# Patient Record
Sex: FEMALE | Race: WHITE | NOT HISPANIC OR LATINO | Employment: FULL TIME | URBAN - METROPOLITAN AREA
[De-identification: names, ages, dates, MRNs, and addresses within clinical notes are randomized per-mention and may not be internally consistent; named-entity substitution may affect disease eponyms.]

---

## 2017-01-03 ENCOUNTER — GENERIC CONVERSION - ENCOUNTER (OUTPATIENT)
Dept: OTHER | Facility: OTHER | Age: 57
End: 2017-01-03

## 2017-01-04 ENCOUNTER — GENERIC CONVERSION - ENCOUNTER (OUTPATIENT)
Dept: OTHER | Facility: OTHER | Age: 57
End: 2017-01-04

## 2017-01-04 LAB — INTERPRETATION (HISTORICAL): NORMAL

## 2017-01-11 ENCOUNTER — ALLSCRIPTS OFFICE VISIT (OUTPATIENT)
Dept: OTHER | Facility: OTHER | Age: 57
End: 2017-01-11

## 2017-01-11 LAB — HBA1C MFR BLD HPLC: 5.9 %

## 2017-01-16 ENCOUNTER — ALLSCRIPTS OFFICE VISIT (OUTPATIENT)
Dept: OTHER | Facility: OTHER | Age: 57
End: 2017-01-16

## 2017-02-23 ENCOUNTER — GENERIC CONVERSION - ENCOUNTER (OUTPATIENT)
Dept: OTHER | Facility: OTHER | Age: 57
End: 2017-02-23

## 2017-04-12 ENCOUNTER — GENERIC CONVERSION - ENCOUNTER (OUTPATIENT)
Dept: OTHER | Facility: OTHER | Age: 57
End: 2017-04-12

## 2017-04-12 LAB — HBA1C MFR BLD HPLC: 5.5 % (ref 4.8–5.6)

## 2017-04-13 LAB
A/G RATIO (HISTORICAL): 1.7 (ref 1.2–2.2)
ALBUMIN SERPL BCP-MCNC: 4 G/DL (ref 3.5–5.5)
ALP SERPL-CCNC: 96 IU/L (ref 39–117)
ALT SERPL W P-5'-P-CCNC: 54 IU/L (ref 0–32)
AST SERPL W P-5'-P-CCNC: 39 IU/L (ref 0–40)
BILIRUB SERPL-MCNC: 0.3 MG/DL (ref 0–1.2)
BUN SERPL-MCNC: 14 MG/DL (ref 6–24)
BUN/CREA RATIO (HISTORICAL): 18 (ref 9–23)
CALCIUM SERPL-MCNC: 9.5 MG/DL (ref 8.7–10.2)
CHLORIDE SERPL-SCNC: 103 MMOL/L (ref 96–106)
CHOLEST SERPL-MCNC: 153 MG/DL (ref 100–199)
CHOLEST/HDLC SERPL: 3.6 RATIO UNITS (ref 0–4.4)
CO2 SERPL-SCNC: 23 MMOL/L (ref 18–29)
CREAT SERPL-MCNC: 0.78 MG/DL (ref 0.57–1)
EGFR AFRICAN AMERICAN (HISTORICAL): 98 ML/MIN/1.73
EGFR-AMERICAN CALC (HISTORICAL): 85 ML/MIN/1.73
GLUCOSE SERPL-MCNC: 92 MG/DL (ref 65–99)
HDLC SERPL-MCNC: 42 MG/DL
INTERPRETATION (HISTORICAL): NORMAL
LDLC SERPL CALC-MCNC: 84 MG/DL (ref 0–99)
POTASSIUM SERPL-SCNC: 4 MMOL/L (ref 3.5–5.2)
SODIUM SERPL-SCNC: 143 MMOL/L (ref 134–144)
TOT. GLOBULIN, SERUM (HISTORICAL): 2.3 G/DL (ref 1.5–4.5)
TOTAL PROTEIN (HISTORICAL): 6.3 G/DL (ref 6–8.5)
TRIGL SERPL-MCNC: 135 MG/DL (ref 0–149)
VLDLC SERPL CALC-MCNC: 27 MG/DL (ref 5–40)

## 2017-04-19 ENCOUNTER — ALLSCRIPTS OFFICE VISIT (OUTPATIENT)
Dept: OTHER | Facility: OTHER | Age: 57
End: 2017-04-19

## 2017-05-18 ENCOUNTER — ALLSCRIPTS OFFICE VISIT (OUTPATIENT)
Dept: OTHER | Facility: OTHER | Age: 57
End: 2017-05-18

## 2017-08-18 ENCOUNTER — ALLSCRIPTS OFFICE VISIT (OUTPATIENT)
Dept: OTHER | Facility: OTHER | Age: 57
End: 2017-08-18

## 2017-08-21 ENCOUNTER — TRANSCRIBE ORDERS (OUTPATIENT)
Dept: ADMINISTRATIVE | Facility: HOSPITAL | Age: 57
End: 2017-08-21

## 2017-08-21 DIAGNOSIS — Z12.31 VISIT FOR SCREENING MAMMOGRAM: Primary | ICD-10-CM

## 2017-08-24 ENCOUNTER — HOSPITAL ENCOUNTER (OUTPATIENT)
Dept: RADIOLOGY | Facility: HOSPITAL | Age: 57
Discharge: HOME | End: 2017-08-24
Payer: COMMERCIAL

## 2017-08-24 DIAGNOSIS — Z12.31 VISIT FOR SCREENING MAMMOGRAM: ICD-10-CM

## 2017-08-24 PROCEDURE — G0202 SCR MAMMO BI INCL CAD: HCPCS

## 2017-09-22 ENCOUNTER — ALLSCRIPTS OFFICE VISIT (OUTPATIENT)
Dept: OTHER | Facility: OTHER | Age: 57
End: 2017-09-22

## 2017-11-01 ENCOUNTER — ALLSCRIPTS OFFICE VISIT (OUTPATIENT)
Dept: OTHER | Facility: OTHER | Age: 57
End: 2017-11-01

## 2017-11-02 NOTE — PROGRESS NOTES
Assessment  Assessed    1  Supraventricular tachycardia (427 89) (I47 1)   2  Morbid or severe obesity due to excess calories (278 01) (E66 01)   3  Benign essential hypertension (401 1) (I10)   4  Hyperlipidemia (272 4) (E78 5)   5  Severe obstructive sleep apnea (327 23) (G47 33)   6  Status post ablation of atrial flutter (V45 89) (Z98 890,Z86 79)    Plan  Morbid or severe obesity due to excess calories    · Belviq 10 MG Oral Tablet   Rx By: Ziggy Robertson; Dispense: 0 Days ; #:60 Tablet; Refill: 5;For: Morbid or severe obesity due to excess calories; ROBERT = N; Print Rx; Last Updated By: Demi Valladares; 11/1/2017 1:30:23 PM   · Contrave 8-90 MG Oral Tablet Extended Release 12 Hour; 1 tab qd for week, then 1  tab BID for a week, then 2 tabs in am 1 tab in PM for a week then 2 tabs  BID for a week   Rx By: Demi Valladares; Dispense: 0 Days ; #:70 Tablet Extended Release 12 Hour; Refill: 0;For: Morbid or severe obesity due to excess calories; ROBERT = N; Print Rx  Supraventricular tachycardia    · EKG/ECG- POC; Status:Complete;   Done: 88GYE5310   Perform: In Office; [facility]:89ASO8351; Last Updated By:Teagan Mars; 11/1/2017 1:02:03 PM;Ordered; For:Supraventricular tachycardia; Ordered By:Alli Guerin;    Discussion/Summary  Cardiology Discussion Summary Free Text Note Form [facility]:   1  SVT - Continue metoprolol and sotalol  If palpitation episodes increase, consider increasing metoprolol dosage  She does not want to make changes at this time  Encouraged to go to emergency room if palpitations do not resolve immediately  Atrial fibrillation - She is currently on sotalol and metoprolol  ASA is being used for CVA prophylaxis because of low CHADS score  QT interval wnl  HTN - BP controlledDyslipidemia - Pravastatin/fish oil  Weight loss/exercise  Obesity - will begin Contrave  Instructions for use and discount card given to patient  Will follow up in 1 month  Chief Complaint  Chief Complaint Free Text Note Form: 6 mo  f/u - no cardiac complaints  ylm/ma      History of Present Illness  Cardiology HPI Free Text Note Form [facility]: Ms Emiliano Cobos is a 61year old female for the followup of supraventricular tachycardia/atrial fibrillation  The patient was seen at [address] [place] [address] initially because of tachycardia  On 3/23/16, she underwent EPS/SVT ablation with ablation of atrial tachycardia but was complicated by atrial fibrillation which responded to sotalol and cardioversion  Since then, she has intermittent episodes of palpitations that occur up to 2*/week, lasting for a few minutes then resolving with Valsalva manoeuvre  She denies syncope or near syncope  She denies any shortness of breath or chest pain with exertion  is tolerating sotalol without any major side effects  She denies any lower extremity edema, orthopnea or paroxysmal nocturnal dyspnea  was recently prescribed Qysmia and then Belviq by Dr Anthony Alarcon but could not take because insurance did not cover  She is interested in discussing other options  Review of Systems  Cardiology Female ROS:     Cardiac: rhythm problems-- and-- palpitations present , but-- as noted in HPI,-- no chest pain,-- no fainting/blackouts-- and-- no signs of swelling  Skin: No complaints of nonhealing sores or skin rash  Genitourinary: No complaints of recurrent urinary tract infections, frequent urination at night, difficult urination, blood in urine, kidney stones, loss of bladder control, kidney problems, denies any birth control or hormone replacement, is not post menopausal, not currently pregnant  Psychological: No complaints of feeling depressed, anxiety, panic attacks, or difficulty concentrating  General: No complaints of trouble sleeping, lack of energy, fatigue, appetite changes, weight changes, fever, frequent infections, or night sweats  Respiratory: No complaints of shortness of breath, cough with sputum, or wheezing     HEENT: No complaints of serious problems, hearing problems, nose problems, throat problems, or snoring  Gastrointestinal: heartburn   Hematologic: No complaints of bleeding disorders, anemia, blood clots, or excessive brusing  Neurological: No complaints of numbness, tingling, dizziness, weakness, seizures, headaches, syncope or fainting, AM fatigue, daytime sleepiness, no witnessed apnea episodes  Musculoskeletal: arthritis-- and-- back pain   ROS Reviewed:   ROS reviewed  Active Problems  Problems    1  Adenomatous colon polyp (211 3) (D12 6)   2  Benign essential hypertension (401 1) (I10)   3  Controlled depression (311) (F32 9)   4  Esophageal reflux (530 81) (K21 9)   5  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)   6  Hyperlipidemia (272 4) (E78 5)   7  Impaired fasting glucose (790 21) (R73 01)   8  Morbid or severe obesity due to excess calories (278 01) (E66 01)   9  Need for influenza vaccination (V04 81) (Z23)   10  Severe obstructive sleep apnea (327 23) (G47 33)   11  Status post ablation of atrial flutter (V45 89) (Z98 890,Z86 79)   12  Supraventricular tachycardia (427 89) (I47 1)   13  Wears glasses (V49 89) (Z97 3)   14  Well adult on routine health check (V70 0) (Z00 00)    Past Medical History  Problems    1  History of Abdominal pain, RUQ (789 01) (R10 11)   2  History of Abnormal liver function test (790 6) (R79 89)   3  History of Adenomatosis (211 3) (D12 6)   4  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)   5  Benign essential hypertension (401 1) (I10)   6  History of Body mass index (BMI) of 50 0 to 59 9 in adult (V85 43) (Z68 43)   7  History of Carpal tunnel syndrome, unspecified laterality (354 0) (G56 00)   8  History of Cervicalgia (723 1) (M54 2)   9  History of Congenital pes planus (754 61) (Q66 50)   10  History of Cough (786 2) (R05)   11  History of Hemangioma of skin (228 01) (D18 01)   12  History of acute sinusitis (V12 69) (Z87 09)   13   History of atrial fibrillation (V12 59) (Z86 79)   14  History of atrial flutter (V12 59) (Z86 79)   15  History of depression (V11 8) (Z86 59)   16  History of gastroesophageal reflux (GERD) (V12 79) (Z87 19)   17  History of high cholesterol (V12 29) (Z86 39)   18  History of onychomycosis (V12 09) (Z86 19)   19  History of tinea corporis (V12 09) (Z86 19)   20  History of viral gastroenteritis (V12 09) (Z86 19)   21  History of Infectious gastroenteritis (009 0) (A09)   22  History of Ingrowing nail with infection (703 0) (L60 0)   23  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 4)   24  History of Myalgia And Myositis (729 1)   25  History of Osteoarthritis of both knees (715 96) (M17 0)   26  History of Paronychia of toe, unspecified laterality (681 11) (L03 039)   27  History of Paroxysmal atrial fibrillation (427 31) (I48 0)   28  History of Plantar fasciitis (728 71) (M72 2)   29  History of Preoperative examination (V72 84) (Z01 818)   30  History of S/P ablation of atrial flutter (V45 89) (Z98 890,Z86 79)   31  History of Scalp cyst (706 2) (L72 9)   32  History of Sprain Of Lateral Collateral Ligament Of Knee (844 0)   33  History of Sprain Of The Back (847 9)   34  History of Trapezius Muscle Strain (840 8)  Active Problems And Past Medical History Reviewed: The active problems and past medical history were reviewed and updated today  Surgical History  Problems    1  History of Abdominal Surgery   2  History of Appendectomy   3  History of Catheter Ablation Atrial Fibrillation   4  History of Colonoscopy (Fiberoptic) Screening   5  History of Hysterectomy   6  History of Neuroplasty Median Nerve At Carpal Tunnel   7  History of Reported Hx Of Knee Replacement   8  History of Tonsillectomy  Surgical History Reviewed: The surgical history was reviewed and updated today  Family History  Mother    1  Family history of arthritis (V17 7) (Z82 61)   2   Family history of chronic obstructive pulmonary disease (V17 6) (Z82 5)   3  Family history of diabetes mellitus (V18 0) (Z83 3)   4  Family history of hypertension (V17 49) (Z82 49)  Father    5  Family history of arthritis (V17 7) (Z82 61)   6  Family history of Prostate cancer  Son    7  Family history of cerebral aneurysm (V17 1) (Z82 49)  Family History Reviewed: The family history was reviewed and updated today  Social History  Problems    · Being A Social Drinker   · Current some day smoker (305 1) (F17 200)   · Former smoker (V15 82) (W78 400)   · Good sleep hygiene   · Denied: History of Illicit drug use   · Lack of exercise (V69 0) (Z72 3)   ·    · No caffeine use   · Pets/Animals: Dog  Social History Reviewed: The social history was reviewed and updated today  Current Meds   1  Aspir-Low 81 MG Oral Tablet Delayed Release; 1 Every Day; Therapy: 75KPQ0001 to  Requested for: 26Jun2015 Recorded   2  Belviq 10 MG Oral Tablet; 1 tab po bid; Therapy: 92YRV1940 to (Last Rx:05Oct2017) Ordered   3  Centrum Silver Ultra Womens Oral Tablet; TAKE 1 TABLET DAILY; Therapy: (Kristin Gredie) to Recorded   4  Crestor 20 MG Oral Tablet; TAKE 1 TABLET DAILY; Therapy: 85GDS5315 to (Evaluate:06Jan2018)  Requested for: 14UUN7729; Last   Rx:11Jan2017 Ordered   5  Esomeprazole Magnesium 40 MG Oral Capsule Delayed Release; TAKE 1 CAPSULE   Every morning; Therapy: 83VHD3265 to (Last Rx:70Wop2447)  Requested for: 85Voq2157 Ordered   6  Fish Oil Triple Strength 1400 MG Oral Capsule; Take 1 capsule twice daily; Therapy: (Recorded:09Cyz9611) to Recorded   7  Magnesium 400 MG CAPS; take 1 capsule daily; Therapy: 20VZJ0547 to (Evaluate:90Lju7928)  Requested for: 58NCS0433; Last   Rx:32Mmp3291 Ordered   8  Metoprolol Succinate ER 25 MG Oral Tablet Extended Release 24 Hour; take 1 tablet   twice a day; Therapy: 05BAH6122 to (Evaluate:41Xob9072)  Requested for: 55TWC7399; Last   Rx:50Cip8984 Ordered   9   PARoxetine HCl ER 25 MG Oral Tablet Extended Release 24 Hour; TAKE 1 TABLET   EVERY MORNING; Therapy: 57NTV8612 to (Last Rx:04Fpe0674)  Requested for: 12Xmj3776 Ordered   10  Sotalol HCl - 120 MG Oral Tablet; Take 1 tablet every 12 hours; Therapy: 15LNN7179 to (Last Rx:83Ola6231)  Requested for: 87Ikt4343 Ordered   11  Valsartan 160 MG Oral Tablet; TAKE 1 TABLET EVERY MORNING; Therapy: 49Umu4137 to (Last Angel Flynn)  Requested for: 25Tgt7652 Ordered  Medication List Reviewed: The medication list was reviewed and updated today  Allergies  Medication    1  Adhesive Tape TAPE    Vitals  Vital Signs    Recorded: 77UBA3195 01:04PM   Heart Rate 66, Apical   Systolic 041, LUE, Sitting   Diastolic 82, LUE, Sitting   BP CUFF SIZE Large   Height 5 ft 4 in   Weight 302 lb    BMI Calculated 51 84   BSA Calculated 2 33   O2 Saturation 97, RA     Physical Exam    Constitutional - General appearance: No acute distress, well appearing and well nourished  Eyes - Conjunctiva and Sclera examination: Conjunctiva pink, sclera anicteric  Neck - Normal, no JVD   Pulmonary - Respiratory effort: No signs of respiratory distress  -- Auscultation of lungs: Clear to auscultation  Cardiovascular - Auscultation of heart: Normal rate and rhythm, normal S1 and S2, no murmurs  -- Pedal pulses: Normal, 2+ bilaterally  -- Examination of extremities for edema and/or varicosities: Normal     Abdomen - Soft  Musculoskeletal - Gait and station: Normal gait  Skin - Skin: Normal without rashes  Skin is warm and well perfused  Neurologic - Speech normal  No focal deficits  Psychiatric - Orientation to person, place, and time: Normal       Results/Data  Diagnostic Studies Reviewed Cardio:   Echocardiogram/YASMIN: Normal ejection fraction  Normal valve function  ECG Report:   Rhythm and rate:  normal sinus rhythm  P-waves:   the P wave is normal    QRS: the QRS is normal   ST segment: QTc interval: 420      Health Management  History of Encounter for screening mammogram for malignant neoplasm of breast   Digital Bilateral Screening Mammogram With CAD; every 1 year; Last 08Tnr2080; Next Due:  79Oez4633;  Overdue    Future Appointments    Date/Time Provider Specialty Site   12/01/2017 01:00 PM Aniceto Little DO Cardiology [facility]IOLOG[facility]   11/06/2017 01:30 PM Amy Penn MD [facility]ology [facility]   01/15/2018 08:45 AM Amy Penn MD [facility]     Signatures   Electronically signed by : Amaya Dacosta DO; Nov 1 2017  2:20PM EST                       (Author)

## 2017-11-06 ENCOUNTER — ALLSCRIPTS OFFICE VISIT (OUTPATIENT)
Dept: OTHER | Facility: OTHER | Age: 57
End: 2017-11-06

## 2017-12-20 ENCOUNTER — GENERIC CONVERSION - ENCOUNTER (OUTPATIENT)
Dept: OTHER | Facility: OTHER | Age: 57
End: 2017-12-20

## 2017-12-20 ENCOUNTER — ALLSCRIPTS OFFICE VISIT (OUTPATIENT)
Dept: OTHER | Facility: OTHER | Age: 57
End: 2017-12-20

## 2018-01-10 NOTE — PROGRESS NOTES
History of Present Illness  Care Coordination Encounter Information:   Type of Encounter: Telephonic   Last Office Visit: 1/11/17       Care Coordination SL Nurse [facility]:   The reason for call is to discuss outreach for follow up/needed services and coordination of meeting care plan treatment goals  I attempted contact with Elmon Spatz as per the [facility] high risk list  She has a history of GI stromal sarcoma, obesity, elevated LFT with fatty liver disease  I provided my contact information and requested a call back  Active Problems    1  Abnormal liver function test (790 6) (R79 89)   2  Adenomatosis (211 3) (D12 6)   3  Adenomatous colon polyp (211 3) (D12 6)   4  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)   5  Atrial flutter (427 32) (I48 92)   6  Benign essential hypertension (401 1) (I10)   7  Body mass index (BMI) of 50 0 to 59 9 in adult (V85 43) (Z68 43)   8  Carpal tunnel syndrome, unspecified laterality (354 0) (G56 00)   9  Congenital pes planus (754 61) (Q66 50)   10  Depression (311) (F32 9)   11  Encounter for screening for malignant neoplasm of colon (V76 51) (Z12 11)   12  Encounter for screening mammogram for malignant neoplasm of breast (V76 12)    (Z12 31)   13  Esophageal reflux (530 81) (K21 9)   14  Flu vaccine need (V04 81) (Z23)   15  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)   16  Hemangioma of skin (228 01) (D18 01)   17  Hyperlipidemia (272 4) (E78 5)   18  Impaired fasting glucose (790 21) (R73 01)   19  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 4)   20  Morbid or severe obesity due to excess calories (278 01) (E66 01)   21  Myalgia And Myositis (729 1)   22  Need for prophylactic vaccination and inoculation against influenza (V04 81) (Z23)   23  Onychomycosis (110 1) (B35 1)   24  Osteoarthritis of both knees (715 96) (M17 0)   25  Paroxysmal atrial fibrillation (427 31) (I48 0)   26   Preoperative examination (V72 84) (Z01 818)   27  S/P ablation of atrial flutter (V45 89) (Z98 890,Z86 79)   28  Severe obstructive sleep apnea (327 23) (G47 33)   29  Status post ablation of atrial flutter (V45 89) (Z98 890,Z86 79)   30  Supraventricular tachycardia (427 89) (I47 1)   31  Wears glasses (V49 89) (Z97 3)   32  Well adult on routine health check (V70 0) (Z00 00)    Past Medical History    1  History of Abdominal pain, RUQ (789 01) (R10 11)   2  Benign essential hypertension (401 1) (I10)   3  History of Cervicalgia (723 1) (M54 2)   4  History of Cough (786 2) (R05)   5  History of acute sinusitis (V12 69) (Z87 09)   6  History of atrial fibrillation (V12 59) (Z86 79)   7  History of gastroesophageal reflux (GERD) (V12 79) (Z87 19)   8  History of high cholesterol (V12 29) (Z86 39)   9  History of tinea corporis (V12 09) (Z86 19)   10  History of viral gastroenteritis (V12 09) (Z86 19)   11  History of Infectious gastroenteritis (009 0) (A09)   12  History of Ingrowing nail with infection (703 0) (L60 0)   13  History of Paronychia of toe, unspecified laterality (681 11) (L03 039)   14  History of Plantar fasciitis (728 71) (M72 2)   15  History of Scalp cyst (706 2) (L72 9)   16  History of Sprain Of Lateral Collateral Ligament Of Knee (844 0)   17  History of Sprain Of The Back (847 9)   18  History of Trapezius Muscle Strain (840 8)    Surgical History    1  History of Abdominal Surgery   2  History of Appendectomy   3  History of Catheter Ablation Atrial Fibrillation   4  History of Colonoscopy (Fiberoptic) Screening   5  History of Hysterectomy   6  History of Neuroplasty Median Nerve At Carpal Tunnel   7  History of Reported Hx Of Knee Replacement   8  History of Tonsillectomy    Family History  Mother    1  Family history of arthritis (V17 7) (Z82 61)   2  Family history of chronic obstructive pulmonary disease (V17 6) (Z82 5)   3  Family history of diabetes mellitus (V18 0) (Z83 3)   4   Family history of hypertension (V17 49) (Z82 49)  Father    5  Family history of arthritis (V17 7) (Z82 61)   6  Family history of Prostate cancer  Son    7  Family history of cerebral aneurysm (V17 1) (Z82 49)    Social History    · Being A Social Drinker   · Current some day smoker (305 1) (F17 200)   · Former smoker (V15 82) (Z87 891)   · Good sleep hygiene   · Lack of exercise (V69 0) (Z72 3)   ·    · No caffeine use   · Pets/Animals: Dog    Current Meds    1  Magnesium 400 MG CAPS; take 1 capsule daily; Therapy: 83KQN8362 to (Evaluate:24Zcl0018)  Requested for: 79YBX9530; Last   Rx:11Eks3545 Ordered    2  Valsartan 160 MG Oral Tablet; TAKE 1 TABLET EVERY MORNING; Therapy: 99Zcq7612 to (Last Rx:54Xlv2389)  Requested for: 32Ldn6029 Ordered    3  PARoxetine HCl ER 25 MG Oral Tablet Extended Release 24 Hour; TAKE 1 TABLET   EVERY MORNING; Therapy: 09DCK0241 to (Last Rx:78Skq2205)  Requested for: 05Zmy9846 Ordered    4  Esomeprazole Magnesium 40 MG Oral Capsule Delayed Release; TAKE 1 CAPSULE   Every morning; Therapy: 26PIJ9509 to (Last Rx:51Ubz3501)  Requested for: 42XNU7988 Ordered    5  Fish Oil Triple Strength 1400 MG Oral Capsule; Take 1 capsule twice daily; Therapy: (Recorded:84Ppv1412) to Recorded    6  Crestor 20 MG Oral Tablet (Rosuvastatin Calcium); TAKE 1 TABLET DAILY; Therapy: 47RKH9534 to (Evaluate:06Jan2018)  Requested for: 66MQF9496; Last   Rx:11Jan2017 Ordered   7  Pravastatin Sodium 40 MG Oral Tablet (Pravachol); TAKE 1 TABLET AT BEDTIME; Therapy: 13SEB7578 to (Last Rx:60Tjc4961)  Requested for: 33Cfn9660 Ordered    8  Saxenda 18 MG/3ML Subcutaneous Solution Pen-injector; 3 mg SQ daily; Therapy: 12YDB1463 to (Last Rx:11Jan2017)  Requested for: 22ULB2273 Ordered    9  Metoprolol Succinate ER 25 MG Oral Tablet Extended Release 24 Hour; Take 1 tablet   twice a day; Therapy: 93PUU2617 to (Evaluate:46Qvw1571)  Requested for: 84Psy1334; Last   Rx:82Ccs0046 Ordered   10   Sotalol HCl - 120 MG Oral Tablet; take 1 tablet every twelve hours; Therapy: 85WOX5306 to (Evaluate:20Apr2017)  Requested for: 25Apr2016; Last    Rx:40Rpt6907 Ordered    11  Aspir-Low 81 MG Oral Tablet Delayed Release; 1 Every Day; Therapy: 47QKQ1935 to  Requested for: 26Jun2015 Recorded   12  Centrum Silver Ultra Womens Oral Tablet; TAKE 1 TABLET DAILY; Therapy: (Ortega Briggs) to Recorded    Allergies    1  Adhesive Tape TAPE    Health Management   Digital Bilateral Screening Mammogram With CAD; every 1 year; Last 30Apr2015; Next Due:  16Hwe5670; Overdue    End of Encounter Meds    1  Magnesium 400 MG CAPS; take 1 capsule daily; Therapy: 42PSH9573 to (Evaluate:06Tmj0983)  Requested for: 68SSO2108; Last   Rx:23Dtq6186 Ordered    2  Valsartan 160 MG Oral Tablet; TAKE 1 TABLET EVERY MORNING; Therapy: 41Wwt4583 to (Last Rx:07Mtw8295)  Requested for: 22Ybz9494 Ordered    3  PARoxetine HCl ER 25 MG Oral Tablet Extended Release 24 Hour; TAKE 1 TABLET   EVERY MORNING; Therapy: 88XLJ6653 to (Last Rx:48Dkd6235)  Requested for: 37Glq6557 Ordered    4  Esomeprazole Magnesium 40 MG Oral Capsule Delayed Release; TAKE 1 CAPSULE   Every morning; Therapy: 98ZYG0219 to (Last Rx:68Zfb3252)  Requested for: 73RYY8566 Ordered    5  Fish Oil Triple Strength 1400 MG Oral Capsule; Take 1 capsule twice daily; Therapy: (Recorded:69Lac9972) to Recorded    6  Crestor 20 MG Oral Tablet (Rosuvastatin Calcium); TAKE 1 TABLET DAILY; Therapy: 70SZS3812 to (Evaluate:06Jan2018)  Requested for: 52XSH2165; Last   Rx:11Jan2017 Ordered   7  Pravastatin Sodium 40 MG Oral Tablet (Pravachol); TAKE 1 TABLET AT BEDTIME; Therapy: 31QBG8084 to (Last Rx:20Zhv6673)  Requested for: 53Cxb5260 Ordered    8  Saxenda 18 MG/3ML Subcutaneous Solution Pen-injector; 3 mg SQ daily; Therapy: 76JHK2087 to (Last Rx:11Jan2017)  Requested for: 49TEL4914 Ordered    9  Metoprolol Succinate ER 25 MG Oral Tablet Extended Release 24 Hour; Take 1 tablet   twice a day;    Therapy: 95UID4794 to (Evaluate:46Cds4183)  Requested for: 53Pft4372; Last   Rx:09Uih1983 Ordered   10  Sotalol HCl - 120 MG Oral Tablet; take 1 tablet every twelve hours; Therapy: 74VWS7427 to (Evaluate:96Ujz9529)  Requested for: 25Apr2016; Last    Rx:89Lvv9067 Ordered    11  Aspir-Low 81 MG Oral Tablet Delayed Release; 1 Every Day; Therapy: 67KKJ3578 to  Requested for: 26Jun2015 Recorded   12  Centrum Silver Ultra Womens Oral Tablet; TAKE 1 TABLET DAILY; Therapy: (Cheron Pond) to Recorded    Future Appointments    Date/Time Provider Specialty Site   01/15/2018 08:45 AM Ward Oliveros MD Hematology Oncology [facility]   04/14/2017 08:00 AM JUAREZ Trejo  [address] [place] [address]     Patient Care Team    Care Team Member Role Specialty Office Number   Antoniofrancis [facility] MD Specialist General Surgery [phone]   Inder FOSTER  Referring Family Medicine [phone]) Lui FOSTER  Cardiology [phone]   Kaushik FOSTER  Cardiology [phone]   Ward Oliveros MD Specialist Hematology Oncology [phone]     Signatures   Electronically signed by :  Juno Mcdonald RN; Feb 23 2017 11:08AM EST                       (Author)

## 2018-01-11 NOTE — MISCELLANEOUS
Provider Comments  Provider Comments:   Dear Jarrett Ivory called the office to cancel your appointment for 8/1/16 but did not reschedule for another day  It is very important that you follow up with us so that we can assess your physical and nutritional safety  Please call our office at [phone] to reschedule your appointment       Sincerely,     [facility]            Signatures   Electronically signed by : Sanket Husain, ; Jul 27 2016  1:08PM EST                       (Author)    Electronically signed by : JUAREZ Razo ; Jul 27 2016  1:15PM EST                       (Co-author)

## 2018-01-12 VITALS
HEIGHT: 64 IN | WEIGHT: 293 LBS | TEMPERATURE: 96.9 F | HEART RATE: 88 BPM | DIASTOLIC BLOOD PRESSURE: 85 MMHG | RESPIRATION RATE: 16 BRPM | SYSTOLIC BLOOD PRESSURE: 120 MMHG | BODY MASS INDEX: 50.02 KG/M2

## 2018-01-12 VITALS
HEART RATE: 72 BPM | RESPIRATION RATE: 16 BRPM | TEMPERATURE: 97.1 F | WEIGHT: 290 LBS | HEIGHT: 64 IN | DIASTOLIC BLOOD PRESSURE: 64 MMHG | BODY MASS INDEX: 49.51 KG/M2 | SYSTOLIC BLOOD PRESSURE: 112 MMHG

## 2018-01-12 NOTE — PROCEDURES
Procedures by Torito Awad MD at 3/23/2016   2:13 PM      Author:  Torito Awad MD Service:  Electrophysiology Author Type:  Physician     Filed:  3/23/2016  2:50 PM Date of Service:  3/23/2016  2:13 PM Status:  Signed     :  Torito Awad MD (Physician)            Post procedure operative note      History and physical were reviewed  Patient was examined and history was reviewed  No change in patient's condition Since history and physical has been completed      The pre- operative diagnosis:  SVT, long RP tachycardia      Postoperative diagnosis:  SVT, long RP tachycardia  Recurrent  atrial fibrillation  Recurrent atrial flutter       Procedure:  SVT - Atrial Tachycardia - RF ablation   3D EAM  Comprehensive EP study   LA pacing and mapping   Pharmacologic stimulation     DCCV - 4 times for A fib       Surgeon: [address] [place] [address] Drive -none    Specimens - none    Estimated blood loss- 25 mL    Findings-none    Complications none    Anesthesia- modified by anesthesiologist, local lidocaine by myself      Details of the procedure    Sheaths used  All  access was obtained under ultrasound guidance  Right femoral vein- 8F, 5F, 5F  Left femoral vein- 7F, 6F, 5F      Catheters used  HRA catheters - Cash  His catheter - Leandra  RV catheter - Cash  Coronary sinus catheter  Ablation catheter -  Irrigated , pressure sensing, DF curve  Pentaray - DF curve      Imaging systems used  1  Fluoroscopy   Right anterior oblique views were used whenever we needed to determine between anterior and posterior  Left anterior oblique views were used whenever we needed to determine between right and left  2  Electrotomy mapping - 3D electro-anatomic mapping was done by the CARTO  System  This was also used whenever catheter was moved  The His bundle was marked        Anticoagulation:  Heparin was used for anticoagulation to keep ACT in the therapeutic range , all ablation in right side, -300 ms      Details of the ablation      Details of the electrophysiologic study    Pre-procedure - Baseline  AH-55  HV- 37  QRS-100  QT-450  MT- 110  Basal CL-980      Ventricular stimulation:  Decremental -VA wenckebach -300  Extra-stimuli -  VERP -600/280      Atrial stimulation:  Decremental - AV wenckebach -400  Extra -stimuli -  AERP -600/280      Tachycardia and maneuvers :  CL -   QRS -  VA interval-    Retrograde RBBB- suggestive of all AVN conduction  Parahisian pacing -suggestive of all AVN conduction    Ventricular entrainment  1  VAAV response  - suggestive of AT  Repeated and conformed AT    Atrial entrainment - not succesful        Ablation :  The patient was brought to the electrophysiologic laboratory and a comprehensive EP study was done  Ventricular extrastimuli showed retrograde right bundle branch block suggesting all conduction was gil  Para hisian pacing was all gil conduction  On atrial stimulation - AH jump present, single echo beats present  However when tachycardia was induced, it is long RP tachycardia- Keith maneuvers gave VA AV response    Atrial beat in the tachycardia - solitary atrial beat was identified after we come off ventricular entrainment, as it is not contaminated by the P wave  It is negative in lead V1 and positive in lead 1 and aVL  It is negative in leads 23 aVF    Initial mapping was done with penta-ray and subsequent with ablation catheter  Site of activation was found - right atrium, inferiorly and laterally and posteriorly  Please look at the CARTO map    We isolated the earliest point:  It was earlier by about 50 ms on Prucka when compared to proximal CS  A signal, and on unipolar had QS  Coming on with ablation the patient did go into tachycardia , which terminated  while we were on ablation   However when I try to consolidate the point with ablation around it, the patient kept going into atrial fibrillation  In total we did form a sukumar but she needed 4 cardioversions    At this time but decided to stop any ablation                    Post procedure :  CL -980  AH - 60  HV-40  QRS -98  QT-450      Post Procedure Testing : The patient went into atrial fibrillation when we restarted post procedure testing and had to be cardioverted  We waited for around 30 minutes post ablation  Patient did not have any more atrial tachycardia        Summary of Procedure :  Patient was evaluated and underwent radiofrequency ablation for long RP tachycardia which was suggestive of atrial tachycardia  During the procedure she had recurrent episodes of atrial fibrillation and atrial flutter    Patient definitely needs to be on antiarrhythmics and anticoagulation  My suggestion would be to use sotalol and any anticoagulation that her insurance will cover  She will also need a sleep study  If antiarrhythmics failed and she should be set up for a combined atrial fibrillation and atrial flutter ablation                     Received Anny FOSTER    Mar 23 2016  2:50PM [facility] Standard Time

## 2018-01-12 NOTE — CONSULTS
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 and placed on Gleevec 400 daily since 2006 (patient had been on the medication for 7+ years - discontinued in December 2013)  Disease involved mesentery and small bowel, and low-grade (2/10 mitotic rate)  Patient is back for followup  Mrs Juan F Yost states feeling okay, baseline  No nausea, vomiting, diarrhea or abdominal pain  No fevers or signs of infection  No problems with excessive bruising or bleeding  Appetite is good; weight is stable  Mrs Juan F Yost continues to go about her normal daily activities  Patient states that routine health maintenance and medical care is up-to-date  Mrs Juan F Yost recently underwent upper and lower endoscopies  3 polyps were found in the upper endoscopy and 2 polyps in the colon  Patient continues to try to lose weight  Review of Systems    Constitutional: as noted in HPI  Eyes: No complaints of eye pain, no red eyes, no eyesight problems, no discharge, no dry eyes, no itching of eyes  ENT: no complaints of earache, no loss of hearing, no nose bleeds, no nasal discharge, no sore throat, no hoarseness  Cardiovascular: No complaints of slow heart rate, no fast heart rate, no chest pain, no palpitations, no leg claudication, no lower extremity edema  Respiratory: No complaints of shortness of breath, no wheezing, no cough, no SOB on exertion, no orthopnea, no PND  Gastrointestinal: as noted in HPI  Genitourinary: No complaints of dysuria, no incontinence, no pelvic pain, no dysmenorrhea, no vaginal discharge or bleeding     Musculoskeletal: No complaints of arthralgias, no myalgias, no joint swelling or stiffness, no limb pain or swelling  Integumentary: No complaints of skin rash or lesions, no itching, no skin wounds, no breast pain or lump  Neurological: No complaints of headache, no confusion, no convulsions, no numbness, no dizziness or fainting, no tingling, no limb weakness, no difficulty walking  Psychiatric: Not suicidal, no sleep disturbance, no anxiety or depression, no change in personality, no emotional problems  Endocrine: No complaints of proptosis, no hot flashes, no muscle weakness, no deepening of the voice, no feelings of weakness  Hematologic/Lymphatic: No complaints of swollen glands, no swollen glands in the neck, does not bleed easily, does not bruise easily  Active Problems    1  Abnormal liver function test (790 6) (R79 89)   2  Adenomatosis (211 3) (D12 6)   3  Adenomatous colon polyp (211 3) (D12 6)   4  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)   5  Atrial flutter (427 32) (I48 92)   6  Benign essential hypertension (401 1) (I10)   7  Carpal tunnel syndrome, unspecified laterality (354 0) (G56 00)   8  Congenital pes planus (754 61) (Q66 50)   9  Depression (311) (F32 9)   10  Encounter for screening for malignant neoplasm of colon (V76 51) (Z12 11)   11  Encounter for screening mammogram for malignant neoplasm of breast (V76 12)    (Z12 31)   12  Esophageal reflux (530 81) (K21 9)   13  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)   14  Hemangioma of skin (228 01) (D18 01)   15  Hyperlipidemia (272 4) (E78 5)   16  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 4)   17  Morbid or severe obesity due to excess calories (278 01) (E66 01)   18  Myalgia And Myositis (729 1)   19  Need for prophylactic vaccination and inoculation against influenza (V04 81) (Z23)   20  Onychomycosis (110 1) (B35 1)   21  Osteoarthritis of both knees (715 96) (M17 0)   22  Preoperative examination (V72 84) (Z01 818)   23  Wears glasses (V49 89) (Z97 3)   24  Well adult on routine health check (V70 0) (Z00 00)    Past Medical History    · History of Abdominal pain, RUQ (789 01) (R10 11)   · History of Cervicalgia (723 1) (M54 2)   · History of Cough (786 2) (R05)   · History of acute sinusitis (V12 69) (Z87 09)   · History of tinea corporis (V12 09) (Z86 19)   · History of viral gastroenteritis (V12 09) (Z86 19)   · History of Infectious gastroenteritis (009 0) (A09)   · History of Ingrowing nail with infection (703 0) (L60 0)   · History of Paronychia of toe, unspecified laterality (681 11) (L03 039)   · History of Plantar fasciitis (728 71) (M72 2)   · History of Scalp cyst (706 2) (L72 9)   · History of Sprain Of Lateral Collateral Ligament Of Knee (844 0)   · History of Sprain Of The Back (847 9)   · History of Trapezius Muscle Strain (840 8)    Surgical History    · History of Abdominal Surgery   · History of Appendectomy   · History of Hysterectomy    The surgical history was reviewed and updated today  Family History    · Family history of chronic obstructive pulmonary disease (V17 6) (Z82 5)   · Family history of diabetes mellitus (V18 0) (Z83 3)   · Family history of hypertension (V17 49) (Z82 49)    · Family history of Prostate cancer    · Family history of cerebral aneurysm (V17 1) (Z82 49)    Social History    · Being A Social Drinker   · Former smoker (V15 82) (O13 053)   · No caffeine use    Current Meds   1  Aspir-Low 81 MG Oral Tablet Delayed Release; 1 Every Day; Therapy: 73ULV9363 to  Requested for: 26Jun2015 Recorded   2  Centrum Silver Ultra Womens Oral Tablet; TAKE 1 TABLET DAILY; Therapy: (Michell Foil) to Recorded   3  Esomeprazole Magnesium 40 MG Oral Capsule Delayed Release; TAKE 1 CAPSULE   Every morning; Therapy: 05QIH3031 to (Last Rx:25Nov2015)  Requested for: 06LUF0931 Ordered   4  Fish Oil Triple Strength 1400 MG Oral Capsule; take 1 capsule daily; Therapy: (Michell Foil) to Recorded   5   Metoprolol Tartrate 50 MG Oral Tablet; Therapy: 66JYM3903 to (Evaluate:19Mar2015) Recorded   6  PARoxetine HCl ER 25 MG Oral Tablet Extended Release 24 Hour; TAKE 1 TABLET   EVERY MORNING; Therapy: 90LHL0934 to (Last Rx:04Aug2015)  Requested for: 48Fho4429 Ordered   7  Pravastatin Sodium 40 MG Oral Tablet; TAKE 1 TABLET AT BEDTIME; Therapy: 46TQQ0040 to (Last Rx:19Nov2015)  Requested for: 30HID1285 Ordered   8  Valsartan 160 MG Oral Tablet; TAKE 1 TABLET EVERY MORNING; Therapy: 02Prn6713 to (Last Rx:16Nov2015)  Requested for: 32PYG3756 Ordered    Allergies    1  Adhesive Tape TAPE    Vitals   Recorded: 24BZY9350 70:23EW   Systolic 299   Diastolic 80   Height 5 ft 4 in   Weight 291 lb    BMI Calculated 49 95   BSA Calculated 2 31     Physical Exam    Constitutional   General appearance: No acute distress, well appearing and well nourished  No apparent distress  Eyes   Conjunctiva and lids: No swelling, erythema or discharge  Pupils and irises: Equal, round and reactive to light  Ears, Nose, Mouth, and Throat   External inspection of ears and nose: Normal     Nasal mucosa, septum, and turbinates: Normal without edema or erythema  Oropharynx: Normal with no erythema, edema, exudate or lesions  Pulmonary Clear bilaterally, distant breath sounds  Auscultation of lungs: Clear to auscultation  Cardiovascular   Palpation of heart: Normal PMI, no thrills  Auscultation of heart: Normal rate and rhythm, normal S1 and S2, without murmurs  Distant heart sounds  Examination of extremities for edema and/or varicosities: Abnormal   1+ bilateral lower extremity edema, no cords, pulses are 1+  Abdomen   Abdomen: Non-tender, no masses  Liver and spleen: Abnormal   Obese cannot palpate liver or spleen  Lymphatic No adenopathy in the neck, supraclavicular area, axilla and groin bilaterally  Musculoskeletal   Gait and station: Normal     Digits and nails: Normal without clubbing or cyanosis      Inspection/palpation of joints, bones, and muscles: Normal     Skin   Skin and subcutaneous tissue: Normal without rashes or lesions  Moist, good color, no ecchymoses  Neurologic   Cranial nerves: Cranial nerves 2-12 intact  Reflexes: 2+ and symmetric  Sensation: No sensory loss  Psychiatric   Orientation to person, place, and time: Normal     Mood and affect: Normal          Results/Data    Results   Radiology CAT scan of the chest and abdomen/pelvis from December 17 2014 did not demonstrate any evidence of recurrent disease  Patient had a stable right adrenal adenoma and a nonobstructing 2 mm up right renal calculus  CAT scan of the chest and abdomen/pelvis performed on November 14, 2013 did not demonstrate any significant abnormalities in the chest  Patient has a stable right adrenal adenoma but no evidence of recurrent GIST tumor  Patient had a small cyst in her right kidney  CAT scan of the abdomen/pelvis performed on September 24, 2012 did not demonstrate any evidence of recurrent or residual GIST tumor  Patient had hepatic steatosis and a stable right adrenal abnormal      Lab Results 12/18/15 WBC = 5 6 hemoglobin = 14 2 hematocrit = 43 platelet = 478 BUN = 19 creatinine = 0 75 AST = 71 ALT = 87 total bilirubin = 0 5 alkaline phosphatase = 102 LDH = 209  6/19/15 WBC = 5 7 hemoglobin = 13 4 hematocrit = 40 platelet = 594 BUN = 11 creatinine = 0 4 LFTs WNL LDH = 188  12/3/14 WBC = 6 6 hemoglobin = 13 9 hematocrit = 42 platelet = 297 BUN = 16 creatinine = 0 75 LFTs WNL LDH = 184 ESR = 30  11/1/13 WBC = 5 6 hemoglobin = 12 2 hematocrit = 39 platelet = 933  8/23/67 BUN = 13 creatinine = 0 79 LFTs WNL LDH = 241 ESR = 14  Assessment    1  Abnormal liver function test (790 6) (R79 89)   2   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)    Plan  Gastrointestinal stromal sarcoma of digestive system    · Follow Up After Tests Complete Evaluation and Treatment  Follow-up  Status: Hold For -  Scheduling  Requested for: 60NEE0947   Ordered; For: Gastrointestinal stromal sarcoma of digestive system; Ordered By: Inez Lord Performed:  Due: 11USF4066   · (1) CBC/PLT/DIFF; Status:Active; Requested REW:09YFB3818;    Perform:[facility]; Due:11Jan2018; Ordered; For:Gastrointestinal stromal sarcoma of digestive system; Ordered By:Alexander Peñaloza;   · (1) COMPREHENSIVE METABOLIC PANEL; Status:Active; Requested CKV:09BME2047;    Perform:[facility]; Due:11Jan2018; Ordered; For:Gastrointestinal stromal sarcoma of digestive system; Ordered By:Ricki Jasmine;   · (1) LD (LDH); Status:Active; Requested JXR:62GBF8381;    Perform:[facility]; Due:11Jan2018; Ordered; For:Gastrointestinal stromal sarcoma of digestive system; Ordered By:Alexander Peñaloza;  Well adult on routine health check    · * MAMMO SCREENING BILATERAL W CAD; Status:Hold For - Scheduling; Requested  BVV:11GJL7864;    Perform:[facility]; LTE:59LJJ5341;UUHWZBT; For:Well adult on routine health check; Ordered By:Alexander Peñaloza;    Discussion/Summary    76-year-old female with history of GIST tumor status post resection and 8 5 years of [address]  Patient feels well and clinically there are no troubling signs  Recent blood work was good/WNL  Mrs Lisy Funes is to return in 13 months with blood work before  No followup scans will be ordered unless patient has any new signs or symptoms (we discussed what to monitor for in regard to recurrence)  Routine health maintenance and medical care is up-to-date  Patient follows up with GI in 3 years  Patient is to call the office if she has any oncology questions or concerns  I will get a copy of the recent upper and lower endoscopy reports to the chart  Patient states that the elevated LFTs is reportedly due to a fatty liver  The values are higher than before  I will request an interval LFTs in 6 months  Carefully review your medication list and verify that the list is accurate and up-to-date   Please call the hematology/oncology office if there are medications missing from the list, medications on the list that you are not currently taking or if there is a dosage or instruction that is different from how you're taking a medication  Health Management   Digital Bilateral Screening Mammogram With CAD; every 1 year; Last 30Apr2015; Next Due:  30Apr2016; Active    Thank you very much for allowing me to participate in the care of this patient  If you have any questions, please do not hesitate to contact me        Signatures   Electronically signed by : Hernandez Woodard MD; Jan 11 2016 12:31PM EST                       (Author)

## 2018-01-13 VITALS
DIASTOLIC BLOOD PRESSURE: 70 MMHG | BODY MASS INDEX: 50.02 KG/M2 | HEIGHT: 64 IN | WEIGHT: 293 LBS | TEMPERATURE: 96.7 F | RESPIRATION RATE: 16 BRPM | HEART RATE: 96 BPM | SYSTOLIC BLOOD PRESSURE: 110 MMHG

## 2018-01-14 VITALS
HEIGHT: 64 IN | DIASTOLIC BLOOD PRESSURE: 70 MMHG | OXYGEN SATURATION: 98 % | BODY MASS INDEX: 50.02 KG/M2 | HEART RATE: 90 BPM | SYSTOLIC BLOOD PRESSURE: 104 MMHG | WEIGHT: 293 LBS

## 2018-01-14 VITALS
HEART RATE: 80 BPM | HEIGHT: 64 IN | DIASTOLIC BLOOD PRESSURE: 80 MMHG | WEIGHT: 293 LBS | BODY MASS INDEX: 50.02 KG/M2 | TEMPERATURE: 96.8 F | RESPIRATION RATE: 16 BRPM | SYSTOLIC BLOOD PRESSURE: 130 MMHG

## 2018-01-14 VITALS
BODY MASS INDEX: 50.02 KG/M2 | DIASTOLIC BLOOD PRESSURE: 82 MMHG | HEIGHT: 64 IN | WEIGHT: 293 LBS | OXYGEN SATURATION: 97 % | HEART RATE: 66 BPM | SYSTOLIC BLOOD PRESSURE: 122 MMHG

## 2018-01-14 NOTE — MISCELLANEOUS
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)   5  Atrial flutter (427 32) (I48 92)   6  Benign essential hypertension (401 1) (I10)   7  Carpal tunnel syndrome, unspecified laterality (354 0) (G56 00)   8  Congenital pes planus (754 61) (Q66 50)   9  Depression (311) (F32 9)   10  Encounter for screening for malignant neoplasm of colon (V76 51) (Z12 11)   11  Encounter for screening mammogram for malignant neoplasm of breast (V76 12)    (Z12 31)   12  Esophageal reflux (530 81) (K21 9)   13  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)   14  Hemangioma of skin (228 01) (D18 01)   15  Hyperlipidemia (272 4) (E78 5)   16  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 4)   17  Morbid or severe obesity due to excess calories (278 01) (E66 01)   18  Myalgia And Myositis (729 1)   19  Need for prophylactic vaccination and inoculation against influenza (V04 81) (Z23)   20  Onychomycosis (110 1) (B35 1)   21  Osteoarthritis of both knees (715 96) (M17 0)   22  Paroxysmal atrial fibrillation (427 31) (I48 0)   23  Preoperative examination (V72 84) (Z01 818)   24  Supraventricular tachycardia (427 89) (I47 1)   25  Wears glasses (V49 89) (Z97 3)   26  Well adult on routine health check (V70 0) (Z00 00)    Past Medical History    1  History of Abdominal pain, RUQ (789 01) (R10 11)   2  Benign essential hypertension (401 1) (I10)   3   History of Cervicalgia (723 1) (M54 2)   4  History of Cough (786 2) (R05)   5  History of acute sinusitis (V12 69) (Z87 09)   6  History of atrial fibrillation (V12 59) (Z86 79)   7  History of gastroesophageal reflux (GERD) (V12 79) (Z87 19)   8  History of high cholesterol (V12 29) (Z86 39)   9  History of tinea corporis (V12 09) (Z86 19)   10  History of viral gastroenteritis (V12 09) (Z86 19)   11  History of Infectious gastroenteritis (009 0) (A09)   12  History of Ingrowing nail with infection (703 0) (L60 0)   13  History of Paronychia of toe, unspecified laterality (681 11) (L03 039)   14  History of Plantar fasciitis (728 71) (M72 2)   15  History of Scalp cyst (706 2) (L72 9)   16  History of Sprain Of Lateral Collateral Ligament Of Knee (844 0)   17  History of Sprain Of The Back (847 9)   18  History of Trapezius Muscle Strain (840 8)    Surgical History    1  History of Abdominal Surgery   2  History of Appendectomy   3  History of Colonoscopy (Fiberoptic) Screening   4  History of Hysterectomy   5  History of Neuroplasty Median Nerve At Carpal Tunnel   6  History of Reported Hx Of Knee Replacement   7  History of Tonsillectomy    Family History    1  Family history of arthritis (V17 7) (Z82 61)   2  Family history of chronic obstructive pulmonary disease (V17 6) (Z82 5)   3  Family history of diabetes mellitus (V18 0) (Z83 3)   4  Family history of hypertension (V17 49) (Z82 49)    5  Family history of arthritis (V17 7) (Z82 61)   6  Family history of Prostate cancer    7  Family history of cerebral aneurysm (V17 1) (Z82 49)    Social History    · Being A Social Drinker   · Current some day smoker (305 1) (F17 210)   · Former smoker (V15 82) (Z87 891)   · Good sleep hygiene   · Lack of exercise (V69 0) (Z72 3)   ·    · No caffeine use   · Pets/Animals: Dog    Current Meds   1  Aspir-Low 81 MG Oral Tablet Delayed Release; 1 Every Day; Therapy: 81HPD8115 to  Requested for: 26Jun2015 Recorded   2   Centrum Silver Ultra Womens Oral Tablet; TAKE 1 TABLET DAILY; Therapy: (Rhododendron Goad) to Recorded   3  Esomeprazole Magnesium 40 MG Oral Capsule Delayed Release; TAKE 1 CAPSULE   Every morning; Therapy: 00FKU6365 to (Last Rx:25Nov2015)  Requested for: 51HBS9191 Ordered   4  Fish Oil Triple Strength 1400 MG Oral Capsule; take 1 capsule daily; Therapy: (Leanne Goad) to Recorded   5  Magnesium Oxide 400 MG Oral Tablet; TAKE 1 TABLET TWICE DAILY; Therapy: 60TLR7949 to (Evaluate:60Cas5882)  Requested for: 25Mar2016; Last   Rx:25Mar2016 Ordered   6  Metoprolol Succinate ER 25 MG Oral Tablet Extended Release 24 Hour; Take 1 tablet   twice a day; Therapy: 63JFL3632 to (Evaluate:30Qgg3084)  Requested for: 25Mar2016; Last   Rx:25Mar2016 Ordered   7  PARoxetine HCl ER 25 MG Oral Tablet Extended Release 24 Hour; TAKE 1 TABLET   EVERY MORNING; Therapy: 05MQF8018 to (Last Rx:31Jan2016)  Requested for: 70GFG4865 Ordered   8  Pravastatin Sodium 40 MG Oral Tablet; TAKE 1 TABLET AT BEDTIME; Therapy: 50VJW2185 to (Last Rx:19Nov2015)  Requested for: 42EPZ3192 Ordered   9  Sotalol HCl - 120 MG Oral Tablet; take 1 tablet every twelve hours; Therapy: 65LGB5711 to (Evaluate:83Rnn1328)  Requested for: 25Mar2016; Last   Rx:25Mar2016 Ordered   10  Valsartan 160 MG Oral Tablet; TAKE 1 TABLET EVERY MORNING; Therapy: 62Xqq4634 to (Last Rx:16Nov2015)  Requested for: 10VSB2759 Ordered    Allergies    1  Adhesive Tape TAPE    Health Management  Encounter for screening mammogram for malignant neoplasm of breast   Digital Bilateral Screening Mammogram With CAD; every 1 year; Last 30Apr2015;  Next Due:  85Rkf5233; Near Due    Future Appointments    Date/Time Provider Specialty Site   04/25/2016 03:30 PM Rakesh Jackson DO [facility]   01/16/2017 11:30 AM Steve Palacios MD Hematology [facility]     Signatures   Electronically signed by : JUAREZ Yin ; Apr 12 2016  5:52PM EST (Author)

## 2018-01-15 ENCOUNTER — ALLSCRIPTS OFFICE VISIT (OUTPATIENT)
Dept: OTHER | Facility: OTHER | Age: 58
End: 2018-01-15

## 2018-01-15 VITALS
HEIGHT: 64 IN | HEART RATE: 76 BPM | OXYGEN SATURATION: 93 % | BODY MASS INDEX: 50.02 KG/M2 | RESPIRATION RATE: 16 BRPM | TEMPERATURE: 96.2 F | WEIGHT: 293 LBS | DIASTOLIC BLOOD PRESSURE: 80 MMHG | SYSTOLIC BLOOD PRESSURE: 120 MMHG

## 2018-01-17 NOTE — CONSULTS
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 and placed on Gleevec 400 daily since 2006 (patient had been on the medication for 9+ years - discontinued in December 2013)  Disease involved mesentery and small bowel, and low-grade (2/10 mitotic rate)  Patient is back for followup  Mrs Lisy Funes felt a lump in her left flank approximately 3 weeks ago  The lesion is approximately the size of a pea  Lesion is sometimes painful  Patient otherwise feels well  No nausea, vomiting, diarrhea or abdominal pain  No fevers or signs of infection  No problems with excessive bruising or bleeding  Appetite is good; weight control is an issue  Mrs Lisy Funes continues to go about her normal daily activities  Patient states that routine health maintenance and medical care is up-to-date  Mrs Lisy Funes has a history of abnormal LFTs believed to be due to a fatty liver  Cholesterol-lowering agent was changed last year in the liver function tests have come back down to normal     Patient cut her right lateral ankle while shaving her legs approximately 2 weeks ago  Lesion has not healed and in fact there is some discharge  Review of Systems    Constitutional: as noted in HPI  Eyes: No complaints of eye pain, no red eyes, no eyesight problems, no discharge, no dry eyes, no itching of eyes  ENT: no complaints of earache, no loss of hearing, no nose bleeds, no nasal discharge, no sore throat, no hoarseness  Cardiovascular: No complaints of slow heart rate, no fast heart rate, no chest pain, no palpitations, no leg claudication, no lower extremity edema     Respiratory: No complaints of shortness of breath, no wheezing, no cough, no SOB on exertion, no orthopnea, no PND  Gastrointestinal: as noted in HPI  Genitourinary: No complaints of dysuria, no incontinence, no pelvic pain, no dysmenorrhea, no vaginal discharge or bleeding  Musculoskeletal: myalgias and Flank pain, but as noted in HPI  Integumentary: No complaints of skin rash or lesions, no itching, no skin wounds, no breast pain or lump  Neurological: No complaints of headache, no confusion, no convulsions, no numbness, no dizziness or fainting, no tingling, no limb weakness, no difficulty walking  Psychiatric: Not suicidal, no sleep disturbance, no anxiety or depression, no change in personality, no emotional problems  Endocrine: No complaints of proptosis, no hot flashes, no muscle weakness, no deepening of the voice, no feelings of weakness  Hematologic/Lymphatic: No complaints of swollen glands, no swollen glands in the neck, does not bleed easily, does not bruise easily  Active Problems    1  Adenomatous colon polyp (211 3) (D12 6)   2  Benign essential hypertension (401 1) (I10)   3  Controlled depression (311) (F32 9)   4  Esophageal reflux (530 81) (K21 9)   5  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)   6  Hyperlipidemia (272 4) (E78 5)   7  Impaired fasting glucose (790 21) (R73 01)   8  Morbid or severe obesity due to excess calories (278 01) (E66 01)   9  Need for influenza vaccination (V04 81) (Z23)   10  Severe obstructive sleep apnea (327 23) (G47 33)   11  Status post ablation of atrial flutter (V45 89) (Z98 890,Z86 79)   12  Supraventricular tachycardia (427 89) (I47 1)   13  Wears glasses (V49 89) (Z97 3)   14   Well adult on routine health check (V70 0) (Z00 00)    Past Medical History    · History of Abdominal pain, RUQ (789 01) (R10 11)   · History of Abnormal liver function test (790 6) (R79 89)   · History of Adenomatosis (211 3) (D12 6)   · 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)   · Benign essential hypertension (401 1) (I10)   · History of Body mass index (BMI) of 50 0 to 59 9 in adult (V85 43) (N55 09)   · History of Carpal tunnel syndrome, unspecified laterality (354 0) (G56 00)   · History of Cervicalgia (723 1) (M54 2)   · History of Congenital pes planus (754 61) (Q66 50)   · History of Cough (786 2) (R05)   · History of Hemangioma of skin (228 01) (D18 01)   · History of acute sinusitis (V12 69) (Z87 09)   · History of atrial fibrillation (V12 59) (Z86 79)   · History of atrial flutter (V12 59) (Z86 79)   · History of depression (V11 8) (Z86 59)   · History of gastroesophageal reflux (GERD) (V12 79) (Z87 19)   · History of high cholesterol (V12 29) (Z86 39)   · History of onychomycosis (V12 09) (Z86 19)   · History of tinea corporis (V12 09) (Z86 19)   · History of viral gastroenteritis (V12 09) (Z86 19)   · History of Infectious gastroenteritis (009 0) (A09)   · History of Ingrowing nail with infection (703 0) (L60 0)   · 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 4)   · History of Myalgia And Myositis (729 1)   · History of Osteoarthritis of both knees (715 96) (M17 0)   · History of Paronychia of toe, unspecified laterality (681 11) (L03 039)   · History of Paroxysmal atrial fibrillation (427 31) (I48 0)   · History of Plantar fasciitis (728 71) (M72 2)   · History of Preoperative examination (V72 84) (Z01 818)   · History of S/P ablation of atrial flutter (V45 89) (Z98 890,Z86 79)   · History of Scalp cyst (706 2) (L72 9)   · History of Sprain Of Lateral Collateral Ligament Of Knee (844 0)   · History of Sprain Of The Back (847 9)   · History of Trapezius Muscle Strain (840 8)    Surgical History    · History of Abdominal Surgery   · History of Appendectomy   · History of Catheter Ablation Atrial Fibrillation   · History of Colonoscopy (Fiberoptic) Screening   · History of Hysterectomy   · History of Neuroplasty Median Nerve At Carpal Tunnel   · History of Reported Hx Of Knee Replacement   · History of Tonsillectomy    The surgical history was reviewed and updated today  Family History    · Family history of arthritis (V17 7) (Z82 61)   · Family history of chronic obstructive pulmonary disease (V17 6) (Z82 5)   · Family history of diabetes mellitus (V18 0) (Z83 3)   · Family history of hypertension (V17 49) (Z82 49)    · Family history of arthritis (V17 7) (Z82 61)   · Family history of Prostate cancer    · Family history of cerebral aneurysm (V17 1) (Z82 49)    Social History    · Being A Social Drinker   · Current some day smoker (305 1) (F17 200)   · Former smoker (V15 82) (Z87 891)   · Good sleep hygiene   · Denied: History of Illicit drug use   · Lack of exercise (V69 0) (Z72 3)   ·    · No caffeine use   · Pets/Animals: Dog    Current Meds   1  Aspir-Low 81 MG Oral Tablet Delayed Release; 1 Every Day; Therapy: 27NTW3661 to  Requested for: 26Jun2015 Recorded   2  Centrum Silver Ultra Womens Oral Tablet; TAKE 1 TABLET DAILY; Therapy: (Nicki Colon) to Recorded   3  Contrave 8-90 MG Oral Tablet Extended Release 12 Hour; 1 tab qd for week, then 1 tab   BID for a week, then 2 tabs in am 1 tab in PM for a week then 2 tabs BID   for a week; Therapy: 58MYL9050 to (Last Rx:01Nov2017) Ordered   4  Crestor 20 MG Oral Tablet; TAKE 1 TABLET DAILY; Therapy: 86QZR5437 to (Evaluate:06Jan2018)  Requested for: 19UAE5187; Last   Rx:11Jan2017 Ordered   5  Esomeprazole Magnesium 40 MG Oral Capsule Delayed Release; TAKE 1 CAPSULE   Every morning; Therapy: 78LFX0171 to (Last Rx:97Yfw8044)  Requested for: 79Coe7558 Ordered   6  Fish Oil Triple Strength 1400 MG Oral Capsule; Take 1 capsule twice daily; Therapy: (Recorded:13Kox5880) to Recorded   7  Magnesium 400 MG CAPS; take 1 capsule daily; Therapy: 43MJA5222 to (Evaluate:98Jvi2084)  Requested for: 64ZPO3061; Last   Rx:13Gcc8380 Ordered   8   Metoprolol Succinate ER 25 MG Oral Tablet Extended Release 24 Hour; take 1 tablet   twice a day; Therapy: 44XZA6301 to (Evaluate:98Kvl1197)  Requested for: 19HVQ8026; Last   Rx:78Cxj8192 Ordered   9  PARoxetine HCl ER 25 MG Oral Tablet Extended Release 24 Hour; TAKE 1 TABLET   EVERY MORNING; Therapy: 70HHM9748 to (Last Rx:51Mmc5824)  Requested for: 39Ugt1963 Ordered   10  Sotalol HCl - 120 MG Oral Tablet; Take 1 tablet every 12 hours; Therapy: 88CYF6952 to (Last Rx:13Nns7620)  Requested for: 96Wcd5122 Ordered   11  Valsartan 160 MG Oral Tablet; TAKE 1 TABLET EVERY MORNING; Therapy: 41Mac4875 to (Last Rx:14Mcu0166)  Requested for: 48Kse9590 Ordered    Allergies    1  Adhesive Tape TAPE    Vitals   Recorded: 95YYV3436 01:42PM   Temperature 96 3 F   Heart Rate 63   Respiration 16   Systolic 682   Diastolic 60   Height 5 ft 4 in   Weight 303 lb 4 oz   BMI Calculated 52 05   BSA Calculated 2 33   O2 Saturation 98     Physical Exam    Constitutional   General appearance: No acute distress, well appearing and well nourished  No apparent distress  Eyes   Conjunctiva and lids: No swelling, erythema or discharge  Pupils and irises: Equal, round and reactive to light  Ears, Nose, Mouth, and Throat   External inspection of ears and nose: Normal     Nasal mucosa, septum, and turbinates: Normal without edema or erythema  Oropharynx: Normal with no erythema, edema, exudate or lesions  Pulmonary Clear bilaterally, distant breath sounds  Auscultation of lungs: Clear to auscultation  Cardiovascular   Palpation of heart: Normal PMI, no thrills  Auscultation of heart: Normal rate and rhythm, normal S1 and S2, without murmurs  Distant heart sounds  Examination of extremities for edema and/or varicosities: Abnormal   1+ bilateral lower extremity edema, no cords, pulses are 1+  Abdomen   Abdomen: Non-tender, no masses  Liver and spleen: Abnormal   Obese cannot palpate liver or spleen     Lymphatic No adenopathy in the neck, supraclavicular area, axilla and groin bilaterally  Musculoskeletal   Gait and station: Normal     Digits and nails: Normal without clubbing or cyanosis  Inspection/palpation of joints, bones, and muscles: Normal     Skin   Skin and subcutaneous tissue: Normal without rashes or lesions  Moist, good color, no ecchymoses  Examination of the skin for lesions: Abnormal   Right lateral ankle with abrasion approximately 3 cm long with thickened borders, slight discharge, no bleeding, area around the borders is slightly red and inflamed  Neurologic   Cranial nerves: Cranial nerves 2-12 intact  Reflexes: 2+ and symmetric  Sensation: No sensory loss  Psychiatric   Orientation to person, place, and time: Normal     Mood and affect: Normal     Additional Exam:  Patient has a lesion in her left lower flank approximately 5 inches from the spine, size is approximately 0 5 cm, round and movable, slightly tender to the touch  Results/Data    Results   Radiology 8/24/17 screening bilateral digital mammogram was category 1, benign  CAT scan of the chest and abdomen/pelvis from December 17 2014 did not demonstrate any evidence of recurrent disease  Patient had a stable right adrenal adenoma and a nonobstructing 2 mm up right renal calculus  CAT scan of the chest and abdomen/pelvis performed on November 14, 2013 did not demonstrate any significant abnormalities in the chest  Patient has a stable right adrenal adenoma but no evidence of recurrent GIST tumor  Patient had a small cyst in her right kidney  CAT scan of the abdomen/pelvis performed on September 24, 2012 did not demonstrate any evidence of recurrent or residual GIST tumor   Patient had hepatic steatosis and a stable right adrenal abnormal      Lab Results 4/12/17 BUN = 14 creatinine = 0 78 total bilirubin = 0 3 alkaline phosphatase = 96 AST = 39 ALT = 54  1/13/17 WBC = 7 5 hemoglobin = 13 6 hematocrit = 42 platelet = 917 BUN = 17 creatinine = 0 73 alkaline phosphatase = 101 AST = 98 = high ALT = 141 = high total bilirubin = 0 3 LDH = 232  12/18/15 WBC = 5 6 hemoglobin = 14 2 hematocrit = 43 platelet = 422 BUN = 19 creatinine = 0 75 AST = 71 ALT = 87 total bilirubin = 0 5 alkaline phosphatase = 102 LDH = 209  6/19/15 WBC = 5 7 hemoglobin = 13 4 hematocrit = 40 platelet = 214 BUN = 11 creatinine = 0 4 LFTs WNL LDH = 188  12/3/14 WBC = 6 6 hemoglobin = 13 9 hematocrit = 42 platelet = 108 BUN = 16 creatinine = 0 75 LFTs WNL LDH = 184 ESR = 30  11/1/13 WBC = 5 6 hemoglobin = 12 2 hematocrit = 39 platelet = 646  0/64/17 BUN = 13 creatinine = 0 79 LFTs WNL LDH = 241 ESR = 14  Assessment    1  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)   2  Flank pain (789 09) (R10 9)   3  Skin lesion (709 9) (L98 9)    Plan  Gastrointestinal stromal sarcoma of digestive system    · Cephalexin 500 MG Oral Capsule; TAKE 1 CAPSULE 4 TIMES DAILY UNTIL GONE   Rx By: Prem GARCIA; Dispense: 7 Days ; #:28 Capsule; Refill: 1; For: Gastrointestinal stromal sarcoma of digestive system; ROBERT = N; Verified Transmission to [facility] [facility]; Last Updated By: System, [employer]; 11/6/2017 2:07:12 PM   · Follow-up visit in 2 months Evaluation and Treatment  Follow-up  Status: Complete   Done: 97WTC0237 08:45AM   Ordered; For: Gastrointestinal stromal sarcoma of digestive system; Ordered By: Andie Pérez Performed:  Due: 74BGP2035; Last Updated By: Hina Potter; 11/6/2017 2:05:50 PM    Discussion/Summary    58-year-old female with history of GIST tumor status post resection and 9 5 years of [address]  Issues:     1 GIST  Patient feels well and clinically there are no troubling signs  I do not believe that the left flank lesion is associated with the history of GIST  We discussed options  I placed a call into radiology - a callback is pending  Not sure what type of x-ray/scan would be helpful in determining what this lesion is   For the time being, patient will monitor for progression  2 The prior liver function tests have come back down to normal  PCP is monitoring  3 Right ankle skin infection from the cut patient cause while shaving her legs  There is no clear evidence of cellulitis or systemic infection  We discussed options  Patient is to begin Keflex (patient denies any penicillin or cephalosporin allergy)  Mrs Michael Gómez will call her PCP if the lesion worsens/enlarges  Patient has a follow-up appointment for 2 months but this may change depending upon the above  Carefully review your medication list and verify that the list is accurate and up-to-date  Please call the hematology/oncology office if there are medications missing from the list, medications on the list that you are not currently taking or if there is a dosage or instruction that is different from how you're taking a medication  Health Management   Digital Bilateral Screening Mammogram With CAD; every 1 year; Last 30Apr2015; Next Due:  30Apr2016; Overdue    Thank you very much for allowing me to participate in the care of this patient  If you have any questions, please do not hesitate to contact me        Signatures   Electronically signed by : Glynn Whitmore MD; Nov 6 2017  2:21PM EST                       (Author)

## 2018-01-22 VITALS
HEIGHT: 64 IN | WEIGHT: 293 LBS | OXYGEN SATURATION: 98 % | HEART RATE: 63 BPM | BODY MASS INDEX: 50.02 KG/M2 | RESPIRATION RATE: 16 BRPM | TEMPERATURE: 96.3 F | DIASTOLIC BLOOD PRESSURE: 60 MMHG | SYSTOLIC BLOOD PRESSURE: 110 MMHG

## 2018-01-23 NOTE — MISCELLANEOUS
Provider Comments  Provider Comments:   Left vm message for patient to call our office to reschedule her missed appointment      Signatures   Electronically signed by : Ankur Mcmanus, ; Jackson 15 2018  9:08AM EST                       (Author)

## 2018-01-24 VITALS
DIASTOLIC BLOOD PRESSURE: 82 MMHG | SYSTOLIC BLOOD PRESSURE: 124 MMHG | OXYGEN SATURATION: 95 % | BODY MASS INDEX: 50.02 KG/M2 | HEIGHT: 64 IN | WEIGHT: 293 LBS | HEART RATE: 65 BPM

## 2018-02-05 DIAGNOSIS — I48.91 ATRIAL FIBRILLATION (HCC): Primary | ICD-10-CM

## 2018-02-05 RX ORDER — SOTALOL HYDROCHLORIDE 120 MG/1
1 TABLET ORAL EVERY 12 HOURS
COMMUNITY
Start: 2016-03-25 | End: 2018-02-05 | Stop reason: SDUPTHER

## 2018-02-05 RX ORDER — SOTALOL HYDROCHLORIDE 120 MG/1
120 TABLET ORAL EVERY 12 HOURS
Qty: 60 TABLET | Refills: 5 | Status: SHIPPED | OUTPATIENT
Start: 2018-02-05 | End: 2018-03-12 | Stop reason: SDUPTHER

## 2018-02-08 ENCOUNTER — OFFICE VISIT (OUTPATIENT)
Dept: FAMILY MEDICINE CLINIC | Facility: CLINIC | Age: 58
End: 2018-02-08
Payer: COMMERCIAL

## 2018-02-08 VITALS
HEART RATE: 72 BPM | RESPIRATION RATE: 14 BRPM | WEIGHT: 293 LBS | HEIGHT: 62 IN | SYSTOLIC BLOOD PRESSURE: 122 MMHG | BODY MASS INDEX: 53.92 KG/M2 | TEMPERATURE: 97.3 F | DIASTOLIC BLOOD PRESSURE: 80 MMHG

## 2018-02-08 DIAGNOSIS — S81.802A OPEN WOUND OF LEFT LOWER EXTREMITY, INITIAL ENCOUNTER: Primary | ICD-10-CM

## 2018-02-08 PROBLEM — R73.01 IMPAIRED FASTING GLUCOSE: Status: ACTIVE | Noted: 2017-01-12

## 2018-02-08 PROCEDURE — 17250 CHEM CAUT OF GRANLTJ TISSUE: CPT | Performed by: NURSE PRACTITIONER

## 2018-02-08 PROCEDURE — 99213 OFFICE O/P EST LOW 20 MIN: CPT | Performed by: NURSE PRACTITIONER

## 2018-02-08 RX ORDER — CEPHALEXIN 500 MG/1
500 CAPSULE ORAL 4 TIMES DAILY
Qty: 40 CAPSULE | Refills: 0 | Status: SHIPPED | OUTPATIENT
Start: 2018-02-08 | End: 2018-02-08 | Stop reason: SDUPTHER

## 2018-02-08 RX ORDER — METOPROLOL SUCCINATE 25 MG/1
1 TABLET, EXTENDED RELEASE ORAL 2 TIMES DAILY
COMMUNITY
Start: 2016-03-25 | End: 2018-03-12 | Stop reason: SDUPTHER

## 2018-02-08 RX ORDER — CEPHALEXIN 500 MG/1
1 CAPSULE ORAL 4 TIMES DAILY
COMMUNITY
Start: 2017-11-06 | End: 2018-02-08 | Stop reason: SDUPTHER

## 2018-02-08 RX ORDER — CEPHALEXIN 500 MG/1
500 CAPSULE ORAL 4 TIMES DAILY
Qty: 40 CAPSULE | Refills: 0 | Status: SHIPPED | OUTPATIENT
Start: 2018-02-08 | End: 2018-02-18

## 2018-02-08 RX ORDER — VALSARTAN 160 MG/1
1 TABLET ORAL DAILY
COMMUNITY
Start: 2014-08-07 | End: 2018-02-26 | Stop reason: SDUPTHER

## 2018-02-08 NOTE — PATIENT INSTRUCTIONS
Monitor for signs and symptoms of repeat bleeding or infection  Return to the office as needed  Wound care discussed during the office visit, keep the wound dry for 24 hours  Then may apply topical antibiotic ointment daily

## 2018-02-08 NOTE — PROGRESS NOTES
Chief Complaint   Patient presents with    Nevus     pt  states bleeding mole left thigh will not stop        Patient ID: Braulio Mcdowell is a 61 y o  female  Patient presents with a bleeding wound/skin lesion on the left upper thigh  She reports a history of multiple hemangiomas  She states that it began to bleed this morning and she was not able to stop the bleeding  She treated this at home with a pressure bandage with reduction in blood flow however without resolution of the bleeding  Past Medical History:   Diagnosis Date    Depression     Hyperlipidemia     Hypertension        Past Surgical History:   Procedure Laterality Date    APPENDECTOMY      CARPAL TUNNEL RELEASE Bilateral     HYSTERECTOMY      KNEE SURGERY      TUMOR EXCISION  2006    gastrointestinal stromal tumor       Patient Active Problem List   Diagnosis    Tachycardia    Dyspnea on exertion    Supraventricular tachycardia ([address] )    Hypertension    Controlled depression    Severe obstructive sleep apnea    Morbid obesity ([address] )    Impaired fasting glucose    Hyperlipidemia    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[address] )    Esophageal reflux    Benign essential hypertension    Adenomatous colon polyp    Open wound of left lower extremity       Family History   Problem Relation Age of Onset    Emphysema Mother     Cancer Father      prostate       Immunization History   Administered Date(s) Administered    Influenza Quadrivalent Preservative Free 3 years and older IM 12/28/2016, 08/18/2017    Influenza TIV (IM) 10/24/2006, 09/17/2010, 09/27/2011, 10/14/2014, 11/19/2015    Pneumococcal Polysaccharide PPV23 01/15/2015    Tdap 09/17/2010       Allergies   Allergen Reactions    Other      Adhesive tape        Current Outpatient Prescriptions   Medication Sig Dispense Refill    aspirin 81 MG tablet Take 81 mg by mouth daily        cephalexin (KEFLEX) 500 mg capsule Take 1 capsule (500 mg total) by mouth 4 (four) times a day for 10 days 40 capsule 0    esomeprazole (NexIUM) 40 MG capsule Take 40 mg by mouth every morning before breakfast       Magnesium Oxide -Mg Supplement 400 MG CAPS Take 1 capsule by mouth daily      metoprolol succinate (TOPROL-XL) 25 mg 24 hr tablet Take 1 tablet by mouth 2 (two) times a day      multivitamin (THERAGRAN) TABS Take 1 tablet by mouth daily   Omega-3 Fatty Acids (FISH OIL) 1,000 mg Take 1,000 mg by mouth daily   PARoxetine (PAXIL-CR) 25 mg 24 hr tablet Take 25 mg by mouth every morning   pravastatin (PRAVACHOL) 40 mg tablet Take 40 mg by mouth daily   sotalol (BETAPACE) 120 mg tablet Take 1 tablet (120 mg total) by mouth every 12 (twelve) hours 60 tablet 5    valsartan (DIOVAN) 160 mg tablet Take 1 tablet by mouth daily       No current facility-administered medications for this visit  Social History     Social History    Marital status: /Civil Union     Spouse name: N/A    Number of children: N/A    Years of education: N/A     Social History Main Topics    Smoking status: Light Tobacco Smoker     Years: 4 00    Smokeless tobacco: Current User    Alcohol use No    Drug use: No    Sexual activity: Not Asked     Other Topics Concern    None     Social History Narrative    None       Review of Systems   Constitutional: Negative  HENT: Negative  Eyes: Negative  Respiratory: Negative  Cardiovascular: Negative  Gastrointestinal: Negative  Endocrine: Negative  Genitourinary: Negative  Musculoskeletal: Negative  Skin: Positive for wound  Allergic/Immunologic: Negative  Neurological: Negative  Hematological: Negative  Psychiatric/Behavioral: Negative            Objective:    /80 (BP Location: Right arm, Patient Position: Sitting, Cuff Size: Adult)   Pulse 72   Temp (!) 97 3 °F (36 3 °C) (Temporal)   Resp 14   Ht 5' 2" (1 575 m)   Wt 136 kg (300 lb)   BMI 54 87 kg/m²        Physical Exam Skin:   There is a 2 mm open area on the left lateral thigh that is bleeding significantly  Procedures      After consent obtained the bleeding area is clean in a sterile fashion and infiltrated with 0 5 cc of 2% xylocaine with epinephrine  The area is then cauterized with a Bovie to rule with effective hemostasis  A dry sterile dressing is applied  Assessment/Plan:    No problem-specific Assessment & Plan notes found for this encounter  Diagnoses and all orders for this visit:    Open wound of left lower extremity, initial encounter  -     cephalexin (KEFLEX) 500 mg capsule; Take 1 capsule (500 mg total) by mouth 4 (four) times a day for 10 days    Other orders  -     Discontinue: cephalexin (KEFLEX) 500 mg capsule; Take 1 capsule by mouth 4 (four) times a day  -     Magnesium Oxide -Mg Supplement 400 MG CAPS; Take 1 capsule by mouth daily  -     metoprolol succinate (TOPROL-XL) 25 mg 24 hr tablet; Take 1 tablet by mouth 2 (two) times a day  -     valsartan (DIOVAN) 160 mg tablet; Take 1 tablet by mouth daily            Patient Instructions   Monitor for signs and symptoms of repeat bleeding or infection  Return to the office as needed  Wound care discussed during the office visit, keep the wound dry for 24 hours  Then may apply topical antibiotic ointment daily                      Zulema Levine

## 2018-02-26 DIAGNOSIS — E78.5 HYPERLIPIDEMIA, UNSPECIFIED HYPERLIPIDEMIA TYPE: ICD-10-CM

## 2018-02-26 DIAGNOSIS — K21.9 GASTROESOPHAGEAL REFLUX DISEASE, ESOPHAGITIS PRESENCE NOT SPECIFIED: Primary | ICD-10-CM

## 2018-02-26 DIAGNOSIS — F32.A DEPRESSION, UNSPECIFIED DEPRESSION TYPE: ICD-10-CM

## 2018-02-26 DIAGNOSIS — I10 HYPERTENSION, UNSPECIFIED TYPE: ICD-10-CM

## 2018-02-26 RX ORDER — ESOMEPRAZOLE MAGNESIUM 40 MG/1
40 CAPSULE, DELAYED RELEASE ORAL DAILY
Qty: 90 CAPSULE | Refills: 1 | Status: SHIPPED | OUTPATIENT
Start: 2018-02-26 | End: 2019-02-25 | Stop reason: SDUPTHER

## 2018-02-26 RX ORDER — VALSARTAN 160 MG/1
160 TABLET ORAL DAILY
Qty: 90 TABLET | Refills: 1 | Status: SHIPPED | OUTPATIENT
Start: 2018-02-26 | End: 2018-07-18 | Stop reason: ALTCHOICE

## 2018-02-26 RX ORDER — PRAVASTATIN SODIUM 40 MG
40 TABLET ORAL DAILY
Qty: 90 TABLET | Refills: 1 | Status: SHIPPED | OUTPATIENT
Start: 2018-02-26 | End: 2019-02-23 | Stop reason: SDUPTHER

## 2018-02-26 RX ORDER — PAROXETINE HYDROCHLORIDE 25 MG/1
25 TABLET, FILM COATED, EXTENDED RELEASE ORAL EVERY MORNING
Qty: 90 TABLET | Refills: 1 | Status: SHIPPED | OUTPATIENT
Start: 2018-02-26 | End: 2018-08-08 | Stop reason: SDUPTHER

## 2018-03-12 DIAGNOSIS — I48.91 ATRIAL FIBRILLATION, UNSPECIFIED TYPE (HCC): ICD-10-CM

## 2018-03-12 NOTE — TELEPHONE ENCOUNTER
Patient needs a short script to local and 90 day to mail away     I am unable to send a short until these are approved

## 2018-03-13 RX ORDER — SOTALOL HYDROCHLORIDE 120 MG/1
120 TABLET ORAL EVERY 12 HOURS
Qty: 180 TABLET | Refills: 2 | Status: SHIPPED | OUTPATIENT
Start: 2018-03-13 | End: 2018-11-23 | Stop reason: SDUPTHER

## 2018-03-13 RX ORDER — METOPROLOL SUCCINATE 25 MG/1
25 TABLET, EXTENDED RELEASE ORAL 2 TIMES DAILY
Qty: 180 TABLET | Refills: 2 | Status: SHIPPED | OUTPATIENT
Start: 2018-03-13 | End: 2019-02-23 | Stop reason: SDUPTHER

## 2018-03-21 ENCOUNTER — TELEPHONE (OUTPATIENT)
Dept: CARDIOLOGY CLINIC | Facility: CLINIC | Age: 58
End: 2018-03-21

## 2018-03-21 ENCOUNTER — HOSPITAL ENCOUNTER (EMERGENCY)
Facility: HOSPITAL | Age: 58
Discharge: HOME | End: 2018-03-21
Attending: EMERGENCY MEDICINE
Payer: COMMERCIAL

## 2018-03-21 ENCOUNTER — APPOINTMENT (EMERGENCY)
Dept: RADIOLOGY | Facility: HOSPITAL | Age: 58
End: 2018-03-21
Payer: COMMERCIAL

## 2018-03-21 VITALS
WEIGHT: 293 LBS | TEMPERATURE: 96.9 F | SYSTOLIC BLOOD PRESSURE: 145 MMHG | RESPIRATION RATE: 22 BRPM | HEART RATE: 86 BPM | OXYGEN SATURATION: 96 % | DIASTOLIC BLOOD PRESSURE: 101 MMHG | BODY MASS INDEX: 50.02 KG/M2 | HEIGHT: 64 IN

## 2018-03-21 DIAGNOSIS — I49.9 IRREGULAR HEART BEAT: Primary | ICD-10-CM

## 2018-03-21 LAB
ALBUMIN SERPL BCP-MCNC: 3.7 G/DL (ref 3.5–5)
ALP SERPL-CCNC: 119 U/L (ref 46–116)
ALT SERPL W P-5'-P-CCNC: 139 U/L (ref 12–78)
ANION GAP SERPL CALCULATED.3IONS-SCNC: 13 MMOL/L (ref 4–13)
APTT PPP: 25 SECONDS (ref 24–33)
AST SERPL W P-5'-P-CCNC: 79 U/L (ref 5–45)
BASOPHILS # BLD AUTO: 0.1 THOUSANDS/ΜL (ref 0–0.1)
BASOPHILS NFR BLD AUTO: 1 % (ref 0–1)
BILIRUB SERPL-MCNC: 0.5 MG/DL (ref 0.2–1)
BUN SERPL-MCNC: 16 MG/DL (ref 5–25)
CALCIUM SERPL-MCNC: 9.6 MG/DL (ref 8.3–10.1)
CHLORIDE SERPL-SCNC: 107 MMOL/L (ref 100–108)
CO2 SERPL-SCNC: 24 MMOL/L (ref 21–32)
CREAT SERPL-MCNC: 0.79 MG/DL (ref 0.6–1.3)
DEPRECATED D DIMER PPP: 960 NG/ML (FEU) (ref 190–520)
EOSINOPHIL # BLD AUTO: 0 THOUSAND/ΜL (ref 0–0.61)
EOSINOPHIL NFR BLD AUTO: 1 % (ref 0–6)
ERYTHROCYTE [DISTWIDTH] IN BLOOD BY AUTOMATED COUNT: 14.9 % (ref 11.6–15.1)
GFR SERPL CREATININE-BSD FRML MDRD: 83 ML/MIN/1.73SQ M
GLUCOSE SERPL-MCNC: 113 MG/DL (ref 65–140)
HCT VFR BLD AUTO: 48.4 % (ref 37–47)
HGB BLD-MCNC: 15.6 G/DL (ref 12–16)
INR PPP: 1.02 (ref 0.86–1.16)
LIPASE SERPL-CCNC: 95 U/L (ref 73–393)
LYMPHOCYTES # BLD AUTO: 2.3 THOUSANDS/ΜL (ref 0.6–4.47)
LYMPHOCYTES NFR BLD AUTO: 31 % (ref 14–44)
MAGNESIUM SERPL-MCNC: 2 MG/DL (ref 1.6–2.6)
MCH RBC QN AUTO: 26.4 PG (ref 27–31)
MCHC RBC AUTO-ENTMCNC: 32.2 G/DL (ref 31.4–37.4)
MCV RBC AUTO: 82 FL (ref 82–98)
MONOCYTES # BLD AUTO: 0.5 THOUSAND/ΜL (ref 0.17–1.22)
MONOCYTES NFR BLD AUTO: 7 % (ref 4–12)
NEUTROPHILS # BLD AUTO: 4.4 THOUSANDS/ΜL (ref 1.85–7.62)
NEUTS SEG NFR BLD AUTO: 60 % (ref 43–75)
NT-PROBNP SERPL-MCNC: 534 PG/ML
PLATELET # BLD AUTO: 211 THOUSANDS/UL (ref 130–400)
PMV BLD AUTO: 10.4 FL (ref 8.9–12.7)
POTASSIUM SERPL-SCNC: 3.9 MMOL/L (ref 3.5–5.3)
PROT SERPL-MCNC: 7.2 G/DL (ref 6.4–8.2)
PROTHROMBIN TIME: 10.7 SECONDS (ref 9.4–11.7)
RBC # BLD AUTO: 5.89 MILLION/UL (ref 4.2–5.4)
SODIUM SERPL-SCNC: 144 MMOL/L (ref 136–145)
TROPONIN I SERPL-MCNC: <0.02 NG/ML
TSH SERPL DL<=0.05 MIU/L-ACNC: 3.19 UIU/ML (ref 0.36–3.74)
WBC # BLD AUTO: 7.3 THOUSAND/UL (ref 4.8–10.8)

## 2018-03-21 PROCEDURE — 83880 ASSAY OF NATRIURETIC PEPTIDE: CPT | Performed by: EMERGENCY MEDICINE

## 2018-03-21 PROCEDURE — 85610 PROTHROMBIN TIME: CPT | Performed by: EMERGENCY MEDICINE

## 2018-03-21 PROCEDURE — 36415 COLL VENOUS BLD VENIPUNCTURE: CPT | Performed by: EMERGENCY MEDICINE

## 2018-03-21 PROCEDURE — 83690 ASSAY OF LIPASE: CPT | Performed by: EMERGENCY MEDICINE

## 2018-03-21 PROCEDURE — 71275 CT ANGIOGRAPHY CHEST: CPT

## 2018-03-21 PROCEDURE — 93005 ELECTROCARDIOGRAM TRACING: CPT

## 2018-03-21 PROCEDURE — 85379 FIBRIN DEGRADATION QUANT: CPT | Performed by: EMERGENCY MEDICINE

## 2018-03-21 PROCEDURE — 80053 COMPREHEN METABOLIC PANEL: CPT | Performed by: EMERGENCY MEDICINE

## 2018-03-21 PROCEDURE — 85025 COMPLETE CBC W/AUTO DIFF WBC: CPT | Performed by: EMERGENCY MEDICINE

## 2018-03-21 PROCEDURE — 99285 EMERGENCY DEPT VISIT HI MDM: CPT

## 2018-03-21 PROCEDURE — 71045 X-RAY EXAM CHEST 1 VIEW: CPT

## 2018-03-21 PROCEDURE — 85730 THROMBOPLASTIN TIME PARTIAL: CPT | Performed by: EMERGENCY MEDICINE

## 2018-03-21 PROCEDURE — 84484 ASSAY OF TROPONIN QUANT: CPT | Performed by: EMERGENCY MEDICINE

## 2018-03-21 PROCEDURE — 83735 ASSAY OF MAGNESIUM: CPT | Performed by: EMERGENCY MEDICINE

## 2018-03-21 PROCEDURE — 84443 ASSAY THYROID STIM HORMONE: CPT | Performed by: EMERGENCY MEDICINE

## 2018-03-21 RX ADMIN — IOHEXOL 85 ML: 350 INJECTION, SOLUTION INTRAVENOUS at 13:00

## 2018-03-21 NOTE — ED PROVIDER NOTES
History  Chief Complaint   Patient presents with    Irregular Heart Beat     patient c/o burning between her shoulder blades and an irregular pulse since 0230 this morning with SOB     48-year-old female presents with burning between her shoulder blades that she experienced at 2:30 a m  this morning  Later then the morning she was carrying a laundry basket when she felt shortness of breath  She also felt an irregular pulse  Got concerned  So came to the emergency room for further evaluation  She has a history of AFib paroxysmal currently on sotalol and metoprolol  At the present time she denies any chest pain pressure dyspnea nausea vomiting abdominal pain diaphoresis generalized weakness or any other symptoms  She has been taking her medications  Has been having some cough and congestion for the past few days  History provided by:  Patient   used: No        Prior to Admission Medications   Prescriptions Last Dose Informant Patient Reported? Taking? Magnesium Oxide -Mg Supplement 400 MG CAPS   Yes Yes   Sig: Take 1 capsule by mouth daily   Omega-3 Fatty Acids (FISH OIL) 1,000 mg   Yes Yes   Sig: Take 1,000 mg by mouth 2 (two) times a day     PARoxetine (PAXIL-CR) 25 mg 24 hr tablet   No Yes   Sig: Take 1 tablet (25 mg total) by mouth every morning   aspirin 81 MG tablet   Yes Yes   Sig: Take 81 mg by mouth daily  esomeprazole (NexIUM) 40 MG capsule   No Yes   Sig: Take 1 capsule (40 mg total) by mouth daily   metoprolol succinate (TOPROL-XL) 25 mg 24 hr tablet   No Yes   Sig: Take 1 tablet (25 mg total) by mouth 2 (two) times a day   multivitamin (THERAGRAN) TABS   Yes Yes   Sig: Take 1 tablet by mouth daily     pravastatin (PRAVACHOL) 40 mg tablet   No Yes   Sig: Take 1 tablet (40 mg total) by mouth daily   Patient taking differently: Take 40 mg by mouth daily at bedtime     sotalol (BETAPACE) 120 mg tablet   No Yes   Sig: Take 1 tablet (120 mg total) by mouth every 12 (twelve) hours   valsartan (DIOVAN) 160 mg tablet   No Yes   Sig: Take 1 tablet (160 mg total) by mouth daily      Facility-Administered Medications: None       Past Medical History:   Diagnosis Date    Depression     Hyperlipidemia     Hypertension        Past Surgical History:   Procedure Laterality Date    APPENDECTOMY      CARPAL TUNNEL RELEASE Bilateral     HYSTERECTOMY      KNEE SURGERY      TUMOR EXCISION  2006    gastrointestinal stromal tumor       Family History   Problem Relation Age of Onset    Emphysema Mother     Cancer Father      prostate     I have reviewed and agree with the history as documented  Social History   Substance Use Topics    Smoking status: Light Tobacco Smoker     Years: 4 00    Smokeless tobacco: Current User    Alcohol use No        Review of Systems   All other systems reviewed and are negative  Physical Exam  ED Triage Vitals [03/21/18 1109]   Temperature Pulse Respirations Blood Pressure SpO2   (!) 96 9 °F (36 1 °C) 86 22 (!) 145/101 96 %      Temp Source Heart Rate Source Patient Position - Orthostatic VS BP Location FiO2 (%)   Tympanic Monitor -- -- --      Pain Score       3           Orthostatic Vital Signs  Vitals:    03/21/18 1109   BP: (!) 145/101   Pulse: 86       Physical Exam   Constitutional: She is oriented to person, place, and time  She appears well-developed and well-nourished  HENT:   Head: Normocephalic and atraumatic  Eyes: EOM are normal  Pupils are equal, round, and reactive to light  Neck: Normal range of motion  Neck supple  Cardiovascular: Normal rate and regular rhythm  Pulmonary/Chest: Effort normal and breath sounds normal    Abdominal: Soft  Bowel sounds are normal    Musculoskeletal: Normal range of motion  Neurological: She is alert and oriented to person, place, and time  Skin: Skin is warm and dry  Psychiatric: She has a normal mood and affect  Nursing note and vitals reviewed        ED Medications  Medications iohexol (OMNIPAQUE) 350 MG/ML injection (MULTI-DOSE) 85 mL (85 mL Intravenous Given 3/21/18 1300)       Diagnostic Studies  Results Reviewed     Procedure Component Value Units Date/Time    Protime-INR [[id number]]  (Normal) Collected:  03/21/18 1127    Lab Status:  Final result Specimen:  Blood from Arm, Right Updated:  03/21/18 1225     Protime 10 7 seconds      INR 1 02    APTT [[id number]]  (Normal) Collected:  03/21/18 1127    Lab Status:  Final result Specimen:  Blood from Arm, Right Updated:  03/21/18 1225     PTT 25 seconds     Narrative: Therapeutic Heparin Range = 60-90 seconds    D-Dimer [[id number]]  (Abnormal) Collected:  03/21/18 1127    Lab Status:  Final result Specimen:  Blood from Arm, Right Updated:  03/21/18 1225     D-Dimer, Quant 960 (H) ng/ml (FEU)     TSH [[id number]]  (Normal) Collected:  03/21/18 1127    Lab Status:  Final result Specimen:  Blood from Arm, Right Updated:  03/21/18 1201     TSH 3RD GENERATON 3 192 uIU/mL     Narrative:         Patients undergoing fluorescein dye angiography may retain small amounts of fluorescein in the body for 48-72 hours post procedure  Samples containing fluorescein can produce falsely depressed TSH values  If the patient had this procedure,a specimen should be resubmitted post fluorescein clearance            The recommended reference ranges for TSH during pregnancy are as follows:  First trimester 0 1 to 2 5 uIU/mL  Second trimester  0 2 to 3 0 uIU/mL  Third trimester 0 3 to 3 0 uIU/m      Magnesium [[id number]]  (Normal) Collected:  03/21/18 1127    Lab Status:  Final result Specimen:  Blood from Arm, Right Updated:  03/21/18 1201     Magnesium 2 0 mg/dL     Lipase [[id number]]  (Normal) Collected:  03/21/18 1127    Lab Status:  Final result Specimen:  Blood from Arm, Right Updated:  03/21/18 1201     Lipase 95 u/L     B-type natriuretic peptide [[id number]]  (Abnormal) Collected:  03/21/18 1127    Lab Status:  Final result Specimen:  Blood from Arm, Right Updated:  03/21/18 1201     NT-proBNP 534 (H) pg/mL     Troponin I [[id number]]  (Normal) Collected:  03/21/18 1127    Lab Status:  Final result Specimen:  Blood from Arm, Right Updated:  03/21/18 1155     Troponin I <0 02 ng/mL     Narrative:         [employer] Chemistry analyzer 99% cutoff is > 0 04 ng/mL in network labs    o cTnI 99% cutoff is useful only when applied to patients in the clinical setting of myocardial ischemia  o cTnI 99% cutoff should be interpreted in the context of clinical history, ECG findings and possibly cardiac imaging to establish correct diagnosis  o cTnI 99% cutoff may be suggestive but clearly not indicative of a coronary event without the clinical setting of myocardial ischemia  Comprehensive metabolic panel [[id number]]  (Abnormal) Collected:  03/21/18 1127    Lab Status:  Final result Specimen:  Blood from Arm, Right Updated:  03/21/18 1154     Sodium 144 mmol/L      Potassium 3 9 mmol/L      Chloride 107 mmol/L      CO2 24 mmol/L      Anion Gap 13 mmol/L      BUN 16 mg/dL      Creatinine 0 79 mg/dL      Glucose 113 mg/dL      Calcium 9 6 mg/dL      AST 79 (H) U/L       (H) U/L      Alkaline Phosphatase 119 (H) U/L      Total Protein 7 2 g/dL      Albumin 3 7 g/dL      Total Bilirubin 0 50 mg/dL      eGFR 83 ml/min/1 73sq m     Narrative:         National Kidney Disease Education Program recommendations are as follows:  GFR calculation is accurate only with a steady state creatinine  Chronic Kidney disease less than 60 ml/min/1 73 sq  meters  Kidney failure less than 15 ml/min/1 73 sq  meters      CBC and differential [[id number]]  (Abnormal) Collected:  03/21/18 1127    Lab Status:  Final result Specimen:  Blood from Arm, Right Updated:  03/21/18 1144     WBC 7 30 Thousand/uL      RBC 5 89 (H) Million/uL      Hemoglobin 15 6 g/dL      Hematocrit 48 4 (H) %      MCV 82 fL      MCH 26 4 (L) pg      MCHC 32 2 g/dL      RDW 14 9 %      MPV 10 4 fL      Platelets 341 Thousands/uL Neutrophils Relative 60 %      Lymphocytes Relative 31 %      Monocytes Relative 7 %      Eosinophils Relative 1 %      Basophils Relative 1 %      Neutrophils Absolute 4 40 Thousands/µL      Lymphocytes Absolute 2 30 Thousands/µL      Monocytes Absolute 0 50 Thousand/µL      Eosinophils Absolute 0 00 Thousand/µL      Basophils Absolute 0 10 Thousands/µL                  CTA chest pe study   Final Result by Silvia Corado MD (03/21 1336)         1  No acute pulmonary embolus  2   Minimal scattered atelectasis  Workstation performed: [id number]         XR chest 1 view portable   Final Result by Silvia Corado MD (03/21 1200)      Left basilar discoid atelectasis  Workstation performed: [id number]                    Procedures  ECG 12 Lead Documentation  Date/Time: 3/21/2018 11:19 AM  Performed by: Kelli Florez  Authorized by: Kelli Florez     ECG reviewed by me, the ED Provider: yes    Patient location:  ED  Previous ECG:     Previous ECG:  Unavailable    Comparison to cardiac monitor: Yes    Interpretation:     Interpretation: non-specific    Rate:     ECG rate assessment: normal    Rhythm:     Rhythm: sinus rhythm    Ectopy:     Ectopy: none    QRS:     QRS axis:  Normal  Conduction:     Conduction: normal    ST segments:     ST segments:  Non-specific  T waves:     T waves: normal             Phone Contacts  ED Phone Contact    ED Course  ED Course                                MDM  Number of Diagnoses or Management Options  Irregular heart beat:   Diagnosis management comments: Patient evaluated with labs, CTPE, EKG, which are unremarkable  Patient will follow up with cardiologist Dr Dasha Wilder for outpatient stress test  She remained hemodynamically stable and asymptomatic during her ED course  Discharged and patient verbalized understanding of instructions and had no further questions at the time of discharge         Amount and/or Complexity of Data Reviewed  Clinical lab tests: ordered and reviewed  Tests in the radiology section of CPT®: ordered and reviewed  Tests in the medicine section of CPT®: ordered and reviewed    Patient Progress  Patient progress: stable    CritCare Time    Disposition  Final diagnoses:   Irregular heart beat     Time reflects when diagnosis was documented in both MDM as applicable and the Disposition within this note     Time User Action Codes Description Comment    3/21/2018  1:44 PM Leanne Forrest Cancer Add [I49 9] Irregular heart beat       ED Disposition     ED Disposition Condition Comment    Discharge  Albert Swenson discharge to home/self care  Condition at discharge: Stable        Follow-up Information     Follow up With Specialties Details Why Contact Info Additional Information    Ziggy Oleary MD Family Medicine Schedule an appointment as soon as possible for a visit  [address] [facility] [address]  [address] [facility] Emergency Department Emergency Medicine  If symptoms worsen [address] [place] [address]  [phone] [facility], [place], [place], [address] [place] [address],  Cardiology Schedule an appointment as soon as possible for a visit  [facility]  [address] [facility] [address]  [phone]           Discharge Medication List as of 3/21/2018  1:45 PM      CONTINUE these medications which have NOT CHANGED    Details   aspirin 81 MG tablet Take 81 mg by mouth daily  , Until Discontinued, Historical Med      esomeprazole (NexIUM) 40 MG capsule Take 1 capsule (40 mg total) by mouth daily, Starting Mon 2/26/2018, Normal      Magnesium Oxide -Mg Supplement 400 MG CAPS Take 1 capsule by mouth daily, Starting Wed 5/25/2016, Historical Med      metoprolol succinate (TOPROL-XL) 25 mg 24 hr tablet Take 1 tablet (25 mg total) by mouth 2 (two) times a day, Starting Tue 3/13/2018, Normal      multivitamin (THERAGRAN) TABS Take 1 tablet by mouth daily  , Until Discontinued, Historical Med      Omega-3 Fatty Acids (FISH OIL) 1,000 mg Take 1,000 mg by mouth 2 (two) times a day  , Historical Med      PARoxetine (PAXIL-CR) 25 mg 24 hr tablet Take 1 tablet (25 mg total) by mouth every morning, Starting Mon 2/26/2018, Normal      pravastatin (PRAVACHOL) 40 mg tablet Take 1 tablet (40 mg total) by mouth daily, Starting Mon 2/26/2018, Normal      sotalol (BETAPACE) 120 mg tablet Take 1 tablet (120 mg total) by mouth every 12 (twelve) hours, Starting Tue 3/13/2018, Normal      valsartan (DIOVAN) 160 mg tablet Take 1 tablet (160 mg total) by mouth daily, Starting Mon 2/26/2018, Normal           No discharge procedures on file      ED Provider  Electronically Signed by           Amy Way DO  03/21/18 7857

## 2018-03-21 NOTE — TELEPHONE ENCOUNTER
Called patient to head to ED  She called the office complaining of burning of chest and back  And SOB   I spoke with Dr Radha Art and explained to the patient to head to the ED as per Dr Radha Art

## 2018-03-21 NOTE — DISCHARGE INSTRUCTIONS
Dyspnea   WHAT YOU NEED TO KNOW:   Dyspnea is breathing difficulty or discomfort  You may have labored, painful, or shallow breathing  You may feel breathless or short of breath  Dyspnea can occur during rest or with activity  You may have dyspnea for a short time, or it might become chronic  Dyspnea is often a symptom of a disease or condition  DISCHARGE INSTRUCTIONS:   Return to the emergency department if:   · Your signs and symptoms are the same or worse within 24 hours of treatment  · You have shaking chills or a fever over 102°F      · You have new pain, pressure, or tightness in your chest      · You have a new or worse cough or wheezing, or you cough up blood  · You feel like you cannot get enough air  · The skin over your ribs or on your neck sinks in when you breathe  · You have a severe headache with vomiting and abdominal pain  · You feel confused or dizzy  Contact your healthcare provider or specialist if:   · You have questions or concerns about your condition or care  Medicines:   · Medicines  may be used to treat the cause of your dyspnea  Medicines may reduce swelling in your airway or decrease extra fluid from around your heart or lungs  Other medicines may be used to decrease anxiety and help you feel calm and relaxed  · Take your medicine as directed  Contact your healthcare provider if you think your medicine is not helping or if you have side effects  Tell him or her if you are allergic to any medicine  Keep a list of the medicines, vitamins, and herbs you take  Include the amounts, and when and why you take them  Bring the list or the pill bottles to follow-up visits  Carry your medicine list with you in case of an emergency  Manage long-term dyspnea:   · Create an action plan  You and your healthcare provider can work together to create a plan for how to handle episodes of dyspnea   The plan can include daily activities, treatment changes, and what to do if you have severe breathing problems  · Lean forward on your elbows when you sit  This helps your lungs expand and may make it easier to breathe  · Use pursed-lip breathing any time you feel short of breath  Breathe in through your nose and then slowly breathe out through your mouth with your lips slightly puckered  It should take you twice as long to breathe out as it did to breathe in  · Do not smoke  Nicotine and other chemicals in cigarettes and cigars can cause lung damage and make it harder to breathe  Ask your healthcare provider for information if you currently smoke and need help to quit  E-cigarettes or smokeless tobacco still contain nicotine  Talk to your healthcare provider before you use these products  · Reach or maintain a healthy weight  Your healthcare provider can help you create a safe weight loss plan if you are overweight  · Exercise as directed  Exercise can help your lungs work more easily  Exercise can also help you lose weight if needed  Try to get at least 30 minutes of exercise most days of the week  Your healthcare provider can help you create an exercise plan that is safe for you  Follow up with your healthcare provider or specialist as directed:  Write down your questions so you remember to ask them during your visits  © 2017 [employer] [employer]  Information is for End User's use only and may not be sold, redistributed or otherwise used for commercial purposes  All illustrations and images included in CareNotes® are the copyrighted property of [employer]  or Lukas Callejas  The above information is an  only  It is not intended as medical advice for individual conditions or treatments  Talk to your doctor, nurse or pharmacist before following any medical regimen to see if it is safe and effective for you

## 2018-03-22 ENCOUNTER — TELEPHONE (OUTPATIENT)
Dept: CARDIOLOGY CLINIC | Facility: CLINIC | Age: 58
End: 2018-03-22

## 2018-03-22 ENCOUNTER — VBI (OUTPATIENT)
Dept: ADMINISTRATIVE | Facility: OTHER | Age: 58
End: 2018-03-22

## 2018-03-22 DIAGNOSIS — R07.9 CHEST PAIN, UNSPECIFIED TYPE: Primary | ICD-10-CM

## 2018-03-22 LAB
ATRIAL RATE: 81 BPM
P AXIS: 57 DEGREES
PR INTERVAL: 140 MS
QRS AXIS: 10 DEGREES
QRSD INTERVAL: 88 MS
QT INTERVAL: 382 MS
QTC INTERVAL: 443 MS
T WAVE AXIS: 54 DEGREES
VENTRICULAR RATE: 81 BPM

## 2018-03-22 PROCEDURE — 93010 ELECTROCARDIOGRAM REPORT: CPT | Performed by: INTERNAL MEDICINE

## 2018-03-22 NOTE — TELEPHONE ENCOUNTER
NO OUTREACH FROM [facility] [place] PATIENT CALLED DR ARREOLA'S OFFICE ON 3/21/18 AND DR Ernie Chávez SENT PATIENT TO ER - PATIENT NEEDS TO F/U WITH Dr Ernie Chávez

## 2018-04-25 ENCOUNTER — TELEPHONE (OUTPATIENT)
Dept: FAMILY MEDICINE CLINIC | Facility: CLINIC | Age: 58
End: 2018-04-25

## 2018-04-25 NOTE — TELEPHONE ENCOUNTER
Dr Brando Alejandro was just at the house to observe the patient and she stated that she is fine and completely not in the need of hospice and her family is denying any hospice treatment for her as well

## 2018-07-18 ENCOUNTER — TELEPHONE (OUTPATIENT)
Dept: FAMILY MEDICINE CLINIC | Facility: CLINIC | Age: 58
End: 2018-07-18

## 2018-07-18 DIAGNOSIS — I10 ESSENTIAL HYPERTENSION: Primary | ICD-10-CM

## 2018-07-18 RX ORDER — OLMESARTAN MEDOXOMIL 20 MG/1
20 TABLET ORAL DAILY
Qty: 90 TABLET | Refills: 3 | Status: SHIPPED | OUTPATIENT
Start: 2018-07-18 | End: 2018-10-18 | Stop reason: SDUPTHER

## 2018-07-18 NOTE — TELEPHONE ENCOUNTER
Dr Ciara Saavedra,  Please send an alternative to valsartan to [facility] as she confirmed what she has was made in [facility]  Thank you!

## 2018-08-08 ENCOUNTER — OFFICE VISIT (OUTPATIENT)
Dept: FAMILY MEDICINE CLINIC | Facility: CLINIC | Age: 58
End: 2018-08-08
Payer: COMMERCIAL

## 2018-08-08 VITALS — TEMPERATURE: 98.2 F | BODY MASS INDEX: 50.02 KG/M2 | WEIGHT: 293 LBS | HEIGHT: 64 IN

## 2018-08-08 DIAGNOSIS — N20.0 KIDNEY CALCULI: ICD-10-CM

## 2018-08-08 DIAGNOSIS — F32.A DEPRESSION, UNSPECIFIED DEPRESSION TYPE: ICD-10-CM

## 2018-08-08 DIAGNOSIS — M54.6 ACUTE LEFT-SIDED THORACIC BACK PAIN: Primary | ICD-10-CM

## 2018-08-08 LAB
SL AMB  POCT GLUCOSE, UA: NORMAL
SL AMB LEUKOCYTE ESTERASE,UA: NORMAL
SL AMB POCT BILIRUBIN,UA: NORMAL
SL AMB POCT BLOOD,UA: NORMAL
SL AMB POCT CLARITY,UA: CLEAR
SL AMB POCT COLOR,UA: YELLOW
SL AMB POCT KETONES,UA: NORMAL
SL AMB POCT NITRITE,UA: NORMAL
SL AMB POCT PH,UA: 5
SL AMB POCT SPECIFIC GRAVITY,UA: 1.01
SL AMB POCT URINE PROTEIN: NORMAL
SL AMB POCT UROBILINOGEN: NORMAL

## 2018-08-08 PROCEDURE — 99214 OFFICE O/P EST MOD 30 MIN: CPT | Performed by: FAMILY MEDICINE

## 2018-08-08 PROCEDURE — 81003 URINALYSIS AUTO W/O SCOPE: CPT | Performed by: FAMILY MEDICINE

## 2018-08-08 PROCEDURE — 3008F BODY MASS INDEX DOCD: CPT | Performed by: FAMILY MEDICINE

## 2018-08-08 RX ORDER — PAROXETINE HYDROCHLORIDE 37.5 MG/1
37.5 TABLET, FILM COATED, EXTENDED RELEASE ORAL EVERY MORNING
Qty: 30 TABLET | Refills: 5 | Status: SHIPPED | OUTPATIENT
Start: 2018-08-08 | End: 2018-08-31 | Stop reason: SDUPTHER

## 2018-08-08 RX ORDER — PREDNISONE 20 MG/1
40 TABLET ORAL DAILY
Qty: 14 TABLET | Refills: 0 | Status: SHIPPED | OUTPATIENT
Start: 2018-08-08 | End: 2019-10-02

## 2018-08-08 RX ORDER — IBUPROFEN 800 MG/1
800 TABLET ORAL EVERY 8 HOURS PRN
Qty: 30 TABLET | Refills: 0 | Status: SHIPPED | OUTPATIENT
Start: 2018-08-08 | End: 2021-07-06

## 2018-08-08 NOTE — PROGRESS NOTES
Chief Complaint   Patient presents with    Back Pain        Patient ID: Fabio Schneider is a 61 y o  female  HPI  Pt is seeing for evaluation of L sided mid back pain started 2 wks ago, positional, does not bother pt at night -  Was seen by ortho  -  Negative spine xrays - was advised to see urologist -  H/o R kidney stone 2 y ago -  Different presentation -  No blood in the urine, no  symptoms, no N/V, no fever, no abd pain  -  Pt tried OTC NSAID with no help     The following portions of the patient's history were reviewed and updated as appropriate: allergies, current medications, past family history, past medical history, past social history, past surgical history and problem list     Review of Systems   Constitutional: Negative  Respiratory: Negative  Cardiovascular: Negative  Gastrointestinal: Negative  Genitourinary: Negative  Skin: Negative  Neurological: Negative  Psychiatric/Behavioral: Positive for dysphoric mood  Negative for sleep disturbance and suicidal ideas  The patient is nervous/anxious  Current Outpatient Prescriptions   Medication Sig Dispense Refill    aspirin 81 MG tablet Take 81 mg by mouth daily   esomeprazole (NexIUM) 40 MG capsule Take 1 capsule (40 mg total) by mouth daily 90 capsule 1    Magnesium Oxide -Mg Supplement 400 MG CAPS Take 1 capsule by mouth daily      metoprolol succinate (TOPROL-XL) 25 mg 24 hr tablet Take 1 tablet (25 mg total) by mouth 2 (two) times a day 180 tablet 2    multivitamin (THERAGRAN) TABS Take 1 tablet by mouth daily        olmesartan (BENICAR) 20 mg tablet Take 1 tablet (20 mg total) by mouth daily 90 tablet 3    Omega-3 Fatty Acids (FISH OIL) 1,000 mg Take 1,000 mg by mouth 2 (two) times a day        PARoxetine (PAXIL-CR) 37 5 mg 24 hr tablet Take 1 tablet (37 5 mg total) by mouth every morning 30 tablet 5    pravastatin (PRAVACHOL) 40 mg tablet Take 1 tablet (40 mg total) by mouth daily (Patient taking differently: Take 40 mg by mouth daily at bedtime  ) 90 tablet 1    sotalol (BETAPACE) 120 mg tablet Take 1 tablet (120 mg total) by mouth every 12 (twelve) hours 180 tablet 2     No current facility-administered medications for this visit  Objective:    Temp 98 2 °F (36 8 °C) (Tympanic)   Ht 5' 4" (1 626 m)   Wt (!) 141 kg (311 lb)   BMI 53 38 kg/m²        Physical Exam   Constitutional: She is oriented to person, place, and time  No distress  Abdominal: There is no tenderness  There is no CVA tenderness  Musculoskeletal:        Thoracic back: She exhibits no swelling, no deformity and no spasm  Back:    Neurological: She is oriented to person, place, and time  No cranial nerve deficit  Skin: No rash noted  Psychiatric: She exhibits a depressed mood  Tearful                Assessment/Plan:         Diagnoses and all orders for this visit:    Acute left-sided thoracic back pain  -     POCT urine dip auto non-scope -  Normal UA  -     predniSONE 20 mg tablet; Take 2 tablets (40 mg total) by mouth daily  -     Ambulatory referral to Physical Therapy; Future  -     ibuprofen (MOTRIN) 800 mg tablet; Take 1 tablet (800 mg total) by mouth every 8 (eight) hours as needed for mild pain    Kidney calculi  -     US kidney and bladder; Future    Depression, unspecified depression type  -     PARoxetine (PAXIL-CR) 37 5 mg 24 hr tablet;  Take 1 tablet (37 5 mg total) by mouth every morning  Med was increased from 25 mg           rto in 3 m                 Kal Conte MD

## 2018-08-13 ENCOUNTER — EVALUATION (OUTPATIENT)
Dept: PHYSICAL THERAPY | Facility: CLINIC | Age: 58
End: 2018-08-13
Payer: COMMERCIAL

## 2018-08-13 DIAGNOSIS — M54.6 PAIN IN THORACIC SPINE: Primary | ICD-10-CM

## 2018-08-13 DIAGNOSIS — M54.6 ACUTE LEFT-SIDED THORACIC BACK PAIN: ICD-10-CM

## 2018-08-13 PROCEDURE — G8981 BODY POS CURRENT STATUS: HCPCS | Performed by: PHYSICAL THERAPIST

## 2018-08-13 PROCEDURE — G8982 BODY POS GOAL STATUS: HCPCS | Performed by: PHYSICAL THERAPIST

## 2018-08-13 PROCEDURE — 97161 PT EVAL LOW COMPLEX 20 MIN: CPT | Performed by: PHYSICAL THERAPIST

## 2018-08-13 NOTE — PROGRESS NOTES
PT Evaluation     Today's date: 2018  Patient name: Stefanie Chawla  : 1960  MRN: [id number]  Referring provider: Lincoln Orr MD  Dx:   Encounter Diagnosis     ICD-10-CM    1  Pain in thoracic spine M54 6    2  Acute left-sided thoracic back pain M54 6 Ambulatory referral to Physical Therapy                  Assessment  Impairments: abnormal or restricted ROM, activity intolerance, impaired physical strength, lacks appropriate home exercise program and pain with function    Assessment details: Pearlene Peer with signs and symptoms consistent with Pain in thoracic spine  (primary encounter diagnosis)  Acute left-sided thoracic back pain, with loss of range of motion, strength and spinal stabilization  Presents with medium reactivity  Stefanie Chawla would benefit with physical therapy to address these impairments to return to prior level of function  Understanding of Dx/Px/POC: good   Prognosis: fair    Goals  STG  Initiate HEP  Able to stand for 30 minutes with out pain in 3 weeks  LTG  Independent with HEP   Able to walk 1/2 mile without pain in 6 weeks    Plan  Planned therapy interventions: manual therapy, neuromuscular re-education, patient education, postural training, strengthening, stretching, therapeutic exercise and home exercise program  Frequency: 2x week  Duration in visits: 12  Duration in weeks: 6  Treatment plan discussed with: patient        Subjective Evaluation    History of Present Illness  Date of onset: 2018  Mechanism of injury: Patient reports longterm achy pain in the back, from needing to assist her  transferring from sitting, but recently, she developed sharp stabbing pain left thorcic/lumbar junction  Dr Rigo Pozo , ortho MD ruled out lumbar origin of the pain  Dr Marbella Solis evaluated and referred to PT and also to have kidney scan      Recurrent probem    Quality of life: good    Pain  Current pain ratin  At best pain ratin  At worst pain rating: 10  Quality: needle-like, knife-like and sharp  Relieving factors: change in position  Aggravating factors: lifting, sitting and standing  Progression: no change    Social Support  Steps to enter house: yes  Stairs in house: no   Lives in: multiple-level home  Lives with: spouse    Employment status: working  Hand dominance: left    Treatments  Current treatment: physical therapy  Patient Goals  Patient goals for therapy: decreased pain, increased motion, increased strength and independence with ADLs/IADLs          Objective     Active Range of Motion     Lumbar   Flexion: 40 degrees   Extension: 15 degrees   Left rotation: 70 degrees with pain  Right rotation: 70 degrees with pain      Flowsheet Rows      Most Recent Value   PT/OT G-Codes   Current Score  47   Projected Score  68   FOTO information reviewed  Yes   Assessment Type  Evaluation   G code set  Changing & Maintaining Body Position   Changing and Maintaining Body Position Current Status ()  CK   Changing and Maintaining Body Position Goal Status ()  CJ          Precautions: HTN, SVT    Daily Treatment Diary     Manual                                                                                   Exercise Diary                                                                                                                                                                                                                                                                                      Modalities                                                       Patient instructed in HEP from [address] [place] [address] #[id number]

## 2018-08-16 ENCOUNTER — OFFICE VISIT (OUTPATIENT)
Dept: PHYSICAL THERAPY | Facility: CLINIC | Age: 58
End: 2018-08-16
Payer: COMMERCIAL

## 2018-08-16 ENCOUNTER — HOSPITAL ENCOUNTER (OUTPATIENT)
Dept: RADIOLOGY | Facility: CLINIC | Age: 58
Discharge: HOME | End: 2018-08-16
Payer: COMMERCIAL

## 2018-08-16 DIAGNOSIS — M54.6 ACUTE LEFT-SIDED THORACIC BACK PAIN: Primary | ICD-10-CM

## 2018-08-16 DIAGNOSIS — N20.0 KIDNEY CALCULI: ICD-10-CM

## 2018-08-16 PROCEDURE — 97110 THERAPEUTIC EXERCISES: CPT

## 2018-08-16 PROCEDURE — 76770 US EXAM ABDO BACK WALL COMP: CPT

## 2018-08-16 NOTE — PROGRESS NOTES
Daily Note     Today's date: 2018  Patient name: Zabrina Rose  : 1960  MRN: [id number]  Referring provider: Suma Goodson MD  Dx:   Encounter Diagnosis     ICD-10-CM    1  Acute left-sided thoracic back pain M54 6                   Subjective: I feel about the same  I just had a kidney US  Objective: See treatment diary below  Precautions: HTN, SVT    Daily Treatment Diary     Manual              T/S-L/S STM seated                                                                    Exercise Diary              NuStep 10min L1            Heel slides 20x            Lumbar rotation 20x            Hip add squeeze [id number]            Clamshells  Gr tb x20                                                                                                                                                                                                                   Modalities              MH T/S-L/S 10min                                        Assessment: Tolerated treatment fair  Patient demonstrated fatigue post treatment      Plan: Progress treatment as tolerated

## 2018-08-20 ENCOUNTER — OFFICE VISIT (OUTPATIENT)
Dept: PHYSICAL THERAPY | Facility: CLINIC | Age: 58
End: 2018-08-20
Payer: COMMERCIAL

## 2018-08-20 DIAGNOSIS — M54.6 ACUTE LEFT-SIDED THORACIC BACK PAIN: Primary | ICD-10-CM

## 2018-08-20 PROCEDURE — 97110 THERAPEUTIC EXERCISES: CPT

## 2018-08-20 NOTE — PROGRESS NOTES
Daily Note     Today's date: 2018  Patient name: John Banks  : 1960  MRN: [id number]  Referring provider: Keyonna Choudhary MD  Dx: No diagnosis found  Subjective: Hurts a lot when I stand, hurts a lot when I sit in my recliner  The kidney US was normal       Objective: See treatment diary below  Precautions: HTN, SVT    Daily Treatment Diary     Manual             T/S-L/S STM seated -----           STM/foam roller  S/L L T/S                                                      Exercise Diary             NuStep 10min L1 [id number] w/MH           Heel slides 20x 20x           Lumbar rotation 20x 20x           Hip add squeeze [id number] w/bridges x10           Clamshells  Gr tb x20 Gr tb x20           TB rows  Red x20           TB HA supine  Yx10           L T/S rotation  S/L x5           FIS w/blue PB  10x           SLR  10x                                                                                                                                                 Modalities              MH T/S-L/S 10min                                        Assessment: Tolerated treatment fair  Patient demonstrated fatigue post treatment      Plan: Progress treatment as tolerated

## 2018-08-22 ENCOUNTER — OFFICE VISIT (OUTPATIENT)
Dept: HEMATOLOGY ONCOLOGY | Facility: MEDICAL CENTER | Age: 58
End: 2018-08-22
Payer: COMMERCIAL

## 2018-08-22 VITALS
OXYGEN SATURATION: 96 % | BODY MASS INDEX: 50.02 KG/M2 | HEIGHT: 64 IN | RESPIRATION RATE: 18 BRPM | DIASTOLIC BLOOD PRESSURE: 80 MMHG | SYSTOLIC BLOOD PRESSURE: 124 MMHG | WEIGHT: 293 LBS | TEMPERATURE: 97.2 F | HEART RATE: 66 BPM

## 2018-08-22 DIAGNOSIS — C49.A0 MALIGNANT GASTROINTESTINAL STROMAL TUMOR, UNSPECIFIED SITE (HCC): Primary | ICD-10-CM

## 2018-08-22 DIAGNOSIS — C49.A0: ICD-10-CM

## 2018-08-22 PROCEDURE — 99214 OFFICE O/P EST MOD 30 MIN: CPT | Performed by: INTERNAL MEDICINE

## 2018-08-22 RX ORDER — ROSUVASTATIN CALCIUM 20 MG/1
TABLET, COATED ORAL
COMMUNITY
Start: 2018-08-14 | End: 2019-01-24 | Stop reason: SDUPTHER

## 2018-08-22 NOTE — PROGRESS NOTES
Quin Aver  1960  [facility] [place] [facility] [id number]    DISCUSSION/SUMMARY:    58-year-old female with history of GIST tumor status post resection and 8+ years of [address]  Issues:      1  GIST  Patient feels well and clinically there are no troubling signs  Patient is approximately 10 years out from diagnosis  I do not believe that routine scanning without any clinical signs or symptoms is warranted  Patient agrees to return in 1 year with blood work before  As before, I do not believe that the left flank lesion is associated with the history of GIST  Patient will continue to monitor for an enlarging lesions/progression  Physical therapy is ongoing  2  Elevated LFTs  This is not new  Etiology is not clear  Workup by PCP is in process      3  Mammogram   Patient is due for mammogram   Mrs Evelia Connors states this is usually ordered by Dr Kalli Kelley - follow-up is pending  Patient knows to call the hematology/oncology office if there are any other questions or concerns  Carefully review your medication list and verify that the list is accurate and up-to-date  Please call the hematology/oncology office if there are medications missing from the list, medications on the list that you are not currently taking or if there is a dosage or instruction that is different from how you're taking that medication      Patient goals and areas of care:  GIST surveillance  Patient is able to self-care   _____________________________________________________________________________________    Chief Complaint   Patient presents with    Follow-up     GIST status post resection and approximately 8 years of Gleevec on surveillance     History of Present Illness:    58-year-old female 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 and placed on Gleevec 400 daily since 2006 (patient had been on the medication for 8+ years - discontinued in December 2013)  Disease involved mesentery and small bowel, and low-grade (2/10 mitotic rate)  Patient is back for followup  Ms Annabel Melchor states feeling okay, baseline  Appetite is okay, no GI issues, no GI bleeding  No Gyn or  issues  Weight control still an issue  Activities are baseline  On the last office visit, patient had issues with left posterior flank pain  A nodule can be felt in the area - same size as before  Patient continues to have pain  Activities are baseline  Routine health maintenance and medical care is up-to-date other than a pending mammogram   Patient has a history of elevated LFTs - workup by PCP is in process  No headaches, blurred vision or dizziness  No fevers, chills or sweats  No chest pain or pressure  No shortness of breath or dyspnea on exertion  Review of Systems   Constitutional: Negative  HENT: Negative  Eyes: Negative  Respiratory: Negative  Cardiovascular: Negative  Gastrointestinal: Negative  Endocrine: Negative  Genitourinary: Negative  Musculoskeletal: Positive for arthralgias and back pain  Skin: Negative  Allergic/Immunologic: Negative  Neurological: Negative  Hematological: Negative  Psychiatric/Behavioral: Negative  All other systems reviewed and are negative       Patient Active Problem List   Diagnosis    Tachycardia    Dyspnea on exertion    Supraventricular tachycardia ([address] )    Hypertension    Controlled depression    Severe obstructive sleep apnea    Morbid obesity ([address] )    Impaired fasting glucose    Hyperlipidemia    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[address] )    Esophageal reflux    Benign essential hypertension    Adenomatous colon polyp    Open wound of left lower extremity     Past Medical History:   Diagnosis Date    Abnormal liver function test     last assessed 1/16/17     Adenomatosis     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 last assessed 4/4/13    Atrial fibrillation (HCC)     Atrial flutter (HCC)     Benign essential hypertension     last assessed 12/21/17     Body mass index (BMI) of 50-59 9 in adult [facility])     Carpal tunnel syndrome 09/07/2004    unspecified laterality  / last assessed 9/7/04    Congenital pes planus     last assessed 7/15/14     Depression     Depression     Hemangioma of skin 08/26/2003    last assessed 8/26/03    History of gastroesophageal reflux (GERD)     Hypercholesterolemia     Hyperlipidemia     Hypertension     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 [address] [facility] 04/19/2006    last assessed 12/31/14     Osteoarthritis of both knees     last assessed 12/31/14     Paroxysmal atrial fibrillation (HCC)     S/P ablation of atrial flutter      Past Surgical History:   Procedure Laterality Date    ABDOMINAL SURGERY  06/2006    20cm GIST removed     APPENDECTOMY      ATRIAL ABLATION SURGERY      CARPAL TUNNEL RELEASE Bilateral     COLONOSCOPY      HYSTERECTOMY      KNEE SURGERY      TONSILLECTOMY      TUMOR EXCISION  2006    gastrointestinal stromal tumor     Family History   Problem Relation Age of Onset    Emphysema Mother    [facility] Arthritis Mother     Diabetes Mother     Hypertension Mother     COPD Mother     Cancer Father         prostate    Arthritis Father     Prostate cancer Father     Cerebral aneurysm Son      Social History     Social History    Marital status: /Civil Union     Spouse name: N/A    Number of children: N/A    Years of education: N/A     Occupational History    Not on file       Social History Main Topics    Smoking status: Light Tobacco Smoker     Years: 4 00    Smokeless tobacco: Current User      Comment: current some day smoker 1 cigarette/day 4 yrs / former smoker per allscript                            Alcohol use No      Comment: social drinker per allscript     Drug use: No    Sexual activity: Not on file     Other Topics Concern    Not on file     Social History Narrative    Lack of exercise    Good sleep hygiene    No caffeine use    Dog    Good sleep hygiene         Current Outpatient Prescriptions:     aspirin 81 MG tablet, Take 81 mg by mouth daily  , Disp: , Rfl:     esomeprazole (NexIUM) 40 MG capsule, Take 1 capsule (40 mg total) by mouth daily, Disp: 90 capsule, Rfl: 1    ibuprofen (MOTRIN) 800 mg tablet, Take 1 tablet (800 mg total) by mouth every 8 (eight) hours as needed for mild pain, Disp: 30 tablet, Rfl: 0    Magnesium Oxide -Mg Supplement 400 MG CAPS, Take 1 capsule by mouth daily, Disp: , Rfl:     metoprolol succinate (TOPROL-XL) 25 mg 24 hr tablet, Take 1 tablet (25 mg total) by mouth 2 (two) times a day, Disp: 180 tablet, Rfl: 2    multivitamin (THERAGRAN) TABS, Take 1 tablet by mouth daily  , Disp: , Rfl:     olmesartan (BENICAR) 20 mg tablet, Take 1 tablet (20 mg total) by mouth daily, Disp: 90 tablet, Rfl: 3    Omega-3 Fatty Acids (FISH OIL) 1,000 mg, Take 1,000 mg by mouth 2 (two) times a day  , Disp: , Rfl:     PARoxetine (PAXIL-CR) 37 5 mg 24 hr tablet, Take 1 tablet (37 5 mg total) by mouth every morning, Disp: 30 tablet, Rfl: 5    pravastatin (PRAVACHOL) 40 mg tablet, Take 1 tablet (40 mg total) by mouth daily (Patient taking differently: Take 40 mg by mouth daily at bedtime  ), Disp: 90 tablet, Rfl: 1    predniSONE 20 mg tablet, Take 2 tablets (40 mg total) by mouth daily, Disp: 14 tablet, Rfl: 0    rosuvastatin (CRESTOR) 20 MG tablet, , Disp: , Rfl:     sotalol (BETAPACE) 120 mg tablet, Take 1 tablet (120 mg total) by mouth every 12 (twelve) hours (Patient not taking: Reported on 8/22/2018 ), Disp: 180 tablet, Rfl: 2    Allergies   Allergen Reactions    Other      Adhesive tape        Vitals:    08/22/18 1600   BP: 124/80   Pulse: 66   Resp: 18   Temp: (!) 97 2 °F (36 2 °C)   SpO2: 96%     Physical Exam   Constitutional: She is oriented to person, place, and time   She appears well-developed and well-nourished  Obese female, no respiratory distress   HENT:   Head: Normocephalic and atraumatic  Right Ear: External ear normal    Left Ear: External ear normal    Nose: Nose normal    Mouth/Throat: Oropharynx is clear and moist    Eyes: Conjunctivae and EOM are normal  Pupils are equal, round, and reactive to light  Neck: Normal range of motion  Neck supple  Cardiovascular: Normal rate, regular rhythm, normal heart sounds and intact distal pulses  Pulmonary/Chest: Effort normal and breath sounds normal    Distant breath sounds bilaterally, no rales or rhonchi   Abdominal: Soft  Bowel sounds are normal    Morbidly obese, cannot palpate liver or spleen, +bowel sounds, nontender   Musculoskeletal: Normal range of motion  There is a palpable nodule in the left lower posterior flank approximately 1 cm in diameter no different than before, lesion is buried within the muscle, no redness or swelling, no signs of cellulitis or inflammation, area is tender,   Neurological: She is alert and oriented to person, place, and time  She has normal reflexes  Skin: Skin is warm  Good color, warm, moist, no petechiae or ecchymoses   Psychiatric: She has a normal mood and affect   Her behavior is normal  Judgment and thought content normal    Extremities:  No lower extremity edema, no cords, pulses are 1+  Lymphatics:  No adenopathy in the neck, supraclavicular region, axilla and groin bilaterally    Performance Status: 0 - Asymptomatic     Labs:    03/21/2018 BUN = 16 creatinine = 0 79 calcium = 9 6 AST = 79 ALT = 139 alkaline phosphatase = 119 total bilirubin = 0 50 total protein = 7 2 WBC = 7 3 hemoglobin = 15 6 hematocrit = 48 4 MCV = 82 platelet = 027 neutrophil = 60%  4/12/17 BUN = 14 creatinine = 0 78 total bilirubin = 0 3 alkaline phosphatase = 96 AST = 39 ALT = 54  1/13/17 WBC = 7 5 hemoglobin = 13 6 hematocrit = 42 platelet = 191 BUN = 17 creatinine = 0 73 alkaline phosphatase = 101 AST = 98 = high ALT = 141 = high total bilirubin = 0 3 LDH = 232    Imaging     08/16/2018 ultrasound of kidney and bladder    IMPRESSION: Normal renal sonogram      8/24/17 screening bilateral digital mammogram was category 1, benign  CAT scan of the chest and abdomen/pelvis from December 17 2014 did not demonstrate any evidence of recurrent disease  Patient had a stable right adrenal adenoma and a nonobstructing 2 mm up right renal calculus  CAT scan of the chest and abdomen/pelvis performed on November 14, 2013 did not demonstrate any significant abnormalities in the chest  Patient has a stable right adrenal adenoma but no evidence of recurrent GIST tumor  Patient had a small cyst in her right kidney  CAT scan of the abdomen/pelvis performed on September 24, 2012 did not demonstrate any evidence of recurrent or residual GIST tumor   Patient had hepatic steatosis and a stable right adrenal abnormal

## 2018-08-27 ENCOUNTER — OFFICE VISIT (OUTPATIENT)
Dept: PHYSICAL THERAPY | Facility: CLINIC | Age: 58
End: 2018-08-27
Payer: COMMERCIAL

## 2018-08-27 DIAGNOSIS — M54.6 ACUTE LEFT-SIDED THORACIC BACK PAIN: Primary | ICD-10-CM

## 2018-08-27 PROCEDURE — 97110 THERAPEUTIC EXERCISES: CPT | Performed by: PHYSICAL THERAPIST

## 2018-08-27 NOTE — PROGRESS NOTES
Daily Note     Today's date: 2018  Patient name: Corky Leon  : 1960  MRN: [id number]  Referring provider: Cehlsie Orantes MD  Dx:   Encounter Diagnosis     ICD-10-CM    1  Acute left-sided thoracic back pain M54 6                   Subjective: Complains of left sided rib cage pain  Objective: See treatment diary below      Assessment: Tolerated treatment well  Patient demonstrated fatigue post treatment      Plan: Continue per plan of care        Daily Treatment Diary     Manual             T/S-L/S STM seated -----           STM/foam roller  S/L L T/S                                                      Exercise Diary            NuStep 10min L1 [id number] w/MH 10 min          Heel slides 20x 20x           Lumbar rotation 20x 20x 20x PB          Hip add squeeze [id number] w/bridges x10           Clamshells  Gr tb x20 Gr tb x20           TB rows  Red x20           TB HA supine  Yx10           L T/S rotation  S/L x5 20x          FIS w/blue PB  10x           SLR  10x           Standing t-spine ext   10x          Standing t spine rotation   10x                                                                                                                      Modalities              MH T/S-L/S 10min

## 2018-08-30 ENCOUNTER — APPOINTMENT (OUTPATIENT)
Dept: PHYSICAL THERAPY | Facility: CLINIC | Age: 58
End: 2018-08-30
Payer: COMMERCIAL

## 2018-08-30 DIAGNOSIS — F32.A DEPRESSION, UNSPECIFIED DEPRESSION TYPE: ICD-10-CM

## 2018-08-31 DIAGNOSIS — F32.A DEPRESSION, UNSPECIFIED DEPRESSION TYPE: ICD-10-CM

## 2018-08-31 RX ORDER — PAROXETINE HYDROCHLORIDE 37.5 MG/1
37.5 TABLET, FILM COATED, EXTENDED RELEASE ORAL EVERY MORNING
Qty: 90 TABLET | Refills: 3 | Status: SHIPPED | OUTPATIENT
Start: 2018-08-31 | End: 2019-07-23 | Stop reason: SDUPTHER

## 2018-08-31 RX ORDER — PAROXETINE HYDROCHLORIDE 25 MG/1
TABLET, FILM COATED, EXTENDED RELEASE ORAL
Qty: 90 TABLET | OUTPATIENT
Start: 2018-08-31

## 2018-09-06 ENCOUNTER — OFFICE VISIT (OUTPATIENT)
Dept: PHYSICAL THERAPY | Facility: CLINIC | Age: 58
End: 2018-09-06
Payer: COMMERCIAL

## 2018-09-06 DIAGNOSIS — M54.6 ACUTE LEFT-SIDED THORACIC BACK PAIN: Primary | ICD-10-CM

## 2018-09-06 PROCEDURE — G8982 BODY POS GOAL STATUS: HCPCS

## 2018-09-06 PROCEDURE — G8981 BODY POS CURRENT STATUS: HCPCS

## 2018-09-06 PROCEDURE — 97110 THERAPEUTIC EXERCISES: CPT

## 2018-09-06 NOTE — PROGRESS NOTES
Daily Note     Today's date: 2018  Patient name: Yoel Montilla  : 1960  MRN: [id number]  Referring provider: Jason Hoyos MD  Dx:   Encounter Diagnosis     ICD-10-CM    1  Acute left-sided thoracic back pain M54 6                   Subjective: Continued L sided back pain when standing & sitting in my lounge chair, but not as bad  Objective: See treatment diary below    Daily Treatment Diary     Manual             T/S-L/S STM seated -----           STM/foam roller  S/L L T/S                                                      Exercise Diary           NuStep 10min L1 [id number] w/MH 10 min 10min L1         Heel slides 20x 20x  20x         Lumbar rotation 20x 20x 20x PB 20x PB         Hip add squeeze [id number] w/bridges x10  bridge x10 on PB         Clamshells  Gr tb x20 Gr tb x20  Gr tb x20         TB rows  Red x20  Red x20         TB HA supine  Yx10  Y x20 seated         L T/S rotation  S/L x5 20x -----         FIS w/blue PB  10x  20x         SLR  10x  -----         Standing t-spine ext   10x 10x         Standing t spine rotation   10x 10x                                                                                                                     Modalities              MH T/S-L/S 10min                                        Assessment: Tolerated treatment fair  Patient demonstrated fatigue post treatment      Plan: Progress treatment as tolerated

## 2018-09-10 ENCOUNTER — OFFICE VISIT (OUTPATIENT)
Dept: PHYSICAL THERAPY | Facility: CLINIC | Age: 58
End: 2018-09-10
Payer: COMMERCIAL

## 2018-09-10 DIAGNOSIS — M54.6 ACUTE LEFT-SIDED THORACIC BACK PAIN: Primary | ICD-10-CM

## 2018-09-10 PROCEDURE — G8983 BODY POS D/C STATUS: HCPCS | Performed by: PHYSICAL THERAPIST

## 2018-09-10 PROCEDURE — 97110 THERAPEUTIC EXERCISES: CPT

## 2018-09-10 PROCEDURE — G8982 BODY POS GOAL STATUS: HCPCS | Performed by: PHYSICAL THERAPIST

## 2018-09-10 NOTE — PROGRESS NOTES
Daily Note     Today's date: 9/10/2018  Patient name: Fabio Schneider  : 1960  MRN: [id number]  Referring provider: Francine Marvin MD  Dx:   Encounter Diagnosis     ICD-10-CM    1  Acute left-sided thoracic back pain M54 6                   Subjective: No pain  Objective: See treatment diary below  Precautions: HTN, SVT    Daily Treatment Diary     Manual             T/S-L/S STM seated -----           STM/foam roller  S/L L T/S                                                      Exercise Diary  8/16 8/20 8/27 9/6 9/10        NuStep 10min L1 [id number] w/MH 10 min 10min L1 10min L1        Heel slides 20x 20x  20x 20x        Lumbar rotation 20x 20x 20x PB 20x PB 20x on pb        Hip add squeeze [id number] w/bridges x10  bridge x10 on PB 10x on PB        Clamshells  Gr tb x20 Gr tb x20  Gr tb x20 ---        TB rows  Red x20  Red x20 Row,ir,ext,add x20         TB HA supine  Yx10  Y x20 seated Y x 20 seated        L T/S rotation  S/L x5 20x -----         FIS w/blue PB  10x  20x 20x        SLR  10x  -----         Standing t-spine ext   10x 10x 10x        Standing t spine rotation   10x 10x 10x                                                                                                                    Modalities              MH T/S-L/S 10min                                        Assessment: Tolerated treatment well  Patient exhibited good technique with therapeutic exercises   HEP / stretching program reviewed with daily performance encouraged        Plan: D/C with HEP

## 2018-09-10 NOTE — PROGRESS NOTES
PT Discharge    Today's date: 9/10/2018  Patient name: Lemont Meigs  : 1960  MRN: [id number]  Referring provider: Konrad Brambila MD  Dx:   Encounter Diagnosis     ICD-10-CM    1  Acute left-sided thoracic back pain M54 6        Start Time: 1620  Stop Time: 1700  Total time in clinic (min): 40 minutes    Assessment    Assessment details: Lemont Meigs has been seen for Acute left-sided thoracic back pain  (primary encounter diagnosis),and has met the goals of physical therapy  And is independent with a HEP  Plan  Plan details: Discontinue PT  Subjective Evaluation    History of Present Illness  Mechanism of injury: I feel great, no pain able to work and stand without pain    Pain  Current pain ratin  At best pain ratin          Objective     Active Range of Motion   Cervical/Thoracic Spine   Normal active range of motion      Flowsheet Rows      Most Recent Value   PT/OT G-Codes   Current Score  86   Projected Score  67   FOTO information reviewed  Yes   Assessment Type  Discharge   G code set  Changing & Maintaining Body Position   Changing and Maintaining Body Position Goal Status ()  CJ   Changing and Maintaining Body Position Discharge Status ()  CI

## 2018-09-13 ENCOUNTER — APPOINTMENT (OUTPATIENT)
Dept: PHYSICAL THERAPY | Facility: CLINIC | Age: 58
End: 2018-09-13
Payer: COMMERCIAL

## 2018-10-18 DIAGNOSIS — I10 ESSENTIAL HYPERTENSION: ICD-10-CM

## 2018-10-18 NOTE — TELEPHONE ENCOUNTER
Dr Leslie Packer,     Patient was wondering if you can call it in to [id number] [facility] so she will have a back up and not have to bother you for it again when she needs it? TY

## 2018-10-19 DIAGNOSIS — I10 ESSENTIAL HYPERTENSION: ICD-10-CM

## 2018-10-19 RX ORDER — OLMESARTAN MEDOXOMIL 20 MG/1
20 TABLET ORAL DAILY
Qty: 30 TABLET | Refills: 0 | Status: SHIPPED | OUTPATIENT
Start: 2018-10-19 | End: 2019-04-10 | Stop reason: SDUPTHER

## 2018-10-19 RX ORDER — OLMESARTAN MEDOXOMIL 20 MG/1
20 TABLET ORAL DAILY
Qty: 90 TABLET | Refills: 3 | Status: SHIPPED | OUTPATIENT
Start: 2018-10-19 | End: 2018-10-19 | Stop reason: SDUPTHER

## 2018-11-23 DIAGNOSIS — I48.91 ATRIAL FIBRILLATION, UNSPECIFIED TYPE (HCC): ICD-10-CM

## 2018-11-27 RX ORDER — SOTALOL HYDROCHLORIDE 120 MG/1
TABLET ORAL
Qty: 180 TABLET | Refills: 2 | Status: SHIPPED | OUTPATIENT
Start: 2018-11-27 | End: 2019-07-26 | Stop reason: SDUPTHER

## 2018-12-19 ENCOUNTER — TRANSCRIBE ORDERS (OUTPATIENT)
Dept: ADMINISTRATIVE | Facility: HOSPITAL | Age: 58
End: 2018-12-19

## 2018-12-19 DIAGNOSIS — Z12.39 SCREENING BREAST EXAMINATION: Primary | ICD-10-CM

## 2019-01-07 ENCOUNTER — HOSPITAL ENCOUNTER (OUTPATIENT)
Dept: RADIOLOGY | Facility: HOSPITAL | Age: 59
Discharge: HOME | End: 2019-01-07
Payer: COMMERCIAL

## 2019-01-07 VITALS — BODY MASS INDEX: 50.02 KG/M2 | WEIGHT: 293 LBS | HEIGHT: 64 IN

## 2019-01-07 DIAGNOSIS — Z12.39 SCREENING BREAST EXAMINATION: ICD-10-CM

## 2019-01-07 PROCEDURE — 77067 SCR MAMMO BI INCL CAD: CPT

## 2019-01-24 DIAGNOSIS — E78.5 HYPERLIPIDEMIA, UNSPECIFIED HYPERLIPIDEMIA TYPE: Primary | ICD-10-CM

## 2019-01-25 RX ORDER — ROSUVASTATIN CALCIUM 20 MG/1
TABLET, COATED ORAL
Qty: 90 TABLET | Refills: 3 | Status: SHIPPED | OUTPATIENT
Start: 2019-01-25 | End: 2019-10-02

## 2019-01-30 ENCOUNTER — OFFICE VISIT (OUTPATIENT)
Dept: PODIATRY | Facility: CLINIC | Age: 59
End: 2019-01-30
Payer: COMMERCIAL

## 2019-01-30 VITALS
RESPIRATION RATE: 17 BRPM | HEART RATE: 77 BPM | HEIGHT: 64 IN | WEIGHT: 293 LBS | SYSTOLIC BLOOD PRESSURE: 137 MMHG | DIASTOLIC BLOOD PRESSURE: 80 MMHG | BODY MASS INDEX: 50.02 KG/M2

## 2019-01-30 DIAGNOSIS — L03.039 PARONYCHIA OF TOENAIL, UNSPECIFIED LATERALITY: ICD-10-CM

## 2019-01-30 DIAGNOSIS — L60.0 INGROWN TOENAIL: ICD-10-CM

## 2019-01-30 DIAGNOSIS — M79.671 PAIN IN BOTH FEET: ICD-10-CM

## 2019-01-30 DIAGNOSIS — M21.969 ACQUIRED DEFORMITY OF FOOT, UNSPECIFIED LATERALITY: Primary | ICD-10-CM

## 2019-01-30 DIAGNOSIS — B35.1 ONYCHOMYCOSIS: ICD-10-CM

## 2019-01-30 DIAGNOSIS — M79.672 PAIN IN BOTH FEET: ICD-10-CM

## 2019-01-30 PROCEDURE — 99203 OFFICE O/P NEW LOW 30 MIN: CPT | Performed by: PODIATRIST

## 2019-01-30 RX ORDER — ULTRAMICROSIZE GRISEOFULVIN 250 MG/1
250 TABLET ORAL DAILY
Qty: 30 TABLET | Refills: 0 | Status: SHIPPED | OUTPATIENT
Start: 2019-01-30 | End: 2019-03-01

## 2019-01-30 NOTE — PROGRESS NOTES
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    last assessed 4/4/13    Atrial fibrillation (HCC)     Atrial flutter (HCC)     Benign essential hypertension     last assessed 12/21/17     Body mass index (BMI) of 50-59 9 in adult [facility])     Carpal tunnel syndrome 09/07/2004    unspecified laterality  / last assessed 9/7/04    Congenital pes planus     last assessed 7/15/14     Depression     Depression     Hemangioma of skin 08/26/2003    last assessed 8/26/03    History of gastroesophageal reflux (GERD)     Hypercholesterolemia     Hyperlipidemia     Hypertension     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[facility] [address] ) 04/19/2006    last assessed 12/31/14     Osteoarthritis of both knees     last assessed 12/31/14     Paroxysmal atrial fibrillation (HCC)     S/P ablation of atrial flutter        Past Surgical History:   Procedure Laterality Date    ABDOMINAL SURGERY  06/2006    20cm GIST removed     APPENDECTOMY      ATRIAL ABLATION SURGERY      CARPAL TUNNEL RELEASE Bilateral     COLONOSCOPY      HYSTERECTOMY      KNEE SURGERY      OOPHORECTOMY      TONSILLECTOMY      TUMOR EXCISION  2006    gastrointestinal stromal tumor       Allergies   Allergen Reactions    Other      Adhesive tape          Current Outpatient Prescriptions:     aspirin 81 MG tablet, Take 81 mg by mouth daily , Disp: , Rfl:     esomeprazole (NexIUM) 40 MG capsule, Take 1 capsule (40 mg total) by mouth daily, Disp: 90 capsule, Rfl: 1    ibuprofen (MOTRIN) 800 mg tablet, Take 1 tablet (800 mg total) by mouth every 8 (eight) hours as needed for mild pain, Disp: 30 tablet, Rfl: 0    Magnesium Oxide -Mg Supplement 400 MG CAPS, Take 1 capsule by mouth daily, Disp: , Rfl:     metoprolol succinate (TOPROL-XL) 25 mg 24 hr tablet, Take 1 tablet (25 mg total) by mouth 2 (two) times a day, Disp: 180 tablet, Rfl: 2    multivitamin (THERAGRAN) TABS, Take 1 tablet by mouth daily  , Disp: , Rfl:     olmesartan (BENICAR) 20 mg tablet, Take 1 tablet (20 mg total) by mouth daily, Disp: 30 tablet, Rfl: 0    Omega-3 Fatty Acids (FISH OIL) 1,000 mg, Take 1,000 mg by mouth 2 (two) times a day  , Disp: , Rfl:     PARoxetine (PAXIL-CR) 37 5 mg 24 hr tablet, Take 1 tablet (37 5 mg total) by mouth every morning, Disp: 90 tablet, Rfl: 3    pravastatin (PRAVACHOL) 40 mg tablet, Take 1 tablet (40 mg total) by mouth daily (Patient taking differently: Take 40 mg by mouth daily at bedtime  ), Disp: 90 tablet, Rfl: 1    predniSONE 20 mg tablet, Take 2 tablets (40 mg total) by mouth daily, Disp: 14 tablet, Rfl: 0    rosuvastatin (CRESTOR) 20 MG tablet, TAKE 1 TABLET DAILY, Disp: 90 tablet, Rfl: 3    sotalol (BETAPACE) 120 mg tablet, TAKE 1 TABLET BY MOUTH  EVERY 12 HOURS, Disp: 180 tablet, Rfl: 2    Patient Active Problem List   Diagnosis    Tachycardia    Dyspnea on exertion    Supraventricular tachycardia (HCC)    Hypertension    Controlled depression    Severe obstructive sleep apnea    Morbid obesity (HCC)    Impaired fasting glucose    Hyperlipidemia    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)    Esophageal reflux    Benign essential hypertension    Adenomatous colon polyp    Open wound of left lower extremity          Patient ID: Yoon Singleton is a 60 y o  female      HPI    The following portions of the patient's history were reviewed and updated as appropriate: allergies, current medications, past family history, past medical history, past social history, past surgical history and problem list     Review of Systems      Objective:  Patient's shoes and socks removed  Foot Exam    General  General Appearance: appears stated age and healthy   Orientation: alert and oriented to person, place, and time   Affect: appropriate   Gait: antalgic       Right Foot/Ankle     Inspection and Palpation  Tenderness: metatarsals   Swelling: dorsum and metatarsals   Arch: pes planus  Hammertoes: fifth toe  Skin Exam: dry skin;     Neurovascular  Dorsalis pedis: 1+  Posterior tibial: 2+  Superficial peroneal nerve sensation: diminished  Deep peroneal nerve sensation: diminished      Left Foot/Ankle      Inspection and Palpation  Tenderness: metatarsals   Swelling: dorsum and metatarsals   Arch: pes planus  Hammertoes: fifth toe  Skin Exam: dry skin;     Neurovascular  Dorsalis pedis: 1+  Posterior tibial: 2+  Superficial peroneal nerve sensation: diminished  Deep peroneal nerve sensation: diminished        Physical Exam   Constitutional: She appears well-developed and well-nourished  Cardiovascular: Normal rate and regular rhythm  Pulses:       Dorsalis pedis pulses are 1+ on the right side, and 1+ on the left side  Posterior tibial pulses are 2+ on the right side, and 2+ on the left side  Feet:   Right Foot:   Skin Integrity: Positive for dry skin  Left Foot:   Skin Integrity: Positive for dry skin  Skin:   Wide incurvated hallux toenail bilateral   Nails demonstrate mycosis  Both hallux is demonstrate ingrown toenail

## 2019-02-23 DIAGNOSIS — I48.91 ATRIAL FIBRILLATION, UNSPECIFIED TYPE (HCC): ICD-10-CM

## 2019-02-23 DIAGNOSIS — E78.5 HYPERLIPIDEMIA, UNSPECIFIED HYPERLIPIDEMIA TYPE: ICD-10-CM

## 2019-02-25 DIAGNOSIS — K21.9 GASTROESOPHAGEAL REFLUX DISEASE, ESOPHAGITIS PRESENCE NOT SPECIFIED: ICD-10-CM

## 2019-02-25 RX ORDER — METOPROLOL SUCCINATE 25 MG/1
TABLET, EXTENDED RELEASE ORAL
Qty: 180 TABLET | Refills: 2 | Status: SHIPPED | OUTPATIENT
Start: 2019-02-25 | End: 2019-09-13 | Stop reason: SDUPTHER

## 2019-02-25 RX ORDER — PRAVASTATIN SODIUM 40 MG
TABLET ORAL
Qty: 90 TABLET | Refills: 1 | Status: SHIPPED | OUTPATIENT
Start: 2019-02-25 | End: 2019-07-16 | Stop reason: SDUPTHER

## 2019-02-25 RX ORDER — ESOMEPRAZOLE MAGNESIUM 40 MG/1
40 CAPSULE, DELAYED RELEASE ORAL DAILY
Qty: 90 CAPSULE | Refills: 1 | Status: SHIPPED | OUTPATIENT
Start: 2019-02-25 | End: 2019-02-27 | Stop reason: ALTCHOICE

## 2019-02-27 DIAGNOSIS — K21.9 GASTROESOPHAGEAL REFLUX DISEASE WITHOUT ESOPHAGITIS: Primary | ICD-10-CM

## 2019-02-27 RX ORDER — PANTOPRAZOLE SODIUM 40 MG/1
40 TABLET, DELAYED RELEASE ORAL
Qty: 90 TABLET | Refills: 3 | Status: SHIPPED | OUTPATIENT
Start: 2019-02-27 | End: 2019-12-17 | Stop reason: SDUPTHER

## 2019-02-28 ENCOUNTER — TRANSCRIBE ORDERS (OUTPATIENT)
Dept: ADMINISTRATIVE | Facility: HOSPITAL | Age: 59
End: 2019-02-28

## 2019-02-28 DIAGNOSIS — T84.038A MECHANICAL LOOSENING OF PROSTHETIC HIP, INITIAL ENCOUNTER (HCC): Primary | ICD-10-CM

## 2019-02-28 DIAGNOSIS — Z96.649 MECHANICAL LOOSENING OF PROSTHETIC HIP, INITIAL ENCOUNTER (HCC): Primary | ICD-10-CM

## 2019-03-11 ENCOUNTER — HOSPITAL ENCOUNTER (OUTPATIENT)
Dept: RADIOLOGY | Facility: HOSPITAL | Age: 59
Discharge: HOME | End: 2019-03-11
Attending: ORTHOPAEDIC SURGERY
Payer: COMMERCIAL

## 2019-03-11 DIAGNOSIS — T84.038A MECHANICAL LOOSENING OF PROSTHETIC HIP, INITIAL ENCOUNTER (HCC): ICD-10-CM

## 2019-03-11 DIAGNOSIS — T84.038A MECHANICAL LOOSENING OF PROSTHETIC KNEE, INITIAL ENCOUNTER (HCC): ICD-10-CM

## 2019-03-11 DIAGNOSIS — Z96.659 MECHANICAL LOOSENING OF PROSTHETIC KNEE, INITIAL ENCOUNTER (HCC): ICD-10-CM

## 2019-03-11 DIAGNOSIS — Z96.649 MECHANICAL LOOSENING OF PROSTHETIC HIP, INITIAL ENCOUNTER (HCC): ICD-10-CM

## 2019-03-11 PROCEDURE — A9503 TC99M MEDRONATE: HCPCS

## 2019-03-11 PROCEDURE — 78315 BONE IMAGING 3 PHASE: CPT

## 2019-04-10 DIAGNOSIS — I10 ESSENTIAL HYPERTENSION: ICD-10-CM

## 2019-04-10 RX ORDER — OLMESARTAN MEDOXOMIL 20 MG/1
20 TABLET ORAL DAILY
Qty: 90 TABLET | Refills: 1 | Status: SHIPPED | OUTPATIENT
Start: 2019-04-10 | End: 2019-11-21 | Stop reason: SDUPTHER

## 2019-06-10 ENCOUNTER — DOCUMENTATION (OUTPATIENT)
Dept: HEMATOLOGY ONCOLOGY | Facility: MEDICAL CENTER | Age: 59
End: 2019-06-10

## 2019-07-16 DIAGNOSIS — E78.5 HYPERLIPIDEMIA, UNSPECIFIED HYPERLIPIDEMIA TYPE: ICD-10-CM

## 2019-07-17 RX ORDER — PRAVASTATIN SODIUM 40 MG
TABLET ORAL
Qty: 90 TABLET | Refills: 1 | Status: SHIPPED | OUTPATIENT
Start: 2019-07-17 | End: 2019-10-29 | Stop reason: ALTCHOICE

## 2019-07-23 DIAGNOSIS — F32.A DEPRESSION, UNSPECIFIED DEPRESSION TYPE: ICD-10-CM

## 2019-07-24 RX ORDER — PAROXETINE HYDROCHLORIDE 37.5 MG/1
TABLET, FILM COATED, EXTENDED RELEASE ORAL
Qty: 90 TABLET | Refills: 3 | Status: SHIPPED | OUTPATIENT
Start: 2019-07-24 | End: 2020-06-01

## 2019-07-26 DIAGNOSIS — I48.91 ATRIAL FIBRILLATION, UNSPECIFIED TYPE (HCC): ICD-10-CM

## 2019-07-29 RX ORDER — SOTALOL HYDROCHLORIDE 120 MG/1
TABLET ORAL
Qty: 60 TABLET | Refills: 0 | Status: SHIPPED | OUTPATIENT
Start: 2019-07-29 | End: 2019-09-13 | Stop reason: SDUPTHER

## 2019-08-27 LAB
ALBUMIN SERPL-MCNC: 4.3 G/DL (ref 3.5–5.5)
ALBUMIN/GLOB SERPL: 1.8 {RATIO} (ref 1.2–2.2)
ALP SERPL-CCNC: 103 IU/L (ref 39–117)
ALT SERPL-CCNC: 123 IU/L (ref 0–32)
AST SERPL-CCNC: 117 IU/L (ref 0–40)
BASOPHILS # BLD AUTO: 0.1 X10E3/UL (ref 0–0.2)
BASOPHILS NFR BLD AUTO: 1 %
BILIRUB SERPL-MCNC: 0.5 MG/DL (ref 0–1.2)
BUN SERPL-MCNC: 17 MG/DL (ref 6–24)
BUN/CREAT SERPL: 23 (ref 9–23)
CALCIUM SERPL-MCNC: 9.9 MG/DL (ref 8.7–10.2)
CHLORIDE SERPL-SCNC: 103 MMOL/L (ref 96–106)
CO2 SERPL-SCNC: 21 MMOL/L (ref 20–29)
CREAT SERPL-MCNC: 0.74 MG/DL (ref 0.57–1)
EOSINOPHIL # BLD AUTO: 0.1 X10E3/UL (ref 0–0.4)
EOSINOPHIL NFR BLD AUTO: 1 %
ERYTHROCYTE [DISTWIDTH] IN BLOOD BY AUTOMATED COUNT: 17.3 % (ref 12.3–15.4)
GLOBULIN SER-MCNC: 2.4 G/DL (ref 1.5–4.5)
GLUCOSE SERPL-MCNC: 135 MG/DL (ref 65–99)
HCT VFR BLD AUTO: 41.4 % (ref 34–46.6)
HGB BLD-MCNC: 13 G/DL (ref 11.1–15.9)
IMM GRANULOCYTES # BLD: 0 X10E3/UL (ref 0–0.1)
IMM GRANULOCYTES NFR BLD: 0 %
LYMPHOCYTES # BLD AUTO: 2 X10E3/UL (ref 0.7–3.1)
LYMPHOCYTES NFR BLD AUTO: 36 %
MCH RBC QN AUTO: 24.7 PG (ref 26.6–33)
MCHC RBC AUTO-ENTMCNC: 31.4 G/DL (ref 31.5–35.7)
MCV RBC AUTO: 79 FL (ref 79–97)
MONOCYTES # BLD AUTO: 0.3 X10E3/UL (ref 0.1–0.9)
MONOCYTES NFR BLD AUTO: 6 %
NEUTROPHILS # BLD AUTO: 3.1 X10E3/UL (ref 1.4–7)
NEUTROPHILS NFR BLD AUTO: 56 %
PLATELET # BLD AUTO: 163 X10E3/UL (ref 150–450)
POTASSIUM SERPL-SCNC: 4 MMOL/L (ref 3.5–5.2)
PROT SERPL-MCNC: 6.7 G/DL (ref 6–8.5)
RBC # BLD AUTO: 5.26 X10E6/UL (ref 3.77–5.28)
SL AMB EGFR AFRICAN AMERICAN: 103 ML/MIN/1.73
SL AMB EGFR NON AFRICAN AMERICAN: 89 ML/MIN/1.73
SODIUM SERPL-SCNC: 141 MMOL/L (ref 134–144)
WBC # BLD AUTO: 5.6 X10E3/UL (ref 3.4–10.8)

## 2019-09-04 ENCOUNTER — OFFICE VISIT (OUTPATIENT)
Dept: HEMATOLOGY ONCOLOGY | Facility: MEDICAL CENTER | Age: 59
End: 2019-09-04
Payer: COMMERCIAL

## 2019-09-04 VITALS
SYSTOLIC BLOOD PRESSURE: 138 MMHG | HEART RATE: 66 BPM | HEIGHT: 64 IN | DIASTOLIC BLOOD PRESSURE: 82 MMHG | TEMPERATURE: 96.4 F | OXYGEN SATURATION: 98 % | WEIGHT: 293 LBS | RESPIRATION RATE: 18 BRPM | BODY MASS INDEX: 50.02 KG/M2

## 2019-09-04 DIAGNOSIS — C49.A0: Primary | ICD-10-CM

## 2019-09-04 PROCEDURE — 99213 OFFICE O/P EST LOW 20 MIN: CPT | Performed by: INTERNAL MEDICINE

## 2019-09-04 RX ORDER — AMOXICILLIN 500 MG/1
CAPSULE ORAL
Refills: 0 | COMMUNITY
Start: 2019-06-12 | End: 2021-04-27

## 2019-09-04 NOTE — PROGRESS NOTES
Jessi Ayala  1960  [facility] [address] [id number]    DISCUSSION/SUMMARY:    50-year-old female with history of GIST tumor status post resection and 8+ years of [address]  Issues:      GIST  From a GI standpoint, patient feels well and clinically there are no troubling signs  Patient is 10+ years from diagnosis  The plan is to continue with surveillance  Cat scans this many years out with any signs or symptoms is not indicated  Patient is to return in 1 year with blood work before  Elevated LFTs  This is not new; trend is listed below (query fatty liver)  I do not believe the elevated LFTs has anything to do with the prior diagnosis of GIST  We rediscussed the liver function abnormalities  Mrs Ranjan Morrison will make an appointment with her PCP      Borderline MCV with elevated RDW, normal hemoglobin level  Patient denies any obvious signs of GI,  or GYN bleeding; states having a good diet  We discussed what to monitor for in regard to progressive anemia  Mrs Ranjan Morrison is to start a dedicated a over the counter iron pill 3 times a week monitoring for GI side effects  Obesity  Patient questioned whether or not she could have gastric surgery  This would be difficult for me to say, patient would need to be seen by 1 of the bariatric surgeons  Patient will think about this option and rediscuss it with her PCP  Patient knows to call the hematology/oncology office if there are any other questions or concerns  Carefully review your medication list and verify that the list is accurate and up-to-date  Please call the hematology/oncology office if there are medications missing from the list, medications on the list that you are not currently taking or if there is a dosage or instruction that is different from how you're taking that medication      Patient goals and areas of care:  GIST surveillance  Barriers to care:  None  Patient is able to self-care   _____________________________________________________________________________________    Chief Complaint   Patient presents with    Follow-up     History of gist, on surveillance     History of Present Illness:    26-year-old female 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 and placed on Gleevec 400 daily since 2006 (patient had been on the medication for 8+ years - discontinued in December 2013)  Disease involved mesentery and small bowel, and low-grade (2/10 mitotic rate)  Patient is back for followup  Ms Ene Monge states feeling well  Appetite is good, no GI issues including pain, bleeding, diarrhea or constipation  No abdominal pain  No  or GYN issues  Weight control still an issue  Activities are baseline  Generalized body aches are the same as before  Routine health maintenance and medical care is up-to-date  Patient has a history of elevated LFTs - never followed up with PCP regarding workup  Review of Systems   Constitutional: Negative  HENT: Negative  Eyes: Negative  Respiratory: Negative  Cardiovascular: Negative  Gastrointestinal: Negative  Endocrine: Negative  Genitourinary: Negative  Musculoskeletal: Positive for arthralgias  Negative for back pain  Skin: Negative  Allergic/Immunologic: Negative  Neurological: Negative  Hematological: Negative  Psychiatric/Behavioral: Negative  All other systems reviewed and are negative       Patient Active Problem List   Diagnosis    Tachycardia    Dyspnea on exertion    [facility]raventricular tachycardia ([facility] [address] )    Hypertension    Controlled depression    Severe obstructive sleep apnea    Morbid obesity ([address] )    Impaired fasting glucose    Hyperlipidemia    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[facility] [address] )    Esophageal reflux    Benign essential hypertension    Adenomatous colon polyp    Open wound of left lower extremity    Acquired deformity of foot    Pain in both feet    Ingrown toenail    Onychomycosis    Paronychia of toenail     Past Medical History:   Diagnosis Date    Abnormal liver function test     last assessed 1/16/17     Adenomatosis     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    last assessed 4/4/13    Atrial fibrillation (HCC)     Atrial flutter (HCC)     Benign essential hypertension     last assessed 12/21/17     Body mass index (BMI) of 50-59 9 in adult [facility])     Carpal tunnel syndrome 09/07/2004    unspecified laterality  / last assessed 9/7/04    Congenital pes planus     last assessed 7/15/14     Depression     Depression     Hemangioma of skin 08/26/2003    last assessed 8/26/03    History of gastroesophageal reflux (GERD)     Hypercholesterolemia     Hyperlipidemia     Hypertension     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[facility] [address] ) 04/19/2006    last assessed 12/31/14     Osteoarthritis of both knees     last assessed 12/31/14     Paroxysmal atrial fibrillation (HCC)     S/P ablation of atrial flutter      Past Surgical History:   Procedure Laterality Date    ABDOMINAL SURGERY  06/2006    20cm GIST removed     APPENDECTOMY      ATRIAL ABLATION SURGERY      CARPAL TUNNEL RELEASE Bilateral     COLONOSCOPY      HYSTERECTOMY      KNEE SURGERY      OOPHORECTOMY      TONSILLECTOMY      TUMOR EXCISION  2006    gastrointestinal stromal tumor     Family History   Problem Relation Age of Onset    Emphysema Mother    Vena Taty Arthritis Mother     Diabetes Mother     Hypertension Mother     COPD Mother     Cancer Father         prostate    Arthritis Father     Prostate cancer Father     Cerebral aneurysm Son      Social History     Socioeconomic History    Marital status: /Civil Union     Spouse name: Not on file    Number of children: Not on file    Years of education: Not on file    Highest education level: Not on file   Occupational History    Not on file   Social Needs    Financial resource strain: Not on file    Food insecurity:     Worry: Not on file     Inability: Not on file    Transportation needs:     Medical: Not on file     Non-medical: Not on file   Tobacco Use    Smoking status: Light Tobacco Smoker     Years: 4 00    Smokeless tobacco: Current User    Tobacco comment: current some day smoker 1 cigarette/day 4 yrs / former smoker per allscript                           Substance and Sexual Activity    Alcohol use: No     Comment: social drinker per allscript     Drug use: No    Sexual activity: Not on file   Lifestyle    Physical activity:     Days per week: Not on file     Minutes per session: Not on file    Stress: Not on file   Relationships    Social connections:     Talks on phone: Not on file     Gets together: Not on file     Attends Mosque service: Not on file     Active member of club or organization: Not on file     Attends meetings of clubs or organizations: Not on file     Relationship status: Not on file    Intimate partner violence:     Fear of current or ex partner: Not on file     Emotionally abused: Not on file     Physically abused: Not on file     Forced sexual activity: Not on file   Other Topics Concern    Not on file   Social History Narrative    Lack of exercise    Good sleep hygiene    No caffeine use    Dog    Good sleep hygiene         Current Outpatient Medications:     aspirin 81 MG tablet, Take 81 mg by mouth daily  , Disp: , Rfl:     ibuprofen (MOTRIN) 800 mg tablet, Take 1 tablet (800 mg total) by mouth every 8 (eight) hours as needed for mild pain, Disp: 30 tablet, Rfl: 0    Magnesium Oxide -Mg Supplement 400 MG CAPS, Take 1 capsule by mouth daily, Disp: , Rfl:     metoprolol succinate (TOPROL-XL) 25 mg 24 hr tablet, TAKE 1 TABLET BY MOUTH TWO  TIMES DAILY, Disp: 180 tablet, Rfl: 2    multivitamin (THERAGRAN) TABS, Take 1 tablet by mouth daily  , Disp: , Rfl:     olmesartan (BENICAR) 20 mg tablet, Take 1 tablet (20 mg total) by mouth daily, Disp: 90 tablet, Rfl: 1    Omega-3 Fatty Acids (FISH OIL) 1,000 mg, Take 1,000 mg by mouth 2 (two) times a day  , Disp: , Rfl:     pantoprazole (PROTONIX) 40 mg tablet, Take 1 tablet (40 mg total) by mouth daily before breakfast, Disp: 90 tablet, Rfl: 3    PARoxetine (PAXIL-CR) 37 5 mg 24 hr tablet, TAKE 1 TABLET BY MOUTH  EVERY MORNING, Disp: 90 tablet, Rfl: 3    pravastatin (PRAVACHOL) 40 mg tablet, TAKE 1 TABLET BY MOUTH  DAILY, Disp: 90 tablet, Rfl: 1    amoxicillin (AMOXIL) 500 mg capsule, TAKE 4 CAPSULES BY MOUTH 1 HOUR BEFORE DENTAL APPOINTMENT, Disp: , Rfl: 0    predniSONE 20 mg tablet, Take 2 tablets (40 mg total) by mouth daily (Patient not taking: Reported on 9/4/2019), Disp: 14 tablet, Rfl: 0    rosuvastatin (CRESTOR) 20 MG tablet, TAKE 1 TABLET DAILY (Patient not taking: Reported on 9/4/2019), Disp: 90 tablet, Rfl: 3    sotalol (BETAPACE) 120 mg tablet, TAKE 1 TABLET BY MOUTH  EVERY 12 HOURS (Patient not taking: Reported on 9/4/2019), Disp: 60 tablet, Rfl: 0    Allergies   Allergen Reactions    Other      Adhesive tape        Vitals:    09/04/19 1610   BP: 138/82   Pulse: 66   Resp: 18   Temp: (!) 96 4 °F (35 8 °C)   SpO2: 98%     Physical Exam   Constitutional: She is oriented to person, place, and time  She appears well-developed and well-nourished  Obese female, no respiratory distress   HENT:   Head: Normocephalic and atraumatic  Right Ear: External ear normal    Left Ear: External ear normal    Nose: Nose normal    Mouth/Throat: Oropharynx is clear and moist    Eyes: Pupils are equal, round, and reactive to light  Conjunctivae and EOM are normal    Neck: Normal range of motion  Neck supple  Supple, no JVD   Cardiovascular: Normal rate, regular rhythm, normal heart sounds and intact distal pulses  Pulmonary/Chest: Effort normal and breath sounds normal    Clear, distant breath sounds   Abdominal: Soft   Bowel sounds are normal    Morbidly obese, cannot palpate liver or spleen, +bowel sounds, nontender   Musculoskeletal: Normal range of motion  Prior left flank nodule not palpable, no pain or tenderness with palpation of joints, muscles or bones including ribcage and spine   Neurological: She is alert and oriented to person, place, and time  She has normal reflexes  Skin: Skin is warm  Good color, warm, moist, no petechiae or ecchymoses   Psychiatric: She has a normal mood and affect  Her behavior is normal  Judgment and thought content normal    Extremities:  No lower extremity edema, no cords, pulses are 1+  Lymphatics:  No adenopathy in the neck, supraclavicular region, axilla and groin bilaterally    Performance Status: 0 - Asymptomatic     Labs        08/26/2019 WBC = 5 6 hemoglobin = 13 hematocrit = 41 4 MCV = 79 RDW = 17 platelet = 289 neutrophil = 56% lymphocytes = 36% monocyte = 6% BUN = 17 creatinine = 0 74 calcium = 9 9 AST = 117 ALT = 123 alkaline phosphatase = 103 total bilirubin = 0 5    03/21/2018 BUN = 16 creatinine = 0 79 calcium = 9 6 AST = 79 ALT = 139 alkaline phosphatase = 119 total bilirubin = 0 50 total protein = 7 2 WBC = 7 3 hemoglobin = 15 6 hematocrit = 48 4 MCV = 82 platelet = 134 neutrophil = 60%  4/12/17 BUN = 14 creatinine = 0 78 total bilirubin = 0 3 alkaline phosphatase = 96 AST = 39 ALT = 54  1/13/17 WBC = 7 5 hemoglobin = 13 6 hematocrit = 42 platelet = 122 BUN = 17 creatinine = 0 73 alkaline phosphatase = 101 AST = 98 = high ALT = 141 = high total bilirubin = 0 3 LDH = 232    Imaging     08/16/2018 ultrasound of kidney and bladder    IMPRESSION: Normal renal sonogram      8/24/17 screening bilateral digital mammogram was category 1, benign  CAT scan of the chest and abdomen/pelvis from December 17 2014 did not demonstrate any evidence of recurrent disease  Patient had a stable right adrenal adenoma and a nonobstructing 2 mm up right renal calculus      CAT scan of the chest and abdomen/pelvis performed on November 14, 2013 did not demonstrate any significant abnormalities in the chest  Patient has a stable right adrenal adenoma but no evidence of recurrent GIST tumor  Patient had a small cyst in her right kidney  CAT scan of the abdomen/pelvis performed on September 24, 2012 did not demonstrate any evidence of recurrent or residual GIST tumor   Patient had hepatic steatosis and a stable right adrenal abnormal

## 2019-09-13 DIAGNOSIS — I48.91 ATRIAL FIBRILLATION, UNSPECIFIED TYPE (HCC): ICD-10-CM

## 2019-09-13 RX ORDER — METOPROLOL SUCCINATE 25 MG/1
25 TABLET, EXTENDED RELEASE ORAL 2 TIMES DAILY
Qty: 180 TABLET | Refills: 0 | Status: SHIPPED | OUTPATIENT
Start: 2019-09-13 | End: 2020-01-02

## 2019-09-13 RX ORDER — SOTALOL HYDROCHLORIDE 120 MG/1
120 TABLET ORAL EVERY 12 HOURS
Qty: 180 TABLET | Refills: 0 | Status: SHIPPED | OUTPATIENT
Start: 2019-09-13 | End: 2020-01-30

## 2019-09-13 NOTE — TELEPHONE ENCOUNTER
Need refills of the following:     metoprolol succinate (TOPROL-XL) 25 mg 24 hr tablet    sotalol (BETAPACE) 120 mg tablet    [facility] 90-day supply   [facility] to [facility]

## 2019-10-02 ENCOUNTER — OFFICE VISIT (OUTPATIENT)
Dept: FAMILY MEDICINE CLINIC | Facility: CLINIC | Age: 59
End: 2019-10-02
Payer: COMMERCIAL

## 2019-10-02 VITALS
SYSTOLIC BLOOD PRESSURE: 110 MMHG | DIASTOLIC BLOOD PRESSURE: 76 MMHG | HEART RATE: 80 BPM | TEMPERATURE: 98.5 F | RESPIRATION RATE: 18 BRPM | WEIGHT: 293 LBS | BODY MASS INDEX: 50.02 KG/M2 | HEIGHT: 64 IN

## 2019-10-02 DIAGNOSIS — Z23 NEED FOR INFLUENZA VACCINATION: ICD-10-CM

## 2019-10-02 DIAGNOSIS — E66.01 MORBID OBESITY (HCC): ICD-10-CM

## 2019-10-02 DIAGNOSIS — E78.5 HYPERLIPIDEMIA, UNSPECIFIED HYPERLIPIDEMIA TYPE: ICD-10-CM

## 2019-10-02 DIAGNOSIS — I10 BENIGN ESSENTIAL HYPERTENSION: ICD-10-CM

## 2019-10-02 DIAGNOSIS — R74.8 ELEVATED LIVER ENZYMES: Primary | ICD-10-CM

## 2019-10-02 DIAGNOSIS — R73.01 IMPAIRED FASTING GLUCOSE: ICD-10-CM

## 2019-10-02 PROBLEM — M79.672 PAIN IN BOTH FEET: Status: RESOLVED | Noted: 2019-01-30 | Resolved: 2019-10-02

## 2019-10-02 PROBLEM — M79.671 PAIN IN BOTH FEET: Status: RESOLVED | Noted: 2019-01-30 | Resolved: 2019-10-02

## 2019-10-02 PROBLEM — B35.1 ONYCHOMYCOSIS: Status: RESOLVED | Noted: 2019-01-30 | Resolved: 2019-10-02

## 2019-10-02 PROBLEM — L60.0 INGROWN TOENAIL: Status: RESOLVED | Noted: 2019-01-30 | Resolved: 2019-10-02

## 2019-10-02 PROBLEM — S81.802A OPEN WOUND OF LEFT LOWER EXTREMITY: Status: RESOLVED | Noted: 2018-02-08 | Resolved: 2019-10-02

## 2019-10-02 PROBLEM — M21.969 ACQUIRED DEFORMITY OF FOOT: Status: RESOLVED | Noted: 2019-01-30 | Resolved: 2019-10-02

## 2019-10-02 PROBLEM — L03.039 PARONYCHIA OF TOENAIL: Status: RESOLVED | Noted: 2019-01-30 | Resolved: 2019-10-02

## 2019-10-02 PROCEDURE — 99214 OFFICE O/P EST MOD 30 MIN: CPT | Performed by: FAMILY MEDICINE

## 2019-10-02 PROCEDURE — 90682 RIV4 VACC RECOMBINANT DNA IM: CPT | Performed by: FAMILY MEDICINE

## 2019-10-02 PROCEDURE — 90471 IMMUNIZATION ADMIN: CPT | Performed by: FAMILY MEDICINE

## 2019-10-02 NOTE — PROGRESS NOTES
Chief Complaint   Patient presents with    Follow-up     multiple issues        Patient ID: Ambika Belcher is a 60 y o  female  HPI  Pt is seeing for f/u elev liver enzymes, MO, IFG, HTN, HLD -  On statin, has fatty liver -  Asymptomatic -  No recent liver US -  Considering weight loss surgery      The following portions of the patient's history were reviewed and updated as appropriate: allergies, current medications, past family history, past medical history, past social history, past surgical history and problem list     Review of Systems   Constitutional: Negative  Respiratory: Negative  Cardiovascular: Negative  Gastrointestinal: Negative  Negative for abdominal distention, abdominal pain, constipation, diarrhea, nausea and vomiting  Genitourinary: Negative  Musculoskeletal: Negative  Skin: Negative  Neurological: Negative  Current Outpatient Medications   Medication Sig Dispense Refill    amoxicillin (AMOXIL) 500 mg capsule TAKE 4 CAPSULES BY MOUTH 1 HOUR BEFORE DENTAL APPOINTMENT  0    aspirin 81 MG tablet Take 81 mg by mouth daily   ibuprofen (MOTRIN) 800 mg tablet Take 1 tablet (800 mg total) by mouth every 8 (eight) hours as needed for mild pain 30 tablet 0    Magnesium Oxide -Mg Supplement 400 MG CAPS Take 1 capsule by mouth daily      metoprolol succinate (TOPROL-XL) 25 mg 24 hr tablet Take 1 tablet (25 mg total) by mouth 2 (two) times a day 180 tablet 0    multivitamin (THERAGRAN) TABS Take 1 tablet by mouth daily        olmesartan (BENICAR) 20 mg tablet Take 1 tablet (20 mg total) by mouth daily 90 tablet 1    Omega-3 Fatty Acids (FISH OIL) 1,000 mg Take 1,000 mg by mouth 2 (two) times a day        pantoprazole (PROTONIX) 40 mg tablet Take 1 tablet (40 mg total) by mouth daily before breakfast 90 tablet 3    PARoxetine (PAXIL-CR) 37 5 mg 24 hr tablet TAKE 1 TABLET BY MOUTH  EVERY MORNING 90 tablet 3    pravastatin (PRAVACHOL) 40 mg tablet TAKE 1 TABLET BY MOUTH  DAILY 90 tablet 1    sotalol (BETAPACE) 120 mg tablet Take 1 tablet (120 mg total) by mouth every 12 (twelve) hours 180 tablet 0     No current facility-administered medications for this visit  Objective:    /76 (BP Location: Left arm, Patient Position: Sitting, Cuff Size: Large)   Pulse 80   Temp 98 5 °F (36 9 °C) (Tympanic)   Resp 18   Ht 5' 4" (1 626 m)   Wt (!) 146 kg (322 lb)   BMI 55 27 kg/m²        Physical Exam   Constitutional: No distress  MO   Cardiovascular: Normal rate, regular rhythm and normal heart sounds  Exam reveals no gallop  No murmur heard  Pulmonary/Chest: Effort normal  No respiratory distress  Abdominal: Soft  She exhibits no distension and no mass  There is no tenderness  Extremely obese      Musculoskeletal: She exhibits no edema  Psychiatric: She has a normal mood and affect  Labs in chart were reviewed  Assessment/Plan:         Diagnoses and all orders for this visit:    Elevated liver enzymes  -     Comprehensive metabolic panel; Future  -     Ferritin; Future  -     Iron; Future  -     TIBC; Future  -     Hepatitis C antibody; Future  -     US liver; Future    Need for influenza vaccination  -     influenza vaccine, [id number], quadrivalent, recombinant, PF, 0 5 mL, for patients 18 yr+ (FLUBLOK)    Impaired fasting glucose  -     Comprehensive metabolic panel; Future  -     Hemoglobin A1C; Future    Benign essential hypertension  -     Lipid panel; Future  -     TSH, 3rd generation; Future    Morbid obesity ([facility] [address] )  -     Ambulatory referral to Weight Management; Future  -     Lipid panel; Future    Hyperlipidemia, unspecified hyperlipidemia type            BMI Counseling: Body mass index is 55 27 kg/m²  Discussed the patient's BMI with her   The BMI is above normal  Nutrition recommendations include reducing portion sizes, decreasing overall calorie intake, 3-5 servings of fruits/vegetables daily, reducing fast food intake, consuming healthier snacks, decreasing soda and/or juice intake, moderation in carbohydrate intake, increasing intake of lean protein, reducing intake of saturated fat and trans fat and reducing intake of cholesterol  Exercise recommendations include moderate aerobic physical activity for 150 minutes/week  Patient referred to bariatric surgery due to patient being morbidly obese           rto in 3 m         Mago Fowler MD

## 2019-10-14 ENCOUNTER — HOSPITAL ENCOUNTER (OUTPATIENT)
Dept: RADIOLOGY | Facility: HOSPITAL | Age: 59
Discharge: HOME | End: 2019-10-14
Attending: FAMILY MEDICINE
Payer: COMMERCIAL

## 2019-10-14 DIAGNOSIS — R74.8 ELEVATED LIVER ENZYMES: ICD-10-CM

## 2019-10-14 LAB
HBA1C MFR BLD HPLC: 6.4 %
HCV AB SER-ACNC: NEGATIVE

## 2019-10-14 PROCEDURE — 76705 ECHO EXAM OF ABDOMEN: CPT

## 2019-10-15 LAB
ALBUMIN SERPL-MCNC: 4.2 G/DL (ref 3.5–5.5)
ALBUMIN/GLOB SERPL: 1.9 {RATIO} (ref 1.2–2.2)
ALP SERPL-CCNC: 100 IU/L (ref 39–117)
ALT SERPL-CCNC: 102 IU/L (ref 0–32)
AST SERPL-CCNC: 76 IU/L (ref 0–40)
BILIRUB SERPL-MCNC: 0.3 MG/DL (ref 0–1.2)
BUN SERPL-MCNC: 20 MG/DL (ref 6–24)
BUN/CREAT SERPL: 26 (ref 9–23)
CALCIUM SERPL-MCNC: 9.5 MG/DL (ref 8.7–10.2)
CHLORIDE SERPL-SCNC: 105 MMOL/L (ref 96–106)
CHOLEST SERPL-MCNC: 209 MG/DL (ref 100–199)
CO2 SERPL-SCNC: 21 MMOL/L (ref 20–29)
CREAT SERPL-MCNC: 0.77 MG/DL (ref 0.57–1)
FERRITIN SERPL-MCNC: 45 NG/ML (ref 15–150)
GLOBULIN SER-MCNC: 2.2 G/DL (ref 1.5–4.5)
GLUCOSE SERPL-MCNC: 133 MG/DL (ref 65–99)
HBA1C MFR BLD: 6.4 % (ref 4.8–5.6)
HCV AB S/CO SERPL IA: 0.2 S/CO RATIO (ref 0–0.9)
HDLC SERPL-MCNC: 38 MG/DL
IRON SATN MFR SERPL: 14 % (ref 15–55)
IRON SERPL-MCNC: 59 UG/DL (ref 27–159)
LDLC SERPL CALC-MCNC: 128 MG/DL (ref 0–99)
MICRODELETION SYND BLD/T FISH: NORMAL
POTASSIUM SERPL-SCNC: 4.1 MMOL/L (ref 3.5–5.2)
PROT SERPL-MCNC: 6.4 G/DL (ref 6–8.5)
SL AMB EGFR AFRICAN AMERICAN: 98 ML/MIN/1.73
SL AMB EGFR NON AFRICAN AMERICAN: 85 ML/MIN/1.73
SL AMB VLDL CHOLESTEROL CALC: 43 MG/DL (ref 5–40)
SODIUM SERPL-SCNC: 143 MMOL/L (ref 134–144)
TIBC SERPL-MCNC: 430 UG/DL (ref 250–450)
TRIGL SERPL-MCNC: 213 MG/DL (ref 0–149)
TSH SERPL DL<=0.005 MIU/L-ACNC: 3.36 UIU/ML (ref 0.45–4.5)
UIBC SERPL-MCNC: 371 UG/DL (ref 131–425)

## 2019-10-16 ENCOUNTER — TELEPHONE (OUTPATIENT)
Dept: FAMILY MEDICINE CLINIC | Facility: CLINIC | Age: 59
End: 2019-10-16

## 2019-10-16 NOTE — TELEPHONE ENCOUNTER
----- Message from Luis Francis MD sent at 10/16/2019  1:23 PM EDT -----  Pl, advise pt -  UA showed fatty liver -  No other abnormalities -  Low fat /low carb diet is recommended

## 2019-10-29 ENCOUNTER — OFFICE VISIT (OUTPATIENT)
Dept: CARDIOLOGY CLINIC | Facility: CLINIC | Age: 59
End: 2019-10-29
Payer: COMMERCIAL

## 2019-10-29 VITALS
WEIGHT: 293 LBS | OXYGEN SATURATION: 98 % | HEIGHT: 64 IN | DIASTOLIC BLOOD PRESSURE: 74 MMHG | HEART RATE: 69 BPM | SYSTOLIC BLOOD PRESSURE: 112 MMHG | BODY MASS INDEX: 50.02 KG/M2

## 2019-10-29 DIAGNOSIS — G47.33 SEVERE OBSTRUCTIVE SLEEP APNEA: ICD-10-CM

## 2019-10-29 DIAGNOSIS — I47.1 SVT (SUPRAVENTRICULAR TACHYCARDIA) (HCC): Primary | ICD-10-CM

## 2019-10-29 DIAGNOSIS — I10 ESSENTIAL HYPERTENSION: Chronic | ICD-10-CM

## 2019-10-29 DIAGNOSIS — E78.5 HYPERLIPIDEMIA, UNSPECIFIED HYPERLIPIDEMIA TYPE: ICD-10-CM

## 2019-10-29 PROCEDURE — 3074F SYST BP LT 130 MM HG: CPT | Performed by: INTERNAL MEDICINE

## 2019-10-29 PROCEDURE — 93000 ELECTROCARDIOGRAM COMPLETE: CPT | Performed by: INTERNAL MEDICINE

## 2019-10-29 PROCEDURE — 3078F DIAST BP <80 MM HG: CPT | Performed by: INTERNAL MEDICINE

## 2019-10-29 PROCEDURE — 99214 OFFICE O/P EST MOD 30 MIN: CPT | Performed by: INTERNAL MEDICINE

## 2019-10-29 RX ORDER — ATORVASTATIN CALCIUM 40 MG/1
40 TABLET, FILM COATED ORAL DAILY
Qty: 90 TABLET | Refills: 2 | Status: SHIPPED | OUTPATIENT
Start: 2019-10-29 | End: 2020-12-14

## 2019-10-29 NOTE — PROGRESS NOTES
Cardiology Follow Up    Mariah Marroquin  1960  [id number]      Interval History: Mariah Marroquin is here for follow up of SVT/Atrial fibrillation  She has been feeling well without any palpitations, chest pain, shortness of breath, syncope or near syncope  She had knee replacement because of infection  Reports good adherence with medications  Weight has increased  The patient was seen at [address] [place] [address] initially because of tachycardia  On 3/23/16, she underwent EPS/SVT ablation with ablation of atrial tachycardia but was complicated by atrial fibrillation which responded to sotalol and cardioversion  She remains in sinus rhythm since then  She is not on anticoagulation because of low chads score  The following portions of the patient's history were reviewed and updated as appropriate: allergies, current medications, past family history, past medical history, past social history, past surgical history and problem list     Current Outpatient Medications on File Prior to Visit   Medication Sig Dispense Refill    amoxicillin (AMOXIL) 500 mg capsule TAKE 4 CAPSULES BY MOUTH 1 HOUR BEFORE DENTAL APPOINTMENT  0    aspirin 81 MG tablet Take 81 mg by mouth daily   ibuprofen (MOTRIN) 800 mg tablet Take 1 tablet (800 mg total) by mouth every 8 (eight) hours as needed for mild pain 30 tablet 0    Magnesium Oxide -Mg Supplement 400 MG CAPS Take 1 capsule by mouth daily      metoprolol succinate (TOPROL-XL) 25 mg 24 hr tablet Take 1 tablet (25 mg total) by mouth 2 (two) times a day 180 tablet 0    multivitamin (THERAGRAN) TABS Take 1 tablet by mouth daily        olmesartan (BENICAR) 20 mg tablet Take 1 tablet (20 mg total) by mouth daily 90 tablet 1    Omega-3 Fatty Acids (FISH OIL) 1,000 mg Take 1,000 mg by mouth 2 (two) times a day        pantoprazole (PROTONIX) 40 mg tablet Take 1 tablet (40 mg total) by mouth daily before breakfast 90 tablet 3    PARoxetine (PAXIL-CR) 37 5 mg 24 hr tablet TAKE 1 TABLET BY MOUTH  EVERY MORNING 90 tablet 3    pravastatin (PRAVACHOL) 40 mg tablet TAKE 1 TABLET BY MOUTH  DAILY 90 tablet 1    sotalol (BETAPACE) 120 mg tablet Take 1 tablet (120 mg total) by mouth every 12 (twelve) hours 180 tablet 0     No current facility-administered medications on file prior to visit  Review of Systems:  Review of Systems   Constitutional: Negative for chills, fatigue and fever  HENT: Negative for congestion, nosebleeds and postnasal drip  Respiratory: Negative for cough, chest tightness and shortness of breath  Cardiovascular: Negative for chest pain, palpitations and leg swelling  Gastrointestinal: Negative for abdominal distention, abdominal pain, diarrhea, nausea and vomiting  Endocrine: Negative for polydipsia, polyphagia and polyuria  Musculoskeletal: Positive for arthralgias, gait problem and myalgias  Skin: Negative for color change, pallor and rash  Allergic/Immunologic: Negative for environmental allergies, food allergies and immunocompromised state  Neurological: Negative for dizziness, seizures, syncope and light-headedness  Hematological: Negative for adenopathy  Does not bruise/bleed easily  Psychiatric/Behavioral: Negative for dysphoric mood  The patient is not nervous/anxious  Physical Exam:  /74 (BP Location: Left arm, Patient Position: Sitting, Cuff Size: Large)   Pulse 69 Comment: apical  Ht 5' 4" (1 626 m)   Wt (!) 148 kg (326 lb)   SpO2 98% Comment: RA  BMI 55 96 kg/m²     Physical Exam   Constitutional: She is oriented to person, place, and time  She appears well-developed  No distress  HENT:   Head: Normocephalic and atraumatic  Eyes: Pupils are equal, round, and reactive to light  Conjunctivae and EOM are normal    Neck: Neck supple  No JVD present  No thyromegaly present  Cardiovascular: Normal rate, regular rhythm and normal heart sounds   Exam reveals no gallop and no friction rub  No murmur heard  Pulmonary/Chest: Effort normal and breath sounds normal    Abdominal: Soft  She exhibits no distension  There is no tenderness  Musculoskeletal: She exhibits no edema  Neurological: She is alert and oriented to person, place, and time  No cranial nerve deficit  Skin: Skin is warm and dry  No rash noted  She is not diaphoretic  No erythema  Psychiatric: She has a normal mood and affect  Her behavior is normal  Judgment and thought content normal        Cardiographics  ECG: normal sinus rhythm, no blocks or conduction defects, no ischemic changes    Labs:  Lab Results   Component Value Date     04/12/2017    K 4 1 10/14/2019     10/14/2019    CO2 21 10/14/2019    BUN 20 10/14/2019    CREATININE 0 77 10/14/2019    CREATININE 0 79 03/21/2018    CREATININE 0 78 04/12/2017    GLUCOSE 92 04/12/2017    CALCIUM 9 6 03/21/2018    CALCIUM 9 5 04/12/2017     Lab Results   Component Value Date    WBC 5 6 08/26/2019    WBC 7 30 03/21/2018    WBC 7 5 01/13/2017    HGB 13 0 08/26/2019    HGB 15 6 03/21/2018    HGB 13 6 01/13/2017    HCT 41 4 08/26/2019    HCT 48 4 (H) 03/21/2018    HCT 41 5 01/13/2017    MCV 79 08/26/2019    MCV 82 03/21/2018    MCV 83 01/13/2017     08/26/2019     03/21/2018     01/13/2017     Lab Results   Component Value Date    CHOL 153 04/12/2017    TRIG 213 (H) 10/14/2019    HDL 38 (L) 10/14/2019     Imaging: Us Abdomen Limited    Result Date: 10/16/2019  Narrative: RIGHT UPPER QUADRANT ULTRASOUND INDICATION:    R74 8: Abnormal levels of other serum enzymes  COMPARISON:  CT abdomen pelvis 12/17/2014 TECHNIQUE:   Real-time ultrasound of the right upper quadrant was performed with a curvilinear transducer with both volumetric sweeps and still imaging techniques  FINDINGS: PANCREAS:  Visualized portions of the pancreas are within normal limits  AORTA AND IVC:  Visualized portions are normal for patient age   LIVER: Size: Mildly enlarged  The liver measures 18 5 cm in the midclavicular line  Contour:  Surface contour is smooth  Parenchyma: There is moderate diffuse increased echogenicity with smooth echotexture and acoustic beam attenuation  Most consistent with moderate hepatic steatosis  No evidence of suspicious mass  Limited imaging of the main portal vein shows it to be patent and hepatopetal   BILIARY: The gallbladder is normal in caliber  No wall thickening or pericholecystic fluid  No stones or sludge identified  No sonographic Hardin's sign  No intrahepatic biliary dilatation  CBD measures 2 mm  No choledocholithiasis  KIDNEY: Right kidney measures 12 6 x 5 7 cm  Within normal limits  Non-shadowing echogenic foci in the lower pole likely sinus fat  ASCITES:   None  Impression: 1  Prominent, fatty liver  2   No cholelithiasis or biliary ductal obstruction  Workstation performed: [id number]       Discussion/Summary:  1  SVT (supraventricular tachycardia) ([address] )    2  Essential hypertension    3  Hyperlipidemia, unspecified hyperlipidemia type    4  Severe obstructive sleep apnea      - she continues to be free of SVT and atrial fibrillation  Tolerating sotalol  QT interval is normal today  Renal function normal along will electrolytes  - Discussed weight loss  She wishes to be evaluated by bariatric surgery for possible gastric sleeve  - blood pressure controlled with olmesartan and metoprolol   - recent blood work reviewed  LDL cholesterol is elevated  Recommend switch of pravastatin to atorvastatin  - She will followup regularly - next appointment in 3-4 months

## 2019-10-30 ENCOUNTER — TELEPHONE (OUTPATIENT)
Dept: CARDIOLOGY CLINIC | Facility: CLINIC | Age: 59
End: 2019-10-30

## 2019-10-30 NOTE — TELEPHONE ENCOUNTER
----- Message from Marvin Wagner DO sent at 10/29/2019  5:24 PM EDT -----  Can you please let her know I switched pravastatin to atorvastatin  Sent to mail order

## 2019-11-21 DIAGNOSIS — I10 ESSENTIAL HYPERTENSION: ICD-10-CM

## 2019-11-22 RX ORDER — OLMESARTAN MEDOXOMIL 20 MG/1
TABLET ORAL
Qty: 90 TABLET | Refills: 1 | Status: SHIPPED | OUTPATIENT
Start: 2019-11-22 | End: 2020-05-11

## 2019-12-05 ENCOUNTER — OFFICE VISIT (OUTPATIENT)
Dept: PODIATRY | Facility: CLINIC | Age: 59
End: 2019-12-05
Payer: COMMERCIAL

## 2019-12-05 VITALS
BODY MASS INDEX: 50.02 KG/M2 | SYSTOLIC BLOOD PRESSURE: 132 MMHG | DIASTOLIC BLOOD PRESSURE: 78 MMHG | RESPIRATION RATE: 17 BRPM | WEIGHT: 293 LBS | HEIGHT: 64 IN

## 2019-12-05 DIAGNOSIS — B35.1 ONYCHOMYCOSIS: ICD-10-CM

## 2019-12-05 DIAGNOSIS — M79.671 PAIN IN BOTH FEET: ICD-10-CM

## 2019-12-05 DIAGNOSIS — L60.0 INGROWN TOENAIL: ICD-10-CM

## 2019-12-05 DIAGNOSIS — M79.672 PAIN IN BOTH FEET: ICD-10-CM

## 2019-12-05 DIAGNOSIS — M21.969 ACQUIRED DEFORMITY OF FOOT, UNSPECIFIED LATERALITY: Primary | ICD-10-CM

## 2019-12-05 DIAGNOSIS — L03.039 PARONYCHIA OF TOENAIL, UNSPECIFIED LATERALITY: ICD-10-CM

## 2019-12-05 PROCEDURE — 99213 OFFICE O/P EST LOW 20 MIN: CPT | Performed by: PODIATRIST

## 2019-12-05 RX ORDER — ULTRAMICROSIZE GRISEOFULVIN 250 MG/1
250 TABLET ORAL DAILY
Qty: 30 TABLET | Refills: 0 | Status: SHIPPED | OUTPATIENT
Start: 2019-12-05 | End: 2020-01-30 | Stop reason: SDUPTHER

## 2019-12-05 NOTE — PROGRESS NOTES
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[facility] [place] [address] ) 04/19/2006     last assessed 12/31/14     Osteoarthritis of both knees       last assessed 12/31/14     Paroxysmal atrial fibrillation (HCC)      S/P ablation of atrial flutter                 Past Surgical History:   Procedure Laterality Date    ABDOMINAL SURGERY   06/2006     20cm GIST removed     APPENDECTOMY        ATRIAL ABLATION SURGERY        CARPAL TUNNEL RELEASE Bilateral      COLONOSCOPY        HYSTERECTOMY        KNEE SURGERY        OOPHORECTOMY        TONSILLECTOMY        TUMOR EXCISION   2006     gastrointestinal stromal tumor               Allergies   Allergen Reactions    Other         Adhesive tape             Current Outpatient Prescriptions:     aspirin 81 MG tablet, Take 81 mg by mouth daily , Disp: , Rfl:     esomeprazole (NexIUM) 40 MG capsule, Take 1 capsule (40 mg total) by mouth daily, Disp: 90 capsule, Rfl: 1    ibuprofen (MOTRIN) 800 mg tablet, Take 1 tablet (800 mg total) by mouth every 8 (eight) hours as needed for mild pain, Disp: 30 tablet, Rfl: 0    Magnesium Oxide -Mg Supplement 400 MG CAPS, Take 1 capsule by mouth daily, Disp: , Rfl:     metoprolol succinate (TOPROL-XL) 25 mg 24 hr tablet, Take 1 tablet (25 mg total) by mouth 2 (two) times a day, Disp: 180 tablet, Rfl: 2    multivitamin (THERAGRAN) TABS, Take 1 tablet by mouth daily  , Disp: , Rfl:     olmesartan (BENICAR) 20 mg tablet, Take 1 tablet (20 mg total) by mouth daily, Disp: 30 tablet, Rfl: 0    Omega-3 Fatty Acids (FISH OIL) 1,000 mg, Take 1,000 mg by mouth 2 (two) times a day  , Disp: , Rfl:     PARoxetine (PAXIL-CR) 37 5 mg 24 hr tablet, Take 1 tablet (37 5 mg total) by mouth every morning, Disp: 90 tablet, Rfl: 3    pravastatin (PRAVACHOL) 40 mg tablet, Take 1 tablet (40 mg total) by mouth daily (Patient taking differently: Take 40 mg by mouth daily at bedtime  ), Disp: 90 tablet, Rfl: 1    predniSONE 20 mg tablet, Take 2 tablets (40 mg total) by mouth daily, Disp: 14 tablet, Rfl: 0    rosuvastatin (CRESTOR) 20 MG tablet, TAKE 1 TABLET DAILY, Disp: 90 tablet, Rfl: 3    sotalol (BETAPACE) 120 mg tablet, TAKE 1 TABLET BY MOUTH  EVERY 12 HOURS, Disp: 180 tablet, Rfl: 2     Patient Active Problem List   Diagnosis    Tachycardia    Dyspnea on exertion    Supraventricular tachycardia (HCC)    Hypertension    Controlled depression    Severe obstructive sleep apnea    Morbid obesity (HCC)    Impaired fasting glucose    Hyperlipidemia    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)    Esophageal reflux    Benign essential hypertension    Adenomatous colon polyp    Open wound of left lower extremity             Patient ID: Jimmy Issa is a 60 y o  female      HPI     The following portions of the patient's history were reviewed and updated as appropriate: allergies, current medications, past family history, past medical history, past social history, past surgical history and problem list      Review of Systems       Objective:  Patient's shoes and socks removed  Foot Exam     General  General Appearance: appears stated age and healthy   Orientation: alert and oriented to person, place, and time   Affect: appropriate   Gait: antalgic         Right Foot/Ankle      Inspection and Palpation  Tenderness: metatarsals   Swelling: dorsum and metatarsals   Arch: pes planus  Hammertoes: fifth toe  Skin Exam: dry skin;      Neurovascular  Dorsalis pedis: 1+  Posterior tibial: 2+  Superficial peroneal nerve sensation: diminished  Deep peroneal nerve sensation: diminished        Left Foot/Ankle       Inspection and Palpation  Tenderness: metatarsals   Swelling: dorsum and metatarsals   Arch: pes planus  Hammertoes: fifth toe  Skin Exam: dry skin;      Neurovascular  Dorsalis pedis: 1+  Posterior tibial: 2+  Superficial peroneal nerve sensation: diminished  Deep peroneal nerve sensation: diminished           Physical Exam   Constitutional: She appears well-developed and well-nourished  Cardiovascular: Normal rate and regular rhythm  Pulses:       Dorsalis pedis pulses are 1+ on the right side, and 1+ on the left side  Posterior tibial pulses are 2+ on the right side, and 2+ on the left side  Feet:   Right Foot:   Skin Integrity: Positive for dry skin  Left Foot:   Skin Integrity: Positive for dry skin  Skin:   Wide incurvated hallux toenail bilateral   Nails demonstrate mycosis    Both hallux is demonstrate ingrown toenail

## 2019-12-17 DIAGNOSIS — K21.9 GASTROESOPHAGEAL REFLUX DISEASE WITHOUT ESOPHAGITIS: ICD-10-CM

## 2019-12-18 RX ORDER — PANTOPRAZOLE SODIUM 40 MG/1
TABLET, DELAYED RELEASE ORAL
Qty: 90 TABLET | Refills: 3 | Status: SHIPPED | OUTPATIENT
Start: 2019-12-18 | End: 2020-11-18

## 2020-01-01 DIAGNOSIS — I48.91 ATRIAL FIBRILLATION, UNSPECIFIED TYPE (HCC): ICD-10-CM

## 2020-01-02 RX ORDER — METOPROLOL SUCCINATE 25 MG/1
TABLET, EXTENDED RELEASE ORAL
Qty: 180 TABLET | Refills: 0 | Status: SHIPPED | OUTPATIENT
Start: 2020-01-02 | End: 2020-03-27

## 2020-01-22 ENCOUNTER — OFFICE VISIT (OUTPATIENT)
Dept: FAMILY MEDICINE CLINIC | Facility: CLINIC | Age: 60
End: 2020-01-22
Payer: COMMERCIAL

## 2020-01-22 VITALS
HEIGHT: 64 IN | TEMPERATURE: 97.1 F | DIASTOLIC BLOOD PRESSURE: 80 MMHG | BODY MASS INDEX: 50.02 KG/M2 | RESPIRATION RATE: 16 BRPM | SYSTOLIC BLOOD PRESSURE: 118 MMHG | WEIGHT: 293 LBS | HEART RATE: 80 BPM

## 2020-01-22 DIAGNOSIS — Z12.31 ENCOUNTER FOR SCREENING MAMMOGRAM FOR MALIGNANT NEOPLASM OF BREAST: ICD-10-CM

## 2020-01-22 DIAGNOSIS — R53.83 OTHER FATIGUE: Primary | ICD-10-CM

## 2020-01-22 DIAGNOSIS — I10 BENIGN ESSENTIAL HYPERTENSION: ICD-10-CM

## 2020-01-22 DIAGNOSIS — E66.01 MORBID OBESITY (HCC): ICD-10-CM

## 2020-01-22 DIAGNOSIS — R73.01 IMPAIRED FASTING GLUCOSE: ICD-10-CM

## 2020-01-22 DIAGNOSIS — G47.33 SEVERE OBSTRUCTIVE SLEEP APNEA: ICD-10-CM

## 2020-01-22 PROCEDURE — 3074F SYST BP LT 130 MM HG: CPT | Performed by: FAMILY MEDICINE

## 2020-01-22 PROCEDURE — 3079F DIAST BP 80-89 MM HG: CPT | Performed by: FAMILY MEDICINE

## 2020-01-22 PROCEDURE — 3008F BODY MASS INDEX DOCD: CPT | Performed by: FAMILY MEDICINE

## 2020-01-22 PROCEDURE — 99214 OFFICE O/P EST MOD 30 MIN: CPT | Performed by: FAMILY MEDICINE

## 2020-01-22 NOTE — PROGRESS NOTES
Chief Complaint   Patient presents with    Fatigue     pt requests bw , says her pulse is skipping a beat        Patient ID: Katie Bartlett is a 60 y o  female  HPI  Pt is seeing for feeling tired all the time -  has severe MARTIN -  On CPAP-  Did not see pulmonologist in several years - noticed "occasional skipping beat" -  No CP, no SOB -  Cannot loose weight -  Under cardiologist care as well     The following portions of the patient's history were reviewed and updated as appropriate: allergies, current medications, past family history, past medical history, past social history, past surgical history and problem list     Review of Systems   Constitutional: Positive for fatigue  Negative for activity change, appetite change and unexpected weight change  HENT: Negative  Respiratory: Negative  Cardiovascular: Negative  Gastrointestinal: Negative  Endocrine: Negative  Genitourinary: Negative  Musculoskeletal: Negative  Psychiatric/Behavioral: Positive for dysphoric mood (stress at home -  pt's son is getting  a divorce )  Current Outpatient Medications   Medication Sig Dispense Refill    amoxicillin (AMOXIL) 500 mg capsule TAKE 4 CAPSULES BY MOUTH 1 HOUR BEFORE DENTAL APPOINTMENT  0    aspirin 81 MG tablet Take 81 mg by mouth daily   atorvastatin (LIPITOR) 40 mg tablet Take 1 tablet (40 mg total) by mouth daily 90 tablet 2    ibuprofen (MOTRIN) 800 mg tablet Take 1 tablet (800 mg total) by mouth every 8 (eight) hours as needed for mild pain 30 tablet 0    Magnesium Oxide -Mg Supplement 400 MG CAPS Take 1 capsule by mouth daily      metoprolol succinate (TOPROL-XL) 25 mg 24 hr tablet TAKE 1 TABLET BY MOUTH TWO  TIMES DAILY 180 tablet 0    multivitamin (THERAGRAN) TABS Take 1 tablet by mouth daily        olmesartan (BENICAR) 20 mg tablet TAKE 1 TABLET BY MOUTH  DAILY 90 tablet 1    Omega-3 Fatty Acids (FISH OIL) 1,000 mg Take 1,000 mg by mouth 2 (two) times a day        pantoprazole (PROTONIX) 40 mg tablet TAKE 1 TABLET BY MOUTH  DAILY BEFORE BREAKFAST 90 tablet 3    PARoxetine (PAXIL-CR) 37 5 mg 24 hr tablet TAKE 1 TABLET BY MOUTH  EVERY MORNING 90 tablet 3    sotalol (BETAPACE) 120 mg tablet Take 1 tablet (120 mg total) by mouth every 12 (twelve) hours 180 tablet 0     No current facility-administered medications for this visit  Objective:    /80 (BP Location: Left arm, Patient Position: Sitting, Cuff Size: Large)   Pulse 80   Temp (!) 97 1 °F (36 2 °C)   Resp 16   Ht 5' 4" (1 626 m)   Wt (!) 147 kg (323 lb 9 6 oz)   BMI 55 55 kg/m²        Physical Exam   Constitutional: She is oriented to person, place, and time  No distress  MO   Cardiovascular: Normal rate, regular rhythm and normal heart sounds  Exam reveals no gallop  No murmur heard  Pulmonary/Chest: Effort normal  No respiratory distress  Abdominal: Soft  She exhibits no distension and no mass  There is no tenderness  Extremely obese      Musculoskeletal: She exhibits no edema  Neurological: She is alert and oriented to person, place, and time  Psychiatric: She has a normal mood and affect  Labs in chart were reviewed  Assessment/Plan:         Diagnoses and all orders for this visit:    Other fatigue  -     TSH, 3rd generation; Future  -     CBC; Future  -     Ambulatory referral to Pulmonology; Future    Encounter for screening mammogram for malignant neoplasm of breast  -     Mammo screening bilateral w cad; Future    Benign essential hypertension  -     Comprehensive metabolic panel; Future    Severe obstructive sleep apnea  -     Ambulatory referral to Pulmonology;  Future    Impaired fasting glucose  -     Hemoglobin A1C; Future    Morbid obesity (HCC)  Diet, weight loss, exercises advised       rto in 3 m                   Yvonne Cardona MD

## 2020-01-24 ENCOUNTER — VBI (OUTPATIENT)
Dept: FAMILY MEDICINE CLINIC | Facility: CLINIC | Age: 60
End: 2020-01-24

## 2020-01-24 NOTE — LETTER
Procedure Request Form: Cervical Cancer Screening      Date Requested: 20  Patient: Jona Newton  Patient : 1960   Referring Provider: Davida Christiansen, MD        Date of Procedure ______________________________       The above patient has informed us that they have completed their   most recent Cervical Cancer Screening at your facility  Please complete   this form and attach all corresponding procedure reports/results  Comments __________________________________________________________  ____________________________________________________________________  ____________________________________________________________________  ____________________________________________________________________    Facility Completing Procedure _________________________________________    Form Completed By (print name) _______________________________________      Signature __________________________________________________________      These reports are needed for  compliance    Please fax this completed form and a copy of the procedure report to our office as soon as possible to [phone] luz Mo: Phone [phone]    We thank you for your assistance in treating our mutual patient     (Faxed to Dr Andrea Cadena ([address]))

## 2020-01-24 NOTE — TELEPHONE ENCOUNTER
[address] -pap result within last year -tc/cma      Faxed to Dr Francisco Teixeira ([facility]  [address]) [phone] EVI Brewster01/24/20 8:08 AM     Received Pap DOS 10/10/2018, resulted EVI Brewster 01/30/20 12:33 PM

## 2020-01-26 LAB
ALBUMIN SERPL-MCNC: 4.3 G/DL (ref 3.8–4.9)
ALBUMIN/GLOB SERPL: 1.9 {RATIO} (ref 1.2–2.2)
ALP SERPL-CCNC: 94 IU/L (ref 39–117)
ALT SERPL-CCNC: 92 IU/L (ref 0–32)
AST SERPL-CCNC: 62 IU/L (ref 0–40)
BASOPHILS # BLD AUTO: 0 X10E3/UL (ref 0–0.2)
BASOPHILS NFR BLD AUTO: 1 %
BILIRUB SERPL-MCNC: 0.5 MG/DL (ref 0–1.2)
BUN SERPL-MCNC: 15 MG/DL (ref 8–27)
BUN/CREAT SERPL: 21 (ref 12–28)
CALCIUM SERPL-MCNC: 9.5 MG/DL (ref 8.7–10.3)
CHLORIDE SERPL-SCNC: 104 MMOL/L (ref 96–106)
CO2 SERPL-SCNC: 21 MMOL/L (ref 20–29)
CREAT SERPL-MCNC: 0.73 MG/DL (ref 0.57–1)
EOSINOPHIL # BLD AUTO: 0.1 X10E3/UL (ref 0–0.4)
EOSINOPHIL NFR BLD AUTO: 1 %
ERYTHROCYTE [DISTWIDTH] IN BLOOD BY AUTOMATED COUNT: 16 % (ref 11.7–15.4)
GLOBULIN SER-MCNC: 2.3 G/DL (ref 1.5–4.5)
GLUCOSE SERPL-MCNC: 114 MG/DL (ref 65–99)
HBA1C MFR BLD: 6.5 % (ref 4.8–5.6)
HCT VFR BLD AUTO: 42.5 % (ref 34–46.6)
HGB BLD-MCNC: 13.7 G/DL (ref 11.1–15.9)
IMM GRANULOCYTES # BLD: 0 X10E3/UL (ref 0–0.1)
IMM GRANULOCYTES NFR BLD: 0 %
LYMPHOCYTES # BLD AUTO: 2.2 X10E3/UL (ref 0.7–3.1)
LYMPHOCYTES NFR BLD AUTO: 35 %
MCH RBC QN AUTO: 26.9 PG (ref 26.6–33)
MCHC RBC AUTO-ENTMCNC: 32.2 G/DL (ref 31.5–35.7)
MCV RBC AUTO: 83 FL (ref 79–97)
MONOCYTES # BLD AUTO: 0.4 X10E3/UL (ref 0.1–0.9)
MONOCYTES NFR BLD AUTO: 6 %
NEUTROPHILS # BLD AUTO: 3.5 X10E3/UL (ref 1.4–7)
NEUTROPHILS NFR BLD AUTO: 57 %
PLATELET # BLD AUTO: 134 X10E3/UL (ref 150–450)
POTASSIUM SERPL-SCNC: 4.2 MMOL/L (ref 3.5–5.2)
PROT SERPL-MCNC: 6.6 G/DL (ref 6–8.5)
RBC # BLD AUTO: 5.1 X10E6/UL (ref 3.77–5.28)
SL AMB EGFR AFRICAN AMERICAN: 104 ML/MIN/1.73
SL AMB EGFR NON AFRICAN AMERICAN: 90 ML/MIN/1.73
SODIUM SERPL-SCNC: 142 MMOL/L (ref 134–144)
TSH SERPL DL<=0.005 MIU/L-ACNC: 1.68 UIU/ML (ref 0.45–4.5)
WBC # BLD AUTO: 6.2 X10E3/UL (ref 3.4–10.8)

## 2020-01-29 DIAGNOSIS — I48.91 ATRIAL FIBRILLATION, UNSPECIFIED TYPE (HCC): ICD-10-CM

## 2020-01-30 ENCOUNTER — OFFICE VISIT (OUTPATIENT)
Dept: PODIATRY | Facility: CLINIC | Age: 60
End: 2020-01-30
Payer: COMMERCIAL

## 2020-01-30 VITALS — HEIGHT: 64 IN | WEIGHT: 293 LBS | BODY MASS INDEX: 50.02 KG/M2

## 2020-01-30 DIAGNOSIS — B35.1 ONYCHOMYCOSIS: ICD-10-CM

## 2020-01-30 DIAGNOSIS — M79.671 PAIN IN BOTH FEET: ICD-10-CM

## 2020-01-30 DIAGNOSIS — M21.969 ACQUIRED DEFORMITY OF FOOT, UNSPECIFIED LATERALITY: Primary | ICD-10-CM

## 2020-01-30 DIAGNOSIS — M79.672 PAIN IN BOTH FEET: ICD-10-CM

## 2020-01-30 DIAGNOSIS — L60.0 INGROWN TOENAIL: ICD-10-CM

## 2020-01-30 DIAGNOSIS — L03.039 PARONYCHIA OF TOENAIL, UNSPECIFIED LATERALITY: ICD-10-CM

## 2020-01-30 PROCEDURE — 99213 OFFICE O/P EST LOW 20 MIN: CPT | Performed by: PODIATRIST

## 2020-01-30 RX ORDER — SOTALOL HYDROCHLORIDE 120 MG/1
TABLET ORAL
Qty: 180 TABLET | Refills: 0 | Status: SHIPPED | OUTPATIENT
Start: 2020-01-30 | End: 2020-03-26

## 2020-01-30 RX ORDER — ULTRAMICROSIZE GRISEOFULVIN 250 MG/1
250 TABLET ORAL DAILY
Qty: 30 TABLET | Refills: 0 | Status: SHIPPED | OUTPATIENT
Start: 2020-01-30 | End: 2020-02-10 | Stop reason: SDUPTHER

## 2020-01-30 NOTE — PROGRESS NOTES
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     last assessed 4/4/13    Atrial fibrillation (HCC)      Atrial flutter (HCC)      Benign essential hypertension       last assessed 12/21/17     Body mass index (BMI) of 50-59 9 in adult [facility])      Carpal tunnel syndrome 09/07/2004     unspecified laterality  / last assessed 9/7/04    Congenital pes planus       last assessed 7/15/14     Depression      Depression      Hemangioma of skin 08/26/2003     last assessed 8/26/03    History of gastroesophageal reflux (GERD)      Hypercholesterolemia      Hyperlipidemia      Hypertension      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[facility] [address] ) 04/19/2006     last assessed 12/31/14     Osteoarthritis of both knees       last assessed 12/31/14     Paroxysmal atrial fibrillation (HCC)      S/P ablation of atrial flutter                     Past Surgical History:   Procedure Laterality Date    ABDOMINAL SURGERY   06/2006     20cm GIST removed     APPENDECTOMY        ATRIAL ABLATION SURGERY        CARPAL TUNNEL RELEASE Bilateral      COLONOSCOPY        HYSTERECTOMY        KNEE SURGERY        OOPHORECTOMY        TONSILLECTOMY        TUMOR EXCISION   2006     gastrointestinal stromal tumor                   Allergies   Allergen Reactions    Other         Adhesive tape             Current Outpatient Prescriptions:     aspirin 81 MG tablet, Take 81 mg by mouth daily  , Disp: , Rfl:     esomeprazole (NexIUM) 40 MG capsule, Take 1 capsule (40 mg total) by mouth daily, Disp: 90 capsule, Rfl: 1    ibuprofen (MOTRIN) 800 mg tablet, Take 1 tablet (800 mg total) by mouth every 8 (eight) hours as needed for mild pain, Disp: 30 tablet, Rfl: 0    Magnesium Oxide -Mg Supplement 400 MG CAPS, Take 1 capsule by mouth daily, Disp: , Rfl:     metoprolol succinate (TOPROL-XL) 25 mg 24 hr tablet, Take 1 tablet (25 mg total) by mouth 2 (two) times a day, Disp: 180 tablet, Rfl: 2    multivitamin (THERAGRAN) TABS, Take 1 tablet by mouth daily  , Disp: , Rfl:     olmesartan (BENICAR) 20 mg tablet, Take 1 tablet (20 mg total) by mouth daily, Disp: 30 tablet, Rfl: 0    Omega-3 Fatty Acids (FISH OIL) 1,000 mg, Take 1,000 mg by mouth 2 (two) times a day  , Disp: , Rfl:     PARoxetine (PAXIL-CR) 37 5 mg 24 hr tablet, Take 1 tablet (37 5 mg total) by mouth every morning, Disp: 90 tablet, Rfl: 3    pravastatin (PRAVACHOL) 40 mg tablet, Take 1 tablet (40 mg total) by mouth daily (Patient taking differently: Take 40 mg by mouth daily at bedtime  ), Disp: 90 tablet, Rfl: 1    predniSONE 20 mg tablet, Take 2 tablets (40 mg total) by mouth daily, Disp: 14 tablet, Rfl: 0    rosuvastatin (CRESTOR) 20 MG tablet, TAKE 1 TABLET DAILY, Disp: 90 tablet, Rfl: 3    sotalol (BETAPACE) 120 mg tablet, TAKE 1 TABLET BY MOUTH  EVERY 12 HOURS, Disp: 180 tablet, Rfl: 2         Patient Active Problem List   Diagnosis    Tachycardia    Dyspnea on exertion    Supraventricular tachycardia (HCC)    Hypertension    Controlled depression    Severe obstructive sleep apnea    Morbid obesity (HCC)    Impaired fasting glucose    Hyperlipidemia    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)    Esophageal reflux    Benign essential hypertension    Adenomatous colon polyp    Open wound of left lower extremity             Patient ID: Alecia Vieira is a 59 y o  female          Objective:  Patient's shoes and socks removed    Foot Exam     General  General Appearance: appears stated age and healthy   Orientation: alert and oriented to person, place, and time   Affect: appropriate   Gait: antalgic         Right Foot/Ankle      Inspection and Palpation  Tenderness: metatarsals   Swelling: dorsum and metatarsals   Arch: pes planus  Hammertoes: fifth toe  Skin Exam: dry skin;      Neurovascular  Dorsalis pedis: 1+  Posterior tibial: 2+  Superficial peroneal nerve sensation: diminished  Deep peroneal nerve sensation: diminished        Left Foot/Ankle       Inspection and Palpation  Tenderness: metatarsals   Swelling: dorsum and metatarsals   Arch: pes planus  Hammertoes: fifth toe  Skin Exam: dry skin;      Neurovascular  Dorsalis pedis: 1+  Posterior tibial: 2+  Superficial peroneal nerve sensation: diminished  Deep peroneal nerve sensation: diminished           Physical Exam   Constitutional: She appears well-developed and well-nourished  Cardiovascular: Normal rate and regular rhythm     Pulses:       Dorsalis pedis pulses are 1+ on the right side, and 1+ on the left side         Posterior tibial pulses are 2+ on the right side, and 2+ on the left side  Feet:   Right Foot:   Skin Integrity: Positive for dry skin  Left Foot:   Skin Integrity: Positive for dry skin     Skin:   Wide incurvated hallux toenail bilateral   Nails demonstrate mycosis   Both hallux is demonstrate ingrown toenail

## 2020-02-03 DIAGNOSIS — I48.91 ATRIAL FIBRILLATION, UNSPECIFIED TYPE (HCC): ICD-10-CM

## 2020-02-03 RX ORDER — SOTALOL HYDROCHLORIDE 120 MG/1
120 TABLET ORAL 2 TIMES DAILY
Qty: 28 TABLET | Refills: 0 | Status: SHIPPED | OUTPATIENT
Start: 2020-02-03 | End: 2020-02-28 | Stop reason: SDUPTHER

## 2020-02-03 NOTE — TELEPHONE ENCOUNTER
Pt waiting for sotalol from [facility] - will need 14 day refill from [facility]

## 2020-02-06 ENCOUNTER — TELEPHONE (OUTPATIENT)
Dept: FAMILY MEDICINE CLINIC | Facility: CLINIC | Age: 60
End: 2020-02-06

## 2020-02-07 ENCOUNTER — TELEPHONE (OUTPATIENT)
Dept: FAMILY MEDICINE CLINIC | Facility: CLINIC | Age: 60
End: 2020-02-07

## 2020-02-07 NOTE — TELEPHONE ENCOUNTER
trc tc/cma----- Message from Marco Antonio Pugh MD sent at 2/7/2020  4:28 PM EST -----  Pl, advise pt -  All labs are stable  - some improvement on liver tests are noticed, Hg AIC is still high 6 5 -  Borderline diabetes

## 2020-02-10 ENCOUNTER — TELEPHONE (OUTPATIENT)
Dept: FAMILY MEDICINE CLINIC | Facility: CLINIC | Age: 60
End: 2020-02-10

## 2020-02-10 DIAGNOSIS — B35.1 ONYCHOMYCOSIS: ICD-10-CM

## 2020-02-10 RX ORDER — KETOCONAZOLE 20 MG/G
CREAM TOPICAL DAILY
Qty: 60 G | Refills: 1 | Status: SHIPPED | OUTPATIENT
Start: 2020-02-10 | End: 2020-11-25

## 2020-02-10 RX ORDER — ULTRAMICROSIZE GRISEOFULVIN 250 MG/1
250 TABLET ORAL DAILY
Qty: 30 TABLET | Refills: 0 | Status: SHIPPED | OUTPATIENT
Start: 2020-02-10 | End: 2020-03-11

## 2020-02-10 NOTE — TELEPHONE ENCOUNTER
----- Message from Alicia Huizar MD sent at 2/7/2020  4:28 PM EST -----  Pl, advise pt -  All labs are stable  - some improvement on liver tests are noticed, Hg AIC is still high 6 5 -  Borderline diabetes

## 2020-02-19 ENCOUNTER — CLINICAL SUPPORT (OUTPATIENT)
Dept: BARIATRICS | Facility: CLINIC | Age: 60
End: 2020-02-19

## 2020-02-19 VITALS
HEART RATE: 70 BPM | RESPIRATION RATE: 18 BRPM | TEMPERATURE: 98.5 F | HEIGHT: 63 IN | DIASTOLIC BLOOD PRESSURE: 70 MMHG | WEIGHT: 293 LBS | SYSTOLIC BLOOD PRESSURE: 116 MMHG | BODY MASS INDEX: 51.91 KG/M2

## 2020-02-19 DIAGNOSIS — Z98.84 BARIATRIC SURGERY STATUS: ICD-10-CM

## 2020-02-19 DIAGNOSIS — E66.01 MORBID (SEVERE) OBESITY DUE TO EXCESS CALORIES (HCC): Primary | ICD-10-CM

## 2020-02-19 PROCEDURE — RECHECK

## 2020-02-19 RX ORDER — PRAVASTATIN SODIUM 40 MG
40 TABLET ORAL DAILY
COMMUNITY
End: 2020-08-31

## 2020-02-19 NOTE — PROGRESS NOTES
Bariatric Nutrition Assessment Note    Type of surgery    Preop (3 weight checks)  Surgery Date: TBD-leaning toward sleeve  Surgeon: Dr Jonatan Russell  60 y o   female     Wt with BMI of 25: 141 1lbs  Pre-Op Excess Wt: 182 3lbs  Blood pressure 116/70, pulse 70, temperature 98 5 °F (36 9 °C), temperature source Tympanic, resp  rate 18, height 5' 2 99" (1 6 m), weight (!) 147 kg (323 lb 3 2 oz)  Body mass index is 57 27 kg/m²      Weight History   Onset of Obesity: Adult, since kids 43 years ago  Family history of obesity: Yes  Wt Loss Attempts: Commercial Programs ([employer], [employer], etc )  Meal Replacements (Medifast, Slim Fast, etc )  OTC meds/supplements  Self Created Diets (Portion Control, Healthy Food Choices, etc )  Maximum Wt Lost: 60lbs with weight watchers (many years ago)  Review of History and Medications   Past Medical History:   Diagnosis Date    Abnormal liver function test     last assessed 1/16/17     Adenomatosis     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    last assessed 4/4/13    Atrial fibrillation ([facility] )     Atrial flutter ([facility] )     Benign essential hypertension     last assessed 12/21/17     Body mass index (BMI) of 50-59 9 in adult [facility])     Carpal tunnel syndrome 09/07/2004    unspecified laterality  / last assessed 9/7/04    Congenital pes planus     last assessed 7/15/14     Depression     Depression     Hemangioma of skin 08/26/2003    last assessed 8/26/03    History of gastroesophageal reflux (GERD)     Hypercholesterolemia     Hyperlipidemia     Hypertension     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[facility] ) 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/2006    last assessed 12/31/14     Osteoarthritis of both knees     last assessed 12/31/14     Paroxysmal atrial fibrillation (HCC)     S/P ablation of atrial flutter      Past Surgical History:   Procedure Laterality Date    ABDOMINAL SURGERY  06/2006    20cm GIST removed     APPENDECTOMY      ATRIAL ABLATION SURGERY      CARPAL TUNNEL RELEASE Bilateral     COLONOSCOPY      HYSTERECTOMY      KNEE SURGERY      KNEE SURGERY Left 03/2019    OOPHORECTOMY      TONSILLECTOMY      TUMOR EXCISION  2006    gastrointestinal stromal tumor     Social History     Socioeconomic History    Marital status: /Civil Union     Spouse name: None    Number of children: None    Years of education: None    Highest education level: None   Occupational History    None   Social Needs    Financial resource strain: None    Food insecurity:     Worry: None     Inability: None    Transportation needs:     Medical: None     Non-medical: None   Tobacco Use    Smoking status: Light Tobacco Smoker     Years: 4 00    Smokeless tobacco: Current User    Tobacco comment: current some day smoker 1 cigarette/day 4 yrs / former smoker per allscript                           Substance and Sexual Activity    Alcohol use: No     Comment: social drinker per allscript     Drug use: No    Sexual activity: None   Lifestyle    Physical activity:     Days per week: None     Minutes per session: None    Stress: None   Relationships    Social connections:     Talks on phone: None     Gets together: None     Attends Jainism service: None     Active member of club or organization: None     Attends meetings of clubs or organizations: None     Relationship status: None    Intimate partner violence:     Fear of current or ex partner: None     Emotionally abused: None     Physically abused: None     Forced sexual activity: None   Other Topics Concern    None   Social History Narrative    Lack of exercise    Good sleep hygiene    No caffeine use    Dog    Good sleep hygiene         Current Outpatient Medications:     aspirin 81 MG tablet, Take 81 mg by mouth daily  , Disp: , Rfl:     griseofulvin (VIVIAN-PEG) 250 MG tablet, Take 1 tablet (250 mg total) by mouth daily, Disp: 30 tablet, Rfl: 0    ibuprofen (MOTRIN) 800 mg tablet, Take 1 tablet (800 mg total) by mouth every 8 (eight) hours as needed for mild pain, Disp: 30 tablet, Rfl: 0    ketoconazole (NIZORAL) 2 % cream, Apply topically daily, Disp: 60 g, Rfl: 1    Magnesium Oxide -Mg Supplement 400 MG CAPS, Take 1 capsule by mouth daily, Disp: , Rfl:     metoprolol succinate (TOPROL-XL) 25 mg 24 hr tablet, TAKE 1 TABLET BY MOUTH TWO  TIMES DAILY, Disp: 180 tablet, Rfl: 0    multivitamin (THERAGRAN) TABS, Take 1 tablet by mouth daily  , Disp: , Rfl:     olmesartan (BENICAR) 20 mg tablet, TAKE 1 TABLET BY MOUTH  DAILY, Disp: 90 tablet, Rfl: 1    Omega-3 Fatty Acids (FISH OIL) 1,000 mg, Take 1,000 mg by mouth 2 (two) times a day  , Disp: , Rfl:     pantoprazole (PROTONIX) 40 mg tablet, TAKE 1 TABLET BY MOUTH  DAILY BEFORE BREAKFAST, Disp: 90 tablet, Rfl: 3    PARoxetine (PAXIL-CR) 37 5 mg 24 hr tablet, TAKE 1 TABLET BY MOUTH  EVERY MORNING, Disp: 90 tablet, Rfl: 3    pravastatin (PRAVACHOL) 40 mg tablet, Take 40 mg by mouth daily, Disp: , Rfl:     sotalol (BETAPACE) 120 mg tablet, TAKE 1 TABLET BY MOUTH  EVERY 12 HOURS, Disp: 180 tablet, Rfl: 0    sotalol (BETAPACE) 120 mg tablet, Take 1 tablet (120 mg total) by mouth 2 (two) times a day, Disp: 28 tablet, Rfl: 0    VITAMIN D PO, Take 2,000 Units by mouth 2 (two) times a day, Disp: , Rfl:     amoxicillin (AMOXIL) 500 mg capsule, TAKE 4 CAPSULES BY MOUTH 1 HOUR BEFORE DENTAL APPOINTMENT, Disp: , Rfl: 0    atorvastatin (LIPITOR) 40 mg tablet, Take 1 tablet (40 mg total) by mouth daily (Patient not taking: Reported on 2/19/2020), Disp: 90 tablet, Rfl: 2  Food Intake and Lifestyle Assessment   Food Intake Assessment completed via food log brought by patient  Wake:  6:15am  Breakfast: DD decaf coffee XL light and sweet w/equal (lasts all day) sometimes sausage egg and cheese wake up wrap if not going to have lunch, but often skips breakfast  Snack: if something is in the office then will pick at it   Lunch: gets 1hr for lunch  Will take around 12pm-Fridays orders out-pizza or sandwich or sausage, egg and cheese wrap OR leftovers OR PBJ OR 1/4 pounder with cheese and fries  Snack: -  Dinner: 3:00/9NT:  2 slices pizza OR 1 hamburger with cheese on a large english muffin OR potatoes, carrots, prime rib OR beef strogunoff OR often beef:  Steak sandwich, burger, steak (6oz) OR sausage, peppers, onions OR chicken shira   Snack: popcorn with butter OR bowl of cereal  Beverage intake: water and coffee/tea  Protein supplement: none  Estimated protein intake per day: 70-80gm  Estimated fluid intake per day: 16-32oz water, then XL coffee each day  Meals eaten away from home: at least 3 per week-Friday lunch and dinner, when camping season will go out to eat 2-3 meals  Typical meal pattern: 2-3 meals per day and 0-1 snacks per day  Eating Behaviors: Consumption of high calorie/ high fat foods, Large portion sizes and Mindless eating  Food allergies or intolerances: Allergies   Allergen Reactions    Other      Adhesive tape      Cultural or Jehovah's witness considerations: none    Physical Assessment  Physical Activity  Types of exercise: None, 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, 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  Current physical limitations: knee pain (has knee replacements, but still in pain)    Psychosocial Assessment   Support systems: spouse friend(s)  Socioeconomic factors: none    Nutrition Diagnosis  Diagnosis: Overweight / Obesity (NC-3 3)  Related to: Physical inactivity and Excessive energy intake  As Evidenced by: BMI >25     Nutrition Prescription: Recommend the following diet  Regular    Interventions and Teaching   Discussed pre-op and post-op nutrition guidelines  Patient educated and handouts provided    Surgical changes to stomach / GI  Capacity of post-surgery stomach  Diet progression  Adequate hydration  Sugar and fat restriction to decrease "dumping syndrome"  Fat restriction to decrease steatorrhea  Expected weight loss  Weight loss plateaus/ possibility of weight regain  Exercise  Suggestions for pre-op diet  Nutrition considerations after surgery  Protein supplements  Meal planning and preparation  Appropriate carbohydrate, protein, and fat intake, and food/fluid choices to maximize safe weight loss, nutrient intake, and tolerance   Dietary and lifestyle changes  Possible problems with poor eating habits  Intuitive eating  Techniques for self monitoring and keeping daily food journal  Potential for food intolerance after surgery, and ways to deal with them including: lactose intolerance, nausea, reflux, vomiting, diarrhea, food intolerance, appetite changes, gas  Vitamin / Mineral supplementation of Multivitamin with minerals and Vitamin D 2000IU pre-op    Education provided to: patient    Barriers to learning: No barriers identified  Readiness to change: preparation    Prior research on procedure: internet, discussed with provider and friends or family    Comprehension: verbalizes understanding     Expected Compliance: good  Recommendations  Pt is an appropriate candidate for surgery   Yes  Evaluation / Monitoring  Dietitian to Monitor: Eating pattern as discussed Body weight Physical activity  Pre-op weight loss goals:  10% by day of surgery:  -32lbs (291lbs)  1/2 by time ready to submit to insurance:  -16lbs (307lbs)  Goals  Food journal  Exercise 30 minutes 5 times per week (start with 10 mins 3x/day)  Complete lession plans 1-6  Eat 3 meals per day with protein at each meal  Use protein shake for breakfast  Eliminate mindless snacking  Incorporate veggies at least 3 times per week  Time Spent:   1 Hour

## 2020-02-19 NOTE — PROGRESS NOTES
Bariatric Behavioral Health Evaluation    Presenting Problem: Jimmy Issa,  1960  Patient is experiencing a lot of conditions relevant to her weight  Patient has tried to lose weight through Weight Watchers, Lenell Staple, Slim Fast, would   ose weight but then gain back  Is the patient seeking Bariatric Surgery Eval? Yes  If yes how long have you researched this surgery option  Patient was going to have the surgery in  but due to the passing of her son she cancelled the procedure  More recently reconsidering the procedure again  Realizes Post- Op Requirements? Yes     Pre-morbid level of function and history of present illness: Patient experiencing knee pain, pre-diabetes, sleep apnea, high cholesterol and blood pressure  Psychiatric/Psychological Treatment Diagnosis: Patient has diagnosis of depression, currently taking medications as prescribed by PCP  Patient feels her symptoms are well managed  Patient denies any substance abuse, drinks alcohol on rare occasions, smokes on occasion  Outpatient Counselor No     Psychiatrist No     Have you had Inpatient Treatment? No    Family Constellation (include relationship with each and Psych/Med HX)    Mother  obesity, Father  mental health illness, Siblings  obesity and tobacco use and Other  obesity    Domestic Violence No    Abuse History:  None    Social situations: linving with spouse and grown son    Additional comments/stressors related to family/relationships/peer support: Patient struggles with weight and the stress it puts on her health  Denies any other life stressors  Patient is aware of vitamin and protein shake regiment and doesn't foresee it being a problem to afford      Physical/Psychological Assessment:     Appearance: appropriate  Sociability: friendly  Affect: appropriate  Mood: calm  Thought Process: coherent  Speech: normal  Content: no impairment  Orientation: person  Yes , place  Yes , time  Yes , normal attention span Yes , normal memory  Yes  , decreased in concentration ability  No and normal judgement  Yes   Insight: emotional  good    Risk Assessment:     Recommendations: Recommended for surgery  yes and Patient meets the criteria to be a member of [facility] Bariatric surgery program      Risk of Harm to Self or Others: Patient denies any SI/HI    Observation:     Access to weapons yes     Weapons secured by patient's son, locked away in a safe  Based on the previous information, the client presents the following risk of harm to self or others: low    BARIATRIC [place]    I have received education related to my bariatric surgery process and understand:    Patients may be required to complete a psychiatric evaluation and receive clearance for surgery from their psychiatrist     Patients who undergo weight loss surgery are at higher risk of increased mental health concerns and suicide attempts  Patients may be required to complete a full substance abuse evaluation and then complete all treatment recommendations prior to surgery  If diagnosis of abuse/dependence results, patient may be required to remain sober for one (1) year before having bariatric surgery  Patients on psychiatric medications should check with their provider to discuss psychiatric medications and the changes in absorption  Patient should discuss all time release medications with provider and take all medications as prescribed  The recommendation is that there is no use of  any tobacco products, Hookah or  vapes for the bariatric post-operation patient  Bariatric surgery patients should not consume alcohol as a post-operative patient as it may increase risk of numerous health conditions including but not limited to alcohol abuse and ulcers  There is a possibility of weight regain if patient does not follow all program guidelines and recommendations      Bariatric surgery patients should exercise thirty (30) to sixty (60) minutes per day to maintain post-surgical weight loss  Research indicates that bariatric patients are more successful when they see a therapist for up to two (2) years post-op  Patients will follow all medical and dietary recommendations provided  Patient will keep all scheduled appointments and follow up with their physician for a minimum of five (5) years  Patient will take all vitamins as recommended  Post-operative vitamins are life-long  Patient reviewed Bariatric Surgery Education Checklist and agrees they have received education on these issues   Note: patient has an Axis I diagnosis of depression that is managed with medication prescribed by PCP  Patient denies any substance dependence, drinks alcohol rarely and smokes on occasion  Risk of alcohol and tobacco use post op were discussed  Patient is appropriate for surgery and recommended to meet with surgeon

## 2020-02-27 ENCOUNTER — HOSPITAL ENCOUNTER (OUTPATIENT)
Dept: RADIOLOGY | Facility: HOSPITAL | Age: 60
Discharge: HOME | End: 2020-02-27
Attending: FAMILY MEDICINE
Payer: COMMERCIAL

## 2020-02-27 VITALS — BODY MASS INDEX: 51.91 KG/M2 | HEIGHT: 63 IN | WEIGHT: 293 LBS

## 2020-02-27 DIAGNOSIS — Z12.31 ENCOUNTER FOR SCREENING MAMMOGRAM FOR MALIGNANT NEOPLASM OF BREAST: ICD-10-CM

## 2020-02-27 PROCEDURE — 77063 BREAST TOMOSYNTHESIS BI: CPT

## 2020-02-27 PROCEDURE — 77067 SCR MAMMO BI INCL CAD: CPT

## 2020-02-28 ENCOUNTER — OFFICE VISIT (OUTPATIENT)
Dept: CARDIOLOGY CLINIC | Facility: CLINIC | Age: 60
End: 2020-02-28
Payer: COMMERCIAL

## 2020-02-28 VITALS
WEIGHT: 293 LBS | SYSTOLIC BLOOD PRESSURE: 122 MMHG | BODY MASS INDEX: 51.91 KG/M2 | OXYGEN SATURATION: 97 % | HEART RATE: 64 BPM | DIASTOLIC BLOOD PRESSURE: 70 MMHG | HEIGHT: 63 IN

## 2020-02-28 DIAGNOSIS — I47.1 SUPRAVENTRICULAR TACHYCARDIA (HCC): ICD-10-CM

## 2020-02-28 DIAGNOSIS — G47.33 SEVERE OBSTRUCTIVE SLEEP APNEA: ICD-10-CM

## 2020-02-28 DIAGNOSIS — R53.83 OTHER FATIGUE: Primary | ICD-10-CM

## 2020-02-28 DIAGNOSIS — I10 BENIGN ESSENTIAL HYPERTENSION: ICD-10-CM

## 2020-02-28 DIAGNOSIS — I10 ESSENTIAL HYPERTENSION: Chronic | ICD-10-CM

## 2020-02-28 DIAGNOSIS — R06.00 DYSPNEA ON EXERTION: ICD-10-CM

## 2020-02-28 PROCEDURE — 4004F PT TOBACCO SCREEN RCVD TLK: CPT | Performed by: INTERNAL MEDICINE

## 2020-02-28 PROCEDURE — 99214 OFFICE O/P EST MOD 30 MIN: CPT | Performed by: INTERNAL MEDICINE

## 2020-02-28 PROCEDURE — 93000 ELECTROCARDIOGRAM COMPLETE: CPT | Performed by: INTERNAL MEDICINE

## 2020-02-28 PROCEDURE — 3074F SYST BP LT 130 MM HG: CPT | Performed by: INTERNAL MEDICINE

## 2020-02-28 PROCEDURE — 3078F DIAST BP <80 MM HG: CPT | Performed by: INTERNAL MEDICINE

## 2020-02-28 PROCEDURE — 3008F BODY MASS INDEX DOCD: CPT | Performed by: INTERNAL MEDICINE

## 2020-02-28 NOTE — PROGRESS NOTES
Cardiology Follow Up    Mariah Marroquin  1960  [id number]      Interval History: Mariah Marroquin is here for follow up of SVT/Atrial fibrillation  Since her last visit, she is feeling fatigued with exertional dyspnea  She denies any chest pain or LE edema  She has intermittent palpitations but denies any sustained tachycardia  The patient was seen at [facility] initially because of tachycardia  On 3/23/16, she underwent EPS/SVT ablation with ablation of atrial tachycardia but was complicated by atrial fibrillation which responded to sotalol and cardioversion  She remains in sinus rhythm since then  She is not on anticoagulation because of low chads score  The following portions of the patient's history were reviewed and updated as appropriate: allergies, current medications, past family history, past medical history, past social history, past surgical history and problem list     Current Outpatient Medications on File Prior to Visit   Medication Sig Dispense Refill    amoxicillin (AMOXIL) 500 mg capsule TAKE 4 CAPSULES BY MOUTH 1 HOUR BEFORE DENTAL APPOINTMENT  0    aspirin 81 MG tablet Take 81 mg by mouth daily   griseofulvin (VIVIAN-PEG) 250 MG tablet Take 1 tablet (250 mg total) by mouth daily 30 tablet 0    ibuprofen (MOTRIN) 800 mg tablet Take 1 tablet (800 mg total) by mouth every 8 (eight) hours as needed for mild pain 30 tablet 0    ketoconazole (NIZORAL) 2 % cream Apply topically daily 60 g 1    Magnesium Oxide -Mg Supplement 400 MG CAPS Take 1 capsule by mouth daily      metoprolol succinate (TOPROL-XL) 25 mg 24 hr tablet TAKE 1 TABLET BY MOUTH TWO  TIMES DAILY 180 tablet 0    multivitamin (THERAGRAN) TABS Take 1 tablet by mouth daily        olmesartan (BENICAR) 20 mg tablet TAKE 1 TABLET BY MOUTH  DAILY 90 tablet 1    Omega-3 Fatty Acids (FISH OIL) 1,000 mg Take 1,000 mg by mouth 2 (two) times a day        pantoprazole (PROTONIX) 40 mg tablet TAKE 1 TABLET BY MOUTH  DAILY BEFORE BREAKFAST 90 tablet 3    PARoxetine (PAXIL-CR) 37 5 mg 24 hr tablet TAKE 1 TABLET BY MOUTH  EVERY MORNING 90 tablet 3    pravastatin (PRAVACHOL) 40 mg tablet Take 40 mg by mouth daily      sotalol (BETAPACE) 120 mg tablet TAKE 1 TABLET BY MOUTH  EVERY 12 HOURS 180 tablet 0    VITAMIN D PO Take 2,000 Units by mouth 2 (two) times a day      atorvastatin (LIPITOR) 40 mg tablet Take 1 tablet (40 mg total) by mouth daily (Patient not taking: Reported on 2/19/2020) 90 tablet 2    [DISCONTINUED] sotalol (BETAPACE) 120 mg tablet Take 1 tablet (120 mg total) by mouth 2 (two) times a day (Patient not taking: Reported on 2/28/2020) 28 tablet 0     No current facility-administered medications on file prior to visit  Review of Systems:  Review of Systems   Constitutional: Positive for fatigue  Negative for chills and fever  HENT: Negative for congestion, nosebleeds and postnasal drip  Respiratory: Positive for shortness of breath  Negative for cough and chest tightness  Cardiovascular: Negative for chest pain, palpitations and leg swelling  Gastrointestinal: Negative for abdominal distention, abdominal pain, diarrhea, nausea and vomiting  Endocrine: Negative for polydipsia, polyphagia and polyuria  Musculoskeletal: Positive for arthralgias, gait problem and myalgias  Skin: Negative for color change, pallor and rash  Allergic/Immunologic: Negative for environmental allergies, food allergies and immunocompromised state  Neurological: Negative for dizziness, seizures, syncope and light-headedness  Hematological: Negative for adenopathy  Does not bruise/bleed easily  Psychiatric/Behavioral: Negative for dysphoric mood  The patient is not nervous/anxious  All other systems reviewed and are negative        Physical Exam:  /70 (BP Location: Left arm, Patient Position: Sitting, Cuff Size: Large)   Pulse 64   Ht 5' 2 75" (1 594 m)   Wt (!) 147 kg (325 lb)   SpO2 97%   BMI 58 03 kg/m²     Physical Exam   Constitutional: She is oriented to person, place, and time  She appears well-developed  No distress  HENT:   Head: Normocephalic and atraumatic  Eyes: Pupils are equal, round, and reactive to light  Conjunctivae and EOM are normal    Neck: Neck supple  No JVD present  No thyromegaly present  Cardiovascular: Normal rate, regular rhythm and normal heart sounds  Exam reveals no gallop and no friction rub  No murmur heard  Pulmonary/Chest: Effort normal and breath sounds normal    Abdominal: Soft  She exhibits no distension  There is no tenderness  Musculoskeletal: She exhibits no edema  Neurological: She is alert and oriented to person, place, and time  No cranial nerve deficit  Skin: Skin is warm and dry  No rash noted  She is not diaphoretic  No erythema  Psychiatric: She has a normal mood and affect   Her behavior is normal  Judgment and thought content normal        Cardiographics  ECG: normal sinus rhythm, no blocks or conduction defects, no ischemic changes    Labs:  Lab Results   Component Value Date     04/12/2017    K 4 2 01/25/2020     01/25/2020    CO2 21 01/25/2020    BUN 15 01/25/2020    CREATININE 0 73 01/25/2020    CREATININE 0 79 03/21/2018    CREATININE 0 78 04/12/2017    GLUCOSE 92 04/12/2017    CALCIUM 9 6 03/21/2018    CALCIUM 9 5 04/12/2017     Lab Results   Component Value Date    WBC 6 2 01/25/2020    WBC 7 30 03/21/2018    WBC 7 5 01/13/2017    HGB 13 7 01/25/2020    HGB 15 6 03/21/2018    HGB 13 6 01/13/2017    HCT 42 5 01/25/2020    HCT 48 4 (H) 03/21/2018    HCT 41 5 01/13/2017    MCV 83 01/25/2020    MCV 82 03/21/2018    MCV 83 01/13/2017     (L) 01/25/2020     03/21/2018     01/13/2017     Lab Results   Component Value Date    CHOL 153 04/12/2017    TRIG 213 (H) 10/14/2019    HDL 38 (L) 10/14/2019     Imaging: Us Abdomen Limited    Result Date: 10/16/2019  Narrative: RIGHT UPPER QUADRANT ULTRASOUND INDICATION:    R74 8: Abnormal levels of other serum enzymes  COMPARISON:  CT abdomen pelvis 12/17/2014 TECHNIQUE:   Real-time ultrasound of the right upper quadrant was performed with a curvilinear transducer with both volumetric sweeps and still imaging techniques  FINDINGS: PANCREAS:  Visualized portions of the pancreas are within normal limits  AORTA AND IVC:  Visualized portions are normal for patient age  LIVER: Size:  Mildly enlarged  The liver measures 18 5 cm in the midclavicular line  Contour:  Surface contour is smooth  Parenchyma: There is moderate diffuse increased echogenicity with smooth echotexture and acoustic beam attenuation  Most consistent with moderate hepatic steatosis  No evidence of suspicious mass  Limited imaging of the main portal vein shows it to be patent and hepatopetal   BILIARY: The gallbladder is normal in caliber  No wall thickening or pericholecystic fluid  No stones or sludge identified  No sonographic Hardin's sign  No intrahepatic biliary dilatation  CBD measures 2 mm  No choledocholithiasis  KIDNEY: Right kidney measures 12 6 x 5 7 cm  Within normal limits  Non-shadowing echogenic foci in the lower pole likely sinus fat  ASCITES:   None  Impression: 1  Prominent, fatty liver  2   No cholelithiasis or biliary ductal obstruction  Workstation performed: [id number]       Discussion/Summary:  1  Other fatigue    2  Essential hypertension    3  Severe obstructive sleep apnea    4  Benign essential hypertension    5  Supraventricular tachycardia ([address] )    6  Dyspnea on exertion      - she continues to be free of SVT and atrial fibrillation  Tolerating sotalol  QT interval is normal today  Renal function normal along will electrolytes  - Discussed weight loss    She is following with bariatric center     - blood pressure controlled with olmesartan and metoprolol   - continue atorvastatin (switched last visit)  - stress test and echocardiogram ordered for exertional dyspnea and fatigue

## 2020-03-02 ENCOUNTER — TELEPHONE (OUTPATIENT)
Dept: RADIOLOGY | Facility: HOSPITAL | Age: 60
End: 2020-03-02

## 2020-03-04 ENCOUNTER — OFFICE VISIT (OUTPATIENT)
Dept: PULMONOLOGY | Facility: MEDICAL CENTER | Age: 60
End: 2020-03-04
Payer: COMMERCIAL

## 2020-03-04 ENCOUNTER — HOSPITAL ENCOUNTER (OUTPATIENT)
Dept: RADIOLOGY | Facility: HOSPITAL | Age: 60
Discharge: HOME | End: 2020-03-04
Attending: FAMILY MEDICINE
Payer: COMMERCIAL

## 2020-03-04 VITALS
HEIGHT: 64 IN | TEMPERATURE: 98 F | WEIGHT: 293 LBS | HEART RATE: 74 BPM | DIASTOLIC BLOOD PRESSURE: 62 MMHG | SYSTOLIC BLOOD PRESSURE: 100 MMHG | BODY MASS INDEX: 50.02 KG/M2 | RESPIRATION RATE: 12 BRPM | OXYGEN SATURATION: 98 %

## 2020-03-04 VITALS — WEIGHT: 293 LBS | BODY MASS INDEX: 50.02 KG/M2 | HEIGHT: 64 IN

## 2020-03-04 DIAGNOSIS — E66.01 CLASS 3 SEVERE OBESITY DUE TO EXCESS CALORIES WITHOUT SERIOUS COMORBIDITY WITH BODY MASS INDEX (BMI) OF 50.0 TO 59.9 IN ADULT (HCC): ICD-10-CM

## 2020-03-04 DIAGNOSIS — R92.8 ABNORMAL SCREENING MAMMOGRAM: ICD-10-CM

## 2020-03-04 DIAGNOSIS — G47.33 SEVERE OBSTRUCTIVE SLEEP APNEA: ICD-10-CM

## 2020-03-04 DIAGNOSIS — G47.33 OSA (OBSTRUCTIVE SLEEP APNEA): Primary | ICD-10-CM

## 2020-03-04 PROBLEM — E66.813 CLASS 3 SEVERE OBESITY DUE TO EXCESS CALORIES WITH BODY MASS INDEX (BMI) OF 50.0 TO 59.9 IN ADULT (HCC): Status: ACTIVE | Noted: 2020-03-04

## 2020-03-04 PROCEDURE — 3078F DIAST BP <80 MM HG: CPT | Performed by: NURSE PRACTITIONER

## 2020-03-04 PROCEDURE — 4004F PT TOBACCO SCREEN RCVD TLK: CPT | Performed by: NURSE PRACTITIONER

## 2020-03-04 PROCEDURE — 99213 OFFICE O/P EST LOW 20 MIN: CPT | Performed by: NURSE PRACTITIONER

## 2020-03-04 PROCEDURE — 76642 ULTRASOUND BREAST LIMITED: CPT

## 2020-03-04 PROCEDURE — 3008F BODY MASS INDEX DOCD: CPT | Performed by: NURSE PRACTITIONER

## 2020-03-04 PROCEDURE — 3074F SYST BP LT 130 MM HG: CPT | Performed by: NURSE PRACTITIONER

## 2020-03-04 NOTE — ASSESSMENT & PLAN NOTE
Migel Meza has elevated body mass index of 56  She is now in consultation with weight management of [facility]  She has lost weight previously with weight watchers and is considering restarting this once again  Patient is otherwise stable    It is possible that her daytime fatigue is multifactorial

## 2020-03-04 NOTE — PATIENT INSTRUCTIONS
Sleep Apnea   AMBULATORY CARE:   Sleep apnea  is also called obstructive sleep apnea  It is a condition that causes you to stop breathing for 10 seconds or more while you are sleeping  During normal sleep, your throat is kept open by muscles that let air pass through easily  Sleep apnea causes the muscles and tissues around your throat to relax and block air from passing through  Sleep apnea may happen many times while you are asleep  Common symptoms include the following:   · No signs of breathing for 10 seconds or more while you sleep    · Snoring loudly, snorting, gasping or choking while you sleep, and waking up suddenly because of these    · A hard time thinking, remembering things, or focusing on your tasks the following day    · Headache or nausea    · Bedwetting or waking up often during the night to urinate    · Feeling sleepy, slow, and tired during the day  Seek care immediately if:   · You have chest pain or trouble breathing  Contact your healthcare provider if:   · You feel tired or depressed  · You have trouble staying awake during the day  · You have trouble thinking clearly  · You have questions or concerns about your condition or care  Treatment for sleep apnea  includes using a continuous positive airway pressure (CPAP) machine to keep your airway open during sleep  A mask is placed over your nose and mouth, or just your nose  The mask is hooked to the CPAP machine that blows a gentle stream of air into the mask when you breathe  This helps keep your airway open so you can breathe more regularly  Extra oxygen may be given to you through the machine  You may be given a mouth device  It looks like a mouth guard or dental retainer and stops your tongue and mouth tissues from blocking your throat while you sleep  Surgery may be needed to remove extra tissues that block your mouth, throat, or nose  Manage sleep apnea:   · Do not smoke    Nicotine and other chemicals in cigarettes and cigars can cause lung damage  Ask your healthcare provider for information if you currently smoke and need help to quit  E-cigarettes or smokeless tobacco still contain nicotine  Talk to your healthcare provider before you use these products  · Do not drink alcohol or take sedative medicine before you go to sleep  Alcohol and sedatives can relax the muscles and tissues around your throat  This can block the airflow to your lungs  · Maintain a healthy weight  Excess tissue around your throat may restrict your breathing  Ask your healthcare provider for information if you need to lose weight  · Sleep on your side or use pillows designed to prevent sleep apnea  This prevents your tongue or other tissues from blocking your throat  You can also raise the head of your bed  Follow up with your healthcare provider as directed:  Write down your questions so you remember to ask them during your visits  © 2017 [address] [employer] Information is for End User's use only and may not be sold, redistributed or otherwise used for commercial purposes  All illustrations and images included in CareNotes® are the copyrighted property of A D A M [employer] Inc  or Lukas Callejas  The above information is an  only  It is not intended as medical advice for individual conditions or treatments  Talk to your doctor, nurse or pharmacist before following any medical regimen to see if it is safe and effective for you

## 2020-03-04 NOTE — PROGRESS NOTES
Assessment/Plan:     Problem List Items Addressed This Visit        Respiratory    Severe obstructive sleep apnea     Patient has a history of severe obstructive sleep apnea  She was diagnosed in April 2016 via split night study  Her pre treatment apneic hypopnea index was 47 events for per hour an increased to 97 during REM sleep  She was titrated up to 11 cm of water pressure  At the final pressure snoring and respiratory events were minimize for an AHI of 5  Her pre treatment mean oxygen was 90 5% the amount of sleep time less than equal to 89 was 24 9 minutes and post treatment mean oxygen was 93% with lowest oxygen of 87 in the amount of sleep time less than equal to 89 was 0 2 minutes  Patient has been using the same machine at a CPAP of 11 cm of water  Pressure  We are currently attempting to download her compliance data she is using her CPAP every night but has noticed gradually over time that she remains more hypersomnolence during the day  She sleeps for approximately 7 hours per night  Patient is not aware if she feels refreshed during the day but rather gradually feeling more daytime hypersomnolence  Since her initial diagnosis, Celestino Li has gained approximately 30+ lb  She does have morbid obesity and has considered weight loss surgery  We were able to download compliance with Jodi hansen who is respiratory therapist from [employer]  Compliance is reviewed from February 2, 2022 March 2, 2020  She is 100% compliant in average usage is 7 hours and 20 minutes  Her AHI is reduced to 0 6  However I do feel that changing her pressure to an auto CPAP would be beneficial   I will change her CPAP 11-16  Other    Class 3 severe obesity due to excess calories with body mass index (BMI) of 50 0 to 59 9 in [facility])     Celestino Li has elevated body mass index of 56  She is now in consultation with weight management of [address]    She has lost weight previously with weight watchers and is considering restarting this once again  Patient is otherwise stable  It is possible that her daytime fatigue is multifactorial              Other Visit Diagnoses     MARTIN (obstructive sleep apnea)    -  Primary    Relevant Orders    PAP DME Pressure Change            Return in about 1 year (around 3/4/2021)  All questions are answered to the patient's satisfaction and understanding  She verbalizes understanding  She is encouraged to call with any further questions or concerns  Portions of the record may have been created with voice recognition software  Occasional wrong word or "sound a like" substitutions may have occurred due to the inherent limitations of voice recognition software  Read the chart carefully and recognize, using context, where substitutions have occurred  Electronically Signed by CHINO Rodriguez    ______________________________________________________________________  HPI:  Zoe Kay is a 77-year-old female with a history of severe obstructive sleep apnea  She was last seen in the office in July of 2016  Patient had a split night study done in April 2016  Her apneic hypopnea index was 46 events per hour  She was titrated to 11 cm of water pressure  It was noted the since she was not observed while supine this could possibly not be optimal pressure  Patient did return to the office for compliance study  She was 100% compliant and AHI was reduced to under 2  At the time of her diagnosis she weighed 289 lb  Zoe Kay has a history of SVT, atrial fibrillation  Hyperlipidemia  Patient does follow with Cardiology  Her last visit was February 2020  Patient continues to be free of SVT and atrial fibrillation  She is on sotalol and normal QT interval was noted  Weight loss was discussed  Patient complaint is fatigue  She was seen by her primary care physician  She was referred by her primary care physician for return to pulmonology office    She is here today to re-evaluate her diagnosis and treatment of MARTIN  Chief Complaint:   Chief Complaint   Patient presents with   [address]     Dr George Castro Fatigue    Sleep Apnea     CPAP       Patient ID: Felicity Welsh is a 60 y o  y o  female has a past medical history of Abnormal liver function test, Adenomatosis, 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), Atrial fibrillation ([address] ), Atrial flutter ([facility] [address] ), Benign essential hypertension, Body mass index (BMI) of 50-59 9 in adult [facility]), Carpal tunnel syndrome (09/07/2004), Congenital pes planus, Depression, Depression, Hemangioma of skin (08/26/2003), History of gastroesophageal reflux (GERD), Hypercholesterolemia, Hyperlipidemia, Hypertension, 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[facility] [address] ) (04/19/2006), Osteoarthritis of both knees, Paroxysmal atrial fibrillation ([facility] [address] ), and S/P ablation of atrial flutter  3/4/2020  Patient presents today for follow-up visit  Fatigue   This is a chronic problem  The current episode started more than 1 year ago  The problem occurs daily  The problem has been gradually worsening  Associated symptoms include fatigue  The symptoms are aggravated by exertion  She has tried rest and sleep for the symptoms  The treatment provided mild relief  Review of Systems   Constitutional: Positive for fatigue  HENT: Negative  Eyes: Negative  Respiratory: Negative  Cardiovascular: Negative  Gastrointestinal: Negative  Endocrine: Negative  Genitourinary: Negative  Musculoskeletal: Negative  Skin: Negative  Allergic/Immunologic: Negative  Neurological: Negative  Psychiatric/Behavioral: Negative  Smoking history: She reports that she has been smoking  She has smoked for the past 9 00 years   She uses smokeless tobacco     The following portions of the patient's history were reviewed and updated as appropriate: current medications, past family history, past medical history, past social history, past surgical history and problem list     Immunization History   Administered Date(s) Administered    Influenza Quadrivalent Preservative Free 3 years and older IM 12/28/2016, 08/18/2017    Influenza TIV (IM) 10/24/2006, 09/17/2010, 09/27/2011, 10/14/2014, 11/19/2015    Influenza, recombinant, quadrivalent,injectable, preservative free 10/02/2019    Pneumococcal Polysaccharide PPV23 01/15/2015    Tdap 09/17/2010     Current Outpatient Medications   Medication Sig Dispense Refill    aspirin 81 MG tablet Take 81 mg by mouth daily   griseofulvin (VIVIAN-PEG) 250 MG tablet Take 1 tablet (250 mg total) by mouth daily 30 tablet 0    ibuprofen (MOTRIN) 800 mg tablet Take 1 tablet (800 mg total) by mouth every 8 (eight) hours as needed for mild pain 30 tablet 0    ketoconazole (NIZORAL) 2 % cream Apply topically daily 60 g 1    Magnesium Oxide -Mg Supplement 400 MG CAPS Take 1 capsule by mouth daily      metoprolol succinate (TOPROL-XL) 25 mg 24 hr tablet TAKE 1 TABLET BY MOUTH TWO  TIMES DAILY 180 tablet 0    multivitamin (THERAGRAN) TABS Take 1 tablet by mouth daily        olmesartan (BENICAR) 20 mg tablet TAKE 1 TABLET BY MOUTH  DAILY 90 tablet 1    Omega-3 Fatty Acids (FISH OIL) 1,000 mg Take 1,000 mg by mouth 2 (two) times a day        pantoprazole (PROTONIX) 40 mg tablet TAKE 1 TABLET BY MOUTH  DAILY BEFORE BREAKFAST 90 tablet 3    PARoxetine (PAXIL-CR) 37 5 mg 24 hr tablet TAKE 1 TABLET BY MOUTH  EVERY MORNING 90 tablet 3    pravastatin (PRAVACHOL) 40 mg tablet Take 40 mg by mouth daily      sotalol (BETAPACE) 120 mg tablet TAKE 1 TABLET BY MOUTH  EVERY 12 HOURS 180 tablet 0    VITAMIN D PO Take 2,000 Units by mouth 2 (two) times a day      amoxicillin (AMOXIL) 500 mg capsule TAKE 4 CAPSULES BY MOUTH 1 HOUR BEFORE DENTAL APPOINTMENT  0    atorvastatin (LIPITOR) 40 mg tablet Take 1 tablet (40 mg total) by mouth daily (Patient not taking: Reported on 3/4/2020) 90 tablet 2     No current facility-administered medications for this visit  Allergies: Other    Objective:  Vitals:    03/04/20 1543   BP: 100/62   BP Location: Left arm   Patient Position: Sitting   Cuff Size: Large   Pulse: 74   Resp: 12   Temp: 98 °F (36 7 °C)   TempSrc: Tympanic   SpO2: 98%   Weight: (!) 148 kg (327 lb)   Height: 5' 4" (1 626 m)   Oxygen Therapy  SpO2: 98 %    Wt Readings from Last 3 Encounters:   03/04/20 (!) 148 kg (327 lb)   03/04/20 (!) 147 kg (325 lb)   02/28/20 (!) 147 kg (325 lb)     Body mass index is 56 13 kg/m²  Physical Exam   Constitutional: She is oriented to person, place, and time  She appears well-developed and well-nourished  BMI 56   HENT:   Head: Normocephalic and atraumatic  Mallampati 4   Eyes: Pupils are equal, round, and reactive to light  EOM are normal    Neck: Normal range of motion  Cardiovascular: Normal rate and regular rhythm  Pulmonary/Chest: Effort normal    Abdominal: Soft  Musculoskeletal: Normal range of motion  Neurological: She is alert and oriented to person, place, and time  Skin: Skin is warm and dry  Capillary refill takes less than 2 seconds  Psychiatric: She has a normal mood and affect   Her behavior is normal        Lab Review:   Orders Only on 01/25/2020   Component Date Value    White Blood Cell Count 01/25/2020 6 2     Red Blood Cell Count 01/25/2020 5 10     Hemoglobin 01/25/2020 13 7     HCT 01/25/2020 42 5     MCV 01/25/2020 83     MCH 01/25/2020 26 9     MCHC 01/25/2020 32 2     RDW 01/25/2020 16 0*    Platelet Count 01/47/0953 134*    Neutrophils 01/25/2020 57     Lymphocytes 01/25/2020 35     Monocytes 01/25/2020 6     Eosinophils 01/25/2020 1     Basophils PCT 01/25/2020 1     Neutrophils (Absolute) 01/25/2020 3 5     Lymphocytes (Absolute) 01/25/2020 2 2     Monocytes (Absolute) 01/25/2020 0 4     Eosinophils (Absolute) 01/25/2020 0 1     Basophils ABS 01/25/2020 0 0     Immature Granulocytes 01/25/2020 0     Immature Granulocytes (A* 01/25/2020 0 0     Glucose, Random 01/25/2020 114*    BUN 01/25/2020 15     Creatinine 01/25/2020 0 73     eGFR Non African American 01/25/2020 90     eGFR  01/25/2020 104     SL AMB BUN/CREATININE RA* 01/25/2020 21     Sodium 01/25/2020 142     Potassium 01/25/2020 4 2     Chloride 01/25/2020 104     CO2 01/25/2020 21     CALCIUM 01/25/2020 9 5     Protein, Total 01/25/2020 6 6     Albumin 01/25/2020 4 3     Globulin, Total 01/25/2020 2 3     Albumin/Globulin Ratio 01/25/2020 1 9     TOTAL BILIRUBIN 01/25/2020 0 5     Alk Phos Isoenzymes 01/25/2020 94     AST 01/25/2020 62*    ALT 01/25/2020 92*    Hemoglobin A1C 01/25/2020 6 5*    TSH 01/25/2020 1 680    Orders Only on 10/14/2019   Component Date Value    Glucose, Random 10/14/2019 133*    BUN 10/14/2019 20     Creatinine 10/14/2019 0 77     eGFR Non  10/14/2019 85     eGFR  10/14/2019 98     SL AMB BUN/CREATININE RA* 10/14/2019 26*    Sodium 10/14/2019 143     Potassium 10/14/2019 4 1     Chloride 10/14/2019 105     CO2 10/14/2019 21     CALCIUM 10/14/2019 9 5     Protein, Total 10/14/2019 6 4     Albumin 10/14/2019 4 2     Globulin, Total 10/14/2019 2 2     Albumin/Globulin Ratio 10/14/2019 1 9     TOTAL BILIRUBIN 10/14/2019 0 3     Alk Phos Isoenzymes 10/14/2019 100     AST 10/14/2019 76*    ALT 10/14/2019 102*    Cholesterol, Total 10/14/2019 209*    Triglycerides 10/14/2019 213*    HDL 10/14/2019 38*    VLDL Cholesterol Calcula* 10/14/2019 43*    LDL Direct 10/14/2019 128*    Total Iron Binding [place]* 10/14/2019 430     UIBC 10/14/2019 371     Iron, Serum 10/14/2019 59     Iron Saturation 10/14/2019 14*    Hemoglobin A1C 10/14/2019 6 4*    TSH 10/14/2019 3 360     HEP C AB 10/14/2019 0 2     Ferritin 10/14/2019 45     Interpretation 10/14/2019 Note    Orders Only on 10/14/2019   Component Date Value    Hemoglobin A1C 10/14/2019 6 4     HEP C AB 10/14/2019 NEGATIVE        Past Surgical History:   Procedure Laterality Date    ABDOMINAL SURGERY  06/2006    20cm GIST removed     APPENDECTOMY      ATRIAL ABLATION SURGERY      CARPAL TUNNEL RELEASE Bilateral     COLONOSCOPY      HYSTERECTOMY      fibroids    KNEE SURGERY      KNEE SURGERY Left 03/2019    OOPHORECTOMY      cyst    TONSILLECTOMY      TUMOR EXCISION  2006    gastrointestinal stromal tumor        Family History   Problem Relation Age of Onset    Emphysema Mother     Arthritis Mother     Diabetes Mother     Hypertension Mother     COPD Mother     Arthritis Father     Prostate cancer Father     Cerebral aneurysm Son     No Known Problems Maternal Grandmother     No Known Problems Maternal Grandfather     No Known Problems Paternal Grandmother     No Known Problems Paternal Grandfather     No Known Problems Son     No Known Problems Maternal Aunt     No Known Problems Maternal Aunt     No Known Problems Paternal Aunt     No Known Problems Paternal Aunt         Diagnostics:  I have personally reviewed pertinent reports  Office Spirometry Results:     ESS:    Us Breast Right Limited (diagnostic)    Result Date: 3/4/2020  Narrative: DIAGNOSIS: Abnormal screening mammogram TECHNIQUE: Ultrasound of the right breast(s) was performed  COMPARISONS: Prior breast imaging dated: 02/27/2020, 01/07/2019, 08/24/2017, 05/02/2016, 04/30/2015, and 02/17/2014 RELEVANT HISTORY: Family Breast Cancer History: No known family history of breast cancer  Family Medical History: No known relevant family medical history  Personal History: Hormone history includes estrogen replacement therapy  Surgical history includes hysterectomy and oophorectomy  No known relevant medical history   RISK ASSESSMENT: 5 Year Tyrer-Cuzick: 0 74 % 10 Year Tyrer-Cuzick: 1 56 % Lifetime Tyrer-Cuzick: 3 95 % INDICATION: Ambika Belcher is a 60 y o  female presenting for mass right breast  FINDINGS: RIGHT 1) CYST: There is a 3 mm x 3 mm x 3 mm round simple cyst with circumscribed margins seen in the lower inner quadrant of the right breast at 5 o'clock in the middle depth, 5 cm from the nipple  The cyst correlates with the prior mammogram finding  Impression:  Benign cyst 05:00 o'clock location right breast, a correlate to the mammographic finding  ASSESSMENT/BI-RADS CATEGORY: Right: 2 - Benign Overall: 2 - Benign RECOMMENDATION:      - Return to routine screening for the right breast  Workstation ID: [id number]    Mammo Screening Bilateral W 3d & Cad    Result Date: 3/2/2020  Narrative: DIAGNOSIS: Encounter for screening mammogram for malignant neoplasm of breast TECHNIQUE: Digital screening mammography was performed  Computer Aided Detection (CAD) analyzed all applicable images  COMPARISONS: Prior breast imaging dated: 01/07/2019, 08/24/2017, 05/02/2016, 04/30/2015, and 02/17/2014 RELEVANT HISTORY: Family Breast Cancer History: No known family history of breast cancer  Family Medical History: No known relevant family medical history  Personal History: No known relevant hormone history  Surgical history includes hysterectomy and oophorectomy  No known relevant medical history  The patient is scheduled in a reminder system for screening mammography  8-10% of cancers will be missed on mammography  Management of a palpable abnormality must be based on clinical grounds  Patients will be notified of their results via letter from our facility  Accredited by [employer] of [employer] and FDA  RISK ASSESSMENT: 5 Year Tyrer-Cuzick: 0 73 % 10 Year Tyrer-Cuzick: 1 54 % Lifetime Tyrer-Cuzick: 3 88 % TISSUE DENSITY: The breasts are almost entirely fatty  INDICATION: Blas Vernon is a 60 y o  female presenting for screening mammography  FINDINGS: RIGHT 1) MASS: There is a 4 mm equal density, round mass seen in the lower inner quadrant of the right breast at 5 o'clock in the middle depth on the MLO and CC views   This is a new finding  LEFT There are no suspicious masses, grouped microcalcifications or areas of architectural distortion  The skin and nipple areolar complex are unremarkable  Impression: 1  4 mm inferior medial right breast mass, not seen on prior studies  Further evaluation with ultrasound is recommended   2  Stable left mammogram    A breast health care nurse from our facility will be contacting the patient regarding the need for additional imaging ASSESSMENT/BI-RADS CATEGORY: Left: 1 - Negative Right: 0 - Incomplete: Needs Additional Imaging Evaluation Overall: 0 - Incomplete: Needs Additional Imaging Evaluation RECOMMENDATION:      - Ultrasound at the current time for the right breast       - Routine screening mammogram in 1 year for the left breast  Workstation ID: [id number]

## 2020-03-04 NOTE — ASSESSMENT & PLAN NOTE
Patient has a history of severe obstructive sleep apnea  She was diagnosed in April 2016 via split night study  Her pre treatment apneic hypopnea index was 47 events for per hour an increased to 97 during REM sleep  She was titrated up to 11 cm of water pressure  At the final pressure snoring and respiratory events were minimize for an AHI of 5  Her pre treatment mean oxygen was 90 5% the amount of sleep time less than equal to 89 was 24 9 minutes and post treatment mean oxygen was 93% with lowest oxygen of 87 in the amount of sleep time less than equal to 89 was 0 2 minutes  Patient has been using the same machine at a CPAP of 11 cm of water  Pressure  We are currently attempting to download her compliance data she is using her CPAP every night but has noticed gradually over time that she remains more hypersomnolence during the day  She sleeps for approximately 7 hours per night  Patient is not aware if she feels refreshed during the day but rather gradually feeling more daytime hypersomnolence  Since her initial diagnosis, Cordelia has gained approximately 30+ lb  She does have morbid obesity and has considered weight loss surgery  We were able to download compliance with Patrizia hansen who is respiratory therapist from [employer]  Compliance is reviewed from February 2, 2022 March 2, 2020  She is 100% compliant in average usage is 7 hours and 20 minutes  Her AHI is reduced to 0 6  However I do feel that changing her pressure to an auto CPAP would be beneficial   I will change her CPAP 11-16

## 2020-03-06 ENCOUNTER — TELEPHONE (OUTPATIENT)
Dept: FAMILY MEDICINE CLINIC | Facility: CLINIC | Age: 60
End: 2020-03-06

## 2020-03-06 ENCOUNTER — TELEPHONE (OUTPATIENT)
Dept: CARDIOLOGY CLINIC | Facility: CLINIC | Age: 60
End: 2020-03-06

## 2020-03-06 NOTE — TELEPHONE ENCOUNTER
Left v/m for pt to contact the office - her NUC study has been denied - Dr Jarad Howard asking if you can walk on treadmill for an exercise stress test; if this is tried and failed then we can move forward for approval the nuclear study  Echo test was approved from insurance

## 2020-03-06 NOTE — TELEPHONE ENCOUNTER
----- Message from Jennifer Antonio MD sent at 3/6/2020 10:21 AM EST -----  Pl, advise pt   Silvia dior US showed 3 mm benign cyst

## 2020-03-09 ENCOUNTER — TELEPHONE (OUTPATIENT)
Dept: BARIATRICS | Facility: CLINIC | Age: 60
End: 2020-03-09

## 2020-03-09 NOTE — TELEPHONE ENCOUNTER
Yuridia Song called me today and asked that all of her appointments be canceled  She is not ready or prepared for surgery at this time and her  is not supportive of her having surgery   I explained the process for her to get reconnected and also gave her information about our mwm program

## 2020-03-11 ENCOUNTER — TELEPHONE (OUTPATIENT)
Dept: CARDIOLOGY CLINIC | Facility: CLINIC | Age: 60
End: 2020-03-11

## 2020-03-11 NOTE — TELEPHONE ENCOUNTER
Left v/m for pt to contact the office - Preeti Estrada/Clinical  reached out to pt also - pt will need to try the exercise stress test and fail in order to have the nuclear approved    Test on 3/13/20

## 2020-03-11 NOTE — TELEPHONE ENCOUNTER
S/w pt made aware of exercise stress test to be done instead of NUC  Please order the exercise stress for 3/13/2020 in [address] [place] [address]

## 2020-03-12 DIAGNOSIS — R06.00 DYSPNEA ON EXERTION: Primary | ICD-10-CM

## 2020-03-13 ENCOUNTER — TELEPHONE (OUTPATIENT)
Dept: CARDIOLOGY CLINIC | Facility: CLINIC | Age: 60
End: 2020-03-13

## 2020-03-13 ENCOUNTER — HOSPITAL ENCOUNTER (OUTPATIENT)
Dept: NON INVASIVE DIAGNOSTICS | Facility: HOSPITAL | Age: 60
Discharge: HOME | End: 2020-03-13
Attending: INTERNAL MEDICINE
Payer: COMMERCIAL

## 2020-03-13 ENCOUNTER — HOSPITAL ENCOUNTER (OUTPATIENT)
Dept: RADIOLOGY | Facility: HOSPITAL | Age: 60
End: 2020-03-13
Attending: INTERNAL MEDICINE
Payer: COMMERCIAL

## 2020-03-13 ENCOUNTER — HOSPITAL ENCOUNTER (OUTPATIENT)
Dept: NON INVASIVE DIAGNOSTICS | Facility: HOSPITAL | Age: 60
End: 2020-03-13
Attending: INTERNAL MEDICINE
Payer: COMMERCIAL

## 2020-03-13 DIAGNOSIS — R06.00 DYSPNEA ON EXERTION: ICD-10-CM

## 2020-03-13 LAB
CHEST PAIN STATEMENT: NORMAL
MAX DIASTOLIC BP: 84 MMHG
MAX HEART RATE: 144 BPM
MAX PREDICTED HEART RATE: 160 BPM
MAX. SYSTOLIC BP: 190 MMHG
PROTOCOL NAME: NORMAL
TARGET HR FORMULA: NORMAL
TEST INDICATION: NORMAL
TIME IN EXERCISE PHASE: NORMAL

## 2020-03-13 PROCEDURE — 93016 CV STRESS TEST SUPVJ ONLY: CPT | Performed by: INTERNAL MEDICINE

## 2020-03-13 PROCEDURE — C8929 TTE W OR WO FOL WCON,DOPPLER: HCPCS

## 2020-03-13 PROCEDURE — 93018 CV STRESS TEST I&R ONLY: CPT | Performed by: INTERNAL MEDICINE

## 2020-03-13 PROCEDURE — 93306 TTE W/DOPPLER COMPLETE: CPT | Performed by: INTERNAL MEDICINE

## 2020-03-13 PROCEDURE — 93017 CV STRESS TEST TRACING ONLY: CPT

## 2020-03-13 RX ADMIN — PERFLUTREN 0.6 ML/MIN: 6.52 INJECTION, SUSPENSION INTRAVENOUS at 08:57

## 2020-03-13 NOTE — TELEPHONE ENCOUNTER
----- Message from Isreal Yeager, DO sent at 3/13/2020 12:14 PM EDT -----  Can you please let the patient know echo was normal

## 2020-03-25 DIAGNOSIS — I48.91 ATRIAL FIBRILLATION, UNSPECIFIED TYPE (HCC): ICD-10-CM

## 2020-03-26 DIAGNOSIS — I48.91 ATRIAL FIBRILLATION, UNSPECIFIED TYPE (HCC): ICD-10-CM

## 2020-03-26 RX ORDER — SOTALOL HYDROCHLORIDE 120 MG/1
TABLET ORAL
Qty: 180 TABLET | Refills: 0 | Status: SHIPPED | OUTPATIENT
Start: 2020-03-26 | End: 2020-06-19

## 2020-03-27 RX ORDER — METOPROLOL SUCCINATE 25 MG/1
TABLET, EXTENDED RELEASE ORAL
Qty: 180 TABLET | Refills: 0 | Status: SHIPPED | OUTPATIENT
Start: 2020-03-27 | End: 2020-06-19

## 2020-03-30 ENCOUNTER — OFFICE VISIT (OUTPATIENT)
Dept: PODIATRY | Facility: CLINIC | Age: 60
End: 2020-03-30
Payer: COMMERCIAL

## 2020-03-30 VITALS — SYSTOLIC BLOOD PRESSURE: 110 MMHG | HEART RATE: 76 BPM | DIASTOLIC BLOOD PRESSURE: 64 MMHG | RESPIRATION RATE: 16 BRPM

## 2020-03-30 DIAGNOSIS — L03.039 PARONYCHIA OF TOENAIL, UNSPECIFIED LATERALITY: ICD-10-CM

## 2020-03-30 DIAGNOSIS — B35.1 ONYCHOMYCOSIS: ICD-10-CM

## 2020-03-30 DIAGNOSIS — M79.671 PAIN IN BOTH FEET: ICD-10-CM

## 2020-03-30 DIAGNOSIS — M21.969 ACQUIRED DEFORMITY OF FOOT, UNSPECIFIED LATERALITY: Primary | ICD-10-CM

## 2020-03-30 DIAGNOSIS — M79.672 PAIN IN BOTH FEET: ICD-10-CM

## 2020-03-30 DIAGNOSIS — L60.0 INGROWN TOENAIL: ICD-10-CM

## 2020-03-30 PROCEDURE — 99213 OFFICE O/P EST LOW 20 MIN: CPT | Performed by: PODIATRIST

## 2020-03-30 NOTE — PROGRESS NOTES
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     last assessed 4/4/13    Atrial fibrillation (HCC)      Atrial flutter ([facility] [address] )      Benign essential hypertension       last assessed 12/21/17     Body mass index (BMI) of 50-59 9 in adult [facility])      Carpal tunnel syndrome 09/07/2004     unspecified laterality  / last assessed 9/7/04    Congenital pes planus       last assessed 7/15/14     Depression      Depression      Hemangioma of skin 08/26/2003     last assessed 8/26/03    History of gastroesophageal reflux (GERD)      Hypercholesterolemia      Hyperlipidemia      Hypertension      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     last assessed 12/31/14     Osteoarthritis of both knees       last assessed 12/31/14     Paroxysmal atrial fibrillation (HCC)      S/P ablation of atrial flutter                     Past Surgical History:   Procedure Laterality Date    ABDOMINAL SURGERY   06/2006     20cm GIST removed     APPENDECTOMY        ATRIAL ABLATION SURGERY        CARPAL TUNNEL RELEASE Bilateral      COLONOSCOPY        HYSTERECTOMY        KNEE SURGERY        OOPHORECTOMY        TONSILLECTOMY        TUMOR EXCISION   2006     gastrointestinal stromal tumor                   Allergies   Allergen Reactions    Other         Adhesive tape             Current Outpatient Prescriptions:     aspirin 81 MG tablet, Take 81 mg by mouth daily  , Disp: , Rfl:     esomeprazole (NexIUM) 40 MG capsule, Take 1 capsule (40 mg total) by mouth daily, Disp: 90 capsule, Rfl: 1    ibuprofen (MOTRIN) 800 mg tablet, Take 1 tablet (800 mg total) by mouth every 8 (eight) hours as needed for mild pain, Disp: 30 tablet, Rfl: 0    Magnesium Oxide -Mg Supplement 400 MG CAPS, Take 1 capsule by mouth daily, Disp: , Rfl:     metoprolol succinate (TOPROL-XL) 25 mg 24 hr tablet, Take 1 tablet (25 mg total) by mouth 2 (two) times a day, Disp: 180 tablet, Rfl: 2    multivitamin (THERAGRAN) TABS, Take 1 tablet by mouth daily  , Disp: , Rfl:     olmesartan (BENICAR) 20 mg tablet, Take 1 tablet (20 mg total) by mouth daily, Disp: 30 tablet, Rfl: 0    Omega-3 Fatty Acids (FISH OIL) 1,000 mg, Take 1,000 mg by mouth 2 (two) times a day  , Disp: , Rfl:     PARoxetine (PAXIL-CR) 37 5 mg 24 hr tablet, Take 1 tablet (37 5 mg total) by mouth every morning, Disp: 90 tablet, Rfl: 3    pravastatin (PRAVACHOL) 40 mg tablet, Take 1 tablet (40 mg total) by mouth daily (Patient taking differently: Take 40 mg by mouth daily at bedtime  ), Disp: 90 tablet, Rfl: 1    predniSONE 20 mg tablet, Take 2 tablets (40 mg total) by mouth daily, Disp: 14 tablet, Rfl: 0    rosuvastatin (CRESTOR) 20 MG tablet, TAKE 1 TABLET DAILY, Disp: 90 tablet, Rfl: 3    sotalol (BETAPACE) 120 mg tablet, TAKE 1 TABLET BY MOUTH  EVERY 12 HOURS, Disp: 180 tablet, Rfl: 2           Patient Active Problem List   Diagnosis    Tachycardia    Dyspnea on exertion    Supraventricular tachycardia (HCC)    Hypertension    Controlled depression    Severe obstructive sleep apnea    Morbid obesity (HCC)    Impaired fasting glucose    Hyperlipidemia    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)    Esophageal reflux    Benign essential hypertension    Adenomatous colon polyp    Open wound of left lower extremity             Patient ID: Alecia Vieira is a 59 y o  female          Objective:  Patient's shoes and socks removed    Foot Exam     General  General Appearance: appears stated age and healthy   Orientation: alert and oriented to person, place, and time   Affect: appropriate   Gait: antalgic         Right Foot/Ankle      Inspection and Palpation  Tenderness: metatarsals   Swelling: dorsum and metatarsals   Arch: pes planus  Hammertoes: fifth toe  Skin Exam: dry skin;      Neurovascular  Dorsalis pedis: 1+  Posterior tibial: 2+  Superficial peroneal nerve sensation: diminished  Deep peroneal nerve sensation: diminished        Left Foot/Ankle       Inspection and Palpation  Tenderness: metatarsals   Swelling: dorsum and metatarsals   Arch: pes planus  Hammertoes: fifth toe  Skin Exam: dry skin;      Neurovascular  Dorsalis pedis: 1+  Posterior tibial: 2+  Superficial peroneal nerve sensation: diminished  Deep peroneal nerve sensation: diminished           Physical Exam   Constitutional: She appears well-developed and well-nourished  Cardiovascular: Normal rate and regular rhythm     Pulses:       Dorsalis pedis pulses are 1+ on the right side, and 1+ on the left side         Posterior tibial pulses are 2+ on the right side, and 2+ on the left side  Feet:   Right Foot:   Skin Integrity: Positive for dry skin  Left Foot:   Skin Integrity: Positive for dry skin     Skin:   Wide incurvated hallux toenail bilateral   Nails demonstrate mycosis   Both hallux is demonstrate ingrown toenail

## 2020-04-13 ENCOUNTER — TELEPHONE (OUTPATIENT)
Dept: BARIATRICS | Facility: CLINIC | Age: 60
End: 2020-04-13

## 2020-05-09 DIAGNOSIS — I10 ESSENTIAL HYPERTENSION: ICD-10-CM

## 2020-05-11 RX ORDER — OLMESARTAN MEDOXOMIL 20 MG/1
TABLET ORAL
Qty: 90 TABLET | Refills: 1 | Status: SHIPPED | OUTPATIENT
Start: 2020-05-11 | End: 2020-08-31 | Stop reason: SDUPTHER

## 2020-05-29 DIAGNOSIS — F32.A DEPRESSION, UNSPECIFIED DEPRESSION TYPE: ICD-10-CM

## 2020-06-01 ENCOUNTER — OFFICE VISIT (OUTPATIENT)
Dept: PODIATRY | Facility: CLINIC | Age: 60
End: 2020-06-01
Payer: COMMERCIAL

## 2020-06-01 VITALS
SYSTOLIC BLOOD PRESSURE: 110 MMHG | DIASTOLIC BLOOD PRESSURE: 64 MMHG | BODY MASS INDEX: 50.02 KG/M2 | HEART RATE: 76 BPM | RESPIRATION RATE: 17 BRPM | HEIGHT: 64 IN | WEIGHT: 293 LBS

## 2020-06-01 DIAGNOSIS — L60.0 INGROWN TOENAIL: ICD-10-CM

## 2020-06-01 DIAGNOSIS — M21.969 ACQUIRED DEFORMITY OF FOOT, UNSPECIFIED LATERALITY: Primary | ICD-10-CM

## 2020-06-01 DIAGNOSIS — M79.671 PAIN IN BOTH FEET: ICD-10-CM

## 2020-06-01 DIAGNOSIS — L03.039 PARONYCHIA OF TOENAIL, UNSPECIFIED LATERALITY: ICD-10-CM

## 2020-06-01 DIAGNOSIS — B35.1 ONYCHOMYCOSIS: ICD-10-CM

## 2020-06-01 DIAGNOSIS — M79.672 PAIN IN BOTH FEET: ICD-10-CM

## 2020-06-01 PROCEDURE — 99213 OFFICE O/P EST LOW 20 MIN: CPT | Performed by: PODIATRIST

## 2020-06-01 RX ORDER — PAROXETINE HYDROCHLORIDE 37.5 MG/1
TABLET, FILM COATED, EXTENDED RELEASE ORAL
Qty: 90 TABLET | Refills: 3 | Status: SHIPPED | OUTPATIENT
Start: 2020-06-01 | End: 2021-04-19

## 2020-06-02 RX ORDER — CEFADROXIL 500 MG/1
500 CAPSULE ORAL EVERY 12 HOURS SCHEDULED
Qty: 20 CAPSULE | Refills: 0 | Status: SHIPPED | OUTPATIENT
Start: 2020-06-02 | End: 2020-06-12

## 2020-06-19 DIAGNOSIS — I48.91 ATRIAL FIBRILLATION, UNSPECIFIED TYPE (HCC): ICD-10-CM

## 2020-06-19 RX ORDER — SOTALOL HYDROCHLORIDE 120 MG/1
TABLET ORAL
Qty: 180 TABLET | Refills: 0 | Status: SHIPPED | OUTPATIENT
Start: 2020-06-19 | End: 2020-07-24 | Stop reason: SDUPTHER

## 2020-06-19 RX ORDER — METOPROLOL SUCCINATE 25 MG/1
TABLET, EXTENDED RELEASE ORAL
Qty: 180 TABLET | Refills: 0 | Status: SHIPPED | OUTPATIENT
Start: 2020-06-19 | End: 2020-07-24 | Stop reason: SDUPTHER

## 2020-07-24 ENCOUNTER — TELEPHONE (OUTPATIENT)
Dept: CARDIOLOGY CLINIC | Facility: CLINIC | Age: 60
End: 2020-07-24

## 2020-07-24 DIAGNOSIS — I48.91 ATRIAL FIBRILLATION, UNSPECIFIED TYPE (HCC): ICD-10-CM

## 2020-07-24 NOTE — TELEPHONE ENCOUNTER
Pt is requesting refills for Metoprolol succ 25 mg , and Sotolol 120 mg sent to [facility]  She has an appointment on 8/31 but will run out of medication prior to her appt

## 2020-07-27 RX ORDER — METOPROLOL SUCCINATE 25 MG/1
25 TABLET, EXTENDED RELEASE ORAL 2 TIMES DAILY
Qty: 180 TABLET | Refills: 3 | Status: SHIPPED | OUTPATIENT
Start: 2020-07-27 | End: 2020-08-31 | Stop reason: SDUPTHER

## 2020-07-27 RX ORDER — SOTALOL HYDROCHLORIDE 120 MG/1
120 TABLET ORAL EVERY 12 HOURS
Qty: 180 TABLET | Refills: 3 | Status: SHIPPED | OUTPATIENT
Start: 2020-07-27 | End: 2020-08-31 | Stop reason: SDUPTHER

## 2020-08-10 ENCOUNTER — OFFICE VISIT (OUTPATIENT)
Dept: PODIATRY | Facility: CLINIC | Age: 60
End: 2020-08-10
Payer: COMMERCIAL

## 2020-08-10 VITALS
HEIGHT: 64 IN | BODY MASS INDEX: 50.02 KG/M2 | HEART RATE: 76 BPM | RESPIRATION RATE: 17 BRPM | SYSTOLIC BLOOD PRESSURE: 110 MMHG | WEIGHT: 293 LBS | DIASTOLIC BLOOD PRESSURE: 64 MMHG

## 2020-08-10 DIAGNOSIS — M79.672 PAIN IN BOTH FEET: ICD-10-CM

## 2020-08-10 DIAGNOSIS — M79.671 PAIN IN BOTH FEET: ICD-10-CM

## 2020-08-10 DIAGNOSIS — B35.1 ONYCHOMYCOSIS: ICD-10-CM

## 2020-08-10 DIAGNOSIS — M21.969 ACQUIRED DEFORMITY OF FOOT, UNSPECIFIED LATERALITY: Primary | ICD-10-CM

## 2020-08-10 DIAGNOSIS — L60.0 INGROWN TOENAIL: ICD-10-CM

## 2020-08-10 PROCEDURE — 99213 OFFICE O/P EST LOW 20 MIN: CPT | Performed by: PODIATRIST

## 2020-08-10 NOTE — PROGRESS NOTES
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     last assessed 4/4/13    Atrial fibrillation (HCC)      Atrial flutter ([facility] [address] )      Benign essential hypertension       last assessed 12/21/17     Body mass index (BMI) of 50-59 9 in adult [facility])      Carpal tunnel syndrome 09/07/2004     unspecified laterality  / last assessed 9/7/04    Congenital pes planus       last assessed 7/15/14     Depression      Depression      Hemangioma of skin 08/26/2003     last assessed 8/26/03    History of gastroesophageal reflux (GERD)      Hypercholesterolemia      Hyperlipidemia      Hypertension      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2006     last assessed 12/31/14     Osteoarthritis of both knees       last assessed 12/31/14     Paroxysmal atrial fibrillation (HCC)      S/P ablation of atrial flutter                     Past Surgical History:   Procedure Laterality Date    ABDOMINAL SURGERY   06/2006     20cm GIST removed     APPENDECTOMY        ATRIAL ABLATION SURGERY        CARPAL TUNNEL RELEASE Bilateral      COLONOSCOPY        HYSTERECTOMY        KNEE SURGERY        OOPHORECTOMY        TONSILLECTOMY        TUMOR EXCISION   2006     gastrointestinal stromal tumor                   Allergies   Allergen Reactions    Other         Adhesive tape             Current Outpatient Prescriptions:     aspirin 81 MG tablet, Take 81 mg by mouth daily  , Disp: , Rfl:     esomeprazole (NexIUM) 40 MG capsule, Take 1 capsule (40 mg total) by mouth daily, Disp: 90 capsule, Rfl: 1    ibuprofen (MOTRIN) 800 mg tablet, Take 1 tablet (800 mg total) by mouth every 8 (eight) hours as needed for mild pain, Disp: 30 tablet, Rfl: 0    Magnesium Oxide -Mg Supplement 400 MG CAPS, Take 1 capsule by mouth daily, Disp: , Rfl:     metoprolol succinate (TOPROL-XL) 25 mg 24 hr tablet, Take 1 tablet (25 mg total) by mouth 2 (two) times a day, Disp: 180 tablet, Rfl: 2    multivitamin (THERAGRAN) TABS, Take 1 tablet by mouth daily  , Disp: , Rfl:     olmesartan (BENICAR) 20 mg tablet, Take 1 tablet (20 mg total) by mouth daily, Disp: 30 tablet, Rfl: 0    Omega-3 Fatty Acids (FISH OIL) 1,000 mg, Take 1,000 mg by mouth 2 (two) times a day  , Disp: , Rfl:     PARoxetine (PAXIL-CR) 37 5 mg 24 hr tablet, Take 1 tablet (37 5 mg total) by mouth every morning, Disp: 90 tablet, Rfl: 3    pravastatin (PRAVACHOL) 40 mg tablet, Take 1 tablet (40 mg total) by mouth daily (Patient taking differently: Take 40 mg by mouth daily at bedtime  ), Disp: 90 tablet, Rfl: 1    predniSONE 20 mg tablet, Take 2 tablets (40 mg total) by mouth daily, Disp: 14 tablet, Rfl: 0    rosuvastatin (CRESTOR) 20 MG tablet, TAKE 1 TABLET DAILY, Disp: 90 tablet, Rfl: 3    sotalol (BETAPACE) 120 mg tablet, TAKE 1 TABLET BY MOUTH  EVERY 12 HOURS, Disp: 180 tablet, Rfl: 2           Patient Active Problem List   Diagnosis    Tachycardia    Dyspnea on exertion    Supraventricular tachycardia (HCC)    Hypertension    Controlled depression    Severe obstructive sleep apnea    Morbid obesity (HCC)    Impaired fasting glucose    Hyperlipidemia    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)    Esophageal reflux    Benign essential hypertension    Adenomatous colon polyp    Open wound of left lower extremity             Patient ID: Alecia Vieira is a 59 y o  female          Objective:  Patient's shoes and socks removed    Foot Exam     General  General Appearance: appears stated age and healthy   Orientation: alert and oriented to person, place, and time   Affect: appropriate   Gait: antalgic         Right Foot/Ankle      Inspection and Palpation  Tenderness: metatarsals   Swelling: dorsum and metatarsals   Arch: pes planus  Hammertoes: fifth toe  Skin Exam: dry skin;      Neurovascular  Dorsalis pedis: 1+  Posterior tibial: 2+  Superficial peroneal nerve sensation: diminished  Deep peroneal nerve sensation: diminished        Left Foot/Ankle       Inspection and Palpation  Tenderness: metatarsals   Swelling: dorsum and metatarsals   Arch: pes planus  Hammertoes: fifth toe  Skin Exam: dry skin;      Neurovascular  Dorsalis pedis: 1+  Posterior tibial: 2+  Superficial peroneal nerve sensation: diminished  Deep peroneal nerve sensation: diminished           Physical Exam   Constitutional: She appears well-developed and well-nourished  Cardiovascular: Normal rate and regular rhythm     Pulses:       Dorsalis pedis pulses are 1+ on the right side, and 1+ on the left side         Posterior tibial pulses are 2+ on the right side, and 2+ on the left side  Feet:   Right Foot:   Skin Integrity: Positive for dry skin  Left Foot:   Skin Integrity: Positive for dry skin     Skin:   Wide incurvated hallux toenail bilateral   Nails demonstrate mycosis   Both hallux is demonstrate ingrown toenail

## 2020-08-31 ENCOUNTER — OFFICE VISIT (OUTPATIENT)
Dept: CARDIOLOGY CLINIC | Facility: CLINIC | Age: 60
End: 2020-08-31
Payer: COMMERCIAL

## 2020-08-31 VITALS
WEIGHT: 293 LBS | OXYGEN SATURATION: 96 % | TEMPERATURE: 98 F | SYSTOLIC BLOOD PRESSURE: 120 MMHG | HEIGHT: 64 IN | BODY MASS INDEX: 50.02 KG/M2 | DIASTOLIC BLOOD PRESSURE: 80 MMHG | HEART RATE: 66 BPM

## 2020-08-31 DIAGNOSIS — E78.5 HYPERLIPIDEMIA, UNSPECIFIED HYPERLIPIDEMIA TYPE: ICD-10-CM

## 2020-08-31 DIAGNOSIS — I10 ESSENTIAL HYPERTENSION: Primary | Chronic | ICD-10-CM

## 2020-08-31 DIAGNOSIS — I48.0 PAROXYSMAL ATRIAL FIBRILLATION (HCC): ICD-10-CM

## 2020-08-31 DIAGNOSIS — E66.01 MORBID OBESITY (HCC): ICD-10-CM

## 2020-08-31 PROCEDURE — 99213 OFFICE O/P EST LOW 20 MIN: CPT | Performed by: INTERNAL MEDICINE

## 2020-08-31 PROCEDURE — 4004F PT TOBACCO SCREEN RCVD TLK: CPT | Performed by: INTERNAL MEDICINE

## 2020-08-31 PROCEDURE — 3079F DIAST BP 80-89 MM HG: CPT | Performed by: INTERNAL MEDICINE

## 2020-08-31 PROCEDURE — 3074F SYST BP LT 130 MM HG: CPT | Performed by: INTERNAL MEDICINE

## 2020-08-31 PROCEDURE — 3008F BODY MASS INDEX DOCD: CPT | Performed by: INTERNAL MEDICINE

## 2020-08-31 PROCEDURE — 93000 ELECTROCARDIOGRAM COMPLETE: CPT | Performed by: INTERNAL MEDICINE

## 2020-08-31 RX ORDER — METOPROLOL SUCCINATE 25 MG/1
25 TABLET, EXTENDED RELEASE ORAL 2 TIMES DAILY
Qty: 180 TABLET | Refills: 3 | Status: SHIPPED | OUTPATIENT
Start: 2020-08-31 | End: 2021-12-15 | Stop reason: SDUPTHER

## 2020-08-31 RX ORDER — OLMESARTAN MEDOXOMIL 20 MG/1
20 TABLET ORAL DAILY
Qty: 90 TABLET | Refills: 2 | Status: SHIPPED | OUTPATIENT
Start: 2020-08-31 | End: 2021-04-02

## 2020-08-31 RX ORDER — SOTALOL HYDROCHLORIDE 120 MG/1
120 TABLET ORAL EVERY 12 HOURS
Qty: 180 TABLET | Refills: 3 | Status: SHIPPED | OUTPATIENT
Start: 2020-08-31 | End: 2021-11-09 | Stop reason: SDUPTHER

## 2020-08-31 NOTE — PROGRESS NOTES
Cardiology Follow Up    Georgie Mccord  1960  [id number]      Interval History: Georgie Mccord is here for follow up of SVT/Atrial fibrillation  She was last seen in February 2020  During that visit, she felt fatigued and had exertional dyspnea  Stress test and echocardiogram were ordered  Echocardiogram was normal   Stress test was normal but she had poor exercise capacity  Since then, she has felt dyspnea has improved  She has intermittent palpitations but no other complaints at this time  The patient was seen at [address] [place] [address] initially because of tachycardia  On 3/23/16, she underwent EPS/SVT ablation with ablation of atrial tachycardia but was complicated by atrial fibrillation which responded to sotalol and cardioversion  She remains in sinus rhythm since then  She is not on anticoagulation because of low chads score  The following portions of the patient's history were reviewed and updated as appropriate: allergies, current medications, past family history, past medical history, past social history, past surgical history and problem list     Current Outpatient Medications on File Prior to Visit   Medication Sig Dispense Refill    aspirin 81 MG tablet Take 81 mg by mouth daily   atorvastatin (LIPITOR) 40 mg tablet Take 1 tablet (40 mg total) by mouth daily 90 tablet 2    ibuprofen (MOTRIN) 800 mg tablet Take 1 tablet (800 mg total) by mouth every 8 (eight) hours as needed for mild pain 30 tablet 0    Magnesium Oxide -Mg Supplement 400 MG CAPS Take 1 capsule by mouth daily      multivitamin (THERAGRAN) TABS Take 1 tablet by mouth daily        Omega-3 Fatty Acids (FISH OIL) 1,000 mg Take 1,000 mg by mouth 2 (two) times a day        pantoprazole (PROTONIX) 40 mg tablet TAKE 1 TABLET BY MOUTH  DAILY BEFORE BREAKFAST 90 tablet 3    PARoxetine (PAXIL-CR) 37 5 mg 24 hr tablet TAKE 1 TABLET BY MOUTH  EVERY MORNING 90 tablet 3    VITAMIN D PO Take 2,000 Units by mouth 2 (two) times a day      [DISCONTINUED] metoprolol succinate (TOPROL-XL) 25 mg 24 hr tablet Take 1 tablet (25 mg total) by mouth 2 (two) times a day 180 tablet 3    [DISCONTINUED] olmesartan (BENICAR) 20 mg tablet TAKE 1 TABLET BY MOUTH  DAILY 90 tablet 1    [DISCONTINUED] sotalol (BETAPACE) 120 mg tablet Take 1 tablet (120 mg total) by mouth every 12 (twelve) hours 180 tablet 3    amoxicillin (AMOXIL) 500 mg capsule TAKE 4 CAPSULES BY MOUTH 1 HOUR BEFORE DENTAL APPOINTMENT  0    ketoconazole (NIZORAL) 2 % cream Apply topically daily 60 g 1    [DISCONTINUED] pravastatin (PRAVACHOL) 40 mg tablet Take 40 mg by mouth daily       No current facility-administered medications on file prior to visit  Review of Systems:  Review of Systems   Constitutional: Positive for fatigue  Negative for chills and fever  HENT: Negative for congestion, nosebleeds and postnasal drip  Respiratory: Negative for cough, chest tightness and shortness of breath  Cardiovascular: Positive for palpitations  Negative for chest pain and leg swelling  Gastrointestinal: Negative for abdominal distention, abdominal pain, diarrhea, nausea and vomiting  Endocrine: Negative for polydipsia, polyphagia and polyuria  Musculoskeletal: Positive for arthralgias and myalgias  Negative for gait problem  Skin: Negative for color change, pallor and rash  Allergic/Immunologic: Negative for environmental allergies, food allergies and immunocompromised state  Neurological: Negative for dizziness, seizures, syncope and light-headedness  Hematological: Negative for adenopathy  Does not bruise/bleed easily  Psychiatric/Behavioral: Negative for dysphoric mood  The patient is not nervous/anxious          Physical Exam:  /80 (BP Location: Right arm, Patient Position: Sitting, Cuff Size: Large)   Pulse 66   Temp 98 °F (36 7 °C) (Temporal)   Ht 5' 4" (1 626 m)   Wt (!) 147 kg (325 lb)   SpO2 96% Comment: RA  BMI 55 79 kg/m²     Physical Exam  Constitutional:       General: She is not in acute distress  Appearance: She is well-developed  She is not diaphoretic  HENT:      Head: Normocephalic and atraumatic  Eyes:      Conjunctiva/sclera: Conjunctivae normal       Pupils: Pupils are equal, round, and reactive to light  Neck:      Musculoskeletal: Neck supple  Thyroid: No thyromegaly  Vascular: No JVD  Cardiovascular:      Rate and Rhythm: Normal rate and regular rhythm  Heart sounds: Normal heart sounds  No murmur  No friction rub  No gallop  Pulmonary:      Effort: Pulmonary effort is normal       Breath sounds: Normal breath sounds  Skin:     General: Skin is warm and dry  Findings: No erythema or rash  Neurological:      Mental Status: She is alert and oriented to person, place, and time  Cranial Nerves: No cranial nerve deficit  Psychiatric:         Behavior: Behavior normal          Thought Content:  Thought content normal          Judgment: Judgment normal          Cardiographics  ECG: normal sinus rhythm, no blocks or conduction defects, no ischemic changes    Labs:  Lab Results   Component Value Date     04/12/2017    K 4 2 01/25/2020     01/25/2020    CO2 21 01/25/2020    BUN 15 01/25/2020    CREATININE 0 73 01/25/2020    CREATININE 0 79 03/21/2018    CREATININE 0 78 04/12/2017    GLUCOSE 92 04/12/2017    CALCIUM 9 6 03/21/2018    CALCIUM 9 5 04/12/2017     Lab Results   Component Value Date    WBC 6 2 01/25/2020    WBC 7 30 03/21/2018    WBC 7 5 01/13/2017    HGB 13 7 01/25/2020    HGB 15 6 03/21/2018    HGB 13 6 01/13/2017    HCT 42 5 01/25/2020    HCT 48 4 (H) 03/21/2018    HCT 41 5 01/13/2017    MCV 83 01/25/2020    MCV 82 03/21/2018    MCV 83 01/13/2017     (L) 01/25/2020     03/21/2018     01/13/2017     Lab Results   Component Value Date    CHOL 153 04/12/2017    TRIG 213 (H) 10/14/2019    HDL 38 (L) 10/14/2019 Imaging: Us Abdomen Limited    Result Date: 10/16/2019  Narrative: RIGHT UPPER QUADRANT ULTRASOUND INDICATION:    R74 8: Abnormal levels of other serum enzymes  COMPARISON:  CT abdomen pelvis 12/17/2014 TECHNIQUE:   Real-time ultrasound of the right upper quadrant was performed with a curvilinear transducer with both volumetric sweeps and still imaging techniques  FINDINGS: PANCREAS:  Visualized portions of the pancreas are within normal limits  AORTA AND IVC:  Visualized portions are normal for patient age  LIVER: Size:  Mildly enlarged  The liver measures 18 5 cm in the midclavicular line  Contour:  Surface contour is smooth  Parenchyma: There is moderate diffuse increased echogenicity with smooth echotexture and acoustic beam attenuation  Most consistent with moderate hepatic steatosis  No evidence of suspicious mass  Limited imaging of the main portal vein shows it to be patent and hepatopetal   BILIARY: The gallbladder is normal in caliber  No wall thickening or pericholecystic fluid  No stones or sludge identified  No sonographic Hardin's sign  No intrahepatic biliary dilatation  CBD measures 2 mm  No choledocholithiasis  KIDNEY: Right kidney measures 12 6 x 5 7 cm  Within normal limits  Non-shadowing echogenic foci in the lower pole likely sinus fat  ASCITES:   None  Impression: 1  Prominent, fatty liver  2   No cholelithiasis or biliary ductal obstruction  Workstation performed: [id number]       Discussion/Summary:  1  Essential hypertension    2  Hyperlipidemia, unspecified hyperlipidemia type    3  Paroxysmal atrial fibrillation (HCC)    4  Morbid obesity ([address] )      - she continues to be free of SVT and atrial fibrillation  Tolerating sotalol  QT interval is normal today  Renal function normal along will electrolytes  - Discussed weight loss  She was previously following at bariatric center  Has not lost any significant weight  Discussed diet in detail    Encouraged to increase exercise as well  She will attempt [place] more [place]      - blood pressure controlled with olmesartan and metoprolol   - continue atorvastatin

## 2020-09-01 ENCOUNTER — TELEPHONE (OUTPATIENT)
Dept: HEMATOLOGY ONCOLOGY | Facility: MEDICAL CENTER | Age: 60
End: 2020-09-01

## 2020-09-01 NOTE — TELEPHONE ENCOUNTER
Patient called in to change the time of her appointment to a later time as she has an eye appointment  10/08/2020

## 2020-09-22 LAB
ALBUMIN SERPL-MCNC: 4.2 G/DL (ref 3.8–4.9)
ALBUMIN/GLOB SERPL: 1.8 {RATIO} (ref 1.2–2.2)
ALP SERPL-CCNC: 116 IU/L (ref 39–117)
ALT SERPL-CCNC: 87 IU/L (ref 0–32)
AST SERPL-CCNC: 70 IU/L (ref 0–40)
BASOPHILS # BLD AUTO: 0.1 X10E3/UL (ref 0–0.2)
BASOPHILS NFR BLD AUTO: 1 %
BILIRUB SERPL-MCNC: 0.5 MG/DL (ref 0–1.2)
BUN SERPL-MCNC: 13 MG/DL (ref 8–27)
BUN/CREAT SERPL: 16 (ref 12–28)
CALCIUM SERPL-MCNC: 9.6 MG/DL (ref 8.7–10.3)
CHLORIDE SERPL-SCNC: 104 MMOL/L (ref 96–106)
CHOLEST SERPL-MCNC: 154 MG/DL (ref 100–199)
CO2 SERPL-SCNC: 22 MMOL/L (ref 20–29)
CREAT SERPL-MCNC: 0.81 MG/DL (ref 0.57–1)
EOSINOPHIL # BLD AUTO: 0.1 X10E3/UL (ref 0–0.4)
EOSINOPHIL NFR BLD AUTO: 1 %
ERYTHROCYTE [DISTWIDTH] IN BLOOD BY AUTOMATED COUNT: 14.2 % (ref 11.7–15.4)
GLOBULIN SER-MCNC: 2.3 G/DL (ref 1.5–4.5)
GLUCOSE SERPL-MCNC: 139 MG/DL (ref 65–99)
HCT VFR BLD AUTO: 43.1 % (ref 34–46.6)
HDLC SERPL-MCNC: 39 MG/DL
HGB BLD-MCNC: 13.8 G/DL (ref 11.1–15.9)
IMM GRANULOCYTES # BLD: 0 X10E3/UL (ref 0–0.1)
IMM GRANULOCYTES NFR BLD: 0 %
LDLC SERPL CALC-MCNC: 90 MG/DL (ref 0–99)
LYMPHOCYTES # BLD AUTO: 2.1 X10E3/UL (ref 0.7–3.1)
LYMPHOCYTES NFR BLD AUTO: 32 %
MCH RBC QN AUTO: 26.5 PG (ref 26.6–33)
MCHC RBC AUTO-ENTMCNC: 32 G/DL (ref 31.5–35.7)
MCV RBC AUTO: 83 FL (ref 79–97)
MONOCYTES # BLD AUTO: 0.4 X10E3/UL (ref 0.1–0.9)
MONOCYTES NFR BLD AUTO: 6 %
NEUTROPHILS # BLD AUTO: 3.8 X10E3/UL (ref 1.4–7)
NEUTROPHILS NFR BLD AUTO: 60 %
PLATELET # BLD AUTO: 151 X10E3/UL (ref 150–450)
POTASSIUM SERPL-SCNC: 4 MMOL/L (ref 3.5–5.2)
PROT SERPL-MCNC: 6.5 G/DL (ref 6–8.5)
RBC # BLD AUTO: 5.2 X10E6/UL (ref 3.77–5.28)
SL AMB EGFR AFRICAN AMERICAN: 91 ML/MIN/1.73
SL AMB EGFR NON AFRICAN AMERICAN: 79 ML/MIN/1.73
SL AMB VLDL CHOLESTEROL CALC: 25 MG/DL (ref 5–40)
SODIUM SERPL-SCNC: 142 MMOL/L (ref 134–144)
TRIGL SERPL-MCNC: 138 MG/DL (ref 0–149)
WBC # BLD AUTO: 6.4 X10E3/UL (ref 3.4–10.8)

## 2020-09-24 ENCOUNTER — TELEPHONE (OUTPATIENT)
Dept: CARDIOLOGY CLINIC | Facility: CLINIC | Age: 60
End: 2020-09-24

## 2020-09-24 NOTE — TELEPHONE ENCOUNTER
----- Message from Yessi Bull DO sent at 9/24/2020 10:48 AM EDT -----  Can you please let the patient know  her cholesterol levels have significantly improved  Her LDL has went from 128 to 90  Has she made any significant changes since her last visit?

## 2020-10-08 ENCOUNTER — TELEPHONE (OUTPATIENT)
Dept: HEMATOLOGY ONCOLOGY | Facility: MEDICAL CENTER | Age: 60
End: 2020-10-08

## 2020-10-12 ENCOUNTER — OFFICE VISIT (OUTPATIENT)
Dept: PODIATRY | Facility: CLINIC | Age: 60
End: 2020-10-12
Payer: COMMERCIAL

## 2020-10-12 VITALS
DIASTOLIC BLOOD PRESSURE: 65 MMHG | BODY MASS INDEX: 50.02 KG/M2 | SYSTOLIC BLOOD PRESSURE: 127 MMHG | RESPIRATION RATE: 16 BRPM | HEIGHT: 64 IN | WEIGHT: 293 LBS

## 2020-10-12 DIAGNOSIS — M21.969 ACQUIRED DEFORMITY OF FOOT, UNSPECIFIED LATERALITY: Primary | ICD-10-CM

## 2020-10-12 DIAGNOSIS — M79.672 PAIN IN BOTH FEET: ICD-10-CM

## 2020-10-12 DIAGNOSIS — L60.0 INGROWN TOENAIL: ICD-10-CM

## 2020-10-12 DIAGNOSIS — L03.039 PARONYCHIA OF TOENAIL, UNSPECIFIED LATERALITY: ICD-10-CM

## 2020-10-12 DIAGNOSIS — M79.671 PAIN IN BOTH FEET: ICD-10-CM

## 2020-10-12 DIAGNOSIS — B35.1 ONYCHOMYCOSIS: ICD-10-CM

## 2020-10-12 PROCEDURE — 99213 OFFICE O/P EST LOW 20 MIN: CPT | Performed by: PODIATRIST

## 2020-10-14 ENCOUNTER — TELEMEDICINE (OUTPATIENT)
Dept: FAMILY MEDICINE CLINIC | Facility: CLINIC | Age: 60
End: 2020-10-14
Payer: COMMERCIAL

## 2020-10-14 DIAGNOSIS — Z20.822 EXPOSURE TO COVID-19 VIRUS: Primary | ICD-10-CM

## 2020-10-14 PROCEDURE — 4004F PT TOBACCO SCREEN RCVD TLK: CPT | Performed by: FAMILY MEDICINE

## 2020-10-14 PROCEDURE — 99213 OFFICE O/P EST LOW 20 MIN: CPT | Performed by: FAMILY MEDICINE

## 2020-10-29 LAB
LEFT EYE DIABETIC RETINOPATHY: NORMAL
RIGHT EYE DIABETIC RETINOPATHY: NORMAL

## 2020-11-03 ENCOUNTER — OFFICE VISIT (OUTPATIENT)
Dept: HEMATOLOGY ONCOLOGY | Facility: MEDICAL CENTER | Age: 60
End: 2020-11-03
Payer: COMMERCIAL

## 2020-11-03 VITALS
DIASTOLIC BLOOD PRESSURE: 98 MMHG | SYSTOLIC BLOOD PRESSURE: 140 MMHG | WEIGHT: 293 LBS | OXYGEN SATURATION: 97 % | HEIGHT: 64 IN | HEART RATE: 71 BPM | TEMPERATURE: 96.8 F | BODY MASS INDEX: 50.02 KG/M2 | RESPIRATION RATE: 18 BRPM

## 2020-11-03 DIAGNOSIS — C49.A0: Primary | ICD-10-CM

## 2020-11-03 DIAGNOSIS — Z12.31 SCREENING MAMMOGRAM, ENCOUNTER FOR: ICD-10-CM

## 2020-11-03 PROCEDURE — 4004F PT TOBACCO SCREEN RCVD TLK: CPT | Performed by: PHYSICIAN ASSISTANT

## 2020-11-03 PROCEDURE — 99215 OFFICE O/P EST HI 40 MIN: CPT | Performed by: PHYSICIAN ASSISTANT

## 2020-11-17 DIAGNOSIS — K21.9 GASTROESOPHAGEAL REFLUX DISEASE WITHOUT ESOPHAGITIS: ICD-10-CM

## 2020-11-18 RX ORDER — PANTOPRAZOLE SODIUM 40 MG/1
TABLET, DELAYED RELEASE ORAL
Qty: 90 TABLET | Refills: 3 | Status: SHIPPED | OUTPATIENT
Start: 2020-11-18 | End: 2021-11-16

## 2020-11-25 ENCOUNTER — OFFICE VISIT (OUTPATIENT)
Dept: FAMILY MEDICINE CLINIC | Facility: CLINIC | Age: 60
End: 2020-11-25
Payer: COMMERCIAL

## 2020-11-25 ENCOUNTER — TELEPHONE (OUTPATIENT)
Dept: FAMILY MEDICINE CLINIC | Facility: CLINIC | Age: 60
End: 2020-11-25

## 2020-11-25 VITALS
TEMPERATURE: 98.4 F | WEIGHT: 293 LBS | BODY MASS INDEX: 50.02 KG/M2 | DIASTOLIC BLOOD PRESSURE: 84 MMHG | HEIGHT: 64 IN | HEART RATE: 64 BPM | RESPIRATION RATE: 18 BRPM | SYSTOLIC BLOOD PRESSURE: 126 MMHG

## 2020-11-25 DIAGNOSIS — R61 EXCESSIVE SWEATING: ICD-10-CM

## 2020-11-25 DIAGNOSIS — E11.9 TYPE 2 DIABETES MELLITUS WITHOUT COMPLICATION, WITHOUT LONG-TERM CURRENT USE OF INSULIN (HCC): Primary | ICD-10-CM

## 2020-11-25 DIAGNOSIS — E11.9 TYPE 2 DIABETES MELLITUS WITHOUT COMPLICATION, WITHOUT LONG-TERM CURRENT USE OF INSULIN (HCC): ICD-10-CM

## 2020-11-25 DIAGNOSIS — K11.20 SIALADENITIS: Primary | ICD-10-CM

## 2020-11-25 LAB
ALBUMIN SERPL-MCNC: 4.4 G/DL (ref 3.8–4.9)
ALBUMIN/CREAT UR: <4 MG/G CREAT (ref 0–29)
ALBUMIN/GLOB SERPL: 1.8 {RATIO} (ref 1.2–2.2)
ALP SERPL-CCNC: 124 IU/L (ref 39–117)
ALT SERPL-CCNC: 91 IU/L (ref 0–32)
AST SERPL-CCNC: 61 IU/L (ref 0–40)
BILIRUB SERPL-MCNC: 0.5 MG/DL (ref 0–1.2)
BUN SERPL-MCNC: 17 MG/DL (ref 8–27)
BUN/CREAT SERPL: 26 (ref 12–28)
CALCIUM SERPL-MCNC: 9.7 MG/DL (ref 8.7–10.3)
CHLORIDE SERPL-SCNC: 103 MMOL/L (ref 96–106)
CHOLEST SERPL-MCNC: 148 MG/DL (ref 100–199)
CO2 SERPL-SCNC: 24 MMOL/L (ref 20–29)
CREAT SERPL-MCNC: 0.65 MG/DL (ref 0.57–1)
CREAT UR-MCNC: 81.3 MG/DL
GLOBULIN SER-MCNC: 2.5 G/DL (ref 1.5–4.5)
GLUCOSE SERPL-MCNC: 113 MG/DL (ref 65–99)
HDLC SERPL-MCNC: 40 MG/DL
LDLC SERPL CALC-MCNC: 86 MG/DL (ref 0–99)
MICROALBUMIN UR-MCNC: <3 UG/ML
MICRODELETION SYND BLD/T FISH: NORMAL
POTASSIUM SERPL-SCNC: 3.9 MMOL/L (ref 3.5–5.2)
PROT SERPL-MCNC: 6.9 G/DL (ref 6–8.5)
SL AMB EGFR AFRICAN AMERICAN: 112 ML/MIN/1.73
SL AMB EGFR NON AFRICAN AMERICAN: 97 ML/MIN/1.73
SL AMB POCT HEMOGLOBIN AIC: 6.8 (ref ?–6.5)
SL AMB VLDL CHOLESTEROL CALC: 22 MG/DL (ref 5–40)
SODIUM SERPL-SCNC: 141 MMOL/L (ref 134–144)
TRIGL SERPL-MCNC: 122 MG/DL (ref 0–149)
TSH SERPL DL<=0.005 MIU/L-ACNC: 1.49 UIU/ML (ref 0.45–4.5)

## 2020-11-25 PROCEDURE — 3074F SYST BP LT 130 MM HG: CPT | Performed by: FAMILY MEDICINE

## 2020-11-25 PROCEDURE — 3725F SCREEN DEPRESSION PERFORMED: CPT | Performed by: FAMILY MEDICINE

## 2020-11-25 PROCEDURE — 3008F BODY MASS INDEX DOCD: CPT | Performed by: FAMILY MEDICINE

## 2020-11-25 PROCEDURE — 3079F DIAST BP 80-89 MM HG: CPT | Performed by: FAMILY MEDICINE

## 2020-11-25 PROCEDURE — 99214 OFFICE O/P EST MOD 30 MIN: CPT | Performed by: FAMILY MEDICINE

## 2020-11-25 PROCEDURE — 4004F PT TOBACCO SCREEN RCVD TLK: CPT | Performed by: FAMILY MEDICINE

## 2020-11-25 PROCEDURE — 83036 HEMOGLOBIN GLYCOSYLATED A1C: CPT | Performed by: FAMILY MEDICINE

## 2020-11-25 PROCEDURE — 3044F HG A1C LEVEL LT 7.0%: CPT | Performed by: FAMILY MEDICINE

## 2020-11-25 RX ORDER — CEPHALEXIN 500 MG/1
500 CAPSULE ORAL EVERY 8 HOURS SCHEDULED
Qty: 21 CAPSULE | Refills: 0 | Status: SHIPPED | OUTPATIENT
Start: 2020-11-25 | End: 2020-12-02

## 2020-12-13 DIAGNOSIS — E78.5 HYPERLIPIDEMIA, UNSPECIFIED HYPERLIPIDEMIA TYPE: ICD-10-CM

## 2020-12-14 ENCOUNTER — OFFICE VISIT (OUTPATIENT)
Dept: PODIATRY | Facility: CLINIC | Age: 60
End: 2020-12-14
Payer: COMMERCIAL

## 2020-12-14 VITALS
WEIGHT: 293 LBS | HEART RATE: 70 BPM | HEIGHT: 64 IN | BODY MASS INDEX: 50.02 KG/M2 | DIASTOLIC BLOOD PRESSURE: 97 MMHG | SYSTOLIC BLOOD PRESSURE: 151 MMHG | RESPIRATION RATE: 17 BRPM

## 2020-12-14 DIAGNOSIS — B35.1 ONYCHOMYCOSIS: ICD-10-CM

## 2020-12-14 DIAGNOSIS — M79.672 PAIN IN BOTH FEET: ICD-10-CM

## 2020-12-14 DIAGNOSIS — L03.039 PARONYCHIA OF TOENAIL, UNSPECIFIED LATERALITY: ICD-10-CM

## 2020-12-14 DIAGNOSIS — M21.969 ACQUIRED DEFORMITY OF FOOT, UNSPECIFIED LATERALITY: Primary | ICD-10-CM

## 2020-12-14 DIAGNOSIS — M79.671 PAIN IN BOTH FEET: ICD-10-CM

## 2020-12-14 PROCEDURE — 99213 OFFICE O/P EST LOW 20 MIN: CPT | Performed by: PODIATRIST

## 2020-12-14 RX ORDER — ATORVASTATIN CALCIUM 40 MG/1
TABLET, FILM COATED ORAL
Qty: 90 TABLET | Refills: 3 | Status: SHIPPED | OUTPATIENT
Start: 2020-12-14 | End: 2021-11-01

## 2021-02-08 ENCOUNTER — OFFICE VISIT (OUTPATIENT)
Dept: PODIATRY | Facility: CLINIC | Age: 61
End: 2021-02-08
Payer: COMMERCIAL

## 2021-02-08 VITALS — WEIGHT: 293 LBS | BODY MASS INDEX: 50.02 KG/M2 | HEIGHT: 64 IN | RESPIRATION RATE: 17 BRPM

## 2021-02-08 DIAGNOSIS — M79.671 PAIN IN BOTH FEET: ICD-10-CM

## 2021-02-08 DIAGNOSIS — M21.969 ACQUIRED DEFORMITY OF FOOT, UNSPECIFIED LATERALITY: Primary | ICD-10-CM

## 2021-02-08 DIAGNOSIS — B35.1 ONYCHOMYCOSIS: ICD-10-CM

## 2021-02-08 DIAGNOSIS — M79.672 PAIN IN BOTH FEET: ICD-10-CM

## 2021-02-08 DIAGNOSIS — L60.0 INGROWN TOENAIL: ICD-10-CM

## 2021-02-08 DIAGNOSIS — L03.039 PARONYCHIA OF TOENAIL, UNSPECIFIED LATERALITY: ICD-10-CM

## 2021-02-08 PROCEDURE — 99213 OFFICE O/P EST LOW 20 MIN: CPT | Performed by: PODIATRIST

## 2021-02-08 NOTE — PROGRESS NOTES
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[facility] [address] ) 04/19/2006     last assessed 12/31/14     Osteoarthritis of both knees       last assessed 12/31/14     Paroxysmal atrial fibrillation (HCC)      S/P ablation of atrial flutter                     Past Surgical History:   Procedure Laterality Date    ABDOMINAL SURGERY   06/2006     20cm GIST removed     APPENDECTOMY        ATRIAL ABLATION SURGERY        CARPAL TUNNEL RELEASE Bilateral      COLONOSCOPY        HYSTERECTOMY        KNEE SURGERY        OOPHORECTOMY        TONSILLECTOMY        TUMOR EXCISION   2006     gastrointestinal stromal tumor                   Allergies   Allergen Reactions    Other         Adhesive tape             Current Outpatient Prescriptions:     aspirin 81 MG tablet, Take 81 mg by mouth daily  , Disp: , Rfl:     esomeprazole (NexIUM) 40 MG capsule, Take 1 capsule (40 mg total) by mouth daily, Disp: 90 capsule, Rfl: 1    ibuprofen (MOTRIN) 800 mg tablet, Take 1 tablet (800 mg total) by mouth every 8 (eight) hours as needed for mild pain, Disp: 30 tablet, Rfl: 0    Magnesium Oxide -Mg Supplement 400 MG CAPS, Take 1 capsule by mouth daily, Disp: , Rfl:     metoprolol succinate (TOPROL-XL) 25 mg 24 hr tablet, Take 1 tablet (25 mg total) by mouth 2 (two) times a day, Disp: 180 tablet, Rfl: 2    multivitamin (THERAGRAN) TABS, Take 1 tablet by mouth daily  , Disp: , Rfl:     olmesartan (BENICAR) 20 mg tablet, Take 1 tablet (20 mg total) by mouth daily, Disp: 30 tablet, Rfl: 0    Omega-3 Fatty Acids (FISH OIL) 1,000 mg, Take 1,000 mg by mouth 2 (two) times a day  , Disp: , Rfl:     PARoxetine (PAXIL-CR) 37 5 mg 24 hr tablet, Take 1 tablet (37 5 mg total) by mouth every morning, Disp: 90 tablet, Rfl: 3    pravastatin (PRAVACHOL) 40 mg tablet, Take 1 tablet (40 mg total) by mouth daily (Patient taking differently: Take 40 mg by mouth daily at bedtime  ), Disp: 90 tablet, Rfl: 1    predniSONE 20 mg tablet, Take 2 tablets (40 mg total) by mouth daily, Disp: 14 tablet, Rfl: 0    rosuvastatin (CRESTOR) 20 MG tablet, TAKE 1 TABLET DAILY, Disp: 90 tablet, Rfl: 3    sotalol (BETAPACE) 120 mg tablet, TAKE 1 TABLET BY MOUTH  EVERY 12 HOURS, Disp: 180 tablet, Rfl: 2           Patient Active Problem List   Diagnosis    Tachycardia    Dyspnea on exertion    Supraventricular tachycardia (HCC)    Hypertension    Controlled depression    Severe obstructive sleep apnea    Morbid obesity (HCC)    Impaired fasting glucose    Hyperlipidemia    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)    Esophageal reflux    Benign essential hypertension    Adenomatous colon polyp    Open wound of left lower extremity             Patient ID: Alecia Vieira is a 59 y o  female          Objective:  Patient's shoes and socks removed    Foot Exam     General  General Appearance: appears stated age and healthy   Orientation: alert and oriented to person, place, and time   Affect: appropriate   Gait: antalgic         Right Foot/Ankle      Inspection and Palpation  Tenderness: metatarsals   Swelling: dorsum and metatarsals   Arch: pes planus  Hammertoes: fifth toe  Skin Exam: dry skin;      Neurovascular  Dorsalis pedis: 1+  Posterior tibial: 2+  Superficial peroneal nerve sensation: diminished  Deep peroneal nerve sensation: diminished        Left Foot/Ankle       Inspection and Palpation  Tenderness: metatarsals   Swelling: dorsum and metatarsals   Arch: pes planus  Hammertoes: fifth toe  Skin Exam: dry skin;      Neurovascular  Dorsalis pedis: 1+  Posterior tibial: 2+  Superficial peroneal nerve sensation: diminished  Deep peroneal nerve sensation: diminished           Physical Exam   Constitutional: She appears well-developed and well-nourished  Cardiovascular: Normal rate and regular rhythm     Pulses:       Dorsalis pedis pulses are 1+ on the right side, and 1+ on the left side         Posterior tibial pulses are 2+ on the right side, and 2+ on the left side  Feet:   Right Foot:   Skin Integrity: Positive for dry skin  Left Foot:   Skin Integrity: Positive for dry skin     Skin:   Wide incurvated hallux toenail bilateral   Nails demonstrate mycosis   Both hallux is demonstrate ingrown toenail

## 2021-02-12 ENCOUNTER — TELEPHONE (OUTPATIENT)
Dept: FAMILY MEDICINE CLINIC | Facility: CLINIC | Age: 61
End: 2021-02-12

## 2021-02-15 DIAGNOSIS — E11.9 TYPE 2 DIABETES MELLITUS WITHOUT COMPLICATION, WITHOUT LONG-TERM CURRENT USE OF INSULIN (HCC): ICD-10-CM

## 2021-02-24 ENCOUNTER — OFFICE VISIT (OUTPATIENT)
Dept: FAMILY MEDICINE CLINIC | Facility: CLINIC | Age: 61
End: 2021-02-24
Payer: COMMERCIAL

## 2021-02-24 VITALS
WEIGHT: 293 LBS | TEMPERATURE: 96.3 F | HEIGHT: 64 IN | RESPIRATION RATE: 14 BRPM | HEART RATE: 72 BPM | DIASTOLIC BLOOD PRESSURE: 72 MMHG | SYSTOLIC BLOOD PRESSURE: 112 MMHG | BODY MASS INDEX: 50.02 KG/M2

## 2021-02-24 DIAGNOSIS — E66.01 CLASS 3 SEVERE OBESITY DUE TO EXCESS CALORIES WITH SERIOUS COMORBIDITY AND BODY MASS INDEX (BMI) OF 50.0 TO 59.9 IN ADULT (HCC): ICD-10-CM

## 2021-02-24 DIAGNOSIS — E11.9 TYPE 2 DIABETES MELLITUS WITHOUT COMPLICATION, WITHOUT LONG-TERM CURRENT USE OF INSULIN (HCC): Primary | ICD-10-CM

## 2021-02-24 DIAGNOSIS — I10 ESSENTIAL HYPERTENSION: Chronic | ICD-10-CM

## 2021-02-24 LAB — SL AMB POCT HEMOGLOBIN AIC: 6.9 (ref ?–6.5)

## 2021-02-24 PROCEDURE — 3078F DIAST BP <80 MM HG: CPT | Performed by: FAMILY MEDICINE

## 2021-02-24 PROCEDURE — 3008F BODY MASS INDEX DOCD: CPT | Performed by: FAMILY MEDICINE

## 2021-02-24 PROCEDURE — 3074F SYST BP LT 130 MM HG: CPT | Performed by: FAMILY MEDICINE

## 2021-02-24 PROCEDURE — 99214 OFFICE O/P EST MOD 30 MIN: CPT | Performed by: FAMILY MEDICINE

## 2021-02-24 PROCEDURE — 4004F PT TOBACCO SCREEN RCVD TLK: CPT | Performed by: FAMILY MEDICINE

## 2021-02-24 PROCEDURE — 3044F HG A1C LEVEL LT 7.0%: CPT | Performed by: FAMILY MEDICINE

## 2021-02-24 PROCEDURE — 83036 HEMOGLOBIN GLYCOSYLATED A1C: CPT | Performed by: FAMILY MEDICINE

## 2021-02-24 RX ORDER — DAPAGLIFLOZIN AND METFORMIN HYDROCHLORIDE 5; 500 MG/1; MG/1
1 TABLET, FILM COATED, EXTENDED RELEASE ORAL DAILY
Qty: 90 TABLET | Refills: 1 | Status: SHIPPED | OUTPATIENT
Start: 2021-02-24 | End: 2021-07-06

## 2021-02-24 NOTE — PROGRESS NOTES
Chief Complaint   Patient presents with    Diabetes     saw eye  11-19-20 Dr Tasia Olmos 2-8-21    Follow-up     chronic conditions         Patient ID: Felicita Parsons is a 63 y o  female  HPI  Pt is seeing for f/u DM2, MO, HTN, Sleep apnea -  All stable - Under cardiologist care as well -  Taking meds as Rx -  Does not exercises, does not follow strict diet     The following portions of the patient's history were reviewed and updated as appropriate: allergies, current medications, past family history, past medical history, past social history, past surgical history and problem list     Review of Systems   Constitutional: Negative  Respiratory: Negative  Cardiovascular: Negative  Gastrointestinal: Negative  Genitourinary: Negative  Musculoskeletal: Negative  Skin: Negative  Neurological: Negative  Current Outpatient Medications   Medication Sig Dispense Refill    aspirin 81 MG tablet Take 81 mg by mouth daily   atorvastatin (LIPITOR) 40 mg tablet TAKE 1 TABLET BY MOUTH  DAILY 90 tablet 3    ibuprofen (MOTRIN) 800 mg tablet Take 1 tablet (800 mg total) by mouth every 8 (eight) hours as needed for mild pain 30 tablet 0    Magnesium Oxide -Mg Supplement 400 MG CAPS Take 1 capsule by mouth daily      metFORMIN (GLUCOPHAGE) 500 mg tablet Take 1 tablet (500 mg total) by mouth 2 (two) times a day with meals 60 tablet 6    metoprolol succinate (TOPROL-XL) 25 mg 24 hr tablet Take 1 tablet (25 mg total) by mouth 2 (two) times a day 180 tablet 3    multivitamin (THERAGRAN) TABS Take 1 tablet by mouth daily        olmesartan (BENICAR) 20 mg tablet Take 1 tablet (20 mg total) by mouth daily 90 tablet 2    Omega-3 Fatty Acids (FISH OIL) 1,000 mg Take 1,000 mg by mouth 2 (two) times a day        pantoprazole (PROTONIX) 40 mg tablet TAKE 1 TABLET BY MOUTH  DAILY BEFORE BREAKFAST 90 tablet 3    PARoxetine (PAXIL-CR) 37 5 mg 24 hr tablet TAKE 1 TABLET BY MOUTH  EVERY MORNING 90 tablet 3    sotalol (BETAPACE) 120 mg tablet Take 1 tablet (120 mg total) by mouth every 12 (twelve) hours 180 tablet 3    VITAMIN D PO Take 2,000 Units by mouth 2 (two) times a day      amoxicillin (AMOXIL) 500 mg capsule TAKE 4 CAPSULES BY MOUTH 1 HOUR BEFORE DENTAL APPOINTMENT  0     No current facility-administered medications for this visit  Objective:    /72 (BP Location: Left arm, Patient Position: Sitting, Cuff Size: Adult)   Pulse 72   Temp (!) 96 3 °F (35 7 °C) (Temporal)   Resp 14   Ht 5' 4" (1 626 m)   Wt (!) 148 kg (327 lb)   BMI 56 13 kg/m²        Physical Exam  Constitutional:       Appearance: She is obese  Cardiovascular:      Rate and Rhythm: Normal rate and regular rhythm  Pulses: no weak pulses          Dorsalis pedis pulses are 2+ on the right side and 2+ on the left side  Posterior tibial pulses are 2+ on the right side and 2+ on the left side  Heart sounds: No murmur  Pulmonary:      Effort: Pulmonary effort is normal  No respiratory distress  Breath sounds: No wheezing, rhonchi or rales  Musculoskeletal:      Right lower leg: No edema  Left lower leg: No edema  Feet:      Right foot:      Skin integrity: No ulcer, skin breakdown, erythema, warmth, callus or dry skin  Left foot:      Skin integrity: No ulcer, skin breakdown, erythema, warmth, callus or dry skin  Neurological:      General: No focal deficit present  Mental Status: She is alert and oriented to person, place, and time  Psychiatric:         Mood and Affect: Mood normal          Diabetic Foot Exam    Patient's shoes and socks removed  Right Foot/Ankle   Right Foot Inspection  Skin Exam: skin normal and skin intact no dry skin, no warmth, no callus, no erythema, no maceration, no abnormal color, no pre-ulcer, no ulcer and no callus                          Toe Exam: ROM and strength within normal limits  Sensory     Proprioception: intact   Monofilament testing: intact  Vascular  Capillary refills: elevated  The right DP pulse is 2+  The right PT pulse is 2+  Left Foot/Ankle  Left Foot Inspection  Skin Exam: skin normal and skin intactno dry skin, no warmth, no erythema, no maceration, normal color, no pre-ulcer, no ulcer and no callus                         Toe Exam: ROM and strength within normal limits                   Sensory     Proprioception: intact  Monofilament: intact  Vascular  Capillary refills: elevated  The left DP pulse is 2+  The left PT pulse is 2+  Assign Risk Category:  No deformity present; No loss of protective sensation; No weak pulses       Risk: 0    Labs in chart were reviewed  Recent Results (from the past 672 hour(s))   POCT hemoglobin A1c    Collection Time: 02/24/21  5:53 PM   Result Value Ref Range    Hemoglobin A1C 6 9 (A) 6 5       Assessment/Plan:         Diagnoses and all orders for this visit:    Type 2 diabetes mellitus without complication, without long-term current use of insulin (HCC)  -     POCT hemoglobin A1c  -     Will change Metformin to  Dapagliflozin-metFORMIN HCl ER (Xigduo XR) 5-500 MG TB24; Take 1 tablet by mouth daily       Discussed general issues about diabetes pathophysiology and management  Counseling at today's visit: discussed the need for weight loss, focused on the need to adhere to the prescribed ADA diet, discussed the advantages of a diet low in carbohydrates and discussed DASH diet  Addressed ADA diet  Encouraged aerobic exercise  Class 3 severe obesity due to excess calories with serious comorbidity and body mass index (BMI) of 50 0 to 59 9 in adult [facility])  Exercises advised   Essential hypertension  Stable  Cont meds          BMI Counseling: Body mass index is 56 13 kg/m²  Discussed the patient's BMI with her   The BMI is above normal  Nutrition recommendations include reducing portion sizes, decreasing overall calorie intake, 3-5 servings of fruits/vegetables daily, reducing fast food intake and consuming healthier snacks  Exercise recommendations include exercising 3-5 times per week           rto in 6 m         Phil Jackson MD

## 2021-02-25 ENCOUNTER — TELEPHONE (OUTPATIENT)
Dept: ADMINISTRATIVE | Facility: OTHER | Age: 61
End: 2021-02-25

## 2021-02-25 NOTE — TELEPHONE ENCOUNTER
Upon review of the In Basket request and the patient's chart, initial outreach has been made via fax, please see Contacts section for details        [phone]    Thank you  Lisy Lynn MA

## 2021-02-25 NOTE — LETTER
Diabetic Eye Exam Form    Date Requested: 21  Patient: Lorena Yadav  Patient : 1960   Referring Provider: Lauren Carlos MD    Dilated Retinal Exam, Optomap-Iris Exam, or Fundus Photography Done         Yes ([place] one above)         No     Date of Diabetic Eye Exam ______________________________  Left Eye      Exam did show retinopathy    Exam did not show retinopathy         Mild       Moderate       None       Proliferative       Severe     Right Eye     Exam did show retinopathy    Exam did not show retinopathy         Mild       Moderate       None       Proliferative       Severe     Comments __________________________________________________________    Practice Providing Exam ______________________________________________    Exam Performed By (print name) _______________________________________      Provider Signature ___________________________________________________      These reports are needed for  compliance    Please fax this completed form and a copy of the Diabetic Eye Exam report to our office located at [place] [address] as soon as possible to [phone] luz [place]: Phone [phone]    We thank you for your assistance in treating our mutual patient

## 2021-02-25 NOTE — TELEPHONE ENCOUNTER
----- Message from [facility] sent at 2/24/2021  5:54 PM EST -----  02/24/21 5:54 PM    Hello, our patient Ruy Fierro has had DM eye exam completed/performed  Please assist in updating the patient chart by Dr Kimberley Ni date of service is 11-19-20       Thank you,  Roxann DE PAZ

## 2021-03-01 NOTE — TELEPHONE ENCOUNTER
Upon review of the In Basket request we were able to locate, review, and update the patient chart as requested for Diabetic Eye Exam     Any additional questions or concerns should be emailed to the Practice Liaisons via [email or web address] email, please do not reply via In Basket      Thank you  Lazarus Gunning, MA

## 2021-04-01 DIAGNOSIS — I10 ESSENTIAL HYPERTENSION: Chronic | ICD-10-CM

## 2021-04-01 PROCEDURE — 4010F ACE/ARB THERAPY RXD/TAKEN: CPT | Performed by: FAMILY MEDICINE

## 2021-04-02 PROCEDURE — 4010F ACE/ARB THERAPY RXD/TAKEN: CPT | Performed by: FAMILY MEDICINE

## 2021-04-02 RX ORDER — OLMESARTAN MEDOXOMIL 20 MG/1
TABLET ORAL
Qty: 90 TABLET | Refills: 3 | Status: SHIPPED | OUTPATIENT
Start: 2021-04-02 | End: 2022-05-05

## 2021-04-05 ENCOUNTER — HOSPITAL ENCOUNTER (OUTPATIENT)
Dept: RADIOLOGY | Facility: HOSPITAL | Age: 61
Discharge: HOME | End: 2021-04-05
Payer: COMMERCIAL

## 2021-04-05 ENCOUNTER — OFFICE VISIT (OUTPATIENT)
Dept: PODIATRY | Facility: CLINIC | Age: 61
End: 2021-04-05
Payer: COMMERCIAL

## 2021-04-05 VITALS — BODY MASS INDEX: 50.02 KG/M2 | HEIGHT: 64 IN | WEIGHT: 293 LBS

## 2021-04-05 VITALS
DIASTOLIC BLOOD PRESSURE: 72 MMHG | BODY MASS INDEX: 50.02 KG/M2 | RESPIRATION RATE: 17 BRPM | WEIGHT: 293 LBS | HEART RATE: 72 BPM | HEIGHT: 64 IN | SYSTOLIC BLOOD PRESSURE: 112 MMHG

## 2021-04-05 DIAGNOSIS — M79.671 PAIN IN BOTH FEET: ICD-10-CM

## 2021-04-05 DIAGNOSIS — M79.672 PAIN IN BOTH FEET: ICD-10-CM

## 2021-04-05 DIAGNOSIS — L03.039 PARONYCHIA OF TOENAIL, UNSPECIFIED LATERALITY: ICD-10-CM

## 2021-04-05 DIAGNOSIS — L60.0 INGROWN TOENAIL: ICD-10-CM

## 2021-04-05 DIAGNOSIS — B35.1 ONYCHOMYCOSIS: ICD-10-CM

## 2021-04-05 DIAGNOSIS — M21.969 ACQUIRED DEFORMITY OF FOOT, UNSPECIFIED LATERALITY: Primary | ICD-10-CM

## 2021-04-05 DIAGNOSIS — Z12.31 SCREENING MAMMOGRAM, ENCOUNTER FOR: ICD-10-CM

## 2021-04-05 PROCEDURE — 77063 BREAST TOMOSYNTHESIS BI: CPT

## 2021-04-05 PROCEDURE — 99213 OFFICE O/P EST LOW 20 MIN: CPT | Performed by: PODIATRIST

## 2021-04-05 PROCEDURE — 77067 SCR MAMMO BI INCL CAD: CPT

## 2021-04-05 NOTE — PROGRESS NOTES
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     last assessed 4/4/13    Atrial fibrillation (HCC)      Atrial flutter (HCC)      Benign essential hypertension       last assessed 12/21/17     Body mass index (BMI) of 50-59 9 in adult [facility])      Carpal tunnel syndrome 09/07/2004     unspecified laterality  / last assessed 9/7/04    Congenital pes planus       last assessed 7/15/14     Depression      Depression      Hemangioma of skin 08/26/2003     last assessed 8/26/03    History of gastroesophageal reflux (GERD)      Hypercholesterolemia      Hyperlipidemia      Hypertension      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[facility] [address] ) 04/19/2006     last assessed 12/31/14     Osteoarthritis of both knees       last assessed 12/31/14     Paroxysmal atrial fibrillation (HCC)      S/P ablation of atrial flutter                     Past Surgical History:   Procedure Laterality Date    ABDOMINAL SURGERY   06/2006     20cm GIST removed     APPENDECTOMY        ATRIAL ABLATION SURGERY        CARPAL TUNNEL RELEASE Bilateral      COLONOSCOPY        HYSTERECTOMY        KNEE SURGERY        OOPHORECTOMY        TONSILLECTOMY        TUMOR EXCISION   2006     gastrointestinal stromal tumor                   Allergies   Allergen Reactions    Other         Adhesive tape             Current Outpatient Prescriptions:     aspirin 81 MG tablet, Take 81 mg by mouth daily  , Disp: , Rfl:     esomeprazole (NexIUM) 40 MG capsule, Take 1 capsule (40 mg total) by mouth daily, Disp: 90 capsule, Rfl: 1    ibuprofen (MOTRIN) 800 mg tablet, Take 1 tablet (800 mg total) by mouth every 8 (eight) hours as needed for mild pain, Disp: 30 tablet, Rfl: 0    Magnesium Oxide -Mg Supplement 400 MG CAPS, Take 1 capsule by mouth daily, Disp: , Rfl:     metoprolol succinate (TOPROL-XL) 25 mg 24 hr tablet, Take 1 tablet (25 mg total) by mouth 2 (two) times a day, Disp: 180 tablet, Rfl: 2    multivitamin (THERAGRAN) TABS, Take 1 tablet by mouth daily  , Disp: , Rfl:     olmesartan (BENICAR) 20 mg tablet, Take 1 tablet (20 mg total) by mouth daily, Disp: 30 tablet, Rfl: 0    Omega-3 Fatty Acids (FISH OIL) 1,000 mg, Take 1,000 mg by mouth 2 (two) times a day  , Disp: , Rfl:     PARoxetine (PAXIL-CR) 37 5 mg 24 hr tablet, Take 1 tablet (37 5 mg total) by mouth every morning, Disp: 90 tablet, Rfl: 3    pravastatin (PRAVACHOL) 40 mg tablet, Take 1 tablet (40 mg total) by mouth daily (Patient taking differently: Take 40 mg by mouth daily at bedtime  ), Disp: 90 tablet, Rfl: 1    predniSONE 20 mg tablet, Take 2 tablets (40 mg total) by mouth daily, Disp: 14 tablet, Rfl: 0    rosuvastatin (CRESTOR) 20 MG tablet, TAKE 1 TABLET DAILY, Disp: 90 tablet, Rfl: 3    sotalol (BETAPACE) 120 mg tablet, TAKE 1 TABLET BY MOUTH  EVERY 12 HOURS, Disp: 180 tablet, Rfl: 2           Patient Active Problem List   Diagnosis    Tachycardia    Dyspnea on exertion    Supraventricular tachycardia (HCC)    Hypertension    Controlled depression    Severe obstructive sleep apnea    Morbid obesity (HCC)    Impaired fasting glucose    Hyperlipidemia    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)    Esophageal reflux    Benign essential hypertension    Adenomatous colon polyp    Open wound of left lower extremity             Patient ID: Alecia Vieira is a 59 y o  female          Objective:  Patient's shoes and socks removed    Foot Exam     General  General Appearance: appears stated age and healthy   Orientation: alert and oriented to person, place, and time   Affect: appropriate   Gait: antalgic         Right Foot/Ankle      Inspection and Palpation  Tenderness: metatarsals   Swelling: dorsum and metatarsals   Arch: pes planus  Hammertoes: fifth toe  Skin Exam: dry skin;      Neurovascular  Dorsalis pedis: 1+  Posterior tibial: 2+  Superficial peroneal nerve sensation: diminished  Deep peroneal nerve sensation: diminished        Left Foot/Ankle       Inspection and Palpation  Tenderness: metatarsals   Swelling: dorsum and metatarsals   Arch: pes planus  Hammertoes: fifth toe  Skin Exam: dry skin;      Neurovascular  Dorsalis pedis: 1+  Posterior tibial: 2+  Superficial peroneal nerve sensation: diminished  Deep peroneal nerve sensation: diminished           Physical Exam   Constitutional: She appears well-developed and well-nourished  Cardiovascular: Normal rate and regular rhythm     Pulses:       Dorsalis pedis pulses are 1+ on the right side, and 1+ on the left side         Posterior tibial pulses are 2+ on the right side, and 2+ on the left side  Feet:   Right Foot:   Skin Integrity: Positive for dry skin  Left Foot:   Skin Integrity: Positive for dry skin     Skin:   Wide incurvated hallux toenail bilateral   Nails demonstrate mycosis   Both hallux is demonstrate ingrown toenail

## 2021-04-06 ENCOUNTER — TELEPHONE (OUTPATIENT)
Dept: HEMATOLOGY ONCOLOGY | Facility: MEDICAL CENTER | Age: 61
End: 2021-04-06

## 2021-04-06 NOTE — TELEPHONE ENCOUNTER
LVM for patient with results  Office phone number provided for a return call if she has any questions or concerns      ----- Message from Alma Maldonado PA-C sent at 4/6/2021  3:17 PM EDT -----  Notify MAMMO normal! Follow up in 1 year

## 2021-04-17 DIAGNOSIS — F32.A DEPRESSION, UNSPECIFIED DEPRESSION TYPE: ICD-10-CM

## 2021-04-19 RX ORDER — PAROXETINE HYDROCHLORIDE 37.5 MG/1
TABLET, FILM COATED, EXTENDED RELEASE ORAL
Qty: 90 TABLET | Refills: 3 | Status: SHIPPED | OUTPATIENT
Start: 2021-04-19 | End: 2022-05-05

## 2021-04-27 ENCOUNTER — TELEMEDICINE (OUTPATIENT)
Dept: FAMILY MEDICINE CLINIC | Facility: CLINIC | Age: 61
End: 2021-04-27
Payer: COMMERCIAL

## 2021-04-27 DIAGNOSIS — R09.81 SINUS CONGESTION: Primary | ICD-10-CM

## 2021-04-27 PROCEDURE — 99214 OFFICE O/P EST MOD 30 MIN: CPT | Performed by: FAMILY MEDICINE

## 2021-04-27 RX ORDER — AMOXICILLIN AND CLAVULANATE POTASSIUM 875; 125 MG/1; MG/1
1 TABLET, FILM COATED ORAL EVERY 12 HOURS SCHEDULED
Qty: 14 TABLET | Refills: 0 | Status: SHIPPED | OUTPATIENT
Start: 2021-04-27 | End: 2021-05-04

## 2021-04-27 NOTE — PROGRESS NOTES
Virtual Brief Visit    Assessment/Plan:    Problem List Items Addressed This Visit     None      Visit Diagnoses     Sinus congestion    -  Primary    Relevant Medications    amoxicillin-clavulanate (AUGMENTIN) 875-125 mg per tablet        rto prn         Reason for visit is   Chief Complaint   Patient presents with    Sinus Problem        Encounter provider Porter Worthington MD    Provider located at [address] [place] [phone]    Recent Visits  No visits were found meeting these conditions  Showing recent visits within past 7 days and meeting all other requirements     Today's Visits  Date Type Provider Dept   04/27/21 Telemedicine Porter Worthington  [facility] today's visits and meeting all other requirements     Future Appointments  No visits were found meeting these conditions  Showing future appointments within next 150 days and meeting all other requirements        After connecting through telephone, the patient was identified by name and date of birth  William Man was informed that this is a telemedicine visit and that the visit is being conducted through telephone  My office door was closed  No one else was in the room  She acknowledged consent and understanding of privacy and security of the platform  The patient has agreed to participate and understands she can discontinue the visit at any time  Patient is aware this is a billable service  Subjective    William Man is a 63 y o  female   Sinus Problem  This is a new problem  The current episode started 1 to 4 weeks ago  The problem is unchanged  There has been no fever  The pain is moderate  Associated symptoms include headaches and sinus pressure  Pertinent negatives include no chills, congestion, coughing, diaphoresis, ear pain, hoarse voice, neck pain, shortness of breath, sneezing, sore throat or swollen glands   Past treatments include oral decongestants  The treatment provided no relief  Past Medical History:   Diagnosis Date    Abnormal liver function test     last assessed 1/16/17     Adenomatosis     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    last assessed 4/4/13    Atrial fibrillation (HCC)     Atrial flutter (HCC)     Benign essential hypertension     last assessed 12/21/17     Body mass index (BMI) of 50-59 9 in adult [facility])     Carpal tunnel syndrome 09/07/2004    unspecified laterality  / last assessed 9/7/04    Congenital pes planus     last assessed 7/15/14     Depression     Depression     Hemangioma of skin 08/26/2003    last assessed 8/26/03    History of gastroesophageal reflux (GERD)     Hypercholesterolemia     Hyperlipidemia     Hypertension     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[facility] [address] ) 04/19/2006    last assessed 12/31/14     Osteoarthritis of both knees     last assessed 12/31/14     Paroxysmal atrial fibrillation (HCC)     S/P ablation of atrial flutter        Past Surgical History:   Procedure Laterality Date    ABDOMINAL SURGERY  06/2006    20cm GIST removed     APPENDECTOMY      ATRIAL ABLATION SURGERY      CARPAL TUNNEL RELEASE Bilateral     COLONOSCOPY      HYSTERECTOMY      fibroids    KNEE SURGERY      KNEE SURGERY Left 03/2019    OOPHORECTOMY      cyst    TONSILLECTOMY      TUMOR EXCISION  2006    gastrointestinal stromal tumor       Current Outpatient Medications   Medication Sig Dispense Refill    amoxicillin (AMOXIL) 500 mg capsule TAKE 4 CAPSULES BY MOUTH 1 HOUR BEFORE DENTAL APPOINTMENT  0    aspirin 81 MG tablet Take 81 mg by mouth daily        atorvastatin (LIPITOR) 40 mg tablet TAKE 1 TABLET BY MOUTH  DAILY 90 tablet 3    Dapagliflozin-metFORMIN HCl ER (Xigduo XR) 5-500 MG TB24 Take 1 tablet by mouth daily 90 tablet 1    ibuprofen (MOTRIN) 800 mg tablet Take 1 tablet (800 mg total) by mouth every 8 (eight) hours as needed for mild pain 30 tablet 0    Magnesium Oxide -Mg Supplement 400 MG CAPS Take 1 capsule by mouth daily      metoprolol succinate (TOPROL-XL) 25 mg 24 hr tablet Take 1 tablet (25 mg total) by mouth 2 (two) times a day 180 tablet 3    multivitamin (THERAGRAN) TABS Take 1 tablet by mouth daily   olmesartan (BENICAR) 20 mg tablet TAKE 1 TABLET BY MOUTH  DAILY 90 tablet 3    Omega-3 Fatty Acids (FISH OIL) 1,000 mg Take 1,000 mg by mouth 2 (two) times a day        pantoprazole (PROTONIX) 40 mg tablet TAKE 1 TABLET BY MOUTH  DAILY BEFORE BREAKFAST 90 tablet 3    PARoxetine (PAXIL-CR) 37 5 mg 24 hr tablet TAKE 1 TABLET BY MOUTH IN  THE MORNING 90 tablet 3    sotalol (BETAPACE) 120 mg tablet Take 1 tablet (120 mg total) by mouth every 12 (twelve) hours 180 tablet 3    VITAMIN D PO Take 2,000 Units by mouth 2 (two) times a day       No current facility-administered medications for this visit  Allergies   Allergen Reactions    Other      Adhesive tape        Review of Systems   Constitutional: Negative for chills and diaphoresis  HENT: Positive for sinus pressure  Negative for congestion, ear pain, hoarse voice, sneezing and sore throat  Respiratory: Negative for cough and shortness of breath  Gastrointestinal: Negative  Musculoskeletal: Negative for neck pain  Neurological: Positive for headaches  There were no vitals filed for this visit  I spent 8 minutes directly with the patient during this visit    VIRTUAL VISIT DISCLAIMER    Georgie Mccord acknowledges that she has consented to an online visit or consultation  She understands that the online visit is based solely on information provided by her, and that, in the absence of a face-to-face physical evaluation by the physician, the diagnosis she receives is both limited and provisional in terms of accuracy and completeness  This is not intended to replace a full medical face-to-face evaluation by the physician   Georgie Mccord understands and accepts these terms

## 2021-04-30 ENCOUNTER — TELEMEDICINE (OUTPATIENT)
Dept: FAMILY MEDICINE CLINIC | Facility: CLINIC | Age: 61
End: 2021-04-30
Payer: COMMERCIAL

## 2021-04-30 DIAGNOSIS — Z20.822 CLOSE EXPOSURE TO COVID-19 VIRUS: Primary | ICD-10-CM

## 2021-04-30 DIAGNOSIS — Z20.822 EXPOSURE TO COVID-19 VIRUS: Primary | ICD-10-CM

## 2021-04-30 DIAGNOSIS — Z20.822 CLOSE EXPOSURE TO COVID-19 VIRUS: ICD-10-CM

## 2021-04-30 PROCEDURE — U0005 INFEC AGEN DETEC AMPLI PROBE: HCPCS | Performed by: FAMILY MEDICINE

## 2021-04-30 PROCEDURE — U0003 INFECTIOUS AGENT DETECTION BY NUCLEIC ACID (DNA OR RNA); SEVERE ACUTE RESPIRATORY SYNDROME CORONAVIRUS 2 (SARS-COV-2) (CORONAVIRUS DISEASE [COVID-19]), AMPLIFIED PROBE TECHNIQUE, MAKING USE OF HIGH THROUGHPUT TECHNOLOGIES AS DESCRIBED BY CMS-2020-01-R: HCPCS | Performed by: FAMILY MEDICINE

## 2021-04-30 PROCEDURE — 4004F PT TOBACCO SCREEN RCVD TLK: CPT | Performed by: FAMILY MEDICINE

## 2021-04-30 PROCEDURE — 99213 OFFICE O/P EST LOW 20 MIN: CPT | Performed by: FAMILY MEDICINE

## 2021-04-30 NOTE — PROGRESS NOTES
COVID-19 Outpatient Progress Note    Assessment/Plan:    Problem List Items Addressed This Visit     None      Visit Diagnoses     Exposure to COVID-19 virus    -  Primary       was sent for the test    Disposition:     I recommended continued isolation until at least 24 hours have passed since recovery defined as resolution of fever without the use of fever-reducing medications AND improvement in COVID symptoms AND 10 days have passed since onset of symptoms (or 10 days have passed since date of first positive viral diagnostic test for asymptomatic patients)  I have spent 5 minutes directly with the patient  Encounter provider Hector Washington MD    Provider located at [address] [place] [phone]    Recent Visits  Date Type Provider Dept   04/27/21 Telemedicine Hector Washington MD [address] recent visits within past 7 days and meeting all other requirements     Today's Visits  Date Type Provider Dept   04/30/21 Telemedicine Hector Washington MD [address] [place] [address] Road today's visits and meeting all other requirements     Future Appointments  No visits were found meeting these conditions  Showing future appointments within next 150 days and meeting all other requirements        Patient agrees to participate in a virtual check in via telephone or video visit instead of presenting to the office to address urgent/immediate medical needs  Patient is aware this is a billable service  After connecting through Telephone, the patient was identified by name and date of birth  Renee Collier was informed that this was a telemedicine visit and that the exam was being conducted confidentially over secure lines  Renee Collier acknowledged consent and understanding of privacy and security of the telemedicine visit   I informed the patient that I have reviewed her record in Epic and presented the opportunity for her to ask any questions regarding the visit today  The patient agreed to participate  Subjective:   Albert Swenson is a 63 y o  female who has been screened for COVID-19  Symptom change since last report: resolving  Patient's symptoms include nasal congestion, rhinorrhea and cough  Patient denies fever, chills, fatigue, malaise, sore throat, anosmia, loss of taste, shortness of breath, chest tightness, abdominal pain, nausea, vomiting, diarrhea, myalgias and headaches  Ender Huynh has been staying home and has isolated themselves in her home  She is taking care to not share personal items and is cleaning all surfaces that are touched often, like counters, tabletops, and doorknobs using household cleaning sprays or wipes  She is wearing a mask when she leaves her room       Date of symptom onset: 4/16/2021    Was initially Tx for "sinus infection" -   with similar symptoms -  Was getting worse -  Sent to ER this am and was positive for COVID     No results found for: [address] 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    last assessed 4/4/13    Atrial fibrillation ([address] )     Atrial flutter ([address] )     Benign essential hypertension     last assessed 12/21/17     Body mass index (BMI) of 50-59 9 in adult [facility])     Carpal tunnel syndrome 09/07/2004    unspecified laterality  / last assessed 9/7/04    Congenital pes planus     last assessed 7/15/14     Depression     Depression     Hemangioma of skin 08/26/2003    last assessed 8/26/03    History of gastroesophageal reflux (GERD)     Hypercholesterolemia     Hyperlipidemia     Hypertension     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[address] ) 04/19/2006    last assessed 12/31/14     Osteoarthritis of both knees     last assessed 12/31/14     Paroxysmal atrial fibrillation ([facility] [address] )     S/P ablation of atrial flutter      Past Surgical History:   Procedure Laterality Date    ABDOMINAL SURGERY  06/2006    20cm GIST removed     APPENDECTOMY      ATRIAL ABLATION SURGERY      CARPAL TUNNEL RELEASE Bilateral     COLONOSCOPY      HYSTERECTOMY      fibroids    KNEE SURGERY      KNEE SURGERY Left 03/2019    OOPHORECTOMY      cyst    TONSILLECTOMY      TUMOR EXCISION  2006    gastrointestinal stromal tumor     Current Outpatient Medications   Medication Sig Dispense Refill    amoxicillin-clavulanate (AUGMENTIN) 875-125 mg per tablet Take 1 tablet by mouth every 12 (twelve) hours for 7 days 14 tablet 0    aspirin 81 MG tablet Take 81 mg by mouth daily   atorvastatin (LIPITOR) 40 mg tablet TAKE 1 TABLET BY MOUTH  DAILY 90 tablet 3    Dapagliflozin-metFORMIN HCl ER (Xigduo XR) 5-500 MG TB24 Take 1 tablet by mouth daily 90 tablet 1    ibuprofen (MOTRIN) 800 mg tablet Take 1 tablet (800 mg total) by mouth every 8 (eight) hours as needed for mild pain 30 tablet 0    Magnesium Oxide -Mg Supplement 400 MG CAPS Take 1 capsule by mouth daily      metoprolol succinate (TOPROL-XL) 25 mg 24 hr tablet Take 1 tablet (25 mg total) by mouth 2 (two) times a day 180 tablet 3    multivitamin (THERAGRAN) TABS Take 1 tablet by mouth daily   olmesartan (BENICAR) 20 mg tablet TAKE 1 TABLET BY MOUTH  DAILY 90 tablet 3    Omega-3 Fatty Acids (FISH OIL) 1,000 mg Take 1,000 mg by mouth 2 (two) times a day        pantoprazole (PROTONIX) 40 mg tablet TAKE 1 TABLET BY MOUTH  DAILY BEFORE BREAKFAST 90 tablet 3    PARoxetine (PAXIL-CR) 37 5 mg 24 hr tablet TAKE 1 TABLET BY MOUTH IN  THE MORNING 90 tablet 3    sotalol (BETAPACE) 120 mg tablet Take 1 tablet (120 mg total) by mouth every 12 (twelve) hours 180 tablet 3    VITAMIN D PO Take 2,000 Units by mouth 2 (two) times a day       No current facility-administered medications for this visit        Allergies   Allergen Reactions    Other      Adhesive tape        Review of Systems   Constitutional: Negative for chills, fatigue and fever  HENT: Positive for congestion and rhinorrhea  Negative for sore throat  Respiratory: Positive for cough  Negative for chest tightness and shortness of breath  Gastrointestinal: Negative for abdominal pain, diarrhea, nausea and vomiting  Musculoskeletal: Negative for myalgias  Neurological: Negative for headaches  Objective: There were no vitals filed for this visit  Physical Exam  VIRTUAL VISIT DISCLAIMER    Sophia Calderon acknowledges that she has consented to an online visit or consultation  She understands that the online visit is based solely on information provided by her, and that, in the absence of a face-to-face physical evaluation by the physician, the diagnosis she receives is both limited and provisional in terms of accuracy and completeness  This is not intended to replace a full medical face-to-face evaluation by the physician  Sophia Calderon understands and accepts these terms

## 2021-05-02 LAB — SARS-COV-2 RNA RESP QL NAA+PROBE: POSITIVE

## 2021-05-13 DIAGNOSIS — B37.9 CANDIDIASIS: Primary | ICD-10-CM

## 2021-05-13 RX ORDER — FLUCONAZOLE 150 MG/1
150 TABLET ORAL ONCE
Qty: 1 TABLET | Refills: 0 | Status: SHIPPED | OUTPATIENT
Start: 2021-05-13 | End: 2021-05-13

## 2021-06-14 ENCOUNTER — OFFICE VISIT (OUTPATIENT)
Dept: CARDIOLOGY CLINIC | Facility: CLINIC | Age: 61
End: 2021-06-14
Payer: COMMERCIAL

## 2021-06-14 VITALS
HEIGHT: 64 IN | WEIGHT: 293 LBS | DIASTOLIC BLOOD PRESSURE: 82 MMHG | SYSTOLIC BLOOD PRESSURE: 120 MMHG | BODY MASS INDEX: 50.02 KG/M2 | OXYGEN SATURATION: 97 % | TEMPERATURE: 98.1 F | HEART RATE: 62 BPM

## 2021-06-14 DIAGNOSIS — I10 ESSENTIAL HYPERTENSION: Primary | ICD-10-CM

## 2021-06-14 DIAGNOSIS — I10 BENIGN ESSENTIAL HYPERTENSION: ICD-10-CM

## 2021-06-14 DIAGNOSIS — I47.1 SUPRAVENTRICULAR TACHYCARDIA (HCC): ICD-10-CM

## 2021-06-14 PROCEDURE — 3008F BODY MASS INDEX DOCD: CPT | Performed by: INTERNAL MEDICINE

## 2021-06-14 PROCEDURE — 4004F PT TOBACCO SCREEN RCVD TLK: CPT | Performed by: INTERNAL MEDICINE

## 2021-06-14 PROCEDURE — 99214 OFFICE O/P EST MOD 30 MIN: CPT | Performed by: INTERNAL MEDICINE

## 2021-06-14 PROCEDURE — 93000 ELECTROCARDIOGRAM COMPLETE: CPT | Performed by: INTERNAL MEDICINE

## 2021-06-14 PROCEDURE — 3074F SYST BP LT 130 MM HG: CPT | Performed by: INTERNAL MEDICINE

## 2021-06-14 PROCEDURE — 3079F DIAST BP 80-89 MM HG: CPT | Performed by: INTERNAL MEDICINE

## 2021-06-14 NOTE — PROGRESS NOTES
Cardiology Follow Up    Feliciano Grullon  1960  [id number]      Interval History: Feliciano Grullon is here for follow up of SVT/Atrial fibrillation  She had a short episode of tachycardia that resolved with Valsalva  She has otherwise been feeling well without any chest pain, shortness of breath, syncope or near syncope  In February 2020 office visit, she felt fatigued and had exertional dyspnea  Stress test and echocardiogram were ordered  Echocardiogram was normal   Stress test was normal but she had poor exercise capacity  The patient was seen at [place] [address] initially because of tachycardia  On 3/23/16, she underwent EPS/SVT ablation with ablation of atrial tachycardia but was complicated by atrial fibrillation which responded to sotalol and cardioversion  She remains in sinus rhythm since then  She is not on anticoagulation because of low chads score  The following portions of the patient's history were reviewed and updated as appropriate: allergies, current medications, past family history, past medical history, past social history, past surgical history and problem list     Current Outpatient Medications on File Prior to Visit   Medication Sig Dispense Refill    aspirin 81 MG tablet Take 81 mg by mouth daily   atorvastatin (LIPITOR) 40 mg tablet TAKE 1 TABLET BY MOUTH  DAILY 90 tablet 3    Dapagliflozin-metFORMIN HCl ER (Xigduo XR) 5-500 MG TB24 Take 1 tablet by mouth daily 90 tablet 1    ibuprofen (MOTRIN) 800 mg tablet Take 1 tablet (800 mg total) by mouth every 8 (eight) hours as needed for mild pain 30 tablet 0    Magnesium Oxide -Mg Supplement 400 MG CAPS Take 1 capsule by mouth daily      metoprolol succinate (TOPROL-XL) 25 mg 24 hr tablet Take 1 tablet (25 mg total) by mouth 2 (two) times a day 180 tablet 3    multivitamin (THERAGRAN) TABS Take 1 tablet by mouth daily        olmesartan (BENICAR) 20 mg tablet TAKE 1 TABLET BY MOUTH  DAILY 90 tablet 3    Omega-3 Fatty Acids (FISH OIL) 1,000 mg Take 1,000 mg by mouth 2 (two) times a day        pantoprazole (PROTONIX) 40 mg tablet TAKE 1 TABLET BY MOUTH  DAILY BEFORE BREAKFAST 90 tablet 3    PARoxetine (PAXIL-CR) 37 5 mg 24 hr tablet TAKE 1 TABLET BY MOUTH IN  THE MORNING 90 tablet 3    sotalol (BETAPACE) 120 mg tablet Take 1 tablet (120 mg total) by mouth every 12 (twelve) hours 180 tablet 3    VITAMIN D PO Take 2,000 Units by mouth 2 (two) times a day       No current facility-administered medications on file prior to visit  Review of Systems:  Review of Systems   Respiratory: Negative for chest tightness, shortness of breath and wheezing  Cardiovascular: Positive for palpitations  Negative for chest pain and leg swelling  Musculoskeletal: Positive for arthralgias  All other systems reviewed and are negative  Physical Exam:  /82 (BP Location: Right arm, Patient Position: Sitting, Cuff Size: Large)   Pulse 62 Comment: apical  Temp 98 1 °F (36 7 °C)   Ht 5' 4" (1 626 m)   Wt (!) 143 kg (316 lb)   SpO2 97% Comment: RA  BMI 54 24 kg/m²     Physical Exam  Constitutional:       General: She is not in acute distress  Appearance: She is well-developed  She is not diaphoretic  HENT:      Head: Normocephalic and atraumatic  Eyes:      General: No scleral icterus  Right eye: No discharge  Left eye: No discharge  Conjunctiva/sclera: Conjunctivae normal       Pupils: Pupils are equal, round, and reactive to light  Neck:      Thyroid: No thyromegaly  Vascular: No JVD  Cardiovascular:      Rate and Rhythm: Normal rate and regular rhythm  Heart sounds: Normal heart sounds  No murmur heard  No friction rub  No gallop  Pulmonary:      Effort: Pulmonary effort is normal       Breath sounds: Normal breath sounds  Musculoskeletal:      Cervical back: Neck supple  Skin:     General: Skin is warm and dry  Findings: No erythema or rash  Neurological:      Mental Status: She is alert and oriented to person, place, and time  Cranial Nerves: No cranial nerve deficit  Psychiatric:         Mood and Affect: Mood normal          Behavior: Behavior normal          Thought Content: Thought content normal          Judgment: Judgment normal          Cardiographics  ECG: normal sinus rhythm, no blocks or conduction defects, no ischemic changes    Labs:  Lab Results   Component Value Date     04/12/2017    K 3 9 11/25/2020     11/25/2020    CO2 24 11/25/2020    BUN 17 11/25/2020    CREATININE 0 65 11/25/2020    CREATININE 0 79 03/21/2018    CREATININE 0 78 04/12/2017    GLUCOSE 92 04/12/2017    CALCIUM 9 6 03/21/2018    CALCIUM 9 5 04/12/2017     Lab Results   Component Value Date    WBC 6 4 09/18/2020    WBC 7 30 03/21/2018    WBC 7 5 01/13/2017    HGB 13 8 09/18/2020    HGB 15 6 03/21/2018    HGB 13 6 01/13/2017    HCT 43 1 09/18/2020    HCT 48 4 (H) 03/21/2018    HCT 41 5 01/13/2017    MCV 83 09/18/2020    MCV 82 03/21/2018    MCV 83 01/13/2017     09/18/2020     03/21/2018     01/13/2017     Lab Results   Component Value Date    CHOL 153 04/12/2017    TRIG 122 11/25/2020    HDL 40 11/25/2020     Imaging: Us Abdomen Limited    Result Date: 10/16/2019  Narrative: RIGHT UPPER QUADRANT ULTRASOUND INDICATION:    R74 8: Abnormal levels of other serum enzymes  COMPARISON:  CT abdomen pelvis 12/17/2014 TECHNIQUE:   Real-time ultrasound of the right upper quadrant was performed with a curvilinear transducer with both volumetric sweeps and still imaging techniques  FINDINGS: PANCREAS:  Visualized portions of the pancreas are within normal limits  AORTA AND IVC:  Visualized portions are normal for patient age  LIVER: Size:  Mildly enlarged  The liver measures 18 5 cm in the midclavicular line  Contour:  Surface contour is smooth  Parenchyma:   There is moderate diffuse increased echogenicity with smooth echotexture and acoustic beam attenuation  Most consistent with moderate hepatic steatosis  No evidence of suspicious mass  Limited imaging of the main portal vein shows it to be patent and hepatopetal   BILIARY: The gallbladder is normal in caliber  No wall thickening or pericholecystic fluid  No stones or sludge identified  No sonographic Hardin's sign  No intrahepatic biliary dilatation  CBD measures 2 mm  No choledocholithiasis  KIDNEY: Right kidney measures 12 6 x 5 7 cm  Within normal limits  Non-shadowing echogenic foci in the lower pole likely sinus fat  ASCITES:   None  Impression: 1  Prominent, fatty liver  2   No cholelithiasis or biliary ductal obstruction  Workstation performed: [id number]       Discussion/Summary:  1  Essential hypertension    2  Supraventricular tachycardia ([address] )    3  Benign essential hypertension      - she continues to be free of SVT and atrial fibrillation  Tolerating sotalol  QT interval is normal today  Renal function normal along will electrolytes  - Discussed weight loss  She was previously following at bariatric center  Encouraged to increase exercise as well  She will attempt to walk more [place]      - blood pressure controlled with olmesartan and metoprolol   - continue atorvastatin   - Blood work ordered

## 2021-06-22 ENCOUNTER — TELEPHONE (OUTPATIENT)
Dept: CARDIOLOGY CLINIC | Facility: CLINIC | Age: 61
End: 2021-06-22

## 2021-06-22 LAB
ALBUMIN SERPL-MCNC: 4.4 G/DL (ref 3.8–4.8)
ALBUMIN/GLOB SERPL: 2.1 {RATIO} (ref 1.2–2.2)
ALP SERPL-CCNC: 109 IU/L (ref 48–121)
ALT SERPL-CCNC: 51 IU/L (ref 0–32)
AST SERPL-CCNC: 44 IU/L (ref 0–40)
BASOPHILS # BLD AUTO: 0.1 X10E3/UL (ref 0–0.2)
BASOPHILS NFR BLD AUTO: 1 %
BILIRUB SERPL-MCNC: 0.5 MG/DL (ref 0–1.2)
BUN SERPL-MCNC: 14 MG/DL (ref 8–27)
BUN/CREAT SERPL: 16 (ref 12–28)
CALCIUM SERPL-MCNC: 9.6 MG/DL (ref 8.7–10.3)
CHLORIDE SERPL-SCNC: 103 MMOL/L (ref 96–106)
CHOLEST SERPL-MCNC: 154 MG/DL (ref 100–199)
CO2 SERPL-SCNC: 23 MMOL/L (ref 20–29)
CREAT SERPL-MCNC: 0.85 MG/DL (ref 0.57–1)
EOSINOPHIL # BLD AUTO: 0.1 X10E3/UL (ref 0–0.4)
EOSINOPHIL NFR BLD AUTO: 1 %
ERYTHROCYTE [DISTWIDTH] IN BLOOD BY AUTOMATED COUNT: 14.6 % (ref 11.7–15.4)
GLOBULIN SER-MCNC: 2.1 G/DL (ref 1.5–4.5)
GLUCOSE SERPL-MCNC: 150 MG/DL (ref 65–99)
HCT VFR BLD AUTO: 41.7 % (ref 34–46.6)
HDLC SERPL-MCNC: 38 MG/DL
HGB BLD-MCNC: 13.5 G/DL (ref 11.1–15.9)
IMM GRANULOCYTES # BLD: 0 X10E3/UL (ref 0–0.1)
IMM GRANULOCYTES NFR BLD: 0 %
LDLC SERPL CALC-MCNC: 90 MG/DL (ref 0–99)
LYMPHOCYTES # BLD AUTO: 2.3 X10E3/UL (ref 0.7–3.1)
LYMPHOCYTES NFR BLD AUTO: 35 %
MCH RBC QN AUTO: 27.3 PG (ref 26.6–33)
MCHC RBC AUTO-ENTMCNC: 32.4 G/DL (ref 31.5–35.7)
MCV RBC AUTO: 84 FL (ref 79–97)
MONOCYTES # BLD AUTO: 0.4 X10E3/UL (ref 0.1–0.9)
MONOCYTES NFR BLD AUTO: 6 %
NEUTROPHILS # BLD AUTO: 3.7 X10E3/UL (ref 1.4–7)
NEUTROPHILS NFR BLD AUTO: 57 %
PLATELET # BLD AUTO: 151 X10E3/UL (ref 150–450)
POTASSIUM SERPL-SCNC: 3.9 MMOL/L (ref 3.5–5.2)
PROT SERPL-MCNC: 6.5 G/DL (ref 6–8.5)
RBC # BLD AUTO: 4.94 X10E6/UL (ref 3.77–5.28)
SL AMB EGFR AFRICAN AMERICAN: 86 ML/MIN/1.73
SL AMB EGFR NON AFRICAN AMERICAN: 74 ML/MIN/1.73
SL AMB VLDL CHOLESTEROL CALC: 26 MG/DL (ref 5–40)
SODIUM SERPL-SCNC: 141 MMOL/L (ref 134–144)
TRIGL SERPL-MCNC: 145 MG/DL (ref 0–149)
WBC # BLD AUTO: 6.5 X10E3/UL (ref 3.4–10.8)

## 2021-06-22 NOTE — TELEPHONE ENCOUNTER
----- Message from Joann Omer DO sent at 6/21/2021  3:45 PM EDT -----  Can you please let the patient know blood work looked good    LDL was 90

## 2021-06-23 ENCOUNTER — OFFICE VISIT (OUTPATIENT)
Dept: PODIATRY | Facility: CLINIC | Age: 61
End: 2021-06-23
Payer: COMMERCIAL

## 2021-06-23 VITALS — WEIGHT: 293 LBS | RESPIRATION RATE: 16 BRPM | BODY MASS INDEX: 50.02 KG/M2 | HEIGHT: 64 IN

## 2021-06-23 DIAGNOSIS — M79.671 PAIN IN BOTH FEET: ICD-10-CM

## 2021-06-23 DIAGNOSIS — M21.969 ACQUIRED DEFORMITY OF FOOT, UNSPECIFIED LATERALITY: Primary | ICD-10-CM

## 2021-06-23 DIAGNOSIS — L03.039 PARONYCHIA OF TOENAIL, UNSPECIFIED LATERALITY: ICD-10-CM

## 2021-06-23 DIAGNOSIS — M79.672 PAIN IN BOTH FEET: ICD-10-CM

## 2021-06-23 DIAGNOSIS — B35.1 ONYCHOMYCOSIS: ICD-10-CM

## 2021-06-23 DIAGNOSIS — L60.0 INGROWN TOENAIL: ICD-10-CM

## 2021-06-23 PROCEDURE — 99213 OFFICE O/P EST LOW 20 MIN: CPT | Performed by: PODIATRIST

## 2021-06-23 NOTE — PROGRESS NOTES
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     last assessed 4/4/13    Atrial fibrillation (HCC)      Atrial flutter (HCC)      Benign essential hypertension       last assessed 12/21/17     Body mass index (BMI) of 50-59 9 in adult [facility])      Carpal tunnel syndrome 09/07/2004     unspecified laterality  / last assessed 9/7/04    Congenital pes planus       last assessed 7/15/14     Depression      Depression      Hemangioma of skin 08/26/2003     last assessed 8/26/03    History of gastroesophageal reflux (GERD)      Hypercholesterolemia      Hyperlipidemia      Hypertension      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[facility] [address] ) 04/19/2006     last assessed 12/31/14     Osteoarthritis of both knees       last assessed 12/31/14     Paroxysmal atrial fibrillation (HCC)      S/P ablation of atrial flutter                     Past Surgical History:   Procedure Laterality Date    ABDOMINAL SURGERY   06/2006     20cm GIST removed     APPENDECTOMY        ATRIAL ABLATION SURGERY        CARPAL TUNNEL RELEASE Bilateral      COLONOSCOPY        HYSTERECTOMY        KNEE SURGERY        OOPHORECTOMY        TONSILLECTOMY        TUMOR EXCISION   2006     gastrointestinal stromal tumor                   Allergies   Allergen Reactions    Other         Adhesive tape             Current Outpatient Prescriptions:     aspirin 81 MG tablet, Take 81 mg by mouth daily , Disp: , Rfl:     esomeprazole (NexIUM) 40 MG capsule, Take 1 capsule (40 mg total) by mouth daily, Disp: 90 capsule, Rfl: 1    ibuprofen (MOTRIN) 800 mg tablet, Take 1 tablet (800 mg total) by mouth every 8 (eight) hours as needed for mild pain, Disp: 30 tablet, Rfl: 0    Magnesium Oxide -Mg Supplement 400 MG CAPS, Take 1 capsule by mouth daily, Disp: , Rfl:     metoprolol succinate (TOPROL-XL) 25 mg 24 hr tablet, Take 1 tablet (25 mg total) by mouth 2 (two) times a day, Disp: 180 tablet, Rfl: 2    multivitamin (THERAGRAN) TABS, Take 1 tablet by mouth daily  , Disp: , Rfl:     olmesartan (BENICAR) 20 mg tablet, Take 1 tablet (20 mg total) by mouth daily, Disp: 30 tablet, Rfl: 0    Omega-3 Fatty Acids (FISH OIL) 1,000 mg, Take 1,000 mg by mouth 2 (two) times a day  , Disp: , Rfl:     PARoxetine (PAXIL-CR) 37 5 mg 24 hr tablet, Take 1 tablet (37 5 mg total) by mouth every morning, Disp: 90 tablet, Rfl: 3    pravastatin (PRAVACHOL) 40 mg tablet, Take 1 tablet (40 mg total) by mouth daily (Patient taking differently: Take 40 mg by mouth daily at bedtime  ), Disp: 90 tablet, Rfl: 1    predniSONE 20 mg tablet, Take 2 tablets (40 mg total) by mouth daily, Disp: 14 tablet, Rfl: 0    rosuvastatin (CRESTOR) 20 MG tablet, TAKE 1 TABLET DAILY, Disp: 90 tablet, Rfl: 3    sotalol (BETAPACE) 120 mg tablet, TAKE 1 TABLET BY MOUTH  EVERY 12 HOURS, Disp: 180 tablet, Rfl: 2           Patient Active Problem List   Diagnosis    Tachycardia    Dyspnea on exertion    Supraventricular tachycardia (HCC)    Hypertension    Controlled depression    Severe obstructive sleep apnea    Morbid obesity (HCC)    Impaired fasting glucose    Hyperlipidemia    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)    Esophageal reflux    Benign essential hypertension    Adenomatous colon polyp    Open wound of left lower extremity             Patient ID: Alecia Vieira is a 59 y o  femaleClaria Grief     Objective:  Patient's shoes and socks removed    Foot Exam     General  General Appearance: appears stated age and healthy   Orientation: alert and oriented to person, place, and time   Affect: appropriate   Gait: antalgic         Right Foot/Ankle      Inspection and Palpation  Tenderness: metatarsals   Swelling: dorsum and metatarsals   Arch: pes planus  Hammertoes: fifth toe  Skin Exam: dry skin;      Neurovascular  Dorsalis pedis: 1+  Posterior tibial: 2+  Superficial peroneal nerve sensation: diminished  Deep peroneal nerve sensation: diminished        Left Foot/Ankle       Inspection and Palpation  Tenderness: metatarsals   Swelling: dorsum and metatarsals   Arch: pes planus  Hammertoes: fifth toe  Skin Exam: dry skin;      Neurovascular  Dorsalis pedis: 1+  Posterior tibial: 2+  Superficial peroneal nerve sensation: diminished  Deep peroneal nerve sensation: diminished           Physical Exam   Constitutional: She appears well-developed and well-nourished  Cardiovascular: Normal rate and regular rhythm     Pulses:       Dorsalis pedis pulses are 1+ on the right side, and 1+ on the left side         Posterior tibial pulses are 2+ on the right side, and 2+ on the left side  Feet:   Right Foot:   Skin Integrity: Positive for dry skin  Left Foot:   Skin Integrity: Positive for dry skin     Skin:   Wide incurvated hallux toenail bilateral   Nails demonstrate mycosis   Both hallux is demonstrate ingrown toenail

## 2021-06-25 ENCOUNTER — TELEPHONE (OUTPATIENT)
Dept: CARDIOLOGY CLINIC | Facility: CLINIC | Age: 61
End: 2021-06-25

## 2021-06-25 NOTE — TELEPHONE ENCOUNTER
----- Message from Fransico Correa DO sent at 6/24/2021  2:20 PM EDT -----  Can you please let the patient know blood work looked good except sugar was elevated

## 2021-06-25 NOTE — TELEPHONE ENCOUNTER
Spoke with patient, message given per Dr Gómez Ryan  Patient verbalized understanding  Patient reports f/u with PCP

## 2021-07-06 ENCOUNTER — OFFICE VISIT (OUTPATIENT)
Dept: FAMILY MEDICINE CLINIC | Facility: CLINIC | Age: 61
End: 2021-07-06
Payer: COMMERCIAL

## 2021-07-06 VITALS
RESPIRATION RATE: 14 BRPM | TEMPERATURE: 98.4 F | HEIGHT: 64 IN | BODY MASS INDEX: 50.02 KG/M2 | DIASTOLIC BLOOD PRESSURE: 80 MMHG | WEIGHT: 293 LBS | HEART RATE: 72 BPM | SYSTOLIC BLOOD PRESSURE: 120 MMHG

## 2021-07-06 DIAGNOSIS — N39.0 URINARY TRACT INFECTION WITHOUT HEMATURIA, SITE UNSPECIFIED: Primary | ICD-10-CM

## 2021-07-06 DIAGNOSIS — R35.0 URINARY FREQUENCY: ICD-10-CM

## 2021-07-06 DIAGNOSIS — E11.9 TYPE 2 DIABETES MELLITUS WITHOUT COMPLICATION, WITHOUT LONG-TERM CURRENT USE OF INSULIN (HCC): ICD-10-CM

## 2021-07-06 LAB
SL AMB  POCT GLUCOSE, UA: >1000
SL AMB LEUKOCYTE ESTERASE,UA: 75
SL AMB POCT BILIRUBIN,UA: ABNORMAL
SL AMB POCT BLOOD,UA: 250
SL AMB POCT CLARITY,UA: ABNORMAL
SL AMB POCT COLOR,UA: ABNORMAL
SL AMB POCT HEMOGLOBIN AIC: 6.2 (ref ?–6.5)
SL AMB POCT KETONES,UA: ABNORMAL
SL AMB POCT NITRITE,UA: ABNORMAL
SL AMB POCT PH,UA: 5
SL AMB POCT SPECIFIC GRAVITY,UA: 1.02
SL AMB POCT URINE PROTEIN: ABNORMAL
SL AMB POCT UROBILINOGEN: ABNORMAL

## 2021-07-06 PROCEDURE — 83036 HEMOGLOBIN GLYCOSYLATED A1C: CPT | Performed by: FAMILY MEDICINE

## 2021-07-06 PROCEDURE — 3044F HG A1C LEVEL LT 7.0%: CPT | Performed by: FAMILY MEDICINE

## 2021-07-06 PROCEDURE — 81003 URINALYSIS AUTO W/O SCOPE: CPT | Performed by: FAMILY MEDICINE

## 2021-07-06 PROCEDURE — 4004F PT TOBACCO SCREEN RCVD TLK: CPT | Performed by: FAMILY MEDICINE

## 2021-07-06 PROCEDURE — 3061F NEG MICROALBUMINURIA REV: CPT | Performed by: FAMILY MEDICINE

## 2021-07-06 PROCEDURE — 99214 OFFICE O/P EST MOD 30 MIN: CPT | Performed by: FAMILY MEDICINE

## 2021-07-06 PROCEDURE — 3074F SYST BP LT 130 MM HG: CPT | Performed by: FAMILY MEDICINE

## 2021-07-06 PROCEDURE — 3079F DIAST BP 80-89 MM HG: CPT | Performed by: FAMILY MEDICINE

## 2021-07-06 PROCEDURE — 3008F BODY MASS INDEX DOCD: CPT | Performed by: FAMILY MEDICINE

## 2021-07-06 RX ORDER — CIPROFLOXACIN 250 MG/1
250 TABLET, FILM COATED ORAL EVERY 12 HOURS SCHEDULED
Qty: 14 TABLET | Refills: 0 | Status: SHIPPED | OUTPATIENT
Start: 2021-07-06 | End: 2021-07-13

## 2021-07-06 NOTE — PROGRESS NOTES
Chief Complaint   Patient presents with    Urinary Tract Infection    Diabetes        Patient ID: Vincent Ribeiro is a 63 y o  female  HPI  Pt is seeing for UTI symptoms x 2 days ( no GI symptoms, no fever,  No therapy tried, not better ) and f/u on DM -  On Xigduo -  This is 3rd UTI since on this med    The following portions of the patient's history were reviewed and updated as appropriate: allergies, current medications, past family history, past medical history, past social history, past surgical history and problem list     Review of Systems   Constitutional: Negative  Respiratory: Negative  Cardiovascular: Negative  Gastrointestinal: Negative  Genitourinary: Positive for dysuria, frequency and urgency  Negative for decreased urine volume, difficulty urinating, hematuria and pelvic pain  Musculoskeletal: Negative  Skin: Negative  Neurological: Negative  Current Outpatient Medications   Medication Sig Dispense Refill    aspirin 81 MG tablet Take 81 mg by mouth daily   atorvastatin (LIPITOR) 40 mg tablet TAKE 1 TABLET BY MOUTH  DAILY 90 tablet 3    Dapagliflozin-metFORMIN HCl ER (Xigduo XR) 5-500 MG TB24 Take 1 tablet by mouth daily 90 tablet 1    Magnesium Oxide -Mg Supplement 400 MG CAPS Take 1 capsule by mouth daily      metoprolol succinate (TOPROL-XL) 25 mg 24 hr tablet Take 1 tablet (25 mg total) by mouth 2 (two) times a day 180 tablet 3    multivitamin (THERAGRAN) TABS Take 1 tablet by mouth daily        olmesartan (BENICAR) 20 mg tablet TAKE 1 TABLET BY MOUTH  DAILY 90 tablet 3    Omega-3 Fatty Acids (FISH OIL) 1,000 mg Take 1,000 mg by mouth 2 (two) times a day        pantoprazole (PROTONIX) 40 mg tablet TAKE 1 TABLET BY MOUTH  DAILY BEFORE BREAKFAST 90 tablet 3    PARoxetine (PAXIL-CR) 37 5 mg 24 hr tablet TAKE 1 TABLET BY MOUTH IN  THE MORNING 90 tablet 3    sotalol (BETAPACE) 120 mg tablet Take 1 tablet (120 mg total) by mouth every 12 (twelve) hours 180 tablet 3    VITAMIN D PO Take 2,000 Units by mouth 2 (two) times a day       No current facility-administered medications for this visit  Objective:    /80 (BP Location: Left arm, Patient Position: Sitting, Cuff Size: Adult)   Pulse 72   Temp 98 4 °F (36 9 °C) (Temporal)   Resp 14   Ht 5' 4" (1 626 m)   Wt (!) 144 kg (317 lb)   BMI 54 41 kg/m²        Physical Exam  Constitutional:       Appearance: She is obese  She is not ill-appearing  Cardiovascular:      Rate and Rhythm: Normal rate and regular rhythm  Pulmonary:      Effort: Pulmonary effort is normal  No respiratory distress  Abdominal:      Palpations: Abdomen is soft  Tenderness: There is abdominal tenderness in the suprapubic area  There is no guarding  Musculoskeletal:      Right lower leg: No edema  Left lower leg: No edema  Neurological:      General: No focal deficit present  Mental Status: She is alert and oriented to person, place, and time  Cranial Nerves: No cranial nerve deficit  Labs in chart were reviewed    Recent Results (from the past 672 hour(s))   Niki Rush Default    Collection Time: 06/18/21  7:05 AM   Result Value Ref Range    White Blood Cell Count 6 5 3 4 - 10 8 x10E3/uL    Red Blood Cell Count 4 94 3 77 - 5 28 x10E6/uL    Hemoglobin 13 5 11 1 - 15 9 g/dL    HCT 41 7 34 0 - 46 6 %    MCV 84 79 - 97 fL    MCH 27 3 26 6 - 33 0 pg    MCHC 32 4 31 5 - 35 7 g/dL    RDW 14 6 11 7 - 15 4 %    Platelet Count 249 535 - 450 x10E3/uL    Neutrophils 57 Not Estab  %    Lymphocytes 35 Not Estab  %    Monocytes 6 Not Estab  %    Eosinophils 1 Not Estab  %    Basophils PCT 1 Not Estab  %    Neutrophils (Absolute) 3 7 1 4 - 7 0 x10E3/uL    Lymphocytes (Absolute) 2 3 0 7 - 3 1 x10E3/uL    Monocytes (Absolute) 0 4 0 1 - 0 9 x10E3/uL    Eosinophils (Absolute) 0 1 0 0 - 0 4 x10E3/uL    Basophils ABS 0 1 0 0 - 0 2 x10E3/uL    Immature Granulocytes 0 Not Estab  %    Immature Granulocytes (Absolute) 0 0 0 0 - 0 1 x10E3/uL   Comprehensive metabolic panel    Collection Time: 06/18/21  7:05 AM   Result Value Ref Range    Glucose, Random 150 (H) 65 - 99 mg/dL    BUN 14 8 - 27 mg/dL    Creatinine 0 85 0 57 - 1 00 mg/dL    eGFR Non  74 >59 mL/min/1 73    eGFR  86 >59 mL/min/1 73    SL AMB BUN/CREATININE RATIO 16 12 - 28    Sodium 141 134 - 144 mmol/L    Potassium 3 9 3 5 - 5 2 mmol/L    Chloride 103 96 - 106 mmol/L    CO2 23 20 - 29 mmol/L    CALCIUM 9 6 8 7 - 10 3 mg/dL    Protein, Total 6 5 6 0 - 8 5 g/dL    Albumin 4 4 3 8 - 4 8 g/dL    Globulin, Total 2 1 1 5 - 4 5 g/dL    Albumin/Globulin Ratio 2 1 1 2 - 2 2    TOTAL BILIRUBIN 0 5 0 0 - 1 2 mg/dL    Alk Phos Isoenzymes 109 48 - 121 IU/L    AST 44 (H) 0 - 40 IU/L    ALT 51 (H) 0 - 32 IU/L   Lipid panel    Collection Time: 06/18/21  7:05 AM   Result Value Ref Range    Cholesterol, Total 154 100 - 199 mg/dL    Triglycerides 145 0 - 149 mg/dL    HDL 38 (L) >39 mg/dL    VLDL Cholesterol Calculated 26 5 - 40 mg/dL    LDL Calculated 90 0 - 99 mg/dL   POCT urine dip auto non-scope    Collection Time: 07/06/21  4:04 PM   Result Value Ref Range     COLOR,UA sonny     CLARITY,UA cloudy     SPECIFIC GRAVITY,UA 1 020      PH,UA 5     LEUKOCYTE ESTERASE,UA 75     NITRITE,UA neg     GLUCOSE, UA >1,000     KETONES,UA neg     BILIRUBIN,UA neg     BLOOD,     POCT URINE PROTEIN trace     SL AMB POCT UROBILINOGEN norm    POCT hemoglobin A1c    Collection Time: 07/06/21  4:18 PM   Result Value Ref Range    Hemoglobin A1C 6 2 6 5       Assessment/Plan:         Diagnoses and all orders for this visit:    Urinary tract infection without hematuria, site unspecified  -     ciprofloxacin (CIPRO) 250 mg tablet; Take 1 tablet (250 mg total) by mouth every 12 (twelve) hours for 7 days    Urinary frequency  -     POCT urine dip auto non-scope  -     Urine culture  -     ciprofloxacin (CIPRO) 250 mg tablet;  Take 1 tablet (250 mg total) by mouth every 12 (twelve) hours for 7 days    Type 2 diabetes mellitus without complication, without long-term current use of insulin (HCC)  -     POCT hemoglobin A1c  -     SITagliptin-metFORMIN HCl ER  MG TB24; Take 1 tablet by mouth daily with dinner      will stop Xigduo       rto in 3 m               Nnamdi Corado MD

## 2021-07-08 LAB
BACTERIA UR CULT: NORMAL
Lab: NORMAL

## 2021-08-26 ENCOUNTER — OFFICE VISIT (OUTPATIENT)
Dept: PODIATRY | Facility: CLINIC | Age: 61
End: 2021-08-26
Payer: COMMERCIAL

## 2021-08-26 VITALS — WEIGHT: 293 LBS | BODY MASS INDEX: 50.02 KG/M2 | RESPIRATION RATE: 17 BRPM | HEIGHT: 64 IN

## 2021-08-26 DIAGNOSIS — L03.039 PARONYCHIA OF TOENAIL, UNSPECIFIED LATERALITY: ICD-10-CM

## 2021-08-26 DIAGNOSIS — L60.0 INGROWN TOENAIL: ICD-10-CM

## 2021-08-26 DIAGNOSIS — B35.1 ONYCHOMYCOSIS: ICD-10-CM

## 2021-08-26 DIAGNOSIS — M79.672 PAIN IN BOTH FEET: Primary | ICD-10-CM

## 2021-08-26 DIAGNOSIS — M21.969 ACQUIRED DEFORMITY OF FOOT, UNSPECIFIED LATERALITY: ICD-10-CM

## 2021-08-26 DIAGNOSIS — M79.671 PAIN IN BOTH FEET: Primary | ICD-10-CM

## 2021-08-26 PROCEDURE — 99213 OFFICE O/P EST LOW 20 MIN: CPT | Performed by: PODIATRIST

## 2021-08-26 NOTE — PROGRESS NOTES
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     last assessed 4/4/13    Atrial fibrillation (HCC)      Atrial flutter (HCC)      Benign essential hypertension       last assessed 12/21/17     Body mass index (BMI) of 50-59 9 in adult [facility])      Carpal tunnel syndrome 09/07/2004     unspecified laterality  / last assessed 9/7/04    Congenital pes planus       last assessed 7/15/14     Depression      Depression      Hemangioma of skin 08/26/2003     last assessed 8/26/03    History of gastroesophageal reflux (GERD)      Hypercholesterolemia      Hyperlipidemia      Hypertension      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[facility] [address] ) 04/19/2006     last assessed 12/31/14     Osteoarthritis of both knees       last assessed 12/31/14     Paroxysmal atrial fibrillation (HCC)      S/P ablation of atrial flutter                     Past Surgical History:   Procedure Laterality Date    ABDOMINAL SURGERY   06/2006     20cm GIST removed     APPENDECTOMY        ATRIAL ABLATION SURGERY        CARPAL TUNNEL RELEASE Bilateral      COLONOSCOPY        HYSTERECTOMY        KNEE SURGERY        OOPHORECTOMY        TONSILLECTOMY        TUMOR EXCISION   2006     gastrointestinal stromal tumor                   Allergies   Allergen Reactions    Other         Adhesive tape             Current Outpatient Prescriptions:     aspirin 81 MG tablet, Take 81 mg by mouth daily  , Disp: , Rfl:     esomeprazole (NexIUM) 40 MG capsule, Take 1 capsule (40 mg total) by mouth daily, Disp: 90 capsule, Rfl: 1    ibuprofen (MOTRIN) 800 mg tablet, Take 1 tablet (800 mg total) by mouth every 8 (eight) hours as needed for mild pain, Disp: 30 tablet, Rfl: 0    Magnesium Oxide -Mg Supplement 400 MG CAPS, Take 1 capsule by mouth daily, Disp: , Rfl:     metoprolol succinate (TOPROL-XL) 25 mg 24 hr tablet, Take 1 tablet (25 mg total) by mouth 2 (two) times a day, Disp: 180 tablet, Rfl: 2    multivitamin (THERAGRAN) TABS, Take 1 tablet by mouth daily  , Disp: , Rfl:     olmesartan (BENICAR) 20 mg tablet, Take 1 tablet (20 mg total) by mouth daily, Disp: 30 tablet, Rfl: 0    Omega-3 Fatty Acids (FISH OIL) 1,000 mg, Take 1,000 mg by mouth 2 (two) times a day  , Disp: , Rfl:     PARoxetine (PAXIL-CR) 37 5 mg 24 hr tablet, Take 1 tablet (37 5 mg total) by mouth every morning, Disp: 90 tablet, Rfl: 3    pravastatin (PRAVACHOL) 40 mg tablet, Take 1 tablet (40 mg total) by mouth daily (Patient taking differently: Take 40 mg by mouth daily at bedtime  ), Disp: 90 tablet, Rfl: 1    predniSONE 20 mg tablet, Take 2 tablets (40 mg total) by mouth daily, Disp: 14 tablet, Rfl: 0    rosuvastatin (CRESTOR) 20 MG tablet, TAKE 1 TABLET DAILY, Disp: 90 tablet, Rfl: 3    sotalol (BETAPACE) 120 mg tablet, TAKE 1 TABLET BY MOUTH  EVERY 12 HOURS, Disp: 180 tablet, Rfl: 2           Patient Active Problem List   Diagnosis    Tachycardia    Dyspnea on exertion    Supraventricular tachycardia (HCC)    Hypertension    Controlled depression    Severe obstructive sleep apnea    Morbid obesity (HCC)    Impaired fasting glucose    Hyperlipidemia    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)    Esophageal reflux    Benign essential hypertension    Adenomatous colon polyp    Open wound of left lower extremity             Patient ID: Alecia Vieira is a 59 y o  female          Objective:  Patient's shoes and socks removed    Foot Exam     General  General Appearance: appears stated age and healthy   Orientation: alert and oriented to person, place, and time   Affect: appropriate   Gait: antalgic         Right Foot/Ankle      Inspection and Palpation  Tenderness: metatarsals   Swelling: dorsum and metatarsals   Arch: pes planus  Hammertoes: fifth toe  Skin Exam: dry skin;      Neurovascular  Dorsalis pedis: 1+  Posterior tibial: 2+  Superficial peroneal nerve sensation: diminished  Deep peroneal nerve sensation: diminished        Left Foot/Ankle       Inspection and Palpation  Tenderness: metatarsals   Swelling: dorsum and metatarsals   Arch: pes planus  Hammertoes: fifth toe  Skin Exam: dry skin;      Neurovascular  Dorsalis pedis: 1+  Posterior tibial: 2+  Superficial peroneal nerve sensation: diminished  Deep peroneal nerve sensation: diminished           Physical Exam   Constitutional: She appears well-developed and well-nourished  Cardiovascular: Normal rate and regular rhythm     Pulses:       Dorsalis pedis pulses are 1+ on the right side, and 1+ on the left side         Posterior tibial pulses are 2+ on the right side, and 2+ on the left side  Feet:   Right Foot:   Skin Integrity: Positive for dry skin  Left Foot:   Skin Integrity: Positive for dry skin     Skin:   Wide incurvated hallux toenail bilateral   Nails demonstrate mycosis   Both hallux is demonstrate ingrown toenail

## 2021-09-03 ENCOUNTER — OFFICE VISIT (OUTPATIENT)
Dept: FAMILY MEDICINE CLINIC | Facility: CLINIC | Age: 61
End: 2021-09-03
Payer: COMMERCIAL

## 2021-09-03 VITALS
WEIGHT: 293 LBS | HEART RATE: 78 BPM | TEMPERATURE: 97.5 F | BODY MASS INDEX: 50.02 KG/M2 | RESPIRATION RATE: 16 BRPM | HEIGHT: 64 IN | SYSTOLIC BLOOD PRESSURE: 100 MMHG | DIASTOLIC BLOOD PRESSURE: 74 MMHG

## 2021-09-03 DIAGNOSIS — E11.9 TYPE 2 DIABETES MELLITUS WITHOUT COMPLICATION, WITHOUT LONG-TERM CURRENT USE OF INSULIN (HCC): Primary | ICD-10-CM

## 2021-09-03 DIAGNOSIS — Z12.11 ENCOUNTER FOR COLORECTAL CANCER SCREENING: ICD-10-CM

## 2021-09-03 DIAGNOSIS — Z23 NEED FOR VACCINATION: ICD-10-CM

## 2021-09-03 DIAGNOSIS — Z12.12 ENCOUNTER FOR COLORECTAL CANCER SCREENING: ICD-10-CM

## 2021-09-03 DIAGNOSIS — R10.9 ABDOMINAL PAIN, UNSPECIFIED ABDOMINAL LOCATION: ICD-10-CM

## 2021-09-03 PROCEDURE — 90715 TDAP VACCINE 7 YRS/> IM: CPT | Performed by: FAMILY MEDICINE

## 2021-09-03 PROCEDURE — 99214 OFFICE O/P EST MOD 30 MIN: CPT | Performed by: FAMILY MEDICINE

## 2021-09-03 PROCEDURE — 90471 IMMUNIZATION ADMIN: CPT | Performed by: FAMILY MEDICINE

## 2021-09-03 PROCEDURE — 3008F BODY MASS INDEX DOCD: CPT | Performed by: FAMILY MEDICINE

## 2021-09-03 RX ORDER — PEN NEEDLE, DIABETIC 30 GX3/16"
NEEDLE, DISPOSABLE MISCELLANEOUS WEEKLY
Qty: 12 EACH | Refills: 3 | Status: SHIPPED | OUTPATIENT
Start: 2021-09-03

## 2021-09-03 RX ORDER — SEMAGLUTIDE 1.34 MG/ML
INJECTION, SOLUTION SUBCUTANEOUS
Qty: 3 ML | Refills: 0 | Status: SHIPPED | OUTPATIENT
Start: 2021-09-03 | End: 2021-09-07

## 2021-09-03 RX ORDER — SEMAGLUTIDE 1.34 MG/ML
1 INJECTION, SOLUTION SUBCUTANEOUS WEEKLY
Qty: 9 ML | Refills: 3 | Status: SHIPPED | OUTPATIENT
Start: 2021-11-03 | End: 2021-09-07

## 2021-09-03 NOTE — PROGRESS NOTES
Chief Complaint   Patient presents with    Nausea     pt itches randomly all over her body at times     Abdominal Pain     lower for the passed week , pt started new diabetes med ?  Immunizations        Patient ID: Zabrina Rose is a 63 y o  female  HPI  Pt is seeing for dyspepsia on and off with nausea since started on Janumet  - no diarrhea     The following portions of the patient's history were reviewed and updated as appropriate: allergies, current medications, past family history, past medical history, past social history, past surgical history and problem list     Review of Systems   All other systems reviewed and are negative  Current Outpatient Medications   Medication Sig Dispense Refill    aspirin 81 MG tablet Take 81 mg by mouth daily   atorvastatin (LIPITOR) 40 mg tablet TAKE 1 TABLET BY MOUTH  DAILY 90 tablet 3    Magnesium Oxide -Mg Supplement 400 MG CAPS Take 1 capsule by mouth daily      metoprolol succinate (TOPROL-XL) 25 mg 24 hr tablet Take 1 tablet (25 mg total) by mouth 2 (two) times a day 180 tablet 3    multivitamin (THERAGRAN) TABS Take 1 tablet by mouth daily   olmesartan (BENICAR) 20 mg tablet TAKE 1 TABLET BY MOUTH  DAILY 90 tablet 3    Omega-3 Fatty Acids (FISH OIL) 1,000 mg Take 1,000 mg by mouth 2 (two) times a day        pantoprazole (PROTONIX) 40 mg tablet TAKE 1 TABLET BY MOUTH  DAILY BEFORE BREAKFAST 90 tablet 3    PARoxetine (PAXIL-CR) 37 5 mg 24 hr tablet TAKE 1 TABLET BY MOUTH IN  THE MORNING 90 tablet 3    SITagliptin-metFORMIN HCl ER  MG TB24 Take 1 tablet by mouth daily with dinner 90 tablet 1    sotalol (BETAPACE) 120 mg tablet Take 1 tablet (120 mg total) by mouth every 12 (twelve) hours 180 tablet 3    VITAMIN D PO Take 2,000 Units by mouth 2 (two) times a day       No current facility-administered medications for this visit         Objective:    /74 (BP Location: Left arm, Patient Position: Sitting, Cuff Size: Large)   Pulse 78   Temp 97 5 °F (36 4 °C)   Resp 16   Ht 5' 4" (1 626 m)   Wt (!) 146 kg (321 lb)   BMI 55 10 kg/m²        Physical Exam  Constitutional:       Appearance: She is obese  She is not ill-appearing  Cardiovascular:      Rate and Rhythm: Normal rate  Pulmonary:      Effort: Pulmonary effort is normal  No respiratory distress  Abdominal:      Palpations: Abdomen is soft  Tenderness: There is no abdominal tenderness  Neurological:      Mental Status: She is alert  Labs in chart were reviewed  Assessment/Plan:         Diagnoses and all orders for this visit:    Type 2 diabetes mellitus without complication, without long-term current use of insulin (HCC)  -     Semaglutide,0 25 or 0 5MG/DOS, (Ozempic, 0 25 or 0 5 MG/DOSE,) 2 MG/1 5ML SOPN; Inject 0 25 mg under the skin once a week for 28 days, THEN 0 5 mg once a week for 28 days   -     Semaglutide, 1 MG/DOSE, (Ozempic, 1 MG/DOSE,) 2 MG/1 5ML SOPN; Inject 1 mg under the skin once a week  -     Insulin Pen Needle (Pen Needles) 32G X 4 MM MISC; Use once a week  Will stop Janumet   Encounter for colorectal cancer screening  -     Ambulatory referral to Gastroenterology;  Future    Abdominal pain, unspecified abdominal location             rto in 3 m                 Jenny Chen MD

## 2021-09-07 ENCOUNTER — OFFICE VISIT (OUTPATIENT)
Dept: FAMILY MEDICINE CLINIC | Facility: CLINIC | Age: 61
End: 2021-09-07
Payer: COMMERCIAL

## 2021-09-07 VITALS
DIASTOLIC BLOOD PRESSURE: 80 MMHG | WEIGHT: 293 LBS | RESPIRATION RATE: 16 BRPM | HEIGHT: 64 IN | TEMPERATURE: 97.5 F | BODY MASS INDEX: 50.02 KG/M2 | OXYGEN SATURATION: 97 % | SYSTOLIC BLOOD PRESSURE: 120 MMHG | HEART RATE: 65 BPM

## 2021-09-07 DIAGNOSIS — N39.0 URINARY TRACT INFECTION WITH HEMATURIA, SITE UNSPECIFIED: Primary | ICD-10-CM

## 2021-09-07 DIAGNOSIS — R31.9 URINARY TRACT INFECTION WITH HEMATURIA, SITE UNSPECIFIED: Primary | ICD-10-CM

## 2021-09-07 LAB
SL AMB  POCT GLUCOSE, UA: ABNORMAL
SL AMB LEUKOCYTE ESTERASE,UA: 75
SL AMB POCT BILIRUBIN,UA: ABNORMAL
SL AMB POCT BLOOD,UA: 250
SL AMB POCT CLARITY,UA: ABNORMAL
SL AMB POCT COLOR,UA: ABNORMAL
SL AMB POCT KETONES,UA: 15
SL AMB POCT NITRITE,UA: ABNORMAL
SL AMB POCT PH,UA: 5
SL AMB POCT SPECIFIC GRAVITY,UA: 1020
SL AMB POCT URINE PROTEIN: 30
SL AMB POCT UROBILINOGEN: ABNORMAL

## 2021-09-07 PROCEDURE — 99213 OFFICE O/P EST LOW 20 MIN: CPT | Performed by: NURSE PRACTITIONER

## 2021-09-07 PROCEDURE — 81003 URINALYSIS AUTO W/O SCOPE: CPT | Performed by: NURSE PRACTITIONER

## 2021-09-07 RX ORDER — SEMAGLUTIDE 1.34 MG/ML
INJECTION, SOLUTION SUBCUTANEOUS
COMMUNITY
Start: 2021-09-03 | End: 2022-02-28 | Stop reason: SDUPTHER

## 2021-09-07 RX ORDER — CEPHALEXIN 500 MG/1
500 CAPSULE ORAL EVERY 12 HOURS SCHEDULED
Qty: 14 CAPSULE | Refills: 0 | Status: SHIPPED | OUTPATIENT
Start: 2021-09-07 | End: 2021-09-14

## 2021-09-07 NOTE — PROGRESS NOTES
Assessment:      UTI      Plan:  Plan:      1  Medications: keflex  2  Maintain adequate hydration  3  Follow up if symptoms not improving, and prn  Subjective:      Rogelio Corado is a 63 y o  female who complains of burning with urination, foul smelling urine, frequency, hematuria and urgency for 3 days  Patient denies back pain, fever, stomach ache and vaginal discharge  Patient does have a history of recurrent UTI  Patient does not have a history of pyelonephritis  The following portions of the patient's history were reviewed and updated as appropriate: allergies, current medications, past family history, past medical history, past social history, past surgical history and problem list     Review of Systems  Pertinent items are noted in HPI  Objective:      /80 (BP Location: Left arm, Patient Position: Sitting, Cuff Size: Large)   Pulse 65   Temp 97 5 °F (36 4 °C)   Resp 16   Ht 5' 4" (1 626 m)   Wt (!) 146 kg (321 lb)   SpO2 97%   BMI 55 10 kg/m²   General: alert and oriented, in no acute distress   Abdomen: soft, non-tender, without masses or organomegaly     Back: CVA tenderness absent   : defer exam     Laboratory:   Urine dipstick shows nitrites, leuks, blood  Micro exam: not done

## 2021-09-10 LAB
BACTERIA UR CULT: NORMAL
Lab: NORMAL

## 2021-09-13 ENCOUNTER — TELEPHONE (OUTPATIENT)
Dept: FAMILY MEDICINE CLINIC | Facility: CLINIC | Age: 61
End: 2021-09-13

## 2021-09-13 NOTE — TELEPHONE ENCOUNTER
Dr Anthony Alarcon:    Patient was in office last week with UTI seen by Farheen Worthington  Patient is vacationing in [place] now and is now experiencing pain blood in urine  She still has an urge of urinating frequently  Should she stop taking medication or can you send in something else?

## 2021-09-16 ENCOUNTER — TRANSITIONAL CARE MANAGEMENT (OUTPATIENT)
Dept: FAMILY MEDICINE CLINIC | Facility: CLINIC | Age: 61
End: 2021-09-16

## 2021-09-16 ENCOUNTER — TELEPHONE (OUTPATIENT)
Dept: UROLOGY | Facility: MEDICAL CENTER | Age: 61
End: 2021-09-16

## 2021-09-16 NOTE — TELEPHONE ENCOUNTER
Complaint/Diagnosis:Post Op Stent Removal    Insurance:[employer] BC/BS     History of Cancer:no    Previous urologist:no    [facility]     If yes,what kind: All    Records requested/where:[phone]    Preferred location:[place]

## 2021-09-16 NOTE — TELEPHONE ENCOUNTER
[facility] she was admitted with 9mm stone and procedure/lithotripsy was performed next day she was discharge with instructions to have stent removed when returned home which is 9/19,please obtain and review records and contact her directly

## 2021-09-17 NOTE — TELEPHONE ENCOUNTER
Pt called stating she was able to obtain her records but not radiology disc they told her they would be mailed to her home,she is still presenting for 9/20 appointment

## 2021-09-17 NOTE — TELEPHONE ENCOUNTER
I spoke to pt and she is still in [place] today so she will go to the hospital and get records and disc  She will be home Sunday and confirmed apt for Monday

## 2021-09-17 NOTE — TELEPHONE ENCOUNTER
Monday is fine I can see her  If it seems appropriate can take her stent out at that time anyway  Or maybe later in the week

## 2021-09-17 NOTE — TELEPHONE ENCOUNTER
Patient scheduled for Monday 9/20/21 at 2pm with Justen GARG  Please confirm and advise patient needs to bring OP report, CT scan report and disc with CT scan images, and any other records she may have

## 2021-09-20 ENCOUNTER — CONSULT (OUTPATIENT)
Dept: UROLOGY | Facility: CLINIC | Age: 61
End: 2021-09-20
Payer: COMMERCIAL

## 2021-09-20 VITALS
DIASTOLIC BLOOD PRESSURE: 82 MMHG | HEART RATE: 90 BPM | WEIGHT: 293 LBS | HEIGHT: 64 IN | BODY MASS INDEX: 50.02 KG/M2 | SYSTOLIC BLOOD PRESSURE: 136 MMHG

## 2021-09-20 DIAGNOSIS — N20.0 CALCULUS OF KIDNEY: Primary | ICD-10-CM

## 2021-09-20 PROCEDURE — 3079F DIAST BP 80-89 MM HG: CPT | Performed by: PHYSICIAN ASSISTANT

## 2021-09-20 PROCEDURE — 99203 OFFICE O/P NEW LOW 30 MIN: CPT | Performed by: PHYSICIAN ASSISTANT

## 2021-09-20 PROCEDURE — 3075F SYST BP GE 130 - 139MM HG: CPT | Performed by: PHYSICIAN ASSISTANT

## 2021-09-20 PROCEDURE — 4004F PT TOBACCO SCREEN RCVD TLK: CPT | Performed by: PHYSICIAN ASSISTANT

## 2021-09-20 PROCEDURE — 3008F BODY MASS INDEX DOCD: CPT | Performed by: PHYSICIAN ASSISTANT

## 2021-09-20 NOTE — PROGRESS NOTES
UROLOGY CONSULTATION NOTE     Patient Identifiers: Giovani Wilson (MRN: [id number])  Service Requesting Consultation: Aaron Paul MD  Service Providing Consultation:  Urology, Amanda Cuadra PA-C  Consults  Date of Service: 9/20/2021    Reason for Consultation:  Retained ureteral stent    History of Present Illness:     Giovani Wilson is a 63 y o  Female with prior history kidney stones was vacationing in [place] and was diagnosed with a 9 millimeter ureteral calculi  Hospital record was provided and reviewed thoroughly  The stone was lasered and removed and stent placed  There was a mild apparent mucosal injury and was recommend the stent remain for 2 weeks  She had no fever chills  Her presenting system was gross hematuria  Review of her preoperative CT scan showed small bilateral nonobstructing calculi measuring 1-3 millimeters  She is mostly comfortable with stent in place other than urinary frequency      Past Medical, Past Surgical History:     Past Medical History:   Diagnosis Date    Abnormal liver function test     last assessed 1/16/17     Adenomatosis     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    last assessed 4/4/13    Atrial fibrillation (HCC)     Atrial flutter (HCC)     Benign essential hypertension     last assessed 12/21/17     Body mass index (BMI) of 50-59 9 in adult [facility])     Carpal tunnel syndrome 09/07/2004    unspecified laterality  / last assessed 9/7/04    Congenital pes planus     last assessed 7/15/14     Depression     Depression     Hemangioma of skin 08/26/2003    last assessed 8/26/03    History of gastroesophageal reflux (GERD)     Hypercholesterolemia     Hyperlipidemia     Hypertension     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[facility] [address] ) 04/19/2006    last assessed 12/31/14     Osteoarthritis of both knees     last assessed 12/31/14     Paroxysmal atrial fibrillation (HCC)     S/P ablation of atrial flutter :    Past Surgical History:   Procedure Laterality Date    ABDOMINAL SURGERY  06/2006    20cm GIST removed     APPENDECTOMY      ATRIAL ABLATION SURGERY      CARPAL TUNNEL RELEASE Bilateral     COLONOSCOPY      HYSTERECTOMY      fibroids    KNEE SURGERY      KNEE SURGERY Left 03/2019    OOPHORECTOMY      cyst    TONSILLECTOMY      TUMOR EXCISION  2006    gastrointestinal stromal tumor   :    Medications, Allergies:     Current Outpatient Medications:     aspirin 81 MG tablet, Take 81 mg by mouth daily  , Disp: , Rfl:     atorvastatin (LIPITOR) 40 mg tablet, TAKE 1 TABLET BY MOUTH  DAILY, Disp: 90 tablet, Rfl: 3    ibuprofen (ADVIL,MOTRIN) 100 MG tablet, Take 100 mg by mouth as needed for mild pain, Disp: , Rfl:     Magnesium Oxide -Mg Supplement 400 MG CAPS, Take 1 capsule by mouth daily, Disp: , Rfl:     metoprolol succinate (TOPROL-XL) 25 mg 24 hr tablet, Take 1 tablet (25 mg total) by mouth 2 (two) times a day, Disp: 180 tablet, Rfl: 3    multivitamin (THERAGRAN) TABS, Take 1 tablet by mouth daily  , Disp: , Rfl:     olmesartan (BENICAR) 20 mg tablet, TAKE 1 TABLET BY MOUTH  DAILY, Disp: 90 tablet, Rfl: 3    Omega-3 Fatty Acids (FISH OIL) 1,000 mg, Take 1,000 mg by mouth 2 (two) times a day  , Disp: , Rfl:     Ozempic, 1 MG/DOSE, 4 MG/3ML SOPN injection pen, , Disp: , Rfl:     pantoprazole (PROTONIX) 40 mg tablet, TAKE 1 TABLET BY MOUTH  DAILY BEFORE BREAKFAST, Disp: 90 tablet, Rfl: 3    PARoxetine (PAXIL-CR) 37 5 mg 24 hr tablet, TAKE 1 TABLET BY MOUTH IN  THE MORNING, Disp: 90 tablet, Rfl: 3    VITAMIN D PO, Take 2,000 Units by mouth 2 (two) times a day, Disp: , Rfl:     ciprofloxacin (CIPRO) 250 mg tablet, Take 1 tablet (250 mg total) by mouth every 12 (twelve) hours for 7 days (Patient not taking: Reported on 9/16/2021), Disp: 14 tablet, Rfl: 0    Insulin Pen Needle (Pen Needles) 32G X 4 MM MISC, Use once a week, Disp: 12 each, Rfl: 3    sotalol (BETAPACE) 120 mg tablet, Take 1 tablet (120 mg total) by mouth every 12 (twelve) hours (Patient not taking: Reported on 9/20/2021), Disp: 180 tablet, Rfl: 3    Allergies: Allergies   Allergen Reactions    Other      Adhesive tape    :    Social and Family History:   Social History:   Social History     Tobacco Use    Smoking status: Light Tobacco Smoker     Years: 9 00    Smokeless tobacco: Current User    Tobacco comment: current some day smoker 1 cigarette/day 4 yrs / former smoker per allscript                           Vaping Use    Vaping Use: Never used   Substance Use Topics    Alcohol use: No     Comment: social drinker per allscript     Drug use: No        Social History     Tobacco Use   Smoking Status Light Tobacco Smoker    Years: 9 00   Smokeless Tobacco Current User   Tobacco Comment    current some day smoker 1 cigarette/day 4 yrs / former smoker per allscript                               Family History:  Family History   Problem Relation Age of Onset    Emphysema Mother     Arthritis Mother     Diabetes Mother     Hypertension Mother     COPD Mother     Arthritis Father     Prostate cancer Father     Cerebral aneurysm Son     No Known Problems Maternal Grandmother     No Known Problems Maternal Grandfather     No Known Problems Paternal Grandmother     No Known Problems Paternal Grandfather     No Known Problems Son     No Known Problems Maternal Aunt     No Known Problems Maternal Aunt     No Known Problems Paternal Aunt     No Known Problems Paternal Aunt    :     Review of Systems:     General: Fever, chills, or night sweats: negative  Cardiac: Negative for chest pain  Pulmonary: Negative for shortness of breath  Gastrointestinal: Abdominal pain negative  Nausea, vomiting, or diarrhea negative,  Genitourinary: See HPI above  Patient does not have hematuria  All other systems queried were negative  Physical Exam:   General: Patient is pleasant and in NAD   Awake and alert  /82   Pulse 90 Ht 5' 4" (1 626 m)   Wt (!) 145 kg (320 lb)   BMI 54 93 kg/m²   HEENT:  Conjunctiva are clear  Constitutional:  pleasant and cooperative     no apparent distress  Cardiac: Peripheral edema: negative  Pulmonary: Non-labored breathing  Abdomen: Soft, non-tender, non-distended  No surgical scars  No masses, tenderness, hernias noted  Genitourinary: Negative CVA tenderness, negative suprapubic tenderness  Extremities: moves all extremities  Neurological:CNII-XII intact  No numbness or tingling  Essentially non focal neurologic exam  Psychiatric:mood affect and behavior normal        Labs:     Lab Results   Component Value Date    HGB 13 5 06/18/2021    HCT 41 7 06/18/2021    WBC 6 5 06/18/2021     06/18/2021   ]    Lab Results   Component Value Date     04/12/2017    K 3 9 06/18/2021     06/18/2021    CO2 23 06/18/2021    BUN 14 06/18/2021    CREATININE 0 85 06/18/2021    CALCIUM 9 6 03/21/2018    GLUCOSE 92 04/12/2017   ]    Imaging:   I personally reviewed the images and report of the following studies, and reviewed them with the patient:    Printed report reviewed and scanned      ASSESSMENT:      1  Nephrolithiasis with retained ureteral stent     PLAN:   - after review the records in the patient history will schedule her for cysto stent removal next week in the office  - after which she will have a ultrasound in about 6 weeks time with follow-up appointment as scheduled  - stent removal note will be sent to the operating physician once completed      Thank you for allowing me to participate in this patients care  Please do not hesitate to call with any additional questions    Marybeth Maya PA-C

## 2021-09-22 ENCOUNTER — VBI (OUTPATIENT)
Dept: ADMINISTRATIVE | Facility: OTHER | Age: 61
End: 2021-09-22

## 2021-09-23 ENCOUNTER — RA CDI HCC (OUTPATIENT)
Dept: OTHER | Facility: HOSPITAL | Age: 61
End: 2021-09-23

## 2021-10-07 ENCOUNTER — TELEPHONE (OUTPATIENT)
Dept: UROLOGY | Facility: AMBULATORY SURGERY CENTER | Age: 61
End: 2021-10-07

## 2021-10-07 ENCOUNTER — PROCEDURE VISIT (OUTPATIENT)
Dept: UROLOGY | Facility: CLINIC | Age: 61
End: 2021-10-07
Payer: COMMERCIAL

## 2021-10-07 VITALS
SYSTOLIC BLOOD PRESSURE: 130 MMHG | WEIGHT: 293 LBS | DIASTOLIC BLOOD PRESSURE: 80 MMHG | HEIGHT: 64 IN | BODY MASS INDEX: 50.02 KG/M2 | HEART RATE: 72 BPM

## 2021-10-07 DIAGNOSIS — N20.0 KIDNEY STONES: Primary | ICD-10-CM

## 2021-10-07 PROCEDURE — 52310 CYSTOSCOPY AND TREATMENT: CPT | Performed by: PHYSICIAN ASSISTANT

## 2021-10-08 ENCOUNTER — RA CDI HCC (OUTPATIENT)
Dept: OTHER | Facility: HOSPITAL | Age: 61
End: 2021-10-08

## 2021-10-14 ENCOUNTER — OFFICE VISIT (OUTPATIENT)
Dept: PODIATRY | Facility: CLINIC | Age: 61
End: 2021-10-14
Payer: COMMERCIAL

## 2021-10-14 VITALS
SYSTOLIC BLOOD PRESSURE: 130 MMHG | WEIGHT: 293 LBS | RESPIRATION RATE: 17 BRPM | DIASTOLIC BLOOD PRESSURE: 80 MMHG | HEIGHT: 64 IN | BODY MASS INDEX: 50.02 KG/M2

## 2021-10-14 DIAGNOSIS — L60.0 INGROWN TOENAIL: ICD-10-CM

## 2021-10-14 DIAGNOSIS — M79.671 PAIN IN BOTH FEET: Primary | ICD-10-CM

## 2021-10-14 DIAGNOSIS — M21.969 ACQUIRED DEFORMITY OF FOOT, UNSPECIFIED LATERALITY: ICD-10-CM

## 2021-10-14 DIAGNOSIS — L03.031 PARONYCHIA OF TOENAIL OF RIGHT FOOT: ICD-10-CM

## 2021-10-14 DIAGNOSIS — M79.672 PAIN IN BOTH FEET: Primary | ICD-10-CM

## 2021-10-14 DIAGNOSIS — B35.1 ONYCHOMYCOSIS: ICD-10-CM

## 2021-10-14 PROCEDURE — 99213 OFFICE O/P EST LOW 20 MIN: CPT | Performed by: PODIATRIST

## 2021-10-25 ENCOUNTER — OFFICE VISIT (OUTPATIENT)
Dept: FAMILY MEDICINE CLINIC | Facility: CLINIC | Age: 61
End: 2021-10-25
Payer: COMMERCIAL

## 2021-10-25 VITALS
WEIGHT: 293 LBS | BODY MASS INDEX: 50.02 KG/M2 | DIASTOLIC BLOOD PRESSURE: 94 MMHG | TEMPERATURE: 97.4 F | HEART RATE: 80 BPM | HEIGHT: 64 IN | SYSTOLIC BLOOD PRESSURE: 128 MMHG

## 2021-10-25 DIAGNOSIS — Z23 NEED FOR INFLUENZA VACCINATION: ICD-10-CM

## 2021-10-25 DIAGNOSIS — E78.5 HYPERLIPIDEMIA, UNSPECIFIED HYPERLIPIDEMIA TYPE: ICD-10-CM

## 2021-10-25 DIAGNOSIS — I10 BENIGN ESSENTIAL HYPERTENSION: ICD-10-CM

## 2021-10-25 DIAGNOSIS — E66.01 MORBID OBESITY (HCC): ICD-10-CM

## 2021-10-25 DIAGNOSIS — E11.9 TYPE 2 DIABETES MELLITUS WITHOUT COMPLICATION, WITHOUT LONG-TERM CURRENT USE OF INSULIN (HCC): Primary | ICD-10-CM

## 2021-10-25 DIAGNOSIS — F32.A CONTROLLED DEPRESSION: Chronic | ICD-10-CM

## 2021-10-25 PROBLEM — N20.0 KIDNEY STONE: Status: ACTIVE | Noted: 2021-10-25

## 2021-10-25 LAB — SL AMB POCT HEMOGLOBIN AIC: 6.3 (ref ?–6.5)

## 2021-10-25 PROCEDURE — 83036 HEMOGLOBIN GLYCOSYLATED A1C: CPT | Performed by: FAMILY MEDICINE

## 2021-10-25 PROCEDURE — 90682 RIV4 VACC RECOMBINANT DNA IM: CPT | Performed by: FAMILY MEDICINE

## 2021-10-25 PROCEDURE — 99214 OFFICE O/P EST MOD 30 MIN: CPT | Performed by: FAMILY MEDICINE

## 2021-10-25 PROCEDURE — 90471 IMMUNIZATION ADMIN: CPT | Performed by: FAMILY MEDICINE

## 2021-10-25 PROCEDURE — 3044F HG A1C LEVEL LT 7.0%: CPT

## 2021-10-25 RX ORDER — BLOOD SUGAR DIAGNOSTIC
1 STRIP MISCELLANEOUS DAILY
Qty: 100 EACH | Refills: 5 | Status: SHIPPED | OUTPATIENT
Start: 2021-10-25 | End: 2022-02-28 | Stop reason: SDUPTHER

## 2021-10-25 RX ORDER — BLOOD-GLUCOSE METER
EACH MISCELLANEOUS DAILY
Qty: 1 KIT | Refills: 0 | Status: SHIPPED | OUTPATIENT
Start: 2021-10-25 | End: 2022-07-26

## 2021-10-25 RX ORDER — LANCETS 30 GAUGE
1 EACH MISCELLANEOUS DAILY
Qty: 100 EACH | Refills: 3 | Status: SHIPPED | OUTPATIENT
Start: 2021-10-25 | End: 2022-07-26

## 2021-10-29 ENCOUNTER — CLINICAL SUPPORT (OUTPATIENT)
Dept: CARDIOLOGY CLINIC | Facility: CLINIC | Age: 61
End: 2021-10-29
Payer: COMMERCIAL

## 2021-10-29 ENCOUNTER — TELEPHONE (OUTPATIENT)
Dept: CARDIOLOGY CLINIC | Facility: CLINIC | Age: 61
End: 2021-10-29

## 2021-10-29 VITALS
DIASTOLIC BLOOD PRESSURE: 90 MMHG | HEIGHT: 64 IN | OXYGEN SATURATION: 97 % | SYSTOLIC BLOOD PRESSURE: 112 MMHG | BODY MASS INDEX: 50.02 KG/M2 | TEMPERATURE: 98.3 F | WEIGHT: 293 LBS | HEART RATE: 82 BPM

## 2021-10-29 DIAGNOSIS — E78.5 HYPERLIPIDEMIA, UNSPECIFIED HYPERLIPIDEMIA TYPE: ICD-10-CM

## 2021-10-29 DIAGNOSIS — I10 BENIGN ESSENTIAL HYPERTENSION: ICD-10-CM

## 2021-10-29 DIAGNOSIS — I10 PRIMARY HYPERTENSION: ICD-10-CM

## 2021-10-29 DIAGNOSIS — I47.1 SUPRAVENTRICULAR TACHYCARDIA (HCC): Primary | ICD-10-CM

## 2021-10-29 PROCEDURE — 4004F PT TOBACCO SCREEN RCVD TLK: CPT

## 2021-10-29 PROCEDURE — 93000 ELECTROCARDIOGRAM COMPLETE: CPT

## 2021-10-29 PROCEDURE — 99212 OFFICE O/P EST SF 10 MIN: CPT

## 2021-10-29 PROCEDURE — 3008F BODY MASS INDEX DOCD: CPT

## 2021-11-01 ENCOUNTER — TELEPHONE (OUTPATIENT)
Dept: HEMATOLOGY ONCOLOGY | Facility: MEDICAL CENTER | Age: 61
End: 2021-11-01

## 2021-11-01 LAB
ALBUMIN SERPL-MCNC: 4.3 G/DL (ref 3.8–4.8)
ALBUMIN/GLOB SERPL: 2 {RATIO} (ref 1.2–2.2)
ALP SERPL-CCNC: 113 IU/L (ref 44–121)
ALT SERPL-CCNC: 58 IU/L (ref 0–32)
AST SERPL-CCNC: 44 IU/L (ref 0–40)
BASOPHILS # BLD AUTO: 0.1 X10E3/UL (ref 0–0.2)
BASOPHILS NFR BLD AUTO: 1 %
BILIRUB SERPL-MCNC: 0.5 MG/DL (ref 0–1.2)
BUN SERPL-MCNC: 14 MG/DL (ref 8–27)
BUN/CREAT SERPL: 18 (ref 12–28)
CALCIUM SERPL-MCNC: 9.5 MG/DL (ref 8.7–10.3)
CHLORIDE SERPL-SCNC: 105 MMOL/L (ref 96–106)
CO2 SERPL-SCNC: 20 MMOL/L (ref 20–29)
CREAT SERPL-MCNC: 0.76 MG/DL (ref 0.57–1)
EOSINOPHIL # BLD AUTO: 0.1 X10E3/UL (ref 0–0.4)
EOSINOPHIL NFR BLD AUTO: 1 %
ERYTHROCYTE [DISTWIDTH] IN BLOOD BY AUTOMATED COUNT: 14.5 % (ref 11.7–15.4)
GLOBULIN SER-MCNC: 2.1 G/DL (ref 1.5–4.5)
GLUCOSE SERPL-MCNC: 139 MG/DL (ref 65–99)
HCT VFR BLD AUTO: 41.8 % (ref 34–46.6)
HGB BLD-MCNC: 13.7 G/DL (ref 11.1–15.9)
IMM GRANULOCYTES # BLD: 0 X10E3/UL (ref 0–0.1)
IMM GRANULOCYTES NFR BLD: 0 %
LYMPHOCYTES # BLD AUTO: 3.2 X10E3/UL (ref 0.7–3.1)
LYMPHOCYTES NFR BLD AUTO: 42 %
MCH RBC QN AUTO: 27.3 PG (ref 26.6–33)
MCHC RBC AUTO-ENTMCNC: 32.8 G/DL (ref 31.5–35.7)
MCV RBC AUTO: 83 FL (ref 79–97)
MONOCYTES # BLD AUTO: 0.5 X10E3/UL (ref 0.1–0.9)
MONOCYTES NFR BLD AUTO: 6 %
NEUTROPHILS # BLD AUTO: 3.7 X10E3/UL (ref 1.4–7)
NEUTROPHILS NFR BLD AUTO: 50 %
PLATELET # BLD AUTO: 155 X10E3/UL (ref 150–450)
POTASSIUM SERPL-SCNC: 4.3 MMOL/L (ref 3.5–5.2)
PROT SERPL-MCNC: 6.4 G/DL (ref 6–8.5)
RBC # BLD AUTO: 5.01 X10E6/UL (ref 3.77–5.28)
SL AMB EGFR AFRICAN AMERICAN: 98 ML/MIN/1.73
SL AMB EGFR NON AFRICAN AMERICAN: 85 ML/MIN/1.73
SODIUM SERPL-SCNC: 141 MMOL/L (ref 134–144)
WBC # BLD AUTO: 7.6 X10E3/UL (ref 3.4–10.8)

## 2021-11-01 RX ORDER — ATORVASTATIN CALCIUM 40 MG/1
TABLET, FILM COATED ORAL
Qty: 90 TABLET | Refills: 3 | Status: SHIPPED | OUTPATIENT
Start: 2021-11-01

## 2021-11-04 ENCOUNTER — TELEPHONE (OUTPATIENT)
Dept: CARDIOLOGY CLINIC | Facility: CLINIC | Age: 61
End: 2021-11-04

## 2021-11-09 DIAGNOSIS — I48.0 PAROXYSMAL ATRIAL FIBRILLATION (HCC): ICD-10-CM

## 2021-11-10 RX ORDER — SOTALOL HYDROCHLORIDE 120 MG/1
120 TABLET ORAL EVERY 12 HOURS
Qty: 180 TABLET | Refills: 3 | Status: SHIPPED | OUTPATIENT
Start: 2021-11-10 | End: 2021-12-15 | Stop reason: SDUPTHER

## 2021-11-11 ENCOUNTER — PREP FOR PROCEDURE (OUTPATIENT)
Dept: GASTROENTEROLOGY | Facility: CLINIC | Age: 61
End: 2021-11-11

## 2021-11-11 ENCOUNTER — TELEPHONE (OUTPATIENT)
Dept: GASTROENTEROLOGY | Facility: CLINIC | Age: 61
End: 2021-11-11

## 2021-11-11 ENCOUNTER — OFFICE VISIT (OUTPATIENT)
Dept: PULMONOLOGY | Facility: MEDICAL CENTER | Age: 61
End: 2021-11-11
Payer: COMMERCIAL

## 2021-11-11 ENCOUNTER — OFFICE VISIT (OUTPATIENT)
Dept: HEMATOLOGY ONCOLOGY | Facility: MEDICAL CENTER | Age: 61
End: 2021-11-11
Payer: COMMERCIAL

## 2021-11-11 VITALS
WEIGHT: 293 LBS | OXYGEN SATURATION: 97 % | DIASTOLIC BLOOD PRESSURE: 84 MMHG | HEIGHT: 64 IN | HEART RATE: 76 BPM | RESPIRATION RATE: 20 BRPM | TEMPERATURE: 97.2 F | SYSTOLIC BLOOD PRESSURE: 138 MMHG | BODY MASS INDEX: 50.02 KG/M2

## 2021-11-11 VITALS
TEMPERATURE: 97.6 F | RESPIRATION RATE: 12 BRPM | OXYGEN SATURATION: 96 % | DIASTOLIC BLOOD PRESSURE: 76 MMHG | SYSTOLIC BLOOD PRESSURE: 122 MMHG | HEART RATE: 83 BPM | BODY MASS INDEX: 50.02 KG/M2 | HEIGHT: 64 IN | WEIGHT: 293 LBS

## 2021-11-11 DIAGNOSIS — K76.0 FATTY LIVER: ICD-10-CM

## 2021-11-11 DIAGNOSIS — Z12.31 SCREENING MAMMOGRAM, ENCOUNTER FOR: ICD-10-CM

## 2021-11-11 DIAGNOSIS — I47.1 SUPRAVENTRICULAR TACHYCARDIA (HCC): ICD-10-CM

## 2021-11-11 DIAGNOSIS — K21.9 GASTROESOPHAGEAL REFLUX DISEASE, UNSPECIFIED WHETHER ESOPHAGITIS PRESENT: ICD-10-CM

## 2021-11-11 DIAGNOSIS — C49.A0: Primary | ICD-10-CM

## 2021-11-11 DIAGNOSIS — R79.89 ELEVATED LFTS: ICD-10-CM

## 2021-11-11 DIAGNOSIS — I10 PRIMARY HYPERTENSION: Chronic | ICD-10-CM

## 2021-11-11 DIAGNOSIS — E66.01 CLASS 3 SEVERE OBESITY DUE TO EXCESS CALORIES WITH SERIOUS COMORBIDITY AND BODY MASS INDEX (BMI) OF 50.0 TO 59.9 IN ADULT (HCC): ICD-10-CM

## 2021-11-11 DIAGNOSIS — D12.6 ADENOMATOUS POLYP OF COLON, UNSPECIFIED PART OF COLON: ICD-10-CM

## 2021-11-11 DIAGNOSIS — G47.33 SEVERE OBSTRUCTIVE SLEEP APNEA: Primary | ICD-10-CM

## 2021-11-11 PROCEDURE — 99214 OFFICE O/P EST MOD 30 MIN: CPT | Performed by: INTERNAL MEDICINE

## 2021-11-11 PROCEDURE — 3075F SYST BP GE 130 - 139MM HG: CPT | Performed by: PHYSICIAN ASSISTANT

## 2021-11-11 PROCEDURE — 99215 OFFICE O/P EST HI 40 MIN: CPT | Performed by: PHYSICIAN ASSISTANT

## 2021-11-11 PROCEDURE — 3079F DIAST BP 80-89 MM HG: CPT | Performed by: PHYSICIAN ASSISTANT

## 2021-11-15 DIAGNOSIS — K21.9 GASTROESOPHAGEAL REFLUX DISEASE WITHOUT ESOPHAGITIS: ICD-10-CM

## 2021-11-16 RX ORDER — PANTOPRAZOLE SODIUM 40 MG/1
TABLET, DELAYED RELEASE ORAL
Qty: 90 TABLET | Refills: 3 | Status: SHIPPED | OUTPATIENT
Start: 2021-11-16

## 2021-11-19 ENCOUNTER — HOSPITAL ENCOUNTER (OUTPATIENT)
Dept: RADIOLOGY | Facility: HOSPITAL | Age: 61
Discharge: HOME | End: 2021-11-19
Payer: COMMERCIAL

## 2021-11-19 DIAGNOSIS — N20.0 KIDNEY STONES: ICD-10-CM

## 2021-11-19 PROCEDURE — 76770 US EXAM ABDO BACK WALL COMP: CPT

## 2021-11-19 PROCEDURE — 74018 RADEX ABDOMEN 1 VIEW: CPT

## 2021-11-20 ENCOUNTER — IMMUNIZATIONS (OUTPATIENT)
Dept: FAMILY MEDICINE CLINIC | Facility: HOSPITAL | Age: 61
End: 2021-11-20

## 2021-11-20 DIAGNOSIS — Z23 ENCOUNTER FOR IMMUNIZATION: Primary | ICD-10-CM

## 2021-11-20 PROCEDURE — 0001A COVID-19 PFIZER VACC 0.3 ML: CPT

## 2021-11-20 PROCEDURE — 91300 COVID-19 PFIZER VACC 0.3 ML: CPT

## 2021-11-29 ENCOUNTER — OFFICE VISIT (OUTPATIENT)
Dept: UROLOGY | Facility: CLINIC | Age: 61
End: 2021-11-29
Payer: COMMERCIAL

## 2021-11-29 VITALS — HEART RATE: 84 BPM | BODY MASS INDEX: 50.02 KG/M2 | WEIGHT: 293 LBS | HEIGHT: 64 IN

## 2021-11-29 DIAGNOSIS — N20.0 KIDNEY STONES: Primary | ICD-10-CM

## 2021-11-29 PROCEDURE — 99213 OFFICE O/P EST LOW 20 MIN: CPT | Performed by: PHYSICIAN ASSISTANT

## 2021-11-29 PROCEDURE — 4004F PT TOBACCO SCREEN RCVD TLK: CPT | Performed by: PHYSICIAN ASSISTANT

## 2021-11-29 PROCEDURE — 3008F BODY MASS INDEX DOCD: CPT | Performed by: PHYSICIAN ASSISTANT

## 2021-11-29 RX ORDER — POTASSIUM CITRATE 15 MEQ/1
1 TABLET, EXTENDED RELEASE ORAL 2 TIMES DAILY
Qty: 180 TABLET | Refills: 3 | Status: SHIPPED | OUTPATIENT
Start: 2021-11-29

## 2021-12-08 ENCOUNTER — CLINICAL SUPPORT (OUTPATIENT)
Dept: CARDIOLOGY CLINIC | Facility: CLINIC | Age: 61
End: 2021-12-08
Payer: COMMERCIAL

## 2021-12-08 ENCOUNTER — TELEPHONE (OUTPATIENT)
Dept: CARDIOLOGY CLINIC | Facility: CLINIC | Age: 61
End: 2021-12-08

## 2021-12-08 DIAGNOSIS — I48.0 PAROXYSMAL ATRIAL FIBRILLATION (HCC): ICD-10-CM

## 2021-12-08 PROCEDURE — 93228 REMOTE 30 DAY ECG REV/REPORT: CPT | Performed by: INTERNAL MEDICINE

## 2021-12-15 ENCOUNTER — OFFICE VISIT (OUTPATIENT)
Dept: CARDIOLOGY CLINIC | Facility: CLINIC | Age: 61
End: 2021-12-15
Payer: COMMERCIAL

## 2021-12-15 VITALS
WEIGHT: 293 LBS | OXYGEN SATURATION: 96 % | TEMPERATURE: 98.4 F | HEART RATE: 87 BPM | DIASTOLIC BLOOD PRESSURE: 84 MMHG | HEIGHT: 64 IN | BODY MASS INDEX: 50.02 KG/M2 | SYSTOLIC BLOOD PRESSURE: 118 MMHG

## 2021-12-15 DIAGNOSIS — I10 PRIMARY HYPERTENSION: Primary | ICD-10-CM

## 2021-12-15 DIAGNOSIS — E78.5 HYPERLIPIDEMIA, UNSPECIFIED HYPERLIPIDEMIA TYPE: ICD-10-CM

## 2021-12-15 DIAGNOSIS — I48.0 PAROXYSMAL ATRIAL FIBRILLATION (HCC): ICD-10-CM

## 2021-12-15 DIAGNOSIS — I47.1 SUPRAVENTRICULAR TACHYCARDIA (HCC): ICD-10-CM

## 2021-12-15 DIAGNOSIS — E66.01 MORBID OBESITY (HCC): ICD-10-CM

## 2021-12-15 PROCEDURE — 99214 OFFICE O/P EST MOD 30 MIN: CPT | Performed by: INTERNAL MEDICINE

## 2021-12-15 PROCEDURE — 3008F BODY MASS INDEX DOCD: CPT | Performed by: INTERNAL MEDICINE

## 2021-12-15 PROCEDURE — 4004F PT TOBACCO SCREEN RCVD TLK: CPT | Performed by: INTERNAL MEDICINE

## 2021-12-15 RX ORDER — SOTALOL HYDROCHLORIDE 120 MG/1
120 TABLET ORAL EVERY 12 HOURS
Qty: 180 TABLET | Refills: 3 | Status: SHIPPED | OUTPATIENT
Start: 2021-12-15

## 2021-12-15 RX ORDER — METOPROLOL SUCCINATE 25 MG/1
25 TABLET, EXTENDED RELEASE ORAL 2 TIMES DAILY
Qty: 180 TABLET | Refills: 3 | Status: SHIPPED | OUTPATIENT
Start: 2021-12-15

## 2021-12-16 PROCEDURE — 93000 ELECTROCARDIOGRAM COMPLETE: CPT | Performed by: INTERNAL MEDICINE

## 2021-12-30 ENCOUNTER — OFFICE VISIT (OUTPATIENT)
Dept: PODIATRY | Facility: CLINIC | Age: 61
End: 2021-12-30

## 2021-12-30 VITALS
HEART RATE: 87 BPM | HEIGHT: 64 IN | WEIGHT: 293 LBS | BODY MASS INDEX: 50.02 KG/M2 | RESPIRATION RATE: 16 BRPM | DIASTOLIC BLOOD PRESSURE: 84 MMHG | SYSTOLIC BLOOD PRESSURE: 118 MMHG

## 2021-12-30 DIAGNOSIS — L60.0 INGROWN TOENAIL: ICD-10-CM

## 2021-12-30 DIAGNOSIS — M79.672 PAIN IN BOTH FEET: ICD-10-CM

## 2021-12-30 DIAGNOSIS — L03.032 PARONYCHIA OF GREAT TOE, LEFT: Primary | ICD-10-CM

## 2021-12-30 DIAGNOSIS — M79.671 PAIN IN BOTH FEET: ICD-10-CM

## 2021-12-30 PROCEDURE — 99212 OFFICE O/P EST SF 10 MIN: CPT | Performed by: PODIATRIST

## 2022-01-01 ENCOUNTER — TELEPHONE (OUTPATIENT)
Dept: HEMATOLOGY ONCOLOGY | Facility: CLINIC | Age: 62
End: 2022-01-01

## 2022-01-01 ENCOUNTER — ANESTHESIA (INPATIENT)
Dept: PERIOP | Facility: HOSPITAL | Age: 62
End: 2022-01-01

## 2022-01-01 ENCOUNTER — APPOINTMENT (INPATIENT)
Dept: RADIOLOGY | Facility: HOSPITAL | Age: 62
End: 2022-01-01

## 2022-01-01 ENCOUNTER — DOCUMENTATION (OUTPATIENT)
Dept: HEMATOLOGY ONCOLOGY | Facility: CLINIC | Age: 62
End: 2022-01-01

## 2022-01-01 ENCOUNTER — OFFICE VISIT (OUTPATIENT)
Dept: HEMATOLOGY ONCOLOGY | Facility: CLINIC | Age: 62
End: 2022-01-01

## 2022-01-01 ENCOUNTER — APPOINTMENT (INPATIENT)
Dept: GASTROENTEROLOGY | Facility: HOSPITAL | Age: 62
End: 2022-01-01

## 2022-01-01 ENCOUNTER — OFFICE VISIT (OUTPATIENT)
Dept: SURGICAL ONCOLOGY | Facility: CLINIC | Age: 62
End: 2022-01-01

## 2022-01-01 ENCOUNTER — TELEPHONE (OUTPATIENT)
Dept: CARDIOLOGY CLINIC | Facility: CLINIC | Age: 62
End: 2022-01-01

## 2022-01-01 ENCOUNTER — ANESTHESIA EVENT (INPATIENT)
Dept: PERIOP | Facility: HOSPITAL | Age: 62
End: 2022-01-01

## 2022-01-01 ENCOUNTER — PREP FOR PROCEDURE (OUTPATIENT)
Dept: GASTROENTEROLOGY | Facility: CLINIC | Age: 62
End: 2022-01-01

## 2022-01-01 ENCOUNTER — APPOINTMENT (OUTPATIENT)
Dept: PERIOP | Facility: HOSPITAL | Age: 62
End: 2022-01-01

## 2022-01-01 ENCOUNTER — HOSPITAL ENCOUNTER (INPATIENT)
Facility: HOSPITAL | Age: 62
LOS: 2 days | End: 2022-12-03
Attending: EMERGENCY MEDICINE | Admitting: SURGERY

## 2022-01-01 ENCOUNTER — OFFICE VISIT (OUTPATIENT)
Dept: FAMILY MEDICINE CLINIC | Facility: CLINIC | Age: 62
End: 2022-01-01

## 2022-01-01 ENCOUNTER — APPOINTMENT (INPATIENT)
Dept: NON INVASIVE DIAGNOSTICS | Facility: HOSPITAL | Age: 62
End: 2022-01-01

## 2022-01-01 ENCOUNTER — APPOINTMENT (EMERGENCY)
Dept: RADIOLOGY | Facility: HOSPITAL | Age: 62
End: 2022-01-01

## 2022-01-01 ENCOUNTER — TELEPHONE (OUTPATIENT)
Dept: GASTROENTEROLOGY | Facility: CLINIC | Age: 62
End: 2022-01-01

## 2022-01-01 VITALS
WEIGHT: 281 LBS | BODY MASS INDEX: 47.97 KG/M2 | HEIGHT: 64 IN | TEMPERATURE: 100 F | SYSTOLIC BLOOD PRESSURE: 92 MMHG | DIASTOLIC BLOOD PRESSURE: 45 MMHG | OXYGEN SATURATION: 84 %

## 2022-01-01 VITALS
RESPIRATION RATE: 17 BRPM | TEMPERATURE: 98 F | OXYGEN SATURATION: 93 % | DIASTOLIC BLOOD PRESSURE: 62 MMHG | HEIGHT: 64 IN | BODY MASS INDEX: 49.17 KG/M2 | SYSTOLIC BLOOD PRESSURE: 140 MMHG | HEART RATE: 90 BPM | WEIGHT: 288 LBS

## 2022-01-01 VITALS
WEIGHT: 286 LBS | TEMPERATURE: 98 F | DIASTOLIC BLOOD PRESSURE: 70 MMHG | SYSTOLIC BLOOD PRESSURE: 122 MMHG | OXYGEN SATURATION: 95 % | BODY MASS INDEX: 48.83 KG/M2 | RESPIRATION RATE: 16 BRPM | HEART RATE: 87 BPM | HEIGHT: 64 IN

## 2022-01-01 VITALS
HEART RATE: 78 BPM | OXYGEN SATURATION: 97 % | DIASTOLIC BLOOD PRESSURE: 70 MMHG | SYSTOLIC BLOOD PRESSURE: 120 MMHG | TEMPERATURE: 97.4 F | WEIGHT: 286 LBS | BODY MASS INDEX: 48.83 KG/M2 | HEIGHT: 64 IN | RESPIRATION RATE: 14 BRPM

## 2022-01-01 DIAGNOSIS — T45.1X5A CHEMOTHERAPY INDUCED NEUTROPENIA: Primary | ICD-10-CM

## 2022-01-01 DIAGNOSIS — Z90.410 H/O WHIPPLE PROCEDURE: ICD-10-CM

## 2022-01-01 DIAGNOSIS — D70.1 CHEMOTHERAPY INDUCED NEUTROPENIA (HCC): ICD-10-CM

## 2022-01-01 DIAGNOSIS — D70.1 CHEMOTHERAPY INDUCED NEUTROPENIA: Primary | ICD-10-CM

## 2022-01-01 DIAGNOSIS — T45.1X5A CHEMOTHERAPY INDUCED NEUTROPENIA (HCC): ICD-10-CM

## 2022-01-01 DIAGNOSIS — K64.9 HEMORRHOIDS, UNSPECIFIED HEMORRHOID TYPE: ICD-10-CM

## 2022-01-01 DIAGNOSIS — R74.01 TRANSAMINITIS: ICD-10-CM

## 2022-01-01 DIAGNOSIS — C25.0 ADENOCARCINOMA OF HEAD OF PANCREAS (HCC): ICD-10-CM

## 2022-01-01 DIAGNOSIS — K92.0 GASTROINTESTINAL HEMORRHAGE WITH HEMATEMESIS: ICD-10-CM

## 2022-01-01 DIAGNOSIS — Z95.828 PORT-A-CATH IN PLACE: Primary | ICD-10-CM

## 2022-01-01 DIAGNOSIS — E87.20 LACTIC ACIDOSIS: ICD-10-CM

## 2022-01-01 DIAGNOSIS — C25.0 MALIGNANT NEOPLASM OF HEAD OF PANCREAS (HCC): Primary | ICD-10-CM

## 2022-01-01 DIAGNOSIS — A41.9 SEPTIC SHOCK (HCC): ICD-10-CM

## 2022-01-01 DIAGNOSIS — K21.9 GASTROESOPHAGEAL REFLUX DISEASE, UNSPECIFIED WHETHER ESOPHAGITIS PRESENT: Primary | ICD-10-CM

## 2022-01-01 DIAGNOSIS — Z90.49 H/O WHIPPLE PROCEDURE: ICD-10-CM

## 2022-01-01 DIAGNOSIS — R11.0 NAUSEA: ICD-10-CM

## 2022-01-01 DIAGNOSIS — K90.81 WHIPPLE DISEASE: ICD-10-CM

## 2022-01-01 DIAGNOSIS — I26.99 PULMONARY EMBOLISM WITHOUT ACUTE COR PULMONALE, UNSPECIFIED CHRONICITY, UNSPECIFIED PULMONARY EMBOLISM TYPE (HCC): ICD-10-CM

## 2022-01-01 DIAGNOSIS — K92.0 HEMATEMESIS, UNSPECIFIED WHETHER NAUSEA PRESENT: Primary | ICD-10-CM

## 2022-01-01 DIAGNOSIS — T45.1X5A CHEMOTHERAPY INDUCED NAUSEA AND VOMITING: ICD-10-CM

## 2022-01-01 DIAGNOSIS — H33.049: ICD-10-CM

## 2022-01-01 DIAGNOSIS — R65.21 SEPTIC SHOCK (HCC): ICD-10-CM

## 2022-01-01 DIAGNOSIS — B99.9 INTRA-ABDOMINAL INFECTION: ICD-10-CM

## 2022-01-01 DIAGNOSIS — D50.0 IRON DEFICIENCY ANEMIA DUE TO CHRONIC BLOOD LOSS: ICD-10-CM

## 2022-01-01 DIAGNOSIS — R11.2 CHEMOTHERAPY INDUCED NAUSEA AND VOMITING: ICD-10-CM

## 2022-01-01 DIAGNOSIS — I26.99 PULMONARY EMBOLISM WITHOUT ACUTE COR PULMONALE, UNSPECIFIED CHRONICITY, UNSPECIFIED PULMONARY EMBOLISM TYPE (HCC): Primary | ICD-10-CM

## 2022-01-01 DIAGNOSIS — I48.0 PAROXYSMAL A-FIB (HCC): ICD-10-CM

## 2022-01-01 LAB
AAASAAGGREGATION: 94 % (ref 89–100)
AAASAINHIBITION: 6 % (ref 0–11)
AAMA (MAX AMPLITUDE AA): 66.7 MM (ref 51–71)
ABO GROUP BLD BPU: NORMAL
ABO GROUP BLD: NORMAL
ACTFMA(MAX AMPLITUDE ACTF): 22.8 MM (ref 2–19)
ADPADPAGGREGATION: 80.9 % (ref 83–100)
ADPADPINHIBITION: 19.1 % (ref 0–17)
ADPMA(MAX AMPLITUDE ADP): 60.6 MM (ref 45–69)
ALBUMIN SERPL BCP-MCNC: 1.6 G/DL (ref 3.5–5)
ALBUMIN SERPL BCP-MCNC: 1.7 G/DL (ref 3.5–5)
ALBUMIN SERPL BCP-MCNC: 1.7 G/DL (ref 3.5–5)
ALBUMIN SERPL BCP-MCNC: 1.8 G/DL (ref 3.5–5)
ALBUMIN SERPL BCP-MCNC: 1.8 G/DL (ref 3.5–5)
ALBUMIN SERPL BCP-MCNC: 2 G/DL (ref 3.5–5)
ALBUMIN SERPL-MCNC: 2.6 G/DL (ref 3.8–4.8)
ALBUMIN/GLOB SERPL: 0.7 {RATIO} (ref 1.2–2.2)
ALP SERPL-CCNC: 246 IU/L (ref 44–121)
ALP SERPL-CCNC: 572 U/L (ref 46–116)
ALP SERPL-CCNC: 604 U/L (ref 46–116)
ALP SERPL-CCNC: 649 U/L (ref 46–116)
ALP SERPL-CCNC: 668 U/L (ref 46–116)
ALP SERPL-CCNC: 799 U/L (ref 46–116)
ALP SERPL-CCNC: 991 U/L (ref 46–116)
ALT SERPL W P-5'-P-CCNC: 1494 U/L (ref 12–78)
ALT SERPL W P-5'-P-CCNC: 1502 U/L (ref 12–78)
ALT SERPL W P-5'-P-CCNC: 2136 U/L (ref 12–78)
ALT SERPL W P-5'-P-CCNC: 29 U/L (ref 12–78)
ALT SERPL W P-5'-P-CCNC: 723 U/L (ref 12–78)
ALT SERPL W P-5'-P-CCNC: 970 U/L (ref 12–78)
ALT SERPL-CCNC: 13 IU/L (ref 0–32)
AMORPH URATE CRY URNS QL MICRO: ABNORMAL
ANION GAP SERPL CALCULATED.3IONS-SCNC: 15 MMOL/L (ref 4–13)
ANION GAP SERPL CALCULATED.3IONS-SCNC: 18 MMOL/L (ref 4–13)
ANION GAP SERPL CALCULATED.3IONS-SCNC: 18 MMOL/L (ref 4–13)
ANION GAP SERPL CALCULATED.3IONS-SCNC: 19 MMOL/L (ref 4–13)
ANION GAP SERPL CALCULATED.3IONS-SCNC: 20 MMOL/L (ref 4–13)
ANION GAP SERPL CALCULATED.3IONS-SCNC: 26 MMOL/L (ref 4–13)
ANION GAP SERPL CALCULATED.3IONS-SCNC: 27 MMOL/L (ref 4–13)
ANISOCYTOSIS BLD QL SMEAR: PRESENT
APICAL FOUR CHAMBER EJECTION FRACTION: 69 %
APTT PPP: 28 SECONDS (ref 23–37)
AST SERPL W P-5'-P-CCNC: 2739 U/L (ref 5–45)
AST SERPL W P-5'-P-CCNC: 5842 U/L (ref 5–45)
AST SERPL W P-5'-P-CCNC: 6505 U/L (ref 5–45)
AST SERPL W P-5'-P-CCNC: 6792 U/L (ref 5–45)
AST SERPL W P-5'-P-CCNC: 88 U/L (ref 5–45)
AST SERPL W P-5'-P-CCNC: ABNORMAL U/L (ref 5–45)
AST SERPL-CCNC: 20 IU/L (ref 0–40)
BACTERIA UR QL AUTO: ABNORMAL /HPF
BASE EX.OXY STD BLDV CALC-SCNC: 58.5 % (ref 60–80)
BASE EX.OXY STD BLDV CALC-SCNC: 72.7 % (ref 60–80)
BASE EXCESS BLDA CALC-SCNC: -12 MMOL/L (ref -2–3)
BASE EXCESS BLDA CALC-SCNC: -12 MMOL/L (ref -2–3)
BASE EXCESS BLDA CALC-SCNC: -14.8 MMOL/L
BASE EXCESS BLDA CALC-SCNC: -16.7 MMOL/L
BASE EXCESS BLDA CALC-SCNC: -18.1 MMOL/L
BASE EXCESS BLDA CALC-SCNC: -20 MMOL/L (ref -2–3)
BASE EXCESS BLDA CALC-SCNC: -21 MMOL/L
BASE EXCESS BLDA CALC-SCNC: -22.7 MMOL/L
BASE EXCESS BLDV CALC-SCNC: -11.2 MMOL/L
BASE EXCESS BLDV CALC-SCNC: -12.5 MMOL/L
BASOPHILS # BLD AUTO: 0 X10E3/UL (ref 0–0.2)
BASOPHILS # BLD MANUAL: 0 THOUSAND/UL (ref 0–0.1)
BASOPHILS # BLD MANUAL: 0 THOUSAND/UL (ref 0–0.1)
BASOPHILS NFR BLD AUTO: 1 %
BASOPHILS NFR MAR MANUAL: 0 % (ref 0–1)
BASOPHILS NFR MAR MANUAL: 0 % (ref 0–1)
BILIRUB DIRECT SERPL-MCNC: 2.15 MG/DL (ref 0–0.2)
BILIRUB SERPL-MCNC: 0.7 MG/DL (ref 0–1.2)
BILIRUB SERPL-MCNC: 1.37 MG/DL (ref 0.2–1)
BILIRUB SERPL-MCNC: 3.53 MG/DL (ref 0.2–1)
BILIRUB SERPL-MCNC: 3.94 MG/DL (ref 0.2–1)
BILIRUB SERPL-MCNC: 4.06 MG/DL (ref 0.2–1)
BILIRUB SERPL-MCNC: 4.46 MG/DL (ref 0.2–1)
BILIRUB SERPL-MCNC: 5.14 MG/DL (ref 0.2–1)
BILIRUB UR QL STRIP: NEGATIVE
BLD GP AB SCN SERPL QL: NEGATIVE
BODY TEMPERATURE: 97.9 DEGREES FEHRENHEIT
BPU ID: NORMAL
BUN SERPL-MCNC: 10 MG/DL (ref 5–25)
BUN SERPL-MCNC: 12 MG/DL (ref 5–25)
BUN SERPL-MCNC: 12 MG/DL (ref 5–25)
BUN SERPL-MCNC: 13 MG/DL (ref 5–25)
BUN SERPL-MCNC: 13 MG/DL (ref 8–27)
BUN SERPL-MCNC: 14 MG/DL (ref 5–25)
BUN SERPL-MCNC: 14 MG/DL (ref 5–25)
BUN SERPL-MCNC: 15 MG/DL (ref 5–25)
BUN/CREAT SERPL: 21 (ref 12–28)
CA-I BLD-SCNC: 1 MMOL/L (ref 1.12–1.32)
CA-I BLD-SCNC: 1.06 MMOL/L (ref 1.12–1.32)
CA-I BLD-SCNC: 1.06 MMOL/L (ref 1.12–1.32)
CA-I BLD-SCNC: 1.09 MMOL/L (ref 1.12–1.32)
CA-I BLD-SCNC: 1.11 MMOL/L (ref 1.12–1.32)
CA-I BLD-SCNC: 1.14 MMOL/L (ref 1.12–1.32)
CA-I BLD-SCNC: 1.15 MMOL/L (ref 1.12–1.32)
CALCIUM ALBUM COR SERPL-MCNC: 10.6 MG/DL (ref 8.3–10.1)
CALCIUM ALBUM COR SERPL-MCNC: 10.6 MG/DL (ref 8.3–10.1)
CALCIUM ALBUM COR SERPL-MCNC: 10.7 MG/DL (ref 8.3–10.1)
CALCIUM ALBUM COR SERPL-MCNC: 10.8 MG/DL (ref 8.3–10.1)
CALCIUM ALBUM COR SERPL-MCNC: 10.9 MG/DL (ref 8.3–10.1)
CALCIUM SERPL-MCNC: 8.7 MG/DL (ref 8.7–10.3)
CALCIUM SERPL-MCNC: 8.8 MG/DL (ref 8.3–10.1)
CALCIUM SERPL-MCNC: 8.8 MG/DL (ref 8.3–10.1)
CALCIUM SERPL-MCNC: 9 MG/DL (ref 8.3–10.1)
CALCIUM SERPL-MCNC: 9 MG/DL (ref 8.3–10.1)
CALCIUM SERPL-MCNC: 9.1 MG/DL (ref 8.3–10.1)
CFFMA (FUNCTIONAL FIBRINOGEN MAX AMPLITUDE): 17.6 MM (ref 15–32)
CFFMA (FUNCTIONAL FIBRINOGEN MAX AMPLITUDE): 20 MM (ref 15–32)
CFFMA (FUNCTIONAL FIBRINOGEN MAX AMPLITUDE): 26.3 MM (ref 15–32)
CHLORIDE SERPL-SCNC: 101 MMOL/L (ref 96–106)
CHLORIDE SERPL-SCNC: 105 MMOL/L (ref 96–108)
CHLORIDE SERPL-SCNC: 107 MMOL/L (ref 96–108)
CHLORIDE SERPL-SCNC: 108 MMOL/L (ref 96–108)
CHLORIDE SERPL-SCNC: 111 MMOL/L (ref 96–108)
CHLORIDE SERPL-SCNC: 111 MMOL/L (ref 96–108)
CHLORIDE SERPL-SCNC: 113 MMOL/L (ref 96–108)
CHLORIDE SERPL-SCNC: 115 MMOL/L (ref 96–108)
CKLY30: 0 % (ref 0–2.6)
CKR(REACTION TIME): 10.1 MIN (ref 4.6–9.1)
CKR(REACTION TIME): 5.5 MIN (ref 4.6–9.1)
CKR(REACTION TIME): 8 MIN (ref 4.6–9.1)
CLARITY UR: ABNORMAL
CO2 SERPL-SCNC: 11 MMOL/L (ref 21–32)
CO2 SERPL-SCNC: 14 MMOL/L (ref 21–32)
CO2 SERPL-SCNC: 15 MMOL/L (ref 21–32)
CO2 SERPL-SCNC: 18 MMOL/L (ref 21–32)
CO2 SERPL-SCNC: 24 MMOL/L (ref 20–29)
CO2 SERPL-SCNC: 7 MMOL/L (ref 21–32)
COLOR UR: YELLOW
CREAT SERPL-MCNC: 0.63 MG/DL (ref 0.57–1)
CREAT SERPL-MCNC: 0.91 MG/DL (ref 0.6–1.3)
CREAT SERPL-MCNC: 0.92 MG/DL (ref 0.6–1.3)
CREAT SERPL-MCNC: 1.16 MG/DL (ref 0.6–1.3)
CREAT SERPL-MCNC: 1.21 MG/DL (ref 0.6–1.3)
CREAT SERPL-MCNC: 1.21 MG/DL (ref 0.6–1.3)
CREAT SERPL-MCNC: 1.66 MG/DL (ref 0.6–1.3)
CREAT SERPL-MCNC: 1.99 MG/DL (ref 0.6–1.3)
CROSSMATCH: NORMAL
CRTMA(RAPIDTEG MAX AMPLITUDE): 55.7 MM (ref 52–70)
CRTMA(RAPIDTEG MAX AMPLITUDE): 56.8 MM (ref 52–70)
CRTMA(RAPIDTEG MAX AMPLITUDE): 65.7 MM (ref 52–70)
EGFR: 100 ML/MIN/1.73
EOSINOPHIL # BLD AUTO: 0 X10E3/UL (ref 0–0.4)
EOSINOPHIL # BLD MANUAL: 0 THOUSAND/UL (ref 0–0.4)
EOSINOPHIL # BLD MANUAL: 0 THOUSAND/UL (ref 0–0.4)
EOSINOPHIL NFR BLD AUTO: 0 %
EOSINOPHIL NFR BLD MANUAL: 0 % (ref 0–6)
EOSINOPHIL NFR BLD MANUAL: 0 % (ref 0–6)
ERYTHROCYTE [DISTWIDTH] IN BLOOD BY AUTOMATED COUNT: 15.7 % (ref 11.7–15.4)
ERYTHROCYTE [DISTWIDTH] IN BLOOD BY AUTOMATED COUNT: 16.2 % (ref 11.6–15.1)
ERYTHROCYTE [DISTWIDTH] IN BLOOD BY AUTOMATED COUNT: 16.3 % (ref 11.6–15.1)
ERYTHROCYTE [DISTWIDTH] IN BLOOD BY AUTOMATED COUNT: 16.3 % (ref 11.6–15.1)
ERYTHROCYTE [DISTWIDTH] IN BLOOD BY AUTOMATED COUNT: 16.5 % (ref 11.6–15.1)
ERYTHROCYTE [DISTWIDTH] IN BLOOD BY AUTOMATED COUNT: 16.9 % (ref 11.6–15.1)
ERYTHROCYTE [DISTWIDTH] IN BLOOD BY AUTOMATED COUNT: 17.8 % (ref 11.6–15.1)
GFR SERPL CREATININE-BSD FRML MDRD: 26 ML/MIN/1.73SQ M
GFR SERPL CREATININE-BSD FRML MDRD: 32 ML/MIN/1.73SQ M
GFR SERPL CREATININE-BSD FRML MDRD: 48 ML/MIN/1.73SQ M
GFR SERPL CREATININE-BSD FRML MDRD: 48 ML/MIN/1.73SQ M
GFR SERPL CREATININE-BSD FRML MDRD: 50 ML/MIN/1.73SQ M
GFR SERPL CREATININE-BSD FRML MDRD: 66 ML/MIN/1.73SQ M
GFR SERPL CREATININE-BSD FRML MDRD: 67 ML/MIN/1.73SQ M
GLOBULIN SER-MCNC: 3.5 G/DL (ref 1.5–4.5)
GLUCOSE SERPL-MCNC: 101 MG/DL (ref 65–140)
GLUCOSE SERPL-MCNC: 103 MG/DL (ref 65–140)
GLUCOSE SERPL-MCNC: 105 MG/DL (ref 65–140)
GLUCOSE SERPL-MCNC: 111 MG/DL (ref 65–140)
GLUCOSE SERPL-MCNC: 115 MG/DL (ref 65–140)
GLUCOSE SERPL-MCNC: 117 MG/DL (ref 70–99)
GLUCOSE SERPL-MCNC: 118 MG/DL (ref 65–140)
GLUCOSE SERPL-MCNC: 125 MG/DL (ref 65–140)
GLUCOSE SERPL-MCNC: 139 MG/DL (ref 65–140)
GLUCOSE SERPL-MCNC: 240 MG/DL (ref 65–140)
GLUCOSE SERPL-MCNC: 25 MG/DL (ref 65–140)
GLUCOSE SERPL-MCNC: 30 MG/DL (ref 65–140)
GLUCOSE SERPL-MCNC: 35 MG/DL (ref 65–140)
GLUCOSE SERPL-MCNC: 53 MG/DL (ref 65–140)
GLUCOSE SERPL-MCNC: 60 MG/DL (ref 65–140)
GLUCOSE SERPL-MCNC: 66 MG/DL (ref 65–140)
GLUCOSE SERPL-MCNC: 66 MG/DL (ref 65–140)
GLUCOSE SERPL-MCNC: 68 MG/DL (ref 65–140)
GLUCOSE SERPL-MCNC: 71 MG/DL (ref 65–140)
GLUCOSE SERPL-MCNC: 81 MG/DL (ref 65–140)
GLUCOSE SERPL-MCNC: 82 MG/DL (ref 65–140)
GLUCOSE SERPL-MCNC: 87 MG/DL (ref 65–140)
GLUCOSE SERPL-MCNC: 91 MG/DL (ref 65–140)
GLUCOSE SERPL-MCNC: 95 MG/DL (ref 65–140)
GLUCOSE SERPL-MCNC: 97 MG/DL (ref 65–140)
GLUCOSE UR STRIP-MCNC: ABNORMAL MG/DL
HCO3 BLDA-SCNC: 12.2 MMOL/L (ref 22–28)
HCO3 BLDA-SCNC: 12.2 MMOL/L (ref 22–28)
HCO3 BLDA-SCNC: 14.6 MMOL/L (ref 22–28)
HCO3 BLDA-SCNC: 15.6 MMOL/L (ref 22–28)
HCO3 BLDA-SCNC: 16 MMOL/L (ref 22–28)
HCO3 BLDA-SCNC: 7.9 MMOL/L (ref 22–28)
HCO3 BLDA-SCNC: 8.7 MMOL/L (ref 22–28)
HCO3 BLDA-SCNC: 9.4 MMOL/L (ref 24–30)
HCO3 BLDV-SCNC: 13.2 MMOL/L (ref 24–30)
HCO3 BLDV-SCNC: 13.8 MMOL/L (ref 24–30)
HCT VFR BLD AUTO: 25.4 % (ref 34.8–46.1)
HCT VFR BLD AUTO: 27.1 % (ref 34–46.6)
HCT VFR BLD AUTO: 27.6 % (ref 34.8–46.1)
HCT VFR BLD AUTO: 27.7 % (ref 34.8–46.1)
HCT VFR BLD AUTO: 28.6 % (ref 34.8–46.1)
HCT VFR BLD AUTO: 32 % (ref 34.8–46.1)
HCT VFR BLD AUTO: 34.2 % (ref 34.8–46.1)
HCT VFR BLD CALC: 20 % (ref 34.8–46.1)
HCT VFR BLD CALC: 24 % (ref 34.8–46.1)
HCT VFR BLD CALC: 25 % (ref 34.8–46.1)
HGB BLD-MCNC: 10.5 G/DL (ref 11.5–15.4)
HGB BLD-MCNC: 10.7 G/DL (ref 11.5–15.4)
HGB BLD-MCNC: 10.7 G/DL (ref 11.5–15.4)
HGB BLD-MCNC: 7.3 G/DL (ref 11.5–15.4)
HGB BLD-MCNC: 7.8 G/DL (ref 11.5–15.4)
HGB BLD-MCNC: 7.9 G/DL (ref 11.5–15.4)
HGB BLD-MCNC: 8 G/DL (ref 11.5–15.4)
HGB BLD-MCNC: 8.3 G/DL (ref 11.5–15.4)
HGB BLD-MCNC: 8.4 G/DL (ref 11.1–15.9)
HGB BLD-MCNC: 8.5 G/DL (ref 11.5–15.4)
HGB BLD-MCNC: 9.9 G/DL (ref 11.5–15.4)
HGB BLDA-MCNC: 6.8 G/DL (ref 11.5–15.4)
HGB BLDA-MCNC: 8.2 G/DL (ref 11.5–15.4)
HGB BLDA-MCNC: 8.5 G/DL (ref 11.5–15.4)
HGB UR QL STRIP.AUTO: ABNORMAL
HKHMA(MAX AMPLITUDE KAOLIN): 69.5 MM (ref 53–68)
HOROWITZ INDEX BLDA+IHG-RTO: 100 MM[HG]
HOROWITZ INDEX BLDA+IHG-RTO: 80 MM[HG]
HOROWITZ INDEX BLDA+IHG-RTO: 80 MM[HG]
IMM GRANULOCYTES # BLD: 0.1 X10E3/UL (ref 0–0.1)
IMM GRANULOCYTES NFR BLD: 1 %
INR PPP: 1.78 (ref 0.84–1.19)
INR PPP: 2.54 (ref 0.84–1.19)
INR PPP: 2.95 (ref 0.84–1.19)
INR PPP: 3.3 (ref 0.84–1.19)
KETONES UR STRIP-MCNC: ABNORMAL MG/DL
LACTATE SERPL-SCNC: 11.3 MMOL/L (ref 0.5–2)
LACTATE SERPL-SCNC: 12.4 MMOL/L (ref 0.5–2)
LACTATE SERPL-SCNC: 14.1 MMOL/L (ref 0.5–2)
LACTATE SERPL-SCNC: 15.7 MMOL/L (ref 0.5–2)
LACTATE SERPL-SCNC: 15.8 MMOL/L (ref 0.5–2)
LACTATE SERPL-SCNC: 15.8 MMOL/L (ref 0.5–2)
LACTATE SERPL-SCNC: 16.2 MMOL/L (ref 0.5–2)
LACTATE SERPL-SCNC: 18 MMOL/L (ref 0.5–2)
LACTATE SERPL-SCNC: 19.4 MMOL/L (ref 0.5–2)
LACTATE SERPL-SCNC: 20.6 MMOL/L (ref 0.5–2)
LACTATE SERPL-SCNC: 23.9 MMOL/L (ref 0.5–2)
LEUKOCYTE ESTERASE UR QL STRIP: ABNORMAL
LIPASE SERPL-CCNC: 2494 U/L (ref 73–393)
LYMPHOCYTES # BLD AUTO: 0 % (ref 14–44)
LYMPHOCYTES # BLD AUTO: 0 THOUSAND/UL (ref 0.6–4.47)
LYMPHOCYTES # BLD AUTO: 1.3 X10E3/UL (ref 0.7–3.1)
LYMPHOCYTES # BLD AUTO: 16 % (ref 14–44)
LYMPHOCYTES # BLD AUTO: 2.13 THOUSAND/UL (ref 0.6–4.47)
LYMPHOCYTES NFR BLD AUTO: 17 %
MAGNESIUM SERPL-MCNC: 1.8 MG/DL (ref 1.6–2.6)
MAGNESIUM SERPL-MCNC: 1.9 MG/DL (ref 1.6–2.6)
MAGNESIUM SERPL-MCNC: 2.2 MG/DL (ref 1.6–2.6)
MAGNESIUM SERPL-MCNC: 2.2 MG/DL (ref 1.6–2.6)
MCH RBC QN AUTO: 27.1 PG (ref 26.6–33)
MCH RBC QN AUTO: 27.3 PG (ref 26.8–34.3)
MCH RBC QN AUTO: 28 PG (ref 26.8–34.3)
MCH RBC QN AUTO: 28.2 PG (ref 26.8–34.3)
MCH RBC QN AUTO: 28.2 PG (ref 26.8–34.3)
MCH RBC QN AUTO: 28.6 PG (ref 26.8–34.3)
MCH RBC QN AUTO: 29.7 PG (ref 26.8–34.3)
MCHC RBC AUTO-ENTMCNC: 28.3 G/DL (ref 31.4–37.4)
MCHC RBC AUTO-ENTMCNC: 29.7 G/DL (ref 31.4–37.4)
MCHC RBC AUTO-ENTMCNC: 30 G/DL (ref 31.4–37.4)
MCHC RBC AUTO-ENTMCNC: 30.9 G/DL (ref 31.4–37.4)
MCHC RBC AUTO-ENTMCNC: 31 G/DL (ref 31.5–35.7)
MCHC RBC AUTO-ENTMCNC: 31.3 G/DL (ref 31.4–37.4)
MCHC RBC AUTO-ENTMCNC: 31.5 G/DL (ref 31.4–37.4)
MCV RBC AUTO: 87 FL (ref 79–97)
MCV RBC AUTO: 89 FL (ref 82–98)
MCV RBC AUTO: 91 FL (ref 82–98)
MCV RBC AUTO: 95 FL (ref 82–98)
MCV RBC AUTO: 96 FL (ref 82–98)
MCV RBC AUTO: 96 FL (ref 82–98)
MCV RBC AUTO: 97 FL (ref 82–98)
METAMYELOCYTES NFR BLD MANUAL: 1 % (ref 0–1)
METAMYELOCYTES NFR BLD MANUAL: 2 % (ref 0–1)
MONOCYTES # BLD AUTO: 0 THOUSAND/UL (ref 0–1.22)
MONOCYTES # BLD AUTO: 0.7 X10E3/UL (ref 0.1–0.9)
MONOCYTES # BLD AUTO: 1.7 THOUSAND/UL (ref 0–1.22)
MONOCYTES NFR BLD AUTO: 8 %
MONOCYTES NFR BLD: 0 % (ref 4–12)
MONOCYTES NFR BLD: 7 % (ref 4–12)
NASAL CANNULA: 2
NEUTROPHILS # BLD AUTO: 5.9 X10E3/UL (ref 1.4–7)
NEUTROPHILS # BLD MANUAL: 11.06 THOUSAND/UL (ref 1.85–7.62)
NEUTROPHILS # BLD MANUAL: 21.88 THOUSAND/UL (ref 1.85–7.62)
NEUTROPHILS NFR BLD AUTO: 73 %
NEUTS BAND NFR BLD MANUAL: 14 % (ref 0–8)
NEUTS BAND NFR BLD MANUAL: 34 % (ref 0–8)
NEUTS SEG NFR BLD AUTO: 56 % (ref 43–75)
NEUTS SEG NFR BLD AUTO: 69 % (ref 43–75)
NITRITE UR QL STRIP: NEGATIVE
NON-SQ EPI CELLS URNS QL MICRO: ABNORMAL /HPF
NRBC BLD AUTO-RTO: 0 /100 WBCS
NRBC BLD AUTO-RTO: 1 % (ref 0–0)
NRBC BLD AUTO-RTO: 2 /100 WBCS
NRBC BLD AUTO-RTO: 9 /100 WBC (ref 0–2)
O2 CT BLDA-SCNC: 12.3 ML/DL (ref 16–23)
O2 CT BLDA-SCNC: 12.4 ML/DL (ref 16–23)
O2 CT BLDA-SCNC: 13.8 ML/DL (ref 16–23)
O2 CT BLDA-SCNC: 14.4 ML/DL (ref 16–23)
O2 CT BLDA-SCNC: 14.6 ML/DL (ref 16–23)
O2 CT BLDV-SCNC: 7.7 ML/DL
O2 CT BLDV-SCNC: 9.1 ML/DL
OXYHGB MFR BLDA: 90 % (ref 94–97)
OXYHGB MFR BLDA: 91.8 % (ref 94–97)
OXYHGB MFR BLDA: 94.2 % (ref 94–97)
OXYHGB MFR BLDA: 95.7 % (ref 94–97)
OXYHGB MFR BLDA: 96.8 % (ref 94–97)
PCO2 BLD: 11 MMOL/L (ref 21–32)
PCO2 BLD: 17 MMOL/L (ref 21–32)
PCO2 BLD: 17 MMOL/L (ref 21–32)
PCO2 BLD: 24.5 MM HG (ref 42–50)
PCO2 BLD: 37.3 MM HG (ref 42–50)
PCO2 BLD: 42.6 MM HG (ref 36–44)
PCO2 BLD: 43.1 MM HG (ref 36–44)
PCO2 BLDA: 34.3 MM HG (ref 36–44)
PCO2 BLDA: 34.6 MM HG (ref 36–44)
PCO2 BLDA: 41.3 MM HG (ref 36–44)
PCO2 BLDA: 47.6 MM HG (ref 36–44)
PCO2 BLDA: 51 MM HG (ref 36–44)
PCO2 BLDV: 24.9 MM HG (ref 42–50)
PCO2 BLDV: 32.6 MM HG (ref 42–50)
PEEP RESPIRATORY: 12 CM[H2O]
PH BLD: 7.01 [PH] (ref 7.3–7.4)
PH BLD: 7.17 [PH] (ref 7.35–7.45)
PH BLD: 7.18 [PH] (ref 7.35–7.45)
PH BLD: 7.35 [PH] (ref 7.3–7.4)
PH BLDA: 6.97 [PH] (ref 7.35–7.45)
PH BLDA: 7.02 [PH] (ref 7.35–7.45)
PH BLDA: 7.03 [PH] (ref 7.35–7.45)
PH BLDA: 7.07 [PH] (ref 7.35–7.45)
PH BLDA: 7.09 [PH] (ref 7.35–7.45)
PH BLDV: 7.24 [PH] (ref 7.3–7.4)
PH BLDV: 7.34 [PH] (ref 7.3–7.4)
PH UR STRIP.AUTO: 5.5 [PH]
PHOSPHATE SERPL-MCNC: 4.3 MG/DL (ref 2.3–4.1)
PHOSPHATE SERPL-MCNC: 5.3 MG/DL (ref 2.3–4.1)
PHOSPHATE SERPL-MCNC: 7.1 MG/DL (ref 2.3–4.1)
PHOSPHATE SERPL-MCNC: 8.4 MG/DL (ref 2.3–4.1)
PLASMA CELLS NFR BLD: 1 % (ref 0–0)
PLATELET # BLD AUTO: 103 THOUSANDS/UL (ref 149–390)
PLATELET # BLD AUTO: 114 X10E3/UL (ref 150–450)
PLATELET # BLD AUTO: 198 THOUSANDS/UL (ref 149–390)
PLATELET # BLD AUTO: 57 THOUSANDS/UL (ref 149–390)
PLATELET # BLD AUTO: 79 THOUSANDS/UL (ref 149–390)
PLATELET # BLD AUTO: 80 THOUSANDS/UL (ref 149–390)
PLATELET # BLD AUTO: 82 THOUSANDS/UL (ref 149–390)
PLATELET BLD QL SMEAR: ABNORMAL
PLATELET BLD QL SMEAR: ADEQUATE
PMV BLD AUTO: 11.3 FL (ref 8.9–12.7)
PMV BLD AUTO: 11.6 FL (ref 8.9–12.7)
PMV BLD AUTO: 11.8 FL (ref 8.9–12.7)
PMV BLD AUTO: 11.9 FL (ref 8.9–12.7)
PMV BLD AUTO: 11.9 FL (ref 8.9–12.7)
PMV BLD AUTO: 12 FL (ref 8.9–12.7)
PO2 BLD: 129 MM HG (ref 75–129)
PO2 BLD: 216 MM HG (ref 75–129)
PO2 BLD: 57 MM HG (ref 35–45)
PO2 BLDA: 111.4 MM HG (ref 75–129)
PO2 BLDA: 117.3 MM HG (ref 75–129)
PO2 BLDA: 76.8 MM HG (ref 75–129)
PO2 BLDA: 90.9 MM HG (ref 75–129)
PO2 BLDA: 91.4 MM HG (ref 75–129)
PO2 BLDV: 35.9 MM HG (ref 35–45)
PO2 BLDV: 40.7 MM HG (ref 35–45)
PO2 VENOUS TEMP CORRECTED: 39.6 MM HG (ref 35–45)
POIKILOCYTOSIS BLD QL SMEAR: PRESENT
POLYCHROMASIA BLD QL SMEAR: PRESENT
POLYCHROMASIA BLD QL SMEAR: PRESENT
POTASSIUM BLD-SCNC: 3 MMOL/L (ref 3.5–5.3)
POTASSIUM BLD-SCNC: 3.4 MMOL/L (ref 3.5–5.3)
POTASSIUM BLD-SCNC: 3.6 MMOL/L (ref 3.5–5.3)
POTASSIUM SERPL-SCNC: 3.5 MMOL/L (ref 3.5–5.2)
POTASSIUM SERPL-SCNC: 3.6 MMOL/L (ref 3.5–5.3)
POTASSIUM SERPL-SCNC: 3.7 MMOL/L (ref 3.5–5.3)
POTASSIUM SERPL-SCNC: 3.8 MMOL/L (ref 3.5–5.3)
POTASSIUM SERPL-SCNC: 3.9 MMOL/L (ref 3.5–5.3)
POTASSIUM SERPL-SCNC: 4.1 MMOL/L (ref 3.5–5.3)
POTASSIUM SERPL-SCNC: 4.1 MMOL/L (ref 3.5–5.3)
POTASSIUM SERPL-SCNC: 4.5 MMOL/L (ref 3.5–5.3)
PROCALCITONIN SERPL-MCNC: 1.68 NG/ML
PROT SERPL-MCNC: 4 G/DL (ref 6.4–8.4)
PROT SERPL-MCNC: 4.5 G/DL (ref 6.4–8.4)
PROT SERPL-MCNC: 5.2 G/DL (ref 6.4–8.4)
PROT SERPL-MCNC: 5.5 G/DL (ref 6.4–8.4)
PROT SERPL-MCNC: 6 G/DL (ref 6.4–8.4)
PROT SERPL-MCNC: 6.1 G/DL (ref 6–8.5)
PROT SERPL-MCNC: 6.4 G/DL (ref 6.4–8.4)
PROT UR STRIP-MCNC: ABNORMAL MG/DL
PROTHROMBIN TIME: 21 SECONDS (ref 11.6–14.5)
PROTHROMBIN TIME: 27.6 SECONDS (ref 11.6–14.5)
PROTHROMBIN TIME: 31 SECONDS (ref 11.6–14.5)
PROTHROMBIN TIME: 33.8 SECONDS (ref 11.6–14.5)
RBC # BLD AUTO: 2.86 MILLION/UL (ref 3.81–5.12)
RBC # BLD AUTO: 2.86 MILLION/UL (ref 3.81–5.12)
RBC # BLD AUTO: 2.9 MILLION/UL (ref 3.81–5.12)
RBC # BLD AUTO: 3.01 MILLION/UL (ref 3.81–5.12)
RBC # BLD AUTO: 3.1 X10E6/UL (ref 3.77–5.28)
RBC # BLD AUTO: 3.33 MILLION/UL (ref 3.81–5.12)
RBC # BLD AUTO: 3.79 MILLION/UL (ref 3.81–5.12)
RBC #/AREA URNS AUTO: ABNORMAL /HPF
RBC MORPH BLD: PRESENT
RBC MORPH BLD: PRESENT
RENAL EPI CELLS #/AREA URNS HPF: PRESENT /[HPF]
RH BLD: POSITIVE
SAO2 % BLD FROM PO2: 100 % (ref 60–85)
SAO2 % BLD FROM PO2: 73 % (ref 60–85)
SAO2 % BLD FROM PO2: 98 % (ref 60–85)
SCHISTOCYTES BLD QL SMEAR: PRESENT
SL CV LV EF: 60
SODIUM BLD-SCNC: 148 MMOL/L (ref 136–145)
SODIUM BLD-SCNC: 150 MMOL/L (ref 136–145)
SODIUM BLD-SCNC: 151 MMOL/L (ref 136–145)
SODIUM SERPL-SCNC: 138 MMOL/L (ref 135–147)
SODIUM SERPL-SCNC: 140 MMOL/L (ref 134–144)
SODIUM SERPL-SCNC: 140 MMOL/L (ref 135–147)
SODIUM SERPL-SCNC: 141 MMOL/L (ref 135–147)
SODIUM SERPL-SCNC: 144 MMOL/L (ref 135–147)
SODIUM SERPL-SCNC: 144 MMOL/L (ref 135–147)
SODIUM SERPL-SCNC: 150 MMOL/L (ref 135–147)
SODIUM SERPL-SCNC: 151 MMOL/L (ref 135–147)
SP GR UR STRIP.AUTO: 1.02 (ref 1–1.03)
SPECIMEN EXPIRATION DATE: NORMAL
SPECIMEN SOURCE: ABNORMAL
UNIT DISPENSE STATUS: NORMAL
UNIT PRODUCT CODE: NORMAL
UNIT PRODUCT VOLUME: 250 ML
UNIT PRODUCT VOLUME: 266 ML
UNIT PRODUCT VOLUME: 280 ML
UNIT PRODUCT VOLUME: 300 ML
UNIT PRODUCT VOLUME: 350 ML
UNIT RH: NORMAL
UROBILINOGEN UR STRIP-ACNC: <2 MG/DL
VENT AC: 16
VENT AC: 24
VENT AC: 24
VENT- AC: AC
VT SETTING VENT: 400 ML
WBC # BLD AUTO: 13.32 THOUSAND/UL (ref 4.31–10.16)
WBC # BLD AUTO: 22.68 THOUSAND/UL (ref 4.31–10.16)
WBC # BLD AUTO: 24.31 THOUSAND/UL (ref 4.31–10.16)
WBC # BLD AUTO: 35.88 THOUSAND/UL (ref 4.31–10.16)
WBC # BLD AUTO: 45.77 THOUSAND/UL (ref 4.31–10.16)
WBC # BLD AUTO: 58.24 THOUSAND/UL (ref 4.31–10.16)
WBC # BLD AUTO: 8 X10E3/UL (ref 3.4–10.8)
WBC #/AREA URNS AUTO: ABNORMAL /HPF

## 2022-01-01 PROCEDURE — 0DJ08ZZ INSPECTION OF UPPER INTESTINAL TRACT, VIA NATURAL OR ARTIFICIAL OPENING ENDOSCOPIC: ICD-10-PCS | Performed by: SURGERY

## 2022-01-01 PROCEDURE — 0DJD0ZZ INSPECTION OF LOWER INTESTINAL TRACT, OPEN APPROACH: ICD-10-PCS | Performed by: SURGERY

## 2022-01-01 PROCEDURE — 5A1935Z RESPIRATORY VENTILATION, LESS THAN 24 CONSECUTIVE HOURS: ICD-10-PCS | Performed by: SURGERY

## 2022-01-01 PROCEDURE — 02HV33Z INSERTION OF INFUSION DEVICE INTO SUPERIOR VENA CAVA, PERCUTANEOUS APPROACH: ICD-10-PCS | Performed by: SURGERY

## 2022-01-01 PROCEDURE — 0BH17EZ INSERTION OF ENDOTRACHEAL AIRWAY INTO TRACHEA, VIA NATURAL OR ARTIFICIAL OPENING: ICD-10-PCS | Performed by: SURGERY

## 2022-01-01 RX ORDER — ATROPINE SULFATE 0.1 MG/ML
INJECTION INTRAVENOUS
Status: DISCONTINUED
Start: 2022-01-01 | End: 2022-01-01 | Stop reason: HOSPADM

## 2022-01-01 RX ORDER — VASOPRESSIN 20 U/ML
INJECTION PARENTERAL AS NEEDED
Status: DISCONTINUED | OUTPATIENT
Start: 2022-01-01 | End: 2022-01-01

## 2022-01-01 RX ORDER — ETOMIDATE 2 MG/ML
30 INJECTION INTRAVENOUS ONCE
Status: COMPLETED | OUTPATIENT
Start: 2022-01-01 | End: 2022-01-01

## 2022-01-01 RX ORDER — CALCIUM GLUCONATE 20 MG/ML
1 INJECTION, SOLUTION INTRAVENOUS ONCE
Status: COMPLETED | OUTPATIENT
Start: 2022-01-01 | End: 2022-01-01

## 2022-01-01 RX ORDER — SODIUM CHLORIDE, SODIUM GLUCONATE, SODIUM ACETATE, POTASSIUM CHLORIDE, MAGNESIUM CHLORIDE, SODIUM PHOSPHATE, DIBASIC, AND POTASSIUM PHOSPHATE .53; .5; .37; .037; .03; .012; .00082 G/100ML; G/100ML; G/100ML; G/100ML; G/100ML; G/100ML; G/100ML
2000 INJECTION, SOLUTION INTRAVENOUS ONCE
Status: DISCONTINUED | OUTPATIENT
Start: 2022-01-01 | End: 2022-01-01

## 2022-01-01 RX ORDER — DEXTROSE MONOHYDRATE 25 G/50ML
50 INJECTION, SOLUTION INTRAVENOUS ONCE
Status: COMPLETED | OUTPATIENT
Start: 2022-01-01 | End: 2022-01-01

## 2022-01-01 RX ORDER — HALOPERIDOL 5 MG/ML
0.5 INJECTION INTRAMUSCULAR EVERY 2 HOUR PRN
Status: DISCONTINUED | OUTPATIENT
Start: 2022-01-01 | End: 2022-01-01 | Stop reason: HOSPADM

## 2022-01-01 RX ORDER — SODIUM CHLORIDE 9 MG/ML
INJECTION, SOLUTION INTRAVENOUS CONTINUOUS PRN
Status: DISCONTINUED | OUTPATIENT
Start: 2022-01-01 | End: 2022-01-01

## 2022-01-01 RX ORDER — METOCLOPRAMIDE HYDROCHLORIDE 5 MG/ML
10 INJECTION INTRAMUSCULAR; INTRAVENOUS EVERY 6 HOURS PRN
Status: DISCONTINUED | OUTPATIENT
Start: 2022-01-01 | End: 2022-01-01 | Stop reason: HOSPADM

## 2022-01-01 RX ORDER — SODIUM CHLORIDE, SODIUM LACTATE, POTASSIUM CHLORIDE, CALCIUM CHLORIDE 600; 310; 30; 20 MG/100ML; MG/100ML; MG/100ML; MG/100ML
125 INJECTION, SOLUTION INTRAVENOUS CONTINUOUS
Status: DISCONTINUED | OUTPATIENT
Start: 2022-01-01 | End: 2022-01-01

## 2022-01-01 RX ORDER — HYDROCORTISONE 25 MG/G
CREAM TOPICAL 2 TIMES DAILY
Qty: 28 G | Refills: 1 | Status: SHIPPED | OUTPATIENT
Start: 2022-01-01

## 2022-01-01 RX ORDER — ONDANSETRON 2 MG/ML
INJECTION INTRAMUSCULAR; INTRAVENOUS AS NEEDED
Status: DISCONTINUED | OUTPATIENT
Start: 2022-01-01 | End: 2022-01-01

## 2022-01-01 RX ORDER — CALCIUM CHLORIDE 100 MG/ML
INJECTION INTRAVENOUS; INTRAVENTRICULAR AS NEEDED
Status: DISCONTINUED | OUTPATIENT
Start: 2022-01-01 | End: 2022-01-01

## 2022-01-01 RX ORDER — FENTANYL CITRATE/PF 50 MCG/ML
25 SYRINGE (ML) INJECTION
Status: DISCONTINUED | OUTPATIENT
Start: 2022-01-01 | End: 2022-01-01 | Stop reason: HOSPADM

## 2022-01-01 RX ORDER — MAGNESIUM HYDROXIDE 1200 MG/15ML
LIQUID ORAL AS NEEDED
Status: DISCONTINUED | OUTPATIENT
Start: 2022-01-01 | End: 2022-01-01 | Stop reason: HOSPADM

## 2022-01-01 RX ORDER — ONDANSETRON 4 MG/1
8 TABLET, FILM COATED ORAL EVERY 8 HOURS PRN
Qty: 20 TABLET | Refills: 3 | Status: SHIPPED | OUTPATIENT
Start: 2022-01-01

## 2022-01-01 RX ORDER — METOCLOPRAMIDE HYDROCHLORIDE 5 MG/ML
10 INJECTION INTRAMUSCULAR; INTRAVENOUS ONCE
Status: COMPLETED | OUTPATIENT
Start: 2022-01-01 | End: 2022-01-01

## 2022-01-01 RX ORDER — ALBUMIN, HUMAN INJ 5% 5 %
SOLUTION INTRAVENOUS CONTINUOUS PRN
Status: DISCONTINUED | OUTPATIENT
Start: 2022-01-01 | End: 2022-01-01

## 2022-01-01 RX ORDER — NOREPINEPHRINE BITARTRATE 1 MG/ML
INJECTION, SOLUTION INTRAVENOUS
Status: COMPLETED
Start: 2022-01-01 | End: 2022-01-01

## 2022-01-01 RX ORDER — INSULIN LISPRO 100 [IU]/ML
2-12 INJECTION, SOLUTION INTRAVENOUS; SUBCUTANEOUS EVERY 6 HOURS SCHEDULED
Status: DISCONTINUED | OUTPATIENT
Start: 2022-01-01 | End: 2022-01-01

## 2022-01-01 RX ORDER — CALCIUM GLUCONATE 20 MG/ML
2 INJECTION, SOLUTION INTRAVENOUS ONCE
Status: DISCONTINUED | OUTPATIENT
Start: 2022-01-01 | End: 2022-01-01

## 2022-01-01 RX ORDER — BISACODYL 10 MG
10 SUPPOSITORY, RECTAL RECTAL DAILY PRN
Status: DISCONTINUED | OUTPATIENT
Start: 2022-01-01 | End: 2022-01-01 | Stop reason: HOSPADM

## 2022-01-01 RX ORDER — PANTOPRAZOLE SODIUM 40 MG/10ML
40 INJECTION, POWDER, LYOPHILIZED, FOR SOLUTION INTRAVENOUS EVERY 12 HOURS SCHEDULED
Status: DISCONTINUED | OUTPATIENT
Start: 2022-01-01 | End: 2022-01-01

## 2022-01-01 RX ORDER — FENTANYL CITRATE 50 UG/ML
25 INJECTION, SOLUTION INTRAMUSCULAR; INTRAVENOUS ONCE
Status: COMPLETED | OUTPATIENT
Start: 2022-01-01 | End: 2022-01-01

## 2022-01-01 RX ORDER — ONDANSETRON 2 MG/ML
4 INJECTION INTRAMUSCULAR; INTRAVENOUS EVERY 6 HOURS PRN
Status: DISCONTINUED | OUTPATIENT
Start: 2022-01-01 | End: 2022-01-01 | Stop reason: HOSPADM

## 2022-01-01 RX ORDER — FENTANYL CITRATE-0.9 % NACL/PF 10 MCG/ML
100 PLASTIC BAG, INJECTION (ML) INTRAVENOUS CONTINUOUS
Status: DISCONTINUED | OUTPATIENT
Start: 2022-01-01 | End: 2022-01-01 | Stop reason: HOSPADM

## 2022-01-01 RX ORDER — DEXTROSE MONOHYDRATE 25 G/50ML
INJECTION, SOLUTION INTRAVENOUS
Status: COMPLETED
Start: 2022-01-01 | End: 2022-01-01

## 2022-01-01 RX ORDER — MIDAZOLAM HYDROCHLORIDE 2 MG/2ML
4 INJECTION, SOLUTION INTRAMUSCULAR; INTRAVENOUS ONCE
Status: COMPLETED | OUTPATIENT
Start: 2022-01-01 | End: 2022-01-01

## 2022-01-01 RX ORDER — HYDROMORPHONE HCL/PF 1 MG/ML
0.5 SYRINGE (ML) INJECTION EVERY 4 HOURS PRN
Status: DISCONTINUED | OUTPATIENT
Start: 2022-01-01 | End: 2022-01-01 | Stop reason: HOSPADM

## 2022-01-01 RX ORDER — ACETAMINOPHEN 650 MG/1
650 SUPPOSITORY RECTAL EVERY 6 HOURS PRN
Status: DISCONTINUED | OUTPATIENT
Start: 2022-01-01 | End: 2022-01-01 | Stop reason: HOSPADM

## 2022-01-01 RX ORDER — HYDROMORPHONE HCL/PF 1 MG/ML
1 SYRINGE (ML) INJECTION ONCE
Status: COMPLETED | OUTPATIENT
Start: 2022-01-01 | End: 2022-01-01

## 2022-01-01 RX ORDER — EPINEPHRINE 0.1 MG/ML
SYRINGE (ML) INJECTION
Status: DISCONTINUED
Start: 2022-01-01 | End: 2022-01-01 | Stop reason: HOSPADM

## 2022-01-01 RX ORDER — PROPOFOL 10 MG/ML
INJECTION, EMULSION INTRAVENOUS AS NEEDED
Status: DISCONTINUED | OUTPATIENT
Start: 2022-01-01 | End: 2022-01-01

## 2022-01-01 RX ORDER — SUCCINYLCHOLINE/SOD CL,ISO/PF 100 MG/5ML
100 SYRINGE (ML) INTRAVENOUS ONCE
Status: COMPLETED | OUTPATIENT
Start: 2022-01-01 | End: 2022-01-01

## 2022-01-01 RX ORDER — ONDANSETRON 2 MG/ML
1 INJECTION INTRAMUSCULAR; INTRAVENOUS ONCE
Status: COMPLETED | OUTPATIENT
Start: 2022-01-01 | End: 2022-01-01

## 2022-01-01 RX ORDER — CALCIUM GLUCONATE 20 MG/ML
2 INJECTION, SOLUTION INTRAVENOUS ONCE
Status: COMPLETED | OUTPATIENT
Start: 2022-01-01 | End: 2022-01-01

## 2022-01-01 RX ORDER — FENTANYL CITRATE 50 UG/ML
25 INJECTION, SOLUTION INTRAMUSCULAR; INTRAVENOUS
Status: DISCONTINUED | OUTPATIENT
Start: 2022-01-01 | End: 2022-01-01 | Stop reason: HOSPADM

## 2022-01-01 RX ORDER — SODIUM CHLORIDE, SODIUM GLUCONATE, SODIUM ACETATE, POTASSIUM CHLORIDE, MAGNESIUM CHLORIDE, SODIUM PHOSPHATE, DIBASIC, AND POTASSIUM PHOSPHATE .53; .5; .37; .037; .03; .012; .00082 G/100ML; G/100ML; G/100ML; G/100ML; G/100ML; G/100ML; G/100ML
1000 INJECTION, SOLUTION INTRAVENOUS ONCE
Status: COMPLETED | OUTPATIENT
Start: 2022-01-01 | End: 2022-01-01

## 2022-01-01 RX ORDER — METRONIDAZOLE 500 MG/1
500 TABLET ORAL ONCE
Status: DISCONTINUED | OUTPATIENT
Start: 2022-01-01 | End: 2022-01-01

## 2022-01-01 RX ORDER — LORAZEPAM 2 MG/ML
1 INJECTION INTRAMUSCULAR
Status: DISCONTINUED | OUTPATIENT
Start: 2022-01-01 | End: 2022-01-01 | Stop reason: HOSPADM

## 2022-01-01 RX ORDER — HYDROCORTISONE ACETATE 25 MG/1
25 SUPPOSITORY RECTAL 2 TIMES DAILY
Qty: 12 SUPPOSITORY | Refills: 0 | Status: SHIPPED | OUTPATIENT
Start: 2022-01-01

## 2022-01-01 RX ORDER — EPINEPHRINE 1 MG/ML
INJECTION, SOLUTION, CONCENTRATE INTRAVENOUS
Status: COMPLETED
Start: 2022-01-01 | End: 2022-01-01

## 2022-01-01 RX ORDER — MAGNESIUM SULFATE HEPTAHYDRATE 40 MG/ML
2 INJECTION, SOLUTION INTRAVENOUS ONCE
Status: COMPLETED | OUTPATIENT
Start: 2022-01-01 | End: 2022-01-01

## 2022-01-01 RX ORDER — CHLORHEXIDINE GLUCONATE 0.12 MG/ML
15 RINSE ORAL EVERY 12 HOURS SCHEDULED
Status: DISCONTINUED | OUTPATIENT
Start: 2022-01-01 | End: 2022-01-01 | Stop reason: HOSPADM

## 2022-01-01 RX ORDER — DEXTROSE AND SODIUM CHLORIDE 5; .9 G/100ML; G/100ML
125 INJECTION, SOLUTION INTRAVENOUS CONTINUOUS
Status: DISCONTINUED | OUTPATIENT
Start: 2022-01-01 | End: 2022-01-01

## 2022-01-01 RX ORDER — DIPHENHYDRAMINE HYDROCHLORIDE 50 MG/ML
12.5 INJECTION INTRAMUSCULAR; INTRAVENOUS ONCE AS NEEDED
Status: DISCONTINUED | OUTPATIENT
Start: 2022-01-01 | End: 2022-01-01 | Stop reason: HOSPADM

## 2022-01-01 RX ORDER — EPINEPHRINE 0.1 MG/ML
SYRINGE (ML) INJECTION AS NEEDED
Status: COMPLETED | OUTPATIENT
Start: 2022-01-01 | End: 2022-01-01

## 2022-01-01 RX ORDER — LIDOCAINE HYDROCHLORIDE 10 MG/ML
INJECTION, SOLUTION EPIDURAL; INFILTRATION; INTRACAUDAL; PERINEURAL AS NEEDED
Status: DISCONTINUED | OUTPATIENT
Start: 2022-01-01 | End: 2022-01-01

## 2022-01-01 RX ORDER — ROCURONIUM BROMIDE 10 MG/ML
INJECTION, SOLUTION INTRAVENOUS AS NEEDED
Status: DISCONTINUED | OUTPATIENT
Start: 2022-01-01 | End: 2022-01-01

## 2022-01-01 RX ORDER — SUCCINYLCHOLINE/SOD CL,ISO/PF 100 MG/5ML
SYRINGE (ML) INTRAVENOUS AS NEEDED
Status: DISCONTINUED | OUTPATIENT
Start: 2022-01-01 | End: 2022-01-01

## 2022-01-01 RX ORDER — DEXTROSE MONOHYDRATE 25 G/50ML
INJECTION, SOLUTION INTRAVENOUS AS NEEDED
Status: DISCONTINUED | OUTPATIENT
Start: 2022-01-01 | End: 2022-01-01

## 2022-01-01 RX ORDER — GLYCOPYRROLATE 0.2 MG/ML
0.1 INJECTION INTRAMUSCULAR; INTRAVENOUS EVERY 4 HOURS PRN
Status: DISCONTINUED | OUTPATIENT
Start: 2022-01-01 | End: 2022-01-01 | Stop reason: HOSPADM

## 2022-01-01 RX ORDER — FENTANYL CITRATE 50 UG/ML
50 INJECTION, SOLUTION INTRAMUSCULAR; INTRAVENOUS
Status: DISCONTINUED | OUTPATIENT
Start: 2022-01-01 | End: 2022-01-01 | Stop reason: HOSPADM

## 2022-01-01 RX ORDER — PROPOFOL 10 MG/ML
5-50 INJECTION, EMULSION INTRAVENOUS
Status: DISCONTINUED | OUTPATIENT
Start: 2022-01-01 | End: 2022-01-01 | Stop reason: HOSPADM

## 2022-01-01 RX ADMIN — PIPERACILLIN AND TAZOBACTAM 4.5 G: 36; 4.5 INJECTION, POWDER, FOR SOLUTION INTRAVENOUS at 22:15

## 2022-01-01 RX ADMIN — SODIUM BICARBONATE 125 ML/HR: 84 INJECTION, SOLUTION INTRAVENOUS at 19:33

## 2022-01-01 RX ADMIN — ONDANSETRON 4 MG: 2 INJECTION INTRAMUSCULAR; INTRAVENOUS at 05:40

## 2022-01-01 RX ADMIN — PIPERACILLIN AND TAZOBACTAM 3.38 G: 36; 4.5 INJECTION, POWDER, FOR SOLUTION INTRAVENOUS at 09:25

## 2022-01-01 RX ADMIN — DEXTROSE MONOHYDRATE 50 ML: 25 INJECTION, SOLUTION INTRAVENOUS at 20:26

## 2022-01-01 RX ADMIN — LIDOCAINE HYDROCHLORIDE 50 MG: 10 INJECTION, SOLUTION EPIDURAL; INFILTRATION; INTRACAUDAL; PERINEURAL at 18:52

## 2022-01-01 RX ADMIN — METOCLOPRAMIDE HYDROCHLORIDE 10 MG: 5 INJECTION INTRAMUSCULAR; INTRAVENOUS at 03:05

## 2022-01-01 RX ADMIN — VASOPRESSIN 0.04 UNITS/MIN: 20 INJECTION INTRAVENOUS at 11:46

## 2022-01-01 RX ADMIN — CALCIUM CHLORIDE 0.5 G: 100 INJECTION INTRAVENOUS; INTRAVENTRICULAR at 17:58

## 2022-01-01 RX ADMIN — ALBUMIN (HUMAN): 12.5 INJECTION, SOLUTION INTRAVENOUS at 16:59

## 2022-01-01 RX ADMIN — NOREPINEPHRINE BITARTRATE 30 MCG/MIN: 1 INJECTION, SOLUTION, CONCENTRATE INTRAVENOUS at 21:09

## 2022-01-01 RX ADMIN — VASOPRESSIN 1 UNITS: 20 INJECTION INTRAVENOUS at 17:32

## 2022-01-01 RX ADMIN — NOREPINEPHRINE BITARTRATE 2 MCG/MIN: 1 INJECTION, SOLUTION, CONCENTRATE INTRAVENOUS at 10:02

## 2022-01-01 RX ADMIN — ERTAPENEM SODIUM 1000 MG: 1 INJECTION, POWDER, LYOPHILIZED, FOR SOLUTION INTRAMUSCULAR; INTRAVENOUS at 12:04

## 2022-01-01 RX ADMIN — SODIUM CHLORIDE, SODIUM LACTATE, POTASSIUM CHLORIDE, AND CALCIUM CHLORIDE 125 ML/HR: .6; .31; .03; .02 INJECTION, SOLUTION INTRAVENOUS at 22:30

## 2022-01-01 RX ADMIN — SODIUM CHLORIDE, SODIUM LACTATE, POTASSIUM CHLORIDE, AND CALCIUM CHLORIDE 125 ML/HR: .6; .31; .03; .02 INJECTION, SOLUTION INTRAVENOUS at 05:41

## 2022-01-01 RX ADMIN — SODIUM BICARBONATE 50 MEQ: 84 INJECTION INTRAVENOUS at 12:58

## 2022-01-01 RX ADMIN — CALCIUM GLUCONATE 1 G: 20 INJECTION, SOLUTION INTRAVENOUS at 20:20

## 2022-01-01 RX ADMIN — ONDANSETRON 4 MG: 2 INJECTION INTRAMUSCULAR; INTRAVENOUS at 21:55

## 2022-01-01 RX ADMIN — SODIUM CHLORIDE: 0.9 INJECTION, SOLUTION INTRAVENOUS at 18:48

## 2022-01-01 RX ADMIN — FENTANYL CITRATE 25 MCG: 50 INJECTION INTRAMUSCULAR; INTRAVENOUS at 14:16

## 2022-01-01 RX ADMIN — DEXTROSE MONOHYDRATE 25 G: 500 INJECTION PARENTERAL at 18:33

## 2022-01-01 RX ADMIN — NOREPINEPHRINE BITARTRATE: 1 INJECTION INTRAVENOUS at 20:30

## 2022-01-01 RX ADMIN — SODIUM CHLORIDE: 0.9 INJECTION, SOLUTION INTRAVENOUS at 16:22

## 2022-01-01 RX ADMIN — PANTOPRAZOLE SODIUM 40 MG: 40 INJECTION, POWDER, FOR SOLUTION INTRAVENOUS at 20:56

## 2022-01-01 RX ADMIN — CALCIUM CHLORIDE 0.5 G: 100 INJECTION INTRAVENOUS; INTRAVENTRICULAR at 17:19

## 2022-01-01 RX ADMIN — SODIUM BICARBONATE 50 MEQ: 84 INJECTION INTRAVENOUS at 15:57

## 2022-01-01 RX ADMIN — EPINEPHRINE 10 MCG/MIN: 1 INJECTION INTRAMUSCULAR; INTRAVENOUS; SUBCUTANEOUS at 21:25

## 2022-01-01 RX ADMIN — Medication 2000 UNITS: at 15:53

## 2022-01-01 RX ADMIN — HYDROMORPHONE HYDROCHLORIDE 0.5 MG: 1 INJECTION, SOLUTION INTRAMUSCULAR; INTRAVENOUS; SUBCUTANEOUS at 03:00

## 2022-01-01 RX ADMIN — Medication 20000 ML: at 23:55

## 2022-01-01 RX ADMIN — PHENYLEPHRINE HYDROCHLORIDE 120 MCG/MIN: 10 INJECTION INTRAVENOUS at 22:12

## 2022-01-01 RX ADMIN — CALCIUM CHLORIDE 0.5 G: 100 INJECTION INTRAVENOUS; INTRAVENTRICULAR at 17:07

## 2022-01-01 RX ADMIN — SODIUM CHLORIDE 8 MG/HR: 9 INJECTION, SOLUTION INTRAVENOUS at 21:20

## 2022-01-01 RX ADMIN — SODIUM BICARBONATE 50 MEQ: 84 INJECTION INTRAVENOUS at 19:42

## 2022-01-01 RX ADMIN — HYDROMORPHONE HYDROCHLORIDE 0.5 MG: 1 INJECTION, SOLUTION INTRAMUSCULAR; INTRAVENOUS; SUBCUTANEOUS at 21:55

## 2022-01-01 RX ADMIN — ETOMIDATE 30 MG: 20 INJECTION, SOLUTION INTRAVENOUS at 11:12

## 2022-01-01 RX ADMIN — CALCIUM CHLORIDE 0.5 G: 100 INJECTION INTRAVENOUS; INTRAVENTRICULAR at 18:05

## 2022-01-01 RX ADMIN — SODIUM BICARBONATE 50 MEQ: 84 INJECTION, SOLUTION INTRAVENOUS at 17:45

## 2022-01-01 RX ADMIN — MIDAZOLAM 4 MG: 1 INJECTION INTRAMUSCULAR; INTRAVENOUS at 13:13

## 2022-01-01 RX ADMIN — PROPOFOL 150 MG: 10 INJECTION, EMULSION INTRAVENOUS at 18:52

## 2022-01-01 RX ADMIN — Medication 50 MEQ: at 19:42

## 2022-01-01 RX ADMIN — FENTANYL CITRATE 100 MCG/HR: 0.05 INJECTION, SOLUTION INTRAMUSCULAR; INTRAVENOUS at 22:38

## 2022-01-01 RX ADMIN — SODIUM CHLORIDE, SODIUM GLUCONATE, SODIUM ACETATE, POTASSIUM CHLORIDE, MAGNESIUM CHLORIDE, SODIUM PHOSPHATE, DIBASIC, AND POTASSIUM PHOSPHATE 1000 ML: .53; .5; .37; .037; .03; .012; .00082 INJECTION, SOLUTION INTRAVENOUS at 09:02

## 2022-01-01 RX ADMIN — SODIUM CHLORIDE: 0.9 INJECTION, SOLUTION INTRAVENOUS at 18:10

## 2022-01-01 RX ADMIN — Medication 50 MEQ: at 15:57

## 2022-01-01 RX ADMIN — ONDANSETRON 4 MG: 2 INJECTION INTRAMUSCULAR; INTRAVENOUS at 19:41

## 2022-01-01 RX ADMIN — NOREPINEPHRINE BITARTRATE 30 MCG/MIN: 1 INJECTION, SOLUTION, CONCENTRATE INTRAVENOUS at 16:14

## 2022-01-01 RX ADMIN — NOREPINEPHRINE BITARTRATE: 1 INJECTION INTRAVENOUS at 10:01

## 2022-01-01 RX ADMIN — PROPOFOL 10 MCG/KG/MIN: 10 INJECTION, EMULSION INTRAVENOUS at 11:51

## 2022-01-01 RX ADMIN — VASOPRESSIN 1 UNITS: 20 INJECTION INTRAVENOUS at 17:27

## 2022-01-01 RX ADMIN — HYDROMORPHONE HYDROCHLORIDE 1 MG: 1 INJECTION, SOLUTION INTRAMUSCULAR; INTRAVENOUS; SUBCUTANEOUS at 12:45

## 2022-01-01 RX ADMIN — DEXTROSE MONOHYDRATE 50 ML: 25 INJECTION, SOLUTION INTRAVENOUS at 12:05

## 2022-01-01 RX ADMIN — DEXTROSE AND SODIUM CHLORIDE 125 ML/HR: 5; .9 INJECTION, SOLUTION INTRAVENOUS at 09:17

## 2022-01-01 RX ADMIN — SODIUM CHLORIDE 80 MG: 9 INJECTION, SOLUTION INTRAVENOUS at 14:24

## 2022-01-01 RX ADMIN — IOHEXOL 100 ML: 350 INJECTION, SOLUTION INTRAVENOUS at 14:47

## 2022-01-01 RX ADMIN — CHLORHEXIDINE GLUCONATE 15 ML: 1.2 SOLUTION ORAL at 12:10

## 2022-01-01 RX ADMIN — ALBUMIN (HUMAN): 12.5 INJECTION, SOLUTION INTRAVENOUS at 17:25

## 2022-01-01 RX ADMIN — DEXTROSE MONOHYDRATE 50 ML: 25 INJECTION, SOLUTION INTRAVENOUS at 08:59

## 2022-01-01 RX ADMIN — NOREPINEPHRINE BITARTRATE 30 MCG/MIN: 1 INJECTION, SOLUTION, CONCENTRATE INTRAVENOUS at 13:50

## 2022-01-01 RX ADMIN — Medication 100 MG: at 11:13

## 2022-01-01 RX ADMIN — HYDROCORTISONE SODIUM SUCCINATE 100 MG: 100 INJECTION, POWDER, FOR SOLUTION INTRAMUSCULAR; INTRAVENOUS at 19:51

## 2022-01-01 RX ADMIN — METOCLOPRAMIDE 10 MG: 5 INJECTION, SOLUTION INTRAMUSCULAR; INTRAVENOUS at 12:53

## 2022-01-01 RX ADMIN — PIPERACILLIN AND TAZOBACTAM 3.38 G: 36; 4.5 INJECTION, POWDER, FOR SOLUTION INTRAVENOUS at 01:45

## 2022-01-01 RX ADMIN — METOCLOPRAMIDE 10 MG: 5 INJECTION, SOLUTION INTRAMUSCULAR; INTRAVENOUS at 08:35

## 2022-01-01 RX ADMIN — SODIUM CHLORIDE 8 MG/HR: 9 INJECTION, SOLUTION INTRAVENOUS at 04:41

## 2022-01-01 RX ADMIN — DEXTROSE MONOHYDRATE 50 ML: 25 INJECTION, SOLUTION INTRAVENOUS at 00:05

## 2022-01-01 RX ADMIN — PHENYLEPHRINE HYDROCHLORIDE 25 MCG/MIN: 10 INJECTION INTRAVENOUS at 19:58

## 2022-01-01 RX ADMIN — EPINEPHRINE 0.2 MG: 0.1 INJECTION INTRACARDIAC; INTRAVENOUS at 13:10

## 2022-01-01 RX ADMIN — MAGNESIUM SULFATE HEPTAHYDRATE 2 G: 40 INJECTION, SOLUTION INTRAVENOUS at 07:10

## 2022-01-01 RX ADMIN — EPINEPHRINE: 1 INJECTION, SOLUTION, CONCENTRATE INTRAVENOUS at 19:15

## 2022-01-01 RX ADMIN — CALCIUM GLUCONATE 2 G: 20 INJECTION, SOLUTION INTRAVENOUS at 23:00

## 2022-01-01 RX ADMIN — ACETAMINOPHEN 650 MG: 650 SUPPOSITORY RECTAL at 13:42

## 2022-01-01 RX ADMIN — SODIUM BICARBONATE: 84 INJECTION, SOLUTION INTRAVENOUS at 23:30

## 2022-01-01 RX ADMIN — SODIUM BICARBONATE 50 MEQ: 84 INJECTION, SOLUTION INTRAVENOUS at 18:03

## 2022-01-01 RX ADMIN — FENTANYL CITRATE 50 MCG/HR: 0.05 INJECTION, SOLUTION INTRAMUSCULAR; INTRAVENOUS at 11:44

## 2022-01-01 RX ADMIN — SODIUM CHLORIDE, SODIUM GLUCONATE, SODIUM ACETATE, POTASSIUM CHLORIDE, MAGNESIUM CHLORIDE, SODIUM PHOSPHATE, DIBASIC, AND POTASSIUM PHOSPHATE 1000 ML: .53; .5; .37; .037; .03; .012; .00082 INJECTION, SOLUTION INTRAVENOUS at 16:01

## 2022-01-01 RX ADMIN — FENTANYL CITRATE 50 MCG: 50 INJECTION INTRAMUSCULAR; INTRAVENOUS at 11:40

## 2022-01-01 RX ADMIN — Medication 50 MEQ: at 12:58

## 2022-01-01 RX ADMIN — SODIUM BICARBONATE 50 MEQ: 84 INJECTION, SOLUTION INTRAVENOUS at 17:30

## 2022-01-01 RX ADMIN — SODIUM BICARBONATE 50 MEQ: 84 INJECTION, SOLUTION INTRAVENOUS at 18:31

## 2022-01-01 RX ADMIN — Medication 200 MG: at 18:52

## 2022-01-01 RX ADMIN — SODIUM CHLORIDE, SODIUM GLUCONATE, SODIUM ACETATE, POTASSIUM CHLORIDE, MAGNESIUM CHLORIDE, SODIUM PHOSPHATE, DIBASIC, AND POTASSIUM PHOSPHATE 1000 ML: .53; .5; .37; .037; .03; .012; .00082 INJECTION, SOLUTION INTRAVENOUS at 03:50

## 2022-01-01 RX ADMIN — HYDROCORTISONE SODIUM SUCCINATE 100 MG: 100 INJECTION, POWDER, FOR SOLUTION INTRAMUSCULAR; INTRAVENOUS at 00:01

## 2022-01-01 RX ADMIN — SODIUM CHLORIDE 8 MG/HR: 9 INJECTION, SOLUTION INTRAVENOUS at 15:38

## 2022-01-01 RX ADMIN — FENTANYL CITRATE 25 MCG: 50 INJECTION INTRAMUSCULAR; INTRAVENOUS at 14:32

## 2022-01-01 RX ADMIN — ROCURONIUM BROMIDE 50 MG: 10 INJECTION, SOLUTION INTRAVENOUS at 16:19

## 2022-01-05 ENCOUNTER — TELEPHONE (OUTPATIENT)
Dept: GASTROENTEROLOGY | Facility: CLINIC | Age: 62
End: 2022-01-05

## 2022-01-05 NOTE — TELEPHONE ENCOUNTER
Patient is scheduled for a colonoscopy at [address] [place] [address] on 2/7/22  She saw Dr Yeni Singh on 12/15/21  Please advise if she is clear to have procedure under MAC anesthesia      Thank you

## 2022-01-06 ENCOUNTER — OFFICE VISIT (OUTPATIENT)
Dept: FAMILY MEDICINE CLINIC | Facility: CLINIC | Age: 62
End: 2022-01-06
Payer: COMMERCIAL

## 2022-01-06 VITALS
RESPIRATION RATE: 14 BRPM | BODY MASS INDEX: 50.02 KG/M2 | WEIGHT: 293 LBS | DIASTOLIC BLOOD PRESSURE: 80 MMHG | HEIGHT: 64 IN | HEART RATE: 92 BPM | TEMPERATURE: 97.2 F | SYSTOLIC BLOOD PRESSURE: 110 MMHG

## 2022-01-06 DIAGNOSIS — K43.2 INCISIONAL HERNIA, WITHOUT OBSTRUCTION OR GANGRENE: Primary | ICD-10-CM

## 2022-01-06 PROCEDURE — 99213 OFFICE O/P EST LOW 20 MIN: CPT | Performed by: FAMILY MEDICINE

## 2022-01-06 PROCEDURE — 4004F PT TOBACCO SCREEN RCVD TLK: CPT | Performed by: FAMILY MEDICINE

## 2022-01-06 PROCEDURE — 3079F DIAST BP 80-89 MM HG: CPT | Performed by: FAMILY MEDICINE

## 2022-01-06 PROCEDURE — 3074F SYST BP LT 130 MM HG: CPT | Performed by: FAMILY MEDICINE

## 2022-01-06 PROCEDURE — 3008F BODY MASS INDEX DOCD: CPT | Performed by: FAMILY MEDICINE

## 2022-01-07 NOTE — PROGRESS NOTES
Chief Complaint   Patient presents with    Abdominal Pain     Pt c/o abdominal pain which has subsided         Patient ID: Cinthia Phoenix is a 57 y o  female  HPI  Pt was originally scheduled for abd pain after [place] -  [facility] -  Concerned about small incisional hernia for many years     The following portions of the patient's history were reviewed and updated as appropriate: allergies, current medications, past family history, past medical history, past social history, past surgical history and problem list     Review of Systems   Constitutional: Negative  Cardiovascular: Negative  Gastrointestinal: Negative  Negative for abdominal distention, abdominal pain (resolved ), constipation, diarrhea, rectal pain and vomiting  Current Outpatient Medications   Medication Sig Dispense Refill    aspirin 81 MG tablet Take 81 mg by mouth daily   atorvastatin (LIPITOR) 40 mg tablet TAKE 1 TABLET BY MOUTH  DAILY 90 tablet 3    Blood Glucose Monitoring Suppl (OneTouch Verio) w/Device KIT Use daily 1 kit 0    glucose blood (OneTouch Verio) test strip Use 1 each daily Use as instructed 100 each 5    ibuprofen (ADVIL,MOTRIN) 100 MG tablet Take 100 mg by mouth as needed for mild pain      Insulin Pen Needle (Pen Needles) 32G X 4 MM MISC Use once a week 12 each 3    Magnesium Oxide -Mg Supplement 400 MG CAPS Take 1 capsule by mouth daily      metoprolol succinate (TOPROL-XL) 25 mg 24 hr tablet Take 1 tablet (25 mg total) by mouth 2 (two) times a day 180 tablet 3    multivitamin (THERAGRAN) TABS Take 1 tablet by mouth daily        mupirocin (BACTROBAN) 2 % ointment Apply topically daily 22 g 0    olmesartan (BENICAR) 20 mg tablet TAKE 1 TABLET BY MOUTH  DAILY 90 tablet 3    Omega-3 Fatty Acids (FISH OIL) 1,000 mg Take 1,000 mg by mouth 2 (two) times a day        OneTouch Delica Lancets 76K MISC Use 1 application daily 029 each 3    Ozempic, 1 MG/DOSE, 4 MG/3ML SOPN injection pen       pantoprazole (PROTONIX) 40 mg tablet TAKE 1 TABLET BY MOUTH  DAILY BEFORE BREAKFAST 90 tablet 3    PARoxetine (PAXIL-CR) 37 5 mg 24 hr tablet TAKE 1 TABLET BY MOUTH IN  THE MORNING 90 tablet 3    Potassium Citrate ER 15 MEQ (1620 MG) TBCR Take 1 tablet by mouth 2 (two) times a day 180 tablet 3    sotalol (BETAPACE) 120 mg tablet Take 1 tablet (120 mg total) by mouth every 12 (twelve) hours 180 tablet 3    VITAMIN D PO Take 2,000 Units by mouth 2 (two) times a day       No current facility-administered medications for this visit  Objective:    /80 (BP Location: Left arm, Patient Position: Sitting, Cuff Size: Large)   Pulse 92   Temp (!) 97 2 °F (36 2 °C)   Resp 14   Ht 5' 4" (1 626 m)   Wt (!) 148 kg (325 lb 3 2 oz)   BMI 55 82 kg/m²        Physical Exam  Constitutional:       Appearance: She is obese  She is not ill-appearing  Abdominal:      Tenderness: There is no abdominal tenderness  There is no guarding or rebound  Hernia: A hernia (2 cm in d  upper mid abd likely fat containing reducible hernia ) is present  There is no hernia in the umbilical area or ventral area  Neurological:      Mental Status: She is alert                   Assessment/Plan:         Diagnoses and all orders for this visit:    Incisional hernia, without obstruction or gangrene      I do not think pt will required surgical intervention at that time -  Weight loss advised     rto orion Nunez Ma, MD

## 2022-01-25 ENCOUNTER — TELEPHONE (OUTPATIENT)
Dept: GASTROENTEROLOGY | Facility: HOSPITAL | Age: 62
End: 2022-01-25

## 2022-02-04 ENCOUNTER — TELEPHONE (OUTPATIENT)
Dept: GASTROENTEROLOGY | Facility: HOSPITAL | Age: 62
End: 2022-02-04

## 2022-02-07 ENCOUNTER — HOSPITAL ENCOUNTER (OUTPATIENT)
Dept: GASTROENTEROLOGY | Facility: HOSPITAL | Age: 62
Setting detail: OUTPATIENT SURGERY
Discharge: HOME | End: 2022-02-07
Attending: INTERNAL MEDICINE
Payer: COMMERCIAL

## 2022-02-07 ENCOUNTER — ANESTHESIA EVENT (OUTPATIENT)
Dept: GASTROENTEROLOGY | Facility: HOSPITAL | Age: 62
End: 2022-02-07

## 2022-02-07 ENCOUNTER — ANESTHESIA (OUTPATIENT)
Dept: GASTROENTEROLOGY | Facility: HOSPITAL | Age: 62
End: 2022-02-07

## 2022-02-07 VITALS
SYSTOLIC BLOOD PRESSURE: 121 MMHG | OXYGEN SATURATION: 94 % | TEMPERATURE: 98.4 F | DIASTOLIC BLOOD PRESSURE: 70 MMHG | HEIGHT: 64 IN | HEART RATE: 76 BPM | RESPIRATION RATE: 17 BRPM | BODY MASS INDEX: 50.02 KG/M2 | WEIGHT: 293 LBS

## 2022-02-07 DIAGNOSIS — C49.A0: ICD-10-CM

## 2022-02-07 DIAGNOSIS — K21.9 GASTROESOPHAGEAL REFLUX DISEASE, UNSPECIFIED WHETHER ESOPHAGITIS PRESENT: ICD-10-CM

## 2022-02-07 DIAGNOSIS — D12.6 ADENOMATOUS POLYP OF COLON, UNSPECIFIED PART OF COLON: ICD-10-CM

## 2022-02-07 LAB — GLUCOSE SERPL-MCNC: 110 MG/DL (ref 65–140)

## 2022-02-07 PROCEDURE — 88305 TISSUE EXAM BY PATHOLOGIST: CPT | Performed by: PATHOLOGY

## 2022-02-07 PROCEDURE — 43251 EGD REMOVE LESION SNARE: CPT | Performed by: INTERNAL MEDICINE

## 2022-02-07 PROCEDURE — 45385 COLONOSCOPY W/LESION REMOVAL: CPT | Performed by: INTERNAL MEDICINE

## 2022-02-07 PROCEDURE — 82948 REAGENT STRIP/BLOOD GLUCOSE: CPT

## 2022-02-07 PROCEDURE — 43239 EGD BIOPSY SINGLE/MULTIPLE: CPT | Performed by: INTERNAL MEDICINE

## 2022-02-07 RX ORDER — PROPOFOL 10 MG/ML
INJECTION, EMULSION INTRAVENOUS AS NEEDED
Status: DISCONTINUED | OUTPATIENT
Start: 2022-02-07 | End: 2022-02-07

## 2022-02-07 RX ORDER — GLYCOPYRROLATE 0.2 MG/ML
INJECTION INTRAMUSCULAR; INTRAVENOUS AS NEEDED
Status: DISCONTINUED | OUTPATIENT
Start: 2022-02-07 | End: 2022-02-07

## 2022-02-07 RX ORDER — LIDOCAINE HYDROCHLORIDE 10 MG/ML
INJECTION, SOLUTION EPIDURAL; INFILTRATION; INTRACAUDAL; PERINEURAL AS NEEDED
Status: DISCONTINUED | OUTPATIENT
Start: 2022-02-07 | End: 2022-02-07

## 2022-02-07 RX ORDER — KETAMINE HCL IN NACL, ISO-OSM 100MG/10ML
SYRINGE (ML) INJECTION AS NEEDED
Status: DISCONTINUED | OUTPATIENT
Start: 2022-02-07 | End: 2022-02-07

## 2022-02-07 RX ORDER — SODIUM CHLORIDE, SODIUM LACTATE, POTASSIUM CHLORIDE, CALCIUM CHLORIDE 600; 310; 30; 20 MG/100ML; MG/100ML; MG/100ML; MG/100ML
INJECTION, SOLUTION INTRAVENOUS CONTINUOUS PRN
Status: DISCONTINUED | OUTPATIENT
Start: 2022-02-07 | End: 2022-02-07

## 2022-02-07 RX ORDER — SODIUM CHLORIDE, SODIUM LACTATE, POTASSIUM CHLORIDE, CALCIUM CHLORIDE 600; 310; 30; 20 MG/100ML; MG/100ML; MG/100ML; MG/100ML
125 INJECTION, SOLUTION INTRAVENOUS CONTINUOUS
Status: DISCONTINUED | OUTPATIENT
Start: 2022-02-07 | End: 2022-02-11 | Stop reason: HOSPADM

## 2022-02-07 RX ADMIN — PROPOFOL 30 MG: 10 INJECTION, EMULSION INTRAVENOUS at 08:11

## 2022-02-07 RX ADMIN — PROPOFOL 50 MG: 10 INJECTION, EMULSION INTRAVENOUS at 08:29

## 2022-02-07 RX ADMIN — PROPOFOL 50 MG: 10 INJECTION, EMULSION INTRAVENOUS at 08:07

## 2022-02-07 RX ADMIN — PROPOFOL 70 MG: 10 INJECTION, EMULSION INTRAVENOUS at 08:02

## 2022-02-07 RX ADMIN — LIDOCAINE HYDROCHLORIDE 100 MG: 10 INJECTION, SOLUTION EPIDURAL; INFILTRATION; INTRACAUDAL; PERINEURAL at 08:00

## 2022-02-07 RX ADMIN — GLYCOPYRROLATE 0.2 MG: 0.2 INJECTION, SOLUTION INTRAMUSCULAR; INTRAVENOUS at 08:00

## 2022-02-07 RX ADMIN — PROPOFOL 30 MG: 10 INJECTION, EMULSION INTRAVENOUS at 08:15

## 2022-02-07 RX ADMIN — PROPOFOL 50 MG: 10 INJECTION, EMULSION INTRAVENOUS at 08:20

## 2022-02-07 RX ADMIN — Medication 20 MG: at 08:02

## 2022-02-07 RX ADMIN — SODIUM CHLORIDE, SODIUM LACTATE, POTASSIUM CHLORIDE, AND CALCIUM CHLORIDE: .6; .31; .03; .02 INJECTION, SOLUTION INTRAVENOUS at 07:40

## 2022-02-07 RX ADMIN — PROPOFOL 50 MG: 10 INJECTION, EMULSION INTRAVENOUS at 08:17

## 2022-02-07 RX ADMIN — PROPOFOL 30 MG: 10 INJECTION, EMULSION INTRAVENOUS at 08:34

## 2022-02-07 RX ADMIN — PROPOFOL 30 MG: 10 INJECTION, EMULSION INTRAVENOUS at 08:24

## 2022-02-07 NOTE — ANESTHESIA PREPROCEDURE EVALUATION
Procedure:  COLONOSCOPY  EGD    Relevant Problems   CARDIO   (+) Benign essential hypertension   (+) Dyspnea on exertion   (+) Hyperlipidemia   (+) Hypertension   (+) Supraventricular tachycardia (HCC)      ENDO   (+) Type 2 diabetes mellitus without complication, without long-term current use of insulin (HCC)      GI/HEPATIC   (+) Esophageal reflux      /RENAL   (+) Kidney stone      NEURO/PSYCH   (+) Controlled depression      PULMONARY   (+) Dyspnea on exertion   (+) Severe obstructive sleep apnea        Physical Exam    Airway    Mallampati score: III  TM Distance: >3 FB  Neck ROM: full     Dental   No notable dental hx     Cardiovascular  Rhythm: regular, Rate: normal, No murmur, Cardiovascular exam normal    Pulmonary  Pulmonary exam normal Breath sounds clear to auscultation, No wheezes,     Other Findings        Anesthesia Plan  ASA Score- 3     Anesthesia Type- IV sedation with anesthesia with ASA Monitors  Additional Monitors:   Airway Plan:           Plan Factors-Exercise tolerance (METS): >4 METS  Chart reviewed  EKG reviewed  Imaging results reviewed  Existing labs reviewed  Patient is a current smoker  Patient instructed to abstain from smoking on day of procedure  Patient did not smoke on day of surgery  There is medical exclusion for perioperative obstructive sleep apnea risk education  Induction- intravenous  Postoperative Plan-     Informed Consent- Anesthetic plan and risks discussed with patient  I personally reviewed this patient with the CRNA  Discussed and agreed on the Anesthesia Plan with the CRNA  Shaw Ma

## 2022-02-07 NOTE — DISCHARGE INSTRUCTIONS
Gastric Polyps   WHAT YOU NEED TO KNOW:   Gastric polyps are growths that form in the lining of your stomach  They are not cancerous, but certain types of polyps can change into cancer  DISCHARGE INSTRUCTIONS:   Follow up with your healthcare provider as directed: You may need more tests or procedures  Write down any questions so you remember to ask them at your visits  Medicines:   · Medicines may  be given if you have an infection caused by H  pylori bacteria  · Take your medicine as directed  Contact your healthcare provider if you think your medicine is not helping or if you have side effects  Tell him or her if you are allergic to any medicine  Keep a list of the medicines, vitamins, and herbs you take  Include the amounts, and when and why you take them  Bring the list or the pill bottles to follow-up visits  Carry your medicine list with you in case of an emergency  Seek care immediately if:   · You have blood in your vomit  · You have dark bowel movements  · You have severe pain in your abdomen that does not go away after you take medicine  Contact your healthcare provider if:   · You have indigestion that does not go away with treatment  · You vomit after meals  · You have questions or concerns about your condition or care  © Copyright [employer] 2021 Information is for End User's use only and may not be sold, redistributed or otherwise used for commercial purposes  All illustrations and images included in CareNotes® are the copyrighted property of [employer]  or Mat Garcia  The above information is an  only  It is not intended as medical advice for individual conditions or treatments  Talk to your doctor, nurse or pharmacist before following any medical regimen to see if it is safe and effective for you        Colorectal Polyps   WHAT YOU NEED TO KNOW:   Colorectal polyps are small growths of tissue in the lining of the colon and rectum  Most polyps are hyperplastic polyps and are usually benign (noncancerous)  Certain types of polyps, called adenomatous polyps, may turn into cancer  DISCHARGE INSTRUCTIONS:   Follow up with your healthcare provider or gastroenterologist as directed: You may need to return for more tests, such as another colonoscopy  Write down your questions so you remember to ask them during your visits  Reduce your risk for colorectal polyps:   · Eat a variety of healthy foods:  Healthy foods include fruit, vegetables, whole-grain breads, low-fat dairy products, beans, lean meat, and fish  Ask if you need to be on a special diet  · Maintain a healthy weight:  Ask your healthcare provider if you need to lose weight and how much you need to lose  Ask for help with a weight loss program     · Exercise:  Begin to exercise slowly and do more as you get stronger  Talk with your healthcare provider before you start an exercise program      · Limit alcohol:  Your risk for polyps increases the more you drink  · Do not smoke: If you smoke, it is never too late to quit  Ask for information about how to stop  For support and more information:   · [facility]) [address] [address]  Phone: [phone]  Web Address: www digestive  niddk nih gov    Contact your healthcare provider or gastroenterologist if:   · You have a fever  · You have chills, a cough, or feel weak and achy  · You have abdominal pain that does not go away or gets worse after you take medicine  · Your abdomen is swollen  · You are losing weight without trying  · You have questions or concerns about your condition or care  Seek care immediately or call 911 if:   · You have sudden shortness of breath  · You have a fast heart rate, fast breathing, or are too dizzy to stand up  · You have severe abdominal pain       · You see blood in your bowel movement  © Copyright [address] Information is for End User's use only and may not be sold, redistributed or otherwise used for commercial purposes  All illustrations and images included in CareNotes® are the copyrighted property of [employer]  or Mat Garcia  The above information is an  only  It is not intended as medical advice for individual conditions or treatments  Talk to your doctor, nurse or pharmacist before following any medical regimen to see if it is safe and effective for you  Hemorrhoids   WHAT YOU NEED TO KNOW:   Hemorrhoids are swollen blood vessels inside your rectum (internal hemorrhoids) or on your anus (external hemorrhoids)  Sometimes a hemorrhoid may prolapse  This means it extends out of your anus  DISCHARGE INSTRUCTIONS:   Seek care immediately if:   · You have severe pain in your rectum or around your anus  · You have severe pain in your abdomen and you are vomiting  · You have bleeding from your anus that soaks through your underwear  Contact your healthcare provider if:   · You have frequent and painful bowel movements  · Your hemorrhoid looks or feels more swollen than usual      · You do not have a bowel movement for 2 days or more  · You see or feel tissue coming through your anus  · You have questions or concerns about your condition or care  Medicines: You may  need any of the following:  · Medicine  may be given to decrease pain, swelling, and itching  The medicine may come as a pad, cream, or ointment  · Stool softeners  help treat or prevent constipation  · NSAIDs , such as ibuprofen, help decrease swelling, pain, and fever  NSAIDs can cause stomach bleeding or kidney problems in certain people  If you take blood thinner medicine, always ask your healthcare provider if NSAIDs are safe for you  Always read the medicine label and follow directions  · Take your medicine as directed    Contact your healthcare provider if you think your medicine is not helping or if you have side effects  Tell him or her if you are allergic to any medicine  Keep a list of the medicines, vitamins, and herbs you take  Include the amounts, and when and why you take them  Bring the list or the pill bottles to follow-up visits  Carry your medicine list with you in case of an emergency  Manage your symptoms:   · Apply ice on your anus for 15 to 20 minutes every hour or as directed  Use an ice pack, or put crushed ice in a plastic bag  Cover it with a towel before you apply it to your anus  Ice helps prevent tissue damage and decreases swelling and pain  · Take a sitz bath  Fill a bathtub with 4 to 6 inches of warm water  You may also use a sitz bath pan that fits inside a toilet bowl  Sit in the sitz bath for 15 minutes  Do this 3 times a day, and after each bowel movement  The warm water can help decrease pain and swelling  · Keep your anal area clean  Gently wash the area with warm water daily  Soap may irritate the area  After a bowel movement, wipe with moist towelettes or wet toilet paper  Dry toilet paper can irritate the area  Prevent hemorrhoids:   · Do not strain to have a bowel movement  Do not sit on the toilet too long  These actions can increase pressure on the tissues in your rectum and anus  · Drink plenty of liquids  Liquids can help prevent constipation  Ask how much liquid to drink each day and which liquids are best for you  · Eat a variety of high-fiber foods  Examples include fruits, vegetables, and whole grains  Ask your healthcare provider how much fiber you need each day  You may need to take a fiber supplement  · Exercise as directed  Exercise, such as walking, may make it easier to have a bowel movement  Ask your healthcare provider to help you create an exercise plan  · Do not have anal sex  Anal sex can weaken the skin around your rectum and anus       · Avoid heavy lifting  This can cause straining and increase your risk for another hemorrhoid  Follow up with your doctor as directed:  Write down your questions so you remember to ask them during your visits  © Copyright [employer] 2021 Information is for End User's use only and may not be sold, redistributed or otherwise used for commercial purposes  All illustrations and images included in CareNotes® are the copyrighted property of A [employer] M [employer]  or Mat Hernandez   The above information is an  only  It is not intended as medical advice for individual conditions or treatments  Talk to your doctor, nurse or pharmacist before following any medical regimen to see if it is safe and effective for you  Upper Endoscopy   WHAT YOU NEED TO KNOW:   An upper endoscopy is also called an upper gastrointestinal (GI) endoscopy, or an esophagogastroduodenoscopy (EGD)  You may feel bloated, gassy, or have some abdominal discomfort after your procedure  Your throat may be sore for 24 to 36 hours  You may burp or pass gas from air that is still inside your body  DISCHARGE INSTRUCTIONS:   Call 911 for any of the following:   · You have sudden chest pain or trouble breathing  Seek care immediately if:   · You feel dizzy or faint  · You have trouble swallowing  · Your bowel movements are very dark or black  · Your abdomen is hard and firm and you have severe pain  · You vomit blood  Contact your healthcare provider if:   · You feel full or bloated and cannot burp or pass gas  · You have not had a bowel movement for 3 days after your procedure  · You have neck pain  · You have a fever or chills  · You have nausea or are vomiting  · You have a rash or hives  · You have questions or concerns about your endoscopy  Relieve a sore throat:  Suck on throat lozenges or crushed ice  Gargle with a small amount of warm salt water   Mix 1 teaspoon of salt and 1 cup of warm water to make salt water    Relieve gas and discomfort from bloating:  Lie on your right side with a heating pad on your abdomen  Take short walks to help pass gas  Eat small meals until bloating is relieved  Rest after your procedure: You have been given medicine to relax you  Do not  drive or make important decisions until the day after your procedure  Return to your normal activity as directed  You can usually return to work the day after your procedure  Follow up with your healthcare provider as directed:  Write down your questions so you remember to ask them during your visits  © 2017 [address] is for End User's use only and may not be sold, redistributed or otherwise used for commercial purposes  All illustrations and images included in CareNotes® are the copyrighted property of [employer]  or Lukas Callejas  The above information is an  only  It is not intended as medical advice for individual conditions or treatments  Talk to your doctor, nurse or pharmacist before following any medical regimen to see if it is safe and effective for you  Colonoscopy   WHAT YOU NEED TO KNOW:   A colonoscopy is a procedure to examine the inside of your colon (intestine) with a scope  Polyps or tissue growths may have been removed during your colonoscopy  It is normal to feel bloated and to have some abdominal discomfort  You should be passing gas  If you have hemorrhoids or you had polyps removed, you may have a small amount of bleeding  DISCHARGE INSTRUCTIONS:   Seek care immediately if:   · You have a large amount of bright red blood in your bowel movements  · Your abdomen is hard and firm and you have severe pain  · You have sudden trouble breathing  Contact your healthcare provider if:   · You develop a rash or hives  · You have a fever within 24 hours of your procedure       · You have not had a bowel movement for 3 days after your procedure      · You have questions or concerns about your condition or care  Activity:   · Do not lift, strain, or run  for 3 days after your procedure  · Rest after your procedure  You have been given medicine to relax you  Do not  drive or make important decisions until the day after your procedure  Return to your normal activity as directed  · Relieve gas and discomfort from bloating  by lying on your right side with a heating pad on your abdomen  You may need to take short walks to help the gas move out  Eat small meals until bloating is relieved  If you had polyps removed: For 7 days after your procedure:  · Do not  take aspirin  · Do not  go on long car rides  Follow up with your healthcare provider as directed:  Write down your questions so you remember to ask them during your visits  © 2017 [address] is for End User's use only and may not be sold, redistributed or otherwise used for commercial purposes  All illustrations and images included in CareNotes® are the copyrighted property of [employer]  or Lukas Callejas  The above information is an  only  It is not intended as medical advice for individual conditions or treatments  Talk to your doctor, nurse or pharmacist before following any medical regimen to see if it is safe and effective for you

## 2022-02-07 NOTE — H&P
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    last assessed 4/4/13    Atrial fibrillation (HCC)     Atrial flutter ([facility] [address] )     Benign essential hypertension     last assessed 12/21/17     Body mass index (BMI) of 50-59 9 in adult [facility])     Carpal tunnel syndrome 09/07/2004    unspecified laterality  / last assessed 9/7/04    Congenital pes planus     last assessed 7/15/14     Depression     Depression     Hemangioma of skin 08/26/2003    last assessed 8/26/03    History of gastroesophageal reflux (GERD)     Hypercholesterolemia     Hyperlipidemia     Hypertension     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[facility] [address] ) 04/19/2006    last assessed 12/31/14     Osteoarthritis of both knees     last assessed 12/31/14     Paroxysmal atrial fibrillation (HCC)     S/P ablation of atrial flutter      Past Surgical History:   Procedure Laterality Date    ABDOMINAL SURGERY  06/2006    20cm GIST removed     APPENDECTOMY      ATRIAL ABLATION SURGERY      CARPAL TUNNEL RELEASE Bilateral     COLONOSCOPY      HYSTERECTOMY      fibroids    KIDNEY STONE SURGERY Right 09/17/2021    placed stent in  for kidney stone    KNEE SURGERY      KNEE SURGERY Left 03/2019    OOPHORECTOMY      cyst    TONSILLECTOMY      TUMOR EXCISION  2006    gastrointestinal stromal tumor     Social History   Social History     Substance and Sexual Activity   Alcohol Use No    Comment: social drinker per allscript      Social History     Substance and Sexual Activity Drug Use No     Social History     Tobacco Use   Smoking Status Light Tobacco Smoker    Years: 9 00   Smokeless Tobacco Current User   Tobacco Comment    current some day smoker 1 cigarette/day 4 yrs / former smoker per allscript                             Family History   Problem Relation Age of Onset    Emphysema Mother     Arthritis Mother     Diabetes Mother     Hypertension Mother     COPD Mother     Arthritis Father     Prostate cancer Father     Cerebral aneurysm Son     No Known Problems Maternal Grandmother     No Known Problems Maternal Grandfather     No Known Problems Paternal Grandmother     No Known Problems Paternal Grandfather     No Known Problems Son     No Known Problems Maternal Aunt     No Known Problems Maternal Aunt     No Known Problems Paternal Aunt     No Known Problems Paternal Aunt        Meds/Allergies       Current Outpatient Medications:     aspirin 81 MG tablet    atorvastatin (LIPITOR) 40 mg tablet    ibuprofen (ADVIL,MOTRIN) 100 MG tablet    Magnesium Oxide -Mg Supplement 400 MG CAPS    metoprolol succinate (TOPROL-XL) 25 mg 24 hr tablet    multivitamin (THERAGRAN) TABS    olmesartan (BENICAR) 20 mg tablet    Ozempic, 1 MG/DOSE, 4 MG/3ML SOPN injection pen    pantoprazole (PROTONIX) 40 mg tablet    PARoxetine (PAXIL-CR) 37 5 mg 24 hr tablet    Potassium Citrate ER 15 MEQ (1620 MG) TBCR    sotalol (BETAPACE) 120 mg tablet    VITAMIN D PO    Blood Glucose Monitoring Suppl (OneTouch Verio) w/Device KIT    glucose blood (OneTouch Verio) test strip    Insulin Pen Needle (Pen Needles) 32G X 4 MM MISC    mupirocin (BACTROBAN) 2 % ointment    Omega-3 Fatty Acids (FISH OIL) 1,000 mg    OneTouch Delica Lancets 07I MISC    Current Facility-Administered Medications:     lactated ringers infusion, 125 mL/hr, Intravenous, Continuous    Allergies   Allergen Reactions    Other      Adhesive tape        Objective     /73   Pulse 84   Temp 98 6 °F (37 °C) (Temporal)   Resp (!) 23   Ht 5' 4" (1 626 m)   Wt (!) 142 kg (314 lb)   SpO2 95%   BMI 53 90 kg/m²       PHYSICAL EXAM    Gen: NAD  Head: NCAT  CV: RRR  CHEST: Clear  ABD: soft, NT/ND  EXT: no edema      ASSESSMENT/PLAN:  This is a 57y o  year old female here for EGD and colonoscopy, and she is stable and optimized for her procedure

## 2022-02-07 NOTE — ANESTHESIA POSTPROCEDURE EVALUATION
Post-Op Assessment Note    CV Status:  Stable    Pain management: adequate     Mental Status:  Alert and awake   Hydration Status:  Stable   PONV Controlled:  None   Airway Patency:  Patent      Post Op Vitals Reviewed: Yes      Staff: Anesthesiologist, CRNA         No complications documented      /72 (02/07/22 0841)    Temp 98 4 °F (36 9 °C) (02/07/22 0841)   Pulse 80   Resp 14   SpO2 97% on RA

## 2022-02-10 ENCOUNTER — OFFICE VISIT (OUTPATIENT)
Dept: DERMATOLOGY | Age: 62
End: 2022-02-10
Payer: COMMERCIAL

## 2022-02-10 VITALS — BODY MASS INDEX: 50.02 KG/M2 | HEIGHT: 64 IN | TEMPERATURE: 97.2 F | WEIGHT: 293 LBS

## 2022-02-10 DIAGNOSIS — L81.4 SOLAR LENTIGO: ICD-10-CM

## 2022-02-10 DIAGNOSIS — L91.8 SKIN TAG: ICD-10-CM

## 2022-02-10 DIAGNOSIS — L72.0 EPIDERMAL CYST: ICD-10-CM

## 2022-02-10 DIAGNOSIS — D18.01 CHERRY ANGIOMA: Primary | ICD-10-CM

## 2022-02-10 PROCEDURE — 99203 OFFICE O/P NEW LOW 30 MIN: CPT | Performed by: DERMATOLOGY

## 2022-02-10 PROCEDURE — 4004F PT TOBACCO SCREEN RCVD TLK: CPT | Performed by: DERMATOLOGY

## 2022-02-10 NOTE — PATIENT INSTRUCTIONS
ACROCHORDON ("SKIN TAG")    Assessment and Plan:  Based on a thorough discussion of this condition and the management approach to it (including a comprehensive discussion of the known risks, side effects and potential benefits of treatment), the patient (family) agrees to implement the following specific plan:   Reassured benign  Skin tags are common, soft, harmless skin lesions that are also called, in the appropriate settings, papillomas, fibroepithelial polyps, and soft fibromas  They are made up of loosely arranged collagen fibers and blood vessels surrounded by a thickened or thinned-out epidermis  Skin tags tend to develop in both men and women as we grow older  They are usually found on the skin folds (neck, armpits, groin)  It is not known what specifically causes skin tags  Certain factors, though, do appear to play a role:   Chaffing and irritation from skin rubbing together   High levels of growth factors (as seen, for example, in pregnancy or in acromegaly/gigantism)   Insulin resistance   Human papillomavirus (wart virus)    We discussed that most skin tags do not need to be treated unless they are specifically causing the patient physical distress or limitation or pose a risk for a larger problem such as an infection that forms secondary to excoriation or chronic irritation      We had a thorough discussion of treatment options and specific risks (including that any procedural treatment may not be covered by insurance and would then be the patient's responsibility) and benefits/alternatives including but not limited to the following:   Cryotherapy (freezing)   Shave removal   Surgical excision (snip excision with scissors)   Electrosurgery   Ligation (we do not do this procedure and counseled against it due to risk of tissue necrosis and infection)      DERMATOFIBROMA      Assessment and Plan:  Based on a thorough discussion of this condition and the management approach to it (including a comprehensive discussion of the known risks, side effects and potential benefits of treatment), the patient (family) agrees to implement the following specific plan:   Reassured benign  Assessment and Plan:  A dermatofibroma is a common benign fibrous nodule that most often arises on the skin of the lower legs  A dermatofibroma is also called a "cutaneous fibrous histiocytoma "  Dermatofibromas occur at all ages and in people of every ethnicity  They are more common in women than in men  It is not clear if dermatofibroma is a reactive process or if it is a neoplasm  The lesions are made up of proliferating fibroblasts  Histiocytes may also be involved  They are sometimes attributed to an insect bite or ingrownhair or local trauma, but not consistently  They may be more numerous in patients with altered immunity  Dermatofibromas most often occur on the legs and arms, but may also arise on the trunk or any site of the body  Typical clinical features include the following:   People may have 1 or up to 15 lesions   Size varies from 0 5-1 5 cm diameter; most lesions are 7-10 mm diameter   They are firm nodules tethered to the skin surface and mobile over subcutaneous tissue   The skin "dimples" on pinching the lesion   Color may be pink to light brown in white skin, and dark brown to black in dark skin; some appear paler in the center   They do not usually cause symptoms, but they are sometimes painful or itchy   Because they are often raised lesions, they may be traumatized, for example by a razor   Occasionally dozens may erupt within a few months, usually in the setting of immunosuppression (for example autoimmune disease, cancer or certain medications)   Dermatofibroma does not give rise to cancer  However, occasionally, it may be mistaken for dermatofibrosarcoma or desmoplastic melanoma  A dermatofibroma is harmless and seldom causes any symptoms   Usually, only reassurance is needed  If it is nuisance or causing concern, the lesion can be removed surgically, resulting in a scar that is, by definition, usually longer in diameter than the widest portion of the dermatofibroma  Cryotherapy, shave biopsy and laser surgery are rarely completely successful  Skin punch biopsy or incisional biopsy may be undertaken if there is an atypical feature such as recent enlargement, ulceration, or asymmetrical structures and colours on dermatoscopy  LENTIGO      Assessment and Plan:  Based on a thorough discussion of this condition and the management approach to it (including a comprehensive discussion of the known risks, side effects and potential benefits of treatment), the patient (family) agrees to implement the following specific plan:   When outside we recommend using a wide brim hat, sunglasses, long sleeve and pants, sunscreen with SPF 28+ with reapplication every 2 hours, or SPF specific clothing       What is a lentigo? A lentigo is a pigmented flat or slightly raised lesion with a clearly defined edge  Unlike an ephelis (freckle), it does not fade in the winter months  There are several kinds of lentigo  The name lentigo originally referred to its appearance resembling a small lentil  The plural of lentigo is lentigines, although lentigos is also in common use  Who gets lentigines? Lentigines can affect males and females of all ages and races  Solar lentigines are especially prevalent in fair skinned adults  Lentigines associated with syndromes are present at birth or arise during childhood  What causes lentigines? Common forms of lentigo are due to exposure to ultraviolet radiation:   Sun damage including sunburn    Indoor tanning    Phototherapy, especially photochemotherapy (PUVA)    Ionizing radiation, eg radiation therapy, can also cause lentigines    Several familial syndromes associated with widespread lentigines originate from mutations in Isaac-MAP kinase, mTOR signaling and PTEN pathways  What is the treatment for lentigines? Most lentigines are left alone  Attempts to lighten them may not be successful  The following approaches are used:   SPF 50+ broad-spectrum sunscreen    Hydroquinone bleaching cream    Alpha hydroxy acids    Vitamin C    Retinoids    Azelaic acid    Chemical peels  Individual lesions can be permanently removed using:   Cryotherapy    Intense pulsed light    Pigment lasers    How can lentigines be prevented? Lentigines associated with exposure ultraviolet radiation can be prevented by very careful sun protection  Clothing is more successful at preventing new lentigines than are sunscreens  What is the outlook for lentigines? Lentigines usually persist  They may increase in number with age and sun exposure  Some in sun-protected sites may fade and disappear  NELSON ANGIOMAS    Assessment and Plan:  Based on a thorough discussion of this condition and the management approach to it (including a comprehensive discussion of the known risks, side effects and potential benefits of treatment), the patient (family) agrees to implement the following specific plan:   Monitor for changes   Benign, reassured   Can be remove as cosmetic procedure of cuaterying  [employer] does not cover treatment   $150 to treat up to 10 lesions, and if over 10 lesions, then additional $10/lesion thereafter  due to to the large numbers would recommend laser treatment       Assessment and Plan:    Cherry angioma, also known as Fausto Fryer spots, are benign vascular skin lesions  A "cherry angioma" is a firm red, blue or purple papule, 0 1-1 cm in diameter  When thrombosed, they can appear black in colour until evaluated with a dermatoscope when the red or purple colour is more easily seen  Cherry angioma may develop on any part of the body but most often appear on the scalp, face, lips and trunk    An angioma is due to proliferating endothelial cells; these are the cells that line the inside of a blood vessel  Angiomas can arise in early life or later in life; the most common type of angioma is a cherry angioma  Cherry angiomas are very common in males and females of any age or race  They are more noticeable in white skin than in skin of colour  They markedly increase in number from about the age of 36  There may be a family history of similar lesions  Eruptive cherry angiomas have been rarely reported to be associated with internal malignancy  The cause of angiomas is unknown  Genetic analysis of cherry angiomas has shown that they frequently carry specific somatic missense mutations in the GNAQ and GNA11 (Q209H) genes, which are involved in other vascular and melanocytic proliferations  EPIDERMAL INCLUSION CYST    Assessment and Plan:  Based on a thorough discussion of this condition and the management approach to it (including a comprehensive discussion of the known risks, side effects and potential benefits of treatment), the patient (family) agrees to implement the following specific plan:   Reassured benign   Can be removed if so desire  What are epidermal inclusion cysts? Epidermal inclusion cysts are the most common, benign cutaneous cysts  There are many different names for epidermal inclusion cysts, including epidermoid cyst, epidermal cyst, infundibular cyst, inclusion cyst, and keratin cyst  These cysts can occur anywhere on the body and typically present as nodules directly underneath the skin  There is often a visible pore or opening in the center  The cysts are freely moveable and can range from a few millimeters to several centimeters in diameter  The center of epidermoid cysts almost always contains keratin, which has a cheesy appearance, and not sebum  They also do not originate from sebaceous glands  Therefore, epidermal inclusion cysts are not the same as sebaceous cysts      Cysts may remain stable or progressively enlarge over time  There are no reliable predictive factors to tell if an epidermal inclusion cyst will enlarge, become inflamed, or remain quiescent  Infected cysts tend to become larger, turn red, and are more noticeable to the patient  There may be accompanying pain and discomfort  What causes epidermal inclusion cysts? Epidermal inclusion cysts often appear out of the blue and are not contagious  They are due to a proliferation of epidermal cells within the dermis and are more common in men than women  They occur more frequently in patients in their 20s to 45s  Epidermal inclusion cysts by themselves are usually not inherited, but they can be hereditary in rare syndromes such as Rand syndrome, nodular elastosis with cysts and comedones (Favre-Racouchot syndrome), and basal cell nevus syndrome (Gorlin syndrome)  Elderly patients with chronic sun-damaged skin areas have a higher likelihood of developing epidermoid cysts  They often occur in areas where hair follicles have been inflamed or repeatedly irritated are more frequent in patients with acne vulgaris  In the  period, they are called milia  Patients on BRAF inhibitors such as imiquimod and cyclosporine have a higher incidence of epidermoid cysts of the face  How do we diagnose an epidermal inclusion cyst?  Epidermoid inclusion cysts are often diagnosed by history and physical exam  There is usually no need for biopsy prior to removal   Radiographic and laboratory exams, such as ultrasound studies, are unnecessary and not typically ordered unless the practitioner suspects a genetic condition  What is the treatment for an epidermal inclusion cyst?  Inflamed, uninfected epidermal inclusion cysts rarely resolve spontaneously without therapy or surgical intervention  Treatment is not emergent unless desired by the patient  Definitive treatment is via surgical excision with walls intact   This method will prevent recurrence  This is best done when the cyst is not inflamed, to decrease the probability of rupture during surgery  - A local anesthetic will be injected around the cyst  - A small incision is made in the skin overlying the cyst, and contents are expressed  - The incision is repaired with sutures    Another option is to use a 4mm punch biopsy with cyst extraction through the defect  Incision and drainage is often needed if the cyst is infected or inflamed  If there is surrounding cellulitis, oral antibiotic therapy may be necessary  The common agents used target methicillin sensitive Staphylococcal aureus and methicillin resistant S aureus in areas of high prevalence  ROSACEA    Assessment and Plan:  Based on a thorough discussion of this condition and the management approach to it (including a comprehensive discussion of the known risks, side effects and potential benefits of treatment), the patient (family) agrees to implement the following specific plan:   Can be treated if so desire  Rosacea is a chronic rash affecting the mid-face including the nose, cheeks, chin, forehead, and eyelids  The incidence is usually greatest between the ages of 30-60 years and is more common in people with fair skin  Common characteristics include redness, telangiectasias, papules and pustules over affected areas  Rosacea may look similar to acne, but there is a lack of comedones  Occasionally the eyes may also be involved in ocular rosacea  In advanced disease, enlargement of the sebaceous glands in the nose, termed rhinophyma, may be present  Rosacea results in red spots (papules) and sometimes pustules over the face, but unlike acne there are no blackheads, whiteheads, or cystic nodules  Patients often experience increased facial flushing with prominent blood vessels (erythematotelangiectatic rosacea) and dry, sensitive skin   These symptoms are exacerbated by sun exposure, hot or spicy foods, topical steroids and oil-based facial products  In ocular rosacea, eyelids may be red and sore due to conjunctivitis, keratitis, and episcleritis  If rhinophyma develops due to enlargement of sebaceous glands, the patient may have an enlarged and irregularly shaped nose with prominent pores  In rosacea that is refractory to treatment, patients can develop persistent redness and swelling of the face due to lymphatic obstruction (Morbihan disease)  Distribution around the cheeks may be confused with the malar or butterfly rash of lupus  However, the rash of lupus spares the nasal creases and lacks papules and pustules  If signs of photosensitivity, oral ulcers, arthritis, and kidney dysfunction are present then consider referral to a rheumatologist      There are many potential causes of rosacea including genetic, environmental, vascular, and inflammatory factors   These include, but are not limited to:   Chronic exposure to ultraviolet radiation    Increased immune responses in the form of cathelicidins that promote vessel dilation and infiltration with white blood cells (neutrophils) into the dermis   Increased matrix metalloproteinases such as collagen and elastase that remodel normal tissue may contribute to inflammation of the skin making it thicker and harder   There is some evidence to suggest that increased numbers of demodex mites on patient skin may contribute to rosacea papules     General Treatment Approach    Avoid exacerbating factors such as heat, spicy foods, and alcohol    Use daily SPF30+ sunscreen and other methods of coverage for sun protection   Use water-based make-up    Avoid applying topical steroids to affected areas as they can cause perioral dermatitis and exacerbate rosacea     Topical Treatment Approach   Metronidazole cream or gel by itself or in combination with oral antibiotics for more severe cases   Azelaic acid cream or lotion is effective for mild inflammatory rosacea when applied twice daily to affected areas   Brimonidine gel and oxymetazoline hydrochloride cream can reduce facial redness temporarily    Ivermectin cream can treat papulopustular rosacea by controlling demodex mites and inflammation    Pimecrolimus cream or tacrolimus ointment twice a day for 2-3 months can help reduce inflammation    Oral Treatment Approach   Antibiotics such as doxycycline, minocycline, or erythromycin for 1-3 months   Clonidine and carvedilol can help reduce facial flushing and are generally well tolerated  Common side effects include low blood pressure, gastrointestinal upset, dry eyes, blurred vision and low heart rate   Isotretinoin at low doses can be effective for long term treatment when antibiotics fail  Side effects may make it unsuitable for some patients   NSAIDs such as diclofenac can help reduce discomfort and redness in the skin       Procedural/Surgical Treatment Approach    Vascular lasers or intense pulsed light treatment may be used to treat persistent telangiectasia and papulopustular rosacea   Plastic surgery and carbon dioxide lasers may be used to treat rhinophyma

## 2022-02-10 NOTE — PROGRESS NOTES
[facility] Note     Patient Name: Albert Swenson  Encounter Date: 2/10/22     Have you been cared for by a [facility] in the last 3 years and, if so, which one? No    · Have you traveled outside of the [address] [place] [address] in the past 3 months or outside of the [facility] area in the last 2 weeks? No     May we call your Preferred Phone number to discuss your specific medical information? Yes     May we leave a detailed message that includes your specific medical information? Yes      Today's Chief Concerns:   Concern #1:  Hemangiomas concern   Concern #2:      Past Medical History:  Have you personally ever had or currently have any of the following? · Skin cancer (such as Melanoma, Basal Cell Carcinoma, Squamous Cell Carcinoma? (If Yes, please provide more detail)- No  · Eczema: No  · Psoriasis: No  · HIV/AIDS: No  · Hepatitis B or C: No  · Tuberculosis: No  · Systemic Immunosuppression such as Diabetes, Biologic or Immunotherapy, Chemotherapy, Organ Transplantation, Bone Marrow Transplantation (If YES, please provide more detail): YES, diabetes, chemotherapy years ago  · Radiation Treatment (If YES, please provide more detail): No  · Any other major medical conditions/concerns? 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 Family History:  Have any of your "first degree relatives" (parent, brother, sister, or child) had any of the following       · Skin cancer such as Melanoma or Merkel Cell Carcinoma or Pancreatic Cancer? No  · Eczema, Asthma, Hay Fever or Seasonal Allergies: No  · Psoriasis or Psoriatic Arthritis: No  · Do any other medical conditions seem to run in your family? If Yes, what condition and which relatives?   No    Current Medications:   (please update all dermatological medications before printing patient's AVS!)      Current Outpatient Medications:     aspirin 81 MG tablet, Take 81 mg by mouth daily  , Disp: , Rfl:     atorvastatin (LIPITOR) 40 mg tablet, TAKE 1 TABLET BY MOUTH  DAILY, Disp: 90 tablet, Rfl: 3    Blood Glucose Monitoring Suppl (OneTouch Verio) w/Device KIT, Use daily, Disp: 1 kit, Rfl: 0    glucose blood (OneTouch Verio) test strip, Use 1 each daily Use as instructed, Disp: 100 each, Rfl: 5    ibuprofen (ADVIL,MOTRIN) 100 MG tablet, Take 100 mg by mouth as needed for mild pain, Disp: , Rfl:     Insulin Pen Needle (Pen Needles) 32G X 4 MM MISC, Use once a week, Disp: 12 each, Rfl: 3    Magnesium Oxide -Mg Supplement 400 MG CAPS, Take 1 capsule by mouth daily, Disp: , Rfl:     metoprolol succinate (TOPROL-XL) 25 mg 24 hr tablet, Take 1 tablet (25 mg total) by mouth 2 (two) times a day, Disp: 180 tablet, Rfl: 3    multivitamin (THERAGRAN) TABS, Take 1 tablet by mouth daily  , Disp: , Rfl:     olmesartan (BENICAR) 20 mg tablet, TAKE 1 TABLET BY MOUTH  DAILY, Disp: 90 tablet, Rfl: 3    Omega-3 Fatty Acids (FISH OIL) 1,000 mg, Take 1,000 mg by mouth 2 (two) times a day  , Disp: , Rfl:     OneTouch Delica Lancets 51Z MISC, Use 1 application daily, Disp: 100 each, Rfl: 3    Ozempic, 1 MG/DOSE, 4 MG/3ML SOPN injection pen, , Disp: , Rfl:     pantoprazole (PROTONIX) 40 mg tablet, TAKE 1 TABLET BY MOUTH  DAILY BEFORE BREAKFAST, Disp: 90 tablet, Rfl: 3    PARoxetine (PAXIL-CR) 37 5 mg 24 hr tablet, TAKE 1 TABLET BY MOUTH IN  THE MORNING, Disp: 90 tablet, Rfl: 3    Potassium Citrate ER 15 MEQ (1620 MG) TBCR, Take 1 tablet by mouth 2 (two) times a day, Disp: 180 tablet, Rfl: 3    sotalol (BETAPACE) 120 mg tablet, Take 1 tablet (120 mg total) by mouth every 12 (twelve) hours, Disp: 180 tablet, Rfl: 3    VITAMIN D PO, Take 2,000 Units by mouth 2 (two) times a day, Disp: , Rfl:     mupirocin (BACTROBAN) 2 % ointment, Apply topically daily (Patient not taking: Reported on 2/10/2022 ), Disp: 22 g, Rfl: 0  No current facility-administered medications for this visit  Facility-Administered Medications Ordered in Other Visits:     lactated ringers infusion, 125 mL/hr, Intravenous, Continuous, Chau Calderon MD      Review of Systems:  Have you recently had or currently have any of the following? If YES, what are you doing for the problem? · Fever, chills or unintended weight loss: No  · Sudden loss or change in your vision: No  · Nausea, vomiting or blood in your stool: No  · Painful or swollen joints: No  · Wheezing or cough: No  · Changing mole or non-healing wound: No  · Nosebleeds: YES, from dry nose  · Excessive sweating: No  · Easy or prolonged bleeding? No  · Over the last 2 weeks, how often have you been bothered by the following problems? · Taking little interest or pleasure in doing things: 1 - Not at All  · Feeling down, depressed, or hopeless: 1 - Not at All  · Rapid heartbeat with epinephrine:  No    · FEMALES ONLY:    · Are you pregnant or planning to become pregnant? No  · Are you currently or planning to be nursing or breast feeding? No    · Any known allergies? Allergies   Allergen Reactions    Other      Adhesive tape    ·       Physical Exam:     Was a chaperone (Derm Clinical Assistant) present throughout the entire Physical Exam? Yes     Did the Dermatology Team specifically  the patient on the importance of a Full Skin Exam to be sure that nothing is missed clinically?  Yes}  o Did the patient ultimately request or accept a Full Skin Exam?  Yes  o Did the patient specifically refuse to have the areas "under-the-bra" examined by the Dermatologist? No  o Did the patient specifically refuse to have the areas "under-the-underwear" examined by the Dermatologist? No    CONSTITUTIONAL:   Vitals:    02/10/22 1450   Temp: (!) 97 2 °F (36 2 °C)   TempSrc: Temporal   Weight: (!) 146 kg (321 lb 3 2 oz)   Height: 5' 4" (1 626 m)           PSYCH: Normal mood and affect  EYES: Normal conjunctiva  ENT: Normal lips and oral mucosa  CARDIOVASCULAR: No edema  RESPIRATORY: Normal respirations  HEME/LYMPH/IMMUNO:  No regional lymphadenopathy except as noted below in "ASSESSMENT AND PLAN BY DIAGNOSIS"    SKIN:  FULL ORGAN SYSTEM EXAM   Hair, Scalp, Ears, Face Normal except as noted below in Assessment   Neck, Cervical Chain Nodes Normal except as noted below in Assessment   Right Arm/Hand/Fingers Normal except as noted below in Assessment   Left Arm/Hand/Fingers Normal except as noted below in Assessment   Chest/Breasts/Axillae Viewed areas Normal except as noted below in Assessment   Abdomen, Umbilicus Normal except as noted below in Assessment   Back/Spine Normal except as noted below in Assessment   Groin/Genitalia/Buttocks Normal except as noted below in Assessment   Right Leg, Foot, Toes Normal except as noted below in Assessment   Left Leg, Foot, Toes Normal except as noted below in Assessment        Assessment and Plan by Diagnosis:    History of Present Condition:     Duration:  How long has this been an issue for you?    o  routine preventive skin exam has growths all over the body if accidentally gets scratch they will be   Location Affected:  Where on the body is this affecting you? o  trunk and extremities   Quality:  Is there any bleeding, pain, itch, burning/irritation, or redness associated with the skin lesion? o  bleeding   Severity:  Describe any bleeding, pain, itch, burning/irritation, or redness on a scale of 1 to 10 (with 10 being the worst)  o  4 when scratch   Timing:  Does this condition seem to be there pretty constantly or do you notice it more at specific times throughout the day?     o  consistent   Context:  Have you ever noticed that this condition seems to be associated with specific activities you do?    o  denies   Modifying Factors:    o Anything that seems to make the condition worse?    -  when scratch  o What have you tried to do to make the condition better?    -  nexcare bandaids    Associated Signs and Symptoms:  Does this skin lesion seem to be associated with any of the following:  o  SL AMB DERM SIGNS AND SYMPTOMS: Bleeding           ACROCHORDON ("SKIN TAG")    Physical Exam:   Anatomic Location Affected:  Neck line   Morphological Description:  Skin colored pedunculated papules   Pertinent Positives:   Pertinent Negatives: Additional History of Present Condition:      Assessment and Plan:  Based on a thorough discussion of this condition and the management approach to it (including a comprehensive discussion of the known risks, side effects and potential benefits of treatment), the patient (family) agrees to implement the following specific plan:   Reassured benign  Skin tags are common, soft, harmless skin lesions that are also called, in the appropriate settings, papillomas, fibroepithelial polyps, and soft fibromas  They are made up of loosely arranged collagen fibers and blood vessels surrounded by a thickened or thinned-out epidermis  Skin tags tend to develop in both men and women as we grow older  They are usually found on the skin folds (neck, armpits, groin)  It is not known what specifically causes skin tags  Certain factors, though, do appear to play a role:   Chaffing and irritation from skin rubbing together   High levels of growth factors (as seen, for example, in pregnancy or in acromegaly/gigantism)   Insulin resistance   Human papillomavirus (wart virus)    We discussed that most skin tags do not need to be treated unless they are specifically causing the patient physical distress or limitation or pose a risk for a larger problem such as an infection that forms secondary to excoriation or chronic irritation      We had a thorough discussion of treatment options and specific risks (including that any procedural treatment may not be covered by insurance and would then be the patient's responsibility) and benefits/alternatives including but not limited to the following:   Cryotherapy (freezing)   Shave removal   Surgical excision (snip excision with scissors)   Electrosurgery   Ligation (we do not do this procedure and counseled against it due to risk of tissue necrosis and infection)      DERMATOFIBROMA    Physical Exam:   Anatomic Location Affected:  Left chest   Morphological Description:  Firm dermo nodule   Pertinent Positives:   Pertinent Negatives: Additional History of Present Condition:      Assessment and Plan:  Based on a thorough discussion of this condition and the management approach to it (including a comprehensive discussion of the known risks, side effects and potential benefits of treatment), the patient (family) agrees to implement the following specific plan:   Reassured benign  Assessment and Plan:  A dermatofibroma is a common benign fibrous nodule that most often arises on the skin of the lower legs  A dermatofibroma is also called a "cutaneous fibrous histiocytoma "  Dermatofibromas occur at all ages and in people of every ethnicity  They are more common in women than in men  It is not clear if dermatofibroma is a reactive process or if it is a neoplasm  The lesions are made up of proliferating fibroblasts  Histiocytes may also be involved  They are sometimes attributed to an insect bite or ingrownhair or local trauma, but not consistently  They may be more numerous in patients with altered immunity  Dermatofibromas most often occur on the legs and arms, but may also arise on the trunk or any site of the body  Typical clinical features include the following:   People may have 1 or up to 15 lesions   Size varies from 0 5-1 5 cm diameter; most lesions are 7-10 mm diameter   They are firm nodules tethered to the skin surface and mobile over subcutaneous tissue   The skin "dimples" on pinching the lesion     Color may be pink to light brown in white skin, and dark brown to black in dark skin; some appear paler in the center   They do not usually cause symptoms, but they are sometimes painful or itchy   Because they are often raised lesions, they may be traumatized, for example by a razor   Occasionally dozens may erupt within a few months, usually in the setting of immunosuppression (for example autoimmune disease, cancer or certain medications)   Dermatofibroma does not give rise to cancer  However, occasionally, it may be mistaken for dermatofibrosarcoma or desmoplastic melanoma  A dermatofibroma is harmless and seldom causes any symptoms  Usually, only reassurance is needed  If it is nuisance or causing concern, the lesion can be removed surgically, resulting in a scar that is, by definition, usually longer in diameter than the widest portion of the dermatofibroma  Cryotherapy, shave biopsy and laser surgery are rarely completely successful  Skin punch biopsy or incisional biopsy may be undertaken if there is an atypical feature such as recent enlargement, ulceration, or asymmetrical structures and colours on dermatoscopy  LENTIGO    Physical Exam:   Anatomic Location Affected:  Mostly sun exposed areas   Morphological Description:  Light brown macules   Pertinent Positives:   Pertinent Negatives: Additional History of Present Condition:      Assessment and Plan:  Based on a thorough discussion of this condition and the management approach to it (including a comprehensive discussion of the known risks, side effects and potential benefits of treatment), the patient (family) agrees to implement the following specific plan:   When outside we recommend using a wide brim hat, sunglasses, long sleeve and pants, sunscreen with SPF 89+ with reapplication every 2 hours, or SPF specific clothing       What is a lentigo? A lentigo is a pigmented flat or slightly raised lesion with a clearly defined edge   Unlike an ephelis (freckle), it does not fade in the winter months  There are several kinds of lentigo  The name lentigo originally referred to its appearance resembling a small lentil  The plural of lentigo is lentigines, although lentigos is also in common use  Who gets lentigines? Lentigines can affect males and females of all ages and races  Solar lentigines are especially prevalent in fair skinned adults  Lentigines associated with syndromes are present at birth or arise during childhood  What causes lentigines? Common forms of lentigo are due to exposure to ultraviolet radiation:   Sun damage including sunburn    Indoor tanning    Phototherapy, especially photochemotherapy (PUVA)    Ionizing radiation, eg radiation therapy, can also cause lentigines  Several familial syndromes associated with widespread lentigines originate from mutations in Isaac-MAP kinase, mTOR signaling and PTEN pathways  What is the treatment for lentigines? Most lentigines are left alone  Attempts to lighten them may not be successful  The following approaches are used:   SPF 50+ broad-spectrum sunscreen    Hydroquinone bleaching cream    Alpha hydroxy acids    Vitamin C    Retinoids    Azelaic acid    Chemical peels  Individual lesions can be permanently removed using:   Cryotherapy    Intense pulsed light    Pigment lasers    How can lentigines be prevented? Lentigines associated with exposure ultraviolet radiation can be prevented by very careful sun protection  Clothing is more successful at preventing new lentigines than are sunscreens  What is the outlook for lentigines? Lentigines usually persist  They may increase in number with age and sun exposure  Some in sun-protected sites may fade and disappear  NELSON ANGIOMAS    Physical Exam:   Anatomic Location Affected:  trunk   Morphological Description:  Scattered cherry red, 1-4 mm papules   Pertinent Positives:   Pertinent Negatives:     Additional History of Present Condition:      Assessment and Plan:  Based on a thorough discussion of this condition and the management approach to it (including a comprehensive discussion of the known risks, side effects and potential benefits of treatment), the patient (family) agrees to implement the following specific plan:   Monitor for changes   Benign, reassured   Can be remove as cosmetic procedure of cuaterying  [employer] does not cover treatment   $150 to treat up to 10 lesions, and if over 10 lesions, then additional $10/lesion thereafter  due to to the large numbers would recommend laser treatment       Assessment and Plan:    Cherry angioma, also known as [employer] spots, are benign vascular skin lesions  A "cherry angioma" is a firm red, blue or purple papule, 0 1-1 cm in diameter  When thrombosed, they can appear black in colour until evaluated with a dermatoscope when the red or purple colour is more easily seen  Cherry angioma may develop on any part of the body but most often appear on the scalp, face, lips and trunk  An angioma is due to proliferating endothelial cells; these are the cells that line the inside of a blood vessel  Angiomas can arise in early life or later in life; the most common type of angioma is a cherry angioma  Cherry angiomas are very common in males and females of any age or race  They are more noticeable in white skin than in skin of colour  They markedly increase in number from about the age of 36  There may be a family history of similar lesions  Eruptive cherry angiomas have been rarely reported to be associated with internal malignancy  The cause of angiomas is unknown  Genetic analysis of cherry angiomas has shown that they frequently carry specific somatic missense mutations in the GNAQ and GNA11 (Q209H) genes, which are involved in other vascular and melanocytic proliferations          EPIDERMAL INCLUSION CYST    Physical Exam:   Anatomic Location Affected:  Left parietal   Morphological Description:  0 5 cm subcutaneous nodule   Pertinent Positives:   Pertinent Negatives: Additional History of Present Condition:  Have removed others    Assessment and Plan:  Based on a thorough discussion of this condition and the management approach to it (including a comprehensive discussion of the known risks, side effects and potential benefits of treatment), the patient (family) agrees to implement the following specific plan:   Reassured benign   Can be removed if so desire  What are epidermal inclusion cysts? Epidermal inclusion cysts are the most common, benign cutaneous cysts  There are many different names for epidermal inclusion cysts, including epidermoid cyst, epidermal cyst, infundibular cyst, inclusion cyst, and keratin cyst  These cysts can occur anywhere on the body and typically present as nodules directly underneath the skin  There is often a visible pore or opening in the center  The cysts are freely moveable and can range from a few millimeters to several centimeters in diameter  The center of epidermoid cysts almost always contains keratin, which has a cheesy appearance, and not sebum  They also do not originate from sebaceous glands  Therefore, epidermal inclusion cysts are not the same as sebaceous cysts  Cysts may remain stable or progressively enlarge over time  There are no reliable predictive factors to tell if an epidermal inclusion cyst will enlarge, become inflamed, or remain quiescent  Infected cysts tend to become larger, turn red, and are more noticeable to the patient  There may be accompanying pain and discomfort  What causes epidermal inclusion cysts? Epidermal inclusion cysts often appear out of the blue and are not contagious  They are due to a proliferation of epidermal cells within the dermis and are more common in men than women  They occur more frequently in patients in their 20s to 45s   Epidermal inclusion cysts by themselves are usually not inherited, but they can be hereditary in rare syndromes such as Rand syndrome, nodular elastosis with cysts and comedones (Favre-Racouchot syndrome), and basal cell nevus syndrome (Gorlin syndrome)  Elderly patients with chronic sun-damaged skin areas have a higher likelihood of developing epidermoid cysts  They often occur in areas where hair follicles have been inflamed or repeatedly irritated are more frequent in patients with acne vulgaris  In the  period, they are called milia  Patients on BRAF inhibitors such as imiquimod and cyclosporine have a higher incidence of epidermoid cysts of the face  How do we diagnose an epidermal inclusion cyst?  Epidermoid inclusion cysts are often diagnosed by history and physical exam  There is usually no need for biopsy prior to removal   Radiographic and laboratory exams, such as ultrasound studies, are unnecessary and not typically ordered unless the practitioner suspects a genetic condition  What is the treatment for an epidermal inclusion cyst?  Inflamed, uninfected epidermal inclusion cysts rarely resolve spontaneously without therapy or surgical intervention  Treatment is not emergent unless desired by the patient  Definitive treatment is via surgical excision with walls intact  This method will prevent recurrence  This is best done when the cyst is not inflamed, to decrease the probability of rupture during surgery  - A local anesthetic will be injected around the cyst  - A small incision is made in the skin overlying the cyst, and contents are expressed  - The incision is repaired with sutures    Another option is to use a 4mm punch biopsy with cyst extraction through the defect  Incision and drainage is often needed if the cyst is infected or inflamed  If there is surrounding cellulitis, oral antibiotic therapy may be necessary   The common agents used target methicillin sensitive Staphylococcal aureus and methicillin resistant S aureus in areas of high prevalence  ROSACEA    Physical Exam:   Anatomic Location Affected:  cheeks   Morphological Description:  Erythematous papules   Pertinent Positives:   Pertinent Negatives: Additional History of Present Condition:      Assessment and Plan:  Based on a thorough discussion of this condition and the management approach to it (including a comprehensive discussion of the known risks, side effects and potential benefits of treatment), the patient (family) agrees to implement the following specific plan:   Can be treated if so desire  Rosacea is a chronic rash affecting the mid-face including the nose, cheeks, chin, forehead, and eyelids  The incidence is usually greatest between the ages of 30-60 years and is more common in people with fair skin  Common characteristics include redness, telangiectasias, papules and pustules over affected areas  Rosacea may look similar to acne, but there is a lack of comedones  Occasionally the eyes may also be involved in ocular rosacea  In advanced disease, enlargement of the sebaceous glands in the nose, termed rhinophyma, may be present  Rosacea results in red spots (papules) and sometimes pustules over the face, but unlike acne there are no blackheads, whiteheads, or cystic nodules  Patients often experience increased facial flushing with prominent blood vessels (erythematotelangiectatic rosacea) and dry, sensitive skin  These symptoms are exacerbated by sun exposure, hot or spicy foods, topical steroids and oil-based facial products  In ocular rosacea, eyelids may be red and sore due to conjunctivitis, keratitis, and episcleritis  If rhinophyma develops due to enlargement of sebaceous glands, the patient may have an enlarged and irregularly shaped nose with prominent pores  In rosacea that is refractory to treatment, patients can develop persistent redness and swelling of the face due to lymphatic obstruction (Morbihan disease)       Distribution around the cheeks may be confused with the malar or butterfly rash of lupus  However, the rash of lupus spares the nasal creases and lacks papules and pustules  If signs of photosensitivity, oral ulcers, arthritis, and kidney dysfunction are present then consider referral to a rheumatologist      There are many potential causes of rosacea including genetic, environmental, vascular, and inflammatory factors   These include, but are not limited to:   Chronic exposure to ultraviolet radiation    Increased immune responses in the form of cathelicidins that promote vessel dilation and infiltration with white blood cells (neutrophils) into the dermis   Increased matrix metalloproteinases such as collagen and elastase that remodel normal tissue may contribute to inflammation of the skin making it thicker and harder   There is some evidence to suggest that increased numbers of demodex mites on patient skin may contribute to rosacea papules     General Treatment Approach    Avoid exacerbating factors such as heat, spicy foods, and alcohol    Use daily SPF30+ sunscreen and other methods of coverage for sun protection   Use water-based make-up    Avoid applying topical steroids to affected areas as they can cause perioral dermatitis and exacerbate rosacea     Topical Treatment Approach   Metronidazole cream or gel by itself or in combination with oral antibiotics for more severe cases   Azelaic acid cream or lotion is effective for mild inflammatory rosacea when applied twice daily to affected areas   Brimonidine gel and oxymetazoline hydrochloride cream can reduce facial redness temporarily    Ivermectin cream can treat papulopustular rosacea by controlling demodex mites and inflammation    Pimecrolimus cream or tacrolimus ointment twice a day for 2-3 months can help reduce inflammation    Oral Treatment Approach   Antibiotics such as doxycycline, minocycline, or erythromycin for 1-3 months   Clonidine and carvedilol can help reduce facial flushing and are generally well tolerated  Common side effects include low blood pressure, gastrointestinal upset, dry eyes, blurred vision and low heart rate   Isotretinoin at low doses can be effective for long term treatment when antibiotics fail  Side effects may make it unsuitable for some patients   NSAIDs such as diclofenac can help reduce discomfort and redness in the skin       Procedural/Surgical Treatment Approach    Vascular lasers or intense pulsed light treatment may be used to treat persistent telangiectasia and papulopustular rosacea   Plastic surgery and carbon dioxide lasers may be used to treat rhinophyma     Scribe Attestation    I,:  Milena Salmon am acting as a scribe while in the presence of the attending physician :       I,:  Jenelle Mar MD personally performed the services described in this documentation    as scribed in my presence :

## 2022-02-21 ENCOUNTER — RA CDI HCC (OUTPATIENT)
Dept: OTHER | Facility: HOSPITAL | Age: 62
End: 2022-02-21

## 2022-02-21 LAB
ALBUMIN SERPL-MCNC: 4.4 G/DL (ref 3.8–4.8)
ALBUMIN/CREAT UR: 2 MG/G CREAT (ref 0–29)
ALBUMIN/GLOB SERPL: 1.8 {RATIO} (ref 1.2–2.2)
ALP SERPL-CCNC: 122 IU/L (ref 44–121)
ALT SERPL-CCNC: 52 IU/L (ref 0–32)
AST SERPL-CCNC: 34 IU/L (ref 0–40)
BILIRUB SERPL-MCNC: 0.5 MG/DL (ref 0–1.2)
BUN SERPL-MCNC: 15 MG/DL (ref 8–27)
BUN/CREAT SERPL: 18 (ref 12–28)
CALCIUM SERPL-MCNC: 9.9 MG/DL (ref 8.7–10.3)
CHLORIDE SERPL-SCNC: 107 MMOL/L (ref 96–106)
CHOLEST SERPL-MCNC: 167 MG/DL (ref 100–199)
CO2 SERPL-SCNC: 20 MMOL/L (ref 20–29)
CREAT SERPL-MCNC: 0.85 MG/DL (ref 0.57–1)
CREAT UR-MCNC: 142.1 MG/DL
GLOBULIN SER-MCNC: 2.5 G/DL (ref 1.5–4.5)
GLUCOSE SERPL-MCNC: 137 MG/DL (ref 65–99)
HBA1C MFR BLD: 6.5 % (ref 4.8–5.6)
HDLC SERPL-MCNC: 43 MG/DL
LDLC SERPL CALC-MCNC: 97 MG/DL (ref 0–99)
MICROALBUMIN UR-MCNC: 3.1 UG/ML
POTASSIUM SERPL-SCNC: 4.2 MMOL/L (ref 3.5–5.2)
PROT SERPL-MCNC: 6.9 G/DL (ref 6–8.5)
SL AMB EGFR AFRICAN AMERICAN: 85 ML/MIN/1.73
SL AMB EGFR NON AFRICAN AMERICAN: 74 ML/MIN/1.73
SL AMB VLDL CHOLESTEROL CALC: 27 MG/DL (ref 5–40)
SODIUM SERPL-SCNC: 144 MMOL/L (ref 134–144)
TRIGL SERPL-MCNC: 153 MG/DL (ref 0–149)

## 2022-02-21 PROCEDURE — 3044F HG A1C LEVEL LT 7.0%: CPT | Performed by: DERMATOLOGY

## 2022-02-21 PROCEDURE — 3061F NEG MICROALBUMINURIA REV: CPT | Performed by: DERMATOLOGY

## 2022-02-21 NOTE — PROGRESS NOTES
[facility]  coding opportunities          Number of diagnosis code(s) already on the problem list added to FYI fla     Chart Reviewed * (Number of) Inbasket suggestions sent to Provider: 1                  Based on clinical documentation indicated in your record, it appears that the patient may have the following conditions:    E11 36 Type 2 diabetes mellitus with diabetic cataract (bilateral combined cataracts noted in 2021 eye exam)    Please review/recertify the BPA diagnoses for       Please review the following Dx as per the active problem list:    E66 01 Morbid Obestiy    If this is correct, please document and assess at your next visit,

## 2022-02-24 ENCOUNTER — OFFICE VISIT (OUTPATIENT)
Dept: PODIATRY | Facility: CLINIC | Age: 62
End: 2022-02-24
Payer: COMMERCIAL

## 2022-02-24 VITALS — HEIGHT: 64 IN | BODY MASS INDEX: 50.02 KG/M2 | WEIGHT: 293 LBS | RESPIRATION RATE: 16 BRPM

## 2022-02-24 DIAGNOSIS — M21.969 ACQUIRED DEFORMITY OF FOOT, UNSPECIFIED LATERALITY: ICD-10-CM

## 2022-02-24 DIAGNOSIS — B35.1 ONYCHOMYCOSIS: ICD-10-CM

## 2022-02-24 DIAGNOSIS — L03.032 PARONYCHIA OF GREAT TOE, LEFT: ICD-10-CM

## 2022-02-24 DIAGNOSIS — L60.0 INGROWN TOENAIL: ICD-10-CM

## 2022-02-24 DIAGNOSIS — E11.42 DIABETIC POLYNEUROPATHY ASSOCIATED WITH TYPE 2 DIABETES MELLITUS (HCC): Primary | ICD-10-CM

## 2022-02-24 DIAGNOSIS — M79.672 PAIN IN BOTH FEET: ICD-10-CM

## 2022-02-24 DIAGNOSIS — M79.671 PAIN IN BOTH FEET: ICD-10-CM

## 2022-02-24 PROCEDURE — 99213 OFFICE O/P EST LOW 20 MIN: CPT | Performed by: PODIATRIST

## 2022-02-24 NOTE — PROGRESS NOTES
Assessment/Plan:  Metatarsalgia   Foot pain bilateral   Ingrown toenail   Paronychia   Mycosis of nail  Diabetic neuropathy      Plan   Patient educated on condition   Foot measured bilateral   Nails debrided without pain or complication   Patient may need nail procedure  Patient watch for signs of infection    Patient educated on care of the diabetic foot         Subjective:  Patient complains of pain in her big toes with ambulation   No history of trauma   Patient has pain when she wears shoes              Past Medical History:   Diagnosis Date    Abnormal liver function test       last assessed 1/16/17     Adenomatosis      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     last assessed 4/4/13    Atrial fibrillation ([facility] )      Atrial flutter ([facility] )      Benign essential hypertension       last assessed 12/21/17     Body mass index (BMI) of 50-59 9 in adult [facility])      Carpal tunnel syndrome 09/07/2004     unspecified laterality  / last assessed 9/7/04    Congenital pes planus       last assessed 7/15/14     Depression      Depression      Hemangioma of skin 08/26/2003     last assessed 8/26/03    History of gastroesophageal reflux (GERD)      Hypercholesterolemia      Hyperlipidemia      Hypertension      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     last assessed 12/31/14     Osteoarthritis of both knees       last assessed 12/31/14     Paroxysmal atrial fibrillation (HCC)      S/P ablation of atrial flutter                     Past Surgical History:   Procedure Laterality Date    ABDOMINAL SURGERY   06/2006     20cm GIST removed     APPENDECTOMY        ATRIAL ABLATION SURGERY        CARPAL TUNNEL RELEASE Bilateral      COLONOSCOPY        HYSTERECTOMY        KNEE SURGERY        OOPHORECTOMY        TONSILLECTOMY        TUMOR EXCISION   2006     gastrointestinal stromal tumor                   Allergies   Allergen Reactions    Other         Adhesive tape             Current Outpatient Prescriptions:     aspirin 81 MG tablet, Take 81 mg by mouth daily  , Disp: , Rfl:     esomeprazole (NexIUM) 40 MG capsule, Take 1 capsule (40 mg total) by mouth daily, Disp: 90 capsule, Rfl: 1    ibuprofen (MOTRIN) 800 mg tablet, Take 1 tablet (800 mg total) by mouth every 8 (eight) hours as needed for mild pain, Disp: 30 tablet, Rfl: 0    Magnesium Oxide -Mg Supplement 400 MG CAPS, Take 1 capsule by mouth daily, Disp: , Rfl:     metoprolol succinate (TOPROL-XL) 25 mg 24 hr tablet, Take 1 tablet (25 mg total) by mouth 2 (two) times a day, Disp: 180 tablet, Rfl: 2    multivitamin (THERAGRAN) TABS, Take 1 tablet by mouth daily  , Disp: , Rfl:     olmesartan (BENICAR) 20 mg tablet, Take 1 tablet (20 mg total) by mouth daily, Disp: 30 tablet, Rfl: 0    Omega-3 Fatty Acids (FISH OIL) 1,000 mg, Take 1,000 mg by mouth 2 (two) times a day  , Disp: , Rfl:     PARoxetine (PAXIL-CR) 37 5 mg 24 hr tablet, Take 1 tablet (37 5 mg total) by mouth every morning, Disp: 90 tablet, Rfl: 3    pravastatin (PRAVACHOL) 40 mg tablet, Take 1 tablet (40 mg total) by mouth daily (Patient taking differently: Take 40 mg by mouth daily at bedtime  ), Disp: 90 tablet, Rfl: 1    predniSONE 20 mg tablet, Take 2 tablets (40 mg total) by mouth daily, Disp: 14 tablet, Rfl: 0    rosuvastatin (CRESTOR) 20 MG tablet, TAKE 1 TABLET DAILY, Disp: 90 tablet, Rfl: 3    sotalol (BETAPACE) 120 mg tablet, TAKE 1 TABLET BY MOUTH  EVERY 12 HOURS, Disp: 180 tablet, Rfl: 2           Patient Active Problem List   Diagnosis    Tachycardia    Dyspnea on exertion    Supraventricular tachycardia (HCC)    Hypertension    Controlled depression    Severe obstructive sleep apnea    Morbid obesity (HCC)    Impaired fasting glucose    Hyperlipidemia    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)    Esophageal reflux    Benign essential hypertension    Adenomatous colon polyp    Open wound of left lower extremity             Patient ID: Alecia Vieira is a 59 y o  female          Objective:  Patient's shoes and socks removed    Foot Exam     General  General Appearance: appears stated age and healthy   Orientation: alert and oriented to person, place, and time   Affect: appropriate   Gait: antalgic         Right Foot/Ankle      Inspection and Palpation  Tenderness: metatarsals   Swelling: dorsum and metatarsals   Arch: pes planus  Hammertoes: fifth toe  Skin Exam: dry skin;      Neurovascular  Dorsalis pedis: 1+  Posterior tibial: 2+  Superficial peroneal nerve sensation: diminished  Deep peroneal nerve sensation: diminished        Left Foot/Ankle       Inspection and Palpation  Tenderness: metatarsals   Swelling: dorsum and metatarsals   Arch: pes planus  Hammertoes: fifth toe  Skin Exam: dry skin;      Neurovascular  Dorsalis pedis: 1+  Posterior tibial: 2+  Superficial peroneal nerve sensation: diminished  Deep peroneal nerve sensation: diminished           Physical Exam   Constitutional: She appears well-developed and well-nourished  Cardiovascular: Normal rate and regular rhythm     Pulses:       Dorsalis pedis pulses are 1+ on the right side, and 1+ on the left side         Posterior tibial pulses are 2+ on the right side, and 2+ on the left side  Feet:   Right Foot:   Skin Integrity: Positive for dry skin  Left Foot:   Skin Integrity: Positive for dry skin  Skin:   Wide incurvated hallux toenail bilateral   Nails demonstrate mycosis   Both hallux is demonstrate ingrown toenail      Patient's shoes and socks removed          Assign Risk Category  Deformity present  Loss of protective sensation  No weak pulses  Risk: 2

## 2022-02-28 ENCOUNTER — OFFICE VISIT (OUTPATIENT)
Dept: FAMILY MEDICINE CLINIC | Facility: CLINIC | Age: 62
End: 2022-02-28
Payer: COMMERCIAL

## 2022-02-28 VITALS
DIASTOLIC BLOOD PRESSURE: 90 MMHG | SYSTOLIC BLOOD PRESSURE: 122 MMHG | HEART RATE: 80 BPM | TEMPERATURE: 97.4 F | BODY MASS INDEX: 50.02 KG/M2 | HEIGHT: 64 IN | RESPIRATION RATE: 16 BRPM | WEIGHT: 293 LBS

## 2022-02-28 DIAGNOSIS — I10 BENIGN ESSENTIAL HYPERTENSION: ICD-10-CM

## 2022-02-28 DIAGNOSIS — E66.01 CLASS 3 SEVERE OBESITY DUE TO EXCESS CALORIES WITH SERIOUS COMORBIDITY AND BODY MASS INDEX (BMI) OF 50.0 TO 59.9 IN ADULT (HCC): ICD-10-CM

## 2022-02-28 DIAGNOSIS — E11.9 TYPE 2 DIABETES MELLITUS WITHOUT COMPLICATION, WITHOUT LONG-TERM CURRENT USE OF INSULIN (HCC): Primary | ICD-10-CM

## 2022-02-28 PROBLEM — N20.0 URIC ACID KIDNEY STONE: Status: ACTIVE | Noted: 2022-02-28

## 2022-02-28 PROCEDURE — 99214 OFFICE O/P EST MOD 30 MIN: CPT | Performed by: FAMILY MEDICINE

## 2022-02-28 PROCEDURE — 3008F BODY MASS INDEX DOCD: CPT | Performed by: DERMATOLOGY

## 2022-02-28 RX ORDER — BLOOD SUGAR DIAGNOSTIC
1 STRIP MISCELLANEOUS DAILY
Qty: 100 EACH | Refills: 5 | Status: SHIPPED | OUTPATIENT
Start: 2022-02-28 | End: 2022-03-17

## 2022-02-28 RX ORDER — SEMAGLUTIDE 1.34 MG/ML
1 INJECTION, SOLUTION SUBCUTANEOUS WEEKLY
Qty: 9 ML | Refills: 1 | Status: SHIPPED | OUTPATIENT
Start: 2022-02-28

## 2022-02-28 NOTE — PROGRESS NOTES
Chief Complaint   Patient presents with    Diabetes     4 month check    Follow-up     chronic conditions         Patient ID: Albert Swenson is a 57 y o  female  HPI  Pt is seeing for f/u DM2, HTN, MO - all stable -  Taking meds     The following portions of the patient's history were reviewed and updated as appropriate: allergies, current medications, past family history, past medical history, past social history, past surgical history and problem list     Review of Systems   Constitutional: Negative  Respiratory: Negative  Cardiovascular: Negative  Gastrointestinal: Negative  Genitourinary: Negative  Musculoskeletal: Negative  Skin: Negative  Neurological: Negative  Current Outpatient Medications   Medication Sig Dispense Refill    aspirin 81 MG tablet Take 81 mg by mouth daily   atorvastatin (LIPITOR) 40 mg tablet TAKE 1 TABLET BY MOUTH  DAILY 90 tablet 3    Blood Glucose Monitoring Suppl (OneTouch Verio) w/Device KIT Use daily 1 kit 0    glucose blood (OneTouch Verio) test strip Use 1 each daily Use as instructed 100 each 5    ibuprofen (ADVIL,MOTRIN) 100 MG tablet Take 100 mg by mouth as needed for mild pain      Insulin Pen Needle (Pen Needles) 32G X 4 MM MISC Use once a week 12 each 3    Magnesium Oxide -Mg Supplement 400 MG CAPS Take 1 capsule by mouth daily      metoprolol succinate (TOPROL-XL) 25 mg 24 hr tablet Take 1 tablet (25 mg total) by mouth 2 (two) times a day 180 tablet 3    multivitamin (THERAGRAN) TABS Take 1 tablet by mouth daily        mupirocin (BACTROBAN) 2 % ointment Apply topically daily 22 g 0    olmesartan (BENICAR) 20 mg tablet TAKE 1 TABLET BY MOUTH  DAILY 90 tablet 3    Omega-3 Fatty Acids (FISH OIL) 1,000 mg Take 1,000 mg by mouth 2 (two) times a day        OneTouch Delica Lancets 45F MISC Use 1 application daily 230 each 3    Ozempic, 1 MG/DOSE, 4 MG/3ML SOPN injection pen Inject 0 75 mL (1 mg total) under the skin once a week 9 mL 1  pantoprazole (PROTONIX) 40 mg tablet TAKE 1 TABLET BY MOUTH  DAILY BEFORE BREAKFAST 90 tablet 3    PARoxetine (PAXIL-CR) 37 5 mg 24 hr tablet TAKE 1 TABLET BY MOUTH IN  THE MORNING 90 tablet 3    Potassium Citrate ER 15 MEQ (1620 MG) TBCR Take 1 tablet by mouth 2 (two) times a day 180 tablet 3    sotalol (BETAPACE) 120 mg tablet Take 1 tablet (120 mg total) by mouth every 12 (twelve) hours 180 tablet 3    VITAMIN D PO Take 2,000 Units by mouth 2 (two) times a day       No current facility-administered medications for this visit  Objective:    /90 (BP Location: Left arm, Patient Position: Sitting, Cuff Size: Large)   Pulse 80   Temp (!) 97 4 °F (36 3 °C)   Resp 16   Ht 5' 4" (1 626 m)   Wt (!) 147 kg (324 lb 6 4 oz)   BMI 55 68 kg/m²        Physical Exam  Constitutional:       Appearance: She is obese  She is not ill-appearing  Cardiovascular:      Rate and Rhythm: Normal rate and regular rhythm  Heart sounds: No murmur heard  Pulmonary:      Effort: Pulmonary effort is normal  No respiratory distress  Breath sounds: No wheezing, rhonchi or rales  Musculoskeletal:      Right lower leg: No edema  Left lower leg: No edema  Neurological:      General: No focal deficit present  Mental Status: She is alert and oriented to person, place, and time  Psychiatric:         Mood and Affect: Mood normal            Labs in chart were reviewed    Recent Results (from the past 672 hour(s))   Fingerstick Glucose (POCT)    Collection Time: 02/07/22  7:33 AM   Result Value Ref Range    POC Glucose 110 65 - 140 mg/dl   Tissue Exam    Collection Time: 02/07/22  8:04 AM   Result Value Ref Range    Case Report       Surgical Pathology Report                         Case: [id number]                                   Authorizing Provider:  Jasen Clay MD           Collected:           02/07/2022 0804              Ordering Location:     [facility]   Received: 02/07/2022 1302                                     Endoscopy                                                                    Pathologist:           Ryan Bueno MD                                                                  Specimens:   A) - Stomach, bx antrum for erythema                                                                B) - Stomach, gastric body polyps  x3 cold snare                                                    C) - Esophagogastric junction, bx eg junction                                                       D) - Colon, cecum polyp cold snare                                                                  E) - Colon, transverse polyp cold snare                                                    Final Diagnosis       A  Stomach, Antrum, Biopsy:  - Gastric antral mucosa with reactive gastritis/gastropathy   - Negative for intestinal metaplasia or dysplasia  - No Helicobacter pylori is identified on H&E stained slides  B  Stomach, Gastric body polyps x3, Biopsy:  - Fundic gland polyps  - Negative for dysplasia  C  Esophagogastric junction, Biopsy:  - Benign squamocolumnar junctional mucosa with nonspecific regenerative & chronic inflammatory changes  - Eosinophils are fewer than 3 cells per high power field in squamous epithelium    - Negative for intestinal metaplasia or dysplasia  D  Colon, Cecum, Polypectomy:  - Tubular adenoma  - No high grade dysplasia and no evidence of malignancy  E  Colon, Transverse, Polypectomy:  - Benign polypoid colonic mucosa without specific histopathologic abnormality   - No epithelial dysplasia and no evidence of malignancy  Additional Information       All reported additional testing was performed with appropriately reactive controls    These tests were developed and their performance characteristics determined by [address] [place] [address] or appropriate performing facility, though some tests may be performed on tissues which have not been validated for performance characteristics (such as staining performed on alcohol exposed cell blocks and decalcified tissues)  Results should be interpreted with caution and in the context of the patients clinical condition  These tests may not be cleared or approved by the [place]  Food and Drug Administration, though the FDA has determined that such clearance or approval is not necessary  These tests are used for clinical purposes and they should not be regarded as investigational or for research  This laboratory has been approved by [place] [address], designated as a high-complexity laboratory and is qualified to perform these tests  Interpretation performed at [place] [address]       Synoptic Checklist          COLON/RECTUM POLYP FORM - GI - All Specimens          :    Adenoma(s)      Gross Description          A  The specimen is received in formalin, labeled with the patient's name and hospital number, and is designated "stomach antrum biopsy "  The specimen consists of 2 tan-pink , measuring 0 3 cm and 0 7 cm in greatest dimension  Entirely submitted  One screened cassette  B  The specimen is received in formalin, labeled with the patient's name and hospital number, and is designated "gastric body polyps x3 "  The specimen consists of multiple tan-pink, soft tissue fragments measuring in aggregate 1 1 x 0 9 x 0 3 cm  Entirely submitted  One screened cassette  C  The specimen is received in formalin, labeled with the patient's name and hospital number, and is designated "GE junction biopsy "  The specimen consists of 2 white tan-pink, soft tissue fragments measuring 0 3 cm and 0 5 cm in greatest dimension  Entirely submitted  One screened cassette  D  The specimen is received in formalin, labeled with the patient's name and hospital number, and is designated "cecum polyp "    The specimen consists of 1 tan-pink , soft tissue fragment measuring 0 7 cm in greatest dimension  Entirely submitted  One screened cassette  E  The specimen is received in formalin, labeled with the patient's name and hospital number, and is designated "transverse colon polyp"  The specimen consists of 1 tan-pink , soft tissue fragment measuring 0 8 cm in greatest dimension  Entirely submitted  One screened cassette  Note: The estimated total formalin fixation time based upon information provided by the submitting clinician and the standard processing schedule is under 72 hours  [facility]         Comprehensive metabolic panel    Collection Time: 02/21/22  9:05 AM   Result Value Ref Range    Glucose, Random 137 (H) 65 - 99 mg/dL    BUN 15 8 - 27 mg/dL    Creatinine 0 85 0 57 - 1 00 mg/dL    eGFR Non  74 >59 mL/min/1 73    eGFR  85 >59 mL/min/1 73    SL AMB BUN/CREATININE RATIO 18 12 - 28    Sodium 144 134 - 144 mmol/L    Potassium 4 2 3 5 - 5 2 mmol/L    Chloride 107 (H) 96 - 106 mmol/L    CO2 20 20 - 29 mmol/L    CALCIUM 9 9 8 7 - 10 3 mg/dL    Protein, Total 6 9 6 0 - 8 5 g/dL    Albumin 4 4 3 8 - 4 8 g/dL    Globulin, Total 2 5 1 5 - 4 5 g/dL    Albumin/Globulin Ratio 1 8 1 2 - 2 2    TOTAL BILIRUBIN 0 5 0 0 - 1 2 mg/dL    Alk Phos Isoenzymes 122 (H) 44 - 121 IU/L    AST 34 0 - 40 IU/L    ALT 52 (H) 0 - 32 IU/L   Lipid panel    Collection Time: 02/21/22  9:05 AM   Result Value Ref Range    Cholesterol, Total 167 100 - 199 mg/dL    Triglycerides 153 (H) 0 - 149 mg/dL    HDL 43 >39 mg/dL    VLDL Cholesterol Calculated 27 5 - 40 mg/dL    LDL Calculated 97 0 - 99 mg/dL   Microalbumin / creatinine urine ratio    Collection Time: 02/21/22  9:05 AM   Result Value Ref Range    Creatinine, Urine 142 1 Not Estab  mg/dL    Microalbum  ,U,Random 3 1 Not Estab  ug/mL    Microalb/Creat Ratio 2 0 - 29 mg/g creat   Hemoglobin A1c (w/out EAG)    Collection Time: 02/21/22  9:05 AM   Result Value Ref Range    Hemoglobin A1C 6 5 (H) 4 8 - 5 6 %       Assessment/Plan:         Diagnoses and all orders for this visit:    Type 2 diabetes mellitus without complication, without long-term current use of insulin (HCC)  -     glucose blood (OneTouch Verio) test strip; Use 1 each daily Use as instructed    Class 3 severe obesity due to excess calories with serious comorbidity and body mass index (BMI) of 50 0 to 59 9 in [facility])    Benign essential hypertension          All stable  Cont meds  Exercises, weight loss advised     rto in 6 m               Joshua Espinoza MD

## 2022-03-17 ENCOUNTER — TELEPHONE (OUTPATIENT)
Dept: FAMILY MEDICINE CLINIC | Facility: CLINIC | Age: 62
End: 2022-03-17

## 2022-03-17 NOTE — TELEPHONE ENCOUNTER
Dr Zechariah Deshpande is unable to use one tough verio test strips as they cost too much  She would like you to send rx for contour next 7313  test strips to [facility] instead

## 2022-03-30 ENCOUNTER — TELEPHONE (OUTPATIENT)
Dept: HEMATOLOGY ONCOLOGY | Facility: MEDICAL CENTER | Age: 62
End: 2022-03-30

## 2022-03-30 NOTE — TELEPHONE ENCOUNTER
Call from patient  Patient has dull pain 3 out of 10 especially after meals     Per last OV note:  Patient was diagnosed in 2006 followed by resection and 8+ years of [address]  Patient has not been on [address] since 2013 and at present has no complications from disease recurrence or from prior chemotherapy      Patient is behind in screening particularly with endoscopy and routine health screening colonoscopy  We discussed this last year however the patient never was able to make it  This office will make the appointment for GI today  Patient is without GI complaints at this time            Patient will call PCP  for evaluation and contact office with any future needs

## 2022-03-31 ENCOUNTER — OFFICE VISIT (OUTPATIENT)
Dept: FAMILY MEDICINE CLINIC | Facility: CLINIC | Age: 62
End: 2022-03-31
Payer: COMMERCIAL

## 2022-03-31 VITALS
BODY MASS INDEX: 50.02 KG/M2 | RESPIRATION RATE: 18 BRPM | SYSTOLIC BLOOD PRESSURE: 112 MMHG | DIASTOLIC BLOOD PRESSURE: 84 MMHG | HEIGHT: 64 IN | OXYGEN SATURATION: 96 % | TEMPERATURE: 97.4 F | WEIGHT: 293 LBS | HEART RATE: 86 BPM

## 2022-03-31 DIAGNOSIS — C49.A0: ICD-10-CM

## 2022-03-31 DIAGNOSIS — R10.84 GENERALIZED ABDOMINAL PAIN: Primary | ICD-10-CM

## 2022-03-31 DIAGNOSIS — K29.60 ANTRITIS (STOMACH): ICD-10-CM

## 2022-03-31 PROCEDURE — 3074F SYST BP LT 130 MM HG: CPT | Performed by: NURSE PRACTITIONER

## 2022-03-31 PROCEDURE — 99213 OFFICE O/P EST LOW 20 MIN: CPT | Performed by: NURSE PRACTITIONER

## 2022-03-31 PROCEDURE — 3079F DIAST BP 80-89 MM HG: CPT | Performed by: NURSE PRACTITIONER

## 2022-03-31 PROCEDURE — 4004F PT TOBACCO SCREEN RCVD TLK: CPT | Performed by: NURSE PRACTITIONER

## 2022-03-31 PROCEDURE — 3008F BODY MASS INDEX DOCD: CPT | Performed by: NURSE PRACTITIONER

## 2022-03-31 NOTE — PROGRESS NOTES
Assessment/Plan   Abdominal pain, likely secondary to antritis (seen on EGD 2/2022)  The case discussed with patient using patient centered shared decision making  The patient was counseled regarding instructions for management,-- risk factor reductions,-- prognosis,-- impressions,-- risks and benefits of treatment options,-- importance of compliance with treatment  I have reviewed the instructions with the patient, answering all questions to her satisfaction  Follow up with Gi, Dr Pina Loyd  Reviewed dietary triggers that may worsen s/s  Cont PPI-consider increase BID for short term  Try not to eat at least 2 hrs prior to bed  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  Will follow   Recheck 2 weeks    Subjective   Ernie Ni is a 57 y o  female who presents for evaluation of abdominal pain  Onset was several weeks ago  Symptoms have been unchanged  The pain is described as aching and burning  Pain is located in the epigastric region and periumbilical region without radiation  Aggravating factors: eating  Alleviating factors: none  Associated symptoms: belching and regurgitation  The patient denies constipation, diarrhea, fever, headache, hematochezia, hematuria, melena, nausea and sweats  History of GIST tumor resection 2006 with [address] therapy to follow  Sees Heme-onc regularly  Also had colonoscopy and EGD 2/2022 per Dr Pina Loyd  EGD reveals antritis and one polyp  Pt takes PPI daily for long term      The following portions of the patient's history were reviewed and updated as appropriate: allergies, current medications, past family history, past medical history, past social history, past surgical history and problem list     Review of Systems  Pertinent items are noted in HPI       Objective   Vitals:    03/31/22 1401   BP: 112/84   BP Location: Left arm   Patient Position: Sitting   Cuff Size: Large   Pulse: 86   Resp: 18   Temp: (!) 97 4 °F (36 3 °C)   SpO2: 96%   Weight: (!) 148 kg (325 lb 9 6 oz)   Height: 5' 4" (1 626 m)     Physical Exam  Vitals and nursing note reviewed  Constitutional:       General: She is not in acute distress  Appearance: She is well-developed  She is obese  Cardiovascular:      Rate and Rhythm: Normal rate and regular rhythm  Pulmonary:      Effort: Pulmonary effort is normal       Breath sounds: Normal breath sounds  Abdominal:      General: Abdomen is protuberant  A surgical scar is present  Bowel sounds are normal  There is no distension or abdominal bruit  Palpations: Abdomen is soft  There is no mass  Tenderness: There is generalized abdominal tenderness  Hernia: A hernia is present  Skin:     General: Skin is warm and dry  Coloration: Skin is not pale  Neurological:      Mental Status: She is alert

## 2022-04-07 ENCOUNTER — HOSPITAL ENCOUNTER (OUTPATIENT)
Dept: RADIOLOGY | Facility: HOSPITAL | Age: 62
Discharge: HOME | End: 2022-04-07
Payer: COMMERCIAL

## 2022-04-07 VITALS — HEIGHT: 64 IN | WEIGHT: 293 LBS | BODY MASS INDEX: 50.02 KG/M2

## 2022-04-07 DIAGNOSIS — C49.A0: ICD-10-CM

## 2022-04-07 DIAGNOSIS — R10.84 GENERALIZED ABDOMINAL PAIN: ICD-10-CM

## 2022-04-07 DIAGNOSIS — Z12.31 SCREENING MAMMOGRAM, ENCOUNTER FOR: ICD-10-CM

## 2022-04-07 PROCEDURE — 77063 BREAST TOMOSYNTHESIS BI: CPT

## 2022-04-07 PROCEDURE — 74177 CT ABD & PELVIS W/CONTRAST: CPT

## 2022-04-07 PROCEDURE — 77067 SCR MAMMO BI INCL CAD: CPT

## 2022-04-07 RX ADMIN — IOHEXOL 100 ML: 350 INJECTION, SOLUTION INTRAVENOUS at 08:38

## 2022-04-15 ENCOUNTER — HOSPITAL ENCOUNTER (OUTPATIENT)
Dept: RADIOLOGY | Facility: HOSPITAL | Age: 62
Discharge: HOME | End: 2022-04-15
Payer: COMMERCIAL

## 2022-04-15 DIAGNOSIS — R92.8 ABNORMAL SCREENING MAMMOGRAM: ICD-10-CM

## 2022-04-15 DIAGNOSIS — N20.0 KIDNEY STONES: ICD-10-CM

## 2022-04-15 PROCEDURE — 76642 ULTRASOUND BREAST LIMITED: CPT

## 2022-04-15 PROCEDURE — 76770 US EXAM ABDO BACK WALL COMP: CPT

## 2022-04-18 ENCOUNTER — TELEPHONE (OUTPATIENT)
Dept: HEMATOLOGY ONCOLOGY | Facility: CLINIC | Age: 62
End: 2022-04-18

## 2022-04-18 ENCOUNTER — OFFICE VISIT (OUTPATIENT)
Dept: FAMILY MEDICINE CLINIC | Facility: CLINIC | Age: 62
End: 2022-04-18
Payer: COMMERCIAL

## 2022-04-18 ENCOUNTER — TELEPHONE (OUTPATIENT)
Dept: HEMATOLOGY ONCOLOGY | Facility: MEDICAL CENTER | Age: 62
End: 2022-04-18

## 2022-04-18 VITALS
SYSTOLIC BLOOD PRESSURE: 130 MMHG | TEMPERATURE: 97.5 F | WEIGHT: 293 LBS | RESPIRATION RATE: 16 BRPM | DIASTOLIC BLOOD PRESSURE: 82 MMHG | BODY MASS INDEX: 50.02 KG/M2 | HEART RATE: 72 BPM | HEIGHT: 64 IN

## 2022-04-18 DIAGNOSIS — K86.89 PANCREATIC DUCT DILATED: Primary | ICD-10-CM

## 2022-04-18 DIAGNOSIS — Q45.3 PANCREATIC ABNORMALITY: ICD-10-CM

## 2022-04-18 PROCEDURE — 99214 OFFICE O/P EST MOD 30 MIN: CPT | Performed by: FAMILY MEDICINE

## 2022-04-18 PROCEDURE — 4004F PT TOBACCO SCREEN RCVD TLK: CPT | Performed by: FAMILY MEDICINE

## 2022-04-18 NOTE — TELEPHONE ENCOUNTER
Patient has findings on CT scan ordered by PCP  Patient has f/u with Dr Marco Nixon on 4/21/2022  [facility] text message sent to provider to discuss with patient as patient is anxious  Patient aware of plan

## 2022-04-18 NOTE — TELEPHONE ENCOUNTER
Called and spoke to Neli Aparicio  She is scheduled for the U/S breast 7/21/22 8am [facility]  Pt can view on [employer] and script was mailed

## 2022-04-18 NOTE — TELEPHONE ENCOUNTER
CALL RETURN FORM   Reason for patient call? Patient is calling to discuss her CT scan     Patient's primary oncologist? Stephanie Medrano    Name of person the patient was calling for? Batool Fountain   Any additional information to add, if applicable? Best call back number is [phone]   Informed patient that the message will be forwarded to the team and someone will get back to them as soon as possible    Did you relay this information to the patient?  yes

## 2022-04-18 NOTE — PROGRESS NOTES
Chief Complaint   Patient presents with    Results     ct results , discuss hernia         Patient ID: Katie Bartlett is a 57 y o  female  HPI  Pt was sen for CT abd and pelvis for vague epigastric pain -  Was found to have hypodensity of the head of pancreas and MRI was advised -  Pain is almost resolved  -  No N/V/diarrhea, no weight loss -  Feeling well otherwise     The following portions of the patient's history were reviewed and updated as appropriate: allergies, current medications, past family history, past medical history, past social history, past surgical history and problem list     Review of Systems   Constitutional: Negative  Respiratory: Negative  Cardiovascular: Negative  Gastrointestinal: Negative  Genitourinary: Negative  Musculoskeletal: Negative  Skin: Negative  Neurological: Negative  Current Outpatient Medications   Medication Sig Dispense Refill    aspirin 81 MG tablet Take 81 mg by mouth daily   atorvastatin (LIPITOR) 40 mg tablet TAKE 1 TABLET BY MOUTH  DAILY 90 tablet 3    Blood Glucose Monitoring Suppl (OneTouch Verio) w/Device KIT Use daily 1 kit 0    glucose blood (Contour Next Test) test strip Use 1 each daily Use as instructed 100 each 3    ibuprofen (ADVIL,MOTRIN) 100 MG tablet Take 100 mg by mouth as needed for mild pain      Insulin Pen Needle (Pen Needles) 32G X 4 MM MISC Use once a week 12 each 3    Magnesium Oxide -Mg Supplement 400 MG CAPS Take 1 capsule by mouth daily      metoprolol succinate (TOPROL-XL) 25 mg 24 hr tablet Take 1 tablet (25 mg total) by mouth 2 (two) times a day 180 tablet 3    multivitamin (THERAGRAN) TABS Take 1 tablet by mouth daily        mupirocin (BACTROBAN) 2 % ointment Apply topically daily 22 g 0    olmesartan (BENICAR) 20 mg tablet TAKE 1 TABLET BY MOUTH  DAILY 90 tablet 3    Omega-3 Fatty Acids (FISH OIL) 1,000 mg Take 1,000 mg by mouth 2 (two) times a day        OneTouch Delica Lancets 17V MISC Use 1 application daily 804 each 3    Ozempic, 1 MG/DOSE, 4 MG/3ML SOPN injection pen Inject 0 75 mL (1 mg total) under the skin once a week 9 mL 1    pantoprazole (PROTONIX) 40 mg tablet TAKE 1 TABLET BY MOUTH  DAILY BEFORE BREAKFAST 90 tablet 3    PARoxetine (PAXIL-CR) 37 5 mg 24 hr tablet TAKE 1 TABLET BY MOUTH IN  THE MORNING 90 tablet 3    Potassium Citrate ER 15 MEQ (1620 MG) TBCR Take 1 tablet by mouth 2 (two) times a day 180 tablet 3    sotalol (BETAPACE) 120 mg tablet Take 1 tablet (120 mg total) by mouth every 12 (twelve) hours 180 tablet 3    VITAMIN D PO Take 2,000 Units by mouth 2 (two) times a day       No current facility-administered medications for this visit  Objective:    /82 (BP Location: Left arm, Patient Position: Sitting, Cuff Size: Large)   Pulse 72   Temp 97 5 °F (36 4 °C)   Resp 16   Ht 5' 4" (1 626 m)   Wt (!) 146 kg (322 lb 8 oz)   BMI 55 36 kg/m²        Physical Exam  Constitutional:       Appearance: She is obese  She is not ill-appearing  Cardiovascular:      Rate and Rhythm: Normal rate and regular rhythm  Heart sounds: No murmur heard  Pulmonary:      Effort: Pulmonary effort is normal  No respiratory distress  Abdominal:      Palpations: There is no mass  Tenderness: There is no abdominal tenderness  Comments: Obese    Musculoskeletal:      Right lower leg: No edema  Left lower leg: No edema  Neurological:      General: No focal deficit present  Mental Status: She is alert and oriented to person, place, and time  Psychiatric:         Mood and Affect: Mood normal                  Assessment/Plan:         Diagnoses and all orders for this visit:    Pancreatic duct dilated  -     Cancer antigen 19-9; Future  -     CBC and Platelet; Future  -     Lipase; Future  -     Amylase; Future  -     Comprehensive metabolic panel; Future  -     MRI abdomen w wo contrast; Future    Pancreatic abnormality  -     Cancer antigen 19-9;  Future  - CBC and Platelet; Future  -     Lipase; Future  -     Amylase; Future  -     Comprehensive metabolic panel;  Future  -     MRI abdomen w wo contrast; Future              rto after MRI                Noble Moreno MD

## 2022-04-19 ENCOUNTER — TELEPHONE (OUTPATIENT)
Dept: FAMILY MEDICINE CLINIC | Facility: CLINIC | Age: 62
End: 2022-04-19

## 2022-04-20 LAB
ALBUMIN SERPL-MCNC: 4.4 G/DL (ref 3.8–4.8)
ALBUMIN/GLOB SERPL: 1.8 {RATIO} (ref 1.2–2.2)
ALP SERPL-CCNC: 126 IU/L (ref 44–121)
ALT SERPL-CCNC: 52 IU/L (ref 0–32)
AMYLASE SERPL-CCNC: 53 U/L (ref 31–110)
AST SERPL-CCNC: 36 IU/L (ref 0–40)
BASOPHILS # BLD AUTO: 0.1 X10E3/UL (ref 0–0.2)
BASOPHILS NFR BLD AUTO: 1 %
BILIRUB SERPL-MCNC: 0.8 MG/DL (ref 0–1.2)
BUN SERPL-MCNC: 13 MG/DL (ref 8–27)
BUN/CREAT SERPL: 15 (ref 12–28)
CALCIUM SERPL-MCNC: 9.7 MG/DL (ref 8.7–10.3)
CANCER AG19-9 SERPL-ACNC: 30 U/ML (ref 0–35)
CHLORIDE SERPL-SCNC: 102 MMOL/L (ref 96–106)
CO2 SERPL-SCNC: 22 MMOL/L (ref 20–29)
CREAT SERPL-MCNC: 0.87 MG/DL (ref 0.57–1)
EGFR: 75 ML/MIN/1.73
EOSINOPHIL # BLD AUTO: 0.1 X10E3/UL (ref 0–0.4)
EOSINOPHIL NFR BLD AUTO: 1 %
ERYTHROCYTE [DISTWIDTH] IN BLOOD BY AUTOMATED COUNT: 14.8 % (ref 11.7–15.4)
GLOBULIN SER-MCNC: 2.5 G/DL (ref 1.5–4.5)
GLUCOSE SERPL-MCNC: 144 MG/DL (ref 65–99)
HCT VFR BLD AUTO: 43.3 % (ref 34–46.6)
HGB BLD-MCNC: 14.1 G/DL (ref 11.1–15.9)
IMM GRANULOCYTES # BLD: 0 X10E3/UL (ref 0–0.1)
IMM GRANULOCYTES NFR BLD: 0 %
LIPASE SERPL-CCNC: 98 U/L (ref 14–72)
LYMPHOCYTES # BLD AUTO: 1.8 X10E3/UL (ref 0.7–3.1)
LYMPHOCYTES NFR BLD AUTO: 25 %
MCH RBC QN AUTO: 27 PG (ref 26.6–33)
MCHC RBC AUTO-ENTMCNC: 32.6 G/DL (ref 31.5–35.7)
MCV RBC AUTO: 83 FL (ref 79–97)
MONOCYTES # BLD AUTO: 0.5 X10E3/UL (ref 0.1–0.9)
MONOCYTES NFR BLD AUTO: 7 %
NEUTROPHILS # BLD AUTO: 4.7 X10E3/UL (ref 1.4–7)
NEUTROPHILS NFR BLD AUTO: 66 %
PLATELET # BLD AUTO: 173 X10E3/UL (ref 150–450)
POTASSIUM SERPL-SCNC: 4 MMOL/L (ref 3.5–5.2)
PROT SERPL-MCNC: 6.9 G/DL (ref 6–8.5)
RBC # BLD AUTO: 5.23 X10E6/UL (ref 3.77–5.28)
SODIUM SERPL-SCNC: 141 MMOL/L (ref 134–144)
WBC # BLD AUTO: 7 X10E3/UL (ref 3.4–10.8)

## 2022-04-21 ENCOUNTER — TELEPHONE (OUTPATIENT)
Dept: FAMILY MEDICINE CLINIC | Facility: CLINIC | Age: 62
End: 2022-04-21

## 2022-04-21 NOTE — TELEPHONE ENCOUNTER
Peer to peer is scheduled for tomorrow at 1:45pm  Dr Ailyn Pressley will call the office to speak to you

## 2022-04-25 ENCOUNTER — HOSPITAL ENCOUNTER (OUTPATIENT)
Dept: RADIOLOGY | Facility: HOSPITAL | Age: 62
Discharge: HOME | End: 2022-04-25
Attending: FAMILY MEDICINE
Payer: COMMERCIAL

## 2022-04-25 DIAGNOSIS — K86.89 PANCREATIC DUCT DILATED: ICD-10-CM

## 2022-04-25 DIAGNOSIS — Q45.3 PANCREATIC ABNORMALITY: ICD-10-CM

## 2022-04-25 PROCEDURE — 74183 MRI ABD W/O CNTR FLWD CNTR: CPT

## 2022-04-25 PROCEDURE — A9585 GADOBUTROL INJECTION: HCPCS | Performed by: FAMILY MEDICINE

## 2022-04-25 RX ADMIN — GADOBUTROL 14 ML: 604.72 INJECTION INTRAVENOUS at 14:50

## 2022-04-28 ENCOUNTER — TELEPHONE (OUTPATIENT)
Dept: GASTROENTEROLOGY | Facility: CLINIC | Age: 62
End: 2022-04-28

## 2022-04-28 ENCOUNTER — OFFICE VISIT (OUTPATIENT)
Dept: GASTROENTEROLOGY | Facility: CLINIC | Age: 62
End: 2022-04-28
Payer: COMMERCIAL

## 2022-04-28 VITALS
SYSTOLIC BLOOD PRESSURE: 116 MMHG | DIASTOLIC BLOOD PRESSURE: 78 MMHG | BODY MASS INDEX: 50.02 KG/M2 | WEIGHT: 293 LBS | HEIGHT: 64 IN | HEART RATE: 69 BPM

## 2022-04-28 DIAGNOSIS — K86.89 PANCREATIC MASS: Primary | ICD-10-CM

## 2022-04-28 DIAGNOSIS — K76.0 FATTY LIVER: ICD-10-CM

## 2022-04-28 DIAGNOSIS — Z86.010 HISTORY OF COLON POLYPS: ICD-10-CM

## 2022-04-28 DIAGNOSIS — K21.9 GASTROESOPHAGEAL REFLUX DISEASE, UNSPECIFIED WHETHER ESOPHAGITIS PRESENT: ICD-10-CM

## 2022-04-28 DIAGNOSIS — C49.A0: ICD-10-CM

## 2022-04-28 PROCEDURE — 3008F BODY MASS INDEX DOCD: CPT | Performed by: FAMILY MEDICINE

## 2022-04-28 PROCEDURE — 99214 OFFICE O/P EST MOD 30 MIN: CPT | Performed by: NURSE PRACTITIONER

## 2022-04-28 NOTE — TELEPHONE ENCOUNTER
Scheduled date of EGD(as of today): 5/11/22  Physician performing EGD: Malina   Location of EGD: [facility]  Instructions reviewed with patient by: Codey Willingham  Clearances: None

## 2022-04-28 NOTE — PROGRESS NOTES
[facility] - Outpatient Follow-up Note  Ambika Belcher 57 y o  female MRN: [id number]  Encounter: [id number]          ASSESSMENT AND PLAN:      1  Pancreatic mass  Pt started having abdominal pain with radiation to the back earlier this month, she had CT scan which noted pancreatic duct dilatation in the pancreatic body with ill defined hypodensity in the pancreatic head measuring approximately 2cm and an ill defined hypodensity in the mid pancreatic body though this likely represents volume averaging between dilated duct and pancreatic fat on reformatted imaging  MRI was done for follow up on 4/25 which showed pancreatic head mass estimated approximately 4 2 x 3 6 x 3cm, abnormal enlargement of the pancreatic duct in the pancreatic body and tail distal to the mass  Mass does not appear to encase or narrow superior mesenteric artery or portal vein  No intrahepatic/extrahepatic ductal dilitation, biliary stricture or suspicious mass  Lipase elevated 98  CA 19-9 normal at 30  Pt is on Ozempic for DM, wondering if this should be stopped  Has minimal tenderness to palpation on mid epigastric area  If not palpating, has no pain  Reports diarrhea recently but this resolved  Denies any unintended weight loss or family hx pancreatic cancer      -Will schedule EUS with [facility] for biopsy of pancreatic head mass with Dr Christi Grissom  -Patient already follows with hematology/oncology for hx GIST tumor, advised she will need to follow up with them regarding treatment plan based on biopsy results   -Will check with attending regarding Ozempic  -     Endoscopic ultrasonography, GI (Upper); Future; Expected date: 04/28/2022    2  Fatty Liver  Pt has known hx fatty liver  LFTs show ALT 52, Alk phos 126 which is consistent with last LFTs done in February  CT shows diffusely decreased in density consistent with fatty change  No CT evidence of suspicious hepatic mass  No biliary dilatation   Normal hepatic contours  -Recommend healthy diet and exercise as tolerated for weight loss of 5-7% of total body weight  -Avoid alcohol  -Management of HLD, DM per PCP    3  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[address] )  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 which was discontinued in 2013  Following with hematology/oncology yearly  4  Gastroesophageal reflux disease, unspecified whether esophagitis present  Controlled on Pantoprazole 40mg daily, Pepcid PRN  Last EGD done in Feb 2021   -Continue current regimen  -Repeat EGD due in Feb 2027    5  History of colon polyps  Up to date on colon cancer screening  1 tubular adenoma removed in Feb 2021   -Next colonoscopy due in Feb 2027      ______________________________________________________________________    SUBJECTIVE:  Ben Castañeda is a 57 y o  female with history of AFib s/p ablation, HTN, HLD, osteoarthritis, depression, GIST involving mesentery & small bowel s/p resection & Gleevec until 2013  She was having generalized abdominal pain and started taking Pepcid which helped but didn't resolve the pain, and then she noted pain radiated to the back  Labs and CT were checked for pancreatitis and noted pancreatic mass and mildly elevated lipase 92  She had MRI/MRCP which showed a pancreatic head mass approximately 4 2 x 3 6 x 3cm  Mass does not appear to encase or narrow superior mesenteric artery or portal vein  Lipase mildly elevated 92  CA 19-9 was normal at 30  She has a history of fatty liver and elevated LFTs, most recent CMP shows Alk phos 126, ALT 52  Denies any nausea/vomiting  She has reflux at night a couple times a week  Denies any unintended weight loss  No family history of pancreatic cancer  Denies any jaundice, pale stools, dark urine, pruritus  Follows with hematology/oncology yearly for history of GIST  Drinks alcohol rarely  She smokes cigarettes 3-4 times a week  Denies any drug use       2/7/22 w/   Evelyn Nett  EGD: antritis, 3 gastric polyps removed, small polyps remain  Bx negative for H pylori  Gastric polyps were benign fundic gland polyps  EG junction negative for intestinal metaplasia  Repeat EGD 5 years  Colonoscopy: external/internal hemorrhoids, 2 polyps removed, remainder exam normal including appendix opening  1 polyp tubular adenoma, 1 polyp benign colonic mucosa  Repeat Colonoscopy in 5 years    REVIEW OF SYSTEMS IS OTHERWISE NEGATIVE        Historical Information   Past Medical History:   Diagnosis Date    Abnormal liver function test     last assessed 1/16/17     Adenomatosis     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    last assessed 4/4/13    Atrial fibrillation (HCC)     [facility] )     Benign essential hypertension     last assessed 12/21/17     Body mass index (BMI) of 50-59 9 in adult [facility])     Carpal tunnel syndrome 09/07/2004    unspecified laterality  / last assessed 9/7/04    Colon polyp     Congenital pes planus     last assessed 7/15/14     Depression     Depression     Hemangioma of skin 08/26/2003    last assessed 8/26/03    History of gastroesophageal reflux (GERD)     Hypercholesterolemia     Hyperlipidemia     Hypertension     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[facility] ) 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/2006    last assessed 12/31/14     Osteoarthritis of both knees     last assessed 12/31/14     Paroxysmal atrial fibrillation (HCC)     S/P ablation of atrial flutter      Past Surgical History:   Procedure Laterality Date    ABDOMINAL SURGERY  06/2006    20cm GIST removed     APPENDECTOMY      ATRIAL ABLATION SURGERY      CARPAL TUNNEL RELEASE Bilateral     COLONOSCOPY      HYSTERECTOMY      fibroids    KIDNEY STONE SURGERY Right 09/17/2021    placed stent in  for kidney stone    KNEE SURGERY      KNEE SURGERY Left 03/2019    OOPHORECTOMY      cyst    TONSILLECTOMY      TUMOR EXCISION  2006    gastrointestinal stromal tumor    UPPER GASTROINTESTINAL ENDOSCOPY       Social History   Social History     Substance and Sexual Activity   Alcohol Use Yes    Comment: social drinker per allscript      Social History     Substance and Sexual Activity   Drug Use No     Social History     Tobacco Use   Smoking Status Light Tobacco Smoker    Years: 9 00   Smokeless Tobacco Current User   Tobacco Comment    current some day smoker 1 cigarette/day 4 yrs / former smoker per allscript                             Family History   Problem Relation Age of Onset    Emphysema Mother     Arthritis Mother     Diabetes Mother     Hypertension Mother     COPD Mother     Arthritis Father     Prostate cancer Father     Cerebral aneurysm Son     No Known Problems Maternal Grandmother     No Known Problems Maternal Grandfather     No Known Problems Paternal Grandmother     No Known Problems Paternal Grandfather     No Known Problems Son     No Known Problems Maternal Aunt     No Known Problems Maternal Aunt     No Known Problems Paternal Aunt     No Known Problems Paternal Aunt        Meds/Allergies       Current Outpatient Medications:     aspirin 81 MG tablet    atorvastatin (LIPITOR) 40 mg tablet    glucose blood (Contour Next Test) test strip    ibuprofen (ADVIL,MOTRIN) 100 MG tablet    Insulin Pen Needle (Pen Needles) 32G X 4 MM MISC    Magnesium Oxide -Mg Supplement 400 MG CAPS    metoprolol succinate (TOPROL-XL) 25 mg 24 hr tablet    multivitamin (THERAGRAN) TABS    olmesartan (BENICAR) 20 mg tablet    Omega-3 Fatty Acids (FISH OIL) 1,000 mg    Ozempic, 1 MG/DOSE, 4 MG/3ML SOPN injection pen    pantoprazole (PROTONIX) 40 mg tablet    PARoxetine (PAXIL-CR) 37 5 mg 24 hr tablet    Potassium Citrate ER 15 MEQ (1620 MG) TBCR    sotalol (BETAPACE) 120 mg tablet    VITAMIN D PO    Blood Glucose Monitoring Suppl (OneTouch Verio) w/Device KIT    mupirocin (BACTROBAN) 2 % ointment    OneTouch Delica Lancets 61I MISC    Allergies   Allergen Reactions    Other      Adhesive tape            Objective     Blood pressure 116/78, pulse 69, height 5' 4" (1 626 m), weight (!) 147 kg (325 lb)  Body mass index is 55 79 kg/m²  PHYSICAL EXAM:      General Appearance:   Alert, cooperative, no distress   HEENT:   Normocephalic, atraumatic, anicteric  Neck:  Supple, symmetrical, trachea midline   Lungs:   Clear to auscultation bilaterally; no rales, rhonchi or wheezing; respirations unlabored    Heart[de-identified]   Regular rate and rhythm; no murmur  Abdomen:   Soft, non-tender, non-distended; normal bowel sounds; no masses, no organomegaly    Genitalia:   Deferred    Rectal:   Deferred    Extremities:  No cyanosis, clubbing or edema    Skin:  No jaundice, rashes, or lesions    Lymph nodes:  No palpable cervical lymphadenopathy        Lab Results:   No visits with results within 1 Day(s) from this visit     Latest known visit with results is:   Orders Only on 04/19/2022   Component Date Value    White Blood Cell Count 04/19/2022 7 0     Red Blood Cell Count 04/19/2022 5 23     Hemoglobin 04/19/2022 14 1     HCT 04/19/2022 43 3     MCV 04/19/2022 83     MCH 04/19/2022 27 0     MCHC 04/19/2022 32 6     RDW 04/19/2022 14 8     Platelet Count 35/85/6954 173     Neutrophils 04/19/2022 66     Lymphocytes 04/19/2022 25     Monocytes 04/19/2022 7     Eosinophils 04/19/2022 1     Basophils PCT 04/19/2022 1     Neutrophils (Absolute) 04/19/2022 4 7     Lymphocytes (Absolute) 04/19/2022 1 8     Monocytes (Absolute) 04/19/2022 0 5     Eosinophils (Absolute) 04/19/2022 0 1     Basophils ABS 04/19/2022 0 1     Immature Granulocytes 04/19/2022 0     Immature Granulocytes (A* 04/19/2022 0 0     Glucose, Random 04/19/2022 144*    BUN 04/19/2022 13     Creatinine 04/19/2022 0 87     eGFR 04/19/2022 75     SL AMB BUN/CREATININE RA* 04/19/2022 15     Sodium 04/19/2022 141     Potassium 04/19/2022 4 0     Chloride 04/19/2022 102     CO2 04/19/2022 22     CALCIUM 04/19/2022 9 7     Protein, Total 04/19/2022 6 9     Albumin 04/19/2022 4 4     Globulin, Total 04/19/2022 2 5     Albumin/Globulin Ratio 04/19/2022 1 8     TOTAL BILIRUBIN 04/19/2022 0 8     Alk Phos Isoenzymes 04/19/2022 126*    AST 04/19/2022 36     ALT 04/19/2022 52*    CA 19-9 04/19/2022 30     Amylase, Serum 04/19/2022 53     Lipase, Serum 04/19/2022 98*         Radiology Results:   MRI abdomen w wo contrast and mrcp    Result Date: 4/27/2022  Narrative: MRI OF THE ABDOMEN WITH AND WITHOUT CONTRAST WITH MRCP INDICATION: 57 years / Female  K86 89: Other specified diseases of pancreas Q45 3: Other congenital malformations of pancreas and pancreatic duct  COMPARISON: CT performed April 7, 2022  TECHNIQUE:  The following pulse sequences were obtained:  Coronal and axial T2 with TE of 90 and 180 respectively, axial T2 with fat saturation, axial FIESTA fat-sat, axial T1-weighted in-and-out-of phase, axial DWI/ADC, pre-contrast axial T1 with fat saturation, post-contrast dynamic axial T1 with fat saturation at 20, 70, and 180 seconds, followed by coronal and 7 minute delayed axial T1 with fat saturation  3D MRCP images were obtained with radial thick slabs and projections  3D rendering was performed from the acquisition scanner  IV Contrast:  14 mL of Gadobutrol injection (SINGLE-DOSE) FINDINGS: LOWER CHEST:   Small sliding-type hiatal hernia  LIVER: Normal in size and configuration  Profound dropout of signal throughout hepatic parenchyma on out of phase gradient T1 imaging  No suspicious hepatic mass  The hepatic veins and portal veins are patent  BILE DUCTS:  No intrahepatic or extrahepatic bile duct dilation  Common bile duct is normal in caliber  No choledocholithiasis, biliary stricture or suspicious mass  GALLBLADDER:  Normal  PANCREAS:  A mass in the pancreatic head is estimated at approximately 4 2 x 3 6 x 3 0 cm on image 72 of series 15 and image 74 of series 14    This mass corresponds to mass in that location on recent CT and is highly suspicious for pancreatic adenocarcinoma  As on prior CT, there is abnormal enlargement of the pancreatic duct in the pancreatic body and tail distal to the mass  Mass does not appear to encase or narrow superior mesenteric artery or portal vein  ADRENAL GLANDS:  Normal  SPLEEN:  Normal  KIDNEYS/PROXIMAL URETERS:  No hydroureteronephrosis  No suspicious renal mass  BOWEL:   No dilated loops of bowel  PERITONEUM/RETROPERITONEUM:  No ascites  LYMPH NODES:  No abdominal lymphadenopathy  VASCULAR STRUCTURES:  No aneurysm  ABDOMINAL WALL:  Unremarkable  OSSEOUS STRUCTURES:  No suspicious osseous lesion  Impression: Mass in the pancreatic head, probably better depicted on CT but on both CT and MR demonstrating characteristics that are most suspicious for pancreatic adenocarcinoma  Endoscopic ultrasound and tissue sampling is recommended  No evidence for metastatic disease in the abdomen  No biliary dilatation  Hepatomegaly and hepatic steatosis  This examination was marked "significant notification" in Epic in order to begin the standard process by which the radiology reading room liaison alerts the referring practitioner  Workstation performed: [id number]     US breast left limited (diagnostic)    Result Date: 4/15/2022  Narrative: DIAGNOSIS: Abnormal screening mammogram TECHNIQUE: Ultrasound of the left breast(s) was performed  COMPARISONS: Prior breast imaging dated: 04/07/2022, 04/05/2021, 03/04/2020, 02/27/2020, 01/07/2019, 08/24/2017, 05/02/2016, 04/30/2015, and 02/17/2014 RELEVANT HISTORY: Family Breast Cancer History: No known family history of breast cancer  Family Medical History: No known relevant family medical history  Personal History: Hormone history includes estrogen replacement therapy  Surgical history includes hysterectomy and oophorectomy  No known relevant medical history   RISK ASSESSMENT: 5 Year Tyrer-Cuzick: 0 82 % 10 Year Tyrer-Cuzick: 1 59 % Lifetime Tyrer-Cuzick: 3 76 % INDICATION: Loli Richardson is a 57 y o  female presenting for Abnormal screening mammogram  FINDINGS: LEFT B) MASS: There is an oval mass seen in the lower outer quadrant of the left breast at 1 o'clock, 5 cm from the nipple  The correlate to the mammographic finding appears to be a small hypoechoic cystic focus which is part of a larger structure which is predominantly hyperechoic and ill-defined  It is total size is 10 x 5 x 18 mm  The sonographic appearance of this finding as well as on mammography is most suggestive of a benign posttraumatic etiology  Recommend ultrasound follow-up in 3 months to reassess this probably benign finding  Impression:  Recommend 3 month follow-up ultrasound for likely benign posttraumatic etiology  ASSESSMENT/BI-RADS CATEGORY: Left: 3 - Probably Benign Overall: 3 - Probably Benign RECOMMENDATION:      - Ultrasound in 3 months for the left breast  Workstation ID: [id number]    US kidney and bladder    Result Date: 4/21/2022  Narrative: RENAL ULTRASOUND INDICATION:   N20 0: Calculus of kidney  COMPARISON: CT 4/7/2022, ultrasound 11/19/2021 TECHNIQUE:   Ultrasound of the retroperitoneum was performed with a curvilinear transducer utilizing volumetric sweeps and still imaging techniques  FINDINGS: KIDNEYS: Symmetric and normal size  Right kidney:  12 7 x 5 4 x 6 2 cm  Volume 223 9 mL Left kidney:  11 8 x 5 8 x 5 8 cm  Volume 208 8 mL Right kidney Normal echogenicity and contour  No mass is identified  No hydronephrosis  6 mm echogenic focus lower pole  No perinephric fluid collections  Left kidney Normal echogenicity and contour  No mass is identified  No hydronephrosis  5 mm echogenic focus lower pole  Trace perinephric fluid  URETERS: Nonvisualized  BLADDER: Normally distended  No focal thickening or mass lesions  Bilateral ureteral jets detected  Impression: Bilateral nonobstructing renal calculi  No hydronephrosis  Workstation performed: [id number]     CT abdomen pelvis w contrast    Result Date: 4/15/2022  Narrative: CT ABDOMEN AND PELVIS WITH IV CONTRAST INDICATION:   R10 84: Generalized abdominal pain C49  A0: Gastrointestinal stromal tumor, unspecified site  COMPARISON:  12/17/2014, 7/7/2016 TECHNIQUE:  CT examination of the abdomen and pelvis was performed  Axial, sagittal, and coronal 2D reformatted images were created from the source data and submitted for interpretation  Radiation dose length product (DLP) for this visit:  2286 29 mGy-cm   This examination, like all CT scans performed in the [facility], was performed utilizing techniques to minimize radiation dose exposure, including the use of iterative reconstruction and automated exposure control  IV Contrast:  100 mL of iohexol (OMNIPAQUE) Enteric Contrast:  Enteric contrast was not administered  FINDINGS: ABDOMEN LOWER CHEST:  No clinically significant abnormality identified in the visualized lower chest  LIVER/BILIARY TREE:  Liver is diffusely decreased in density consistent with fatty change  No CT evidence of suspicious hepatic mass  Normal hepatic contours  No biliary dilatation  GALLBLADDER:  No calcified gallstones  No pericholecystic inflammatory change  SPLEEN:  Unremarkable  PANCREAS:  There is pancreatic duct dilatation in the pancreatic body with ill-defined hypodensity in the pancreatic head measuring approximately 2 cm  There is also ill-defined hypodensity in the mid pancreatic body though this likely represents volume  averaging between dilated duct and pancreatic fat on reformatted images  ADRENAL GLANDS:  There is a 1 2 x 1 9 cm right adrenal nodule characterized as an adrenal adenoma on the prior CT  KIDNEYS/URETERS:  There is a 3 mm nonobstructing left renal calculus  No hydronephrosis  STOMACH AND BOWEL:  Unremarkable  APPENDIX:  No findings to suggest appendicitis  ABDOMINOPELVIC CAVITY:  No ascites    No pneumoperitoneum  No lymphadenopathy  VESSELS:  Unremarkable for patient's age  PELVIS REPRODUCTIVE ORGANS:  Surgical changes of prior hysterectomy  URINARY BLADDER:  Unremarkable  ABDOMINAL WALL/INGUINAL REGIONS:  Unremarkable  OSSEOUS STRUCTURES:  No acute fracture or destructive osseous lesion  Impression: 1  Pancreatic duct dilatation with ill-defined hypodensity in the pancreatic head suspicious for pancreatic adenocarcinoma  Follow-up MRI recommended for confirmation followed by endoscopic ultrasound if necessary  In addition, there may be slight stranding adjacent to the pancreatic head and superimposed pancreatitis is not excluded  Correlation with lipase is recommended  Note that the presence of mild pancreatitis would not decrease the suspicion for underlying mass  2   Diffuse hepatic steatosis  3   Nonobstructing left renal calculus  4   Hiatal hernia with gastroesophageal reflux  I personally discussed this study with Robby Patel on 4/15/2022 at 12:29 PM  Workstation performed: [id number]     Mammo screening bilateral w 3d & cad    Result Date: 4/10/2022  Narrative: DIAGNOSIS: Screening mammogram, encounter for TECHNIQUE: Digital screening mammography was performed  Computer Aided Detection (CAD) analyzed all applicable images  COMPARISONS: Prior breast imaging dated: 04/05/2021, 03/04/2020, 02/27/2020, 01/07/2019, 08/24/2017, 05/02/2016, 04/30/2015, and 02/17/2014 RELEVANT HISTORY: Family Breast Cancer History: No known family history of breast cancer  Family Medical History: No known relevant family medical history  Personal History: Hormone history includes estrogen replacement therapy  Surgical history includes hysterectomy and oophorectomy  No known relevant medical history  The patient is scheduled in a reminder system for screening mammography  8-10% of cancers will be missed on mammography  Management of a palpable abnormality must be based on clinical grounds    Patients will be notified of their results via letter from our facility  Accredited by [employer] of Radiology and FDA  RISK ASSESSMENT: 5 Year Tyrer-Cuzick: 0 82 % 10 Year Tyrer-Cuzick: 1 59 % Lifetime Tyrer-Cuzick: 3 76 % TISSUE DENSITY: The breasts are almost entirely fatty  INDICATION: Deena Barragan is a 57 y o  female presenting for screening mammography  FINDINGS: LEFT B) MASS: There is a new round mass with circumscribed margins seen in the lower outer quadrant of the left breast   Recommend further evaluation ultrasound  Right There are no suspicious masses, grouped microcalcifications or areas of unexplained architectural distortion  The skin and nipple areolar complex are unremarkable  Impression: Additional imaging required  A breast health care nurse from our facility will be contacting the patient regarding the need for additional imaging   ASSESSMENT/BI-RADS CATEGORY: Left: 0 - Incomplete: Needs Additional Imaging Evaluation Right: 1 - Negative Overall: 0 - Incomplete: Needs Additional Imaging Evaluation RECOMMENDATION:      - Ultrasound at the current time for the left breast       - Routine screening mammogram in 1 year for the right breast  Workstation ID: [id number]

## 2022-04-28 NOTE — PATIENT INSTRUCTIONS
Upper Endoscopic Gastrointestinal Ultrasonography   WHAT YOU NEED TO KNOW:   What do I need to know about an upper endoscopic gastrointestinal ultrasonography? An upper gastrointestinal endoscopic ultrasound is done to look at the different parts of your upper gastrointestinal (GI) tract  The upper GI tract includes the esophagus, stomach, and duodenum (first part of the small intestine)  This procedure is used to help identify and treat diseases that affect the upper GI tract  How do I prepare for this procedure? Your healthcare provider will talk to you about how to prepare for procedure  He may tell you not to eat or drink anything after midnight on the day of your procedure  He will tell you what medicines you may or may not take on the day of your procedure  Arrange to have someone drive you home  What will happen during this procedure? · You may be given medicine to help you relax  You will be asked to lie on your left side  Your healthcare provider will gently pass the echoendoscope through your mouth  This will go down into your esophagus, stomach, and duodenum  You may be asked to swallow to help the scope move along  The passage of the echoendoscope may cause a feeling of pressure and some discomfort  Your healthcare provider will slowly advance the scope while he watches its movement on a small video screen  He will also take pictures  · Your healthcare provider may take tissue samples and send them to the lab for tests  He may also treat any known conditions you have  When the procedure is finished, the echoendoscope will be removed  What will happen after this procedure? You may feel bloated, gassy, or have some abdominal discomfort  Your throat may be sore for 24 to 36 hours after the procedure  You may burp or pass gas from air that is still inside your body after your procedure  You may need to take short walks to help move the gas out  Eat small meals, if you feel bloated   Do not drive or make important decisions until the day after your procedure  What are the risks of this procedure? Your esophagus, stomach, or duodenum may be punctured or torn during the procedure  This is because of increased pressure as the scope and air are passing through  You may bleed more than expected or get an infection  You may have a slow or irregular heartbeat, or low blood pressure  This can cause sweating and fainting  Fluid may enter your lungs and you may have trouble breathing  These problems can be life-threatening  CARE AGREEMENT:   You have the right to help plan your care  Learn about your health condition and how it may be treated  Discuss treatment options with your healthcare providers to decide what care you want to receive  You always have the right to refuse treatment  The above information is an  only  It is not intended as medical advice for individual conditions or treatments  Talk to your doctor, nurse or pharmacist before following any medical regimen to see if it is safe and effective for you  © Copyright [employer] 2022 Information is for End User's use only and may not be sold, redistributed or otherwise used for commercial purposes   All illustrations and images included in CareNotes® are the copyrighted property of [employer]  or [address] [place] [address]

## 2022-05-03 ENCOUNTER — OFFICE VISIT (OUTPATIENT)
Dept: PODIATRY | Facility: CLINIC | Age: 62
End: 2022-05-03
Payer: COMMERCIAL

## 2022-05-03 VITALS
HEIGHT: 64 IN | BODY MASS INDEX: 50.02 KG/M2 | RESPIRATION RATE: 16 BRPM | SYSTOLIC BLOOD PRESSURE: 116 MMHG | WEIGHT: 293 LBS | DIASTOLIC BLOOD PRESSURE: 78 MMHG

## 2022-05-03 DIAGNOSIS — M79.671 PAIN IN BOTH FEET: ICD-10-CM

## 2022-05-03 DIAGNOSIS — L60.0 INGROWN TOENAIL: ICD-10-CM

## 2022-05-03 DIAGNOSIS — M79.672 PAIN IN BOTH FEET: ICD-10-CM

## 2022-05-03 DIAGNOSIS — M21.969 ACQUIRED DEFORMITY OF FOOT, UNSPECIFIED LATERALITY: ICD-10-CM

## 2022-05-03 DIAGNOSIS — E11.42 DIABETIC POLYNEUROPATHY ASSOCIATED WITH TYPE 2 DIABETES MELLITUS (HCC): Primary | ICD-10-CM

## 2022-05-03 DIAGNOSIS — B35.1 ONYCHOMYCOSIS: ICD-10-CM

## 2022-05-03 PROCEDURE — 99213 OFFICE O/P EST LOW 20 MIN: CPT | Performed by: PODIATRIST

## 2022-05-03 NOTE — PROGRESS NOTES
Assessment/Plan:  Metatarsalgia   Foot pain bilateral   Ingrown toenail   Paronychia   Mycosis of nail  Diabetic neuropathy      Plan   Patient educated on condition   Foot measured bilateral   Nails debrided without pain or complication   Patient may need nail procedure  Patient watch for signs of infection    Patient educated on care of the diabetic foot         Subjective:  Patient complains of pain in her big toes with ambulation   No history of trauma   Patient has pain when she wears shoes              Past Medical History:   Diagnosis Date    Abnormal liver function test       last assessed 1/16/17     Adenomatosis      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[facility] [address] ) 04/19/2006     last assessed 12/31/14     Osteoarthritis of both knees       last assessed 12/31/14     Paroxysmal atrial fibrillation (HCC)      S/P ablation of atrial flutter                     Past Surgical History:   Procedure Laterality Date    ABDOMINAL SURGERY   06/2006     20cm GIST removed     APPENDECTOMY        ATRIAL ABLATION SURGERY        CARPAL TUNNEL RELEASE Bilateral      COLONOSCOPY        HYSTERECTOMY        KNEE SURGERY        OOPHORECTOMY        TONSILLECTOMY        TUMOR EXCISION   2006     gastrointestinal stromal tumor                   Allergies   Allergen Reactions    Other         Adhesive tape             Current Outpatient Prescriptions:     aspirin 81 MG tablet, Take 81 mg by mouth daily  , Disp: , Rfl:     esomeprazole (NexIUM) 40 MG capsule, Take 1 capsule (40 mg total) by mouth daily, Disp: 90 capsule, Rfl: 1    ibuprofen (MOTRIN) 800 mg tablet, Take 1 tablet (800 mg total) by mouth every 8 (eight) hours as needed for mild pain, Disp: 30 tablet, Rfl: 0    Magnesium Oxide -Mg Supplement 400 MG CAPS, Take 1 capsule by mouth daily, Disp: , Rfl:     metoprolol succinate (TOPROL-XL) 25 mg 24 hr tablet, Take 1 tablet (25 mg total) by mouth 2 (two) times a day, Disp: 180 tablet, Rfl: 2    multivitamin (THERAGRAN) TABS, Take 1 tablet by mouth daily  , Disp: , Rfl:     olmesartan (BENICAR) 20 mg tablet, Take 1 tablet (20 mg total) by mouth daily, Disp: 30 tablet, Rfl: 0    Omega-3 Fatty Acids (FISH OIL) 1,000 mg, Take 1,000 mg by mouth 2 (two) times a day  , Disp: , Rfl:     PARoxetine (PAXIL-CR) 37 5 mg 24 hr tablet, Take 1 tablet (37 5 mg total) by mouth every morning, Disp: 90 tablet, Rfl: 3    pravastatin (PRAVACHOL) 40 mg tablet, Take 1 tablet (40 mg total) by mouth daily (Patient taking differently: Take 40 mg by mouth daily at bedtime  ), Disp: 90 tablet, Rfl: 1    predniSONE 20 mg tablet, Take 2 tablets (40 mg total) by mouth daily, Disp: 14 tablet, Rfl: 0    rosuvastatin (CRESTOR) 20 MG tablet, TAKE 1 TABLET DAILY, Disp: 90 tablet, Rfl: 3    sotalol (BETAPACE) 120 mg tablet, TAKE 1 TABLET BY MOUTH  EVERY 12 HOURS, Disp: 180 tablet, Rfl: 2           Patient Active Problem List   Diagnosis    Tachycardia    Dyspnea on exertion    Supraventricular tachycardia (HCC)    Hypertension    Controlled depression    Severe obstructive sleep apnea    Morbid obesity (HCC)    Impaired fasting glucose    Hyperlipidemia    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)    Esophageal reflux    Benign essential hypertension    Adenomatous colon polyp    Open wound of left lower extremity             Patient ID: Alecia Vieira is a 59 y o  female          Objective:  Patient's shoes and socks removed    Foot Exam     General  General Appearance: appears stated age and healthy   Orientation: alert and oriented to person, place, and time   Affect: appropriate   Gait: antalgic         Right Foot/Ankle      Inspection and Palpation  Tenderness: metatarsals   Swelling: dorsum and metatarsals   Arch: pes planus  Hammertoes: fifth toe  Skin Exam: dry skin;      Neurovascular  Dorsalis pedis: 1+  Posterior tibial: 2+  Superficial peroneal nerve sensation: diminished  Deep peroneal nerve sensation: diminished        Left Foot/Ankle       Inspection and Palpation  Tenderness: metatarsals   Swelling: dorsum and metatarsals   Arch: pes planus  Hammertoes: fifth toe  Skin Exam: dry skin;      Neurovascular  Dorsalis pedis: 1+  Posterior tibial: 2+  Superficial peroneal nerve sensation: diminished  Deep peroneal nerve sensation: diminished           Physical Exam   Constitutional: She appears well-developed and well-nourished  Cardiovascular: Normal rate and regular rhythm     Pulses:       Dorsalis pedis pulses are 1+ on the right side, and 1+ on the left side         Posterior tibial pulses are 2+ on the right side, and 2+ on the left side  Feet:   Right Foot:   Skin Integrity: Positive for dry skin  Left Foot:   Skin Integrity: Positive for dry skin     Skin:   Wide incurvated hallux toenail bilateral   Nails demonstrate mycosis   Both hallux is demonstrate ingrown toenail       Patient's shoes and socks removed            Assign Risk Category  Deformity present  Loss of protective sensation  No weak pulses  Risk: 2

## 2022-05-04 ENCOUNTER — TELEPHONE (OUTPATIENT)
Dept: GASTROENTEROLOGY | Facility: CLINIC | Age: 62
End: 2022-05-04

## 2022-05-04 DIAGNOSIS — I10 ESSENTIAL HYPERTENSION: Chronic | ICD-10-CM

## 2022-05-04 DIAGNOSIS — F32.A DEPRESSION, UNSPECIFIED DEPRESSION TYPE: ICD-10-CM

## 2022-05-04 NOTE — TELEPHONE ENCOUNTER
Called pt and lmom asking her to call back to confirm her procedure appt and to make sure she has and understands the prep instructions  If she calls back please update chart  Thank you

## 2022-05-05 PROCEDURE — 4010F ACE/ARB THERAPY RXD/TAKEN: CPT | Performed by: SURGERY

## 2022-05-05 RX ORDER — OLMESARTAN MEDOXOMIL 20 MG/1
TABLET ORAL
Qty: 90 TABLET | Refills: 3 | Status: SHIPPED | OUTPATIENT
Start: 2022-05-05

## 2022-05-05 RX ORDER — PAROXETINE HYDROCHLORIDE 37.5 MG/1
TABLET, FILM COATED, EXTENDED RELEASE ORAL
Qty: 90 TABLET | Refills: 3 | Status: SHIPPED | OUTPATIENT
Start: 2022-05-05

## 2022-05-06 ENCOUNTER — TELEPHONE (OUTPATIENT)
Dept: GASTROENTEROLOGY | Facility: CLINIC | Age: 62
End: 2022-05-06

## 2022-05-09 NOTE — TELEPHONE ENCOUNTER
Called lab, spoke to [facility] and informed of procedure date and need for fna and cytology

## 2022-05-11 ENCOUNTER — HOSPITAL ENCOUNTER (OUTPATIENT)
Dept: GASTROENTEROLOGY | Facility: HOSPITAL | Age: 62
Setting detail: OUTPATIENT SURGERY
Discharge: HOME | End: 2022-05-11
Admitting: INTERNAL MEDICINE
Payer: COMMERCIAL

## 2022-05-11 ENCOUNTER — ANESTHESIA (OUTPATIENT)
Dept: GASTROENTEROLOGY | Facility: HOSPITAL | Age: 62
End: 2022-05-11

## 2022-05-11 ENCOUNTER — ANESTHESIA EVENT (OUTPATIENT)
Dept: GASTROENTEROLOGY | Facility: HOSPITAL | Age: 62
End: 2022-05-11

## 2022-05-11 VITALS
WEIGHT: 293 LBS | OXYGEN SATURATION: 95 % | BODY MASS INDEX: 50.02 KG/M2 | HEART RATE: 77 BPM | HEIGHT: 64 IN | SYSTOLIC BLOOD PRESSURE: 122 MMHG | RESPIRATION RATE: 13 BRPM | DIASTOLIC BLOOD PRESSURE: 64 MMHG | TEMPERATURE: 97 F

## 2022-05-11 DIAGNOSIS — K86.89 PANCREATIC MASS: ICD-10-CM

## 2022-05-11 LAB — GLUCOSE SERPL-MCNC: 140 MG/DL (ref 65–140)

## 2022-05-11 PROCEDURE — 88172 CYTP DX EVAL FNA 1ST EA SITE: CPT | Performed by: PATHOLOGY

## 2022-05-11 PROCEDURE — 88173 CYTOPATH EVAL FNA REPORT: CPT | Performed by: PATHOLOGY

## 2022-05-11 PROCEDURE — 82948 REAGENT STRIP/BLOOD GLUCOSE: CPT

## 2022-05-11 PROCEDURE — 88305 TISSUE EXAM BY PATHOLOGIST: CPT | Performed by: PATHOLOGY

## 2022-05-11 PROCEDURE — 43239 EGD BIOPSY SINGLE/MULTIPLE: CPT | Performed by: INTERNAL MEDICINE

## 2022-05-11 PROCEDURE — 43242 EGD US FINE NEEDLE BX/ASPIR: CPT | Performed by: INTERNAL MEDICINE

## 2022-05-11 RX ORDER — GLYCOPYRROLATE 0.2 MG/ML
INJECTION INTRAMUSCULAR; INTRAVENOUS AS NEEDED
Status: DISCONTINUED | OUTPATIENT
Start: 2022-05-11 | End: 2022-05-11

## 2022-05-11 RX ORDER — SODIUM CHLORIDE 9 MG/ML
INJECTION, SOLUTION INTRAVENOUS CONTINUOUS PRN
Status: DISCONTINUED | OUTPATIENT
Start: 2022-05-11 | End: 2022-05-11

## 2022-05-11 RX ORDER — PROPOFOL 10 MG/ML
INJECTION, EMULSION INTRAVENOUS AS NEEDED
Status: DISCONTINUED | OUTPATIENT
Start: 2022-05-11 | End: 2022-05-11

## 2022-05-11 RX ORDER — SODIUM CHLORIDE, SODIUM LACTATE, POTASSIUM CHLORIDE, CALCIUM CHLORIDE 600; 310; 30; 20 MG/100ML; MG/100ML; MG/100ML; MG/100ML
INJECTION, SOLUTION INTRAVENOUS CONTINUOUS PRN
Status: DISCONTINUED | OUTPATIENT
Start: 2022-05-11 | End: 2022-05-11

## 2022-05-11 RX ORDER — KETAMINE HCL IN NACL, ISO-OSM 100MG/10ML
SYRINGE (ML) INJECTION AS NEEDED
Status: DISCONTINUED | OUTPATIENT
Start: 2022-05-11 | End: 2022-05-11

## 2022-05-11 RX ORDER — LIDOCAINE HYDROCHLORIDE 10 MG/ML
INJECTION, SOLUTION EPIDURAL; INFILTRATION; INTRACAUDAL; PERINEURAL AS NEEDED
Status: DISCONTINUED | OUTPATIENT
Start: 2022-05-11 | End: 2022-05-11

## 2022-05-11 RX ADMIN — GLYCOPYRROLATE 0.2 MG: 0.2 INJECTION, SOLUTION INTRAMUSCULAR; INTRAVENOUS at 09:22

## 2022-05-11 RX ADMIN — PROPOFOL 50 MG: 10 INJECTION, EMULSION INTRAVENOUS at 09:37

## 2022-05-11 RX ADMIN — PROPOFOL 50 MG: 10 INJECTION, EMULSION INTRAVENOUS at 09:54

## 2022-05-11 RX ADMIN — PROPOFOL 50 MG: 10 INJECTION, EMULSION INTRAVENOUS at 09:43

## 2022-05-11 RX ADMIN — PROPOFOL 50 MG: 10 INJECTION, EMULSION INTRAVENOUS at 09:27

## 2022-05-11 RX ADMIN — LIDOCAINE HYDROCHLORIDE 80 MG: 10 INJECTION, SOLUTION EPIDURAL; INFILTRATION; INTRACAUDAL; PERINEURAL at 09:21

## 2022-05-11 RX ADMIN — PROPOFOL 100 MG: 10 INJECTION, EMULSION INTRAVENOUS at 09:22

## 2022-05-11 RX ADMIN — LIDOCAINE HYDROCHLORIDE 20 MG: 10 INJECTION, SOLUTION EPIDURAL; INFILTRATION; INTRACAUDAL; PERINEURAL at 09:23

## 2022-05-11 RX ADMIN — GLYCOPYRROLATE 0.1 MG: 0.2 INJECTION, SOLUTION INTRAMUSCULAR; INTRAVENOUS at 09:21

## 2022-05-11 RX ADMIN — SODIUM CHLORIDE: 0.9 INJECTION, SOLUTION INTRAVENOUS at 09:14

## 2022-05-11 RX ADMIN — PROPOFOL 50 MG: 10 INJECTION, EMULSION INTRAVENOUS at 09:30

## 2022-05-11 RX ADMIN — Medication 30 MG: at 09:21

## 2022-05-11 RX ADMIN — PROPOFOL 50 MG: 10 INJECTION, EMULSION INTRAVENOUS at 09:47

## 2022-05-11 RX ADMIN — PROPOFOL 50 MG: 10 INJECTION, EMULSION INTRAVENOUS at 09:33

## 2022-05-11 RX ADMIN — PROPOFOL 50 MG: 10 INJECTION, EMULSION INTRAVENOUS at 09:40

## 2022-05-11 RX ADMIN — PROPOFOL 50 MG: 10 INJECTION, EMULSION INTRAVENOUS at 09:50

## 2022-05-11 RX ADMIN — SODIUM CHLORIDE, SODIUM LACTATE, POTASSIUM CHLORIDE, AND CALCIUM CHLORIDE: .6; .31; .03; .02 INJECTION, SOLUTION INTRAVENOUS at 09:26

## 2022-05-11 NOTE — H&P
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    last assessed 4/4/13    Atrial fibrillation (HCC)     [facility] ([email or web address] )     Benign essential hypertension     last assessed 12/21/17     Body mass index (BMI) of 50-59 9 in adult [facility])     Carpal tunnel syndrome 09/07/2004    unspecified laterality  / last assessed 9/7/04    Colon polyp     Congenital pes planus     last assessed 7/15/14     Depression     Depression     Hemangioma of skin 08/26/2003    last assessed 8/26/03    History of gastroesophageal reflux (GERD)     Hypercholesterolemia     Hyperlipidemia     Hypertension     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[email or web address] ) 04/19/2006    last assessed 12/31/14     Osteoarthritis of both knees     last assessed 12/31/14     Paroxysmal atrial fibrillation (HCC)     S/P ablation of atrial flutter      Past Surgical History:   Procedure Laterality Date    ABDOMINAL SURGERY  06/2006    20cm GIST removed     APPENDECTOMY      ATRIAL ABLATION SURGERY      CARPAL TUNNEL RELEASE Bilateral     COLONOSCOPY      HYSTERECTOMY      fibroids    KIDNEY STONE SURGERY Right 09/17/2021    placed stent in  for kidney stone    KNEE SURGERY      KNEE SURGERY Left 03/2019    OOPHORECTOMY      cyst    TONSILLECTOMY      TUMOR EXCISION  2006    gastrointestinal stromal tumor    UPPER GASTROINTESTINAL ENDOSCOPY       Social History   Social History     Substance and Sexual Activity   Alcohol Use Not Currently    Comment: social drinker per [employer]      Social History     Substance and Sexual Activity   Drug Use No     Social History     Tobacco Use   Smoking Status Light Tobacco Smoker    Years: 9 00    Types: Cigarettes   Smokeless Tobacco Current User   Tobacco Comment    pt states she smokes 1 to 3 times per week     Family History   Problem Relation Age of Onset    Emphysema Mother     Arthritis Mother     Diabetes Mother     Hypertension Mother     COPD Mother     Arthritis Father     Prostate cancer Father     Cerebral aneurysm Son     No Known Problems Maternal Grandmother     No Known Problems Maternal Grandfather     No Known Problems Paternal Grandmother     No Known Problems Paternal Grandfather     No Known Problems Son     No Known Problems Maternal Aunt     No Known Problems Maternal Aunt     No Known Problems Paternal Aunt     No Known Problems Paternal Aunt        Meds/Allergies     (Not in a hospital admission)      Allergies   Allergen Reactions    Other      Adhesive tape        Objective     /83   Pulse 85   Temp (!) 97 °F (36 1 °C) (Temporal)   Resp 16   Ht 5' 4" (1 626 m)   Wt (!) 144 kg (318 lb)   SpO2 91%   BMI 54 58 kg/m²       PHYSICAL EXAM    Gen: NAD  CV: RRR  CHEST: Clear  ABD: soft, NT/ND  EXT: no edema      ASSESSMENT/PLAN:  This is a 57y o  year old female here for EGD with EUS, and she is stable and optimized for her procedure

## 2022-05-11 NOTE — ANESTHESIA PREPROCEDURE EVALUATION
Procedure:  ENDOSCOPIC ULTRASOUND (UPPER)    Relevant Problems   CARDIO   (+) Benign essential hypertension   (+) Dyspnea on exertion   (+) Hyperlipidemia   (+) Hypertension   (+) Supraventricular tachycardia (HCC)      ENDO   (+) Type 2 diabetes mellitus without complication, without long-term current use of insulin (HCC)      GI/HEPATIC   (+) Esophageal reflux      /RENAL   (+) Kidney stone   (+) Uric acid kidney stone      NEURO/PSYCH   (+) Controlled depression      PULMONARY   (+) Dyspnea on exertion   (+) Severe obstructive sleep apnea   Uses nasal cpap at night    Physical Exam    Airway    Mallampati score: II  TM Distance: >3 FB  Neck ROM: full     Dental   No notable dental hx     Cardiovascular  Rate: normal,     Pulmonary  Breath sounds clear to auscultation,     Other Findings        Anesthesia Plan  ASA Score- 3     Anesthesia Type- IV sedation with anesthesia with ASA Monitors  Additional Monitors:   Airway Plan:     Comment: Ketamine/glyco used as adjunct to propofol for prior endoscopy in Feb 2022 - no issues  Consider O2 facemask or nasal supernova if needed  Plan Factors-    Chart reviewed  Patient summary reviewed  Induction- intravenous  Postoperative Plan-     Informed Consent- Anesthetic plan and risks discussed with patient  I personally reviewed this patient with the CRNA  Discussed and agreed on the Anesthesia Plan with the CRNA  Yasmany Garza

## 2022-05-11 NOTE — ANESTHESIA POSTPROCEDURE EVALUATION
Post-Op Assessment Note    CV Status:  Stable  Pain Score: 0    Pain management: adequate     Mental Status:  Alert and awake   Hydration Status:  Euvolemic   PONV Controlled:  Controlled   Airway Patency:  Patent      Post Op Vitals Reviewed: Yes      Staff: CRNA, Anesthesiologist         No complications documented      BP   122/64   Temp     Pulse  88   Resp   16   SpO2   96

## 2022-05-16 ENCOUNTER — TELEPHONE (OUTPATIENT)
Dept: GASTROENTEROLOGY | Facility: CLINIC | Age: 62
End: 2022-05-16

## 2022-05-16 ENCOUNTER — TELEPHONE (OUTPATIENT)
Dept: HEMATOLOGY ONCOLOGY | Facility: CLINIC | Age: 62
End: 2022-05-16

## 2022-05-16 NOTE — TELEPHONE ENCOUNTER
----- Message from Allen Garcia MD sent at 5/16/2022  3:29 PM EDT -----  Please call patient and schedule her for office visit this week to discuss about further treatment plan

## 2022-05-16 NOTE — TELEPHONE ENCOUNTER
New Patient Intake Form   Patient Details:    Amelia Alicea  1960    Appointment Information   Who is calling to schedule? patient   If not self, what is the caller's name? Please put name of RBC nurse as well  n/a   DID YOU CONFIRM INSURANCE WITH PATIENT? yes   Referring provider self   What is the diagnosis? Pancreatic mass     Is there a confirmed tissue diagnosis?   no   Is there a biopsy ordered or pending? Please specify dates   Yes pending results from 5/11     Is patient aware of diagnosis? yes   Have you had any imaging or labs done? If yes, where? (If imaging done outside of [facility], please remind patient to bring a disk ) Yes imaging done 4/25     If imaging done at outside facility, did you instruct patient to obtain discs and bring to visit? no   Have you been seen by another Oncologist/Hematologist?  If so, who and where? Yes, patient of Dr Digna Arroyo the records in [facility] or Care Everywhere? yes   Does the patient have records at another facility/hospital? no   If yes, Name of facility, city and state where facility is located  Did you instruct patient to have records faxed to [facility] and provide rightfax number? n/a   Preferred [place]   Is the patient willing to be seen by another provider? (This is for breast patients only) n/a     Did you send new patient paperwork? Email or mail?  Yes emailed to [email or web address]     Miscellaneous Information: appt on 5/31 @ 3pm with Dr Wiley Solis at [facility]

## 2022-05-16 NOTE — TELEPHONE ENCOUNTER
I am looking at Dr Kunal Martines schedule for wed 5/18/22 at 1:20pm   Pretty Porras said she could help me find a slot, however, if do not hear back from her, I will ask CC if can add slot to Dr Kunal Martines schedule

## 2022-05-17 NOTE — TELEPHONE ENCOUNTER
Spoke with patient and made an appointment with Dr Cinthya Pinon for 5/18 at 1320 hours  Patient will arrive early  Patient inquiring about the biopsy results, I asked her if she would like a call from Dr Cinthya Pinon but she said she can wait until tomorrow  Pt had no further questions or concerns  I gave her our phone number in case she changed her mind  I kept the appt with Rossana Alas next week, if not needed, this can be cancelled tomorrow after her appointment
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
## 2022-05-18 ENCOUNTER — TELEPHONE (OUTPATIENT)
Dept: HEMATOLOGY ONCOLOGY | Facility: CLINIC | Age: 62
End: 2022-05-18

## 2022-05-18 ENCOUNTER — OFFICE VISIT (OUTPATIENT)
Dept: GASTROENTEROLOGY | Facility: CLINIC | Age: 62
End: 2022-05-18
Payer: COMMERCIAL

## 2022-05-18 VITALS
BODY MASS INDEX: 50.02 KG/M2 | SYSTOLIC BLOOD PRESSURE: 131 MMHG | HEART RATE: 80 BPM | DIASTOLIC BLOOD PRESSURE: 94 MMHG | WEIGHT: 293 LBS | HEIGHT: 64 IN

## 2022-05-18 DIAGNOSIS — R10.13 EPIGASTRIC PAIN: ICD-10-CM

## 2022-05-18 DIAGNOSIS — C25.0 MALIGNANT NEOPLASM OF HEAD OF PANCREAS (HCC): Primary | ICD-10-CM

## 2022-05-18 PROCEDURE — 99214 OFFICE O/P EST MOD 30 MIN: CPT | Performed by: INTERNAL MEDICINE

## 2022-05-18 NOTE — PROGRESS NOTES
[facility] - Outpatient Follow-up Note  Christie Alberts 57 y o  female MRN: [id number]  Encounter: [id number]          ASSESSMENT AND PLAN:      1  Malignant neoplasm of head of pancreas ([address] )  [de-identified] year female referred to us for abnormal CT scan and MRI which shows pancreatic head mass, subsequently I did endoscopic ultrasound which confirmed 4 cm size mass in the pancreatic head with dilated pancreatic duct, status post FNA and FNA be which confirm malignancy, pancreatic adeno carcinoma, no vascular involvement, no peripancreatic lymphadenopathy  Will order PET-CT scan, patient already has appointment with surgical oncologist   Fabiola Rolling about underlying nature of the disease, patient might need Whipple surgery along with chemotherapy  - NM pet ct tumor imaging whole body; Future    2  Epigastric pain  Secondary to pancreatic cancer  She did not want any pain medication  She will take Tylenol or Advil as needed    3  Up-to-date with screening colonoscopy which was done with Dr Freda Matute recently    ______________________________________________________________________    SUBJECTIVE:  Patient seen and examined, she come for follow-up after endoscopic ultrasound, she was complaining abdominal pain and subsequently CT scan of abdomen and pelvis was done which shows pancreatic head mass and then she had MRI of pancreas which confirm 4 cm size pancreatic mass  I did EGD with EUS and FNA, EUS finding confirm pancreatic mass without any vascular involvement, no peripancreatic lymphadenopathy  Biopsy confirm malignancy adenocarcinoma, today she come for follow-up, she is doing well only slight abdominal pain but otherwise no nausea, no vomiting, no upper back pain, no weight loss  Patient and family has a multiple question about surgery and further treatment plan and they want start treatment as soon as possible  REVIEW OF SYSTEMS IS OTHERWISE NEGATIVE        Historical Information Past Medical History:   Diagnosis Date    Abnormal liver function test     last assessed 1/16/17     Adenomatosis     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    last assessed 4/4/13    [facility]rial fibrillation [facility] )     Atrial flutter ([facility] )     Benign essential hypertension     last assessed 12/21/17     Body mass index (BMI) of 50-59 9 in adult [facility])     Carpal tunnel syndrome 09/07/2004    unspecified laterality  / last assessed 9/7/04    Colon polyp     Congenital pes planus     last assessed 7/15/14     Depression     Depression     Hemangioma of skin 08/26/2003    last assessed 8/26/03    History of gastroesophageal reflux (GERD)     Hypercholesterolemia     Hyperlipidemia     Hypertension     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[facility] ) 04/19/2006    last assessed 12/31/14     Osteoarthritis of both knees     last assessed 12/31/14     Paroxysmal atrial fibrillation (HCC)     S/P ablation of atrial flutter      Past Surgical History:   Procedure Laterality Date    ABDOMINAL SURGERY  06/2006    20cm GIST removed     APPENDECTOMY      ATRIAL ABLATION SURGERY      CARPAL TUNNEL RELEASE Bilateral     COLONOSCOPY      HYSTERECTOMY      fibroids    KIDNEY STONE SURGERY Right 09/17/2021    placed stent in  for kidney stone    KNEE SURGERY      KNEE SURGERY Left 03/2019    OOPHORECTOMY      cyst    TONSILLECTOMY      TUMOR EXCISION  2006    gastrointestinal stromal tumor    UPPER GASTROINTESTINAL ENDOSCOPY       Social History   Social History     Substance and Sexual Activity   Alcohol Use Not Currently    Comment: social drinker per allscript      Social History     Substance and Sexual Activity   Drug Use No     Social History     Tobacco Use   Smoking Status Light Tobacco Smoker    Years: 9 00    Types: Cigarettes   Smokeless Tobacco Current User   Tobacco Comment    pt states she smokes 1 to 3 times per week     Family History   Problem Relation Age of Onset    Emphysema Mother    [facility] Arthritis Mother     Diabetes Mother     Hypertension Mother     COPD Mother     Arthritis Father     Prostate cancer Father     Cerebral aneurysm Son     No Known Problems Maternal Grandmother     No Known Problems Maternal Grandfather     No Known Problems Paternal Grandmother     No Known Problems Paternal Grandfather     No Known Problems Son     No Known Problems Maternal Aunt     No Known Problems Maternal Aunt     No Known Problems Paternal Aunt     No Known Problems Paternal Aunt        Meds/Allergies       Current Outpatient Medications:     aspirin 81 MG tablet    atorvastatin (LIPITOR) 40 mg tablet    glucose blood (Contour Next Test) test strip    ibuprofen (ADVIL,MOTRIN) 100 MG tablet    Magnesium Oxide -Mg Supplement 400 MG CAPS    metoprolol succinate (TOPROL-XL) 25 mg 24 hr tablet    multivitamin (THERAGRAN) TABS    olmesartan (BENICAR) 20 mg tablet    Omega-3 Fatty Acids (FISH OIL) 1,000 mg    Ozempic, 1 MG/DOSE, 4 MG/3ML SOPN injection pen    pantoprazole (PROTONIX) 40 mg tablet    PARoxetine (PAXIL-CR) 37 5 mg 24 hr tablet    Potassium Citrate ER 15 MEQ (1620 MG) TBCR    sotalol (BETAPACE) 120 mg tablet    VITAMIN D PO    Blood Glucose Monitoring Suppl (OneTouch Verio) w/Device KIT    Insulin Pen Needle (Pen Needles) 32G X 4 MM MISC    mupirocin (BACTROBAN) 2 % ointment    OneTouch Delica Lancets 18D MISC    Allergies   Allergen Reactions    Other      Adhesive tape            Objective     Blood pressure 131/94, pulse 80, height 5' 4" (1 626 m), weight (!) 147 kg (325 lb)  Body mass index is 55 79 kg/m²        PHYSICAL EXAM:      General Appearance:   Alert, cooperative, no distress   HEENT:   Normocephalic, atraumatic, anicteric      Neck:  Supple, symmetrical, trachea midline   Lungs:   Clear to auscultation bilaterally; no rales, rhonchi or wheezing; respirations unlabored    Heart[de-identified]   Regular rate and rhythm; no murmur, rub, or gallop  Abdomen:   Soft, non-tender, non-distended; normal bowel sounds; no masses, no organomegaly    Genitalia:   Deferred    Rectal:   Deferred    Extremities:  No cyanosis, clubbing or edema    Pulses:  2+ and symmetric    Skin:  No jaundice, rashes, or lesions    Lymph nodes:  No palpable cervical lymphadenopathy        Lab Results:   No visits with results within 1 Day(s) from this visit  Latest known visit with results is:   Hospital Outpatient Visit on 05/11/2022   Component Date Value    POC Glucose 05/11/2022 140     Case Report 05/11/2022                      Value:Non-gynecologic Cytology                          Case: [id number]                                  Authorizing Provider:  Jayson Polanco MD Collected:           05/11/2022 0954              Ordering Location:     [facility]   Received:            05/11/2022 1013                                     Endoscopy                                                                    Pathologist:           Danielle Lafleur MD                                                                           Specimens:   A) - Pancreas, Pancreatic head                                                                      B) - Pancreas, Pancreatic head                                                                      C) - Pancreas, cell block                                                                  Final Diagnosis 05/11/2022                      Value: This result contains rich text formatting which cannot be displayed here   Note 05/11/2022                      Value: This result contains rich text formatting which cannot be displayed here   Intraoperative Consultat* 05/11/2022                      Value: This result contains rich text formatting which cannot be displayed here     Cheryln Jeans Description 05/11/2022 Value:This result contains rich text formatting which cannot be displayed here   Additional Information 05/11/2022                      Value: This result contains rich text formatting which cannot be displayed here  Radiology Results:   MRI abdomen w wo contrast and mrcp    Result Date: 4/27/2022  Narrative: MRI OF THE ABDOMEN WITH AND WITHOUT CONTRAST WITH MRCP INDICATION: 57 years / Female  K86 89: Other specified diseases of pancreas Q45 3: Other congenital malformations of pancreas and pancreatic duct  COMPARISON: CT performed April 7, 2022  TECHNIQUE:  The following pulse sequences were obtained:  Coronal and axial T2 with TE of 90 and 180 respectively, axial T2 with fat saturation, axial FIESTA fat-sat, axial T1-weighted in-and-out-of phase, axial DWI/ADC, pre-contrast axial T1 with fat saturation, post-contrast dynamic axial T1 with fat saturation at 20, 70, and 180 seconds, followed by coronal and 7 minute delayed axial T1 with fat saturation  3D MRCP images were obtained with radial thick slabs and projections  3D rendering was performed from the acquisition scanner  IV Contrast:  14 mL of Gadobutrol injection (SINGLE-DOSE) FINDINGS: LOWER CHEST:   Small sliding-type hiatal hernia  LIVER: Normal in size and configuration  Profound dropout of signal throughout hepatic parenchyma on out of phase gradient T1 imaging  No suspicious hepatic mass  The hepatic veins and portal veins are patent  BILE DUCTS:  No intrahepatic or extrahepatic bile duct dilation  Common bile duct is normal in caliber  No choledocholithiasis, biliary stricture or suspicious mass  GALLBLADDER:  Normal  PANCREAS:  A mass in the pancreatic head is estimated at approximately 4 2 x 3 6 x 3 0 cm on image 72 of series 15 and image 74 of series 14  This mass corresponds to mass in that location on recent CT and is highly suspicious for pancreatic adenocarcinoma    As on prior CT, there is abnormal enlargement of the pancreatic duct in the pancreatic body and tail distal to the mass  Mass does not appear to encase or narrow superior mesenteric artery or portal vein  ADRENAL GLANDS:  Normal  SPLEEN:  Normal  KIDNEYS/PROXIMAL URETERS:  No hydroureteronephrosis  No suspicious renal mass  BOWEL:   No dilated loops of bowel  PERITONEUM/RETROPERITONEUM:  No ascites  LYMPH NODES:  No abdominal lymphadenopathy  VASCULAR STRUCTURES:  No aneurysm  ABDOMINAL WALL:  Unremarkable  OSSEOUS STRUCTURES:  No suspicious osseous lesion  Impression: Mass in the pancreatic head, probably better depicted on CT but on both CT and MR demonstrating characteristics that are most suspicious for pancreatic adenocarcinoma  Endoscopic ultrasound and tissue sampling is recommended  No evidence for metastatic disease in the abdomen  No biliary dilatation  Hepatomegaly and hepatic steatosis  This examination was marked "significant notification" in Epic in order to begin the standard process by which the radiology reading room liaison alerts the referring practitioner  Workstation performed: [id number]     Endoscopic ultrasonography, GI (Upper)    Result Date: 5/11/2022  Narrative: [address] [place] [address] [id number] [address]: 5/11/22 PHYSICIAN(S): Attending: Sherren Liverpool, MD Fellow: No Staff Documented Procedure :  EUS with FNA , EGD with biopsies INDICATION: Pancreatic mass POST-OP DIAGNOSIS: See the impression below  PREPROCEDURE: Informed consent was obtained for the procedure, including sedation  Risks of perforation, hemorrhage, adverse drug reaction and aspiration were discussed  The patient was placed in the left lateral decubitus position  Patient was explained about the risks and benefits of the procedure  Risks including but not limited to bleeding, infection, and perforation were explained in detail   Also explained about less than 100% sensitivity with the exam and other alternatives  DETAILS OF PROCEDURE: Patient was taken to the procedure room where a time out was performed to confirm correct patient and correct procedure  The patient underwent monitored anesthesia care, which was administered by an anesthesia professional  The patient's blood pressure, heart rate, oxygen, respirations and level of consciousness were monitored throughout the procedure  The linear scope was introduced through the mouth and advanced to the second part of the duodenum  Retroflexion was performed in the cardia, fundus and incisura  The patient experienced no blood loss  The procedure was not difficult  The patient tolerated the procedure well  There were no apparent complications  ANESTHESIA INFORMATION: ASA: III Anesthesia Type: IV Sedation with Anesthesia MEDICATIONS: No administrations occurring from 0917 to 1001 on 05/11/22 FINDINGS: Single oval, hypoechoic and solid mass measuring 40 mm x 38 mm with well-defined margins in the pancreas; 2 fine needle aspiration passes were taken with a 25 gauge shark core needle using a transduodenal approach guided by Doppler  Obtained adequate sample of clear-appearing aspirate  Onsite cytologist was present and cytology results were preliminarily atypical; 3 successful fine needle biopsy passes were taken with a 22 gauge shark core needle using a transduodenal approach, sample found to be adequate and sent sample for histology analysis and performed cytology analysis  Onsite cytologist was present and cytology results were preliminarily atypical  Large 4 cm size hypoechoic lesion in the pancreatic head with dilated pancreatic duct 6 mm in size    Common bile duct appear normal   With the use of 25 and 22 gauge needle, multiple passes were done and tissue was obtained for cytopathology, on-site cytopathology evaluation confirm adequate sample and atypical cell, there is no vascular involvement, no peripancreatic lymphadenopathy Normal pancreatic body, tail and uncinate process with only dilated pancreatic duct in body and tail Mild, localized erythematous mucosa in the antrum; performed cold forceps biopsy to rule out H  pylori The upper third of the esophagus, middle third of the esophagus, lower third of the esophagus, GE junction, duodenal bulb, 1st part of the duodenum and 2nd part of the duodenum appeared normal  SPECIMENS: ID Type Source Tests Collected by Time Destination 1 : antrum Tissue Stomach TISSUE EXAM Merly Wilson MD 5/11/2022  9:25 AM  2 : Pancreatic head Other FNA FINE NEEDLE ASPIRATION Merly Wilson MD 5/11/2022  9:54 AM       Impression: 1  Large 4 cm size hypoechoic lesion in the pancreatic head with dilated pancreatic duct, status post FNA and FNB, tissue was obtained for cytopathology and cell block, bleeding result confirm atypical cell  No peripancreatic lymphadenopathy, no vascular involvement 2  Mild erythema in the antrum 3   Normal esophagus, normal duodenum, normal common bile duct RECOMMENDATION: Await pathology results Follow up with me in clinic Surgical Oncology consult   Merly Wilson MD

## 2022-05-18 NOTE — TELEPHONE ENCOUNTER
Appointment Cancellation Or Reschedule     Person calling in Physician Office    Provider Dr Etelvina Chau   Office Visit Date and Time 5/31 3PM Dr Aviva Martínez originally   Did patient want to reschedule their office appointment? If so, when was it scheduled to? Yes 5/24 8:15AM Sukumar Dumont   Is this patient calling to reschedule an infusion appointment? No   When is their next infusion appointment? No   Is this patient a Chemo patient? No   Reason for Cancellation or Reschedule Dr Christi Grissom requested     If the patient is a treatment patient, please route this to the office nurse  If the patient is not on treatment, please route to the office MA

## 2022-05-20 ENCOUNTER — TELEPHONE (OUTPATIENT)
Dept: SURGICAL ONCOLOGY | Facility: CLINIC | Age: 62
End: 2022-05-20

## 2022-05-20 NOTE — TELEPHONE ENCOUNTER
 Hello, can I please speak to (patient name) this is (enter your name here) calling from [facility]  Luke'popeye ortega) to remind you of your appointment on (5/26 8:15am) at [facility]  I am calling to review our no-show/cancelation policy and masking policy  Do you have a few minutes? We ask that you come at least 15 minutes early for your appointment to complete all paperwork  However, If you are up to 20 minutes late for your appointment, we may need to reschedule you  Any lateness to an appointment may result in an shorted visit, for our providers to offer you the most out of your consult, please arrive early  We require at least 24-hour notice for cancelations and if you miss your appointment 3 times, we may unfortunately not be able to reschedule any future visits  We ask that you please call our office in the event you are feeling ill as we may need to reschedule your appointment  You are allowed to bring only one visitor with you to your appointment, if your visitor is not feeling well we ask that you don't bring them  Our current masking policy is: if you are vaccinated masking is optional, if you are unvaccinated masking is required  We look forward to seeing you at your upcoming appointment! Spoke with her and confirmed  She was agreeable

## 2022-05-23 ENCOUNTER — OFFICE VISIT (OUTPATIENT)
Dept: CARDIOLOGY CLINIC | Facility: CLINIC | Age: 62
End: 2022-05-23
Payer: COMMERCIAL

## 2022-05-23 ENCOUNTER — TELEPHONE (OUTPATIENT)
Dept: GASTROENTEROLOGY | Facility: CLINIC | Age: 62
End: 2022-05-23

## 2022-05-23 VITALS
WEIGHT: 293 LBS | DIASTOLIC BLOOD PRESSURE: 74 MMHG | SYSTOLIC BLOOD PRESSURE: 122 MMHG | BODY MASS INDEX: 50.02 KG/M2 | TEMPERATURE: 98.2 F | OXYGEN SATURATION: 97 % | HEIGHT: 64 IN | HEART RATE: 78 BPM

## 2022-05-23 DIAGNOSIS — I48.0 PAROXYSMAL ATRIAL FIBRILLATION (HCC): ICD-10-CM

## 2022-05-23 DIAGNOSIS — I47.1 SUPRAVENTRICULAR TACHYCARDIA (HCC): ICD-10-CM

## 2022-05-23 DIAGNOSIS — Z01.810 PRE-OPERATIVE CARDIOVASCULAR EXAMINATION: Primary | ICD-10-CM

## 2022-05-23 DIAGNOSIS — I10 ESSENTIAL HYPERTENSION: ICD-10-CM

## 2022-05-23 PROBLEM — C25.0 ADENOCARCINOMA OF HEAD OF PANCREAS (HCC): Status: ACTIVE | Noted: 2022-05-11

## 2022-05-23 PROCEDURE — 3074F SYST BP LT 130 MM HG: CPT | Performed by: INTERNAL MEDICINE

## 2022-05-23 PROCEDURE — 3078F DIAST BP <80 MM HG: CPT | Performed by: INTERNAL MEDICINE

## 2022-05-23 PROCEDURE — 99214 OFFICE O/P EST MOD 30 MIN: CPT | Performed by: INTERNAL MEDICINE

## 2022-05-23 PROCEDURE — 93000 ELECTROCARDIOGRAM COMPLETE: CPT | Performed by: INTERNAL MEDICINE

## 2022-05-23 NOTE — PROGRESS NOTES
Cardiology Follow Up    Dario Lucas  1960  [id number]      Interval History: Dario Lucas is here for follow up of SVT/Atrial fibrillation and to discuss cardiac risk prior to undergoing potential resection of a pancreatic mass  Recently was diagnosed with malignant neoplasm of the head of her pancreas  She will be seen by surgical oncologist later this week to discuss possible resection  She denies any recent symptoms  She denies any chest pain, shortness of breath, syncope or near syncope  She denies any lower extremity edema, orthopnea or paroxysmal nocturnal dyspnea  The patient was seen at [address] [place] [address] initially because of tachycardia  On 3/23/16, she underwent EPS/SVT ablation with ablation of atrial tachycardia but was complicated by atrial fibrillation which responded to sotalol and cardioversion  She remains in sinus rhythm since then  She is not on anticoagulation because of low chads score  The following portions of the patient's history were reviewed and updated as appropriate: allergies, current medications, past family history, past medical history, past social history, past surgical history and problem list     Current Outpatient Medications on File Prior to Visit   Medication Sig Dispense Refill    aspirin 81 MG tablet Take 81 mg by mouth daily   atorvastatin (LIPITOR) 40 mg tablet TAKE 1 TABLET BY MOUTH  DAILY 90 tablet 3    glucose blood (Contour Next Test) test strip Use 1 each daily Use as instructed 100 each 3    ibuprofen (ADVIL,MOTRIN) 100 MG tablet Take 100 mg by mouth as needed for mild pain      Magnesium Oxide -Mg Supplement 400 MG CAPS Take 1 capsule by mouth daily      metoprolol succinate (TOPROL-XL) 25 mg 24 hr tablet Take 1 tablet (25 mg total) by mouth 2 (two) times a day 180 tablet 3    multivitamin (THERAGRAN) TABS Take 1 tablet by mouth daily        olmesartan (BENICAR) 20 mg tablet TAKE 1 TABLET BY MOUTH  DAILY 90 tablet 3    Omega-3 Fatty Acids (FISH OIL) 1,000 mg Take 1,000 mg by mouth 2 (two) times a day        Ozempic, 1 MG/DOSE, 4 MG/3ML SOPN injection pen Inject 0 75 mL (1 mg total) under the skin once a week 9 mL 1    pantoprazole (PROTONIX) 40 mg tablet TAKE 1 TABLET BY MOUTH  DAILY BEFORE BREAKFAST 90 tablet 3    PARoxetine (PAXIL-CR) 37 5 mg 24 hr tablet TAKE 1 TABLET BY MOUTH IN  THE MORNING 90 tablet 3    Potassium Citrate ER 15 MEQ (1620 MG) TBCR Take 1 tablet by mouth 2 (two) times a day 180 tablet 3    sotalol (BETAPACE) 120 mg tablet Take 1 tablet (120 mg total) by mouth every 12 (twelve) hours 180 tablet 3    VITAMIN D PO Take 2,000 Units by mouth 2 (two) times a day      Blood Glucose Monitoring Suppl (OneTouch Verio) w/Device KIT Use daily (Patient not taking: Reported on 4/28/2022 ) 1 kit 0    Insulin Pen Needle (Pen Needles) 32G X 4 MM MISC Use once a week (Patient not taking: No sig reported) 12 each 3    mupirocin (BACTROBAN) 2 % ointment Apply topically daily (Patient not taking: Reported on 4/28/2022 ) 22 g 0    OneTouch Delica Lancets 34E MISC Use 1 application daily (Patient not taking: Reported on 4/28/2022 ) 100 each 3     No current facility-administered medications on file prior to visit  Review of Systems:  Review of Systems   Constitutional: Positive for fatigue  Cardiovascular: Negative for chest pain, palpitations and leg swelling  Musculoskeletal: Positive for arthralgias  All other systems reviewed and are negative  Physical Exam:  /74 (BP Location: Right arm, Patient Position: Sitting, Cuff Size: Standard)   Pulse 78   Temp 98 2 °F (36 8 °C)   Ht 5' 4" (1 626 m)   Wt (!) 145 kg (320 lb)   SpO2 97%   BMI 54 93 kg/m²     Physical Exam  Constitutional:       General: She is not in acute distress  Appearance: She is well-developed  She is not diaphoretic  HENT:      Head: Normocephalic and atraumatic     Eyes: Conjunctiva/sclera: Conjunctivae normal       Pupils: Pupils are equal, round, and reactive to light  Neck:      Thyroid: No thyromegaly  Vascular: No JVD  Cardiovascular:      Rate and Rhythm: Normal rate and regular rhythm  Heart sounds: Normal heart sounds  No murmur heard  No friction rub  No gallop  Pulmonary:      Effort: Pulmonary effort is normal       Breath sounds: Normal breath sounds  Musculoskeletal:      Cervical back: Neck supple  Skin:     General: Skin is warm and dry  Findings: No erythema or rash  Neurological:      General: No focal deficit present  Mental Status: She is alert and oriented to person, place, and time  Mental status is at baseline  Cranial Nerves: No cranial nerve deficit  Psychiatric:         Mood and Affect: Mood normal          Behavior: Behavior normal          Thought Content: Thought content normal          Judgment: Judgment normal          Cardiographics  ECG: normal sinus rhythm, no blocks or conduction defects, no ischemic changes    Labs:  Lab Results   Component Value Date     04/12/2017    K 4 0 04/19/2022     04/19/2022    CO2 22 04/19/2022    BUN 13 04/19/2022    CREATININE 0 87 04/19/2022    CREATININE 0 79 03/21/2018    CREATININE 0 78 04/12/2017    GLUCOSE 92 04/12/2017    CALCIUM 9 6 03/21/2018    CALCIUM 9 5 04/12/2017     Lab Results   Component Value Date    WBC 7 0 04/19/2022    WBC 7 30 03/21/2018    WBC 7 5 01/13/2017    HGB 14 1 04/19/2022    HGB 15 6 03/21/2018    HGB 13 6 01/13/2017    HCT 43 3 04/19/2022    HCT 48 4 (H) 03/21/2018    HCT 41 5 01/13/2017    MCV 83 04/19/2022    MCV 82 03/21/2018    MCV 83 01/13/2017     04/19/2022     03/21/2018     01/13/2017     Lab Results   Component Value Date    CHOL 153 04/12/2017    TRIG 153 (H) 02/21/2022    HDL 43 02/21/2022        Discussion/Summary:  1   Essential hypertension  - Patient's blood pressure controlled with olmesartan and metoprolol  2  Supraventricular tachycardia ([address] )  - continue sotalol and metoprolol  Heart rate is normal today and QT interval is 462 milliseconds  - patient has undergone previous SVT ablation  No further episodes since then  3  Paroxysmal atrial fibrillation (HCC)  - no recent episodes  - she is not on anticoagulation  - continue sotalol and metoprolol   - POCT ECG    4  Pre-operative cardiovascular examination  - patient is at intermediate risk for procedure it because of insulin-dependent diabetes mellitus, obstructive sleep apnea and morbid obesity  - she had stress test and echocardiogram in 2020  Echocardiogram showed normal LV function  No indication for further testing at this time  She has no symptoms to suggest decompensated congestive heart failure or significant arrhythmia  - she will follow-up with surgical Oncology to decide next course of action  She should call if any new symptoms develop prior to any surgeries

## 2022-05-23 NOTE — TELEPHONE ENCOUNTER
I called and left message asking patient if she would still be coming in for her appt today w/ Tiffani Flores  Patient was recently seen by Dr Audra Khanna

## 2022-05-24 ENCOUNTER — CONSULT (OUTPATIENT)
Dept: SURGICAL ONCOLOGY | Facility: CLINIC | Age: 62
End: 2022-05-24
Payer: COMMERCIAL

## 2022-05-24 ENCOUNTER — TELEPHONE (OUTPATIENT)
Dept: GENETICS | Facility: CLINIC | Age: 62
End: 2022-05-24

## 2022-05-24 ENCOUNTER — TELEPHONE (OUTPATIENT)
Dept: GYNECOLOGIC ONCOLOGY | Facility: CLINIC | Age: 62
End: 2022-05-24

## 2022-05-24 ENCOUNTER — TELEPHONE (OUTPATIENT)
Dept: HEMATOLOGY ONCOLOGY | Facility: CLINIC | Age: 62
End: 2022-05-24

## 2022-05-24 VITALS
DIASTOLIC BLOOD PRESSURE: 78 MMHG | BODY MASS INDEX: 50.02 KG/M2 | WEIGHT: 293 LBS | RESPIRATION RATE: 16 BRPM | HEART RATE: 84 BPM | OXYGEN SATURATION: 97 % | TEMPERATURE: 97.1 F | HEIGHT: 64 IN | SYSTOLIC BLOOD PRESSURE: 120 MMHG

## 2022-05-24 DIAGNOSIS — C25.0 ADENOCARCINOMA OF HEAD OF PANCREAS (HCC): Primary | ICD-10-CM

## 2022-05-24 PROCEDURE — 1036F TOBACCO NON-USER: CPT | Performed by: SURGERY

## 2022-05-24 PROCEDURE — 99215 OFFICE O/P EST HI 40 MIN: CPT | Performed by: SURGERY

## 2022-05-24 PROCEDURE — 3008F BODY MASS INDEX DOCD: CPT | Performed by: SURGERY

## 2022-05-24 RX ORDER — HYDROCODONE BITARTRATE AND ACETAMINOPHEN 5; 325 MG/1; MG/1
1 TABLET ORAL EVERY 6 HOURS PRN
Qty: 10 TABLET | Refills: 0 | Status: SHIPPED | OUTPATIENT
Start: 2022-05-24 | End: 2022-08-03

## 2022-05-24 NOTE — LETTER
May 24, 2022     Emeterio Villar MD  [address] [facility]    Patient: Charlette Linder   YOB: 1960   Date of Visit: 5/24/2022       Dear Dr Melissa Saavedra: Thank you for referring Charlette Linder to me for evaluation  Below are my notes for this consultation  If you have questions, please do not hesitate to call me  I look forward to following your patient along with you  Sincerely,        Willian Mccarty MD        CC: MD Adilene Sexton MD Jhonny Cords, MD  5/24/2022  9:04 AM  Incomplete               Surgical Oncology Consult       [facility] [address] [address] [phone]    Charlette Linder  1960  [id number]  [facility] [address] [address] [phone]    Diagnoses and all orders for this visit:    Adenocarcinoma of head of pancreas [facility])  -     Case request operating room: INSERTION VENOUS PORT (PORT-A-CATH); Standing  -     Comprehensive metabolic panel; Future  -     CBC and differential; Future  -     HYDROcodone-acetaminophen (Norco) 5-325 mg per tablet; Take 1 tablet by mouth every 6 (six) hours as needed for pain Max Daily Amount: 4 tablets  -     Ambulatory Referral to Oncology Genetics; Future  -     Ambulatory referral to Hematology / Oncology; Future  -     Case request operating room: INSERTION VENOUS PORT (PORT-A-CATH)    Other orders  -     Incentive spirometry; Standing  -     Insert and maintain IV line; Standing  -     Void On-Call to O R ; Standing  -     Place sequential compression device; Standing  -     ceFAZolin (ANCEF) 3,000 mg in dextrose 5 % 100 mL IVPB        Chief Complaint   Patient presents with    Consult       No follow-ups on file      Oncology History   Adenocarcinoma of head of pancreas ([address] )   5/11/2022 Initial Diagnosis    Adenocarcinoma of head of pancreas ([facility] [address] )     5/11/2022 Biopsy    Endoscopic Ultrasound:  A , B , & C  Pancreas, Pancreatic head:   Malignant (PSC Category VI)  Positive for adenocarcinoma         History of Present Illness:  56-year-old female who had a routine EGD and then developed some abdominal discomfort  She saw her primary care physician who ordered a CT scan  CT from April 7, 2022 reveals pancreatic duct dilatation in the body with an ill-defined density in the pancreatic head  There was also a 1 2 x 1 9 cm right adrenal nodule that was previously characterized as an adenoma  Follow-up MRI on April 25, 2022 reveals a 4 2 x 3 6 x 3 cm mass in the pancreatic head  There was also abnormal enlargement of the pancreatic duct and the pancreatic body and tail  The mass did not appear to encase her narrow the portal vein or SMA  I personally reviewed the films  Endoscopic ultrasound on May 11, 2022 reveals a 4 x 3 8 cm mass that was well-defined in the pancreas head  This was associated with a dilated pancreatic duct  Pathology revealed adenocarcinoma  CA 19-9 is normal at 30  She has already had a preoperative cardiac evaluation because of a history of SVT ablation and atrial tachycardia and atrial fibrillation  She she has been cardioverted in the past   She was at intermittent risk from a cardiac standpoint  Her echo in 2020 had normal LV function and no further testing was recommended  She had a mildly elevated lipase on April 19th  She denies any current abdominal pain, nausea or vomiting  No change in appetite or unintentional weight loss  No family history of pancreas cancer  Review of Systems  Complete ROS Surg Onc:   Constitutional: The patient denies new or recent history of general fatigue, no recent weight loss, no change in appetite  Eyes: No complaints of visual problems, no scleral icterus     ENT: no complaints of ear pain, no hoarseness, no difficulty swallowing,  no tinnitus and no new masses in head, oral cavity, or neck  Cardiovascular: No complaints of chest pain, no palpitations, no ankle edema  Respiratory: No complaints of shortness of breath, no cough  Gastrointestinal: No complaints of jaundice, no bloody stools, no pale stools  Genitourinary: No complaints of dysuria, no hematuria, no nocturia, no frequent urination, no urethral discharge  Musculoskeletal: No complaints of weakness, paralysis, joint stiffness or arthralgias  Integumentary: No complaints of rash, no new lesions  Neurological: No complaints of convulsions, no seizures, no dizziness  Hematologic/Lymphatic: No complaints of easy bruising  Endocrine:  No hot or cold intolerance  No polydipsia, polyphagia, or polyuria  Allergy/immunology:  No environmental allergies  No food allergies  Not immunocompromised  Skin:  No pallor or rash  No wound            Patient Active Problem List   Diagnosis    Dyspnea on exertion    Supraventricular tachycardia ([facility] [address] )    Hypertension    Controlled depression    Severe obstructive sleep apnea    Morbid obesity ([facility] )    Type 2 diabetes mellitus without complication, without long-term current use of insulin ([facility] )    Hyperlipidemia    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)    Esophageal reflux    Benign essential hypertension    Adenomatous colon polyp    Class 3 severe obesity due to excess calories with body mass index (BMI) of 50 0 to 59 9 in adult [facility])    Kidney stone    Uric acid kidney stone    Adenocarcinoma of head of pancreas [facility])     Past Medical History:   Diagnosis Date    Abnormal liver function test     last assessed 1/16/17     Adenomatosis     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    last assessed 4/4/13    Atrial fibrillation ([facility] )     Atrial flutter ([facility] )     Benign essential hypertension     last assessed 12/21/17     Body mass index (BMI) of 50-59 9 in [facility])  Carpal tunnel syndrome 09/07/2004    unspecified laterality  / last assessed 9/7/04    Colon polyp     Congenital pes planus     last assessed 7/15/14     Depression     Depression     Hemangioma of skin 08/26/2003    last assessed 8/26/03    History of gastroesophageal reflux (GERD)     Hypercholesterolemia     Hyperlipidemia     Hypertension     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[facility] [address] ) 04/19/2006    last assessed 12/31/14     Osteoarthritis of both knees     last assessed 12/31/14     Paroxysmal atrial fibrillation (HCC)     S/P ablation of atrial flutter      Past Surgical History:   Procedure Laterality Date    ABDOMINAL SURGERY  06/2006    20cm GIST removed     APPENDECTOMY      ATRIAL ABLATION SURGERY      CARPAL TUNNEL RELEASE Bilateral     COLONOSCOPY      HYSTERECTOMY      fibroids    KIDNEY STONE SURGERY Right 09/17/2021    placed stent in  for kidney stone    [address] Left 03/2019    OOPHORECTOMY      cyst    TONSILLECTOMY      TUMOR EXCISION  2006    gastrointestinal stromal tumor    UPPER GASTROINTESTINAL ENDOSCOPY       Family History   Problem Relation Age of Onset    Emphysema Mother     Arthritis Mother     Diabetes Mother     Hypertension Mother     COPD Mother     Arthritis Father     Prostate cancer Father     Cerebral aneurysm Son     No Known Problems Maternal Grandmother     No Known Problems Maternal Grandfather     No Known Problems Paternal Grandmother     No Known Problems Paternal Grandfather     No Known Problems Son     No Known Problems Maternal Aunt     No Known Problems Maternal Aunt     No Known Problems Paternal Aunt     No Known Problems Paternal Aunt      Social History     Socioeconomic History    Marital status: /Civil Union     Spouse name: Not on file    Number of children: Not on file    Years of education: Not on file    Highest education level: Not on file Occupational History    Not on file   Tobacco Use    Smoking status: Light Tobacco Smoker     Years: 9 00     Types: Cigarettes    Smokeless tobacco: Current User    Tobacco comment: pt states she smokes 1 to 3 times per week   Vaping Use    Vaping Use: Never used   Substance and Sexual Activity    Alcohol use: Not Currently     Comment: social drinker per allscript     Drug use: No    Sexual activity: Not on file   Other Topics Concern    Not on file   Social History Narrative    Lack of exercise    Good sleep hygiene    No caffeine use    Dog    Good sleep hygiene       Social Determinants of Health     Financial Resource Strain: Not on file   Food Insecurity: Not on file   Transportation Needs: Not on file   Physical Activity: Not on file   Stress: Not on file   Social Connections: Not on file   Intimate Partner Violence: Not on file   Housing Stability: Not on file       Current Outpatient Medications:     aspirin 81 MG tablet, Take 81 mg by mouth daily  , Disp: , Rfl:     atorvastatin (LIPITOR) 40 mg tablet, TAKE 1 TABLET BY MOUTH  DAILY, Disp: 90 tablet, Rfl: 3    glucose blood (Contour Next Test) test strip, Use 1 each daily Use as instructed, Disp: 100 each, Rfl: 3    HYDROcodone-acetaminophen (Norco) 5-325 mg per tablet, Take 1 tablet by mouth every 6 (six) hours as needed for pain Max Daily Amount: 4 tablets, Disp: 10 tablet, Rfl: 0    ibuprofen (ADVIL,MOTRIN) 100 MG tablet, Take 100 mg by mouth as needed for mild pain, Disp: , Rfl:     Insulin Pen Needle (Pen Needles) 32G X 4 MM MISC, Use once a week, Disp: 12 each, Rfl: 3    Magnesium Oxide -Mg Supplement 400 MG CAPS, Take 1 capsule by mouth daily, Disp: , Rfl:     metoprolol succinate (TOPROL-XL) 25 mg 24 hr tablet, Take 1 tablet (25 mg total) by mouth 2 (two) times a day, Disp: 180 tablet, Rfl: 3    multivitamin (THERAGRAN) TABS, Take 1 tablet by mouth daily  , Disp: , Rfl:     olmesartan (BENICAR) 20 mg tablet, TAKE 1 TABLET BY MOUTH  DAILY, Disp: 90 tablet, Rfl: 3    Omega-3 Fatty Acids (FISH OIL) 1,000 mg, Take 1,000 mg by mouth 2 (two) times a day  , Disp: , Rfl:     Ozempic, 1 MG/DOSE, 4 MG/3ML SOPN injection pen, Inject 0 75 mL (1 mg total) under the skin once a week, Disp: 9 mL, Rfl: 1    pantoprazole (PROTONIX) 40 mg tablet, TAKE 1 TABLET BY MOUTH  DAILY BEFORE BREAKFAST, Disp: 90 tablet, Rfl: 3    PARoxetine (PAXIL-CR) 37 5 mg 24 hr tablet, TAKE 1 TABLET BY MOUTH IN  THE MORNING, Disp: 90 tablet, Rfl: 3    Potassium Citrate ER 15 MEQ (1620 MG) TBCR, Take 1 tablet by mouth 2 (two) times a day, Disp: 180 tablet, Rfl: 3    sotalol (BETAPACE) 120 mg tablet, Take 1 tablet (120 mg total) by mouth every 12 (twelve) hours, Disp: 180 tablet, Rfl: 3    VITAMIN D PO, Take 2,000 Units by mouth 2 (two) times a day, Disp: , Rfl:     Blood Glucose Monitoring Suppl (OneTouch Verio) w/Device KIT, Use daily (Patient not taking: Reported on 4/28/2022 ), Disp: 1 kit, Rfl: 0    mupirocin (BACTROBAN) 2 % ointment, Apply topically daily (Patient not taking: Reported on 4/28/2022 ), Disp: 22 g, Rfl: 0    OneTouch Delica Lancets 36W MISC, Use 1 application daily (Patient not taking: Reported on 4/28/2022 ), Disp: 100 each, Rfl: 3  Allergies   Allergen Reactions    Other      Adhesive tape      Vitals:    05/24/22 0802   BP: 120/78   Pulse: 84   Resp: 16   Temp: (!) 97 1 °F (36 2 °C)   SpO2: 97%       Physical Exam   Constitutional: General appearance: The Patient is well-developed and well-nourished who appears the stated age in no acute distress  Patient is pleasant and talkative  HEENT:  Normocephalic  Sclerae are anicteric  Mucous membranes are moist  Neck is supple without adenopathy  No JVD  Chest: The lungs are clear to auscultation  Cardiac: Heart is regular rate  Abdomen: Abdomen is soft, non-tender, non-distended and without masses  Extremities: There is no clubbing or cyanosis  There is no edema  Symmetric  Neuro: Grossly nonfocal  Gait is normal      Lymphatic: No evidence of cervical adenopathy bilaterally  No evidence of axillary adenopathy bilaterally  No evidence of inguinal adenopathy bilaterally  Skin: Warm, anicteric  Psych:  Patient is pleasant and talkative  Breasts:      Pathology:  Final Diagnosis   A , B , & C  Pancreas, Pancreatic head: (Thin-Prep, smears, and cell block)  Malignant (PSC Category VI) -See note  Positive for adenocarcinoma     Note:  - Case reviewed with intradepartmental consultation (Supriya Talbot)  - The above diagnostic category is part of the six-tiered system proposed by The Papanicolaou Society of Cytopathology for the reporting of pancreaticobiliary specimens  This proposed scheme provides terminology that correlates the cytologic diagnostic nomenclature with the 2010 WHO classification of pancreatic tumors and standardizes the categorization of the various diseases of the pancreas, some of which are difficult to diagnose specifically by cytology      *The Papanicolaou Society of Cytopathology System for Reporting Pancreaticobiliary Cytology: Definitions, Criteria and Explanatory Notes  Lux Woodward; Hernandez, 2015       Electronically signed by Jamil Mathis MD on 5/16/2022 at  2:21 PM         Labs:  Component Ref Range & Units 4/19/22 7:20 AM   CA 19-9 0 - 35 U/mL 30       Imaging  MRI abdomen w wo contrast and mrcp    Result Date: 4/27/2022  Narrative: MRI OF THE ABDOMEN WITH AND WITHOUT CONTRAST WITH MRCP INDICATION: 57 years / Female  K86 89: Other specified diseases of pancreas Q45 3: Other congenital malformations of pancreas and pancreatic duct  COMPARISON: CT performed April 7, 2022   TECHNIQUE:  The following pulse sequences were obtained:  Coronal and axial T2 with TE of 90 and 180 respectively, axial T2 with fat saturation, axial FIESTA fat-sat, axial T1-weighted in-and-out-of phase, axial DWI/ADC, pre-contrast axial T1 with fat saturation, post-contrast dynamic axial T1 with fat saturation at 20, 70, and 180 seconds, followed by coronal and 7 minute delayed axial T1 with fat saturation  3D MRCP images were obtained with radial thick slabs and projections  3D rendering was performed from the acquisition scanner  IV Contrast:  14 mL of Gadobutrol injection (SINGLE-DOSE) FINDINGS: LOWER CHEST:   Small sliding-type hiatal hernia  LIVER: Normal in size and configuration  Profound dropout of signal throughout hepatic parenchyma on out of phase gradient T1 imaging  No suspicious hepatic mass  The hepatic veins and portal veins are patent  BILE DUCTS:  No intrahepatic or extrahepatic bile duct dilation  Common bile duct is normal in caliber  No choledocholithiasis, biliary stricture or suspicious mass  GALLBLADDER:  Normal  PANCREAS:  A mass in the pancreatic head is estimated at approximately 4 2 x 3 6 x 3 0 cm on image 72 of series 15 and image 74 of series 14  This mass corresponds to mass in that location on recent CT and is highly suspicious for pancreatic adenocarcinoma  As on prior CT, there is abnormal enlargement of the pancreatic duct in the pancreatic body and tail distal to the mass  Mass does not appear to encase or narrow superior mesenteric artery or portal vein  ADRENAL GLANDS:  Normal  SPLEEN:  Normal  KIDNEYS/PROXIMAL URETERS:  No hydroureteronephrosis  No suspicious renal mass  BOWEL:   No dilated loops of bowel  PERITONEUM/RETROPERITONEUM:  No ascites  LYMPH NODES:  No abdominal lymphadenopathy  VASCULAR STRUCTURES:  No aneurysm  ABDOMINAL WALL:  Unremarkable  OSSEOUS STRUCTURES:  No suspicious osseous lesion  Impression: Mass in the pancreatic head, probably better depicted on CT but on both CT and MR demonstrating characteristics that are most suspicious for pancreatic adenocarcinoma  Endoscopic ultrasound and tissue sampling is recommended  No evidence for metastatic disease in the abdomen    No biliary dilatation  Hepatomegaly and hepatic steatosis  This examination was marked "significant notification" in Epic in order to begin the standard process by which the radiology reading room liaison alerts the referring practitioner  Workstation performed: [id number]     Endoscopic ultrasonography, GI (Upper)    Result Date: 5/11/2022  Narrative: [address] [place] [address] [facility] [place] [id number] [address]: 5/11/22 PHYSICIAN(S): Attending: Elva Vásquez MD Fellow: No Staff Documented Procedure :  EUS with FNA , EGD with biopsies INDICATION: Pancreatic mass POST-OP DIAGNOSIS: See the impression below  PREPROCEDURE: Informed consent was obtained for the procedure, including sedation  Risks of perforation, hemorrhage, adverse drug reaction and aspiration were discussed  The patient was placed in the left lateral decubitus position  Patient was explained about the risks and benefits of the procedure  Risks including but not limited to bleeding, infection, and perforation were explained in detail  Also explained about less than 100% sensitivity with the exam and other alternatives  DETAILS OF PROCEDURE: Patient was taken to the procedure room where a time out was performed to confirm correct patient and correct procedure  The patient underwent monitored anesthesia care, which was administered by an anesthesia professional  The patient's blood pressure, heart rate, oxygen, respirations and level of consciousness were monitored throughout the procedure  The linear scope was introduced through the mouth and advanced to the second part of the duodenum  Retroflexion was performed in the cardia, fundus and incisura  The patient experienced no blood loss  The procedure was not difficult  The patient tolerated the procedure well  There were no apparent complications   ANESTHESIA INFORMATION: ASA: III Anesthesia Type: IV Sedation with Anesthesia MEDICATIONS: No administrations occurring from [id number] to 1001 on 05/11/22 FINDINGS: Single oval, hypoechoic and solid mass measuring 40 mm x 38 mm with well-defined margins in the pancreas; 2 fine needle aspiration passes were taken with a 25 gauge shark core needle using a transduodenal approach guided by Doppler  Obtained adequate sample of clear-appearing aspirate  Onsite cytologist was present and cytology results were preliminarily atypical; 3 successful fine needle biopsy passes were taken with a 22 gauge shark core needle using a transduodenal approach, sample found to be adequate and sent sample for histology analysis and performed cytology analysis  Onsite cytologist was present and cytology results were preliminarily atypical  Large 4 cm size hypoechoic lesion in the pancreatic head with dilated pancreatic duct 6 mm in size  Common bile duct appear normal   With the use of 25 and 22 gauge needle, multiple passes were done and tissue was obtained for cytopathology, on-site cytopathology evaluation confirm adequate sample and atypical cell, there is no vascular involvement, no peripancreatic lymphadenopathy Normal pancreatic body, tail and uncinate process with only dilated pancreatic duct in body and tail Mild, localized erythematous mucosa in the antrum; performed cold forceps biopsy to rule out H  pylori The upper third of the esophagus, middle third of the esophagus, lower third of the esophagus, GE junction, duodenal bulb, 1st part of the duodenum and 2nd part of the duodenum appeared normal  SPECIMENS: ID Type Source Tests Collected by Time Destination 1 : antrum Tissue Stomach TISSUE EXAM Ja Lopez MD 5/11/2022  9:25 AM  2 : Pancreatic head Other FNA FINE NEEDLE ASPIRATION Ja Lopez MD 5/11/2022  9:54 AM       Impression: 1   Large 4 cm size hypoechoic lesion in the pancreatic head with dilated pancreatic duct, status post FNA and FNB, tissue was obtained for cytopathology and cell block, bleeding result confirm atypical cell  No peripancreatic lymphadenopathy, no vascular involvement 2  Mild erythema in the antrum 3  Normal esophagus, normal duodenum, normal common bile duct RECOMMENDATION: Await pathology results Follow up with me in clinic Surgical Oncology consult   Madison Rodriguez MD     I reviewed the above laboratory and imaging data  Discussion/Summary:  41-year-old female with a clinical T2 N0 M0 pancreas cancer  Her staging workup has been negative  She has not had a CT of the chest, but has been set up for a PET scan by her gastroenterologist   We will await those results  Assuming this is negative, I would recommend neoadjuvant chemotherapy  We had a long discussion regarding the reasoning and rationale behind this approach  This will ensure that there is no progression through treatment making surgery futile  This could also potentially convert her to node-negative  She will need a port for chemotherapy  The risks were explained including bleeding, infection, need for further surgery, wound complications, port malfunction, DVT, and pneumothorax  Informed consent was obtained  We will schedule this at our earliest mutual convenience  I have discussed this with Dr Melva Sever, and he will see her to initiate neoadjuvant chemo  We will likely plan on 3 months of chemo followed by surgery  I did discuss that surgical literature suggests 5-6 months of preoperative chemotherapy is optimal, but she is leaning more towards 3 months  I will see her back in 3 months  She is agreeable to this  All her questions were answered

## 2022-05-24 NOTE — PROGRESS NOTES
Surgical Oncology Consult       [id number] [facility] CANCER [facility] [place]  [address]  [place] [id number] [phone]    Aundria Picket  1960  [id number]  [address] [facility]CER [facility] [place]  [address]  [place] [address] [phone]    Diagnoses and all orders for this visit:    Adenocarcinoma of head of pancreas [facility])  -     Case request operating room: INSERTION VENOUS PORT (PORT-A-CATH); Standing  -     Comprehensive metabolic panel; Future  -     CBC and differential; Future  -     HYDROcodone-acetaminophen (Norco) 5-325 mg per tablet; Take 1 tablet by mouth every 6 (six) hours as needed for pain Max Daily Amount: 4 tablets  -     Ambulatory Referral to Oncology Genetics; Future  -     Ambulatory referral to Hematology / Oncology; Future  -     Case request operating room: INSERTION VENOUS PORT (PORT-A-CATH)    Other orders  -     Incentive spirometry; Standing  -     Insert and maintain IV line; Standing  -     Void On-Call to O R ; Standing  -     Place sequential compression device; Standing  -     ceFAZolin (ANCEF) 3,000 mg in dextrose 5 % 100 mL IVPB        Chief Complaint   Patient presents with    Consult       No follow-ups on file  Oncology History   Adenocarcinoma of head of pancreas ([facility] )   5/11/2022 Initial Diagnosis    Adenocarcinoma of head of pancreas ([facility] [address] )     5/11/2022 Biopsy    Endoscopic Ultrasound:  A , B , & C  Pancreas, Pancreatic head:   Malignant (PSC Category VI)  Positive for adenocarcinoma         History of Present Illness:  57-year-old female who had a routine EGD and then developed some abdominal discomfort  She saw her primary care physician who ordered a CT scan  CT from April 7, 2022 reveals pancreatic duct dilatation in the body with an ill-defined density in the pancreatic head    There was also a 1 2 x 1 9 cm right adrenal nodule that was previously characterized as an adenoma  Follow-up MRI on April 25, 2022 reveals a 4 2 x 3 6 x 3 cm mass in the pancreatic head  There was also abnormal enlargement of the pancreatic duct and the pancreatic body and tail  The mass did not appear to encase her narrow the portal vein or SMA  I personally reviewed the films  Endoscopic ultrasound on May 11, 2022 reveals a 4 x 3 8 cm mass that was well-defined in the pancreas head  This was associated with a dilated pancreatic duct  Pathology revealed adenocarcinoma  CA 19-9 is normal at 30  She has already had a preoperative cardiac evaluation because of a history of SVT ablation and atrial tachycardia and atrial fibrillation  She she has been cardioverted in the past   She was at intermittent risk from a cardiac standpoint  Her echo in 2020 had normal LV function and no further testing was recommended  She had a mildly elevated lipase on April 19th  She denies any current abdominal pain, nausea or vomiting  No change in appetite or unintentional weight loss  No family history of pancreas cancer  Review of Systems  Complete ROS Surg Onc:   Constitutional: The patient denies new or recent history of general fatigue, no recent weight loss, no change in appetite  Eyes: No complaints of visual problems, no scleral icterus  ENT: no complaints of ear pain, no hoarseness, no difficulty swallowing,  no tinnitus and no new masses in head, oral cavity, or neck  Cardiovascular: No complaints of chest pain, no palpitations, no ankle edema  Respiratory: No complaints of shortness of breath, no cough  Gastrointestinal: No complaints of jaundice, no bloody stools, no pale stools  Genitourinary: No complaints of dysuria, no hematuria, no nocturia, no frequent urination, no urethral discharge  Musculoskeletal: No complaints of weakness, paralysis, joint stiffness or arthralgias  Integumentary: No complaints of rash, no new lesions     Neurological: No complaints of convulsions, no seizures, no dizziness  Hematologic/Lymphatic: No complaints of easy bruising  Endocrine:  No hot or cold intolerance  No polydipsia, polyphagia, or polyuria  Allergy/immunology:  No environmental allergies  No food allergies  Not immunocompromised  Skin:  No pallor or rash  No wound            Patient Active Problem List   Diagnosis    Dyspnea on exertion    Supraventricular tachycardia ([facility] )    Hypertension    Controlled depression    Severe obstructive sleep apnea    Morbid obesity ([facility] )    Type 2 diabetes mellitus without complication, without long-term current use of insulin (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 )    Esophageal reflux    Benign essential hypertension    Adenomatous colon polyp    Class 3 severe obesity due to excess calories with body mass index (BMI) of 50 0 to 59 9 in adult 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)     Carpal tunnel syndrome 09/07/2004    unspecified laterality  / last assessed 9/7/04    Colon polyp     Congenital pes planus     last assessed 7/15/14     Depression     Depression     Hemangioma of skin 08/26/2003    last assessed 8/26/03    History of gastroesophageal reflux (GERD)     Hypercholesterolemia     Hyperlipidemia     Hypertension     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 ) 04/19/2006    last assessed 12/31/14     Osteoarthritis of both knees     last assessed 12/31/14     Paroxysmal atrial fibrillation ([facility] )     S/P ablation of atrial flutter      Past Surgical History:   Procedure Laterality Date    ABDOMINAL SURGERY  06/2006    20cm GIST removed     APPENDECTOMY      ATRIAL ABLATION SURGERY      CARPAL TUNNEL RELEASE Bilateral     COLONOSCOPY      HYSTERECTOMY      fibroids    KIDNEY STONE SURGERY Right 09/17/2021    placed stent in  for kidney stone    [address] Left 03/2019    OOPHORECTOMY      cyst    TONSILLECTOMY      TUMOR EXCISION  2006    gastrointestinal stromal tumor    UPPER GASTROINTESTINAL ENDOSCOPY       Family History   Problem Relation Age of Onset    Emphysema Mother     Arthritis Mother     Diabetes Mother     Hypertension Mother     COPD Mother     Arthritis Father     Prostate cancer Father     Cerebral aneurysm Son     No Known Problems Maternal Grandmother     No Known Problems Maternal Grandfather     No Known Problems Paternal Grandmother     No Known Problems Paternal Grandfather     No Known Problems Son     No Known Problems Maternal Aunt     No Known Problems Maternal Aunt     No Known Problems Paternal Aunt     No Known Problems Paternal Aunt      Social History     Socioeconomic History    Marital status: /Civil Union     Spouse name: Not on file    Number of children: Not on file    Years of education: Not on file    Highest education level: Not on file   Occupational History    Not on file   Tobacco Use    Smoking status: Light Tobacco Smoker     Years: 9 00     Types: Cigarettes    Smokeless tobacco: Current User    Tobacco comment: pt states she smokes 1 to 3 times per week   Vaping Use    Vaping Use: Never used   Substance and Sexual Activity    Alcohol use: Not Currently     Comment: social drinker per allscript     Drug use: No    Sexual activity: Not on file   Other Topics Concern    Not on file   Social History Narrative    Lack of exercise    Good sleep hygiene    No caffeine use    Dog    Good sleep hygiene Social Determinants of Health     Financial Resource Strain: Not on file   Food Insecurity: Not on file   Transportation Needs: Not on file   Physical Activity: Not on file   Stress: Not on file   Social Connections: Not on file   Intimate Partner Violence: Not on file   Housing Stability: Not on file       Current Outpatient Medications:     aspirin 81 MG tablet, Take 81 mg by mouth daily  , Disp: , Rfl:     atorvastatin (LIPITOR) 40 mg tablet, TAKE 1 TABLET BY MOUTH  DAILY, Disp: 90 tablet, Rfl: 3    glucose blood (Contour Next Test) test strip, Use 1 each daily Use as instructed, Disp: 100 each, Rfl: 3    HYDROcodone-acetaminophen (Norco) 5-325 mg per tablet, Take 1 tablet by mouth every 6 (six) hours as needed for pain Max Daily Amount: 4 tablets, Disp: 10 tablet, Rfl: 0    ibuprofen (ADVIL,MOTRIN) 100 MG tablet, Take 100 mg by mouth as needed for mild pain, Disp: , Rfl:     Insulin Pen Needle (Pen Needles) 32G X 4 MM MISC, Use once a week, Disp: 12 each, Rfl: 3    Magnesium Oxide -Mg Supplement 400 MG CAPS, Take 1 capsule by mouth daily, Disp: , Rfl:     metoprolol succinate (TOPROL-XL) 25 mg 24 hr tablet, Take 1 tablet (25 mg total) by mouth 2 (two) times a day, Disp: 180 tablet, Rfl: 3    multivitamin (THERAGRAN) TABS, Take 1 tablet by mouth daily  , Disp: , Rfl:     olmesartan (BENICAR) 20 mg tablet, TAKE 1 TABLET BY MOUTH  DAILY, Disp: 90 tablet, Rfl: 3    Omega-3 Fatty Acids (FISH OIL) 1,000 mg, Take 1,000 mg by mouth 2 (two) times a day  , Disp: , Rfl:     Ozempic, 1 MG/DOSE, 4 MG/3ML SOPN injection pen, Inject 0 75 mL (1 mg total) under the skin once a week, Disp: 9 mL, Rfl: 1    pantoprazole (PROTONIX) 40 mg tablet, TAKE 1 TABLET BY MOUTH  DAILY BEFORE BREAKFAST, Disp: 90 tablet, Rfl: 3    PARoxetine (PAXIL-CR) 37 5 mg 24 hr tablet, TAKE 1 TABLET BY MOUTH IN  THE MORNING, Disp: 90 tablet, Rfl: 3    Potassium Citrate ER 15 MEQ (1620 MG) TBCR, Take 1 tablet by mouth 2 (two) times a day, Disp: 180 tablet, Rfl: 3    sotalol (BETAPACE) 120 mg tablet, Take 1 tablet (120 mg total) by mouth every 12 (twelve) hours, Disp: 180 tablet, Rfl: 3    VITAMIN D PO, Take 2,000 Units by mouth 2 (two) times a day, Disp: , Rfl:     Blood Glucose Monitoring Suppl (OneTouch Verio) w/Device KIT, Use daily (Patient not taking: Reported on 4/28/2022 ), Disp: 1 kit, Rfl: 0    mupirocin (BACTROBAN) 2 % ointment, Apply topically daily (Patient not taking: Reported on 4/28/2022 ), Disp: 22 g, Rfl: 0    OneTouch Delica Lancets 71B MISC, Use 1 application daily (Patient not taking: Reported on 4/28/2022 ), Disp: 100 each, Rfl: 3  Allergies   Allergen Reactions    Other      Adhesive tape      Vitals:    05/24/22 0802   BP: 120/78   Pulse: 84   Resp: 16   Temp: (!) 97 1 °F (36 2 °C)   SpO2: 97%       Physical Exam   Constitutional: General appearance: The Patient is well-developed and well-nourished who appears the stated age in no acute distress  Patient is pleasant and talkative  HEENT:  Normocephalic  Sclerae are anicteric  Mucous membranes are moist  Neck is supple without adenopathy  No JVD  Chest: The lungs are clear to auscultation  Cardiac: Heart is regular rate  Abdomen: Abdomen is soft, non-tender, non-distended and without masses  Extremities: There is no clubbing or cyanosis  There is no edema  Symmetric  Neuro: Grossly nonfocal  Gait is normal      Lymphatic: No evidence of cervical adenopathy bilaterally  No evidence of axillary adenopathy bilaterally  No evidence of inguinal adenopathy bilaterally  Skin: Warm, anicteric  Psych:  Patient is pleasant and talkative  Breasts:      Pathology:  Final Diagnosis   A , B , & C  Pancreas, Pancreatic head: (Thin-Prep, smears, and cell block)  Malignant (PSC Category VI) -See note    Positive for adenocarcinoma     Note:  - Case reviewed with intradepartmental consultation (Cristi Welch)  - The above diagnostic category is part of the six-tiered system proposed by The Papanicolaou Society of Cytopathology for the reporting of pancreaticobiliary specimens  This proposed scheme provides terminology that correlates the cytologic diagnostic nomenclature with the 2010 WHO classification of pancreatic tumors and standardizes the categorization of the various diseases of the pancreas, some of which are difficult to diagnose specifically by cytology      *The Papan[employer]olaou Society of Cytopathology System for Reporting Pancreaticobiliary Cytology: Definitions, Criteria and Explanatory Notes  [place] Gerardo; Hernandez, 2015       Electronically signed by Jordon Watts MD on 5/16/2022 at  2:21 PM         Labs:  Component Ref Range & Units 4/19/22 7:20 AM   CA 19-9 0 - 35 U/mL 30       Imaging  MRI abdomen w wo contrast and mrcp    Result Date: 4/27/2022  Narrative: MRI OF THE ABDOMEN WITH AND WITHOUT CONTRAST WITH MRCP INDICATION: 57 years / Female  K86 89: Other specified diseases of pancreas Q45 3: Other congenital malformations of pancreas and pancreatic duct  COMPARISON: CT performed April 7, 2022  TECHNIQUE:  The following pulse sequences were obtained:  Coronal and axial T2 with TE of 90 and 180 respectively, axial T2 with fat saturation, axial FIESTA fat-sat, axial T1-weighted in-and-out-of phase, axial DWI/ADC, pre-contrast axial T1 with fat saturation, post-contrast dynamic axial T1 with fat saturation at 20, 70, and 180 seconds, followed by coronal and 7 minute delayed axial T1 with fat saturation  3D MRCP images were obtained with radial thick slabs and projections  3D rendering was performed from the acquisition scanner  IV Contrast:  14 mL of Gadobutrol injection (SINGLE-DOSE) FINDINGS: LOWER CHEST:   Small sliding-type hiatal hernia  LIVER: Normal in size and configuration  Profound dropout of signal throughout hepatic parenchyma on out of phase gradient T1 imaging  No suspicious hepatic mass   The hepatic veins and portal veins are patent  BILE DUCTS:  No intrahepatic or extrahepatic bile duct dilation  Common bile duct is normal in caliber  No choledocholithiasis, biliary stricture or suspicious mass  GALLBLADDER:  Normal  PANCREAS:  A mass in the pancreatic head is estimated at approximately 4 2 x 3 6 x 3 0 cm on image 72 of series 15 and image 74 of series 14  This mass corresponds to mass in that location on recent CT and is highly suspicious for pancreatic adenocarcinoma  As on prior CT, there is abnormal enlargement of the pancreatic duct in the pancreatic body and tail distal to the mass  Mass does not appear to encase or narrow superior mesenteric artery or portal vein  ADRENAL GLANDS:  Normal  SPLEEN:  Normal  KIDNEYS/PROXIMAL URETERS:  No hydroureteronephrosis  No suspicious renal mass  BOWEL:   No dilated loops of bowel  PERITONEUM/RETROPERITONEUM:  No ascites  LYMPH NODES:  No abdominal lymphadenopathy  VASCULAR STRUCTURES:  No aneurysm  ABDOMINAL WALL:  Unremarkable  OSSEOUS STRUCTURES:  No suspicious osseous lesion  Impression: Mass in the pancreatic head, probably better depicted on CT but on both CT and MR demonstrating characteristics that are most suspicious for pancreatic adenocarcinoma  Endoscopic ultrasound and tissue sampling is recommended  No evidence for metastatic disease in the abdomen  No biliary dilatation  Hepatomegaly and hepatic steatosis  This examination was marked "significant notification" in Epic in order to begin the standard process by which the radiology reading room liaison alerts the referring practitioner     Workstation performed: [id number]     Endoscopic ultrasonography, GI (Upper)    Result Date: 5/11/2022  Narrative: [facility] [id number] [address]: 5/11/22 PHYSICIAN(S): Attending: Morgan Norris MD Fellow: No Staff Documented Procedure :  EUS with FNA , EGD with biopsies INDICATION: Pancreatic mass POST-OP DIAGNOSIS: See the impression below  PREPROCEDURE: Informed consent was obtained for the procedure, including sedation  Risks of perforation, hemorrhage, adverse drug reaction and aspiration were discussed  The patient was placed in the left lateral decubitus position  Patient was explained about the risks and benefits of the procedure  Risks including but not limited to bleeding, infection, and perforation were explained in detail  Also explained about less than 100% sensitivity with the exam and other alternatives  DETAILS OF PROCEDURE: Patient was taken to the procedure room where a time out was performed to confirm correct patient and correct procedure  The patient underwent monitored anesthesia care, which was administered by an anesthesia professional  The patient's blood pressure, heart rate, oxygen, respirations and level of consciousness were monitored throughout the procedure  The linear scope was introduced through the mouth and advanced to the second part of the duodenum  Retroflexion was performed in the cardia, fundus and incisura  The patient experienced no blood loss  The procedure was not difficult  The patient tolerated the procedure well  There were no apparent complications  ANESTHESIA INFORMATION: ASA: III Anesthesia Type: IV Sedation with Anesthesia MEDICATIONS: No administrations occurring from 0917 to 1001 on 05/11/22 FINDINGS: Single oval, hypoechoic and solid mass measuring 40 mm x 38 mm with well-defined margins in the pancreas; 2 fine needle aspiration passes were taken with a 25 gauge shark core needle using a transduodenal approach guided by Doppler  Obtained adequate sample of clear-appearing aspirate   Onsite cytologist was present and cytology results were preliminarily atypical; 3 successful fine needle biopsy passes were taken with a 22 gauge shark core needle using a transduodenal approach, sample found to be adequate and sent sample for histology analysis and performed cytology analysis  Onsite cytologist was present and cytology results were preliminarily atypical  Large 4 cm size hypoechoic lesion in the pancreatic head with dilated pancreatic duct 6 mm in size  Common bile duct appear normal   With the use of 25 and 22 gauge needle, multiple passes were done and tissue was obtained for cytopathology, on-site cytopathology evaluation confirm adequate sample and atypical cell, there is no vascular involvement, no peripancreatic lymphadenopathy Normal pancreatic body, tail and uncinate process with only dilated pancreatic duct in body and tail Mild, localized erythematous mucosa in the antrum; performed cold forceps biopsy to rule out H  pylori The upper third of the esophagus, middle third of the esophagus, lower third of the esophagus, GE junction, duodenal bulb, 1st part of the duodenum and 2nd part of the duodenum appeared normal  SPECIMENS: ID Type Source Tests Collected by Time Destination 1 : antrum Tissue Stomach TISSUE EXAM Florencio Baum MD 5/11/2022  9:25 AM  2 : Pancreatic head Other FNA FINE NEEDLE ASPIRATION Florencio Baum MD 5/11/2022  9:54 AM       Impression: 1  Large 4 cm size hypoechoic lesion in the pancreatic head with dilated pancreatic duct, status post FNA and FNB, tissue was obtained for cytopathology and cell block, bleeding result confirm atypical cell  No peripancreatic lymphadenopathy, no vascular involvement 2  Mild erythema in the antrum 3  Normal esophagus, normal duodenum, normal common bile duct RECOMMENDATION: Await pathology results Follow up with me in clinic Surgical Oncology consult   Florencio Baum MD     I reviewed the above laboratory and imaging data  Discussion/Summary:  59-year-old female with a clinical T2 N0 M0 pancreas cancer  Her staging workup has been negative    She has not had a CT of the chest, but has been set up for a PET scan by her gastroenterologist   We will await those results  Assuming this is negative, I would recommend neoadjuvant chemotherapy  We had a long discussion regarding the reasoning and rationale behind this approach  This will ensure that there is no progression through treatment making surgery futile  This could also potentially convert her to node-negative  She will need a port for chemotherapy  The risks were explained including bleeding, infection, need for further surgery, wound complications, port malfunction, DVT, and pneumothorax  Informed consent was obtained  We will schedule this at our earliest mutual convenience  I have discussed this with Dr Bernadine Zepeda, and he will see her to initiate neoadjuvant chemo  We will likely plan on 3 months of chemo followed by surgery  I did discuss that surgical literature suggests 5-6 months of preoperative chemotherapy is optimal, but she is leaning more towards 3 months  I will see her back in 3 months  She is agreeable to this  All her questions were answered

## 2022-05-24 NOTE — H&P (VIEW-ONLY)
Surgical Oncology Consult       [address] [facility] [address]  [facility] [id number] [phone]    Shar Escobar  1960  [id number]  [address] [facility] [address]  [facility] [address] [phone]    Diagnoses and all orders for this visit:    Adenocarcinoma of head of pancreas [facility])  -     Case request operating room: INSERTION VENOUS PORT (PORT-A-CATH); Standing  -     Comprehensive metabolic panel; Future  -     CBC and differential; Future  -     HYDROcodone-acetaminophen (Norco) 5-325 mg per tablet; Take 1 tablet by mouth every 6 (six) hours as needed for pain Max Daily Amount: 4 tablets  -     Ambulatory Referral to Oncology Genetics; Future  -     Ambulatory referral to Hematology / Oncology; Future  -     Case request operating room: INSERTION VENOUS PORT (PORT-A-CATH)    Other orders  -     Incentive spirometry; Standing  -     Insert and maintain IV line; Standing  -     Void On-Call to O R ; Standing  -     Place sequential compression device; Standing  -     ceFAZolin (ANCEF) 3,000 mg in dextrose 5 % 100 mL IVPB        Chief Complaint   Patient presents with    Consult       No follow-ups on file  Oncology History   Adenocarcinoma of head of pancreas ([facility] [address] )   5/11/2022 Initial Diagnosis    Adenocarcinoma of head of pancreas ([facility] )     5/11/2022 Biopsy    Endoscopic Ultrasound:  A , B , & C  Pancreas, Pancreatic head:   Malignant (PSC Category VI)  Positive for adenocarcinoma         History of Present Illness:  39-year-old female who had a routine EGD and then developed some abdominal discomfort  She saw her primary care physician who ordered a CT scan  CT from April 7, 2022 reveals pancreatic duct dilatation in the body with an ill-defined density in the pancreatic head    There was also a 1 2 x 1 9 cm right adrenal nodule that was previously characterized as an adenoma  Follow-up MRI on April 25, 2022 reveals a 4 2 x 3 6 x 3 cm mass in the pancreatic head  There was also abnormal enlargement of the pancreatic duct and the pancreatic body and tail  The mass did not appear to encase her narrow the portal vein or SMA  I personally reviewed the films  Endoscopic ultrasound on May 11, 2022 reveals a 4 x 3 8 cm mass that was well-defined in the pancreas head  This was associated with a dilated pancreatic duct  Pathology revealed adenocarcinoma  CA 19-9 is normal at 30  She has already had a preoperative cardiac evaluation because of a history of SVT ablation and atrial tachycardia and atrial fibrillation  She she has been cardioverted in the past   She was at intermittent risk from a cardiac standpoint  Her echo in 2020 had normal LV function and no further testing was recommended  She had a mildly elevated lipase on April 19th  She denies any current abdominal pain, nausea or vomiting  No change in appetite or unintentional weight loss  No family history of pancreas cancer  Review of Systems  Complete ROS Surg Onc:   Constitutional: The patient denies new or recent history of general fatigue, no recent weight loss, no change in appetite  Eyes: No complaints of visual problems, no scleral icterus  ENT: no complaints of ear pain, no hoarseness, no difficulty swallowing,  no tinnitus and no new masses in head, oral cavity, or neck  Cardiovascular: No complaints of chest pain, no palpitations, no ankle edema  Respiratory: No complaints of shortness of breath, no cough  Gastrointestinal: No complaints of jaundice, no bloody stools, no pale stools  Genitourinary: No complaints of dysuria, no hematuria, no nocturia, no frequent urination, no urethral discharge  Musculoskeletal: No complaints of weakness, paralysis, joint stiffness or arthralgias  Integumentary: No complaints of rash, no new lesions     Neurological: No complaints of convulsions, no seizures, no dizziness  Hematologic/Lymphatic: No complaints of easy bruising  Endocrine:  No hot or cold intolerance  No polydipsia, polyphagia, or polyuria  Allergy/immunology:  No environmental allergies  No food allergies  Not immunocompromised  Skin:  No pallor or rash  No wound            Patient Active Problem List   Diagnosis    Dyspnea on exertion    [facility]    Hypertension    Controlled depression    Severe obstructive sleep apnea    Morbid obesity ([facility] )    Type 2 diabetes mellitus without complication, without long-term current use of insulin ([facility] )    Hyperlipidemia    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[facility] )    Esophageal reflux    Benign essential hypertension    Adenomatous colon polyp    Class 3 severe obesity due to excess calories with body mass index (BMI) of 50 0 to 59 9 in adult [facility])    Kidney stone    Uric acid kidney stone    Adenocarcinoma of head of pancreas ([place] [address] )     Past Medical History:   Diagnosis Date    Abnormal liver function test     last assessed 1/16/17     Adenomatosis     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    last assessed 4/4/13    Atrial fibrillation (HCC)     Atrial flutter (HCC)     Benign essential hypertension     last assessed 12/21/17     Body mass index (BMI) of 50-59 9 in adult [facility])     Carpal tunnel syndrome 09/07/2004    unspecified laterality  / last assessed 9/7/04    Colon polyp     Congenital pes planus     last assessed 7/15/14     Depression     Depression     Hemangioma of skin 08/26/2003    last assessed 8/26/03    History of gastroesophageal reflux (GERD)     Hypercholesterolemia     Hyperlipidemia     Hypertension     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 04/19/2006    last assessed 12/31/14     Osteoarthritis of both knees     last assessed 12/31/14     Paroxysmal atrial fibrillation ([facility] )     S/P ablation of atrial flutter      Past Surgical History:   Procedure Laterality Date    ABDOMINAL SURGERY  06/2006    20cm GIST removed     APPENDECTOMY      ATRIAL ABLATION SURGERY      CARPAL TUNNEL RELEASE Bilateral     COLONOSCOPY      HYSTERECTOMY      fibroids    KIDNEY STONE SURGERY Right 09/17/2021    placed stent in  for kidney stone    [address] Left 03/2019    OOPHORECTOMY      cyst    TONSILLECTOMY      TUMOR EXCISION  2006    gastrointestinal stromal tumor    UPPER GASTROINTESTINAL ENDOSCOPY       Family History   Problem Relation Age of Onset    Emphysema Mother     Arthritis Mother     Diabetes Mother     Hypertension Mother     COPD Mother     Arthritis Father     Prostate cancer Father     Cerebral aneurysm Son     No Known Problems Maternal Grandmother     No Known Problems Maternal Grandfather     No Known Problems Paternal Grandmother     No Known Problems Paternal Grandfather     No Known Problems Son     No Known Problems Maternal Aunt     No Known Problems Maternal Aunt     No Known Problems Paternal Aunt     No Known Problems Paternal Aunt      Social History     Socioeconomic History    Marital status: /Civil Union     Spouse name: Not on file    Number of children: Not on file    Years of education: Not on file    Highest education level: Not on file   Occupational History    Not on file   Tobacco Use    Smoking status: Light Tobacco Smoker     Years: 9 00     Types: Cigarettes    Smokeless tobacco: Current User    Tobacco comment: pt states she smokes 1 to 3 times per week   Vaping Use    Vaping Use: Never used   Substance and Sexual Activity    Alcohol use: Not Currently     Comment: social drinker per allscript     Drug use: No    Sexual activity: Not on file   Other Topics Concern    Not on file   Social History Narrative    Lack of exercise    Good sleep hygiene    No caffeine use    Dog    Good sleep hygiene Social Determinants of Health     Financial Resource Strain: Not on file   Food Insecurity: Not on file   Transportation Needs: Not on file   Physical Activity: Not on file   Stress: Not on file   Social Connections: Not on file   Intimate Partner Violence: Not on file   Housing Stability: Not on file       Current Outpatient Medications:     aspirin 81 MG tablet, Take 81 mg by mouth daily  , Disp: , Rfl:     atorvastatin (LIPITOR) 40 mg tablet, TAKE 1 TABLET BY MOUTH  DAILY, Disp: 90 tablet, Rfl: 3    glucose blood (Contour Next Test) test strip, Use 1 each daily Use as instructed, Disp: 100 each, Rfl: 3    HYDROcodone-acetaminophen (Norco) 5-325 mg per tablet, Take 1 tablet by mouth every 6 (six) hours as needed for pain Max Daily Amount: 4 tablets, Disp: 10 tablet, Rfl: 0    ibuprofen (ADVIL,MOTRIN) 100 MG tablet, Take 100 mg by mouth as needed for mild pain, Disp: , Rfl:     Insulin Pen Needle (Pen Needles) 32G X 4 MM MISC, Use once a week, Disp: 12 each, Rfl: 3    Magnesium Oxide -Mg Supplement 400 MG CAPS, Take 1 capsule by mouth daily, Disp: , Rfl:     metoprolol succinate (TOPROL-XL) 25 mg 24 hr tablet, Take 1 tablet (25 mg total) by mouth 2 (two) times a day, Disp: 180 tablet, Rfl: 3    multivitamin (THERAGRAN) TABS, Take 1 tablet by mouth daily  , Disp: , Rfl:     olmesartan (BENICAR) 20 mg tablet, TAKE 1 TABLET BY MOUTH  DAILY, Disp: 90 tablet, Rfl: 3    Omega-3 Fatty Acids (FISH OIL) 1,000 mg, Take 1,000 mg by mouth 2 (two) times a day  , Disp: , Rfl:     Ozempic, 1 MG/DOSE, 4 MG/3ML SOPN injection pen, Inject 0 75 mL (1 mg total) under the skin once a week, Disp: 9 mL, Rfl: 1    pantoprazole (PROTONIX) 40 mg tablet, TAKE 1 TABLET BY MOUTH  DAILY BEFORE BREAKFAST, Disp: 90 tablet, Rfl: 3    PARoxetine (PAXIL-CR) 37 5 mg 24 hr tablet, TAKE 1 TABLET BY MOUTH IN  THE MORNING, Disp: 90 tablet, Rfl: 3    Potassium Citrate ER 15 MEQ (1620 MG) TBCR, Take 1 tablet by mouth 2 (two) times a day, Disp: 180 tablet, Rfl: 3    sotalol (BETAPACE) 120 mg tablet, Take 1 tablet (120 mg total) by mouth every 12 (twelve) hours, Disp: 180 tablet, Rfl: 3    VITAMIN D PO, Take 2,000 Units by mouth 2 (two) times a day, Disp: , Rfl:     Blood Glucose Monitoring Suppl (OneTouch Verio) w/Device KIT, Use daily (Patient not taking: Reported on 4/28/2022 ), Disp: 1 kit, Rfl: 0    mupirocin (BACTROBAN) 2 % ointment, Apply topically daily (Patient not taking: Reported on 4/28/2022 ), Disp: 22 g, Rfl: 0    OneTouch Delica Lancets 06V MISC, Use 1 application daily (Patient not taking: Reported on 4/28/2022 ), Disp: 100 each, Rfl: 3  Allergies   Allergen Reactions    Other      Adhesive tape      Vitals:    05/24/22 0802   BP: 120/78   Pulse: 84   Resp: 16   Temp: (!) 97 1 °F (36 2 °C)   SpO2: 97%       Physical Exam   Constitutional: General appearance: The Patient is well-developed and well-nourished who appears the stated age in no acute distress  Patient is pleasant and talkative  HEENT:  Normocephalic  Sclerae are anicteric  Mucous membranes are moist  Neck is supple without adenopathy  No JVD  Chest: The lungs are clear to auscultation  Cardiac: Heart is regular rate  Abdomen: Abdomen is soft, non-tender, non-distended and without masses  Extremities: There is no clubbing or cyanosis  There is no edema  Symmetric  Neuro: Grossly nonfocal  Gait is normal      Lymphatic: No evidence of cervical adenopathy bilaterally  No evidence of axillary adenopathy bilaterally  No evidence of inguinal adenopathy bilaterally  Skin: Warm, anicteric  Psych:  Patient is pleasant and talkative  Breasts:      Pathology:  Final Diagnosis   A , B , & C  Pancreas, Pancreatic head: (Thin-Prep, smears, and cell block)  Malignant (PSC Category VI) -See note    Positive for adenocarcinoma     Note:  - Case reviewed with intradepartmental consultation (Caden Toscano)  - The above diagnostic category is part of the six-tiered system proposed by The Papanicolaou Society of Cytopathology for the reporting of pancreaticobiliary specimens  This proposed scheme provides terminology that correlates the cytologic diagnostic nomenclature with the 2010 WHO classification of pancreatic tumors and standardizes the categorization of the various diseases of the pancreas, some of which are difficult to diagnose specifically by cytology      *The Papanicolaou Society of Cytopathology System for Reporting Pancreaticobiliary Cytology: Definitions, Criteria and Explanatory Notes  Lux Woodward; Hernandez, 2015       Electronically signed by Jamil Mathis MD on 5/16/2022 at  2:21 PM         Labs:  Component Ref Range & Units 4/19/22 7:20 AM   CA 19-9 0 - 35 U/mL 30       Imaging  MRI abdomen w wo contrast and mrcp    Result Date: 4/27/2022  Narrative: MRI OF THE ABDOMEN WITH AND WITHOUT CONTRAST WITH MRCP INDICATION: 57 years / Female  K86 89: Other specified diseases of pancreas Q45 3: Other congenital malformations of pancreas and pancreatic duct  COMPARISON: CT performed April 7, 2022  TECHNIQUE:  The following pulse sequences were obtained:  Coronal and axial T2 with TE of 90 and 180 respectively, axial T2 with fat saturation, axial FIESTA fat-sat, axial T1-weighted in-and-out-of phase, axial DWI/ADC, pre-contrast axial T1 with fat saturation, post-contrast dynamic axial T1 with fat saturation at 20, 70, and 180 seconds, followed by coronal and 7 minute delayed axial T1 with fat saturation  3D MRCP images were obtained with radial thick slabs and projections  3D rendering was performed from the acquisition scanner  IV Contrast:  14 mL of Gadobutrol injection (SINGLE-DOSE) FINDINGS: LOWER CHEST:   Small sliding-type hiatal hernia  LIVER: Normal in size and configuration  Profound dropout of signal throughout hepatic parenchyma on out of phase gradient T1 imaging  No suspicious hepatic mass   The hepatic veins and portal veins are patent  BILE DUCTS:  No intrahepatic or extrahepatic bile duct dilation  Common bile duct is normal in caliber  No choledocholithiasis, biliary stricture or suspicious mass  GALLBLADDER:  Normal  PANCREAS:  A mass in the pancreatic head is estimated at approximately 4 2 x 3 6 x 3 0 cm on image 72 of series 15 and image 74 of series 14  This mass corresponds to mass in that location on recent CT and is highly suspicious for pancreatic adenocarcinoma  As on prior CT, there is abnormal enlargement of the pancreatic duct in the pancreatic body and tail distal to the mass  Mass does not appear to encase or narrow superior mesenteric artery or portal vein  ADRENAL GLANDS:  Normal  SPLEEN:  Normal  KIDNEYS/PROXIMAL URETERS:  No hydroureteronephrosis  No suspicious renal mass  BOWEL:   No dilated loops of bowel  PERITONEUM/RETROPERITONEUM:  No ascites  LYMPH NODES:  No abdominal lymphadenopathy  VASCULAR STRUCTURES:  No aneurysm  ABDOMINAL WALL:  Unremarkable  OSSEOUS STRUCTURES:  No suspicious osseous lesion  Impression: Mass in the pancreatic head, probably better depicted on CT but on both CT and MR demonstrating characteristics that are most suspicious for pancreatic adenocarcinoma  Endoscopic ultrasound and tissue sampling is recommended  No evidence for metastatic disease in the abdomen  No biliary dilatation  Hepatomegaly and hepatic steatosis  This examination was marked "significant notification" in Epic in order to begin the standard process by which the radiology reading room liaison alerts the referring practitioner     Workstation performed: [id number]     Endoscopic ultrasonography, GI (Upper)    Result Date: 5/11/2022  Narrative: [facility] [address] [id number] [address]: 5/11/22 PHYSICIAN(S): Attending: Prema Hartman MD Fellow: No Staff Documented Procedure :  EUS with FNA , EGD with biopsies INDICATION: Pancreatic mass POST-OP DIAGNOSIS: See the impression below  PREPROCEDURE: Informed consent was obtained for the procedure, including sedation  Risks of perforation, hemorrhage, adverse drug reaction and aspiration were discussed  The patient was placed in the left lateral decubitus position  Patient was explained about the risks and benefits of the procedure  Risks including but not limited to bleeding, infection, and perforation were explained in detail  Also explained about less than 100% sensitivity with the exam and other alternatives  DETAILS OF PROCEDURE: Patient was taken to the procedure room where a time out was performed to confirm correct patient and correct procedure  The patient underwent monitored anesthesia care, which was administered by an anesthesia professional  The patient's blood pressure, heart rate, oxygen, respirations and level of consciousness were monitored throughout the procedure  The linear scope was introduced through the mouth and advanced to the second part of the duodenum  Retroflexion was performed in the cardia, fundus and incisura  The patient experienced no blood loss  The procedure was not difficult  The patient tolerated the procedure well  There were no apparent complications  ANESTHESIA INFORMATION: ASA: III Anesthesia Type: IV Sedation with Anesthesia MEDICATIONS: No administrations occurring from 0917 to 1001 on 05/11/22 FINDINGS: Single oval, hypoechoic and solid mass measuring 40 mm x 38 mm with well-defined margins in the pancreas; 2 fine needle aspiration passes were taken with a 25 gauge shark core needle using a transduodenal approach guided by Doppler  Obtained adequate sample of clear-appearing aspirate   Onsite cytologist was present and cytology results were preliminarily atypical; 3 successful fine needle biopsy passes were taken with a 22 gauge shark core needle using a transduodenal approach, sample found to be adequate and sent sample for histology analysis and performed cytology analysis  Onsite cytologist was present and cytology results were preliminarily atypical  Large 4 cm size hypoechoic lesion in the pancreatic head with dilated pancreatic duct 6 mm in size  Common bile duct appear normal   With the use of 25 and 22 gauge needle, multiple passes were done and tissue was obtained for cytopathology, on-site cytopathology evaluation confirm adequate sample and atypical cell, there is no vascular involvement, no peripancreatic lymphadenopathy Normal pancreatic body, tail and uncinate process with only dilated pancreatic duct in body and tail Mild, localized erythematous mucosa in the antrum; performed cold forceps biopsy to rule out H  pylori The upper third of the esophagus, middle third of the esophagus, lower third of the esophagus, GE junction, duodenal bulb, 1st part of the duodenum and 2nd part of the duodenum appeared normal  SPECIMENS: ID Type Source Tests Collected by Time Destination 1 : antrum Tissue Stomach TISSUE EXAM Ja Lopez MD 5/11/2022  9:25 AM  2 : Pancreatic head Other FNA FINE NEEDLE ASPIRATION Ja Lopez MD 5/11/2022  9:54 AM       Impression: 1  Large 4 cm size hypoechoic lesion in the pancreatic head with dilated pancreatic duct, status post FNA and FNB, tissue was obtained for cytopathology and cell block, bleeding result confirm atypical cell  No peripancreatic lymphadenopathy, no vascular involvement 2  Mild erythema in the antrum 3  Normal esophagus, normal duodenum, normal common bile duct RECOMMENDATION: Await pathology results Follow up with me in clinic Surgical Oncology consult   Ja Lopez MD     I reviewed the above laboratory and imaging data  Discussion/Summary:  57-year-old female with a clinical T2 N0 M0 pancreas cancer  Her staging workup has been negative    She has not had a CT of the chest, but has been set up for a PET scan by her gastroenterologist   We will await those results  Assuming this is negative, I would recommend neoadjuvant chemotherapy  We had a long discussion regarding the reasoning and rationale behind this approach  This will ensure that there is no progression through treatment making surgery futile  This could also potentially convert her to node-negative  She will need a port for chemotherapy  The risks were explained including bleeding, infection, need for further surgery, wound complications, port malfunction, DVT, and pneumothorax  Informed consent was obtained  We will schedule this at our earliest mutual convenience  I have discussed this with Dr Gale Ramos, and he will see her to initiate neoadjuvant chemo  We will likely plan on 3 months of chemo followed by surgery  I did discuss that surgical literature suggests 5-6 months of preoperative chemotherapy is optimal, but she is leaning more towards 3 months  I will see her back in 3 months  She is agreeable to this  All her questions were answered

## 2022-05-24 NOTE — TELEPHONE ENCOUNTER
I called Kylee Hudson to schedule a new patient appointment with the Cancer Risk and Genetics Program       Outcome:   I left a voice message encouraging the patient to call the genetics team at [phone] to schedule this appointment  Follow-up: We will make one more attempt to reach the patient

## 2022-05-24 NOTE — TELEPHONE ENCOUNTER
Scheduling Appointment     Who Is Calling to Schedule patient   Doctor Dr Wilder Atkinson   Date and Time 8/25 8AM   Reason for scheduling appointment Request by provider   Patient verbalized understanding    yes

## 2022-05-25 ENCOUNTER — TELEPHONE (OUTPATIENT)
Dept: HEMATOLOGY ONCOLOGY | Facility: CLINIC | Age: 62
End: 2022-05-25

## 2022-05-25 ENCOUNTER — DOCUMENTATION (OUTPATIENT)
Dept: HEMATOLOGY ONCOLOGY | Facility: CLINIC | Age: 62
End: 2022-05-25

## 2022-05-25 NOTE — PROGRESS NOTES
Chart reviewed, pt saw Dr Cortez April 5/24 and was recommended to have neoadjuvant therapy, then  Follow up in 3 months to discuss surgery  [facility] is being placed 5/31 an pt is set up to see medical oncology    Follow up set up for 8/25 with Dr Dana Lamas

## 2022-05-25 NOTE — TELEPHONE ENCOUNTER
Intra-Department Referral    Patient Details:  Blas Vernon  1960     Background Information:  Zak Robins starts by opening a telephone encounter and gathering the following information   What department is patient being referred to? Hem Onc   Who is calling to schedule and relationship?    Patient   Diagnosis Adenocarcinoma of head of pancreas [facility])   What department was patient seen in last?       Surg Onc         Miscellaneous: Appmt scheduled with Dr Yasmin Thomas 6/14 9:40AM SLR

## 2022-05-25 NOTE — TELEPHONE ENCOUNTER
05/25/22      Spoke with pt and moved up her appt from 6/14 to 6/1, after her PET scan  Pt is happy with the new appt date and time

## 2022-05-25 NOTE — TELEPHONE ENCOUNTER
Intake received  Insurance education outreach to follow    06/15/22  Pt has an active [employer] plan that runs on a kristopher year  Effective 05/01/18   Plan has in & out of network benefits  No deduct  Out of pocket $400 met  Since the out of pocket has been met all services should be covered 100%    06/16/22  Called pt to go over the benefits & she thanked me for the call

## 2022-05-26 ENCOUNTER — HOSPITAL ENCOUNTER (OUTPATIENT)
Dept: RADIOLOGY | Age: 62
Discharge: HOME | End: 2022-05-26
Payer: COMMERCIAL

## 2022-05-26 ENCOUNTER — APPOINTMENT (OUTPATIENT)
Dept: LAB | Age: 62
End: 2022-05-26
Payer: COMMERCIAL

## 2022-05-26 DIAGNOSIS — Q45.3 PANCREATIC ABNORMALITY: ICD-10-CM

## 2022-05-26 DIAGNOSIS — N39.0 URINARY TRACT INFECTION WITH HEMATURIA, SITE UNSPECIFIED: ICD-10-CM

## 2022-05-26 DIAGNOSIS — K86.89 PANCREATIC DUCT DILATED: ICD-10-CM

## 2022-05-26 DIAGNOSIS — R31.9 URINARY TRACT INFECTION WITH HEMATURIA, SITE UNSPECIFIED: ICD-10-CM

## 2022-05-26 DIAGNOSIS — C25.0 ADENOCARCINOMA OF HEAD OF PANCREAS (HCC): ICD-10-CM

## 2022-05-26 DIAGNOSIS — C25.0 MALIGNANT NEOPLASM OF HEAD OF PANCREAS (HCC): ICD-10-CM

## 2022-05-26 LAB
ALBUMIN SERPL BCP-MCNC: 3.7 G/DL (ref 3.5–5)
ALP SERPL-CCNC: 123 U/L (ref 46–116)
ALT SERPL W P-5'-P-CCNC: 68 U/L (ref 12–78)
AMYLASE SERPL-CCNC: 37 IU/L (ref 25–115)
ANION GAP SERPL CALCULATED.3IONS-SCNC: 7 MMOL/L (ref 4–13)
AST SERPL W P-5'-P-CCNC: 43 U/L (ref 5–45)
BASOPHILS # BLD AUTO: 0.07 THOUSANDS/ΜL (ref 0–0.1)
BASOPHILS NFR BLD AUTO: 1 % (ref 0–1)
BILIRUB SERPL-MCNC: 0.84 MG/DL (ref 0.2–1)
BUN SERPL-MCNC: 17 MG/DL (ref 5–25)
CALCIUM SERPL-MCNC: 10 MG/DL (ref 8.3–10.1)
CHLORIDE SERPL-SCNC: 108 MMOL/L (ref 100–108)
CHOLEST SERPL-MCNC: 153 MG/DL
CO2 SERPL-SCNC: 25 MMOL/L (ref 21–32)
CREAT SERPL-MCNC: 0.86 MG/DL (ref 0.6–1.3)
EOSINOPHIL # BLD AUTO: 0.06 THOUSAND/ΜL (ref 0–0.61)
EOSINOPHIL NFR BLD AUTO: 1 % (ref 0–6)
ERYTHROCYTE [DISTWIDTH] IN BLOOD BY AUTOMATED COUNT: 14.6 % (ref 11.6–15.1)
GFR SERPL CREATININE-BSD FRML MDRD: 72 ML/MIN/1.73SQ M
GLUCOSE P FAST SERPL-MCNC: 147 MG/DL (ref 65–99)
GLUCOSE SERPL-MCNC: 141 MG/DL (ref 65–140)
HCT VFR BLD AUTO: 43.5 % (ref 34.8–46.1)
HDLC SERPL-MCNC: 38 MG/DL
HGB BLD-MCNC: 14 G/DL (ref 11.5–15.4)
IMM GRANULOCYTES # BLD AUTO: 0.02 THOUSAND/UL (ref 0–0.2)
IMM GRANULOCYTES NFR BLD AUTO: 0 % (ref 0–2)
LDLC SERPL CALC-MCNC: 83 MG/DL (ref 0–100)
LIPASE SERPL-CCNC: 290 U/L (ref 73–393)
LYMPHOCYTES # BLD AUTO: 2.21 THOUSANDS/ΜL (ref 0.6–4.47)
LYMPHOCYTES NFR BLD AUTO: 32 % (ref 14–44)
MCH RBC QN AUTO: 27.7 PG (ref 26.8–34.3)
MCHC RBC AUTO-ENTMCNC: 32.2 G/DL (ref 31.4–37.4)
MCV RBC AUTO: 86 FL (ref 82–98)
MONOCYTES # BLD AUTO: 0.39 THOUSAND/ΜL (ref 0.17–1.22)
MONOCYTES NFR BLD AUTO: 6 % (ref 4–12)
NEUTROPHILS # BLD AUTO: 4.19 THOUSANDS/ΜL (ref 1.85–7.62)
NEUTS SEG NFR BLD AUTO: 60 % (ref 43–75)
NONHDLC SERPL-MCNC: 115 MG/DL
NRBC BLD AUTO-RTO: 0 /100 WBCS
PLATELET # BLD AUTO: 172 THOUSANDS/UL (ref 149–390)
PMV BLD AUTO: 11.5 FL (ref 8.9–12.7)
POTASSIUM SERPL-SCNC: 4 MMOL/L (ref 3.5–5.3)
PROT SERPL-MCNC: 7.7 G/DL (ref 6.4–8.2)
RBC # BLD AUTO: 5.05 MILLION/UL (ref 3.81–5.12)
SODIUM SERPL-SCNC: 140 MMOL/L (ref 136–145)
TRIGL SERPL-MCNC: 162 MG/DL
WBC # BLD AUTO: 6.94 THOUSAND/UL (ref 4.31–10.16)

## 2022-05-26 PROCEDURE — 82150 ASSAY OF AMYLASE: CPT

## 2022-05-26 PROCEDURE — 36415 COLL VENOUS BLD VENIPUNCTURE: CPT

## 2022-05-26 PROCEDURE — 86301 IMMUNOASSAY TUMOR CA 19-9: CPT

## 2022-05-26 PROCEDURE — 78816 PET IMAGE W/CT FULL BODY: CPT

## 2022-05-26 PROCEDURE — 87086 URINE CULTURE/COLONY COUNT: CPT

## 2022-05-26 PROCEDURE — 80061 LIPID PANEL: CPT | Performed by: INTERNAL MEDICINE

## 2022-05-26 PROCEDURE — 82948 REAGENT STRIP/BLOOD GLUCOSE: CPT

## 2022-05-26 PROCEDURE — 85025 COMPLETE CBC W/AUTO DIFF WBC: CPT

## 2022-05-26 PROCEDURE — G1004 CDSM NDSC: HCPCS

## 2022-05-26 PROCEDURE — 80053 COMPREHEN METABOLIC PANEL: CPT | Performed by: INTERNAL MEDICINE

## 2022-05-26 PROCEDURE — A9552 F18 FDG: HCPCS

## 2022-05-26 PROCEDURE — 83690 ASSAY OF LIPASE: CPT

## 2022-05-26 NOTE — PRE-PROCEDURE INSTRUCTIONS
Pre-Surgery Instructions:   Medication Instructions    aspirin 81 MG tablet Pt has already stopped this medication    atorvastatin (LIPITOR) 40 mg tablet Take this medication day of surgery if normally taken in the morning   HYDROcodone-acetaminophen (Norco) 5-325 mg per tablet May use day of surgery if needed      ibuprofen (ADVIL,MOTRIN) 100 MG tablet Hold this medication for 3 days before surgery      Magnesium Oxide -Mg Supplement 400 MG CAPS Hold from now till after procedure unless prescribed by a Physician   metoprolol succinate (TOPROL-XL) 25 mg 24 hr tablet Take this medication day of surgery if normally taken in the morning   multivitamin (THERAGRAN) TABS Hold from now till after procedure unless prescribed by a Physician   olmesartan (BENICAR) 20 mg tablet Hold day of surgery      Omega-3 Fatty Acids (FISH OIL) 1,000 mg Pt has already stopped this medication    Ozempic, 1 MG/DOSE, 4 MG/3ML SOPN injection pen Takes on monday    pantoprazole (PROTONIX) 40 mg tablet Take this medication day of surgery if normally taken in the morning   PARoxetine (PAXIL-CR) 37 5 mg 24 hr tablet Take this medication day of surgery if normally taken in the morning   Potassium Citrate ER 15 MEQ (1620 MG) TBCR Hold day of surgery      sotalol (BETAPACE) 120 mg tablet Take this medication day of surgery if normally taken in the morning   VITAMIN D PO Hold day of surgery      My Surgical Experience    The following information was developed to assist you to prepare for your operation  What do I need to do before coming to the hospital?   Arrange for a responsible person to drive you to and from the hospital    Arrange care for your children at home  Children are not allowed in the recovery areas of the hospital   Plan to wear clothing that is easy to put on and take off   If you are having shoulder surgery, wear a shirt that buttons or zippers in the front  Bathing  o Shower the evening before and the morning of your surgery with an antibacterial soap  Please refer to the Pre Op Showering Instructions for Surgery Patients Sheet   o Remove nail polish and all body piercing jewelry  o Do not shave any body part for at least 24 hours before surgery-this includes face, arms, legs and upper body  Food  o Nothing to eat or drink after midnight the night before your surgery  This includes candy and chewing gum  o Exception: If your surgery is after 12:00pm (noon), you may have clear liquids such as 7-Up®, ginger ale, apple or cranberry juice, Jell-O®, water, or clear broth until 8:00 am  o Do not drink milk or juice with pulp on the morning before surgery  o Do not drink alcohol 24 hours before surgery  Medicine  o Follow instructions you received from your surgeon about which medicines you may take on the day of surgery  o If instructed to take medicine on the morning of surgery, take pills with just a small sip of water  Call your prescribing doctor for specific infroamtion on what to do if you take insulin    What should I bring to the hospital?    Bring:  Dee Guerrero or a walker, if you have them, for foot or knee surgery   A list of the daily medicines, vitamins, minerals, herbals and nutritional supplements you take  Include the dosages of medicines and the time you take them each day   Glasses, dentures or hearing aids   Minimal clothing; you will be wearing hospital sleepwear   Photo ID; required to verify your identity   If you have a Living Will or Power of , bring a copy of the documents   If you have an ostomy, bring an extra pouch and any supplies you use    Do not bring   Medicines or inhalers   Money, valuables or jewelry    What other information should I know about the day of surgery?  Notify your surgeons if you develop a cold, sore throat, cough, fever, rash or any other illness     Report to the Ambulatory Surgical/Same Day Surgery Unit   You will be instructed to stop at Registration only if you have not been pre-registered   Inform your  fi they do not stay that they will be asked by the staff to leave a phone number where they can be reached   Be available to be reached before surgery  In the event the operating room schedule changes, you may be asked to come in earlier or later than expected    *It is important to tell your doctor and others involved in your health care if you are taking or have been taking any non-prescription drugs, vitamins, minerals, herbals or other nutritional supplements   Any of these may interact with some food or medicines and cause a reaction

## 2022-05-27 ENCOUNTER — TELEPHONE (OUTPATIENT)
Dept: HEMATOLOGY ONCOLOGY | Facility: CLINIC | Age: 62
End: 2022-05-27

## 2022-05-27 ENCOUNTER — TELEPHONE (OUTPATIENT)
Dept: CARDIOLOGY CLINIC | Facility: CLINIC | Age: 62
End: 2022-05-27

## 2022-05-27 LAB
BACTERIA UR CULT: NORMAL
CANCER AG19-9 SERPL-ACNC: 39 U/ML (ref 0–35)

## 2022-05-27 NOTE — TELEPHONE ENCOUNTER
----- Message from Nikkie Westbrook DO sent at 5/27/2022  1:36 PM EDT -----  Can you please let the patient know blood work looked good

## 2022-05-27 NOTE — TELEPHONE ENCOUNTER
CALL TRANSFER   Reason for patient call? Patient wanted to discuss medical issues with Marquise Max    Patient's primary physician? Teresa Queen    RN call was transferred to and time it was transferred? Jane Ramirez 8:58Am 5/27   Informed patient that the message will be forwarded to the team and someone will get back to them as soon as possible    Did you relay this information to the patient?   yes

## 2022-05-27 NOTE — TELEPHONE ENCOUNTER
Spoke to patient  She stated that her PET-CT came back as negative and there was no mets  She asked if she needs the port  Informed patient that the treatment for this type of pancreatic cancer is chemo then surgery, even if it did not spread anywhere  Patient appreciative of phone call

## 2022-05-29 ENCOUNTER — ANESTHESIA EVENT (OUTPATIENT)
Dept: PERIOP | Facility: HOSPITAL | Age: 62
End: 2022-05-29
Payer: COMMERCIAL

## 2022-05-29 NOTE — ANESTHESIA PREPROCEDURE EVALUATION
Procedure:  INSERTION VENOUS PORT (PORT-A-CATH) (N/A Chest)    Relevant Problems   ANESTHESIA (within normal limits)      CARDIO   (+) Benign essential hypertension   (+) Dyspnea on exertion   (+) Hyperlipidemia   (+) Hypertension   (+) Supraventricular tachycardia (HCC)      ENDO   (+) Type 2 diabetes mellitus without complication, without long-term current use of insulin (HCC)      GI/HEPATIC   (+) Adenocarcinoma of head of pancreas (HCC)   (+) Esophageal reflux      /RENAL   (+) Kidney stone   (+) Uric acid kidney stone      HEMATOLOGY (within normal limits)      NEURO/PSYCH   (+) Controlled depression      PULMONARY   (+) Dyspnea on exertion   (+) Severe obstructive sleep apnea      Other   (+) Class 3 severe obesity due to excess calories with body mass index (BMI) of 50 0 to 59 9 in adult [facility])   (+) Morbid obesity ([address] )      H/o SVT s/p ablation  H/o pAfib, on sotalol and metoprolol    MRI Abdomen 4/25/2022: Mass in the pancreatic head, probably better depicted on CT but on both CT and MR demonstrating characteristics that are most suspicious for pancreatic adenocarcinoma  Endoscopic ultrasound and tissue sampling is recommended      No evidence for metastatic disease in the abdomen  No biliary dilatation      Hepatomegaly and hepatic steatosis  EKG 10/2021:  NSR, rate 81, normal EKG    Exercise Stress 3/2020:  -  Stress results: Duration of exercise was 3 min and 31 sec  Target heart rate was achieved  There was resting hypertension with an appropriate blood pressure response to stress  There was no chest pain during stress  -  ECG conclusions: The stress ECG was negative for ischemia      IMPRESSIONS: Normal study after maximal exercise without reproduction of symptoms  TTE 3/2020:  LEFT VENTRICLE:  Systolic function was at the lower limits of normal  Ejection fraction was estimated in the range of 50 % to 55 % to be 50 %  There were no regional wall motion abnormalities    Wall thickness was mildly increased  There was mild concentric hypertrophy  Doppler parameters were consistent with abnormal left ventricular relaxation (grade 1 diastolic dysfunction)      LEFT ATRIUM:  The atrium was mildly dilated      RIGHT ATRIUM:  The atrium was mildly dilated      MITRAL VALVE:  There was mild annular calcification  There was trace regurgitation      TRICUSPID VALVE:  There was trace regurgitation  The tricuspid jet envelope definition was inadequate for estimation of RV systolic pressure  Lab Results   Component Value Date    WBC 6 94 05/26/2022    HGB 14 0 05/26/2022    HCT 43 5 05/26/2022    MCV 86 05/26/2022     05/26/2022     Lab Results   Component Value Date    SODIUM 140 05/26/2022    K 4 0 05/26/2022     05/26/2022    CO2 25 05/26/2022    BUN 17 05/26/2022    CREATININE 0 86 05/26/2022    GLUC 144 (H) 04/19/2022    CALCIUM 10 0 05/26/2022     Lab Results   Component Value Date    INR 1 02 03/21/2018    INR 0 96 01/27/2016    PROTIME 10 7 03/21/2018    PROTIME 10 2 01/27/2016     Lab Results   Component Value Date    HGBA1C 6 5 (H) 02/21/2022          Physical Exam    Airway    Mallampati score: III  TM Distance: >3 FB  Neck ROM: full     Dental   No notable dental hx     Cardiovascular  Cardiovascular exam normal    Pulmonary  Pulmonary exam normal     Other Findings        Anesthesia Plan  ASA Score- 3     Anesthesia Type- general with ASA Monitors  Additional Monitors:   Airway Plan: ETT  Plan Factors-Exercise tolerance (METS): >4 METS  Chart reviewed  EKG reviewed  Imaging results reviewed  Existing labs reviewed  Patient summary reviewed  Induction- intravenous  Postoperative Plan-   Planned trial extubation    Informed Consent- Anesthetic plan and risks discussed with patient  I personally reviewed this patient with the CRNA  Discussed and agreed on the Anesthesia Plan with the CRNA  Latanya Harmon

## 2022-05-31 ENCOUNTER — ANESTHESIA (OUTPATIENT)
Dept: PERIOP | Facility: HOSPITAL | Age: 62
End: 2022-05-31
Payer: COMMERCIAL

## 2022-05-31 ENCOUNTER — TELEPHONE (OUTPATIENT)
Dept: GENETICS | Facility: CLINIC | Age: 62
End: 2022-05-31

## 2022-05-31 ENCOUNTER — HOSPITAL ENCOUNTER (OUTPATIENT)
Dept: RADIOLOGY | Facility: HOSPITAL | Age: 62
Setting detail: OUTPATIENT SURGERY
Discharge: HOME | End: 2022-05-31
Payer: COMMERCIAL

## 2022-05-31 ENCOUNTER — HOSPITAL ENCOUNTER (OUTPATIENT)
Facility: HOSPITAL | Age: 62
Setting detail: OUTPATIENT SURGERY
Discharge: HOME | End: 2022-05-31
Attending: SURGERY | Admitting: SURGERY
Payer: COMMERCIAL

## 2022-05-31 ENCOUNTER — APPOINTMENT (OUTPATIENT)
Dept: RADIOLOGY | Facility: HOSPITAL | Age: 62
End: 2022-05-31
Attending: SURGERY
Payer: COMMERCIAL

## 2022-05-31 VITALS
BODY MASS INDEX: 50.02 KG/M2 | DIASTOLIC BLOOD PRESSURE: 77 MMHG | WEIGHT: 293 LBS | SYSTOLIC BLOOD PRESSURE: 137 MMHG | OXYGEN SATURATION: 98 % | HEIGHT: 64 IN | HEART RATE: 85 BPM | TEMPERATURE: 96.1 F | RESPIRATION RATE: 20 BRPM

## 2022-05-31 DIAGNOSIS — C25.0 ADENOCARCINOMA OF HEAD OF PANCREAS (HCC): ICD-10-CM

## 2022-05-31 LAB
GLUCOSE SERPL-MCNC: 116 MG/DL (ref 65–140)
GLUCOSE SERPL-MCNC: 96 MG/DL (ref 65–140)

## 2022-05-31 PROCEDURE — 36561 INSERT TUNNELED CV CATH: CPT | Performed by: SURGERY

## 2022-05-31 PROCEDURE — C1788 PORT, INDWELLING, IMP: HCPCS | Performed by: SURGERY

## 2022-05-31 PROCEDURE — 77001 FLUOROGUIDE FOR VEIN DEVICE: CPT

## 2022-05-31 PROCEDURE — 82948 REAGENT STRIP/BLOOD GLUCOSE: CPT

## 2022-05-31 PROCEDURE — 77001 FLUOROGUIDE FOR VEIN DEVICE: CPT | Performed by: SURGERY

## 2022-05-31 DEVICE — 8F PLASTIC PRO-FUSE® CT PORT W/ATTACHABLE CHRONOFLEX® POLYURETHANE CATHETER
Type: IMPLANTABLE DEVICE | Status: NON-FUNCTIONAL
Brand: PRO-FUSE® LOW PROFILE CT PORT
Removed: 2022-06-28

## 2022-05-31 RX ORDER — OXYCODONE HYDROCHLORIDE 5 MG/1
5 TABLET ORAL EVERY 4 HOURS PRN
Status: DISCONTINUED | OUTPATIENT
Start: 2022-05-31 | End: 2022-05-31 | Stop reason: HOSPADM

## 2022-05-31 RX ORDER — SODIUM CHLORIDE, SODIUM LACTATE, POTASSIUM CHLORIDE, CALCIUM CHLORIDE 600; 310; 30; 20 MG/100ML; MG/100ML; MG/100ML; MG/100ML
125 INJECTION, SOLUTION INTRAVENOUS CONTINUOUS
Status: DISCONTINUED | OUTPATIENT
Start: 2022-05-31 | End: 2022-05-31 | Stop reason: HOSPADM

## 2022-05-31 RX ORDER — ACETAMINOPHEN 325 MG/1
650 TABLET ORAL EVERY 4 HOURS PRN
Status: DISCONTINUED | OUTPATIENT
Start: 2022-05-31 | End: 2022-05-31 | Stop reason: HOSPADM

## 2022-05-31 RX ORDER — DIPHENHYDRAMINE HYDROCHLORIDE 50 MG/ML
12.5 INJECTION INTRAMUSCULAR; INTRAVENOUS ONCE AS NEEDED
Status: DISCONTINUED | OUTPATIENT
Start: 2022-05-31 | End: 2022-05-31 | Stop reason: HOSPADM

## 2022-05-31 RX ORDER — CEFAZOLIN SODIUM 1 G/3ML
INJECTION, POWDER, FOR SOLUTION INTRAMUSCULAR; INTRAVENOUS AS NEEDED
Status: DISCONTINUED | OUTPATIENT
Start: 2022-05-31 | End: 2022-05-31

## 2022-05-31 RX ORDER — BUPIVACAINE HYDROCHLORIDE 5 MG/ML
INJECTION, SOLUTION EPIDURAL; INTRACAUDAL AS NEEDED
Status: DISCONTINUED | OUTPATIENT
Start: 2022-05-31 | End: 2022-05-31 | Stop reason: HOSPADM

## 2022-05-31 RX ORDER — MAGNESIUM HYDROXIDE 1200 MG/15ML
LIQUID ORAL AS NEEDED
Status: DISCONTINUED | OUTPATIENT
Start: 2022-05-31 | End: 2022-05-31 | Stop reason: HOSPADM

## 2022-05-31 RX ORDER — ONDANSETRON 2 MG/ML
INJECTION INTRAMUSCULAR; INTRAVENOUS AS NEEDED
Status: DISCONTINUED | OUTPATIENT
Start: 2022-05-31 | End: 2022-05-31

## 2022-05-31 RX ORDER — HYDROMORPHONE HCL/PF 1 MG/ML
0.5 SYRINGE (ML) INJECTION
Status: DISCONTINUED | OUTPATIENT
Start: 2022-05-31 | End: 2022-05-31 | Stop reason: HOSPADM

## 2022-05-31 RX ORDER — ONDANSETRON 2 MG/ML
4 INJECTION INTRAMUSCULAR; INTRAVENOUS ONCE AS NEEDED
Status: DISCONTINUED | OUTPATIENT
Start: 2022-05-31 | End: 2022-05-31 | Stop reason: HOSPADM

## 2022-05-31 RX ORDER — SUCCINYLCHOLINE/SOD CL,ISO/PF 100 MG/5ML
SYRINGE (ML) INTRAVENOUS AS NEEDED
Status: DISCONTINUED | OUTPATIENT
Start: 2022-05-31 | End: 2022-05-31

## 2022-05-31 RX ORDER — ROCURONIUM BROMIDE 10 MG/ML
INJECTION, SOLUTION INTRAVENOUS AS NEEDED
Status: DISCONTINUED | OUTPATIENT
Start: 2022-05-31 | End: 2022-05-31

## 2022-05-31 RX ORDER — LIDOCAINE HYDROCHLORIDE 10 MG/ML
INJECTION, SOLUTION EPIDURAL; INFILTRATION; INTRACAUDAL; PERINEURAL AS NEEDED
Status: DISCONTINUED | OUTPATIENT
Start: 2022-05-31 | End: 2022-05-31

## 2022-05-31 RX ORDER — FENTANYL CITRATE 50 UG/ML
INJECTION, SOLUTION INTRAMUSCULAR; INTRAVENOUS AS NEEDED
Status: DISCONTINUED | OUTPATIENT
Start: 2022-05-31 | End: 2022-05-31

## 2022-05-31 RX ORDER — PROPOFOL 10 MG/ML
INJECTION, EMULSION INTRAVENOUS AS NEEDED
Status: DISCONTINUED | OUTPATIENT
Start: 2022-05-31 | End: 2022-05-31

## 2022-05-31 RX ORDER — ONDANSETRON 2 MG/ML
4 INJECTION INTRAMUSCULAR; INTRAVENOUS EVERY 4 HOURS PRN
Status: DISCONTINUED | OUTPATIENT
Start: 2022-05-31 | End: 2022-05-31 | Stop reason: HOSPADM

## 2022-05-31 RX ORDER — FENTANYL CITRATE/PF 50 MCG/ML
25 SYRINGE (ML) INJECTION
Status: DISCONTINUED | OUTPATIENT
Start: 2022-05-31 | End: 2022-05-31 | Stop reason: HOSPADM

## 2022-05-31 RX ORDER — DEXAMETHASONE SODIUM PHOSPHATE 10 MG/ML
INJECTION, SOLUTION INTRAMUSCULAR; INTRAVENOUS AS NEEDED
Status: DISCONTINUED | OUTPATIENT
Start: 2022-05-31 | End: 2022-05-31

## 2022-05-31 RX ADMIN — FENTANYL CITRATE 50 MCG: 50 INJECTION, SOLUTION INTRAMUSCULAR; INTRAVENOUS at 13:39

## 2022-05-31 RX ADMIN — ONDANSETRON 4 MG: 2 INJECTION INTRAMUSCULAR; INTRAVENOUS at 13:44

## 2022-05-31 RX ADMIN — ROCURONIUM BROMIDE 20 MG: 50 INJECTION, SOLUTION INTRAVENOUS at 13:49

## 2022-05-31 RX ADMIN — LIDOCAINE HYDROCHLORIDE 50 MG: 10 INJECTION, SOLUTION EPIDURAL; INFILTRATION; INTRACAUDAL; PERINEURAL at 13:39

## 2022-05-31 RX ADMIN — SUGAMMADEX 400 MG: 100 INJECTION, SOLUTION INTRAVENOUS at 14:21

## 2022-05-31 RX ADMIN — PROPOFOL 200 MG: 10 INJECTION, EMULSION INTRAVENOUS at 13:39

## 2022-05-31 RX ADMIN — DEXAMETHASONE SODIUM PHOSPHATE 10 MG: 10 INJECTION, SOLUTION INTRAMUSCULAR; INTRAVENOUS at 13:44

## 2022-05-31 RX ADMIN — CEFAZOLIN SODIUM 3000 MG: 1 INJECTION, POWDER, FOR SOLUTION INTRAMUSCULAR; INTRAVENOUS at 13:45

## 2022-05-31 RX ADMIN — FENTANYL CITRATE 50 MCG: 50 INJECTION, SOLUTION INTRAMUSCULAR; INTRAVENOUS at 13:47

## 2022-05-31 RX ADMIN — Medication 160 MG: at 13:40

## 2022-05-31 RX ADMIN — SODIUM CHLORIDE, SODIUM LACTATE, POTASSIUM CHLORIDE, AND CALCIUM CHLORIDE 125 ML/HR: .6; .31; .03; .02 INJECTION, SOLUTION INTRAVENOUS at 11:40

## 2022-05-31 NOTE — OP NOTE
OPERATIVE REPORT  PATIENT NAME: Felicita Parsons    :  1960  MRN: [id number]  Pt Location: [facility] OR ROOM 06    SURGERY DATE: 2022    Surgeon(s) and Role:     Dell Nguyen MD - Primary     * Kezia Garcia MD - Assisting    Preop Diagnosis:  Adenocarcinoma of head of pancreas ([address] ) [C25 0]    Post-Op Diagnosis Codes:     * Adenocarcinoma of head of pancreas ([address] ) [C25 0]    Procedure(s) (LRB):  INSERTION VENOUS PORT (PORT-A-CATH) (N/A)    Specimen(s):  * No specimens in log *    Estimated Blood Loss:   Minimal    Drains:  * No LDAs found *    Anesthesia Type:   General    Operative Indications:  Adenocarcinoma of head of pancreas ([facility] [address] ) [C220]  78-year-old female who needs a port for chemotherapy  Risks and benefits were explained  Informed consent was obtained  Patient was brought to the operating room  Operative Findings:  Catheter tip the cavoatrial junction  No ectopy  Complications:   None    Procedure and Technique:  After identifying the patient, general anesthesia was induced  Patient was prepped and draped in the usual sterile fashion  A time-out was performed  An incision was made in the left deltopectoral groove  This was taken down to the cephalic vein  The cephalic vein was encircled and tied off distally with 2-0 silk suture  A 2-0 silk was placed proximally around the vein to prevent any backbleeding  The catheter was introduced through the vein and confirmed to be in the superior vena cava using fluoroscopy  A subcutaneous port pocket was created using sharp dissection  Excess fat was removed using sharp dissection  There was excellent hemostasis  Three 2-0 Prolene sutures were placed in the port pocket and attached to the port  Using fluoroscopy, the catheter was manipulated into the cavoatrial junction  Catheter was cut and attached to the port and the port was secured using the previously placed Prolene sutures  The port withdrew blood easily and flushed very easily  The proximal tie was placed around the catheter and vein to prevent any backbleeding  Wound was now copiously irrigated there was excellent hemostasis  The incision was approximated with interrupted 3-0 Vicryl suture subcutaneously and a running 4 Monocryl suture in a subcuticular fashion  Skin glue was applied  Port was once again accessed and withdrew blood easily and flushed very easily and was ultimately flushed with the final Hep-Lock solution  The patient was extubated  Patient tolerated the procedure well and was taken to the recovery room in stable condition  I was present and participated in all aspects of this procedure  A chest x-ray was pending at the time of this dictation         I was present for the entire procedure    Patient Disposition:  PACU       SIGNATURE: Christine Aguilera MD  DATE: May 31, 2022  TIME: 2:25 PM

## 2022-05-31 NOTE — TELEPHONE ENCOUNTER
Second Attempt:    I called Danielito Hammer to schedule a new patient appointment with the [facility]       Outcome:   I left a voice message encouraging the patient to call the genetics team at [phone] to schedule this appointment      Follow-up:   At this time the referral will be closed and we will wait to hear back from the patient regarding scheduling this appointment

## 2022-05-31 NOTE — DISCHARGE INSTRUCTIONS
POST-OPERATIVE WOUND CARE INSTRUCTIONS    Your wound is closed with:   Dissolvable sutures/glue       Wound care: You may remove your dressing after 24 hours   You may shower using soap and water to clean your wound  Gently pat it dry  You may redress your wound for comfort as needed  Activity:   Did not perform any heavy lifting or strenuous physical activity for 7 days  Your activity restrictions will be reevaluated at your postoperative visit  Medications: You may resume all your preoperative medications and diet  Pain medication as directed on the prescription given in the office  Other:   May use ice on the wound for 24-48 hours as needed for comfort  Call the office at [phone] if you have any of the followin  Redness, swelling, heat, unusual drainage or heavy bleeding from the wound     2  Fever greater than 101°F    3  Pain not relieved by the prescribed pain medication

## 2022-05-31 NOTE — ANESTHESIA POSTPROCEDURE EVALUATION
Post-Op Assessment Note    CV Status:  Stable  Pain Score: 0    Pain management: adequate     Mental Status:  Awake   Hydration Status:  Stable   PONV Controlled:  None   Airway Patency:  Patent      Post Op Vitals Reviewed: Yes      Staff: Anesthesiologist, CRNA         No complications documented      BP   118/80   Temp  97 2   Pulse  82   Resp   16   SpO2   94

## 2022-05-31 NOTE — INTERVAL H&P NOTE
H&P reviewed  After examining the patient I find no changes in the patients condition since the H&P had been written      Vitals:    05/31/22 1109   BP: 137/92   Pulse: 83   Resp: 16   Temp: (!) 96 °F (35 6 °C)   SpO2: 96%

## 2022-06-01 ENCOUNTER — CONSULT (OUTPATIENT)
Dept: HEMATOLOGY ONCOLOGY | Facility: CLINIC | Age: 62
End: 2022-06-01
Payer: COMMERCIAL

## 2022-06-01 VITALS
WEIGHT: 293 LBS | OXYGEN SATURATION: 96 % | DIASTOLIC BLOOD PRESSURE: 86 MMHG | HEIGHT: 64 IN | HEART RATE: 92 BPM | BODY MASS INDEX: 50.02 KG/M2 | RESPIRATION RATE: 18 BRPM | SYSTOLIC BLOOD PRESSURE: 142 MMHG | TEMPERATURE: 97 F

## 2022-06-01 DIAGNOSIS — T45.1X5A CHEMOTHERAPY INDUCED NEUTROPENIA (HCC): ICD-10-CM

## 2022-06-01 DIAGNOSIS — D70.1 CHEMOTHERAPY INDUCED NEUTROPENIA (HCC): Primary | ICD-10-CM

## 2022-06-01 DIAGNOSIS — R11.2 CHEMOTHERAPY INDUCED NAUSEA AND VOMITING: Primary | ICD-10-CM

## 2022-06-01 DIAGNOSIS — C25.0 ADENOCARCINOMA OF HEAD OF PANCREAS (HCC): ICD-10-CM

## 2022-06-01 DIAGNOSIS — D70.1 CHEMOTHERAPY INDUCED NEUTROPENIA (HCC): ICD-10-CM

## 2022-06-01 DIAGNOSIS — T45.1X5A CHEMOTHERAPY INDUCED NAUSEA AND VOMITING: Primary | ICD-10-CM

## 2022-06-01 DIAGNOSIS — T45.1X5A CHEMOTHERAPY INDUCED NEUTROPENIA (HCC): Primary | ICD-10-CM

## 2022-06-01 DIAGNOSIS — C25.0 ADENOCARCINOMA OF HEAD OF PANCREAS (HCC): Primary | ICD-10-CM

## 2022-06-01 DIAGNOSIS — R11.0 NAUSEA: ICD-10-CM

## 2022-06-01 PROCEDURE — 3079F DIAST BP 80-89 MM HG: CPT | Performed by: INTERNAL MEDICINE

## 2022-06-01 PROCEDURE — 3077F SYST BP >= 140 MM HG: CPT | Performed by: INTERNAL MEDICINE

## 2022-06-01 PROCEDURE — 99215 OFFICE O/P EST HI 40 MIN: CPT | Performed by: INTERNAL MEDICINE

## 2022-06-01 PROCEDURE — 3008F BODY MASS INDEX DOCD: CPT | Performed by: INTERNAL MEDICINE

## 2022-06-01 PROCEDURE — 1036F TOBACCO NON-USER: CPT | Performed by: INTERNAL MEDICINE

## 2022-06-01 RX ORDER — DEXTROSE MONOHYDRATE 50 MG/ML
20 INJECTION, SOLUTION INTRAVENOUS ONCE
Status: CANCELLED | OUTPATIENT
Start: 2022-06-08

## 2022-06-01 RX ORDER — ONDANSETRON 4 MG/1
8 TABLET, FILM COATED ORAL EVERY 8 HOURS PRN
Qty: 20 TABLET | Refills: 3 | Status: SHIPPED | OUTPATIENT
Start: 2022-06-01

## 2022-06-01 RX ORDER — ATROPINE SULFATE 1 MG/ML
0.25 INJECTION, SOLUTION INTRAVENOUS ONCE AS NEEDED
Status: CANCELLED | OUTPATIENT
Start: 2022-06-08

## 2022-06-01 RX ORDER — ATROPINE SULFATE 1 MG/ML
0.25 INJECTION, SOLUTION INTRAVENOUS ONCE
Status: CANCELLED | OUTPATIENT
Start: 2022-06-08

## 2022-06-01 RX ORDER — FLUOROURACIL 50 MG/ML
400 INJECTION, SOLUTION INTRAVENOUS ONCE
Status: CANCELLED | OUTPATIENT
Start: 2022-06-08

## 2022-06-01 RX ORDER — SODIUM CHLORIDE 9 MG/ML
20 INJECTION, SOLUTION INTRAVENOUS ONCE AS NEEDED
Status: CANCELLED | OUTPATIENT
Start: 2022-06-08

## 2022-06-01 NOTE — PROGRESS NOTES
Oncology Outpatient Consult Note  Mariah Marroquin 57 y o  female MRN: @ Encounter: [id number]        Date:  6/1/2022        CC:  Locally advanced pancreatic cancer for neoadjuvant chemotherapy      HPI:  Mariah Marroquin is seen for initial consultation 6/1/2022 regarding newly diagnosed pancreatic adenocarcinoma  The patient had some abdominal discomfort after an EGD and up getting a CT scan which revealed pancreatic duct dilatation in the body with an ill-defined density in the pancreatic head  There was also 1 2 x 1 9 cm right adrenal nodule which was previously characterized as an adenoma  The patient had an MRI repeated in April which revealed a 4 2 x 3 6 x 3 cm mass in the pancreatic head with abnormal enlargement of the pancreatic duct and the pancreatic body and tail  The patient had an EUS with biopsy which revealed adenocarcinoma  CA 19 9 was normal   The patient was seen by our colleagues in Surgical Oncology then referred to see us for consideration of neoadjuvant treatment prior to resection  In terms of symptoms she is mostly at baseline  Denies any nausea denies any vomiting denies any diarrhea  The rest of her 14 point review of systems today was negative  Previous Hematologic/ Oncologic History:    Oncology History   Adenocarcinoma of head of pancreas ([facility] [address] )   5/11/2022 Initial Diagnosis    Adenocarcinoma of head of pancreas ([facility] [address] )     5/11/2022 Biopsy    Endoscopic Ultrasound:  A , B , & C   Pancreas, Pancreatic head:   Malignant (PSC Category VI)  Positive for adenocarcinoma             Test Results:    Imaging: Endoscopic ultrasonography, GI (Upper)    Result Date: 5/11/2022  Narrative: [facility] [id number] [phone] DATE OF SERVICE: 5/11/22 PHYSICIAN(S): Attending: Renae Contreras MD Fellow: No Staff Documented Procedure :  EUS with FNA , EGD with biopsies INDICATION: Pancreatic mass POST-OP DIAGNOSIS: See the impression below  PREPROCEDURE: Informed consent was obtained for the procedure, including sedation  Risks of perforation, hemorrhage, adverse drug reaction and aspiration were discussed  The patient was placed in the left lateral decubitus position  Patient was explained about the risks and benefits of the procedure  Risks including but not limited to bleeding, infection, and perforation were explained in detail  Also explained about less than 100% sensitivity with the exam and other alternatives  DETAILS OF PROCEDURE: Patient was taken to the procedure room where a time out was performed to confirm correct patient and correct procedure  The patient underwent monitored anesthesia care, which was administered by an anesthesia professional  The patient's blood pressure, heart rate, oxygen, respirations and level of consciousness were monitored throughout the procedure  The linear scope was introduced through the mouth and advanced to the second part of the duodenum  Retroflexion was performed in the cardia, fundus and incisura  The patient experienced no blood loss  The procedure was not difficult  The patient tolerated the procedure well  There were no apparent complications  ANESTHESIA INFORMATION: ASA: III Anesthesia Type: IV Sedation with Anesthesia MEDICATIONS: No administrations occurring from 0917 to 1001 on 05/11/22 FINDINGS: Single oval, hypoechoic and solid mass measuring 40 mm x 38 mm with well-defined margins in the pancreas; 2 fine needle aspiration passes were taken with a 25 gauge shark core needle using a transduodenal approach guided by Doppler  Obtained adequate sample of clear-appearing aspirate  Onsite cytologist was present and cytology results were preliminarily atypical; 3 successful fine needle biopsy passes were taken with a 22 gauge shark core needle using a transduodenal approach, sample found to be adequate and sent sample for histology analysis and performed cytology analysis   Onsite cytologist was present and cytology results were preliminarily atypical  Large 4 cm size hypoechoic lesion in the pancreatic head with dilated pancreatic duct 6 mm in size  Common bile duct appear normal   With the use of 25 and 22 gauge needle, multiple passes were done and tissue was obtained for cytopathology, on-site cytopathology evaluation confirm adequate sample and atypical cell, there is no vascular involvement, no peripancreatic lymphadenopathy Normal pancreatic body, tail and uncinate process with only dilated pancreatic duct in body and tail Mild, localized erythematous mucosa in the antrum; performed cold forceps biopsy to rule out H  pylori The upper third of the esophagus, middle third of the esophagus, lower third of the esophagus, GE junction, duodenal bulb, 1st part of the duodenum and 2nd part of the duodenum appeared normal  SPECIMENS: ID Type Source Tests Collected by Time Destination 1 : antrum Tissue Stomach TISSUE EXAM Rodrigo Guerrero MD 5/11/2022  9:25 AM  2 : Pancreatic head Other FNA FINE NEEDLE ASPIRATION Rodrigo Guerrero MD 5/11/2022  9:54 AM       Impression: 1  Large 4 cm size hypoechoic lesion in the pancreatic head with dilated pancreatic duct, status post FNA and FNB, tissue was obtained for cytopathology and cell block, bleeding result confirm atypical cell  No peripancreatic lymphadenopathy, no vascular involvement 2  Mild erythema in the antrum 3  Normal esophagus, normal duodenum, normal common bile duct RECOMMENDATION: Await pathology results Follow up with me in clinic Surgical Oncology consult   Rodrigo Guerrero MD     NM pet ct tumor imaging whole body    Result Date: 5/26/2022  Narrative: PET/CT SCAN INDICATION: Pancreatic cancer, initial staging  C25 0: Malignant neoplasm of head of pancreas MODIFIER: PI COMPARISON: MRI abdomen 4/25/2022, CT abdomen pelvis 4/7/2022 CELL TYPE:  Adenocarcinoma, pancreatic head TECHNIQUE:   12 4 mCi F-18-FDG administered IV  Multiplanar attenuation corrected and non attenuation corrected PET images were acquired 60 minutes post injection  Contiguous, low dose, axial CT sections were obtained from the skull vertex through the femurs   Intravenous contrast material was not utilized  This examination, like all CT scans performed in the [facility], was performed utilizing techniques to minimize radiation dose exposure, including the use of iterative reconstruction and automated exposure control  Fasting serum glucose: 141 mg/dl FINDINGS: Exam is suboptimal due to patient body habitus  VISUALIZED BRAIN:   No acute abnormalities are seen  HEAD/NECK:   There is a physiologic distribution of FDG  No FDG avid cervical adenopathy is seen  CT images: Coarse calcification in the left lobe of the thyroid inferiorly  CHEST:   No FDG avid soft tissue lesions are seen  CT images: Unremarkable  ABDOMEN:   Prominent focus of FDG uptake at the level of the pancreatic head, SUV max of 4 1  This would correspond to the mass lesion seen on prior MRI measuring approximately 4 2 cm  No FDG avid lymph nodes  CT images: Stable 2 cm right adrenal nodule previously characterized as adenoma  5 mm calculus at the left lower pole  PELVIS: No FDG avid soft tissue lesions are seen  CT images: Status post hysterectomy  OSSEOUS STRUCTURES: No FDG avid lesions are seen  CT images: Multilevel degenerative spurring of the spine  Impression: 1  FDG avid pancreatic head lesion compatible with the known malignancy  2    No findings for hypermetabolic metastasis  Workstation performed: [id number]     XR chest PICC line portable    Result Date: 5/31/2022  Narrative: CHEST INDICATION:   s/p port  COMPARISON:  3/21/2018 EXAM PERFORMED/VIEWS:  XR CHEST PICC LINE PORTABLE FINDINGS: Left-sided Port-A-Cath is visualized extending into the right atrium  Minimal rotation of the patient is visualized   Linear scarring/ atelectasis is seen within the left midlung field  Minimal peribronchial cuffing is seen bilaterally  Otherwise there is no acute infiltrate with no pneumothorax and no pleural effusion  Fullness of both hilum with enlargement of the cardiac silhouette is visualized  Degenerative changes is seen within the spine     Impression: Port-A-Cath extending into the right atrium  No acute infiltrate identified  No pneumothorax  Workstation performed: [id number]       Labs:   Lab Results   Component Value Date    WBC 6 94 05/26/2022    HGB 14 0 05/26/2022    HCT 43 5 05/26/2022    MCV 86 05/26/2022     05/26/2022     Lab Results   Component Value Date     04/12/2017    K 4 0 05/26/2022     05/26/2022    CO2 25 05/26/2022    ANIONGAP 7 01/22/2015    BUN 17 05/26/2022    CREATININE 0 86 05/26/2022    GLUCOSE 92 04/12/2017    GLUF 147 (H) 05/26/2022    CALCIUM 10 0 05/26/2022    AST 43 05/26/2022    ALT 68 05/26/2022    ALKPHOS 123 (H) 05/26/2022    PROT 6 3 04/12/2017    BILITOT 0 3 04/12/2017    EGFR 72 05/26/2022       Lab Results   Component Value Date    IRON 59 10/14/2019    TIBC 430 10/14/2019    FERRITIN 45 10/14/2019       ROS: As stated in history of present illness otherwise her 14 point review of systems today was negative          Active Problems:   Patient Active Problem List   Diagnosis    Dyspnea on exertion    Supraventricular tachycardia ([address] )    Hypertension    Controlled depression    Severe obstructive sleep apnea    Morbid obesity ([facility] [address] )    Type 2 diabetes mellitus without complication, without long-term current use of insulin (HCC)    Hyperlipidemia    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)    Kidney stone    Uric acid kidney stone    Adenocarcinoma of head of pancreas 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    last assessed 4/4/13    Atrial fibrillation ([facility] )     Atrial flutter ([facility] )     Benign essential hypertension     last assessed 12/21/17     Body mass index (BMI) of 50-59 9 in adult ([facility] [address] )     Cancer ([facility] )     pancreas    Carpal tunnel syndrome 09/07/2004    unspecified laterality  / last assessed 9/7/04    Colon polyp     Congenital pes planus     last assessed 7/15/14     COVID     4/30/21    CPAP (continuous positive airway pressure) dependence     Depression     Depression     Diabetes mellitus (HCC)     GERD (gastroesophageal reflux disease)     Hemangioma of skin 08/26/2003    last assessed 8/26/03    History of gastroesophageal reflux (GERD)     Hypercholesterolemia     Hyperlipidemia     Hypertension     Irregular heart beat     Kidney stone     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 04/19/2006    last assessed 12/31/14     Osteoarthritis of both knees     last assessed 12/31/14     Paroxysmal atrial fibrillation (HCC)     S/P ablation of atrial flutter     Sleep apnea        Surgical History:   Past Surgical History:   Procedure Laterality Date    ABDOMINAL SURGERY  06/2006    20cm GIST removed     APPENDECTOMY      ATRIAL ABLATION SURGERY      CARPAL TUNNEL RELEASE Bilateral     COLONOSCOPY      HYSTERECTOMY      fibroids    KIDNEY STONE SURGERY Right 09/17/2021    placed stent in  for kidney stone    KNEE SURGERY      KNEE SURGERY Left 03/2019    OOPHORECTOMY      cyst    TONSILLECTOMY      TUMOR EXCISION  2006    gastrointestinal stromal tumor    UPPER GASTROINTESTINAL ENDOSCOPY         Family History:    Family History   Problem Relation Age of Onset    Emphysema Mother     Arthritis Mother     Diabetes Mother     Hypertension Mother     COPD Mother    Chuck Peace Arthritis Father     Prostate cancer Father     Cerebral aneurysm Son     No Known Problems Maternal Grandmother     No Known Problems Maternal Grandfather     No Known Problems Paternal Grandmother     No Known Problems Paternal Grandfather     No Known Problems Son     No Known Problems Maternal Aunt     No Known Problems Maternal Aunt     No Known Problems Paternal Aunt     No Known Problems Paternal Aunt        Cancer-related family history includes Prostate cancer in her father  Social History:   Social History     Socioeconomic History    Marital status: /Civil Union     Spouse name: Not on file    Number of children: Not on file    Years of education: Not on file    Highest education level: Not on file   Occupational History    Not on file   Tobacco Use    Smoking status: Former Smoker     Years: 9 00     Types: Cigarettes    Smokeless tobacco: Never Used    Tobacco comment: pt states she smokes 1 to 3 times per week   Vaping Use    Vaping Use: Never used   Substance and Sexual Activity    Alcohol use: Not Currently     Comment: social drinker per [employer]script     Drug use: No    Sexual activity: Not on file   Other Topics Concern    Not on file   Social History Narrative    Lack of exercise    Good sleep hygiene    No caffeine use    Dog    Good sleep hygiene       Social Determinants of Health     Financial Resource Strain: Not on file   Food Insecurity: Not on file   Transportation Needs: Not on file   Physical Activity: Not on file   Stress: Not on file   Social Connections: Not on file   Intimate Partner Violence: Not on file   Housing Stability: Not on file       Current Medications:   Current Outpatient Medications   Medication Sig Dispense Refill    aspirin 81 MG tablet Take 81 mg by mouth daily        atorvastatin (LIPITOR) 40 mg tablet TAKE 1 TABLET BY MOUTH  DAILY 90 tablet 3    glucose blood (Contour Next Test) test strip Use 1 each daily Use as instructed 100 each 3    HYDROcodone-acetaminophen (Norco) 5-325 mg per tablet Take 1 tablet by mouth every 6 (six) hours as needed for pain Max Daily Amount: 4 tablets 10 tablet 0    ibuprofen (ADVIL,MOTRIN) 100 MG tablet Take 200 mg by mouth as needed for mild pain      Insulin Pen Needle (Pen Needles) 32G X 4 MM MISC Use once a week 12 each 3    Magnesium Oxide -Mg Supplement 400 MG CAPS Take 1 capsule by mouth daily      metoprolol succinate (TOPROL-XL) 25 mg 24 hr tablet Take 1 tablet (25 mg total) by mouth 2 (two) times a day 180 tablet 3    multivitamin (THERAGRAN) TABS Take 1 tablet by mouth daily   olmesartan (BENICAR) 20 mg tablet TAKE 1 TABLET BY MOUTH  DAILY 90 tablet 3    Omega-3 Fatty Acids (FISH OIL) 1,000 mg Take 1,000 mg by mouth 2 (two) times a day        Ozempic, 1 MG/DOSE, 4 MG/3ML SOPN injection pen Inject 0 75 mL (1 mg total) under the skin once a week 9 mL 1    pantoprazole (PROTONIX) 40 mg tablet TAKE 1 TABLET BY MOUTH  DAILY BEFORE BREAKFAST 90 tablet 3    PARoxetine (PAXIL-CR) 37 5 mg 24 hr tablet TAKE 1 TABLET BY MOUTH IN  THE MORNING 90 tablet 3    Potassium Citrate ER 15 MEQ (1620 MG) TBCR Take 1 tablet by mouth 2 (two) times a day 180 tablet 3    sotalol (BETAPACE) 120 mg tablet Take 1 tablet (120 mg total) by mouth every 12 (twelve) hours 180 tablet 3    VITAMIN D PO Take 2,000 Units by mouth 2 (two) times a day      Blood Glucose Monitoring Suppl (OneTouch Verio) w/Device KIT Use daily (Patient not taking: No sig reported) 1 kit 0    OneTouch Delica Lancets 03X MISC Use 1 application daily (Patient not taking: No sig reported) 100 each 3     No current facility-administered medications for this visit  Allergies: Allergies   Allergen Reactions    Other Rash     Adhesive tape          Physical Exam:    Body surface area is 2 39 meters squared      Wt Readings from Last 3 Encounters:   06/01/22 (!) 145 kg (320 lb)   05/31/22 (!) 145 kg (320 lb)   05/24/22 (!) 145 kg (320 lb) Temp Readings from Last 3 Encounters:   06/01/22 (!) 97 °F (36 1 °C)   05/31/22 (!) 96 1 °F (35 6 °C) (Tympanic)   05/24/22 (!) 97 1 °F (36 2 °C)        BP Readings from Last 3 Encounters:   06/01/22 142/86   05/31/22 137/77   05/24/22 120/78         Pulse Readings from Last 3 Encounters:   06/01/22 92   05/31/22 85   05/24/22 84       Physical Exam     Constitutional   General appearance: No acute distress, well appearing and well nourished  Eyes   Conjunctiva and lids: No swelling, erythema or discharge  Pupils and irises: Equal, round and reactive to light  Ears, Nose, Mouth, and Throat   External inspection of ears and nose: Normal     Nasal mucosa, septum, and turbinates: Normal without edema or erythema  Oropharynx: Normal with no erythema, edema, exudate or lesions  Pulmonary   Respiratory effort: No increased work of breathing or signs of respiratory distress  Auscultation of lungs: Clear to auscultation  Cardiovascular   Palpation of heart: Normal PMI, no thrills  Auscultation of heart: Normal rate and rhythm, normal S1 and S2, without murmurs  Examination of extremities for edema and/or varicosities: Normal     Carotid pulses: Normal     Abdomen   Abdomen: Non-tender, no masses  Liver and spleen: No hepatomegaly or splenomegaly  Lymphatic   Palpation of lymph nodes in neck: No lymphadenopathy  Musculoskeletal   Gait and station: Normal     Digits and nails: Normal without clubbing or cyanosis  Inspection/palpation of joints, bones, and muscles: Normal     Skin   Skin and subcutaneous tissue: Normal without rashes or lesions  Neurologic   Cranial nerves: Cranial nerves 2-12 intact  Sensation: No sensory loss  Psychiatric   Orientation to person, place, and time: Normal     Mood and affect: Normal       Assessment/ Plan: This is a pleasant 21-year-old female with newly diagnosed adenocarcinoma of the head of the pancreas    The patient was seen by our colleagues in surgery and they have recommended neoadjuvant chemotherapy  We went over various options and decided upon FOLFIRI Rory [place] because of her relative  I explained the risks benefits and alternatives of chemotherapy  I explained the rationale behind neoadjuvant chemotherapy and the patient was agreeable to proceed  I went over the risks benefits and alternatives of 5 fluorouracil, oxaliplatin, CPT 11 and Neulasta  I explained the possible side effects to include but not limited to nausea, vomiting, diarrhea, abdominal pain, mouth sores, fatigue, low blood counts, risk of infection and even death  The patient is agreeable to proceeding  The intent of chemotherapy is curative and we discussed this today  Hopefully she will have a nice response and go for surgery  Until then if she has any questions she will call our office  Goals and Barriers:  Current Goal:  Prolong Survival from pancreatic Cancer  Barriers: None  Patient's Capacity to Self Care:  Patient  able to self care  Portions of the record may have been created with voice recognition software  Occasional wrong word or "sound a like" substitutions may have occurred due to the inherent limitations of voice recognition software  Read the chart carefully and recognize, using context, where substitutions have occurred

## 2022-06-01 NOTE — PROGRESS NOTES
• Communication Barriers: Preferred language:    • Patient Barriers: Are there Any barriers?:     • Physical Barriers: Are there any special accommodations you need for your visit?    • Assessment completed with:Patient/Spouse/Children/Family/Parent(s)/Friend/Significant Other/Other    • Patient's mood:Calm/Anxious/Depressed/Confused/Irritable/Tired/Other    • Smoking Cessation: Do you (or have you ever) smoked or vaped?     • Social Work: Referral made to cancer counselor?    • Practical Barrier: Current living situation: Alone/Spouse/Children/Significant Other/Parents/Other    • Transportation Barriers: Any transportation issues? Is someone coming w/you? Setup [facility]? Schedule sent to [facility]?    • Pain Assessment: Are you having any pain? Palliative Care appropriate? Referral made?    • Do you understand your diagnosis?    • Complex Care Coordination: Do you know when your appointments are? Any appointments you need assistance with?    • Genetic testing: Have you had genetic testing in the past? Are you interested in testing? Referral made to Genetic Counselor? Date? For what disease site?    • Imaging:  Do you need any assistance with scheduling imaging? What imaging was scheduled?    • Interventional Radiology: Do you need a port? Referral made to IR? Date? If not IR will surgeon place port?    • Support Groups: Was support group info provided to patient?    • Is the patient aware of Cancer Support Community? Info provided?    • Family History: Do you have a family history of cancer?    • Fertility Specialist: Do you have any fertility concerns? Information provided?

## 2022-06-03 ENCOUNTER — PATIENT OUTREACH (OUTPATIENT)
Dept: HEMATOLOGY ONCOLOGY | Facility: CLINIC | Age: 62
End: 2022-06-03

## 2022-06-03 ENCOUNTER — APPOINTMENT (OUTPATIENT)
Dept: LAB | Facility: CLINIC | Age: 62
End: 2022-06-03
Payer: COMMERCIAL

## 2022-06-03 ENCOUNTER — TELEPHONE (OUTPATIENT)
Dept: GENETICS | Facility: CLINIC | Age: 62
End: 2022-06-03

## 2022-06-03 DIAGNOSIS — T45.1X5A CHEMOTHERAPY INDUCED NEUTROPENIA (HCC): ICD-10-CM

## 2022-06-03 DIAGNOSIS — C25.0 ADENOCARCINOMA OF HEAD OF PANCREAS (HCC): ICD-10-CM

## 2022-06-03 DIAGNOSIS — C25.0 ADENOCARCINOMA OF HEAD OF PANCREAS (HCC): Primary | ICD-10-CM

## 2022-06-03 DIAGNOSIS — D70.1 CHEMOTHERAPY INDUCED NEUTROPENIA (HCC): ICD-10-CM

## 2022-06-03 LAB
ALBUMIN SERPL BCP-MCNC: 3.4 G/DL (ref 3.5–5)
ALP SERPL-CCNC: 103 U/L (ref 46–116)
ALT SERPL W P-5'-P-CCNC: 61 U/L (ref 12–78)
ANION GAP SERPL CALCULATED.3IONS-SCNC: 5 MMOL/L (ref 4–13)
AST SERPL W P-5'-P-CCNC: 45 U/L (ref 5–45)
BASOPHILS # BLD AUTO: 0.07 THOUSANDS/ΜL (ref 0–0.1)
BASOPHILS NFR BLD AUTO: 1 % (ref 0–1)
BILIRUB SERPL-MCNC: 0.6 MG/DL (ref 0.2–1)
BUN SERPL-MCNC: 15 MG/DL (ref 5–25)
CALCIUM ALBUM COR SERPL-MCNC: 10.2 MG/DL (ref 8.3–10.1)
CALCIUM SERPL-MCNC: 9.7 MG/DL (ref 8.3–10.1)
CHLORIDE SERPL-SCNC: 108 MMOL/L (ref 100–108)
CO2 SERPL-SCNC: 26 MMOL/L (ref 21–32)
CREAT SERPL-MCNC: 0.82 MG/DL (ref 0.6–1.3)
EOSINOPHIL # BLD AUTO: 0.07 THOUSAND/ΜL (ref 0–0.61)
EOSINOPHIL NFR BLD AUTO: 1 % (ref 0–6)
ERYTHROCYTE [DISTWIDTH] IN BLOOD BY AUTOMATED COUNT: 15.1 % (ref 11.6–15.1)
GFR SERPL CREATININE-BSD FRML MDRD: 76 ML/MIN/1.73SQ M
GLUCOSE P FAST SERPL-MCNC: 137 MG/DL (ref 65–99)
HCT VFR BLD AUTO: 40.9 % (ref 34.8–46.1)
HGB BLD-MCNC: 13.3 G/DL (ref 11.5–15.4)
IMM GRANULOCYTES # BLD AUTO: 0.03 THOUSAND/UL (ref 0–0.2)
IMM GRANULOCYTES NFR BLD AUTO: 0 % (ref 0–2)
LYMPHOCYTES # BLD AUTO: 2.94 THOUSANDS/ΜL (ref 0.6–4.47)
LYMPHOCYTES NFR BLD AUTO: 42 % (ref 14–44)
MCH RBC QN AUTO: 27.9 PG (ref 26.8–34.3)
MCHC RBC AUTO-ENTMCNC: 32.5 G/DL (ref 31.4–37.4)
MCV RBC AUTO: 86 FL (ref 82–98)
MONOCYTES # BLD AUTO: 0.54 THOUSAND/ΜL (ref 0.17–1.22)
MONOCYTES NFR BLD AUTO: 8 % (ref 4–12)
NEUTROPHILS # BLD AUTO: 3.4 THOUSANDS/ΜL (ref 1.85–7.62)
NEUTS SEG NFR BLD AUTO: 48 % (ref 43–75)
NRBC BLD AUTO-RTO: 0 /100 WBCS
PLATELET # BLD AUTO: 145 THOUSANDS/UL (ref 149–390)
PMV BLD AUTO: 12.4 FL (ref 8.9–12.7)
POTASSIUM SERPL-SCNC: 4 MMOL/L (ref 3.5–5.3)
PROT SERPL-MCNC: 6.8 G/DL (ref 6.4–8.2)
RBC # BLD AUTO: 4.77 MILLION/UL (ref 3.81–5.12)
SODIUM SERPL-SCNC: 139 MMOL/L (ref 136–145)
WBC # BLD AUTO: 7.05 THOUSAND/UL (ref 4.31–10.16)

## 2022-06-03 PROCEDURE — 80053 COMPREHEN METABOLIC PANEL: CPT

## 2022-06-03 PROCEDURE — 85025 COMPLETE CBC W/AUTO DIFF WBC: CPT

## 2022-06-03 PROCEDURE — 36415 COLL VENOUS BLD VENIPUNCTURE: CPT

## 2022-06-03 NOTE — TELEPHONE ENCOUNTER
Patient called into the office returning our phone call to schedule an appointment with the Genetic Counselor  Visit scheduled for 6/10/2022 with Selam via telephone

## 2022-06-06 ENCOUNTER — TELEPHONE (OUTPATIENT)
Dept: HEMATOLOGY ONCOLOGY | Facility: CLINIC | Age: 62
End: 2022-06-06

## 2022-06-06 NOTE — PROGRESS NOTES
Phone outreach to the pt, introduced myself and explained my role  Very pleasant woman to speak to, completed general assessment  Patient has been all set up w surg onc and med onc  Port placed on 5/31/22  Pt requested for information on the [address]  Email sent to the pt  Provided the contact information for the pt to call me If she has any questions or concerns  She was appreciative of my call

## 2022-06-08 ENCOUNTER — HOSPITAL ENCOUNTER (OUTPATIENT)
Dept: INFUSION CENTER | Facility: HOSPITAL | Age: 62
Discharge: HOME | End: 2022-06-08
Attending: INTERNAL MEDICINE
Payer: COMMERCIAL

## 2022-06-08 VITALS
HEIGHT: 64 IN | DIASTOLIC BLOOD PRESSURE: 67 MMHG | OXYGEN SATURATION: 92 % | TEMPERATURE: 96.7 F | HEART RATE: 96 BPM | BODY MASS INDEX: 50.02 KG/M2 | SYSTOLIC BLOOD PRESSURE: 127 MMHG | RESPIRATION RATE: 18 BRPM | WEIGHT: 293 LBS

## 2022-06-08 DIAGNOSIS — T45.1X5A CHEMOTHERAPY INDUCED NEUTROPENIA (HCC): Primary | ICD-10-CM

## 2022-06-08 DIAGNOSIS — D70.1 CHEMOTHERAPY INDUCED NEUTROPENIA (HCC): Primary | ICD-10-CM

## 2022-06-08 DIAGNOSIS — C25.0 ADENOCARCINOMA OF HEAD OF PANCREAS (HCC): ICD-10-CM

## 2022-06-08 PROCEDURE — 96375 TX/PRO/DX INJ NEW DRUG ADDON: CPT

## 2022-06-08 PROCEDURE — 96367 TX/PROPH/DG ADDL SEQ IV INF: CPT

## 2022-06-08 PROCEDURE — 96411 CHEMO IV PUSH ADDL DRUG: CPT

## 2022-06-08 PROCEDURE — G0498 CHEMO EXTEND IV INFUS W/PUMP: HCPCS

## 2022-06-08 PROCEDURE — 96417 CHEMO IV INFUS EACH ADDL SEQ: CPT

## 2022-06-08 PROCEDURE — 96413 CHEMO IV INFUSION 1 HR: CPT

## 2022-06-08 PROCEDURE — 96415 CHEMO IV INFUSION ADDL HR: CPT

## 2022-06-08 RX ORDER — DEXTROSE MONOHYDRATE 50 MG/ML
20 INJECTION, SOLUTION INTRAVENOUS ONCE
Status: COMPLETED | OUTPATIENT
Start: 2022-06-08 | End: 2022-06-08

## 2022-06-08 RX ORDER — ATROPINE SULFATE 1 MG/ML
0.25 INJECTION, SOLUTION INTRAVENOUS ONCE AS NEEDED
Status: DISCONTINUED | OUTPATIENT
Start: 2022-06-08 | End: 2022-06-11 | Stop reason: HOSPADM

## 2022-06-08 RX ORDER — ATROPINE SULFATE 1 MG/ML
0.25 INJECTION, SOLUTION INTRAVENOUS ONCE
Status: COMPLETED | OUTPATIENT
Start: 2022-06-08 | End: 2022-06-08

## 2022-06-08 RX ORDER — SODIUM CHLORIDE 9 MG/ML
20 INJECTION, SOLUTION INTRAVENOUS ONCE AS NEEDED
Status: DISCONTINUED | OUTPATIENT
Start: 2022-06-08 | End: 2022-06-11 | Stop reason: HOSPADM

## 2022-06-08 RX ORDER — FLUOROURACIL 50 MG/ML
400 INJECTION, SOLUTION INTRAVENOUS ONCE
Status: COMPLETED | OUTPATIENT
Start: 2022-06-08 | End: 2022-06-08

## 2022-06-08 RX ADMIN — SODIUM CHLORIDE 20 ML/HR: 9 INJECTION, SOLUTION INTRAVENOUS at 10:31

## 2022-06-08 RX ADMIN — ATROPINE SULFATE 0.25 MG: 1 INJECTION, SOLUTION INTRAMUSCULAR; INTRAVENOUS; SUBCUTANEOUS at 13:28

## 2022-06-08 RX ADMIN — FLUOROURACIL 955 MG: 50 INJECTION, SOLUTION INTRAVENOUS at 15:15

## 2022-06-08 RX ADMIN — OXALIPLATIN 200 MG: 5 INJECTION, SOLUTION INTRAVENOUS at 11:18

## 2022-06-08 RX ADMIN — DEXAMETHASONE SODIUM PHOSPHATE: 10 INJECTION INTRAMUSCULAR; INTRAVENOUS at 10:33

## 2022-06-08 RX ADMIN — DEXTROSE 20 ML/HR: 50 INJECTION, SOLUTION INTRAVENOUS at 11:09

## 2022-06-08 RX ADMIN — IRINOTECAN HYDROCHLORIDE 440 MG: 20 INJECTION, SOLUTION INTRAVENOUS at 13:36

## 2022-06-08 NOTE — PROGRESS NOTES
Patient received oxaliplatin, camptosar and 5FU injection via port  Tolerated well with no adverse effects  Offers no complaints  5FU pump hooked up, clamps opened  Next appointment verified, AVS provided

## 2022-06-10 ENCOUNTER — CLINICAL SUPPORT (OUTPATIENT)
Dept: GENETICS | Facility: CLINIC | Age: 62
End: 2022-06-10

## 2022-06-10 ENCOUNTER — DOCUMENTATION (OUTPATIENT)
Dept: GENETICS | Facility: CLINIC | Age: 62
End: 2022-06-10

## 2022-06-10 ENCOUNTER — HOSPITAL ENCOUNTER (OUTPATIENT)
Dept: INFUSION CENTER | Facility: HOSPITAL | Age: 62
Discharge: HOME | End: 2022-06-10
Attending: INTERNAL MEDICINE
Payer: COMMERCIAL

## 2022-06-10 DIAGNOSIS — D70.1 CHEMOTHERAPY INDUCED NEUTROPENIA (HCC): Primary | ICD-10-CM

## 2022-06-10 DIAGNOSIS — C25.0 ADENOCARCINOMA OF HEAD OF PANCREAS (HCC): ICD-10-CM

## 2022-06-10 DIAGNOSIS — Z80.42 FAMILY HISTORY OF PROSTATE CANCER: ICD-10-CM

## 2022-06-10 DIAGNOSIS — C25.0 ADENOCARCINOMA OF HEAD OF PANCREAS (HCC): Primary | ICD-10-CM

## 2022-06-10 DIAGNOSIS — T45.1X5A CHEMOTHERAPY INDUCED NEUTROPENIA (HCC): Primary | ICD-10-CM

## 2022-06-10 PROCEDURE — NC001 PR NO CHARGE: Performed by: GENETIC COUNSELOR, MS

## 2022-06-10 PROCEDURE — 96372 THER/PROPH/DIAG INJ SC/IM: CPT

## 2022-06-10 RX ADMIN — PEGFILGRASTIM 6 MG: KIT SUBCUTANEOUS at 13:46

## 2022-06-10 NOTE — LETTER
Marci 10, 2022     Guillermo Rodriguez MD  [address] [facility]  2nd [address] [facility] [address] [facility]    Patient: Yola Jackson  YOB: 1960  Date of Visit: 6/10/2022      Dear Dr Espinoza Hayes:    Thank you for referring Yola Jackson to me for evaluation  Below are my notes for this consultation  If you have questions, please do not hesitate to call me  I look forward to following your patient along with you  Sincerely,        Selam Lewis GC        CC: No Recipients        Pre-Test Genetic Counseling Consult Note    Patient Name: Yola Jackosn DOB/Age: 1960/61 y o  Referring Provider: Guillermo Rodriguez MD    Date of Service: 6/10/2022  Genetic Counselor: Krystin Huizar MS, [address] [facility]  Interpretation Services: None  Location: Telephone consult   Length of Visit: 60 minutes      Migel Meza was referred to the [address] [place] [address] and Genetic Assessment Program due to her personal history of pancreatic cancer  She presents today to discuss the possibility of a hereditary cancer syndrome, options for genetic testing, and implications for her and her family  Cancer History and Treatment:     Personal History: Personal history of GIST, skin and pancreatic cancer    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     Recent CT scan revealed pancreatic duct dilatation in the body with an ill-defined density in the pancreatic head  There was also 1 2 x 1 9 cm right adrenal nodule which was previously characterized as an adenoma  Oncology History   Adenocarcinoma of head of pancreas ([facility] [address] )   2022 Initial Diagnosis     Adenocarcinoma of head of pancreas [facility])      2022 Biopsy     Endoscopic Ultrasound:  A , B , & C  Pancreas, Pancreatic head:   Malignant (PSC Category VI)  Positive for adenocarcinoma     Screening Hx:     Breast:  Breast Imaging: Annual mammogram   Breast Biopsy: None   Breast Density: The breasts are almost entirely fatty       Colon:  Colonoscopy: Most recent Feb 7 2022; repeat in 5 years; 1 colon polyps     Gynecologic:  ANNETTE/BSO due to uterine fibroids in April 2002    Skin:  No current screening    Reproductive History  Age at menarche: 5  Age at first live birth: 21y  Menopause: Post Menopausal (39y)  Hormone replacement: None    Medical and Surgical History  Pertinent surgical history:   Past Surgical History:   Procedure Laterality Date    ABDOMINAL SURGERY  06/2006    20cm GIST removed     APPENDECTOMY      ATRIAL ABLATION SURGERY      CARPAL TUNNEL RELEASE Bilateral     COLONOSCOPY      FL GUIDED CENTRAL VENOUS ACCESS DEVICE INSERTION  5/31/2022    HYSTERECTOMY      fibroids    KIDNEY STONE SURGERY Right 09/17/2021    placed stent in  for kidney stone    KNEE SURGERY      KNEE SURGERY Left 03/2019    OOPHORECTOMY      cyst    TONSILLECTOMY      TUMOR EXCISION  2006    gastrointestinal stromal tumor    TUNNELED VENOUS PORT PLACEMENT N/A 5/31/2022    Procedure: INSERTION VENOUS PORT (PORT-A-CATH);   Surgeon: Jose Ramon Christiansen MD;  [facility];  Service: Surgical Oncology    UPPER GASTROINTESTINAL ENDOSCOPY        Pertinent medical history:  Past Medical History:   Diagnosis Date    Abnormal liver function test     last assessed 1/16/17     Adenomatosis     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    last assessed 4/4/13    Atrial fibrillation ([address] )     Atrial flutter ([address] )     Benign essential hypertension     last assessed 12/21/17     Body mass index (BMI) of 50-59 9 in adult ([address] )     Cancer ([address] )     pancreas    Carpal tunnel syndrome 09/07/2004    unspecified laterality  / last assessed 9/7/04    Colon polyp     Congenital pes planus     last assessed 7/15/14     COVID     4/30/21    CPAP (continuous positive airway pressure) dependence     Depression     Depression     Diabetes mellitus (HCC)     GERD (gastroesophageal reflux disease)     Hemangioma of skin 08/26/2003    last assessed 8/26/03    History of gastroesophageal reflux (GERD)     Hypercholesterolemia     Hyperlipidemia     Hypertension     Irregular heart beat     Kidney stone     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[facility] [address] ) 04/19/2006    last assessed 12/31/14     Osteoarthritis of both knees     last assessed 12/31/14     Paroxysmal atrial fibrillation (HCC)     S/P ablation of atrial flutter     Sleep apnea        Other History:  Height:   Ht Readings from Last 1 Encounters:   06/08/22 5' 3 78" (1 62 m)     Weight:   Wt Readings from Last 1 Encounters:   06/08/22 (!) 144 kg (317 lb 0 3 oz)     Relevant Family History   Patient reports no Ashkenazi Roman Catholic ancestry  Maternal Family History: Mother (d age 67) with no history of cancer  There is no known family history of cancer in maternal relatives, but [place] has limited information on some relatives     Paternal Family History:  Father (age 80) with prostate cancer  There is no known family history of cancer in paternal relatives, but [place] has limited information on some relatives     Please refer to the scanned pedigree in the Media Tab for a complete family history     *All history is reported as provided by the patient; records are not available for review, except where indicated  Assessment:  We discussed sporadic, familial and hereditary cancer  We also discussed the many factors that influence our risk for cancer such as age, environmental exposures, lifestyle choices and family history  We reviewed the indications suggestive of a hereditary predisposition to cancer      Genetic testing is indicated for Cordelia based on the following criteria: Meets NCCN V2 2022 Testing Criteria for Pancreatic Cancer Susceptibility Genes: Personal history of pancreatic cancer    The risks, benefits, and limitations of genetic testing were reviewed with the patient, as well as genetic discrimination laws, and possible test results (positive, negative, variants of uncertain significance) and their clinical implications  If positive for a mutation, options for managing cancer risk including increased surveillance, chemoprevention, and in some cases prophylactic surgery were discussed  Migel Meza was informed that if a hereditary cancer syndrome was identified in her, first degree relatives (parents, siblings, and children) have a chance of also inheriting the condition  Genetic testing would allow for predictive genetic testing in other relatives, who may also be at risk depending on their degree of relation  Plan: Patient decided to proceed with testing and provided consent  Summary:     Sample Collection:  A blood kit was mailed home to the patient    Genetic Testing Preformed: [employer] Common Hereditary Cancers Panel + RNA (47 genes): APC, MIGUEL ANGEL, AXIN2, BARD1, BMPR1A, BRCA1, BRCA2, BRIP1, CDH1, CDK4, CDKN2A, CHEK2, CTNNA1, DICER1, EPCAM, GREM1, HOXB13, KIT, MEN1, MLH1, MSH2, MSH3, MSH6, MUTYH, NBN, NF1, NTHL1, PALB2, PDGFRA, PMS2, POLD1, POLE, PTEN, RAD50, RAD51C, RAD51D, SDHA, SDHB, SDHC, SDHD, SMAD4, SMARCA4, STK11, TP53, TSC1, TSC2, VHL    Results take approximately 2-3 weeks to complete once test is started  We will contact Migel Meza once results are available  Additional recommendations for surveillance/medical management will be made pending genetic test results

## 2022-06-10 NOTE — PROGRESS NOTES
Pre-Test Genetic Counseling Consult Note    Patient Name: Katie Bartlett   /Age: 1960/61 y o  Referring Provider: Helena Edmonds MD    Date of Service: 6/10/2022  Genetic Counselor: Alejandro Stack MS, [address] [place]  Interpretation Services: None  Location: Telephone consult   Length of Visit: 60 minutes      Cristopher Lockett was referred to the [address] [place] [address] and Genetic Assessment Program due to her personal history of pancreatic cancer  She presents today to discuss the possibility of a hereditary cancer syndrome, options for genetic testing, and implications for her and her family  Cancer History and Treatment:     Personal History: Personal history of GIST, skin and pancreatic cancer    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     Recent CT scan revealed pancreatic duct dilatation in the body with an ill-defined density in the pancreatic head  There was also 1 2 x 1 9 cm right adrenal nodule which was previously characterized as an adenoma  Oncology History   Adenocarcinoma of head of pancreas ([facility] [address] )   2022 Initial Diagnosis     Adenocarcinoma of head of pancreas [facility])      2022 Biopsy     Endoscopic Ultrasound:  A , B , & C  Pancreas, Pancreatic head:   Malignant (PSC Category VI)  Positive for adenocarcinoma     Screening Hx:     Breast:  Breast Imaging: Annual mammogram   Breast Biopsy: None   Breast Density: The breasts are almost entirely fatty       Colon:  Colonoscopy: Most recent 2022; repeat in 5 years; 1 colon polyps     Gynecologic:  ANNETTE/BSO due to uterine fibroids in 2002    Skin:  No current screening    Reproductive History  Age at menarche: 5  Age at first live birth: 21y  Menopause: Post Menopausal (39y)  Hormone replacement: None    Medical and Surgical History  Pertinent surgical history:   Past Surgical History:   Procedure Laterality Date    ABDOMINAL SURGERY  2006    20cm GIST removed     APPENDECTOMY      ATRIAL ABLATION SURGERY      CARPAL TUNNEL RELEASE Bilateral     COLONOSCOPY      FL GUIDED CENTRAL VENOUS ACCESS DEVICE INSERTION  5/31/2022    HYSTERECTOMY      fibroids    KIDNEY STONE SURGERY Right 09/17/2021    placed stent in  for kidney stone    KNEE SURGERY      KNEE SURGERY Left 03/2019    OOPHORECTOMY      cyst    TONSILLECTOMY      TUMOR EXCISION  2006    gastrointestinal stromal tumor    TUNNELED VENOUS PORT PLACEMENT N/A 5/31/2022    Procedure: INSERTION VENOUS PORT (PORT-A-CATH);   Surgeon: Liliana Cox MD;  [facility];  Service: Surgical Oncology    UPPER GASTROINTESTINAL ENDOSCOPY        Pertinent medical history:  Past Medical History:   Diagnosis Date    Abnormal liver function test     last assessed 1/16/17     Adenomatosis     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    last assessed 4/4/13    Atrial fibrillation ([address] )     Atrial flutter ([address] )     Benign essential hypertension     last assessed 12/21/17     Body mass index (BMI) of 50-59 9 in adult ([address] )     Cancer ([facility] [address] )     pancreas    Carpal tunnel syndrome 09/07/2004    unspecified laterality  / last assessed 9/7/04    Colon polyp     Congenital pes planus     last assessed 7/15/14     COVID     4/30/21    CPAP (continuous positive airway pressure) dependence     Depression     Depression     Diabetes mellitus (HCC)     GERD (gastroesophageal reflux disease)     Hemangioma of skin 08/26/2003    last assessed 8/26/03    History of gastroesophageal reflux (GERD)     Hypercholesterolemia     Hyperlipidemia     Hypertension     Irregular heart beat     Kidney stone     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[address] ) 04/19/2006    last assessed 12/31/14     Osteoarthritis of both knees     last assessed 12/31/14     Paroxysmal atrial fibrillation (HCC)     S/P ablation of atrial flutter     Sleep apnea        Other History:  Height:   Ht Readings from Last 1 Encounters:   06/08/22 5' 3 78" (1 62 m) Weight:   Wt Readings from Last 1 Encounters:   06/08/22 (!) 144 kg (317 lb 0 3 oz)     Relevant Family History   Patient reports no Ashkenazi Faith ancestry  Maternal Family History: Mother (d age 67) with no history of cancer  There is no known family history of cancer in maternal relatives, but Jo[place] has limited information on some relatives     Paternal Family History:  Father (age 80) with prostate cancer  There is no known family history of cancer in paternal relatives, but Joanna Nguyen has limited information on some relatives     Please refer to the scanned pedigree in the Media Tab for a complete family history     *All history is reported as provided by the patient; records are not available for review, except where indicated  Assessment:  We discussed sporadic, familial and hereditary cancer  We also discussed the many factors that influence our risk for cancer such as age, environmental exposures, lifestyle choices and family history  We reviewed the indications suggestive of a hereditary predisposition to cancer  Genetic testing is indicated for Joanna Nguyen based on the following criteria: Meets NCCN V2 2022 Testing Criteria for Pancreatic Cancer Susceptibility Genes: Personal history of pancreatic cancer    The risks, benefits, and limitations of genetic testing were reviewed with the patient, as well as genetic discrimination laws, and possible test results (positive, negative, variants of uncertain significance) and their clinical implications  If positive for a mutation, options for managing cancer risk including increased surveillance, chemoprevention, and in some cases prophylactic surgery were discussed  Joanna Nguyen was informed that if a hereditary cancer syndrome was identified in her, first degree relatives (parents, siblings, and children) have a chance of also inheriting the condition   Genetic testing would allow for predictive genetic testing in other relatives, who may also be at risk depending on their degree of relation  Plan: Patient decided to proceed with testing and provided consent  Summary:     Sample Collection:  A blood kit was mailed home to the patient    Genetic Testing Preformed: [employer] Common Hereditary Cancers Panel + RNA (47 genes): APC, MIGUEL ANGEL, AXIN2, BARD1, BMPR1A, BRCA1, BRCA2, BRIP1, CDH1, CDK4, CDKN2A, CHEK2, CTNNA1, DICER1, EPCAM, GREM1, HOXB13, KIT, MEN1, MLH1, MSH2, MSH3, MSH6, MUTYH, NBN, NF1, NTHL1, PALB2, PDGFRA, PMS2, POLD1, POLE, PTEN, RAD50, RAD51C, RAD51D, SDHA, SDHB, SDHC, SDHD, SMAD4, SMARCA4, STK11, TP53, TSC1, TSC2, VHL    Results take approximately 2-3 weeks to complete once test is started  We will contact Derek Berg once results are available  Additional recommendations for surveillance/medical management will be made pending genetic test results

## 2022-06-13 RX ORDER — ATROPINE SULFATE 1 MG/ML
0.25 INJECTION, SOLUTION INTRAVENOUS ONCE AS NEEDED
Status: CANCELLED | OUTPATIENT
Start: 2022-06-22

## 2022-06-13 RX ORDER — DEXTROSE MONOHYDRATE 50 MG/ML
20 INJECTION, SOLUTION INTRAVENOUS ONCE
Status: CANCELLED | OUTPATIENT
Start: 2022-06-22

## 2022-06-13 RX ORDER — ATROPINE SULFATE 1 MG/ML
0.25 INJECTION, SOLUTION INTRAVENOUS ONCE
Status: CANCELLED | OUTPATIENT
Start: 2022-06-22

## 2022-06-13 RX ORDER — FLUOROURACIL 50 MG/ML
400 INJECTION, SOLUTION INTRAVENOUS ONCE
Status: CANCELLED | OUTPATIENT
Start: 2022-06-22

## 2022-06-13 RX ORDER — SODIUM CHLORIDE 9 MG/ML
20 INJECTION, SOLUTION INTRAVENOUS ONCE AS NEEDED
Status: CANCELLED | OUTPATIENT
Start: 2022-06-22

## 2022-06-17 ENCOUNTER — APPOINTMENT (OUTPATIENT)
Dept: LAB | Facility: CLINIC | Age: 62
End: 2022-06-17
Payer: COMMERCIAL

## 2022-06-17 DIAGNOSIS — T45.1X5A CHEMOTHERAPY INDUCED NEUTROPENIA (HCC): ICD-10-CM

## 2022-06-17 DIAGNOSIS — Z80.42 FAMILY HISTORY OF MALIGNANT NEOPLASM OF PROSTATE: ICD-10-CM

## 2022-06-17 DIAGNOSIS — C25.0 MALIGNANT NEOPLASM OF HEAD OF PANCREAS (HCC): Primary | ICD-10-CM

## 2022-06-17 DIAGNOSIS — D70.1 CHEMOTHERAPY INDUCED NEUTROPENIA (HCC): ICD-10-CM

## 2022-06-17 DIAGNOSIS — C25.0 ADENOCARCINOMA OF HEAD OF PANCREAS (HCC): ICD-10-CM

## 2022-06-17 LAB
ALBUMIN SERPL BCP-MCNC: 3.4 G/DL (ref 3.5–5)
ALP SERPL-CCNC: 149 U/L (ref 46–116)
ALT SERPL W P-5'-P-CCNC: 61 U/L (ref 12–78)
ANION GAP SERPL CALCULATED.3IONS-SCNC: 8 MMOL/L (ref 4–13)
AST SERPL W P-5'-P-CCNC: 34 U/L (ref 5–45)
BASOPHILS # BLD MANUAL: 0 THOUSAND/UL (ref 0–0.1)
BASOPHILS NFR MAR MANUAL: 0 % (ref 0–1)
BILIRUB SERPL-MCNC: 0.51 MG/DL (ref 0.2–1)
BUN SERPL-MCNC: 14 MG/DL (ref 5–25)
CALCIUM ALBUM COR SERPL-MCNC: 9.9 MG/DL (ref 8.3–10.1)
CALCIUM SERPL-MCNC: 9.4 MG/DL (ref 8.3–10.1)
CHLORIDE SERPL-SCNC: 108 MMOL/L (ref 100–108)
CO2 SERPL-SCNC: 22 MMOL/L (ref 21–32)
CREAT SERPL-MCNC: 0.94 MG/DL (ref 0.6–1.3)
EOSINOPHIL # BLD MANUAL: 0.3 THOUSAND/UL (ref 0–0.4)
EOSINOPHIL NFR BLD MANUAL: 4 % (ref 0–6)
ERYTHROCYTE [DISTWIDTH] IN BLOOD BY AUTOMATED COUNT: 14.8 % (ref 11.6–15.1)
GFR SERPL CREATININE-BSD FRML MDRD: 65 ML/MIN/1.73SQ M
GLUCOSE P FAST SERPL-MCNC: 159 MG/DL (ref 65–99)
HCT VFR BLD AUTO: 41.5 % (ref 34.8–46.1)
HGB BLD-MCNC: 13.3 G/DL (ref 11.5–15.4)
LYMPHOCYTES # BLD AUTO: 2.5 THOUSAND/UL (ref 0.6–4.47)
LYMPHOCYTES # BLD AUTO: 33 % (ref 14–44)
MCH RBC QN AUTO: 27.9 PG (ref 26.8–34.3)
MCHC RBC AUTO-ENTMCNC: 32 G/DL (ref 31.4–37.4)
MCV RBC AUTO: 87 FL (ref 82–98)
METAMYELOCYTES NFR BLD MANUAL: 7 % (ref 0–1)
MONOCYTES # BLD AUTO: 0.3 THOUSAND/UL (ref 0–1.22)
MONOCYTES NFR BLD: 4 % (ref 4–12)
NEUTROPHILS # BLD MANUAL: 3.95 THOUSAND/UL (ref 1.85–7.62)
NEUTS BAND NFR BLD MANUAL: 9 % (ref 0–8)
NEUTS SEG NFR BLD AUTO: 43 % (ref 43–75)
PLATELET # BLD AUTO: 151 THOUSANDS/UL (ref 149–390)
PLATELET BLD QL SMEAR: ADEQUATE
PMV BLD AUTO: 12 FL (ref 8.9–12.7)
POLYCHROMASIA BLD QL SMEAR: PRESENT
POTASSIUM SERPL-SCNC: 3.7 MMOL/L (ref 3.5–5.3)
PROT SERPL-MCNC: 6.8 G/DL (ref 6.4–8.2)
RBC # BLD AUTO: 4.76 MILLION/UL (ref 3.81–5.12)
RBC MORPH BLD: PRESENT
SODIUM SERPL-SCNC: 138 MMOL/L (ref 136–145)
TOXIC GRANULES BLD QL SMEAR: PRESENT
WBC # BLD AUTO: 7.59 THOUSAND/UL (ref 4.31–10.16)

## 2022-06-17 PROCEDURE — 36415 COLL VENOUS BLD VENIPUNCTURE: CPT

## 2022-06-17 PROCEDURE — 85007 BL SMEAR W/DIFF WBC COUNT: CPT

## 2022-06-17 PROCEDURE — 80053 COMPREHEN METABOLIC PANEL: CPT

## 2022-06-17 PROCEDURE — 85027 COMPLETE CBC AUTOMATED: CPT

## 2022-06-22 ENCOUNTER — TELEPHONE (OUTPATIENT)
Dept: INFUSION CENTER | Facility: HOSPITAL | Age: 62
End: 2022-06-22

## 2022-06-22 ENCOUNTER — HOSPITAL ENCOUNTER (OUTPATIENT)
Dept: INFUSION CENTER | Facility: HOSPITAL | Age: 62
Discharge: HOME | End: 2022-06-22
Attending: INTERNAL MEDICINE
Payer: COMMERCIAL

## 2022-06-22 VITALS
BODY MASS INDEX: 50.02 KG/M2 | SYSTOLIC BLOOD PRESSURE: 115 MMHG | RESPIRATION RATE: 20 BRPM | OXYGEN SATURATION: 97 % | TEMPERATURE: 96.8 F | DIASTOLIC BLOOD PRESSURE: 68 MMHG | WEIGHT: 293 LBS | HEART RATE: 92 BPM | HEIGHT: 64 IN

## 2022-06-22 DIAGNOSIS — T45.1X5A CHEMOTHERAPY INDUCED NEUTROPENIA (HCC): Primary | ICD-10-CM

## 2022-06-22 DIAGNOSIS — D70.1 CHEMOTHERAPY INDUCED NEUTROPENIA (HCC): Primary | ICD-10-CM

## 2022-06-22 DIAGNOSIS — C25.0 ADENOCARCINOMA OF HEAD OF PANCREAS (HCC): ICD-10-CM

## 2022-06-22 PROCEDURE — 96365 THER/PROPH/DIAG IV INF INIT: CPT

## 2022-06-22 RX ORDER — ATROPINE SULFATE 1 MG/ML
0.25 INJECTION, SOLUTION INTRAVENOUS ONCE AS NEEDED
Status: CANCELLED | OUTPATIENT
Start: 2022-06-23

## 2022-06-22 RX ORDER — DEXTROSE MONOHYDRATE 50 MG/ML
20 INJECTION, SOLUTION INTRAVENOUS ONCE
Status: DISCONTINUED | OUTPATIENT
Start: 2022-06-22 | End: 2022-06-25 | Stop reason: HOSPADM

## 2022-06-22 RX ORDER — ATROPINE SULFATE 1 MG/ML
0.25 INJECTION, SOLUTION INTRAVENOUS ONCE
Status: DISCONTINUED | OUTPATIENT
Start: 2022-06-22 | End: 2022-06-25 | Stop reason: HOSPADM

## 2022-06-22 RX ORDER — FLUOROURACIL 50 MG/ML
400 INJECTION, SOLUTION INTRAVENOUS ONCE
Status: DISCONTINUED | OUTPATIENT
Start: 2022-06-22 | End: 2022-06-25 | Stop reason: HOSPADM

## 2022-06-22 RX ORDER — ATROPINE SULFATE 1 MG/ML
0.25 INJECTION, SOLUTION INTRAVENOUS ONCE
Status: CANCELLED | OUTPATIENT
Start: 2022-06-23

## 2022-06-22 RX ORDER — SODIUM CHLORIDE 9 MG/ML
20 INJECTION, SOLUTION INTRAVENOUS ONCE AS NEEDED
Status: CANCELLED | OUTPATIENT
Start: 2022-06-23

## 2022-06-22 RX ORDER — FLUOROURACIL 50 MG/ML
400 INJECTION, SOLUTION INTRAVENOUS ONCE
Status: CANCELLED | OUTPATIENT
Start: 2022-06-23

## 2022-06-22 RX ORDER — ATROPINE SULFATE 1 MG/ML
0.25 INJECTION, SOLUTION INTRAVENOUS ONCE AS NEEDED
Status: DISCONTINUED | OUTPATIENT
Start: 2022-06-22 | End: 2022-06-25 | Stop reason: HOSPADM

## 2022-06-22 RX ORDER — DEXTROSE MONOHYDRATE 50 MG/ML
20 INJECTION, SOLUTION INTRAVENOUS ONCE
Status: CANCELLED | OUTPATIENT
Start: 2022-06-23

## 2022-06-22 RX ORDER — SODIUM CHLORIDE 9 MG/ML
20 INJECTION, SOLUTION INTRAVENOUS ONCE AS NEEDED
Status: DISCONTINUED | OUTPATIENT
Start: 2022-06-22 | End: 2022-06-25 | Stop reason: HOSPADM

## 2022-06-22 RX ADMIN — SODIUM CHLORIDE 20 ML/HR: 0.9 INJECTION, SOLUTION INTRAVENOUS at 09:14

## 2022-06-22 RX ADMIN — DEXAMETHASONE SODIUM PHOSPHATE: 10 INJECTION INTRAMUSCULAR; INTRAVENOUS at 09:13

## 2022-06-22 NOTE — TELEPHONE ENCOUNTER
Pt stated the dressing over her port is clean, dry, and intact  Pt given extra gauze and tegaderm before she left infusion center  Pt aware to change dressing if saturated  Pt aware of apt tomorrow at 0830  Pt offers no complaints at this time

## 2022-06-22 NOTE — PROGRESS NOTES
Spoke with Medina Sánchez RN  Plan to treat pt tomorrow 6/23/22 at [facility] and have [address] d/c on Saturday 6/25/22 at [facility]  Pt's port site is weeping, gauze dressing changed, pressure applied  Pt is tearful, emotional support provided

## 2022-06-22 NOTE — PROGRESS NOTES
Received message from Suzie Helms, stating during patient's Zofran pre medication her port needle popped out and infused Zofran into her subcutaneous tissue  Chemotherapy treatment was unable to be given today due to this  She stated a larger needle will be used with next treatment  Reviewed above information with Dr Charlotte Aquino, per him okay to treat tomorrow  Cycle #2 day #1 for today was completed due to medications being released prior to Zofran infiltrating  Sabrina expressed chemotherapy medications weren't given today  Cycle #2 day #2 was added to the plan for patient to receive treatment tomorrow

## 2022-06-22 NOTE — PROGRESS NOTES
Pt to clinic for Folfirinox  Pt offers no complaints at this time  Pt's port accessed with brisk blood return at 0910  Pt c/o of pain and swelling over port site at completion of premedication  Upon assessment pt's port needle has slipped out of port and premedication infused into subcutaneous tissue  Port removed, sterile dressing in place  Message sent to [facility] RN  Pt resting comfortably in recliner at this time

## 2022-06-23 ENCOUNTER — HOSPITAL ENCOUNTER (OUTPATIENT)
Dept: NON INVASIVE DIAGNOSTICS | Facility: HOSPITAL | Age: 62
Discharge: HOME | End: 2022-06-23
Attending: INTERNAL MEDICINE | Admitting: RADIOLOGY
Payer: COMMERCIAL

## 2022-06-23 ENCOUNTER — HOSPITAL ENCOUNTER (OUTPATIENT)
Dept: INFUSION CENTER | Facility: HOSPITAL | Age: 62
Discharge: HOME | End: 2022-06-23
Attending: INTERNAL MEDICINE
Payer: COMMERCIAL

## 2022-06-23 ENCOUNTER — TELEPHONE (OUTPATIENT)
Dept: HEMATOLOGY ONCOLOGY | Facility: CLINIC | Age: 62
End: 2022-06-23

## 2022-06-23 ENCOUNTER — PREP FOR PROCEDURE (OUTPATIENT)
Dept: INTERVENTIONAL RADIOLOGY/VASCULAR | Facility: CLINIC | Age: 62
End: 2022-06-23

## 2022-06-23 VITALS
HEIGHT: 64 IN | RESPIRATION RATE: 18 BRPM | BODY MASS INDEX: 50.02 KG/M2 | TEMPERATURE: 96.9 F | WEIGHT: 293 LBS | OXYGEN SATURATION: 96 % | SYSTOLIC BLOOD PRESSURE: 118 MMHG | DIASTOLIC BLOOD PRESSURE: 87 MMHG | HEART RATE: 92 BPM

## 2022-06-23 DIAGNOSIS — T45.1X5A CHEMOTHERAPY INDUCED NEUTROPENIA (HCC): Primary | ICD-10-CM

## 2022-06-23 DIAGNOSIS — C25.0 ADENOCARCINOMA OF HEAD OF PANCREAS (HCC): ICD-10-CM

## 2022-06-23 DIAGNOSIS — C25.0 ADENOCARCINOMA OF HEAD OF PANCREAS (HCC): Primary | ICD-10-CM

## 2022-06-23 DIAGNOSIS — D70.1 CHEMOTHERAPY INDUCED NEUTROPENIA (HCC): Primary | ICD-10-CM

## 2022-06-23 DIAGNOSIS — Z95.828 PORT-A-CATH IN PLACE: ICD-10-CM

## 2022-06-23 PROCEDURE — 36598 INJ W/FLUOR EVAL CV DEVICE: CPT

## 2022-06-23 PROCEDURE — 36598 INJ W/FLUOR EVAL CV DEVICE: CPT | Performed by: RADIOLOGY

## 2022-06-23 PROCEDURE — 99152 MOD SED SAME PHYS/QHP 5/>YRS: CPT

## 2022-06-23 PROCEDURE — 96365 THER/PROPH/DIAG IV INF INIT: CPT

## 2022-06-23 PROCEDURE — 99153 MOD SED SAME PHYS/QHP EA: CPT

## 2022-06-23 RX ORDER — FLUOROURACIL 50 MG/ML
400 INJECTION, SOLUTION INTRAVENOUS ONCE
Status: DISCONTINUED | OUTPATIENT
Start: 2022-06-23 | End: 2022-06-26 | Stop reason: HOSPADM

## 2022-06-23 RX ORDER — SODIUM CHLORIDE 9 MG/ML
20 INJECTION, SOLUTION INTRAVENOUS ONCE AS NEEDED
Status: DISCONTINUED | OUTPATIENT
Start: 2022-06-23 | End: 2022-06-26 | Stop reason: HOSPADM

## 2022-06-23 RX ORDER — SODIUM CHLORIDE 9 MG/ML
30 INJECTION, SOLUTION INTRAVENOUS CONTINUOUS
Status: CANCELLED | OUTPATIENT
Start: 2022-06-23

## 2022-06-23 RX ORDER — ATROPINE SULFATE 1 MG/ML
0.25 INJECTION, SOLUTION INTRAVENOUS ONCE AS NEEDED
Status: DISCONTINUED | OUTPATIENT
Start: 2022-06-23 | End: 2022-06-26 | Stop reason: HOSPADM

## 2022-06-23 RX ORDER — ATROPINE SULFATE 1 MG/ML
0.25 INJECTION, SOLUTION INTRAVENOUS ONCE
Status: DISCONTINUED | OUTPATIENT
Start: 2022-06-23 | End: 2022-06-26 | Stop reason: HOSPADM

## 2022-06-23 RX ORDER — DEXTROSE MONOHYDRATE 50 MG/ML
20 INJECTION, SOLUTION INTRAVENOUS ONCE
Status: COMPLETED | OUTPATIENT
Start: 2022-06-23 | End: 2022-06-23

## 2022-06-23 RX ADMIN — DEXAMETHASONE SODIUM PHOSPHATE: 10 INJECTION INTRAMUSCULAR; INTRAVENOUS at 08:58

## 2022-06-23 RX ADMIN — DEXTROSE 20 ML/HR: 50 INJECTION, SOLUTION INTRAVENOUS at 09:37

## 2022-06-23 RX ADMIN — SODIUM CHLORIDE 20 ML/HR: 0.9 INJECTION, SOLUTION INTRAVENOUS at 08:59

## 2022-06-23 NOTE — PROGRESS NOTES
Pt's swelling over her port site has resolved at this time  Pt has NO pain and offers NO complaints at this time  Pt's port has BRISK BLOOD RETURN at this time  Pt aware to notify RN if she has any pain or discomfort at her port site  Brisk blood return noted before infusing pre-medications  Pt resting comfortably in recliner at this time

## 2022-06-23 NOTE — PROGRESS NOTES
Pt returned to clinic from IR  IR removed port needle, gauze and tegaderm in place at this time  Riddhi Rodrigez RN made aware  Pt is NOT receiving treatment today  Pt aware that she will receive a call to schedule new port insertion  Pt and pt's  are upset, emotional support provided at this time  Pt made aware to call Dr José [place] office if she does NOT hear from anyone tomorrow

## 2022-06-23 NOTE — SEDATION DOCUMENTATION
Pt came from infusion with left port accessed, unable to get blood return  Pt for port check, port reaccessed with 19G x 1 5 inch needle by Dr Migdalia Pemberton note by Dr Barbette Merlin  Port deaccessed, needs to follow up with her surgeon  Pt taken back to infusion, report given

## 2022-06-23 NOTE — PROGRESS NOTES
NO BLOOD RETURN noted from port at this time  Pt reports a "cold sensation" when port is flushed  RN used a 1 5 inch needle to access port today  There was brisk blood return before pre-medication was administered  Pt reports NO pain at this time  Message sent to [facility] RN

## 2022-06-23 NOTE — BRIEF OP NOTE (RAD/CATH)
INTERVENTIONAL RADIOLOGY PROCEDURE NOTE    Date: 6/23/2022    Procedure: IR PORT CHECK    Preoperative diagnosis: cancer, pancreatic    Postoperative diagnosis: Same  Surgeon: Adela Middleton MD     Assistant: None  No qualified resident was available  Blood loss: 0    Specimens: 0     Findings:     Left chest port  Given body habitus the catheter tubing appeared to readily kink near the hub    I was able to straighten out the kink and access the port  Unfortunately there was rapid extravasation around the port of contrast     I attempted to access again and verified good needle placement with x-ray but the port would not aspirate freely    I do not recommend that this port be utilized due to risk of leakage    I recommend removal and revision or placement of a new port    We will discuss with the Medical and Surgical Oncology service    Complications: None immediate      Anesthesia: local

## 2022-06-25 ENCOUNTER — HOSPITAL ENCOUNTER (OUTPATIENT)
Dept: INFUSION CENTER | Facility: CLINIC | Age: 62
End: 2022-06-25

## 2022-06-26 ENCOUNTER — APPOINTMENT (EMERGENCY)
Dept: RADIOLOGY | Facility: HOSPITAL | Age: 62
End: 2022-06-26
Payer: COMMERCIAL

## 2022-06-26 ENCOUNTER — HOSPITAL ENCOUNTER (EMERGENCY)
Facility: HOSPITAL | Age: 62
Discharge: HOME | End: 2022-06-26
Attending: EMERGENCY MEDICINE | Admitting: EMERGENCY MEDICINE
Payer: COMMERCIAL

## 2022-06-26 VITALS
DIASTOLIC BLOOD PRESSURE: 86 MMHG | SYSTOLIC BLOOD PRESSURE: 141 MMHG | HEART RATE: 68 BPM | OXYGEN SATURATION: 94 % | RESPIRATION RATE: 20 BRPM | TEMPERATURE: 96.6 F

## 2022-06-26 DIAGNOSIS — N20.1 URETERAL STONE: ICD-10-CM

## 2022-06-26 DIAGNOSIS — R31.9 HEMATURIA: Primary | ICD-10-CM

## 2022-06-26 DIAGNOSIS — N20.0 KIDNEY STONE: ICD-10-CM

## 2022-06-26 LAB
ALBUMIN SERPL BCP-MCNC: 3.5 G/DL (ref 3.5–5)
ALP SERPL-CCNC: 135 U/L (ref 46–116)
ALT SERPL W P-5'-P-CCNC: 72 U/L (ref 12–78)
ANION GAP SERPL CALCULATED.3IONS-SCNC: 7 MMOL/L (ref 4–13)
AST SERPL W P-5'-P-CCNC: 42 U/L (ref 5–45)
BACTERIA UR QL AUTO: ABNORMAL /HPF
BASOPHILS # BLD AUTO: 0.12 THOUSANDS/ΜL (ref 0–0.1)
BASOPHILS NFR BLD AUTO: 1 % (ref 0–1)
BILIRUB SERPL-MCNC: 0.65 MG/DL (ref 0.2–1)
BILIRUB UR QL STRIP: NEGATIVE
BUN SERPL-MCNC: 15 MG/DL (ref 5–25)
CALCIUM SERPL-MCNC: 9.1 MG/DL (ref 8.3–10.1)
CHLORIDE SERPL-SCNC: 104 MMOL/L (ref 100–108)
CLARITY UR: ABNORMAL
CO2 SERPL-SCNC: 31 MMOL/L (ref 21–32)
COLOR UR: ABNORMAL
CREAT SERPL-MCNC: 0.8 MG/DL (ref 0.6–1.3)
EOSINOPHIL # BLD AUTO: 0.07 THOUSAND/ΜL (ref 0–0.61)
EOSINOPHIL NFR BLD AUTO: 1 % (ref 0–6)
ERYTHROCYTE [DISTWIDTH] IN BLOOD BY AUTOMATED COUNT: 16 % (ref 11.6–15.1)
GFR SERPL CREATININE-BSD FRML MDRD: 79 ML/MIN/1.73SQ M
GLUCOSE SERPL-MCNC: 97 MG/DL (ref 65–140)
GLUCOSE UR STRIP-MCNC: NEGATIVE MG/DL
HCT VFR BLD AUTO: 42.8 % (ref 34.8–46.1)
HGB BLD-MCNC: 13.4 G/DL (ref 11.5–15.4)
HGB UR QL STRIP.AUTO: ABNORMAL
IMM GRANULOCYTES # BLD AUTO: 0.22 THOUSAND/UL (ref 0–0.2)
IMM GRANULOCYTES NFR BLD AUTO: 2 % (ref 0–2)
KETONES UR STRIP-MCNC: NEGATIVE MG/DL
LEUKOCYTE ESTERASE UR QL STRIP: NEGATIVE
LYMPHOCYTES # BLD AUTO: 3.08 THOUSANDS/ΜL (ref 0.6–4.47)
LYMPHOCYTES NFR BLD AUTO: 24 % (ref 14–44)
MCH RBC QN AUTO: 27.5 PG (ref 26.8–34.3)
MCHC RBC AUTO-ENTMCNC: 31.3 G/DL (ref 31.4–37.4)
MCV RBC AUTO: 88 FL (ref 82–98)
MONOCYTES # BLD AUTO: 0.62 THOUSAND/ΜL (ref 0.17–1.22)
MONOCYTES NFR BLD AUTO: 5 % (ref 4–12)
NEUTROPHILS # BLD AUTO: 8.5 THOUSANDS/ΜL (ref 1.85–7.62)
NEUTS SEG NFR BLD AUTO: 67 % (ref 43–75)
NITRITE UR QL STRIP: NEGATIVE
NON-SQ EPI CELLS URNS QL MICRO: ABNORMAL /HPF
NRBC BLD AUTO-RTO: 0 /100 WBCS
PH UR STRIP.AUTO: 8 [PH]
PLATELET # BLD AUTO: 128 THOUSANDS/UL (ref 149–390)
PMV BLD AUTO: 10.9 FL (ref 8.9–12.7)
POTASSIUM SERPL-SCNC: 4 MMOL/L (ref 3.5–5.3)
PROT SERPL-MCNC: 6.8 G/DL (ref 6.4–8.2)
PROT UR STRIP-MCNC: NEGATIVE MG/DL
RBC # BLD AUTO: 4.87 MILLION/UL (ref 3.81–5.12)
RBC #/AREA URNS AUTO: ABNORMAL /HPF
SODIUM SERPL-SCNC: 142 MMOL/L (ref 136–145)
SP GR UR STRIP.AUTO: 1.01 (ref 1–1.03)
UROBILINOGEN UR QL STRIP.AUTO: 0.2 E.U./DL
WBC # BLD AUTO: 12.61 THOUSAND/UL (ref 4.31–10.16)
WBC #/AREA URNS AUTO: ABNORMAL /HPF

## 2022-06-26 PROCEDURE — 85025 COMPLETE CBC W/AUTO DIFF WBC: CPT | Performed by: EMERGENCY MEDICINE

## 2022-06-26 PROCEDURE — G1004 CDSM NDSC: HCPCS

## 2022-06-26 PROCEDURE — 87086 URINE CULTURE/COLONY COUNT: CPT | Performed by: EMERGENCY MEDICINE

## 2022-06-26 PROCEDURE — 81001 URINALYSIS AUTO W/SCOPE: CPT | Performed by: EMERGENCY MEDICINE

## 2022-06-26 PROCEDURE — 74176 CT ABD & PELVIS W/O CONTRAST: CPT

## 2022-06-26 PROCEDURE — 99284 EMERGENCY DEPT VISIT MOD MDM: CPT | Performed by: EMERGENCY MEDICINE

## 2022-06-26 PROCEDURE — 80053 COMPREHEN METABOLIC PANEL: CPT | Performed by: EMERGENCY MEDICINE

## 2022-06-26 PROCEDURE — 36415 COLL VENOUS BLD VENIPUNCTURE: CPT | Performed by: EMERGENCY MEDICINE

## 2022-06-26 PROCEDURE — 99284 EMERGENCY DEPT VISIT MOD MDM: CPT

## 2022-06-26 RX ORDER — TAMSULOSIN HYDROCHLORIDE 0.4 MG/1
0.4 CAPSULE ORAL
Qty: 7 CAPSULE | Refills: 0 | Status: SHIPPED | OUTPATIENT
Start: 2022-06-26 | End: 2022-08-03

## 2022-06-26 RX ORDER — ONDANSETRON 4 MG/1
4 TABLET, ORALLY DISINTEGRATING ORAL EVERY 6 HOURS PRN
Qty: 9 TABLET | Refills: 0 | Status: SHIPPED | OUTPATIENT
Start: 2022-06-26 | End: 2022-08-03

## 2022-06-26 NOTE — DISCHARGE INSTRUCTIONS
- please follow-up with your urologist in a week   -please take Flomax at bedtime for the kidney stones it can cause her to feel lightheaded  Take the pain medications as prescribed for pain relief  Return for worsening pain vomiting fevers chills to the ED

## 2022-06-26 NOTE — ED PROVIDER NOTES
History  Chief Complaint   Patient presents with    Blood in Urine     Started with blood in urine yesterday hx of kidney stones  No pain, stage 2 pancreatic cancer  Had one chemo treatment so far, port has failed     57year old female with history of pancreatic cancer currently after 1 round of chemo presents with hematuria that started yesterday  Denies any dysuria urgency frequency no fevers vomiting flank pain abdominal pain or any other complaints at this time  She has a history of kidney stones that required lithotripsy and stent placement  History provided by:  Patient   used: No        Prior to Admission Medications   Prescriptions Last Dose Informant Patient Reported? Taking? Blood Glucose Monitoring Suppl (OneTouch Verio) w/Device KIT  Self No No   Sig: Use daily   Patient not taking: No sig reported   HYDROcodone-acetaminophen (Norco) 5-325 mg per tablet   No No   Sig: Take 1 tablet by mouth every 6 (six) hours as needed for pain Max Daily Amount: 4 tablets   Insulin Pen Needle (Pen Needles) 32G X 4 MM MISC   No No   Sig: Use once a week   Magnesium Oxide -Mg Supplement 400 MG CAPS  Self Yes No   Sig: Take 1 capsule by mouth daily   Omega-3 Fatty Acids (FISH OIL) 1,000 mg  Self Yes No   Sig: Take 1,000 mg by mouth 2 (two) times a day     OneTouch Delica Lancets 98J MISC  Self No No   Sig: Use 1 application daily   Patient not taking: No sig reported   Ozempic, 1 MG/DOSE, 4 MG/3ML SOPN injection pen  Self No No   Sig: Inject 0 75 mL (1 mg total) under the skin once a week   PARoxetine (PAXIL-CR) 37 5 mg 24 hr tablet  Self No No   Sig: TAKE 1 TABLET BY MOUTH IN  THE MORNING   Potassium Citrate ER 15 MEQ (1620 MG) TBCR  Self No No   Sig: Take 1 tablet by mouth 2 (two) times a day   VITAMIN D PO  Self Yes No   Sig: Take 2,000 Units by mouth 2 (two) times a day   aspirin 81 MG tablet  Self Yes No   Sig: Take 81 mg by mouth daily     atorvastatin (LIPITOR) 40 mg tablet  Self No No Sig: TAKE 1 TABLET BY MOUTH  DAILY   fluorouracil 5,735 mg in CADD/Elastomeric Infusion Device   No No   Sig: Infuse 5,735 mg (1,200 mg/m2/day x 2 39 m2) into a venous catheter over 46 hours for 2 days   glucose blood (Contour Next Test) test strip  Self No No   Sig: Use 1 each daily Use as instructed   ibuprofen (ADVIL,MOTRIN) 100 MG tablet  Self Yes No   Sig: Take 200 mg by mouth as needed for mild pain   metoprolol succinate (TOPROL-XL) 25 mg 24 hr tablet  Self No No   Sig: Take 1 tablet (25 mg total) by mouth 2 (two) times a day   multivitamin (THERAGRAN) TABS  Self Yes No   Sig: Take 1 tablet by mouth daily     olmesartan (BENICAR) 20 mg tablet  Self No No   Sig: TAKE 1 TABLET BY MOUTH  DAILY   ondansetron (ZOFRAN) 4 mg tablet   No No   Sig: Take 2 tablets (8 mg total) by mouth every 8 (eight) hours as needed for nausea or vomiting   pantoprazole (PROTONIX) 40 mg tablet  Self No No   Sig: TAKE 1 TABLET BY MOUTH  DAILY BEFORE BREAKFAST   sotalol (BETAPACE) 120 mg tablet  Self No No   Sig: Take 1 tablet (120 mg total) by mouth every 12 (twelve) hours      Facility-Administered Medications: None       Past Medical History:   Diagnosis Date    Abnormal liver function test     last assessed 1/16/17     Adenomatosis     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    last assessed 4/4/13    [facility]rial fibrillation [facility]     Atrial flutter (HCC)     Benign essential hypertension     last assessed 12/21/17     Body mass index (BMI) of 50-59 9 in adult ([facility] )     Cancer ([facility] )     pancreas    Carpal tunnel syndrome 09/07/2004    unspecified laterality  / last assessed 9/7/04    Colon polyp     Congenital pes planus     last assessed 7/15/14     COVID     4/30/21    CPAP (continuous positive airway pressure) dependence     Depression     Depression     Diabetes mellitus (HCC)     GERD (gastroesophageal reflux disease)     Hemangioma of skin 08/26/2003    last assessed 8/26/03    History of gastroesophageal reflux (GERD)     Hypercholesterolemia     Hyperlipidemia     Hypertension     Irregular heart beat     Kidney stone     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[facility] ) 04/19/2006    last assessed 12/31/14     Osteoarthritis of both knees     last assessed 12/31/14     Paroxysmal atrial fibrillation ([facility]     S/P ablation of atrial flutter     Sleep apnea        Past Surgical History:   Procedure Laterality Date    ABDOMINAL SURGERY  06/2006    20cm GIST removed     APPENDECTOMY      ATRIAL ABLATION SURGERY      CARPAL TUNNEL RELEASE Bilateral     COLONOSCOPY      FL GUIDED CENTRAL VENOUS ACCESS DEVICE INSERTION  5/31/2022    HYSTERECTOMY      fibroids    IR PORT CHECK  6/23/2022    KIDNEY STONE SURGERY Right 09/17/2021    placed stent in  for kidney stone    KNEE SURGERY      KNEE SURGERY Left 03/2019    OOPHORECTOMY      cyst    TONSILLECTOMY      TUMOR EXCISION  2006    gastrointestinal stromal tumor    TUNNELED VENOUS PORT PLACEMENT N/A 5/31/2022    Procedure: INSERTION VENOUS PORT (PORT-A-CATH); Surgeon: Jasmeet Calloway MD;  [facility];  Service: Surgical Oncology    UPPER GASTROINTESTINAL ENDOSCOPY         Family History   Problem Relation Age of Onset    Emphysema Mother    Dami Bree Arthritis Mother     Diabetes Mother     Hypertension Mother     COPD Mother     Arthritis Father     Prostate cancer Father     Cerebral aneurysm Son     No Known Problems Maternal Grandmother     No Known Problems Maternal Grandfather     No Known Problems Paternal Grandmother     No Known Problems Paternal Grandfather     No Known Problems Son     No Known Problems Maternal Aunt     No Known Problems Maternal Aunt     No Known Problems Paternal Aunt     No Known Problems Paternal Aunt      I have reviewed and agree with the history as documented      E-Cigarette/Vaping    E-Cigarette Use Never User      E-Cigarette/Vaping Substances    Nicotine No  THC No     CBD No     Flavoring No     Other No     Unknown No      Social History     Tobacco Use    Smoking status: Former Smoker     Years: 9 00     Types: Cigarettes    Smokeless tobacco: Never Used    Tobacco comment: pt states she smokes 1 to 3 times per week   Vaping Use    Vaping Use: Never used   Substance Use Topics    Alcohol use: Not Currently     Comment: social drinker per allscript     Drug use: No       Review of Systems   Constitutional: Negative  HENT: Negative  Eyes: Negative  Respiratory: Negative  Cardiovascular: Negative  Gastrointestinal: Negative  Endocrine: Negative  Genitourinary: Positive for hematuria  Musculoskeletal: Negative  Skin: Negative  Allergic/Immunologic: Negative  Neurological: Negative  Hematological: Negative  Psychiatric/Behavioral: Negative  All other systems reviewed and are negative  Physical Exam  Physical Exam  Constitutional:       Appearance: Normal appearance  HENT:      Head: Normocephalic and atraumatic  Nose: Nose normal       Mouth/Throat:      Mouth: Mucous membranes are moist    Eyes:      Extraocular Movements: Extraocular movements intact  Pupils: Pupils are equal, round, and reactive to light  Cardiovascular:      Rate and Rhythm: Normal rate and regular rhythm  Pulmonary:      Effort: Pulmonary effort is normal       Breath sounds: Normal breath sounds  Abdominal:      General: Abdomen is flat  Bowel sounds are normal       Palpations: Abdomen is soft  Musculoskeletal:         General: Normal range of motion  Cervical back: Normal range of motion and neck supple  Skin:     General: Skin is warm  Capillary Refill: Capillary refill takes less than 2 seconds  Neurological:      General: No focal deficit present  Mental Status: She is alert and oriented to person, place, and time  Mental status is at baseline     Psychiatric:         Mood and Affect: Mood normal  Thought Content:  Thought content normal          Vital Signs  ED Triage Vitals [06/26/22 1240]   Temperature Pulse Respirations Blood Pressure SpO2   (!) 96 6 °F (35 9 °C) 72 20 143/87 97 %      Temp Source Heart Rate Source Patient Position - Orthostatic VS BP Location FiO2 (%)   Tympanic Monitor Sitting Right arm --      Pain Score       No Pain           Vitals:    06/26/22 1240 06/26/22 1339 06/26/22 1430   BP: 143/87 145/88 141/86   Pulse: 72 68    Patient Position - Orthostatic VS: Sitting  Sitting         Visual Acuity      ED Medications  Medications - No data to display    Diagnostic Studies  Results Reviewed     Procedure Component Value Units Date/Time    Urine Microscopic [[id number]]  (Abnormal) Collected: 06/26/22 1306    Lab Status: Final result Specimen: Urine, Clean Catch Updated: 06/26/22 1341     RBC, UA Innumerable /hpf      WBC, UA 1-2 /hpf      Epithelial Cells None Seen /hpf      Bacteria, UA Occasional /hpf     Comprehensive metabolic panel [[id number]]  (Abnormal) Collected: 06/26/22 1304    Lab Status: Final result Specimen: Blood from Arm, Left Updated: 06/26/22 1340     Sodium 142 mmol/L      Potassium 4 0 mmol/L      Chloride 104 mmol/L      CO2 31 mmol/L      ANION GAP 7 mmol/L      BUN 15 mg/dL      Creatinine 0 80 mg/dL      Glucose 97 mg/dL      Calcium 9 1 mg/dL      AST 42 U/L      ALT 72 U/L      Alkaline Phosphatase 135 U/L      Total Protein 6 8 g/dL      Albumin 3 5 g/dL      Total Bilirubin 0 65 mg/dL      eGFR 79 ml/min/1 73sq m     Narrative:      [place] guidelines for Chronic Kidney Disease (CKD):     Stage 1 with normal or high GFR (GFR > 90 mL/min/1 73 square meters)    Stage 2 Mild CKD (GFR = 60-89 mL/min/1 73 square meters)    Stage 3A Moderate CKD (GFR = 45-59 mL/min/1 73 square meters)    Stage 3B Moderate CKD (GFR = 30-44 mL/min/1 73 square meters)    Stage 4 Severe CKD (GFR = 15-29 mL/min/1 73 square meters)    Stage 5 End Stage CKD (GFR <15 mL/min/1 73 square meters)  Note: GFR calculation is accurate only with a steady state creatinine    UA (URINE) with reflex to Scope [[id number]]  (Abnormal) Collected: 06/26/22 1306    Lab Status: Final result Specimen: Urine, Clean Catch Updated: 06/26/22 1321     Color, UA Bridgett     Clarity, UA Cloudy     Specific Gravity, UA 1 015     pH, UA 8 0     Leukocytes, UA Negative     Nitrite, UA Negative     Protein, UA Negative mg/dl      Glucose, UA Negative mg/dl      Ketones, UA Negative mg/dl      Urobilinogen, UA 0 2 E U /dl      Bilirubin, UA Negative     Occult Blood, UA Large    Urine culture [[id number]] Collected: 06/26/22 1309    Lab Status: In process Specimen: Urine, Clean Catch Updated: 06/26/22 1314    CBC and differential [[id number]]  (Abnormal) Collected: 06/26/22 1304    Lab Status: Final result Specimen: Blood from Arm, Left Updated: 06/26/22 1313     WBC 12 61 Thousand/uL      RBC 4 87 Million/uL      Hemoglobin 13 4 g/dL      Hematocrit 42 8 %      MCV 88 fL      MCH 27 5 pg      MCHC 31 3 g/dL      RDW 16 0 %      MPV 10 9 fL      Platelets 026 Thousands/uL      nRBC 0 /100 WBCs      Neutrophils Relative 67 %      Immat GRANS % 2 %      Lymphocytes Relative 24 %      Monocytes Relative 5 %      Eosinophils Relative 1 %      Basophils Relative 1 %      Neutrophils Absolute 8 50 Thousands/µL      Immature Grans Absolute 0 22 Thousand/uL      Lymphocytes Absolute 3 08 Thousands/µL      Monocytes Absolute 0 62 Thousand/µL      Eosinophils Absolute 0 07 Thousand/µL      Basophils Absolute 0 12 Thousands/µL                  CT renal stone study abdomen pelvis wo contrast   Final Result by Adrianne Glass MD (06/26 9458)      5 mm proximal left ureteral calculus  No significant hydroureter or hydronephrosis  Hepatomegaly/hepatic steatosis  Known pancreatic head mass is not well visualized without intravenous contrast       The study was marked in EPIC for immediate notification  Workstation performed: [id number]                    Procedures  Procedures         ED Course                               SBIRT 20yo+    Flowsheet Row Most Recent Value   SBIRT (23 yo +)    In order to provide better care to our patients, we are screening all of our patients for alcohol and drug use  Would it be okay to ask you these screening questions? Yes Filed at: 06/26/2022 1305   Initial Alcohol Screen: US AUDIT-C     1  How often do you have a drink containing alcohol? 0 Filed at: 06/26/2022 1305   2  How many drinks containing alcohol do you have on a typical day you are drinking? 0 Filed at: 06/26/2022 1305   3a  Male UNDER 65: How often do you have five or more drinks on one occasion? 0 Filed at: 06/26/2022 1305   3b  FEMALE Any Age, or MALE 65+: How often do you have 4 or more drinks on one occassion? 0 Filed at: 06/26/2022 1305   Audit-C Score 0 Filed at: 06/26/2022 1305   JAXSON: How many times in the past year have you    Used an illegal drug or used a prescription medication for non-medical reasons?  Never Filed at: 06/26/2022 1305                    MDM  Number of Diagnoses or Management Options     Amount and/or Complexity of Data Reviewed  Clinical lab tests: ordered and reviewed  Tests in the radiology section of CPT®: reviewed and ordered  Tests in the medicine section of CPT®: ordered and reviewed    Patient Progress  Patient progress: stable      Disposition  Final diagnoses:   Hematuria   Kidney stone   Ureteral stone     Time reflects when diagnosis was documented in both MDM as applicable and the Disposition within this note     Time User Action Codes Description Comment    6/26/2022  3:05 PM ElizabethuRolandaJoanna Add [R31 9] Hematuria     6/26/2022  3:05 PM Anepu Joanna Add [N20 0] Kidney stone     6/26/2022  3:06 PM AnepuRolandaJoanna Add [N20 1] Ureteral stone       ED Disposition     ED Disposition   Discharge    Condition   Stable    Date/Time   Sun Jun 26, 2022  3:05 PM    Comment Aura Po discharge to home/self care  Follow-up Information     Follow up With Specialties Details Why Contact Info Additional Information    Sonali Chilel MD Family Medicine Schedule an appointment as soon as possible for a visit   [address] [facility] [address] [address] [facility] Emergency Department Emergency Medicine  If symptoms worsen [address] [address]  [address] [address] Emergency Department, [place], [place], [address]          Patient's Medications   Discharge Prescriptions    ONDANSETRON (ZOFRAN-ODT) 4 MG DISINTEGRATING TABLET    Take 1 tablet (4 mg total) by mouth every 6 (six) hours as needed for nausea for up to 3 days       Start Date: 6/26/2022 End Date: 6/29/2022       Order Dose: 4 mg       Quantity: 9 tablet    Refills: 0    TAMSULOSIN (FLOMAX) 0 4 MG    Take 1 capsule (0 4 mg total) by mouth daily with dinner for 7 days       Start Date: 6/26/2022 End Date: 7/3/2022       Order Dose: 0 4 mg       Quantity: 7 capsule    Refills: 0       No discharge procedures on file      PDMP Review       Value Time User    PDMP Reviewed  Yes 5/24/2022  9:01 AM Diana De Guzman MD          ED Provider  Electronically Signed by           Troy Avendaño DO  06/26/22 6543

## 2022-06-28 ENCOUNTER — HOSPITAL ENCOUNTER (OUTPATIENT)
Dept: NON INVASIVE DIAGNOSTICS | Facility: HOSPITAL | Age: 62
Discharge: HOME | End: 2022-06-28
Attending: RADIOLOGY | Admitting: RADIOLOGY
Payer: COMMERCIAL

## 2022-06-28 ENCOUNTER — PATIENT OUTREACH (OUTPATIENT)
Dept: CASE MANAGEMENT | Facility: HOSPITAL | Age: 62
End: 2022-06-28

## 2022-06-28 VITALS
DIASTOLIC BLOOD PRESSURE: 57 MMHG | TEMPERATURE: 98.5 F | WEIGHT: 293 LBS | SYSTOLIC BLOOD PRESSURE: 112 MMHG | OXYGEN SATURATION: 99 % | RESPIRATION RATE: 18 BRPM | BODY MASS INDEX: 53.48 KG/M2 | HEART RATE: 76 BPM

## 2022-06-28 DIAGNOSIS — C25.0 ADENOCARCINOMA OF HEAD OF PANCREAS (HCC): ICD-10-CM

## 2022-06-28 DIAGNOSIS — T45.1X5A CHEMOTHERAPY INDUCED NEUTROPENIA (HCC): Primary | ICD-10-CM

## 2022-06-28 DIAGNOSIS — D70.1 CHEMOTHERAPY INDUCED NEUTROPENIA (HCC): Primary | ICD-10-CM

## 2022-06-28 LAB
BACTERIA UR CULT: NORMAL
GLUCOSE SERPL-MCNC: 106 MG/DL (ref 65–140)

## 2022-06-28 PROCEDURE — 36590 REMOVAL TUNNELED CV CATH: CPT | Performed by: RADIOLOGY

## 2022-06-28 PROCEDURE — 99152 MOD SED SAME PHYS/QHP 5/>YRS: CPT | Performed by: RADIOLOGY

## 2022-06-28 PROCEDURE — 77001 FLUOROGUIDE FOR VEIN DEVICE: CPT

## 2022-06-28 PROCEDURE — 36561 INSERT TUNNELED CV CATH: CPT | Performed by: RADIOLOGY

## 2022-06-28 PROCEDURE — C1788 PORT, INDWELLING, IMP: HCPCS

## 2022-06-28 PROCEDURE — 36590 REMOVAL TUNNELED CV CATH: CPT

## 2022-06-28 PROCEDURE — 76937 US GUIDE VASCULAR ACCESS: CPT | Performed by: RADIOLOGY

## 2022-06-28 PROCEDURE — 77001 FLUOROGUIDE FOR VEIN DEVICE: CPT | Performed by: RADIOLOGY

## 2022-06-28 PROCEDURE — 36561 INSERT TUNNELED CV CATH: CPT

## 2022-06-28 PROCEDURE — 76937 US GUIDE VASCULAR ACCESS: CPT

## 2022-06-28 PROCEDURE — 82948 REAGENT STRIP/BLOOD GLUCOSE: CPT

## 2022-06-28 RX ORDER — MIDAZOLAM HYDROCHLORIDE 2 MG/2ML
INJECTION, SOLUTION INTRAMUSCULAR; INTRAVENOUS CODE/TRAUMA/SEDATION MEDICATION
Status: COMPLETED | OUTPATIENT
Start: 2022-06-28 | End: 2022-06-28

## 2022-06-28 RX ORDER — SODIUM CHLORIDE 9 MG/ML
20 INJECTION, SOLUTION INTRAVENOUS ONCE AS NEEDED
Status: CANCELLED | OUTPATIENT
Start: 2022-07-05

## 2022-06-28 RX ORDER — FLUOROURACIL 50 MG/ML
400 INJECTION, SOLUTION INTRAVENOUS ONCE
Status: CANCELLED | OUTPATIENT
Start: 2022-07-05

## 2022-06-28 RX ORDER — ACETAMINOPHEN 325 MG/1
650 TABLET ORAL EVERY 4 HOURS PRN
Status: DISCONTINUED | OUTPATIENT
Start: 2022-06-28 | End: 2022-06-29 | Stop reason: HOSPADM

## 2022-06-28 RX ORDER — FENTANYL CITRATE 50 UG/ML
25 INJECTION, SOLUTION INTRAMUSCULAR; INTRAVENOUS EVERY 2 HOUR PRN
Status: CANCELLED | OUTPATIENT
Start: 2022-06-28 | End: 2022-06-30

## 2022-06-28 RX ORDER — DEXTROSE MONOHYDRATE 50 MG/ML
20 INJECTION, SOLUTION INTRAVENOUS ONCE
Status: CANCELLED | OUTPATIENT
Start: 2022-07-06

## 2022-06-28 RX ORDER — ATROPINE SULFATE 1 MG/ML
0.25 INJECTION, SOLUTION INTRAVENOUS ONCE
Status: CANCELLED | OUTPATIENT
Start: 2022-07-06

## 2022-06-28 RX ORDER — ATROPINE SULFATE 1 MG/ML
0.25 INJECTION, SOLUTION INTRAVENOUS ONCE AS NEEDED
Status: CANCELLED | OUTPATIENT
Start: 2022-07-05

## 2022-06-28 RX ORDER — ATROPINE SULFATE 1 MG/ML
0.25 INJECTION, SOLUTION INTRAVENOUS ONCE AS NEEDED
Status: CANCELLED | OUTPATIENT
Start: 2022-07-06

## 2022-06-28 RX ORDER — LIDOCAINE WITH 8.4% SOD BICARB 0.9%(10ML)
SYRINGE (ML) INJECTION CODE/TRAUMA/SEDATION MEDICATION
Status: COMPLETED | OUTPATIENT
Start: 2022-06-28 | End: 2022-06-28

## 2022-06-28 RX ORDER — ATROPINE SULFATE 1 MG/ML
0.25 INJECTION, SOLUTION INTRAVENOUS ONCE
Status: CANCELLED | OUTPATIENT
Start: 2022-07-05

## 2022-06-28 RX ORDER — OXYCODONE HYDROCHLORIDE 5 MG/1
10 TABLET ORAL EVERY 4 HOURS PRN
Status: DISCONTINUED | OUTPATIENT
Start: 2022-06-28 | End: 2022-06-29 | Stop reason: HOSPADM

## 2022-06-28 RX ORDER — OXYCODONE HYDROCHLORIDE 5 MG/1
5 TABLET ORAL EVERY 4 HOURS PRN
Status: DISCONTINUED | OUTPATIENT
Start: 2022-06-28 | End: 2022-06-29 | Stop reason: HOSPADM

## 2022-06-28 RX ORDER — SODIUM CHLORIDE 9 MG/ML
20 INJECTION, SOLUTION INTRAVENOUS ONCE AS NEEDED
Status: CANCELLED | OUTPATIENT
Start: 2022-07-06

## 2022-06-28 RX ORDER — DEXTROSE MONOHYDRATE 50 MG/ML
20 INJECTION, SOLUTION INTRAVENOUS ONCE
Status: CANCELLED | OUTPATIENT
Start: 2022-07-05

## 2022-06-28 RX ORDER — SODIUM CHLORIDE 9 MG/ML
30 INJECTION, SOLUTION INTRAVENOUS CONTINUOUS
Status: DISCONTINUED | OUTPATIENT
Start: 2022-06-28 | End: 2022-06-29 | Stop reason: HOSPADM

## 2022-06-28 RX ORDER — FENTANYL CITRATE 50 UG/ML
INJECTION, SOLUTION INTRAMUSCULAR; INTRAVENOUS CODE/TRAUMA/SEDATION MEDICATION
Status: COMPLETED | OUTPATIENT
Start: 2022-06-28 | End: 2022-06-28

## 2022-06-28 RX ORDER — FLUOROURACIL 50 MG/ML
400 INJECTION, SOLUTION INTRAVENOUS ONCE
Status: CANCELLED | OUTPATIENT
Start: 2022-07-06

## 2022-06-28 RX ADMIN — FENTANYL CITRATE 50 MCG: 50 INJECTION, SOLUTION INTRAMUSCULAR; INTRAVENOUS at 14:16

## 2022-06-28 RX ADMIN — MIDAZOLAM 1 MG: 1 INJECTION INTRAMUSCULAR; INTRAVENOUS at 14:01

## 2022-06-28 RX ADMIN — FENTANYL CITRATE 50 MCG: 50 INJECTION, SOLUTION INTRAMUSCULAR; INTRAVENOUS at 13:45

## 2022-06-28 RX ADMIN — MIDAZOLAM 1 MG: 1 INJECTION INTRAMUSCULAR; INTRAVENOUS at 13:45

## 2022-06-28 RX ADMIN — Medication 20 ML: at 14:01

## 2022-06-28 RX ADMIN — FENTANYL CITRATE 50 MCG: 50 INJECTION, SOLUTION INTRAMUSCULAR; INTRAVENOUS at 14:01

## 2022-06-28 RX ADMIN — CEFAZOLIN 3000 MG: 1 INJECTION, POWDER, FOR SOLUTION INTRAVENOUS at 13:57

## 2022-06-28 RX ADMIN — MIDAZOLAM 1 MG: 1 INJECTION INTRAMUSCULAR; INTRAVENOUS at 14:12

## 2022-06-28 RX ADMIN — Medication 20 ML: at 14:44

## 2022-06-28 NOTE — SEDATION DOCUMENTATION
Procedure ended  Left chest port removed and new one placed  Exofin to site  Patient tolerated well

## 2022-06-28 NOTE — DISCHARGE INSTRUCTIONS
Implanted Venous Access Port     WHAT YOU NEED TO KNOW:   An implanted venous access port is a device used to give treatments and take blood  It may also be called a central venous access device (CVAD)  The port is a small container that is placed under your skin, usually in your upper chest  The port is attached to a catheter that enters a large vein  DISCHARGE INSTRUCTIONS:   Resume your normal diet  Small sips of flat soda will help with mild nausea  Prevent an infection:   Wash your hands often  Use soap and water  Clean your hands before and after you care for your port  Remind everyone who cares for your port to wash their hands  Check your skin for infection every day  Look for redness, swelling, or fluid oozing from the port site  Care for your port: You may shower beginning 48 hours after procedure  Leave glue in place  It is normal for some bruising to occur  Use Tylenol for pain  Limit use of arm on the side that your port was placed  Lift nothing heavier than 5 pounds for 1 week, and then gradually increase activity as tolerated  DO NOT apply ointment, lotion or cream to port site until incision is healed  Allow glue to fall off  DO NOT attempt to peel glue from skin even it it begins to flake  After the port incision is healed you may swim, bathe  Notify the Interventional Radiologist if you have any of the followin  Fever above 101 F    2  Increased redness or swelling after 1st day  3  Increased pain after 1st day  4  Any sign of infection (drainage from port site, skin separation, hot to touch)  5  Persistent nausea or vomiting  Contact Interventional Radiology at [phone] [facility] PATIENTS: Contact Interventional Radiology at [phone]) ([address] [facility] [address]: Contact Interventional Radiology at [phone])

## 2022-06-28 NOTE — PERIOPERATIVE NURSING NOTE
Vitals wdl, pt able to tolerate fluids post procedure, site clean dry intact, discharge education provided to patient and spouse, both stated understanding, left floor via wheelchair by RN, discharged to home

## 2022-06-28 NOTE — BRIEF OP NOTE (RAD/CATH)
INTERVENTIONAL RADIOLOGY PROCEDURE NOTE    Date: 6/28/2022    Procedure: IR PORT REMOVAL AND PLACEMENT NEW SITE     Preoperative diagnosis:   1  Adenocarcinoma of head of pancreas ([address] )       Postoperative diagnosis: Same  Surgeon: Diane Hernandez MD     Assistant: None  No qualified resident was available  Blood loss: minimal    Specimens: none    Findings: Successful left sided port removal and left sided placement of a new port at a new site  Complications: None immediate      Anesthesia: conscious sedation

## 2022-06-30 ENCOUNTER — APPOINTMENT (OUTPATIENT)
Dept: LAB | Facility: CLINIC | Age: 62
End: 2022-06-30
Payer: COMMERCIAL

## 2022-06-30 DIAGNOSIS — D70.1 CHEMOTHERAPY INDUCED NEUTROPENIA (HCC): ICD-10-CM

## 2022-06-30 DIAGNOSIS — C25.0 ADENOCARCINOMA OF HEAD OF PANCREAS (HCC): ICD-10-CM

## 2022-06-30 DIAGNOSIS — T45.1X5A CHEMOTHERAPY INDUCED NEUTROPENIA (HCC): ICD-10-CM

## 2022-06-30 LAB
ALBUMIN SERPL BCP-MCNC: 3.3 G/DL (ref 3.5–5)
ALP SERPL-CCNC: 125 U/L (ref 46–116)
ALT SERPL W P-5'-P-CCNC: 54 U/L (ref 12–78)
ANION GAP SERPL CALCULATED.3IONS-SCNC: 7 MMOL/L (ref 4–13)
AST SERPL W P-5'-P-CCNC: 34 U/L (ref 5–45)
BASOPHILS # BLD AUTO: 0.1 THOUSANDS/ΜL (ref 0–0.1)
BASOPHILS NFR BLD AUTO: 1 % (ref 0–1)
BILIRUB SERPL-MCNC: 0.69 MG/DL (ref 0.2–1)
BUN SERPL-MCNC: 14 MG/DL (ref 5–25)
CALCIUM ALBUM COR SERPL-MCNC: 10.1 MG/DL (ref 8.3–10.1)
CALCIUM SERPL-MCNC: 9.5 MG/DL (ref 8.3–10.1)
CHLORIDE SERPL-SCNC: 108 MMOL/L (ref 100–108)
CO2 SERPL-SCNC: 25 MMOL/L (ref 21–32)
CREAT SERPL-MCNC: 0.95 MG/DL (ref 0.6–1.3)
EOSINOPHIL # BLD AUTO: 0.01 THOUSAND/ΜL (ref 0–0.61)
EOSINOPHIL NFR BLD AUTO: 0 % (ref 0–6)
ERYTHROCYTE [DISTWIDTH] IN BLOOD BY AUTOMATED COUNT: 16 % (ref 11.6–15.1)
GFR SERPL CREATININE-BSD FRML MDRD: 64 ML/MIN/1.73SQ M
GLUCOSE P FAST SERPL-MCNC: 143 MG/DL (ref 65–99)
HCT VFR BLD AUTO: 40.1 % (ref 34.8–46.1)
HGB BLD-MCNC: 12.5 G/DL (ref 11.5–15.4)
IMM GRANULOCYTES # BLD AUTO: 0.04 THOUSAND/UL (ref 0–0.2)
IMM GRANULOCYTES NFR BLD AUTO: 1 % (ref 0–2)
LYMPHOCYTES # BLD AUTO: 2.05 THOUSANDS/ΜL (ref 0.6–4.47)
LYMPHOCYTES NFR BLD AUTO: 28 % (ref 14–44)
MCH RBC QN AUTO: 28 PG (ref 26.8–34.3)
MCHC RBC AUTO-ENTMCNC: 31.2 G/DL (ref 31.4–37.4)
MCV RBC AUTO: 90 FL (ref 82–98)
MONOCYTES # BLD AUTO: 0.52 THOUSAND/ΜL (ref 0.17–1.22)
MONOCYTES NFR BLD AUTO: 7 % (ref 4–12)
NEUTROPHILS # BLD AUTO: 4.67 THOUSANDS/ΜL (ref 1.85–7.62)
NEUTS SEG NFR BLD AUTO: 63 % (ref 43–75)
NRBC BLD AUTO-RTO: 0 /100 WBCS
PLATELET # BLD AUTO: 149 THOUSANDS/UL (ref 149–390)
PMV BLD AUTO: 13 FL (ref 8.9–12.7)
POTASSIUM SERPL-SCNC: 4.1 MMOL/L (ref 3.5–5.3)
PROT SERPL-MCNC: 7.3 G/DL (ref 6.4–8.2)
RBC # BLD AUTO: 4.46 MILLION/UL (ref 3.81–5.12)
SODIUM SERPL-SCNC: 140 MMOL/L (ref 136–145)
WBC # BLD AUTO: 7.39 THOUSAND/UL (ref 4.31–10.16)

## 2022-06-30 PROCEDURE — 85025 COMPLETE CBC W/AUTO DIFF WBC: CPT

## 2022-06-30 PROCEDURE — 80053 COMPREHEN METABOLIC PANEL: CPT

## 2022-06-30 PROCEDURE — 36415 COLL VENOUS BLD VENIPUNCTURE: CPT

## 2022-07-01 DIAGNOSIS — T45.1X5A CHEMOTHERAPY INDUCED NEUTROPENIA (HCC): Primary | ICD-10-CM

## 2022-07-01 DIAGNOSIS — C25.0 ADENOCARCINOMA OF HEAD OF PANCREAS (HCC): ICD-10-CM

## 2022-07-01 DIAGNOSIS — D70.1 CHEMOTHERAPY INDUCED NEUTROPENIA (HCC): Primary | ICD-10-CM

## 2022-07-05 ENCOUNTER — PATIENT OUTREACH (OUTPATIENT)
Dept: CASE MANAGEMENT | Facility: HOSPITAL | Age: 62
End: 2022-07-05

## 2022-07-05 ENCOUNTER — HOSPITAL ENCOUNTER (OUTPATIENT)
Dept: INFUSION CENTER | Facility: CLINIC | Age: 62
Discharge: HOME | End: 2022-07-05
Payer: COMMERCIAL

## 2022-07-05 ENCOUNTER — TELEPHONE (OUTPATIENT)
Dept: HEMATOLOGY ONCOLOGY | Facility: CLINIC | Age: 62
End: 2022-07-05

## 2022-07-05 VITALS
HEART RATE: 89 BPM | BODY MASS INDEX: 50.02 KG/M2 | RESPIRATION RATE: 18 BRPM | WEIGHT: 293 LBS | HEIGHT: 64 IN | DIASTOLIC BLOOD PRESSURE: 80 MMHG | SYSTOLIC BLOOD PRESSURE: 114 MMHG | OXYGEN SATURATION: 95 % | TEMPERATURE: 96.3 F

## 2022-07-05 DIAGNOSIS — C25.0 ADENOCARCINOMA OF HEAD OF PANCREAS (HCC): ICD-10-CM

## 2022-07-05 DIAGNOSIS — T45.1X5A CHEMOTHERAPY INDUCED NEUTROPENIA (HCC): Primary | ICD-10-CM

## 2022-07-05 DIAGNOSIS — D70.1 CHEMOTHERAPY INDUCED NEUTROPENIA (HCC): Primary | ICD-10-CM

## 2022-07-05 PROCEDURE — 96375 TX/PRO/DX INJ NEW DRUG ADDON: CPT

## 2022-07-05 PROCEDURE — 96413 CHEMO IV INFUSION 1 HR: CPT

## 2022-07-05 PROCEDURE — G0498 CHEMO EXTEND IV INFUS W/PUMP: HCPCS

## 2022-07-05 PROCEDURE — 96411 CHEMO IV PUSH ADDL DRUG: CPT

## 2022-07-05 PROCEDURE — 96367 TX/PROPH/DG ADDL SEQ IV INF: CPT

## 2022-07-05 PROCEDURE — 96417 CHEMO IV INFUS EACH ADDL SEQ: CPT

## 2022-07-05 PROCEDURE — 96415 CHEMO IV INFUSION ADDL HR: CPT

## 2022-07-05 RX ORDER — DEXTROSE MONOHYDRATE 50 MG/ML
20 INJECTION, SOLUTION INTRAVENOUS ONCE
Status: COMPLETED | OUTPATIENT
Start: 2022-07-05 | End: 2022-07-05

## 2022-07-05 RX ORDER — FLUOROURACIL 50 MG/ML
400 INJECTION, SOLUTION INTRAVENOUS ONCE
Status: COMPLETED | OUTPATIENT
Start: 2022-07-05 | End: 2022-07-05

## 2022-07-05 RX ORDER — ATROPINE SULFATE 1 MG/ML
0.25 INJECTION, SOLUTION INTRAVENOUS ONCE
Status: COMPLETED | OUTPATIENT
Start: 2022-07-05 | End: 2022-07-05

## 2022-07-05 RX ORDER — ATROPINE SULFATE 1 MG/ML
0.25 INJECTION, SOLUTION INTRAVENOUS ONCE AS NEEDED
Status: DISCONTINUED | OUTPATIENT
Start: 2022-07-05 | End: 2022-07-08 | Stop reason: HOSPADM

## 2022-07-05 RX ORDER — SODIUM CHLORIDE 9 MG/ML
20 INJECTION, SOLUTION INTRAVENOUS ONCE AS NEEDED
Status: DISCONTINUED | OUTPATIENT
Start: 2022-07-05 | End: 2022-07-08 | Stop reason: HOSPADM

## 2022-07-05 RX ADMIN — SODIUM CHLORIDE 20 ML/HR: 0.9 INJECTION, SOLUTION INTRAVENOUS at 08:05

## 2022-07-05 RX ADMIN — IRINOTECAN HYDROCHLORIDE 440 MG: 20 INJECTION, SOLUTION INTRAVENOUS at 10:56

## 2022-07-05 RX ADMIN — FLUOROURACIL 955 MG: 50 INJECTION, SOLUTION INTRAVENOUS at 12:28

## 2022-07-05 RX ADMIN — DEXTROSE 20 ML/HR: 5 SOLUTION INTRAVENOUS at 08:39

## 2022-07-05 RX ADMIN — DEXAMETHASONE SODIUM PHOSPHATE: 10 INJECTION, SOLUTION INTRAMUSCULAR; INTRAVENOUS at 08:08

## 2022-07-05 RX ADMIN — ATROPINE SULFATE 0.25 MG: 1 INJECTION, SOLUTION INTRAMUSCULAR; INTRAVENOUS; SUBCUTANEOUS at 10:40

## 2022-07-05 RX ADMIN — OXALIPLATIN 200 MG: 5 INJECTION, SOLUTION INTRAVENOUS at 08:40

## 2022-07-05 NOTE — PROGRESS NOTES
Received message from [facility] RN stating that pt reports nausea without abdominal cramping  Pt had oral decadron Zofran at 0808  Pt reported this same sensation last treatment and took her oral Zofran and it went away  Reviewed this with Dr Amanda Robison, per him okay for patient to take oral Zofran now and give an additional 10 mg of IV decadron if patient prefers  Dr Amanda Robison stated Barbra Apt should be given started next treatment as premedication  [facility] RN notified

## 2022-07-05 NOTE — PROGRESS NOTES
Biopsychosocial and Barriers Assessment    Cancer Diagnosis:pancreatic cancer   Home/Cell Phone:[phone]  Emergency Contact: - [phone]  Marital Status:   Interpretation concerns, speaks another language, preferred language: english  Cultural concerns: none  Ability to read or write: yes pt  Is employed and works for [employer]  Caregiver/Support: - Charm Lefort  Children: sonWinston Mistry  Child/Elder care: no however pt does have five grandsons  Housing: pt lives in a [place] ranch   Home Setup: there are 12 stairs to enter from outside  Lives With:  Charm Lefort  Son- Cristofer Ward lives with pt  He has gone through a divorce  Daily Living Activities: independent  Durable Medical Equipment: none  Ambulation: independent    Preferred [facility] [place]  High co-pays with insurance: no  High co-pays with medication coverage:no  No medication coverage:  n/a    Primary Care Provider: Sonali Chilel MD  Hx of [address]: no  Hx of Short term rehab: no  Mental Health Hx: no  Substance Abuse Hx: no  Employment: employed for the court [employer]  [place] Status/Location: no  Ability to pay bills:yes  POA/LW/AD: - Charm Lefort  Transportation Plan/Concerns: pt transports herself to appts at [address]  Pt came to [facility] infusion because there were no availabilities in [facility] due to the holiday  What do you know about your Cancer Diagnosis    What has your doctor told you about your cancer diagnosis: pancreatic cancer to be treated  What has your doctor told you about your cancer treatment: chemo treatment and surgery  What specific concerns do you have about your diagnosis and treatment: pt states she is taking it one day at a time  Pt also states there is nothing else to do but to take it as it comes  Have you been made aware of any hair loss associated with treatment: yes  MSW provided resources [employer] [facility] [employer]    Pt is is informed of the possibility of insurance paying for a wig  MSW provided pamphlet for cancer support community and educated they provided a free wig  Additional Comments:    MSW met with pt and  - Ariana Clancy who states they are doing fine  Pt states she is doing well on her chemotherapy and continues to go into work  Pt states she normally goes to the [facility] due to the holiday they did not have availability  Pt state she is taking it one day at a time  - Ariana Clancy states he is on permanent disability due to an accident that cause his neck and back to be repaired  Ariana Clancy states he keeps up with patient's appointments and assists with pt in all ways possible  Pt states her - Jordyn Campuzano is a great caregiver and has been very helpful  MSW provided emotional supportive services  Pt is receptive to this MSW's follow  MSW will continue to follow up

## 2022-07-05 NOTE — TELEPHONE ENCOUNTER
Patient had Emend and Aloxi added to her tx plan  The Emend is okay however can the Aloxi please be changed to insurance preferred Kytril (granisetron)?     Thanks

## 2022-07-05 NOTE — PROGRESS NOTES
Patient rang call bell c/o nausea, denies abdominal cramping  Patient reports she typically gets some nausea with the Irinotecan  Patient questioning if she can take her PO Zofran, reports she took it during last cycle and it helped  TEAMs message sent to [facility] in Dr Carolina Silva office to make aware - will add Emend and Aloxi (switching out Zof/Dex to just Decadron) to next cycle and can add Decadron today if needed  Patient aware, took Zofran at [phone] and will let staff know if nausea is not improved/if she wants Decadron  Alla RN (primary RN) aware

## 2022-07-05 NOTE — PROGRESS NOTES
Pt  tolerated treatment  Connected to Elastomeric infusion device after independent verification of dose and clamp check  Good blood return noted prior to connection  To return for disconnect @  10:45 on Thurs [id number]/72022

## 2022-07-07 ENCOUNTER — HOSPITAL ENCOUNTER (OUTPATIENT)
Dept: INFUSION CENTER | Facility: CLINIC | Age: 62
Discharge: HOME | End: 2022-07-07
Payer: COMMERCIAL

## 2022-07-07 DIAGNOSIS — D70.1 CHEMOTHERAPY INDUCED NEUTROPENIA (HCC): Primary | ICD-10-CM

## 2022-07-07 DIAGNOSIS — C25.0 ADENOCARCINOMA OF HEAD OF PANCREAS (HCC): ICD-10-CM

## 2022-07-07 DIAGNOSIS — T45.1X5A CHEMOTHERAPY INDUCED NEUTROPENIA (HCC): Primary | ICD-10-CM

## 2022-07-07 PROCEDURE — 96372 THER/PROPH/DIAG INJ SC/IM: CPT

## 2022-07-07 RX ADMIN — PEGFILGRASTIM 6 MG: KIT SUBCUTANEOUS at 12:01

## 2022-07-07 NOTE — PROGRESS NOTES
Pt tolerated treatment well  Elastomeric ball appears deflated  Neulasta applied to SHRUTHI without issue   Declined AVS

## 2022-07-11 DIAGNOSIS — C25.0 ADENOCARCINOMA OF HEAD OF PANCREAS (HCC): ICD-10-CM

## 2022-07-11 DIAGNOSIS — T45.1X5A CHEMOTHERAPY INDUCED NEUTROPENIA (HCC): Primary | ICD-10-CM

## 2022-07-11 DIAGNOSIS — D70.1 CHEMOTHERAPY INDUCED NEUTROPENIA (HCC): Primary | ICD-10-CM

## 2022-07-11 RX ORDER — DEXTROSE MONOHYDRATE 50 MG/ML
20 INJECTION, SOLUTION INTRAVENOUS ONCE
Status: CANCELLED | OUTPATIENT
Start: 2022-07-20

## 2022-07-11 RX ORDER — ATROPINE SULFATE 1 MG/ML
0.25 INJECTION, SOLUTION INTRAVENOUS ONCE
Status: CANCELLED | OUTPATIENT
Start: 2022-07-20

## 2022-07-11 RX ORDER — SODIUM CHLORIDE 9 MG/ML
20 INJECTION, SOLUTION INTRAVENOUS ONCE AS NEEDED
Status: CANCELLED | OUTPATIENT
Start: 2022-07-20

## 2022-07-11 RX ORDER — ATROPINE SULFATE 1 MG/ML
0.25 INJECTION, SOLUTION INTRAVENOUS ONCE AS NEEDED
Status: CANCELLED | OUTPATIENT
Start: 2022-07-20

## 2022-07-11 RX ORDER — FLUOROURACIL 50 MG/ML
400 INJECTION, SOLUTION INTRAVENOUS ONCE
Status: CANCELLED | OUTPATIENT
Start: 2022-07-20

## 2022-07-12 ENCOUNTER — OFFICE VISIT (OUTPATIENT)
Dept: PODIATRY | Facility: CLINIC | Age: 62
End: 2022-07-12
Payer: COMMERCIAL

## 2022-07-12 VITALS
HEIGHT: 64 IN | SYSTOLIC BLOOD PRESSURE: 114 MMHG | WEIGHT: 293 LBS | DIASTOLIC BLOOD PRESSURE: 80 MMHG | BODY MASS INDEX: 50.02 KG/M2

## 2022-07-12 DIAGNOSIS — M21.969 ACQUIRED DEFORMITY OF FOOT, UNSPECIFIED LATERALITY: ICD-10-CM

## 2022-07-12 DIAGNOSIS — M79.672 PAIN IN BOTH FEET: ICD-10-CM

## 2022-07-12 DIAGNOSIS — E11.42 DIABETIC POLYNEUROPATHY ASSOCIATED WITH TYPE 2 DIABETES MELLITUS (HCC): Primary | ICD-10-CM

## 2022-07-12 DIAGNOSIS — L60.0 INGROWN TOENAIL: ICD-10-CM

## 2022-07-12 DIAGNOSIS — L03.032 PARONYCHIA OF GREAT TOE, LEFT: ICD-10-CM

## 2022-07-12 DIAGNOSIS — M79.671 PAIN IN BOTH FEET: ICD-10-CM

## 2022-07-12 DIAGNOSIS — B35.1 ONYCHOMYCOSIS: ICD-10-CM

## 2022-07-12 PROCEDURE — 99213 OFFICE O/P EST LOW 20 MIN: CPT | Performed by: PODIATRIST

## 2022-07-12 NOTE — PROGRESS NOTES
Assessment/Plan:  Metatarsalgia   Foot pain bilateral   Ingrown toenail   Paronychia   Mycosis of nail   Diabetic neuropathy      Plan   Patient educated on condition   Foot measured bilateral   Nails debrided without pain or complication   Patient may need nail procedure   Patient watch for signs of infection   Patient educated on care of the diabetic foot         Subjective:  Patient complains of pain in her big toes with ambulation   No history of trauma   Patient has pain when she wears shoes              Past Medical History:   Diagnosis Date    Abnormal liver function test       last assessed 1/16/17     Adenomatosis      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     last assessed 4/4/13    Atrial fibrillation ([facility] )      Atrial flutter ([facility] )      Benign essential hypertension       last assessed 12/21/17     Body mass index (BMI) of 50-59 9 in adult [facility])      Carpal tunnel syndrome 09/07/2004     unspecified laterality  / last assessed 9/7/04    Congenital pes planus       last assessed 7/15/14     Depression      Depression      Hemangioma of skin 08/26/2003     last assessed 8/26/03    History of gastroesophageal reflux (GERD)      Hypercholesterolemia      Hyperlipidemia      Hypertension      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[facility] [facility] ) 04/19/2006     last assessed 12/31/14     Osteoarthritis of both knees       last assessed 12/31/14     Paroxysmal atrial fibrillation (HCC)      S/P ablation of atrial flutter                     Past Surgical History:   Procedure Laterality Date    ABDOMINAL SURGERY   06/2006     20cm GIST removed     APPENDECTOMY        ATRIAL ABLATION SURGERY        CARPAL TUNNEL RELEASE Bilateral      COLONOSCOPY        HYSTERECTOMY        KNEE SURGERY        OOPHORECTOMY        TONSILLECTOMY        TUMOR EXCISION   2006     gastrointestinal stromal tumor                   Allergies   Allergen Reactions    Other         Adhesive tape             Current Outpatient Prescriptions:     aspirin 81 MG tablet, Take 81 mg by mouth daily  , Disp: , Rfl:     esomeprazole (NexIUM) 40 MG capsule, Take 1 capsule (40 mg total) by mouth daily, Disp: 90 capsule, Rfl: 1    ibuprofen (MOTRIN) 800 mg tablet, Take 1 tablet (800 mg total) by mouth every 8 (eight) hours as needed for mild pain, Disp: 30 tablet, Rfl: 0    Magnesium Oxide -Mg Supplement 400 MG CAPS, Take 1 capsule by mouth daily, Disp: , Rfl:     metoprolol succinate (TOPROL-XL) 25 mg 24 hr tablet, Take 1 tablet (25 mg total) by mouth 2 (two) times a day, Disp: 180 tablet, Rfl: 2    multivitamin (THERAGRAN) TABS, Take 1 tablet by mouth daily  , Disp: , Rfl:     olmesartan (BENICAR) 20 mg tablet, Take 1 tablet (20 mg total) by mouth daily, Disp: 30 tablet, Rfl: 0    Omega-3 Fatty Acids (FISH OIL) 1,000 mg, Take 1,000 mg by mouth 2 (two) times a day  , Disp: , Rfl:     PARoxetine (PAXIL-CR) 37 5 mg 24 hr tablet, Take 1 tablet (37 5 mg total) by mouth every morning, Disp: 90 tablet, Rfl: 3    pravastatin (PRAVACHOL) 40 mg tablet, Take 1 tablet (40 mg total) by mouth daily (Patient taking differently: Take 40 mg by mouth daily at bedtime  ), Disp: 90 tablet, Rfl: 1    predniSONE 20 mg tablet, Take 2 tablets (40 mg total) by mouth daily, Disp: 14 tablet, Rfl: 0    rosuvastatin (CRESTOR) 20 MG tablet, TAKE 1 TABLET DAILY, Disp: 90 tablet, Rfl: 3    sotalol (BETAPACE) 120 mg tablet, TAKE 1 TABLET BY MOUTH  EVERY 12 HOURS, Disp: 180 tablet, Rfl: 2           Patient Active Problem List   Diagnosis    Tachycardia    Dyspnea on exertion    Supraventricular tachycardia (HCC)    Hypertension    Controlled depression    Severe obstructive sleep apnea    Morbid obesity (HCC)    Impaired fasting glucose    Hyperlipidemia    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)    Esophageal reflux    Benign essential hypertension    Adenomatous colon polyp    Open wound of left lower extremity             Patient ID: Alecia Vieira is a 59 y o  female          Objective:  Patient's shoes and socks removed    Foot Exam     General  General Appearance: appears stated age and healthy   Orientation: alert and oriented to person, place, and time   Affect: appropriate   Gait: antalgic         Right Foot/Ankle      Inspection and Palpation  Tenderness: metatarsals   Swelling: dorsum and metatarsals   Arch: pes planus  Hammertoes: fifth toe  Skin Exam: dry skin;      Neurovascular  Dorsalis pedis: 1+  Posterior tibial: 2+  Superficial peroneal nerve sensation: diminished  Deep peroneal nerve sensation: diminished        Left Foot/Ankle       Inspection and Palpation  Tenderness: metatarsals   Swelling: dorsum and metatarsals   Arch: pes planus  Hammertoes: fifth toe  Skin Exam: dry skin;      Neurovascular  Dorsalis pedis: 1+  Posterior tibial: 2+  Superficial peroneal nerve sensation: diminished  Deep peroneal nerve sensation: diminished           Physical Exam   Constitutional: She appears well-developed and well-nourished  Cardiovascular: Normal rate and regular rhythm     Pulses:       Dorsalis pedis pulses are 1+ on the right side, and 1+ on the left side         Posterior tibial pulses are 2+ on the right side, and 2+ on the left side  Feet:   Right Foot:   Skin Integrity: Positive for dry skin  Left Foot:   Skin Integrity: Positive for dry skin     Skin:   Wide incurvated hallux toenail bilateral   Nails demonstrate mycosis   Both hallux is demonstrate ingrown toenail       Patient's shoes and socks removed            Assign Risk Category  Deformity present  Loss of protective sensation  No weak pulses  Risk: 2

## 2022-07-14 NOTE — PROGRESS NOTES
Per discussion with Dr Joseph Maxwell, patient's doses were recalculated based on capped BSA of 2 2  Irinotecan is dosed at 150 mg per metered sq  Five FU bolus is discontinued

## 2022-07-18 ENCOUNTER — TELEPHONE (OUTPATIENT)
Dept: UROLOGY | Facility: CLINIC | Age: 62
End: 2022-07-18

## 2022-07-18 ENCOUNTER — HOSPITAL ENCOUNTER (EMERGENCY)
Facility: HOSPITAL | Age: 62
Discharge: HOME | End: 2022-07-18
Attending: EMERGENCY MEDICINE | Admitting: EMERGENCY MEDICINE
Payer: COMMERCIAL

## 2022-07-18 ENCOUNTER — APPOINTMENT (EMERGENCY)
Dept: RADIOLOGY | Facility: HOSPITAL | Age: 62
End: 2022-07-18
Payer: COMMERCIAL

## 2022-07-18 ENCOUNTER — APPOINTMENT (EMERGENCY)
Dept: CT IMAGING | Facility: HOSPITAL | Age: 62
End: 2022-07-18
Payer: COMMERCIAL

## 2022-07-18 ENCOUNTER — ANESTHESIA EVENT (EMERGENCY)
Dept: PERIOP | Facility: HOSPITAL | Age: 62
End: 2022-07-18
Payer: COMMERCIAL

## 2022-07-18 ENCOUNTER — PREP FOR PROCEDURE (OUTPATIENT)
Dept: UROLOGY | Facility: CLINIC | Age: 62
End: 2022-07-18

## 2022-07-18 ENCOUNTER — ANESTHESIA (EMERGENCY)
Dept: PERIOP | Facility: HOSPITAL | Age: 62
End: 2022-07-18
Payer: COMMERCIAL

## 2022-07-18 VITALS
OXYGEN SATURATION: 96 % | SYSTOLIC BLOOD PRESSURE: 140 MMHG | HEART RATE: 80 BPM | TEMPERATURE: 97 F | DIASTOLIC BLOOD PRESSURE: 76 MMHG | RESPIRATION RATE: 20 BRPM

## 2022-07-18 DIAGNOSIS — N20.0 KIDNEY STONE ON LEFT SIDE: ICD-10-CM

## 2022-07-18 DIAGNOSIS — N20.0 KIDNEY STONE: ICD-10-CM

## 2022-07-18 DIAGNOSIS — N20.1 LEFT URETERAL STONE: Primary | ICD-10-CM

## 2022-07-18 DIAGNOSIS — R10.9 LEFT FLANK PAIN: ICD-10-CM

## 2022-07-18 DIAGNOSIS — N13.2 HYDRONEPHROSIS WITH URINARY OBSTRUCTION DUE TO URETERAL CALCULUS: Primary | ICD-10-CM

## 2022-07-18 DIAGNOSIS — N13.30 HYDRONEPHROSIS: ICD-10-CM

## 2022-07-18 DIAGNOSIS — Z46.6 ENCOUNTER FOR ADJUSTMENT OF URETERAL STENT: ICD-10-CM

## 2022-07-18 LAB
ALBUMIN SERPL BCP-MCNC: 3.8 G/DL (ref 3.5–5)
ALP SERPL-CCNC: 134 U/L (ref 34–104)
ALT SERPL W P-5'-P-CCNC: 40 U/L (ref 7–52)
ANION GAP SERPL CALCULATED.3IONS-SCNC: 8 MMOL/L (ref 4–13)
AST SERPL W P-5'-P-CCNC: 26 U/L (ref 13–39)
BACTERIA UR QL AUTO: ABNORMAL /HPF
BASOPHILS # BLD MANUAL: 0.27 THOUSAND/UL (ref 0–0.1)
BASOPHILS NFR MAR MANUAL: 2 % (ref 0–1)
BILIRUB SERPL-MCNC: 0.78 MG/DL (ref 0.2–1)
BILIRUB UR QL STRIP: NEGATIVE
BUN SERPL-MCNC: 14 MG/DL (ref 5–25)
CALCIUM SERPL-MCNC: 9.2 MG/DL (ref 8.4–10.2)
CHLORIDE SERPL-SCNC: 105 MMOL/L (ref 96–108)
CLARITY UR: CLEAR
CO2 SERPL-SCNC: 26 MMOL/L (ref 21–32)
COLOR UR: YELLOW
CREAT SERPL-MCNC: 1.24 MG/DL (ref 0.6–1.3)
EOSINOPHIL # BLD MANUAL: 0.14 THOUSAND/UL (ref 0–0.4)
EOSINOPHIL NFR BLD MANUAL: 1 % (ref 0–6)
ERYTHROCYTE [DISTWIDTH] IN BLOOD BY AUTOMATED COUNT: 17.2 % (ref 11.6–15.1)
GFR SERPL CREATININE-BSD FRML MDRD: 46 ML/MIN/1.73SQ M
GLUCOSE SERPL-MCNC: 122 MG/DL (ref 65–140)
GLUCOSE SERPL-MCNC: 88 MG/DL (ref 65–140)
GLUCOSE SERPL-MCNC: 90 MG/DL (ref 65–140)
GLUCOSE UR STRIP-MCNC: NEGATIVE MG/DL
HCT VFR BLD AUTO: 39.2 % (ref 34.8–46.1)
HGB BLD-MCNC: 12.8 G/DL (ref 11.5–15.4)
HGB UR QL STRIP.AUTO: ABNORMAL
KETONES UR STRIP-MCNC: NEGATIVE MG/DL
LEUKOCYTE ESTERASE UR QL STRIP: NEGATIVE
LYMPHOCYTES # BLD AUTO: 1.77 THOUSAND/UL (ref 0.6–4.47)
LYMPHOCYTES # BLD AUTO: 13 % (ref 14–44)
MCH RBC QN AUTO: 28.8 PG (ref 26.8–34.3)
MCHC RBC AUTO-ENTMCNC: 32.7 G/DL (ref 31.4–37.4)
MCV RBC AUTO: 88 FL (ref 82–98)
MONOCYTES # BLD AUTO: 1.23 THOUSAND/UL (ref 0–1.22)
MONOCYTES NFR BLD: 9 % (ref 4–12)
MUCOUS THREADS UR QL AUTO: ABNORMAL
NEUTROPHILS # BLD MANUAL: 10.22 THOUSAND/UL (ref 1.85–7.62)
NEUTS BAND NFR BLD MANUAL: 2 % (ref 0–8)
NEUTS SEG NFR BLD AUTO: 73 % (ref 43–75)
NITRITE UR QL STRIP: NEGATIVE
NON-SQ EPI CELLS URNS QL MICRO: ABNORMAL /HPF
PH UR STRIP.AUTO: 7.5 [PH] (ref 4.5–8)
PLATELET # BLD AUTO: 106 THOUSANDS/UL (ref 149–390)
PLATELET BLD QL SMEAR: ABNORMAL
PMV BLD AUTO: 11 FL (ref 8.9–12.7)
POTASSIUM SERPL-SCNC: 4.2 MMOL/L (ref 3.5–5.3)
PROT SERPL-MCNC: 6.5 G/DL (ref 6.4–8.4)
PROT UR STRIP-MCNC: ABNORMAL MG/DL
RBC # BLD AUTO: 4.45 MILLION/UL (ref 3.81–5.12)
RBC #/AREA URNS AUTO: ABNORMAL /HPF
RBC MORPH BLD: NORMAL
SODIUM SERPL-SCNC: 139 MMOL/L (ref 135–147)
SP GR UR STRIP.AUTO: 1.02 (ref 1–1.03)
UROBILINOGEN UR QL STRIP.AUTO: 0.2 E.U./DL
WBC # BLD AUTO: 13.63 THOUSAND/UL (ref 4.31–10.16)
WBC #/AREA URNS AUTO: ABNORMAL /HPF

## 2022-07-18 PROCEDURE — 74420 UROGRAPHY RTRGR +-KUB: CPT

## 2022-07-18 PROCEDURE — 52332 CYSTOSCOPY AND TREATMENT: CPT | Performed by: UROLOGY

## 2022-07-18 PROCEDURE — 36415 COLL VENOUS BLD VENIPUNCTURE: CPT | Performed by: PHYSICIAN ASSISTANT

## 2022-07-18 PROCEDURE — C1758 CATHETER, URETERAL: HCPCS | Performed by: UROLOGY

## 2022-07-18 PROCEDURE — 96374 THER/PROPH/DIAG INJ IV PUSH: CPT

## 2022-07-18 PROCEDURE — 74176 CT ABD & PELVIS W/O CONTRAST: CPT

## 2022-07-18 PROCEDURE — G1004 CDSM NDSC: HCPCS

## 2022-07-18 PROCEDURE — 99285 EMERGENCY DEPT VISIT HI MDM: CPT

## 2022-07-18 PROCEDURE — 85007 BL SMEAR W/DIFF WBC COUNT: CPT | Performed by: PHYSICIAN ASSISTANT

## 2022-07-18 PROCEDURE — 96375 TX/PRO/DX INJ NEW DRUG ADDON: CPT

## 2022-07-18 PROCEDURE — C2617 STENT, NON-COR, TEM W/O DEL: HCPCS | Performed by: UROLOGY

## 2022-07-18 PROCEDURE — 99213 OFFICE O/P EST LOW 20 MIN: CPT | Performed by: UROLOGY

## 2022-07-18 PROCEDURE — C1769 GUIDE WIRE: HCPCS | Performed by: UROLOGY

## 2022-07-18 PROCEDURE — 80053 COMPREHEN METABOLIC PANEL: CPT | Performed by: PHYSICIAN ASSISTANT

## 2022-07-18 PROCEDURE — 85027 COMPLETE CBC AUTOMATED: CPT | Performed by: PHYSICIAN ASSISTANT

## 2022-07-18 PROCEDURE — 99285 EMERGENCY DEPT VISIT HI MDM: CPT | Performed by: PHYSICIAN ASSISTANT

## 2022-07-18 PROCEDURE — 81001 URINALYSIS AUTO W/SCOPE: CPT

## 2022-07-18 PROCEDURE — 82948 REAGENT STRIP/BLOOD GLUCOSE: CPT

## 2022-07-18 DEVICE — INLAY OPTIMA URETERAL STENT W/O GUIDEWIRE
Type: IMPLANTABLE DEVICE | Site: URETER | Status: NON-FUNCTIONAL
Brand: BARD® INLAY OPTIMA® URETERAL STENT
Removed: 2022-08-22

## 2022-07-18 RX ORDER — SUCCINYLCHOLINE/SOD CL,ISO/PF 100 MG/5ML
SYRINGE (ML) INTRAVENOUS AS NEEDED
Status: DISCONTINUED | OUTPATIENT
Start: 2022-07-18 | End: 2022-07-18

## 2022-07-18 RX ORDER — OXYCODONE HYDROCHLORIDE 5 MG/1
10 TABLET ORAL EVERY 4 HOURS PRN
Status: CANCELLED | OUTPATIENT
Start: 2022-07-18

## 2022-07-18 RX ORDER — FENTANYL CITRATE 50 UG/ML
INJECTION, SOLUTION INTRAMUSCULAR; INTRAVENOUS AS NEEDED
Status: DISCONTINUED | OUTPATIENT
Start: 2022-07-18 | End: 2022-07-18

## 2022-07-18 RX ORDER — SODIUM CHLORIDE, SODIUM LACTATE, POTASSIUM CHLORIDE, CALCIUM CHLORIDE 600; 310; 30; 20 MG/100ML; MG/100ML; MG/100ML; MG/100ML
INJECTION, SOLUTION INTRAVENOUS CONTINUOUS PRN
Status: DISCONTINUED | OUTPATIENT
Start: 2022-07-18 | End: 2022-07-18

## 2022-07-18 RX ORDER — MAGNESIUM HYDROXIDE/ALUMINUM HYDROXICE/SIMETHICONE 120; 1200; 1200 MG/30ML; MG/30ML; MG/30ML
30 SUSPENSION ORAL EVERY 6 HOURS PRN
Status: CANCELLED | OUTPATIENT
Start: 2022-07-18

## 2022-07-18 RX ORDER — OXYCODONE HYDROCHLORIDE 5 MG/1
5 TABLET ORAL EVERY 4 HOURS PRN
Status: CANCELLED | OUTPATIENT
Start: 2022-07-18

## 2022-07-18 RX ORDER — ONDANSETRON 2 MG/ML
4 INJECTION INTRAMUSCULAR; INTRAVENOUS ONCE AS NEEDED
Status: DISCONTINUED | OUTPATIENT
Start: 2022-07-18 | End: 2022-07-18 | Stop reason: HOSPADM

## 2022-07-18 RX ORDER — ONDANSETRON 2 MG/ML
4 INJECTION INTRAMUSCULAR; INTRAVENOUS ONCE
Status: COMPLETED | OUTPATIENT
Start: 2022-07-18 | End: 2022-07-18

## 2022-07-18 RX ORDER — OXYBUTYNIN CHLORIDE 5 MG/1
5 TABLET, EXTENDED RELEASE ORAL DAILY
Status: CANCELLED | OUTPATIENT
Start: 2022-07-18

## 2022-07-18 RX ORDER — ACETAMINOPHEN 325 MG/1
650 TABLET ORAL EVERY 6 HOURS SCHEDULED
Status: CANCELLED | OUTPATIENT
Start: 2022-07-18

## 2022-07-18 RX ORDER — HYDROMORPHONE HCL/PF 1 MG/ML
0.5 SYRINGE (ML) INJECTION EVERY 2 HOUR PRN
Status: CANCELLED | OUTPATIENT
Start: 2022-07-18 | End: 2022-07-20

## 2022-07-18 RX ORDER — FENTANYL CITRATE/PF 50 MCG/ML
50 SYRINGE (ML) INJECTION
Status: DISCONTINUED | OUTPATIENT
Start: 2022-07-18 | End: 2022-07-18 | Stop reason: HOSPADM

## 2022-07-18 RX ORDER — CEPHALEXIN 500 MG/1
500 CAPSULE ORAL EVERY 6 HOURS SCHEDULED
Qty: 20 CAPSULE | Refills: 0 | Status: SHIPPED | OUTPATIENT
Start: 2022-07-18 | End: 2022-07-23

## 2022-07-18 RX ORDER — DEXMEDETOMIDINE HYDROCHLORIDE 100 UG/ML
INJECTION, SOLUTION INTRAVENOUS AS NEEDED
Status: DISCONTINUED | OUTPATIENT
Start: 2022-07-18 | End: 2022-07-18

## 2022-07-18 RX ORDER — MIDAZOLAM HYDROCHLORIDE 2 MG/2ML
INJECTION, SOLUTION INTRAMUSCULAR; INTRAVENOUS AS NEEDED
Status: DISCONTINUED | OUTPATIENT
Start: 2022-07-18 | End: 2022-07-18

## 2022-07-18 RX ORDER — TAMSULOSIN HYDROCHLORIDE 0.4 MG/1
0.4 CAPSULE ORAL
Status: CANCELLED | OUTPATIENT
Start: 2022-07-18

## 2022-07-18 RX ORDER — ONDANSETRON 2 MG/ML
4 INJECTION INTRAMUSCULAR; INTRAVENOUS EVERY 6 HOURS PRN
Status: CANCELLED | OUTPATIENT
Start: 2022-07-18

## 2022-07-18 RX ORDER — LIDOCAINE HYDROCHLORIDE 10 MG/ML
INJECTION, SOLUTION EPIDURAL; INFILTRATION; INTRACAUDAL; PERINEURAL AS NEEDED
Status: DISCONTINUED | OUTPATIENT
Start: 2022-07-18 | End: 2022-07-18

## 2022-07-18 RX ORDER — OXYBUTYNIN CHLORIDE 10 MG/1
10 TABLET, EXTENDED RELEASE ORAL
Qty: 30 TABLET | Refills: 0 | Status: ON HOLD | OUTPATIENT
Start: 2022-07-18 | End: 2022-08-22 | Stop reason: ALTCHOICE

## 2022-07-18 RX ORDER — ACETAMINOPHEN 325 MG/1
975 TABLET ORAL ONCE
Status: CANCELLED | OUTPATIENT
Start: 2022-07-18 | End: 2022-07-18

## 2022-07-18 RX ORDER — SODIUM CHLORIDE, SODIUM LACTATE, POTASSIUM CHLORIDE, CALCIUM CHLORIDE 600; 310; 30; 20 MG/100ML; MG/100ML; MG/100ML; MG/100ML
100 INJECTION, SOLUTION INTRAVENOUS CONTINUOUS
Status: DISCONTINUED | OUTPATIENT
Start: 2022-07-18 | End: 2022-07-18 | Stop reason: HOSPADM

## 2022-07-18 RX ORDER — DEXAMETHASONE SODIUM PHOSPHATE 10 MG/ML
INJECTION, SOLUTION INTRAMUSCULAR; INTRAVENOUS AS NEEDED
Status: DISCONTINUED | OUTPATIENT
Start: 2022-07-18 | End: 2022-07-18

## 2022-07-18 RX ORDER — PROPOFOL 10 MG/ML
INJECTION, EMULSION INTRAVENOUS AS NEEDED
Status: DISCONTINUED | OUTPATIENT
Start: 2022-07-18 | End: 2022-07-18

## 2022-07-18 RX ORDER — KETOROLAC TROMETHAMINE 30 MG/ML
30 INJECTION, SOLUTION INTRAMUSCULAR; INTRAVENOUS ONCE
Status: COMPLETED | OUTPATIENT
Start: 2022-07-18 | End: 2022-07-18

## 2022-07-18 RX ORDER — HYDROMORPHONE HCL IN WATER/PF 6 MG/30 ML
0.2 PATIENT CONTROLLED ANALGESIA SYRINGE INTRAVENOUS
Status: DISCONTINUED | OUTPATIENT
Start: 2022-07-18 | End: 2022-07-18 | Stop reason: HOSPADM

## 2022-07-18 RX ORDER — ONDANSETRON 2 MG/ML
INJECTION INTRAMUSCULAR; INTRAVENOUS AS NEEDED
Status: DISCONTINUED | OUTPATIENT
Start: 2022-07-18 | End: 2022-07-18

## 2022-07-18 RX ORDER — ACETAMINOPHEN 500 MG
500 TABLET ORAL EVERY 6 HOURS
Qty: 20 TABLET | Refills: 0 | Status: SHIPPED | OUTPATIENT
Start: 2022-07-18 | End: 2022-07-23

## 2022-07-18 RX ORDER — CEFAZOLIN SODIUM 1 G/3ML
INJECTION, POWDER, FOR SOLUTION INTRAMUSCULAR; INTRAVENOUS AS NEEDED
Status: DISCONTINUED | OUTPATIENT
Start: 2022-07-18 | End: 2022-07-18

## 2022-07-18 RX ORDER — MAGNESIUM HYDROXIDE 1200 MG/15ML
LIQUID ORAL AS NEEDED
Status: DISCONTINUED | OUTPATIENT
Start: 2022-07-18 | End: 2022-07-18 | Stop reason: HOSPADM

## 2022-07-18 RX ADMIN — ONDANSETRON 4 MG: 2 INJECTION INTRAMUSCULAR; INTRAVENOUS at 08:59

## 2022-07-18 RX ADMIN — Medication 200 MG: at 15:22

## 2022-07-18 RX ADMIN — LIDOCAINE HYDROCHLORIDE 50 MG: 10 INJECTION, SOLUTION EPIDURAL; INFILTRATION; INTRACAUDAL at 15:22

## 2022-07-18 RX ADMIN — KETOROLAC TROMETHAMINE 30 MG: 30 INJECTION, SOLUTION INTRAMUSCULAR at 08:59

## 2022-07-18 RX ADMIN — CEFAZOLIN SODIUM 3000 MG: 1 INJECTION, POWDER, FOR SOLUTION INTRAMUSCULAR; INTRAVENOUS at 15:30

## 2022-07-18 RX ADMIN — ONDANSETRON 4 MG: 2 INJECTION INTRAMUSCULAR; INTRAVENOUS at 15:27

## 2022-07-18 RX ADMIN — PHENYLEPHRINE HYDROCHLORIDE 50 MCG/MIN: 10 INJECTION INTRAVENOUS at 15:31

## 2022-07-18 RX ADMIN — SODIUM CHLORIDE, SODIUM LACTATE, POTASSIUM CHLORIDE, AND CALCIUM CHLORIDE: .6; .31; .03; .02 INJECTION, SOLUTION INTRAVENOUS at 15:17

## 2022-07-18 RX ADMIN — PROPOFOL 300 MG: 10 INJECTION, EMULSION INTRAVENOUS at 15:22

## 2022-07-18 RX ADMIN — FENTANYL CITRATE 100 MCG: 50 INJECTION, SOLUTION INTRAMUSCULAR; INTRAVENOUS at 15:32

## 2022-07-18 RX ADMIN — DEXMEDETOMIDINE HYDROCHLORIDE 8 MCG: 100 INJECTION, SOLUTION INTRAVENOUS at 15:35

## 2022-07-18 RX ADMIN — MIDAZOLAM HYDROCHLORIDE 1 MG: 1 INJECTION, SOLUTION INTRAMUSCULAR; INTRAVENOUS at 15:16

## 2022-07-18 RX ADMIN — DEXAMETHASONE SODIUM PHOSPHATE 10 MG: 10 INJECTION, SOLUTION INTRAMUSCULAR; INTRAVENOUS at 15:22

## 2022-07-18 RX ADMIN — MIDAZOLAM HYDROCHLORIDE 1 MG: 1 INJECTION, SOLUTION INTRAMUSCULAR; INTRAVENOUS at 15:20

## 2022-07-18 NOTE — CONSULTS
Consult - Urology   Elvis Choudhary 1960, 57 y o  female MRN: [id number]    Unit/Bed#: ED 29 Encounter: [id number]    Assessment & Plan:  1  Hydronephrosis due to obstructing ureteral calculus   - Will proceed with left ureteral stent insertion today due to intractable pain    - NPO, she reports not eating or drinking since last night  - Surgical consent reviewed and signed by patient  - Will require definitive ureteroscopy at an interval     Subjective:   69-year-old female medical history of hypertension, SVT, MARTIN, morbid obesity, type 2 diabetes, pancreatic cancer s/p ongoing chemotherapy presenting with severe left flank pain  She does have prior history of kidney stone that required ureteroscopic with urologist in [place] last year  She was seen in the ED about 3 weeks ago with hematuria and was seen to have a 5 mm proximal left ureteral stone with no hydronephrosis or evidence of obstruction  She was discharged with medical expulsive at that time  She represented to the ED today with intractable pain  CT completed today shows an obstructing 7 mm calculus in the pelvic portion with mild left hydronephrosis and hydroureter with the surrounding perinephric stranding  White count in 13 63  No significant PATI however Cr up to 1 24 from 0 95  UA micro 20-30 RBCs, 2-4 WBCs, and occasional bacteria  She is currently feeling better since receiving pain medication  She localizes some discomfort to the left flank  Denies any fever, chills, nausea, vomiting, hematuria, or dysuria  Prior stone was reportedly uric acid in composition and she has been managed on potassium citrate for prevention  She is afebrile and vitals currently stable  She denies any prior adverse reactions with anesthesia  Review of Systems   Constitutional: Negative for chills and fever  Respiratory: Negative for shortness of breath  Cardiovascular: Negative for chest pain     Gastrointestinal: Negative for abdominal pain, nausea and vomiting  Genitourinary: Positive for flank pain  Negative for difficulty urinating, dysuria, frequency, hematuria and urgency  Neurological: Negative for dizziness  Objective:  Vitals: Blood pressure 122/58, pulse 84, temperature 98 4 °F (36 9 °C), temperature source Oral, resp  rate 16, SpO2 95 %  ,There is no height or weight on file to calculate BMI  Physical Exam  Constitutional:       Appearance: Normal appearance  She is obese  HENT:      Head: Normocephalic and atraumatic  Right Ear: External ear normal       Left Ear: External ear normal    Eyes:      General: No scleral icterus  Conjunctiva/sclera: Conjunctivae normal    Cardiovascular:      Rate and Rhythm: Normal rate and regular rhythm  Pulses: Normal pulses  Heart sounds: Normal heart sounds  Pulmonary:      Effort: Pulmonary effort is normal       Breath sounds: Normal breath sounds  Abdominal:      General: Abdomen is flat  Bowel sounds are normal       Palpations: Abdomen is soft  Tenderness: There is no abdominal tenderness  There is no right CVA tenderness or left CVA tenderness  Musculoskeletal:      Cervical back: Normal range of motion  Right lower leg: No edema  Left lower leg: No edema  Skin:     General: Skin is warm and dry  Neurological:      General: No focal deficit present  Mental Status: She is alert and oriented to person, place, and time  Psychiatric:         Mood and Affect: Mood normal          Behavior: Behavior normal          Thought Content: Thought content normal          Judgment: Judgment normal          Imaging:  CT ABDOMEN AND PELVIS WITHOUT IV CONTRAST     INDICATION:   Abdominal pain, acute, nonlocalized  pain      COMPARISON:  None      TECHNIQUE:  CT examination of the abdomen and pelvis was performed without intravenous contrast  This examination was performed without intravenous contrast in the context of the critical nationwide Omnipaque shortage  Axial, sagittal, and coronal 2D   reformatted images were created from the source data and submitted for interpretation       Radiation dose length product (DLP) for this visit:  8449 mGy-cm   This examination, like all CT scans performed in the [facility], was performed utilizing techniques to minimize radiation dose exposure, including the use of iterative   reconstruction and automated exposure control       Enteric contrast was administered       FINDINGS:     ABDOMEN     LOWER CHEST:  Bibasilar density seen due to atelectasis     LIVER/BILIARY TREE:  Evaluation of the liver parenchyma limited due to ring artifact     GALLBLADDER:  No calcified gallstones  No pericholecystic inflammatory change      SPLEEN:  Splenomegaly seen     PANCREAS:  Low-attenuation is suggested in the head and uncinate process of the pancreas in image 31 series 2 measuring 2 7 cm,     ADRENAL GLANDS:  Unremarkable      KIDNEYS/URETERS:  Left hydronephrosis seen with perinephric stranding or hydroureter  There is an obstructing calculus in the pelvic portion of the left ureter measuring about 7 mm, image 68 series 2     STOMACH AND BOWEL:  Unremarkable      APPENDIX:  No findings to suggest appendicitis      ABDOMINOPELVIC CAVITY:  Infiltration seen in the left lateral pelvic sidewall and inferior aspect of the left abdomen contiguous with the perinephric stranding on the left side     VESSELS:  Unremarkable for patient's age      PELVIS     REPRODUCTIVE ORGANS:  Unremarkable for patient's age      URINARY BLADDER:  Unremarkable      ABDOMINAL WALL/INGUINAL REGIONS:  Unremarkable      OSSEOUS STRUCTURES:  No acute compression of the vertebra  No gross lytic lesion  No gross lytic lesion     IMPRESSION:     Obstructing 7 mm calculus in the pelvic portion  ,  Image 68 series 2 with mild left hydronephrosis and hydroureter with the surrounding perinephric stranding     2 7 cm hypodensity is suggested within the pancreas, consider nonemergent evaluation with MRI to exclude cystic pancreatic lesion    Labs:  Recent Labs     07/18/22  0901   WBC 13 63*     Recent Labs     07/18/22  0901   HGB 12 8     Recent Labs     07/18/22  0901   CREATININE 1 24       History:  Social History     Socioeconomic History    Marital status: /Civil Union     Spouse name: None    Number of children: None    Years of education: None    Highest education level: None   Occupational History    None   Tobacco Use    Smoking status: Former Smoker     Years: 9 00     Types: Cigarettes    Smokeless tobacco: Never Used    Tobacco comment: pt states she smokes 1 to 3 times per week   Vaping Use    Vaping Use: Never used   Substance and Sexual Activity    Alcohol use: Not Currently     Comment: social drinker per [employer]     Drug use: No    Sexual activity: None   Other Topics Concern    None   Social History Narrative    Lack of exercise    Good sleep hygiene    No caffeine use    Dog    Good sleep hygiene       Social Determinants of Health     Financial Resource Strain: Not on file   Food Insecurity: Not on file   Transportation Needs: Not on file   Physical Activity: Not on file   Stress: Not on file   Social Connections: Not on file   Intimate Partner Violence: Not on file   Housing Stability: Not on file     Past Medical History:   Diagnosis Date    Abnormal liver function test     last assessed 1/16/17     Adenomatosis     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    last assessed 4/4/13    Atrial fibrillation ([facility] )     Atrial flutter (HCC)     Benign essential hypertension     last assessed 12/21/17     Body mass index (BMI) of 50-59 9 in adult ([facility] [address] )     Cancer ([facility] )     pancreas    Carpal tunnel syndrome 09/07/2004    unspecified laterality  / last assessed 9/7/04    Colon polyp     Congenital pes planus     last assessed 7/15/14     COVID     4/30/21    CPAP (continuous positive airway pressure) dependence     Depression     Depression     Diabetes mellitus [facility]     GERD (gastroesophageal reflux disease)     Hemangioma of skin 08/26/2003    last assessed 8/26/03    History of gastroesophageal reflux (GERD)     Hypercholesterolemia     Hyperlipidemia     Hypertension     Irregular heart beat     Kidney stone     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[facility] ) 04/19/2006    last assessed 12/31/14     Osteoarthritis of both knees     last assessed 12/31/14     [facility]ox[facility]mal atrial fibrillation ([facility] )     S/P ablation of atrial flutter     Sleep apnea      Past Surgical History:   Procedure Laterality Date    ABDOMINAL SURGERY  06/2006    20cm GIST removed     APPENDECTOMY      ATRIAL ABLATION SURGERY      CARPAL TUNNEL RELEASE Bilateral     COLONOSCOPY      FL GUIDED CENTRAL VENOUS ACCESS DEVICE INSERTION  5/31/2022    HYSTERECTOMY      fibroids    IR PORT CHECK  6/23/2022    IR PORT REMOVAL AND PLACEMENT NEW SITE  6/28/2022    KIDNEY STONE SURGERY Right 09/17/2021    placed stent in  for kidney stone    KNEE SURGERY      KNEE SURGERY Left 03/2019    OOPHORECTOMY      cyst    TONSILLECTOMY      TUMOR EXCISION  2006    gastrointestinal stromal tumor    TUNNELED VENOUS PORT PLACEMENT N/A 5/31/2022    Procedure: INSERTION VENOUS PORT (PORT-A-CATH);   Surgeon: Adrián Roberts MD;  Location: [facility];  Service: Surgical Oncology    UPPER GASTROINTESTINAL ENDOSCOPY       Family History   Problem Relation Age of Onset    Emphysema Mother    Emeka Pert Arthritis Mother     Diabetes Mother     Hypertension Mother     COPD Mother     Arthritis Father     Prostate cancer Father     Cerebral aneurysm Son     No Known Problems Maternal Grandmother     No Known Problems Maternal Grandfather     No Known Problems Paternal Grandmother     No Known Problems Paternal Grandfather     No Known Problems Son     No Known Problems Maternal Aunt     No Known Problems Maternal Aunt     No Known Problems Paternal Aunt     No Known Problems Paternal Aunt        Enriqueta Jiang [place]  Date: 7/18/2022 Time: 12:32 PM

## 2022-07-18 NOTE — OP NOTE
OPERATIVE REPORT  PATIENT NAME: Cinthia Phoenix    :  1960  MRN: [id number]  Pt Location: AN OR ROOM 03    SURGERY DATE: 2022    Surgeon(s) and Role:     * Shilo Lopez MD - Primary    Preop Diagnosis:  Hydronephrosis with urinary obstruction due to ureteral calculus [N13 2]    Post-Op Diagnosis Codes: * Hydronephrosis with urinary obstruction due to ureteral calculus [N13 2]    Procedure(s) (LRB):  CYSTOSCOPY RETROGRADE PYELOGRAM WITH INSERTION STENT URETERAL (Left)    Specimen(s):  * No specimens in log *    Estimated Blood Loss:   Minimal    Drains:  * No LDAs found *    Anesthesia Type:   General    Operative Indications:  Hydronephrosis with urinary obstruction due to ureteral calculus [N13 2]  Perinephric stranding    Operative Findings:  Normal urethra and bladder, drainage of cloudy urine is noted upon placement of a 6 Nepali by 24 centimeter left ureteral stent  She will require subsequent, staged ureteroscopic stone intervention at an interval         Complications:   None    Procedure and Technique:    PROCEDURES PERFORMED:  1) Cystoscopy  2) left retrograde pyelography with fluoroscopic interpretation  3) left ureteral stent placement (6 [place] by 24 centimeter)    SURGEON:  Shilo Lopez MD    ASSISTANTS:  None    NOTE:  There were no qualified teaching residents to assist with this case    ANESTHESIA: General     COMPLICATIONS:   None    ANTIBIOTICS:  Ancef    INTRAOPERATIVE THROMBOEMBOLISM PROPHYLAXIS:  Pneumatic compression stockings     RADIOLOGIC FINDINGS:    1  Retrograde pyelogram was performed on the left side using a 5 Fr open ended catheter  10 milliliters of contrast was utilized    2  The following findings were noted:  Severe hydronephrosis noted          INDICATIONS FOR PROCEDURE:  Cinthia Phoenix is an 57 y o  old female with a left ureteral stone, intractable left flank pain, and left hydronephrosis    After discussing the options, the patient elected to undergo ureteral stent placement  We discussed the procedure in detail, the alternatives, and the risks, and they signed informed consent to proceed  PROCEDURE IN DETAIL:   The patient was identified and brought to the OR  Antibiotic prophylaxis and DVT prophylaxis were administered  They were placed in the comfortable dorsal lithotomy position with care to pad all pressure points  They were prepped and draped in the usual sterile fashion using hibiclens  A surgical time out was performed with all in the room in agreement with the correct patient, procedure, indications, and laterality  A 21-Grenadian rigid cystoscope was used to enter the bladder  The bladder was inspected in its entirety and there were no lesions noted  The ureteral orifices were identified in their orthotopic positions  The left ureteral orifice was identified and a 5 Fr open ended catheter was placed into the ureteral orifice  The stone was not visible on  fluoroscopic guidance  A retrograde pyelogram was performed with injection of contrast which demonstrated moderate to severe hydroureteronephrosis  A wire was up to the kidney under fluoroscopic guidance  A 6 Grenadian by 24 centimeter left JJ stent was then passed up the wire  under fluoroscopic guidance into the left  kidney with a good curl noted in the kidney and in the bladder  The bladder was drained  The patient was placed back in the supine position, awakened from general anesthesia and brought to the recovery room in stable condition  SPECIMENS:   * No orders in the log *     IMPLANTS:   * No implants in log *     COMPLICATIONS:  None    DISPOSITION: PACU     PLAN:  The patient is status post renal unit decompression  She will require a staged, subsequent ureteroscopic admission to hospital for stone removal and destruction    We have covered her with empiric antibiosis given her active chemotherapy for pancreatic cancer     I was present for the entire procedure and A qualified resident physician was not available    Patient Disposition:  PACU       SIGNATURE: Fernanda Avila MD  DATE: July 18, 2022  TIME: 3:43 PM

## 2022-07-18 NOTE — ANESTHESIA POSTPROCEDURE EVALUATION
Post-Op Assessment Note    CV Status:  Stable    Pain management: adequate     Mental Status:  Awake   Hydration Status:  Euvolemic   PONV Controlled:  Controlled   Airway Patency:  Patent      Post Op Vitals Reviewed: Yes      Staff: Anesthesiologist, CRNA         No complications documented      /58 (07/18/22 1630)    Temp (!) 97 °F (36 1 °C) (07/18/22 1630)    Pulse 84 (07/18/22 1630)   Resp 20 (07/18/22 1630)    SpO2 96 % (07/18/22 1630)

## 2022-07-18 NOTE — DISCHARGE INSTRUCTIONS
Huma Jesus,    Today you had cystoscopy ureteral stent placement to drain your left kidney  We will arrange for subsequent ureteroscopic stone surgery in some weeks  Urgency and frequency of urination along with blood in the urine are par for the course  We have given you medications to make your stent more comfortable as well  If you have questions or concerns as you recover, please let us know  I hope that you feel better soon   Thank you for allowing us to take care of you today,    Dr Padmini Palomo

## 2022-07-18 NOTE — ANESTHESIA PREPROCEDURE EVALUATION
Procedure:  CYSTOSCOPY RETROGRADE PYELOGRAM WITH INSERTION STENT URETERAL (Left Bladder)    Relevant Problems   ANESTHESIA (within normal limits)      CARDIO   (+) Benign essential hypertension   (+) Dyspnea on exertion   (+) Hyperlipidemia   (+) Hypertension   (+) Supraventricular tachycardia (HCC)      ENDO   (+) Type 2 diabetes mellitus without complication, without long-term current use of insulin (HCC)   (-) Hyperthyroidism   (-) Hypothyroidism      GI/HEPATIC   (+) Adenocarcinoma of head of pancreas (HCC)   (+) Esophageal reflux      /RENAL   (+) Kidney stone   (+) Uric acid kidney stone      HEMATOLOGY (within normal limits)      MUSCULOSKELETAL (within normal limits)      NEURO/PSYCH   (+) Controlled depression      PULMONARY   (+) Dyspnea on exertion   (+) Severe obstructive sleep apnea   (-) Asthma        Physical Exam    Airway    Mallampati score: III  TM Distance: >3 FB  Neck ROM: full     Dental   No notable dental hx     Cardiovascular      Pulmonary      Other Findings        Anesthesia Plan  ASA Score- 4     Anesthesia Type- general with ASA Monitors  Additional Monitors:   Airway Plan: ETT  Plan Factors-Exercise tolerance (METS): <4 METS  Chart reviewed  Existing labs reviewed  Patient summary reviewed  Patient is not a current smoker  Induction- intravenous  Postoperative Plan- Plan for postoperative opioid use  Informed Consent- Anesthetic plan and risks discussed with patient and spouse  I personally reviewed this patient with the CRNA  Discussed and agreed on the Anesthesia Plan with the CRNA  Radha Brar

## 2022-07-18 NOTE — H&P (VIEW-ONLY)
Consult - Urology   Danyel Gerard 1960, 57 y o  female MRN: [id number]    Unit/Bed#: ED 29 Encounter: [id number]    Assessment & Plan:  1  Hydronephrosis due to obstructing ureteral calculus   - Will proceed with left ureteral stent insertion today due to intractable pain    - NPO, she reports not eating or drinking since last night  - Surgical consent reviewed and signed by patient  - Will require definitive ureteroscopy at an interval     Subjective:   57-year-old female medical history of hypertension, SVT, MARTIN, morbid obesity, type 2 diabetes, pancreatic cancer s/p ongoing chemotherapy presenting with severe left flank pain  She does have prior history of kidney stone that required ureteroscopic with urologist in [place] last year  She was seen in the ED about 3 weeks ago with hematuria and was seen to have a 5 mm proximal left ureteral stone with no hydronephrosis or evidence of obstruction  She was discharged with medical expulsive at that time  She represented to the ED today with intractable pain  CT completed today shows an obstructing 7 mm calculus in the pelvic portion with mild left hydronephrosis and hydroureter with the surrounding perinephric stranding  White count in 13 63  No significant PATI however Cr up to 1 24 from 0 95  UA micro 20-30 RBCs, 2-4 WBCs, and occasional bacteria  She is currently feeling better since receiving pain medication  She localizes some discomfort to the left flank  Denies any fever, chills, nausea, vomiting, hematuria, or dysuria  Prior stone was reportedly uric acid in composition and she has been managed on potassium citrate for prevention  She is afebrile and vitals currently stable  She denies any prior adverse reactions with anesthesia  Review of Systems   Constitutional: Negative for chills and fever  Respiratory: Negative for shortness of breath  Cardiovascular: Negative for chest pain     Gastrointestinal: Negative for abdominal pain, nausea and vomiting  Genitourinary: Positive for flank pain  Negative for difficulty urinating, dysuria, frequency, hematuria and urgency  Neurological: Negative for dizziness  Objective:  Vitals: Blood pressure 122/58, pulse 84, temperature 98 4 °F (36 9 °C), temperature source Oral, resp  rate 16, SpO2 95 %  ,There is no height or weight on file to calculate BMI  Physical Exam  Constitutional:       Appearance: Normal appearance  She is obese  HENT:      Head: Normocephalic and atraumatic  Right Ear: External ear normal       Left Ear: External ear normal    Eyes:      General: No scleral icterus  Conjunctiva/sclera: Conjunctivae normal    Cardiovascular:      Rate and Rhythm: Normal rate and regular rhythm  Pulses: Normal pulses  Heart sounds: Normal heart sounds  Pulmonary:      Effort: Pulmonary effort is normal       Breath sounds: Normal breath sounds  Abdominal:      General: Abdomen is flat  Bowel sounds are normal       Palpations: Abdomen is soft  Tenderness: There is no abdominal tenderness  There is no right CVA tenderness or left CVA tenderness  Musculoskeletal:      Cervical back: Normal range of motion  Right lower leg: No edema  Left lower leg: No edema  Skin:     General: Skin is warm and dry  Neurological:      General: No focal deficit present  Mental Status: She is alert and oriented to person, place, and time  Psychiatric:         Mood and Affect: Mood normal          Behavior: Behavior normal          Thought Content: Thought content normal          Judgment: Judgment normal          Imaging:  CT ABDOMEN AND PELVIS WITHOUT IV CONTRAST     INDICATION:   Abdominal pain, acute, nonlocalized  pain      COMPARISON:  None      TECHNIQUE:  CT examination of the abdomen and pelvis was performed without intravenous contrast  This examination was performed without intravenous contrast in the context of the critical nationwide Omnipaque shortage  Axial, sagittal, and coronal 2D   reformatted images were created from the source data and submitted for interpretation       Radiation dose length product (DLP) for this visit:  7701 mGy-cm   This examination, like all CT scans performed in the [facility], was performed utilizing techniques to minimize radiation dose exposure, including the use of iterative   reconstruction and automated exposure control       Enteric contrast was administered       FINDINGS:     ABDOMEN     LOWER CHEST:  Bibasilar density seen due to atelectasis     LIVER/BILIARY TREE:  Evaluation of the liver parenchyma limited due to ring artifact     GALLBLADDER:  No calcified gallstones  No pericholecystic inflammatory change      SPLEEN:  Splenomegaly seen     PANCREAS:  Low-attenuation is suggested in the head and uncinate process of the pancreas in image 31 series 2 measuring 2 7 cm,     ADRENAL GLANDS:  Unremarkable      KIDNEYS/URETERS:  Left hydronephrosis seen with perinephric stranding or hydroureter  There is an obstructing calculus in the pelvic portion of the left ureter measuring about 7 mm, image 68 series 2     STOMACH AND BOWEL:  Unremarkable      APPENDIX:  No findings to suggest appendicitis      ABDOMINOPELVIC CAVITY:  Infiltration seen in the left lateral pelvic sidewall and inferior aspect of the left abdomen contiguous with the perinephric stranding on the left side     VESSELS:  Unremarkable for patient's age      PELVIS     REPRODUCTIVE ORGANS:  Unremarkable for patient's age      URINARY BLADDER:  Unremarkable      ABDOMINAL WALL/INGUINAL REGIONS:  Unremarkable      OSSEOUS STRUCTURES:  No acute compression of the vertebra  No gross lytic lesion  No gross lytic lesion     IMPRESSION:     Obstructing 7 mm calculus in the pelvic portion  ,  Image 68 series 2 with mild left hydronephrosis and hydroureter with the surrounding perinephric stranding     2 7 cm hypodensity is suggested within the pancreas, consider nonemergent evaluation with MRI to exclude cystic pancreatic lesion    Labs:  Recent Labs     07/18/22  0901   WBC 13 63*     Recent Labs     07/18/22  0901   HGB 12 8     Recent Labs     07/18/22  0901   CREATININE 1 24       History:  Social History     Socioeconomic History    Marital status: /Civil Union     Spouse name: None    Number of children: None    Years of education: None    Highest education level: None   Occupational History    None   Tobacco Use    Smoking status: Former Smoker     Years: 9 00     Types: Cigarettes    Smokeless tobacco: Never Used    Tobacco comment: pt states she smokes 1 to 3 times per week   Vaping Use    Vaping Use: Never used   Substance and Sexual Activity    Alcohol use: Not Currently     Comment: social drinker per [employer]     Drug use: No    Sexual activity: None   Other Topics Concern    None   Social History Narrative    Lack of exercise    Good sleep hygiene    No caffeine use    Dog    Good sleep hygiene       Social Determinants of Health     Financial Resource Strain: Not on file   Food Insecurity: Not on file   Transportation Needs: Not on file   Physical Activity: Not on file   Stress: Not on file   Social Connections: Not on file   Intimate Partner Violence: Not on file   Housing Stability: Not on file     Past Medical History:   Diagnosis Date    Abnormal liver function test     last assessed 1/16/17     Adenomatosis     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    last assessed 4/4/13    Atrial fibrillation ([facility] )     Atrial flutter (HCC)     Benign essential hypertension     last assessed 12/21/17     Body mass index (BMI) of 50-59 9 in adult ([facility] )     Cancer ([facility] )     pancreas    Carpal tunnel syndrome 09/07/2004    unspecified laterality  / last assessed 9/7/04    Colon polyp     Congenital pes planus     last assessed 7/15/14     COVID     4/30/21    CPAP (continuous positive airway pressure) dependence     Depression     Depression     Diabetes mellitus ([facility] )     GERD (gastroesophageal reflux disease)     Hemangioma of skin 08/26/2003    last assessed 8/26/03    History of gastroesophageal reflux (GERD)     Hypercholesterolemia     Hyperlipidemia     Hypertension     Irregular heart beat     Kidney stone     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[facility] ) 04/19/2006    last assessed 12/31/14     Osteoarthritis of both knees     last assessed 12/31/14     Paroxysmal atrial fibrillation ([facility] )     S/P ablation of atrial flutter     Sleep apnea      Past Surgical History:   Procedure Laterality Date    ABDOMINAL SURGERY  06/2006    20cm GIST removed     APPENDECTOMY      ATRIAL ABLATION SURGERY      CARPAL TUNNEL RELEASE Bilateral     COLONOSCOPY      FL GUIDED CENTRAL VENOUS ACCESS DEVICE INSERTION  5/31/2022    HYSTERECTOMY      fibroids    IR PORT CHECK  6/23/2022    IR PORT REMOVAL AND PLACEMENT NEW SITE  6/28/2022    KIDNEY STONE SURGERY Right 09/17/2021    placed stent in  for kidney stone    KNEE SURGERY      KNEE SURGERY Left 03/2019    OOPHORECTOMY      cyst    TONSILLECTOMY      TUMOR EXCISION  2006    gastrointestinal stromal tumor    TUNNELED VENOUS PORT PLACEMENT N/A 5/31/2022    Procedure: INSERTION VENOUS PORT (PORT-A-CATH);   Surgeon: Sueellen Habermann, MD;  Location: [facility];  Service: Surgical Oncology    UPPER GASTROINTESTINAL ENDOSCOPY       Family History   Problem Relation Age of Onset    Emphysema Mother    Priyank Troup Arthritis Mother     Diabetes Mother     Hypertension Mother     COPD Mother     Arthritis Father     Prostate cancer Father     Cerebral aneurysm Son     No Known Problems Maternal Grandmother     No Known Problems Maternal Grandfather     No Known Problems Paternal Grandmother     No Known Problems Paternal Grandfather     No Known Problems Son     No Known Problems Maternal Aunt     No Known Problems Maternal Aunt     No Known Problems Paternal Aunt     No Known Problems Paternal Aunt        Francisco Knapp, [place]  Date: 7/18/2022 Time: 12:32 PM

## 2022-07-18 NOTE — INTERVAL H&P NOTE
H&P reviewed  After examining the patient I find no changes in the patients condition since the H&P had been written      Vitals:    07/18/22 1446   BP: 141/91   Pulse: 85   Resp: 18   Temp: (!) 97 °F (36 1 °C)   SpO2: 96%

## 2022-07-18 NOTE — TELEPHONE ENCOUNTER
Undergoing ureteral stent placement, please reference Op Note for stent database details   Please book for ureteroscopy with laser lithotripsy for 3-4 weeks from now with the first available urologist

## 2022-07-19 ENCOUNTER — APPOINTMENT (OUTPATIENT)
Dept: LAB | Facility: CLINIC | Age: 62
End: 2022-07-19
Payer: COMMERCIAL

## 2022-07-19 ENCOUNTER — TELEPHONE (OUTPATIENT)
Dept: GENETICS | Facility: CLINIC | Age: 62
End: 2022-07-19

## 2022-07-19 DIAGNOSIS — T45.1X5A CHEMOTHERAPY INDUCED NEUTROPENIA (HCC): ICD-10-CM

## 2022-07-19 DIAGNOSIS — C25.0 ADENOCARCINOMA OF HEAD OF PANCREAS (HCC): ICD-10-CM

## 2022-07-19 DIAGNOSIS — D70.1 CHEMOTHERAPY INDUCED NEUTROPENIA (HCC): ICD-10-CM

## 2022-07-19 LAB
ALBUMIN SERPL BCP-MCNC: 3.2 G/DL (ref 3.5–5)
ALP SERPL-CCNC: 149 U/L (ref 46–116)
ALT SERPL W P-5'-P-CCNC: 44 U/L (ref 12–78)
ANION GAP SERPL CALCULATED.3IONS-SCNC: 9 MMOL/L (ref 4–13)
ANISOCYTOSIS BLD QL SMEAR: PRESENT
AST SERPL W P-5'-P-CCNC: 19 U/L (ref 5–45)
BASOPHILS # BLD MANUAL: 0 THOUSAND/UL (ref 0–0.1)
BASOPHILS NFR MAR MANUAL: 0 % (ref 0–1)
BILIRUB SERPL-MCNC: 0.58 MG/DL (ref 0.2–1)
BUN SERPL-MCNC: 15 MG/DL (ref 5–25)
CALCIUM ALBUM COR SERPL-MCNC: 10 MG/DL (ref 8.3–10.1)
CALCIUM SERPL-MCNC: 9.4 MG/DL (ref 8.3–10.1)
CHLORIDE SERPL-SCNC: 107 MMOL/L (ref 96–108)
CO2 SERPL-SCNC: 25 MMOL/L (ref 21–32)
CREAT SERPL-MCNC: 1.13 MG/DL (ref 0.6–1.3)
EOSINOPHIL # BLD MANUAL: 0 THOUSAND/UL (ref 0–0.4)
EOSINOPHIL NFR BLD MANUAL: 0 % (ref 0–6)
ERYTHROCYTE [DISTWIDTH] IN BLOOD BY AUTOMATED COUNT: 17 % (ref 11.6–15.1)
GFR SERPL CREATININE-BSD FRML MDRD: 52 ML/MIN/1.73SQ M
GIANT PLATELETS BLD QL SMEAR: PRESENT
GLUCOSE P FAST SERPL-MCNC: 172 MG/DL (ref 65–99)
HCT VFR BLD AUTO: 39.5 % (ref 34.8–46.1)
HGB BLD-MCNC: 12.1 G/DL (ref 11.5–15.4)
LYMPHOCYTES # BLD AUTO: 0.29 THOUSAND/UL (ref 0.6–4.47)
LYMPHOCYTES # BLD AUTO: 2 % (ref 14–44)
MCH RBC QN AUTO: 27.8 PG (ref 26.8–34.3)
MCHC RBC AUTO-ENTMCNC: 30.6 G/DL (ref 31.4–37.4)
MCV RBC AUTO: 91 FL (ref 82–98)
MONOCYTES # BLD AUTO: 0.29 THOUSAND/UL (ref 0–1.22)
MONOCYTES NFR BLD: 2 % (ref 4–12)
NEUTROPHILS # BLD MANUAL: 12.69 THOUSAND/UL (ref 1.85–7.62)
NEUTS BAND NFR BLD MANUAL: 13 % (ref 0–8)
NEUTS SEG NFR BLD AUTO: 74 % (ref 43–75)
PLATELET # BLD AUTO: 121 THOUSANDS/UL (ref 149–390)
PLATELET BLD QL SMEAR: ABNORMAL
PMV BLD AUTO: 12.1 FL (ref 8.9–12.7)
POLYCHROMASIA BLD QL SMEAR: PRESENT
POTASSIUM SERPL-SCNC: 4.1 MMOL/L (ref 3.5–5.3)
PROMYELOCYTES NFR BLD MANUAL: 1 % (ref 0–0)
PROT SERPL-MCNC: 6.9 G/DL (ref 6.4–8.4)
RBC # BLD AUTO: 4.36 MILLION/UL (ref 3.81–5.12)
RBC MORPH BLD: PRESENT
SODIUM SERPL-SCNC: 141 MMOL/L (ref 135–147)
VARIANT LYMPHS # BLD AUTO: 8 %
WBC # BLD AUTO: 14.59 THOUSAND/UL (ref 4.31–10.16)

## 2022-07-19 PROCEDURE — 36415 COLL VENOUS BLD VENIPUNCTURE: CPT

## 2022-07-19 PROCEDURE — 85007 BL SMEAR W/DIFF WBC COUNT: CPT

## 2022-07-19 PROCEDURE — 80053 COMPREHEN METABOLIC PANEL: CPT

## 2022-07-19 PROCEDURE — 85027 COMPLETE CBC AUTOMATED: CPT

## 2022-07-19 NOTE — TELEPHONE ENCOUNTER
Post-Test Genetic Counseling Consult Note  Today I left a voicemail for Alejandro Broderick reviewing the results of her genetic test for hereditary cancer  She met previously with Ariella Rivas, [address] [place] [address] on 6/10 for pre-test counseling  I encouraged her to call [phone] to discuss this result further  A copy of this consult note and genetic test result will be shared with the patient  SUMMARY:    Test(s): [facility] Common Hereditary Cancers Panel (47 genes): APC, MIGUEL ANGEL, AXIN2, BARD1, BMPR1A, BRCA1, BRCA2, BRIP1, CDH1, CDK4, CDKN2A, CHEK2, CTNNA1, DICER1, EPCAM, GREM1, HOXB13, KIT, MEN1, MLH1, MSH2, MSH3, MSH6, MUTYH, NBN, NF1, NTHL1, PALB2, PDGFRA, PMS2, POLD1, POLE, PTEN, RAD50, RAD51C, RAD51D, SDHA, SDHB, SDHC, SDHD, SMAD4, SMARCA4, STK11, TP53, TSC1, TSC2, VHL      Result: Negative - No Clinically Significant Variants Detected      Assessment:   A negative result significantly reduces the likelihood that Alejandro Broderick has a hereditary cancer syndrome  However, this testing is unable to completely rule out the presence of hereditary cancer  It remains possible that:  - There is a variant in an area of a gene which was not tested or there is a variant not detectable due to technical limitations of this test      - There is a variant in another gene that was not included in this test or in a gene not known to be linked to cancer or tumors  - A family member has a genetic variant that the patient did not inherit  - The cancer in the family is sporadic and is related to non-hereditary factors  Risks and Testing for Family Members:    Despite a negative result, Alecia's first-degree relatives may be at increased risk for the cancers based on the family history  We recommend they discuss screening and management recommendations with their healthcare providers  At this time we do not recommend testing for Alejandro Broderick 's child based on her negative test result    Alejandro Broderick 's child still needs to consider the history of cancer on the other side of their family when determining their risks  If Lamar Ayala has any affected family members with a cancer diagnosis, especially at a young age, they may still consider genetic testing  Relatives who wish to pursue genetic testing can reach out to the [address] [place] [address] Road ([phone]) to schedule an appointment or visit www [email or web address] to identify a local genetic counselor  Plan:   There are no additional recommendations based on Alecia's negative result  she should continue cancer screening and medical management as clinically indicated and as determined appropriate by her healthcare providers  Negative Result: Lamar Ayala was strongly encouraged to contact us regarding any changes in her personal or family history of cancer as these changes could alter our recommendation regarding genetic testing and/or cancer screening

## 2022-07-20 ENCOUNTER — PREP FOR PROCEDURE (OUTPATIENT)
Dept: UROLOGY | Facility: CLINIC | Age: 62
End: 2022-07-20

## 2022-07-20 ENCOUNTER — HOSPITAL ENCOUNTER (OUTPATIENT)
Dept: INFUSION CENTER | Facility: HOSPITAL | Age: 62
Discharge: HOME | End: 2022-07-20
Attending: INTERNAL MEDICINE
Payer: COMMERCIAL

## 2022-07-20 VITALS
BODY MASS INDEX: 50.02 KG/M2 | SYSTOLIC BLOOD PRESSURE: 139 MMHG | DIASTOLIC BLOOD PRESSURE: 87 MMHG | OXYGEN SATURATION: 96 % | RESPIRATION RATE: 16 BRPM | HEIGHT: 64 IN | WEIGHT: 293 LBS | HEART RATE: 102 BPM | TEMPERATURE: 97 F

## 2022-07-20 DIAGNOSIS — T45.1X5A CHEMOTHERAPY INDUCED NEUTROPENIA (HCC): Primary | ICD-10-CM

## 2022-07-20 DIAGNOSIS — N20.0 KIDNEY STONES: Primary | ICD-10-CM

## 2022-07-20 DIAGNOSIS — C25.0 ADENOCARCINOMA OF HEAD OF PANCREAS (HCC): ICD-10-CM

## 2022-07-20 DIAGNOSIS — D70.1 CHEMOTHERAPY INDUCED NEUTROPENIA (HCC): Primary | ICD-10-CM

## 2022-07-20 PROCEDURE — 96367 TX/PROPH/DG ADDL SEQ IV INF: CPT

## 2022-07-20 PROCEDURE — 96375 TX/PRO/DX INJ NEW DRUG ADDON: CPT

## 2022-07-20 PROCEDURE — G0498 CHEMO EXTEND IV INFUS W/PUMP: HCPCS

## 2022-07-20 PROCEDURE — 96417 CHEMO IV INFUS EACH ADDL SEQ: CPT

## 2022-07-20 PROCEDURE — 96413 CHEMO IV INFUSION 1 HR: CPT

## 2022-07-20 PROCEDURE — 96415 CHEMO IV INFUSION ADDL HR: CPT

## 2022-07-20 RX ORDER — SODIUM CHLORIDE 9 MG/ML
20 INJECTION, SOLUTION INTRAVENOUS ONCE AS NEEDED
Status: DISCONTINUED | OUTPATIENT
Start: 2022-07-20 | End: 2022-07-23 | Stop reason: HOSPADM

## 2022-07-20 RX ORDER — ATROPINE SULFATE 1 MG/ML
0.25 INJECTION, SOLUTION INTRAVENOUS ONCE AS NEEDED
Status: DISCONTINUED | OUTPATIENT
Start: 2022-07-20 | End: 2022-07-23 | Stop reason: HOSPADM

## 2022-07-20 RX ORDER — ATROPINE SULFATE 1 MG/ML
0.25 INJECTION, SOLUTION INTRAVENOUS ONCE
Status: COMPLETED | OUTPATIENT
Start: 2022-07-20 | End: 2022-07-20

## 2022-07-20 RX ORDER — DEXTROSE MONOHYDRATE 50 MG/ML
20 INJECTION, SOLUTION INTRAVENOUS ONCE
Status: COMPLETED | OUTPATIENT
Start: 2022-07-20 | End: 2022-07-20

## 2022-07-20 RX ADMIN — IRINOTECAN HYDROCHLORIDE 330 MG: 20 INJECTION, SOLUTION INTRAVENOUS at 13:48

## 2022-07-20 RX ADMIN — DEXTROSE 20 ML/HR: 50 INJECTION, SOLUTION INTRAVENOUS at 09:40

## 2022-07-20 RX ADMIN — DEXAMETHASONE SODIUM PHOSPHATE 10 MG: 10 INJECTION INTRAMUSCULAR; INTRAVENOUS at 10:13

## 2022-07-20 RX ADMIN — ATROPINE SULFATE 0.25 MG: 1 INJECTION, SOLUTION INTRAMUSCULAR; INTRAVENOUS; SUBCUTANEOUS at 13:41

## 2022-07-20 RX ADMIN — OXALIPLATIN 200 MG: 5 INJECTION, SOLUTION INTRAVENOUS at 11:28

## 2022-07-20 RX ADMIN — GRANISETRON HYDROCHLORIDE 1 MG: 1 INJECTION, SOLUTION INTRAVENOUS at 09:40

## 2022-07-20 RX ADMIN — FOSAPREPITANT 150 MG: 150 INJECTION, POWDER, LYOPHILIZED, FOR SOLUTION INTRAVENOUS at 10:46

## 2022-07-20 NOTE — TELEPHONE ENCOUNTER
Spoke w patient and offer her 8/17 w Dr Con Fernandez but she has chemo that week and the best date that worked for her was 8/22 at [facility] w Dr Padmini Palomo  She is aware hospital will contact her Friday before w time of arrival and she will need a   She will go for PATs on 8/12 and advised 7 day hold of any asprin products, multivitamins and fish oils  I am mailing her a surgical packet and she is aware to contact us w any questions or concerns

## 2022-07-21 ENCOUNTER — TELEPHONE (OUTPATIENT)
Dept: OBGYN CLINIC | Facility: CLINIC | Age: 62
End: 2022-07-21

## 2022-07-21 ENCOUNTER — TELEPHONE (OUTPATIENT)
Dept: HEMATOLOGY ONCOLOGY | Facility: CLINIC | Age: 62
End: 2022-07-21

## 2022-07-21 ENCOUNTER — TELEPHONE (OUTPATIENT)
Dept: GENETICS | Facility: CLINIC | Age: 62
End: 2022-07-21

## 2022-07-21 NOTE — ED PROVIDER NOTES
History  Chief Complaint   Patient presents with    Flank Pain     Pt presents to the ED with exacerbation of left flank pain, reports dx with kidney stone in June  60-year-old female presents the emergency department with complaints of abdominal pain  States she has had left-sided abdominal pain with radiation to the flank over the past 2-3 days  States she was previously diagnosed with a kidney stone in June  Was noted to have a 5 mm stone on a CT scan that was done on 6/26/22 for hematuria  States that she does not believe the stone has passed in that time  Notes that she started with her hematuria over the past 2 days prior to pain starting again  No fevers at home  Has been taking her 's Flomax and Norco that she had left over from PICC line placement for pain without relief  History provided by:  Patient   used: No    Flank Pain  Pain location:  L flank and LLQ  Pain quality: sharp and stabbing    Pain severity:  Moderate  Onset quality:  Gradual  Duration:  3 days  Timing:  Constant  Chronicity:  New  Relieved by:  Nothing  Ineffective treatments: vicodin, flomax  Associated symptoms: hematuria and nausea    Associated symptoms: no anorexia, no belching, no chest pain, no chills, no constipation, no cough, no diarrhea, no dysuria, no fatigue, no fever, no flatus, no hematemesis, no hematochezia, no melena, no shortness of breath, no sore throat, no vaginal bleeding, no vaginal discharge and no vomiting        Prior to Admission Medications   Prescriptions Last Dose Informant Patient Reported? Taking?    Blood Glucose Monitoring Suppl (OneTouch Verio) w/Device KIT  Self No No   Sig: Use daily   Patient not taking: No sig reported   HYDROcodone-acetaminophen (Norco) 5-325 mg per tablet 7/17/2022 at Unknown time  No Yes   Sig: Take 1 tablet by mouth every 6 (six) hours as needed for pain Max Daily Amount: 4 tablets   Insulin Pen Needle (Pen Needles) 32G X 4 MM MISC No No   Sig: Use once a week   Magnesium Oxide -Mg Supplement 400 MG CAPS 7/17/2022 at Unknown time Self Yes Yes   Sig: Take 1 capsule by mouth daily   Omega-3 Fatty Acids (FISH OIL) 1,000 mg 7/18/2022 at Unknown time Self Yes Yes   Sig: Take 1,000 mg by mouth 2 (two) times a day     OneTouch Delica Lancets 19W MISC  Self No No   Sig: Use 1 application daily   Patient not taking: No sig reported   Ozempic, 1 MG/DOSE, 4 MG/3ML SOPN injection pen  Self No No   Sig: Inject 0 75 mL (1 mg total) under the skin once a week   PARoxetine (PAXIL-CR) 37 5 mg 24 hr tablet 7/18/2022 at Unknown time Self No Yes   Sig: TAKE 1 TABLET BY MOUTH IN  THE MORNING   Potassium Citrate ER 15 MEQ (1620 MG) TBCR 7/18/2022 at Unknown time Self No Yes   Sig: Take 1 tablet by mouth 2 (two) times a day   VITAMIN D PO 7/18/2022 at Unknown time Self Yes Yes   Sig: Take 2,000 Units by mouth 2 (two) times a day   aspirin 81 MG tablet 7/17/2022 at Unknown time Self Yes Yes   Sig: Take 81 mg by mouth daily  atorvastatin (LIPITOR) 40 mg tablet 7/17/2022 at Unknown time Self No Yes   Sig: TAKE 1 TABLET BY MOUTH  DAILY   fluorouracil 5,735 mg in CADD/Elastomeric Infusion Device   No No   Sig: Infuse 5,735 mg (1,200 mg/m2/day x 2 39 m2) into a venous catheter over 46 hours for 2 days  Do not start before July 20, 2022  glucose blood (Contour Next Test) test strip  Self No No   Sig: Use 1 each daily Use as instructed   ibuprofen (ADVIL,MOTRIN) 100 MG tablet 7/16/2022 Self Yes No   Sig: Take 200 mg by mouth as needed for mild pain   metoprolol succinate (TOPROL-XL) 25 mg 24 hr tablet 7/18/2022 at Unknown time Self No Yes   Sig: Take 1 tablet (25 mg total) by mouth 2 (two) times a day   multivitamin (THERAGRAN) TABS 7/18/2022 at Unknown time Self Yes Yes   Sig: Take 1 tablet by mouth daily     olmesartan (BENICAR) 20 mg tablet 7/18/2022 at Unknown time Self No Yes   Sig: TAKE 1 TABLET BY MOUTH  DAILY   ondansetron (ZOFRAN) 4 mg tablet 7/17/2022 at Unknown time  No Yes   Sig: Take 2 tablets (8 mg total) by mouth every 8 (eight) hours as needed for nausea or vomiting   ondansetron (ZOFRAN-ODT) 4 mg disintegrating tablet   No No   Sig: Take 1 tablet (4 mg total) by mouth every 6 (six) hours as needed for nausea for up to 3 days   pantoprazole (PROTONIX) 40 mg tablet 7/18/2022 at Unknown time Self No Yes   Sig: TAKE 1 TABLET BY MOUTH  DAILY BEFORE BREAKFAST   sotalol (BETAPACE) 120 mg tablet 7/18/2022 at Unknown time Self No Yes   Sig: Take 1 tablet (120 mg total) by mouth every 12 (twelve) hours   tamsulosin (FLOMAX) 0 4 mg   No No   Sig: Take 1 capsule (0 4 mg total) by mouth daily with dinner for 7 days   Patient taking differently: Take 1,800 mg by mouth daily with dinner      Facility-Administered Medications: None       Past Medical History:   Diagnosis Date    Abnormal liver function test     last assessed 1/16/17     Adenomatosis     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    last assessed 4/4/13    Atrial fibrillation ([facility]     Atrial flutter ([facility] )     Benign essential hypertension     last assessed 12/21/17     Body mass index (BMI) of 50-59 9 in adult ([facility]     Cancer ([facility]     pancreas    Carpal tunnel syndrome 09/07/2004    unspecified laterality  / last assessed 9/7/04    Colon polyp     Congenital pes planus     last assessed 7/15/14     COVID     4/30/21    CPAP (continuous positive airway pressure) dependence     Depression     Depression     Diabetes mellitus ([facility]     GERD (gastroesophageal reflux disease)     Hemangioma of skin 08/26/2003    last assessed 8/26/03    History of gastroesophageal reflux (GERD)     Hypercholesterolemia     Hyperlipidemia     Hypertension     Irregular heart beat     Kidney stone     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    last assessed 12/31/14     Osteoarthritis of both knees     last assessed 12/31/14     [facility]oxysmal atrial fibrillation ([facility]     S/P ablation of atrial flutter     Sleep apnea        Past Surgical History:   Procedure Laterality Date    ABDOMINAL SURGERY  06/2006    20cm GIST removed     APPENDECTOMY      ATRIAL ABLATION SURGERY      CARPAL TUNNEL RELEASE Bilateral     COLONOSCOPY      FL GUIDED CENTRAL VENOUS ACCESS DEVICE INSERTION  5/31/2022    HYSTERECTOMY      fibroids    IR PORT CHECK  6/23/2022    IR PORT REMOVAL AND PLACEMENT NEW SITE  6/28/2022    KIDNEY STONE SURGERY Right 09/17/2021    placed stent in  for kidney stone    KNEE SURGERY      KNEE SURGERY Left 03/2019    OOPHORECTOMY      cyst    GA CYSTOURETHROSCOPY,URETER CATHETER Left 7/18/2022    Procedure: CYSTOSCOPY RETROGRADE PYELOGRAM WITH INSERTION STENT URETERAL;  Surgeon: Pao Rainey MD;  Location: [facility];  Service: Urology    TONSILLECTOMY      TUMOR EXCISION  2006    gastrointestinal stromal tumor    TUNNELED VENOUS PORT PLACEMENT N/A 5/31/2022    Procedure: INSERTION VENOUS PORT (PORT-A-CATH); Surgeon: Jasmeet Calloway MD;  Location: [facility];  Service: Surgical Oncology    UPPER GASTROINTESTINAL ENDOSCOPY         Family History   Problem Relation Age of Onset    Emphysema Mother    Isanti Bree Arthritis Mother     Diabetes Mother     Hypertension Mother     COPD Mother     Arthritis Father     Prostate cancer Father     Cerebral aneurysm Son     No Known Problems Maternal Grandmother     No Known Problems Maternal Grandfather     No Known Problems Paternal Grandmother     No Known Problems Paternal Grandfather     No Known Problems Son     No Known Problems Maternal Aunt     No Known Problems Maternal Aunt     No Known Problems Paternal Aunt     No Known Problems Paternal Aunt      I have reviewed and agree with the history as documented      E-Cigarette/Vaping    E-Cigarette Use Never User      E-Cigarette/Vaping Substances    Nicotine No     THC No     CBD No     Flavoring No     Other No     Unknown No      Social History     Tobacco Use    Smoking status: Former Smoker     Years: 9 00     Types: Cigarettes    Smokeless tobacco: Never Used    Tobacco comment: pt states she smokes 1 to 3 times per week   Vaping Use    Vaping Use: Never used   Substance Use Topics    Alcohol use: Not Currently     Comment: social drinker per allscript     Drug use: No       Review of Systems   Constitutional: Negative for activity change, appetite change, chills, fatigue and fever  HENT: Negative for congestion, dental problem, drooling, ear discharge, ear pain, mouth sores, nosebleeds, rhinorrhea, sore throat and trouble swallowing  Eyes: Negative for pain, discharge and itching  Respiratory: Negative for cough, chest tightness, shortness of breath and wheezing  Cardiovascular: Negative for chest pain and palpitations  Gastrointestinal: Positive for nausea  Negative for abdominal pain, anorexia, blood in stool, constipation, diarrhea, flatus, hematemesis, hematochezia, melena and vomiting  Endocrine: Negative for cold intolerance and heat intolerance  Genitourinary: Positive for flank pain and hematuria  Negative for difficulty urinating, dysuria, frequency, urgency, vaginal bleeding and vaginal discharge  Skin: Negative for rash and wound  Allergic/Immunologic: Negative for food allergies and immunocompromised state  Neurological: Negative for dizziness, seizures, syncope, weakness, numbness and headaches  Psychiatric/Behavioral: Negative for agitation, behavioral problems and confusion  Physical Exam  Physical Exam  Vitals and nursing note reviewed  Constitutional:       General: She is not in acute distress  Appearance: She is not diaphoretic  HENT:      Head: Normocephalic and atraumatic  Right Ear: External ear normal       Left Ear: External ear normal    Eyes:      General: No scleral icterus  Conjunctiva/sclera: Conjunctivae normal    Neck:      Vascular: No JVD        Trachea: No tracheal deviation  Cardiovascular:      Rate and Rhythm: Normal rate and regular rhythm  Heart sounds: Normal heart sounds  No murmur heard  No friction rub  No gallop  Pulmonary:      Effort: Pulmonary effort is normal  No respiratory distress  Breath sounds: Normal breath sounds  No wheezing or rales  Chest:      Chest wall: No tenderness  Abdominal:      General: Bowel sounds are normal  There is no distension  Palpations: Abdomen is soft  Tenderness: There is abdominal tenderness in the left lower quadrant  There is no left CVA tenderness or guarding  Musculoskeletal:         General: No tenderness or deformity  Normal range of motion  Lymphadenopathy:      Cervical: No cervical adenopathy  Skin:     General: Skin is warm and dry  Findings: No erythema or rash  Neurological:      Mental Status: She is alert and oriented to person, place, and time     Psychiatric:         Mood and Affect: Mood normal          Behavior: Behavior normal          Vital Signs  ED Triage Vitals   Temperature Pulse Respirations Blood Pressure SpO2   07/18/22 0805 07/18/22 0805 07/18/22 0805 07/18/22 0805 07/18/22 0805   98 4 °F (36 9 °C) 85 16 (!) 192/105 96 %      Temp Source Heart Rate Source Patient Position - Orthostatic VS BP Location FiO2 (%)   07/18/22 0805 07/18/22 0805 07/18/22 0805 07/18/22 0805 --   Oral Monitor Sitting Right arm       Pain Score       07/18/22 0859       7           Vitals:    07/18/22 1645 07/18/22 1700 07/18/22 1715 07/18/22 1730   BP: 115/63 140/76 141/78 140/76   Pulse: 88 80 84 80   Patient Position - Orthostatic VS:             Visual Acuity      ED Medications  Medications   ondansetron (ZOFRAN) injection 4 mg (4 mg Intravenous Given 7/18/22 0859)   ketorolac (TORADOL) injection 30 mg (30 mg Intravenous Given 7/18/22 0859)       Diagnostic Studies  Results Reviewed     Procedure Component Value Units Date/Time    CBC and differential [[id number]]  (Abnormal) Collected: 07/18/22 0901    Lab Status: Final result Specimen: Blood from Arm, Right Updated: 07/18/22 1005     WBC 13 63 Thousand/uL      RBC 4 45 Million/uL      Hemoglobin 12 8 g/dL      Hematocrit 39 2 %      MCV 88 fL      MCH 28 8 pg      MCHC 32 7 g/dL      RDW 17 2 %      MPV 11 0 fL      Platelets 242 Thousands/uL     Narrative: This is an appended report  These results have been appended to a previously verified report      Manual Differential(PHLEBS Do Not Order) [[id number]]  (Abnormal) Collected: 07/18/22 0901    Lab Status: Final result Specimen: Blood from Arm, Right Updated: 07/18/22 1005     Segmented % 73 %      Bands % 2 %      Lymphocytes % 13 %      Monocytes % 9 %      Eosinophils, % 1 %      Basophils % 2 %      Absolute Neutrophils 10 22 Thousand/uL      Lymphocytes Absolute 1 77 Thousand/uL      Monocytes Absolute 1 23 Thousand/uL      Eosinophils Absolute 0 14 Thousand/uL      Basophils Absolute 0 27 Thousand/uL      Total Counted --     RBC Morphology Normal     Platelet Estimate Borderline    Urine Microscopic [[id number]]  (Abnormal) Collected: 07/18/22 0908    Lab Status: Final result Specimen: Urine, Clean Catch Updated: 07/18/22 0936     RBC, UA 20-30 /hpf      WBC, UA 2-4 /hpf      Epithelial Cells Occasional /hpf      Bacteria, UA Occasional /hpf      MUCUS THREADS Occasional    Comprehensive metabolic panel [[id number]]  (Abnormal) Collected: 07/18/22 0901    Lab Status: Final result Specimen: Blood from Arm, Right Updated: 07/18/22 0927     Sodium 139 mmol/L      Potassium 4 2 mmol/L      Chloride 105 mmol/L      CO2 26 mmol/L      ANION GAP 8 mmol/L      BUN 14 mg/dL      Creatinine 1 24 mg/dL      Glucose 122 mg/dL      Calcium 9 2 mg/dL      AST 26 U/L      ALT 40 U/L      Alkaline Phosphatase 134 U/L      Total Protein 6 5 g/dL      Albumin 3 8 g/dL      Total Bilirubin 0 78 mg/dL      eGFR 46 ml/min/1 73sq m     Narrative:      [place] guidelines for Chronic Kidney Disease (CKD):     Stage 1 with normal or high GFR (GFR > 90 mL/min/1 73 square meters)    Stage 2 Mild CKD (GFR = 60-89 mL/min/1 73 square meters)    Stage 3A Moderate CKD (GFR = 45-59 mL/min/1 73 square meters)    Stage 3B Moderate CKD (GFR = 30-44 mL/min/1 73 square meters)    Stage 4 Severe CKD (GFR = 15-29 mL/min/1 73 square meters)    Stage 5 End Stage CKD (GFR <15 mL/min/1 73 square meters)  Note: GFR calculation is accurate only with a steady state creatinine    Urine Macroscopic, POC [[id number]]  (Abnormal) Collected: 07/18/22 0908    Lab Status: Final result Specimen: Urine Updated: 07/18/22 0909     Color, UA Yellow     Clarity, UA Clear     pH, UA 7 5     Leukocytes, UA Negative     Nitrite, UA Negative     Protein, UA Trace mg/dl      Glucose, UA Negative mg/dl      Ketones, UA Negative mg/dl      Urobilinogen, UA 0 2 E U /dl      Bilirubin, UA Negative     Occult Blood, UA Moderate     Specific [place], UA 1 020    Narrative:      CLINITEK RESULT                 CT abdomen pelvis wo contrast   Final Result by Neysa Cushing, MD (07/18 0914)      Obstructing 7 mm calculus in the pelvic portion  ,  Image 68 series 2 with mild left hydronephrosis and hydroureter with the surrounding perinephric stranding      2 7 cm hypodensity is suggested within the pancreas, consider nonemergent evaluation with MRI to exclude cystic pancreatic lesion      Immediate finding notification has been created in Epic         Workstation performed: [id number]         FL retrograde pyelogram    (Results Pending)              Procedures  Procedures         ED Course  ED Course as of 07/21/22 1101   Mon Jul 18, 2022   4310 Message sent to Urology regarding case   1003 Discussed case with Urology APN call  Will speak with attending to decide if patient should go for a stent today or tomorrow                                               MDM  Number of Diagnoses or Management Options  Hydronephrosis  Kidney stone on left side  Diagnosis management comments: Differential diagnosis includes but not limited to:  Kidney stone, hydronephrosis, renal injury         Amount and/or Complexity of Data Reviewed  Clinical lab tests: ordered and reviewed  Tests in the radiology section of CPT®: ordered and reviewed  Discuss the patient with other providers: yes  Independent visualization of images, tracings, or specimens: yes        Disposition  Final diagnoses:   Kidney stone on left side - 7mm obstructing   Hydronephrosis     Time reflects when diagnosis was documented in both MDM as applicable and the Disposition within this note     Time User Action Codes Description Comment    7/18/2022 10:02 AM Towana Pun Add [N13 2] Hydronephrosis with urinary obstruction due to ureteral calculus     7/18/2022 10:13 AM Louis Coates Add [N20 0] Kidney stone on left side     7/18/2022 10:13 AM Louis Coates Modify [N20 0] Kidney stone on left side 7mm obstructing    7/18/2022 10:13 AM Louis Coates Add [N13 30] Hydronephrosis     7/18/2022  3:26 PM VerKami parson Reveal Add [N20 0] Kidney stone     7/18/2022  3:26 PM Verbe Apex Reveal Add [R10 9] Left flank pain       ED Disposition     ED Disposition   Send to OR    Condition   --    Date/Time   Mon Jul 18, 2022 10:13 AM    Comment   --         Follow-up Information    None         Discharge Medication List as of 7/18/2022  4:19 PM      START taking these medications    Details   acetaminophen (TYLENOL) 500 mg tablet Take 1 tablet (500 mg total) by mouth every 6 (six) hours for 5 days, Starting Mon 7/18/2022, Until Sat 7/23/2022, Normal      cephalexin (KEFLEX) 500 mg capsule Take 1 capsule (500 mg total) by mouth every 6 (six) hours for 5 days, Starting Mon 7/18/2022, Until Sat 7/23/2022, Normal      oxybutynin (DITROPAN-XL) 10 MG 24 hr tablet Take 1 tablet (10 mg total) by mouth daily at bedtime, Starting Mon 7/18/2022, Until Wed 8/17/2022, Normal CONTINUE these medications which have NOT CHANGED    Details   aspirin 81 MG tablet Take 81 mg by mouth daily  , Historical Med      atorvastatin (LIPITOR) 40 mg tablet TAKE 1 TABLET BY MOUTH  DAILY, Normal      HYDROcodone-acetaminophen (Norco) 5-325 mg per tablet Take 1 tablet by mouth every 6 (six) hours as needed for pain Max Daily Amount: 4 tablets, Starting Tue 5/24/2022, Normal      Magnesium Oxide -Mg Supplement 400 MG CAPS Take 1 capsule by mouth daily, Starting Wed 5/25/2016, Historical Med      metoprolol succinate (TOPROL-XL) 25 mg 24 hr tablet Take 1 tablet (25 mg total) by mouth 2 (two) times a day, Starting Wed 12/15/2021, Normal      multivitamin (THERAGRAN) TABS Take 1 tablet by mouth daily  , Historical Med      olmesartan (BENICAR) 20 mg tablet TAKE 1 TABLET BY MOUTH  DAILY, Normal      Omega-3 Fatty Acids (FISH OIL) 1,000 mg Take 1,000 mg by mouth 2 (two) times a day  , Historical Med      ondansetron (ZOFRAN) 4 mg tablet Take 2 tablets (8 mg total) by mouth every 8 (eight) hours as needed for nausea or vomiting, Starting Wed 6/1/2022, Normal      pantoprazole (PROTONIX) 40 mg tablet TAKE 1 TABLET BY MOUTH  DAILY BEFORE BREAKFAST, Normal      PARoxetine (PAXIL-CR) 37 5 mg 24 hr tablet TAKE 1 TABLET BY MOUTH IN  THE MORNING, Normal      Potassium Citrate ER 15 MEQ (1620 MG) TBCR Take 1 tablet by mouth 2 (two) times a day, Starting Mon 11/29/2021, Normal      sotalol (BETAPACE) 120 mg tablet Take 1 tablet (120 mg total) by mouth every 12 (twelve) hours, Starting Wed 12/15/2021, Normal      VITAMIN D PO Take 2,000 Units by mouth 2 (two) times a day, Historical Med      Blood Glucose Monitoring Suppl (OneTouch Verio) w/Device KIT Use daily, Starting Mon 10/25/2021, Normal      fluorouracil 5,735 mg in CADD/Elastomeric Infusion Device Infuse 5,735 mg (1,200 mg/m2/day x 2 39 m2) into a venous catheter over 46 hours for 2 days  Do not start before July 20, 2022 , Starting Wed 7/20/2022, Until Fri 7/22/2022, Print      glucose blood (Contour Next Test) test strip Use 1 each daily Use as instructed, Starting Thu 3/17/2022, Normal      ibuprofen (ADVIL,MOTRIN) 100 MG tablet Take 200 mg by mouth as needed for mild pain, Historical Med      Insulin Pen Needle (Pen Needles) 32G X 4 MM MISC Use once a week, Starting Fri 9/3/2021, Normal      ondansetron (ZOFRAN-ODT) 4 mg disintegrating tablet Take 1 tablet (4 mg total) by mouth every 6 (six) hours as needed for nausea for up to 3 days, Starting Sun 6/26/2022, Until Wed 6/29/2022 at 2359, Normal      OneTouch Delica Lancets 74B MISC Use 1 application daily, Starting Mon 10/25/2021, Normal      Ozempic, 1 MG/DOSE, 4 MG/3ML SOPN injection pen Inject 0 75 mL (1 mg total) under the skin once a week, Starting Mon 2/28/2022, Normal      tamsulosin (FLOMAX) 0 4 mg Take 1 capsule (0 4 mg total) by mouth daily with dinner for 7 days, Starting Sun 6/26/2022, Until Sun 7/3/2022, Normal             Outpatient Discharge Orders   Discharge Diet     Activity as tolerated     Shower Daily     Call provider for:  persistent nausea or vomiting     Call provider for:  severe uncontrolled pain     No dressing needed       PDMP Review       Value Time User    PDMP Reviewed  Yes 5/24/2022  9:01 AM Lindsay Satton MD          ED Provider  Electronically Signed by           Yole Paniagua PA-C  07/21/22 2007

## 2022-07-21 NOTE — TELEPHONE ENCOUNTER
Left VM for patient to make her aware that she may take advil for her ankle pain  However, she should contact her PCP in regards to the ankle pain that she is having in case they would like to order scans  She should not be experiencing severe pain from treatment

## 2022-07-21 NOTE — TELEPHONE ENCOUNTER
----- Message from Meseret Weber sent at 7/19/2022  4:34 PM EDT -----  Regarding: Complete  GC Completed Chart     Result Type: Negative    Result Delivery: PlayerTakesAllhart Message    Monthly Review: Does not need monthly review- COMPLETE

## 2022-07-22 ENCOUNTER — HOSPITAL ENCOUNTER (OUTPATIENT)
Dept: INFUSION CENTER | Facility: HOSPITAL | Age: 62
Discharge: HOME | End: 2022-07-22
Attending: INTERNAL MEDICINE
Payer: COMMERCIAL

## 2022-07-22 DIAGNOSIS — T45.1X5A CHEMOTHERAPY INDUCED NEUTROPENIA (HCC): Primary | ICD-10-CM

## 2022-07-22 DIAGNOSIS — D70.1 CHEMOTHERAPY INDUCED NEUTROPENIA (HCC): Primary | ICD-10-CM

## 2022-07-22 DIAGNOSIS — C25.0 ADENOCARCINOMA OF HEAD OF PANCREAS (HCC): ICD-10-CM

## 2022-07-22 PROCEDURE — 96372 THER/PROPH/DIAG INJ SC/IM: CPT

## 2022-07-22 RX ADMIN — PEGFILGRASTIM 6 MG: KIT SUBCUTANEOUS at 13:54

## 2022-07-22 NOTE — PROGRESS NOTES
CALL RECEIVED FROM PATIENT STATING THAT HER NEULASTA ON-PRO FELL OFF  WISAM MORALES RN MADE AWARE  PER DR GOINS, PT CAN GET NEULASTA INJECTION ON Monday 7/25  PT AGREEABLE   SCHEDULED FOR 7/25/22 AT 11AM

## 2022-07-22 NOTE — PROGRESS NOTES
Pt to clinic for Elastomeric pump disconnect  Elastomeric pump appears deflated at this time  Pt tolerated at home infusion without incident  Port flushed and blood return noted and was de-accessed as per protocol  Neulasta Onpro applied to pt's LEFT ARM, green light blinking  Pt aware of when Neulasta Onpro will start and when she can remove it  Pt aware of future appts  AVS provided

## 2022-07-25 ENCOUNTER — HOSPITAL ENCOUNTER (OUTPATIENT)
Dept: INFUSION CENTER | Facility: HOSPITAL | Age: 62
Discharge: HOME | End: 2022-07-25
Attending: INTERNAL MEDICINE
Payer: COMMERCIAL

## 2022-07-25 VITALS — TEMPERATURE: 96.4 F

## 2022-07-25 DIAGNOSIS — D70.1 CHEMOTHERAPY INDUCED NEUTROPENIA (HCC): Primary | ICD-10-CM

## 2022-07-25 DIAGNOSIS — T45.1X5A CHEMOTHERAPY INDUCED NEUTROPENIA (HCC): Primary | ICD-10-CM

## 2022-07-25 PROCEDURE — 96372 THER/PROPH/DIAG INJ SC/IM: CPT

## 2022-07-25 RX ADMIN — PEGFILGRASTIM 6 MG: 6 INJECTION SUBCUTANEOUS at 11:21

## 2022-07-26 ENCOUNTER — OFFICE VISIT (OUTPATIENT)
Dept: OBGYN CLINIC | Facility: CLINIC | Age: 62
End: 2022-07-26
Payer: COMMERCIAL

## 2022-07-26 ENCOUNTER — OFFICE VISIT (OUTPATIENT)
Dept: HEMATOLOGY ONCOLOGY | Facility: CLINIC | Age: 62
End: 2022-07-26
Payer: COMMERCIAL

## 2022-07-26 ENCOUNTER — APPOINTMENT (OUTPATIENT)
Dept: RADIOLOGY | Facility: CLINIC | Age: 62
End: 2022-07-26
Payer: COMMERCIAL

## 2022-07-26 VITALS
HEIGHT: 64 IN | SYSTOLIC BLOOD PRESSURE: 110 MMHG | WEIGHT: 293 LBS | BODY MASS INDEX: 50.02 KG/M2 | DIASTOLIC BLOOD PRESSURE: 78 MMHG | RESPIRATION RATE: 14 BRPM | HEART RATE: 88 BPM | OXYGEN SATURATION: 94 % | TEMPERATURE: 97.8 F

## 2022-07-26 VITALS
BODY MASS INDEX: 50.02 KG/M2 | HEART RATE: 84 BPM | SYSTOLIC BLOOD PRESSURE: 100 MMHG | HEIGHT: 64 IN | DIASTOLIC BLOOD PRESSURE: 66 MMHG | WEIGHT: 293 LBS

## 2022-07-26 DIAGNOSIS — M25.571 ACUTE RIGHT ANKLE PAIN: ICD-10-CM

## 2022-07-26 DIAGNOSIS — M25.572 ACUTE LEFT ANKLE PAIN: Primary | ICD-10-CM

## 2022-07-26 DIAGNOSIS — M25.572 ACUTE LEFT ANKLE PAIN: ICD-10-CM

## 2022-07-26 DIAGNOSIS — D70.1 CHEMOTHERAPY INDUCED NEUTROPENIA (HCC): Primary | ICD-10-CM

## 2022-07-26 DIAGNOSIS — C25.0 ADENOCARCINOMA OF HEAD OF PANCREAS (HCC): Primary | ICD-10-CM

## 2022-07-26 DIAGNOSIS — M21.42 PES PLANUS OF LEFT FOOT: ICD-10-CM

## 2022-07-26 DIAGNOSIS — M76.72 PERONEAL TENDONITIS OF LEFT LOWER EXTREMITY: ICD-10-CM

## 2022-07-26 DIAGNOSIS — T45.1X5A CHEMOTHERAPY INDUCED NEUTROPENIA (HCC): Primary | ICD-10-CM

## 2022-07-26 DIAGNOSIS — C25.0 ADENOCARCINOMA OF HEAD OF PANCREAS (HCC): ICD-10-CM

## 2022-07-26 PROCEDURE — 99203 OFFICE O/P NEW LOW 30 MIN: CPT | Performed by: PHYSICIAN ASSISTANT

## 2022-07-26 PROCEDURE — 99214 OFFICE O/P EST MOD 30 MIN: CPT | Performed by: INTERNAL MEDICINE

## 2022-07-26 PROCEDURE — 73610 X-RAY EXAM OF ANKLE: CPT

## 2022-07-26 RX ORDER — FLUOROURACIL 50 MG/ML
400 INJECTION, SOLUTION INTRAVENOUS ONCE
Status: CANCELLED | OUTPATIENT
Start: 2022-08-03

## 2022-07-26 RX ORDER — ATROPINE SULFATE 1 MG/ML
0.25 INJECTION, SOLUTION INTRAVENOUS ONCE
Status: CANCELLED | OUTPATIENT
Start: 2022-08-03

## 2022-07-26 RX ORDER — ATROPINE SULFATE 1 MG/ML
0.25 INJECTION, SOLUTION INTRAVENOUS ONCE AS NEEDED
Status: CANCELLED | OUTPATIENT
Start: 2022-08-03

## 2022-07-26 RX ORDER — SODIUM CHLORIDE 9 MG/ML
20 INJECTION, SOLUTION INTRAVENOUS ONCE AS NEEDED
Status: CANCELLED | OUTPATIENT
Start: 2022-08-03

## 2022-07-26 RX ORDER — DEXTROSE MONOHYDRATE 50 MG/ML
20 INJECTION, SOLUTION INTRAVENOUS ONCE
Status: CANCELLED | OUTPATIENT
Start: 2022-08-03

## 2022-07-26 NOTE — PROGRESS NOTES
Assessment/Plan:  1  Acute left ankle pain  Durable Medical Equipment    Ambulatory Referral to Podiatry    CANCELED: XR ankle 3+ vw right   2  Peroneal tendonitis of left lower extremity  Durable Medical Equipment    Ambulatory Referral to Podiatry   3  Pes planus of left foot       Carlton Khanna has developed a lateral ankle pain was consistent with a peroneal tendinitis  She did not have any trauma and is refusing to consider formal physical therapy  The best ability will treatment option while she is undergoing chemotherapy will be providing an ASO ankle support in combination with a home exercise program in the after visit summary for ankle range of motion and strengthening  She will ice and elevate the ankle for comfort  She will follow-up with podiatry in 2 weeks  She was encouraged to avoid barefoot walking  Shoes with good arch supports were encouraged        Subjective:   Patient ID: Danyel Gerard is a 57 y o  female new patient with leftt ankle pain  Patient has a contributing medical history of diabetes, pancreatic cancer and BMI 52  She is currently undergoing chemotherapy  She denies any trauma to the left ankle  She reports a few days ago she began getting shooting pain in the lateral part of the ankle into her lateral foot  She reports the symptoms have decreased over the past 48 hours  She is adamant she does not wish to consider any physical therapy as she is currently undergoing chemotherapy and quite exhausted of seeing doctors and hospitals  Review of Systems   Constitutional: Negative for chills and fever  HENT: Negative for ear pain and sore throat  Eyes: Negative for pain and visual disturbance  Respiratory: Negative for cough and shortness of breath  Cardiovascular: Negative for chest pain and palpitations  Gastrointestinal: Negative for abdominal pain and vomiting  Genitourinary: Negative for dysuria and hematuria     Musculoskeletal: Negative for arthralgias and back pain  Skin: Negative for color change and rash  Neurological: Negative for seizures and syncope  All other systems reviewed and are negative          Past Medical History:   Diagnosis Date    Abnormal liver function test     last assessed 1/16/17     Adenomatosis     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    last assessed 4/4/13    Atrial fibrillation ([facility] )     Atrial flutter ([facility] )     Benign essential hypertension     last assessed 12/21/17     Body mass index (BMI) of 50-59 9 in adult ([facility] )     Cancer ([facility] )     pancreas    Carpal tunnel syndrome 09/07/2004    unspecified laterality  / last assessed 9/7/04    Colon polyp     Congenital pes planus     last assessed 7/15/14     COVID     4/30/21    CPAP (continuous positive airway pressure) dependence     Depression     Depression     Diabetes mellitus (HCC)     GERD (gastroesophageal reflux disease)     Hemangioma of skin 08/26/2003    last assessed 8/26/03    History of gastroesophageal reflux (GERD)     Hypercholesterolemia     Hyperlipidemia     Hypertension     Irregular heart beat     Kidney stone     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 04/19/2006    last assessed 12/31/14     Osteoarthritis of both knees     last assessed 12/31/14     Paroxysmal atrial fibrillation ([facility] )     S/P ablation of atrial flutter     Sleep apnea        Past Surgical History:   Procedure Laterality Date    ABDOMINAL SURGERY  06/2006    20cm GIST removed     APPENDECTOMY      ATRIAL ABLATION SURGERY      CARPAL TUNNEL RELEASE Bilateral     COLONOSCOPY      FL GUIDED CENTRAL VENOUS ACCESS DEVICE INSERTION  5/31/2022    FL RETROGRADE PYELOGRAM  7/18/2022    HYSTERECTOMY      fibroids    IR PORT CHECK  6/23/2022    IR PORT REMOVAL AND PLACEMENT NEW SITE  6/28/2022    KIDNEY STONE SURGERY Right 09/17/2021    placed stent in  for kidney stone    KNEE SURGERY      KNEE SURGERY Left 03/2019    OOPHORECTOMY      cyst    W[place] CYSTOURETHROSCOPY,URETER CATHETER Left 7/18/2022    Procedure: CYSTOSCOPY RETROGRADE PYELOGRAM WITH INSERTION STENT URETERAL;  Surgeon: Peyton Davis MD;  [facility];  Service: Urology    TONSILLECTOMY      TUMOR EXCISION  2006    gastrointestinal stromal tumor    TUNNELED VENOUS PORT PLACEMENT N/A 5/31/2022    Procedure: INSERTION VENOUS PORT (PORT-A-CATH); Surgeon: Georgie Hashimoto, MD;  [facility];  Service: Surgical Oncology    UPPER GASTROINTESTINAL ENDOSCOPY         Family History   Problem Relation Age of Onset    Emphysema Mother    [facility] Arthritis Mother     Diabetes Mother     Hypertension Mother     COPD Mother     Arthritis Father     Prostate cancer Father     Cerebral aneurysm Son     No Known Problems Maternal Grandmother     No Known Problems Maternal Grandfather     No Known Problems Paternal Grandmother     No Known Problems Paternal Grandfather     No Known Problems Son     No Known Problems Maternal Aunt     No Known Problems Maternal Aunt     No Known Problems Paternal Aunt     No Known Problems Paternal Aunt        Social History     Occupational History    Not on file   Tobacco Use    Smoking status: Former Smoker     Years: 9 00     Types: Cigarettes    Smokeless tobacco: Never Used    Tobacco comment: pt states she smokes 1 to 3 times per week   Vaping Use    Vaping Use: Never used   Substance and Sexual Activity    Alcohol use: Not Currently     Comment: social drinker per [employer]     Drug use: No    Sexual activity: Not on file         Current Outpatient Medications:     aspirin 81 MG tablet, Take 81 mg by mouth daily  , Disp: , Rfl:     atorvastatin (LIPITOR) 40 mg tablet, TAKE 1 TABLET BY MOUTH  DAILY, Disp: 90 tablet, Rfl: 3    [START ON 8/3/2022] fluorouracil 5,280 mg in CADD/Elastomeric Infusion Device, Infuse 5,280 mg (1,200 mg/m2/day x 2 2 m2) into a catheter in a vein via infusion device over 46 hours for 2 days  Do not start before August 3, 2022 , Disp: 1 each, Rfl: 12    glucose blood (Contour Next Test) test strip, Use 1 each daily Use as instructed, Disp: 100 each, Rfl: 3    HYDROcodone-acetaminophen (Norco) 5-325 mg per tablet, Take 1 tablet by mouth every 6 (six) hours as needed for pain Max Daily Amount: 4 tablets, Disp: 10 tablet, Rfl: 0    ibuprofen (ADVIL,MOTRIN) 100 MG tablet, Take 200 mg by mouth as needed for mild pain, Disp: , Rfl:     Insulin Pen Needle (Pen Needles) 32G X 4 MM MISC, Use once a week, Disp: 12 each, Rfl: 3    Magnesium Oxide -Mg Supplement 400 MG CAPS, Take 1 capsule by mouth daily, Disp: , Rfl:     metoprolol succinate (TOPROL-XL) 25 mg 24 hr tablet, Take 1 tablet (25 mg total) by mouth 2 (two) times a day, Disp: 180 tablet, Rfl: 3    multivitamin (THERAGRAN) TABS, Take 1 tablet by mouth daily  , Disp: , Rfl:     olmesartan (BENICAR) 20 mg tablet, TAKE 1 TABLET BY MOUTH  DAILY, Disp: 90 tablet, Rfl: 3    Omega-3 Fatty Acids (FISH OIL) 1,000 mg, Take 1,000 mg by mouth 2 (two) times a day  , Disp: , Rfl:     ondansetron (ZOFRAN) 4 mg tablet, Take 2 tablets (8 mg total) by mouth every 8 (eight) hours as needed for nausea or vomiting, Disp: 20 tablet, Rfl: 3    oxybutynin (DITROPAN-XL) 10 MG 24 hr tablet, Take 1 tablet (10 mg total) by mouth daily at bedtime, Disp: 30 tablet, Rfl: 0    Ozempic, 1 MG/DOSE, 4 MG/3ML SOPN injection pen, Inject 0 75 mL (1 mg total) under the skin once a week, Disp: 9 mL, Rfl: 1    pantoprazole (PROTONIX) 40 mg tablet, TAKE 1 TABLET BY MOUTH  DAILY BEFORE BREAKFAST, Disp: 90 tablet, Rfl: 3    PARoxetine (PAXIL-CR) 37 5 mg 24 hr tablet, TAKE 1 TABLET BY MOUTH IN  THE MORNING, Disp: 90 tablet, Rfl: 3    Potassium Citrate ER 15 MEQ (1620 MG) TBCR, Take 1 tablet by mouth 2 (two) times a day, Disp: 180 tablet, Rfl: 3    sotalol (BETAPACE) 120 mg tablet, Take 1 tablet (120 mg total) by mouth every 12 (twelve) hours, Disp: 180 tablet, Rfl: 3   VITAMIN D PO, Take 2,000 Units by mouth 2 (two) times a day, Disp: , Rfl:     ondansetron (ZOFRAN-ODT) 4 mg disintegrating tablet, Take 1 tablet (4 mg total) by mouth every 6 (six) hours as needed for nausea for up to 3 days (Patient not taking: Reported on 7/26/2022), Disp: 9 tablet, Rfl: 0    tamsulosin (FLOMAX) 0 4 mg, Take 1 capsule (0 4 mg total) by mouth daily with dinner for 7 days (Patient not taking: Reported on 7/26/2022), Disp: 7 capsule, Rfl: 0    Allergies   Allergen Reactions    Other Rash     Adhesive tape        Objective:  Vitals:    07/26/22 1738   BP: 100/66   Pulse: 84       Left Ankle Exam     Tenderness   Left ankle tenderness location: Peroneal tendon  Left ankle swelling: Lateral ankle  Range of Motion   The patient has normal left ankle ROM  Muscle Strength   Dorsiflexion:  5/5   Plantar flexion:  5/5   Anterior tibial:  5/5   Posterior tibial:  5/5  Gastrocsoleus:  5/5  Peroneal muscle:  4/5    Tests   Anterior drawer: negative    Other   Erythema: absent  Scars: absent  Sensation: normal  Pulse: present    Comments:  She is able to bear weight with out significant pain  She has pes planus noted            Physical Exam  Constitutional:       General: She is not in acute distress  Appearance: Normal appearance  She is obese  HENT:      Head: Normocephalic and atraumatic  Nose: Nose normal    Cardiovascular:      Pulses: Normal pulses  Skin:     General: Skin is warm and dry  Coloration: Skin is not jaundiced  Findings: No bruising, erythema or rash  Neurological:      General: No focal deficit present  Mental Status: She is alert and oriented to person, place, and time  Psychiatric:         Mood and Affect: Mood normal          Behavior: Behavior normal          Thought Content:  Thought content normal          Judgment: Judgment normal          I have personally reviewed pertinent films in PACS and my interpretation is left ankle three views show no acute fractures or dislocations  Mild degenerative changes are noted  Calcaneal spurring is seen  Some midfoot arch collapse is noted  These are read from an orthopedic standpoint will await official radiologist interpretation             Ref Range & Units 5 mo ago   HgB A1C 4 8 - 5 6 % 6 5 High     Resulting Agency  [facility]   Specimen Collected: 02/21/22 Last Resulted: 02/21/22 12:00 AM   Received From: [facility]  Result Received: 06/30/22  7:29 AM     Patient's most recent hemoglobin A1c was reviewed by myself in the office today

## 2022-07-26 NOTE — PATIENT INSTRUCTIONS
Ankle Exercises   WHAT YOU NEED TO KNOW:   What do I need to know about ankle exercises? Ankle exercises help strengthen your ankle and improve its function after injury  These are beginning exercises  Ask your healthcare provider if you need to see a physical therapist for more advanced exercises  Do these exercises 3 to 5 days a week , or as directed by your healthcare provider  Ask if you should perform the exercises on each ankle  Do the exercises in the order that your healthcare provider recommends  This will help prevent swelling, chronic pain, and reinjury  Start with range of motion exercises  Then progress to strengthening exercises, and finally to balancing exercises  Warm up before you do ankle exercises  Walk or ride a stationary bike for 5 to 10 minutes to prepare your ankle for movement  Stop if you feel pain  It is normal to feel some discomfort at first  Regular exercise will help decrease your discomfort over time  How do I perform range of motion exercises safely? Begin with range of motion exercises to improve flexibility  Ask your healthcare provider when you can progress to strengthening exercises  Ankle alphabet:  Sit on a chair so that your feet do not touch the floor  Use your big toe to write each letter of the alphabet  Use only your foot and ankle, and keep your movements small  Do 2 sets  Calf stretches:      Sitting calf stretches with a towel:  Sit on the floor with both legs out straight in front of you  Loop a towel around the ball of your injured foot  Grasp the ends of the towel and pull it toward you  Keep your leg and back straight  Do not lean forward as you pull the towel  Hold for 30 seconds  Then relax for 30 seconds  Do 2 sets of 10  Standing calf stretches:  Stand facing a wall with the foot that is not injured forward and your knee slightly bent   Keep the leg with the injured foot straight and behind you with your toes pointed in slightly  With both heels flat on the floor, press your hips forward  Do not arch your back  Hold for 30 seconds, and then relax for 30 seconds  Do 2 sets of 10  Repeat with your leg bent  Do 2 sets of 10  How do I perform strengthening exercises safely? After you can perform range of motion exercises without pain, you may begin strengthening exercises  Ask your healthcare provider when you can progress to balancing exercises  Ankle movement in 4 directions:  Sit on the floor with your legs straight in front of you  Keep your heels on the floor for support  Dorsiflexion:  Begin with your toes pointing straight up  Pull your toes toward your body  Slowly return to the starting position  Do 3 sets of 5  Plantar flexion:  Begin with your toes pointing straight up  Push your toes away from your body  Slowly return to the starting position  Do 3 sets of 5  Inversion:  Begin with your toes pointing straight up  Push your toes inward, toward each other  Slowly return to the starting position  Do 3 sets of 5  Eversion:  Begin with your toes pointing straight up  Push your toes outward, away from each other  Slowly return to the starting position  Do 3 sets of 5  Toe curls with a towel:  Sit on a chair so that both of your feet are flat on the floor  Place a small towel on the floor in front of your injured foot  Grab the center of the towel with your toes and curl the towel toward you  Relax and repeat  Do 1 set of 5  Eure pick-ups:  Sit on a chair so that both of your feet are flat on the floor  Place 20 marbles on the floor in front of your injured foot  Use your toes to  one marble at a time and place it into a bowl  Repeat until you have picked up all the marbles  Do 1 set  Heel raises:      Single leg heel raises:  Stand with your weight evenly on both feet  Hold on to a chair or a wall for balance   Lift the foot that is not injured off the floor so all your weight is placed on your injured foot  Raise the heel of your injured foot as high as you can  Slowly lower your heel to the floor  Do 1 set of 10  Double leg heel raises:  Stand with your weight evenly on both feet  Hold on to a chair or a wall for balance  Raise both of your heels as high as you can  Slowly lower your heels to the floor  Do 1 set of 10  Heel and toe walks:      Heel walks:  Begin in a standing position  Lift your toes off the floor and walk on your heels  Keep your toes lifted as high as possible  Do 2 sets of 10  Toe walks:  Begin in a standing position  Lift your heels off the floor and walk on the balls and toes of your feet  Keep your heels lifted as high as possible  Do 2 sets of 10  How do I perform a balance exercise safely? After you can perform strengthening exercises without pain, you may do this beginning balancing exercise  Ask your healthcare provider for more advanced balance exercises  Single leg stance:  Stand with your weight evenly on both feet, or hold on to a chair or a wall  Do not lean to the side  Lift the foot that is not injured off the floor so all your weight is placed on your injured foot  Balance on your injured foot  Ask your healthcare provider how long to hold this position  When should I contact my healthcare provider? Your pain becomes worse  You have new pain  You have questions or concerns about your condition, care, or exercise program     CARE AGREEMENT:   You have the right to help plan your care  Learn about your health condition and how it may be treated  Discuss treatment options with your healthcare providers to decide what care you want to receive  You always have the right to refuse treatment  The above information is an  only  It is not intended as medical advice for individual conditions or treatments   Talk to your doctor, nurse or pharmacist before following any medical regimen to see if it is safe and effective for you  © Copyright [employer] 2022 Information is for End User's use only and may not be sold, redistributed or otherwise used for commercial purposes   All illustrations and images included in CareNotes® are the copyrighted property of [employer]  or Mat Hernandez

## 2022-07-26 NOTE — PROGRESS NOTES
Hematology/Oncology Outpatient Follow- up Note  Grupo Araya 57 y o  female MRN: @ Encounter: [id number]        Date:  7/26/2022    Presenting Complaint/Diagnosis :     Locally advanced pancreatic cancer for neoadjuvant chemotherapy    HPI:    Grupo Araya is seen for initial consultation 6/1/2022 regarding newly diagnosed pancreatic adenocarcinoma  The patient had some abdominal discomfort after an EGD and up getting a CT scan which revealed pancreatic duct dilatation in the body with an ill-defined density in the pancreatic head  There was also 1 2 x 1 9 cm right adrenal nodule which was previously characterized as an adenoma  The patient had an MRI repeated in April which revealed a 4 2 x 3 6 x 3 cm mass in the pancreatic head with abnormal enlargement of the pancreatic duct and the pancreatic body and tail  The patient had an EUS with biopsy which revealed adenocarcinoma  CA 19 9 was normal   The patient was seen by our colleagues in Surgical Oncology then referred to see us for consideration of neoadjuvant treatment prior to resection  Previous Hematologic/ Oncologic History:    Oncology History   Adenocarcinoma of head of pancreas ([facility] [address] )   5/11/2022 Initial Diagnosis    Adenocarcinoma of head of pancreas ([facility] [address] )     5/11/2022 Biopsy    Endoscopic Ultrasound:  A , B , & C   Pancreas, Pancreatic head:   Malignant (PSC Category VI)  Positive for adenocarcinoma     6/8/2022 -  Chemotherapy    pegfilgrastim (Idylwood Silva), 6 mg, Subcutaneous, Once, 3 of 14 cycles  Administration: 6 mg (6/10/2022), 6 mg (7/7/2022)  fosaprepitant (EMEND) IVPB, 150 mg, Intravenous, Once, 1 of 12 cycles  Administration: 150 mg (7/20/2022)  fluorouracil (ADRUCIL), 400 mg/m2 = 955 mg, Intravenous, Once, 3 of 14 cycles  Administration: 955 mg (6/8/2022), 955 mg (7/5/2022)  irinotecan (CAMPTOSAR) chemo infusion, 430 mg, Intravenous, Once, 4 of 15 cycles  Dose modification: 150 mg/m2 (original dose 180 mg/m2, Cycle 6, Reason: Max Dose Reached, Comment: per protocol), 180 mg/m2 (original dose 180 mg/m2, Cycle 5, Reason: Other (Must fill in a comment), Comment: Per protocol)  Administration: 440 mg (6/8/2022), 330 mg (7/20/2022), 440 mg (7/5/2022)  oxaliplatin (ELOXATIN) chemo infusion, 203 15 mg, Intravenous, Once, 4 of 15 cycles  Administration: 200 mg (6/8/2022), 200 mg (7/20/2022), 200 mg (7/5/2022)  fluorouracil (ADRUCIL) ambulatory infusion Soln, 1,200 mg/m2/day = 5,735 mg, Intravenous, Over 46 hours, 4 of 15 cycles         Current Hematologic/ Oncologic Treatment:      FOLFIRINOX  Cycle 5  Is on the 3rd of August     Interval History:    Patient returns for follow-up visit  She had a kidney stone and is seeing Urology for procedure on the 22nd of August   She is getting neoadjuvant treatment for her pancreatic cancer  She has a kidney stone for which he has a stent  She has a procedure coming up on the 22nd  She is tolerating her chemotherapy reasonably well  Does get fatigued  Has occasional diarrhea which is controlled with Imodium  She was started on Kytril which has helped with her nausea significantly  Denies any nausea denies any vomiting currently  Denies any abdominal pain currently  The rest of her 14 point review of systems today was negative  She also twisted her ankle so is a little on comfortable with that  She states it is improving  Test Results:    Imaging: CT abdomen pelvis wo contrast    Result Date: 7/18/2022  Narrative: CT ABDOMEN AND PELVIS WITHOUT IV CONTRAST INDICATION:   Abdominal pain, acute, nonlocalized pain  COMPARISON:  None  TECHNIQUE:  CT examination of the abdomen and pelvis was performed without intravenous contrast  This examination was performed without intravenous contrast in the context of the critical nationwide Omnipaque shortage  Axial, sagittal, and coronal 2D reformatted images were created from the source data and submitted for interpretation   Radiation dose length product (DLP) for this visit:  1522 mGy-cm   This examination, like all CT scans performed in the [facility], was performed utilizing techniques to minimize radiation dose exposure, including the use of iterative reconstruction and automated exposure control  Enteric contrast was administered  FINDINGS: ABDOMEN LOWER CHEST:  Bibasilar density seen due to atelectasis LIVER/BILIARY TREE:  Evaluation of the liver parenchyma limited due to ring artifact GALLBLADDER:  No calcified gallstones  No pericholecystic inflammatory change  SPLEEN:  Splenomegaly seen PANCREAS:  Low-attenuation is suggested in the head and uncinate process of the pancreas in image 31 series 2 measuring 2 7 cm, ADRENAL GLANDS:  Unremarkable  KIDNEYS/URETERS:  Left hydronephrosis seen with perinephric stranding or hydroureter  There is an obstructing calculus in the pelvic portion of the left ureter measuring about 7 mm, image 68 series 2 STOMACH AND BOWEL:  Unremarkable  APPENDIX:  No findings to suggest appendicitis  ABDOMINOPELVIC CAVITY:  Infiltration seen in the left lateral pelvic sidewall and inferior aspect of the left abdomen contiguous with the perinephric stranding on the left side VESSELS:  Unremarkable for patient's age  PELVIS REPRODUCTIVE ORGANS:  Unremarkable for patient's age  URINARY BLADDER:  Unremarkable  ABDOMINAL WALL/INGUINAL REGIONS:  Unremarkable  OSSEOUS STRUCTURES:  No acute compression of the vertebra No gross lytic lesion No gross lytic lesion     Impression: Obstructing 7 mm calculus in the pelvic portion  ,  Image 68 series 2 with mild left hydronephrosis and hydroureter with the surrounding perinephric stranding 2 7 cm hypodensity is suggested within the pancreas, consider nonemergent evaluation with MRI to exclude cystic pancreatic lesion Immediate finding notification has been created in Epic Workstation performed: [id number]     FL retrograde pyelogram    Result Date: 7/22/2022  Narrative: LEFT RETROGRADE PYELOGRAM INDICATION:   Hydronephrosis with urinary obstruction due to ureteral calculus [N13 2]  COMPARISON: 7/18/2022 IMAGES:  5 FLUOROSCOPY TIME:   15 sec CONTRAST:  10 mL of iothalamate meglumine 17 2 % FINDINGS: Fluoroscopic guidance provided for retrograde pyelogram  Opacified portions of the left ureter demonstrate normal   Left hydronephrosis and hydroureter  Nephroureteral stent was placed  Osseous and soft tissue detail limited by technique  Impression: Fluoroscopic guidance provided for left retrograde pyelogram and stent placement  Please see procedure report for further details  Workstation performed: [id number]     CT renal stone study abdomen pelvis wo contrast    Result Date: 6/26/2022  Narrative: CT ABDOMEN AND PELVIS WITHOUT IV CONTRAST - LOW DOSE RENAL STONE INDICATION:   Flank pain, kidney stone suspected renal stone  COMPARISON:  CT abdomen/pelvis dated 4/7/2022  TECHNIQUE:  Low radiation dose thin section CT examination of the abdomen and pelvis was performed without intravenous or oral contrast according to a protocol specifically designed to evaluate for urinary tract calculus  Axial, sagittal, and coronal 2D  reformatted images were created from the source data and submitted for interpretation  Evaluation for pathology in the abdomen and pelvis that is unrelated to urinary tract calculi is limited  Radiation dose length product (DLP) for this visit:  2133 42 mGy-cm   This examination, like all CT scans performed in the [facility], was performed utilizing techniques to minimize radiation dose exposure, including the use of iterative reconstruction and automated exposure control  URINARY TRACT FINDINGS: RIGHT KIDNEY AND URETER:  No urinary tract calculi  No hydronephrosis or hydroureter  LEFT KIDNEY AND URETER:  5 mm calculus in the proximal ureter  No significant hydronephrosis or hydroureter  URINARY BLADDER:  Unremarkable   ADDITIONAL FINDINGS: LOWER CHEST:  No clinically significant abnormality identified in the visualized lower chest  SOLID VISCERA: The liver is enlarged measuring up to 21 cm in length and demonstrates diffuse fatty infiltration  Stable 1 6 cm right adrenal gland adenoma  Limited low radiation dose noncontrast CT evaluation demonstrates no clinically significant abnormality of the imaged portions of the spleen or adrenal glands  The known pancreatic head mass is not well visualized without intravenous contrast  GALLBLADDER/BILIARY TREE:  No calcified gallstones  No pericholecystic inflammatory change  No biliary dilatation  STOMACH AND BOWEL:  Unremarkable  APPENDIX:  No findings to suggest appendicitis  ABDOMINOPELVIC CAVITY:  No ascites  No pneumoperitoneum  No lymphadenopathy  REPRODUCTIVE ORGANS:  Status post hysterectomy  ABDOMINAL WALL/INGUINAL REGIONS:  Unremarkable  OSSEOUS STRUCTURES:  No acute fracture or destructive osseous lesion  Multilevel degenerative disc disease in the lumbar spine  Impression: 5 mm proximal left ureteral calculus  No significant hydroureter or hydronephrosis  Hepatomegaly/hepatic steatosis  Known pancreatic head mass is not well visualized without intravenous contrast  The study was marked in EPIC for immediate notification  Workstation performed: [id number]     IR port removal and placement new site    Result Date: 6/29/2022  Narrative: New Left Chest port placement Existing Left Chest port removal Clinical History: Left chest port placed surgically with difficulty accessing it  Fluoro time: 2 1 mins Number of Images: 2 Conscious sedation time:  45mins New Left Port Placement Technique: The patient was brought to the interventional radiology suite and identified verbally and by wristband  The patient was placed supine on the table  The left internal jugular vein was evaluated as a potential access site with ultrasound  The vessel was found to be patent and compressible   The left neck and upper chest was prepped and draped in the usual sterile fashion  All elements of maximal sterile barrier technique were followed (cap, mask, sterile gown, sterile gloves, large sterile sheet, hand hygiene, and 2% chlorhexidine for cutaneous antisepsis)  Lidocaine was administered to the skin and a small skin incision was made  Under ultrasound guidance, utilizing sterile ultrasound technique with sterile gel and a sterile probe cover, the left internal jugular vein was accessed using single wall Seldinger technique  Static images of real time needle entry into the vessel were obtained  A 0 018 wire was then advanced through the needle into the central venous system  The needle was removed, and a 5 [place]i coaxial dilator was inserted  A heavy wire was inserted through the outer dilator and a 6 [place] peel-away sheath was inserted over the wire  A subcutaneous pocket was created in the skin of the upper chest for the reservoir utilizing a surgical incision  The port catheter was then tunneled under the skin of the upper chest  The catheter was advanced through the peel-away and the peel-away was removed  The catheter tip was then positioned in the right atrium under fluoroscopic control  The catheter was connected to the port, and the port inserted into the subcutaneous pocket  The port was sutured in 2 places to the fascia using 3-0 Monocryl suture  The pocket was closed with 3-0 Monocryl suture and Exofin skin adhesive  A sterile dressing was applied and 0 9 normal saline solution was administered into the lumen  Existing Left Port Removal Lidocaine was administered to the skin of the old incision site  The previous surgical incision was opened, and the port hub was dissected free  Once control of the hub was obtained, the port was then dissected free of the pocket, and the port with it's catheter were completely removed from the patient  This was confirmed fluoroscopically  The pocket was flushed with saline solution   The incision was closed using 3-0 Monocryl and Exofin  A sterile dressing was applied  The patient was returned to the ambulatory care unit for monitoring  Follow-up instructions were given  Impression: Impression: 1  Successful ultrasound and fluoroscopically guided placement of a new chest port via the left internal jugular vein  2  The tip of the catheter is in the right atrium and may be used immediately  3  Successful fluoroscopically guided removal of the existing left port catheter and reservoir  Workstation performed: [id number]       Labs:   Lab Results   Component Value Date    WBC 14 59 (H) 07/19/2022    HGB 12 1 07/19/2022    HCT 39 5 07/19/2022    MCV 91 07/19/2022     (L) 07/19/2022     Lab Results   Component Value Date     04/12/2017    K 4 1 07/19/2022     07/19/2022    CO2 25 07/19/2022    ANIONGAP 7 01/22/2015    BUN 15 07/19/2022    CREATININE 1 13 07/19/2022    GLUCOSE 92 04/12/2017    GLUF 172 (H) 07/19/2022    CALCIUM 9 4 07/19/2022    CORRECTEDCA 10 0 07/19/2022    AST 19 07/19/2022    ALT 44 07/19/2022    ALKPHOS 149 (H) 07/19/2022    PROT 6 3 04/12/2017    BILITOT 0 3 04/12/2017    EGFR 52 07/19/2022       Lab Results   Component Value Date    IRON 59 10/14/2019    TIBC 430 10/14/2019    FERRITIN 45 10/14/2019     ROS: As stated in the history of present illness otherwise his 14 point review of systems today was negative        Active Problems:   Patient Active Problem List   Diagnosis    Dyspnea on exertion    Supraventricular tachycardia ([address] )    Hypertension    Controlled depression    Severe obstructive sleep apnea    Morbid obesity ([address] )    Type 2 diabetes mellitus without complication, without long-term current use of insulin (HCC)    Hyperlipidemia    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)    Esophageal reflux    Benign essential hypertension    Adenomatous colon polyp    Class 3 severe obesity due to excess calories with body mass index (BMI) of 50 0 to 59 9 in adult [facility])    Kidney stone    Uric acid kidney stone    Ad[facility] pancreas [facility]    Chemotherapy induced neutropenia (HCC)    CPAP (continuous positive airway pressure) dependence    Left flank pain       Past Medical History:   Past Medical History:   Diagnosis Date    Abnormal liver function test     last assessed 1/16/17     Adenomatosis     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    last assessed 4/4/13    Atrial fibrillation (HCC)     Atrial flutter (HCC)     Benign essential hypertension     last assessed 12/21/17     Body mass index (BMI) of 50-59 9 in adult ([facility] )     Cancer ([place] [address] )     pancreas    Carpal tunnel syndrome 09/07/2004    unspecified laterality  / last assessed 9/7/04    Colon polyp     Congenital pes planus     last assessed 7/15/14     COVID     4/30/21    CPAP (continuous positive airway pressure) dependence     Depression     Depression     Diabetes mellitus (HCC)     GERD (gastroesophageal reflux disease)     Hemangioma of skin 08/26/2003    last assessed 8/26/03    History of gastroesophageal reflux (GERD)     Hypercholesterolemia     Hyperlipidemia     Hypertension     Irregular heart beat     Kidney stone     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    last assessed 12/31/14     Osteoarthritis of both knees     last assessed 12/31/14     Paroxysmal atrial fibrillation (HCC)     S/P ablation of atrial flutter     Sleep apnea        Surgical History:   Past Surgical History:   Procedure Laterality Date    ABDOMINAL SURGERY  06/2006    20cm GIST removed     APPENDECTOMY      ATRIAL ABLATION SURGERY      CARPAL TUNNEL RELEASE Bilateral     COLONOSCOPY      FL GUIDED CENTRAL VENOUS ACCESS DEVICE INSERTION  5/31/2022    FL RETROGRADE PYELOGRAM  7/18/2022    HYSTERECTOMY      fibroids    IR PORT CHECK  6/23/2022    IR PORT REMOVAL AND PLACEMENT NEW SITE 6/28/2022    KIDNEY STONE SURGERY Right 09/17/2021    placed stent in  for kidney stone    KNEE SURGERY      KNEE SURGERY Left 03/2019    OOPHORECTOMY      cyst    [place] CYSTOURETHROSCOPY,URETER CATHETER Left 7/18/2022    Procedure: CYSTOSCOPY RETROGRADE PYELOGRAM WITH INSERTION STENT URETERAL;  Surgeon: Vernon Goltz, MD;  [facility]: [facility];  Service: Urology    TONSILLECTOMY      TUMOR EXCISION  2006    gastrointestinal stromal tumor    TUNNELED VENOUS PORT PLACEMENT N/A 5/31/2022    Procedure: INSERTION VENOUS PORT (PORT-A-CATH); Surgeon: Wendi Burk MD;  [facility]: [facility];  Service: Surgical Oncology    UPPER GASTROINTESTINAL ENDOSCOPY         Family History:    Family History   Problem Relation Age of Onset    Emphysema Mother     Arthritis Mother     Diabetes Mother     Hypertension Mother     COPD Mother     Arthritis Father     Prostate cancer Father     Cerebral aneurysm Son     No Known Problems Maternal Grandmother     No Known Problems Maternal Grandfather     No Known Problems Paternal Grandmother     No Known Problems Paternal Grandfather     No Known Problems Son     No Known Problems Maternal Aunt     No Known Problems Maternal Aunt     No Known Problems Paternal Aunt     No Known Problems Paternal Aunt        Cancer-related family history includes Prostate cancer in her father      Social History:   Social History     Socioeconomic History    Marital status: /Civil Union     Spouse name: Not on file    Number of children: Not on file    Years of education: Not on file    Highest education level: Not on file   Occupational History    Not on file   Tobacco Use    Smoking status: Former Smoker     Years: 9 00     Types: Cigarettes    Smokeless tobacco: Never Used    Tobacco comment: pt states she smokes 1 to 3 times per week   Vaping Use    Vaping Use: Never used   Substance and Sexual Activity    Alcohol use: Not Currently     Comment: social drinker per allscript     Drug use: No    Sexual activity: Not on file   Other Topics Concern    Not on file   Social History Narrative    Lack of exercise    Good sleep hygiene    No caffeine use    Dog    Good sleep hygiene       Social Determinants of Health     Financial Resource Strain: Not on file   Food Insecurity: Not on file   Transportation Needs: Not on file   Physical Activity: Not on file   Stress: Not on file   Social Connections: Not on file   Intimate Partner Violence: Not on file   Housing Stability: Not on file       Current Medications:   Current Outpatient Medications   Medication Sig Dispense Refill    aspirin 81 MG tablet Take 81 mg by mouth daily   atorvastatin (LIPITOR) 40 mg tablet TAKE 1 TABLET BY MOUTH  DAILY 90 tablet 3    glucose blood (Contour Next Test) test strip Use 1 each daily Use as instructed 100 each 3    HYDROcodone-acetaminophen (Norco) 5-325 mg per tablet Take 1 tablet by mouth every 6 (six) hours as needed for pain Max Daily Amount: 4 tablets 10 tablet 0    ibuprofen (ADVIL,MOTRIN) 100 MG tablet Take 200 mg by mouth as needed for mild pain      Insulin Pen Needle (Pen Needles) 32G X 4 MM MISC Use once a week 12 each 3    Magnesium Oxide -Mg Supplement 400 MG CAPS Take 1 capsule by mouth daily      metoprolol succinate (TOPROL-XL) 25 mg 24 hr tablet Take 1 tablet (25 mg total) by mouth 2 (two) times a day 180 tablet 3    multivitamin (THERAGRAN) TABS Take 1 tablet by mouth daily        olmesartan (BENICAR) 20 mg tablet TAKE 1 TABLET BY MOUTH  DAILY 90 tablet 3    Omega-3 Fatty Acids (FISH OIL) 1,000 mg Take 1,000 mg by mouth 2 (two) times a day        ondansetron (ZOFRAN) 4 mg tablet Take 2 tablets (8 mg total) by mouth every 8 (eight) hours as needed for nausea or vomiting 20 tablet 3    oxybutynin (DITROPAN-XL) 10 MG 24 hr tablet Take 1 tablet (10 mg total) by mouth daily at bedtime 30 tablet 0    Ozempic, 1 MG/DOSE, 4 MG/3ML SOPN injection pen Inject 0 75 mL (1 mg total) under the skin once a week 9 mL 1    pantoprazole (PROTONIX) 40 mg tablet TAKE 1 TABLET BY MOUTH  DAILY BEFORE BREAKFAST 90 tablet 3    PARoxetine (PAXIL-CR) 37 5 mg 24 hr tablet TAKE 1 TABLET BY MOUTH IN  THE MORNING 90 tablet 3    Potassium Citrate ER 15 MEQ (1620 MG) TBCR Take 1 tablet by mouth 2 (two) times a day 180 tablet 3    sotalol (BETAPACE) 120 mg tablet Take 1 tablet (120 mg total) by mouth every 12 (twelve) hours 180 tablet 3    VITAMIN D PO Take 2,000 Units by mouth 2 (two) times a day      Blood Glucose Monitoring Suppl (OneTouch Verio) w/Device KIT Use daily (Patient not taking: No sig reported) 1 kit 0    ondansetron (ZOFRAN-ODT) 4 mg disintegrating tablet Take 1 tablet (4 mg total) by mouth every 6 (six) hours as needed for nausea for up to 3 days (Patient not taking: Reported on 7/26/2022) 9 tablet 0    OneTouch Delica Lancets 11Z MISC Use 1 application daily (Patient not taking: No sig reported) 100 each 3    tamsulosin (FLOMAX) 0 4 mg Take 1 capsule (0 4 mg total) by mouth daily with dinner for 7 days (Patient not taking: Reported on 7/26/2022) 7 capsule 0     No current facility-administered medications for this visit  Allergies: Allergies   Allergen Reactions    Other Rash     Adhesive tape        Physical Exam:    Body surface area is 2 34 meters squared  Wt Readings from Last 3 Encounters:   07/26/22 (!) 138 kg (304 lb)   07/20/22 (!) 141 kg (311 lb 11 7 oz)   07/12/22 (!) 143 kg (315 lb)        Temp Readings from Last 3 Encounters:   07/26/22 97 8 °F (36 6 °C) (Temporal)   07/25/22 (!) 96 4 °F (35 8 °C) (Temporal)   07/20/22 (!) 97 °F (36 1 °C) (Temporal)        BP Readings from Last 3 Encounters:   07/26/22 110/78   07/20/22 139/87   07/18/22 140/76         Pulse Readings from Last 3 Encounters:   07/26/22 88   07/20/22 102   07/18/22 80         Physical Exam     Constitutional   General appearance: No acute distress, well appearing and well nourished  Eyes   Conjunctiva and lids: No swelling, erythema or discharge  Pupils and irises: Equal, round and reactive to light  Ears, Nose, Mouth, and Throat   External inspection of ears and nose: Normal     Nasal mucosa, septum, and turbinates: Normal without edema or erythema  Oropharynx: Normal with no erythema, edema, exudate or lesions  Pulmonary   Respiratory effort: No increased work of breathing or signs of respiratory distress  Auscultation of lungs: Clear to auscultation  Cardiovascular   Palpation of heart: Normal PMI, no thrills  Auscultation of heart: Normal rate and rhythm, normal S1 and S2, without murmurs  Examination of extremities for edema and/or varicosities: Normal     Carotid pulses: Normal     Abdomen   Abdomen: Non-tender, no masses  Liver and spleen: No hepatomegaly or splenomegaly  Lymphatic   Palpation of lymph nodes in neck: No lymphadenopathy  Musculoskeletal   Gait and station: Normal     Digits and nails: Normal without clubbing or cyanosis  Inspection/palpation of joints, bones, and muscles: Normal     Skin   Skin and subcutaneous tissue: Normal without rashes or lesions  Neurologic   Cranial nerves: Cranial nerves 2-12 intact  Sensation: No sensory loss  Psychiatric   Orientation to person, place, and time: Normal     Mood and affect: Normal         Assessment / Plan: This is a pleasant 76-year-old female with newly diagnosed adenocarcinoma of the head of the pancreas  The patient was seen by our colleagues in surgery and they have recommended neoadjuvant chemotherapy  We went over various options and decided upon FOLFIRINOX  Doses have been adjusted  The patient is doing reasonably well at this point  We will continue her current regimen  She is following with Urology for kidney stones  She will continue to do so and have her procedure done on the 22nd  The plan is for at least 6 cycles of chemotherapy and then reimaging    If she has a nice response or if she has no evidence of metastatic disease and her disease is localized and resectable she will then go for surgery  She will then get the remainder of her chemotherapy postoperatively  The patient is in agreement with this  She states she does not wish to do all of her chemotherapy upfront and is only agreeable to 6-8 doses maximum prior to surgery if she is resectable  I think this is reasonable based on how she is feeling  We will continue the chemotherapy for now  Goals and Barriers:  Current Goal:  Prolong Survival from pancreatic cancer  Barriers: None  Patient's Capacity to Self Care:  Patient  able to self care  Portions of the record may have been created with voice recognition software  Occasional wrong word or "sound a like" substitutions may have occurred due to the inherent limitations of voice recognition software  Read the chart carefully and recognize, using context, where substitutions have occurred

## 2022-07-29 ENCOUNTER — APPOINTMENT (OUTPATIENT)
Dept: LAB | Facility: CLINIC | Age: 62
End: 2022-07-29
Payer: COMMERCIAL

## 2022-07-29 ENCOUNTER — OFFICE VISIT (OUTPATIENT)
Dept: CARDIOLOGY CLINIC | Facility: CLINIC | Age: 62
End: 2022-07-29
Payer: COMMERCIAL

## 2022-07-29 VITALS
HEIGHT: 64 IN | DIASTOLIC BLOOD PRESSURE: 68 MMHG | SYSTOLIC BLOOD PRESSURE: 114 MMHG | WEIGHT: 293 LBS | TEMPERATURE: 97.7 F | BODY MASS INDEX: 50.02 KG/M2 | HEART RATE: 96 BPM | OXYGEN SATURATION: 97 %

## 2022-07-29 DIAGNOSIS — D70.1 CHEMOTHERAPY INDUCED NEUTROPENIA (HCC): ICD-10-CM

## 2022-07-29 DIAGNOSIS — C25.0 ADENOCARCINOMA OF HEAD OF PANCREAS (HCC): ICD-10-CM

## 2022-07-29 DIAGNOSIS — I48.0 PAROXYSMAL ATRIAL FIBRILLATION (HCC): ICD-10-CM

## 2022-07-29 DIAGNOSIS — T45.1X5A CHEMOTHERAPY INDUCED NEUTROPENIA (HCC): ICD-10-CM

## 2022-07-29 DIAGNOSIS — Z01.810 PRE-OPERATIVE CARDIOVASCULAR EXAMINATION: Primary | ICD-10-CM

## 2022-07-29 DIAGNOSIS — I10 ESSENTIAL HYPERTENSION: ICD-10-CM

## 2022-07-29 DIAGNOSIS — I47.1 SUPRAVENTRICULAR TACHYCARDIA (HCC): ICD-10-CM

## 2022-07-29 LAB
ALBUMIN SERPL BCP-MCNC: 3.5 G/DL (ref 3.5–5)
ALP SERPL-CCNC: 179 U/L (ref 46–116)
ALT SERPL W P-5'-P-CCNC: 78 U/L (ref 12–78)
ANION GAP SERPL CALCULATED.3IONS-SCNC: 6 MMOL/L (ref 4–13)
ANISOCYTOSIS BLD QL SMEAR: PRESENT
AST SERPL W P-5'-P-CCNC: 35 U/L (ref 5–45)
BASOPHILS # BLD MANUAL: 0.12 THOUSAND/UL (ref 0–0.1)
BASOPHILS NFR MAR MANUAL: 1 % (ref 0–1)
BILIRUB SERPL-MCNC: 0.58 MG/DL (ref 0.2–1)
BUN SERPL-MCNC: 9 MG/DL (ref 5–25)
CALCIUM SERPL-MCNC: 9.9 MG/DL (ref 8.3–10.1)
CHLORIDE SERPL-SCNC: 110 MMOL/L (ref 96–108)
CO2 SERPL-SCNC: 24 MMOL/L (ref 21–32)
CREAT SERPL-MCNC: 0.92 MG/DL (ref 0.6–1.3)
EOSINOPHIL # BLD MANUAL: 0.12 THOUSAND/UL (ref 0–0.4)
EOSINOPHIL NFR BLD MANUAL: 1 % (ref 0–6)
ERYTHROCYTE [DISTWIDTH] IN BLOOD BY AUTOMATED COUNT: 17.6 % (ref 11.6–15.1)
GFR SERPL CREATININE-BSD FRML MDRD: 66 ML/MIN/1.73SQ M
GLUCOSE P FAST SERPL-MCNC: 103 MG/DL (ref 65–99)
HCT VFR BLD AUTO: 41.4 % (ref 34.8–46.1)
HGB BLD-MCNC: 12.7 G/DL (ref 11.5–15.4)
LYMPHOCYTES # BLD AUTO: 18 % (ref 14–44)
LYMPHOCYTES # BLD AUTO: 2.18 THOUSAND/UL (ref 0.6–4.47)
MCH RBC QN AUTO: 27.9 PG (ref 26.8–34.3)
MCHC RBC AUTO-ENTMCNC: 30.7 G/DL (ref 31.4–37.4)
MCV RBC AUTO: 91 FL (ref 82–98)
METAMYELOCYTES NFR BLD MANUAL: 2 % (ref 0–1)
MONOCYTES # BLD AUTO: 1.09 THOUSAND/UL (ref 0–1.22)
MONOCYTES NFR BLD: 9 % (ref 4–12)
NEUTROPHILS # BLD MANUAL: 8.34 THOUSAND/UL (ref 1.85–7.62)
NEUTS BAND NFR BLD MANUAL: 5 % (ref 0–8)
NEUTS SEG NFR BLD AUTO: 64 % (ref 43–75)
PLATELET # BLD AUTO: 149 THOUSANDS/UL (ref 149–390)
PLATELET BLD QL SMEAR: ABNORMAL
PMV BLD AUTO: 11.4 FL (ref 8.9–12.7)
POIKILOCYTOSIS BLD QL SMEAR: PRESENT
POLYCHROMASIA BLD QL SMEAR: PRESENT
POTASSIUM SERPL-SCNC: 4 MMOL/L (ref 3.5–5.3)
PROT SERPL-MCNC: 7.1 G/DL (ref 6.4–8.4)
RBC # BLD AUTO: 4.55 MILLION/UL (ref 3.81–5.12)
RBC MORPH BLD: PRESENT
SODIUM SERPL-SCNC: 140 MMOL/L (ref 135–147)
WBC # BLD AUTO: 12.09 THOUSAND/UL (ref 4.31–10.16)

## 2022-07-29 PROCEDURE — 85027 COMPLETE CBC AUTOMATED: CPT

## 2022-07-29 PROCEDURE — 99214 OFFICE O/P EST MOD 30 MIN: CPT | Performed by: INTERNAL MEDICINE

## 2022-07-29 PROCEDURE — 80053 COMPREHEN METABOLIC PANEL: CPT

## 2022-07-29 PROCEDURE — 85007 BL SMEAR W/DIFF WBC COUNT: CPT

## 2022-07-29 PROCEDURE — 36415 COLL VENOUS BLD VENIPUNCTURE: CPT

## 2022-07-30 PROCEDURE — 93000 ELECTROCARDIOGRAM COMPLETE: CPT | Performed by: INTERNAL MEDICINE

## 2022-07-30 NOTE — PROGRESS NOTES
Cardiology Follow Up    William Man  1960  [id number]      Interval History: William Man is here for follow up of SVT/Atrial fibrillation and to discuss cardiac risk prior to undergoing cystoscopy with exchange of ureteral stent  Eventually, she will also undergo Whipple's procedure to treat pancreatic cancer  She is currently undergoing chemotherapy  Regimen includes 5-fluorouracil  She feels fatigued but denies any chest pain, shortness of breath, syncope or near syncope  She does not have any lower extremity edema, orthopnea or paroxysmal nocturnal dyspnea  The patient was seen at [address] [place] [address] initially because of tachycardia  On 3/23/16, she underwent EPS/SVT ablation with ablation of atrial tachycardia but was complicated by atrial fibrillation which responded to sotalol and cardioversion  She remains in sinus rhythm since then  She is not on anticoagulation because of low chads score  The following portions of the patient's history were reviewed and updated as appropriate: allergies, current medications, past family history, past medical history, past social history, past surgical history and problem list     Current Outpatient Medications on File Prior to Visit   Medication Sig Dispense Refill    aspirin 81 MG tablet Take 81 mg by mouth daily        atorvastatin (LIPITOR) 40 mg tablet TAKE 1 TABLET BY MOUTH  DAILY 90 tablet 3    [START ON 8/3/2022] fluorouracil 5,280 mg in CADD/Elastomeric Infusion Device Infuse 5,280 mg (1,200 mg/m2/day x 2 2 m2) into a catheter in a vein via infusion device over 46 hours for 2 days  Do not start before August 3, 2022  1 each 12    glucose blood (Contour Next Test) test strip Use 1 each daily Use as instructed 100 each 3    HYDROcodone-acetaminophen (Norco) 5-325 mg per tablet Take 1 tablet by mouth every 6 (six) hours as needed for pain Max Daily Amount: 4 tablets 10 tablet 0    ibuprofen (ADVIL,MOTRIN) 100 MG tablet Take 200 mg by mouth as needed for mild pain      Insulin Pen Needle (Pen Needles) 32G X 4 MM MISC Use once a week 12 each 3    Magnesium Oxide -Mg Supplement 400 MG CAPS Take 1 capsule by mouth daily      metoprolol succinate (TOPROL-XL) 25 mg 24 hr tablet Take 1 tablet (25 mg total) by mouth 2 (two) times a day 180 tablet 3    multivitamin (THERAGRAN) TABS Take 1 tablet by mouth daily   olmesartan (BENICAR) 20 mg tablet TAKE 1 TABLET BY MOUTH  DAILY 90 tablet 3    Omega-3 Fatty Acids (FISH OIL) 1,000 mg Take 1,000 mg by mouth 2 (two) times a day        ondansetron (ZOFRAN) 4 mg tablet Take 2 tablets (8 mg total) by mouth every 8 (eight) hours as needed for nausea or vomiting 20 tablet 3    oxybutynin (DITROPAN-XL) 10 MG 24 hr tablet Take 1 tablet (10 mg total) by mouth daily at bedtime 30 tablet 0    Ozempic, 1 MG/DOSE, 4 MG/3ML SOPN injection pen Inject 0 75 mL (1 mg total) under the skin once a week 9 mL 1    pantoprazole (PROTONIX) 40 mg tablet TAKE 1 TABLET BY MOUTH  DAILY BEFORE BREAKFAST 90 tablet 3    PARoxetine (PAXIL-CR) 37 5 mg 24 hr tablet TAKE 1 TABLET BY MOUTH IN  THE MORNING 90 tablet 3    Potassium Citrate ER 15 MEQ (1620 MG) TBCR Take 1 tablet by mouth 2 (two) times a day 180 tablet 3    sotalol (BETAPACE) 120 mg tablet Take 1 tablet (120 mg total) by mouth every 12 (twelve) hours 180 tablet 3    VITAMIN D PO Take 2,000 Units by mouth 2 (two) times a day      ondansetron (ZOFRAN-ODT) 4 mg disintegrating tablet Take 1 tablet (4 mg total) by mouth every 6 (six) hours as needed for nausea for up to 3 days (Patient not taking: Reported on 7/26/2022) 9 tablet 0    tamsulosin (FLOMAX) 0 4 mg Take 1 capsule (0 4 mg total) by mouth daily with dinner for 7 days (Patient not taking: Reported on 7/26/2022) 7 capsule 0     No current facility-administered medications on file prior to visit            Review of Systems:  Review of Systems   Constitutional: Positive for fatigue  Respiratory: Negative for shortness of breath  Cardiovascular: Negative for chest pain, palpitations and leg swelling  Musculoskeletal: Positive for arthralgias  All other systems reviewed and are negative  Physical Exam:  /68 (BP Location: Right arm, Patient Position: Standing, Cuff Size: Large)   Pulse 96   Temp 97 7 °F (36 5 °C)   Ht 5' 3 9" (1 623 m)   Wt (!) 137 kg (303 lb)   SpO2 97%   BMI 52 17 kg/m²     Physical Exam  Constitutional:       General: She is not in acute distress  Appearance: She is well-developed  She is not diaphoretic  HENT:      Head: Normocephalic and atraumatic  Eyes:      Conjunctiva/sclera: Conjunctivae normal       Pupils: Pupils are equal, round, and reactive to light  Neck:      Thyroid: No thyromegaly  Vascular: No JVD  Cardiovascular:      Rate and Rhythm: Normal rate and regular rhythm  Heart sounds: Normal heart sounds  No murmur heard  No friction rub  No gallop  Pulmonary:      Effort: Pulmonary effort is normal       Breath sounds: Normal breath sounds  Musculoskeletal:      Cervical back: Neck supple  Skin:     General: Skin is warm and dry  Findings: No erythema or rash  Neurological:      General: No focal deficit present  Mental Status: She is alert and oriented to person, place, and time  Cranial Nerves: No cranial nerve deficit  Psychiatric:         Mood and Affect: Mood normal          Behavior: Behavior normal          Thought Content:  Thought content normal          Judgment: Judgment normal          Cardiographics  ECG: normal sinus rhythm, no blocks or conduction defects, no ischemic changes    Labs:  Lab Results   Component Value Date     04/12/2017    K 4 0 07/29/2022    K 4 0 04/19/2022     (H) 07/29/2022     04/19/2022    CO2 24 07/29/2022    CO2 22 04/19/2022    BUN 9 07/29/2022    BUN 13 04/19/2022    CREATININE 0 92 07/29/2022    CREATININE 0 78 04/12/2017    GLUCOSE 92 04/12/2017    CALCIUM 9 9 07/29/2022    CALCIUM 9 5 04/12/2017     Lab Results   Component Value Date    WBC 12 09 (H) 07/29/2022    WBC 7 5 01/13/2017    HGB 12 7 07/29/2022    HGB 13 6 01/13/2017    HCT 41 4 07/29/2022    HCT 41 5 01/13/2017    MCV 91 07/29/2022    MCV 83 01/13/2017     07/29/2022     01/13/2017     Lab Results   Component Value Date    CHOL 153 04/12/2017    TRIG 162 (H) 05/26/2022    TRIG 153 (H) 02/21/2022    HDL 38 (L) 05/26/2022    HDL 43 02/21/2022        Discussion/Summary:  1  Essential hypertension  - Patient's blood pressure controlled with olmesartan and metoprolol  2  Supraventricular tachycardia ([address] )  - continue sotalol and metoprolol  Heart rate is normal today and QT interval is 460 milliseconds  - patient has undergone previous SVT ablation  No further episodes since then  3  Paroxysmal atrial fibrillation (HCC)  - no recent episodes  - she is not on anticoagulation  - continue sotalol and metoprolol   - POCT ECG    4  Pre-operative cardiovascular examination  - patient is at intermediate risk for procedure it because of insulin-dependent diabetes mellitus, obstructive sleep apnea and morbid obesity  She is currently medically stable and may proceed as planned  No further workup at this time as she had stress test and echocardiogram in 2020 with no new symptoms since then  Echocardiogram showed normal LV function at that time  Recommend repeat echocardiogram in September or if any new symptoms develop with current chemotherapy regimen

## 2022-08-01 ENCOUNTER — NURSE TRIAGE (OUTPATIENT)
Dept: OTHER | Facility: OTHER | Age: 62
End: 2022-08-01

## 2022-08-01 ENCOUNTER — HOSPITAL ENCOUNTER (INPATIENT)
Facility: HOSPITAL | Age: 62
LOS: 1 days | Discharge: HOME | DRG: 690 | End: 2022-08-03
Attending: EMERGENCY MEDICINE | Admitting: INTERNAL MEDICINE
Payer: COMMERCIAL

## 2022-08-01 ENCOUNTER — APPOINTMENT (EMERGENCY)
Dept: CT IMAGING | Facility: HOSPITAL | Age: 62
DRG: 690 | End: 2022-08-01
Payer: COMMERCIAL

## 2022-08-01 DIAGNOSIS — A41.9 SEPSIS (HCC): ICD-10-CM

## 2022-08-01 DIAGNOSIS — N39.0 UTI (URINARY TRACT INFECTION): ICD-10-CM

## 2022-08-01 DIAGNOSIS — N20.1 URETEROLITHIASIS: Primary | ICD-10-CM

## 2022-08-01 PROCEDURE — 81001 URINALYSIS AUTO W/SCOPE: CPT

## 2022-08-01 PROCEDURE — 96375 TX/PRO/DX INJ NEW DRUG ADDON: CPT

## 2022-08-01 PROCEDURE — 85007 BL SMEAR W/DIFF WBC COUNT: CPT

## 2022-08-01 PROCEDURE — 84145 PROCALCITONIN (PCT): CPT

## 2022-08-01 PROCEDURE — 51798 US URINE CAPACITY MEASURE: CPT

## 2022-08-01 PROCEDURE — 99285 EMERGENCY DEPT VISIT HI MDM: CPT

## 2022-08-01 PROCEDURE — 74176 CT ABD & PELVIS W/O CONTRAST: CPT

## 2022-08-01 PROCEDURE — 85027 COMPLETE CBC AUTOMATED: CPT

## 2022-08-01 PROCEDURE — 87086 URINE CULTURE/COLONY COUNT: CPT

## 2022-08-01 PROCEDURE — 96361 HYDRATE IV INFUSION ADD-ON: CPT

## 2022-08-01 PROCEDURE — 36415 COLL VENOUS BLD VENIPUNCTURE: CPT

## 2022-08-01 PROCEDURE — 80053 COMPREHEN METABOLIC PANEL: CPT

## 2022-08-01 RX ORDER — KETOROLAC TROMETHAMINE 30 MG/ML
15 INJECTION, SOLUTION INTRAMUSCULAR; INTRAVENOUS ONCE
Status: COMPLETED | OUTPATIENT
Start: 2022-08-01 | End: 2022-08-01

## 2022-08-01 RX ORDER — ONDANSETRON 2 MG/ML
4 INJECTION INTRAMUSCULAR; INTRAVENOUS ONCE AS NEEDED
Status: COMPLETED | OUTPATIENT
Start: 2022-08-01 | End: 2022-08-02

## 2022-08-01 RX ORDER — ACETAMINOPHEN 325 MG/1
650 TABLET ORAL ONCE
Status: COMPLETED | OUTPATIENT
Start: 2022-08-01 | End: 2022-08-01

## 2022-08-01 RX ADMIN — SODIUM CHLORIDE 1000 ML: 0.9 INJECTION, SOLUTION INTRAVENOUS at 23:38

## 2022-08-01 RX ADMIN — KETOROLAC TROMETHAMINE 15 MG: 30 INJECTION, SOLUTION INTRAMUSCULAR at 23:46

## 2022-08-01 RX ADMIN — ACETAMINOPHEN 650 MG: 325 TABLET ORAL at 23:46

## 2022-08-02 PROBLEM — I48.0 PAROXYSMAL A-FIB (HCC): Status: ACTIVE | Noted: 2022-08-02

## 2022-08-02 PROBLEM — A41.9 SEPSIS (HCC): Status: ACTIVE | Noted: 2022-08-02

## 2022-08-02 LAB
ALBUMIN SERPL BCP-MCNC: 3.8 G/DL (ref 3.5–5)
ALP SERPL-CCNC: 162 U/L (ref 34–104)
ALT SERPL W P-5'-P-CCNC: 48 U/L (ref 7–52)
ANION GAP SERPL CALCULATED.3IONS-SCNC: 9 MMOL/L (ref 4–13)
ANION GAP SERPL CALCULATED.3IONS-SCNC: 9 MMOL/L (ref 4–13)
ANISOCYTOSIS BLD QL SMEAR: PRESENT
ANISOCYTOSIS BLD QL SMEAR: PRESENT
APTT PPP: 24 SECONDS (ref 23–37)
AST SERPL W P-5'-P-CCNC: 29 U/L (ref 13–39)
BACTERIA UR QL AUTO: ABNORMAL /HPF
BASOPHILS # BLD MANUAL: 0 THOUSAND/UL (ref 0–0.1)
BASOPHILS # BLD MANUAL: 0 THOUSAND/UL (ref 0–0.1)
BASOPHILS NFR MAR MANUAL: 0 % (ref 0–1)
BASOPHILS NFR MAR MANUAL: 0 % (ref 0–1)
BILIRUB SERPL-MCNC: 0.6 MG/DL (ref 0.2–1)
BILIRUB UR QL STRIP: NEGATIVE
BUN SERPL-MCNC: 14 MG/DL (ref 5–25)
BUN SERPL-MCNC: 15 MG/DL (ref 5–25)
CALCIUM SERPL-MCNC: 8.2 MG/DL (ref 8.4–10.2)
CALCIUM SERPL-MCNC: 9.3 MG/DL (ref 8.4–10.2)
CHLORIDE SERPL-SCNC: 107 MMOL/L (ref 96–108)
CHLORIDE SERPL-SCNC: 108 MMOL/L (ref 96–108)
CLARITY UR: ABNORMAL
CO2 SERPL-SCNC: 25 MMOL/L (ref 21–32)
CO2 SERPL-SCNC: 25 MMOL/L (ref 21–32)
COLOR UR: ABNORMAL
CREAT SERPL-MCNC: 1.13 MG/DL (ref 0.6–1.3)
CREAT SERPL-MCNC: 1.14 MG/DL (ref 0.6–1.3)
DACRYOCYTES BLD QL SMEAR: PRESENT
EOSINOPHIL # BLD MANUAL: 0 THOUSAND/UL (ref 0–0.4)
EOSINOPHIL # BLD MANUAL: 0.17 THOUSAND/UL (ref 0–0.4)
EOSINOPHIL NFR BLD MANUAL: 0 % (ref 0–6)
EOSINOPHIL NFR BLD MANUAL: 1 % (ref 0–6)
ERYTHROCYTE [DISTWIDTH] IN BLOOD BY AUTOMATED COUNT: 17.7 % (ref 11.6–15.1)
ERYTHROCYTE [DISTWIDTH] IN BLOOD BY AUTOMATED COUNT: 17.8 % (ref 11.6–15.1)
GFR SERPL CREATININE-BSD FRML MDRD: 51 ML/MIN/1.73SQ M
GFR SERPL CREATININE-BSD FRML MDRD: 52 ML/MIN/1.73SQ M
GLUCOSE SERPL-MCNC: 100 MG/DL (ref 65–140)
GLUCOSE SERPL-MCNC: 100 MG/DL (ref 65–140)
GLUCOSE SERPL-MCNC: 138 MG/DL (ref 65–140)
GLUCOSE SERPL-MCNC: 84 MG/DL (ref 65–140)
GLUCOSE SERPL-MCNC: 95 MG/DL (ref 65–140)
GLUCOSE UR STRIP-MCNC: NEGATIVE MG/DL
HCT VFR BLD AUTO: 36.8 % (ref 34.8–46.1)
HCT VFR BLD AUTO: 38.9 % (ref 34.8–46.1)
HGB BLD-MCNC: 11.7 G/DL (ref 11.5–15.4)
HGB BLD-MCNC: 12.8 G/DL (ref 11.5–15.4)
HGB UR QL STRIP.AUTO: ABNORMAL
HYALINE CASTS #/AREA URNS LPF: ABNORMAL /LPF
INR PPP: 1.06 (ref 0.84–1.19)
KETONES UR STRIP-MCNC: NEGATIVE MG/DL
LACTATE SERPL-SCNC: 1.7 MMOL/L (ref 0.5–2)
LEUKOCYTE ESTERASE UR QL STRIP: ABNORMAL
LYMPHOCYTES # BLD AUTO: 12 % (ref 14–44)
LYMPHOCYTES # BLD AUTO: 2.35 THOUSAND/UL (ref 0.6–4.47)
LYMPHOCYTES # BLD AUTO: 21 % (ref 14–44)
LYMPHOCYTES # BLD AUTO: 3.51 THOUSAND/UL (ref 0.6–4.47)
MCH RBC QN AUTO: 28.5 PG (ref 26.8–34.3)
MCH RBC QN AUTO: 29.3 PG (ref 26.8–34.3)
MCHC RBC AUTO-ENTMCNC: 31.8 G/DL (ref 31.4–37.4)
MCHC RBC AUTO-ENTMCNC: 32.9 G/DL (ref 31.4–37.4)
MCV RBC AUTO: 89 FL (ref 82–98)
MCV RBC AUTO: 90 FL (ref 82–98)
METAMYELOCYTES NFR BLD MANUAL: 1 % (ref 0–1)
METAMYELOCYTES NFR BLD MANUAL: 1 % (ref 0–1)
MONOCYTES # BLD AUTO: 0.5 THOUSAND/UL (ref 0–1.22)
MONOCYTES # BLD AUTO: 1.37 THOUSAND/UL (ref 0–1.22)
MONOCYTES NFR BLD: 3 % (ref 4–12)
MONOCYTES NFR BLD: 7 % (ref 4–12)
MUCOUS THREADS UR QL AUTO: ABNORMAL
NEUTROPHILS # BLD MANUAL: 12.36 THOUSAND/UL (ref 1.85–7.62)
NEUTROPHILS # BLD MANUAL: 15.66 THOUSAND/UL (ref 1.85–7.62)
NEUTS BAND NFR BLD MANUAL: 1 % (ref 0–8)
NEUTS BAND NFR BLD MANUAL: 5 % (ref 0–8)
NEUTS SEG NFR BLD AUTO: 69 % (ref 43–75)
NEUTS SEG NFR BLD AUTO: 79 % (ref 43–75)
NITRITE UR QL STRIP: NEGATIVE
NON-SQ EPI CELLS URNS QL MICRO: ABNORMAL /HPF
OVALOCYTES BLD QL SMEAR: PRESENT
OVALOCYTES BLD QL SMEAR: PRESENT
PH UR STRIP.AUTO: 5 [PH]
PLATELET # BLD AUTO: 123 THOUSANDS/UL (ref 149–390)
PLATELET # BLD AUTO: 134 THOUSANDS/UL (ref 149–390)
PLATELET BLD QL SMEAR: ABNORMAL
PLATELET BLD QL SMEAR: ABNORMAL
PMV BLD AUTO: 10.8 FL (ref 8.9–12.7)
PMV BLD AUTO: 10.8 FL (ref 8.9–12.7)
POIKILOCYTOSIS BLD QL SMEAR: PRESENT
POLYCHROMASIA BLD QL SMEAR: PRESENT
POLYCHROMASIA BLD QL SMEAR: PRESENT
POTASSIUM SERPL-SCNC: 3.6 MMOL/L (ref 3.5–5.3)
POTASSIUM SERPL-SCNC: 3.8 MMOL/L (ref 3.5–5.3)
PROCALCITONIN SERPL-MCNC: 0.1 NG/ML
PROT SERPL-MCNC: 6.3 G/DL (ref 6.4–8.4)
PROT UR STRIP-MCNC: ABNORMAL MG/DL
PROTHROMBIN TIME: 14 SECONDS (ref 11.6–14.5)
RBC # BLD AUTO: 4.1 MILLION/UL (ref 3.81–5.12)
RBC # BLD AUTO: 4.37 MILLION/UL (ref 3.81–5.12)
RBC #/AREA URNS AUTO: ABNORMAL /HPF
RBC MORPH BLD: PRESENT
RBC MORPH BLD: PRESENT
SODIUM SERPL-SCNC: 141 MMOL/L (ref 135–147)
SODIUM SERPL-SCNC: 142 MMOL/L (ref 135–147)
SP GR UR STRIP.AUTO: 1.01 (ref 1–1.03)
UROBILINOGEN UR STRIP-ACNC: <2 MG/DL
WBC # BLD AUTO: 16.7 THOUSAND/UL (ref 4.31–10.16)
WBC # BLD AUTO: 19.58 THOUSAND/UL (ref 4.31–10.16)
WBC #/AREA URNS AUTO: ABNORMAL /HPF

## 2022-08-02 PROCEDURE — 85610 PROTHROMBIN TIME: CPT

## 2022-08-02 PROCEDURE — 85730 THROMBOPLASTIN TIME PARTIAL: CPT

## 2022-08-02 PROCEDURE — 82948 REAGENT STRIP/BLOOD GLUCOSE: CPT

## 2022-08-02 PROCEDURE — 85027 COMPLETE CBC AUTOMATED: CPT

## 2022-08-02 PROCEDURE — 99223 1ST HOSP IP/OBS HIGH 75: CPT | Performed by: INTERNAL MEDICINE

## 2022-08-02 PROCEDURE — 80048 BASIC METABOLIC PNL TOTAL CA: CPT

## 2022-08-02 PROCEDURE — 36415 COLL VENOUS BLD VENIPUNCTURE: CPT

## 2022-08-02 PROCEDURE — 96365 THER/PROPH/DIAG IV INF INIT: CPT

## 2022-08-02 PROCEDURE — 99222 1ST HOSP IP/OBS MODERATE 55: CPT | Performed by: PHYSICIAN ASSISTANT

## 2022-08-02 PROCEDURE — 83605 ASSAY OF LACTIC ACID: CPT

## 2022-08-02 PROCEDURE — 87040 BLOOD CULTURE FOR BACTERIA: CPT

## 2022-08-02 PROCEDURE — 85007 BL SMEAR W/DIFF WBC COUNT: CPT

## 2022-08-02 RX ORDER — CEFAZOLIN SODIUM 2 G/50ML
2000 SOLUTION INTRAVENOUS ONCE
Status: COMPLETED | OUTPATIENT
Start: 2022-08-02 | End: 2022-08-02

## 2022-08-02 RX ORDER — ACETAMINOPHEN 325 MG/1
650 TABLET ORAL EVERY 6 HOURS PRN
Status: DISCONTINUED | OUTPATIENT
Start: 2022-08-02 | End: 2022-08-03 | Stop reason: HOSPADM

## 2022-08-02 RX ORDER — ATORVASTATIN CALCIUM 40 MG/1
40 TABLET, FILM COATED ORAL DAILY
Status: DISCONTINUED | OUTPATIENT
Start: 2022-08-02 | End: 2022-08-03 | Stop reason: HOSPADM

## 2022-08-02 RX ORDER — ENOXAPARIN SODIUM 100 MG/ML
60 INJECTION SUBCUTANEOUS EVERY 12 HOURS SCHEDULED
Status: DISCONTINUED | OUTPATIENT
Start: 2022-08-02 | End: 2022-08-03 | Stop reason: HOSPADM

## 2022-08-02 RX ORDER — SODIUM CHLORIDE 9 MG/ML
75 INJECTION, SOLUTION INTRAVENOUS CONTINUOUS
Status: DISCONTINUED | OUTPATIENT
Start: 2022-08-02 | End: 2022-08-03 | Stop reason: HOSPADM

## 2022-08-02 RX ORDER — CEFAZOLIN SODIUM 1 G/50ML
1000 SOLUTION INTRAVENOUS ONCE
Status: COMPLETED | OUTPATIENT
Start: 2022-08-02 | End: 2022-08-02

## 2022-08-02 RX ORDER — CEFTRIAXONE 1 G/50ML
1000 INJECTION, SOLUTION INTRAVENOUS ONCE
Status: COMPLETED | OUTPATIENT
Start: 2022-08-02 | End: 2022-08-02

## 2022-08-02 RX ORDER — POTASSIUM CHLORIDE 20 MEQ/1
20 TABLET, EXTENDED RELEASE ORAL DAILY
Status: DISCONTINUED | OUTPATIENT
Start: 2022-08-02 | End: 2022-08-03 | Stop reason: HOSPADM

## 2022-08-02 RX ORDER — CEFTRIAXONE 1 G/50ML
1000 INJECTION, SOLUTION INTRAVENOUS EVERY 24 HOURS
Status: DISCONTINUED | OUTPATIENT
Start: 2022-08-03 | End: 2022-08-03 | Stop reason: HOSPADM

## 2022-08-02 RX ORDER — POTASSIUM CITRATE 15 MEQ/1
1 TABLET, EXTENDED RELEASE ORAL 2 TIMES DAILY
Status: DISCONTINUED | OUTPATIENT
Start: 2022-08-02 | End: 2022-08-02

## 2022-08-02 RX ORDER — TAMSULOSIN HYDROCHLORIDE 0.4 MG/1
0.4 CAPSULE ORAL
Status: DISCONTINUED | OUTPATIENT
Start: 2022-08-02 | End: 2022-08-03 | Stop reason: HOSPADM

## 2022-08-02 RX ORDER — SOTALOL HYDROCHLORIDE 80 MG/1
120 TABLET ORAL EVERY 12 HOURS SCHEDULED
Status: DISCONTINUED | OUTPATIENT
Start: 2022-08-02 | End: 2022-08-03 | Stop reason: HOSPADM

## 2022-08-02 RX ORDER — OXYBUTYNIN CHLORIDE 5 MG/1
10 TABLET, EXTENDED RELEASE ORAL
Status: DISCONTINUED | OUTPATIENT
Start: 2022-08-02 | End: 2022-08-03 | Stop reason: HOSPADM

## 2022-08-02 RX ORDER — METOPROLOL SUCCINATE 25 MG/1
25 TABLET, EXTENDED RELEASE ORAL EVERY 12 HOURS SCHEDULED
Status: DISCONTINUED | OUTPATIENT
Start: 2022-08-02 | End: 2022-08-03 | Stop reason: HOSPADM

## 2022-08-02 RX ORDER — ASPIRIN 81 MG/1
81 TABLET, CHEWABLE ORAL DAILY
Status: DISCONTINUED | OUTPATIENT
Start: 2022-08-02 | End: 2022-08-03 | Stop reason: HOSPADM

## 2022-08-02 RX ORDER — ACETAMINOPHEN 325 MG/1
975 TABLET ORAL ONCE
Status: COMPLETED | OUTPATIENT
Start: 2022-08-02 | End: 2022-08-02

## 2022-08-02 RX ORDER — MELATONIN
2000 2 TIMES DAILY
Status: DISCONTINUED | OUTPATIENT
Start: 2022-08-02 | End: 2022-08-03 | Stop reason: HOSPADM

## 2022-08-02 RX ORDER — PANTOPRAZOLE SODIUM 40 MG/1
40 TABLET, DELAYED RELEASE ORAL
Status: DISCONTINUED | OUTPATIENT
Start: 2022-08-02 | End: 2022-08-03 | Stop reason: HOSPADM

## 2022-08-02 RX ORDER — ENOXAPARIN SODIUM 100 MG/ML
40 INJECTION SUBCUTANEOUS 2 TIMES DAILY
Status: DISCONTINUED | OUTPATIENT
Start: 2022-08-02 | End: 2022-08-02

## 2022-08-02 RX ORDER — INSULIN LISPRO 100 [IU]/ML
2-12 INJECTION, SOLUTION INTRAVENOUS; SUBCUTANEOUS
Status: DISCONTINUED | OUTPATIENT
Start: 2022-08-02 | End: 2022-08-03 | Stop reason: HOSPADM

## 2022-08-02 RX ORDER — PAROXETINE HYDROCHLORIDE HEMIHYDRATE 12.5 MG/1
37.5 TABLET, FILM COATED, EXTENDED RELEASE ORAL EVERY MORNING
Status: DISCONTINUED | OUTPATIENT
Start: 2022-08-02 | End: 2022-08-03 | Stop reason: HOSPADM

## 2022-08-02 RX ORDER — LOSARTAN POTASSIUM 50 MG/1
50 TABLET ORAL DAILY
Status: DISCONTINUED | OUTPATIENT
Start: 2022-08-02 | End: 2022-08-03 | Stop reason: HOSPADM

## 2022-08-02 RX ADMIN — CEFTRIAXONE 1000 MG: 1 INJECTION, SOLUTION INTRAVENOUS at 02:56

## 2022-08-02 RX ADMIN — METOPROLOL SUCCINATE 25 MG: 25 TABLET, EXTENDED RELEASE ORAL at 08:27

## 2022-08-02 RX ADMIN — SODIUM CHLORIDE 75 ML/HR: 0.9 INJECTION, SOLUTION INTRAVENOUS at 05:45

## 2022-08-02 RX ADMIN — MAGNESIUM OXIDE TAB 400 MG (241.3 MG ELEMENTAL MG) 400 MG: 400 (241.3 MG) TAB at 08:29

## 2022-08-02 RX ADMIN — TAMSULOSIN HYDROCHLORIDE 0.4 MG: 0.4 CAPSULE ORAL at 17:40

## 2022-08-02 RX ADMIN — PAROXETINE HYDROCHLORIDE 37.5 MG: 12.5 TABLET, FILM COATED, EXTENDED RELEASE ORAL at 08:30

## 2022-08-02 RX ADMIN — Medication 2000 UNITS: at 08:25

## 2022-08-02 RX ADMIN — ATORVASTATIN CALCIUM 40 MG: 40 TABLET, FILM COATED ORAL at 08:26

## 2022-08-02 RX ADMIN — ASPIRIN 81 MG CHEWABLE TABLET 81 MG: 81 TABLET CHEWABLE at 08:25

## 2022-08-02 RX ADMIN — SOTALOL HYDROCHLORIDE 120 MG: 80 TABLET ORAL at 21:32

## 2022-08-02 RX ADMIN — ONDANSETRON 4 MG: 2 INJECTION INTRAMUSCULAR; INTRAVENOUS at 06:32

## 2022-08-02 RX ADMIN — SOTALOL HYDROCHLORIDE 120 MG: 80 TABLET ORAL at 08:28

## 2022-08-02 RX ADMIN — METOPROLOL SUCCINATE 25 MG: 25 TABLET, EXTENDED RELEASE ORAL at 21:32

## 2022-08-02 RX ADMIN — PANTOPRAZOLE SODIUM 40 MG: 40 TABLET, DELAYED RELEASE ORAL at 06:32

## 2022-08-02 RX ADMIN — POTASSIUM CHLORIDE 20 MEQ: 1500 TABLET, EXTENDED RELEASE ORAL at 08:24

## 2022-08-02 RX ADMIN — CEFAZOLIN SODIUM 1000 MG: 1 SOLUTION INTRAVENOUS at 06:11

## 2022-08-02 RX ADMIN — ENOXAPARIN SODIUM 60 MG: 60 INJECTION SUBCUTANEOUS at 08:24

## 2022-08-02 RX ADMIN — ACETAMINOPHEN 975 MG: 325 TABLET ORAL at 05:41

## 2022-08-02 RX ADMIN — Medication 2000 UNITS: at 17:40

## 2022-08-02 RX ADMIN — ENOXAPARIN SODIUM 60 MG: 60 INJECTION SUBCUTANEOUS at 21:32

## 2022-08-02 RX ADMIN — SODIUM CHLORIDE 75 ML/HR: 0.9 INJECTION, SOLUTION INTRAVENOUS at 17:47

## 2022-08-02 RX ADMIN — LOSARTAN POTASSIUM 50 MG: 50 TABLET, FILM COATED ORAL at 08:25

## 2022-08-02 RX ADMIN — CEFAZOLIN SODIUM 2000 MG: 2 SOLUTION INTRAVENOUS at 05:46

## 2022-08-02 NOTE — CONSULTS
Consult - Urology   Linda Coleman 1960, 57 y o  female MRN: [id number]    Unit/Bed#: W -01 Encounter: [id number]    Assessment and Plan:  1  Ureteral stone  2  UTI  - CT showing left ureteral stent in place as well as stone in left distal ureter  - Creatinine 1 13  - Afebrile, VSS  - No pain at this time  - Recommend medical optimization per primary team  - Discussed case with attending  Stent in place, pain controlled, white count improving with antibiotics  Plan to proceed with second stage ureteroscopy as scheduled  Subjective:   Linda Coleman is a 57year old female with past medical history of pancreatic cancer on active chemotherapy, GERD, HTN, HLD who had stent placement 7/18 for nephrolithiasis with plan for second stage ureteroscopy 8/22 who presented to the emergency room with severe pain  States she was cooking dinner and started to have severe sharp pain in left back and groin  CT showing stent in place  Creatinine within normal limits, VSS, labs stable  Was started on antibiotics for urinary tract infection  States she has no pain at all at this time  Denies gross hematuria, dysuria, flank pain, fever, chills, nausea, vomiting  Laying comfortably in bed, in no acute distress  Review of Systems   Constitutional: Negative for activity change, appetite change, chills and fever  HENT: Negative for congestion and trouble swallowing  Respiratory: Negative for cough and shortness of breath  Cardiovascular: Negative for chest pain, palpitations and leg swelling  Gastrointestinal: Negative for abdominal pain, constipation, diarrhea, nausea and vomiting  Genitourinary: Negative for difficulty urinating, dysuria, flank pain, frequency, hematuria and urgency  Musculoskeletal: Negative for back pain and gait problem  Skin: Negative for wound  Allergic/Immunologic: Negative for immunocompromised state  Neurological: Negative for dizziness and syncope     Hematological: Does not bruise/bleed easily  Psychiatric/Behavioral: Negative for confusion  All other systems reviewed and are negative  Objective:  Vitals: Blood pressure 130/87, pulse 79, temperature 97 6 °F (36 4 °C), temperature source Oral, resp  rate 18, SpO2 94 %  ,There is no height or weight on file to calculate BMI  Physical Exam  Constitutional:       General: She is not in acute distress  Appearance: Normal appearance  She is not ill-appearing, toxic-appearing or diaphoretic  HENT:      Head: Normocephalic  Nose: No congestion  Eyes:      General: No scleral icterus  Right eye: No discharge  Left eye: No discharge  Conjunctiva/sclera: Conjunctivae normal       Pupils: Pupils are equal, round, and reactive to light  Pulmonary:      Effort: Pulmonary effort is normal    Musculoskeletal:      Cervical back: Normal range of motion  Skin:     General: Skin is warm and dry  Coloration: Skin is not jaundiced or pale  Findings: No bruising, erythema, lesion or rash  Neurological:      General: No focal deficit present  Mental Status: She is alert and oriented to person, place, and time  Mental status is at baseline  Gait: Gait normal    Psychiatric:         Mood and Affect: Mood normal          Behavior: Behavior normal          Thought Content: Thought content normal          Judgment: Judgment normal          Imaging:  CT ABDOMEN AND PELVIS WITHOUT IV CONTRAST - LOW DOSE RENAL STONE      INDICATION:   known stone and stent/ LLQ pain      COMPARISON:  7/18/2022      TECHNIQUE:  Low radiation dose thin section CT examination of the abdomen and pelvis was performed without intravenous or oral contrast according to a protocol specifically designed to evaluate for urinary tract calculus  Axial, sagittal, and coronal 2D   reformatted images were created from the source data and submitted for interpretation    Evaluation for pathology in the abdomen and pelvis that is unrelated to urinary tract calculi is limited        Radiation dose length product (DLP) for this visit:  1827 mGy-cm   This examination, like all CT scans performed in the [facility], was performed utilizing techniques to minimize radiation dose exposure, including the use of iterative   reconstruction and automated exposure control      URINARY TRACT FINDINGS:     RIGHT KIDNEY AND URETER:  No urinary tract calculi  No hydronephrosis or hydroureter      LEFT KIDNEY AND URETER: Left ureteral stent in place with proximal coil at the level of the left ureter pelvic junction  6 mm left peristent calculi as unchanged in position comparing to 7/18/2022       URINARY BLADDER:  Unremarkable         ADDITIONAL FINDINGS:     LOWER CHEST:  No clinically significant abnormality identified in the visualized lower chest      SOLID VISCERA: Visualized portion of the liver appears to be diffusely decreased in density suggestive of steatosis  Low-density hyperplasia of the right adrenal gland similar to prior CT examinations  Otherwise, limited low radiation dose CT   demonstrates no clinically significant abnormality of visualized solid abdominal viscera       GALLBLADDER/BILIARY TREE:  No calcified gallstones  No pericholecystic inflammatory change  No biliary dilatation      STOMACH AND BOWEL:  Unremarkable      APPENDIX:  No findings to suggest appendicitis      ABDOMINOPELVIC CAVITY:  No ascites  No pneumoperitoneum  No lymphadenopathy      REPRODUCTIVE ORGANS:  Unremarkable for patient's age      ABDOMINAL WALL/INGUINAL REGIONS:  Unremarkable      OSSEOUS STRUCTURES:  No acute fracture or destructive osseous lesion      IMPRESSION:     Left ureteral stent in place with proximal coil at level left ureteropelvic junction  Stone within the distal left ureter has not significantly changed in position comparing to 7/18/2022  Imaging reviewed - both report and images personally reviewed       Labs:  Recent Labs     08/01/22  2338 08/02/22  0543   WBC 19 58* 16 70*     Recent Labs     08/01/22  2338 08/02/22  0543   HGB 12 8 11 7     Recent Labs     08/01/22  2338 08/02/22  0543   CREATININE 1 14 1 13       Microbiology:      History:  Social History     Socioeconomic History    Marital status: /Civil Union     Spouse name: None    Number of children: None    Years of education: None    Highest education level: None   Occupational History    None   Tobacco Use    Smoking status: Former Smoker     Years: 9 00     Types: Cigarettes    Smokeless tobacco: Never Used    Tobacco comment: pt states she smokes 1 to 3 times per week   Vaping Use    Vaping Use: Never used   Substance and Sexual Activity    Alcohol use: Not Currently     Comment: social drinker per [employer]     Drug use: No    Sexual activity: None   Other Topics Concern    None   Social History Narrative    Lack of exercise    Good sleep hygiene    No caffeine use    Dog    Good sleep hygiene       Social Determinants of Health     Financial Resource Strain: Not on file   Food Insecurity: Not on file   Transportation Needs: Not on file   Physical Activity: Not on file   Stress: Not on file   Social Connections: Not on file   Intimate Partner Violence: Not on file   Housing Stability: Not on file     Past Medical History:   Diagnosis Date    Abnormal liver function test     last assessed 1/16/17     Adenomatosis     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    last assessed 4/4/13    [facility]     Atrial flutter ([facility]     Benign essential hypertension     last assessed 12/21/17     Body mass index (BMI) of 50-59 9 in adult ([facility] )     Cancer ([facility] )     pancreas    Carpal tunnel syndrome 09/07/2004    unspecified laterality  / last assessed 9/7/04    Colon polyp     Congenital pes planus     last assessed 7/15/14     COVID     4/30/21    CPAP (continuous positive airway pressure) dependence     Depression     Depression     Diabetes mellitus [facility]     GERD (gastroesophageal reflux disease)     Hemangioma of skin 08/26/2003    last assessed 8/26/03    History of gastroesophageal reflux (GERD)     Hypercholesterolemia     Hyperlipidemia     Hypertension     Irregular heart beat     Kidney stone     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    last assessed 12/31/14     Osteoarthritis of both knees     last assessed 12/31/14     Paroxysmal atrial fibrillation (HCC)     S/P ablation of atrial flutter     Sleep apnea      Past Surgical History:   Procedure Laterality Date    ABDOMINAL SURGERY  06/2006    20cm GIST removed     APPENDECTOMY      ATRIAL ABLATION SURGERY      CARPAL TUNNEL RELEASE Bilateral     COLONOSCOPY      FL GUIDED CENTRAL VENOUS ACCESS DEVICE INSERTION  5/31/2022    FL RETROGRADE PYELOGRAM  7/18/2022    HYSTERECTOMY      fibroids    IR PORT CHECK  6/23/2022    IR PORT REMOVAL AND PLACEMENT NEW SITE  6/28/2022    KIDNEY STONE SURGERY Right 09/17/2021    placed stent in  for kidney stone    KNEE SURGERY      KNEE SURGERY Left 03/2019    OOPHORECTOMY      cyst    MA CYSTOURETHROSCOPY,URETER CATHETER Left 7/18/2022    Procedure: CYSTOSCOPY RETROGRADE PYELOGRAM WITH INSERTION STENT URETERAL;  Surgeon: Betzaida Boss MD;  Location[facility];  Service: Urology    TONSILLECTOMY      TUMOR EXCISION  2006    gastrointestinal stromal tumor    TUNNELED VENOUS PORT PLACEMENT N/A 5/31/2022    Procedure: INSERTION VENOUS PORT (PORT-A-CATH);   Surgeon: Sueellen Habermann, MD;  Location[facility];  Service: Surgical Oncology    UPPER GASTROINTESTINAL ENDOSCOPY       Family History   Problem Relation Age of Onset    Emphysema Mother    P[place] Arthritis Mother     Diabetes Mother     Hypertension Mother     COPD Mother     Arthritis Father     Prostate cancer Father     Cerebral aneurysm Son     No Known Problems Maternal Grandmother     No Known Problems Maternal Grandfather     No Known Problems Paternal Grandmother     No Known Problems Paternal Grandfather     No Known Problems Son     No Known Problems Maternal Aunt     No Known Problems Maternal Aunt     No Known Problems Paternal Aunt     No Known Problems Paternal Aunt        [place], [place]  Date: 8/2/2022 Time: 2:15 PM

## 2022-08-02 NOTE — ASSESSMENT & PLAN NOTE
Systolic pressures 352/634C upon admission  Plan: Losartan 50mg daily   Observe for elevated pressures

## 2022-08-02 NOTE — ASSESSMENT & PLAN NOTE
Hx of paroxysmal a  Fib  Plan: Continue with sotalol 120 mg BID and metoprolol 25mg BID for rate control

## 2022-08-02 NOTE — TELEPHONE ENCOUNTER
TC provider patient's symptoms  Advised it could be related to stent pain; advised to stay hydrated  Can take oxybutynin as needed instead of routinely  Patient stated the pain is severe and hasn't improved over the past hour; patient is going to go to [facility] for evaluation  Placed patient on ED tracking board

## 2022-08-02 NOTE — ASSESSMENT & PLAN NOTE
Lab Results   Component Value Date    HGBA1C 6 5 (H) 02/21/2022       Recent Labs     08/02/22  0719 08/02/22  1229   POCGLU 100 84       Blood Sugar Average: Last 72 hrs:     Discontinued home oral diabetic medications  Plan: Sliding scale insulin  On hypoglycemic protocol

## 2022-08-02 NOTE — SEPSIS NOTE
Sepsis Note   Juventino Lesch 57 y o  female MRN: [id number]  Unit/Bed#: ED 26 Encounter: [id number]       qSOFA     [address] Name 08/02/22 0200 08/02/22 0030 08/02/22 0018 08/01/22 2347 08/01/22 2129    Altered mental status GCS < 15 -- -- 0 -- --    Respiratory Rate > / =22 0 0 -- 0 0    Systolic BP < / =116 0 0 -- 0 0    Q Sofa Score 0 0 0 0 0               Initial Sepsis Screening     Row Name 08/02/22 0230                Is the patient's history suggestive of a new or worsening infection? Yes (Proceed)  -OB        Suspected source of infection urinary tract infection  -OB        Are two or more of the following signs & symptoms of infection both present and new to the patient? Yes (Proceed)  -OB        Indicate SIRS criteria Tachycardia > 90 bpm;Leukocytosis (WBC > 15458 IJL)  -OB        If the answer is yes to both questions, suspicion of sepsis is present --        If severe sepsis is present AND tissue hypoperfusion perists in the hour after fluid resuscitation or lactate > 4, the patient meets criteria for SEPTIC SHOCK --        Are any of the following organ dysfunction criteria present within 6 hours of suspected infection and SIRS criteria that are NOT considered to be chronic conditions?  No  -OB        Organ dysfunction --        Date of presentation of severe sepsis --        Time of presentation of severe sepsis --        Tissue hypoperfusion persists in the hour after crystalloid fluid administration, evidenced, by either: --        Was hypotension present within one hour of the conclusion of crystalloid fluid administration? --        Date of presentation of septic shock --        Time of presentation of septic shock --              User Key  (r) = Recorded By, (t) = Taken By, (c) = Cosigned By    [id number] Name Provider Type    OB Christofer Herring PA-C Physician Assistant

## 2022-08-02 NOTE — H&P
[facility]  H&P- [facility] 1960, 57 y o  female MRN: [id number]  Unit/Bed#: ED 26 Encounter: [id number]  Primary Care Provider: Radha Fields MD   Date and time admitted to hospital: 8/1/2022  9:38 PM    * Kidney stone  Assessment & Plan  L sided kidney stone which has been present for 1-2 months  Px first presented to [address] [place] [address] in June with 5mm L  Kidney stone treated with flowmax only  July 18th, px returned to ED for flank/groin pain 2/2 kidney stone now with UTI  At this time px was treated with 5-7 days of keflex and a L  Ureteral stent was placed  Px now presents today with continued groin pain extending to flank region with UA concerning for UTI without any urinary symptoms  Pertinent labs include WBC 19 5, UA with 20-30 WBC, numerous RBC, bacteria, and leukocytes with urine cx pending  CT scan demonstrated unchanged position of 6mm stone from prior scan on July 18th 2022  Plan: Px was given 1L bolus, 15mg toradol, tylenol, and one dose of ceftriaxone in ED  Urology f/u in AM for stone removal  NPO status for possible procedure/stent exchange  F/u with urine cultures, blood cultures, and morning labs  Continue treating with oxybutynin 10mg and tamsulosin 0 4mg daily to assist in stone passage  Urology added cefazolin antibiotic therapy, first dose this morning 545AM     Sepsis ([facility] [address] )  Assessment & Plan  Possible sepsis, patient meeting two SIRS criteria including leukocytosis and tachycardia  Px has not been tachycardic since presentation  qSOFA criteria for sepsis is unlikely  UA demonstrating signs of UTI however px does not have any urinary symptoms  Plan: Monitor hemodynamic status in setting of UTI findings on UA and ureterolithiasis  Observe vitals for tachycardia, tachypnea, and hypotension  Monitor fever trend  Paroxysmal A-fib (HCC)  Assessment & Plan  Hx of paroxysmal a  Fib      Plan: Continue with sotalol 120 mg BID and metoprolol 25mg BID for rate control  Adenocarcinoma of head of pancreas [facility])  Assessment & Plan  Cancer of pancreatic head with active chemotherapy with 5-FU  Eventual plan to complete whipple procedure  Plan: monitor clinical symptoms in setting of pancreatic therapy  Lovenox 60mg BID for VTE prophylaxis and hypercoagulable state  Type 2 diabetes mellitus without complication, without long-term current use of insulin (HCC)  Assessment & Plan  Lab Results   Component Value Date    HGBA1C 6 5 (H) 02/21/2022       No results for input(s): POCGLU in the last 72 hours  Blood Sugar Average: Last 72 hrs:     Discontinued home oral diabetic medications  Plan: Sliding scale insulin  On hypoglycemic protocol  Hypertension  Assessment & Plan  Systolic pressures 180/867K upon admission  Plan: Losartan 50mg daily  Observe for elevated pressures    VTE Pharmacologic Prophylaxis: VTE Score: 9 High Risk (Score >/= 5) - Pharmacological DVT Prophylaxis Ordered: enoxaparin (Lovenox)  Sequential Compression Devices Ordered  Code Status: Level 1 - Full Code   Discussion with family: I did not attempt to discuss with family, px admitted during early morning hours  Anticipated Length of Stay: Patient will be admitted on an inpatient basis with an anticipated length of stay of greater than 2 midnights secondary to L  ureterolithiasis with laboratory signs concerning for UTI and possible sepsis  Chief Complaint: L  Groin pain 2/2 kidney stone    History of Present Illness:  Ivan Askew is a 57 y o  female with a PMH of pancreatic cancer on active chemotherapy, GERD, HTN, HLD, and hx of kidney stones who presents with 1 day history of 8/10 L  Groin pain extending to L flank which is sharp in nature  Px reports she was cooking dinner and started having severe sharp L  Groin pain which was the same pain she experienced in the past with a kidney stone   Px called urology nurse and was told if pain continued she should go to the ED  Px did not take any pain medications at home and pain continued so she went to [facility] ED  In the ED she was given 1 dose of ceftriaxone, 1 L of NSS, tylenol, 15mg toradol, and repeat CT scan which demonstrated unchanged position of 6mm stone  After toradol therapy, pain was reduced to 3/10 with reduction in radiation of pain to L  Flank  Px denies nausea, vomiting, SOB, CP, fevers/chills, changes in BM, dysuria, hematuria, abdominal pain, and headache  Px recently went to ED on 7/18 for kidney stone and UTI  Px was treated with 5-7 days of keflex and had a stent placed in L  Ureter via urology  Px was also seen in June for kidney stone approximately 5mm  During this initial visit in June, px was given flowmax and was told her stone would most likely pass which px states it never did  Review of Systems:  Review of Systems   Constitutional: Negative for chills and fever  HENT: Negative for ear pain and sore throat  Eyes: Negative for pain and visual disturbance  Respiratory: Negative for cough, chest tightness and shortness of breath  Cardiovascular: Negative for chest pain and palpitations  Gastrointestinal: Negative for abdominal pain, nausea and vomiting  Genitourinary: Positive for flank pain and pelvic pain  Negative for dysuria, frequency and hematuria  Musculoskeletal: Negative for arthralgias and back pain  Skin: Negative for color change and rash  Neurological: Positive for light-headedness  Negative for dizziness, seizures, syncope and headaches  Psychiatric/Behavioral: Negative  All other systems reviewed and are negative        Past Medical and Surgical History:   Past Medical History:   Diagnosis Date    Abnormal liver function test     last assessed 1/16/17     Adenomatosis     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 [address] )     Cancer ([facility] )     pancreas    Carpal tunnel syndrome 09/07/2004    unspecified laterality  / last assessed 9/7/04    Colon polyp     Congenital pes planus     last assessed 7/15/14     COVID     4/30/21    CPAP (continuous positive airway pressure) dependence     Depression     Depression     Diabetes mellitus (HCC)     GERD (gastroesophageal reflux disease)     Hemangioma of skin 08/26/2003    last assessed 8/26/03    History of gastroesophageal reflux (GERD)     Hypercholesterolemia     Hyperlipidemia     Hypertension     Irregular heart beat     Kidney stone     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 04/19/2006    last assessed 12/31/14     Osteoarthritis of both knees     last assessed 12/31/14     Paroxysmal atrial fibrillation (HCC)     S/P ablation of atrial flutter     Sleep apnea        Past Surgical History:   Procedure Laterality Date    ABDOMINAL SURGERY  06/2006    20cm GIST removed     APPENDECTOMY      ATRIAL ABLATION SURGERY      CARPAL TUNNEL RELEASE Bilateral     COLONOSCOPY      FL GUIDED CENTRAL VENOUS ACCESS DEVICE INSERTION  5/31/2022    FL RETROGRADE PYELOGRAM  7/18/2022    HYSTERECTOMY      fibroids    IR PORT CHECK  6/23/2022    IR PORT REMOVAL AND PLACEMENT NEW SITE  6/28/2022    KIDNEY STONE SURGERY Right 09/17/2021    placed stent in  for kidney stone    KNEE SURGERY      KNEE SURGERY Left 03/2019    OOPHORECTOMY      cyst    TN CYSTOURETHROSCOPY,URETER CATHETER Left 7/18/2022    Procedure: CYSTOSCOPY RETROGRADE PYELOGRAM WITH INSERTION STENT URETERAL;  Surgeon: Iqra Dan MD;  Location: [facility];  Service: Urology    TONSILLECTOMY      TUMOR EXCISION  2006    gastrointestinal stromal tumor    TUNNELED VENOUS PORT PLACEMENT N/A 5/31/2022    Procedure: INSERTION VENOUS PORT (PORT-A-CATH);   Surgeon: Rahul Del Cid MD;  [facility];  Service: Surgical Oncology    UPPER GASTROINTESTINAL ENDOSCOPY         Meds/Allergies:  Prior to Admission medications    Medication Sig Start Date End Date Taking? Authorizing Provider   aspirin 81 MG tablet Take 81 mg by mouth daily  Yes Historical Provider, MD   atorvastatin (LIPITOR) 40 mg tablet TAKE 1 TABLET BY MOUTH  DAILY 11/1/21  Yes Alli Guerin DO   fluorouracil 5,280 mg in CADD/Elastomeric Infusion Device Infuse 5,280 mg (1,200 mg/m2/day x 2 2 m2) into a catheter in a vein via infusion device over 46 hours for 2 days  Do not start before August 3, 2022  8/3/22 8/5/22 Yes Ana Rocha MD   glucose blood (Contour Next Test) test strip Use 1 each daily Use as instructed 3/17/22  Yes Susen Koyanagi, MD   ibuprofen (ADVIL,MOTRIN) 100 MG tablet Take 200 mg by mouth as needed for mild pain   Yes Historical Provider, MD   Insulin Pen Needle (Pen Needles) 32G X 4 MM MISC Use once a week 9/3/21  Yes Susen Koyanagi, MD   Magnesium Oxide -Mg Supplement 400 MG CAPS Take 1 capsule by mouth daily 5/25/16  Yes Historical Provider, MD   metoprolol succinate (TOPROL-XL) 25 mg 24 hr tablet Take 1 tablet (25 mg total) by mouth 2 (two) times a day 12/15/21  Yes Alli Guerin DO   multivitamin (THERAGRAN) TABS Take 1 tablet by mouth daily     Yes Historical Provider, MD   olmesartan (BENICAR) 20 mg tablet TAKE 1 TABLET BY MOUTH  DAILY 5/5/22  Yes Alli Guerin DO   Omega-3 Fatty Acids (FISH OIL) 1,000 mg Take 1,000 mg by mouth 2 (two) times a day     Yes Historical Provider, MD   oxybutynin (DITROPAN-XL) 10 MG 24 hr tablet Take 1 tablet (10 mg total) by mouth daily at bedtime 7/18/22 8/17/22 Yes Weston Haywood MD   Ozempic, 1 MG/DOSE, 4 MG/3ML SOPN injection pen Inject 0 75 mL (1 mg total) under the skin once a week 2/28/22  Yes Susen Koyanagi, MD   pantoprazole (PROTONIX) 40 mg tablet TAKE 1 TABLET BY MOUTH  DAILY BEFORE BREAKFAST 11/16/21  Yes Susen Koyanagi, MD   PARoxetine (PAXIL-CR) 37 5 mg 24 hr tablet TAKE 1 TABLET BY MOUTH IN  THE MORNING 5/5/22  Yes Jose Antonio Montgomery MD   Potassium Citrate ER 15 MEQ (1620 MG) TBCR Take 1 tablet by mouth 2 (two) times a day 11/29/21  Yes Jacqueline Yanez PA-C   sotalol (BETAPACE) 120 mg tablet Take 1 tablet (120 mg total) by mouth every 12 (twelve) hours 12/15/21  Yes Alli Guerin DO   VITAMIN D PO Take 2,000 Units by mouth 2 (two) times a day   Yes Livia Lariso MD   HYDROcodone-acetaminophen (Norco) 5-325 mg per tablet Take 1 tablet by mouth every 6 (six) hours as needed for pain Max Daily Amount: 4 tablets  Patient not taking: Reported on 8/2/2022 5/24/22   Lambert Rivera MD   ondansetron [facility] [address]) 4 mg tablet Take 2 tablets (8 mg total) by mouth every 8 (eight) hours as needed for nausea or vomiting 6/1/22   aMggie Robert MD   ondansetron (ZOFRAN-ODT) 4 mg disintegrating tablet Take 1 tablet (4 mg total) by mouth every 6 (six) hours as needed for nausea for up to 3 days  Patient not taking: Reported on 7/26/2022 6/26/22 6/29/22  Joanna Forrest DO   tamsulosin (FLOMAX) 0 4 mg Take 1 capsule (0 4 mg total) by mouth daily with dinner for 7 days  Patient not taking: Reported on 7/26/2022 6/26/22 7/3/22  Joanna Forrest DO     I have reviewed home medications with patient personally  Allergies:    Allergies   Allergen Reactions    Other Rash     Adhesive tape        Social History:  Marital Status: /Civil Union   Occupation:   Patient Pre-hospital Living Situation: Home  Patient Pre-hospital Level of Mobility: walks  Patient Pre-hospital Diet Restrictions:   Substance Use History:   Social History     Substance and Sexual Activity   Alcohol Use Not Currently    Comment: social drinker per allscript      Social History     Tobacco Use   Smoking Status Former Smoker    Years: 9 00    Types: Cigarettes   Smokeless Tobacco Never Used   Tobacco Comment    pt states she smokes 1 to 3 times per week     Social History     Substance and Sexual Activity   Drug Use No       Family History:  Family History   Problem Relation Age of Onset    Emphysema Mother    Rachele Leisure Arthritis Mother     Diabetes Mother     Hypertension Mother     COPD Mother     Arthritis Father     Prostate cancer Father     Cerebral aneurysm Son     No Known Problems Maternal Grandmother     No Known Problems Maternal Grandfather     No Known Problems Paternal Grandmother     No Known Problems Paternal Grandfather     No Known Problems Son     No Known Problems Maternal Aunt     No Known Problems Maternal Aunt     No Known Problems Paternal Aunt     No Known Problems Paternal Aunt        Physical Exam:     Vitals:   Blood Pressure: 147/87 (08/02/22 0550)  Pulse: 84 (08/02/22 0550)  Temperature: 98 1 °F (36 7 °C) (08/01/22 2129)  Temp Source: Oral (08/01/22 2129)  Respirations: 16 (08/02/22 0550)  SpO2: 96 % (08/02/22 0550)    Physical Exam  Vitals and nursing note reviewed  Constitutional:       General: She is not in acute distress  Appearance: She is well-developed  She is obese  HENT:      Head: Normocephalic and atraumatic  Eyes:      Conjunctiva/sclera: Conjunctivae normal    Cardiovascular:      Rate and Rhythm: Normal rate and regular rhythm  Pulses: Normal pulses  Heart sounds: Normal heart sounds  No murmur heard  Pulmonary:      Effort: Pulmonary effort is normal  No respiratory distress  Breath sounds: Normal breath sounds  Abdominal:      Palpations: Abdomen is soft  Tenderness: There is abdominal tenderness (tenderness to palpation in epigastric region and L  groin region)  There is left CVA tenderness  Musculoskeletal:      Cervical back: Neck supple  Right lower leg: No edema  Left lower leg: No edema  Skin:     General: Skin is warm and dry  Neurological:      Mental Status: She is alert            Additional Data:     Lab Results:  Results from last 7 days   Lab Units 08/02/22  0543 08/01/22  2338   WBC Thousand/uL 16 70* 19 58*   HEMOGLOBIN g/dL 11 7 12 8   HEMATOCRIT % 36 8 38 9   PLATELETS Thousands/uL 123* 134*   BANDS PCT %  --  1   LYMPHO PCT %  --  12*   MONO PCT %  --  7   EOS PCT %  --  0     Results from last 7 days   Lab Units 08/01/22  2338   SODIUM mmol/L 141   POTASSIUM mmol/L 3 8   CHLORIDE mmol/L 107   CO2 mmol/L 25   BUN mg/dL 15   CREATININE mg/dL 1 14   ANION GAP mmol/L 9   CALCIUM mg/dL 9 3   ALBUMIN g/dL 3 8   TOTAL BILIRUBIN mg/dL 0 60   ALK PHOS U/L 162*   ALT U/L 48   AST U/L 29   GLUCOSE RANDOM mg/dL 95     Results from last 7 days   Lab Units 08/02/22  0027   INR  1 06             Results from last 7 days   Lab Units 08/02/22  0027 08/01/22  2338   LACTIC ACID mmol/L 1 7  --    PROCALCITONIN ng/ml  --  0 10       Imaging: Reviewed radiology reports from this admission including: abdominal/pelvic CT  CT renal stone study abdomen pelvis without contrast   Final Result by Aviva Serrato MD (08/02 7797)      Left ureteral stent in place with proximal coil at level left ureteropelvic junction  Stone within the distal left ureter has not significantly changed in position comparing to 7/18/2022         Workstation performed: [id number]             EKG and Other Studies Reviewed on Admission:   · EKG: No EKG obtained  ** Please Note: This note has been constructed using a voice recognition system   **

## 2022-08-02 NOTE — TELEPHONE ENCOUNTER
Regarding: stent/ kidney stone    on going pain on lower left quadrant  ----- Message from Toy Loud sent at 8/1/2022  7:40 PM EDT -----  Pt called, " I have on going pain on my lower quadrant that wraps around janice my bad   I have a stent in an d have a kidney stone "

## 2022-08-02 NOTE — TELEPHONE ENCOUNTER
Reason for Disposition   [1] SEVERE pain (e g , excruciating, scale 8-10) AND [2] present > 1 hour    Answer Assessment - Initial Assessment Questions  1  LOCATION: "Where does it hurt?" (e g , left, right)      Left sided flank; exactly the same when patient had the stone; pain is lower left abdomen and radiates to the left flank  2  ONSET: "When did the pain start?"      Just started tonight  3  SEVERITY: "How bad is the pain?" (e g , Scale 1-10; mild, moderate, or severe)    - MILD (1-3): doesn't interfere with normal activities     - MODERATE (4-7): interferes with normal activities or awakens from sleep     - SEVERE (8-10): excruciating pain and patient unable to do normal activities (stays in bed)        9/10  4  PATTERN: "Does the pain come and go, or is it constant?"       Constant  5  CAUSE: "What do you think is causing the pain?"      Kidney stone  6  OTHER SYMPTOMS:  "Do you have any other symptoms?" (e g , fever, abdominal pain, vomiting, leg weakness, burning with urination, blood in urine)      Denies fever, denies nausea  Denies any pain with urination or any blood  7  PREGNANCY:  "Is there any chance you are pregnant?" "When was your last menstrual period?"      N/A  Stent put in July 18; left side    Oxybutynin-was making mouth dry and lightheaded and dizzy; no one returned call about it  Protocols used:  FLANK PAIN-ADULT-

## 2022-08-02 NOTE — ASSESSMENT & PLAN NOTE
- SIRS criteria: leukocytosis and tachycardia on admission, but pt has not been tachycardic since admission    - qSOFA criteria: sepsis is unlikely     - UA demonstrating signs of UTI however px does not have any urinary symptoms    - Urine and blood cultures pending     Plan:   - Monitor hemodynamic status   - Observe vitals for tachycardia, tachypnea, and hypotension  - Monitor fever trend    - Repeat CBC and CMP   - Empiric treatment with ceftriaxone and cefazolin

## 2022-08-02 NOTE — ED PROVIDER NOTES
History  Chief Complaint   Patient presents with    Abdominal Pain     Pt presents to the ED with left flank and LLQ abd pain onset tonight  Reports hx of 7/18 stent placed for kidney stone  Denies nausea  Pt is a 58-year old F with a PMHx significant for paroxysmal a fib, pancreatic CA, DM, GERD, HTN, HLD, and kidney stones presenting to the ED with a complaint of LLQ abdominal pain  Pt reports she was diagnosed with a kidney stone in June and had a stent placed on July 18th, reports she was prescribed keflex at that time and finished the full course  States she has otherwise been feeling well until tonight when she began with 8/10, sharp LLQ/groin pain with mild radiation to the L flank  States the pain feels exactly like her stone  States she has had a decreased urine stream but otherwise denies dysuria, hematuria, malodorous urine, and increased urinary frequency  States she last had a "normal" BM today and denies BRBPR and melena  Denies other abdominal pain, nausea and vomiting  Denies fever, chills, CP, SOB, HA, confusion, weakness and any other complaint  Reports prior to this incident she has otherwise been feeling well  Of note patient is actively receiving chemotherapy with her next infusion in 2 days  Prior to Admission Medications   Prescriptions Last Dose Informant Patient Reported? Taking?    HYDROcodone-acetaminophen (Norco) 5-325 mg per tablet Not Taking at Unknown time Self No No   Sig: Take 1 tablet by mouth every 6 (six) hours as needed for pain Max Daily Amount: 4 tablets   Patient not taking: Reported on 8/2/2022   Insulin Pen Needle (Pen Needles) 32G X 4 MM MISC Past Week at Unknown time Self No Yes   Sig: Use once a week   Magnesium Oxide -Mg Supplement 400 MG CAPS 8/1/2022 at Unknown time Self Yes Yes   Sig: Take 1 capsule by mouth daily   Omega-3 Fatty Acids (FISH OIL) 1,000 mg 8/1/2022 at Unknown time Self Yes Yes   Sig: Take 1,000 mg by mouth 2 (two) times a day Ozempic, 1 MG/DOSE, 4 MG/3ML SOPN injection pen Past Week at Unknown time Self No Yes   Sig: Inject 0 75 mL (1 mg total) under the skin once a week   PARoxetine (PAXIL-CR) 37 5 mg 24 hr tablet 8/1/2022 at Unknown time Self No Yes   Sig: TAKE 1 TABLET BY MOUTH IN  THE MORNING   Potassium Citrate ER 15 MEQ (1620 MG) TBCR 8/1/2022 at Unknown time Self No Yes   Sig: Take 1 tablet by mouth 2 (two) times a day   VITAMIN D PO 8/1/2022 at Unknown time Self Yes Yes   Sig: Take 2,000 Units by mouth 2 (two) times a day   aspirin 81 MG tablet 8/1/2022 at Unknown time Self Yes Yes   Sig: Take 81 mg by mouth daily  atorvastatin (LIPITOR) 40 mg tablet 8/1/2022 at Unknown time Self No Yes   Sig: TAKE 1 TABLET BY MOUTH  DAILY   fluorouracil 5,280 mg in CADD/Elastomeric Infusion Device Past Month at Unknown time Self No Yes   Sig: Infuse 5,280 mg (1,200 mg/m2/day x 2 2 m2) into a catheter in a vein via infusion device over 46 hours for 2 days  Do not start before August 3, 2022  glucose blood (Contour Next Test) test strip Past Week at Unknown time Self No Yes   Sig: Use 1 each daily Use as instructed   ibuprofen (ADVIL,MOTRIN) 100 MG tablet 8/1/2022 at Unknown time Self Yes Yes   Sig: Take 200 mg by mouth as needed for mild pain   metoprolol succinate (TOPROL-XL) 25 mg 24 hr tablet 8/1/2022 at Unknown time Self No Yes   Sig: Take 1 tablet (25 mg total) by mouth 2 (two) times a day   multivitamin (THERAGRAN) TABS 8/1/2022 at Unknown time Self Yes Yes   Sig: Take 1 tablet by mouth daily     olmesartan (BENICAR) 20 mg tablet 8/1/2022 at Unknown time Self No Yes   Sig: TAKE 1 TABLET BY MOUTH  DAILY   ondansetron (ZOFRAN) 4 mg tablet More than a month at Unknown time Self No No   Sig: Take 2 tablets (8 mg total) by mouth every 8 (eight) hours as needed for nausea or vomiting   ondansetron (ZOFRAN-ODT) 4 mg disintegrating tablet   No No   Sig: Take 1 tablet (4 mg total) by mouth every 6 (six) hours as needed for nausea for up to 3 days   Patient not taking: Reported on 7/26/2022   oxybutynin (DITROPAN-XL) 10 MG 24 hr tablet Past Week at Unknown time Self No Yes   Sig: Take 1 tablet (10 mg total) by mouth daily at bedtime   pantoprazole (PROTONIX) 40 mg tablet 8/1/2022 at Unknown time Self No Yes   Sig: TAKE 1 TABLET BY MOUTH  DAILY BEFORE BREAKFAST   sotalol (BETAPACE) 120 mg tablet 8/1/2022 at Unknown time Self No Yes   Sig: Take 1 tablet (120 mg total) by mouth every 12 (twelve) hours   tamsulosin (FLOMAX) 0 4 mg   No No   Sig: Take 1 capsule (0 4 mg total) by mouth daily with dinner for 7 days   Patient not taking: Reported on 7/26/2022      Facility-Administered Medications: None       Past Medical History:   Diagnosis Date    Abnormal liver function test     last assessed 1/16/17     Adenomatosis     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    last assessed 4/4/13    Atrial fibrillation ([facility] )     Atrial flutter ([facility] )     Benign essential hypertension     last assessed 12/21/17     Body mass index (BMI) of 50-59 9 in adult ([facility] )     Cancer ([facility] )     pancreas    Carpal tunnel syndrome 09/07/2004    unspecified laterality  / last assessed 9/7/04    Colon polyp     Congenital pes planus     last assessed 7/15/14     COVID     4/30/21    CPAP (continuous positive airway pressure) dependence     Depression     Depression     Diabetes mellitus [facility]     GERD (gastroesophageal reflux disease)     Hemangioma of skin 08/26/2003    last assessed 8/26/03    History of gastroesophageal reflux (GERD)     Hypercholesterolemia     Hyperlipidemia     Hypertension     Irregular heart beat     Kidney stone     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 04/19/2006    last assessed 12/31/14     Osteoarthritis of both knees     last assessed 12/31/14     Paroxysmal atrial fibrillation (HCC)     S/P ablation of atrial flutter     Sleep apnea        Past Surgical History:   Procedure Laterality Date  ABDOMINAL SURGERY  06/2006    20cm GIST removed     APPENDECTOMY      ATRIAL ABLATION SURGERY      CARPAL TUNNEL RELEASE Bilateral     COLONOSCOPY      FL GUIDED CENTRAL VENOUS ACCESS DEVICE INSERTION  5/31/2022    FL RETROGRADE PYELOGRAM  7/18/2022    HYSTERECTOMY      fibroids    IR PORT CHECK  6/23/2022    IR PORT REMOVAL AND PLACEMENT NEW SITE  6/28/2022    KIDNEY STONE SURGERY Right 09/17/2021    placed stent in  for kidney stone    KNEE SURGERY      KNEE SURGERY Left 03/2019    OOPHORECTOMY      cyst    WI CYSTOURETHROSCOPY,URETER CATHETER Left 7/18/2022    Procedure: CYSTOSCOPY RETROGRADE PYELOGRAM WITH INSERTION STENT URETERAL;  Surgeon: Anthony Williamson MD;  [facility];  Service: Urology    TONSILLECTOMY      TUMOR EXCISION  2006    gastrointestinal stromal tumor    TUNNELED VENOUS PORT PLACEMENT N/A 5/31/2022    Procedure: INSERTION VENOUS PORT (PORT-A-CATH); Surgeon: Konrad Johnson MD;  [facility];  Service: Surgical Oncology    UPPER GASTROINTESTINAL ENDOSCOPY         Family History   Problem Relation Age of Onset    Emphysema Mother    Hannae Martinet Arthritis Mother     Diabetes Mother     Hypertension Mother     COPD Mother     Arthritis Father     Prostate cancer Father     Cerebral aneurysm Son     No Known Problems Maternal Grandmother     No Known Problems Maternal Grandfather     No Known Problems Paternal Grandmother     No Known Problems Paternal Grandfather     No Known Problems Son     No Known Problems Maternal Aunt     No Known Problems Maternal Aunt     No Known Problems Paternal Aunt     No Known Problems Paternal Aunt      I have reviewed and agree with the history as documented      E-Cigarette/Vaping    E-Cigarette Use Never User      E-Cigarette/Vaping Substances    Nicotine No     THC No     CBD No     Flavoring No     Other No     Unknown No      Social History     Tobacco Use    Smoking status: Former Smoker     Years: 9 00 Types: Cigarettes    Smokeless tobacco: Never Used    Tobacco comment: pt states she smokes 1 to 3 times per week   Vaping Use    Vaping Use: Never used   Substance Use Topics    Alcohol use: Not Currently     Comment: social drinker per allscript     Drug use: No       Review of Systems   Constitutional: Negative for chills and fever  HENT: Negative for ear pain and sore throat  Eyes: Negative for pain and visual disturbance  Respiratory: Negative for cough and shortness of breath  Cardiovascular: Negative for chest pain and palpitations  Gastrointestinal: Positive for abdominal pain  Negative for blood in stool, diarrhea, nausea and vomiting  Genitourinary: Positive for flank pain  Negative for decreased urine volume, dysuria, frequency, hematuria, urgency, vaginal bleeding and vaginal discharge  Musculoskeletal: Negative for arthralgias and back pain  Skin: Negative for color change and rash  Neurological: Negative for seizures, syncope, weakness and headaches  Psychiatric/Behavioral: Negative for confusion  All other systems reviewed and are negative  Physical Exam  Physical Exam  Vitals and nursing note reviewed  Constitutional:       General: She is not in acute distress  Appearance: She is well-developed  She is not ill-appearing  HENT:      Head: Normocephalic and atraumatic  Mouth/Throat:      Mouth: Mucous membranes are moist       Pharynx: Oropharynx is clear  Eyes:      Conjunctiva/sclera: Conjunctivae normal    Cardiovascular:      Rate and Rhythm: Normal rate and regular rhythm  Heart sounds: No murmur heard  Pulmonary:      Effort: Pulmonary effort is normal  No respiratory distress  Breath sounds: Normal breath sounds  Abdominal:      Palpations: Abdomen is soft  Tenderness: There is abdominal tenderness  There is no right CVA tenderness, left CVA tenderness, guarding or rebound   Negative signs include Hardin's sign and McBurney's sign           Comments: TTP where indicated  Otherwise abdomen is soft  No guarding or rebound  No overlying skin changes  (-) CVA tenderness b/l  Genitourinary:     Comments: Deferred  Musculoskeletal:      Cervical back: Neck supple  Skin:     General: Skin is warm and dry  Capillary Refill: Capillary refill takes less than 2 seconds  Neurological:      General: No focal deficit present  Mental Status: She is alert  Vital Signs  ED Triage Vitals   Temperature Pulse Respirations Blood Pressure SpO2   08/01/22 2129 08/01/22 2129 08/01/22 2129 08/01/22 2129 08/01/22 2129   98 1 °F (36 7 °C) 103 20 154/89 94 %      Temp Source Heart Rate Source Patient Position - Orthostatic VS BP Location FiO2 (%)   08/01/22 2129 08/01/22 2129 08/01/22 2129 08/01/22 2129 --   Oral Monitor Sitting Right arm       Pain Score       08/01/22 2346       7           Vitals:    08/01/22 2129 08/01/22 2347 08/02/22 0030 08/02/22 0200   BP: 154/89 140/71 147/80 140/75   Pulse: 103 95 88 84   Patient Position - Orthostatic VS: Sitting Lying Lying Lying         Visual Acuity      ED Medications  Medications   ondansetron (ZOFRAN) injection 4 mg (has no administration in time range)   ketorolac (TORADOL) injection 15 mg (15 mg Intravenous Given 8/1/22 2346)   acetaminophen (TYLENOL) tablet 650 mg (650 mg Oral Given 8/1/22 2346)   sodium chloride 0 9 % bolus 1,000 mL (0 mL Intravenous Stopped 8/2/22 0030)   cefTRIAXone (ROCEPHIN) IVPB (premix in dextrose) 1,000 mg 50 mL (1,000 mg Intravenous New Bag 8/2/22 0256)       Diagnostic Studies  Results Reviewed     Procedure Component Value Units Date/Time    Urine culture [[id number]] Collected: 08/01/22 2338    Lab Status:  In process Specimen: Urine, Clean Catch Updated: 08/02/22 0240    Procalcitonin [[id number]]  (Normal) Collected: 08/01/22 2338    Lab Status: Final result Specimen: Blood from Arm, Right Updated: 08/02/22 0132     Procalcitonin 0 10 ng/ml     Urine Microscopic [[id number]]  (Abnormal) Collected: 08/01/22 2338    Lab Status: Final result Specimen: Urine, Clean Catch Updated: 08/02/22 0116     RBC, UA Innumerable /hpf      WBC, UA 20-30 /hpf      Epithelial Cells Occasional /hpf      Bacteria, UA Occasional /hpf      MUCUS THREADS Occasional     Hyaline Casts, UA 0-3 /lpf     CBC and differential [[id number]]  (Abnormal) Collected: 08/01/22 2338    Lab Status: Final result Specimen: Blood from Arm, Right Updated: 08/02/22 0105     WBC 19 58 Thousand/uL      RBC 4 37 Million/uL      Hemoglobin 12 8 g/dL      Hematocrit 38 9 %      MCV 89 fL      MCH 29 3 pg      MCHC 32 9 g/dL      RDW 17 8 %      MPV 10 8 fL      Platelets 296 Thousands/uL     Narrative: This is an appended report  These results have been appended to a previously verified report  Manual Differential(PHLEBS Do Not Order) [[id number]]  (Abnormal) Collected: 08/01/22 2338    Lab Status: Final result Specimen: Blood from Arm, Right Updated: 08/02/22 0105     Segmented % 79 %      Bands % 1 %      Lymphocytes % 12 %      Monocytes % 7 %      Eosinophils, % 0 %      Basophils % 0 %      Metamyelocytes% 1 %      Absolute Neutrophils 15 66 Thousand/uL      Lymphocytes Absolute 2 35 Thousand/uL      Monocytes Absolute 1 37 Thousand/uL      Eosinophils Absolute 0 00 Thousand/uL      Basophils Absolute 0 00 Thousand/uL      Total Counted --     RBC Morphology Present     Anisocytosis Present     Ovalocytes Present     Poikilocytes Present     Polychromasia Present     Tear Drop Cells Present     Platelet Estimate Borderline    Lactic acid [[id number]]  (Normal) Collected: 08/02/22 0027    Lab Status: Final result Specimen: Blood from Arm, Right Updated: 08/02/22 0057     LACTIC ACID 1 7 mmol/L     Narrative:      Result may be elevated if tourniquet was used during collection      Rody Pack [[id number]]  (Normal) Collected: 08/02/22 0027    Lab Status: Final result Specimen: Blood from Arm, Right Updated: 08/02/22 0054     Protime 14 0 seconds      INR 1 06    APTT [[id number]]  (Normal) Collected: 08/02/22 0027    Lab Status: Final result Specimen: Blood from Arm, Right Updated: 08/02/22 0054     PTT 24 seconds     Blood culture #2 [[id number]] Collected: 08/02/22 0027    Lab Status: In process Specimen: Blood from Arm, Left Updated: 08/02/22 0043    Blood culture #1 [[id number]] Collected: 08/02/22 0027    Lab Status:  In process Specimen: Blood from Arm, Right Updated: 08/02/22 0043    Comprehensive metabolic panel [[id number]]  (Abnormal) Collected: 08/01/22 2338    Lab Status: Final result Specimen: Blood from Arm, Right Updated: 08/02/22 0018     Sodium 141 mmol/L      Potassium 3 8 mmol/L      Chloride 107 mmol/L      CO2 25 mmol/L      ANION GAP 9 mmol/L      BUN 15 mg/dL      Creatinine 1 14 mg/dL      Glucose 95 mg/dL      Calcium 9 3 mg/dL      AST 29 U/L      ALT 48 U/L      Alkaline Phosphatase 162 U/L      Total Protein 6 3 g/dL      Albumin 3 8 g/dL      Total Bilirubin 0 60 mg/dL      eGFR 51 ml/min/1 73sq m     Narrative:      [place] guidelines for Chronic Kidney Disease (CKD):     Stage 1 with normal or high GFR (GFR > 90 mL/min/1 73 square meters)    Stage 2 Mild CKD (GFR = 60-89 mL/min/1 73 square meters)    Stage 3A Moderate CKD (GFR = 45-59 mL/min/1 73 square meters)    Stage 3B Moderate CKD (GFR = 30-44 mL/min/1 73 square meters)    Stage 4 Severe CKD (GFR = 15-29 mL/min/1 73 square meters)    Stage 5 End Stage CKD (GFR <15 mL/min/1 73 square meters)  Note: GFR calculation is accurate only with a steady state creatinine    UA (URINE) with reflex to Scope [[id number]]  (Abnormal) Collected: 08/01/22 2338    Lab Status: Final result Specimen: Urine, Clean Catch Updated: 08/02/22 0006     Color, UA Light Yellow     Clarity, UA Turbid     Specific [place], UA 1 014     pH, UA 5 0     Leukocytes, UA Small     Nitrite, UA Negative     Protein, UA Trace mg/dl Glucose, UA Negative mg/dl      Ketones, UA Negative mg/dl      Urobilinogen, UA <2 0 mg/dl      Bilirubin, UA Negative     Occult Blood, UA Large                 CT renal stone study abdomen pelvis without contrast   Final Result by Key Riojas MD (08/02 9146)      Left ureteral stent in place with proximal coil at level left ureteropelvic junction  Stone within the distal left ureter has not significantly changed in position comparing to 7/18/2022         Workstation performed: [id number]                    Procedures  Procedures         ED Course                            Initial Sepsis Screening     Row Name 08/02/22 0230                Is the patient's history suggestive of a new or worsening infection? Yes (Proceed)  -OB        Suspected source of infection urinary tract infection  -OB        Are two or more of the following signs & symptoms of infection both present and new to the patient? Yes (Proceed)  -OB        Indicate SIRS criteria Tachycardia > 90 bpm;Leukocytosis (WBC > 37978 IJL)  -OB        If the answer is yes to both questions, suspicion of sepsis is present --        If severe sepsis is present AND tissue hypoperfusion perists in the hour after fluid resuscitation or lactate > 4, the patient meets criteria for SEPTIC SHOCK --        Are any of the following organ dysfunction criteria present within 6 hours of suspected infection and SIRS criteria that are NOT considered to be chronic conditions?  No  -OB        Organ dysfunction --        Date of presentation of severe sepsis --        Time of presentation of severe sepsis --        Tissue hypoperfusion persists in the hour after crystalloid fluid administration, evidenced, by either: --        Was hypotension present within one hour of the conclusion of crystalloid fluid administration? --        Date of presentation of septic shock --        Time of presentation of septic shock --              User Key  (r) = Recorded By, (t) = Taken By, (c) = Cosigned By    [address] Name Provider Type    OB Kourtney Castañeda PA-C Physician Assistant                              MDM  Number of Diagnoses or Management Options     Amount and/or Complexity of Data Reviewed  Clinical lab tests: ordered and reviewed  Tests in the radiology section of CPT®: ordered and reviewed    Risk of Complications, Morbidity, and/or Mortality  General comments: WBC- 19 58  Urine micro- 20-30 WBC, innumerable RBC and occ bacteria  Cr- 1 14    CT IMPRESSION:  Left ureteral stent in place with proximal coil at level left ureteropelvic junction  Stone within the distal left ureter has not significantly changed in position comparing to 7/18/2022  One dose of ceftriaxone given in the ED in light of concern for UTI with kidney stone  Contacted on call urology who agree with admission  Advise pt to be NPO for urology evaluation in the AM    [facility] contacted for admission in light of UTI with a stone and sepsis  [facility] accept pt under Dr Godinez Necessary  Verbally communicated all above findings and disposition with the patient who verbalized her understanding and agreement to the above plan  All patient questions and concerns were answered  Patient stable at admission      Patient Progress  Patient progress: stable      Disposition  Final diagnoses:   Ureterolithiasis   UTI (urinary tract infection)   Sepsis ([facility] )     Time reflects when diagnosis was documented in both MDM as applicable and the Disposition within this note     Time User Action Codes Description Comment    8/2/2022  4:53 AM Carmen Carmen Add [N20 1] Ureterolithiasis     8/2/2022  4:53 AM Brukardt, Kingsley Schirmer Add [N39 0] UTI (urinary tract infection)     8/2/2022  4:53 AM Brukardt, Kingsley Schirmer Add [A41 9] Sepsis [facility])       ED Disposition     ED Disposition   Admit    Condition   Stable    Date/Time   Tue Aug 2, 2022  4:53 AM    Comment   Case was discussed with [facility] and the patient's admission status was agreed to be Admission Status: inpatient status to the service of Dr Janice Quiñonez   Follow-up Information    None         Patient's Medications   Discharge Prescriptions    No medications on file       No discharge procedures on file      PDMP Review       Value Time User    PDMP Reviewed  Yes 5/24/2022  9:01 AM Uli Barrett MD          ED Provider  Electronically Signed by           Madonna Hannah PA-C  08/02/22 5213

## 2022-08-02 NOTE — ASSESSMENT & PLAN NOTE
Cancer of pancreatic head with active chemotherapy with 5-FU  Eventual plan to complete whipple procedure  Plan: monitor clinical symptoms in setting of pancreatic therapy  Lovenox 60mg BID for VTE prophylaxis and hypercoagulable state

## 2022-08-02 NOTE — PROGRESS NOTES
[facility]  Progress Note - Jerardo Pacheco 1960, 57 y o  female MRN: [id number]  Unit/Bed#: W -01 Encounter: [id number]  Primary Care Provider: Joshua Espinoza MD   Date and time admitted to hospital: 8/1/2022  9:38 PM    Sepsis [facility])  Assessment & Plan  - SIRS criteria: leukocytosis and tachycardia on admission, but pt has not been tachycardic since admission    - qSOFA criteria: sepsis is unlikely  - UA demonstrating signs of UTI however px does not have any urinary symptoms    - Urine and blood cultures pending     Plan:   - Monitor hemodynamic status   - Observe vitals for tachycardia, tachypnea, and hypotension  - Monitor fever trend    - Repeat CBC and CMP   - Empiric treatment with ceftriaxone and cefazolin     * Kidney stone  Assessment & Plan  - L sided kidney stone which has been present since June 2022, originally 5mm treated w flomax  - July 18th, px returned to ED for flank/groin pain 2/2 kidney stone now with UTI  Pt was treated with 5-7 days of keflex and a L ureteral stent  - 8/1/22 pt presented with continued groin pain extending to flank region with UA concerning for UTI without any urinary symptoms   - WBC 19 5, UA with 20-30 WBC, numerous RBC, bacteria, and leukocytes with urine cx pending    - CT scan demonstrated unchanged position of 6mm stone from prior scan on 7/18/22    Plan:  - Px was given 1L bolus, 15mg toradol, tylenol, and one dose of ceftriaxone in ED    - Urology consulted and recommended pain management and supportive care, rule out infection and continue w/ planned cystoscopy later in August    - Urine and blood cultures pending   - Continue treating with oxybutynin 10mg and tamsulosin 0 4mg daily to assist in stone passage    - Urology added cefazolin antibiotic therapy    Paroxysmal A-fib ([facility] [address] )  Assessment & Plan  Hx of paroxysmal a  Fib      Plan: Continue with sotalol 120 mg BID and metoprolol 25mg BID for rate control  Adenocarcinoma of head of pancreas [facility])  Assessment & Plan  Cancer of pancreatic head with active chemotherapy with 5-FU  Eventual plan to complete whipple procedure  Patient has next chemotherapy infusion scheduled for Wednesday, 8/3/22  Reached out to pt's oncologist, Dr Mauri Thomas, who said patient would need to delay chemotherapy for about a week if she has an infection  Plan: monitor clinical symptoms in setting of pancreatic therapy  Lovenox 60mg BID for VTE prophylaxis and hypercoagulable state  Urine and blood cultures pending  Type 2 diabetes mellitus without complication, without long-term current use of insulin [facility])  Assessment & Plan  Lab Results   Component Value Date    HGBA1C 6 5 (H) 02/21/2022       Recent Labs     08/02/22  0719 08/02/22  1229   POCGLU 100 84       Blood Sugar Average: Last 72 hrs:     Discontinued home oral diabetic medications  Plan: Sliding scale insulin  On hypoglycemic protocol  Hypertension  Assessment & Plan  Systolic pressures 964/894H upon admission  Plan: Losartan 50mg daily  Observe for elevated pressures        VTE Pharmacologic Prophylaxis: VTE Score: 9 High Risk (Score >/= 5) - Pharmacological DVT Prophylaxis Ordered: enoxaparin (Lovenox)  Sequential Compression Devices Ordered  Patient Centered Rounds: I performed bedside rounds with nursing staff today  Discussions with Specialists or Other Care Team Provider: Urology and Oncology     Education and Discussions with Family / Patient: Updated  () at bedside  Current Length of Stay: 0 day(s)  Current Patient Status: Inpatient   Discharge Plan: Anticipate discharge tomorrow to home  Code Status: Level 1 - Full Code    Subjective:   Ms Victor Hugo Jarvis is a 57year old female with chronic persistent L kidney stone s/p L ureteral stent placement one month ago w/ pancreatic cancer  Nursing reports no acute overnight events       Patient reports pain has resolved with IM toradol  Denies any fevers, chills, SOB, chest pain, or dysuria  Objective:     Vitals:   Temp (24hrs), Av 9 °F (36 6 °C), Min:97 6 °F (36 4 °C), Max:98 1 °F (36 7 °C)    Temp:  [97 6 °F (36 4 °C)-98 1 °F (36 7 °C)] 97 8 °F (36 6 °C)  HR:  [] 88  Resp:  [16-20] 19  BP: (119-154)/(71-95) 119/75  SpO2:  [92 %-97 %] 93 %  There is no height or weight on file to calculate BMI  Input and Output Summary (last 24 hours): Intake/Output Summary (Last 24 hours) at 2022 1707  Last data filed at 2022 0641  Gross per 24 hour   Intake 1150 ml   Output 92 ml   Net 1058 ml       Physical Exam:   Physical Exam  Vitals and nursing note reviewed  Constitutional:       General: She is not in acute distress  Appearance: She is well-developed  She is not ill-appearing, toxic-appearing or diaphoretic  HENT:      Head: Normocephalic and atraumatic  Eyes:      Conjunctiva/sclera: Conjunctivae normal    Cardiovascular:      Rate and Rhythm: Normal rate and regular rhythm  Heart sounds: Normal heart sounds  No murmur heard  Pulmonary:      Effort: Pulmonary effort is normal  No respiratory distress  Breath sounds: Normal breath sounds  No wheezing or rhonchi  Abdominal:      Palpations: Abdomen is soft  Tenderness: There is abdominal tenderness (in L inguinal region)  There is no guarding  Musculoskeletal:         General: No swelling or tenderness  Cervical back: Neck supple  Skin:     General: Skin is warm and dry  Coloration: Skin is not jaundiced  Neurological:      Mental Status: She is alert  Psychiatric:         Mood and Affect: Mood normal          Behavior: Behavior normal          Thought Content:  Thought content normal           Additional Data:     Labs:  Results from last 7 days   Lab Units 22  0543   WBC Thousand/uL 16 70*   HEMOGLOBIN g/dL 11 7   HEMATOCRIT % 36 8   PLATELETS Thousands/uL 123*   BANDS PCT % 5   LYMPHO PCT % 21   MONO PCT % 3* EOS PCT % 1     Results from last 7 days   Lab Units 08/02/22  0543 08/01/22  2338   SODIUM mmol/L 142 141   POTASSIUM mmol/L 3 6 3 8   CHLORIDE mmol/L 108 107   CO2 mmol/L 25 25   BUN mg/dL 14 15   CREATININE mg/dL 1 13 1 14   ANION GAP mmol/L 9 9   CALCIUM mg/dL 8 2* 9 3   ALBUMIN g/dL  --  3 8   TOTAL BILIRUBIN mg/dL  --  0 60   ALK PHOS U/L  --  162*   ALT U/L  --  48   AST U/L  --  29   GLUCOSE RANDOM mg/dL 138 95     Results from last 7 days   Lab Units 08/02/22  0027   INR  1 06     Results from last 7 days   Lab Units 08/02/22  1229 08/02/22  0719   POC GLUCOSE mg/dl 84 100         Results from last 7 days   Lab Units 08/02/22  0027 08/01/22  2338   LACTIC ACID mmol/L 1 7  --    PROCALCITONIN ng/ml  --  0 10       Lines/Drains:  Invasive Devices  Report    Central Venous Catheter Line  Duration           Port A Cath 05/31/22 Left Chest 63 days          Peripheral Intravenous Line  Duration           Peripheral IV 08/01/22 Right Antecubital <1 day          Drain  Duration           Ureteral Internal Stent Left ureter 6 Fr  15 days                Central Line:  Goal for removal: Line managed by oncology for her pancreatic adenocarcinoma             Imaging: Reviewed radiology reports from this admission including: abdominal/pelvic CT    Recent Cultures (last 7 days):   Results from last 7 days   Lab Units 08/02/22  0027   BLOOD CULTURE  Received in Microbiology Lab  Culture in Progress  Received in Microbiology Lab  Culture in Progress         Last 24 Hours Medication List:   Current Facility-Administered Medications   Medication Dose Route Frequency Provider Last Rate    acetaminophen  650 mg Oral Q6H PRN Arminda Carroll MD      aspirin  81 mg Oral Daily Arminda Carroll MD      atorvastatin  40 mg Oral Daily Arminda Carroll MD     [facility] ON 8/3/2022] cefTRIAXone  1,000 mg Intravenous Q24H Mark Scott MD      cholecalciferol  2,000 Units Oral BID Arminda Carroll MD     [facility] enoxaparin  60 mg Subcutaneous Q12H [address], MD      insulin lispro  2-12 Units Subcutaneous TID [facility] Ata Monsalve MD      losartan  50 mg Oral Daily Ata Monsalve MD      magnesium oxide  400 mg Oral Daily Ata Monsalve MD      metoprolol succinate  25 mg Oral Q12H [address] Ata Monsalve MD      oxybutynin  10 mg Oral HS Ata Monsalve MD      pantoprazole  40 mg Oral Daily Before Breakfast Ata Monsalve MD      PARoxetine  37 5 mg Oral QAM Ata Monsalve MD      potassium chloride  20 mEq Oral Daily Ata Monsalve MD      sodium chloride  75 mL/hr Intravenous Continuous Ata Monsalve MD 75 mL/hr (08/02/22 6445)   Janett Tellez sotalol  120 mg Oral Q12H [address] Anthony Craig MD      tamsulosin  0 4 mg Oral Daily With Milana Contreras MD          Today, Patient Was Seen By: Jordyn Lopez MD    **Please Note: This note may have been constructed using a voice recognition system  **

## 2022-08-02 NOTE — ASSESSMENT & PLAN NOTE
Cancer of pancreatic head with active chemotherapy with 5-FU  Eventual plan to complete whipple procedure  Patient has next chemotherapy infusion scheduled for Wednesday, 8/3/22  Reached out to pt's oncologist, Dr Jo Guzmán, who said patient would need to delay chemotherapy for about a week if she has an infection  Plan: monitor clinical symptoms in setting of pancreatic therapy  Lovenox 60mg BID for VTE prophylaxis and hypercoagulable state  Urine and blood cultures pending

## 2022-08-02 NOTE — ASSESSMENT & PLAN NOTE
- L sided kidney stone which has been present since June 2022, originally 5mm treated w flomax  - July 18th, px returned to ED for flank/groin pain 2/2 kidney stone now with UTI  Pt was treated with 5-7 days of keflex and a L ureteral stent     - 8/1/22 pt presented with continued groin pain extending to flank region with UA concerning for UTI without any urinary symptoms   - WBC 19 5, UA with 20-30 WBC, numerous RBC, bacteria, and leukocytes with urine cx pending    - CT scan demonstrated unchanged position of 6mm stone from prior scan on 7/18/22    Plan:  - Px was given 1L bolus, 15mg toradol, tylenol, and one dose of ceftriaxone in ED    - Urology consulted and recommended pain management and supportive care, rule out infection and continue w/ planned cystoscopy later in August    - Urine and blood cultures pending   - Continue treating with oxybutynin 10mg and tamsulosin 0 4mg daily to assist in stone passage    - Urology added cefazolin antibiotic therapy

## 2022-08-02 NOTE — ASSESSMENT & PLAN NOTE
Lab Results   Component Value Date    HGBA1C 6 5 (H) 02/21/2022       No results for input(s): POCGLU in the last 72 hours  Blood Sugar Average: Last 72 hrs:     Discontinued home oral diabetic medications  Plan: Sliding scale insulin  On hypoglycemic protocol

## 2022-08-02 NOTE — ASSESSMENT & PLAN NOTE
Systolic pressures 659/110A upon admission  Plan: Losartan 50mg daily   Observe for elevated pressures

## 2022-08-02 NOTE — ASSESSMENT & PLAN NOTE
L sided kidney stone which has been present for 1-2 months  Px first presented to [address] [place] [address] in June with 5mm L  Kidney stone treated with flowmax only  July 18th, px returned to ED for flank/groin pain 2/2 kidney stone now with UTI  At this time px was treated with 5-7 days of keflex and a L  Ureteral stent was placed  Px now presents today with continued groin pain extending to flank region with UA concerning for UTI without any urinary symptoms  Pertinent labs include WBC 19 5, UA with 20-30 WBC, numerous RBC, bacteria, and leukocytes with urine cx pending  CT scan demonstrated unchanged position of 6mm stone from prior scan on July 18th 2022  Plan: Px was given 1L bolus, 15mg toradol, tylenol, and one dose of ceftriaxone in ED  Urology f/u in AM for stone removal  NPO status for possible procedure/stent exchange  F/u with urine cultures, blood cultures, and morning labs  Continue treating with oxybutynin 10mg and tamsulosin 0 4mg daily to assist in stone passage   Urology added cefazolin antibiotic therapy, first dose this morning 545AM

## 2022-08-02 NOTE — ASSESSMENT & PLAN NOTE
Possible sepsis, patient meeting two SIRS criteria including leukocytosis and tachycardia  Px has not been tachycardic since presentation  qSOFA criteria for sepsis is unlikely  UA demonstrating signs of UTI however px does not have any urinary symptoms  Plan: Monitor hemodynamic status in setting of UTI findings on UA and ureterolithiasis  Observe vitals for tachycardia, tachypnea, and hypotension  Monitor fever trend

## 2022-08-03 ENCOUNTER — HOSPITAL ENCOUNTER (OUTPATIENT)
Dept: INFUSION CENTER | Facility: HOSPITAL | Age: 62
Discharge: HOME | End: 2022-08-03
Attending: INTERNAL MEDICINE

## 2022-08-03 ENCOUNTER — TELEPHONE (OUTPATIENT)
Dept: HEMATOLOGY ONCOLOGY | Facility: CLINIC | Age: 62
End: 2022-08-03

## 2022-08-03 VITALS
DIASTOLIC BLOOD PRESSURE: 96 MMHG | HEART RATE: 78 BPM | TEMPERATURE: 98.6 F | RESPIRATION RATE: 17 BRPM | SYSTOLIC BLOOD PRESSURE: 149 MMHG | OXYGEN SATURATION: 93 %

## 2022-08-03 LAB
ALBUMIN SERPL BCP-MCNC: 3.4 G/DL (ref 3.5–5)
ALP SERPL-CCNC: 139 U/L (ref 34–104)
ALT SERPL W P-5'-P-CCNC: 33 U/L (ref 7–52)
ANION GAP SERPL CALCULATED.3IONS-SCNC: 7 MMOL/L (ref 4–13)
ANISOCYTOSIS BLD QL SMEAR: PRESENT
AST SERPL W P-5'-P-CCNC: 25 U/L (ref 13–39)
BACTERIA UR CULT: NORMAL
BASOPHILS # BLD MANUAL: 0 THOUSAND/UL (ref 0–0.1)
BASOPHILS NFR MAR MANUAL: 0 % (ref 0–1)
BILIRUB SERPL-MCNC: 0.41 MG/DL (ref 0.2–1)
BUN SERPL-MCNC: 12 MG/DL (ref 5–25)
CALCIUM ALBUM COR SERPL-MCNC: 9.5 MG/DL (ref 8.3–10.1)
CALCIUM SERPL-MCNC: 9 MG/DL (ref 8.4–10.2)
CHLORIDE SERPL-SCNC: 110 MMOL/L (ref 96–108)
CO2 SERPL-SCNC: 26 MMOL/L (ref 21–32)
CREAT SERPL-MCNC: 0.84 MG/DL (ref 0.6–1.3)
EOSINOPHIL # BLD MANUAL: 0 THOUSAND/UL (ref 0–0.4)
EOSINOPHIL NFR BLD MANUAL: 0 % (ref 0–6)
ERYTHROCYTE [DISTWIDTH] IN BLOOD BY AUTOMATED COUNT: 17.6 % (ref 11.6–15.1)
GFR SERPL CREATININE-BSD FRML MDRD: 74 ML/MIN/1.73SQ M
GLUCOSE SERPL-MCNC: 100 MG/DL (ref 65–140)
GLUCOSE SERPL-MCNC: 96 MG/DL (ref 65–140)
GLUCOSE SERPL-MCNC: 96 MG/DL (ref 65–140)
HCT VFR BLD AUTO: 36.4 % (ref 34.8–46.1)
HGB BLD-MCNC: 11.9 G/DL (ref 11.5–15.4)
HYPERCHROMIA BLD QL SMEAR: PRESENT
LYMPHOCYTES # BLD AUTO: 13 % (ref 14–44)
LYMPHOCYTES # BLD AUTO: 2.2 THOUSAND/UL (ref 0.6–4.47)
MCH RBC QN AUTO: 29.5 PG (ref 26.8–34.3)
MCHC RBC AUTO-ENTMCNC: 32.7 G/DL (ref 31.4–37.4)
MCV RBC AUTO: 90 FL (ref 82–98)
MONOCYTES # BLD AUTO: 0.68 THOUSAND/UL (ref 0–1.22)
MONOCYTES NFR BLD: 4 % (ref 4–12)
MYELOCYTES NFR BLD MANUAL: 1 % (ref 0–1)
NEUTROPHILS # BLD MANUAL: 13.89 THOUSAND/UL (ref 1.85–7.62)
NEUTS BAND NFR BLD MANUAL: 16 % (ref 0–8)
NEUTS SEG NFR BLD AUTO: 66 % (ref 43–75)
OVALOCYTES BLD QL SMEAR: PRESENT
PLATELET # BLD AUTO: 117 THOUSANDS/UL (ref 149–390)
PLATELET BLD QL SMEAR: ABNORMAL
PMV BLD AUTO: 11.2 FL (ref 8.9–12.7)
POLYCHROMASIA BLD QL SMEAR: PRESENT
POTASSIUM SERPL-SCNC: 3.9 MMOL/L (ref 3.5–5.3)
PROT SERPL-MCNC: 5.7 G/DL (ref 6.4–8.4)
RBC # BLD AUTO: 4.04 MILLION/UL (ref 3.81–5.12)
SODIUM SERPL-SCNC: 143 MMOL/L (ref 135–147)
WBC # BLD AUTO: 16.94 THOUSAND/UL (ref 4.31–10.16)

## 2022-08-03 PROCEDURE — 85007 BL SMEAR W/DIFF WBC COUNT: CPT

## 2022-08-03 PROCEDURE — 85027 COMPLETE CBC AUTOMATED: CPT

## 2022-08-03 PROCEDURE — 99232 SBSQ HOSP IP/OBS MODERATE 35: CPT | Performed by: NURSE PRACTITIONER

## 2022-08-03 PROCEDURE — 80053 COMPREHEN METABOLIC PANEL: CPT

## 2022-08-03 PROCEDURE — 99239 HOSP IP/OBS DSCHRG MGMT >30: CPT | Performed by: INTERNAL MEDICINE

## 2022-08-03 PROCEDURE — 82948 REAGENT STRIP/BLOOD GLUCOSE: CPT

## 2022-08-03 RX ORDER — CEPHALEXIN 500 MG/1
500 CAPSULE ORAL EVERY 6 HOURS SCHEDULED
Qty: 8 CAPSULE | Refills: 0 | Status: SHIPPED | OUTPATIENT
Start: 2022-08-03 | End: 2022-08-05

## 2022-08-03 RX ADMIN — POTASSIUM CHLORIDE 20 MEQ: 1500 TABLET, EXTENDED RELEASE ORAL at 08:50

## 2022-08-03 RX ADMIN — ENOXAPARIN SODIUM 60 MG: 60 INJECTION SUBCUTANEOUS at 08:49

## 2022-08-03 RX ADMIN — METOPROLOL SUCCINATE 25 MG: 25 TABLET, EXTENDED RELEASE ORAL at 08:50

## 2022-08-03 RX ADMIN — LOSARTAN POTASSIUM 50 MG: 50 TABLET, FILM COATED ORAL at 08:50

## 2022-08-03 RX ADMIN — MAGNESIUM OXIDE TAB 400 MG (241.3 MG ELEMENTAL MG) 400 MG: 400 (241.3 MG) TAB at 08:50

## 2022-08-03 RX ADMIN — PANTOPRAZOLE SODIUM 40 MG: 40 TABLET, DELAYED RELEASE ORAL at 05:31

## 2022-08-03 RX ADMIN — ASPIRIN 81 MG CHEWABLE TABLET 81 MG: 81 TABLET CHEWABLE at 08:50

## 2022-08-03 RX ADMIN — CEFTRIAXONE 1000 MG: 1 INJECTION, SOLUTION INTRAVENOUS at 01:03

## 2022-08-03 RX ADMIN — Medication 2000 UNITS: at 08:50

## 2022-08-03 RX ADMIN — PAROXETINE HYDROCHLORIDE 37.5 MG: 12.5 TABLET, FILM COATED, EXTENDED RELEASE ORAL at 08:58

## 2022-08-03 RX ADMIN — ATORVASTATIN CALCIUM 40 MG: 40 TABLET, FILM COATED ORAL at 08:51

## 2022-08-03 RX ADMIN — SOTALOL HYDROCHLORIDE 120 MG: 80 TABLET ORAL at 08:51

## 2022-08-03 NOTE — DISCHARGE INSTR - AVS FIRST PAGE
Dear Danyel Gerard,     It was our pleasure to care for you here at [facility]  It is our hope that we were always able to exceed the expected standards for your care during your stay  You were hospitalized due to ***  You were cared for on the *** floor by Fernand Hashimoto, DO under the service of Jerome Stanton MD with the [facility] who covers for your primary care physician (PCP), Lenny Blake MD, while you were hospitalized  If you have any questions or concerns related to this hospitalization, you may contact us at [phone]  For follow up as well as any medication refills, we recommend that you follow up with your primary care physician  A registered nurse will reach out to you by phone within a few days after your discharge to answer any additional questions that you may have after going home  However, at this time we provide for you here, the most important instructions / recommendations at discharge:     Notable Medication Adjustments -   Continue Keflex 500 mg every 6 hours by mouth for next 2 days  Testing Required after Discharge -   None  Important follow up information -   Please follow up with with Dr Jesus Nielsen and your Oncology team about your next Chemo treatments  Please follow up with Urology for your scheduled procedure  Please follow up with your PCP to discuss recent hospitalization  Other Instructions -   Please return to the ED for any increasing back or urinary pain, not controlled with Tylenol, Fevers, noted blood in your urine  Please review this entire after visit summary as additional general instructions including medication list, appointments, activity, diet, any pertinent wound care, and other additional recommendations from your care team that may be provided for you        Sincerely,     Fernand Hashimoto, DO

## 2022-08-03 NOTE — DISCHARGE SUMMARY
[facility]  Discharge- Sophia [facility] 1960, 57 y o  female MRN: [id number]  Unit/Bed#: W -85 Encounter: [id number]  Primary Care Provider: Deanne Zaman MD   Date and time admitted to hospital: 8/1/2022  9:38 PM    * Kidney stone  Assessment & Plan  - L sided kidney stone which has been present since June 2022, originally 5mm treated w flomax  - July 18th, px returned to ED for flank/groin pain 2/2 kidney stone now with UTI  Pt was treated with 5-7 days of keflex and a L ureteral stent  - 8/1/22 pt presented with continued groin pain extending to flank region with UA concerning for UTI without any urinary symptoms   - WBC 19 5, UA with 20-30 WBC, numerous RBC, bacteria, and leukocytes with urine cx pending    - CT scan demonstrated unchanged position of 6mm stone from prior scan on 7/18/22  - Urology consulted - recommend continue with scheduled stage Ureteroscopy on 8/22/22 as outpatient  Plan:  - Px was given 1L bolus, 15mg toradol, tylenol, and one dose of ceftriaxone in ED  Continued on Abx and will d/c on PO Keflex to continue 3 day course of abx     - Continue treating with oxybutynin 10mg and tamsulosin 0 4mg daily to assist in stone passage   - Urology consulted as above and recommended continue w/ planned cystoscopy later in August - 8/22/22      Paroxysmal A-fib ([facility])  Assessment & Plan  Hx of paroxysmal a  Fib  Plan: Continue with sotalol 120 mg BID and metoprolol 25mg BID for rate control  Sepsis ([facility] [address] )  Assessment & Plan  - SIRS criteria: leukocytosis and tachycardia on admission intitally  - qSOFA criteria: sepsis is unlikely  - UA demonstrating signs of UTI however px does not have symptoms and Urine cx not impressive  - Pt remained stable for remainder of hospitalization - elevated wbcs thought to be likely from recent Neulasta injection on 7/25  Plan:   - Empiric treatment with ceftriaxone maintainted in the hospital for possible UTI  Will complete 3 day course and d/c with 2 more days of Keflex 500 mg PO q6hrs  - Continue to monitor s/s at home  - Follow up with PCP in 1 week  Adenocarcinoma of head of pancreas [facility])  Assessment & Plan  Cancer of pancreatic head with active chemotherapy with 5-FU  Eventual plan to complete whipple procedure  Patient has next chemotherapy infusion scheduled for Wednesday, 8/3/22   Reached out to pt's oncologist, Dr Julio Chaudhary, who said patient would need to delay chemotherapy for about a week if she has an infection  Plan: monitor clinical symptoms in setting of pancreatic therapy  Lovenox 60mg BID for VTE prophylaxis and hypercoagulable state  Follow up with Oncologist to re-schedule treatments on d/c    Type 2 diabetes mellitus without complication, without long-term current use of insulin [facility])  Assessment & Plan  Lab Results   Component Value Date    HGBA1C 6 5 (H) 02/21/2022       Recent Labs     08/02/22  1229 08/02/22  1706 08/03/22  0728 08/03/22  1124   POCGLU 84 100 96 96       Blood Sugar Average: Last 72 hrs:     Discontinued home oral diabetic medications  Plan: Sliding scale insulin while admitted  Resume home meds on dc    Hypertension  Assessment & Plan  Blood Pressure: 149/96    Plan: Continue Losartan 50mg daily  Observe for elevated pressures and follow up with PCP      Medical Problems             Resolved Problems  Date Reviewed: 8/3/2022   None               Discharging Physician / Practitioner: Rex Woodard DO  PCP: Ralph Silva MD  Admission Date:   Admission Orders (From admission, onward)     Ordered        08/02/22 0501  INPATIENT ADMISSION  Once            08/02/22 2166  Inpatient Admission  Once                      Discharge Date: 08/03/22    Consultations During Hospital Stay:  · Urology    Procedures Performed:   · CT Renal Study    Significant Findings / Test Results:   Left ureteral stent in place with proximal coil at level left ureteropelvic junction  Stone within the distal left ureter has not significantly changed in position comparing to 7/18/2022    Incidental Findings:   · None    Test Results Pending at Discharge (will require follow up): · None     Outpatient Tests Requested:  · [facility]    Complications:  None    Reason for Admission: Kidney Stone, Sepsis    Hospital Course:   Ewelina Wolfe is a 57 y o  female patient who originally presented to the hospital on 8/1/2022 due to increasing left flank pain with history kidney stone and ureteral stent in place  In the ED noted to be tachycardic with leukocytosis  Patient denied any other signs and symptoms  CT renal study done showing left ureteral stent in place as above with continued 5 mm stone without significant change  Patient was admitted and started on broad-spectrum antibiotics, urine micro and culture were done which were not impressive for infection  Patient also noted that she had her Neulasta injection on 07/25/2022  Patient's pain improved with Toradol  Continued on antibiotics and was seen by Urology while admitted  Urology agreed with antibiotics and recommended continue previous plan to undergo ureteroscopy on 08/22/2022  On day of discharge patient continued overnight without any pain  She continues to admit to no urinary symptoms  She is tolerating p o  Intake without concern  Discussed plan with patient and she was comfortable going home and continuing 2 more days of antibiotics for full treatment of possible UTI  ED precautions discussed and she agrees  Please see above list of diagnoses and related plan for additional information  Condition at Discharge: good    Discharge Day Visit / Exam:   Subjective:  Patient seen and examined sitting comfortably having breakfast this morning  She denies any further flank pain, no urinary symptoms, no blood in her urine  She denies any other concerns this morning    Vitals: Blood Pressure: 149/96 (08/03/22 0700)  Pulse: 78 (08/03/22 0700)  Temperature: 98 6 °F (37 °C) (08/02/22 2052)  Temp Source: Oral (08/02/22 2052)  Respirations: 17 (08/03/22 0700)  SpO2: 93 % (08/03/22 0900)  Exam:   Physical Exam  Vitals and nursing note reviewed  Constitutional:       General: She is not in acute distress  Appearance: Normal appearance  She is obese  HENT:      Head: Normocephalic and atraumatic  Right Ear: External ear normal       Left Ear: External ear normal       Nose: Nose normal       Mouth/Throat:      Mouth: Mucous membranes are moist       Pharynx: No oropharyngeal exudate  Eyes:      Extraocular Movements: Extraocular movements intact  Conjunctiva/sclera: Conjunctivae normal       Pupils: Pupils are equal, round, and reactive to light  Cardiovascular:      Rate and Rhythm: Normal rate and regular rhythm  Pulses: Normal pulses  Heart sounds: Normal heart sounds  Pulmonary:      Effort: Pulmonary effort is normal       Breath sounds: No wheezing or rales  Abdominal:      General: Abdomen is flat  Palpations: Abdomen is soft  Tenderness: There is no abdominal tenderness  There is no right CVA tenderness, left CVA tenderness or guarding  Musculoskeletal:         General: Normal range of motion  Cervical back: Normal range of motion  Right lower leg: No edema  Left lower leg: No edema  Lymphadenopathy:      Cervical: No cervical adenopathy  Skin:     General: Skin is warm  Capillary Refill: Capillary refill takes less than 2 seconds  Findings: No erythema  Neurological:      General: No focal deficit present  Mental Status: She is alert and oriented to person, place, and time  Cranial Nerves: No cranial nerve deficit  Motor: No weakness  Psychiatric:         Mood and Affect: Mood normal          Behavior: Behavior normal           Discussion with Family: Updated  () via phone      Discharge instructions/Information to patient and family:   See after visit summary for information provided to patient and family  Provisions for Follow-Up Care:  See after visit summary for information related to follow-up care and any pertinent home health orders  Disposition:   Home    Planned Readmission: None     Discharge Statement:  I spent 35 minutes discharging the patient  This time was spent on the day of discharge  I had direct contact with the patient on the day of discharge  Greater than 50% of the total time was spent examining patient, answering all patient questions, arranging and discussing plan of care with patient as well as directly providing post-discharge instructions  Additional time then spent on discharge activities  Discharge Medications:  See after visit summary for reconciled discharge medications provided to patient and/or family        **Please Note: This note may have been constructed using a voice recognition system**

## 2022-08-03 NOTE — ASSESSMENT & PLAN NOTE
Lab Results   Component Value Date    HGBA1C 6 5 (H) 02/21/2022       Recent Labs     08/02/22  1229 08/02/22  1706 08/03/22  0728 08/03/22  1124   POCGLU 84 100 96 96       Blood Sugar Average: Last 72 hrs:     Discontinued home oral diabetic medications      Plan: Sliding scale insulin while admitted  Resume home meds on dc

## 2022-08-03 NOTE — ASSESSMENT & PLAN NOTE
- SIRS criteria: leukocytosis and tachycardia on admission intitally  - qSOFA criteria: sepsis is unlikely  - UA demonstrating signs of UTI however px does not have symptoms and Urine cx not impressive  - Pt remained stable for remainder of hospitalization - elevated wbcs thought to be likely from recent Neulasta injection on 7/25  Plan:   - Empiric treatment with ceftriaxone maintainted in the hospital for possible UTI  Will complete 3 day course and d/c with 2 more days of Keflex 500 mg PO q6hrs  - Continue to monitor s/s at home  - Follow up with PCP in 1 week

## 2022-08-03 NOTE — PLAN OF CARE
Problem: Potential for Falls  Goal: Patient will remain free of falls  Description: INTERVENTIONS:  - Educate patient/family on patient safety including physical limitations  - Instruct patient to call for assistance with activity   - Consult OT/PT to assist with strengthening/mobility   - Keep Call bell within reach  - Keep bed low and locked with side rails adjusted as appropriate  - Keep care items and personal belongings within reach  - Initiate and maintain comfort rounds  - Make Fall Risk Sign visible to staff  - Offer Toileting every  Hours, in advance of need  - Initiate/Maintain alarm  - Obtain necessary fall risk management equipment:   - Apply yellow socks and bracelet for high fall risk patients  - Consider moving patient to room near nurses station  Outcome: Progressing     Problem: PAIN - ADULT  Goal: Verbalizes/displays adequate comfort level or baseline comfort level  Description: Interventions:  - Encourage patient to monitor pain and request assistance  - Assess pain using appropriate pain scale  - Administer analgesics based on type and severity of pain and evaluate response  - Implement non-pharmacological measures as appropriate and evaluate response  - Consider cultural and social influences on pain and pain management  - Notify physician/advanced practitioner if interventions unsuccessful or patient reports new pain  Outcome: Progressing     Problem: INFECTION - ADULT  Goal: Absence or prevention of progression during hospitalization  Description: INTERVENTIONS:  - Assess and monitor for signs and symptoms of infection  - Monitor lab/diagnostic results  - Monitor all insertion sites, i e  indwelling lines, tubes, and drains  - Monitor endotracheal if appropriate and nasal secretions for changes in amount and color  - [place] appropriate cooling/warming therapies per order  - Administer medications as ordered  - Instruct and encourage patient and family to use good hand hygiene technique  - Identify and instruct in appropriate isolation precautions for identified infection/condition  Outcome: Progressing  Goal: Absence of fever/infection during neutropenic period  Description: INTERVENTIONS:  - Monitor WBC    Outcome: Progressing     Problem: SAFETY ADULT  Goal: Maintain or return to baseline ADL function  Description: INTERVENTIONS:  -  Assess patient's ability to carry out ADLs; assess patient's baseline for ADL function and identify physical deficits which impact ability to perform ADLs (bathing, care of mouth/teeth, toileting, grooming, dressing, etc )  - Assess/evaluate cause of self-care deficits   - Assess range of motion  - Assess patient's mobility; develop plan if impaired  - Assess patient's need for assistive devices and provide as appropriate  - Encourage maximum independence but intervene and supervise when necessary  - Involve family in performance of ADLs  - Assess for home care needs following discharge   - Consider OT consult to assist with ADL evaluation and planning for discharge  - Provide patient education as appropriate  Outcome: Progressing  Goal: Maintains/Returns to pre admission functional level  Description: INTERVENTIONS:  - Perform BMAT or MOVE assessment daily    - Set and communicate daily mobility goal to care team and patient/family/caregiver  - Collaborate with rehabilitation services on mobility goals if consulted  - Perform Range of Motion  times a day  - Reposition patient every  hours    - Dangle patient  times a day  - Stand patient  times a day  - Ambulate patient  times a day  - Out of bed to chair  times a day   - Out of bed for meals times a day  - Out of bed for toileting  - Record patient progress and toleration of activity level   Outcome: Progressing     Problem: DISCHARGE PLANNING  Goal: Discharge to home or other facility with appropriate resources  Description: INTERVENTIONS:  - Identify barriers to discharge w/patient and caregiver  - Arrange for needed discharge resources and transportation as appropriate  - Identify discharge learning needs (meds, wound care, etc )  - Arrange for interpretive services to assist at discharge as needed  - Refer to Case Management Department for coordinating discharge planning if the patient needs post-hospital services based on physician/advanced practitioner order or complex needs related to functional status, cognitive ability, or social support system  Outcome: Progressing     Problem: Knowledge Deficit  Goal: Patient/family/caregiver demonstrates understanding of disease process, treatment plan, medications, and discharge instructions  Description: Complete learning assessment and assess knowledge base    Interventions:  - Provide teaching at level of understanding  - Provide teaching via preferred learning methods  Outcome: Progressing

## 2022-08-03 NOTE — ASSESSMENT & PLAN NOTE
Cancer of pancreatic head with active chemotherapy with 5-FU  Eventual plan to complete whipple procedure  Patient has next chemotherapy infusion scheduled for Wednesday, 8/3/22   Reached out to pt's oncologist, Dr Maza, who said patient would need to delay chemotherapy for about a week if she has an infection  Plan: monitor clinical symptoms in setting of pancreatic therapy  Lovenox 60mg BID for VTE prophylaxis and hypercoagulable state     Follow up with Oncologist to re-schedule treatments on d/c

## 2022-08-03 NOTE — PLAN OF CARE
Problem: Potential for Falls  Goal: Patient will remain free of falls  Description: INTERVENTIONS:  - Educate patient/family on patient safety including physical limitations  - Instruct patient to call for assistance with activity   - Consult OT/PT to assist with strengthening/mobility   - Keep Call bell within reach  - Keep bed low and locked with side rails adjusted as appropriate  - Keep care items and personal belongings within reach  - Initiate and maintain comfort rounds  - Make Fall Risk Sign visible to staff  - Apply yellow socks and bracelet for high fall risk patients  - Consider moving patient to room near nurses station  Outcome: Progressing     Problem: PAIN - ADULT  Goal: Verbalizes/displays adequate comfort level or baseline comfort level  Description: Interventions:  - Encourage patient to monitor pain and request assistance  - Assess pain using appropriate pain scale  - Administer analgesics based on type and severity of pain and evaluate response  - Implement non-pharmacological measures as appropriate and evaluate response  - Consider cultural and social influences on pain and pain management  - Notify physician/advanced practitioner if interventions unsuccessful or patient reports new pain  Outcome: Progressing     Problem: INFECTION - ADULT  Goal: Absence or prevention of progression during hospitalization  Description: INTERVENTIONS:  - Assess and monitor for signs and symptoms of infection  - Monitor lab/diagnostic results  - Monitor all insertion sites, i e  indwelling lines, tubes, and drains  - Monitor endotracheal if appropriate and nasal secretions for changes in amount and color  - [place] appropriate cooling/warming therapies per order  - Administer medications as ordered  - Instruct and encourage patient and family to use good hand hygiene technique  - Identify and instruct in appropriate isolation precautions for identified infection/condition  Outcome: Progressing  Goal: Absence of fever/infection during neutropenic period  Description: INTERVENTIONS:  - Monitor WBC    Outcome: Progressing     Problem: SAFETY ADULT  Goal: Maintain or return to baseline ADL function  Description: INTERVENTIONS:  -  Assess patient's ability to carry out ADLs; assess patient's baseline for ADL function and identify physical deficits which impact ability to perform ADLs (bathing, care of mouth/teeth, toileting, grooming, dressing, etc )  - Assess/evaluate cause of self-care deficits   - Assess range of motion  - Assess patient's mobility; develop plan if impaired  - Assess patient's need for assistive devices and provide as appropriate  - Encourage maximum independence but intervene and supervise when necessary  - Involve family in performance of ADLs  - Assess for home care needs following discharge   - Consider OT consult to assist with ADL evaluation and planning for discharge  - Provide patient education as appropriate  Outcome: Progressing  Goal: Maintains/Returns to pre admission functional level  Description: INTERVENTIONS:  - Perform BMAT or MOVE assessment daily    - Set and communicate daily mobility goal to care team and patient/family/caregiver     - Collaborate with rehabilitation services on mobility goals if consulted  - Out of bed for toileting  - Record patient progress and toleration of activity level   Outcome: Progressing     Problem: DISCHARGE PLANNING  Goal: Discharge to home or other facility with appropriate resources  Description: INTERVENTIONS:  - Identify barriers to discharge w/patient and caregiver  - Arrange for needed discharge resources and transportation as appropriate  - Identify discharge learning needs (meds, wound care, etc )  - Arrange for interpretive services to assist at discharge as needed  - Refer to Case Management Department for coordinating discharge planning if the patient needs post-hospital services based on physician/advanced practitioner order or complex needs related to functional status, cognitive ability, or social support system  Outcome: Progressing     Problem: Knowledge Deficit  Goal: Patient/family/caregiver demonstrates understanding of disease process, treatment plan, medications, and discharge instructions  Description: Complete learning assessment and assess knowledge base    Interventions:  - Provide teaching at level of understanding  - Provide teaching via preferred learning methods  Outcome: Progressing

## 2022-08-03 NOTE — TELEPHONE ENCOUNTER
Pt is currently admitted  Treatment today cancelled with infusion  Spoke with Faye Aguilar at [facility] infusion  Will defer treatment to next scheduled 8/17

## 2022-08-03 NOTE — DISCHARGE INSTRUCTIONS
Kidney Stones   WHAT YOU NEED TO KNOW:   Kidney stones form in the urinary system when the water and waste in your urine are out of balance  When this happens, certain types of waste crystals separate from the urine  The crystals build up and form kidney stones  You may have more than one kidney stone  DISCHARGE INSTRUCTIONS:   Return to the emergency department if:   You are vomiting and it is not relieved with medicine  Call your doctor or kidney specialist if:   You have a fever  You have trouble urinating  You see blood in your urine  You have severe pain  You have any questions or concerns about your condition or care  Medicines: You may need any of the following:  NSAIDs , such as ibuprofen, help decrease swelling, pain, and fever  This medicine is available with or without a doctor's order  NSAIDs can cause stomach bleeding or kidney problems in certain people  If you take blood thinner medicine, always ask your healthcare provider if NSAIDs are safe for you  Always read the medicine label and follow directions  Acetaminophen  decreases pain and fever  It is available without a doctor's order  Ask how much to take and how often to take it  Follow directions  Read the labels of all other medicines you are using to see if they also contain acetaminophen, or ask your doctor or pharmacist  Acetaminophen can cause liver damage if not taken correctly  Do not use more than 4 grams (4,000 milligrams) total of acetaminophen in one day  Prescription pain medicine  may be given  Ask your healthcare provider how to take this medicine safely  Some prescription pain medicines contain acetaminophen  Do not take other medicines that contain acetaminophen without talking to your healthcare provider  Too much acetaminophen may cause liver damage  Prescription pain medicine may cause constipation  Ask your healthcare provider how to prevent or treat constipation       Medicines  to balance your electrolytes may be needed  Take your medicine as directed  Contact your healthcare provider if you think your medicine is not helping or if you have side effects  Tell him or her if you are allergic to any medicine  Keep a list of the medicines, vitamins, and herbs you take  Include the amounts, and when and why you take them  Bring the list or the pill bottles to follow-up visits  Carry your medicine list with you in case of an emergency  What you can do to manage kidney stones:   Drink more liquids  Your healthcare provider may tell you to drink at least 8 to 12 (eight-ounce) cups of liquids each day  This helps flush out the kidney stones when you urinate  Water is the best liquid to drink  Strain your urine every time you go to the bathroom  Urinate through a strainer or a piece of thin cloth to catch the stones  Take the stones to your healthcare provider so they can be sent to the lab for tests  This will help your healthcare providers plan the best treatment for you  Ask if you should avoid any foods  You may need to limit oxalate  Oxalate is a chemical found in some plant foods  The most common type of kidney stone is made up of crystals that contain calcium and oxalate  Your healthcare provider or dietitian may recommend that you limit oxalate if you get this type of kidney stone often  You may need to limit how much sodium (salt) or protein you eat  Ask for information about the best foods for you  Be physically active as directed  Your stones may pass more easily if you stay active  Physical activity can also help you manage your weight  Ask about the best activities for you  After you pass the kidney stones: Your healthcare provider may  order a 24-hour urine test  Results from a 24-hour urine test will help your healthcare provider plan ways to prevent more stones from forming  Your healthcare provider will give you more instructions    Follow up with your doctor or kidney specialist as directed:  Write down your questions so you remember to ask them during your visits  © Copyright [employer] 2022 Information is for End User's use only and may not be sold, redistributed or otherwise used for commercial purposes  All illustrations and images included in CareNotes® are the copyrighted property of A D A M [employer] Inc  or Mat Garcia  The above information is an  only  It is not intended as medical advice for individual conditions or treatments  Talk to your doctor, nurse or pharmacist before following any medical regimen to see if it is safe and effective for you

## 2022-08-03 NOTE — PROGRESS NOTES
UROLOGY PROGRESS NOTE   Patient Identifiers: Kassidy Perla (MRN [id number])  Date of Service: 8/3/2022    Subjective:     24 HR EVENTS:   no significant events  Patient has  no complaints  Objective:     VITALS:    Vitals:    08/03/22 0900   BP:    Pulse:    Resp:    Temp:    SpO2: 93%       INS & OUTS:  I/O last 24 hours: In: 2348 8 [P O :480; I V :1818 8;  IV Piggyback:50]  Out: -     LABS:  Lab Results   Component Value Date    HGB 11 9 08/03/2022    HCT 36 4 08/03/2022    WBC 16 94 (H) 08/03/2022     (L) 08/03/2022   ]    Lab Results   Component Value Date     04/12/2017    K 3 9 08/03/2022     (H) 08/03/2022    CO2 26 08/03/2022    BUN 12 08/03/2022    CREATININE 0 84 08/03/2022    CALCIUM 9 0 08/03/2022    GLUCOSE 92 04/12/2017   ]    INPATIENT MEDS:    Current Facility-Administered Medications:     acetaminophen (TYLENOL) tablet 650 mg, 650 mg, Oral, Q6H PRN, Mp Chau MD    aspirin chewable tablet 81 mg, 81 mg, Oral, Daily, Mp Chau MD, 81 mg at 08/03/22 0850    atorvastatin (LIPITOR) tablet 40 mg, 40 mg, Oral, Daily, Mp Chau MD, 40 mg at 08/03/22 0851    cefTRIAXone (ROCEPHIN) IVPB (premix in dextrose) 1,000 mg 50 mL, 1,000 mg, Intravenous, Q24H, Sebastián Daley MD, Stopped at 08/03/22 0133    cholecalciferol (VITAMIN D3) tablet 2,000 Units, 2,000 Units, Oral, BID, Mp Chau MD, 2,000 Units at 08/03/22 0850    enoxaparin (LOVENOX) subcutaneous injection 60 mg, 60 mg, Subcutaneous, Q12H [address], Mp Chau MD, 60 mg at 08/03/22 0849    insulin lispro (HumaLOG) 100 units/mL subcutaneous injection 2-12 Units, 2-12 Units, Subcutaneous, TID AC **AND** Fingerstick Glucose (POCT), , , TID AC, Mp Chau MD    losartan (COZAAR) tablet 50 mg, 50 mg, Oral, Daily, Mp Chau MD, 50 mg at 08/03/22 0850    magnesium oxide (MAG-OX) tablet 400 mg, 400 mg, Oral, Daily, Tonia Ortiz MD, 400 mg at 08/03/22 0850    metoprolol succinate (TOPROL-XL) 24 hr tablet 25 mg, 25 mg, Oral, Q12H [address], Yasmin Koroma MD, 25 mg at 08/03/22 0850    oxybutynin (DITROPAN-XL) 24 hr tablet 10 mg, 10 mg, Oral, HS, Yasmin Koroma MD    pantoprazole (PROTONIX) EC tablet 40 mg, 40 mg, Oral, Daily Before Breakfast, Yasmin Koroma MD, 40 mg at 08/03/22 0531    PARoxetine (PAXIL-CR) 24 hr tablet 37 5 mg, 37 5 mg, Oral, QAM, Yasmin Koroma MD, 37 5 mg at 08/03/22 0858    potassium chloride (K-DUR,KLOR-CON) CR tablet 20 mEq, 20 mEq, Oral, Daily, Yasmin Koroma MD, 20 mEq at 08/03/22 0850    sodium chloride 0 9 % infusion, 75 mL/hr, Intravenous, Continuous, Yasmin Koroma MD, Last Rate: 75 mL/hr at 08/02/22 1747, 75 mL/hr at 08/02/22 1747    sotalol (BETAPACE) tablet 120 mg, 120 mg, Oral, Q12H [address], Yasmin Koroma MD, 120 mg at 08/03/22 0851    tamsulosin (FLOMAX) capsule 0 4 mg, 0 4 mg, Oral, Daily With Janine Carrasco MD, 0 4 mg at 08/02/22 1740      Physical Exam:     GEN: alert and oriented x 3    RESP: breathing comfortably with no accessory muscle use    CV: pulses palpable, HRR  ABD: soft, non-tender, non-distended   EXT: no significant peripheral edema   : negative CVA tenderness    RADIOLOGY:     CT RENAL STONE STUDY 8/2/2022    RIGHT KIDNEY AND URETER:  No urinary tract calculi  No hydronephrosis or hydroureter      LEFT KIDNEY AND URETER: Left ureteral stent in place with proximal coil at the level of the left ureter pelvic junction  6 mm left peristent calculi as unchanged in position comparing to 7/18/2022  IMPRESSION:     Left ureteral stent in place with proximal coil at level left ureteropelvic junction  Stone within the distal left ureter has not significantly changed in position comparing to 7/18/2022     Assessment:   58 y/o female status post left ureteral stent on 7/18/2022  She presented to the ER with severe left sided flank pain    Additional history includes pancreatic cancer, currently receiving chemotherapy  CT shows left ureteral stet in place  Patient remains afebrile, VSS  WBC 16 94, 19 58 on admission  Creatinine 0 84  Patient continues to report mild left sided flank discomfort, but notes improvement  She is resting comfortably, and tolerating regular diet  Patient voiding without any difficulties or gross hematuria  Plan:   -Continue antibiotics per primary team, and titrate based on final culture and sensitivity  -Monitor WBC, fever curve, and vitals  -No urologic intervention required at this time  Would recommend proceeding with staged ureteroscopy scheduled on 8/22/2022, given elevated WBC and pending cultures  Patient should be discharged home on oral antibiotics    Urology signing off  Please do not hesitate to call with any questions or concerns  RN participated in rounds with AP  Plan of care discussed with patient and RN

## 2022-08-03 NOTE — ASSESSMENT & PLAN NOTE
- L sided kidney stone which has been present since June 2022, originally 5mm treated w flomax  - July 18th, px returned to ED for flank/groin pain 2/2 kidney stone now with UTI  Pt was treated with 5-7 days of keflex and a L ureteral stent  - 8/1/22 pt presented with continued groin pain extending to flank region with UA concerning for UTI without any urinary symptoms   - WBC 19 5, UA with 20-30 WBC, numerous RBC, bacteria, and leukocytes with urine cx pending    - CT scan demonstrated unchanged position of 6mm stone from prior scan on 7/18/22  - Urology consulted - recommend continue with scheduled stage Ureteroscopy on 8/22/22 as outpatient  Plan:  - Px was given 1L bolus, 15mg toradol, tylenol, and one dose of ceftriaxone in ED   Continued on Abx and will d/c on PO Keflex to continue 3 day course of abx     - Continue treating with oxybutynin 10mg and tamsulosin 0 4mg daily to assist in stone passage   - Urology consulted as above and recommended continue w/ planned cystoscopy later in August - 8/22/22

## 2022-08-03 NOTE — ASSESSMENT & PLAN NOTE
Blood Pressure: 149/96    Plan: Continue Losartan 50mg daily   Observe for elevated pressures and follow up with PCP

## 2022-08-03 NOTE — UTILIZATION REVIEW
Initial Clinical Review    Admission: Date/Time/Statement:   Admission Orders (From admission, onward)     Ordered        08/02/22 0501  INPATIENT ADMISSION  Once            08/02/22 0512  Inpatient Admission  Once                      Orders Placed This Encounter   Procedures    INPATIENT ADMISSION     Standing Status:   Standing     Number of Occurrences:   1     Order Specific Question:   Level of Care     Answer:   Med Surg [16]     Order Specific Question:   Estimated length of stay     Answer:   More than 2 Midnights     Order Specific Question:   Certification     Answer:   I certify that inpatient services are medically necessary for this patient for a duration of greater than two midnights  See H&P and MD Progress Notes for additional information about the patient's course of treatment   Inpatient Admission     Standing Status:   Standing     Number of Occurrences:   1     Order Specific Question:   Level of Care     Answer:   Med Surg [16]     Order Specific Question:   Estimated length of stay     Answer:   Not Applicable     ED Arrival Information     Expected   8/1/2022 21:24    Arrival   8/1/2022 21:18    Acuity   Urgent            Means of arrival   Walk-In    Escorted by   Self    Service   Hospitalist    Admission type   Urgent            Arrival complaint   Flank Pain           Chief Complaint   Patient presents with    Abdominal Pain     Pt presents to the ED with left flank and LLQ abd pain onset tonight  Reports hx of 7/18 stent placed for kidney stone  Denies nausea  Initial Presentation: 57 y o  female with hx pancreatic cancer on active chemotherapy-due 8/3/22, GERD, HTN, HLD, PAF, history of recurrent kidney stones, UTI,  L stent placed 7/18/22 who presents to ED from home with  Left groin radiating to L flank pain   Reports decreased urine stream   On exam,initially tachycardic,  tenderness to palpation in epigastric region and L  groin region, left CVA tenderness   Labs - WBC 19 58, creat 1 14, UA- small leuks 20-30 WBC , innumerable RBC         Imaging shows left ureteral stent was coiled at level of UPJ,  left 6 mm stone unchanged in position   Pt given IV, IV abx, IV analgesic in ED  Pt admitted as Inpatient with kidney stone, sepsis, possible UTI vs SIRS  Plan- Urology consult,NPO at [facility]  F/u urine cultures, blood cultures, am labs-CBC, CMP  Continue Oxybutynin, flomax  IV abx- cefazolin and ceftriaxone  Chemo delayed for 1 week if pt has infection per oncology   Urology consult-ureteral stone  UTI   CT showing left ureteral stent in place as well as stone in left distal ureter  Creat WNL  Pt currently denies pain, is afebrile  WBC improving to 16 7 w/ IV abx  Plan - medical optimization, proceed with second stage ureteroscopy as scheduled 8/22/22  Date:8/3   Day 2:   Urology-WBC 16 94 today  Creat 0 84   Pt reports L sided flank pain improving, Pt voiding w/o difficulty   Abdomen-soft,  non-distended , no CVA tenderness   No urologic intervention required at this time    Monitor WBC, temp  IV abx per primary team , on Ceftriaxone  Recommend proceeding with staged ureteroscopy scheduled on 8/22/2022, given elevated WBC and pending cultures    Patient should be on oral antibiotics upon d/c  ED Triage Vitals   Temperature Pulse Respirations Blood Pressure SpO2   08/01/22 2129 08/01/22 2129 08/01/22 2129 08/01/22 2129 08/01/22 2129   98 1 °F (36 7 °C) 103 20 154/89 94 %      Temp Source Heart Rate Source Patient Position - Orthostatic VS BP Location FiO2 (%)   08/01/22 2129 08/01/22 2129 08/01/22 2129 08/01/22 2129 --   Oral Monitor Sitting Right arm       Pain Score       08/01/22 2346       7          Wt Readings from Last 1 Encounters:   07/29/22 (!) 137 kg (303 lb)     Additional Vital Signs:   Date/Time Temp Pulse Resp BP MAP (mmHg) SpO2   08/03/22 0900 -- -- -- -- -- 93 %   08/03/22 0700 -- 78 17 149/96 -- 93 %   08/02/22 2052 98 6 °F (37 °C) 81 16 154/89 114 94 %   08/02/22 1554 97 8 °F (36 6 °C) 88 19 119/75 92 93 %   08/02/22 11:46:02 97 6 °F (36 4 °C) 79 18 130/87 101 94 %   08/02/22 0833 -- -- -- -- -- --   08/02/22 0830 -- 91 18 141/95 -- 92 %   08/02/22 0827 -- 87 -- 141/95 -- --   08/02/22 0712 98 1 °F (36 7 °C) 85 16 138/84 -- 94 %   08/02/22 0550 -- 84 16 147/87 -- 96 %   08/02/22 0200 -- 84 18 140/75 99 92 %   08/02/22 0030 -- 88 18 147/80 108 97 %   08/01/22 2347 -- 95 19 140/71 -- 94 %       Pertinent Labs/Diagnostic Test Results:   CT renal stone study abdomen pelvis without contrast   Final Result by Ally Rogers MD (08/02 9678)      Left ureteral stent in place with proximal coil at level left ureteropelvic junction   Stone within the distal left ureter has not significantly changed in position comparing to 7/18/2022         Workstation performed: [id number]               Results from last 7 days   Lab Units 08/03/22  0505 08/02/22  0543 08/01/22 2338 07/29/22  1052   WBC Thousand/uL 16 94* 16 70* 19 58* 12 09*   HEMOGLOBIN g/dL 11 9 11 7 12 8 12 7   HEMATOCRIT % 36 4 36 8 38 9 41 4   PLATELETS Thousands/uL 117* 123* 134* 149   BANDS PCT % 16* 5 1 5         Results from last 7 days   Lab Units 08/03/22  0505 08/02/22  0543 08/01/22 2338 07/29/22  1052   SODIUM mmol/L 143 142 141 140   POTASSIUM mmol/L 3 9 3 6 3 8 4 0   CHLORIDE mmol/L 110* 108 107 110*   CO2 mmol/L 26 25 25 24   ANION GAP mmol/L 7 9 9 6   BUN mg/dL 12 14 15 9   CREATININE mg/dL 0 84 1 13 1 14 0 92   EGFR ml/min/1 73sq m 74 52 51 66   CALCIUM mg/dL 9 0 8 2* 9 3 9 9     Results from last 7 days   Lab Units 08/03/22  0505 08/01/22  2338 07/29/22  1052   AST U/L 25 29 35   ALT U/L 33 48 78   ALK PHOS U/L 139* 162* 179*   TOTAL PROTEIN g/dL 5 7* 6 3* 7 1   ALBUMIN g/dL 3 4* 3 8 3 5   TOTAL BILIRUBIN mg/dL 0 41 0 60 0 58     Results from last 7 days   Lab Units 08/03/22  0728 08/02/22  1706 08/02/22  1229 08/02/22  0719   POC GLUCOSE mg/dl 96 100 84 100     Results from last 7 days   Lab Units 08/03/22  0505 08/02/22  0543 08/01/22  2338   GLUCOSE RANDOM mg/dL 100 138 95                   Results from last 7 days   Lab Units 08/02/22  0027   PROTIME seconds 14 0   INR  1 06   PTT seconds 24         Results from last 7 days   Lab Units 08/01/22  2338   PROCALCITONIN ng/ml 0 10     Results from last 7 days   Lab Units 08/02/22  0027   LACTIC ACID mmol/L 1 7                       Results from last 7 days   Lab Units 08/01/22  2338   CLARITY UA  Turbid   COLOR UA  Light Yellow   SPEC GRAV UA  1 014   PH UA  5 0   GLUCOSE UA mg/dl Negative   KETONES UA mg/dl Negative   BLOOD UA  Large*   PROTEIN UA mg/dl Trace*   NITRITE UA  Negative   BILIRUBIN UA  Negative   UROBILINOGEN UA (BE) mg/dl <2 0   LEUKOCYTES UA  Small*   WBC UA /hpf 20-30*   RBC UA /hpf Innumerable*   BACTERIA UA /hpf Occasional   EPITHELIAL CELLS WET PREP /hpf Occasional   MUCUS THREADS  Occasional*                                 Results from last 7 days   Lab Units 08/02/22  0027 08/01/22 2338   BLOOD CULTURE  No Growth at 24 hrs    No Growth at 24 hrs   --    URINE CULTURE   --  10,000-19,000 cfu/ml                ED Treatment:   Medication Administration from 08/01/2022 2039 to 08/02/2022 1026       Date/Time Order Dose Route Action     08/01/2022 2346 ketorolac (TORADOL) injection 15 mg 15 mg Intravenous Given     08/01/2022 2346 acetaminophen (TYLENOL) tablet 650 mg 650 mg Oral Given     08/02/2022 0632 ondansetron (ZOFRAN) injection 4 mg 4 mg Intravenous Given     08/01/2022 2338 sodium chloride 0 9 % bolus 1,000 mL 1,000 mL Intravenous New Bag     08/02/2022 0256 cefTRIAXone (ROCEPHIN) IVPB (premix in dextrose) 1,000 mg 50 mL 1,000 mg Intravenous New Bag     08/02/2022 0545 sodium chloride 0 9 % infusion 75 mL/hr Intravenous New Bag     08/02/2022 0541 acetaminophen (TYLENOL) tablet 975 mg 975 mg Oral Given     08/02/2022 0611 ceFAZolin (ANCEF) IVPB (premix in dextrose) 1,000 mg 50 mL 1,000 mg Intravenous New Bag     08/02/2022 0825 aspirin chewable tablet 81 mg 81 mg Oral Given     08/02/2022 0826 atorvastatin (LIPITOR) tablet 40 mg 40 mg Oral Given     08/02/2022 0829 magnesium oxide (MAG-OX) tablet 400 mg 400 mg Oral Given     08/02/2022 0827 metoprolol succinate (TOPROL-XL) 24 hr tablet 25 mg 25 mg Oral Given     08/02/2022 0825 losartan (COZAAR) tablet 50 mg 50 mg Oral Given     08/02/2022 5963 pantoprazole (PROTONIX) EC tablet 40 mg 40 mg Oral Given     08/02/2022 0830 PARoxetine (PAXIL-CR) 24 hr tablet 37 5 mg 37 5 mg Oral Given     08/02/2022 0828 sotalol (BETAPACE) tablet 120 mg 120 mg Oral Given     08/02/2022 0825 cholecalciferol (VITAMIN D3) tablet 2,000 Units 2,000 Units Oral Given     08/02/2022 0824 enoxaparin (LOVENOX) subcutaneous injection 60 mg 60 mg Subcutaneous Given     08/02/2022 0824 potassium chloride (K-DUR,KLOR-CON) CR tablet 20 mEq 20 mEq Oral Given        Past Medical History:   Diagnosis Date    Abnormal liver function test     last assessed 1/16/17     Adenomatosis     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    last assessed 4/4/13    Atrial fibrillation ([facility] )     Atrial flutter ([facility] )     Benign essential hypertension     last assessed 12/21/17     Body mass index (BMI) of 50-59 9 in adult ([facility] )     Cancer ([facility] )     pancreas    Carpal tunnel syndrome 09/07/2004    unspecified laterality  / last assessed 9/7/04    Colon polyp     Congenital pes planus     last assessed 7/15/14     COVID     4/30/21    CPAP (continuous positive airway pressure) dependence     Depression     Depression     Diabetes [facility]     GERD (gastroesophageal reflux disease)     Hemangioma of skin 08/26/2003    last assessed 8/26/03    History of gastroesophageal reflux (GERD)     Hypercholesterolemia     Hyperlipidemia     Hypertension     Irregular heart beat     Kidney stone     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    last assessed 12/31/14     Osteoarthritis of both knees last assessed 12/31/14     Paroxysmal atrial fibrillation (HCC)     S/P ablation of atrial flutter     Sleep apnea      Present on Admission:   Kidney stone   Hypertension   Type 2 diabetes mellitus without complication, without long-term current use of insulin (HCC)   Adenocarcinoma of head of pancreas (HCC)      Admitting Diagnosis: Ureterolithiasis [N20 1]  UTI (urinary tract infection) [N39 0]  Abdominal pain [R10 9]  Sepsis ([facility] ) [A41 9]  Age/Sex: 57 y o  female  Admission Orders:  Scheduled Medications:  aspirin, 81 mg, Oral, Daily  atorvastatin, 40 mg, Oral, Daily  cefTRIAXone, 1,000 mg, Intravenous, Q24H  cholecalciferol, 2,000 Units, Oral, BID  enoxaparin, 60 mg, Subcutaneous, Q12H KIM  insulin lispro, 2-12 Units, Subcutaneous, TID AC  losartan, 50 mg, Oral, Daily  magnesium oxide, 400 mg, Oral, Daily  metoprolol succinate, 25 mg, Oral, Q12H KIM  oxybutynin, 10 mg, Oral, HS  pantoprazole, 40 mg, Oral, Daily Before Breakfast  PARoxetine, 37 5 mg, Oral, QAM  potassium chloride, 20 mEq, Oral, Daily  sotalol, 120 mg, Oral, Q12H KIM  tamsulosin, 0 4 mg, Oral, Daily With Dinner    ceFAZolin (ANCEF) IVPB (premix in dextrose) 1,000 mg 50 mL  Dose: 1,000 mg  Freq: Once Route: IV x1 8/2 @0611    Continuous IV Infusions:  sodium chloride, 75 mL/hr, Intravenous, Continuous      PRN Meds:  acetaminophen, 650 mg, Oral, Q6H PRN    OOB as megan    SCD    IP CONSULT TO UROLOGY    Network Utilization Review Department  ATTENTION: Please call with any questions or concerns to [phone] and carefully listen to the prompts so that you are directed to the right person  All voicemails are confidential   Sylvia Morris all requests for admission clinical reviews, approved or denied determinations and any other requests to dedicated fax number below belonging to the campus where the patient is receiving treatment   List of dedicated fax numbers for the Facilities:  FACILITY NAME UR FAX NUMBER   ADMISSION DENIALS (Administrative/Medical Necessity) [phone]   [address] (Maternity/NICU/Pediatrics) 261 [facility],7Th Floor [facility] 40 125 [facility]  [phone]   José Reich [address] [address] [place] [address] Petros Irizarry 14[address] [facility] [address] [phone]

## 2022-08-03 NOTE — PLAN OF CARE
Problem: Potential for Falls  Goal: Patient will remain free of falls  Description: INTERVENTIONS:  - Educate patient/family on patient safety including physical limitations  - Instruct patient to call for assistance with activity   - Consult OT/PT to assist with strengthening/mobility   - Keep Call bell within reach  - Keep bed low and locked with side rails adjusted as appropriate  - Keep care items and personal belongings within reach  - Initiate and maintain comfort rounds  - Make Fall Risk Sign visible to staff  - Offer Toileting every  Hours, in advance of need  - Initiate/Maintain alarm  - Obtain necessary fall risk management equipment:   - Apply yellow socks and bracelet for high fall risk patients  - Consider moving patient to room near nurses station  Outcome: Progressing     Problem: PAIN - ADULT  Goal: Verbalizes/displays adequate comfort level or baseline comfort level  Description: Interventions:  - Encourage patient to monitor pain and request assistance  - Assess pain using appropriate pain scale  - Administer analgesics based on type and severity of pain and evaluate response  - Implement non-pharmacological measures as appropriate and evaluate response  - Consider cultural and social influences on pain and pain management  - Notify physician/advanced practitioner if interventions unsuccessful or patient reports new pain  Outcome: Progressing     Problem: INFECTION - ADULT  Goal: Absence or prevention of progression during hospitalization  Description: INTERVENTIONS:  - Assess and monitor for signs and symptoms of infection  - Monitor lab/diagnostic results  - Monitor all insertion sites, i e  indwelling lines, tubes, and drains  - Monitor endotracheal if appropriate and nasal secretions for changes in amount and color  - [place] appropriate cooling/warming therapies per order  - Administer medications as ordered  - Instruct and encourage patient and family to use good hand hygiene technique  - Identify and instruct in appropriate isolation precautions for identified infection/condition  Outcome: Progressing  Goal: Absence of fever/infection during neutropenic period  Description: INTERVENTIONS:  - Monitor WBC    Outcome: Progressing     Problem: SAFETY ADULT  Goal: Maintain or return to baseline ADL function  Description: INTERVENTIONS:  -  Assess patient's ability to carry out ADLs; assess patient's baseline for ADL function and identify physical deficits which impact ability to perform ADLs (bathing, care of mouth/teeth, toileting, grooming, dressing, etc )  - Assess/evaluate cause of self-care deficits   - Assess range of motion  - Assess patient's mobility; develop plan if impaired  - Assess patient's need for assistive devices and provide as appropriate  - Encourage maximum independence but intervene and supervise when necessary  - Involve family in performance of ADLs  - Assess for home care needs following discharge   - Consider OT consult to assist with ADL evaluation and planning for discharge  - Provide patient education as appropriate  Outcome: Progressing  Goal: Maintains/Returns to pre admission functional level  Description: INTERVENTIONS:  - Perform BMAT or MOVE assessment daily    - Set and communicate daily mobility goal to care team and patient/family/caregiver  - Collaborate with rehabilitation services on mobility goals if consulted  - Perform Range of Motion  times a day  - Reposition patient every  hours    - Dangle patient  times a day  - Stand patient  times a day  - Ambulate patient  times a day  - Out of bed to chair  times a day   - Out of bed for meals times a day  - Out of bed for toileting  - Record patient progress and toleration of activity level   Outcome: Progressing     Problem: DISCHARGE PLANNING  Goal: Discharge to home or other facility with appropriate resources  Description: INTERVENTIONS:  - Identify barriers to discharge w/patient and caregiver  - Arrange for needed discharge resources and transportation as appropriate  - Identify discharge learning needs (meds, wound care, etc )  - Arrange for interpretive services to assist at discharge as needed  - Refer to Case Management Department for coordinating discharge planning if the patient needs post-hospital services based on physician/advanced practitioner order or complex needs related to functional status, cognitive ability, or social support system  Outcome: Progressing     Problem: Knowledge Deficit  Goal: Patient/family/caregiver demonstrates understanding of disease process, treatment plan, medications, and discharge instructions  Description: Complete learning assessment and assess knowledge base    Interventions:  - Provide teaching at level of understanding  - Provide teaching via preferred learning methods  Outcome: Progressing

## 2022-08-03 NOTE — TELEPHONE ENCOUNTER
----- Message from [facility] Silvia BARON MD sent at 8/2/2022  4:50 PM EDT -----  Patient in the emergency room with possible UTI and kidney stones  Treatment will need to be delayed by at least a week  She was supposed to be treated tomorrow

## 2022-08-04 ENCOUNTER — TRANSITIONAL CARE MANAGEMENT (OUTPATIENT)
Dept: FAMILY MEDICINE CLINIC | Facility: CLINIC | Age: 62
End: 2022-08-04

## 2022-08-04 NOTE — TELEPHONE ENCOUNTER
Pt  Was in the hospital yesterday and missed her appt  And she would like to know what to do now  Pls  Return the Patients call   Ty

## 2022-08-04 NOTE — TELEPHONE ENCOUNTER
Returned call to patient, reviewed treatment schedule  She is aware that next treatment will be 8/17  I provided her my direct contact number if she need additional support

## 2022-08-05 ENCOUNTER — HOSPITAL ENCOUNTER (OUTPATIENT)
Dept: INFUSION CENTER | Facility: HOSPITAL | Age: 62
Discharge: HOME | End: 2022-08-05
Attending: INTERNAL MEDICINE

## 2022-08-05 NOTE — UTILIZATION REVIEW
Notification of Discharge   This is a Notification of Discharge from our facility [address]  Please be advised that this patient has been discharge from our facility  Below you will find the admission and discharge date and time including the patients disposition  UTILIZATION REVIEW CONTACT:  Obie Deshpande MA  Utilization   Network Utilization Review Department  Phone: [phone] x carefully listen to the prompts  All voicemails are confidential   Email: [email or web address]     PHYSICIAN ADVISORY SERVICES:  FOR [id number] REVIEW - MEDICAL NECESSITY DENIAL  Phone: [phone]  Fax: [phone]  Email: [email or web address]     PRESENTATION DATE: 8/1/2022  9:38 PM  OBERVATION ADMISSION DATE:   INPATIENT ADMISSION DATE: 8/2/22  5:01 AM   DISCHARGE DATE: 8/3/2022  1:49 PM  DISPOSITION: Home/Self Care Home/Self Care      IMPORTANT INFORMATION:  Send all requests for admission clinical reviews, approved or denied determinations and any other requests to dedicated fax number below belonging to the campus where the patient is receiving treatment   List of dedicated fax numbers:  [address] DENIALS (Administrative/Medical Necessity) [phone]   [address]  (Maternity/NICU/Pediatrics) [phone]   Sophia [facility] [phone]   [facility] [phone]   [address] [place] [address] [phone]   2000 [facility] [address] [place] [address],[address]Th Floor [address] [place] [address] [phone]   [facility]  [phone]   2205 [facility], [facility]  2401 [facility] [address] [facility] [phone]

## 2022-08-07 LAB
BACTERIA BLD CULT: NORMAL
BACTERIA BLD CULT: NORMAL

## 2022-08-08 ENCOUNTER — TELEPHONE (OUTPATIENT)
Dept: UROLOGY | Facility: MEDICAL CENTER | Age: 62
End: 2022-08-08

## 2022-08-08 DIAGNOSIS — D70.1 CHEMOTHERAPY INDUCED NEUTROPENIA (HCC): Primary | ICD-10-CM

## 2022-08-08 DIAGNOSIS — C25.0 ADENOCARCINOMA OF HEAD OF PANCREAS (HCC): ICD-10-CM

## 2022-08-08 DIAGNOSIS — T45.1X5A CHEMOTHERAPY INDUCED NEUTROPENIA (HCC): Primary | ICD-10-CM

## 2022-08-08 RX ORDER — DEXTROSE MONOHYDRATE 50 MG/ML
20 INJECTION, SOLUTION INTRAVENOUS ONCE
Status: CANCELLED | OUTPATIENT
Start: 2022-08-17

## 2022-08-08 RX ORDER — SODIUM CHLORIDE 9 MG/ML
20 INJECTION, SOLUTION INTRAVENOUS ONCE AS NEEDED
Status: CANCELLED | OUTPATIENT
Start: 2022-08-17

## 2022-08-08 RX ORDER — ATROPINE SULFATE 1 MG/ML
0.25 INJECTION, SOLUTION INTRAVENOUS ONCE AS NEEDED
Status: CANCELLED | OUTPATIENT
Start: 2022-08-17

## 2022-08-08 RX ORDER — ATROPINE SULFATE 1 MG/ML
0.25 INJECTION, SOLUTION INTRAVENOUS ONCE
Status: CANCELLED | OUTPATIENT
Start: 2022-08-17

## 2022-08-08 RX ORDER — FLUOROURACIL 50 MG/ML
400 INJECTION, SOLUTION INTRAVENOUS ONCE
Status: CANCELLED | OUTPATIENT
Start: 2022-08-17

## 2022-08-09 ENCOUNTER — RA CDI HCC (OUTPATIENT)
Dept: OTHER | Facility: HOSPITAL | Age: 62
End: 2022-08-09

## 2022-08-09 NOTE — PROGRESS NOTES
Aicha [facility]  coding opportunities          Chart Reviewed number of suggestions sent to Provider: 1     Patients Insurance        Commercial Insurance: Rocaaudra Jones

## 2022-08-12 ENCOUNTER — APPOINTMENT (OUTPATIENT)
Dept: LAB | Facility: CLINIC | Age: 62
End: 2022-08-12
Payer: COMMERCIAL

## 2022-08-12 DIAGNOSIS — N20.0 KIDNEY STONES: ICD-10-CM

## 2022-08-12 DIAGNOSIS — D70.1 CHEMOTHERAPY INDUCED NEUTROPENIA (HCC): ICD-10-CM

## 2022-08-12 DIAGNOSIS — C25.0 ADENOCARCINOMA OF HEAD OF PANCREAS (HCC): ICD-10-CM

## 2022-08-12 DIAGNOSIS — D70.1 CHEMOTHERAPY INDUCED NEUTROPENIA (HCC): Primary | ICD-10-CM

## 2022-08-12 DIAGNOSIS — T45.1X5A CHEMOTHERAPY INDUCED NEUTROPENIA (HCC): ICD-10-CM

## 2022-08-12 DIAGNOSIS — T45.1X5A CHEMOTHERAPY INDUCED NEUTROPENIA (HCC): Primary | ICD-10-CM

## 2022-08-12 LAB
ALBUMIN SERPL BCP-MCNC: 3.3 G/DL (ref 3.5–5)
ALP SERPL-CCNC: 137 U/L (ref 46–116)
ALT SERPL W P-5'-P-CCNC: 68 U/L (ref 12–78)
ANION GAP SERPL CALCULATED.3IONS-SCNC: 7 MMOL/L (ref 4–13)
AST SERPL W P-5'-P-CCNC: 43 U/L (ref 5–45)
BASOPHILS # BLD AUTO: 0.16 THOUSANDS/ΜL (ref 0–0.1)
BASOPHILS NFR BLD AUTO: 2 % (ref 0–1)
BILIRUB SERPL-MCNC: 0.51 MG/DL (ref 0.2–1)
BUN SERPL-MCNC: 12 MG/DL (ref 5–25)
CALCIUM ALBUM COR SERPL-MCNC: 9.5 MG/DL (ref 8.3–10.1)
CALCIUM SERPL-MCNC: 8.9 MG/DL (ref 8.3–10.1)
CHLORIDE SERPL-SCNC: 110 MMOL/L (ref 96–108)
CO2 SERPL-SCNC: 24 MMOL/L (ref 21–32)
CREAT SERPL-MCNC: 0.98 MG/DL (ref 0.6–1.3)
EOSINOPHIL # BLD AUTO: 0.11 THOUSAND/ΜL (ref 0–0.61)
EOSINOPHIL NFR BLD AUTO: 1 % (ref 0–6)
ERYTHROCYTE [DISTWIDTH] IN BLOOD BY AUTOMATED COUNT: 17.9 % (ref 11.6–15.1)
GFR SERPL CREATININE-BSD FRML MDRD: 62 ML/MIN/1.73SQ M
GLUCOSE P FAST SERPL-MCNC: 120 MG/DL (ref 65–99)
HCT VFR BLD AUTO: 37.9 % (ref 34.8–46.1)
HGB BLD-MCNC: 12 G/DL (ref 11.5–15.4)
IMM GRANULOCYTES # BLD AUTO: 0.07 THOUSAND/UL (ref 0–0.2)
IMM GRANULOCYTES NFR BLD AUTO: 1 % (ref 0–2)
LYMPHOCYTES # BLD AUTO: 1.81 THOUSANDS/ΜL (ref 0.6–4.47)
LYMPHOCYTES NFR BLD AUTO: 23 % (ref 14–44)
MCH RBC QN AUTO: 28.8 PG (ref 26.8–34.3)
MCHC RBC AUTO-ENTMCNC: 31.7 G/DL (ref 31.4–37.4)
MCV RBC AUTO: 91 FL (ref 82–98)
MONOCYTES # BLD AUTO: 0.49 THOUSAND/ΜL (ref 0.17–1.22)
MONOCYTES NFR BLD AUTO: 6 % (ref 4–12)
NEUTROPHILS # BLD AUTO: 5.31 THOUSANDS/ΜL (ref 1.85–7.62)
NEUTS SEG NFR BLD AUTO: 67 % (ref 43–75)
NRBC BLD AUTO-RTO: 0 /100 WBCS
PLATELET # BLD AUTO: 117 THOUSANDS/UL (ref 149–390)
PMV BLD AUTO: 12.8 FL (ref 8.9–12.7)
POTASSIUM SERPL-SCNC: 4 MMOL/L (ref 3.5–5.3)
PROT SERPL-MCNC: 6.9 G/DL (ref 6.4–8.4)
RBC # BLD AUTO: 4.16 MILLION/UL (ref 3.81–5.12)
SODIUM SERPL-SCNC: 141 MMOL/L (ref 135–147)
WBC # BLD AUTO: 7.95 THOUSAND/UL (ref 4.31–10.16)

## 2022-08-12 PROCEDURE — 36415 COLL VENOUS BLD VENIPUNCTURE: CPT | Performed by: INTERNAL MEDICINE

## 2022-08-12 PROCEDURE — 86301 IMMUNOASSAY TUMOR CA 19-9: CPT | Performed by: INTERNAL MEDICINE

## 2022-08-12 PROCEDURE — 80053 COMPREHEN METABOLIC PANEL: CPT

## 2022-08-12 PROCEDURE — 85025 COMPLETE CBC W/AUTO DIFF WBC: CPT

## 2022-08-12 PROCEDURE — 87086 URINE CULTURE/COLONY COUNT: CPT

## 2022-08-13 LAB
BACTERIA UR CULT: NORMAL
CANCER AG19-9 SERPL-ACNC: 22 U/ML (ref 0–35)

## 2022-08-15 ENCOUNTER — OFFICE VISIT (OUTPATIENT)
Dept: FAMILY MEDICINE CLINIC | Facility: CLINIC | Age: 62
End: 2022-08-15
Payer: COMMERCIAL

## 2022-08-15 VITALS
DIASTOLIC BLOOD PRESSURE: 85 MMHG | HEIGHT: 64 IN | RESPIRATION RATE: 16 BRPM | HEART RATE: 92 BPM | BODY MASS INDEX: 50.02 KG/M2 | SYSTOLIC BLOOD PRESSURE: 142 MMHG | TEMPERATURE: 97.3 F | WEIGHT: 293 LBS

## 2022-08-15 DIAGNOSIS — Z01.818 PREOPERATIVE CLEARANCE: Primary | ICD-10-CM

## 2022-08-15 DIAGNOSIS — N20.0 KIDNEY STONE: ICD-10-CM

## 2022-08-15 DIAGNOSIS — R42 VERTIGO: ICD-10-CM

## 2022-08-15 PROCEDURE — 99215 OFFICE O/P EST HI 40 MIN: CPT | Performed by: FAMILY MEDICINE

## 2022-08-15 PROCEDURE — 3079F DIAST BP 80-89 MM HG: CPT | Performed by: FAMILY MEDICINE

## 2022-08-15 PROCEDURE — 1111F DSCHRG MED/CURRENT MED MERGE: CPT | Performed by: FAMILY MEDICINE

## 2022-08-15 PROCEDURE — 3077F SYST BP >= 140 MM HG: CPT | Performed by: FAMILY MEDICINE

## 2022-08-15 RX ORDER — MECLIZINE HCL 12.5 MG/1
12.5 TABLET ORAL EVERY 8 HOURS SCHEDULED
Qty: 30 TABLET | Refills: 0 | Status: SHIPPED | OUTPATIENT
Start: 2022-08-15 | End: 2022-10-24

## 2022-08-15 NOTE — PROGRESS NOTES
Chief Complaint   Patient presents with    Pre-op Exam     Kidney stone and stent  DR Abena Cosme 8/22        Patient ID: Renee Collier is a 57 y o  female  HPI   Pt is seeing for preop exam prior to   CYSTOSCOPY URETEROSCOPY WITH LITHOTRIPSY HOLMIUM LASER, RETROGRADE PYELOGRAM AND INSERTION STENT URETERAL     The following portions of the patient's history were reviewed and updated as appropriate: allergies, current medications, past family history, past medical history, past social history, past surgical history and problem list     Review of Systems   Constitutional: Negative  Respiratory: Negative  Cardiovascular: Negative  Gastrointestinal: Negative  Genitourinary: Negative  Musculoskeletal: Negative  Skin: Negative  Neurological: Positive for dizziness  Negative for weakness, numbness and headaches  Current Outpatient Medications   Medication Sig Dispense Refill    aspirin 81 MG tablet Take 81 mg by mouth daily   atorvastatin (LIPITOR) 40 mg tablet TAKE 1 TABLET BY MOUTH  DAILY 90 tablet 3    [START ON 8/17/2022] fluorouracil 5,280 mg in CADD/Elastomeric Infusion Device Infuse 5,280 mg (1,200 mg/m2/day x 2 2 m2) into a catheter in a vein via infusion device over 46 hours for 2 days  Do not start before August 17, 2022  1 each 12    glucose blood (Contour Next Test) test strip Use 1 each daily Use as instructed 100 each 3    ibuprofen (ADVIL,MOTRIN) 100 MG tablet Take 200 mg by mouth as needed for mild pain      Insulin Pen Needle (Pen Needles) 32G X 4 MM MISC Use once a week 12 each 3    Magnesium Oxide -Mg Supplement 400 MG CAPS Take 1 capsule by mouth daily      metoprolol succinate (TOPROL-XL) 25 mg 24 hr tablet Take 1 tablet (25 mg total) by mouth 2 (two) times a day 180 tablet 3    multivitamin (THERAGRAN) TABS Take 1 tablet by mouth daily        olmesartan (BENICAR) 20 mg tablet TAKE 1 TABLET BY MOUTH  DAILY 90 tablet 3    Omega-3 Fatty Acids (FISH OIL) 1,000 mg Take 1,000 mg by mouth 2 (two) times a day        ondansetron (ZOFRAN) 4 mg tablet Take 2 tablets (8 mg total) by mouth every 8 (eight) hours as needed for nausea or vomiting 20 tablet 3    oxybutynin (DITROPAN-XL) 10 MG 24 hr tablet Take 1 tablet (10 mg total) by mouth daily at bedtime 30 tablet 0    Ozempic, 1 MG/DOSE, 4 MG/3ML SOPN injection pen Inject 0 75 mL (1 mg total) under the skin once a week 9 mL 1    pantoprazole (PROTONIX) 40 mg tablet TAKE 1 TABLET BY MOUTH  DAILY BEFORE BREAKFAST 90 tablet 3    PARoxetine (PAXIL-CR) 37 5 mg 24 hr tablet TAKE 1 TABLET BY MOUTH IN  THE MORNING 90 tablet 3    Potassium Citrate ER 15 MEQ (1620 MG) TBCR Take 1 tablet by mouth 2 (two) times a day 180 tablet 3    sotalol (BETAPACE) 120 mg tablet Take 1 tablet (120 mg total) by mouth every 12 (twelve) hours 180 tablet 3    VITAMIN D PO Take 2,000 Units by mouth 2 (two) times a day       No current facility-administered medications for this visit  Objective:    /85 (BP Location: Left arm, Patient Position: Sitting, Cuff Size: Large)   Pulse 92   Temp (!) 97 3 °F (36 3 °C)   Resp 16   Ht 5' 3 5" (1 613 m)   Wt (!) 140 kg (309 lb)   BMI 53 88 kg/m²        Physical Exam  Constitutional:       General: She is not in acute distress  Appearance: She is obese  She is not ill-appearing  Cardiovascular:      Rate and Rhythm: Normal rate and regular rhythm  Heart sounds: No murmur heard  Pulmonary:      Effort: Pulmonary effort is normal  No respiratory distress  Abdominal:      Palpations: There is no mass  Tenderness: There is no abdominal tenderness  Comments: Obese    Musculoskeletal:      Right lower leg: No edema  Left lower leg: No edema  Neurological:      General: No focal deficit present  Mental Status: She is alert and oriented to person, place, and time  Psychiatric:         Mood and Affect: Mood normal            Labs in chart were reviewed     Recent Results (from the past 672 hour(s))   Manual Differential(PHLEBS Do Not Order)   Cancer antigen 19-9    Collection Time: 08/12/22  7:33 AM   Result Value Ref Range    CA 19-9 22 0 - 35 U/mL   Urine culture    Collection Time: 08/12/22  7:33 AM    Specimen: Urine, Clean Catch   Result Value Ref Range    Urine Culture No Growth <1000 cfu/mL    CBC and differential    Collection Time: 08/12/22  7:33 AM   Result Value Ref Range    WBC 7 95 4 31 - 10 16 Thousand/uL    RBC 4 16 3 81 - 5 12 Million/uL    Hemoglobin 12 0 11 5 - 15 4 g/dL    Hematocrit 37 9 34 8 - 46 1 %    MCV 91 82 - 98 fL    MCH 28 8 26 8 - 34 3 pg    MCHC 31 7 31 4 - 37 4 g/dL    RDW 17 9 (H) 11 6 - 15 1 %    MPV 12 8 (H) 8 9 - 12 7 fL    Platelets 265 (L) 140 - 390 Thousands/uL    nRBC 0 /100 WBCs    Neutrophils Relative 67 43 - 75 %    Immat GRANS % 1 0 - 2 %    Lymphocytes Relative 23 14 - 44 %    Monocytes Relative 6 4 - 12 %    Eosinophils Relative 1 0 - 6 %    Basophils Relative 2 (H) 0 - 1 %    Neutrophils Absolute 5 31 1 85 - 7 62 Thousands/µL    Immature Grans Absolute 0 07 0 00 - 0 20 Thousand/uL    Lymphocytes Absolute 1 81 0 60 - 4 47 Thousands/µL    Monocytes Absolute 0 49 0 17 - 1 22 Thousand/µL    Eosinophils Absolute 0 11 0 00 - 0 61 Thousand/µL    Basophils Absolute 0 16 (H) 0 00 - 0 10 Thousands/µL   Comprehensive metabolic panel    Collection Time: 08/12/22  7:33 AM   Result Value Ref Range    Sodium 141 135 - 147 mmol/L    Potassium 4 0 3 5 - 5 3 mmol/L    Chloride 110 (H) 96 - 108 mmol/L    CO2 24 21 - 32 mmol/L    ANION GAP 7 4 - 13 mmol/L    BUN 12 5 - 25 mg/dL    Creatinine 0 98 0 60 - 1 30 mg/dL    Glucose, Fasting 120 (H) 65 - 99 mg/dL    Calcium 8 9 8 3 - 10 1 mg/dL    Corrected Calcium 9 5 8 3 - 10 1 mg/dL    AST 43 5 - 45 U/L    ALT 68 12 - 78 U/L    Alkaline Phosphatase 137 (H) 46 - 116 U/L    Total Protein 6 9 6 4 - 8 4 g/dL    Albumin 3 3 (L) 3 5 - 5 0 g/dL    Total Bilirubin 0 51 0 20 - 1 00 mg/dL    eGFR 62 ml/min/1 73sq m Assessment/Plan:         Diagnoses and all orders for this visit:    Preoperative clearance  Cleared for surgery   Kidney stone    Vertigo  -     meclizine (ANTIVERT) 12 5 MG tablet;  Take 1 tablet (12 5 mg total) by mouth every 8 (eight) hours          rto in 1 m for DM check                   Joshua Espinoza MD

## 2022-08-17 ENCOUNTER — HOSPITAL ENCOUNTER (OUTPATIENT)
Dept: INFUSION CENTER | Facility: HOSPITAL | Age: 62
Discharge: HOME | End: 2022-08-17
Attending: INTERNAL MEDICINE
Payer: COMMERCIAL

## 2022-08-17 VITALS
HEIGHT: 64 IN | DIASTOLIC BLOOD PRESSURE: 81 MMHG | WEIGHT: 293 LBS | SYSTOLIC BLOOD PRESSURE: 125 MMHG | TEMPERATURE: 97.8 F | HEART RATE: 88 BPM | RESPIRATION RATE: 20 BRPM | BODY MASS INDEX: 50.02 KG/M2 | OXYGEN SATURATION: 97 %

## 2022-08-17 DIAGNOSIS — T45.1X5A CHEMOTHERAPY INDUCED NEUTROPENIA (HCC): Primary | ICD-10-CM

## 2022-08-17 DIAGNOSIS — C25.0 ADENOCARCINOMA OF HEAD OF PANCREAS (HCC): ICD-10-CM

## 2022-08-17 DIAGNOSIS — D70.1 CHEMOTHERAPY INDUCED NEUTROPENIA (HCC): Primary | ICD-10-CM

## 2022-08-17 PROCEDURE — G0498 CHEMO EXTEND IV INFUS W/PUMP: HCPCS

## 2022-08-17 PROCEDURE — 96415 CHEMO IV INFUSION ADDL HR: CPT

## 2022-08-17 PROCEDURE — 96413 CHEMO IV INFUSION 1 HR: CPT

## 2022-08-17 PROCEDURE — 96367 TX/PROPH/DG ADDL SEQ IV INF: CPT

## 2022-08-17 PROCEDURE — 96417 CHEMO IV INFUS EACH ADDL SEQ: CPT

## 2022-08-17 PROCEDURE — 96411 CHEMO IV PUSH ADDL DRUG: CPT

## 2022-08-17 PROCEDURE — 96375 TX/PRO/DX INJ NEW DRUG ADDON: CPT

## 2022-08-17 RX ORDER — SODIUM CHLORIDE 9 MG/ML
20 INJECTION, SOLUTION INTRAVENOUS ONCE AS NEEDED
Status: DISCONTINUED | OUTPATIENT
Start: 2022-08-17 | End: 2022-08-20 | Stop reason: HOSPADM

## 2022-08-17 RX ORDER — DEXTROSE MONOHYDRATE 50 MG/ML
20 INJECTION, SOLUTION INTRAVENOUS ONCE
Status: COMPLETED | OUTPATIENT
Start: 2022-08-17 | End: 2022-08-17

## 2022-08-17 RX ORDER — ATROPINE SULFATE 1 MG/ML
0.25 INJECTION, SOLUTION INTRAVENOUS ONCE AS NEEDED
Status: DISCONTINUED | OUTPATIENT
Start: 2022-08-17 | End: 2022-08-20 | Stop reason: HOSPADM

## 2022-08-17 RX ORDER — FLUOROURACIL 50 MG/ML
400 INJECTION, SOLUTION INTRAVENOUS ONCE
Status: COMPLETED | OUTPATIENT
Start: 2022-08-17 | End: 2022-08-17

## 2022-08-17 RX ORDER — ACETAMINOPHEN 500 MG
500 TABLET ORAL EVERY 6 HOURS PRN
COMMUNITY
End: 2022-10-24

## 2022-08-17 RX ORDER — ATROPINE SULFATE 1 MG/ML
0.25 INJECTION, SOLUTION INTRAVENOUS ONCE
Status: COMPLETED | OUTPATIENT
Start: 2022-08-17 | End: 2022-08-17

## 2022-08-17 RX ADMIN — OXALIPLATIN 200 MG: 5 INJECTION, SOLUTION INTRAVENOUS at 11:00

## 2022-08-17 RX ADMIN — DEXTROSE 20 ML/HR: 50 INJECTION, SOLUTION INTRAVENOUS at 10:56

## 2022-08-17 RX ADMIN — IRINOTECAN HYDROCHLORIDE 330 MG: 20 INJECTION, SOLUTION INTRAVENOUS at 13:22

## 2022-08-17 RX ADMIN — FOSAPREPITANT 150 MG: 150 INJECTION, POWDER, LYOPHILIZED, FOR SOLUTION INTRAVENOUS at 10:21

## 2022-08-17 RX ADMIN — GRANISETRON HYDROCHLORIDE 1 MG: 1 INJECTION, SOLUTION INTRAVENOUS at 09:51

## 2022-08-17 RX ADMIN — ATROPINE SULFATE 0.25 MG: 1 INJECTION, SOLUTION INTRAMUSCULAR; INTRAVENOUS; SUBCUTANEOUS at 13:18

## 2022-08-17 RX ADMIN — SODIUM CHLORIDE 20 ML/HR: 0.9 INJECTION, SOLUTION INTRAVENOUS at 09:21

## 2022-08-17 RX ADMIN — FLUOROURACIL 880 MG: 50 INJECTION, SOLUTION INTRAVENOUS at 15:04

## 2022-08-17 RX ADMIN — DEXAMETHASONE SODIUM PHOSPHATE 10 MG: 10 INJECTION, SOLUTION INTRAMUSCULAR; INTRAVENOUS at 09:21

## 2022-08-17 NOTE — PROGRESS NOTES
Pt tolerated treatment without incident  Pt's Elastomeric pump device attached, all clamps open  Pt aware of when to return for pump d/c

## 2022-08-17 NOTE — PRE-PROCEDURE INSTRUCTIONS
Pre-Surgery Instructions:   Medication Instructions    acetaminophen (TYLENOL) 500 mg tablet Uses PRN- OK to take day of surgery    aspirin 81 MG tablet Stop taking 7 days prior to surgery  LD 8/15    atorvastatin (LIPITOR) 40 mg tablet Take night before surgery    ibuprofen (ADVIL,MOTRIN) 100 MG tablet Stop taking 3 days prior to surgery   Magnesium Oxide -Mg Supplement 400 MG CAPS Stop taking 7 days prior to surgery   meclizine (ANTIVERT) 12 5 MG tablet Uses PRN- OK to take day of surgery    metoprolol succinate (TOPROL-XL) 25 mg 24 hr tablet Take day of surgery   multivitamin (THERAGRAN) TABS Stop taking 7 days prior to surgery   olmesartan (BENICAR) 20 mg tablet Hold day of surgery   Omega-3 Fatty Acids (FISH OIL) 1,000 mg Stop taking 7 days prior to surgery   ondansetron (ZOFRAN) 4 mg tablet Uses PRN- OK to take day of surgery    Ozempic, 1 MG/DOSE, 4 MG/3ML SOPN injection pen Hold day of surgery   pantoprazole (PROTONIX) 40 mg tablet Take day of surgery   PARoxetine (PAXIL-CR) 37 5 mg 24 hr tablet Take day of surgery   Potassium Citrate ER 15 MEQ (1620 MG) TBCR Hold day of surgery   sotalol (BETAPACE) 120 mg tablet Take day of surgery   VITAMIN D PO Stop taking 7 days prior to surgery    Patient instructed on use of chlorhexidine soap per hospital protocol    Patient instructed to stop all ASA, NSAIDS, vitamins and herbal supplements one week prior to surgery  or per Dr Fariba Arenas

## 2022-08-18 ENCOUNTER — VBI (OUTPATIENT)
Dept: ADMINISTRATIVE | Facility: OTHER | Age: 62
End: 2022-08-18

## 2022-08-19 ENCOUNTER — HOSPITAL ENCOUNTER (OUTPATIENT)
Dept: INFUSION CENTER | Facility: HOSPITAL | Age: 62
Discharge: HOME | End: 2022-08-19
Attending: INTERNAL MEDICINE
Payer: COMMERCIAL

## 2022-08-19 VITALS — TEMPERATURE: 96.5 F

## 2022-08-19 DIAGNOSIS — C25.0 ADENOCARCINOMA OF HEAD OF PANCREAS (HCC): ICD-10-CM

## 2022-08-19 DIAGNOSIS — T45.1X5A CHEMOTHERAPY INDUCED NEUTROPENIA (HCC): Primary | ICD-10-CM

## 2022-08-19 DIAGNOSIS — D70.1 CHEMOTHERAPY INDUCED NEUTROPENIA (HCC): Primary | ICD-10-CM

## 2022-08-19 PROCEDURE — 96372 THER/PROPH/DIAG INJ SC/IM: CPT

## 2022-08-19 RX ADMIN — PEGFILGRASTIM 6 MG: KIT SUBCUTANEOUS at 13:41

## 2022-08-22 ENCOUNTER — TELEPHONE (OUTPATIENT)
Dept: UROLOGY | Facility: CLINIC | Age: 62
End: 2022-08-22

## 2022-08-22 ENCOUNTER — APPOINTMENT (OUTPATIENT)
Dept: RADIOLOGY | Facility: HOSPITAL | Age: 62
End: 2022-08-22
Payer: COMMERCIAL

## 2022-08-22 ENCOUNTER — ANESTHESIA (OUTPATIENT)
Dept: PERIOP | Facility: HOSPITAL | Age: 62
End: 2022-08-22
Payer: COMMERCIAL

## 2022-08-22 ENCOUNTER — HOSPITAL ENCOUNTER (OUTPATIENT)
Facility: HOSPITAL | Age: 62
Setting detail: OUTPATIENT SURGERY
Discharge: HOME | End: 2022-08-22
Attending: UROLOGY | Admitting: UROLOGY
Payer: COMMERCIAL

## 2022-08-22 ENCOUNTER — ANESTHESIA EVENT (OUTPATIENT)
Dept: PERIOP | Facility: HOSPITAL | Age: 62
End: 2022-08-22
Payer: COMMERCIAL

## 2022-08-22 VITALS
OXYGEN SATURATION: 93 % | HEIGHT: 64 IN | WEIGHT: 293 LBS | RESPIRATION RATE: 18 BRPM | SYSTOLIC BLOOD PRESSURE: 111 MMHG | HEART RATE: 84 BPM | DIASTOLIC BLOOD PRESSURE: 64 MMHG | BODY MASS INDEX: 50.02 KG/M2 | TEMPERATURE: 97.2 F

## 2022-08-22 DIAGNOSIS — C25.0 ADENOCARCINOMA OF HEAD OF PANCREAS (HCC): ICD-10-CM

## 2022-08-22 DIAGNOSIS — T45.1X5A CHEMOTHERAPY INDUCED NEUTROPENIA (HCC): Primary | ICD-10-CM

## 2022-08-22 DIAGNOSIS — N20.0 NEPHROLITHIASIS: Primary | ICD-10-CM

## 2022-08-22 DIAGNOSIS — R10.9 LEFT FLANK PAIN: Primary | ICD-10-CM

## 2022-08-22 DIAGNOSIS — D70.1 CHEMOTHERAPY INDUCED NEUTROPENIA (HCC): Primary | ICD-10-CM

## 2022-08-22 LAB
GLUCOSE SERPL-MCNC: 108 MG/DL (ref 65–140)
GLUCOSE SERPL-MCNC: 131 MG/DL (ref 65–140)

## 2022-08-22 PROCEDURE — 74420 UROGRAPHY RTRGR +-KUB: CPT

## 2022-08-22 PROCEDURE — NC001 PR NO CHARGE: Performed by: UROLOGY

## 2022-08-22 PROCEDURE — C1758 CATHETER, URETERAL: HCPCS | Performed by: UROLOGY

## 2022-08-22 PROCEDURE — C2617 STENT, NON-COR, TEM W/O DEL: HCPCS | Performed by: UROLOGY

## 2022-08-22 PROCEDURE — 82948 REAGENT STRIP/BLOOD GLUCOSE: CPT

## 2022-08-22 PROCEDURE — C1769 GUIDE WIRE: HCPCS | Performed by: UROLOGY

## 2022-08-22 PROCEDURE — 52356 CYSTO/URETERO W/LITHOTRIPSY: CPT | Performed by: UROLOGY

## 2022-08-22 DEVICE — INLAY OPTIMA URETERAL STENT W/O GUIDEWIRE
Type: IMPLANTABLE DEVICE | Site: URETER | Status: FUNCTIONAL
Brand: BARD® INLAY OPTIMA® URETERAL STENT

## 2022-08-22 RX ORDER — ONDANSETRON 2 MG/ML
INJECTION INTRAMUSCULAR; INTRAVENOUS AS NEEDED
Status: DISCONTINUED | OUTPATIENT
Start: 2022-08-22 | End: 2022-08-22

## 2022-08-22 RX ORDER — ATROPINE SULFATE 1 MG/ML
0.25 INJECTION, SOLUTION INTRAVENOUS ONCE
Status: CANCELLED | OUTPATIENT
Start: 2022-08-31

## 2022-08-22 RX ORDER — DEXAMETHASONE SODIUM PHOSPHATE 10 MG/ML
INJECTION, SOLUTION INTRAMUSCULAR; INTRAVENOUS AS NEEDED
Status: DISCONTINUED | OUTPATIENT
Start: 2022-08-22 | End: 2022-08-22

## 2022-08-22 RX ORDER — FLUOROURACIL 50 MG/ML
400 INJECTION, SOLUTION INTRAVENOUS ONCE
Status: CANCELLED | OUTPATIENT
Start: 2022-08-31

## 2022-08-22 RX ORDER — SULFAMETHOXAZOLE AND TRIMETHOPRIM 800; 160 MG/1; MG/1
1 TABLET ORAL EVERY 12 HOURS SCHEDULED
Qty: 6 TABLET | Refills: 0 | Status: SHIPPED | OUTPATIENT
Start: 2022-08-22 | End: 2022-08-25

## 2022-08-22 RX ORDER — SUCCINYLCHOLINE/SOD CL,ISO/PF 100 MG/5ML
SYRINGE (ML) INTRAVENOUS AS NEEDED
Status: DISCONTINUED | OUTPATIENT
Start: 2022-08-22 | End: 2022-08-22

## 2022-08-22 RX ORDER — METOCLOPRAMIDE HYDROCHLORIDE 5 MG/ML
10 INJECTION INTRAMUSCULAR; INTRAVENOUS ONCE AS NEEDED
Status: DISCONTINUED | OUTPATIENT
Start: 2022-08-22 | End: 2022-08-22 | Stop reason: HOSPADM

## 2022-08-22 RX ORDER — MIDAZOLAM HYDROCHLORIDE 2 MG/2ML
INJECTION, SOLUTION INTRAMUSCULAR; INTRAVENOUS AS NEEDED
Status: DISCONTINUED | OUTPATIENT
Start: 2022-08-22 | End: 2022-08-22

## 2022-08-22 RX ORDER — OXYBUTYNIN CHLORIDE 5 MG/1
5 TABLET ORAL 2 TIMES DAILY
Status: DISCONTINUED | OUTPATIENT
Start: 2022-08-22 | End: 2022-08-22 | Stop reason: HOSPADM

## 2022-08-22 RX ORDER — FENTANYL CITRATE 50 UG/ML
INJECTION, SOLUTION INTRAMUSCULAR; INTRAVENOUS AS NEEDED
Status: DISCONTINUED | OUTPATIENT
Start: 2022-08-22 | End: 2022-08-22

## 2022-08-22 RX ORDER — SENNOSIDES 8.6 MG
8.6 TABLET ORAL
Qty: 5 TABLET | Refills: 0 | Status: SHIPPED | OUTPATIENT
Start: 2022-08-22 | End: 2022-10-27

## 2022-08-22 RX ORDER — SODIUM CHLORIDE, SODIUM LACTATE, POTASSIUM CHLORIDE, CALCIUM CHLORIDE 600; 310; 30; 20 MG/100ML; MG/100ML; MG/100ML; MG/100ML
125 INJECTION, SOLUTION INTRAVENOUS CONTINUOUS
Status: DISCONTINUED | OUTPATIENT
Start: 2022-08-22 | End: 2022-08-22 | Stop reason: HOSPADM

## 2022-08-22 RX ORDER — ATROPINE SULFATE 1 MG/ML
0.25 INJECTION, SOLUTION INTRAVENOUS ONCE AS NEEDED
Status: CANCELLED | OUTPATIENT
Start: 2022-08-31

## 2022-08-22 RX ORDER — ACETAMINOPHEN 325 MG/1
650 TABLET ORAL EVERY 6 HOURS SCHEDULED
Status: DISCONTINUED | OUTPATIENT
Start: 2022-08-22 | End: 2022-08-22 | Stop reason: HOSPADM

## 2022-08-22 RX ORDER — HYDROMORPHONE HCL IN WATER/PF 6 MG/30 ML
0.2 PATIENT CONTROLLED ANALGESIA SYRINGE INTRAVENOUS
Status: DISCONTINUED | OUTPATIENT
Start: 2022-08-22 | End: 2022-08-22 | Stop reason: HOSPADM

## 2022-08-22 RX ORDER — LIDOCAINE HYDROCHLORIDE 20 MG/ML
INJECTION, SOLUTION EPIDURAL; INFILTRATION; INTRACAUDAL; PERINEURAL AS NEEDED
Status: DISCONTINUED | OUTPATIENT
Start: 2022-08-22 | End: 2022-08-22

## 2022-08-22 RX ORDER — PHENAZOPYRIDINE HYDROCHLORIDE 100 MG/1
200 TABLET, FILM COATED ORAL
Status: DISCONTINUED | OUTPATIENT
Start: 2022-08-22 | End: 2022-08-22 | Stop reason: HOSPADM

## 2022-08-22 RX ORDER — HYDROMORPHONE HCL/PF 1 MG/ML
0.5 SYRINGE (ML) INJECTION EVERY 2 HOUR PRN
Status: DISCONTINUED | OUTPATIENT
Start: 2022-08-22 | End: 2022-08-22 | Stop reason: HOSPADM

## 2022-08-22 RX ORDER — SODIUM CHLORIDE 9 MG/ML
20 INJECTION, SOLUTION INTRAVENOUS ONCE AS NEEDED
Status: CANCELLED | OUTPATIENT
Start: 2022-08-31

## 2022-08-22 RX ORDER — OXYCODONE HYDROCHLORIDE 5 MG/1
5 TABLET ORAL EVERY 4 HOURS PRN
Qty: 8 TABLET | Refills: 0 | Status: SHIPPED | OUTPATIENT
Start: 2022-08-22 | End: 2022-08-27

## 2022-08-22 RX ORDER — OXYCODONE HYDROCHLORIDE 5 MG/1
5 TABLET ORAL EVERY 4 HOURS PRN
Status: DISCONTINUED | OUTPATIENT
Start: 2022-08-22 | End: 2022-08-22 | Stop reason: HOSPADM

## 2022-08-22 RX ORDER — PROPOFOL 10 MG/ML
INJECTION, EMULSION INTRAVENOUS AS NEEDED
Status: DISCONTINUED | OUTPATIENT
Start: 2022-08-22 | End: 2022-08-22

## 2022-08-22 RX ORDER — EPHEDRINE SULFATE 50 MG/ML
INJECTION INTRAVENOUS AS NEEDED
Status: DISCONTINUED | OUTPATIENT
Start: 2022-08-22 | End: 2022-08-22

## 2022-08-22 RX ORDER — MAGNESIUM HYDROXIDE/ALUMINUM HYDROXICE/SIMETHICONE 120; 1200; 1200 MG/30ML; MG/30ML; MG/30ML
30 SUSPENSION ORAL EVERY 6 HOURS PRN
Status: DISCONTINUED | OUTPATIENT
Start: 2022-08-22 | End: 2022-08-22 | Stop reason: HOSPADM

## 2022-08-22 RX ORDER — DEXTROSE MONOHYDRATE 50 MG/ML
20 INJECTION, SOLUTION INTRAVENOUS ONCE
Status: CANCELLED | OUTPATIENT
Start: 2022-08-31

## 2022-08-22 RX ORDER — OXYCODONE HYDROCHLORIDE 5 MG/1
10 TABLET ORAL EVERY 4 HOURS PRN
Status: DISCONTINUED | OUTPATIENT
Start: 2022-08-22 | End: 2022-08-22 | Stop reason: HOSPADM

## 2022-08-22 RX ORDER — MAGNESIUM HYDROXIDE 1200 MG/15ML
LIQUID ORAL AS NEEDED
Status: DISCONTINUED | OUTPATIENT
Start: 2022-08-22 | End: 2022-08-22 | Stop reason: HOSPADM

## 2022-08-22 RX ORDER — ONDANSETRON 2 MG/ML
4 INJECTION INTRAMUSCULAR; INTRAVENOUS EVERY 6 HOURS PRN
Status: DISCONTINUED | OUTPATIENT
Start: 2022-08-22 | End: 2022-08-22 | Stop reason: HOSPADM

## 2022-08-22 RX ORDER — FENTANYL CITRATE/PF 50 MCG/ML
25 SYRINGE (ML) INJECTION
Status: DISCONTINUED | OUTPATIENT
Start: 2022-08-22 | End: 2022-08-22 | Stop reason: HOSPADM

## 2022-08-22 RX ADMIN — PROPOFOL 300 MG: 10 INJECTION, EMULSION INTRAVENOUS at 07:37

## 2022-08-22 RX ADMIN — LIDOCAINE HYDROCHLORIDE 100 MG: 20 INJECTION, SOLUTION EPIDURAL; INFILTRATION; INTRACAUDAL; PERINEURAL at 07:36

## 2022-08-22 RX ADMIN — MIDAZOLAM 2 MG: 1 INJECTION INTRAMUSCULAR; INTRAVENOUS at 07:31

## 2022-08-22 RX ADMIN — ONDANSETRON 4 MG: 2 INJECTION INTRAMUSCULAR; INTRAVENOUS at 07:40

## 2022-08-22 RX ADMIN — EPHEDRINE SULFATE 5 MG: 50 INJECTION INTRAVENOUS at 08:00

## 2022-08-22 RX ADMIN — DEXAMETHASONE SODIUM PHOSPHATE 10 MG: 10 INJECTION, SOLUTION INTRAMUSCULAR; INTRAVENOUS at 07:39

## 2022-08-22 RX ADMIN — PHENAZOPYRIDINE 200 MG: 100 TABLET ORAL at 09:35

## 2022-08-22 RX ADMIN — FENTANYL CITRATE 25 MCG: 50 INJECTION INTRAMUSCULAR; INTRAVENOUS at 07:47

## 2022-08-22 RX ADMIN — Medication 3 MG: at 07:35

## 2022-08-22 RX ADMIN — OXYBUTYNIN CHLORIDE 5 MG: 5 TABLET ORAL at 09:36

## 2022-08-22 RX ADMIN — SODIUM CHLORIDE, SODIUM LACTATE, POTASSIUM CHLORIDE, AND CALCIUM CHLORIDE: .6; .31; .03; .02 INJECTION, SOLUTION INTRAVENOUS at 07:30

## 2022-08-22 RX ADMIN — Medication 200 MG: at 07:38

## 2022-08-22 NOTE — OP NOTE
OPERATIVE REPORT  PATIENT NAME: Danyel Gerard    :  1960  MRN: [id number]  Pt Location: [facility] 01    SURGERY DATE: 2022    Surgeon(s) and Role:     * Nell Wan MD - Primary    Preop Diagnosis:  Left ureteral stone [N20 1]  Encounter for adjustment of ureteral stent [Z46 6]    Post-Op Diagnosis Codes:     * Left ureteral stone [N20 1]     * Encounter for adjustment of ureteral stent [Z46 6]    Procedure(s) (LRB):  CYSTOSCOPY URETEROSCOPY WITH LITHOTRIPSY HOLMIUM LASER, RETROGRADE PYELOGRAM AND INSERTION STENT URETERAL (Left)  EXCHANGE STENT URETERAL (Left)    Specimen(s):  * No specimens in log *    Estimated Blood Loss:   0 mL    Drains:  Ureteral Internal Stent Left ureter 6 Fr  (Active)   Number of days: 35       Anesthesia Type:   General    Operative Indications:  Left ureteral stone [N20 1]  Encounter for adjustment of ureteral stent [Z46 6]      Operative Findings:  Encrustation of the distal curl the stent is noted, this was broken with the stent grasper, a wire was placed  The stone is seen to have migrated distally  This was engaged with laser lithotripsy settings of 0 2 joules and 50 hertz and dusting was performed  The ureter was cleared  There are no other stones or strictures or lesions within the ureter  A 6 German by 24 centimeter left ureteral stent exchange was performed on a string  She will remove this in 5 days and see us in the office in some months    Complications:   None    Procedure and Technique:  PROCEDURES PERFORMED:  1) Cystoscopy  2) left retrograde pyelography with fluoroscopic interpretation  3) left ureteral stent placement (6F x 24cm)    SURGEON:  Nell Wan MD    ASSISTANTS:  None    NOTE:  There were no qualified teaching residents to assist with this case    ANESTHESIA: General     COMPLICATIONS:   None    ANTIBIOTICS:  Ancef    INTRAOPERATIVE THROMBOEMBOLISM PROPHYLAXIS:  Pneumatic compression stockings     RADIOLOGIC FINDINGS:    1  Retrograde pyelogram was performed on the left side using a 5 Fr open ended catheter  10 milliliters contrast used    2  The following findings were noted:  Hydronephrosis proximal to the distal left ureteral stone noted          INDICATIONS FOR PROCEDURE:  Lay Malcolm is an 57 y o  old female with a left ureteral stone status post stenting with left hydronephrosis  Here for definitive stone management  After discussing the options, the patient elected to undergo ureteral stent placement  We discussed the procedure in detail, the alternatives, and the risks, and they signed informed consent to proceed  PROCEDURE IN DETAIL:   The patient was identified and brought to the OR  Antibiotic prophylaxis and DVT prophylaxis were administered  They were placed in the comfortable dorsal lithotomy position with care to pad all pressure points  They were prepped and draped in the usual sterile fashion using hibiclens  A surgical time out was performed with all in the room in agreement with the correct patient, procedure, indications, and laterality  A 21-Dutch rigid cystoscope was used to enter the bladder  The bladder was inspected in its entirety and there were no lesions noted  The ureteral orifices were identified in their orthotopic positions  The left stent was grasped and a wire was placed  A retrograde pyelogram was performed with injection of contrast which demonstrated moderate hydroureteronephrosis  A 7 5 Dutch semi rigid ureteral scope was you showing a distal stone within the intramural ureter  A high-powered laser with settings of 0 4 joules and 50 hertz was used to perform dusting of the stone  The stone was removed entirely by with dusting  The ureter was scoped the level ureteropelvic junction without any other lesions or stones  There was mild edema at the area where the stone had been    A 6 Dutch by 24 centimeters stent was placed on a string with a good curl in the kidney and within the bladder  The bladder was drained    The patient was placed back in the supine position, awakened from general anesthesia and brought to the recovery room in stable condition  SPECIMENS:   * No orders in the log *     IMPLANTS:   Implant Name Type Inv  Item Serial No   Lot No  LRB No  Used Action   STENT URETERAL 6FR 24CML INLAY OPTIMA - [id number]  STENT URETERAL 6FR 24CML [facility] [id number] Left 1 Explanted   STENT URETERAL 6FR 24CML Aleida Denzel  STENT URETERAL 6FR 24CML INLAY OPTIMA  [address] [address] [id number] Left 1 Implanted        COMPLICATIONS:  None  DISPOSITION: PACU     PLAN:  The patient is status post ureteroscopy with laser lithotripsy  She has a new stent in place    She can remove this in 5 days and see us in the office in 3 months     I was present for the entire procedure and A qualified resident physician was not available    Patient Disposition:  PACU       SIGNATURE: Juanita Pagan MD  DATE: August 22, 2022  TIME: 8:21 AM

## 2022-08-22 NOTE — ANESTHESIA PREPROCEDURE EVALUATION
Procedure:  CYSTOSCOPY URETEROSCOPY WITH LITHOTRIPSY HOLMIUM LASER, RETROGRADE PYELOGRAM AND INSERTION STENT URETERAL (Left Bladder)  EXCHANGE STENT URETERAL (Left Bladder)    Relevant Problems   CARDIO   (+) Benign essential hypertension   (+) Dyspnea on exertion   (+) Hyperlipidemia   (+) Hypertension   (+) Paroxysmal A-fib (HCC)   (+) Supraventricular tachycardia (HCC)      ENDO   (+) Type 2 diabetes mellitus without complication, without long-term current use of insulin (HCC)      GI/HEPATIC   (+) Adenocarcinoma of head of pancreas (HCC)   (+) Esophageal reflux      /RENAL   (+) Kidney stone   (+) Uric acid kidney stone      NEURO/PSYCH   (+) Controlled depression      PULMONARY   (+) Dyspnea on exertion   (+) Severe obstructive sleep apnea        Physical Exam    Airway    Mallampati score: III  TM Distance: >3 FB  Neck ROM: full     Dental       Cardiovascular      Pulmonary      Other Findings        Anesthesia Plan  ASA Score- 4     Anesthesia Type- general with ASA Monitors  Additional Monitors:   Airway Plan: ETT  Plan Factors-    Chart reviewed  Patient summary reviewed  Patient is not a current smoker  Patient did not smoke on day of surgery  Induction- intravenous and rapid sequence induction  Postoperative Plan- Plan for postoperative opioid use  Planned trial extubation    Informed Consent- Anesthetic plan and risks discussed with patient  I personally reviewed this patient with the CRNA  Discussed and agreed on the Anesthesia Plan with the CRNA  Christopher Patel

## 2022-08-22 NOTE — TELEPHONE ENCOUNTER
The patient is status post a stent exchange of a 6 [place] by 24 centimeter left ureteral stent as well as ureteroscopy with laser lithotripsy  Please arrange for her to remove this stent in 5 days, she can see us in 3 months with imaging prior    I have also placed a referral to Nephrology/stone center for medical metabolic stone workup

## 2022-08-22 NOTE — DISCHARGE INSTRUCTIONS
Mary Sample,    Today you had ureteroscopy with laser lithotripsy  This went well  We were able to get all of your stone turned into dust   It is common to have urgency and frequency of urination along with blood in the urine after surgery like this  You do have a stent in place on a string  Please use the black thread to remove a long green tube with curls on both ends, this can be done in 5 days  The vagina is only for urinating while the stent is in place, please avoid vaginal penetration and sexual intercourse  You will be cleared for intercourse 1 week after stent removal     I will refer you to the nephrology doctors for ongoing consideration of medical and metabolic stone workup  The hope is that they can find some medical therapies to decrease your stone risk going forward  We will see you back in the office in 3 months with an ultrasound prior  If you have questions or concerns as you recover, please let me know    Thank you for allowing me to take care of you today,  Dr Hyun Gutierrez

## 2022-08-22 NOTE — ANESTHESIA POSTPROCEDURE EVALUATION
Post-Op Assessment Note    CV Status:  Stable  Pain Score: 0    Pain management: adequate     Mental Status:  Arousable and sleepy   Hydration Status:  Stable   PONV Controlled:  None   Airway Patency:  Patent      Post Op Vitals Reviewed: Yes      Staff: CRNA         No complications documented      BP   121/72   Temp 98 6 °F (37 °C) (08/22/22 0822)    Pulse  93   Resp   18   SpO2   97

## 2022-08-22 NOTE — TELEPHONE ENCOUNTER
Called and spoke to patient  Reviewed stent removal on post-op day 5  Instructed patient on how to remove stent  Patient feels comfortable doing this at home  Reviewed medications and what to expect with stent in place  Informed patient she can utilize in NSAIDs as well as heating pad  Schedule patient for three-month follow-up with Avril Louis at [place] location  Patient given central scheduling phone number and knows to call and have that scheduled 2 weeks prior to appointment  Patient was also informed that a referral for Nephrology was placed  Informed patient if she does not hear from Nephrology by next week to call our office in a week assistant scheduling this  Informed patient I will call her on Monday to ensure successful stent removal   Patient was thankful for call verbalized understanding

## 2022-08-22 NOTE — H&P
H&P Exam - Urology   Zeny Conner 57 y o  female MRN: [id number]  Unit/Bed#: [facility] [place] Encounter: [id number]    Assessment/Plan     Assessment:  56-year-old woman with history of stenting for complicated UTI, this has resolved, here today for definitive left ureteroscopy with laser lithotripsy  Marked on her left arm  She has signed informed consent  She has a normal S1, S2, normal rate and rhythm, and normal breath sounds  Plan:  Proceed to left ureteroscopy with laser lithotripsy and all indicated procedures    History of Present Illness   HPI:  Zeny Conner is a 57 y o  female who presents with left ureteral stone status post stenting, here today for definitive stone management  No new complaints  No significant change in her state of health since last time we saw her  The following portions of the patient's history were reviewed and updated as appropriate: allergies, current medications, past family history, past medical history, past social history, past surgical history and problem list     Review of Systems   Constitutional: Negative  Headache, somewhat dizzy   HENT: Negative  Eyes: Negative  Respiratory: Negative  Cardiovascular: Negative  Gastrointestinal: Negative  Endocrine: Negative  Genitourinary: Negative  Musculoskeletal: Negative  Skin: Negative  Allergic/Immunologic: Negative  Neurological: Negative  Hematological: Negative  Psychiatric/Behavioral: Negative          Historical Information   Past Medical History:   Diagnosis Date    Abnormal liver function test     last assessed 1/16/17     Adenomatosis     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    last assessed 4/4/13    Arthritis     Atrial flutter ([address] )     Benign essential hypertension     last assessed 12/21/17     Body mass index (BMI) of 50-59 9 in adult (HCC)     Cancer (HCC)     pancreas    Colon polyp     Congenital pes planus     last assessed 7/15/14     COVID     4/30/21    CPAP (continuous positive airway pressure) dependence     Dental crowns present     Depression     Diabetes mellitus [facility]     Dizziness     occas--pt reports did see family doctor    GERD (gastroesophageal reflux disease)     Hemangioma of skin 08/26/2003    last assessed 8/26/03    History of cancer chemotherapy      Oncologist Dr Lizeth Garland History of gastroesophageal reflux (GERD)     Hypercholesterolemia     Hyperlipidemia     Hypertension     Irregular heart beat     Kidney stone     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[facility] [address] [facility] ) 04/19/2006    last assessed 12/31/14     Osteoarthritis of both knees     last assessed 12/31/14     [facility]     Port-A-Cath in place     S/P ablation of atrial flutter     Shortness of breath     per pt "from chemo-not drastic--still working and goes up steps"    Sleep apnea     Wears glasses      Past Surgical History:   Procedure Laterality Date    ABDOMINAL SURGERY  06/2006    20cm GIST removed     APPENDECTOMY      ATRIAL ABLATION SURGERY      CARPAL TUNNEL RELEASE Bilateral     COLONOSCOPY      FL GUIDED CENTRAL VENOUS ACCESS DEVICE INSERTION  05/31/2022    FL RETROGRADE PYELOGRAM  07/18/2022    HYSTERECTOMY      fibroids    IR PORT CHECK  06/23/2022    IR PORT REMOVAL AND PLACEMENT NEW SITE  06/28/2022    JOINT REPLACEMENT Bilateral     knees    KIDNEY STONE SURGERY Right 09/17/2021    placed stent in  for kidney stone    KNEE SURGERY      KNEE SURGERY Left 03/2019    OOPHORECTOMY      cyst    IL CYSTOURETHROSCOPY,URETER CATHETER Left 07/18/2022    Procedure: CYSTOSCOPY RETROGRADE PYELOGRAM WITH INSERTION STENT URETERAL;  Surgeon: Marnie Adamson MD;  [facility];  Service: Urology    TONSILLECTOMY     [facility] TUMOR EXCISION  2006    gastrointestinal stromal tumor    TUNNELED VENOUS PORT PLACEMENT N/A 05/31/2022    Procedure: INSERTION VENOUS PORT (PORT-A-CATH); Surgeon: Lorene Acevedo MD;  [facility];  Service: Surgical Oncology    UPPER GASTROINTESTINAL ENDOSCOPY       Social History   Social History     Substance and Sexual Activity   Alcohol Use Not Currently    Comment: social drinker per [employer]script      Social History     Substance and Sexual Activity   Drug Use No     Social History     Tobacco Use   Smoking Status Former Smoker    Years: 9 00    Types: Cigarettes   Smokeless Tobacco Never Used   Tobacco Comment    pt states she smokes 1 to 3 times per week     E-Cigarette/Vaping    E-Cigarette Use Never User      E-Cigarette/Vaping Substances    Nicotine No     THC No     CBD No     Flavoring No     Other No     Unknown No      Family History:   Family History   Problem Relation Age of Onset    Emphysema Mother     Arthritis Mother     Diabetes Mother     Hypertension Mother     COPD Mother     Arthritis Father     Prostate cancer Father     Cerebral aneurysm Son     No Known Problems Maternal Grandmother     No Known Problems Maternal Grandfather     No Known Problems Paternal Grandmother     No Known Problems Paternal Grandfather     No Known Problems Son     No Known Problems Maternal Aunt     No Known Problems Maternal Aunt     No Known Problems Paternal Aunt     No Known Problems Paternal Aunt        Meds/Allergies   PTA meds:   Prior to Admission Medications   Prescriptions Last Dose Informant Patient Reported? Taking?    Insulin Pen Needle (Pen Needles) 32G X 4 MM MISC  Self No No   Sig: Use once a week   Magnesium Oxide -Mg Supplement 400 MG CAPS 8/15/2022 Self Yes Yes   Sig: Take 1 capsule by mouth daily   Omega-3 Fatty Acids (FISH OIL) 1,000 mg 8/15/2022 Self Yes Yes   Sig: Take 1,000 mg by mouth 2 (two) times a day     Ozempic, 1 MG/DOSE, 4 MG/3ML SOPN injection pen 8/15/2022 Self No Yes   Sig: Inject 0 75 mL (1 mg total) under the skin once a week   PARoxetine (PAXIL-CR) 37 5 mg 24 hr tablet 8/22/2022 at 0400 Self No Yes   Sig: TAKE 1 TABLET BY MOUTH IN  THE MORNING   Potassium Citrate ER 15 MEQ (1620 MG) TBCR 8/15/2022 Self No Yes   Sig: Take 1 tablet by mouth 2 (two) times a day   VITAMIN D PO 8/15/2022 Self Yes Yes   Sig: Take 2,000 Units by mouth 2 (two) times a day   acetaminophen (TYLENOL) 500 mg tablet Past Week at Unknown time  Yes Yes   Sig: Take 500 mg by mouth every 6 (six) hours as needed for mild pain   aspirin 81 MG tablet 8/15/2022 Self Yes Yes   Sig: Take 81 mg by mouth daily  atorvastatin (LIPITOR) 40 mg tablet 8/21/2022 at 2100  No Yes   Sig: TAKE 1 TABLET BY MOUTH  DAILY   Patient taking differently: daily at bedtime   glucose blood (Contour Next Test) test strip  Self No No   Sig: Use 1 each daily Use as instructed   ibuprofen (ADVIL,MOTRIN) 100 MG tablet Past Week Self Yes Yes   Sig: Take 200 mg by mouth as needed for mild pain   meclizine (ANTIVERT) 12 5 MG tablet 8/19/2022  No Yes   Sig: Take 1 tablet (12 5 mg total) by mouth every 8 (eight) hours   metoprolol succinate (TOPROL-XL) 25 mg 24 hr tablet 8/22/2022 at 0400 Self No Yes   Sig: Take 1 tablet (25 mg total) by mouth 2 (two) times a day   multivitamin (THERAGRAN) TABS 8/15/2022 Self Yes Yes   Sig: Take 1 tablet by mouth daily     olmesartan (BENICAR) 20 mg tablet 8/21/2022 at 0800 Self No Yes   Sig: TAKE 1 TABLET BY MOUTH  DAILY   ondansetron (ZOFRAN) 4 mg tablet More than a month at Unknown time Self No No   Sig: Take 2 tablets (8 mg total) by mouth every 8 (eight) hours as needed for nausea or vomiting   pantoprazole (PROTONIX) 40 mg tablet 8/22/2022 at 0400 Self No Yes   Sig: TAKE 1 TABLET BY MOUTH  DAILY BEFORE BREAKFAST   sotalol (BETAPACE) 120 mg tablet 8/22/2022 at 0400 Self No Yes   Sig: Take 1 tablet (120 mg total) by mouth every 12 (twelve) hours      Facility-Administered Medications: None     Allergies   Allergen Reactions    Other Rash     Adhesive tape        Objective   Vitals: Blood pressure 112/72, pulse 91, temperature (!) 97 4 °F (36 3 °C), temperature source Tympanic, resp  rate 18, height 5' 4" (1 626 m), weight (!) 140 kg (309 lb), SpO2 96 %  No intake/output data recorded  Invasive Devices  Report    Central Venous Catheter Line  Duration           Port A Cath 05/31/22 Left Chest 82 days          Drain  Duration           Ureteral Internal Stent Left ureter 6 Fr  34 days                Physical Exam  Vitals reviewed  Constitutional:       General: She is not in acute distress  Appearance: Normal appearance  She is obese  She is not ill-appearing, toxic-appearing or diaphoretic  HENT:      Head: Normocephalic and atraumatic  Eyes:      General: No scleral icterus  Right eye: No discharge  Left eye: No discharge  Cardiovascular:      Pulses: Normal pulses  Pulmonary:      Effort: Pulmonary effort is normal    Abdominal:      General: There is no distension  Palpations: There is no mass  Genitourinary:     Comments: Deferred for OR  Musculoskeletal:         General: No swelling  Skin:     General: Skin is warm  Neurological:      General: No focal deficit present  Mental Status: She is alert and oriented to person, place, and time  Cranial Nerves: No cranial nerve deficit  Psychiatric:         Mood and Affect: Mood normal          Behavior: Behavior normal          Thought Content: Thought content normal          Judgment: Judgment normal          Lab Results: I have personally reviewed pertinent reports  Imaging: I have personally reviewed pertinent reports  EKG, Pathology, and Other Studies: I have personally reviewed pertinent reports  VTE Prophylaxis: Sequential compression device [place])     Code Status: Prior  Advance Directive and Living Will:      Power of :    POLST:      Counseling / Coordination of Care  Total floor / unit time spent today 15 minutes    Greater than 50% of total time was spent with the patient and / or family counseling and / or coordination of care  A description of the counseling / coordination of care:  A review of today's case

## 2022-08-23 ENCOUNTER — TELEPHONE (OUTPATIENT)
Dept: NEPHROLOGY | Facility: CLINIC | Age: 62
End: 2022-08-23

## 2022-08-24 ENCOUNTER — TELEPHONE (OUTPATIENT)
Dept: HEMATOLOGY ONCOLOGY | Facility: CLINIC | Age: 62
End: 2022-08-24

## 2022-08-24 NOTE — TELEPHONE ENCOUNTER
Returned call to patient, her consult with Dr Tiffanie Dunbar was cancelled today, she will need CT scan first   This was delayed to to recent admission  Pt has scheduled f/u on 8/30 with Dr Galarza Sites  Will discuss CT scan at that appt and then assist with scheduling appt with Dr Tiffanie Dunbar  Pt was appreciative of call back

## 2022-08-24 NOTE — TELEPHONE ENCOUNTER
CALL RETURN FORM   Reason for patient call? Patient would like Dr Chuck Pallas to order her CT so that she can reschedule her visit with Dr Yu Lin   Patient's primary oncologist? Dr Chuck Pallas   Name of person the patient was calling for? Dr Chuck Pallas   Any additional information to add, if applicable? N/A   Informed patient that the message will be forwarded to the team and someone will get back to them as soon as possible    Did you relay this information to the patient?  Yes

## 2022-08-25 NOTE — TELEPHONE ENCOUNTER
New Patient Intake Form   Patient Details   Linda Coleman     1960     [id number]     Appointment Information   Who is calling to schedule? If not patient, what is callers name? Patient    Referring Provider Mariah Todd   Referring Provider Number [id number]      Reason for Appt (Diagnosis) Nephrolithiasis   Is patient aware of why they are being referred? yes   Does Patient have labs done at [place] [address]? If not, where do they go? yes    Has patient had labs / urine work done? List date of most recent lab / urine work yes   8/12/2022   Has patient had a BMP & CBC done in the past 2 years? If so, list the date yes   8/12/2022   Has patient been hospitalized recently? If yes, list name and location of hospital they were in yes   8/22/2022 [facility]   Has patient been seen by a Nephrologist before? If yes, list name, location and phone number  no   Has patient been see by another Specialty before (ex  Neurology, urology, cardiology)? If yes, please list name, and specialty  Urology Mariah Todd  Cardiology Narayan Jones     Has the patient had imaging done? If so, list the most recent date and type of imaging no   US of kidney and bladder with pvr scheduled    Does the patient has a stone analysis report if history of kidney stones? no   Appointment Details   Is there a referral on file?  yes    Appointment Date  10/27/2022   Location [place]    Miscellaneous

## 2022-08-26 ENCOUNTER — APPOINTMENT (OUTPATIENT)
Dept: LAB | Facility: CLINIC | Age: 62
End: 2022-08-26
Payer: COMMERCIAL

## 2022-08-26 LAB
ALBUMIN SERPL BCP-MCNC: 3.3 G/DL (ref 3.5–5)
ALP SERPL-CCNC: 188 U/L (ref 46–116)
ALT SERPL W P-5'-P-CCNC: 76 U/L (ref 12–78)
ANION GAP SERPL CALCULATED.3IONS-SCNC: 8 MMOL/L (ref 4–13)
AST SERPL W P-5'-P-CCNC: 42 U/L (ref 5–45)
BASOPHILS # BLD AUTO: 0.07 THOUSANDS/ΜL (ref 0–0.1)
BASOPHILS NFR BLD AUTO: 1 % (ref 0–1)
BILIRUB SERPL-MCNC: 0.31 MG/DL (ref 0.2–1)
BUN SERPL-MCNC: 15 MG/DL (ref 5–25)
CALCIUM ALBUM COR SERPL-MCNC: 9.5 MG/DL (ref 8.3–10.1)
CALCIUM SERPL-MCNC: 8.9 MG/DL (ref 8.3–10.1)
CHLORIDE SERPL-SCNC: 112 MMOL/L (ref 96–108)
CO2 SERPL-SCNC: 22 MMOL/L (ref 21–32)
CREAT SERPL-MCNC: 0.93 MG/DL (ref 0.6–1.3)
EOSINOPHIL # BLD AUTO: 0.28 THOUSAND/ΜL (ref 0–0.61)
EOSINOPHIL NFR BLD AUTO: 3 % (ref 0–6)
ERYTHROCYTE [DISTWIDTH] IN BLOOD BY AUTOMATED COUNT: 17.7 % (ref 11.6–15.1)
GFR SERPL CREATININE-BSD FRML MDRD: 66 ML/MIN/1.73SQ M
GLUCOSE P FAST SERPL-MCNC: 118 MG/DL (ref 65–99)
HCT VFR BLD AUTO: 38.7 % (ref 34.8–46.1)
HGB BLD-MCNC: 12 G/DL (ref 11.5–15.4)
IMM GRANULOCYTES # BLD AUTO: 0.14 THOUSAND/UL (ref 0–0.2)
IMM GRANULOCYTES NFR BLD AUTO: 2 % (ref 0–2)
LYMPHOCYTES # BLD AUTO: 2.37 THOUSANDS/ΜL (ref 0.6–4.47)
LYMPHOCYTES NFR BLD AUTO: 28 % (ref 14–44)
MCH RBC QN AUTO: 28.6 PG (ref 26.8–34.3)
MCHC RBC AUTO-ENTMCNC: 31 G/DL (ref 31.4–37.4)
MCV RBC AUTO: 92 FL (ref 82–98)
MONOCYTES # BLD AUTO: 0.67 THOUSAND/ΜL (ref 0.17–1.22)
MONOCYTES NFR BLD AUTO: 8 % (ref 4–12)
NEUTROPHILS # BLD AUTO: 4.99 THOUSANDS/ΜL (ref 1.85–7.62)
NEUTS SEG NFR BLD AUTO: 58 % (ref 43–75)
NRBC BLD AUTO-RTO: 0 /100 WBCS
PLATELET # BLD AUTO: 142 THOUSANDS/UL (ref 149–390)
PMV BLD AUTO: 12.5 FL (ref 8.9–12.7)
POTASSIUM SERPL-SCNC: 3.8 MMOL/L (ref 3.5–5.3)
PROT SERPL-MCNC: 6.7 G/DL (ref 6.4–8.4)
RBC # BLD AUTO: 4.19 MILLION/UL (ref 3.81–5.12)
SODIUM SERPL-SCNC: 142 MMOL/L (ref 135–147)
WBC # BLD AUTO: 8.52 THOUSAND/UL (ref 4.31–10.16)

## 2022-08-26 PROCEDURE — 36415 COLL VENOUS BLD VENIPUNCTURE: CPT

## 2022-08-26 PROCEDURE — 80053 COMPREHEN METABOLIC PANEL: CPT

## 2022-08-26 PROCEDURE — 85025 COMPLETE CBC W/AUTO DIFF WBC: CPT

## 2022-08-30 ENCOUNTER — OFFICE VISIT (OUTPATIENT)
Dept: HEMATOLOGY ONCOLOGY | Facility: CLINIC | Age: 62
End: 2022-08-30
Payer: COMMERCIAL

## 2022-08-30 VITALS
WEIGHT: 293 LBS | RESPIRATION RATE: 16 BRPM | DIASTOLIC BLOOD PRESSURE: 70 MMHG | HEIGHT: 64 IN | HEART RATE: 93 BPM | TEMPERATURE: 97.8 F | SYSTOLIC BLOOD PRESSURE: 110 MMHG | BODY MASS INDEX: 50.02 KG/M2 | OXYGEN SATURATION: 98 %

## 2022-08-30 DIAGNOSIS — C25.0 ADENOCARCINOMA OF HEAD OF PANCREAS (HCC): Primary | ICD-10-CM

## 2022-08-30 PROCEDURE — 99214 OFFICE O/P EST MOD 30 MIN: CPT | Performed by: INTERNAL MEDICINE

## 2022-08-30 RX ORDER — DEXTROSE MONOHYDRATE 50 MG/ML
20 INJECTION, SOLUTION INTRAVENOUS ONCE
Status: CANCELLED | OUTPATIENT
Start: 2022-09-14

## 2022-08-30 RX ORDER — ATROPINE SULFATE 1 MG/ML
0.25 INJECTION, SOLUTION INTRAVENOUS ONCE
Status: CANCELLED | OUTPATIENT
Start: 2022-09-14

## 2022-08-30 RX ORDER — SODIUM CHLORIDE 9 MG/ML
20 INJECTION, SOLUTION INTRAVENOUS ONCE AS NEEDED
Status: CANCELLED | OUTPATIENT
Start: 2022-09-14

## 2022-08-30 RX ORDER — ATROPINE SULFATE 1 MG/ML
0.25 INJECTION, SOLUTION INTRAVENOUS ONCE AS NEEDED
Status: CANCELLED | OUTPATIENT
Start: 2022-09-14

## 2022-08-30 RX ORDER — FLUOROURACIL 50 MG/ML
400 INJECTION, SOLUTION INTRAVENOUS ONCE
Status: CANCELLED | OUTPATIENT
Start: 2022-09-14

## 2022-08-30 NOTE — PROGRESS NOTES
Hematology/Oncology Outpatient Follow- up Note  Rene Vasquez 57 y o  female MRN: @ Encounter: [id number]        Date:  8/30/2022    Presenting Complaint/Diagnosis :     Locally advanced pancreatic cancer for neoadjuvant chemotherapy     HPI:      Tmaar Coker seen for initial consultation 6/1/2022 regarding newly diagnosed pancreatic adenocarcinoma   The patient had some abdominal discomfort after an EGD and up getting a CT scan which revealed pancreatic duct dilatation in the body with an ill-defined density in the pancreatic head   There was also 1 2 x 1 9 cm right adrenal nodule which was previously characterized as an adenoma   The patient had an MRI repeated in April which revealed a 4 2 x 3 6 x 3 cm mass in the pancreatic head with abnormal enlargement of the pancreatic duct and the pancreatic body and tail  The patient had an EUS with biopsy which revealed adenocarcinoma   CA 19 9 was normal   The patient was seen by our colleagues in Surgical Oncology then referred to see us for consideration of neoadjuvant treatment prior to resection        Previous Hematologic/ Oncologic History:    Oncology History   Adenocarcinoma of head of pancreas ([facility] [address] )   5/11/2022 Initial Diagnosis    Adenocarcinoma of head of pancreas ([facility] [address] )     5/11/2022 Biopsy    Endoscopic Ultrasound:  A , B , & C   Pancreas, Pancreatic head:   Malignant (PSC Category VI)  Positive for adenocarcinoma     6/8/2022 -  Chemotherapy    pegfilgrastim (Sam Sohan), 6 mg, Subcutaneous, Once, 4 of 14 cycles  Administration: 6 mg (6/10/2022), 6 mg (8/19/2022), 6 mg (7/22/2022), 6 mg (7/7/2022)  fosaprepitant (EMEND) IVPB, 150 mg, Intravenous, Once, 2 of 12 cycles  Administration: 150 mg (7/20/2022), 150 mg (8/17/2022)  fluorouracil (ADRUCIL), 400 mg/m2 = 955 mg, Intravenous, Once, 4 of 14 cycles  Administration: 955 mg (6/8/2022), 880 mg (8/17/2022), 955 mg (7/5/2022)  irinotecan (CAMPTOSAR) chemo infusion, 430 mg, Intravenous, Once, 5 of 15 cycles  Dose modification: 150 mg/m2 (original dose 180 mg/m2, Cycle 6, Reason: Max Dose Reached, Comment: per protocol), 180 mg/m2 (original dose 180 mg/m2, Cycle 5, Reason: Other (Must fill in a comment), Comment: Per protocol)  Administration: 440 mg (6/8/2022), 330 mg (8/17/2022), 330 mg (7/20/2022), 440 mg (7/5/2022)  oxaliplatin (ELOXATIN) chemo infusion, 203 15 mg, Intravenous, Once, 5 of 15 cycles  Administration: 200 mg (6/8/2022), 200 mg (8/17/2022), 200 mg (7/20/2022), 200 mg (7/5/2022)  fluorouracil (ADRUCIL) ambulatory infusion Soln, 1,200 mg/m2/day = 5,735 mg, Intravenous, Over 46 hours, 5 of 15 cycles         Current Hematologic/ Oncologic Treatment:      FOLFIRINOX  Cycle 6  Is on the 31st of August    Interval History:      Patient returns for follow-up visit  She had a kidney stone and is seeing Urology for procedure on the 22nd of August   She is getting neoadjuvant treatment for her pancreatic cancer  she states her nausea is much better controlled  She still has vertigo which is unexplained  She describes it as a spinning feeling when she moves her head or when she wakes up  With her malignancy I think it is reasonable to get an MRI of the brain and refer her to our colleagues in ENT surgery to evaluate for inner ear issues if MRI shows no other abnormalities  She is restarting chemotherapy  Is otherwise doing well  Apart from vertigo and fatigue associated with the chemotherapy the rest of her 14 point review of systems today was negative  Her CA 19 9 is controlled  Test Results:    Imaging: FL retrograde pyelogram    Result Date: 8/26/2022  Narrative: C-ARM - INDICATION: Left ureteral stone   Procedure guidance  COMPARISON:  None TECHNIQUE: FLUOROSCOPY TIME:   15 seconds 7 FLUOROSCOPIC IMAGES FINDINGS: Fluoroscopic guidance provided for procedure guidance  Osseous and soft tissue detail limited by technique       Impression: Fluoroscopic guidance provided for procedure guidance  Please refer to the separate procedure notes for additional details  Workstation performed: [id number]     CT renal stone study abdomen pelvis without contrast    Result Date: 8/2/2022  Narrative: CT ABDOMEN AND PELVIS WITHOUT IV CONTRAST - LOW DOSE RENAL STONE INDICATION:   known stone and stent/ LLQ pain  COMPARISON:  7/18/2022  TECHNIQUE:  Low radiation dose thin section CT examination of the abdomen and pelvis was performed without intravenous or oral contrast according to a protocol specifically designed to evaluate for urinary tract calculus  Axial, sagittal, and coronal 2D  reformatted images were created from the source data and submitted for interpretation  Evaluation for pathology in the abdomen and pelvis that is unrelated to urinary tract calculi is limited  Radiation dose length product (DLP) for this visit:  1827 mGy-cm   This examination, like all CT scans performed in the [facility], was performed utilizing techniques to minimize radiation dose exposure, including the use of iterative reconstruction and automated exposure control  URINARY TRACT FINDINGS: RIGHT KIDNEY AND URETER:  No urinary tract calculi  No hydronephrosis or hydroureter  LEFT KIDNEY AND URETER: Left ureteral stent in place with proximal coil at the level of the left ureter pelvic junction  6 mm left peristent calculi as unchanged in position comparing to 7/18/2022  URINARY BLADDER:  Unremarkable  ADDITIONAL FINDINGS: LOWER CHEST:  No clinically significant abnormality identified in the visualized lower chest  SOLID VISCERA: Visualized portion of the liver appears to be diffusely decreased in density suggestive of steatosis  Low-density hyperplasia of the right adrenal gland similar to prior CT examinations  Otherwise, limited low radiation dose CT demonstrates no clinically significant abnormality of visualized solid abdominal viscera  GALLBLADDER/BILIARY TREE:  No calcified gallstones   No pericholecystic inflammatory change  No biliary dilatation  STOMACH AND BOWEL:  Unremarkable  APPENDIX:  No findings to suggest appendicitis  ABDOMINOPELVIC CAVITY:  No ascites  No pneumoperitoneum  No lymphadenopathy  REPRODUCTIVE ORGANS:  Unremarkable for patient's age  ABDOMINAL WALL/INGUINAL REGIONS:  Unremarkable  OSSEOUS STRUCTURES:  No acute fracture or destructive osseous lesion  Impression: Left ureteral stent in place with proximal coil at level left ureteropelvic junction  Stone within the distal left ureter has not significantly changed in position comparing to 7/18/2022 Workstation performed: [id number]       Labs:   Lab Results   Component Value Date    WBC 8 52 08/26/2022    HGB 12 0 08/26/2022    HCT 38 7 08/26/2022    MCV 92 08/26/2022     (L) 08/26/2022     Lab Results   Component Value Date     04/12/2017    K 3 8 08/26/2022     (H) 08/26/2022    CO2 22 08/26/2022    ANIONGAP 7 01/22/2015    BUN 15 08/26/2022    CREATININE 0 93 08/26/2022    GLUCOSE 92 04/12/2017    GLUF 118 (H) 08/26/2022    CALCIUM 8 9 08/26/2022    CORRECTEDCA 9 5 08/26/2022    AST 42 08/26/2022    ALT 76 08/26/2022    ALKPHOS 188 (H) 08/26/2022    PROT 6 3 04/12/2017    BILITOT 0 3 04/12/2017    EGFR 66 08/26/2022       Lab Results   Component Value Date    IRON 59 10/14/2019    TIBC 430 10/14/2019    FERRITIN 45 10/14/2019       ROS: As stated in the history of present illness otherwise his 14 point review of systems today was negative        Active Problems:   Patient Active Problem List   Diagnosis    Dyspnea on exertion    Supraventricular tachycardia ([address] )    Hypertension    Controlled depression    Severe obstructive sleep apnea    Morbid obesity ([address] )    Type 2 diabetes mellitus without complication, without long-term current use of insulin (HCC)    Hyperlipidemia    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[address] )    Esophageal reflux    Benign essential hypertension    Adenomatous colon polyp    Class 3 severe obesity due to excess calories with body mass index (BMI) of 50 0 to 59 9 in adult [facility])    Kidney stone    Uric acid kidney stone    Adenocarcinoma of head of pancreas ([facility] )    Chemotherapy induced neutropenia (HCC)    CPAP (continuous positive airway pressure) dependence    Left flank pain    Paroxysmal A-fib [facility])       Past Medical History:   Past Medical History:   Diagnosis Date    Abnormal liver function test     last assessed 1/16/17     Adenomatosis     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    last assessed 4/4/13    Arthritis     Atrial flutter ([facility] )     Benign essential hypertension     last assessed 12/21/17     Body mass index (BMI) of 50-59 9 in adult ([place] [address] )     Cancer ([place] [address] )     pancreas    Colon polyp     Congenital pes planus     last assessed 7/15/14     COVID     4/30/21    CPAP (continuous positive airway pressure) dependence     Dental crowns present     Depression     Diabetes mellitus ([place] [address] )     Dizziness     occas--pt reports did see family doctor    GERD (gastroesophageal reflux disease)     Hemangioma of skin 08/26/2003    last assessed 8/26/03    History of cancer chemotherapy      Oncologist Dr Iggy Murphy History of gastroesophageal reflux (GERD)     Hypercholesterolemia     Hyperlipidemia     Hypertension     Irregular heart beat     Kidney stone     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[facility] ) 04/19/2006    last assessed 12/31/14     Osteoarthritis of both knees     last assessed 12/31/14     Paroxysmal atrial fibrillation ([place] [address] )     Port-A-Cath in place     S/P ablation of atrial flutter     Shortness of breath     per pt "from chemo-not drastic--still working and goes up steps"    Sleep apnea     Wears glasses        Surgical History:   Past Surgical History:   Procedure Laterality Date    ABDOMINAL SURGERY  06/2006    20cm GIST removed     APPENDECTOMY      ATRIAL ABLATION SURGERY      CARPAL TUNNEL RELEASE Bilateral     COLONOSCOPY      FL GUIDED CENTRAL VENOUS ACCESS DEVICE INSERTION  05/31/2022    FL RETROGRADE PYELOGRAM  07/18/2022    FL RETROGRADE PYELOGRAM  8/22/2022    HYSTERECTOMY      fibroids    IR PORT CHECK  06/23/2022    IR PORT REMOVAL AND PLACEMENT NEW SITE  06/28/2022    JOINT REPLACEMENT Bilateral     knees    KIDNEY STONE SURGERY Right 09/17/2021    placed stent in  for kidney stone    KNEE SURGERY      KNEE SURGERY Left 03/2019    OOPHORECTOMY      cyst    CT CYSTO/URETERO W/LITHOTRIPSY &INDWELL STENT INSRT Left 8/22/2022    Procedure: CYSTOSCOPY URETEROSCOPY WITH LITHOTRIPSY HOLMIUM LASER, RETROGRADE PYELOGRAM AND INSERTION STENT URETERAL;  Surgeon: Poppy Liang MD;  [facility];  Service: Urology    CT CYSTOSCOPY,INSERT URETERAL STENT Left 8/22/2022    Procedure: EXCHANGE STENT URETERAL;  Surgeon: Poppy Liang MD;  [facility];  Service: Urology    CT CYSTOURETHROSCOPY,URETER CATHETER Left 07/18/2022    Procedure: CYSTOSCOPY RETROGRADE PYELOGRAM WITH INSERTION STENT URETERAL;  Surgeon: Poppy Liang MD;  [facility];  Service: Urology    [facility]  [facility]    gastrointestinal stromal tumor    TUNNELED VENOUS PORT PLACEMENT N/A 05/31/2022    Procedure: INSERTION VENOUS PORT (PORT-A-CATH);   Surgeon: Lambert Rivera MD;  [facility];  Service: Surgical Oncology    UPPER GASTROINTESTINAL ENDOSCOPY         Family History:    Family History   Problem Relation Age of Onset    Emphysema Mother     Arthritis Mother     Diabetes Mother     Hypertension Mother     COPD Mother     Arthritis Father     Prostate cancer Father     Cerebral aneurysm Son     No Known Problems Maternal Grandmother     No Known Problems Maternal Grandfather     No Known Problems Paternal Grandmother     No Known Problems Paternal Grandfather     No Known Problems Son     No Known Problems Maternal Aunt     No Known Problems Maternal Aunt     No Known Problems Paternal Aunt     No Known Problems Paternal Aunt        Cancer-related family history includes Prostate cancer in her father  Social History:   Social History     Socioeconomic History    Marital status: /Civil Union     Spouse name: Not on file    Number of children: Not on file    Years of education: Not on file    Highest education level: Not on file   Occupational History    Not on file   Tobacco Use    Smoking status: Former Smoker     Years: 9 00     Types: Cigarettes    Smokeless tobacco: Never Used    Tobacco comment: pt states she smokes 1 to 3 times per week   Vaping Use    Vaping Use: Never used   Substance and Sexual Activity    Alcohol use: Not Currently     Comment: social drinker per allscript     Drug use: No    Sexual activity: Not on file     Comment: defer   Other Topics Concern    Not on file   Social History Narrative    Lack of exercise    Good sleep hygiene    No caffeine use    Dog    Good sleep hygiene       Social Determinants of Health     Financial Resource Strain: Not on file   Food Insecurity: Not on file   Transportation Needs: Not on file   Physical Activity: Not on file   Stress: Not on file   Social Connections: Not on file   Intimate Partner Violence: Not on file   Housing Stability: Not on file       Current Medications:   Current Outpatient Medications   Medication Sig Dispense Refill    acetaminophen (TYLENOL) 500 mg tablet Take 500 mg by mouth every 6 (six) hours as needed for mild pain      aspirin 81 MG tablet Take 81 mg by mouth daily        atorvastatin (LIPITOR) 40 mg tablet TAKE 1 TABLET BY MOUTH  DAILY (Patient taking differently: daily at bedtime) 90 tablet 3    [START ON 8/31/2022] fluorouracil 5,280 mg in CADD/Elastomeric Infusion Device Infuse 5,280 mg (1,200 mg/m2/day x 2 2 m2) into a catheter in a vein via infusion device over 46 hours for 2 days  Do not start before August 31, 2022  1 each 12    glucose blood (Contour Next Test) test strip Use 1 each daily Use as instructed 100 each 3    ibuprofen (ADVIL,MOTRIN) 100 MG tablet Take 200 mg by mouth as needed for mild pain      Insulin Pen Needle (Pen Needles) 32G X 4 MM MISC Use once a week 12 each 3    Magnesium Oxide -Mg Supplement 400 MG CAPS Take 1 capsule by mouth daily      meclizine (ANTIVERT) 12 5 MG tablet Take 1 tablet (12 5 mg total) by mouth every 8 (eight) hours 30 tablet 0    metoprolol succinate (TOPROL-XL) 25 mg 24 hr tablet Take 1 tablet (25 mg total) by mouth 2 (two) times a day 180 tablet 3    multivitamin (THERAGRAN) TABS Take 1 tablet by mouth daily   olmesartan (BENICAR) 20 mg tablet TAKE 1 TABLET BY MOUTH  DAILY 90 tablet 3    Omega-3 Fatty Acids (FISH OIL) 1,000 mg Take 1,000 mg by mouth 2 (two) times a day        ondansetron (ZOFRAN) 4 mg tablet Take 2 tablets (8 mg total) by mouth every 8 (eight) hours as needed for nausea or vomiting 20 tablet 3    Ozempic, 1 MG/DOSE, 4 MG/3ML SOPN injection pen Inject 0 75 mL (1 mg total) under the skin once a week 9 mL 1    pantoprazole (PROTONIX) 40 mg tablet TAKE 1 TABLET BY MOUTH  DAILY BEFORE BREAKFAST 90 tablet 3    PARoxetine (PAXIL-CR) 37 5 mg 24 hr tablet TAKE 1 TABLET BY MOUTH IN  THE MORNING 90 tablet 3    Potassium Citrate ER 15 MEQ (1620 MG) TBCR Take 1 tablet by mouth 2 (two) times a day 180 tablet 3    sotalol (BETAPACE) 120 mg tablet Take 1 tablet (120 mg total) by mouth every 12 (twelve) hours 180 tablet 3    VITAMIN D PO Take 2,000 Units by mouth 2 (two) times a day      senna (SENOKOT) 8 6 mg Take 1 tablet (8 6 mg total) by mouth daily at bedtime for 5 days 5 tablet 0     No current facility-administered medications for this visit  Allergies: Allergies   Allergen Reactions    Other Rash     Adhesive tape        Physical Exam:    Body surface area is 2 33 meters squared      Wt Readings from Last 3 Encounters: 08/30/22 (!) 137 kg (302 lb)   08/22/22 (!) 140 kg (309 lb)   08/17/22 (!) 138 kg (305 lb)        Temp Readings from Last 3 Encounters:   08/30/22 97 8 °F (36 6 °C) (Temporal)   08/22/22 (!) 97 2 °F (36 2 °C) (Temporal)   08/19/22 (!) 96 5 °F (35 8 °C) (Temporal)        BP Readings from Last 3 Encounters:   08/30/22 110/70   08/22/22 111/64   08/17/22 125/81         Pulse Readings from Last 3 Encounters:   08/30/22 93   08/22/22 84   08/17/22 88      Physical Exam     Constitutional   General appearance: No acute distress, well appearing and well nourished  Eyes   Conjunctiva and lids: No swelling, erythema or discharge  Pupils and irises: Equal, round and reactive to light  Ears, Nose, Mouth, and Throat   External inspection of ears and nose: Normal     Nasal mucosa, septum, and turbinates: Normal without edema or erythema  Oropharynx: Normal with no erythema, edema, exudate or lesions  Pulmonary   Respiratory effort: No increased work of breathing or signs of respiratory distress  Auscultation of lungs: Clear to auscultation  Cardiovascular   Palpation of heart: Normal PMI, no thrills  Auscultation of heart: Normal rate and rhythm, normal S1 and S2, without murmurs  Examination of extremities for edema and/or varicosities: Normal     Carotid pulses: Normal     Abdomen   Abdomen: Non-tender, no masses  Liver and spleen: No hepatomegaly or splenomegaly  Lymphatic   Palpation of lymph nodes in neck: No lymphadenopathy  Musculoskeletal   Gait and station: Normal     Digits and nails: Normal without clubbing or cyanosis  Inspection/palpation of joints, bones, and muscles: Normal     Skin   Skin and subcutaneous tissue: Normal without rashes or lesions  Neurologic   Cranial nerves: Cranial nerves 2-12 intact  Sensation: No sensory loss  Psychiatric   Orientation to person, place, and time: Normal     Mood and affect: Normal         Assessment / Plan:       This is a pleasant 48-year-old female with newly diagnosed adenocarcinoma of the head of the pancreas   The patient was seen by our colleagues in surgery and they have recommended neoadjuvant chemotherapy   We went over various options and decided upon FOLFIRINOX  Doses have been adjusted  The patient is doing reasonably well at this point  She is disease so I will refer her to ENT as she describes it as vertigo even when she is lying still and opens her eyes and looks up on occasion  Will get an MRI of the brain as soon as possible  Patient will get a CT scan of the chest abdomen pelvis in 6 weeks and see me back with results in 6 weeks  Patient will see her surgeon with results of the scan also  We will continue her current regimen  Goals and Barriers:  Current Goal:  Prolong Survival from adenocarcinoma of the head of the pancreas  Barriers: None  Patient's Capacity to Self Care:  Patient able to self care  Portions of the record may have been created with voice recognition software  Occasional wrong word or "sound a like" substitutions may have occurred due to the inherent limitations of voice recognition software  Read the chart carefully and recognize, using context, where substitutions have occurred

## 2022-08-31 ENCOUNTER — HOSPITAL ENCOUNTER (OUTPATIENT)
Dept: INFUSION CENTER | Facility: HOSPITAL | Age: 62
Discharge: HOME | End: 2022-08-31
Attending: INTERNAL MEDICINE
Payer: COMMERCIAL

## 2022-08-31 VITALS
HEART RATE: 86 BPM | BODY MASS INDEX: 50.02 KG/M2 | RESPIRATION RATE: 20 BRPM | TEMPERATURE: 97.1 F | SYSTOLIC BLOOD PRESSURE: 104 MMHG | WEIGHT: 293 LBS | HEIGHT: 64 IN | DIASTOLIC BLOOD PRESSURE: 69 MMHG | OXYGEN SATURATION: 97 %

## 2022-08-31 DIAGNOSIS — C25.0 ADENOCARCINOMA OF HEAD OF PANCREAS (HCC): ICD-10-CM

## 2022-08-31 DIAGNOSIS — T45.1X5A CHEMOTHERAPY INDUCED NEUTROPENIA (HCC): Primary | ICD-10-CM

## 2022-08-31 DIAGNOSIS — D70.1 CHEMOTHERAPY INDUCED NEUTROPENIA (HCC): Primary | ICD-10-CM

## 2022-08-31 PROCEDURE — 96413 CHEMO IV INFUSION 1 HR: CPT

## 2022-08-31 PROCEDURE — 96411 CHEMO IV PUSH ADDL DRUG: CPT

## 2022-08-31 PROCEDURE — G0498 CHEMO EXTEND IV INFUS W/PUMP: HCPCS

## 2022-08-31 PROCEDURE — 96415 CHEMO IV INFUSION ADDL HR: CPT

## 2022-08-31 PROCEDURE — 96417 CHEMO IV INFUS EACH ADDL SEQ: CPT

## 2022-08-31 PROCEDURE — 96375 TX/PRO/DX INJ NEW DRUG ADDON: CPT

## 2022-08-31 PROCEDURE — 96367 TX/PROPH/DG ADDL SEQ IV INF: CPT

## 2022-08-31 RX ORDER — FLUOROURACIL 50 MG/ML
400 INJECTION, SOLUTION INTRAVENOUS ONCE
Status: COMPLETED | OUTPATIENT
Start: 2022-08-31 | End: 2022-08-31

## 2022-08-31 RX ORDER — ATROPINE SULFATE 1 MG/ML
0.25 INJECTION, SOLUTION INTRAVENOUS ONCE
Status: COMPLETED | OUTPATIENT
Start: 2022-08-31 | End: 2022-08-31

## 2022-08-31 RX ORDER — DEXTROSE MONOHYDRATE 50 MG/ML
20 INJECTION, SOLUTION INTRAVENOUS ONCE
Status: COMPLETED | OUTPATIENT
Start: 2022-08-31 | End: 2022-08-31

## 2022-08-31 RX ORDER — ATROPINE SULFATE 1 MG/ML
0.25 INJECTION, SOLUTION INTRAVENOUS ONCE AS NEEDED
Status: DISCONTINUED | OUTPATIENT
Start: 2022-08-31 | End: 2022-09-03 | Stop reason: HOSPADM

## 2022-08-31 RX ORDER — SODIUM CHLORIDE 9 MG/ML
20 INJECTION, SOLUTION INTRAVENOUS ONCE AS NEEDED
Status: DISCONTINUED | OUTPATIENT
Start: 2022-08-31 | End: 2022-09-03 | Stop reason: HOSPADM

## 2022-08-31 RX ADMIN — IRINOTECAN HYDROCHLORIDE 330 MG: 20 INJECTION, SOLUTION INTRAVENOUS at 12:40

## 2022-08-31 RX ADMIN — DEXAMETHASONE SODIUM PHOSPHATE 10 MG: 10 INJECTION, SOLUTION INTRAMUSCULAR; INTRAVENOUS at 08:34

## 2022-08-31 RX ADMIN — SODIUM CHLORIDE 20 ML/HR: 0.9 INJECTION, SOLUTION INTRAVENOUS at 08:33

## 2022-08-31 RX ADMIN — OXALIPLATIN 187 MG: 5 INJECTION, SOLUTION INTRAVENOUS at 10:30

## 2022-08-31 RX ADMIN — GRANISETRON HYDROCHLORIDE 1 MG: 1 INJECTION, SOLUTION INTRAVENOUS at 09:10

## 2022-08-31 RX ADMIN — FOSAPREPITANT 150 MG: 150 INJECTION, POWDER, LYOPHILIZED, FOR SOLUTION INTRAVENOUS at 09:43

## 2022-08-31 RX ADMIN — DEXTROSE 20 ML/HR: 50 INJECTION, SOLUTION INTRAVENOUS at 10:28

## 2022-08-31 RX ADMIN — ATROPINE SULFATE 0.25 MG: 1 INJECTION, SOLUTION INTRAMUSCULAR; INTRAVENOUS; SUBCUTANEOUS at 12:39

## 2022-08-31 RX ADMIN — FLUOROURACIL 880 MG: 50 INJECTION, SOLUTION INTRAVENOUS at 14:29

## 2022-08-31 NOTE — PROGRESS NOTES
Pt tolerated treatment without incident  Pt's Elastomeric pump device attached, all clamps open  Pt aware of pump d/c apt

## 2022-08-31 NOTE — PROGRESS NOTES
Pt to clinic for FOLFIRINOX  Pt offers no complaints at this time  Pt resting comfortably in recliner at this time

## 2022-09-02 ENCOUNTER — HOSPITAL ENCOUNTER (OUTPATIENT)
Dept: INFUSION CENTER | Facility: HOSPITAL | Age: 62
End: 2022-09-02
Attending: INTERNAL MEDICINE
Payer: COMMERCIAL

## 2022-09-02 DIAGNOSIS — C25.0 ADENOCARCINOMA OF HEAD OF PANCREAS (HCC): ICD-10-CM

## 2022-09-02 DIAGNOSIS — T45.1X5A CHEMOTHERAPY INDUCED NEUTROPENIA (HCC): Primary | ICD-10-CM

## 2022-09-02 DIAGNOSIS — D70.1 CHEMOTHERAPY INDUCED NEUTROPENIA (HCC): Primary | ICD-10-CM

## 2022-09-02 PROCEDURE — 96372 THER/PROPH/DIAG INJ SC/IM: CPT

## 2022-09-02 RX ADMIN — PEGFILGRASTIM 6 MG: KIT SUBCUTANEOUS at 13:15

## 2022-09-06 DIAGNOSIS — D70.1 CHEMOTHERAPY INDUCED NEUTROPENIA (HCC): Primary | ICD-10-CM

## 2022-09-06 DIAGNOSIS — T45.1X5A CHEMOTHERAPY INDUCED NEUTROPENIA (HCC): Primary | ICD-10-CM

## 2022-09-06 DIAGNOSIS — C25.0 ADENOCARCINOMA OF HEAD OF PANCREAS (HCC): ICD-10-CM

## 2022-09-07 ENCOUNTER — HOSPITAL ENCOUNTER (OUTPATIENT)
Dept: NON INVASIVE DIAGNOSTICS | Facility: HOSPITAL | Age: 62
Discharge: HOME | End: 2022-09-07
Attending: INTERNAL MEDICINE
Payer: COMMERCIAL

## 2022-09-07 VITALS
WEIGHT: 293 LBS | SYSTOLIC BLOOD PRESSURE: 127 MMHG | DIASTOLIC BLOOD PRESSURE: 86 MMHG | BODY MASS INDEX: 50.02 KG/M2 | HEIGHT: 64 IN | HEART RATE: 90 BPM

## 2022-09-07 DIAGNOSIS — Z01.810 PRE-OPERATIVE CARDIOVASCULAR EXAMINATION: ICD-10-CM

## 2022-09-07 DIAGNOSIS — I47.1 SUPRAVENTRICULAR TACHYCARDIA (HCC): ICD-10-CM

## 2022-09-07 DIAGNOSIS — I48.0 PAROXYSMAL ATRIAL FIBRILLATION (HCC): ICD-10-CM

## 2022-09-07 LAB
AORTIC ROOT: 3.4 CM
APICAL FOUR CHAMBER EJECTION FRACTION: 59 %
ASCENDING AORTA: 3.5 CM
E WAVE DECELERATION TIME: 175 MS
FRACTIONAL SHORTENING: 27 (ref 28–44)
GLOBAL LONGITUIDAL STRAIN: 15 %
INTERVENTRICULAR SEPTUM IN DIASTOLE (PARASTERNAL SHORT AXIS VIEW): 1.1 CM
INTERVENTRICULAR SEPTUM: 1.1 CM (ref 0.6–1.1)
LAAS-AP2: 20 CM2
LAAS-AP4: 17.9 CM2
LEFT ATRIUM SIZE: 3.5 CM
LEFT INTERNAL DIMENSION IN SYSTOLE: 3 CM (ref 2.1–4)
LEFT VENTRICLE DIASTOLIC VOLUME (MOD BIPLANE): 89 ML
LEFT VENTRICLE SYSTOLIC VOLUME (MOD BIPLANE): 37 ML
LEFT VENTRICULAR INTERNAL DIMENSION IN DIASTOLE: 4.1 CM (ref 3.5–6)
LEFT VENTRICULAR POSTERIOR WALL IN END DIASTOLE: 1.3 CM
LEFT VENTRICULAR STROKE VOLUME: 42 ML
LV EF: 59 %
LVSV (TEICH): 42 ML
MV E'TISSUE VEL-SEP: 6 CM/S
MV PEAK A VEL: 0.99 M/S
MV PEAK E VEL: 48 CM/S
MV STENOSIS PRESSURE HALF TIME: 51 MS
MV VALVE AREA P 1/2 METHOD: 4.31
RIGHT ATRIUM AREA SYSTOLE A4C: 10.2 CM2
RIGHT VENTRICLE ID DIMENSION: 3 CM
SL CV LEFT ATRIUM LENGTH A2C: 5.5 CM
SL CV LV EF: 55
SL CV PED ECHO LEFT VENTRICLE DIASTOLIC VOLUME (MOD BIPLANE) 2D: 76 ML
SL CV PED ECHO LEFT VENTRICLE SYSTOLIC VOLUME (MOD BIPLANE) 2D: 34 ML

## 2022-09-07 PROCEDURE — 93306 TTE W/DOPPLER COMPLETE: CPT | Performed by: INTERNAL MEDICINE

## 2022-09-07 PROCEDURE — 93306 TTE W/DOPPLER COMPLETE: CPT

## 2022-09-08 ENCOUNTER — OFFICE VISIT (OUTPATIENT)
Dept: CARDIOLOGY CLINIC | Facility: CLINIC | Age: 62
End: 2022-09-08
Payer: COMMERCIAL

## 2022-09-08 VITALS
DIASTOLIC BLOOD PRESSURE: 72 MMHG | WEIGHT: 293 LBS | HEART RATE: 90 BPM | BODY MASS INDEX: 52.37 KG/M2 | SYSTOLIC BLOOD PRESSURE: 115 MMHG

## 2022-09-08 DIAGNOSIS — I48.0 PAROXYSMAL ATRIAL FIBRILLATION (HCC): ICD-10-CM

## 2022-09-08 DIAGNOSIS — I10 ESSENTIAL HYPERTENSION: Primary | ICD-10-CM

## 2022-09-08 DIAGNOSIS — E78.5 HYPERLIPIDEMIA, UNSPECIFIED HYPERLIPIDEMIA TYPE: ICD-10-CM

## 2022-09-08 DIAGNOSIS — E66.01 MORBID OBESITY (HCC): ICD-10-CM

## 2022-09-08 DIAGNOSIS — I47.1 SUPRAVENTRICULAR TACHYCARDIA (HCC): ICD-10-CM

## 2022-09-08 DIAGNOSIS — I10 PRIMARY HYPERTENSION: ICD-10-CM

## 2022-09-08 PROCEDURE — 3074F SYST BP LT 130 MM HG: CPT | Performed by: INTERNAL MEDICINE

## 2022-09-08 PROCEDURE — 93000 ELECTROCARDIOGRAM COMPLETE: CPT | Performed by: INTERNAL MEDICINE

## 2022-09-08 PROCEDURE — 3078F DIAST BP <80 MM HG: CPT | Performed by: INTERNAL MEDICINE

## 2022-09-08 PROCEDURE — 99214 OFFICE O/P EST MOD 30 MIN: CPT | Performed by: INTERNAL MEDICINE

## 2022-09-08 NOTE — PROGRESS NOTES
Cardiology Follow Up    Josue Conrad  1960  [id number]      Interval History: Josue Conrad is here for follow up of SVT/Atrial fibrillation and to discuss recent echocardiogram     Since her last visit, she underwent cystoscopy with lithotripsy and insertion of ureteral stent  Procedure was done without complications  She will be following up with surgery to determine need to undergo Whipple's procedure to treat pancreatic cancer  She continues to go for chemotherapy  Regimen includes 5-fluorouracil  She feels fatigued but denies any chest pain, shortness of breath, syncope or near syncope  She does not have any lower extremity edema, orthopnea or paroxysmal nocturnal dyspnea  Echocardiogram was done on 9/7/22 which showed EF of 55% with Strain of -15 6%  There was grade 1 Diastolic dysfunction and minimal valve disease  The patient was seen at [address] [place] [address] initially because of tachycardia  On 3/23/16, she underwent EPS/SVT ablation with ablation of atrial tachycardia but was complicated by atrial fibrillation which responded to sotalol and cardioversion  She remains in sinus rhythm since then  She is not on anticoagulation because of low chads score  The following portions of the patient's history were reviewed and updated as appropriate: allergies, current medications, past family history, past medical history, past social history, past surgical history and problem list     Current Outpatient Medications on File Prior to Visit   Medication Sig Dispense Refill    acetaminophen (TYLENOL) 500 mg tablet Take 500 mg by mouth every 6 (six) hours as needed for mild pain      aspirin 81 MG tablet Take 81 mg by mouth daily        atorvastatin (LIPITOR) 40 mg tablet TAKE 1 TABLET BY MOUTH  DAILY (Patient taking differently: daily at bedtime) 90 tablet 3    [START ON 9/14/2022] fluorouracil 5,280 mg in CADD/Elastomeric Infusion Device Infuse 5,280 mg (1,200 mg/m2/day x 2 2 m2) into a catheter in a vein via infusion device over 46 hours for 2 days  Do not start before September 14, 2022  1 each 12    glucose blood (Contour Next Test) test strip Use 1 each daily Use as instructed 100 each 3    ibuprofen (ADVIL,MOTRIN) 100 MG tablet Take 200 mg by mouth as needed for mild pain      Insulin Pen Needle (Pen Needles) 32G X 4 MM MISC Use once a week 12 each 3    Magnesium Oxide -Mg Supplement 400 MG CAPS Take 1 capsule by mouth daily      meclizine (ANTIVERT) 12 5 MG tablet Take 1 tablet (12 5 mg total) by mouth every 8 (eight) hours 30 tablet 0    metoprolol succinate (TOPROL-XL) 25 mg 24 hr tablet Take 1 tablet (25 mg total) by mouth 2 (two) times a day 180 tablet 3    multivitamin (THERAGRAN) TABS Take 1 tablet by mouth daily   olmesartan (BENICAR) 20 mg tablet TAKE 1 TABLET BY MOUTH  DAILY 90 tablet 3    Omega-3 Fatty Acids (FISH OIL) 1,000 mg Take 1,000 mg by mouth 2 (two) times a day        Ozempic, 1 MG/DOSE, 4 MG/3ML SOPN injection pen Inject 0 75 mL (1 mg total) under the skin once a week 9 mL 1    pantoprazole (PROTONIX) 40 mg tablet TAKE 1 TABLET BY MOUTH  DAILY BEFORE BREAKFAST 90 tablet 3    PARoxetine (PAXIL-CR) 37 5 mg 24 hr tablet TAKE 1 TABLET BY MOUTH IN  THE MORNING 90 tablet 3    Potassium Citrate ER 15 MEQ (1620 MG) TBCR Take 1 tablet by mouth 2 (two) times a day 180 tablet 3    sotalol (BETAPACE) 120 mg tablet Take 1 tablet (120 mg total) by mouth every 12 (twelve) hours 180 tablet 3    VITAMIN D PO Take 2,000 Units by mouth 2 (two) times a day      ondansetron (ZOFRAN) 4 mg tablet Take 2 tablets (8 mg total) by mouth every 8 (eight) hours as needed for nausea or vomiting (Patient not taking: Reported on 9/8/2022) 20 tablet 3    senna (SENOKOT) 8 6 mg Take 1 tablet (8 6 mg total) by mouth daily at bedtime for 5 days 5 tablet 0     No current facility-administered medications on file prior to visit            Review of Systems:  Review of Systems   Constitutional: Positive for fatigue  Respiratory: Negative for shortness of breath  Cardiovascular: Negative for chest pain, palpitations and leg swelling  Musculoskeletal: Positive for arthralgias  All other systems reviewed and are negative  Physical Exam:  /72 (BP Location: Right arm, Patient Position: Sitting, Cuff Size: Standard)   Wt (!) 138 kg (305 lb 1 6 oz)   BMI 52 37 kg/m²     Physical Exam  Constitutional:       General: She is not in acute distress  Appearance: She is well-developed  She is not diaphoretic  HENT:      Head: Normocephalic and atraumatic  Eyes:      Conjunctiva/sclera: Conjunctivae normal       Pupils: Pupils are equal, round, and reactive to light  Neck:      Thyroid: No thyromegaly  Vascular: No JVD  Cardiovascular:      Rate and Rhythm: Normal rate and regular rhythm  Heart sounds: Normal heart sounds  No murmur heard  No friction rub  No gallop  Pulmonary:      Effort: Pulmonary effort is normal       Breath sounds: Normal breath sounds  Musculoskeletal:      Cervical back: Neck supple  Skin:     General: Skin is warm and dry  Findings: No erythema or rash  Neurological:      General: No focal deficit present  Mental Status: She is alert and oriented to person, place, and time  Cranial Nerves: No cranial nerve deficit  Psychiatric:         Mood and Affect: Mood normal          Behavior: Behavior normal          Thought Content:  Thought content normal          Judgment: Judgment normal          Cardiographics  ECG: normal sinus rhythm, no blocks or conduction defects, no ischemic changes    Labs:  Lab Results   Component Value Date     04/12/2017    K 3 8 08/26/2022    K 4 0 04/19/2022     (H) 08/26/2022     04/19/2022    CO2 22 08/26/2022    CO2 22 04/19/2022    BUN 15 08/26/2022    BUN 13 04/19/2022    CREATININE 0 93 08/26/2022    CREATININE 0 78 04/12/2017 GLUCOSE 92 04/12/2017    CALCIUM 8 9 08/26/2022    CALCIUM 9 5 04/12/2017     Lab Results   Component Value Date    WBC 8 52 08/26/2022    WBC 7 5 01/13/2017    HGB 12 0 08/26/2022    HGB 13 6 01/13/2017    HCT 38 7 08/26/2022    HCT 41 5 01/13/2017    MCV 92 08/26/2022    MCV 83 01/13/2017     (L) 08/26/2022     01/13/2017     Lab Results   Component Value Date    CHOL 153 04/12/2017    TRIG 162 (H) 05/26/2022    TRIG 153 (H) 02/21/2022    HDL 38 (L) 05/26/2022    HDL 43 02/21/2022        Discussion/Summary:  1  Essential hypertension  - Patient's blood pressure controlled with olmesartan and metoprolol  2  Supraventricular tachycardia ([address] )  - continue sotalol and metoprolol  Heart rate is normal today and QT interval is 460 milliseconds  Unchanged from previous  - patient has undergone previous SVT ablation  No further episodes since then  3  Paroxysmal atrial fibrillation (HCC)  - no recent episodes  - she is not on anticoagulation  - continue sotalol and metoprolol  4  Pre-operative cardiovascular examination  - patient is at intermediate risk for potential Whipples procedure because of insulin-dependent diabetes mellitus, obstructive sleep apnea and morbid obesity  She is currently medically stable and may proceed as planned  No further workup at this time as she had stress test  in 2020 with no new symptoms since then  - Recent echocardiogram showed normal LV function  - she will be following up with surgery next month after CT scan done

## 2022-09-09 ENCOUNTER — APPOINTMENT (OUTPATIENT)
Dept: LAB | Facility: CLINIC | Age: 62
End: 2022-09-09
Payer: COMMERCIAL

## 2022-09-09 DIAGNOSIS — T45.1X5A CHEMOTHERAPY INDUCED NEUTROPENIA (HCC): ICD-10-CM

## 2022-09-09 DIAGNOSIS — D70.1 CHEMOTHERAPY INDUCED NEUTROPENIA (HCC): ICD-10-CM

## 2022-09-09 DIAGNOSIS — C25.0 ADENOCARCINOMA OF HEAD OF PANCREAS (HCC): ICD-10-CM

## 2022-09-09 LAB
ALBUMIN SERPL BCP-MCNC: 3.5 G/DL (ref 3.5–5)
ALP SERPL-CCNC: 176 U/L (ref 46–116)
ALT SERPL W P-5'-P-CCNC: 111 U/L (ref 12–78)
ANION GAP SERPL CALCULATED.3IONS-SCNC: 7 MMOL/L (ref 4–13)
AST SERPL W P-5'-P-CCNC: 61 U/L (ref 5–45)
BASOPHILS # BLD AUTO: 0.04 THOUSANDS/ΜL (ref 0–0.1)
BASOPHILS NFR BLD AUTO: 1 % (ref 0–1)
BILIRUB SERPL-MCNC: 0.43 MG/DL (ref 0.2–1)
BUN SERPL-MCNC: 10 MG/DL (ref 5–25)
CALCIUM SERPL-MCNC: 9.4 MG/DL (ref 8.3–10.1)
CHLORIDE SERPL-SCNC: 112 MMOL/L (ref 96–108)
CO2 SERPL-SCNC: 23 MMOL/L (ref 21–32)
CREAT SERPL-MCNC: 0.88 MG/DL (ref 0.6–1.3)
EOSINOPHIL # BLD AUTO: 0.1 THOUSAND/ΜL (ref 0–0.61)
EOSINOPHIL NFR BLD AUTO: 2 % (ref 0–6)
ERYTHROCYTE [DISTWIDTH] IN BLOOD BY AUTOMATED COUNT: 17.7 % (ref 11.6–15.1)
GFR SERPL CREATININE-BSD FRML MDRD: 70 ML/MIN/1.73SQ M
GLUCOSE P FAST SERPL-MCNC: 118 MG/DL (ref 65–99)
HCT VFR BLD AUTO: 36.2 % (ref 34.8–46.1)
HGB BLD-MCNC: 11.3 G/DL (ref 11.5–15.4)
IMM GRANULOCYTES # BLD AUTO: 0.03 THOUSAND/UL (ref 0–0.2)
IMM GRANULOCYTES NFR BLD AUTO: 1 % (ref 0–2)
LYMPHOCYTES # BLD AUTO: 1.69 THOUSANDS/ΜL (ref 0.6–4.47)
LYMPHOCYTES NFR BLD AUTO: 33 % (ref 14–44)
MCH RBC QN AUTO: 29.3 PG (ref 26.8–34.3)
MCHC RBC AUTO-ENTMCNC: 31.2 G/DL (ref 31.4–37.4)
MCV RBC AUTO: 94 FL (ref 82–98)
MONOCYTES # BLD AUTO: 0.55 THOUSAND/ΜL (ref 0.17–1.22)
MONOCYTES NFR BLD AUTO: 11 % (ref 4–12)
NEUTROPHILS # BLD AUTO: 2.78 THOUSANDS/ΜL (ref 1.85–7.62)
NEUTS SEG NFR BLD AUTO: 52 % (ref 43–75)
NRBC BLD AUTO-RTO: 0 /100 WBCS
PLATELET # BLD AUTO: 102 THOUSANDS/UL (ref 149–390)
PMV BLD AUTO: 12.3 FL (ref 8.9–12.7)
POTASSIUM SERPL-SCNC: 3.7 MMOL/L (ref 3.5–5.3)
PROT SERPL-MCNC: 6.7 G/DL (ref 6.4–8.4)
RBC # BLD AUTO: 3.86 MILLION/UL (ref 3.81–5.12)
SODIUM SERPL-SCNC: 142 MMOL/L (ref 135–147)
WBC # BLD AUTO: 5.19 THOUSAND/UL (ref 4.31–10.16)

## 2022-09-09 PROCEDURE — 85025 COMPLETE CBC W/AUTO DIFF WBC: CPT

## 2022-09-09 PROCEDURE — 80053 COMPREHEN METABOLIC PANEL: CPT

## 2022-09-09 PROCEDURE — 36415 COLL VENOUS BLD VENIPUNCTURE: CPT

## 2022-09-13 ENCOUNTER — OFFICE VISIT (OUTPATIENT)
Dept: PODIATRY | Facility: CLINIC | Age: 62
End: 2022-09-13
Payer: COMMERCIAL

## 2022-09-13 VITALS — WEIGHT: 293 LBS | BODY MASS INDEX: 50.02 KG/M2 | RESPIRATION RATE: 17 BRPM | HEIGHT: 64 IN

## 2022-09-13 DIAGNOSIS — B35.1 ONYCHOMYCOSIS: ICD-10-CM

## 2022-09-13 DIAGNOSIS — M79.672 PAIN IN BOTH FEET: ICD-10-CM

## 2022-09-13 DIAGNOSIS — M21.969 ACQUIRED DEFORMITY OF FOOT, UNSPECIFIED LATERALITY: ICD-10-CM

## 2022-09-13 DIAGNOSIS — L60.0 INGROWN TOENAIL: ICD-10-CM

## 2022-09-13 DIAGNOSIS — E11.42 DIABETIC POLYNEUROPATHY ASSOCIATED WITH TYPE 2 DIABETES MELLITUS (HCC): Primary | ICD-10-CM

## 2022-09-13 DIAGNOSIS — M79.671 PAIN IN BOTH FEET: ICD-10-CM

## 2022-09-13 DIAGNOSIS — L03.032 PARONYCHIA OF GREAT TOE, LEFT: ICD-10-CM

## 2022-09-13 PROCEDURE — 99213 OFFICE O/P EST LOW 20 MIN: CPT | Performed by: PODIATRIST

## 2022-09-13 NOTE — PROGRESS NOTES
Assessment/Plan:  Metatarsalgia   Foot pain bilateral   Ingrown toenail   Paronychia   Mycosis of nail   Diabetic neuropathy      Plan   Patient educated on condition   Foot measured bilateral   Nails debrided without pain or complication   Patient may need nail procedure   Patient watch for signs of infection   Patient educated on care of the diabetic foot         Subjective:  Patient complains of pain in her big toes with ambulation   No history of trauma   Patient has pain when she wears shoes              Past Medical History:   Diagnosis Date    Abnormal liver function test       last assessed 1/16/17     Adenomatosis      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     last assessed 4/4/13    Atrial fibrillation ([facility] )      Atrial flutter ([facility] )      Benign essential hypertension       last assessed 12/21/17     Body mass index (BMI) of 50-59 9 in adult [facility])      Carpal tunnel syndrome 09/07/2004     unspecified laterality  / last assessed 9/7/04    Congenital pes planus       last assessed 7/15/14     Depression      Depression      Hemangioma of skin 08/26/2003     last assessed 8/26/03    History of gastroesophageal reflux (GERD)      Hypercholesterolemia      Hyperlipidemia      Hypertension      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[facility] [facility] ) 04/19/2006     last assessed 12/31/14     Osteoarthritis of both knees       last assessed 12/31/14     Paroxysmal atrial fibrillation (HCC)      S/P ablation of atrial flutter                     Past Surgical History:   Procedure Laterality Date    ABDOMINAL SURGERY   06/2006     20cm GIST removed     APPENDECTOMY        ATRIAL ABLATION SURGERY        CARPAL TUNNEL RELEASE Bilateral      COLONOSCOPY        HYSTERECTOMY        KNEE SURGERY        OOPHORECTOMY        TONSILLECTOMY        TUMOR EXCISION   2006     gastrointestinal stromal tumor                   Allergies   Allergen Reactions    Other         Adhesive tape             Current Outpatient Prescriptions:     aspirin 81 MG tablet, Take 81 mg by mouth daily  , Disp: , Rfl:     esomeprazole (NexIUM) 40 MG capsule, Take 1 capsule (40 mg total) by mouth daily, Disp: 90 capsule, Rfl: 1    ibuprofen (MOTRIN) 800 mg tablet, Take 1 tablet (800 mg total) by mouth every 8 (eight) hours as needed for mild pain, Disp: 30 tablet, Rfl: 0    Magnesium Oxide -Mg Supplement 400 MG CAPS, Take 1 capsule by mouth daily, Disp: , Rfl:     metoprolol succinate (TOPROL-XL) 25 mg 24 hr tablet, Take 1 tablet (25 mg total) by mouth 2 (two) times a day, Disp: 180 tablet, Rfl: 2    multivitamin (THERAGRAN) TABS, Take 1 tablet by mouth daily  , Disp: , Rfl:     olmesartan (BENICAR) 20 mg tablet, Take 1 tablet (20 mg total) by mouth daily, Disp: 30 tablet, Rfl: 0    Omega-3 Fatty Acids (FISH OIL) 1,000 mg, Take 1,000 mg by mouth 2 (two) times a day  , Disp: , Rfl:     PARoxetine (PAXIL-CR) 37 5 mg 24 hr tablet, Take 1 tablet (37 5 mg total) by mouth every morning, Disp: 90 tablet, Rfl: 3    pravastatin (PRAVACHOL) 40 mg tablet, Take 1 tablet (40 mg total) by mouth daily (Patient taking differently: Take 40 mg by mouth daily at bedtime  ), Disp: 90 tablet, Rfl: 1    predniSONE 20 mg tablet, Take 2 tablets (40 mg total) by mouth daily, Disp: 14 tablet, Rfl: 0    rosuvastatin (CRESTOR) 20 MG tablet, TAKE 1 TABLET DAILY, Disp: 90 tablet, Rfl: 3    sotalol (BETAPACE) 120 mg tablet, TAKE 1 TABLET BY MOUTH  EVERY 12 HOURS, Disp: 180 tablet, Rfl: 2           Patient Active Problem List   Diagnosis    Tachycardia    Dyspnea on exertion    Supraventricular tachycardia (HCC)    Hypertension    Controlled depression    Severe obstructive sleep apnea    Morbid obesity (HCC)    Impaired fasting glucose    Hyperlipidemia    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)    Esophageal reflux    Benign essential hypertension    Adenomatous colon polyp    Open wound of left lower extremity             Patient ID: Alecia Vieira is a 59 y o  female          Objective:  Patient's shoes and socks removed    Foot Exam     General  General Appearance: appears stated age and healthy   Orientation: alert and oriented to person, place, and time   Affect: appropriate   Gait: antalgic         Right Foot/Ankle      Inspection and Palpation  Tenderness: metatarsals   Swelling: dorsum and metatarsals   Arch: pes planus  Hammertoes: fifth toe  Skin Exam: dry skin;      Neurovascular  Dorsalis pedis: 1+  Posterior tibial: 2+  Superficial peroneal nerve sensation: diminished  Deep peroneal nerve sensation: diminished        Left Foot/Ankle       Inspection and Palpation  Tenderness: metatarsals   Swelling: dorsum and metatarsals   Arch: pes planus  Hammertoes: fifth toe  Skin Exam: dry skin;      Neurovascular  Dorsalis pedis: 1+  Posterior tibial: 2+  Superficial peroneal nerve sensation: diminished  Deep peroneal nerve sensation: diminished           Physical Exam   Constitutional: She appears well-developed and well-nourished  Cardiovascular: Normal rate and regular rhythm     Pulses:       Dorsalis pedis pulses are 1+ on the right side, and 1+ on the left side         Posterior tibial pulses are 2+ on the right side, and 2+ on the left side  Feet:   Right Foot:   Skin Integrity: Positive for dry skin  Left Foot:   Skin Integrity: Positive for dry skin     Skin:   Wide incurvated hallux toenail bilateral   Nails demonstrate mycosis   Both hallux is demonstrate ingrown toenail       Patient's shoes and socks removed            Assign Risk Category  Deformity present  Loss of protective sensation  No weak pulses  Risk: 2

## 2022-09-14 ENCOUNTER — HOSPITAL ENCOUNTER (OUTPATIENT)
Dept: INFUSION CENTER | Facility: HOSPITAL | Age: 62
Discharge: HOME | End: 2022-09-14
Attending: INTERNAL MEDICINE
Payer: COMMERCIAL

## 2022-09-14 VITALS
OXYGEN SATURATION: 94 % | DIASTOLIC BLOOD PRESSURE: 85 MMHG | WEIGHT: 293 LBS | BODY MASS INDEX: 50.02 KG/M2 | TEMPERATURE: 97.1 F | HEIGHT: 64 IN | SYSTOLIC BLOOD PRESSURE: 129 MMHG | RESPIRATION RATE: 20 BRPM | HEART RATE: 87 BPM

## 2022-09-14 DIAGNOSIS — C25.0 ADENOCARCINOMA OF HEAD OF PANCREAS (HCC): ICD-10-CM

## 2022-09-14 DIAGNOSIS — D70.1 CHEMOTHERAPY INDUCED NEUTROPENIA (HCC): Primary | ICD-10-CM

## 2022-09-14 DIAGNOSIS — T45.1X5A CHEMOTHERAPY INDUCED NEUTROPENIA (HCC): Primary | ICD-10-CM

## 2022-09-14 PROCEDURE — 96415 CHEMO IV INFUSION ADDL HR: CPT

## 2022-09-14 PROCEDURE — 96367 TX/PROPH/DG ADDL SEQ IV INF: CPT

## 2022-09-14 PROCEDURE — 96375 TX/PRO/DX INJ NEW DRUG ADDON: CPT

## 2022-09-14 PROCEDURE — 96417 CHEMO IV INFUS EACH ADDL SEQ: CPT

## 2022-09-14 PROCEDURE — 96411 CHEMO IV PUSH ADDL DRUG: CPT

## 2022-09-14 PROCEDURE — G0498 CHEMO EXTEND IV INFUS W/PUMP: HCPCS

## 2022-09-14 PROCEDURE — 96413 CHEMO IV INFUSION 1 HR: CPT

## 2022-09-14 RX ORDER — ATROPINE SULFATE 1 MG/ML
0.25 INJECTION, SOLUTION INTRAVENOUS ONCE AS NEEDED
Status: DISCONTINUED | OUTPATIENT
Start: 2022-09-14 | End: 2022-09-17 | Stop reason: HOSPADM

## 2022-09-14 RX ORDER — ATROPINE SULFATE 1 MG/ML
0.25 INJECTION, SOLUTION INTRAVENOUS ONCE
Status: COMPLETED | OUTPATIENT
Start: 2022-09-14 | End: 2022-09-14

## 2022-09-14 RX ORDER — FLUOROURACIL 50 MG/ML
400 INJECTION, SOLUTION INTRAVENOUS ONCE
Status: COMPLETED | OUTPATIENT
Start: 2022-09-14 | End: 2022-09-14

## 2022-09-14 RX ORDER — SODIUM CHLORIDE 9 MG/ML
20 INJECTION, SOLUTION INTRAVENOUS ONCE AS NEEDED
Status: DISCONTINUED | OUTPATIENT
Start: 2022-09-14 | End: 2022-09-17 | Stop reason: HOSPADM

## 2022-09-14 RX ORDER — DEXTROSE MONOHYDRATE 50 MG/ML
20 INJECTION, SOLUTION INTRAVENOUS ONCE
Status: COMPLETED | OUTPATIENT
Start: 2022-09-14 | End: 2022-09-14

## 2022-09-14 RX ADMIN — FLUOROURACIL 880 MG: 50 INJECTION, SOLUTION INTRAVENOUS at 14:04

## 2022-09-14 RX ADMIN — FOSAPREPITANT 150 MG: 150 INJECTION, POWDER, LYOPHILIZED, FOR SOLUTION INTRAVENOUS at 09:07

## 2022-09-14 RX ADMIN — GRANISETRON HYDROCHLORIDE 1 MG: 1 INJECTION, SOLUTION INTRAVENOUS at 09:37

## 2022-09-14 RX ADMIN — DEXTROSE MONOHYDRATE 20 ML/HR: 50 INJECTION, SOLUTION INTRAVENOUS at 10:10

## 2022-09-14 RX ADMIN — IRINOTECAN HYDROCHLORIDE 330 MG: 20 INJECTION, SOLUTION INTRAVENOUS at 12:22

## 2022-09-14 RX ADMIN — ATROPINE SULFATE 0.25 MG: 1 INJECTION, SOLUTION INTRAMUSCULAR; INTRAVENOUS; SUBCUTANEOUS at 12:19

## 2022-09-14 RX ADMIN — DEXAMETHASONE SODIUM PHOSPHATE 10 MG: 10 INJECTION, SOLUTION INTRAMUSCULAR; INTRAVENOUS at 08:37

## 2022-09-14 RX ADMIN — OXALIPLATIN 187 MG: 5 INJECTION, SOLUTION INTRAVENOUS at 10:13

## 2022-09-14 RX ADMIN — SODIUM CHLORIDE 20 ML/HR: 0.9 INJECTION, SOLUTION INTRAVENOUS at 08:33

## 2022-09-14 NOTE — PROGRESS NOTES
Pt tolerated treatment without incident  Pt's elastomeric pump device attached and infusing, all clamps open  Pt aware of when to return for pump d/c

## 2022-09-14 NOTE — PROGRESS NOTES
Pt to clinic for FOLFIRINOX  Pt c/o numbness and tingling when touching cold objects  Pt resting comfortably in recliner at this time

## 2022-09-15 ENCOUNTER — TELEPHONE (OUTPATIENT)
Dept: NEPHROLOGY | Facility: CLINIC | Age: 62
End: 2022-09-15

## 2022-09-16 ENCOUNTER — HOSPITAL ENCOUNTER (OUTPATIENT)
Dept: INFUSION CENTER | Facility: HOSPITAL | Age: 62
End: 2022-09-16
Attending: INTERNAL MEDICINE
Payer: COMMERCIAL

## 2022-09-16 DIAGNOSIS — T45.1X5A CHEMOTHERAPY INDUCED NEUTROPENIA (HCC): Primary | ICD-10-CM

## 2022-09-16 DIAGNOSIS — C25.0 ADENOCARCINOMA OF HEAD OF PANCREAS (HCC): ICD-10-CM

## 2022-09-16 DIAGNOSIS — D70.1 CHEMOTHERAPY INDUCED NEUTROPENIA (HCC): Primary | ICD-10-CM

## 2022-09-16 PROCEDURE — 96372 THER/PROPH/DIAG INJ SC/IM: CPT

## 2022-09-16 RX ADMIN — PEGFILGRASTIM 6 MG: KIT SUBCUTANEOUS at 12:50

## 2022-09-18 DIAGNOSIS — E78.5 HYPERLIPIDEMIA, UNSPECIFIED HYPERLIPIDEMIA TYPE: ICD-10-CM

## 2022-09-18 DIAGNOSIS — N20.0 KIDNEY STONES: ICD-10-CM

## 2022-09-19 DIAGNOSIS — C25.0 ADENOCARCINOMA OF HEAD OF PANCREAS (HCC): ICD-10-CM

## 2022-09-19 DIAGNOSIS — T45.1X5A CHEMOTHERAPY INDUCED NEUTROPENIA (HCC): Primary | ICD-10-CM

## 2022-09-19 DIAGNOSIS — D70.1 CHEMOTHERAPY INDUCED NEUTROPENIA (HCC): Primary | ICD-10-CM

## 2022-09-19 RX ORDER — POTASSIUM CITRATE 15 MEQ/1
TABLET, EXTENDED RELEASE ORAL
Qty: 200 TABLET | Refills: 3 | Status: SHIPPED | OUTPATIENT
Start: 2022-09-19

## 2022-09-19 RX ORDER — DEXTROSE MONOHYDRATE 50 MG/ML
20 INJECTION, SOLUTION INTRAVENOUS ONCE
Status: CANCELLED | OUTPATIENT
Start: 2022-09-28

## 2022-09-19 RX ORDER — ATROPINE SULFATE 1 MG/ML
0.25 INJECTION, SOLUTION INTRAVENOUS ONCE AS NEEDED
Status: CANCELLED | OUTPATIENT
Start: 2022-09-28

## 2022-09-19 RX ORDER — ATROPINE SULFATE 1 MG/ML
0.25 INJECTION, SOLUTION INTRAVENOUS ONCE
Status: CANCELLED | OUTPATIENT
Start: 2022-09-28

## 2022-09-19 RX ORDER — ATORVASTATIN CALCIUM 40 MG/1
40 TABLET, FILM COATED ORAL
Qty: 90 TABLET | Refills: 3 | Status: SHIPPED | OUTPATIENT
Start: 2022-09-19

## 2022-09-19 RX ORDER — FLUOROURACIL 50 MG/ML
400 INJECTION, SOLUTION INTRAVENOUS ONCE
Status: CANCELLED | OUTPATIENT
Start: 2022-09-28

## 2022-09-19 RX ORDER — SODIUM CHLORIDE 9 MG/ML
20 INJECTION, SOLUTION INTRAVENOUS ONCE AS NEEDED
Status: CANCELLED | OUTPATIENT
Start: 2022-09-28

## 2022-09-22 ENCOUNTER — HOSPITAL ENCOUNTER (OUTPATIENT)
Dept: MRI IMAGING | Facility: HOSPITAL | Age: 62
End: 2022-09-22
Payer: COMMERCIAL

## 2022-09-22 DIAGNOSIS — C25.0 ADENOCARCINOMA OF HEAD OF PANCREAS (HCC): ICD-10-CM

## 2022-09-22 PROCEDURE — 70553 MRI BRAIN STEM W/O & W/DYE: CPT

## 2022-09-22 PROCEDURE — A9585 GADOBUTROL INJECTION: HCPCS | Performed by: INTERNAL MEDICINE

## 2022-09-22 RX ADMIN — GADOBUTROL 13 ML: 604.72 INJECTION INTRAVENOUS at 08:33

## 2022-09-23 ENCOUNTER — APPOINTMENT (OUTPATIENT)
Dept: LAB | Facility: CLINIC | Age: 62
End: 2022-09-23
Payer: COMMERCIAL

## 2022-09-23 DIAGNOSIS — T45.1X5A CHEMOTHERAPY INDUCED NEUTROPENIA (HCC): ICD-10-CM

## 2022-09-23 DIAGNOSIS — D70.1 CHEMOTHERAPY INDUCED NEUTROPENIA (HCC): ICD-10-CM

## 2022-09-23 DIAGNOSIS — C25.0 ADENOCARCINOMA OF HEAD OF PANCREAS (HCC): ICD-10-CM

## 2022-09-23 LAB
ALBUMIN SERPL BCP-MCNC: 3.3 G/DL (ref 3.5–5)
ALP SERPL-CCNC: 198 U/L (ref 46–116)
ALT SERPL W P-5'-P-CCNC: 92 U/L (ref 12–78)
ANION GAP SERPL CALCULATED.3IONS-SCNC: 11 MMOL/L (ref 4–13)
AST SERPL W P-5'-P-CCNC: 47 U/L (ref 5–45)
BASOPHILS # BLD AUTO: 0.08 THOUSANDS/ΜL (ref 0–0.1)
BASOPHILS NFR BLD AUTO: 1 % (ref 0–1)
BILIRUB SERPL-MCNC: 0.53 MG/DL (ref 0.2–1)
BUN SERPL-MCNC: 13 MG/DL (ref 5–25)
CALCIUM ALBUM COR SERPL-MCNC: 10.7 MG/DL (ref 8.3–10.1)
CALCIUM SERPL-MCNC: 10.1 MG/DL (ref 8.3–10.1)
CHLORIDE SERPL-SCNC: 110 MMOL/L (ref 96–108)
CO2 SERPL-SCNC: 19 MMOL/L (ref 21–32)
CREAT SERPL-MCNC: 0.91 MG/DL (ref 0.6–1.3)
EOSINOPHIL # BLD AUTO: 0.09 THOUSAND/ΜL (ref 0–0.61)
EOSINOPHIL NFR BLD AUTO: 1 % (ref 0–6)
ERYTHROCYTE [DISTWIDTH] IN BLOOD BY AUTOMATED COUNT: 17.3 % (ref 11.6–15.1)
GFR SERPL CREATININE-BSD FRML MDRD: 67 ML/MIN/1.73SQ M
GLUCOSE P FAST SERPL-MCNC: 130 MG/DL (ref 65–99)
HCT VFR BLD AUTO: 36 % (ref 34.8–46.1)
HGB BLD-MCNC: 11.6 G/DL (ref 11.5–15.4)
IMM GRANULOCYTES # BLD AUTO: 0.11 THOUSAND/UL (ref 0–0.2)
IMM GRANULOCYTES NFR BLD AUTO: 2 % (ref 0–2)
LYMPHOCYTES # BLD AUTO: 1.53 THOUSANDS/ΜL (ref 0.6–4.47)
LYMPHOCYTES NFR BLD AUTO: 23 % (ref 14–44)
MCH RBC QN AUTO: 29.5 PG (ref 26.8–34.3)
MCHC RBC AUTO-ENTMCNC: 32.2 G/DL (ref 31.4–37.4)
MCV RBC AUTO: 92 FL (ref 82–98)
MONOCYTES # BLD AUTO: 0.58 THOUSAND/ΜL (ref 0.17–1.22)
MONOCYTES NFR BLD AUTO: 9 % (ref 4–12)
NEUTROPHILS # BLD AUTO: 4.29 THOUSANDS/ΜL (ref 1.85–7.62)
NEUTS SEG NFR BLD AUTO: 64 % (ref 43–75)
NRBC BLD AUTO-RTO: 0 /100 WBCS
PLATELET # BLD AUTO: 92 THOUSANDS/UL (ref 149–390)
PMV BLD AUTO: 12 FL (ref 8.9–12.7)
POTASSIUM SERPL-SCNC: 3.9 MMOL/L (ref 3.5–5.3)
PROT SERPL-MCNC: 6.9 G/DL (ref 6.4–8.4)
RBC # BLD AUTO: 3.93 MILLION/UL (ref 3.81–5.12)
SODIUM SERPL-SCNC: 140 MMOL/L (ref 135–147)
WBC # BLD AUTO: 6.68 THOUSAND/UL (ref 4.31–10.16)

## 2022-09-23 PROCEDURE — 80053 COMPREHEN METABOLIC PANEL: CPT

## 2022-09-23 PROCEDURE — 85025 COMPLETE CBC W/AUTO DIFF WBC: CPT

## 2022-09-26 ENCOUNTER — HOSPITAL ENCOUNTER (OUTPATIENT)
Dept: RADIOLOGY | Facility: HOSPITAL | Age: 62
Discharge: HOME | End: 2022-09-26
Attending: INTERNAL MEDICINE
Payer: COMMERCIAL

## 2022-09-26 DIAGNOSIS — C25.0 ADENOCARCINOMA OF HEAD OF PANCREAS (HCC): ICD-10-CM

## 2022-09-26 PROCEDURE — 71260 CT THORAX DX C+: CPT

## 2022-09-26 PROCEDURE — G1004 CDSM NDSC: HCPCS

## 2022-09-26 PROCEDURE — 74177 CT ABD & PELVIS W/CONTRAST: CPT

## 2022-09-26 RX ADMIN — IOHEXOL 100 ML: 350 INJECTION, SOLUTION INTRAVENOUS at 08:32

## 2022-09-28 ENCOUNTER — HOSPITAL ENCOUNTER (OUTPATIENT)
Dept: INFUSION CENTER | Facility: HOSPITAL | Age: 62
Discharge: HOME | End: 2022-09-28
Attending: INTERNAL MEDICINE
Payer: COMMERCIAL

## 2022-09-28 VITALS
RESPIRATION RATE: 20 BRPM | BODY MASS INDEX: 50.02 KG/M2 | TEMPERATURE: 97.8 F | SYSTOLIC BLOOD PRESSURE: 140 MMHG | DIASTOLIC BLOOD PRESSURE: 81 MMHG | OXYGEN SATURATION: 97 % | HEIGHT: 64 IN | WEIGHT: 293 LBS | HEART RATE: 85 BPM

## 2022-09-28 DIAGNOSIS — T45.1X5A CHEMOTHERAPY INDUCED NEUTROPENIA (HCC): Primary | ICD-10-CM

## 2022-09-28 DIAGNOSIS — D70.1 CHEMOTHERAPY INDUCED NEUTROPENIA (HCC): Primary | ICD-10-CM

## 2022-09-28 DIAGNOSIS — C25.0 ADENOCARCINOMA OF HEAD OF PANCREAS (HCC): ICD-10-CM

## 2022-09-28 PROCEDURE — 96417 CHEMO IV INFUS EACH ADDL SEQ: CPT

## 2022-09-28 PROCEDURE — 96367 TX/PROPH/DG ADDL SEQ IV INF: CPT

## 2022-09-28 PROCEDURE — 96375 TX/PRO/DX INJ NEW DRUG ADDON: CPT

## 2022-09-28 PROCEDURE — 96411 CHEMO IV PUSH ADDL DRUG: CPT

## 2022-09-28 PROCEDURE — G0498 CHEMO EXTEND IV INFUS W/PUMP: HCPCS

## 2022-09-28 PROCEDURE — 96413 CHEMO IV INFUSION 1 HR: CPT

## 2022-09-28 PROCEDURE — 96415 CHEMO IV INFUSION ADDL HR: CPT

## 2022-09-28 RX ORDER — SODIUM CHLORIDE 9 MG/ML
20 INJECTION, SOLUTION INTRAVENOUS ONCE AS NEEDED
Status: DISCONTINUED | OUTPATIENT
Start: 2022-09-28 | End: 2022-10-01 | Stop reason: HOSPADM

## 2022-09-28 RX ORDER — ATROPINE SULFATE 1 MG/ML
0.25 INJECTION, SOLUTION INTRAVENOUS ONCE AS NEEDED
Status: DISCONTINUED | OUTPATIENT
Start: 2022-09-28 | End: 2022-10-01 | Stop reason: HOSPADM

## 2022-09-28 RX ORDER — ATROPINE SULFATE 1 MG/ML
0.25 INJECTION, SOLUTION INTRAVENOUS ONCE
Status: COMPLETED | OUTPATIENT
Start: 2022-09-28 | End: 2022-09-28

## 2022-09-28 RX ORDER — FLUOROURACIL 50 MG/ML
400 INJECTION, SOLUTION INTRAVENOUS ONCE
Status: COMPLETED | OUTPATIENT
Start: 2022-09-28 | End: 2022-09-28

## 2022-09-28 RX ORDER — DEXTROSE MONOHYDRATE 50 MG/ML
20 INJECTION, SOLUTION INTRAVENOUS ONCE
Status: COMPLETED | OUTPATIENT
Start: 2022-09-28 | End: 2022-09-28

## 2022-09-28 RX ADMIN — ATROPINE SULFATE 0.25 MG: 1 INJECTION, SOLUTION INTRAVENOUS at 13:35

## 2022-09-28 RX ADMIN — IRINOTECAN HYDROCHLORIDE 351 MG: 20 INJECTION, SOLUTION INTRAVENOUS at 13:41

## 2022-09-28 RX ADMIN — SODIUM CHLORIDE 20 ML/HR: 0.9 INJECTION, SOLUTION INTRAVENOUS at 09:25

## 2022-09-28 RX ADMIN — DEXAMETHASONE SODIUM PHOSPHATE 10 MG: 10 INJECTION, SOLUTION INTRAMUSCULAR; INTRAVENOUS at 09:29

## 2022-09-28 RX ADMIN — FOSAPREPITANT 150 MG: 150 INJECTION, POWDER, LYOPHILIZED, FOR SOLUTION INTRAVENOUS at 10:54

## 2022-09-28 RX ADMIN — OXALIPLATIN 200 MG: 5 INJECTION, SOLUTION INTRAVENOUS at 11:33

## 2022-09-28 RX ADMIN — DEXTROSE MONOHYDRATE 20 ML/HR: 50 INJECTION, SOLUTION INTRAVENOUS at 11:27

## 2022-09-28 RX ADMIN — GRANISETRON HYDROCHLORIDE 1 MG: 1 INJECTION, SOLUTION INTRAVENOUS at 10:11

## 2022-09-28 RX ADMIN — FLUOROURACIL 935 MG: 50 INJECTION, SOLUTION INTRAVENOUS at 15:14

## 2022-09-28 NOTE — PROGRESS NOTES
Treatment tolerated well without complications  No complaints offered  AVS declined  Left unit in stable condition

## 2022-09-30 ENCOUNTER — HOSPITAL ENCOUNTER (OUTPATIENT)
Dept: INFUSION CENTER | Facility: HOSPITAL | Age: 62
End: 2022-09-30
Attending: INTERNAL MEDICINE
Payer: COMMERCIAL

## 2022-09-30 DIAGNOSIS — C25.0 ADENOCARCINOMA OF HEAD OF PANCREAS (HCC): ICD-10-CM

## 2022-09-30 DIAGNOSIS — D70.1 CHEMOTHERAPY INDUCED NEUTROPENIA (HCC): Primary | ICD-10-CM

## 2022-09-30 DIAGNOSIS — T45.1X5A CHEMOTHERAPY INDUCED NEUTROPENIA (HCC): Primary | ICD-10-CM

## 2022-09-30 PROCEDURE — 96372 THER/PROPH/DIAG INJ SC/IM: CPT

## 2022-09-30 RX ADMIN — PEGFILGRASTIM 6 MG: KIT SUBCUTANEOUS at 13:33

## 2022-10-03 DIAGNOSIS — C25.0 ADENOCARCINOMA OF HEAD OF PANCREAS (HCC): ICD-10-CM

## 2022-10-03 DIAGNOSIS — T45.1X5A CHEMOTHERAPY INDUCED NEUTROPENIA (HCC): Primary | ICD-10-CM

## 2022-10-03 DIAGNOSIS — D70.1 CHEMOTHERAPY INDUCED NEUTROPENIA (HCC): Primary | ICD-10-CM

## 2022-10-03 DIAGNOSIS — E11.9 TYPE 2 DIABETES MELLITUS WITHOUT COMPLICATION, WITHOUT LONG-TERM CURRENT USE OF INSULIN (HCC): ICD-10-CM

## 2022-10-03 DIAGNOSIS — C25.0 ADENOCARCINOMA OF HEAD OF PANCREAS (HCC): Primary | ICD-10-CM

## 2022-10-03 RX ORDER — ATROPINE SULFATE 1 MG/ML
0.25 INJECTION, SOLUTION INTRAVENOUS ONCE
OUTPATIENT
Start: 2022-10-12

## 2022-10-03 RX ORDER — FLUOROURACIL 50 MG/ML
400 INJECTION, SOLUTION INTRAVENOUS ONCE
OUTPATIENT
Start: 2022-10-12

## 2022-10-03 RX ORDER — ATROPINE SULFATE 1 MG/ML
0.25 INJECTION, SOLUTION INTRAVENOUS ONCE AS NEEDED
OUTPATIENT
Start: 2022-10-12

## 2022-10-03 RX ORDER — SEMAGLUTIDE 1.34 MG/ML
INJECTION, SOLUTION SUBCUTANEOUS
Qty: 9 ML | Refills: 3 | Status: SHIPPED | OUTPATIENT
Start: 2022-10-03

## 2022-10-03 RX ORDER — DEXTROSE MONOHYDRATE 50 MG/ML
20 INJECTION, SOLUTION INTRAVENOUS ONCE
OUTPATIENT
Start: 2022-10-12

## 2022-10-03 RX ORDER — SODIUM CHLORIDE 9 MG/ML
20 INJECTION, SOLUTION INTRAVENOUS ONCE AS NEEDED
OUTPATIENT
Start: 2022-10-12

## 2022-10-04 ENCOUNTER — TELEPHONE (OUTPATIENT)
Dept: ANESTHESIOLOGY | Facility: CLINIC | Age: 62
End: 2022-10-04

## 2022-10-04 ENCOUNTER — TELEPHONE (OUTPATIENT)
Dept: HEMATOLOGY ONCOLOGY | Facility: CLINIC | Age: 62
End: 2022-10-04

## 2022-10-04 ENCOUNTER — OFFICE VISIT (OUTPATIENT)
Dept: SURGICAL ONCOLOGY | Facility: CLINIC | Age: 62
End: 2022-10-04
Payer: COMMERCIAL

## 2022-10-04 ENCOUNTER — OFFICE VISIT (OUTPATIENT)
Dept: HEMATOLOGY ONCOLOGY | Facility: CLINIC | Age: 62
End: 2022-10-04
Payer: COMMERCIAL

## 2022-10-04 VITALS
SYSTOLIC BLOOD PRESSURE: 110 MMHG | HEIGHT: 64 IN | RESPIRATION RATE: 14 BRPM | HEART RATE: 74 BPM | BODY MASS INDEX: 50.02 KG/M2 | WEIGHT: 293 LBS | DIASTOLIC BLOOD PRESSURE: 68 MMHG | OXYGEN SATURATION: 98 % | TEMPERATURE: 98 F

## 2022-10-04 VITALS
HEIGHT: 64 IN | SYSTOLIC BLOOD PRESSURE: 128 MMHG | TEMPERATURE: 97.4 F | WEIGHT: 293 LBS | RESPIRATION RATE: 20 BRPM | HEART RATE: 78 BPM | BODY MASS INDEX: 50.02 KG/M2 | DIASTOLIC BLOOD PRESSURE: 80 MMHG | OXYGEN SATURATION: 98 %

## 2022-10-04 DIAGNOSIS — C25.0 ADENOCARCINOMA OF HEAD OF PANCREAS (HCC): ICD-10-CM

## 2022-10-04 DIAGNOSIS — C25.0 ADENOCARCINOMA OF HEAD OF PANCREAS (HCC): Primary | ICD-10-CM

## 2022-10-04 DIAGNOSIS — T45.1X5A CHEMOTHERAPY INDUCED NEUTROPENIA (HCC): Primary | ICD-10-CM

## 2022-10-04 DIAGNOSIS — D70.1 CHEMOTHERAPY INDUCED NEUTROPENIA (HCC): Primary | ICD-10-CM

## 2022-10-04 PROCEDURE — 99214 OFFICE O/P EST MOD 30 MIN: CPT | Performed by: INTERNAL MEDICINE

## 2022-10-04 PROCEDURE — 99215 OFFICE O/P EST HI 40 MIN: CPT | Performed by: SURGERY

## 2022-10-04 NOTE — PROGRESS NOTES
Hematology/Oncology Outpatient Follow- up Note  Louis Randhawa 57 y o  female MRN: @ Encounter: [id number]        Date:  10/4/2022    Presenting Complaint/Diagnosis :     Locally advanced pancreatic cancer for neoadjuvant chemotherapy    HPI:    Darryn Levine seen for initial consultation 6/1/2022 regarding newly diagnosed pancreatic adenocarcinoma   The patient had some abdominal discomfort after an EGD and up getting a CT scan which revealed pancreatic duct dilatation in the body with an ill-defined density in the pancreatic head   There was also 1 2 x 1 9 cm right adrenal nodule which was previously characterized as an adenoma   The patient had an MRI repeated in April which revealed a 4 2 x 3 6 x 3 cm mass in the pancreatic head with abnormal enlargement of the pancreatic duct and the pancreatic body and tail  The patient had an EUS with biopsy which revealed adenocarcinoma   CA 19 9 was normal   The patient was seen by our colleagues in Surgical Oncology then referred to see us for consideration of neoadjuvant treatment prior to resection      Previous Hematologic/ Oncologic History:    Oncology History   Adenocarcinoma of head of pancreas ([facility] [address] )   5/11/2022 Initial Diagnosis    Adenocarcinoma of head of pancreas ([facility] [address] )     5/11/2022 Biopsy    Endoscopic Ultrasound:  A , B , & C   Pancreas, Pancreatic head:   Malignant (PSC Category VI)  Positive for adenocarcinoma     6/8/2022 -  Chemotherapy    pegfilgrastim (Christyne Valeria), 6 mg, Subcutaneous, Once, 7 of 14 cycles  Administration: 6 mg (6/10/2022), 6 mg (9/2/2022), 6 mg (9/16/2022), 6 mg (9/30/2022), 6 mg (8/19/2022), 6 mg (7/22/2022), 6 mg (7/7/2022)  fosaprepitant (EMEND) IVPB, 150 mg, Intravenous, Once, 5 of 12 cycles  Administration: 150 mg (7/20/2022), 150 mg (8/17/2022), 150 mg (8/31/2022), 150 mg (9/14/2022), 150 mg (9/28/2022)  fluorouracil (ADRUCIL), 400 mg/m2 = 955 mg, Intravenous, Once, 7 of 14 cycles  Administration: 955 mg (6/8/2022), 880 mg (8/31/2022), 880 mg (9/14/2022), 935 mg (9/28/2022), 880 mg (8/17/2022), 955 mg (7/5/2022)  irinotecan (CAMPTOSAR) chemo infusion, 430 mg, Intravenous, Once, 8 of 15 cycles  Dose modification: 150 mg/m2 (original dose 180 mg/m2, Cycle 6, Reason: Max Dose Reached, Comment: per protocol), 180 mg/m2 (original dose 180 mg/m2, Cycle 5, Reason: Other (Must fill in a comment), Comment: Per protocol)  Administration: 440 mg (6/8/2022), 330 mg (8/31/2022), 330 mg (9/14/2022), 351 mg (9/28/2022), 330 mg (8/17/2022), 330 mg (7/20/2022), 440 mg (7/5/2022)  oxaliplatin (ELOXATIN) chemo infusion, 203 15 mg, Intravenous, Once, 8 of 15 cycles  Administration: 200 mg (6/8/2022), 187 mg (8/31/2022), 187 mg (9/14/2022), 200 mg (9/28/2022), 200 mg (8/17/2022), 200 mg (7/20/2022), 200 mg (7/5/2022)  fluorouracil (ADRUCIL) ambulatory infusion Soln, 1,200 mg/m2/day = 5,735 mg, Intravenous, Over 46 hours, 8 of 15 cycles         Current Hematologic/ Oncologic Treatment:       FOLFIRINOX status post 7 cycles    Interval History:       Patient returns for follow-up visit  She has had 7 cycles of FOLFIRINOX so far  Most recent imaging shows no evidence of progression  Again there was an ill-defined hypodense pancreatic head mass not discretely measurable on the current study however overall appearance of the pancreatic head region does not appear significantly changed  There is no significant metastatic disease in abdomen or pelvis  A 2 mm pulmonary nodule was stable since 2018 confirming benign nature  In terms of symptoms she has had trouble with her chemotherapy  As far symptoms today are concerned  She is recovering from her last chemotherapy  Does get fatigued after chemotherapy   states she slept the week  She was seen today by our colleagues in surgery and she is now going to go for surgery after having received 7 cycles of FOLFIRINOX  Cycle 2   Was initially marked as given but actually was held so she has received 7 cycles so far  Denies any nausea or vomiting today  Denies any diarrhea  Still has some dizziness  The rest of her 14 point review of systems today was negative  Test Results:    Imaging: MRI brain w wo contrast    Result Date: 9/26/2022  Narrative: MRI BRAIN WITH AND WITHOUT CONTRAST INDICATION: C25 0: Malignant neoplasm of head of pancreas  COMPARISON:  None  TECHNIQUE: Sagittal T1, axial T2, axial FLAIR, axial T1, axial [place], axial diffusion  Sagittal, axial T1 postcontrast   Axial bravo postcontrast with coronal reconstructions  IV Contrast:  13 mL of Gadobutrol injection (SINGLE-DOSE)  IMAGE QUALITY:   Diagnostic  FINDINGS: BRAIN PARENCHYMA:  There is no discrete mass, mass effect or midline shift  There is no intracranial hemorrhage  Normal posterior fossa  Diffusion imaging is unremarkable  There are no white matter changes in the cerebral hemispheres  Postcontrast imaging of the brain demonstrates no abnormal enhancement  VENTRICLES:  Normal for the patient's age  SELLA AND PITUITARY GLAND:  Normal  ORBITS:  Normal  PARANASAL SINUSES:  Normal  VASCULATURE:  Appropriate flow voids of the major central intracranial vessels  The distal vertebral arteries are asymmetric with hypoplasia on the right and a dominant left vertebral artery with mild tortuosity  CALVARIUM AND SKULL BASE:  Normal  EXTRACRANIAL SOFT TISSUES:  Normal      Impression: Normal MRI of the brain  Workstation performed: [id number]     CT chest abdomen pelvis w contrast    Result Date: 9/29/2022  Narrative: CT CHEST, ABDOMEN AND PELVIS WITH IV CONTRAST INDICATION:   C25 0: Malignant neoplasm of head of pancreas  COMPARISON:  CT abdomen pelvis 8/2/2022  MR abdomen 4/25/2022  CT chest 3/21/2019  TECHNIQUE: CT examination of the chest, abdomen and pelvis was performed  Scanning through the abdomen was performed in arterial, venous and delayed phases according a protocol spefically designed to evaluate upper abdominal viscera    Axial, sagittal, and coronal 2D reformatted images were created from the source data and submitted for interpretation  Radiation dose length product (DLP) for this visit:  5035 75 mGy-cm   This examination, like all CT scans performed in the [facility], was performed utilizing techniques to minimize radiation dose exposure, including the use of iterative reconstruction and automated exposure control  IV Contrast:  100 mL of iohexol (OMNIPAQUE) Enteric contrast was not administered  FINDINGS: CHEST LUNGS:  No focal consolidation  Central airways are patent  -Right upper lobe 2 mm nodule (series 5 image 35) unchanged since 3/21/2018, conferring benignity   -Right lower lobe 2 mm nodule (series 5 image 47), stable  -Left lower lobe 2 mm nodule (series 5 image 76) is also unchanged   -Left lower lobe 2 mm nodule (series 5 image 58) not clearly seen on prior  PLEURA:  Unremarkable  HEART/GREAT VESSELS:  Heart is unremarkable for patient's age  No thoracic aortic aneurysm  Central venous catheter tip in the right atrium  MEDIASTINUM AND SHANNAN:  Unremarkable  CHEST WALL AND LOWER NECK:  Unremarkable  ABDOMEN LIVER/BILIARY TREE:  Hepatic steatosis  No evidence of suspicious hepatic mass  No biliary ductal dilation  GALLBLADDER:  No calcified gallstones  No pericholecystic inflammatory change  SPLEEN:  Unremarkable  PANCREAS:  Again noted ill-defined hypodense pancreatic head mass, not discretely measurable on the current study, however overall appearance of the pancreatic head region does not appear significantly changed  ADRENAL GLANDS:  Stable 1 9 cm right adrenal adenoma  KIDNEYS/URETERS: Exophytic 12 mm hypodense lesion in the right kidney midportion measures slightly higher than simple fluid density, stable, could be due to a complicated cyst   No hydronephrosis  STOMACH AND BOWEL:  Unremarkable  APPENDIX:  No findings to suggest appendicitis  ABDOMINOPELVIC CAVITY:  No ascites  No pneumoperitoneum  No lymphadenopathy  VESSELS:  Unremarkable for patient's age  PELVIS REPRODUCTIVE ORGANS:  Post hysterectomy  URINARY BLADDER:  Unremarkable  ABDOMINAL WALL/INGUINAL REGIONS:  Unremarkable  OSSEOUS STRUCTURES:  No acute fracture or destructive osseous lesion  Degenerative changes of the spine  Impression: 1  Again noted ill-defined hypodense pancreatic head mass, not discretely measurable on the current study, however overall appearance of the pancreatic head region does not appear significantly changed  2   No findings of metastatic disease in the abdomen or pelvis  3   2 mm pulmonary nodules stable since 3/21/2018, conferring benignity  A 2 mm nodule is not clearly seen on prior  Attention can be made on follow-up  Workstation performed: [id number]     Echo complete w/ contrast if indicated    Result Date: 9/7/2022  Narrative: Ama Krishnamurthy  Left Ventricle: Left ventricular cavity size is normal  Wall thickness is mildly increased  There is borderline concentric hypertrophy  The left ventricular ejection fraction is 55% by visual estimation  Systolic function is normal  Global longitudinal strain is normal at -15 6 %  Although no diagnostic regional wall motion abnormality was identified, this possibility cannot be completely excluded on the basis of this study  Diastolic function is mildly abnormal, consistent with grade I (abnormal) relaxation    Mitral Valve: There is trace regurgitation         Labs:   Lab Results   Component Value Date    WBC 6 68 09/23/2022    HGB 11 6 09/23/2022    HCT 36 0 09/23/2022    MCV 92 09/23/2022    PLT 92 (L) 09/23/2022     Lab Results   Component Value Date     04/12/2017    K 3 9 09/23/2022     (H) 09/23/2022    CO2 19 (L) 09/23/2022    ANIONGAP 7 01/22/2015    BUN 13 09/23/2022    CREATININE 0 91 09/23/2022    GLUCOSE 92 04/12/2017    GLUF 130 (H) 09/23/2022    CALCIUM 10 1 09/23/2022    CORRECTEDCA 10 7 (H) 09/23/2022    AST 47 (H) 09/23/2022    ALT 92 (H) 09/23/2022    ALKPHOS 198 (H) 09/23/2022    PROT 6 3 04/12/2017    BILITOT 0 3 04/12/2017    EGFR 67 09/23/2022       Lab Results   Component Value Date    IRON 59 10/14/2019    TIBC 430 10/14/2019    FERRITIN 45 10/14/2019       ROS: As stated in the history of present illness otherwise his 14 point review of systems today was negative        Active Problems:   Patient Active Problem List   Diagnosis    Dyspnea on exertion    Supra[facility]ricular tachycardia ([facility] )    Hypertension    Controlled depression    Severe obstructive sleep apnea    Morbid obesity ([place] [address] )    Type 2 diabetes mellitus without complication, without long-term current use of insulin (HCC)    Hyperlipidemia    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)    Esophageal reflux    Benign essential hypertension    Adenomatous colon polyp    Class 3 severe obesity due to excess calories with body mass index (BMI) of 50 0 to 59 9 in adult [facility])    Kidney stone    Uric acid kidney stone    Adenocarcinoma of head of pancreas ([place] [address] )    Chemotherapy induced neutropenia (HCC)    CPAP (continuous positive airway pressure) dependence    Left flank pain    Paroxysmal A-fib ([place] [address] )       Past Medical History:   Past Medical History:   Diagnosis Date    Abnormal liver function test     last assessed 1/16/17     Adenomatosis     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    last assessed 4/4/13    Arthritis     Atrial flutter (HCC)     Benign essential hypertension     last assessed 12/21/17     Body mass index (BMI) of 50-59 9 in adult ([facility] )     Cancer ([place] [address] )     pancreas    Colon polyp     Congenital pes planus     last assessed 7/15/14     COVID     4/30/21    CPAP (continuous positive airway pressure) dependence     Dental crowns present     Depression     Diabetes mellitus ([facility] )     Dizziness     occas--pt reports did see family doctor    GERD (gastroesophageal reflux disease)     Hemangioma of skin 08/26/2003 last assessed 8/26/03    History of cancer chemotherapy      Oncologist Dr Timothy Zelaya History of gastroesophageal reflux (GERD)     Hypercholesterolemia     Hyperlipidemia     Hypertension     Irregular heart beat     Kidney stone     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[facility] [address] ) 04/19/2006    last assessed 12/31/14     Osteoarthritis of both knees     last assessed 12/31/14     Paroxysmal atrial fibrillation ([facility] )     Port-A-Cath in place     S/P ablation of atrial flutter     Shortness of breath     per pt "from chemo-not drastic--still working and goes up steps"    Sleep apnea     Wears glasses        Surgical History:   Past Surgical History:   Procedure Laterality Date    ABDOMINAL SURGERY  06/2006    20cm GIST removed     APPENDECTOMY      ATRIAL ABLATION SURGERY      CARPAL TUNNEL RELEASE Bilateral     COLONOSCOPY      FL GUIDED CENTRAL VENOUS ACCESS DEVICE INSERTION  05/31/2022    FL RETROGRADE PYELOGRAM  07/18/2022    FL RETROGRADE PYELOGRAM  8/22/2022    HYSTERECTOMY      fibroids    IR PORT CHECK  06/23/2022    IR PORT REMOVAL AND PLACEMENT NEW SITE  06/28/2022    JOINT REPLACEMENT Bilateral     knees    KIDNEY STONE SURGERY Right 09/17/2021    placed stent in  for kidney stone    KNEE SURGERY      KNEE SURGERY Left 03/2019    OOPHORECTOMY      cyst    AL CYSTO/URETERO W/LITHOTRIPSY &INDWELL STENT INSRT Left 8/22/2022    Procedure: CYSTOSCOPY URETEROSCOPY WITH LITHOTRIPSY HOLMIUM LASER, RETROGRADE PYELOGRAM AND INSERTION STENT URETERAL;  Surgeon: Fernanda Avila MD;  [facility]: [facility];  Service: Urology    AL CYSTOSCOPY,INSERT URETERAL STENT Left 8/22/2022    Procedure: EXCHANGE STENT URETERAL;  Surgeon: Fernanda Avila MD;  [facility];  Service: Urology    AL CYSTOURETHROSCOPY,URETER CATHETER Left 07/18/2022    Procedure: CYSTOSCOPY RETROGRADE PYELOGRAM WITH INSERTION STENT URETERAL;  Surgeon: Fernanda Avila MD;  [facility];  Service: Urology    TONSILLECTOMY      TUBAL LIGATION      TUMOR EXCISION  2006    gastrointestinal stromal tumor    TUNNELED VENOUS PORT PLACEMENT N/A 05/31/2022    Procedure: INSERTION VENOUS PORT (PORT-A-CATH); Surgeon: Noé Reeves MD;  [facility];  Service: Surgical Oncology    UPPER GASTROINTESTINAL ENDOSCOPY         Family History:    Family History   Problem Relation Age of Onset    Emphysema Mother     Arthritis Mother     Diabetes Mother     Hypertension Mother     COPD Mother     Arthritis Father     Prostate cancer Father     Cerebral aneurysm Son     No Known Problems Maternal Grandmother     No Known Problems Maternal Grandfather     No Known Problems Paternal Grandmother     No Known Problems Paternal Grandfather     No Known Problems Son     No Known Problems Maternal Aunt     No Known Problems Maternal Aunt     No Known Problems Paternal Aunt     No Known Problems Paternal Aunt        Cancer-related family history includes Prostate cancer in her father      Social History:   Social History     Socioeconomic History    Marital status: /Civil Union     Spouse name: Not on file    Number of children: Not on file    Years of education: Not on file    Highest education level: Not on file   Occupational History    Not on file   Tobacco Use    Smoking status: Former Smoker     Years: 9 00     Types: Cigarettes    Smokeless tobacco: Never Used    Tobacco comment: pt states she smokes 1 to 3 times per week   Vaping Use    Vaping Use: Never used   Substance and Sexual Activity    Alcohol use: Not Currently     Comment: social drinker per [employer]script     Drug use: No    Sexual activity: Not on file     Comment: defer   Other Topics Concern    Not on file   Social History Narrative    Lack of exercise    Good sleep hygiene    No caffeine use    Dog    Good sleep hygiene       Social Determinants of Health     Financial Resource Strain: Not on file   Food Insecurity: Not on file   Transportation Needs: Not on file   Physical Activity: Not on file   Stress: Not on file   Social Connections: Not on file   Intimate Partner Violence: Not on file   Housing Stability: Not on file       Current Medications:   Current Outpatient Medications   Medication Sig Dispense Refill    acetaminophen (TYLENOL) 500 mg tablet Take 500 mg by mouth every 6 (six) hours as needed for mild pain      aspirin 81 MG tablet Take 81 mg by mouth daily   atorvastatin (LIPITOR) 40 mg tablet Take 1 tablet (40 mg total) by mouth daily at bedtime 90 tablet 3    [START ON 10/12/2022] fluorouracil 5,615 mg in CADD/Elastomeric Infusion Device Infuse 5,615 mg (1,200 mg/m2/day x 2 34 m2) into a catheter in a vein via infusion device over 46 hours for 2 days  Do not start before October 12, 2022  1 each 12    glucose blood (Contour Next Test) test strip Use 1 each daily Use as instructed 100 each 3    ibuprofen (ADVIL,MOTRIN) 100 MG tablet Take 200 mg by mouth as needed for mild pain      Insulin Pen Needle (Pen Needles) 32G X 4 MM MISC Use once a week 12 each 3    Magnesium Oxide -Mg Supplement 400 MG CAPS Take 1 capsule by mouth daily      meclizine (ANTIVERT) 12 5 MG tablet Take 1 tablet (12 5 mg total) by mouth every 8 (eight) hours 30 tablet 0    metoprolol succinate (TOPROL-XL) 25 mg 24 hr tablet Take 1 tablet (25 mg total) by mouth 2 (two) times a day 180 tablet 3    multivitamin (THERAGRAN) TABS Take 1 tablet by mouth daily        olmesartan (BENICAR) 20 mg tablet TAKE 1 TABLET BY MOUTH  DAILY 90 tablet 3    Omega-3 Fatty Acids (FISH OIL) 1,000 mg Take 1,000 mg by mouth 2 (two) times a day        ondansetron (ZOFRAN) 4 mg tablet Take 2 tablets (8 mg total) by mouth every 8 (eight) hours as needed for nausea or vomiting 20 tablet 3    Ozempic, 1 MG/DOSE, 4 MG/3ML SOPN injection pen INJECT 1MG SUBCUTANEOUSLY  WEEKLY 9 mL 3    pantoprazole (PROTONIX) 40 mg tablet TAKE 1 TABLET BY MOUTH DAILY BEFORE BREAKFAST 90 tablet 3    PARoxetine (PAXIL-CR) 37 5 mg 24 hr tablet TAKE 1 TABLET BY MOUTH IN  THE MORNING 90 tablet 3    Potassium Citrate ER 15 MEQ (1620 MG) TBCR TAKE 1 TABLET BY MOUTH  TWICE DAILY 200 tablet 3    senna (SENOKOT) 8 6 mg Take 1 tablet (8 6 mg total) by mouth daily at bedtime for 5 days 5 tablet 0    sotalol (BETAPACE) 120 mg tablet Take 1 tablet (120 mg total) by mouth every 12 (twelve) hours 180 tablet 3    VITAMIN D PO Take 2,000 Units by mouth 2 (two) times a day       No current facility-administered medications for this visit  Allergies: Allergies   Allergen Reactions    Other Rash     Adhesive tape        Physical Exam:    Body surface area is 2 31 meters squared  Wt Readings from Last 3 Encounters:   10/04/22 134 kg (295 lb)   10/04/22 134 kg (295 lb 8 oz)   09/28/22 (!) 138 kg (303 lb 9 2 oz)        Temp Readings from Last 3 Encounters:   10/04/22 98 °F (36 7 °C) (Temporal)   10/04/22 (!) 97 4 °F (36 3 °C)   09/28/22 97 8 °F (36 6 °C) (Temporal)        BP Readings from Last 3 Encounters:   10/04/22 110/68   10/04/22 128/80   09/28/22 140/81         Pulse Readings from Last 3 Encounters:   10/04/22 74   10/04/22 78   09/28/22 85         Physical Exam     Constitutional   General appearance: No acute distress, well appearing and well nourished  Eyes   Conjunctiva and lids: No swelling, erythema or discharge  Pupils and irises: Equal, round and reactive to light  Ears, Nose, Mouth, and Throat   External inspection of ears and nose: Normal     Nasal mucosa, septum, and turbinates: Normal without edema or erythema  Oropharynx: Normal with no erythema, edema, exudate or lesions  Pulmonary   Respiratory effort: No increased work of breathing or signs of respiratory distress  Auscultation of lungs: Clear to auscultation  Cardiovascular   Palpation of heart: Normal PMI, no thrills      Auscultation of heart: Normal rate and rhythm, normal S1 and S2, without murmurs  Examination of extremities for edema and/or varicosities: Normal     Carotid pulses: Normal     Abdomen   Abdomen: Non-tender, no masses  Liver and spleen: No hepatomegaly or splenomegaly  Lymphatic   Palpation of lymph nodes in neck: No lymphadenopathy  Musculoskeletal   Gait and station: Normal     Digits and nails: Normal without clubbing or cyanosis  Inspection/palpation of joints, bones, and muscles: Normal     Skin   Skin and subcutaneous tissue: Normal without rashes or lesions  Neurologic   Cranial nerves: Cranial nerves 2-12 intact  Sensation: No sensory loss  Psychiatric   Orientation to person, place, and time: Normal     Mood and affect: Normal         Assessment / Plan:      The patient is a pleasant 40-year-old female who was started on neoadjuvant chemotherapy with FOLFIRINOX which was then dose adjusted based on toxicity  She has completed 7 cycles and is now going for surgery as she is getting fatigued and we wish for her to be optimized for surgery  She was seen by my colleagues in [address] today who agreed  I will see her back in approximately 2 months  She will had surgery by then and then will be ready to restart her chemotherapy to finish up 6 months total perioperative chemotherapy  She has 5 more cycles left  The patient is in agreement with the plan  Our colleagues in surgery are in agreement with the plan  I will see her back in the end of November and then we will plan to restart chemotherapy  If she has any questions she will call our office  Goals and Barriers:  Current Goal:  Prolong Survival from   Pancreatic cancer  Barriers: None  Patient's Capacity to Self Care:  Patient able to self care  Portions of the record may have been created with voice recognition software  Occasional wrong word or "sound a like" substitutions may have occurred due to the inherent limitations of voice recognition software    Read the chart carefully and recognize, using context, where substitutions have occurred

## 2022-10-04 NOTE — LETTER
October 4, 2022     Radha Fields, [address] [facility]    Patient: Louis Randhawa   YOB: 1960   Date of Visit: 10/4/2022       Dear Dr Rachel Ray: Thank you for referring Louis Randhawa to me for evaluation  Below are my notes for this consultation  If you have questions, please do not hesitate to call me  I look forward to following your patient along with you  Sincerely,        Audie Ratliff MD        CC: MD Audie Viveros MD  10/4/2022  1:53 PM  Incomplete               Surgical Oncology Follow Up       [address]  [facility] [place]  [address] [facility] [place] [address]    Louis Randhawa  1960  [id number]  [address] [facility]  [facility] [place]  [address] [place] [address]    Diagnoses and all orders for this visit:    Adenocarcinoma of head of pancreas [facility])        Chief Complaint   Patient presents with    Follow-up       No follow-ups on file  Oncology History   Adenocarcinoma of head of pancreas ([facility] )   5/11/2022 Initial Diagnosis    Adenocarcinoma of head of pancreas ([facility] [address] )     5/11/2022 Biopsy    Endoscopic Ultrasound:  A , B , & C   Pancreas, Pancreatic head:   Malignant (PSC Category VI)  Positive for adenocarcinoma     6/8/2022 -  Chemotherapy    pegfilgrastim (Christyne Valeria), 6 mg, Subcutaneous, Once, 7 of 14 cycles  Administration: 6 mg (6/10/2022), 6 mg (9/2/2022), 6 mg (9/16/2022), 6 mg (9/30/2022), 6 mg (8/19/2022), 6 mg (7/22/2022), 6 mg (7/7/2022)  fosaprepitant (EMEND) IVPB, 150 mg, Intravenous, Once, 5 of 12 cycles  Administration: 150 mg (7/20/2022), 150 mg (8/17/2022), 150 mg (8/31/2022), 150 mg (9/14/2022), 150 mg (9/28/2022)  fluorouracil (ADRUCIL), 400 mg/m2 = 955 mg, Intravenous, Once, 7 of 14 cycles  Administration: 955 mg (6/8/2022), 880 mg (8/31/2022), 880 mg (9/14/2022), 935 mg (9/28/2022), 880 mg (8/17/2022), 955 mg (7/5/2022)  irinotecan (CAMPTOSAR) chemo infusion, 430 mg, Intravenous, Once, 8 of 15 cycles  Dose modification: 150 mg/m2 (original dose 180 mg/m2, Cycle 6, Reason: Max Dose Reached, Comment: per protocol), 180 mg/m2 (original dose 180 mg/m2, Cycle 5, Reason: Other (Must fill in a comment), Comment: Per protocol)  Administration: 440 mg (6/8/2022), 330 mg (8/31/2022), 330 mg (9/14/2022), 351 mg (9/28/2022), 330 mg (8/17/2022), 330 mg (7/20/2022), 440 mg (7/5/2022)  oxaliplatin (ELOXATIN) chemo infusion, 203 15 mg, Intravenous, Once, 8 of 15 cycles  Administration: 200 mg (6/8/2022), 187 mg (8/31/2022), 187 mg (9/14/2022), 200 mg (9/28/2022), 200 mg (8/17/2022), 200 mg (7/20/2022), 200 mg (7/5/2022)  fluorouracil (ADRUCIL) ambulatory infusion Soln, 1,200 mg/m2/day = 5,735 mg, Intravenous, Over 46 hours, 8 of 15 cycles         Staging: [id number] pancreas adenocarcinoma  Treatment history:  Neoadjuvant FOLFIRINOX  Current treatment:  Neoadjuvant FOLFIRINOX  Disease status:  Stable    History of Present Illness:  Patient returns in follow-up  She has received 7 cycles FOLFIRINOX  She starting to have a hard time recovering from her chemotherapy  CT from September 26, 2022 reveals an ill-defined pancreatic head mass  There was no evidence of metastatic disease  There was also a small 2 mm nodule that was not seen on her previous imaging in the lung  I personally reviewed the films  She comes in now to discuss further therapy  Her appetite is fair  She has lost 35 lb since the start of chemotherapy  She has met with her cardiologist who cleared her, but she is at intermittent risk given her comorbid conditions  Review of Systems  Complete ROS Surg Onc:   Complete ROS Surg Onc:   Constitutional: The patient denies new or recent history of general fatigue, no recent weight loss, no change in appetite  Eyes: No complaints of visual problems, no scleral icterus     ENT: no complaints of ear pain, no hoarseness, no difficulty swallowing,  no tinnitus and no new masses in head, oral cavity, or neck  Cardiovascular: No complaints of chest pain, no palpitations, no ankle edema  Respiratory: No complaints of shortness of breath, no cough  Gastrointestinal: No complaints of jaundice, no bloody stools, no pale stools  Genitourinary: No complaints of dysuria, no hematuria, no nocturia, no frequent urination, no urethral discharge  Musculoskeletal: No complaints of weakness, paralysis, joint stiffness or arthralgias  Integumentary: No complaints of rash, no new lesions  Neurological: No complaints of convulsions, no seizures, no dizziness  Hematologic/Lymphatic: No complaints of easy bruising  Endocrine:  No hot or cold intolerance  No polydipsia, polyphagia, or polyuria  Allergy/immunology:  No environmental allergies  No food allergies  Not immunocompromised  Skin:  No pallor or rash  No wound          Patient Active Problem List   Diagnosis    Dyspnea on exertion    Supraventricular tachycardia ([address] )    Hypertension    Controlled depression    Severe obstructive sleep apnea    Morbid obesity ([facility] )    Type 2 diabetes mellitus without complication, without long-term current use of insulin (HCC)    Hyperlipidemia    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)    Esophageal reflux    Benign essential hypertension    Adenomatous colon polyp    Class 3 severe obesity due to excess calories with body mass index (BMI) of 50 0 to 59 9 in adult [facility])    Kidney stone    Uric acid kidney stone    Adenocarcinoma of head of pancreas ([facility] )    Chemotherapy induced neutropenia (HCC)    CPAP (continuous positive airway pressure) dependence    Left flank pain    Paroxysmal A-fib ([address] )     Past Medical History:   Diagnosis Date    Abnormal liver function test     last assessed 1/16/17     Adenomatosis     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    last assessed 4/4/13    Arthritis     Atrial flutter (HCC)     Benign essential hypertension     last assessed 12/21/17     Body mass index (BMI) of 50-59 9 in adult (HCC)     Cancer ([facility] )     pancreas    Colon polyp     Congenital pes planus     last assessed 7/15/14     COVID     4/30/21    CPAP (continuous positive airway pressure) dependence     Dental crowns present     Depression     Diabetes mellitus ([facility] )     Dizziness     occas--pt reports did see family doctor    GERD (gastroesophageal reflux disease)     Hemangioma of skin 08/26/2003    last assessed 8/26/03    History of cancer chemotherapy      Oncologist Dr Herrera Choudhury History of gastroesophageal reflux (GERD)     Hypercholesterolemia     Hyperlipidemia     Hypertension     Irregular heart beat     Kidney stone     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[facility] ) 04/19/2006    last assessed 12/31/14     Osteoarthritis of both knees     last assessed 12/31/14     Paroxysmal atrial fibrillation ([facility] )     Port-A-Cath in place     S/P ablation of atrial flutter     Shortness of breath     per pt "from chemo-not drastic--still working and goes up steps"    Sleep apnea     Wears glasses      Past Surgical History:   Procedure Laterality Date    ABDOMINAL SURGERY  06/2006    20cm GIST removed     APPENDECTOMY      ATRIAL ABLATION SURGERY      CARPAL TUNNEL RELEASE Bilateral     COLONOSCOPY      FL GUIDED CENTRAL VENOUS ACCESS DEVICE INSERTION  05/31/2022    FL RETROGRADE PYELOGRAM  07/18/2022    FL RETROGRADE PYELOGRAM  8/22/2022    HYSTERECTOMY      fibroids    IR PORT CHECK  06/23/2022    IR PORT REMOVAL AND PLACEMENT NEW SITE  06/28/2022    JOINT REPLACEMENT Bilateral     knees    KIDNEY STONE SURGERY Right 09/17/2021    placed stent in  for kidney stone    KNEE SURGERY      KNEE SURGERY Left 03/2019    OOPHORECTOMY      cyst    ND CYSTO/URETERO W/LITHOTRIPSY &INDWELL STENT INSRT Left 8/22/2022 Procedure: CYSTOSCOPY URETEROSCOPY WITH LITHOTRIPSY HOLMIUM LASER, RETROGRADE PYELOGRAM AND INSERTION STENT URETERAL;  Surgeon: James Osborne MD;  [facility];  Service: Urology    OH CYSTOSCOPY,INSERT URETERAL STENT Left 8/22/2022    Procedure: EXCHANGE STENT URETERAL;  Surgeon: James Osborne MD;  [facility];  Service: Urology    OH CYSTOURETHROSCOPY,URETER CATHETER Left 07/18/2022    Procedure: CYSTOSCOPY RETROGRADE PYELOGRAM WITH INSERTION STENT URETERAL;  Surgeon: James Osborne MD;  [facility];  Service: Urology    [address] TUMOR EXCISION  2006    gastrointestinal stromal tumor    TUNNELED VENOUS PORT PLACEMENT N/A 05/31/2022    Procedure: INSERTION VENOUS PORT (PORT-A-CATH);   Surgeon: Connor Ramon MD;  [facility];  Service: Surgical Oncology    UPPER GASTROINTESTINAL ENDOSCOPY       Family History   Problem Relation Age of Onset    Emphysema Mother    Earna Minnie Arthritis Mother     Diabetes Mother     Hypertension Mother     COPD Mother     Arthritis Father     Prostate cancer Father     Cerebral aneurysm Son     No Known Problems Maternal Grandmother     No Known Problems Maternal Grandfather     No Known Problems Paternal Grandmother     No Known Problems Paternal Grandfather     No Known Problems Son     No Known Problems Maternal Aunt     No Known Problems Maternal Aunt     No Known Problems Paternal Aunt     No Known Problems Paternal Aunt      Social History     Socioeconomic History    Marital status: /Civil Union     Spouse name: Not on file    Number of children: Not on file    Years of education: Not on file    Highest education level: Not on file   Occupational History    Not on file   Tobacco Use    Smoking status: Former Smoker     Years: 9 00     Types: Cigarettes    Smokeless tobacco: Never Used    Tobacco comment: pt states she smokes 1 to 3 times per week   Vaping Use    Vaping Use: Never used   Substance and Sexual Activity    Alcohol use: Not Currently     Comment: social drinker per allscript     Drug use: No    Sexual activity: Not on file     Comment: defer   Other Topics Concern    Not on file   Social History Narrative    Lack of exercise    Good sleep hygiene    No caffeine use    Dog    Good sleep hygiene       Social Determinants of Health     Financial Resource Strain: Not on file   Food Insecurity: Not on file   Transportation Needs: Not on file   Physical Activity: Not on file   Stress: Not on file   Social Connections: Not on file   Intimate Partner Violence: Not on file   Housing Stability: Not on file       Current Outpatient Medications:     acetaminophen (TYLENOL) 500 mg tablet, Take 500 mg by mouth every 6 (six) hours as needed for mild pain, Disp: , Rfl:     aspirin 81 MG tablet, Take 81 mg by mouth daily  , Disp: , Rfl:     atorvastatin (LIPITOR) 40 mg tablet, Take 1 tablet (40 mg total) by mouth daily at bedtime, Disp: 90 tablet, Rfl: 3    [START ON 10/12/2022] fluorouracil 5,615 mg in CADD/Elastomeric Infusion Device, Infuse 5,615 mg (1,200 mg/m2/day x 2 34 m2) into a catheter in a vein via infusion device over 46 hours for 2 days  Do not start before October 12, 2022 , Disp: 1 each, Rfl: 12    glucose blood (Contour Next Test) test strip, Use 1 each daily Use as instructed, Disp: 100 each, Rfl: 3    ibuprofen (ADVIL,MOTRIN) 100 MG tablet, Take 200 mg by mouth as needed for mild pain, Disp: , Rfl:     Insulin Pen Needle (Pen Needles) 32G X 4 MM MISC, Use once a week, Disp: 12 each, Rfl: 3    Magnesium Oxide -Mg Supplement 400 MG CAPS, Take 1 capsule by mouth daily, Disp: , Rfl:     meclizine (ANTIVERT) 12 5 MG tablet, Take 1 tablet (12 5 mg total) by mouth every 8 (eight) hours, Disp: 30 tablet, Rfl: 0    metoprolol succinate (TOPROL-XL) 25 mg 24 hr tablet, Take 1 tablet (25 mg total) by mouth 2 (two) times a day, Disp: 180 tablet, Rfl: 3    multivitamin [facility]) TABS, Take 1 tablet by mouth daily  , Disp: , Rfl:     olmesartan (BENICAR) 20 mg tablet, TAKE 1 TABLET BY MOUTH  DAILY, Disp: 90 tablet, Rfl: 3    Omega-3 Fatty Acids (FISH OIL) 1,000 mg, Take 1,000 mg by mouth 2 (two) times a day  , Disp: , Rfl:     ondansetron (ZOFRAN) 4 mg tablet, Take 2 tablets (8 mg total) by mouth every 8 (eight) hours as needed for nausea or vomiting, Disp: 20 tablet, Rfl: 3    Ozempic, 1 MG/DOSE, 4 MG/3ML SOPN injection pen, INJECT 1MG SUBCUTANEOUSLY  WEEKLY, Disp: 9 mL, Rfl: 3    pantoprazole (PROTONIX) 40 mg tablet, TAKE 1 TABLET BY MOUTH  DAILY BEFORE BREAKFAST, Disp: 90 tablet, Rfl: 3    PARoxetine (PAXIL-CR) 37 5 mg 24 hr tablet, TAKE 1 TABLET BY MOUTH IN  THE MORNING, Disp: 90 tablet, Rfl: 3    Potassium Citrate ER 15 MEQ (1620 MG) TBCR, TAKE 1 TABLET BY MOUTH  TWICE DAILY, Disp: 200 tablet, Rfl: 3    sotalol (BETAPACE) 120 mg tablet, Take 1 tablet (120 mg total) by mouth every 12 (twelve) hours, Disp: 180 tablet, Rfl: 3    VITAMIN D PO, Take 2,000 Units by mouth 2 (two) times a day, Disp: , Rfl:     senna (SENOKOT) 8 6 mg, Take 1 tablet (8 6 mg total) by mouth daily at bedtime for 5 days, Disp: 5 tablet, Rfl: 0  Allergies   Allergen Reactions    Other Rash     Adhesive tape      Vitals:    10/04/22 1328   BP: 128/80   Pulse: 78   Resp: 20   Temp: (!) 97 4 °F (36 3 °C)   SpO2: 98%       Physical Exam  Constitutional: General appearance: The Patient is well-developed and well-nourished who appears the stated age in no acute distress  Patient is pleasant and talkative  HEENT:  Normocephalic  Sclerae are anicteric  Mucous membranes are moist  Neck is supple without adenopathy  No JVD  Chest: The lungs are clear to auscultation  Cardiac: Heart is regular rate  Abdomen: Abdomen is soft, non-tender, non-distended and without masses  Extremities: There is no clubbing or cyanosis  There is no edema  Symmetric    Neuro: Grossly nonfocal  Gait is normal      Lymphatic: No evidence of cervical adenopathy bilaterally  No evidence of axillary adenopathy bilaterally  No evidence of inguinal adenopathy bilaterally  Skin: Warm, anicteric  Psych:  Patient is pleasant and talkative  Breasts:        Pathology:  [unfilled]    Labs:      Imaging  MRI brain w wo contrast    Result Date: 9/26/2022  Narrative: MRI BRAIN WITH AND WITHOUT CONTRAST INDICATION: C25 0: Malignant neoplasm of head of pancreas  COMPARISON:  None  TECHNIQUE: Sagittal T1, axial T2, axial FLAIR, axial T1, axial [place], axial diffusion  Sagittal, axial T1 postcontrast   Axial bravo postcontrast with coronal reconstructions  IV Contrast:  13 mL of Gadobutrol injection (SINGLE-DOSE)  IMAGE QUALITY:   Diagnostic  FINDINGS: BRAIN PARENCHYMA:  There is no discrete mass, mass effect or midline shift  There is no intracranial hemorrhage  Normal posterior fossa  Diffusion imaging is unremarkable  There are no white matter changes in the cerebral hemispheres  Postcontrast imaging of the brain demonstrates no abnormal enhancement  VENTRICLES:  Normal for the patient's age  SELLA AND PITUITARY GLAND:  Normal  ORBITS:  Normal  PARANASAL SINUSES:  Normal  VASCULATURE:  Appropriate flow voids of the major central intracranial vessels  The distal vertebral arteries are asymmetric with hypoplasia on the right and a dominant left vertebral artery with mild tortuosity  CALVARIUM AND SKULL BASE:  Normal  EXTRACRANIAL SOFT TISSUES:  Normal      Impression: Normal MRI of the brain  Workstation performed: [id number]     CT chest abdomen pelvis w contrast    Result Date: 9/29/2022  Narrative: CT CHEST, ABDOMEN AND PELVIS WITH IV CONTRAST INDICATION:   C25 0: Malignant neoplasm of head of pancreas  COMPARISON:  CT abdomen pelvis 8/2/2022  MR abdomen 4/25/2022  CT chest 3/21/2019  TECHNIQUE: CT examination of the chest, abdomen and pelvis was performed   Scanning through the abdomen was performed in arterial, venous and delayed phases according a protocol spefically designed to evaluate upper abdominal viscera  Axial, sagittal, and coronal 2D reformatted images were created from the source data and submitted for interpretation  Radiation dose length product (DLP) for this visit:  5035 75 mGy-cm   This examination, like all CT scans performed in the [facility], was performed utilizing techniques to minimize radiation dose exposure, including the use of iterative reconstruction and automated exposure control  IV Contrast:  100 mL of iohexol (OMNIPAQUE) Enteric contrast was not administered  FINDINGS: CHEST LUNGS:  No focal consolidation  Central airways are patent  -Right upper lobe 2 mm nodule (series 5 image 35) unchanged since 3/21/2018, conferring benignity   -Right lower lobe 2 mm nodule (series 5 image 47), stable  -Left lower lobe 2 mm nodule (series 5 image 76) is also unchanged   -Left lower lobe 2 mm nodule (series 5 image 58) not clearly seen on prior  PLEURA:  Unremarkable  HEART/GREAT VESSELS:  Heart is unremarkable for patient's age  No thoracic aortic aneurysm  Central venous catheter tip in the right atrium  MEDIASTINUM AND SHANNAN:  Unremarkable  CHEST WALL AND LOWER NECK:  Unremarkable  ABDOMEN LIVER/BILIARY TREE:  Hepatic steatosis  No evidence of suspicious hepatic mass  No biliary ductal dilation  GALLBLADDER:  No calcified gallstones  No pericholecystic inflammatory change  SPLEEN:  Unremarkable  PANCREAS:  Again noted ill-defined hypodense pancreatic head mass, not discretely measurable on the current study, however overall appearance of the pancreatic head region does not appear significantly changed  ADRENAL GLANDS:  Stable 1 9 cm right adrenal adenoma  KIDNEYS/URETERS: Exophytic 12 mm hypodense lesion in the right kidney midportion measures slightly higher than simple fluid density, stable, could be due to a complicated cyst   No hydronephrosis      STOMACH AND BOWEL:  Unremarkable  APPENDIX:  No findings to suggest appendicitis  ABDOMINOPELVIC CAVITY:  No ascites  No pneumoperitoneum  No lymphadenopathy  VESSELS:  Unremarkable for patient's age  PELVIS REPRODUCTIVE ORGANS:  Post hysterectomy  URINARY BLADDER:  Unremarkable  ABDOMINAL WALL/INGUINAL REGIONS:  Unremarkable  OSSEOUS STRUCTURES:  No acute fracture or destructive osseous lesion  Degenerative changes of the spine  Impression: 1  Again noted ill-defined hypodense pancreatic head mass, not discretely measurable on the current study, however overall appearance of the pancreatic head region does not appear significantly changed  2   No findings of metastatic disease in the abdomen or pelvis  3   2 mm pulmonary nodules stable since 3/21/2018, conferring benignity  A 2 mm nodule is not clearly seen on prior  Attention can be made on follow-up  Workstation performed: [id number]     Echo complete w/ contrast if indicated    Result Date: 9/7/2022  Narrative: Stephane Gregory  Left Ventricle: Left ventricular cavity size is normal  Wall thickness is mildly increased  There is borderline concentric hypertrophy  The left ventricular ejection fraction is 55% by visual estimation  Systolic function is normal  Global longitudinal strain is normal at -15 6 %  Although no diagnostic regional wall motion abnormality was identified, this possibility cannot be completely excluded on the basis of this study  Diastolic function is mildly abnormal, consistent with grade I (abnormal) relaxation    Mitral Valve: There is trace regurgitation  I reviewed the above laboratory and imaging data  Discussion/Summary:  65-year-old female with a clinical T2 N0 M0 pancreas cancer  There is no evidence of metastasis by her imaging  I suspect a lung nodule is stable  I have recommended that she undergo surgery at this time since she is having difficulty getting through her chemotherapy  I have discussed this with Dr Pascual Banks who agrees  The risks of pancreatic or duodenectomy with soft tissue ablation of the pancreas and intraoperative ultrasound were explained and included bleeding, infection, recurrence, need for further surgery, wound complications, adjacent organ injury, pancreatic and biliary fistula, sepsis, MI, DVT, stroke, pulmonary embolism, death  Informed consent was obtained  I suspect she will be able to tolerate surgery  She has already obtained her cardiac evaluation  We will send [facility] to the [facility] given her comorbid conditions  We will schedule this in the next 3 weeks once she has recovered from her last cycle of chemotherapy  She and her  are agreeable to this plan  All their questions were answered

## 2022-10-04 NOTE — PROGRESS NOTES
Surgical Oncology Follow Up       [address]  [facility] [place]  [address]  [place] [address]    Aura Po  1960  [id number]  [address] [facility]  [facility] [place]  [address] [facility] [address]  [place] [address]    Diagnoses and all orders for this visit:    Adenocarcinoma of head of pancreas [facility])  -     Case request operating room: WHIPPLE PROCEDURE/PANCREATICO-DUODENECTOMY; Standing  -     Comprehensive metabolic panel; Future  -     CBC and differential; Future  -     APTT; Future  -     Type and screen; Future  -     Protime-INR; Future  -     HEMOGLOBIN A1C W/ EAG ESTIMATION; Future  -     Ambulatory referral to Cardiology; Future  -     Ambulatory referral to surgical optimization; Future  -     Case request operating room: WHIPPLE PROCEDURE/PANCREATICO-DUODENECTOMY    Other orders  -     Incentive spirometry; Standing  -     Insert and maintain IV line; Standing  -     Void On-Call to O R ; Standing  -     Place sequential compression device; Standing  -     ceFAZolin (ANCEF) 2,000 mg in dextrose 5 % 100 mL IVPB  -     metroNIDAZOLE (FLAGYL) (HIGH DOSE) IVPB 500 mg        Chief Complaint   Patient presents with    Follow-up       No follow-ups on file  Oncology History   Adenocarcinoma of head of pancreas ([facility] [address] )   5/11/2022 Initial Diagnosis    Adenocarcinoma of head of pancreas ([facility] [address] )     5/11/2022 Biopsy    Endoscopic Ultrasound:  A , B , & C   Pancreas, Pancreatic head:   Malignant (PSC Category VI)  Positive for adenocarcinoma     6/8/2022 -  Chemotherapy    pegfilgrastim (Hershell Friar), 6 mg, Subcutaneous, Once, 7 of 14 cycles  Administration: 6 mg (6/10/2022), 6 mg (9/2/2022), 6 mg (9/16/2022), 6 mg (9/30/2022), 6 mg (8/19/2022), 6 mg (7/22/2022), 6 mg (7/7/2022)  fosaprepitant (EMEND) IVPB, 150 mg, Intravenous, Once, 5 of 12 cycles  Administration: 150 mg (7/20/2022), 150 mg (8/17/2022), 150 mg (8/31/2022), 150 mg (9/14/2022), 150 mg (9/28/2022)  fluorouracil (ADRUCIL), 400 mg/m2 = 955 mg, Intravenous, Once, 7 of 14 cycles  Administration: 955 mg (6/8/2022), 880 mg (8/31/2022), 880 mg (9/14/2022), 935 mg (9/28/2022), 880 mg (8/17/2022), 955 mg (7/5/2022)  irinotecan (CAMPTOSAR) chemo infusion, 430 mg, Intravenous, Once, 8 of 15 cycles  Dose modification: 150 mg/m2 (original dose 180 mg/m2, Cycle 6, Reason: Max Dose Reached, Comment: per protocol), 180 mg/m2 (original dose 180 mg/m2, Cycle 5, Reason: Other (Must fill in a comment), Comment: Per protocol)  Administration: 440 mg (6/8/2022), 330 mg (8/31/2022), 330 mg (9/14/2022), 351 mg (9/28/2022), 330 mg (8/17/2022), 330 mg (7/20/2022), 440 mg (7/5/2022)  oxaliplatin (ELOXATIN) chemo infusion, 203 15 mg, Intravenous, Once, 8 of 15 cycles  Administration: 200 mg (6/8/2022), 187 mg (8/31/2022), 187 mg (9/14/2022), 200 mg (9/28/2022), 200 mg (8/17/2022), 200 mg (7/20/2022), 200 mg (7/5/2022)  fluorouracil (ADRUCIL) ambulatory infusion Soln, 1,200 mg/m2/day = 5,735 mg, Intravenous, Over 46 hours, 8 of 15 cycles         Staging: [id number] pancreas adenocarcinoma  Treatment history:  Neoadjuvant FOLFIRINOX  Current treatment:  Neoadjuvant FOLFIRINOX  Disease status:  Stable    History of Present Illness:  Patient returns in follow-up  She has received 7 cycles FOLFIRINOX  She starting to have a hard time recovering from her chemotherapy  CT from September 26, 2022 reveals an ill-defined pancreatic head mass  There was no evidence of metastatic disease  There was also a small 2 mm nodule that was not seen on her previous imaging in the lung  I personally reviewed the films  She comes in now to discuss further therapy  Her appetite is fair  She has lost 35 lb since the start of chemotherapy  She has met with her cardiologist who cleared her, but she is at intermittent risk given her comorbid conditions      Review of Systems  Complete ROS Surg Onc: Complete ROS Surg Onc:   Constitutional: The patient denies new or recent history of general fatigue, no recent weight loss, no change in appetite  Eyes: No complaints of visual problems, no scleral icterus  ENT: no complaints of ear pain, no hoarseness, no difficulty swallowing,  no tinnitus and no new masses in head, oral cavity, or neck  Cardiovascular: No complaints of chest pain, no palpitations, no ankle edema  Respiratory: No complaints of shortness of breath, no cough  Gastrointestinal: No complaints of jaundice, no bloody stools, no pale stools  Genitourinary: No complaints of dysuria, no hematuria, no nocturia, no frequent urination, no urethral discharge  Musculoskeletal: No complaints of weakness, paralysis, joint stiffness or arthralgias  Integumentary: No complaints of rash, no new lesions  Neurological: No complaints of convulsions, no seizures, no dizziness  Hematologic/Lymphatic: No complaints of easy bruising  Endocrine:  No hot or cold intolerance  No polydipsia, polyphagia, or polyuria  Allergy/immunology:  No environmental allergies  No food allergies  Not immunocompromised  Skin:  No pallor or rash  No wound          Patient Active Problem List   Diagnosis    Dyspnea on exertion    Supraventricular tachycardia ([address] )    Hypertension    Controlled depression    Severe obstructive sleep apnea    Morbid obesity ([facility] [address] )    Type 2 diabetes mellitus without complication, without long-term current use of insulin (HCC)    Hyperlipidemia    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    last assessed 4/4/13    Arthritis     [facility]     Benign essential hypertension     last assessed 12/21/17     Body mass index (BMI) of 50-59 9 in adult ([facility] [address] )     Cancer ([facility]     pancreas    Colon polyp     Congenital pes planus     last assessed 7/15/14     COVID     4/30/21    CPAP (continuous positive airway pressure) dependence     Dental crowns present     Depression     Diabetes mellitus [facility]     Dizziness     occas--pt reports did see family doctor    GERD (gastroesophageal reflux disease)     Hemangioma of skin 08/26/2003    last assessed 8/26/03    History of cancer chemotherapy      Oncologist Dr Shanice Brooke History of gastroesophageal reflux (GERD)     Hypercholesterolemia     Hyperlipidemia     Hypertension     Irregular heart beat     Kidney stone     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    last assessed 12/31/14     Osteoarthritis of both knees     last assessed 12/31/14     Paroxysmal atrial fibrillation ([facility] )     Port-A-Cath in place     S/P ablation of atrial flutter     Shortness of breath     per pt "from chemo-not drastic--still working and goes up steps"    Sleep apnea     Wears glasses      Past Surgical History:   Procedure Laterality Date    ABDOMINAL SURGERY  06/2006    20cm GIST removed     APPENDECTOMY      ATRIAL ABLATION SURGERY      CARPAL TUNNEL RELEASE Bilateral     COLONOSCOPY      FL GUIDED CENTRAL VENOUS ACCESS DEVICE INSERTION  05/31/2022    FL RETROGRADE PYELOGRAM  07/18/2022    FL RETROGRADE PYELOGRAM  8/22/2022    HYSTERECTOMY      fibroids    IR PORT CHECK  06/23/2022    IR PORT REMOVAL AND PLACEMENT NEW SITE  06/28/2022    JOINT REPLACEMENT Bilateral knees    KIDNEY STONE SURGERY Right 09/17/2021    placed stent in  for kidney stone    KNEE SURGERY      KNEE SURGERY Left 03/2019    OOPHORECTOMY      cyst    WA CYSTO/URETERO W/LITHOTRIPSY &INDWELL STENT INSRT Left 8/22/2022    Procedure: CYSTOSCOPY URETEROSCOPY WITH LITHOTRIPSY HOLMIUM LASER, RETROGRADE PYELOGRAM AND INSERTION STENT URETERAL;  Surgeon: Ace Pittman MD;  [facility];  Service: Urology    WA CYSTOSCOPY,INSERT URETERAL STENT Left 8/22/2022    Procedure: EXCHANGE STENT URETERAL;  Surgeon: Ace Pittman MD;  [facility];  Service: Urology    WA CYSTOURETHROSCOPY,URETER CATHETER Left 07/18/2022    Procedure: CYSTOSCOPY RETROGRADE PYELOGRAM WITH INSERTION STENT URETERAL;  Surgeon: Ace Pittman MD;  [facility];  Service: Urology    TONSILLECTOMY     [address] TUMOR EXCISION  2006    gastrointestinal stromal tumor    TUNNELED VENOUS PORT PLACEMENT N/A 05/31/2022    Procedure: INSERTION VENOUS PORT (PORT-A-CATH);   Surgeon: Jeanmarie Calvert MD;  [facility];  Service: Surgical Oncology    UPPER GASTROINTESTINAL ENDOSCOPY       Family History   Problem Relation Age of Onset    Emphysema Mother     Arthritis Mother     Diabetes Mother     Hypertension Mother     COPD Mother     Arthritis Father     Prostate cancer Father     Cerebral aneurysm Son     No Known Problems Maternal Grandmother     No Known Problems Maternal Grandfather     No Known Problems Paternal Grandmother     No Known Problems Paternal Grandfather     No Known Problems Son     No Known Problems Maternal Aunt     No Known Problems Maternal Aunt     No Known Problems Paternal Aunt     No Known Problems Paternal Aunt      Social History     Socioeconomic History    Marital status: /Civil Union     Spouse name: Not on file    Number of children: Not on file    Years of education: Not on file    Highest education level: Not on file   Occupational History    Not on file   Tobacco Use    Smoking status: Former Smoker     Years: 9 00     Types: Cigarettes    Smokeless tobacco: Never Used    Tobacco comment: pt states she smokes 1 to 3 times per week   Vaping Use    Vaping Use: Never used   Substance and Sexual Activity    Alcohol use: Not Currently     Comment: social drinker per [employer]script     Drug use: No    Sexual activity: Not on file     Comment: defer   Other Topics Concern    Not on file   Social History Narrative    Lack of exercise    Good sleep hygiene    No caffeine use    Dog    Good sleep hygiene       Social Determinants of Health     Financial Resource Strain: Not on file   Food Insecurity: Not on file   Transportation Needs: Not on file   Physical Activity: Not on file   Stress: Not on file   Social Connections: Not on file   Intimate Partner Violence: Not on file   Housing Stability: Not on file       Current Outpatient Medications:     acetaminophen (TYLENOL) 500 mg tablet, Take 500 mg by mouth every 6 (six) hours as needed for mild pain, Disp: , Rfl:     aspirin 81 MG tablet, Take 81 mg by mouth daily  , Disp: , Rfl:     atorvastatin (LIPITOR) 40 mg tablet, Take 1 tablet (40 mg total) by mouth daily at bedtime, Disp: 90 tablet, Rfl: 3    [START ON 10/12/2022] fluorouracil 5,615 mg in CADD/Elastomeric Infusion Device, Infuse 5,615 mg (1,200 mg/m2/day x 2 34 m2) into a catheter in a vein via infusion device over 46 hours for 2 days  Do not start before October 12, 2022 , Disp: 1 each, Rfl: 12    glucose blood (Contour Next Test) test strip, Use 1 each daily Use as instructed, Disp: 100 each, Rfl: 3    ibuprofen (ADVIL,MOTRIN) 100 MG tablet, Take 200 mg by mouth as needed for mild pain, Disp: , Rfl:     Insulin Pen Needle (Pen Needles) 32G X 4 MM MISC, Use once a week, Disp: 12 each, Rfl: 3    Magnesium Oxide -Mg Supplement 400 MG CAPS, Take 1 capsule by mouth daily, Disp: , Rfl:     meclizine (ANTIVERT) 12 5 MG tablet, Take 1 tablet (12 5 mg total) by mouth every 8 (eight) hours, Disp: 30 tablet, Rfl: 0    metoprolol succinate (TOPROL-XL) 25 mg 24 hr tablet, Take 1 tablet (25 mg total) by mouth 2 (two) times a day, Disp: 180 tablet, Rfl: 3    multivitamin (THERAGRAN) TABS, Take 1 tablet by mouth daily  , Disp: , Rfl:     olmesartan (BENICAR) 20 mg tablet, TAKE 1 TABLET BY MOUTH  DAILY, Disp: 90 tablet, Rfl: 3    Omega-3 Fatty Acids (FISH OIL) 1,000 mg, Take 1,000 mg by mouth 2 (two) times a day  , Disp: , Rfl:     ondansetron (ZOFRAN) 4 mg tablet, Take 2 tablets (8 mg total) by mouth every 8 (eight) hours as needed for nausea or vomiting, Disp: 20 tablet, Rfl: 3    Ozempic, 1 MG/DOSE, 4 MG/3ML SOPN injection pen, INJECT 1MG SUBCUTANEOUSLY  WEEKLY, Disp: 9 mL, Rfl: 3    pantoprazole (PROTONIX) 40 mg tablet, TAKE 1 TABLET BY MOUTH  DAILY BEFORE BREAKFAST, Disp: 90 tablet, Rfl: 3    PARoxetine (PAXIL-CR) 37 5 mg 24 hr tablet, TAKE 1 TABLET BY MOUTH IN  THE MORNING, Disp: 90 tablet, Rfl: 3    Potassium Citrate ER 15 MEQ (1620 MG) TBCR, TAKE 1 TABLET BY MOUTH  TWICE DAILY, Disp: 200 tablet, Rfl: 3    sotalol (BETAPACE) 120 mg tablet, Take 1 tablet (120 mg total) by mouth every 12 (twelve) hours, Disp: 180 tablet, Rfl: 3    VITAMIN D PO, Take 2,000 Units by mouth 2 (two) times a day, Disp: , Rfl:     senna (SENOKOT) 8 6 mg, Take 1 tablet (8 6 mg total) by mouth daily at bedtime for 5 days, Disp: 5 tablet, Rfl: 0  Allergies   Allergen Reactions    Other Rash     Adhesive tape      Vitals:    10/04/22 1328   BP: 128/80   Pulse: 78   Resp: 20   Temp: (!) 97 4 °F (36 3 °C)   SpO2: 98%       Physical Exam  Constitutional: General appearance: The Patient is well-developed and well-nourished who appears the stated age in no acute distress  Patient is pleasant and talkative  HEENT:  Normocephalic  Sclerae are anicteric  Mucous membranes are moist  Neck is supple without adenopathy  No JVD       Chest: The lungs are clear to auscultation  Cardiac: Heart is regular rate  Abdomen: Abdomen is soft, non-tender, non-distended and without masses  Extremities: There is no clubbing or cyanosis  There is no edema  Symmetric  Neuro: Grossly nonfocal  Gait is normal      Lymphatic: No evidence of cervical adenopathy bilaterally  No evidence of axillary adenopathy bilaterally  No evidence of inguinal adenopathy bilaterally  Skin: Warm, anicteric  Psych:  Patient is pleasant and talkative  Breasts:        Pathology:  [unfilled]    Labs:      Imaging  MRI brain w wo contrast    Result Date: 9/26/2022  Narrative: MRI BRAIN WITH AND WITHOUT CONTRAST INDICATION: C25 0: Malignant neoplasm of head of pancreas  COMPARISON:  None  TECHNIQUE: Sagittal T1, axial T2, axial FLAIR, axial T1, axial Kendall, axial diffusion  Sagittal, axial T1 postcontrast   Axial bravo postcontrast with coronal reconstructions  IV Contrast:  13 mL of Gadobutrol injection (SINGLE-DOSE)  IMAGE QUALITY:   Diagnostic  FINDINGS: BRAIN PARENCHYMA:  There is no discrete mass, mass effect or midline shift  There is no intracranial hemorrhage  Normal posterior fossa  Diffusion imaging is unremarkable  There are no white matter changes in the cerebral hemispheres  Postcontrast imaging of the brain demonstrates no abnormal enhancement  VENTRICLES:  Normal for the patient's age  SELLA AND PITUITARY GLAND:  Normal  ORBITS:  Normal  PARANASAL SINUSES:  Normal  VASCULATURE:  Appropriate flow voids of the major central intracranial vessels  The distal vertebral arteries are asymmetric with hypoplasia on the right and a dominant left vertebral artery with mild tortuosity  CALVARIUM AND SKULL BASE:  Normal  EXTRACRANIAL SOFT TISSUES:  Normal      Impression: Normal MRI of the brain   Workstation performed: [id number]     CT chest abdomen pelvis w contrast    Result Date: 9/29/2022  Narrative: CT CHEST, ABDOMEN AND PELVIS WITH IV CONTRAST INDICATION:   C25 0: Malignant neoplasm of head of pancreas  COMPARISON:  CT abdomen pelvis 8/2/2022  MR abdomen 4/25/2022  CT chest 3/21/2019  TECHNIQUE: CT examination of the chest, abdomen and pelvis was performed  Scanning through the abdomen was performed in arterial, venous and delayed phases according a protocol spefically designed to evaluate upper abdominal viscera  Axial, sagittal, and coronal 2D reformatted images were created from the source data and submitted for interpretation  Radiation dose length product (DLP) for this visit:  5035 75 mGy-cm   This examination, like all CT scans performed in the [facility], was performed utilizing techniques to minimize radiation dose exposure, including the use of iterative reconstruction and automated exposure control  IV Contrast:  100 mL of iohexol (OMNIPAQUE) Enteric contrast was not administered  FINDINGS: CHEST LUNGS:  No focal consolidation  Central airways are patent  -Right upper lobe 2 mm nodule (series 5 image 35) unchanged since 3/21/2018, conferring benignity   -Right lower lobe 2 mm nodule (series 5 image 47), stable  -Left lower lobe 2 mm nodule (series 5 image 76) is also unchanged   -Left lower lobe 2 mm nodule (series 5 image 58) not clearly seen on prior  PLEURA:  Unremarkable  HEART/GREAT VESSELS:  Heart is unremarkable for patient's age  No thoracic aortic aneurysm  Central venous catheter tip in the right atrium  MEDIASTINUM AND SHANNAN:  Unremarkable  CHEST WALL AND LOWER NECK:  Unremarkable  ABDOMEN LIVER/BILIARY TREE:  Hepatic steatosis  No evidence of suspicious hepatic mass  No biliary ductal dilation  GALLBLADDER:  No calcified gallstones  No pericholecystic inflammatory change  SPLEEN:  Unremarkable  PANCREAS:  Again noted ill-defined hypodense pancreatic head mass, not discretely measurable on the current study, however overall appearance of the pancreatic head region does not appear significantly changed    ADRENAL GLANDS:  Stable 1 9 cm right adrenal adenoma  KIDNEYS/URETERS: Exophytic 12 mm hypodense lesion in the right kidney midportion measures slightly higher than simple fluid density, stable, could be due to a complicated cyst   No hydronephrosis  STOMACH AND BOWEL:  Unremarkable  APPENDIX:  No findings to suggest appendicitis  ABDOMINOPELVIC CAVITY:  No ascites  No pneumoperitoneum  No lymphadenopathy  VESSELS:  Unremarkable for patient's age  PELVIS REPRODUCTIVE ORGANS:  Post hysterectomy  URINARY BLADDER:  Unremarkable  ABDOMINAL WALL/INGUINAL REGIONS:  Unremarkable  OSSEOUS STRUCTURES:  No acute fracture or destructive osseous lesion  Degenerative changes of the spine  Impression: 1  Again noted ill-defined hypodense pancreatic head mass, not discretely measurable on the current study, however overall appearance of the pancreatic head region does not appear significantly changed  2   No findings of metastatic disease in the abdomen or pelvis  3   2 mm pulmonary nodules stable since 3/21/2018, conferring benignity  A 2 mm nodule is not clearly seen on prior  Attention can be made on follow-up  Workstation performed: [id number]     Echo complete w/ contrast if indicated    Result Date: 9/7/2022  Narrative: Dannie Larkin  Left Ventricle: Left ventricular cavity size is normal  Wall thickness is mildly increased  There is borderline concentric hypertrophy  The left ventricular ejection fraction is 55% by visual estimation  Systolic function is normal  Global longitudinal strain is normal at -15 6 %  Although no diagnostic regional wall motion abnormality was identified, this possibility cannot be completely excluded on the basis of this study  Diastolic function is mildly abnormal, consistent with grade I (abnormal) relaxation    Mitral Valve: There is trace regurgitation  I reviewed the above laboratory and imaging data  Discussion/Summary:  40-year-old female with a clinical T2 N0 M0 pancreas cancer    There is no evidence of metastasis by her imaging  I suspect a lung nodule is stable  I have recommended that she undergo surgery at this time since she is having difficulty getting through her chemotherapy  I have discussed this with Dr Jo Guzmán who agrees  The risks of pancreatic or duodenectomy with soft tissue ablation of the pancreas and intraoperative ultrasound were explained and included bleeding, infection, recurrence, need for further surgery, wound complications, adjacent organ injury, pancreatic and biliary fistula, sepsis, MI, DVT, stroke, pulmonary embolism, death  Informed consent was obtained  I suspect she will be able to tolerate surgery  She has already obtained her cardiac evaluation  [facility] given her comorbid conditions  We will schedule this in the next 3 weeks once she has recovered from her last cycle of chemotherapy  She and her  are agreeable to this plan  All their questions were answered

## 2022-10-04 NOTE — H&P (VIEW-ONLY)
Surgical Oncology Follow Up       [address]  [facility] [place]  [address]  [place] [address]    McGrady Bold  1960  [id number]  [address] [facility]  [facility] [place]  [address] [facility] [address]  [place] [address]    Diagnoses and all orders for this visit:    Adenocarcinoma of head of pancreas [facility])  -     Case request operating room: WHIPPLE PROCEDURE/PANCREATICO-DUODENECTOMY; Standing  -     Comprehensive metabolic panel; Future  -     CBC and differential; Future  -     APTT; Future  -     Type and screen; Future  -     Protime-INR; Future  -     HEMOGLOBIN A1C W/ EAG ESTIMATION; Future  -     Ambulatory referral to Cardiology; Future  -     Ambulatory referral to surgical optimization; Future  -     Case request operating room: WHIPPLE PROCEDURE/PANCREATICO-DUODENECTOMY    Other orders  -     Incentive spirometry; Standing  -     Insert and maintain IV line; Standing  -     Void On-Call to O R ; Standing  -     Place sequential compression device; Standing  -     ceFAZolin (ANCEF) 2,000 mg in dextrose 5 % 100 mL IVPB  -     metroNIDAZOLE (FLAGYL) (HIGH DOSE) IVPB 500 mg        Chief Complaint   Patient presents with   • Follow-up       No follow-ups on file  Oncology History   Adenocarcinoma of head of pancreas ([facility] [address] )   5/11/2022 Initial Diagnosis    Adenocarcinoma of head of pancreas ([facility] [address] )     5/11/2022 Biopsy    Endoscopic Ultrasound:  A , B , & C   Pancreas, Pancreatic head:   Malignant (PSC Category VI)  Positive for adenocarcinoma     6/8/2022 -  Chemotherapy    pegfilgrastim (Amadou Porsche), 6 mg, Subcutaneous, Once, 7 of 14 cycles  Administration: 6 mg (6/10/2022), 6 mg (9/2/2022), 6 mg (9/16/2022), 6 mg (9/30/2022), 6 mg (8/19/2022), 6 mg (7/22/2022), 6 mg (7/7/2022)  fosaprepitant (EMEND) IVPB, 150 mg, Intravenous, Once, 5 of 12 cycles  Administration: 150 mg (7/20/2022), 150 mg (8/17/2022), 150 mg (8/31/2022), 150 mg (9/14/2022), 150 mg (9/28/2022)  fluorouracil (ADRUCIL), 400 mg/m2 = 955 mg, Intravenous, Once, 7 of 14 cycles  Administration: 955 mg (6/8/2022), 880 mg (8/31/2022), 880 mg (9/14/2022), 935 mg (9/28/2022), 880 mg (8/17/2022), 955 mg (7/5/2022)  irinotecan (CAMPTOSAR) chemo infusion, 430 mg, Intravenous, Once, 8 of 15 cycles  Dose modification: 150 mg/m2 (original dose 180 mg/m2, Cycle 6, Reason: Max Dose Reached, Comment: per protocol), 180 mg/m2 (original dose 180 mg/m2, Cycle 5, Reason: Other (Must fill in a comment), Comment: Per protocol)  Administration: 440 mg (6/8/2022), 330 mg (8/31/2022), 330 mg (9/14/2022), 351 mg (9/28/2022), 330 mg (8/17/2022), 330 mg (7/20/2022), 440 mg (7/5/2022)  oxaliplatin (ELOXATIN) chemo infusion, 203 15 mg, Intravenous, Once, 8 of 15 cycles  Administration: 200 mg (6/8/2022), 187 mg (8/31/2022), 187 mg (9/14/2022), 200 mg (9/28/2022), 200 mg (8/17/2022), 200 mg (7/20/2022), 200 mg (7/5/2022)  fluorouracil (ADRUCIL) ambulatory infusion Soln, 1,200 mg/m2/day = 5,735 mg, Intravenous, Over 46 hours, 8 of 15 cycles         Staging: [id number] pancreas adenocarcinoma  Treatment history:  Neoadjuvant FOLFIRINOX  Current treatment:  Neoadjuvant FOLFIRINOX  Disease status:  Stable    History of Present Illness:  Patient returns in follow-up  She has received 7 cycles FOLFIRINOX  She starting to have a hard time recovering from her chemotherapy  CT from September 26, 2022 reveals an ill-defined pancreatic head mass  There was no evidence of metastatic disease  There was also a small 2 mm nodule that was not seen on her previous imaging in the lung  I personally reviewed the films  She comes in now to discuss further therapy  Her appetite is fair  She has lost 35 lb since the start of chemotherapy  She has met with her cardiologist who cleared her, but she is at intermittent risk given her comorbid conditions      Review of Systems  Complete ROS Surg Onc: Complete ROS Surg Onc:   Constitutional: The patient denies new or recent history of general fatigue, no recent weight loss, no change in appetite  Eyes: No complaints of visual problems, no scleral icterus  ENT: no complaints of ear pain, no hoarseness, no difficulty swallowing,  no tinnitus and no new masses in head, oral cavity, or neck  Cardiovascular: No complaints of chest pain, no palpitations, no ankle edema  Respiratory: No complaints of shortness of breath, no cough  Gastrointestinal: No complaints of jaundice, no bloody stools, no pale stools  Genitourinary: No complaints of dysuria, no hematuria, no nocturia, no frequent urination, no urethral discharge  Musculoskeletal: No complaints of weakness, paralysis, joint stiffness or arthralgias  Integumentary: No complaints of rash, no new lesions  Neurological: No complaints of convulsions, no seizures, no dizziness  Hematologic/Lymphatic: No complaints of easy bruising  Endocrine:  No hot or cold intolerance  No polydipsia, polyphagia, or polyuria  Allergy/immunology:  No environmental allergies  No food allergies  Not immunocompromised  Skin:  No pallor or rash  No wound          Patient Active Problem List   Diagnosis   • Dyspnea on exertion   • Supraventricular tachycardia (HCC)   • Hypertension   • Controlled depression   • Severe obstructive sleep apnea   • Morbid obesity (HCC)   • Type 2 diabetes mellitus without complication, without long-term current use of insulin (HCC)   • Hyperlipidemia   • 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)   • Esophageal reflux   • Benign essential hypertension   • Adenomatous colon polyp   • Class 3 severe obesity due to excess calories with body mass index (BMI) of 50 0 to 59 9 in adult [facility])   • Kidney stone   • Uric acid kidney stone   • Adenocarcinoma of head of pancreas ([facility] [address] )   • Chemotherapy induced neutropenia (HCC)   • CPAP (continuous positive airway pressure) dependence   • Left flank pain   • Paroxysmal A-fib [facility])     Past Medical History:   Diagnosis Date   • Abnormal liver function test     last assessed 1/16/17    • Adenomatosis    • 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 )    • Benign essential hypertension     last assessed 12/21/17    • Body mass index (BMI) of 50-59 9 in adult [facility])    • Cancer (HCC)     pancreas   • Colon polyp    • Congenital pes planus     last assessed 7/15/14    • COVID     4/30/21   • CPAP (continuous positive airway pressure) dependence    • Dental crowns present    • Depression    • Diabetes mellitus ([place] [address] )    • Dizziness     occas--pt reports did see family doctor   • GERD (gastroesophageal reflux disease)    • Hemangioma of skin 08/26/2003    last assessed 8/26/03   • History of cancer chemotherapy     [facility] Oncologist Dr Lion Narvaez   • History of gastroesophageal reflux (GERD)    • Hypercholesterolemia    • Hyperlipidemia    • Hypertension    • Irregular heart beat    • Kidney stone    • 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 ) 04/19/2006    last assessed 12/31/14    • Osteoarthritis of both knees     last assessed 12/31/14    • Paroxysmal atrial fibrillation ([facility] )    • Port-A-Cath in place    • S/P ablation of atrial flutter    • Shortness of breath     per pt "from chemo-not drastic--still working and goes up steps"   • Sleep apnea    • Wears glasses      Past Surgical History:   Procedure Laterality Date   • ABDOMINAL SURGERY  06/2006    20cm GIST removed    • APPENDECTOMY     • ATRIAL ABLATION SURGERY     • CARPAL TUNNEL RELEASE Bilateral    • COLONOSCOPY     • FL GUIDED CENTRAL VENOUS ACCESS DEVICE INSERTION  05/31/2022   • FL RETROGRADE PYELOGRAM  07/18/2022   • FL RETROGRADE PYELOGRAM  8/22/2022   • HYSTERECTOMY      fibroids   • IR PORT CHECK  06/23/2022   • IR PORT REMOVAL AND PLACEMENT NEW SITE  06/28/2022   • JOINT REPLACEMENT Bilateral knees   • KIDNEY STONE SURGERY Right 09/17/2021    placed stent in  for kidney stone   • KNEE SURGERY     • KNEE SURGERY Left 03/2019   • OOPHORECTOMY      cyst   • WY CYSTO/URETERO W/LITHOTRIPSY &INDWELL STENT INSRT Left 8/22/2022    Procedure: CYSTOSCOPY URETEROSCOPY WITH LITHOTRIPSY HOLMIUM LASER, RETROGRADE PYELOGRAM AND INSERTION STENT URETERAL;  Surgeon: Ace Pittman MD;  [facility]: [facility];  Service: Urology   • WY CYSTOSCOPY,INSERT URETERAL STENT Left 8/22/2022    Procedure: EXCHANGE STENT URETERAL;  Surgeon: Ace Pittman MD;  [facility];  Service: Urology   • WY CYSTOURETHROSCOPY,URETER CATHETER Left 07/18/2022    Procedure: CYSTOSCOPY RETROGRADE PYELOGRAM WITH INSERTION STENT URETERAL;  Surgeon: Ace Pittman MD;  [facility];  Service: Urology   • TONSILLECTOMY     • TUBAL LIGATION     • TUMOR EXCISION  2006    gastrointestinal stromal tumor   • TUNNELED VENOUS PORT PLACEMENT N/A 05/31/2022    Procedure: INSERTION VENOUS PORT (PORT-A-CATH);   Surgeon: Jeanmarie Calvert MD;  Location: [facility];  Service: Surgical Oncology   • UPPER GASTROINTESTINAL ENDOSCOPY       Family History   Problem Relation Age of Onset   • Emphysema Mother    • Arthritis Mother    • Diabetes Mother    • Hypertension Mother    • COPD Mother    • Arthritis Father    • Prostate cancer Father    • Cerebral aneurysm Son    • No Known Problems Maternal Grandmother    • No Known Problems Maternal Grandfather    • No Known Problems Paternal Grandmother    • No Known Problems Paternal Grandfather    • No Known Problems Son    • No Known Problems Maternal Aunt    • No Known Problems Maternal Aunt    • No Known Problems Paternal Aunt    • No Known Problems Paternal Aunt      Social History     Socioeconomic History   • Marital status: /Civil Union     Spouse name: Not on file   • Number of children: Not on file   • Years of education: Not on file   • Highest education level: Not on file   Occupational History   • Not on file   Tobacco Use   • Smoking status: Former Smoker     Years: 9 00     Types: Cigarettes   • Smokeless tobacco: Never Used   • Tobacco comment: pt states she smokes 1 to 3 times per week   Vaping Use   • Vaping Use: Never used   Substance and Sexual Activity   • Alcohol use: Not Currently     Comment: social drinker per allscript    • Drug use: No   • Sexual activity: Not on file     Comment: defer   Other Topics Concern   • Not on file   Social History Narrative    Lack of exercise    Good sleep hygiene    No caffeine use    Dog    Good sleep hygiene       Social Determinants of Health     Financial Resource Strain: Not on file   Food Insecurity: Not on file   Transportation Needs: Not on file   Physical Activity: Not on file   Stress: Not on file   Social Connections: Not on file   Intimate Partner Violence: Not on file   Housing Stability: Not on file       Current Outpatient Medications:   •  acetaminophen (TYLENOL) 500 mg tablet, Take 500 mg by mouth every 6 (six) hours as needed for mild pain, Disp: , Rfl:   •  aspirin 81 MG tablet, Take 81 mg by mouth daily  , Disp: , Rfl:   •  atorvastatin (LIPITOR) 40 mg tablet, Take 1 tablet (40 mg total) by mouth daily at bedtime, Disp: 90 tablet, Rfl: 3  •  [START ON 10/12/2022] fluorouracil 5,615 mg in CADD/Elastomeric Infusion Device, Infuse 5,615 mg (1,200 mg/m2/day x 2 34 m2) into a catheter in a vein via infusion device over 46 hours for 2 days  Do not start before October 12, 2022 , Disp: 1 each, Rfl: 12  •  glucose blood (Contour Next Test) test strip, Use 1 each daily Use as instructed, Disp: 100 each, Rfl: 3  •  ibuprofen (ADVIL,MOTRIN) 100 MG tablet, Take 200 mg by mouth as needed for mild pain, Disp: , Rfl:   •  Insulin Pen Needle (Pen Needles) 32G X 4 MM MISC, Use once a week, Disp: 12 each, Rfl: 3  •  Magnesium Oxide -Mg Supplement 400 MG CAPS, Take 1 capsule by mouth daily, Disp: , Rfl:   •  meclizine (ANTIVERT) 12 5 MG tablet, Take 1 tablet (12 5 mg total) by mouth every 8 (eight) hours, Disp: 30 tablet, Rfl: 0  •  metoprolol succinate (TOPROL-XL) 25 mg 24 hr tablet, Take 1 tablet (25 mg total) by mouth 2 (two) times a day, Disp: 180 tablet, Rfl: 3  •  multivitamin (THERAGRAN) TABS, Take 1 tablet by mouth daily  , Disp: , Rfl:   •  olmesartan (BENICAR) 20 mg tablet, TAKE 1 TABLET BY MOUTH  DAILY, Disp: 90 tablet, Rfl: 3  •  Omega-3 Fatty Acids (FISH OIL) 1,000 mg, Take 1,000 mg by mouth 2 (two) times a day  , Disp: , Rfl:   •  ondansetron (ZOFRAN) 4 mg tablet, Take 2 tablets (8 mg total) by mouth every 8 (eight) hours as needed for nausea or vomiting, Disp: 20 tablet, Rfl: 3  •  Ozempic, 1 MG/DOSE, 4 MG/3ML SOPN injection pen, INJECT 1MG SUBCUTANEOUSLY  WEEKLY, Disp: 9 mL, Rfl: 3  •  pantoprazole (PROTONIX) 40 mg tablet, TAKE 1 TABLET BY MOUTH  DAILY BEFORE BREAKFAST, Disp: 90 tablet, Rfl: 3  •  PARoxetine (PAXIL-CR) 37 5 mg 24 hr tablet, TAKE 1 TABLET BY MOUTH IN  THE MORNING, Disp: 90 tablet, Rfl: 3  •  Potassium Citrate ER 15 MEQ (1620 MG) TBCR, TAKE 1 TABLET BY MOUTH  TWICE DAILY, Disp: 200 tablet, Rfl: 3  •  sotalol (BETAPACE) 120 mg tablet, Take 1 tablet (120 mg total) by mouth every 12 (twelve) hours, Disp: 180 tablet, Rfl: 3  •  VITAMIN D PO, Take 2,000 Units by mouth 2 (two) times a day, Disp: , Rfl:   •  senna (SENOKOT) 8 6 mg, Take 1 tablet (8 6 mg total) by mouth daily at bedtime for 5 days, Disp: 5 tablet, Rfl: 0  Allergies   Allergen Reactions   • Other Rash     Adhesive tape      Vitals:    10/04/22 1328   BP: 128/80   Pulse: 78   Resp: 20   Temp: (!) 97 4 °F (36 3 °C)   SpO2: 98%       Physical Exam  Constitutional: General appearance: The Patient is well-developed and well-nourished who appears the stated age in no acute distress  Patient is pleasant and talkative  HEENT:  Normocephalic  Sclerae are anicteric  Mucous membranes are moist  Neck is supple without adenopathy  No JVD       Chest: The lungs are clear to auscultation  Cardiac: Heart is regular rate  Abdomen: Abdomen is soft, non-tender, non-distended and without masses  Extremities: There is no clubbing or cyanosis  There is no edema  Symmetric  Neuro: Grossly nonfocal  Gait is normal      Lymphatic: No evidence of cervical adenopathy bilaterally  No evidence of axillary adenopathy bilaterally  No evidence of inguinal adenopathy bilaterally  Skin: Warm, anicteric  Psych:  Patient is pleasant and talkative  Breasts:        Pathology:  [unfilled]    Labs:      Imaging  MRI brain w wo contrast    Result Date: 9/26/2022  Narrative: MRI BRAIN WITH AND WITHOUT CONTRAST INDICATION: C25 0: Malignant neoplasm of head of pancreas  COMPARISON:  None  TECHNIQUE: Sagittal T1, axial T2, axial FLAIR, axial T1, axial [place], axial diffusion  Sagittal, axial T1 postcontrast   Axial bravo postcontrast with coronal reconstructions  IV Contrast:  13 mL of Gadobutrol injection (SINGLE-DOSE)  IMAGE QUALITY:   Diagnostic  FINDINGS: BRAIN PARENCHYMA:  There is no discrete mass, mass effect or midline shift  There is no intracranial hemorrhage  Normal posterior fossa  Diffusion imaging is unremarkable  There are no white matter changes in the cerebral hemispheres  Postcontrast imaging of the brain demonstrates no abnormal enhancement  VENTRICLES:  Normal for the patient's age  SELLA AND PITUITARY GLAND:  Normal  ORBITS:  Normal  PARANASAL SINUSES:  Normal  VASCULATURE:  Appropriate flow voids of the major central intracranial vessels  The distal vertebral arteries are asymmetric with hypoplasia on the right and a dominant left vertebral artery with mild tortuosity  CALVARIUM AND SKULL BASE:  Normal  EXTRACRANIAL SOFT TISSUES:  Normal      Impression: Normal MRI of the brain   Workstation performed: [id number]     CT chest abdomen pelvis w contrast    Result Date: 9/29/2022  Narrative: CT CHEST, ABDOMEN AND PELVIS WITH IV CONTRAST INDICATION:   C25 0: Malignant neoplasm of head of pancreas  COMPARISON:  CT abdomen pelvis 8/2/2022  MR abdomen 4/25/2022  CT chest 3/21/2019  TECHNIQUE: CT examination of the chest, abdomen and pelvis was performed  Scanning through the abdomen was performed in arterial, venous and delayed phases according a protocol spefically designed to evaluate upper abdominal viscera  Axial, sagittal, and coronal 2D reformatted images were created from the source data and submitted for interpretation  Radiation dose length product (DLP) for this visit:  5035 75 mGy-cm   This examination, like all CT scans performed in the [facility], was performed utilizing techniques to minimize radiation dose exposure, including the use of iterative reconstruction and automated exposure control  IV Contrast:  100 mL of iohexol (OMNIPAQUE) Enteric contrast was not administered  FINDINGS: CHEST LUNGS:  No focal consolidation  Central airways are patent  -Right upper lobe 2 mm nodule (series 5 image 35) unchanged since 3/21/2018, conferring benignity   -Right lower lobe 2 mm nodule (series 5 image 47), stable  -Left lower lobe 2 mm nodule (series 5 image 76) is also unchanged   -Left lower lobe 2 mm nodule (series 5 image 58) not clearly seen on prior  PLEURA:  Unremarkable  HEART/GREAT VESSELS:  Heart is unremarkable for patient's age  No thoracic aortic aneurysm  Central venous catheter tip in the right atrium  MEDIASTINUM AND SHANNAN:  Unremarkable  CHEST WALL AND LOWER NECK:  Unremarkable  ABDOMEN LIVER/BILIARY TREE:  Hepatic steatosis  No evidence of suspicious hepatic mass  No biliary ductal dilation  GALLBLADDER:  No calcified gallstones  No pericholecystic inflammatory change  SPLEEN:  Unremarkable  PANCREAS:  Again noted ill-defined hypodense pancreatic head mass, not discretely measurable on the current study, however overall appearance of the pancreatic head region does not appear significantly changed    ADRENAL GLANDS:  Stable 1 9 cm right adrenal adenoma  KIDNEYS/URETERS: Exophytic 12 mm hypodense lesion in the right kidney midportion measures slightly higher than simple fluid density, stable, could be due to a complicated cyst   No hydronephrosis  STOMACH AND BOWEL:  Unremarkable  APPENDIX:  No findings to suggest appendicitis  ABDOMINOPELVIC CAVITY:  No ascites  No pneumoperitoneum  No lymphadenopathy  VESSELS:  Unremarkable for patient's age  PELVIS REPRODUCTIVE ORGANS:  Post hysterectomy  URINARY BLADDER:  Unremarkable  ABDOMINAL WALL/INGUINAL REGIONS:  Unremarkable  OSSEOUS STRUCTURES:  No acute fracture or destructive osseous lesion  Degenerative changes of the spine  Impression: 1  Again noted ill-defined hypodense pancreatic head mass, not discretely measurable on the current study, however overall appearance of the pancreatic head region does not appear significantly changed  2   No findings of metastatic disease in the abdomen or pelvis  3   2 mm pulmonary nodules stable since 3/21/2018, conferring benignity  A 2 mm nodule is not clearly seen on prior  Attention can be made on follow-up  Workstation performed: [id number]     Echo complete w/ contrast if indicated    Result Date: 9/7/2022  Narrative: •  Left Ventricle: Left ventricular cavity size is normal  Wall thickness is mildly increased  There is borderline concentric hypertrophy  The left ventricular ejection fraction is 55% by visual estimation  Systolic function is normal  Global longitudinal strain is normal at -15 6 %  Although no diagnostic regional wall motion abnormality was identified, this possibility cannot be completely excluded on the basis of this study  Diastolic function is mildly abnormal, consistent with grade I (abnormal) relaxation  •  Mitral Valve: There is trace regurgitation  I reviewed the above laboratory and imaging data  Discussion/Summary:  43-year-old female with a clinical T2 N0 M0 pancreas cancer    There is no evidence of metastasis by her imaging  I suspect a lung nodule is stable  I have recommended that she undergo surgery at this time since she is having difficulty getting through her chemotherapy  I have discussed this with Dr Kelli Bland who agrees  The risks of pancreatic or duodenectomy with soft tissue ablation of the pancreas and intraoperative ultrasound were explained and included bleeding, infection, recurrence, need for further surgery, wound complications, adjacent organ injury, pancreatic and biliary fistula, sepsis, MI, DVT, stroke, pulmonary embolism, death  Informed consent was obtained  I suspect she will be able to tolerate surgery  She has already obtained her cardiac evaluation  We will send [facility] the [facility] given her comorbid conditions  We will schedule this in the next 3 weeks once she has recovered from her last cycle of chemotherapy  She and her  are agreeable to this plan  All their questions were answered

## 2022-10-05 ENCOUNTER — TELEPHONE (OUTPATIENT)
Dept: FAMILY MEDICINE CLINIC | Facility: CLINIC | Age: 62
End: 2022-10-05

## 2022-10-05 NOTE — TELEPHONE ENCOUNTER
Dr Lev Moore    Patient needs letter excusing her from jury duty as she is undergoing chemotherapy and is having surgery for pancreatic cancer on 10/31

## 2022-10-12 ENCOUNTER — HOSPITAL ENCOUNTER (OUTPATIENT)
Dept: INFUSION CENTER | Facility: HOSPITAL | Age: 62
End: 2022-10-12
Attending: INTERNAL MEDICINE

## 2022-10-12 ENCOUNTER — TELEPHONE (OUTPATIENT)
Dept: ANESTHESIOLOGY | Facility: CLINIC | Age: 62
End: 2022-10-12

## 2022-10-13 ENCOUNTER — CONSULT (OUTPATIENT)
Dept: ANESTHESIOLOGY | Facility: CLINIC | Age: 62
End: 2022-10-13
Payer: COMMERCIAL

## 2022-10-13 VITALS
DIASTOLIC BLOOD PRESSURE: 76 MMHG | HEART RATE: 88 BPM | OXYGEN SATURATION: 98 % | TEMPERATURE: 98.2 F | SYSTOLIC BLOOD PRESSURE: 130 MMHG

## 2022-10-13 DIAGNOSIS — Z99.89 CPAP (CONTINUOUS POSITIVE AIRWAY PRESSURE) DEPENDENCE: ICD-10-CM

## 2022-10-13 DIAGNOSIS — C49.A0: ICD-10-CM

## 2022-10-13 DIAGNOSIS — C25.0 ADENOCARCINOMA OF HEAD OF PANCREAS (HCC): ICD-10-CM

## 2022-10-13 DIAGNOSIS — I48.0 PAROXYSMAL A-FIB (HCC): ICD-10-CM

## 2022-10-13 DIAGNOSIS — E11.9 TYPE 2 DIABETES MELLITUS WITHOUT COMPLICATION, WITHOUT LONG-TERM CURRENT USE OF INSULIN (HCC): ICD-10-CM

## 2022-10-13 DIAGNOSIS — I10 PRIMARY HYPERTENSION: Chronic | ICD-10-CM

## 2022-10-13 DIAGNOSIS — G47.33 SEVERE OBSTRUCTIVE SLEEP APNEA: Primary | ICD-10-CM

## 2022-10-13 PROCEDURE — 99215 OFFICE O/P EST HI 40 MIN: CPT | Performed by: NURSE PRACTITIONER

## 2022-10-13 NOTE — PROGRESS NOTES
CAM: no   ·TUG <15 sec: yes   Falls (last 6 months): no falls   ·Con Total Score: 20   ·PHQ- 9 Depression Scale: 0   ·METS: 9 25   Health goals:  -What are your overall health goals? Active more   -What brings you strength?    -What activities are important to you? Fabian       Patient is here with , in the last 2 weeks had a chemo treatment- lost about 30 lbs within the last year    Lives with:  in a single family home   Patient usually eats 2 big meals a day  Breakfast: Half- Cream cheese, Tea, Coffee, Water   Lunch: sometimes eats lunch- sandwich, soup, cottage cheese  Dinner:  Chicken, beef, veggies, starch- potato, rice, pasta    Snacks: cookies, carrots ( ranch dressing )   Sleep: 8 hours   Nasal canal/ C-pap   Naps: sometimes     Incentive spirometer teaching completed, instructed to do 10 breathes 4x a day, IS sent home with patient   Surgical soap was given:

## 2022-10-13 NOTE — PROGRESS NOTES
[facility]  Consult:  Surgical Optimization      Assessment/Plan:  · 76-year-old female referred to [facility] for pre-surgery surgical optimization   · Consult concerns:  Evaluation due to co-morbid conditions & S/P chemotherapy with upcoming surgery   Case: [id number] Date/Time: 10/31/22 0800   Procedures:        WHIPPLE PROCEDURE/PANCREATICO-DUODENECTOMY, IOUS (N/A Abdomen)       EXPLORATORY LAPAROTOMY (N/A Abdomen)       SOFT TISSUE ABLATION (N/A Abdomen)       POSSIBLE INSERTION JEJUNOSTOMY FEEDING TUBE (N/A Abdomen)   Anesthesia type: General w/ Epidural   Diagnosis: Adenocarcinoma of head of pancreas (HCC) [C25 0]   Pre-op diagnosis: Adenocarcinoma of head of pancreas ([facility] [address] ) [C25 0]   Location:  OR ROOM 07 / [facility]   Surgeons: Jermaine Borrero MD     No problem-specific Assessment & Plan notes found for this encounter         Problem List Items Addressed This Visit        Digestive    HX gastrointestinal stromal sarcoma times 5 years ago, removed and stable per patient   Adenocarcinoma of head of pancreas ([facility] [address] )  · Seen for surgical optimization secondary to multiple comorbidities and status post chemotherapy  · Seen today for evaluation of needs a prehab due to status post chemo  · Needs cardiac clearance- office sending letter  · Needs to complete PAT's -will do on 10/17/2022 await results  · Reviewed past labs from 09/23/2022- do not expect any upcoming concerns  · Instructed to increase protein prior to surgery  · Instructed to switch out carbs and sweets for protein snacks  · Started on best protocol  BEST   Breathing- instructed to exercise lungs prior to surgery via IS  Eat- discussed increasing protein intake prior to surgery   Sleep/stress- encouraged 8-10 sleep @ night, stress reduction, avoid sick contacts and handwashing   · Train- encouraged to remain active          Respiratory    Severe obstructive sleep apnea   · Stable -per patient  · Wears a nasal ,-high-flow oxygen, CPAP for bed-patient is faithful  · Received cardiac clearance       Cardiovascular and Mediastinum    Hypertension (Chronic)  · Continue sotalol, olmesartan, metoprolol    HX Paroxysmal A-fib (HCC)  · History of high blood pressure &history of paroxysmal AFib  · Stable per patient  · /76  Today, heart rate stable at 88  · Mets is 9, active, denies chest pain denies shortness of breath  · Still working           History of diabetes  · Stable  · Last hemoglobin A1c 6 5  · Continue current regimen          Status post chemotherapy  · Patient is doing well  States that she has recovered nicely since chemo was over   with and agrees  · Energy level before chemo 5/5  · Energy level mid chemo 3/5  · Current energy level 5/5  · Patient states she is feeling better  Patient states she is eating with an appetite  Patient is active and going to work  Continue with current regimen  Encouraged our BEST protocol  BEST   Breathing- instructed to exercise lungs prior to surgery via IS  Eat- discussed increasing protein intake prior to surgery   Sleep/stress- encouraged 8-10 sleep @ night, stress reduction, avoid sick contacts and handwashing   · Train- encouraged to remain active     CAM: no   ·TUG <15 sec: yes   Falls (last 6 months): no falls   ·Con Total Score: 20   ·PHQ- 9 Depression Scale: 0   ·METS: 9 25   Health goals:  -What are your overall health goals? Active more   -What brings you strength?    -What activities are important to you? Fabian         Patient is here with , in the last 2 weeks had a chemo treatment- lost about 30 lbs within the last year    Lives with:  in a single family home   Patient usually eats 2 big meals a day  Breakfast: Half- Cream cheese, Tea, Coffee, Water   Lunch: sometimes eats lunch- sandwich, soup, cottage cheese  Dinner:  Chicken, beef, veggies, starch- potato, rice, pasta    Snacks: cookies, carrots ( ranch dressing )   Sleep: 8 hours   Nasal canal/ C-pap   Naps: sometimes      Incentive spirometer teaching completed, instructed to do 10 breathes 4x a day, IS sent home with patient   Surgical soap was given:         Subjective:      Patient ID: Lay Malcolm is a 57 y o  female who was referred to [facility] for pre-surgery surgical optimization  Consult concerns for multiple co morbidities,  in addition she has just finished chemo treatments with an upcoming surgery in the future  I met patient in the [facility]  She arrived with her   Walks independently  No walker or no cane use  Lives at home with her   She is independent with all ADLs  Mets is 9  She is active  She is still working full-time  Denies any chest pain or shortness of breath  Very pleasant to take care of today  There were no cognitive concerns identified  She needs to complete her PAT's  She is doing this on 10/17/2022  I have set a follow-up date to review these results  Await results for any further needs  I reviewed past blood work from a month ago  Those results were fairly stable -and do not anticipate any concerns on the new blood work  - await results  She has received cardiac clearance  I have asked her to increase her protein intake prior to surgery  Albumin was 3 3  She does describe losing 30 lb during her chemo treatment  Her BMI is still 50 64  I did ask her to switch out carbs and sweets for protein snacks  Patient understands  You have the power to be your BEST! You're almost there, keep up the good work! As always we discussed having your BEST surgery, and BEST recovery  Surgery goals reviewed today   bestsu  Breathing exercises   Patient was encouraged to begin lung exercises today  This could be accomplished through deep breathing and cough exercises  Patient was taught how to use an incentive spirometer  Return demonstration provided      Eating/nutrition   Encouraged patient to increase oral protein intake prior to surgery  This can be accomplished by consuming chicken, fish, tuna fish, cottage cheese, cheese, eggs, [place] yogurt, and protein shakes as needed  I encouraged use of protein shakes such ENLIVE  I also recommended making your own protein shakes with protein powder  Sleep/Stress management  Patient was encouraged to rest their body prior to surgery  Encouraged attempting to get 8 hours of sleep at night  Avoid stress  Avoid sick contacts  Encouraged to find a relaxing hobby such as reading, meditation, listening to music  Training exercises  Patient was encouraged to remain active as possible  Today bilateral lower extremity generic exercises were taught for muscle strengthening and balance  All exercises to be done sitting down  The following portions of the patient's history were reviewed and updated as appropriate: current medications, past family history, past medical history, past social history, past surgical history and problem list     Review of Systems   Constitutional: Negative for chills and fever  HENT: Negative for sinus pain and sore throat  Respiratory: Negative for cough and shortness of breath  Cardiovascular: Positive for palpitations  Negative for chest pain and leg swelling  Chronic AFIB   Gastrointestinal: Negative for diarrhea, rectal pain and vomiting  Genitourinary: Negative for difficulty urinating  Skin: Negative for color change, pallor, rash and wound  Neurological: Negative for dizziness  Psychiatric/Behavioral: Negative for agitation, behavioral problems, confusion, decreased concentration and dysphoric mood  Objective:      /76   Pulse 88   Temp 98 2 °F (36 8 °C)   SpO2 98%          Physical Exam  Constitutional:       Appearance: Normal appearance  HENT:      Head: Normocephalic  Mouth/Throat:      Mouth: Mucous membranes are moist    Eyes:      Pupils: Pupils are equal, round, and reactive to light     Cardiovascular: Rate and Rhythm: Normal rate and regular rhythm  Pulmonary:      Effort: Pulmonary effort is normal    Abdominal:      Palpations: Abdomen is soft  Musculoskeletal:         General: Normal range of motion  Cervical back: Normal range of motion  Skin:     General: Skin is warm and dry  Neurological:      General: No focal deficit present  Mental Status: She is alert and oriented to person, place, and time  Mental status is at baseline  Psychiatric:         Mood and Affect: Mood normal          Behavior: Behavior normal          Thought Content:  Thought content normal          Judgment: Judgment normal

## 2022-10-17 ENCOUNTER — LAB REQUISITION (OUTPATIENT)
Dept: LAB | Facility: HOSPITAL | Age: 62
End: 2022-10-17
Payer: COMMERCIAL

## 2022-10-17 ENCOUNTER — APPOINTMENT (OUTPATIENT)
Dept: LAB | Facility: CLINIC | Age: 62
End: 2022-10-17

## 2022-10-17 DIAGNOSIS — C25.0 ADENOCARCINOMA OF HEAD OF PANCREAS (HCC): ICD-10-CM

## 2022-10-17 DIAGNOSIS — C25.0 MALIGNANT NEOPLASM OF HEAD OF PANCREAS (HCC): ICD-10-CM

## 2022-10-17 LAB
ABO GROUP BLD: NORMAL
ALBUMIN SERPL BCP-MCNC: 3.4 G/DL (ref 3.5–5)
ALP SERPL-CCNC: 157 U/L (ref 46–116)
ALT SERPL W P-5'-P-CCNC: 86 U/L (ref 12–78)
ANION GAP SERPL CALCULATED.3IONS-SCNC: 4 MMOL/L (ref 4–13)
APTT PPP: 26 SECONDS (ref 23–37)
AST SERPL W P-5'-P-CCNC: 60 U/L (ref 5–45)
BASOPHILS # BLD AUTO: 0.11 THOUSANDS/ÂΜL (ref 0–0.1)
BASOPHILS NFR BLD AUTO: 1 % (ref 0–1)
BILIRUB SERPL-MCNC: 0.52 MG/DL (ref 0.2–1)
BLD GP AB SCN SERPL QL: NEGATIVE
BUN SERPL-MCNC: 14 MG/DL (ref 5–25)
CALCIUM ALBUM COR SERPL-MCNC: 9.7 MG/DL (ref 8.3–10.1)
CALCIUM SERPL-MCNC: 9.2 MG/DL (ref 8.3–10.1)
CHLORIDE SERPL-SCNC: 113 MMOL/L (ref 96–108)
CO2 SERPL-SCNC: 25 MMOL/L (ref 21–32)
CREAT SERPL-MCNC: 0.83 MG/DL (ref 0.6–1.3)
EOSINOPHIL # BLD AUTO: 0.06 THOUSAND/ÂΜL (ref 0–0.61)
EOSINOPHIL NFR BLD AUTO: 1 % (ref 0–6)
ERYTHROCYTE [DISTWIDTH] IN BLOOD BY AUTOMATED COUNT: 17.2 % (ref 11.6–15.1)
EST. AVERAGE GLUCOSE BLD GHB EST-MCNC: 114 MG/DL
GFR SERPL CREATININE-BSD FRML MDRD: 75 ML/MIN/1.73SQ M
GLUCOSE P FAST SERPL-MCNC: 117 MG/DL (ref 65–99)
HBA1C MFR BLD: 5.6 %
HCT VFR BLD AUTO: 38.3 % (ref 34.8–46.1)
HGB BLD-MCNC: 11.7 G/DL (ref 11.5–15.4)
IMM GRANULOCYTES # BLD AUTO: >0.5 THOUSAND/UL (ref 0–0.2)
IMM GRANULOCYTES NFR BLD AUTO: 6 % (ref 0–2)
INR PPP: 0.95 (ref 0.84–1.19)
LYMPHOCYTES # BLD AUTO: 1.59 THOUSANDS/ÂΜL (ref 0.6–4.47)
LYMPHOCYTES NFR BLD AUTO: 16 % (ref 14–44)
MCH RBC QN AUTO: 29.8 PG (ref 26.8–34.3)
MCHC RBC AUTO-ENTMCNC: 30.5 G/DL (ref 31.4–37.4)
MCV RBC AUTO: 98 FL (ref 82–98)
MONOCYTES # BLD AUTO: 0.53 THOUSAND/ÂΜL (ref 0.17–1.22)
MONOCYTES NFR BLD AUTO: 5 % (ref 4–12)
NEUTROPHILS # BLD AUTO: 7.31 THOUSANDS/ÂΜL (ref 1.85–7.62)
NEUTS SEG NFR BLD AUTO: 71 % (ref 43–75)
NRBC BLD AUTO-RTO: 0 /100 WBCS
PLATELET # BLD AUTO: 85 THOUSANDS/UL (ref 149–390)
PMV BLD AUTO: 11.9 FL (ref 8.9–12.7)
POTASSIUM SERPL-SCNC: 4 MMOL/L (ref 3.5–5.3)
PROT SERPL-MCNC: 6.5 G/DL (ref 6.4–8.4)
PROTHROMBIN TIME: 12.9 SECONDS (ref 11.6–14.5)
RBC # BLD AUTO: 3.93 MILLION/UL (ref 3.81–5.12)
RH BLD: POSITIVE
SODIUM SERPL-SCNC: 142 MMOL/L (ref 135–147)
SPECIMEN EXPIRATION DATE: NORMAL
WBC # BLD AUTO: 10.16 THOUSAND/UL (ref 4.31–10.16)

## 2022-10-17 PROCEDURE — 86901 BLOOD TYPING SEROLOGIC RH(D): CPT | Performed by: SURGERY

## 2022-10-17 PROCEDURE — 86900 BLOOD TYPING SEROLOGIC ABO: CPT | Performed by: SURGERY

## 2022-10-17 PROCEDURE — 86850 RBC ANTIBODY SCREEN: CPT | Performed by: SURGERY

## 2022-10-20 DIAGNOSIS — I48.0 PAROXYSMAL ATRIAL FIBRILLATION (HCC): ICD-10-CM

## 2022-10-20 DIAGNOSIS — K21.9 GASTROESOPHAGEAL REFLUX DISEASE WITHOUT ESOPHAGITIS: ICD-10-CM

## 2022-10-20 RX ORDER — METOPROLOL SUCCINATE 25 MG/1
TABLET, EXTENDED RELEASE ORAL
Qty: 180 TABLET | Refills: 3 | Status: SHIPPED | OUTPATIENT
Start: 2022-10-20

## 2022-10-21 RX ORDER — PANTOPRAZOLE SODIUM 40 MG/1
TABLET, DELAYED RELEASE ORAL
Qty: 90 TABLET | Refills: 3 | Status: SHIPPED | OUTPATIENT
Start: 2022-10-21

## 2022-10-24 ENCOUNTER — ANESTHESIA EVENT (OUTPATIENT)
Dept: PERIOP | Facility: HOSPITAL | Age: 62
End: 2022-10-24

## 2022-10-24 LAB
ABO GROUP BLD: NORMAL
BLD GP AB SCN SERPL QL: NEGATIVE
RH BLD: POSITIVE
SPECIMEN EXPIRATION DATE: NORMAL

## 2022-10-24 NOTE — ANESTHESIA PREPROCEDURE EVALUATION
Procedure: WHIPPLE PROCEDURE/PANCREATICO-DUODENECTOMY, IOUS (N/A Abdomen)  EXPLORATORY LAPAROTOMY (N/A Abdomen)  SOFT TISSUE ABLATION (N/A Abdomen)  POSSIBLE INSERTION JEJUNOSTOMY FEEDING TUBE (N/A Abdomen)    P afib/SVT on sotalol/metoprolol    EF 55%,     Severe MARTIN - CPAP dependance    Relevant Problems   CARDIO   (+) Benign essential hypertension   (+) Dyspnea on exertion   (+) Hyperlipidemia   (+) Hypertension   (+) Paroxysmal A-fib (HCC)   (+) Supraventricular tachycardia (HCC)      ENDO   (+) Type 2 diabetes mellitus without complication, without long-term current use of insulin (HCC)      GI/HEPATIC   (+) Adenocarcinoma of head of pancreas (HCC)   (+) Esophageal reflux      /RENAL   (+) Kidney stone   (+) Uric acid kidney stone      NEURO/PSYCH   (+) Controlled depression      PULMONARY   (+) Dyspnea on exertion   (+) Severe obstructive sleep apnea      Please review cardiology note/clearance  Platelet trend decreasing (80-90k)  Discuss with surgeon plt transfusion if needed  Physical Exam    Airway    Mallampati score: IV  TM Distance: >3 FB  Neck ROM: full     Dental   No notable dental hx     Cardiovascular  Rhythm: regular, Rate: normal, Cardiovascular exam normal    Pulmonary  Pulmonary exam normal Breath sounds clear to auscultation,     Other Findings        Anesthesia Plan  ASA Score- 4     Anesthesia Type- general with ASA Monitors  Additional Monitors: arterial line  Airway Plan: ETT  Comment: Risks/benefits and alternatives discussed including likely possibility of PONV and sore throat, as well as the rare possibilities of aspiration, dental/oropharyngeal/ocular injuries, or grave/life threatening anesthetic and surgical emergencies          Plan Factors-Exercise tolerance (METS): >4 METS  Patient summary reviewed  Patient instructed to abstain from smoking on day of procedure  Patient did not smoke on day of surgery  Induction- intravenous      Postoperative Plan- Plan for postoperative opioid use  Planned trial extubation    Informed Consent- Anesthetic plan and risks discussed with patient  I personally reviewed this patient with the CRNA  Discussed and agreed on the Anesthesia Plan with the CRNA  Dylon Do

## 2022-10-24 NOTE — PRE-PROCEDURE INSTRUCTIONS
Pre-Surgery Instructions:   Medication Instructions   • aspirin 81 MG tablet Instructions provided by Isaura naidu   • atorvastatin (LIPITOR) 40 mg tablet Take night before surgery   • ibuprofen (ADVIL,MOTRIN) 100 MG tablet Stop taking   • Magnesium Oxide -Mg Supplement 400 MG CAPS Stop taking 7 days prior to surgery  • metoprolol succinate (TOPROL-XL) 25 mg 24 hr tablet Take day of surgery  • multivitamin (THERAGRAN) TABS Stop taking 7 days prior to surgery  • olmesartan (BENICAR) 20 mg tablet Do not take this medication the day before and the morning of the day of surgery/procedure   • Omega-3 Fatty Acids (FISH OIL) 1,000 mg Stop taking 7 days prior to surgery  • ondansetron (ZOFRAN) 4 mg tablet Uses PRN- OK to take day of surgery   • Ozempic, 1 MG/DOSE, 4 MG/3ML SOPN injection pen Take day of surgery  • pantoprazole (PROTONIX) 40 mg tablet Take day of surgery  • PARoxetine (PAXIL-CR) 37 5 mg 24 hr tablet Take day of surgery  • Potassium Citrate ER 15 MEQ (1620 MG) TBCR Hold day of surgery  • senna (SENOKOT) 8 6 mg Uses PRN- OK to take day of surgery   • sotalol (BETAPACE) 120 mg tablet Take day of surgery  • VITAMIN D PO Stop taking 7 days prior to surgery  Have you had / have a sore throat? No  Have you had / have a cough less than 1 week? No  Have you had / have a fever greater than 100 0 - 100  4? No  Are you experiencing any shortness of breath? No    Review with patient via phone medications and showering instructions  Instructed to avoid all OTC Vit/Supp 1 week prior to surgery and to avoid NSAIDs 10 days prior to surgery per anesthesia instructions  Tylenol ok to take prn  [facility] call with surgery schedule time, nothing eat or drink after midnight  Verbalized understanding

## 2022-10-25 ENCOUNTER — HOSPITAL ENCOUNTER (OUTPATIENT)
Dept: RADIOLOGY | Facility: HOSPITAL | Age: 62
Discharge: HOME | End: 2022-10-25
Attending: UROLOGY
Payer: COMMERCIAL

## 2022-10-25 DIAGNOSIS — N20.0 NEPHROLITHIASIS: ICD-10-CM

## 2022-10-25 PROCEDURE — 76770 US EXAM ABDO BACK WALL COMP: CPT

## 2022-10-27 ENCOUNTER — CONSULT (OUTPATIENT)
Dept: NEPHROLOGY | Facility: CLINIC | Age: 62
End: 2022-10-27
Payer: COMMERCIAL

## 2022-10-27 VITALS — HEIGHT: 64 IN | BODY MASS INDEX: 50.02 KG/M2 | WEIGHT: 293 LBS

## 2022-10-27 DIAGNOSIS — I10 BENIGN ESSENTIAL HYPERTENSION: ICD-10-CM

## 2022-10-27 DIAGNOSIS — G47.33 SEVERE OBSTRUCTIVE SLEEP APNEA: Primary | ICD-10-CM

## 2022-10-27 DIAGNOSIS — N20.0 URIC ACID KIDNEY STONE: ICD-10-CM

## 2022-10-27 DIAGNOSIS — C49.A0: ICD-10-CM

## 2022-10-27 DIAGNOSIS — N20.0 NEPHROLITHIASIS: ICD-10-CM

## 2022-10-27 DIAGNOSIS — C25.0 ADENOCARCINOMA OF HEAD OF PANCREAS (HCC): ICD-10-CM

## 2022-10-27 LAB
SL AMB  POCT GLUCOSE, UA: NORMAL
SL AMB LEUKOCYTE ESTERASE,UA: NORMAL
SL AMB POCT BILIRUBIN,UA: NORMAL
SL AMB POCT BLOOD,UA: NORMAL
SL AMB POCT KETONES,UA: NORMAL
SL AMB POCT NITRITE,UA: NORMAL
SL AMB POCT PH,UA: 5
SL AMB POCT SPECIFIC GRAVITY,UA: 1.03
SL AMB POCT URINE PROTEIN: 15
SL AMB POCT UROBILINOGEN: 0.2

## 2022-10-27 PROCEDURE — 99203 OFFICE O/P NEW LOW 30 MIN: CPT | Performed by: INTERNAL MEDICINE

## 2022-10-27 PROCEDURE — 81002 URINALYSIS NONAUTO W/O SCOPE: CPT | Performed by: INTERNAL MEDICINE

## 2022-10-27 NOTE — PATIENT INSTRUCTIONS
Stone risk profile    Drink at least 70 oz fluid, to maintain urine output over 2 5 Liter    Low Salt Diet    Blood work before next visit in 8 months

## 2022-10-27 NOTE — PROGRESS NOTES
NEPHROLOGY OUTPATIENT CONSULTATION   Jennifer Balderas 57 y o  female MRN: [id number]  Date: 10/27/2022  Reason for consultation:   Chief Complaint   Patient presents with   • Consult       ASSESSMENT and PLAN:    Thank you for the courtesy of this consultation  I had the pleasure of seeing Nathaniel Hickey today in the renal clinic for the initial management of medical management of nephrolithiasis  Nephrolithiasis  --3 episodes in the last 6 year  --underwent cystoscopy with ureteroscopy with lithotripsy laser retrograde pyelogram and insertion of a left ureteral stent in 8/22/2022  --CT scan from September showed no evidence of nephrolithiasis  --urinalysis did show high specific gravity 1 03   --she is obese, history of GIST, with colon resection which can increase oxalate based stone  --stone analysis in case she passes another stone  --check 24 hour stone risk profile  --maintain high free water intake to maintain urine output greater than 2 5 L per day  --diet high in calcium and potassium  --diet low in oxalate  --check uric acid, PTH  --started on potassium citrate but has not started as of yet    Pancreatic adenocarcinoma  --patient plan for Whipple procedure on Monday  --GFR > 60 mL/min    Gastrointestinal stromal tumor  --status post chemotherapy    Hypertension  --controlled  --morbidly obese with BMI 51  --continue metoprolol 25 mg daily, Benicar 20 mg daily      PATIENT INSTRUCTIONS:    Patient Instructions   Stone risk profile    Drink at least 70 oz fluid, to maintain urine output over 2 5 Liter    Low Salt Diet    Blood work before next visit in 8 months       HISTORY OF PRESENT ILLNESS:  Requesting Physician: Luz Page MD    Jennifer Balderas is a 57 y o  female who has a history of gastrointestinal stromal tumor status post resection and chemotherapy with now newly diagnosed pancreatic adenocarcinoma who is plan for Whipple procedure on Monday  Presenting to me for nephrolithiasis    She is being followed by Urology who performed a cystoscopy and lithotripsy back in August for large stone  She has had 3 episodes of stones in the past 60 years  She reports he tries to drink plenty of fluids  She has never had a stone risk profile  Never had stone analysis  No chest pain or shortness of breath  She was started on potassium citrate but has not started as of yet  Reports no flank pain no gross hematuria      PAST MEDICAL HISTORY:  Past Medical History:   Diagnosis Date   • Abnormal liver function test     last assessed 1/16/17    • Adenomatosis    • 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 )    • Benign essential hypertension     last assessed 12/21/17    • Body mass index (BMI) of 50-59 9 in [facility])    • Cancer (HCC)     pancreas   • Colon polyp    • Congenital pes planus     last assessed 7/15/14    • COVID     4/30/21   • CPAP (continuous positive airway pressure) dependence    • Dental crowns present    • Depression    • Diabetes mellitus (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[facility] [address] ) 04/19/2006    last assessed 12/31/14    • Osteoarthritis of both knees     last assessed 12/31/14    • Paroxysmal atrial fibrillation ([facility] [address] )    • Port-A-Cath in place    • S/P ablation of atrial flutter    • Shortness of breath     per pt "from chemo-not drastic--still working and goes up steps"   • Sleep apnea    • Wears glasses        PAST SURGICAL HISTORY:  Past Surgical History:   Procedure Laterality Date   • ABDOMINAL SURGERY  06/2006    20cm GIST removed    • APPENDECTOMY     • ATRIAL ABLATION SURGERY     • CARPAL TUNNEL RELEASE Bilateral    • COLONOSCOPY     • FL GUIDED CENTRAL VENOUS ACCESS DEVICE INSERTION  05/31/2022   • FL RETROGRADE PYELOGRAM  07/18/2022   • FL RETROGRADE PYELOGRAM  8/22/2022   • HYSTERECTOMY      fibroids   • IR PORT CHECK  06/23/2022   • IR PORT REMOVAL AND PLACEMENT NEW SITE  06/28/2022   • JOINT REPLACEMENT Bilateral     knees   • KIDNEY STONE SURGERY Right 09/17/2021    placed stent in  for kidney stone   • KNEE SURGERY     • KNEE SURGERY Left 03/2019   • OOPHORECTOMY      cyst   • OH CYSTO/URETERO W/LITHOTRIPSY &INDWELL STENT INSRT Left 8/22/2022    Procedure: CYSTOSCOPY URETEROSCOPY WITH LITHOTRIPSY HOLMIUM LASER, RETROGRADE PYELOGRAM AND INSERTION STENT URETERAL;  Surgeon: Emilia Trejo MD;  Location: [facility];  Service: Urology   • OH CYSTOSCOPY,INSERT URETERAL STENT Left 8/22/2022    Procedure: EXCHANGE STENT URETERAL;  Surgeon: Emilia Trejo MD;  Location: [facility];  Service: Urology   • OH CYSTOURETHROSCOPY,URETER CATHETER Left 07/18/2022    Procedure: CYSTOSCOPY RETROGRADE PYELOGRAM WITH INSERTION STENT URETERAL;  Surgeon: Emilia Trejo MD;  Location: [facility];  Service: Urology   • TONSILLECTOMY     • TUBAL LIGATION     • TUMOR EXCISION  2006    gastrointestinal stromal tumor   • TUNNELED VENOUS PORT PLACEMENT N/A 05/31/2022    Procedure: INSERTION VENOUS PORT (PORT-A-CATH);   Surgeon: Dandy Huynh MD;  Location: [facility];  Service: Surgical Oncology   • UPPER GASTROINTESTINAL ENDOSCOPY         ALLERGIES:  Allergies   Allergen Reactions   • Other Rash     Adhesive tape        SOCIAL HISTORY:  Social History     Substance and Sexual Activity   Alcohol Use Not Currently    Comment: social drinker per allscript      Social History     Substance and Sexual Activity   Drug Use Never     Social History     Tobacco Use   Smoking Status Former Smoker   • Years: 9 00   • Types: Cigarettes   Smokeless Tobacco Never Used       FAMILY HISTORY:  Family History   Problem Relation Age of Onset   • Emphysema Mother    • Arthritis Mother    • Diabetes Mother    • Hypertension Mother    • COPD Mother    • Arthritis Father    • Prostate cancer Father    • Cerebral aneurysm Son    • No Known Problems Maternal Grandmother    • No Known Problems Maternal Grandfather    • No Known Problems Paternal Grandmother    • No Known Problems Paternal Grandfather    • No Known Problems Son    • No Known Problems Maternal Aunt    • No Known Problems Maternal Aunt    • No Known Problems Paternal Aunt    • No Known Problems Paternal Aunt        MEDICATIONS:    Current Outpatient Medications:   •  atorvastatin (LIPITOR) 40 mg tablet, Take 1 tablet (40 mg total) by mouth daily at bedtime, Disp: 90 tablet, Rfl: 3  •  ibuprofen (ADVIL,MOTRIN) 100 MG tablet, Take 200 mg by mouth as needed for mild pain, Disp: , Rfl:   •  Magnesium Oxide -Mg Supplement 400 MG CAPS, Take 1 capsule by mouth daily, Disp: , Rfl:   •  metoprolol succinate (TOPROL-XL) 25 mg 24 hr tablet, TAKE 1 TABLET BY MOUTH  TWICE DAILY, Disp: 180 tablet, Rfl: 3  •  multivitamin (THERAGRAN) TABS, Take 1 tablet by mouth daily  , Disp: , Rfl:   •  olmesartan (BENICAR) 20 mg tablet, TAKE 1 TABLET BY MOUTH  DAILY, Disp: 90 tablet, Rfl: 3  •  Omega-3 Fatty Acids (FISH OIL) 1,000 mg, Take 1,000 mg by mouth 2 (two) times a day  , Disp: , Rfl:   •  ondansetron (ZOFRAN) 4 mg tablet, Take 2 tablets (8 mg total) by mouth every 8 (eight) hours as needed for nausea or vomiting, Disp: 20 tablet, Rfl: 3  •  Ozempic, 1 MG/DOSE, 4 MG/3ML SOPN injection pen, INJECT 1MG SUBCUTANEOUSLY  WEEKLY (Patient taking differently: Inject under the skin once a week mondays), Disp: 9 mL, Rfl: 3  •  pantoprazole (PROTONIX) 40 mg tablet, TAKE 1 TABLET BY MOUTH  DAILY BEFORE BREAKFAST, Disp: 90 tablet, Rfl: 3  •  PARoxetine (PAXIL-CR) 37 5 mg 24 hr tablet, TAKE 1 TABLET BY MOUTH IN  THE MORNING, Disp: 90 tablet, Rfl: 3  • Potassium Citrate ER 15 MEQ (1620 MG) TBCR, TAKE 1 TABLET BY MOUTH  TWICE DAILY, Disp: 200 tablet, Rfl: 3  •  senna (SENOKOT) 8 6 mg, Take 1 tablet (8 6 mg total) by mouth daily at bedtime for 5 days (Patient taking differently: Take 8 6 mg by mouth daily at bedtime as needed), Disp: 5 tablet, Rfl: 0  •  sotalol (BETAPACE) 120 mg tablet, Take 1 tablet (120 mg total) by mouth every 12 (twelve) hours, Disp: 180 tablet, Rfl: 3  •  VITAMIN D PO, Take 2,000 Units by mouth 2 (two) times a day, Disp: , Rfl:   •  aspirin 81 MG tablet, Take 81 mg by mouth daily  (Patient not taking: Reported on 10/27/2022), Disp: , Rfl:   •  glucose blood (Contour Next Test) test strip, Use 1 each daily Use as instructed, Disp: 100 each, Rfl: 3  •  Insulin Pen Needle (Pen Needles) 32G X 4 MM MISC, Use once a week, Disp: 12 each, Rfl: 3    REVIEW OF SYSTEMS:  Review of Systems   Constitutional: Negative for activity change and fever  Respiratory: Negative for cough, chest tightness, shortness of breath and wheezing  Cardiovascular: Negative for chest pain and leg swelling  Gastrointestinal: Negative for abdominal pain, diarrhea, nausea and vomiting  Endocrine: Negative for polyuria  Genitourinary: Negative for difficulty urinating, dysuria, flank pain, frequency and urgency  Skin: Negative for rash  Neurological: Negative for dizziness, syncope, light-headedness and headaches  All the systems were reviewed and were negative except as documented on the HPI  PHYSICAL EXAM:  Current Weight: Body mass index is 51 49 kg/m²  Vitals:    10/27/22 1337   Weight: 136 kg (300 lb)   Height: 5' 4" (1 626 m)       Physical Exam  Vitals and nursing note reviewed  Exam conducted with a chaperone present  Constitutional:       General: She is not in acute distress  Appearance: She is well-developed  She is obese  HENT:      Head: Normocephalic and atraumatic  Eyes:      General: No scleral icterus       Conjunctiva/sclera: Conjunctivae normal       Pupils: Pupils are equal, round, and reactive to light  Cardiovascular:      Rate and Rhythm: Normal rate and regular rhythm  Heart sounds: S1 normal and S2 normal  No murmur heard  No friction rub  No gallop  Pulmonary:      Effort: Pulmonary effort is normal  No respiratory distress  Breath sounds: Normal breath sounds  No wheezing or rales  Abdominal:      General: Bowel sounds are normal       Palpations: Abdomen is soft  Tenderness: There is no abdominal tenderness  There is no rebound  Musculoskeletal:         General: Normal range of motion  Cervical back: Normal range of motion and neck supple  Skin:     Findings: No rash  Neurological:      Mental Status: She is alert and oriented to person, place, and time     Psychiatric:         Behavior: Behavior normal          Laboratory results:   Results for orders placed or performed in visit on 10/27/22   POCT urine dip   Result Value Ref Range    LEUKOCYTE ESTERASE,UA NEG     NITRITE,UA NEG     SL AMB POCT UROBILINOGEN 0 2     POCT URINE PROTEIN 15      PH,UA 5     BLOOD,UA NEG     SPECIFIC GRAVITY,UA 1 030     KETONES,UA NEG     BILIRUBIN,UA NEG     GLUCOSE, UA NEG

## 2022-10-31 ENCOUNTER — ANESTHESIA (OUTPATIENT)
Dept: PERIOP | Facility: HOSPITAL | Age: 62
End: 2022-10-31

## 2022-10-31 ENCOUNTER — HOSPITAL ENCOUNTER (INPATIENT)
Facility: HOSPITAL | Age: 62
LOS: 11 days | Discharge: HOME HEALTH | End: 2022-11-11
Attending: SURGERY | Admitting: SURGERY

## 2022-10-31 ENCOUNTER — APPOINTMENT (OUTPATIENT)
Dept: RADIOLOGY | Facility: HOSPITAL | Age: 62
End: 2022-10-31

## 2022-10-31 DIAGNOSIS — I26.99 PULMONARY EMBOLISM WITHOUT ACUTE COR PULMONALE, UNSPECIFIED CHRONICITY, UNSPECIFIED PULMONARY EMBOLISM TYPE (HCC): ICD-10-CM

## 2022-10-31 DIAGNOSIS — R78.81 STREPTOCOCCAL BACTEREMIA: ICD-10-CM

## 2022-10-31 DIAGNOSIS — N20.0 KIDNEY STONE: Primary | ICD-10-CM

## 2022-10-31 DIAGNOSIS — B95.5 STREPTOCOCCAL BACTEREMIA: ICD-10-CM

## 2022-10-31 DIAGNOSIS — A41.9 SEPSIS (HCC): ICD-10-CM

## 2022-10-31 DIAGNOSIS — I47.20 VENTRICULAR TACHYCARDIA, SUSTAINED: ICD-10-CM

## 2022-10-31 DIAGNOSIS — C25.0 ADENOCARCINOMA OF HEAD OF PANCREAS (HCC): ICD-10-CM

## 2022-10-31 LAB
ABO GROUP BLD: NORMAL
ALBUMIN SERPL BCP-MCNC: 2.8 G/DL (ref 3.5–5)
ALP SERPL-CCNC: 94 U/L (ref 46–116)
ALT SERPL W P-5'-P-CCNC: 365 U/L (ref 12–78)
ANION GAP SERPL CALCULATED.3IONS-SCNC: 9 MMOL/L (ref 4–13)
AST SERPL W P-5'-P-CCNC: 493 U/L (ref 5–45)
BASE EXCESS BLDA CALC-SCNC: -3 MMOL/L (ref -2–3)
BASE EXCESS BLDA CALC-SCNC: -4 MMOL/L (ref -2–3)
BASE EXCESS BLDA CALC-SCNC: -5 MMOL/L (ref -2–3)
BASE EXCESS BLDA CALC-SCNC: -6 MMOL/L (ref -2–3)
BASE EXCESS BLDA CALC-SCNC: -6 MMOL/L (ref -2–3)
BASE EXCESS BLDA CALC-SCNC: -7 MMOL/L (ref -2–3)
BASE EXCESS BLDA CALC-SCNC: -8 MMOL/L (ref -2–3)
BASE EXCESS BLDA CALC-SCNC: -9.3 MMOL/L
BILIRUB SERPL-MCNC: 1.02 MG/DL (ref 0.2–1)
BLD GP AB SCN SERPL QL: NEGATIVE
BUN SERPL-MCNC: 10 MG/DL (ref 5–25)
CA-I BLD-SCNC: 1.11 MMOL/L (ref 1.12–1.32)
CA-I BLD-SCNC: 1.12 MMOL/L (ref 1.12–1.32)
CA-I BLD-SCNC: 1.13 MMOL/L (ref 1.12–1.32)
CA-I BLD-SCNC: 1.15 MMOL/L (ref 1.12–1.32)
CA-I BLD-SCNC: 1.15 MMOL/L (ref 1.12–1.32)
CA-I BLD-SCNC: 1.17 MMOL/L (ref 1.12–1.32)
CA-I BLD-SCNC: 1.19 MMOL/L (ref 1.12–1.32)
CA-I BLD-SCNC: 1.26 MMOL/L (ref 1.12–1.32)
CALCIUM ALBUM COR SERPL-MCNC: 9.5 MG/DL (ref 8.3–10.1)
CALCIUM SERPL-MCNC: 8.5 MG/DL (ref 8.3–10.1)
CHLORIDE SERPL-SCNC: 112 MMOL/L (ref 96–108)
CO2 SERPL-SCNC: 19 MMOL/L (ref 21–32)
CREAT SERPL-MCNC: 0.86 MG/DL (ref 0.6–1.3)
ERYTHROCYTE [DISTWIDTH] IN BLOOD BY AUTOMATED COUNT: 15.9 % (ref 11.6–15.1)
ERYTHROCYTE [DISTWIDTH] IN BLOOD BY AUTOMATED COUNT: 16.5 % (ref 11.6–15.1)
GFR SERPL CREATININE-BSD FRML MDRD: 72 ML/MIN/1.73SQ M
GLUCOSE SERPL-MCNC: 128 MG/DL (ref 65–140)
GLUCOSE SERPL-MCNC: 137 MG/DL (ref 65–140)
GLUCOSE SERPL-MCNC: 151 MG/DL (ref 65–140)
GLUCOSE SERPL-MCNC: 153 MG/DL (ref 65–140)
GLUCOSE SERPL-MCNC: 155 MG/DL (ref 65–140)
GLUCOSE SERPL-MCNC: 156 MG/DL (ref 65–140)
GLUCOSE SERPL-MCNC: 163 MG/DL (ref 65–140)
GLUCOSE SERPL-MCNC: 165 MG/DL (ref 65–140)
GLUCOSE SERPL-MCNC: 87 MG/DL (ref 65–140)
HCO3 BLDA-SCNC: 15.7 MMOL/L (ref 22–28)
HCO3 BLDA-SCNC: 18.7 MMOL/L (ref 22–28)
HCO3 BLDA-SCNC: 19.1 MMOL/L (ref 22–28)
HCO3 BLDA-SCNC: 19.8 MMOL/L (ref 22–28)
HCO3 BLDA-SCNC: 20.2 MMOL/L (ref 22–28)
HCO3 BLDA-SCNC: 20.5 MMOL/L (ref 22–28)
HCO3 BLDA-SCNC: 22.1 MMOL/L (ref 22–28)
HCO3 BLDA-SCNC: 22.3 MMOL/L (ref 22–28)
HCT VFR BLD AUTO: 27.5 % (ref 34.8–46.1)
HCT VFR BLD AUTO: 34.7 % (ref 34.8–46.1)
HCT VFR BLD CALC: 25 % (ref 34.8–46.1)
HCT VFR BLD CALC: 27 % (ref 34.8–46.1)
HCT VFR BLD CALC: 28 % (ref 34.8–46.1)
HCT VFR BLD CALC: 29 % (ref 34.8–46.1)
HCT VFR BLD CALC: 30 % (ref 34.8–46.1)
HCT VFR BLD CALC: 30 % (ref 34.8–46.1)
HCT VFR BLD CALC: 32 % (ref 34.8–46.1)
HGB BLD-MCNC: 11 G/DL (ref 11.5–15.4)
HGB BLD-MCNC: 8.8 G/DL (ref 11.5–15.4)
HGB BLDA-MCNC: 10.2 G/DL (ref 11.5–15.4)
HGB BLDA-MCNC: 10.2 G/DL (ref 11.5–15.4)
HGB BLDA-MCNC: 10.9 G/DL (ref 11.5–15.4)
HGB BLDA-MCNC: 8.5 G/DL (ref 11.5–15.4)
HGB BLDA-MCNC: 9.2 G/DL (ref 11.5–15.4)
HGB BLDA-MCNC: 9.5 G/DL (ref 11.5–15.4)
HGB BLDA-MCNC: 9.9 G/DL (ref 11.5–15.4)
HOROWITZ INDEX BLDA+IHG-RTO: 60 MM[HG]
INR PPP: 1.25 (ref 0.84–1.19)
LACTATE SERPL-SCNC: 6 MMOL/L (ref 0.5–2)
MAGNESIUM SERPL-MCNC: 1.5 MG/DL (ref 1.6–2.6)
MCH RBC QN AUTO: 29.6 PG (ref 26.8–34.3)
MCH RBC QN AUTO: 29.6 PG (ref 26.8–34.3)
MCHC RBC AUTO-ENTMCNC: 31.7 G/DL (ref 31.4–37.4)
MCHC RBC AUTO-ENTMCNC: 32 G/DL (ref 31.4–37.4)
MCV RBC AUTO: 93 FL (ref 82–98)
MCV RBC AUTO: 93 FL (ref 82–98)
O2 CT BLDA-SCNC: 15.6 ML/DL (ref 16–23)
OXYHGB MFR BLDA: 96.2 % (ref 94–97)
PCO2 BLD: 20 MMOL/L (ref 21–32)
PCO2 BLD: 20 MMOL/L (ref 21–32)
PCO2 BLD: 21 MMOL/L (ref 21–32)
PCO2 BLD: 21 MMOL/L (ref 21–32)
PCO2 BLD: 22 MMOL/L (ref 21–32)
PCO2 BLD: 23 MMOL/L (ref 21–32)
PCO2 BLD: 24 MMOL/L (ref 21–32)
PCO2 BLD: 37.5 MM HG (ref 36–44)
PCO2 BLD: 41.2 MM HG (ref 36–44)
PCO2 BLD: 41.7 MM HG (ref 36–44)
PCO2 BLD: 41.9 MM HG (ref 36–44)
PCO2 BLD: 42.1 MM HG (ref 36–44)
PCO2 BLD: 42.9 MM HG (ref 36–44)
PCO2 BLD: 43.4 MM HG (ref 36–44)
PCO2 BLDA: 31.6 MM HG (ref 36–44)
PEEP RESPIRATORY: 6 CM[H2O]
PH BLD: 7.25 [PH] (ref 7.35–7.45)
PH BLD: 7.27 [PH] (ref 7.35–7.45)
PH BLD: 7.28 [PH] (ref 7.35–7.45)
PH BLD: 7.29 [PH] (ref 7.35–7.45)
PH BLD: 7.3 [PH] (ref 7.35–7.45)
PH BLD: 7.32 [PH] (ref 7.35–7.45)
PH BLD: 7.38 [PH] (ref 7.35–7.45)
PH BLDA: 7.32 [PH] (ref 7.35–7.45)
PHOSPHATE SERPL-MCNC: 4.2 MG/DL (ref 2.3–4.1)
PLATELET # BLD AUTO: 103 THOUSANDS/UL (ref 149–390)
PLATELET # BLD AUTO: 105 THOUSANDS/UL (ref 149–390)
PMV BLD AUTO: 11.5 FL (ref 8.9–12.7)
PMV BLD AUTO: 12.3 FL (ref 8.9–12.7)
PO2 BLD: 123 MM HG (ref 75–129)
PO2 BLD: 144 MM HG (ref 75–129)
PO2 BLD: 166 MM HG (ref 75–129)
PO2 BLD: 167 MM HG (ref 75–129)
PO2 BLD: 168 MM HG (ref 75–129)
PO2 BLD: 221 MM HG (ref 75–129)
PO2 BLD: 228 MM HG (ref 75–129)
PO2 BLDA: 108.3 MM HG (ref 75–129)
POTASSIUM BLD-SCNC: 3.7 MMOL/L (ref 3.5–5.3)
POTASSIUM BLD-SCNC: 3.9 MMOL/L (ref 3.5–5.3)
POTASSIUM BLD-SCNC: 4.1 MMOL/L (ref 3.5–5.3)
POTASSIUM BLD-SCNC: 4.2 MMOL/L (ref 3.5–5.3)
POTASSIUM BLD-SCNC: 4.5 MMOL/L (ref 3.5–5.3)
POTASSIUM BLD-SCNC: 4.6 MMOL/L (ref 3.5–5.3)
POTASSIUM BLD-SCNC: 4.7 MMOL/L (ref 3.5–5.3)
POTASSIUM SERPL-SCNC: 4.5 MMOL/L (ref 3.5–5.3)
PROT SERPL-MCNC: 5.6 G/DL (ref 6.4–8.4)
PROTHROMBIN TIME: 15.9 SECONDS (ref 11.6–14.5)
RBC # BLD AUTO: 2.97 MILLION/UL (ref 3.81–5.12)
RBC # BLD AUTO: 3.72 MILLION/UL (ref 3.81–5.12)
RH BLD: POSITIVE
SAO2 % BLD FROM PO2: 100 % (ref 60–85)
SAO2 % BLD FROM PO2: 100 % (ref 60–85)
SAO2 % BLD FROM PO2: 99 % (ref 60–85)
SODIUM BLD-SCNC: 139 MMOL/L (ref 136–145)
SODIUM BLD-SCNC: 140 MMOL/L (ref 136–145)
SODIUM BLD-SCNC: 141 MMOL/L (ref 136–145)
SODIUM BLD-SCNC: 142 MMOL/L (ref 136–145)
SODIUM SERPL-SCNC: 140 MMOL/L (ref 135–147)
SPECIMEN EXPIRATION DATE: NORMAL
SPECIMEN SOURCE: ABNORMAL
VENT AC: 20
VENT- AC: AC
VT SETTING VENT: 550 ML
WBC # BLD AUTO: 11.63 THOUSAND/UL (ref 4.31–10.16)
WBC # BLD AUTO: 11.86 THOUSAND/UL (ref 4.31–10.16)

## 2022-10-31 PROCEDURE — 06Q50ZZ REPAIR SUPERIOR MESENTERIC VEIN, OPEN APPROACH: ICD-10-PCS | Performed by: SURGERY

## 2022-10-31 PROCEDURE — 06Q80ZZ REPAIR PORTAL VEIN, OPEN APPROACH: ICD-10-PCS | Performed by: SURGERY

## 2022-10-31 PROCEDURE — 0DNU0ZZ RELEASE OMENTUM, OPEN APPROACH: ICD-10-PCS | Performed by: SURGERY

## 2022-10-31 PROCEDURE — 07BD0ZZ EXCISION OF AORTIC LYMPHATIC, OPEN APPROACH: ICD-10-PCS | Performed by: SURGERY

## 2022-10-31 PROCEDURE — 0DB90ZZ EXCISION OF DUODENUM, OPEN APPROACH: ICD-10-PCS | Performed by: SURGERY

## 2022-10-31 PROCEDURE — 30233N1 TRANSFUSION OF NONAUTOLOGOUS RED BLOOD CELLS INTO PERIPHERAL VEIN, PERCUTANEOUS APPROACH: ICD-10-PCS | Performed by: SURGERY

## 2022-10-31 PROCEDURE — 30233L1 TRANSFUSION OF NONAUTOLOGOUS FRESH PLASMA INTO PERIPHERAL VEIN, PERCUTANEOUS APPROACH: ICD-10-PCS | Performed by: SURGERY

## 2022-10-31 PROCEDURE — 0FT40ZZ RESECTION OF GALLBLADDER, OPEN APPROACH: ICD-10-PCS | Performed by: SURGERY

## 2022-10-31 PROCEDURE — 0FBG0ZZ EXCISION OF PANCREAS, OPEN APPROACH: ICD-10-PCS | Performed by: SURGERY

## 2022-10-31 RX ORDER — ACETAMINOPHEN 325 MG/1
975 TABLET ORAL ONCE
Status: COMPLETED | OUTPATIENT
Start: 2022-10-31 | End: 2022-10-31

## 2022-10-31 RX ORDER — MAGNESIUM HYDROXIDE 1200 MG/15ML
LIQUID ORAL AS NEEDED
Status: DISCONTINUED | OUTPATIENT
Start: 2022-10-31 | End: 2022-10-31 | Stop reason: HOSPADM

## 2022-10-31 RX ORDER — LIDOCAINE HYDROCHLORIDE 10 MG/ML
0.5 INJECTION, SOLUTION EPIDURAL; INFILTRATION; INTRACAUDAL; PERINEURAL ONCE AS NEEDED
Status: DISCONTINUED | OUTPATIENT
Start: 2022-10-31 | End: 2022-10-31 | Stop reason: HOSPADM

## 2022-10-31 RX ORDER — HYDROMORPHONE HCL 110MG/55ML
PATIENT CONTROLLED ANALGESIA SYRINGE INTRAVENOUS AS NEEDED
Status: DISCONTINUED | OUTPATIENT
Start: 2022-10-31 | End: 2022-10-31

## 2022-10-31 RX ORDER — PROPOFOL 10 MG/ML
INJECTION, EMULSION INTRAVENOUS AS NEEDED
Status: DISCONTINUED | OUTPATIENT
Start: 2022-10-31 | End: 2022-10-31

## 2022-10-31 RX ORDER — ROPIVACAINE HYDROCHLORIDE 2 MG/ML
INJECTION, SOLUTION EPIDURAL; INFILTRATION; PERINEURAL AS NEEDED
Status: DISCONTINUED | OUTPATIENT
Start: 2022-10-31 | End: 2022-10-31

## 2022-10-31 RX ORDER — ALBUTEROL SULFATE 90 UG/1
AEROSOL, METERED RESPIRATORY (INHALATION) AS NEEDED
Status: DISCONTINUED | OUTPATIENT
Start: 2022-10-31 | End: 2022-10-31

## 2022-10-31 RX ORDER — PROPOFOL 10 MG/ML
5-50 INJECTION, EMULSION INTRAVENOUS
Status: DISCONTINUED | OUTPATIENT
Start: 2022-10-31 | End: 2022-11-01

## 2022-10-31 RX ORDER — GLYCOPYRROLATE 0.2 MG/ML
INJECTION INTRAMUSCULAR; INTRAVENOUS AS NEEDED
Status: DISCONTINUED | OUTPATIENT
Start: 2022-10-31 | End: 2022-10-31

## 2022-10-31 RX ORDER — SODIUM CHLORIDE 9 MG/ML
INJECTION, SOLUTION INTRAVENOUS CONTINUOUS PRN
Status: DISCONTINUED | OUTPATIENT
Start: 2022-10-31 | End: 2022-10-31

## 2022-10-31 RX ORDER — FENTANYL CITRATE 50 UG/ML
INJECTION, SOLUTION INTRAMUSCULAR; INTRAVENOUS AS NEEDED
Status: DISCONTINUED | OUTPATIENT
Start: 2022-10-31 | End: 2022-10-31

## 2022-10-31 RX ORDER — HEPARIN SODIUM 5000 [USP'U]/ML
5000 INJECTION, SOLUTION INTRAVENOUS; SUBCUTANEOUS EVERY 8 HOURS SCHEDULED
Status: DISCONTINUED | OUTPATIENT
Start: 2022-10-31 | End: 2022-11-04

## 2022-10-31 RX ORDER — SODIUM CHLORIDE, SODIUM LACTATE, POTASSIUM CHLORIDE, CALCIUM CHLORIDE 600; 310; 30; 20 MG/100ML; MG/100ML; MG/100ML; MG/100ML
INJECTION, SOLUTION INTRAVENOUS CONTINUOUS PRN
Status: DISCONTINUED | OUTPATIENT
Start: 2022-10-31 | End: 2022-10-31

## 2022-10-31 RX ORDER — DEXMEDETOMIDINE HYDROCHLORIDE 100 UG/ML
INJECTION, SOLUTION INTRAVENOUS AS NEEDED
Status: DISCONTINUED | OUTPATIENT
Start: 2022-10-31 | End: 2022-10-31

## 2022-10-31 RX ORDER — CEFAZOLIN SODIUM 2 G/50ML
2000 SOLUTION INTRAVENOUS ONCE
Status: COMPLETED | OUTPATIENT
Start: 2022-10-31 | End: 2022-10-31

## 2022-10-31 RX ORDER — MAGNESIUM SULFATE HEPTAHYDRATE 40 MG/ML
2 INJECTION, SOLUTION INTRAVENOUS ONCE
Status: COMPLETED | OUTPATIENT
Start: 2022-10-31 | End: 2022-11-01

## 2022-10-31 RX ORDER — METRONIDAZOLE 500 MG/100ML
500 INJECTION, SOLUTION INTRAVENOUS ONCE
Status: COMPLETED | OUTPATIENT
Start: 2022-10-31 | End: 2022-10-31

## 2022-10-31 RX ORDER — SODIUM CHLORIDE, SODIUM GLUCONATE, SODIUM ACETATE, POTASSIUM CHLORIDE, MAGNESIUM CHLORIDE, SODIUM PHOSPHATE, DIBASIC, AND POTASSIUM PHOSPHATE .53; .5; .37; .037; .03; .012; .00082 G/100ML; G/100ML; G/100ML; G/100ML; G/100ML; G/100ML; G/100ML
1000 INJECTION, SOLUTION INTRAVENOUS ONCE
Status: COMPLETED | OUTPATIENT
Start: 2022-10-31 | End: 2022-10-31

## 2022-10-31 RX ORDER — MIDAZOLAM HYDROCHLORIDE 2 MG/2ML
INJECTION, SOLUTION INTRAMUSCULAR; INTRAVENOUS AS NEEDED
Status: DISCONTINUED | OUTPATIENT
Start: 2022-10-31 | End: 2022-10-31

## 2022-10-31 RX ORDER — CALCIUM CHLORIDE 100 MG/ML
INJECTION INTRAVENOUS; INTRAVENTRICULAR AS NEEDED
Status: DISCONTINUED | OUTPATIENT
Start: 2022-10-31 | End: 2022-10-31

## 2022-10-31 RX ORDER — SODIUM CHLORIDE, SODIUM GLUCONATE, SODIUM ACETATE, POTASSIUM CHLORIDE, MAGNESIUM CHLORIDE, SODIUM PHOSPHATE, DIBASIC, AND POTASSIUM PHOSPHATE .53; .5; .37; .037; .03; .012; .00082 G/100ML; G/100ML; G/100ML; G/100ML; G/100ML; G/100ML; G/100ML
84 INJECTION, SOLUTION INTRAVENOUS CONTINUOUS
Status: DISCONTINUED | OUTPATIENT
Start: 2022-10-31 | End: 2022-11-03

## 2022-10-31 RX ORDER — ONDANSETRON 2 MG/ML
INJECTION INTRAMUSCULAR; INTRAVENOUS AS NEEDED
Status: DISCONTINUED | OUTPATIENT
Start: 2022-10-31 | End: 2022-10-31

## 2022-10-31 RX ORDER — SODIUM CHLORIDE, SODIUM LACTATE, POTASSIUM CHLORIDE, CALCIUM CHLORIDE 600; 310; 30; 20 MG/100ML; MG/100ML; MG/100ML; MG/100ML
125 INJECTION, SOLUTION INTRAVENOUS CONTINUOUS
Status: DISCONTINUED | OUTPATIENT
Start: 2022-10-31 | End: 2022-10-31

## 2022-10-31 RX ORDER — CHLORHEXIDINE GLUCONATE 0.12 MG/ML
15 RINSE ORAL EVERY 12 HOURS SCHEDULED
Status: DISCONTINUED | OUTPATIENT
Start: 2022-10-31 | End: 2022-11-01

## 2022-10-31 RX ORDER — KETAMINE HCL IN NACL, ISO-OSM 100MG/10ML
SYRINGE (ML) INJECTION AS NEEDED
Status: DISCONTINUED | OUTPATIENT
Start: 2022-10-31 | End: 2022-10-31

## 2022-10-31 RX ORDER — PROPOFOL 10 MG/ML
INJECTION, EMULSION INTRAVENOUS CONTINUOUS PRN
Status: DISCONTINUED | OUTPATIENT
Start: 2022-10-31 | End: 2022-10-31

## 2022-10-31 RX ORDER — LIDOCAINE HYDROCHLORIDE 10 MG/ML
INJECTION, SOLUTION EPIDURAL; INFILTRATION; INTRACAUDAL; PERINEURAL AS NEEDED
Status: DISCONTINUED | OUTPATIENT
Start: 2022-10-31 | End: 2022-10-31

## 2022-10-31 RX ORDER — PROPOFOL 10 MG/ML
INJECTION, EMULSION INTRAVENOUS
Status: COMPLETED
Start: 2022-10-31 | End: 2022-10-31

## 2022-10-31 RX ORDER — FENTANYL CITRATE 50 UG/ML
50 INJECTION, SOLUTION INTRAMUSCULAR; INTRAVENOUS
Status: DISCONTINUED | OUTPATIENT
Start: 2022-10-31 | End: 2022-11-02

## 2022-10-31 RX ORDER — SUCCINYLCHOLINE/SOD CL,ISO/PF 100 MG/5ML
SYRINGE (ML) INTRAVENOUS AS NEEDED
Status: DISCONTINUED | OUTPATIENT
Start: 2022-10-31 | End: 2022-10-31

## 2022-10-31 RX ORDER — ROCURONIUM BROMIDE 10 MG/ML
INJECTION, SOLUTION INTRAVENOUS AS NEEDED
Status: DISCONTINUED | OUTPATIENT
Start: 2022-10-31 | End: 2022-10-31

## 2022-10-31 RX ORDER — ALBUMIN, HUMAN INJ 5% 5 %
SOLUTION INTRAVENOUS CONTINUOUS PRN
Status: DISCONTINUED | OUTPATIENT
Start: 2022-10-31 | End: 2022-10-31

## 2022-10-31 RX ORDER — DEXAMETHASONE SODIUM PHOSPHATE 10 MG/ML
INJECTION, SOLUTION INTRAMUSCULAR; INTRAVENOUS AS NEEDED
Status: DISCONTINUED | OUTPATIENT
Start: 2022-10-31 | End: 2022-10-31

## 2022-10-31 RX ADMIN — ALBUTEROL SULFATE 6 PUFF: 90 AEROSOL, METERED RESPIRATORY (INHALATION) at 10:52

## 2022-10-31 RX ADMIN — ROPIVACAINE HYDROCHLORIDE 6 ML: 2 INJECTION, SOLUTION EPIDURAL; INFILTRATION; PERINEURAL at 17:38

## 2022-10-31 RX ADMIN — ROCURONIUM BROMIDE 50 MG: 50 INJECTION INTRAVENOUS at 09:49

## 2022-10-31 RX ADMIN — PROPOFOL 50 MCG/KG/MIN: 10 INJECTION, EMULSION INTRAVENOUS at 20:30

## 2022-10-31 RX ADMIN — FENTANYL CITRATE 50 MCG: 50 INJECTION INTRAMUSCULAR; INTRAVENOUS at 15:05

## 2022-10-31 RX ADMIN — CHLORHEXIDINE GLUCONATE 15 ML: 1.2 SOLUTION ORAL at 21:25

## 2022-10-31 RX ADMIN — DEXAMETHASONE SODIUM PHOSPHATE 10 MG: 10 INJECTION, SOLUTION INTRAMUSCULAR; INTRAVENOUS at 10:00

## 2022-10-31 RX ADMIN — HEPARIN SODIUM 5000 UNITS: 5000 INJECTION INTRAVENOUS; SUBCUTANEOUS at 21:24

## 2022-10-31 RX ADMIN — LIDOCAINE HYDROCHLORIDE 100 MG: 10 INJECTION, SOLUTION EPIDURAL; INFILTRATION; INTRACAUDAL; PERINEURAL at 09:45

## 2022-10-31 RX ADMIN — Medication 10 MG: at 15:51

## 2022-10-31 RX ADMIN — Medication 10 MG: at 12:00

## 2022-10-31 RX ADMIN — ALBUTEROL SULFATE 6 PUFF: 90 AEROSOL, METERED RESPIRATORY (INHALATION) at 15:05

## 2022-10-31 RX ADMIN — ALBUMIN (HUMAN): 12.5 INJECTION, SOLUTION INTRAVENOUS at 14:00

## 2022-10-31 RX ADMIN — ROCURONIUM BROMIDE 10 MG: 50 INJECTION INTRAVENOUS at 14:19

## 2022-10-31 RX ADMIN — Medication 10 MG: at 10:44

## 2022-10-31 RX ADMIN — FENTANYL CITRATE 50 MCG: 50 INJECTION INTRAMUSCULAR; INTRAVENOUS at 14:51

## 2022-10-31 RX ADMIN — SODIUM CHLORIDE, SODIUM GLUCONATE, SODIUM ACETATE, POTASSIUM CHLORIDE AND MAGNESIUM CHLORIDE 1000 ML: 526; 502; 368; 37; 30 INJECTION, SOLUTION INTRAVENOUS at 21:03

## 2022-10-31 RX ADMIN — Medication 180 MG: at 09:45

## 2022-10-31 RX ADMIN — ROCURONIUM BROMIDE 50 MG: 50 INJECTION INTRAVENOUS at 10:40

## 2022-10-31 RX ADMIN — MIDAZOLAM 1 MG: 1 INJECTION INTRAMUSCULAR; INTRAVENOUS at 08:57

## 2022-10-31 RX ADMIN — CALCIUM CHLORIDE 0.5 G: 100 INJECTION INTRAVENOUS; INTRAVENTRICULAR at 15:15

## 2022-10-31 RX ADMIN — ALBUMIN (HUMAN): 12.5 INJECTION, SOLUTION INTRAVENOUS at 11:53

## 2022-10-31 RX ADMIN — MIDAZOLAM 1 MG: 1 INJECTION INTRAMUSCULAR; INTRAVENOUS at 09:44

## 2022-10-31 RX ADMIN — PHENYLEPHRINE HYDROCHLORIDE 40 MCG/MIN: 10 INJECTION INTRAVENOUS at 09:47

## 2022-10-31 RX ADMIN — ROCURONIUM BROMIDE 20 MG: 50 INJECTION INTRAVENOUS at 12:12

## 2022-10-31 RX ADMIN — SODIUM CHLORIDE, SODIUM LACTATE, POTASSIUM CHLORIDE, AND CALCIUM CHLORIDE: .6; .31; .03; .02 INJECTION, SOLUTION INTRAVENOUS at 13:57

## 2022-10-31 RX ADMIN — FENTANYL CITRATE 50 MCG: 50 INJECTION INTRAMUSCULAR; INTRAVENOUS at 09:45

## 2022-10-31 RX ADMIN — ACETAMINOPHEN 975 MG: 325 TABLET ORAL at 08:27

## 2022-10-31 RX ADMIN — GLYCOPYRROLATE 0.1 MG: 0.2 INJECTION, SOLUTION INTRAMUSCULAR; INTRAVENOUS at 10:00

## 2022-10-31 RX ADMIN — SODIUM CHLORIDE: 0.9 INJECTION, SOLUTION INTRAVENOUS at 09:47

## 2022-10-31 RX ADMIN — SODIUM CHLORIDE: 0.9 INJECTION, SOLUTION INTRAVENOUS at 13:32

## 2022-10-31 RX ADMIN — PROPOFOL 45 MCG/KG/MIN: 10 INJECTION, EMULSION INTRAVENOUS at 21:20

## 2022-10-31 RX ADMIN — SODIUM CHLORIDE 0.4 MCG/KG/HR: 900 INJECTION INTRAVENOUS at 11:00

## 2022-10-31 RX ADMIN — CEFAZOLIN SODIUM 3000 MG: 2 SOLUTION INTRAVENOUS at 10:00

## 2022-10-31 RX ADMIN — ROCURONIUM BROMIDE 20 MG: 50 INJECTION INTRAVENOUS at 12:53

## 2022-10-31 RX ADMIN — CEFAZOLIN SODIUM 3000 MG: 2 SOLUTION INTRAVENOUS at 13:53

## 2022-10-31 RX ADMIN — ROPIVACAINE HYDROCHLORIDE: 5 INJECTION EPIDURAL; INFILTRATION; PERINEURAL at 23:26

## 2022-10-31 RX ADMIN — METRONIDAZOLE: 500 SOLUTION INTRAVENOUS at 10:00

## 2022-10-31 RX ADMIN — FENTANYL CITRATE 50 MCG: 50 INJECTION INTRAMUSCULAR; INTRAVENOUS at 08:57

## 2022-10-31 RX ADMIN — ROCURONIUM BROMIDE 10 MG: 50 INJECTION INTRAVENOUS at 13:39

## 2022-10-31 RX ADMIN — ROCURONIUM BROMIDE 20 MG: 50 INJECTION INTRAVENOUS at 15:04

## 2022-10-31 RX ADMIN — PROPOFOL 60 MCG/KG/MIN: 10 INJECTION, EMULSION INTRAVENOUS at 17:40

## 2022-10-31 RX ADMIN — DEXMEDETOMIDINE HCL 12 MCG: 100 INJECTION INTRAVENOUS at 10:38

## 2022-10-31 RX ADMIN — DEXMEDETOMIDINE HCL 8 MCG: 100 INJECTION INTRAVENOUS at 10:44

## 2022-10-31 RX ADMIN — SODIUM CHLORIDE, SODIUM LACTATE, POTASSIUM CHLORIDE, AND CALCIUM CHLORIDE 125 ML/HR: .6; .31; .03; .02 INJECTION, SOLUTION INTRAVENOUS at 08:35

## 2022-10-31 RX ADMIN — SODIUM CHLORIDE, SODIUM LACTATE, POTASSIUM CHLORIDE, AND CALCIUM CHLORIDE: .6; .31; .03; .02 INJECTION, SOLUTION INTRAVENOUS at 12:00

## 2022-10-31 RX ADMIN — MAGNESIUM SULFATE HEPTAHYDRATE 2 G: 40 INJECTION, SOLUTION INTRAVENOUS at 21:25

## 2022-10-31 RX ADMIN — CEFAZOLIN SODIUM 3000 MG: 2 SOLUTION INTRAVENOUS at 17:23

## 2022-10-31 RX ADMIN — Medication 20 MG: at 09:45

## 2022-10-31 RX ADMIN — HYDROMORPHONE HYDROCHLORIDE 0.5 MG: 2 INJECTION, SOLUTION INTRAMUSCULAR; INTRAVENOUS; SUBCUTANEOUS at 10:00

## 2022-10-31 RX ADMIN — ROCURONIUM BROMIDE 20 MG: 50 INJECTION INTRAVENOUS at 16:36

## 2022-10-31 RX ADMIN — PROPOFOL 200 MG: 10 INJECTION, EMULSION INTRAVENOUS at 09:45

## 2022-10-31 RX ADMIN — CALCIUM CHLORIDE 0.5 G: 100 INJECTION INTRAVENOUS; INTRAVENTRICULAR at 15:08

## 2022-10-31 RX ADMIN — ALBUMIN (HUMAN): 12.5 INJECTION, SOLUTION INTRAVENOUS at 10:00

## 2022-10-31 RX ADMIN — SODIUM CHLORIDE, SODIUM GLUCONATE, SODIUM ACETATE, POTASSIUM CHLORIDE AND MAGNESIUM CHLORIDE 150 ML/HR: 526; 502; 368; 37; 30 INJECTION, SOLUTION INTRAVENOUS at 18:40

## 2022-10-31 RX ADMIN — ONDANSETRON 4 MG: 2 INJECTION INTRAMUSCULAR; INTRAVENOUS at 10:00

## 2022-10-31 NOTE — OP NOTE
OPERATIVE REPORT  PATIENT NAME: Josue Conrad    :  1960  MRN: [id number]  Pt Location: [facility] OR ROOM 07    SURGERY DATE: 10/31/2022    Surgeon(s) and Role:     * Ele Rizvi MD - Primary     * Michelle Black MD - Simona Chalwa MD - Assisting     * Nallely Lopez MD - Assisting    Preop Diagnosis:  Adenocarcinoma of head of pancreas ([address] ) [C25 0]    Post-Op Diagnosis Codes:     * Adenocarcinoma of head of pancreas ([address] ) [C25 0]    Procedure(s) (LRB):  Primary repair of SMV/Portal vein injury    Specimen(s): none    Estimated Blood Loss:   2000 mL    Anesthesia Type:   General w/ Epidural    Operative Indications:  Adenocarcinoma of head of pancreas ([address] ) [C25 0]  Called intra-operative for assessment of bleeding from the portal vein/SMV    Operative Findings:  -Friable delicate vein around specimen  -Large venotomy in the lateral wall of the portal vein oversewed with 5-0 prolene and pledgets  -Venotomy in SMV oversewed with 4-0 prolene and pledgets    Complications:   none    Procedure and Technique:  Called intra-operatively to assess bleeding  At this point the surgical oncology team had performed the laparotomy and had kocherized the duodenum attempting to take the specimen off the portal venous system  There was a rent in the portal vein along the lateral wall measuring 2cm  We were able to gain control manually and I oversewed with a 5-0 prolene  Unfortunately the vein was quite friable and there were tearing noticed near the suture holes  Pledgets were used and the repair held  There was hemostasis achieved  The surgical oncology team continued their dissection  I was called back about 20 min later due to more bleeding this time from what appeared to be the SMV  This was a small 1-2cm area and this was oversewn with 4-0 prolene with pledgets  This held well and again we were hemostatic  I present until the specimen was removed   There was no active bleeding from the portal vein/SMV  At this point hemostatic agents were placed in the pancreatic bed and specimen was taken off the field  No further need for vascular surgery at this point and I unscrubbed from the case       Please see surgical oncology team op note for more details regarding the case      SIGNATURE: Donna Montoya MD  DATE: October 31, 2022  TIME: 3:53 PM

## 2022-10-31 NOTE — ANESTHESIA PROCEDURE NOTES
Arterial Line Insertion  Performed by: Lily Davila CRNA  Authorized by: Rae Hamilton MD   Consent: Verbal consent obtained  Written consent obtained  Risks and benefits: risks, benefits and alternatives were discussed  Consent given by: patient  Patient understanding: patient states understanding of the procedure being performed  Patient consent: the patient's understanding of the procedure matches consent given  Procedure consent: procedure consent matches procedure scheduled  Relevant documents: relevant documents present and verified  Test results: test results available and properly labeled  Site marked: the operative site was marked  Patient identity confirmed: arm band and hospital-assigned identification number  Time out: Immediately prior to procedure a "time out" was called to verify the correct patient, procedure, equipment, support staff and site/side marked as required  Preparation: Patient was prepped and draped in the usual sterile fashion    Indications: hemodynamic monitoring  Orientation:  Right  Location: radial artery  Sedation:  Patient sedated: yes  Sedation type: anxiolysis  Sedatives: fentanyl and midazolam  Analgesia: fentanyl    Procedure Details:  Needle gauge: 20  Seldinger technique: Seldinger technique used  Number of attempts: 1    Post-procedure:  Post-procedure: dressing applied  Waveform: good waveform and waveform confirmed  Post-procedure CNS: normal and unchanged  Patient tolerance: Patient tolerated the procedure well with no immediate complications

## 2022-10-31 NOTE — CONSULTS
[id number] [facility] 57 y o  female MRN: [id number]  Unit/Bed#: [facility] 434-14 Encounter: [id number]      -------------------------------------------------------------------------------------------------------------  Chief Complaint: pancreatic cancer    History of Present Illness   HX and PE limited by: intubated sedated status  Braulio Mcdowell is a 57 y o  female who presents with adenocarcinoma of the head of the pancreas s/p whipple procedure 10/31  She had significant bleeding from SMV and portal vein of approximately 2L  Received 2 units pRBCs and FFP each  Bleeding was then well controlled and she remained stable during the case  She remained intubated on no pressors after the case and transferred to the ICU  PMH is significant for DMII, Obesity, afib on sotalol       History obtained from chart review and surgical team   -------------------------------------------------------------------------------------------------------------  Assessment and Plan:    Neuro:   • Diagnosis: Pain control  o Plan: epidural- ropivacaine and fentanyl  o Appreciate APS recs      CV:   • Diagnosis: HTN  o Plan: continue home meds post extubation  o BP goal MAP >89  o Systolic <682  • Diagnosis: AFIB  o Plan: continue home sotalol      Pulm:  • Diagnosis:  Intubation post surgery  o Plan: Continue intubation and sedation overnight  o SBT and extubate when able  • Diagnosis: MARTIN  o CPAP when extubated      GI:   • Diagnosis: Pancreatic cancer  o Plan: wound care per surgical team  o Bowel reg per surgical team      :   • Diagnosis: No acute issues  o Plan: Monitor UOP  o Urinary retention protocol      F/E/N:   • Plan:   o F: Plasmalyte 125, bolus 1L for Lactic acid of 6 0  o E: Replete as needed  o N: NPO      Heme/Onc:   • Diagnosis: ABLA  o Plan: monitor Hgb  o Transfuse hgb< 7  o Anticoagulation per surgical team      Endo:   • Diagnosis: DMII   o Plan: Monitor glucose  o Goal 120-160  - Insulin      ID: • Diagnosis: No acute issues  • Plan: monitor wbc and temp curve      MSK/Skin:   • Diagnosis: no acute issues  o Plan: frequent turning  o OOB with PT/OT when able    Disposition: Transfer to Critical Care   Code Status: Level 1 - Full Code  --------------------------------------------------------------------------------------------------------------  Review of Systems    Review of systems was unable to be performed secondary to intubated sedated status    Physical Exam  Constitutional:       Comments: Intubated and sedated   HENT:      Head: Normocephalic  Cardiovascular:      Pulses: Normal pulses  --------------------------------------------------------------------------------------------------------------  Vitals:   Vitals:    10/31/22 0749 10/31/22 0824 10/31/22 1835   BP: 153/96     Pulse: 82     Resp: 16     Temp: (!) 96 8 °F (36 °C)     TempSrc: Temporal     SpO2: 95%  95%   Weight:  134 kg (295 lb)    Height:  5' 4" (1 626 m)      Temp  Min: 96 8 °F (36 °C)  Max: 98 2 °F (36 8 °C)     Height: 5' 4" (162 6 cm)  Body mass index is 50 64 kg/m²    N/A    Laboratory and Diagnostics:  Results from last 7 days   Lab Units 10/31/22  1619   WBC Thousand/uL 11 63*   HEMOGLOBIN g/dL 8 8*   HEMATOCRIT % 27 5*   PLATELETS Thousands/uL 105*              Results from last 7 days   Lab Units 10/31/22  1540   INR  1 25*                    Imaging: I have personally reviewed pertinent films in PACS    Historical Information   Past Medical History:   Diagnosis Date   • Abnormal liver function test     last assessed 1/16/17    • Adenomatosis    • 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    last assessed 4/4/13   • Arthritis    • Atrial flutter ([address] )    • Benign essential hypertension     last assessed 12/21/17    • Body mass index (BMI) of 50-59 9 in adult [facility])    • Cancer (HCC)     pancreas   • Colon polyp    • Congenital pes planus     last assessed 7/15/14    • COVID     4/30/21   • CPAP (continuous positive airway pressure) dependence    • Dental crowns present    • Depression    • Diabetes mellitus ([facility] )    • Dizziness     occas--pt reports did see family doctor   • GERD (gastroesophageal reflux disease)    • Hemangioma of skin 08/26/2003    last assessed 8/26/03   • History of cancer chemotherapy     [facility] Oncologist Dr Sharon Boxer   • History of gastroesophageal reflux (GERD)    • Hypercholesterolemia    • Hyperlipidemia    • Hypertension    • Irregular heart beat    • Kidney stone    • 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[facility] ) 04/19/2006    last assessed 12/31/14    • Osteoarthritis of both knees     last assessed 12/31/14    • Paroxysmal atrial fibrillation ([place] [address] )    • Port-A-Cath in place    • S/P ablation of atrial flutter    • Shortness of breath     per pt "from chemo-not drastic--still working and goes up steps"   • Sleep apnea    • Wears glasses      Past Surgical History:   Procedure Laterality Date   • ABDOMINAL SURGERY  06/2006    20cm GIST removed    • APPENDECTOMY     • ATRIAL ABLATION SURGERY     • CARPAL TUNNEL RELEASE Bilateral    • COLONOSCOPY     • FL GUIDED CENTRAL VENOUS ACCESS DEVICE INSERTION  05/31/2022   • FL RETROGRADE PYELOGRAM  07/18/2022   • FL RETROGRADE PYELOGRAM  8/22/2022   • HYSTERECTOMY      fibroids   • IR PORT CHECK  06/23/2022   • IR PORT REMOVAL AND PLACEMENT NEW SITE  06/28/2022   • JOINT REPLACEMENT Bilateral     knees   • KIDNEY STONE SURGERY Right 09/17/2021    placed stent in  for kidney stone   • KNEE SURGERY     • KNEE SURGERY Left 03/2019   • OOPHORECTOMY      cyst   • NH CYSTO/URETERO W/LITHOTRIPSY &INDWELL STENT INSRT Left 8/22/2022    Procedure: CYSTOSCOPY URETEROSCOPY WITH LITHOTRIPSY HOLMIUM LASER, RETROGRADE PYELOGRAM AND INSERTION STENT URETERAL;  Surgeon: Jona Javed MD;  Location: [facility];  Service: Urology   • NH CYSTOSCOPY,INSERT URETERAL STENT Left 8/22/2022    Procedure: EXCHANGE STENT URETERAL;  Surgeon: Jona Javed MD; Location: [facility];  Service: Urology   • MN CYSTOURETHROSCOPY,URETER CATHETER Left 07/18/2022    Procedure: CYSTOSCOPY RETROGRADE PYELOGRAM WITH INSERTION STENT URETERAL;  Surgeon: Anderson Long MD;  Location: [facility];  Service: Urology   • TONSILLECTOMY     • TUBAL LIGATION     • TUMOR EXCISION  2006    gastrointestinal stromal tumor   • TUNNELED VENOUS PORT PLACEMENT N/A 05/31/2022    Procedure: INSERTION VENOUS PORT (PORT-A-CATH);   Surgeon: Elin Powell MD;  Location: [facility];  Service: Surgical Oncology   • UPPER GASTROINTESTINAL ENDOSCOPY       Social History   Social History     Substance and Sexual Activity   Alcohol Use Not Currently    Comment: social drinker per allscript      Social History     Substance and Sexual Activity   Drug Use Never     Social History     Tobacco Use   Smoking Status Former Smoker   • Years: 9 00   • Types: Cigarettes   Smokeless Tobacco Never Used     Family History:   Family History   Problem Relation Age of Onset   • Emphysema Mother    • Arthritis Mother    • Diabetes Mother    • Hypertension Mother    • COPD Mother    • Arthritis Father    • Prostate cancer Father    • Cerebral aneurysm Son    • No Known Problems Maternal Grandmother    • No Known Problems Maternal Grandfather    • No Known Problems Paternal Grandmother    • No Known Problems Paternal Grandfather    • No Known Problems Son    • No Known Problems Maternal Aunt    • No Known Problems Maternal Aunt    • No Known Problems Paternal Aunt    • No Known Problems Paternal Aunt      I have reviewed this patient's family history and commented on sigificant items within the HPI      Medications:  Current Facility-Administered Medications   Medication Dose Route Frequency   • heparin (porcine) subcutaneous injection 5,000 Units  5,000 Units Subcutaneous Q8H [facility]   • lactated ringers infusion  125 mL/hr Intravenous Continuous   • lactated ringers infusion  125 mL/hr Intravenous Continuous   • multi-electrolyte (PLASMALYTE-A/ISOLYTE-S PH 7 4) IV solution  150 mL/hr Intravenous Continuous   • ropivacaine 0 1% and fentaNYL 2 mcg/mL PCEA   Epidural Continuous     Home medications:  Prior to Admission Medications   Prescriptions Last Dose Informant Patient Reported? Taking? Insulin Pen Needle (Pen Needles) 32G X 4 MM MISC  Self No No   Sig: Use once a week   Magnesium Oxide -Mg Supplement 400 MG CAPS 10/24/2022 Self Yes Yes   Sig: Take 1 capsule by mouth daily   Omega-3 Fatty Acids (FISH OIL) 1,000 mg 10/24/2022 Self Yes Yes   Sig: Take 1,000 mg by mouth 2 (two) times a day     Ozempic, 1 MG/DOSE, 4 MG/3ML SOPN injection pen 10/31/2022 at 0615  No Yes   Sig: INJECT 1MG SUBCUTANEOUSLY  WEEKLY   Patient taking differently: Inject under the skin once a week mondays   PARoxetine (PAXIL-CR) 37 5 mg 24 hr tablet 10/31/2022 at 0615 Self No Yes   Sig: TAKE 1 TABLET BY MOUTH IN  THE MORNING   Potassium Citrate ER 15 MEQ (1620 MG) TBCR 10/24/2022 Self No Yes   Sig: TAKE 1 TABLET BY MOUTH  TWICE DAILY   VITAMIN D PO 10/24/2022 Self Yes Yes   Sig: Take 2,000 Units by mouth 2 (two) times a day   aspirin 81 MG tablet  Self Yes Yes   Sig: Take 81 mg by mouth daily  Patient not taking: Reported on 10/27/2022   atorvastatin (LIPITOR) 40 mg tablet 10/30/2022 at Unknown time Self No Yes   Sig: Take 1 tablet (40 mg total) by mouth daily at bedtime   glucose blood (Contour Next Test) test strip  Self No No   Sig: Use 1 each daily Use as instructed   ibuprofen (ADVIL,MOTRIN) 100 MG tablet Past Week at Unknown time Self Yes Yes   Sig: Take 200 mg by mouth as needed for mild pain   metoprolol succinate (TOPROL-XL) 25 mg 24 hr tablet 10/31/2022 at 0615 Self No Yes   Sig: TAKE 1 TABLET BY MOUTH  TWICE DAILY   multivitamin (THERAGRAN) TABS 10/24/2022 Self Yes Yes   Sig: Take 1 tablet by mouth daily     olmesartan (BENICAR) 20 mg tablet 10/29/2022 Self No Yes   Sig: TAKE 1 TABLET BY MOUTH  DAILY   ondansetron (ZOFRAN) 4 mg tablet Unknown at Unknown time Self No No   Sig: Take 2 tablets (8 mg total) by mouth every 8 (eight) hours as needed for nausea or vomiting   pantoprazole (PROTONIX) 40 mg tablet 10/31/2022 at 0615 Self No Yes   Sig: TAKE 1 TABLET BY MOUTH  DAILY BEFORE BREAKFAST   senna (SENOKOT) 8 6 mg  Self No Yes   Sig: Take 1 tablet (8 6 mg total) by mouth daily at bedtime for 5 days   Patient taking differently: Take 8 6 mg by mouth daily at bedtime as needed   sotalol (BETAPACE) 120 mg tablet 10/31/2022 at 0615 Self No Yes   Sig: Take 1 tablet (120 mg total) by mouth every 12 (twelve) hours      Facility-Administered Medications: None     Allergies: Allergies   Allergen Reactions   • Other Rash     Adhesive tape      ------------------------------------------------------------------------------------------------------------  Anticipated Length of Stay is > 2 midnights    Care Time Delivered:   Upon my evaluation, this patient had a high probability of imminent or life-threatening deterioration due to intubation/sedation post surgery with blood requirements, which required my direct attention, intervention, and personal management  I have personally provided 15 minutes (10min to 20min) of critical care time, exclusive of procedures, teaching, family meetings, and any prior time recorded by providers other than myself  Lawrence York MD        Portions of the record may have been created with voice recognition software  Occasional wrong word or "sound a like" substitutions may have occurred due to the inherent limitations of voice recognition software    Read the chart carefully and recognize, using context, where substitutions have occurred

## 2022-10-31 NOTE — OP NOTE
OPERATIVE REPORT  PATIENT NAME: Lay Malcolm    :  1960  MRN: [id number]  Pt Location: [facility] OR ROOM 07    SURGERY DATE: 10/31/2022    Surgeon(s) and Role:     * Vero Enriquez MD - Primary     * Rob Lewis MD - Margie Womack MD - Assisting     * Jeaneth Reed MD - Assisting    Preop Diagnosis:  Adenocarcinoma of head of pancreas ([address] ) [C25 0]    Post-Op Diagnosis Codes:     * Adenocarcinoma of head of pancreas ([address] ) [C25 0]    Procedure(s) (LRB):  WHIPPLE PROCEDURE/PANCREATICO-DUODENECTOMY, IOUS (N/A)  EXPLORATORY LAPAROTOMY (N/A)  SOFT TISSUE ABLATION (N/A)  LYSIS ADHESIONS, colectomy, vascular pedicle flap (N/A)     Exploratory laparotomy  Extensive lysis of adhesions  Pancreaticoduodenectomy, pylorus preserving  Cholecystectomy  Vascularized pedicle flap  Repair of incisional hernia    Specimen(s):  ID Type Source Tests Collected by Time Destination   1 : GALLBLADDER Tissue Gallbladder TISSUE EXAM Vero Enriquez MD 10/31/2022 11:59 AM    2 : whipple, freeze the margin  Tissue Pancreas TISSUE Migue Holbrook MD 10/31/2022  2:53 PM    3 : Portal lymph node Tissue Lymph Node TISSUE EXAM Vero Enriquez MD 10/31/2022  3:11 PM        Estimated Blood Loss:   2000 mL    Drains:  Closed/Suction Drain RUQ Bulb 19 Fr  (Active)   Number of days: 0       Urethral Catheter Latex 16 Fr  (Active)   Collection Container Standard drainage bag 10/31/22 1000   Number of days: 0       Ureteral Internal Stent Left ureter 6 Fr  (Active)   Number of days: 105       Anesthesia Type:   General w/ Epidural    Operative Indications:  Adenocarcinoma of head of pancreas ([address] ) [C220]  79-year-old female with a clinical T2 N0 M0 pancreas carcinoma  She underwent neoadjuvant chemotherapy  Risks and benefits of pancreaticoduodenectomy were explained  Informed consent was obtained  Patient was brought to the operating room      Operative Findings:  Frozen section of the pancreas and bile duct margin were negative    Complications:   None    Procedure and Technique:  After identifying the patient, general anesthesia was achieved  A White catheter was placed  Lines were placed by Anesthesia  Patient was prepped and draped in the usual sterile fashion  A time-out was performed  An upper midline incision was made  This incorporated a portion of her previous incision  Using sharp dissection this was taken through the skin, subcutaneous tissue, through the fascia and into the peritoneal cavity  There was an incisional hernia that we reduced  This time, we lysed multiple adhesions for an hour just freeing up the omentum off of the abdominal wall and out of pelvis as well as mobilizing the small so this would be free when we divided the jejunum  Once this area was completely freed, now inspected the liver  There was no evidence of liver metastasis or peritoneal disease  We now proceeded with resection  The Bookwalter retractor was placed  The gastrocolic omentum was divided in an avascular plane and we entered the lesser sac  We followed the middle colic down to the under surface of the pancreas  The under surface of the pancreas was sharply divided using the Harmonic scalpel  We ultimately were able to identify the SMV  The epiploic vessels were clamped, divided and ligated somewhat distal to the SMV  Ultimately we divided these closer to the SMV during the portal vein dissection  A wide Kocher maneuver was then performed to the level of the SMV  The mass appeared separate from the SMA  Based on palpating the mass, there was certainly concerned that the lateral aspect of the portal vein may have been involved  The gallbladder was then taken down in a dome down fashion  Cystic artery was clipped and divided  Cystic duct was clamped, divided and suture ligated with 3 0 Vicryl suture  We now dissected in the tariq hepatis    We ultimately were able to identify the proper hepatic artery and then the common hepatic artery  We were able to dissect the pancreas away from the common hepatic artery and the gastroduodenal was identified  Consequently this was then suture ligated with 3 0 Prolene suture  The bile duct was now  encircled with a vessel loop  Portal vein superiorly was identified and freed from the pancreas  The bile duct was ligated with 2 0 silk suture and a bulldog clamp was placed proximally  This was then divided sharply  We now divided the stomach distal to the pylorus with a fire of the [address] stapling device  This was performed after the gastroepiploic were clamped, divided and ligated 2-0 silk suture  The lesser omentum was dissected away from the duodenum  The stomach was packed out of harm's way  The superior and inferior pancreaticoduodenal also were now ligated with 2 0 silk suture  We now sharply divided the pancreas along the SMV freeing this off the SMV as we divided the pancreas  Pancreas was then completely divided  There were several areas of bleeding of crossing veins underneath the pancreas, these were suture ligated with 3-0 Prolene suture  We now started to dissect the pancreas away from the portal vein  Any smaller branches were divided with the Harmonic scalpel  Larger branches were ligated with 2 0 silk suture and divided  As we were dissecting the portal vein away from the pancreas, there was an area completely away from where we were dissecting where there was sudden gush of blood  This area was controlled with Loetta Prakash and 3-0 Prolene suture  Ultimately I had to elevate the portal vein with my finger and I called vascular surgery in to help repair this defect  This was essentially repaired with a running 5 0 Prolene using pledgetted sutures  This will be dictated under separate cover by vascular surgery  We now divided the proximal jejunum, just distal to the ligament of Treitz with the [address] stapling device    The mesentery was divided close to the bowel and taken down to the ligament of Treitz with a Harmonic scalpel  Once all the smaller vessels were divided with the Harmonic scalpel, the small bowel was rotated under the vessels to the right side of the patient  At this point we now dissected the portal vein free from the tumor  Small branches were divided with the Harmonic scalpel  Once again, there appeared to be bleeding just inferior to the area that we had repaired  This was ultimately clamped and then repaired again by vascular surgery with 5 0 pledgetted Prolene sutures  Once there was adequate hemostasis, we proceeded with our dissection  I decided because of the extreme friability of the portal vein not to perform soft tissue ablation in this region  We continued to sharply dissect the portal vein away from the mass  Once this was completely decided away, we were able to expose the SMA and identify the retroperitoneal tissues and uncinate process of the pancreas adjacent to the SMA  We retracted the mass away from the SMA and divided the uncinate process away from the SMA using the Harmonic scalpel  We continued to dissect the pancreas away from the SMA using the Harmonic scalpel  We ultimately expose the SMA, there was some oozing from this so we decided to divide the specimen from the SMA with 2 fires of a TA 30° vascular stapling device  This was placed parallel to the SMA and we made sure not to encroach on the SMA  Once this was confirmed, the stapler was fired  There was a small amount of bleeding near the SMA superiorly, this was packed and then ultimately FloSeal was placed and there was excellent hemostasis  There was 1 portal lymph node that was enlarged in this region, posterior to the portal vein, this was dissected free from the portal vein and sent to pathology  There was excellent hemostasis  Specimen was now oriented  I took this to pathology  Frozen section on the pancreas and bile duct margins were negative    Wound was now copiously irrigated and there was excellent hemostasis  We now proceeded with our anastomosis  The small bowel was brought through the transverse colon mesentery adjacent to the pancreas  I had to take this to the left of the middle colic since there was not enough mobility on the small bowel to go to the right of the middle colic vessels  An enterotomy was made  A duct to mucosa anastomosis was created  The back wall was created with interrupted 2 0 silk suture  The duct to mucosa anastomosis was created with interrupted 4 0 PDS suture directly into the small intestine at the enterotomy site  The anterior layer was oversewn with interrupted 2 0 silk suture  We turned our attention to the biliary anastomosis  The bulldog clamp was removed  There was excellent hemostasis  An enterotomy was made in the small intestine  A choledocho jejunostomy was now created with interrupted 4 0 Vicryl suture  This area was now packed  We now created a duodenal jejunostomy in an anti colic fashion  The omentum was divided so the small bowel could come up without tension  The afferent limb was at least 40 cm away from the biliary anastomosis  Our biliary and pancreatic limb was approximately 40 cm  The small bowel was brought adjacent to the duodenum and the back wall was created with interrupted 3-0 silk suture  The inner layer of back wall was created with a running 3-0 Vicryl suture starting centrally and run outward in an interlocking fashion and converted anteriorly to a lachelle suture  The anterior layer of the outer wall was now created with interrupted 3-0 silk suture in a Lembert fashion  The anastomosis was widely patent  We looked at the biliary packing and there was a bile leak  This area of leaking was oversewn with an interrupted 4-0 Vicryl suture  Ultimately no leak was seen  An NG tube was placed just proximal to the anastomosis  The wound was now copiously irrigated    There was excellent hemostasis  A 19 [place] drain was placed through a separate stab incision and secured to the skin using 3 0 nylon suture  This was placed posterior to the biliary and pancreatic anastomosis  The wound was now copiously irrigated  There was excellent hemostasis  There was no evidence of any bile leak after the packs were removed  We now created a vascularized pedicle flap of falciform ligament, this was isolated more inferiorly at the incision  This was clamped, divided and ligated with 2-0 silk suture  We then mobilized this off the abdominal wall going all the way to the umbilical fissure of the liver  Once this was free and mobile, we placed this in between the pancreas and the GDA stump  The end of the flap was now secured with the 2-0 silk sutures we placed anteriorly on the pancreas we created the anastomosis  Sponge and needle counts were correct  Bookwalter retractor was removed  The fascia was approximated with 1  PDS suture starting at either end of the incision and secured centrally  We did repair the incisional hernia that she had  Subcutaneous tissue was irrigated  Skin was approximated with staples  Sterile dressings were applied  Patient was left intubated given the amount of fluid she received  She was taken directly to the ICU having tolerated the procedure well and she was left intubated  She was hemodynamically stable at the conclusion of the procedure  I was present and participated in all aspects of this procedure  I was present for the entire procedure    Patient Disposition:  intubated and hemodynamically stable            SIGNATURE: Lorene Acevedo MD  DATE: October 31, 2022  TIME: 6:34 PM

## 2022-10-31 NOTE — INTERVAL H&P NOTE
H&P reviewed  After examining the patient I find no changes in the patients condition since the H&P had been written      Vitals:    10/31/22 0749   BP: 153/96   Pulse: 82   Resp: 16   Temp: (!) 96 8 °F (36 °C)   SpO2: 95%

## 2022-11-01 LAB
ABO GROUP BLD BPU: NORMAL
ALBUMIN SERPL BCP-MCNC: 2.6 G/DL (ref 3.5–5)
ALP SERPL-CCNC: 96 U/L (ref 46–116)
ALT SERPL W P-5'-P-CCNC: 525 U/L (ref 12–78)
ANION GAP SERPL CALCULATED.3IONS-SCNC: 8 MMOL/L (ref 4–13)
AST SERPL W P-5'-P-CCNC: 685 U/L (ref 5–45)
BASE EXCESS BLDA CALC-SCNC: -1.3 MMOL/L
BASOPHILS # BLD AUTO: 0.02 THOUSANDS/ÂΜL (ref 0–0.1)
BASOPHILS NFR BLD AUTO: 0 % (ref 0–1)
BILIRUB SERPL-MCNC: 0.84 MG/DL (ref 0.2–1)
BODY TEMPERATURE: 98.7 DEGREES FEHRENHEIT
BPU ID: NORMAL
BUN SERPL-MCNC: 9 MG/DL (ref 5–25)
CALCIUM ALBUM COR SERPL-MCNC: 9.3 MG/DL (ref 8.3–10.1)
CALCIUM SERPL-MCNC: 8.2 MG/DL (ref 8.3–10.1)
CHLORIDE SERPL-SCNC: 110 MMOL/L (ref 96–108)
CO2 SERPL-SCNC: 23 MMOL/L (ref 21–32)
CREAT SERPL-MCNC: 0.66 MG/DL (ref 0.6–1.3)
CROSSMATCH: NORMAL
EOSINOPHIL # BLD AUTO: 0 THOUSAND/ÂΜL (ref 0–0.61)
EOSINOPHIL NFR BLD AUTO: 0 % (ref 0–6)
ERYTHROCYTE [DISTWIDTH] IN BLOOD BY AUTOMATED COUNT: 16.6 % (ref 11.6–15.1)
GFR SERPL CREATININE-BSD FRML MDRD: 94 ML/MIN/1.73SQ M
GLUCOSE SERPL-MCNC: 147 MG/DL (ref 65–140)
GLUCOSE SERPL-MCNC: 149 MG/DL (ref 65–140)
GLUCOSE SERPL-MCNC: 155 MG/DL (ref 65–140)
GLUCOSE SERPL-MCNC: 163 MG/DL (ref 65–140)
HCO3 BLDA-SCNC: 22.1 MMOL/L (ref 22–28)
HCT VFR BLD AUTO: 32.2 % (ref 34.8–46.1)
HGB BLD-MCNC: 10.4 G/DL (ref 11.5–15.4)
HOROWITZ INDEX BLDA+IHG-RTO: 60 MM[HG]
IMM GRANULOCYTES # BLD AUTO: 0.06 THOUSAND/UL (ref 0–0.2)
IMM GRANULOCYTES NFR BLD AUTO: 1 % (ref 0–2)
LACTATE SERPL-SCNC: 3.5 MMOL/L (ref 0.5–2)
LACTATE SERPL-SCNC: 3.9 MMOL/L (ref 0.5–2)
LACTATE SERPL-SCNC: 4.3 MMOL/L (ref 0.5–2)
LYMPHOCYTES # BLD AUTO: 0.99 THOUSANDS/ÂΜL (ref 0.6–4.47)
LYMPHOCYTES NFR BLD AUTO: 11 % (ref 14–44)
MAGNESIUM SERPL-MCNC: 2.3 MG/DL (ref 1.6–2.6)
MCH RBC QN AUTO: 29.5 PG (ref 26.8–34.3)
MCHC RBC AUTO-ENTMCNC: 32.3 G/DL (ref 31.4–37.4)
MCV RBC AUTO: 91 FL (ref 82–98)
MONOCYTES # BLD AUTO: 0.96 THOUSAND/ÂΜL (ref 0.17–1.22)
MONOCYTES NFR BLD AUTO: 10 % (ref 4–12)
NEUTROPHILS # BLD AUTO: 7.4 THOUSANDS/ÂΜL (ref 1.85–7.62)
NEUTS SEG NFR BLD AUTO: 78 % (ref 43–75)
NRBC BLD AUTO-RTO: 0 /100 WBCS
O2 CT BLDA-SCNC: 14.6 ML/DL (ref 16–23)
OXYHGB MFR BLDA: 95.7 % (ref 94–97)
PCO2 BLDA: 32.6 MM HG (ref 36–44)
PEEP RESPIRATORY: 6 CM[H2O]
PH BLDA: 7.45 [PH] (ref 7.35–7.45)
PHOSPHATE SERPL-MCNC: 2.9 MG/DL (ref 2.3–4.1)
PLATELET # BLD AUTO: 90 THOUSANDS/UL (ref 149–390)
PMV BLD AUTO: 11.7 FL (ref 8.9–12.7)
PO2 BLDA: 90 MM HG (ref 75–129)
POTASSIUM SERPL-SCNC: 3.9 MMOL/L (ref 3.5–5.3)
PROT SERPL-MCNC: 5.3 G/DL (ref 6.4–8.4)
RBC # BLD AUTO: 3.53 MILLION/UL (ref 3.81–5.12)
SODIUM SERPL-SCNC: 141 MMOL/L (ref 135–147)
SPECIMEN SOURCE: ABNORMAL
UNIT DISPENSE STATUS: NORMAL
UNIT PRODUCT CODE: NORMAL
UNIT PRODUCT VOLUME: 250 ML
UNIT PRODUCT VOLUME: 280 ML
UNIT PRODUCT VOLUME: 350 ML
UNIT RH: NORMAL
VENT AC: 20
VENT- AC: AC
VT SETTING VENT: 550 ML
WBC # BLD AUTO: 9.43 THOUSAND/UL (ref 4.31–10.16)

## 2022-11-01 RX ORDER — SODIUM CHLORIDE, SODIUM GLUCONATE, SODIUM ACETATE, POTASSIUM CHLORIDE, MAGNESIUM CHLORIDE, SODIUM PHOSPHATE, DIBASIC, AND POTASSIUM PHOSPHATE .53; .5; .37; .037; .03; .012; .00082 G/100ML; G/100ML; G/100ML; G/100ML; G/100ML; G/100ML; G/100ML
500 INJECTION, SOLUTION INTRAVENOUS ONCE
Status: COMPLETED | OUTPATIENT
Start: 2022-11-01 | End: 2022-11-01

## 2022-11-01 RX ORDER — PANTOPRAZOLE SODIUM 40 MG/10ML
40 INJECTION, POWDER, LYOPHILIZED, FOR SOLUTION INTRAVENOUS
Status: DISCONTINUED | OUTPATIENT
Start: 2022-11-01 | End: 2022-11-02

## 2022-11-01 RX ORDER — INSULIN LISPRO 100 [IU]/ML
2-12 INJECTION, SOLUTION INTRAVENOUS; SUBCUTANEOUS EVERY 6 HOURS SCHEDULED
Status: DISCONTINUED | OUTPATIENT
Start: 2022-11-01 | End: 2022-11-03

## 2022-11-01 RX ORDER — POTASSIUM CHLORIDE 14.9 MG/ML
20 INJECTION INTRAVENOUS ONCE
Status: COMPLETED | OUTPATIENT
Start: 2022-11-01 | End: 2022-11-01

## 2022-11-01 RX ADMIN — SODIUM CHLORIDE, SODIUM GLUCONATE, SODIUM ACETATE, POTASSIUM CHLORIDE AND MAGNESIUM CHLORIDE 150 ML/HR: 526; 502; 368; 37; 30 INJECTION, SOLUTION INTRAVENOUS at 01:50

## 2022-11-01 RX ADMIN — HEPARIN SODIUM 5000 UNITS: 5000 INJECTION INTRAVENOUS; SUBCUTANEOUS at 21:47

## 2022-11-01 RX ADMIN — SODIUM CHLORIDE, SODIUM GLUCONATE, SODIUM ACETATE, POTASSIUM CHLORIDE AND MAGNESIUM CHLORIDE 100 ML/HR: 526; 502; 368; 37; 30 INJECTION, SOLUTION INTRAVENOUS at 08:47

## 2022-11-01 RX ADMIN — PROPOFOL 40 MCG/KG/MIN: 10 INJECTION, EMULSION INTRAVENOUS at 00:30

## 2022-11-01 RX ADMIN — HEPARIN SODIUM 5000 UNITS: 5000 INJECTION INTRAVENOUS; SUBCUTANEOUS at 05:00

## 2022-11-01 RX ADMIN — PANTOPRAZOLE SODIUM 40 MG: 40 INJECTION, POWDER, FOR SOLUTION INTRAVENOUS at 08:35

## 2022-11-01 RX ADMIN — SODIUM CHLORIDE, SODIUM GLUCONATE, SODIUM ACETATE, POTASSIUM CHLORIDE AND MAGNESIUM CHLORIDE 500 ML: 526; 502; 368; 37; 30 INJECTION, SOLUTION INTRAVENOUS at 03:11

## 2022-11-01 RX ADMIN — ROPIVACAINE HYDROCHLORIDE: 5 INJECTION EPIDURAL; INFILTRATION; PERINEURAL at 11:40

## 2022-11-01 RX ADMIN — SODIUM CHLORIDE, SODIUM GLUCONATE, SODIUM ACETATE, POTASSIUM CHLORIDE AND MAGNESIUM CHLORIDE 100 ML/HR: 526; 502; 368; 37; 30 INJECTION, SOLUTION INTRAVENOUS at 17:56

## 2022-11-01 RX ADMIN — PROPOFOL 40 MCG/KG/MIN: 10 INJECTION, EMULSION INTRAVENOUS at 03:48

## 2022-11-01 RX ADMIN — SODIUM CHLORIDE, SODIUM GLUCONATE, SODIUM ACETATE, POTASSIUM CHLORIDE AND MAGNESIUM CHLORIDE 500 ML: 526; 502; 368; 37; 30 INJECTION, SOLUTION INTRAVENOUS at 00:43

## 2022-11-01 RX ADMIN — HEPARIN SODIUM 5000 UNITS: 5000 INJECTION INTRAVENOUS; SUBCUTANEOUS at 14:46

## 2022-11-01 RX ADMIN — PROPOFOL 20 MCG/KG/MIN: 10 INJECTION, EMULSION INTRAVENOUS at 08:15

## 2022-11-01 RX ADMIN — CHLORHEXIDINE GLUCONATE 15 ML: 1.2 SOLUTION ORAL at 08:35

## 2022-11-01 RX ADMIN — POTASSIUM CHLORIDE 20 MEQ: 14.9 INJECTION, SOLUTION INTRAVENOUS at 08:35

## 2022-11-01 NOTE — PLAN OF CARE
Problem: PHYSICAL THERAPY ADULT  Goal: Performs mobility at highest level of function for planned discharge setting  See evaluation for individualized goals  Description: Treatment/Interventions: Functional transfer training, LE strengthening/ROM, Therapeutic exercise, Endurance training, Patient/family training, Elevations, Equipment eval/education, Bed mobility, Gait training, Spoke to nursing  Equipment Recommended:  (TBD)       See flowsheet documentation for full assessment, interventions and recommendations  Note: Prognosis: Good  Problem List: Decreased strength, Decreased range of motion, Decreased endurance, Impaired balance, Decreased mobility  Assessment: Pt seen for high complexity PT evaluation due to decrease in functional mobility status compared to baseline  Pt with active PT eval/treat orders at this time  Pt is a 57 y o  F who presented to [facility] with adenocarcinoma of head of pancreas on 10/31/22  Pt is s/p whipple and primary repair of SMV/portal vein injury  Pt  has a past medical history of Abnormal liver function test, Adenomatosis, 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), Arthritis, Atrial flutter ([facility] ), Benign essential hypertension, Body mass index (BMI) of 50-59 9 in [facility]), Cancer ([facility] ), Colon polyp, Congenital pes planus, COVID, CPAP (continuous positive airway pressure) dependence, Dental crowns present, Depression, Diabetes mellitus ([facility] ), Dizziness, GERD (gastroesophageal reflux disease), Hemangioma of skin (08/26/2003), History of cancer chemotherapy, History of gastroesophageal reflux (GERD), Hypercholesterolemia, Hyperlipidemia, Hypertension, Irregular heart beat, Kidney stone, 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[address] ) (04/19/2006), Osteoarthritis of both knees, Paroxysmal atrial fibrillation ([facility] ), Port-A-Cath in place, S/P ablation of atrial flutter, Shortness of breath, Sleep apnea, and Wears glasses    Pt resides with spouse in [facility] with 2[facility]  Pt presents with decreased strength, balance, endurance that contribute to limitations in bed mobility, functional transfers, functional mobility  Pt requires Mod A for bed mobility, Mod A x 2 for STS and ambulation at this time  Pt left upright in bedside chair with all needs in reach  Pt will benefit from skilled therapy in order to address current impairments and functional limitations  PT to follow pt and recommending HPPT pending progress  The patient's AM-PAC Basic Mobility Inpatient Short Form Raw Score is 8  A Raw score of less than or equal to 16 suggests the patient may benefit from discharge to post-acute rehabilitation services  Please also refer to the recommendation of the Physical Therapist for safe discharge planning  Anticipate pt will progress to DC to HHPT during hospital stay  PT Discharge Recommendation: Home with home health rehabilitation (pending progress)    See flowsheet documentation for full assessment

## 2022-11-01 NOTE — RESPIRATORY THERAPY NOTE
RT Ventilator Management Note  Hernandez Abrams 57 y o  female MRN: [id number]  Unit/Bed#: [facility] 417-66 Encounter: [id number]      Daily Screen         11/1/2022  0752 11/1/2022  1036          Patient safety screen outcome[de-identified] Failed Passed      Not Ready for Weaning due to[de-identified] Underline problem not resolved --                Physical Exam:   Assessment Type: Assess only  General Appearance: Sleeping, Sedated  Respiratory Pattern: Assisted  Chest Assessment: Chest expansion symmetrical  Bilateral Breath Sounds: Diminished      Resp Comments: (P) pt suctioned orally and down ett  pt extubated without incident  pt placed on 4lpm humidified nc o2  no stidor noted

## 2022-11-01 NOTE — OCCUPATIONAL THERAPY NOTE
Occupational Therapy Evaluation     Patient Name: Jeanette GOMEZ Date: 11/1/2022  Problem List  Principal Problem:    Adenocarcinoma of head of pancreas [facility])    Past Medical History  Past Medical History:   Diagnosis Date    Abnormal liver function test     last assessed 1/16/17     Adenomatosis     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    last assessed 4/4/13    Arthritis     Atrial flutter ([facility] [address] )     Benign essential hypertension     last assessed 12/21/17     Body mass index (BMI) of 50-59 9 in adult [facility])     Cancer ([facility] [address] )     pancreas    Colon polyp     Congenital pes planus     last assessed 7/15/14     COVID     4/30/21    CPAP (continuous positive airway pressure) dependence     Dental crowns present     Depression     Diabetes mellitus ([address] )     Dizziness     occas--pt reports did see family doctor    GERD (gastroesophageal reflux disease)     Hemangioma of skin 08/26/2003    last assessed 8/26/03    History of cancer chemotherapy     SL Oncologist Dr Ovalle Severe    History of gastroesophageal reflux (GERD)     Hypercholesterolemia     Hyperlipidemia     Hypertension     Irregular heart beat     Kidney stone     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[facility] [address] ) 04/19/2006    last assessed 12/31/14     Osteoarthritis of both knees     last assessed 12/31/14     Paroxysmal atrial fibrillation ([facility] [address] )     Port-A-Cath in place     S/P ablation of atrial flutter     Shortness of breath     per pt "from chemo-not drastic--still working and goes up steps"    Sleep apnea     Wears glasses      Past Surgical History  Past Surgical History:   Procedure Laterality Date    ABDOMINAL ADHESION SURGERY N/A 10/31/2022    Procedure: LYSIS ADHESIONS, colectomy, vascular pedicle flap;  Surgeon: Low Lopez MD;  [facility];  Service: Surgical Oncology    ABDOMINAL SURGERY  06/2006    20cm GIST removed     ABLATION SOFT TISSUE N/A 10/31/2022    Procedure: SOFT TISSUE ABLATION;  Surgeon: Audie Ratliff MD;  [facility];  Service: Surgical Oncology    APPENDECTOMY      ATRIAL ABLATION SURGERY      CARPAL TUNNEL RELEASE Bilateral     COLONOSCOPY      FL GUIDED CENTRAL VENOUS ACCESS DEVICE INSERTION  05/31/2022    FL RETROGRADE PYELOGRAM  07/18/2022    FL RETROGRADE PYELOGRAM  8/22/2022    HYSTERECTOMY      fibroids    IR PORT CHECK  06/23/2022    IR PORT REMOVAL AND PLACEMENT NEW SITE  06/28/2022    JOINT REPLACEMENT Bilateral     knees    KIDNEY STONE SURGERY Right 09/17/2021    placed stent in  for kidney stone    KNEE SURGERY      KNEE SURGERY Left 03/2019    LAPAROTOMY N/A 10/31/2022    Procedure: EXPLORATORY LAPAROTOMY;  Surgeon: Audie Ratliff MD;  [facility];  Service: Surgical Oncology    OOPHORECTOMY      cyst    AR CYSTO/URETERO W/LITHOTRIPSY &INDWELL STENT INSRT Left 8/22/2022    Procedure: CYSTOSCOPY URETEROSCOPY WITH LITHOTRIPSY HOLMIUM LASER, RETROGRADE PYELOGRAM AND INSERTION STENT URETERAL;  Surgeon: Tom Bejarano MD;  Location[facility];  Service: Urology    AR CYSTOSCOPY,INSERT URETERAL STENT Left 8/22/2022    Procedure: EXCHANGE STENT URETERAL;  Surgeon: Tom Bejarano MD;  [facility];  Service: Urology    AR CYSTOURETHROSCOPY,URETER CATHETER Left 07/18/2022    Procedure: CYSTOSCOPY RETROGRADE PYELOGRAM WITH INSERTION STENT URETERAL;  Surgeon: Tom Bejarano MD;  [facility];  Service: Urology    TONSILLECTOMY      TUBAL LIGATION      TUMOR EXCISION  2006    gastrointestinal stromal tumor    TUNNELED VENOUS PORT PLACEMENT N/A 05/31/2022    Procedure: INSERTION VENOUS PORT (PORT-A-CATH); Surgeon: Audie Ratliff MD;  [facility];  Service: Surgical Oncology    UPPER GASTROINTESTINAL ENDOSCOPY      WHIPPLE PROCEDURE/PANCREATICO-DUODENECTOMY N/A 10/31/2022    Procedure:  WHIPPLE PROCEDURE/PANCREATICO-DUODENECTOMY, IOUS;  Surgeon: Audie Ratliff MD;  [facility];  Service: Surgical Oncology           11/01/22 1415   OT Last Visit   OT Visit Date 11/01/22   Note Type   Note type Evaluation   Pain Assessment   Pain Assessment Tool 0-10   Pain Score No Pain   Restrictions/Precautions   Weight Bearing Precautions Per Order No   Other Precautions Multiple lines;Telemetry;Pain;O2  (PCA pump, NGT)   Home Living   Type of Home House   Home Layout Two level  (2 ABIOLA front, 9 ABIOLA side door)   Bathroom Shower/Tub Tub/shower unit   Bathroom Toilet Raised   Bathroom Equipment   (denies)   Bathroom Accessibility Accessible   Home Equipment Cane;Walker  (not using)   Prior Function   Level of Agency Independent with ADLs; Independent with functional mobility; Independent with IADLS   Lives With Spouse   Receives Help From Family   IADLs Independent with driving; Independent with meal prep; Independent with medication management   Falls in the last 6 months 0   Vocational Retired   Lifestyle   Autonomy pta, pt was I W ADL/IADL, no AD mobility, +    Reciprocal Relationships supportive spouse who she lives with, very supportive family who can assist as needed  Service to Others retired   Intrinsic Gratification spending time w her family  Subjective   Subjective "I actually feel ok"   ADL   Where Assessed Edge of bed   Eating Assistance 6  Modified independent   Grooming Assistance 6  Modified Independent   UB Bathing Assistance 5  Supervision/Setup   LB Bathing Assistance 4  Minimal [address] 5  Supervision/Setup   LB Dressing Assistance 4  [address] 4  Minimal Assistance   Bed Mobility   Supine to Sit 3  Moderate assistance   Additional items Assist x 1; Increased time required;Verbal cues;LE management   Additional Comments found supine in bed, left in chair w all needs in reach and alarm on   Transfers   Sit to Stand 3  Moderate assistance   Additional items Assist x 2; Increased time required;Verbal cues   Stand to Sit 3  Moderate assistance   Additional items Assist x 2; Increased time required;Verbal cues   Additional Comments c HHA, vc for weight shifting  Do anticipate as pts overall medical status stabilizes, she will quickly improve w overall fxnl ability  Functional Mobility   Functional Mobility 3  Moderate assistance   Additional Comments ax2, EOB>chair   Additional items Hand hold assistance   Balance   Static Sitting Fair   Dynamic Sitting Fair -   Static Standing Poor +   Dynamic Standing Poor   Ambulatory Poor   Activity Tolerance   Activity Tolerance Patient tolerated treatment well   Medical Staff Made Aware DPT [facility] 2' pts med complexity, comorbidities and regression from baseline   Nurse Made Aware ok per RN   RUE Assessment   RUE Assessment WFL   LUE Assessment   LUE Assessment WFL   Hand Function   Gross Motor Coordination Functional   Fine Motor Coordination Functional   Psychosocial   Psychosocial (WDL) WDL   Cognition   Overall Cognitive Status WFL   Arousal/Participation Alert; Responsive; Cooperative   Attention Within functional limits   Orientation Level Oriented X4   Memory Within functional limits   Following Commands Follows all commands and directions without difficulty   Comments pleasant and cooperative, G safety awareness and insight to condition t/o session  Assessment   Limitation Decreased ADL status; Decreased Safe judgement during ADL;Decreased endurance;Decreased self-care trans;Decreased high-level ADLs   Prognosis Good   Assessment Pt is a 57 y o  female admitted 10/31/22 w adenocarcinoma of head of pancreas  Pt underwent whipple procedure 10/31/22, is POD #1 w active OT eval and treat orders   PMH includes  has a past medical history of Abnormal liver function test, Adenomatosis, Anomalous atrioventricular excitation, Arthritis, Atrial flutter ([facility] [address] ), Benign essential hypertension, Body mass index (BMI) of 50-59 9 in adult [facility]), Cancer ([facility] [address] ), Colon polyp, Congenital pes planus, COVID, CPAP (continuous positive airway pressure) dependence, Dental crowns present, Depression, Diabetes mellitus ([facility] [address] ), Dizziness, GERD (gastroesophageal reflux disease), Hemangioma of skin, History of cancer chemotherapy, History of gastroesophageal reflux (GERD), Hypercholesterolemia, Hyperlipidemia, Hypertension, Irregular heart beat, Kidney stone, 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[facility] [address] ), Osteoarthritis of both knees, Paroxysmal [facility] fibrillation ([facility] ), Port-A-Cath in place, S/P ablation of atrial flutter, Shortness of breath, Sleep apnea, and Wears glasses  Pt lives w spouse in a multi level home w 2 vs 9 ABIOLA, reports bed/bath upstairs w tub shower and standard toilet  Pta, pt was independent w/ ADL/IADL and functional mobility, was driving and was not using any DME at baseline- has RW and SPC if needed  Currently, pt is S for UB ADL, Min A for LB ADL and completed transfers/FM w mod Ax2 w HHA from [facility]  Pt is limited at this time 2* decreased endurance/activtiy tolerance, decreased ADL/High-level ADL status, decreased self-care trans, decreased safety awareness, and is a fall risk  This impacts pt's ability to complete UB and LB dressing and bathing, toileting, transfers, functional mobility, community mobility, home and health maintenance, and safe engagement in typical daily routine  The patient's raw score on the AM-PAC Daily Activity inpatient short form is 21, standardized score is 44 27, greater than 39 4  Patients at this level are likely to benefit from discharge to home  Please refer to the recommendation of the Occupational Therapist for safe discharge planning  From OT standpoint, pt should D/C to home w home OT when medically stable  Pt will benefit from continued acute OT services 3-5x/wk for 10-14 days to meet goals     Goals   Patient Goals to get better   LTG Time Frame 10-14   Long Term Goal #1 see below   Plan   Treatment Interventions ADL retraining;Functional transfer training; Endurance training;Patient/family training;Equipment evaluation/education; Compensatory technique education; Energy conservation; Activityengagement   Goal Expiration Date 11/15/22   OT Frequency 3-5x/wk   Recommendation   OT Discharge Recommendation Home with home health rehabilitation   AM-PAC Daily Activity Inpatient   Lower Body Dressing 3   Bathing 3   Toileting 3   Upper Body Dressing 4   Grooming 4   Eating 4   Daily Activity Raw Score 21   Daily Activity Standardized Score (Calc for Raw Score >=11) 44 27   AM-PAC Applied Cognition Inpatient   Following a Speech/Presentation 4   Understanding Ordinary Conversation 4   Taking Medications 4   Remembering Where Things Are Placed or Put Away 4   Remembering List of 4-5 Errands 4   Taking Care of Complicated Tasks 4   Applied Cognition Raw Score 24   Applied Cognition Standardized Score 62 21   Modified Wood Scale   Modified Pardeep Scale 4   End of Consult   Education Provided Yes;Family or social support of family present for education by provider   Patient Position at End of Consult Bedside chair; All needs within reach   Nurse Communication Nurse aware of consult       Pt will complete functional mobility with Mod I using appropriate DME as needed  Pt will complete UB dressing and bathing with Mod I using appropriate DME as needed  Pt will complete LB dressing and bathing with Mod I using appropriate DME as needed  Pt will complete transfers with Mod I using appropriate DME as needed  Pt will complete toileting with Mod I using appropriate DME as needed  Pt will complete home maintenance task with Mod I using appropriate DME as needed  Pt will utilize energy conservation techniques throughout functional activity/ADL s/p skilled education  Pt will demonstrate increased safety awareness during functional tasks/ADL's s/p skilled education       Pt will increase activity tolerance to 30 minutes in order to complete ADL's/ functional tasks, using appropriate DME as needed         Mono Franco, MOT, OTR/L

## 2022-11-01 NOTE — PROGRESS NOTES
Daily Progress Note - Critical Care   Braulio Mcdowell 57 y o  female MRN: [id number]  Unit/Bed#: [facility] 088-81 Encounter: [id number]        ----------------------------------------------------------------------------------------  HPI/24hr events:  Patient is a 57-year-old female presenting yesterday for a planned Whipple after 7 cycles of FOLFIRINOX with decreasing tolerance  She has a past medical history of pancreatic cancer, atrial fibrillation not on anticoagulation, type 2 diabetes, hypertension, morbid obesity, recurrent kidney stones with left ureteral stent, and gastroesophageal reflux disease  Her operative course was notable for a venotomy in the portal vein that was oversewn and pledgets applied and venotomy in the SMV in the it was oversewn with pledgets  Overnight she has had improvement in her end points of resuscitation following generous volume resuscitation     ---------------------------------------------------------------------------------------  SUBJECTIVE  Intubated/sedated    Review of Systems   Unable to perform ROS: Intubated     ---------------------------------------------------------------------------------------  Assessment and Plan:    Neuro:   • Diagnosis:  Acute postoperative pain  o Plan:  Continue propofol for ventilatory comfort, patient has ropivacaine/fentanyl PCA available for acute pain service, reassess pain needs once liberated from mechanical ventilation, continue p r n   Tylenol while intubated, frequent neuro checks, delirium precautions    CV:   • Diagnosis:  Paroxysmal atrial fibrillation, history of hypertension  o Plan:  Patient presently normotensive in sinus rhythm, will reassess after liberation from mechanical ventilation, may consider low-dose IV metoprolol    Pulm:  • Diagnosis:  Postoperative respiratory insufficiency  o Plan:  Plan for spontaneous breathing trial today with hopeful extubation, respiratory protocol    GI:   • Diagnosis:  Pancreatic cancer status postoperative day 1 from Whipple procedure with repair of venotomies in the SMV/portal, transaminitis  o Plan:  Postoperative management for the surgical oncology service, follow hemoglobin as needed, begin IV Protonix, continue to follow LFTs    :   o Plan:  Maintain White catheter postoperative day 1 for surgical oncology service      F/E/N:   • Plan:  NPO for now, replete electrolytes as necessary      Heme/Onc:   • Diagnosis:  Anemia, thrombocytopenia  o Plan:  Suspect consumptive/dilutional given volume resuscitation as well as reading in the operating room, continue to trend, continue heparin for DVT prophylaxis      Endo:   • Diagnosis:  Type 2 diabetes  o Plan:  Follow blood glucose needs status post Whipple, begin sliding scale for now, may need additional endocrinology follow-up    ID:   o Plan:  Follow temperature and white count      MSK/Skin:   o Plan:  Frequent turning repositioning, out of bed when liberated from mechanical ventilation    Patient appropriate for transfer out of the ICU today?: No  Disposition: Continue Critical Care   Code Status: Level 1 - Full Code  ---------------------------------------------------------------------------------------  ICU CORE MEASURES    Prophylaxis   VTE Pharmacologic Prophylaxis: Heparin  VTE Mechanical Prophylaxis: sequential compression device  Stress Ulcer Prophylaxis: Pantoprazole IV     ABCDE Protocol (if indicated)  Plan to perform spontaneous awakening trial today? Yes  Plan to perform spontaneous breathing trial today? Yes  Obvious barriers to extubation? No  CAM-ICU: Negative    Invasive Devices Review  Invasive Devices  Report    Central Venous Catheter Line  Duration           Port A Cath 05/31/22 Left Chest 153 days          Peripheral Intravenous Line  Duration           Peripheral IV 10/31/22 Dorsal (posterior); Right Forearm <1 day    Peripheral IV 10/31/22 Right Forearm <1 day          Epidural Line  Duration           Epidural Catheter 10/31/22 <1 day          Arterial Line  Duration           Arterial Line 10/31/22 Right Radial <1 day          Drain  Duration           Ureteral Internal Stent Left ureter 6 Fr  105 days    Closed/Suction Drain RUQ Bulb 19 Fr  <1 day    NG/OG/Enteral Tube Nasogastric Right nare <1 day    Urethral Catheter Latex 16 Fr  <1 day              Can any invasive devices be discontinued today? No  ---------------------------------------------------------------------------------------  OBJECTIVE    Vitals   Vitals:    22 0300 22 0400 22 0500 22 0600   BP: 128/73 118/69 126/77    Pulse: 104 105 104    Resp: 20 20 20    Temp:       TempSrc:       SpO2: 96% 96% 96%    Weight:    (!) 143 kg (315 lb 4 1 oz)   Height:         Temp (24hrs), Av 2 °F (36 8 °C), Min:96 8 °F (36 °C), Max:98 9 °F (37 2 °C)  Current: Temperature: 98 8 °F (37 1 °C)  HR: 100  BP: 127/75  RR: 22  SpO2: 95%    Respiratory:  SpO2: SpO2: 96 %, SpO2 Activity:  , SpO2 Device: O2 Device: None (Room air)       Invasive/non-invasive ventilation settings   Respiratory  Report   Lab Data (Last 4 hours)    None         O2/Vent Data (Last 4 hours)       0257           Vent Mode AC/VC       Resp Rate (BPM) (BPM) 20       Vt (mL) (mL) 550       FIO2 (%) (%) 60       PEEP (cmH2O) (cmH2O) 6       MV 11 2                   Physical Exam  Constitutional:       General: She is not in acute distress  Appearance: She is obese  Interventions: She is sedated, intubated and restrained  HENT:      Head: Normocephalic and atraumatic  Mouth/Throat:      Mouth: Mucous membranes are dry  Eyes:      Pupils: Pupils are equal, round, and reactive to light  Cardiovascular:      Rate and Rhythm: Regular rhythm  Tachycardia present  Pulses: Normal pulses  Pulmonary:      Effort: Pulmonary effort is normal  No respiratory distress  She is intubated  Breath sounds: Normal breath sounds        Comments: No secretions inline suction  Abdominal:      General: There is no distension  Palpations: Abdomen is soft  Tenderness: There is abdominal tenderness  Comments: Midline incision well-approximated with no erythema or drainage  RUQ NIGEL in place with large amount of serosanguinous drainage   Genitourinary:     Comments: White in place with yellow urine  Musculoskeletal:         General: No signs of injury  Right lower leg: No edema  Left lower leg: No edema  Skin:     General: Skin is warm and dry  Neurological:      GCS: GCS eye subscore is 3  GCS verbal subscore is 1  GCS motor subscore is 6        Comments: Exam performed on propofol       Laboratory and Diagnostics:  Results from last 7 days   Lab Units 11/01/22  0507 10/31/22  1855 10/31/22  1659 10/31/22  1619 10/31/22  1546 10/31/22  1458 10/31/22  1406   WBC Thousand/uL 9 43 11 86*  --  11 63*  --   --   --    HEMOGLOBIN g/dL 10 4* 11 0*  --  8 8*  --   --   --    I STAT HEMOGLOBIN g/dl  --   --  10 2*  --  8 5* 9 5* 9 2*   HEMATOCRIT % 32 2* 34 7*  --  27 5*  --   --   --    HEMATOCRIT, ISTAT %  --   --  30*  --  25* 28* 27*   PLATELETS Thousands/uL 90* 103*  --  105*  --   --   --    NEUTROS PCT % 78*  --   --   --   --   --   --    MONOS PCT % 10  --   --   --   --   --   --      Results from last 7 days   Lab Units 11/01/22  0507 10/31/22  1855 10/31/22  1659 10/31/22  1546 10/31/22  1458 10/31/22  1406 10/31/22  1312   SODIUM mmol/L 141 140  --   --   --   --   --    POTASSIUM mmol/L 3 9 4 5  --   --   --   --   --    CHLORIDE mmol/L 110* 112*  --   --   --   --   --    CO2 mmol/L 23 19*  --   --   --   --   --    CO2, I-STAT mmol/L  --   --  20* 21 20* 21 22   ANION GAP mmol/L 8 9  --   --   --   --   --    BUN mg/dL 9 10  --   --   --   --   --    CREATININE mg/dL 0 66 0 86  --   --   --   --   --    CALCIUM mg/dL 8 2* 8 5  --   --   --   --   --    GLUCOSE RANDOM mg/dL 163* 165*  --   --   --   --   --    ALT U/L 525* 365*  --   --   --   --   -- AST U/L 685* 493*  --   --   --   --   --    ALK PHOS U/L 96 94  --   --   --   --   --    ALBUMIN g/dL 2 6* 2 8*  --   --   --   --   --    TOTAL BILIRUBIN mg/dL 0 84 1 02*  --   --   --   --   --      Results from last 7 days   Lab Units 11/01/22  0507 10/31/22  1855   MAGNESIUM mg/dL 2 3 1 5*   PHOSPHORUS mg/dL 2 9 4 2*      Results from last 7 days   Lab Units 10/31/22  1540   INR  1 25*          Results from last 7 days   Lab Units 11/01/22  0443 11/01/22  0153 10/31/22  2257 10/31/22  1855   LACTIC ACID mmol/L 3 5* 3 9* 4 3* 6 0*     ABG:  Results from last 7 days   Lab Units 10/31/22  1855   PH ART  7 315*   PCO2 ART mm Hg 31 6*   PO2 ART mm Hg 108 3   HCO3 ART mmol/L 15 7*   BASE EXC ART mmol/L -9 3   ABG SOURCE  Line, Arterial     VBG:  Results from last 7 days   Lab Units 10/31/22  1855   ABG SOURCE  Line, Arterial           Micro        EKG:  Review of telemetry demonstrates sinus tachycardia  Imaging:  I have personally reviewed pertinent reports  and I have personally reviewed pertinent films in PACS    Intake and Output  I/O       10/30 0701  10/31 0700 10/31 0701 11/01 0700    I V  (mL/kg)  6109 (42 7)    Blood  1550    IV Piggyback  1100    Total Intake(mL/kg)  8759 (61 3)    Urine (mL/kg/hr)  1965    Emesis/NG output  350    Drains  300    Stool  0    Blood  2000    Total Output  4615    Net  +4144          Unmeasured Stool Occurrence  0 x        UOP: 80 ml/hr     Height and Weights   Height: 5' 4" (162 6 cm)     Body mass index is 54 11 kg/m²  Weight (last 2 days)     Date/Time Weight    11/01/22 0600 143 (315 26)    10/31/22 0824 134 (295)            Nutrition       Diet Orders   (From admission, onward)             Start     Ordered    10/31/22 1848  Diet NPO  Diet effective now        References:    Nutrtion Support Algorithm Enteral vs  Parenteral   Question Answer Comment   Diet Type NPO    RD to adjust diet per protocol?  Yes        10/31/22 1521                Active Medications  Scheduled Meds:  Current Facility-Administered Medications   Medication Dose Route Frequency Provider Last Rate   • chlorhexidine  15 mL Mouth/Throat Q12H 640 [address] FOREST Soriano     • fentanyl citrate (PF)  50 mcg Intravenous Q1H PRN Dayday Peña PA-C     • heparin (porcine)  5,000 Units Subcutaneous [facility] Kanchan Cuba MD     • multi-electrolyte  150 mL/hr Intravenous Continuous Kanchan Cuba  mL/hr (11/01/22 0150)   • propofol  5-50 mcg/kg/min Intravenous Titrated Dayday Peña PA-C 25 mcg/kg/min (11/01/22 7308)   • ropivacaine 0 1% and fentaNYL 2 mcg/mL   Epidural Continuous Lucina Overton MD       Continuous Infusions:  multi-electrolyte, 150 mL/hr, Last Rate: 150 mL/hr (11/01/22 0150)  propofol, 5-50 mcg/kg/min, Last Rate: 25 mcg/kg/min (11/01/22 1247)  ropivacaine 0 1% and fentaNYL 2 mcg/mL,       PRN Meds:   fentanyl citrate (PF), 50 mcg, Q1H PRN        Allergies   Allergies   Allergen Reactions   • Other Rash     Adhesive tape      ---------------------------------------------------------------------------------------  Advance Directive and Living Will:      Power of :    POLST:    ---------------------------------------------------------------------------------------  Care Time Delivered:   No Critical Care time spent     [facility]      Portions of the record may have been created with voice recognition software  Occasional wrong word or "sound a like" substitutions may have occurred due to the inherent limitations of voice recognition software    Read the chart carefully and recognize, using context, where substitutions have occurred

## 2022-11-01 NOTE — PHYSICAL THERAPY NOTE
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)    Esophageal reflux    Benign essential hypertension    Adenomatous colon polyp    Class 3 severe obesity due to excess calories with body mass index (BMI) of 50 0 to 59 9 in adult [facility])    Nephrolithiasis    Uric acid kidney stone    Adenocarcinoma of head of pancreas ([facility] )    Chemotherapy induced neutropenia (HCC)    CPAP (continuous positive airway pressure) dependence    Left flank pain    Paroxysmal A-fib ([facility] )       Past Medical History  Past Medical History:   Diagnosis Date    Abnormal liver function test     last assessed 1/16/17     Adenomatosis     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    last assessed 4/4/13    Arthritis     Atrial flutter ([facility] )     Benign essential hypertension     last assessed 12/21/17     Body mass index (BMI) of 50-59 9 in adult ([facility] )     Cancer ([facility] )     pancreas    Colon polyp     Congenital pes planus     last assessed 7/15/14     COVID     4/30/21    CPAP (continuous positive airway pressure) dependence     Dental crowns present     Depression     Diabetes mellitus ([facility] )     Dizziness     occas--pt reports did see family doctor    GERD (gastroesophageal reflux disease)     Hemangioma of skin 08/26/2003    last assessed 8/26/03    History of cancer chemotherapy     SL Oncologist Dr Valverde [facility]    History of gastroesophageal reflux (GERD)     Hypercholesterolemia     Hyperlipidemia     Hypertension     Irregular heart beat     Kidney stone     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[facility] ) 04/19/2006    last assessed 12/31/14     Osteoarthritis of both knees     last assessed 12/31/14     Paroxysmal atrial fibrillation ([facility] )     Port-A-Cath in place     S/P ablation of atrial flutter     Shortness of breath     per pt "from chemo-not drastic--still working and goes up steps"    Sleep apnea     Wears glasses        Past Surgical History  Past Surgical History:   Procedure Laterality Date    ABDOMINAL ADHESION SURGERY N/A 10/31/2022    Procedure: LYSIS ADHESIONS, colectomy, vascular pedicle flap;  Surgeon: Tee Grimes MD;  [facility]: [facility];  Service: Surgical Oncology    ABDOMINAL SURGERY  06/2006    20cm GIST removed     ABLATION SOFT TISSUE N/A 10/31/2022    Procedure: SOFT TISSUE ABLATION;  Surgeon: Tee Grimes MD;  Location: [facility];  Service: Surgical Oncology    APPENDECTOMY      ATRIAL ABLATION SURGERY      CARPAL TUNNEL RELEASE Bilateral     COLONOSCOPY      FL [address] [facility],  [phone]  05/31/2022    FL RETROGRADE PYELOGRAM  07/18/2022    FL RETROGRADE PYELOGRAM  8/22/2022    HYSTERECTOMY      fibroids    IR PORT CHECK  06/23/2022    IR PORT REMOVAL AND PLACEMENT NEW SITE  06/28/2022    JOINT REPLACEMENT Bilateral     knees    KIDNEY STONE SURGERY Right 09/17/2021    placed stent in  for kidney stone    KNEE SURGERY      KNEE SURGERY Left 03/2019    LAPAROTOMY N/A 10/31/2022    Procedure: EXPLORATORY LAPAROTOMY;  Surgeon: Tee Grimes MD;  [facility];  Service: Surgical Oncology    OOPHORECTOMY      cyst    MD CYSTO/URETERO W/LITHOTRIPSY &INDWELL STENT INSRT Left 8/22/2022    Procedure: CYSTOSCOPY URETEROSCOPY WITH LITHOTRIPSY HOLMIUM LASER, RETROGRADE PYELOGRAM AND INSERTION STENT URETERAL;  Surgeon: Desean Dyer MD;  Location[facility];  Service: Urology    MD CYSTOSCOPY,INSERT URETERAL STENT Left 8/22/2022    Procedure: EXCHANGE STENT URETERAL;  Surgeon: Desean Dyer MD;  [facility];  Service: Urology    SD CYSTOURETHROSCOPY,URETER CATHETER Left 07/18/2022    Procedure: CYSTOSCOPY RETROGRADE PYELOGRAM WITH INSERTION STENT URETERAL;  Surgeon: Vanessa Larsen MD;  Location[facility];  Service: Urology    TONSILLECTOMY      TUBAL LIGATION      TUMOR EXCISION  2006    gastrointestinal stromal tumor    TUNNELED VENOUS PORT PLACEMENT N/A 05/31/2022    Procedure: INSERTION VENOUS PORT (PORT-A-CATH); Surgeon: Vero Enriquez MD;  [facility];  Service: Surgical Oncology    UPPER GASTROINTESTINAL ENDOSCOPY      WHIPPLE PROCEDURE/PANCREATICO-DUODENECTOMY N/A 10/31/2022    Procedure: WHIPPLE PROCEDURE/PANCREATICO-DUODENECTOMY, IOUS;  Surgeon: Vero Enriquez MD;  [facility];  Service: Surgical Oncology          11/01/22 1423   PT Last Visit   PT Visit Date 11/01/22   Note Type   Note type Evaluation   Pain Assessment   Pain Assessment Tool 0-10   Pain Score No Pain   Restrictions/Precautions   Weight Bearing Precautions Per Order No   Other Precautions Multiple lines;Telemetry; Fall Risk;O2  (NGT, PCA pump)   Home Living   Type of Home House   Home Layout Two level  (2STE, 9 ABIOLA on side)   Bathroom Shower/Tub Tub/shower unit   Bathroom Toilet Raised   Home Equipment Cane;Walker  (denies use PTA)   Prior Function   Level of [place] Independent with ADLs; Independent with functional mobility   Lives With Spouse   Falls in the last 6 months 0   General   Family/Caregiver Present No   Cognition   Overall Cognitive Status WFL   Arousal/Participation Alert   Orientation Level Oriented X4   Memory Within functional limits   Following Commands Follows all commands and directions without difficulty   Subjective   Subjective Pleasant and agreeable to participate in therapy sessiuon   RLE Assessment   RLE Assessment   (functionally 3+/5)   LLE Assessment   LLE Assessment   (functionally 3+/5)   Bed Mobility   Supine to Sit 3  Moderate assistance   Additional items Assist x 1;HOB elevated; Increased time required;Verbal cues;LE management   Additional Comments Supine in bed upon PT arrival   Pt left upright in bedside chair with all needs in reach  Transfers   Sit to Stand 3  Moderate assistance   Additional items Assist x 2; Increased time required;Verbal cues  (assist of third for lines)   Stand to Sit 3  Moderate assistance   Additional items Assist x 2; Increased time required;Verbal cues   Additional Comments with HHA   Ambulation/Elevation   Gait pattern Excessively slow; Short stride; Step to;Decreased foot clearance   Gait Assistance 3  Moderate assist   Additional items Assist x 2;Verbal cues; Tactile cues   Assistive Device Other (Comment)  (HHA)   Distance 3 ft from bed to chair   Balance   Static Sitting Fair   Dynamic Sitting Fair   Static Standing Fair -   Dynamic Standing Poor +   Ambulatory Poor +   Activity Tolerance   Activity Tolerance Patient limited by fatigue   Medical Staff Made Aware OT, OTS   Nurse Made Aware RN cleared pt to be seen by PT   Assessment   Prognosis Good   Problem List Decreased strength;Decreased range of motion;Decreased endurance; Impaired balance;Decreased mobility   Assessment Pt seen for high complexity PT evaluation due to decrease in functional mobility status compared to baseline  Pt with active PT eval/treat orders at this time  Pt is a 57 y o  F [facility] head of pancreas on 10/31/22  Pt is s/p whipple and primary repair of SMV/portal vein injury    Pt  has a past medical history of Abnormal liver function test, Adenomatosis, 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), Arthritis, Atrial flutter ([facility] ), Benign essential hypertension, Body mass index (BMI) of 50-59 9 in adult [facility]), Cancer ([facility] ), Colon polyp, Congenital pes planus, COVID, CPAP (continuous positive airway pressure) dependence, Dental crowns present, Depression, Diabetes mellitus ([facility] ), Dizziness, GERD (gastroesophageal reflux disease), Hemangioma of skin (08/26/2003), History of cancer chemotherapy, History of gastroesophageal reflux (GERD), Hypercholesterolemia, Hyperlipidemia, Hypertension, Irregular heart beat, Kidney stone, 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[facility] ) (04/19/2006), Osteoarthritis of both knees, Paroxysmal atrial fibrillation ([facility] ), Port-A-Cath in place, S/P ablation of atrial flutter, Shortness of breath, Sleep apnea, and Wears glasses  Pt resides with spouse in [facility] with 2STE  Pt presents with decreased strength, balance, endurance that contribute to limitations in bed mobility, functional transfers, functional mobility  Pt requires Mod A for bed mobility, Mod A x 2 for STS and ambulation at this time  Pt left upright in bedside chair with all needs in reach  Pt will benefit from skilled therapy in order to address current impairments and functional limitations  PT to follow pt and recommending HPPT pending progress  The patient's AM-PAC Basic Mobility Inpatient Short Form Raw Score is 8  A Raw score of less than or equal to 16 suggests the patient may benefit from discharge to post-acute rehabilitation services  Please also refer to the recommendation of the Physical Therapist for safe discharge planning  Anticipate pt will progress to DC to HHPT during hospital stay  Goals   Patient Goals to get better   [facility] Expiration Date 11/15/22   Short Term Goal #1 1  Pt will demonstrate ability to perform all aspects of bed mobility with I in order to increase independence and decrease burden on caregivers  2  Pt will demonstrate ability to perform functional transfers with Mod I in order to increase independence and decrease burden on caregivers  3  Pt will demonstrate ability to ambulate 200 ft with least restrictive AD with Mod I in order to return to mobility safely   4  Pt will demonstrate ability to negotiate full flight steps with/without HR and Mod I in order to return to household/community mobility safely  5  Pt will demonstrate improved balance by one grade order to decrease risk of falls  6  Pt will increase b/l LE strength by 1 grade in order to increase ease of functional mobility and transfers  Plan   Treatment/Interventions Functional transfer training;LE strengthening/ROM; Therapeutic exercise; Endurance training;Patient/family training;Elevations; Equipment eval/education; Bed mobility;Gait training;Spoke to nursing   PT Frequency 3-5x/wk   Recommendation   PT Discharge Recommendation Home with home health rehabilitation  (pending progress)   Equipment Recommended   (TBD)   AM-PAC Basic Mobility Inpatient   Turning in Bed Without Bedrails 2   Lying on Back to Sitting on Edge of Flat Bed 2   Moving Bed to Chair 1   Standing Up From Chair 1   Walk in Room 1   Climb 3-5 Stairs 1   Basic Mobility Inpatient Raw Score 8   Turning Head Towards Sound 4   Follow Simple Instructions 4   Low Function Basic Mobility Raw Score 16   Low Function Basic Mobility Standardized Score 25 72   Highest Level Of Mobility   JH-HLM Goal 3: Sit at edge of bed   JH-HLM Achieved 4: Move to chair/commode   Modified [place] Scale   Modified [place] Scale 4         Breanne Sam, PT, DPT

## 2022-11-01 NOTE — PROGRESS NOTES
Progress Note - Surgical Oncology  Hood Deras 57 y o  female MRN: [id number]  Unit/Bed#: [facility] 456-84 Encounter: [id number]    Assessment:  Patient is a 57 y o  female who presented with  post pancreatic head mass, now status on bubble, portal vein, SMV repair on 1031/POD1  EBL 2 L      Afebrile,VSS on AC VC 20, 550, 60% and 6  NGT:  350 clear  NIGEL:  300 cc,serosanguinous  UOP:  1 9L       Plan:  Wean to extubate  Maintain NPO NG tube  Monitor endpoints and continue resuscitation  Maintain NIGEL a monitor output  Maintain White and A-line  Continue IV fluids consider decreasing right  Maintain epidural per APS  Follow-up a m  Labs  Appreciate ICU level of care        Subjective/Objective     Events/subjective  Patient received 3 units PRBCs and 2 of FFP 1 L of 5% albumin and about 6 L of fluid yesterday  Lactate trend 3 9 from 4 3 from 6  Last ABG 7 31/31/108/15/9 3    Objective:    Blood pressure 118/69, pulse 105, temperature 98 8 °F (37 1 °C), temperature source Oral, resp  rate 20, height 5' 4" (1 626 m), weight 134 kg (295 lb), SpO2 96 %  ,Body mass index is 50 64 kg/m²  Intake/Output Summary (Last 24 hours) at 11/1/2022 0535  Last data filed at 11/1/2022 0445  Gross per 24 hour   Intake 8759 03 ml   Output 4615 ml   Net 4144 03 ml       Invasive Devices  Report    Central Venous Catheter Line  Duration           Port A Cath 05/31/22 Left Chest 153 days          Peripheral Intravenous Line  Duration           Peripheral IV 10/31/22 Dorsal (posterior); Right Forearm <1 day    Peripheral IV 10/31/22 Right Forearm <1 day          Epidural Line  Duration           Epidural Catheter 10/31/22 <1 day          Arterial Line  Duration           Arterial Line 10/31/22 Right Radial <1 day          Drain  Duration           Ureteral Internal Stent Left ureter 6 Fr  105 days    Closed/Suction Drain RUQ Bulb 19 Fr  <1 day    NG/OG/Enteral Tube Nasogastric Right nare <1 day    Urethral Catheter Latex 16 Fr  <1 day Physical Exam  Vitals reviewed  Constitutional:       Interventions: She is sedated, intubated and restrained  HENT:      Head: Normocephalic and atraumatic  Cardiovascular:      Rate and Rhythm: Normal rate  Pulmonary:      Effort: She is intubated  Abdominal:      General: There is no distension  Palpations: Abdomen is soft        Comments: Incision clean dry and intact  NIGEL drain in place   Musculoskeletal:      Comments: SCD's in place             Results from last 7 days   Lab Units 10/31/22  1855 10/31/22  1659 10/31/22  1619   WBC Thousand/uL 11 86*  --  11 63*   HEMOGLOBIN g/dL 11 0*  --  8 8*   I STAT HEMOGLOBIN g/dl  --  10 2*  --    HEMATOCRIT % 34 7*  --  27 5*   HEMATOCRIT, ISTAT %  --  30*  --    PLATELETS Thousands/uL 103*  --  105*     Results from last 7 days   Lab Units 10/31/22  1855 10/31/22  1659 10/31/22  1546   POTASSIUM mmol/L 4 5  --   --    CHLORIDE mmol/L 112*  --   --    CO2 mmol/L 19*  --   --    CO2, I-STAT mmol/L  --  20* 21   BUN mg/dL 10  --   --    CREATININE mg/dL 0 86  --   --    GLUCOSE, ISTAT mg/dl  --  151* 156*   CALCIUM mg/dL 8 5  --   --      Results from last 7 days   Lab Units 10/31/22  1540   INR  1 25*

## 2022-11-01 NOTE — PLAN OF CARE
Problem: OCCUPATIONAL THERAPY ADULT  Goal: Performs self-care activities at highest level of function for planned discharge setting  See evaluation for individualized goals  Description: Treatment Interventions: ADL retraining, Functional transfer training, Endurance training, Patient/family training, Equipment evaluation/education, Compensatory technique education, Energy conservation, Activityengagement          See flowsheet documentation for full assessment, interventions and recommendations  Note: Limitation: Decreased ADL status, Decreased Safe judgement during ADL, Decreased endurance, Decreased self-care trans, Decreased high-level ADLs  Prognosis: Good  Assessment: Pt is a 57 y o  female admitted 10/31/22 w adenocarcinoma of head of pancreas  Pt underwent whipple procedure 10/31/22, is POD #1 w active OT eval and treat orders  PMH includes  has a past medical history of Abnormal liver function test, Adenomatosis, Anomalous atrioventricular excitation, Arthritis, Atrial flutter ([address] ), Benign essential hypertension, Body mass index (BMI) of 50-59 9 in adult [facility]), Cancer ([facility] ), Colon polyp, Congenital pes planus, COVID, CPAP (continuous positive airway pressure) dependence, Dental crowns present, Depression, Diabetes mellitus ([address] ), Dizziness, GERD (gastroesophageal reflux disease), Hemangioma of skin, History of cancer chemotherapy, History of gastroesophageal reflux (GERD), Hypercholesterolemia, Hyperlipidemia, Hypertension, Irregular heart beat, Kidney stone, 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[address] ), Osteoarthritis of both knees, Paroxysmal atrial fibrillation ([address] ), Port-A-Cath in place, S/P ablation of atrial flutter, Shortness of breath, Sleep apnea, and Wears glasses  Pt lives w spouse in a multi level home w 2 vs 9 ABIOLA, reports bed/bath upstairs w tub shower and standard toilet   Pta, pt was independent w/ ADL/IADL and functional mobility, was driving and was not using any DME at baseline- has RW and SPC if needed  Currently, pt is S for UB ADL, Min A for LB ADL and completed transfers/FM w mod Ax2 w HHA from [facility]  Pt is limited at this time 2* decreased endurance/activtiy tolerance, decreased ADL/High-level ADL status, decreased self-care trans, decreased safety awareness, and is a fall risk  This impacts pt's ability to complete UB and LB dressing and bathing, toileting, transfers, functional mobility, community mobility, home and health maintenance, and safe engagement in typical daily routine  The patient's raw score on the AM-PAC Daily Activity inpatient short form is 21, standardized score is 44 27, greater than 39 4  Patients at this level are likely to benefit from discharge to home  Please refer to the recommendation of the Occupational Therapist for safe discharge planning  From OT standpoint, pt should D/C to home w home OT when medically stable  Pt will benefit from continued acute OT services 3-5x/wk for 10-14 days to meet goals       OT Discharge Recommendation: Home with home health rehabilitation

## 2022-11-01 NOTE — UTILIZATION REVIEW
Initial Clinical Review    Elective Inpatient surgical procedure  Age/Sex: 57 y o  female  Surgery Date: 10/31/22  Procedure: WHIPPLE PROCEDURE/PANCREATICO-DUODENECTOMY, IOUS (N/A)  EXPLORATORY LAPAROTOMY (N/A)  SOFT TISSUE ABLATION (N/A)  LYSIS ADHESIONS, colectomy, vascular pedicle flap (N/A)   Primary repair of SMV/Portal vein injury     Anesthesia: General w/ Epidural    POD#1 Progress Note:   Progress notes; EBL 2L  NGT: 350 clear, NIGEL: 300 ml serosanguinous  UOP: 1 9L  Wean to extubate  Maintain NPO and NG tube  Maintain NIGEL outpt, White cath and A-line  Continue IVFs  Maintain Epidural per APS  F/u am labs  Per Critical Care; Propofol for ventilatory comfort, patient has ropivacaine/fentanyl PCA available for acute pain service, reassess pain needs once liberated from mechanical ventilation, continue p r n  Tylenol while intubated, frequent neuro checks, delirium precautions  Plan for spontaneous breathing trial today with hopeful extubation  NPO for now   F/u  blood glucose needs status post Whipple, begin sliding scale for now, may need additional endocrinology follow-up      Admission Orders: Date/Time/Statement:   Admission Orders (From admission, onward)     Ordered        10/31/22 1758  Inpatient Admission  Once                      Orders Placed This Encounter   Procedures   • Inpatient Admission     Standing Status:   Standing     Number of Occurrences:   1     Order Specific Question:   Level of Care     Answer:   Critical Care [15]     Order Specific Question:   Estimated length of stay     Answer:   Inpatient Only Surgery     Vital Signs: /73   Pulse 104   Temp 98 8 °F (37 1 °C) (Oral)   Resp 21   Ht 5' 4" (1 626 m)   Wt (!) 143 kg (315 lb 4 1 oz)   SpO2 95%   BMI 54 11 kg/m²      11/01/22 0945 -- 101 18 123/81 97 94 % --   11/01/22 0930 -- 101 18 121/76 93 95 % --   11/01/22 0915 -- 98 19 123/72 89 94 %      Pertinent Labs/Diagnostic Test Results:   XR chest portable ICU   Final Result by Aaron Husain MD (10/31 2031)      Endotracheal tube with its distal tip in the proximal right atrium  This should be retracted several centimeters  Gastric tube as above  The study was marked in [facility] for immediate notification                    Workstation performed: [id number]               Results from last 7 days   Lab Units 11/01/22  0507 10/31/22  1855 10/31/22  1659 10/31/22  1619 10/31/22  1546   WBC Thousand/uL 9 43 11 86*  --  11 63*  --    HEMOGLOBIN g/dL 10 4* 11 0*  --  8 8*  --    I STAT HEMOGLOBIN g/dl  --   --  10 2*  --  8 5*   HEMATOCRIT % 32 2* 34 7*  --  27 5*  --    HEMATOCRIT, ISTAT %  --   --  30*  --  25*   PLATELETS Thousands/uL 90* 103*  --  105*  --    NEUTROS ABS Thousands/µL 7 40  --   --   --   --          Results from last 7 days   Lab Units 11/01/22  0507 10/31/22  1855 10/31/22  1659 10/31/22  1546 10/31/22  1458 10/31/22  1406   SODIUM mmol/L 141 140  --   --   --   --    POTASSIUM mmol/L 3 9 4 5  --   --   --   --    CHLORIDE mmol/L 110* 112*  --   --   --   --    CO2 mmol/L 23 19*  --   --   --   --    CO2, I-STAT mmol/L  --   --  20* 21 20* 21   ANION GAP mmol/L 8 9  --   --   --   --    BUN mg/dL 9 10  --   --   --   --    CREATININE mg/dL 0 66 0 86  --   --   --   --    EGFR ml/min/1 73sq m 94 72  --   --   --   --    CALCIUM mg/dL 8 2* 8 5  --   --   --   --    CALCIUM, IONIZED mmol/L  --  1 13  --   --   --   --    CALCIUM, IONIZED, ISTAT mmol/L  --   --  1 19 1 26 1 11* 1 12   MAGNESIUM mg/dL 2 3 1 5*  --   --   --   --    PHOSPHORUS mg/dL 2 9 4 2*  --   --   --   --      Results from last 7 days   Lab Units 11/01/22  0507 10/31/22  1855   AST U/L 685* 493*   ALT U/L 525* 365*   ALK PHOS U/L 96 94   TOTAL PROTEIN g/dL 5 3* 5 6*   ALBUMIN g/dL 2 6* 2 8*   TOTAL BILIRUBIN mg/dL 0 84 1 02*     Results from last 7 days   Lab Units 11/01/22  0645 10/31/22  0752   POC GLUCOSE mg/dl 149* 128     Results from last 7 days   Lab Units 11/01/22  0507 10/31/22  1855 GLUCOSE RANDOM mg/dL 163* 165*       Results from last 7 days   Lab Units 11/01/22  0625 10/31/22  1855   PH ART  7 449 7 315*   PCO2 ART mm Hg 32 6* 31 6*   PO2 ART mm Hg 90 0 108 3   HCO3 ART mmol/L 22 1 15 7*   BASE EXC ART mmol/L -1 3 -9 3   O2 CONTENT ART mL/dL 14 6* 15 6*   O2 HGB, ARTERIAL % 95 7 96 2   ABG SOURCE  Line, Arterial Line, Arterial         Results from last 7 days   Lab Units 10/31/22  1659 10/31/22  1546 10/31/22  1458   I STAT BASE EXC mmol/L -7* -6* -8*   I STAT O2 SAT % 99* 99* 99*   ISTAT PH ART  7 269* 7 282* 7 247*   I STAT ART PCO2 mm HG 41 7 41 9 42 9   I STAT ART PO2 mm  0* 166 0* 144 0*   I STAT ART HCO3 mmol/L 19 1* 19 8* 18 7*       Results from last 7 days   Lab Units 10/31/22  1540   PROTIME seconds 15 9*   INR  1 25*       Results from last 7 days   Lab Units 11/01/22  0443 11/01/22  0153 10/31/22  2257 10/31/22  1855   LACTIC ACID mmol/L 3 5* 3 9* 4 3* 6 0*         Results from last 7 days   Lab Units 10/27/22  1410   GLUCOSE UA  NEG   KETONES UA  NEG   BLOOD UA  NEG   PROTEIN UA  15   NITRITE UA  NEG   BILIRUBIN UA POC  NEG   UROBILINOGEN UA  0 2   LEUKOCYTES UA  NEG       Diet: NPO  Mobility: Up and OOB as tolerated  DVT Prophylaxis: SCD    Medications/Pain Control:   Scheduled Medications:  chlorhexidine, 15 mL, Mouth/Throat, Q12H KIM  heparin (porcine), 5,000 Units, Subcutaneous, Q8H KIM  insulin lispro, 2-12 Units, Subcutaneous, Q6H KIM  pantoprazole, 40 mg, Intravenous, Q24H KIM  potassium chloride, 20 mEq, Intravenous, Once      Continuous IV Infusions:  multi-electrolyte, 100 mL/hr, Intravenous, Continuous  propofol, 5-50 mcg/kg/min, Intravenous, Titrated  ropivacaine 0 1% and fentaNYL 2 mcg/mL, , Epidural, Continuous      PRN Meds:  fentanyl citrate (PF), 50 mcg, Intravenous, Q1H PRN        Network Utilization Review Department  ATTENTION: Please call with any questions or concerns to [phone] and carefully listen to the prompts so that you are directed to the right person  All voicemails are confidential   Russell Cramer all requests for admission clinical reviews, approved or denied determinations and any other requests to dedicated fax number below belonging to the campus where the patient is receiving treatment   List of dedicated fax numbers for the Facilities:  [address] DENIALS (Administrative/Medical Necessity) [phone]   [address] (Maternity/NICU/Pediatrics) [phone]   91 Aurea Estrella [phone]   [facility]  [phone]   [address] [address] Omaira Stringer [facility] 28 [phone]   1551 [facility] Olav [facility] 134 815 [facility] [phone]

## 2022-11-01 NOTE — PROGRESS NOTES
Progress Note - Oncology Surgical   Samantha Phoenix 57 y o  female MRN: [id number]  Unit/Bed#: [facility] 347-02 Encounter: [id number]    Assessment:  Patient is a 57 y o  female who presented with  post pancreatic head mass, now status on bubble, portal vein, SMV repair on 1031/POD1  EBL 2 L     Afebrile,VSS on RA  NGT:  0 from 350 clear  NIGEL:  1050 cc,bilious  UOP:  1 8      Plan:  Plan for conservative management for probable bile leak  Monitor NIGEL  Maintain NPO NG tube  Consider dc bozena  Continue IV fluids   Maintain epidural per APS  Follow-up a m  Labs  Appreciate ICU level of care      Subjective/Objective     Subjective:   No acute events overnight  -F  -BM  No nausea or vomiting  Objective:     Blood pressure 110/65, pulse (!) 109, temperature 98 4 °F (36 9 °C), temperature source Oral, resp  rate (!) 38, height 5' 4" (1 626 m), weight (!) 143 kg (315 lb 4 1 oz), SpO2 93 %  ,Body mass index is 54 11 kg/m²  Intake/Output Summary (Last 24 hours) at 11/1/2022 1948  Last data filed at 11/1/2022 1759  Gross per 24 hour   Intake 5543 61 ml   Output 3345 ml   Net 2198 61 ml       Invasive Devices  Report    Central Venous Catheter Line  Duration           Port A Cath 05/31/22 Left Chest 154 days          Peripheral Intravenous Line  Duration           Peripheral IV 10/31/22 Dorsal (posterior); Right Forearm 1 day    Peripheral IV 10/31/22 Right Forearm 1 day          Epidural Line  Duration           Epidural Catheter 10/31/22 1 day          Arterial Line  Duration           Arterial Line 10/31/22 Right Radial 1 day          Drain  Duration           Ureteral Internal Stent Left ureter 6 Fr   106 days    Closed/Suction Drain RUQ Bulb 19 Fr  1 day    NG/OG/Enteral Tube Nasogastric Right nare 1 day    Urethral Catheter Latex 16 Fr  1 day                Physical Exam:   Gen: NAD, Comfortable  Neuro: A&O, No focal deficits  Head: Normal Cephalic, Atraumatic  Eye: EOMI, PERRLA, No scleral icterus  Neck: Supple, No JVD, Midline trachea  CV: RRR, Cap refill <2 sec  Pulm: Normal work of breathing, no respiratory distress  Abd: Soft, Non-Distended, appropriately tender  Ext: No edema, Non-tender  Skin: warm, dry, intact [Constipation: Grade 0] : Constipation: Grade 0 [Anal Pain: Grade 0] : Anal Pain: Grade 0 [Nausea: Grade 0] : Nausea: Grade 0 [Diarrhea: Grade 0] : Diarrhea: Grade 0 [Rectal Pain: Grade 0] : Rectal Pain: Grade 0 [Vomiting: Grade 0] : Vomiting: Grade 0 [Urinary Incontinence: Grade 0] : Urinary Incontinence: Grade 0  [Hematuria: Grade 0] : Hematuria: Grade 0 [Urinary Retention: Grade 2 - Placement of urinary, suprapubic or intermittent catheter placement indicated; medication indicated] : Urinary Retention: Grade 2 - Placement of urinary, suprapubic or intermittent catheter placement indicated; medication indicated [Urinary Tract Pain: Grade 0] : Urinary Tract Pain: Grade 0 [Urinary Urgency: Grade 2 - Limiting instrumental ADL; medical management indicated] : Urinary Urgency: Grade 2 - Limiting instrumental ADL; medical management indicated [Urinary Frequency: Grade 1 - Present] : Urinary Frequency: Grade 1 - Present

## 2022-11-01 NOTE — PHYSICAL THERAPY NOTE
Physical Therapy Cancellation Note             11/01/22 0758   PT Last Visit   PT Visit Date 11/01/22   Note Type   Note type Cancelled Session   Cancel Reasons Intubated/sedated   Additional Comments Pt orders received, chart reviewed  Pt currently intubated and sedated  PT to continue to follow and see pt as appropriate and able       Julianna Helton, PT, DPT

## 2022-11-01 NOTE — CASE MANAGEMENT
Case Management Assessment    Patient name Braulio Mcdowell  Location [address] [facility] [address] [address] 518/PPHP 609-08 MRN [id number]  : 1960 Date 2022       Current Admission Date: 10/31/2022  Current Admission Diagnosis:Adenocarcinoma of head of pancreas [facility])   Patient Active Problem List    Diagnosis Date Noted   • Paroxysmal A-fib ([facility] [address] ) 2022   • Left flank pain 2022   • CPAP (continuous positive airway pressure) dependence    • Chemotherapy induced neutropenia ([facility] ) 2022   • Adenocarcinoma of head of pancreas ([facility] ) 2022   • Uric acid kidney stone 2022   • Nephrolithiasis 10/25/2021   • Class 3 severe obesity due to excess calories with body mass index (BMI) of 50 0 to 59 9 in adult ([facility] ) 2020   • Type 2 diabetes mellitus without complication, without long-term current use of insulin ([place] [address] ) 2017   • Severe obstructive sleep apnea 2016   • Dyspnea on exertion 2016   • Supraventricular tachycardia ([facility] ) 2016   • Hypertension 2016   • Controlled depression 2016   • Adenomatous colon polyp 2015   • 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   • Morbid obesity ([facility] ) 2013   • Hyperlipidemia 2013   • Benign essential hypertension 10/02/2012   • Esophageal reflux 2012      LOS (days): 1  Geometric Mean LOS (GMLOS) (days):   Days to GMLOS:     OBJECTIVE:  Risk of Unplanned Readmission Score: 18 14      Current admission status: Inpatient    Preferred Pharmacy:   [address] [place] [address] #[id number] [facility] [address] [place] [address]  Phone: [phone] Fax: [phone]    Primary Care Provider: Evelia Patel MD    Primary Insurance: BLUE CROSS  Secondary Insurance:     ASSESSMENT:  Rob Cristina Proxies    There are no active Health Care Proxies on file       Advance Directives  Does patient have a Health Care POA?: No  Does patient have Advance Directives?: No  Primary Contact: Pt's  Promise Polanco / phone: [phone]    Patient Information  Admitted from[de-identified] Home  Mental Status: Alert  Assessment information provided by[de-identified] Patient  Primary Caregiver: Self  Support Systems: Spouse/significant other  [place] of Residence: [address] [place] [address] do you live in?: [place]  entry access options   Select all that apply : Stairs  Number of steps to enter home : 2  Type of Current Residence: 2 story home  Upon entering residence, is there a bedroom on the main floor (no further steps)?: No  A bedroom is located on the following floor levels of residence (select all that apply):: 2nd Floor  Upon entering residence, is there a bathroom on the main floor (no further steps)?: Yes  Number of steps to 2nd floor from main floor: One Flight  In the last 12 months, was there a time when you were not able to pay the mortgage or rent on time?: No  In the last 12 months, how many places have you lived?: 1  In the last 12 months, was there a time when you did not have a steady place to sleep or slept in a shelter (including now)?: No  Homeless/housing insecurity resource given?: N/A  Living Arrangements: Lives w/ Spouse/significant other  Is patient a ?: No    Activities of Daily Living Prior to Admission  Functional Status: Independent  Completes ADLs independently?: Yes  Ambulates independently?: Yes  Does patient use assisted devices?: No  Does patient currently own DME?: Yes  What DME does the patient currently own?: Stra[employer] Corinne Cousins  Does patient have a history of Outpatient Therapy (PT/OT)?: No  Does the patient have a history of Short-Term Rehab?: No  Does patient have a history of HHC?: No  Does patient currently have [address]?: No    Patient Information Continued  Income Source: Employed  Does patient have prescription coverage?: Yes  Within the past 12 months, you worried that your food would run out before you got the money to buy more : Never true  Within the past 12 months, the food you bought just didn't last and you didn't have money to get more : Never true  Food insecurity resource given?: N/A  Does patient receive dialysis treatments?: No  Does patient have a history of substance abuse?: No  Does patient have a history of Mental Health Diagnosis?: No    Means of Transportation  Means of Transport to Appts[de-identified] Drives Self  In the past 12 months, has lack of transportation kept you from medical appointments or from getting medications?: No  In the past 12 months, has lack of transportation kept you from meetings, work, or from getting things needed for daily living?: No  Was application for public transport provided?: N/A     Additional Comments: CM reviewed d/c planning process including the following: identifying help at home, patient preference for d/c planning needs, Discharge Lounge, [facility] Meds to Bed program, availability of treatment team to discuss questions or concerns patient and/or family may have regarding understanding medications and recognizing signs and symptoms once discharged  CM also encouraged patient to follow up with all recommended appointments after discharge  Patient advised of importance for patient and family to participate in managing patient’s medical well being  Patient/caregiver received discharge checklist  Content reviewed  Patient/caregiver encouraged to participate in discharge plan of care prior to discharge home

## 2022-11-01 NOTE — PROGRESS NOTES
Progress Note - Vascular Surgery   Minoo Guy 57 y o  female MRN: [id number]  Unit/Bed#: [facility] 518-01 Encounter: [id number]        ASSESSMENT:   59yoF s/p Whipple following neoadjuvant chemo for pancreatic carcinoma  with repair of portal vein and SMV with pledgets    AVSS on AC/VC 20, 576, 60%, 6    NIGEL 300 serosanguineous discharge  UOP 1 96 L      Subjective:  Patient aroused to sound of team entering to see and examine at bedside  Resting comfortably in bed prior to being awoken by our presence  Appeared to understand discussion that vascular surgery aided in the repair of portal vein and SMV  Vitals:  /55   Pulse 105   Temp 98 8 °F (37 1 °C) (Oral)   Resp 20   Ht 5' 4" (1 626 m)   Wt (!) 143 kg (315 lb 4 1 oz)   SpO2 96%   BMI 54 11 kg/m²     I/Os:  I/O last 3 completed shifts: In: 8754 7 [I V :6104 7; Blood:1550; IV Piggyback:1100]  Out: 4884 [Urine:1240; Emesis/NG output:150; Drains:50; Blood:2000]  I/O this shift:   In: 4 4 [I V :4 4]  Out: 1175 [Urine:725; Emesis/NG output:200; Drains:250]    Lab Results and Cultures:   CBC with diff:   Lab Results   Component Value Date    WBC 9 43 11/01/2022    HGB 10 4 (L) 11/01/2022    HCT 32 2 (L) 11/01/2022    MCV 91 11/01/2022    PLT 90 (L) 11/01/2022    ADJUSTEDWBC 8 80 01/27/2016    MCH 29 5 11/01/2022    MCHC 32 3 11/01/2022    RDW 16 6 (H) 11/01/2022    MPV 11 7 11/01/2022    NRBC 0 11/01/2022   ,   BMP/CMP:  Lab Results   Component Value Date    SODIUM 141 11/01/2022    SODIUM 141 04/19/2022    K 3 9 11/01/2022    K 4 0 04/19/2022     (H) 11/01/2022     04/19/2022    CO2 23 11/01/2022    CO2 20 (L) 10/31/2022    CO2 22 04/19/2022    ANIONGAP 7 01/22/2015    BUN 9 11/01/2022    BUN 13 04/19/2022    CREATININE 0 66 11/01/2022    CREATININE 0 78 04/12/2017    GLUCOSE 151 (H) 10/31/2022    GLUCOSE 92 04/12/2017    CALCIUM 8 2 (L) 11/01/2022    CALCIUM 9 5 04/12/2017     (H) 11/01/2022    AST 36 04/19/2022     (H) 11/01/2022    ALT 52 (H) 04/19/2022    ALKPHOS 96 11/01/2022    ALKPHOS 96 04/12/2017    PROT 6 3 04/12/2017    BILITOT 0 3 04/12/2017    EGFR 94 11/01/2022   ,   Lipid Panel:   Lab Results   Component Value Date    CHOL 153 04/12/2017   ,   Coags:   Lab Results   Component Value Date    PTT 26 10/17/2022    INR 1 25 (H) 10/31/2022   ,     Blood Culture:   Lab Results   Component Value Date    BLOODCX No Growth After 5 Days  08/02/2022    BLOODCX No Growth After 5 Days   08/02/2022   ,   Urinalysis:   Lab Results   Component Value Date    COLORU Light Yellow 08/01/2022    CLARITYU Turbid 08/01/2022    SPECGRAV 1 014 08/01/2022    PHUR 5 0 08/01/2022    PHUR 7 5 07/18/2022    LEUKOCYTESUR NEG 10/27/2022    LEUKOCYTESUR Small (A) 08/01/2022    NITRITE NEG 10/27/2022    NITRITE Negative 08/01/2022    GLUCOSEU NEG 10/27/2022    GLUCOSEU Negative 08/01/2022    KETONESU NEG 10/27/2022    KETONESU Negative 08/01/2022    BILIRUBINUR NEG 10/27/2022    BILIRUBINUR Negative 08/01/2022    BLOODU NEG 10/27/2022    BLOODU Large (A) 08/01/2022   ,   Urine Culture:   Lab Results   Component Value Date    URINECX No Growth <1000 cfu/mL 08/12/2022   ,   Wound Culure: No results found for: WOUNDCULT    Medications:  Current Facility-Administered Medications   Medication Dose Route Frequency   • chlorhexidine (PERIDEX) 0 12 % oral rinse 15 mL  15 mL Mouth/Throat Q12H [address]   • fentanyl citrate (PF) 100 MCG/2ML 50 mcg  50 mcg Intravenous Q1H PRN   • heparin (porcine) subcutaneous injection 5,000 Units  5,000 Units Subcutaneous Q8H [address]   • insulin lispro (HumaLOG) 100 units/mL subcutaneous injection 2-12 Units  2-12 Units Subcutaneous Q6H [address]   • multi-electrolyte (PLASMALYTE-A/ISOLYTE-S PH 7 4) IV solution  150 mL/hr Intravenous Continuous   • pantoprazole (PROTONIX) injection 40 mg  40 mg Intravenous Q24H [address]   • potassium chloride 20 mEq IVPB (premix)  20 mEq Intravenous Once   • propofol (DIPRIVAN) 1000 mg in 100 mL infusion (premix) 5-50 mcg/kg/min Intravenous Titrated   • ropivacaine 0 1% and fentaNYL 2 mcg/mL PCEA   Epidural Continuous       Imaging:  Reviewed    Physical Exam:    General: Alert, difficult to assess orientation; in no acute distress  Intubated   CV: Tachycardic with regular rhythm  Respiratory: Intubated  Abdominal: Soft  Mildly tender to palpation  Midline incision without drainage, bleeding, induration, surrounding erythema  NIGEL drain in place with serosanguineous drainage  Extremities: Grossly warm and dry bilateral UE and LE  Neurologic: Patient sedated on AC/VC    Pulse exam:  Radial: Right: 2+ Left[de-identified] 2+   Femoral: Right: 2+ Left: 2+  DP: Right: palpable Left: palpable      Assessment:  61yoF s/p Whipple following neoadjuvant chemo for pancreatic carcinoma with repair of portal vein and SMV    Plan:  - From vascular perspective, would recommend 81mg aspirin daily   - Continue neurovascular checks  - Remainder of care per Critical care/Surg Onc  - Appreciate this consult and allowing vascular surgery to be a part of Ms Vieira's care- TT with any questions or concerns    Milady Vásquez PA-C  11/1/2022

## 2022-11-01 NOTE — CONSULTS
This consult was generated through the SOC/Nephrology PATI risk reduction program  The patient's chart was reviewed and the GFR is > 60 and the patient has not had any PATI episodes within the last 3 months  Therefore a Nephrology consult is not needed   We will not formally see the patient but should you need any Nephrology follow up, please re-consult us

## 2022-11-01 NOTE — CASE MANAGEMENT
Case Management Note    Patient name Sophia Calderon  Location [address] [facility] [address] 518/PPHP 857-48 MRN [id number]  : 1960 Date 2022       Current Admission Date: 10/31/2022  Current Admission Diagnosis:Adenocarcinoma of head of pancreas ([facility] [address] )      LOS (days): 1  Geometric Mean LOS (GMLOS) (days):   Days to GMLOS:     OBJECTIVE:  Risk of Unplanned Readmission Score: 18 14   Current admission status: Inpatient   Primary Insurance: BLUE CROSS    DISCHARGE DETAILS:    Discharge planning discussed with[de-identified] Patient  Freedom of Choice: Yes  Were Treatment Team discharge recommendations reviewed with patient/caregiver?: Yes  Did patient/caregiver verbalize understanding of patient care needs?: Yes  Were patient/caregiver advised of the risks associated with not following Treatment Team discharge recommendations?: Yes    [address]         Is the patient interested in [facility] at discharge?: Yes  [address] requested[de-identified] Nursing, Physical Therapy, Occupational [address] Name[de-identified] [address] Provider[de-identified] PCP  [address] Needed[de-identified] Post-Op Care and Assessment, Evaluate Functional Status and Safety, Gait/ADL Training, Strengthening/Theraputic Exercises to Improve Function  Homebound Criteria Met[de-identified] Requires the Assistance of Another Person for Safe Ambulation or to Leave the Home  Supporting Clincal Findings[de-identified] Limited Endurance    [facility]  Discharge Destination Plan[de-identified] Home with Tasha at Discharge : Family    Additional Comments: Pt is S/P Whipple Procedure and is recommended to have in-home post-op skilled nursing care via a VNA for her aftercare plan  CM spoke to pt about this aftercare recommendation  Pt is agreeable w/ this recommendation  CM provided pt w/ freedom of choice for VNA referrals  Pt requested referral to [facility]  CM made AIDIN referral to same  CM to follow

## 2022-11-01 NOTE — CONSULTS
Inpatient consult to Acute Pain Service  Consult performed by: Makenna Damian MD  Consult ordered by: Jack Meza MD        Epidural Follow-up Note - Acute Pain Service    Ernie Ni 57 y o  female MRN: [id number]  Unit/Bed#: [facility] 719-40 Encounter: [id number]      Assessment:   Principal Problem:    Adenocarcinoma of head of pancreas ([address] )      Ernie Ni is a 57y o  year old female s/p Whipple following neoadjuvant chemo for pancreatic carcinoma with repair of portal vein and SMW with pledgets  Patient received a thoracic epidural preoperatively which has been on [facility] following surgery as she remained intubated in ICU immediately postop  Pt seen and examined today, extubated this morning without issue  Immediately prior to extubation, patient alert and arousable, responding with nods and shakes of head, denies pain at this time and has been denying pain with nursing overnight  Discussed with ICU team, will increase epidural settings in conjunction with extubation  Site c/d/i  Pt seen again after extubation, conversing comfortably, pain controlled, denies n/v/pruritus/LE weakness  No other issues, reinforced PCEA use  Plan:  Analgesia:  - Increase Thoracic epidural infusion of Ropivacaine 0 1% with fentanyl 2 mcg/mL to 6ml/hr continuous, 4ml demand bolus q10min, max 4x/hr  - Recommend d/c Fentanyl, add IV Dilaudid 0 5mg q2hrs PRN breakthrough pain  - Anticipate epidural removal in 2-3 days    Bowel Regimen:  - Bowel regimen when appropriate from surgical perspective    APS will continue to follow  Please contact Acute Pain Service - [facility] via MST from [phone] with additional questions or concerns  See [email or web address] or Nba for additional contacts and after hours information      Pain History  Current pain location(s): deep abdomen  Pain Scale:   1-2  Quality: achy  24 hour history: as above    PCEA use: encouraged  Opioid requirement previous 24 hours: n/a previously intubated    Meds/Allergies   all current active meds have been reviewed    Allergies   Allergen Reactions   • Other Rash     Adhesive tape      Review of Systems - ROS reviewed and negative except as indicated    FH/SH - reviewed    Objective     Temp:  [98 1 °F (36 7 °C)-98 9 °F (37 2 °C)] 98 1 °F (36 7 °C)  HR:  [] 102  Resp:  [17-28] 21  BP: ()/() 124/77    Physical Exam  Vitals and nursing note reviewed  Constitutional:       Appearance: Normal appearance  She is ill-appearing  HENT:      Head: Normocephalic and atraumatic  Nose:      Comments: NGT     Mouth/Throat:      Mouth: Mucous membranes are moist    Eyes:      Extraocular Movements: Extraocular movements intact  Cardiovascular:      Rate and Rhythm: Normal rate  Pulmonary:      Effort: Pulmonary effort is normal    Abdominal:      General: There is no distension  Palpations: Abdomen is soft  Tenderness: There is no abdominal tenderness  Musculoskeletal:         General: No tenderness  Skin:     General: Skin is warm and dry  Neurological:      Mental Status: She is alert and oriented to person, place, and time     Psychiatric:         Mood and Affect: Mood normal          Behavior: Behavior normal        Epidural: Site clean/dry/intact, no surrounding erythema/edema/induration, infusion functioning appropriately    Lab Results:   Results from last 7 days   Lab Units 11/01/22  0507   WBC Thousand/uL 9 43   HEMOGLOBIN g/dL 10 4*   HEMATOCRIT % 32 2*   PLATELETS Thousands/uL 90*      Results from last 7 days   Lab Units 11/01/22  0507 10/31/22  1855 10/31/22  1659   POTASSIUM mmol/L 3 9   < >  --    CHLORIDE mmol/L 110*   < >  --    CO2 mmol/L 23   < >  --    CO2, I-STAT mmol/L  --   --  20*   BUN mg/dL 9   < >  --    CREATININE mg/dL 0 66   < >  --    CALCIUM mg/dL 8 2*   < >  --    ALK PHOS U/L 96   < >  --    ALT U/L 525*   < >  --    AST U/L 685*   < >  --    GLUCOSE, ISTAT mg/dl  --   --  151*    < > = values in this interval not displayed  Results from last 7 days   Lab Units 10/31/22  1540   INR  1 25*       Imaging Studies: I have personally reviewed pertinent reports  EKG, Pathology, and Other Studies: I have personally reviewed pertinent reports  Counseling / Coordination of Care  Total floor / unit time spent today 20 minutes  Greater than 50% of total time was spent with the patient and / or family counseling and / or coordination of care  A description of the counseling / coordination of care: chart review, post op pain and regional/neuraxial pain management, discussion/planning with nursing/medical/surgical teams    Please note that the APS provides consultative services regarding pain management only  With the exception of ketamine, peripheral nerve catheters, and epidural infusions (and except when indicated), final decisions regarding starting or changing doses of analgesic medications are at the discretion of the consulting service  Off hours consultation and/or medication management is generally not available      [facility]  Acute Pain Service

## 2022-11-01 NOTE — RESPIRATORY THERAPY NOTE
RT Ventilator Management Note  Jerardo Pacheco 57 y o  female MRN: [id number]  Unit/Bed#: [facility] 992-08 Encounter: [id number]      Daily Screen         10/31/2022  1835 11/1/2022  0752          Patient safety screen outcome[de-identified] Failed Failed (P)       Not Ready for Weaning due to[de-identified] Underline problem not resolved Underline problem not resolved (P)                 Physical Exam:   Assessment Type: Assess only  General Appearance: Sleeping, Sedated  Respiratory Pattern: Assisted  Chest Assessment: Chest expansion symmetrical  Bilateral Breath Sounds: Diminished      Resp Comments: pt remains on ac/vc vent settings at this time  fio2 decreased at this time   vent disinfected and pt suctioned down ett

## 2022-11-02 LAB
ALBUMIN SERPL BCP-MCNC: 2.4 G/DL (ref 3.5–5)
ALP SERPL-CCNC: 91 U/L (ref 46–116)
ALT SERPL W P-5'-P-CCNC: 516 U/L (ref 12–78)
ANION GAP SERPL CALCULATED.3IONS-SCNC: 6 MMOL/L (ref 4–13)
AST SERPL W P-5'-P-CCNC: 423 U/L (ref 5–45)
BILIRUB SERPL-MCNC: 0.84 MG/DL (ref 0.2–1)
BUN SERPL-MCNC: 9 MG/DL (ref 5–25)
CALCIUM ALBUM COR SERPL-MCNC: 9.4 MG/DL (ref 8.3–10.1)
CALCIUM SERPL-MCNC: 8.1 MG/DL (ref 8.3–10.1)
CHLORIDE SERPL-SCNC: 110 MMOL/L (ref 96–108)
CO2 SERPL-SCNC: 27 MMOL/L (ref 21–32)
CREAT SERPL-MCNC: 0.53 MG/DL (ref 0.6–1.3)
ERYTHROCYTE [DISTWIDTH] IN BLOOD BY AUTOMATED COUNT: 16.4 % (ref 11.6–15.1)
GFR SERPL CREATININE-BSD FRML MDRD: 102 ML/MIN/1.73SQ M
GLUCOSE SERPL-MCNC: 122 MG/DL (ref 65–140)
GLUCOSE SERPL-MCNC: 147 MG/DL (ref 65–140)
GLUCOSE SERPL-MCNC: 162 MG/DL (ref 65–140)
GLUCOSE SERPL-MCNC: 162 MG/DL (ref 65–140)
GLUCOSE SERPL-MCNC: 165 MG/DL (ref 65–140)
HCT VFR BLD AUTO: 28.7 % (ref 34.8–46.1)
HGB BLD-MCNC: 8.9 G/DL (ref 11.5–15.4)
MAGNESIUM SERPL-MCNC: 2.3 MG/DL (ref 1.6–2.6)
MCH RBC QN AUTO: 29 PG (ref 26.8–34.3)
MCHC RBC AUTO-ENTMCNC: 31 G/DL (ref 31.4–37.4)
MCV RBC AUTO: 94 FL (ref 82–98)
PHOSPHATE SERPL-MCNC: 1.7 MG/DL (ref 2.3–4.1)
PLATELET # BLD AUTO: 67 THOUSANDS/UL (ref 149–390)
PMV BLD AUTO: 11.7 FL (ref 8.9–12.7)
POTASSIUM SERPL-SCNC: 3.7 MMOL/L (ref 3.5–5.3)
PROT SERPL-MCNC: 5.2 G/DL (ref 6.4–8.4)
RBC # BLD AUTO: 3.07 MILLION/UL (ref 3.81–5.12)
SODIUM SERPL-SCNC: 143 MMOL/L (ref 135–147)
WBC # BLD AUTO: 8.36 THOUSAND/UL (ref 4.31–10.16)

## 2022-11-02 RX ORDER — FENTANYL CITRATE/PF 50 MCG/ML
25 SYRINGE (ML) INJECTION
Status: DISCONTINUED | OUTPATIENT
Start: 2022-11-02 | End: 2022-11-04

## 2022-11-02 RX ORDER — ONDANSETRON 2 MG/ML
4 INJECTION INTRAMUSCULAR; INTRAVENOUS ONCE AS NEEDED
Status: COMPLETED | OUTPATIENT
Start: 2022-11-02 | End: 2022-11-04

## 2022-11-02 RX ORDER — PANTOPRAZOLE SODIUM 40 MG/1
40 TABLET, DELAYED RELEASE ORAL
Status: DISCONTINUED | OUTPATIENT
Start: 2022-11-03 | End: 2022-11-11 | Stop reason: HOSPADM

## 2022-11-02 RX ORDER — METOPROLOL TARTRATE 5 MG/5ML
2.5 INJECTION INTRAVENOUS ONCE
Status: COMPLETED | OUTPATIENT
Start: 2022-11-02 | End: 2022-11-02

## 2022-11-02 RX ORDER — PAROXETINE HYDROCHLORIDE HEMIHYDRATE 37.5 MG/1
37.5 TABLET, FILM COATED, EXTENDED RELEASE ORAL DAILY
Status: DISCONTINUED | OUTPATIENT
Start: 2022-11-02 | End: 2022-11-11 | Stop reason: HOSPADM

## 2022-11-02 RX ORDER — PAROXETINE HYDROCHLORIDE 20 MG/1
37.5 TABLET, FILM COATED ORAL ONCE
Status: COMPLETED | OUTPATIENT
Start: 2022-11-02 | End: 2022-11-02

## 2022-11-02 RX ORDER — HYDROMORPHONE HCL/PF 1 MG/ML
0.5 SYRINGE (ML) INJECTION
Status: DISCONTINUED | OUTPATIENT
Start: 2022-11-02 | End: 2022-11-04

## 2022-11-02 RX ADMIN — PAROXETINE HYDROCHLORIDE 40 MG: 20 TABLET, FILM COATED ORAL at 16:25

## 2022-11-02 RX ADMIN — SODIUM CHLORIDE, SODIUM GLUCONATE, SODIUM ACETATE, POTASSIUM CHLORIDE AND MAGNESIUM CHLORIDE 100 ML/HR: 526; 502; 368; 37; 30 INJECTION, SOLUTION INTRAVENOUS at 12:32

## 2022-11-02 RX ADMIN — INSULIN LISPRO 2 UNITS: 100 INJECTION, SOLUTION INTRAVENOUS; SUBCUTANEOUS at 05:05

## 2022-11-02 RX ADMIN — SODIUM CHLORIDE, SODIUM GLUCONATE, SODIUM ACETATE, POTASSIUM CHLORIDE AND MAGNESIUM CHLORIDE 100 ML/HR: 526; 502; 368; 37; 30 INJECTION, SOLUTION INTRAVENOUS at 02:16

## 2022-11-02 RX ADMIN — SODIUM CHLORIDE, SODIUM GLUCONATE, SODIUM ACETATE, POTASSIUM CHLORIDE AND MAGNESIUM CHLORIDE 100 ML/HR: 526; 502; 368; 37; 30 INJECTION, SOLUTION INTRAVENOUS at 21:42

## 2022-11-02 RX ADMIN — INSULIN LISPRO 2 UNITS: 100 INJECTION, SOLUTION INTRAVENOUS; SUBCUTANEOUS at 00:17

## 2022-11-02 RX ADMIN — HEPARIN SODIUM 5000 UNITS: 5000 INJECTION INTRAVENOUS; SUBCUTANEOUS at 21:01

## 2022-11-02 RX ADMIN — HEPARIN SODIUM 5000 UNITS: 5000 INJECTION INTRAVENOUS; SUBCUTANEOUS at 13:51

## 2022-11-02 RX ADMIN — PANTOPRAZOLE SODIUM 40 MG: 40 INJECTION, POWDER, FOR SOLUTION INTRAVENOUS at 09:17

## 2022-11-02 RX ADMIN — METOROPROLOL TARTRATE 2.5 MG: 5 INJECTION, SOLUTION INTRAVENOUS at 09:17

## 2022-11-02 RX ADMIN — HEPARIN SODIUM 5000 UNITS: 5000 INJECTION INTRAVENOUS; SUBCUTANEOUS at 05:05

## 2022-11-02 RX ADMIN — POTASSIUM PHOSPHATE, MONOBASIC AND POTASSIUM PHOSPHATE, DIBASIC 30 MMOL: 224; 236 INJECTION, SOLUTION, CONCENTRATE INTRAVENOUS at 09:16

## 2022-11-02 RX ADMIN — ROPIVACAINE HYDROCHLORIDE: 5 INJECTION EPIDURAL; INFILTRATION; PERINEURAL at 17:56

## 2022-11-02 NOTE — PROGRESS NOTES
Daily Progress Note - Critical Care   Juventino Lesch 57 y o  female MRN: [id number]  Unit/Bed#: [facility] 518-01 Encounter: [id number]        ----------------------------------------------------------------------------------------  HPI/24hr events:  Extubated yesterday to RA  No other events  ---------------------------------------------------------------------------------------  SUBJECTIVE  Offers no complaints this morning  Pain is well controlled with epidural  No nausea, vomiting, chest pain, or shortness of breath  No bowel movements or flatus  Review of Systems   Unable to perform ROS: Intubated   Constitutional: Negative for chills  HENT: Negative  Eyes: Negative  Respiratory: Negative  Cardiovascular: Negative  Gastrointestinal: Negative for abdominal pain, nausea and vomiting  Endocrine: Negative  Genitourinary: Negative  Musculoskeletal: Negative  Skin: Negative  Allergic/Immunologic: Negative  Neurological: Negative  Hematological: Negative      Psychiatric/Behavioral: Negative       ---------------------------------------------------------------------------------------  Assessment and Plan:    Neuro:   • Diagnosis:  Acute postoperative pain  o Plan:    - Ropivacaine 0 1%/fentanyl 2mcg/mL PCEA, APS on board  - Delirium precautions, maintain sleep wake cycle  • Diagnosis: Depression  o Plan  - Restart home paxil once able to tolerate PO    CV:   • Diagnosis:  Paroxysmal atrial fibrillation, history of hypertension, Hx of HLD  o Plan:    - Currently in normal sinus rhythm, restart home metoprolol and sotalol once able to tolerate PO per surg onc  - Continue to hold olmesartan  - Restart home lipitor once able to tolerate PO    Pulm:  • Diagnosis:  Postoperative respiratory insufficiency, MARTIN  o Plan:    - Extubated to room air  - Maintain spO2>92%, NC prn  - Aggressive pulm pavel IS    GI:   • Diagnosis:  Pancreatic cancer s/p Whipple  procedure with repair of venotomies in the [facility]/portal on 10/31  o Plan:   - Plan per surgical oncology  • NIGEL 1L bilious, concern for bile leak  • Diagnosis: Transaminitis  o Plan  - Likely 2/2 surgery  - Continue to trend daily LFTs  • Diagnosis: GERD  o Plan  - Continue protonix    :   o Plan:    - Strict I/Os  - Trend BUN/Cr  - White management per surg onc      F/E/N:   • Plan:    o [email or web address]  o E: replace PRN  o N: NPO, advance per surg onc      Heme/Onc:   • Diagnosis:  Acute blood loss anemia, thrombocytopenia  o Plan:    - Continue to trend daily hgb  • 11 0->10 4->8  9  - Monitor for sx/symptoms of bleeding  - Transfuse prn hgb<7  • Diagnosis: thrombocytopenia, chronic  o Plan  - Continue to trend  • 67 (90)  • Diagnosis: DVT ppx  o Plan  - SQH  - SCDs      Endo:   • Diagnosis:  Type 2 diabetes  o Plan:   - Follow BS with goal of 140-180  - SSI     ID:   o Plan:    - Monitor fever/wbc curve      MSK/Skin:   o Plan:  Frequent turning repositioning, PT/OT, surgical wound per surg onc    Patient appropriate for transfer out of the ICU today?: YES  Disposition: Transfer to Stepdown Level 2  Code Status: Level 1 - Full Code  ---------------------------------------------------------------------------------------  ICU CORE MEASURES    Prophylaxis   VTE Pharmacologic Prophylaxis: Heparin  VTE Mechanical Prophylaxis: sequential compression device  Stress Ulcer Prophylaxis: Pantoprazole IV       Invasive Devices Review  Invasive Devices  Report    Central Venous Catheter Line  Duration           Port A Cath 05/31/22 Left Chest 154 days          Peripheral Intravenous Line  Duration           Peripheral IV 10/31/22 Dorsal (posterior); Right Forearm 1 day    Peripheral IV 10/31/22 Right Forearm 1 day          Epidural Line  Duration           Epidural Catheter 10/31/22 1 day          Arterial Line  Duration           Arterial Line 10/31/22 Right Radial 1 day          Drain  Duration           Ureteral Internal Stent Left ureter 6 Fr   106 days Closed/Suction Drain RUQ Bulb 19 Fr  1 day    NG/OG/Enteral Tube Nasogastric Right nare 1 day    Urethral Catheter Latex 16 Fr  1 day              Can any invasive devices be discontinued today? No  ---------------------------------------------------------------------------------------  OBJECTIVE    Vitals   Vitals:    22 0300 22 0400 22 0500 22 0600   BP: 141/78 124/64 126/70 152/70   Pulse: 94 98 94 94   Resp: 18 20 20 19   Temp:  98 8 °F (37 1 °C)     TempSrc:       SpO2: 92% 92% 91% 92%   Weight:   (!) 142 kg (312 lb 2 7 oz)    Height:         Temp (24hrs), Av 6 °F (37 °C), Min:98 1 °F (36 7 °C), Max:99 °F (37 2 °C)  Current: Temperature: 98 8 °F (37 1 °C)    Respiratory:  SpO2: SpO2: 92 %, SpO2 Activity:  , SpO2 Device: O2 Device: Nasal cannula  Nasal Cannula O2 Flow Rate (L/min): 4 L/min    Invasive/non-invasive ventilation settings   Respiratory  Report   Lab Data (Last 4 hours)    None         O2/Vent Data (Last 4 hours)    None                Physical Exam  Vitals and nursing note reviewed  Constitutional:       General: She is not in acute distress  Appearance: She is obese  She is not ill-appearing  Interventions: She is sedated, intubated and restrained  HENT:      Head: Normocephalic and atraumatic  Mouth/Throat:      Mouth: Mucous membranes are moist    Eyes:      Conjunctiva/sclera: Conjunctivae normal    Cardiovascular:      Rate and Rhythm: Normal rate and regular rhythm  Pulses: Normal pulses  Pulmonary:      Effort: Pulmonary effort is normal  She is intubated  Comments: No secretions inline suction  Abdominal:      General: There is no distension  Palpations: Abdomen is soft  Tenderness: There is no abdominal tenderness  There is no guarding or rebound  Comments: Incision c/d/i  NIGEL is bilious   Genitourinary:     Comments: White in place  Musculoskeletal:      Right lower leg: No edema  Left lower leg: No edema  Skin:     General: Skin is warm and dry  Capillary Refill: Capillary refill takes less than 2 seconds  Neurological:      General: No focal deficit present  Mental Status: She is oriented to person, place, and time  GCS: GCS eye subscore is 3  GCS verbal subscore is 1  GCS motor subscore is 6        Comments: Exam performed on propofol   Psychiatric:         Mood and Affect: Mood normal        Laboratory and Diagnostics:  Results from last 7 days   Lab Units 11/02/22 0441 11/01/22  0507 10/31/22  1855 10/31/22  1659 10/31/22  1619 10/31/22  1546 10/31/22  1458   WBC Thousand/uL 8 36 9 43 11 86*  --  11 63*  --   --    HEMOGLOBIN g/dL 8 9* 10 4* 11 0*  --  8 8*  --   --    I STAT HEMOGLOBIN g/dl  --   --   --  10 2*  --  8 5* 9 5*   HEMATOCRIT % 28 7* 32 2* 34 7*  --  27 5*  --   --    HEMATOCRIT, ISTAT %  --   --   --  30*  --  25* 28*   PLATELETS Thousands/uL 67* 90* 103*  --  105*  --   --    NEUTROS PCT %  --  78*  --   --   --   --   --    MONOS PCT %  --  10  --   --   --   --   --      Results from last 7 days   Lab Units 11/02/22 0441 11/01/22 0507 10/31/22  1855 10/31/22  1659 10/31/22  1546 10/31/22  1458 10/31/22  1406   SODIUM mmol/L 143 141 140  --   --   --   --    POTASSIUM mmol/L 3 7 3 9 4 5  --   --   --   --    CHLORIDE mmol/L 110* 110* 112*  --   --   --   --    CO2 mmol/L 27 23 19*  --   --   --   --    CO2, I-STAT mmol/L  --   --   --  20* 21 20* 21   ANION GAP mmol/L 6 8 9  --   --   --   --    BUN mg/dL 9 9 10  --   --   --   --    CREATININE mg/dL 0 53* 0 66 0 86  --   --   --   --    CALCIUM mg/dL 8 1* 8 2* 8 5  --   --   --   --    GLUCOSE RANDOM mg/dL 165* 163* 165*  --   --   --   --    ALT U/L 516* 525* 365*  --   --   --   --    AST U/L 423* 685* 493*  --   --   --   --    ALK PHOS U/L 91 96 94  --   --   --   --    ALBUMIN g/dL 2 4* 2 6* 2 8*  --   --   --   --    TOTAL BILIRUBIN mg/dL 0 84 0 84 1 02*  --   --   --   --      Results from last 7 days   Lab Units 11/02/22  0441 11/01/22  0507 10/31/22  1855   MAGNESIUM mg/dL 2 3 2 3 1 5*   PHOSPHORUS mg/dL 1 7* 2 9 4 2*      Results from last 7 days   Lab Units 10/31/22  1540   INR  1 25*          Results from last 7 days   Lab Units 11/01/22  0443 11/01/22  0153 10/31/22  2257 10/31/22  1855   LACTIC ACID mmol/L 3 5* 3 9* 4 3* 6 0*     ABG:  Results from last 7 days   Lab Units 11/01/22  0625   PH ART  7 449   PCO2 ART mm Hg 32 6*   PO2 ART mm Hg 90 0   HCO3 ART mmol/L 22 1   BASE EXC ART mmol/L -1 3   ABG SOURCE  Line, Arterial     VBG:  Results from last 7 days   Lab Units 11/01/22  0625   ABG SOURCE  Line, Arterial           Micro        EKG:  Review of telemetry demonstrates sinus tachycardia  Imaging:  I have personally reviewed pertinent reports  and I have personally reviewed pertinent films in PACS    Intake and Output  I/O       10/30 0701  10/31 0700 10/31 0701  11/01 0700    I V  (mL/kg)  6109 (42 7)    Blood  1550    IV Piggyback  1100    Total Intake(mL/kg)  8759 (61 3)    Urine (mL/kg/hr)  1965    Emesis/NG output  350    Drains  300    Stool  0    Blood  2000    Total Output  4615    Net  +4144          Unmeasured Stool Occurrence  0 x        UOP: 80 ml/hr     Height and Weights   Height: 5' 4" (162 6 cm)     Body mass index is 53 58 kg/m²  Weight (last 2 days)     Date/Time Weight    11/02/22 0500 142 (312 17)    11/01/22 0600 143 (315 26)    10/31/22 0824 134 (295)            Nutrition       Diet Orders   (From admission, onward)             Start     Ordered    10/31/22 1848  Diet NPO  Diet effective now        References:    Nutrtion Support Algorithm Enteral vs  Parenteral   Question Answer Comment   Diet Type NPO    RD to adjust diet per protocol?  Yes        10/31/22 1848                Active Medications  Scheduled Meds:  Current Facility-Administered Medications   Medication Dose Route Frequency Provider Last Rate   • fentanyl citrate (PF)  50 mcg Intravenous Q1H PRN Ivan Koroma PA-C     • heparin (porcine)  5,000 Units Subcutaneous [facility] [place] Johnson MD     • insulin lispro  2-12 Units Subcutaneous Q6H [facility] CHINO Mccormick     • multi-electrolyte  100 mL/hr Intravenous Continuous Charlia CHINO Null 100 mL/hr (11/02/22 0216)   • pantoprazole  40 mg Intravenous Q24H [facility] NaldoCHINO Floyd     • ropivacaine 0 1% and fentaNYL 2 mcg/mL   Epidural Continuous Mony Mistry MD       Continuous Infusions:  multi-electrolyte, 100 mL/hr, Last Rate: 100 mL/hr (11/02/22 0216)  ropivacaine 0 1% and fentaNYL 2 mcg/mL,       PRN Meds:   fentanyl citrate (PF), 50 mcg, Q1H PRN        Allergies   Allergies   Allergen Reactions   • Other Rash     Adhesive tape      ---------------------------------------------------------------------------------------  Advance Directive and Living Will:      Power of :    POLST:    ---------------------------------------------------------------------------------------  Care Time Delivered:   No Critical Care time spent     Gerber Cross,       Portions of the record may have been created with voice recognition software  Occasional wrong word or "sound a like" substitutions may have occurred due to the inherent limitations of voice recognition software    Read the chart carefully and recognize, using context, where substitutions have occurred

## 2022-11-02 NOTE — ANESTHESIA POSTPROCEDURE EVALUATION
Post-Op Assessment Note    CV Status:  Stable  Pain Score: 0    Pain management: adequate     Mental Status:  Awake and sleepy   Hydration Status:  Stable   PONV Controlled:  None   Airway Patency:  Patent      Post Op Vitals Reviewed: Yes      Staff: Anesthesiologist         No complications documented      BP   125/71   Temp 98 1   Pulse 92   Resp 18   SpO2 96%

## 2022-11-02 NOTE — PROGRESS NOTES
Epidural Follow-up Note - Acute Pain Service    Zeny Conner 57 y o  female MRN: [id number]  Unit/Bed#: [facility] 093-98 Encounter: [id number]      Assessment:   Principal Problem:    Adenocarcinoma of head of pancreas ([facility] [address] )      Zeny Conner is a 57y o  year old female with PMhx of pancreatic adenocarcinoma s/p Whipple procedure with repair of portal vein and SMW with pledgets on 10/31  A pre-operative thoracic epidural was placed for post-operative analgesia  The catheter was initially [facility] but PCEA restarted once patient was extubated on 11/1  Pain has been well controlled with epidural PCEA  APS consulted for post-operative pain/epidural management  Upon bedside evaluation, Dunia Lockett was in the chair  Pain well controlled on epidural PCEA  No other complaints  No evidence of excessive sympathectomy-induced hypotension/pressor requirements/abnormal sensorimotor deficits  Able to move LE bilaterally  Some residual blood around catheter site  Plan:  Analgesia:  - Continue Thoracic epidural infusion of Ropivacaine 0 1% with fentanyl 2 mcg/mL at 6/4/10/4  - Add IV dilaudid 0 5mg q2hr PRN if breakthrough pain is present  - Anticipate epidural removal and transition to primarily PO regimen 2-3 days    Bowel Regimen:  - Bowel regimen when appropriate from surgical perspective    APS will continue to follow  Please contact Acute Pain Service - [facility] via ChatID from [phone] with additional questions or concerns  See Paula or Nba for additional contacts and after hours information      Pain History  Current pain location(s): Abdomen  Pain Scale:  0/10  Quality: N/A  24 hour history: See above    PCEA use: 0 attempts, 0 doses given  Opioid requirement previous 24 hours: none    Meds/Allergies   all current active meds have been reviewed    Allergies   Allergen Reactions   • Other Rash     Adhesive tape        Objective     Temp:  [98 2 °F (36 8 °C)-99 °F (37 2 °C)] 98 8 °F (37 1 °C)  HR:  [] 99  Resp: [17-38] 35  BP: (101-154)/(56-82) 121/59    Physical Exam  Constitutional:       Appearance: She is obese  HENT:      Head: Normocephalic  Mouth/Throat:      Mouth: Mucous membranes are dry  Eyes:      Pupils: Pupils are equal, round, and reactive to light  Cardiovascular:      Rate and Rhythm: Normal rate  Pulses: Normal pulses  Pulmonary:      Effort: Pulmonary effort is normal    Abdominal:      Palpations: Abdomen is soft  Tenderness: There is abdominal tenderness  Musculoskeletal:         General: Normal range of motion  Skin:     General: Skin is dry  Neurological:      General: No focal deficit present  Mental Status: She is alert and oriented to person, place, and time  Psychiatric:         Mood and Affect: Mood normal        Epidural: Site clean/dry/intact, no surrounding erythema/edema/induration, infusion functioning appropriately    Lab Results:   Results from last 7 days   Lab Units 11/02/22  0441   WBC Thousand/uL 8 36   HEMOGLOBIN g/dL 8 9*   HEMATOCRIT % 28 7*   PLATELETS Thousands/uL 67*      Results from last 7 days   Lab Units 11/02/22  0441 10/31/22  1855 10/31/22  1659   POTASSIUM mmol/L 3 7   < >  --    CHLORIDE mmol/L 110*   < >  --    CO2 mmol/L 27   < >  --    CO2, I-STAT mmol/L  --   --  20*   BUN mg/dL 9   < >  --    CREATININE mg/dL 0 53*   < >  --    CALCIUM mg/dL 8 1*   < >  --    ALK PHOS U/L 91   < >  --    ALT U/L 516*   < >  --    AST U/L 423*   < >  --    GLUCOSE, ISTAT mg/dl  --   --  151*    < > = values in this interval not displayed  Results from last 7 days   Lab Units 10/31/22  1540   INR  1 25*       Imaging Studies: I have personally reviewed pertinent reports  EKG, Pathology, and Other Studies: I have personally reviewed pertinent reports  Counseling / Coordination of Care  Total floor / unit time spent today 20 minutes   Greater than 50% of total time was spent with the patient and / or family counseling and / or coordination of care  Please note that the APS provides consultative services regarding pain management only  With the exception of ketamine, peripheral nerve catheters, and epidural infusions (and except when indicated), final decisions regarding starting or changing doses of analgesic medications are at the discretion of the consulting service  Off hours consultation and/or medication management is generally not available      [address]  Acute Pain Service

## 2022-11-02 NOTE — RESTORATIVE TECHNICIAN NOTE
Restorative Technician Note      Patient Name: Yoon Singleton     Note Type: Mobility  Patient Position Upon Consult: Supine  Activity Performed: Ambulated  Assistive Device: Roller walker  Patient Position at End of Consult: Bedside chair;  All needs within reach

## 2022-11-02 NOTE — ANESTHESIA PROCEDURE NOTES
Epidural Block    Start time: 10/31/2022 9:13 AM  Reason for block: procedure for pain and at surgeon's request  Staffing  Performed: Anesthesiologist   Anesthesiologist: Britney Abraham MD  Preanesthetic Checklist  Completed: patient identified, IV checked, site marked, risks and benefits discussed, surgical consent, monitors and equipment checked, pre-op evaluation and timeout performed  Epidural  Patient position: sitting  Prep: ChloraPrep  Patient monitoring: cardiac monitor and frequent blood pressure checks  Approach: midline  Location: thoracic  Injection technique: XOCHILT air  Needle  Needle type: Tuohy   Needle gauge: 18 G  Catheter type: side hole  Catheter size: 20 G  Catheter at skin depth: 14 cm  Catheter securement method: stabilization device  Test dose: negative  Assessment  Sensory level: T10  Number of attempts: 2negative aspiration for CSF, negative aspiration for heme and no paresthesia on injection  patient tolerated the procedure well with no immediate complications  Additional Notes  First attempt midline, good feeling of shallow os, steep angulation needed, unable to pass deeper os at the 8-9cm manish after two needle redirections  Paramedian approach with os at 7cm, redirrection with easy XOCHILT at 8cm  Catheter threaded without resistance  Negative test dose for lidocaine with epinephrine  3 ml and additional 2 ml after 3 minutes

## 2022-11-03 LAB
ALBUMIN SERPL BCP-MCNC: 2.4 G/DL (ref 3.5–5)
ALP SERPL-CCNC: 87 U/L (ref 46–116)
ALT SERPL W P-5'-P-CCNC: 335 U/L (ref 12–78)
AMYLASE FLD QL: 17 U/L
AMYLASE SERPL-CCNC: 62 IU/L (ref 25–115)
ANION GAP SERPL CALCULATED.3IONS-SCNC: 4 MMOL/L (ref 4–13)
AST SERPL W P-5'-P-CCNC: 165 U/L (ref 5–45)
ATRIAL RATE: 150 BPM
ATRIAL RATE: 165 BPM
BASOPHILS # BLD AUTO: 0.05 THOUSANDS/ÂΜL (ref 0–0.1)
BASOPHILS NFR BLD AUTO: 1 % (ref 0–1)
BILIRUB DIRECT SERPL-MCNC: 0.2 MG/DL (ref 0–0.2)
BILIRUB SERPL-MCNC: 0.77 MG/DL (ref 0.2–1)
BUN SERPL-MCNC: 7 MG/DL (ref 5–25)
CALCIUM SERPL-MCNC: 8.3 MG/DL (ref 8.3–10.1)
CHLORIDE SERPL-SCNC: 111 MMOL/L (ref 96–108)
CO2 SERPL-SCNC: 28 MMOL/L (ref 21–32)
CREAT SERPL-MCNC: 0.45 MG/DL (ref 0.6–1.3)
EOSINOPHIL # BLD AUTO: 0.04 THOUSAND/ÂΜL (ref 0–0.61)
EOSINOPHIL NFR BLD AUTO: 1 % (ref 0–6)
ERYTHROCYTE [DISTWIDTH] IN BLOOD BY AUTOMATED COUNT: 15.8 % (ref 11.6–15.1)
GFR SERPL CREATININE-BSD FRML MDRD: 107 ML/MIN/1.73SQ M
GLUCOSE SERPL-MCNC: 103 MG/DL (ref 65–140)
GLUCOSE SERPL-MCNC: 123 MG/DL (ref 65–140)
GLUCOSE SERPL-MCNC: 123 MG/DL (ref 65–140)
GLUCOSE SERPL-MCNC: 126 MG/DL (ref 65–140)
GLUCOSE SERPL-MCNC: 129 MG/DL (ref 65–140)
GLUCOSE SERPL-MCNC: 139 MG/DL (ref 65–140)
GLUCOSE SERPL-MCNC: 94 MG/DL (ref 65–140)
HCT VFR BLD AUTO: 26.5 % (ref 34.8–46.1)
HGB BLD-MCNC: 8.4 G/DL (ref 11.5–15.4)
IMM GRANULOCYTES # BLD AUTO: 0.04 THOUSAND/UL (ref 0–0.2)
IMM GRANULOCYTES NFR BLD AUTO: 1 % (ref 0–2)
LYMPHOCYTES # BLD AUTO: 1.07 THOUSANDS/ÂΜL (ref 0.6–4.47)
LYMPHOCYTES NFR BLD AUTO: 15 % (ref 14–44)
MAGNESIUM SERPL-MCNC: 2.1 MG/DL (ref 1.6–2.6)
MCH RBC QN AUTO: 29.8 PG (ref 26.8–34.3)
MCHC RBC AUTO-ENTMCNC: 31.7 G/DL (ref 31.4–37.4)
MCV RBC AUTO: 94 FL (ref 82–98)
MONOCYTES # BLD AUTO: 0.52 THOUSAND/ÂΜL (ref 0.17–1.22)
MONOCYTES NFR BLD AUTO: 7 % (ref 4–12)
NEUTROPHILS # BLD AUTO: 5.5 THOUSANDS/ÂΜL (ref 1.85–7.62)
NEUTS SEG NFR BLD AUTO: 75 % (ref 43–75)
NRBC BLD AUTO-RTO: 0 /100 WBCS
P AXIS: 266 DEGREES
PHOSPHATE SERPL-MCNC: 1.8 MG/DL (ref 2.3–4.1)
PLATELET # BLD AUTO: 64 THOUSANDS/UL (ref 149–390)
PMV BLD AUTO: 11.4 FL (ref 8.9–12.7)
POTASSIUM SERPL-SCNC: 3.5 MMOL/L (ref 3.5–5.3)
PR INTERVAL: 144 MS
PROT SERPL-MCNC: 5.2 G/DL (ref 6.4–8.4)
QRS AXIS: 36 DEGREES
QRS AXIS: 44 DEGREES
QRSD INTERVAL: 86 MS
QRSD INTERVAL: 86 MS
QT INTERVAL: 252 MS
QT INTERVAL: 306 MS
QTC INTERVAL: 402 MS
QTC INTERVAL: 443 MS
RBC # BLD AUTO: 2.82 MILLION/UL (ref 3.81–5.12)
SODIUM SERPL-SCNC: 143 MMOL/L (ref 135–147)
T WAVE AXIS: 25 DEGREES
T WAVE AXIS: 264 DEGREES
VENTRICULAR RATE: 126 BPM
VENTRICULAR RATE: 153 BPM
WBC # BLD AUTO: 7.22 THOUSAND/UL (ref 4.31–10.16)

## 2022-11-03 RX ORDER — METOPROLOL SUCCINATE 25 MG/1
25 TABLET, EXTENDED RELEASE ORAL 2 TIMES DAILY
Status: DISCONTINUED | OUTPATIENT
Start: 2022-11-03 | End: 2022-11-10

## 2022-11-03 RX ORDER — POTASSIUM CHLORIDE 20 MEQ/1
20 TABLET, EXTENDED RELEASE ORAL ONCE
Status: COMPLETED | OUTPATIENT
Start: 2022-11-03 | End: 2022-11-03

## 2022-11-03 RX ORDER — SOTALOL HYDROCHLORIDE 120 MG/1
120 TABLET ORAL EVERY 12 HOURS SCHEDULED
Status: DISCONTINUED | OUTPATIENT
Start: 2022-11-03 | End: 2022-11-11 | Stop reason: HOSPADM

## 2022-11-03 RX ORDER — HYDROMORPHONE HCL/PF 1 MG/ML
0.5 SYRINGE (ML) INJECTION EVERY 2 HOUR PRN
Status: CANCELLED | OUTPATIENT
Start: 2022-11-03

## 2022-11-03 RX ORDER — DEXTROSE, SODIUM CHLORIDE, AND POTASSIUM CHLORIDE 5; .45; .15 G/100ML; G/100ML; G/100ML
50 INJECTION INTRAVENOUS CONTINUOUS
Status: DISCONTINUED | OUTPATIENT
Start: 2022-11-03 | End: 2022-11-04

## 2022-11-03 RX ORDER — METOPROLOL TARTRATE 5 MG/5ML
2.5 INJECTION INTRAVENOUS EVERY 6 HOURS PRN
Status: DISCONTINUED | OUTPATIENT
Start: 2022-11-03 | End: 2022-11-11 | Stop reason: HOSPADM

## 2022-11-03 RX ORDER — INSULIN LISPRO 100 [IU]/ML
2-12 INJECTION, SOLUTION INTRAVENOUS; SUBCUTANEOUS
Status: DISCONTINUED | OUTPATIENT
Start: 2022-11-03 | End: 2022-11-11 | Stop reason: HOSPADM

## 2022-11-03 RX ADMIN — METOPROLOL SUCCINATE 25 MG: 25 TABLET, EXTENDED RELEASE ORAL at 17:23

## 2022-11-03 RX ADMIN — HEPARIN SODIUM 5000 UNITS: 5000 INJECTION INTRAVENOUS; SUBCUTANEOUS at 22:18

## 2022-11-03 RX ADMIN — DEXTROSE, SODIUM CHLORIDE, AND POTASSIUM CHLORIDE 50 ML/HR: 5; .45; .15 INJECTION INTRAVENOUS at 13:22

## 2022-11-03 RX ADMIN — SODIUM CHLORIDE, SODIUM GLUCONATE, SODIUM ACETATE, POTASSIUM CHLORIDE AND MAGNESIUM CHLORIDE 100 ML/HR: 526; 502; 368; 37; 30 INJECTION, SOLUTION INTRAVENOUS at 07:44

## 2022-11-03 RX ADMIN — SOTALOL HYDROCHLORIDE 120 MG: 120 TABLET ORAL at 04:25

## 2022-11-03 RX ADMIN — METOROPROLOL TARTRATE 2.5 MG: 5 INJECTION, SOLUTION INTRAVENOUS at 04:25

## 2022-11-03 RX ADMIN — PAROXETINE 37.5 MG: 37.5 TABLET, FILM COATED, EXTENDED RELEASE ORAL at 13:22

## 2022-11-03 RX ADMIN — HEPARIN SODIUM 5000 UNITS: 5000 INJECTION INTRAVENOUS; SUBCUTANEOUS at 13:22

## 2022-11-03 RX ADMIN — POTASSIUM PHOSPHATE, MONOBASIC AND POTASSIUM PHOSPHATE, DIBASIC 21 MMOL: 224; 236 INJECTION, SOLUTION, CONCENTRATE INTRAVENOUS at 09:37

## 2022-11-03 RX ADMIN — POTASSIUM CHLORIDE 20 MEQ: 1500 TABLET, EXTENDED RELEASE ORAL at 09:38

## 2022-11-03 RX ADMIN — HEPARIN SODIUM 5000 UNITS: 5000 INJECTION INTRAVENOUS; SUBCUTANEOUS at 05:00

## 2022-11-03 RX ADMIN — ROPIVACAINE HYDROCHLORIDE: 5 INJECTION EPIDURAL; INFILTRATION; PERINEURAL at 21:45

## 2022-11-03 RX ADMIN — PANTOPRAZOLE SODIUM 40 MG: 40 TABLET, DELAYED RELEASE ORAL at 05:52

## 2022-11-03 RX ADMIN — SOTALOL HYDROCHLORIDE 120 MG: 120 TABLET ORAL at 22:18

## 2022-11-03 RX ADMIN — METOPROLOL SUCCINATE 25 MG: 25 TABLET, EXTENDED RELEASE ORAL at 09:37

## 2022-11-03 NOTE — OCCUPATIONAL THERAPY NOTE
Occupational Therapy Progress Note     Patient Name: Hernandez Abrams  YXJRA'C Date: 11/3/2022  Problem List  Principal Problem:    Adenocarcinoma of head of pancreas ([address] )          11/03/22 1058   OT Last Visit   OT Visit Date 11/03/22   Note Type   Note Type Treatment   Pain Assessment   Pain Assessment Tool 0-10   Pain Score 5   Pain Location/Orientation Location: Abdomen   Hospital Pain Intervention(s) Ambulation/increased activity;Repositioned   Restrictions/Precautions   Weight Bearing Precautions Per Order No   Other Precautions Multiple lines;Telemetry; Fall Risk;Pain  (NIGEL drain, PCA pump)   ADL   Where Assessed Chair   LB Bathing Assistance 2  Maximal Assistance   LB Bathing Deficit Setup;Steadying;Verbal cueing;Supervision/safety; Increased time to complete;Perineal area; Buttocks;Right upper leg;Left upper leg;Right lower leg including foot; Left lower leg including foot   LB Bathing Comments Pt required max a for wiping down buttocks area + perineal hygiene + B feet  Pt able to maintain STS w/ sergei RW during LB bathing  UB Dressing Assistance 4  Minimal Assistance   UB Dressing Deficit Thread RUE; Thread LUE;Pull around back   UB Dressing Comments Pt required min a for threading B UE into gown + tying gown in back  LB Dressing Assistance 2  Maximal Assistance   LB Dressing Deficit Don/doff R sock; Don/doff L sock; Thread RLE into underwear; Thread LLE into underwear;Pull up over hips   LB Dressing Comments Pt required max a for donning B socks + threading B LE into underwear and pulling over hips  Pt able to maintain STS w/ sergei RW for pulling underwear over hips  Functional Standing Tolerance   Time 20 minutes   Activity Pt able to tolerate fnxl standing activity during fnxl mobility in hallway + during LB bathing/dressing tasks  Pt required use of RW for fnxl standing activities  Comments Pt follows min vc for safety and hand placement w/RW     Bed Mobility   Rolling R 3  Moderate assistance Additional items Assist x 1; Increased time required;Verbal cues   Supine to Sit 3  Moderate assistance   Additional items Assist x 1; Increased time required;Verbal cues   Additional Comments Pt was found supine in bed and left in bedside chair w/call bell in reach  Transfers   Sit to Stand 4  Minimal assistance   Additional items Assist x 1; Increased time required;Verbal cues   Stand to Sit 4  Minimal assistance   Additional items Assist x 1; Increased time required;Verbal cues   Additional Comments Pt required use of tyree RW during transitions for support  Pt demonstrates F safety awareness and insight into condition  Functional Mobility   Functional Mobility 5  Supervision   Additional Comments S (CGA) w/tyree RW for fnxl mobility in hallway - pt able to ambulate household distance+  Pt required one seated rest break due to blood receding from pawel RN aware and notified - able to tape w/gauze  Pt follows min vc for safety and hand placement w/tyree RW  Additional items Rolling walker  (Tyree RW)   Subjective   Subjective "I feel good"   Cognition   Overall Cognitive Status [facility]   Arousal/Participation Alert; Responsive; Cooperative   Attention Within functional limits   Orientation Level Oriented X4   Memory Within functional limits   Following Commands Follows one step commands without difficulty   Comments Pt is able to follow conversation and attend to min vc for safety and hand placement w/RW during transitions and fnxl mobility  Pt demonstrates F safety awareness and insight into condition  Activity Tolerance   Activity Tolerance Patient tolerated treatment well   Medical Staff Made Aware DPT Anton English, MONIKA aware   Assessment   Assessment OT trx session focused on ADL retraining, functional transfer training, endurance training, patient/family training, equipment evaluation/education, continued evaluation, energy conservation, and activity engagement   Pt was found supine in bed and left in bedside chair w/ call bell within reach  Presently, pt is MAX A for LB bathing/dressing (w/increased time + vc), MIN A for UB dressing (w/increased time, vc), MOD A x1 for bed mobility, MIN A for sit>stand + stand>sit  (w/increased time, vc + sergei RW), and S (CGA) w/sergei RW for fnxl mobility  ADL task of UB dressing were completed seated in bedside chair  ADL task of LB bathing/dressing were completed w/pt maintaining STS w/sergei RW in front of bedside chair  Moderate improvements are noted with functional transfer training and energy conservation  Remaining limitations observed w/ADL retraining, functional transfer training, endurance training, activity engagement, and energy conservation  Pt has made improvements in overall occupational functioning in comparison to initial OT eval session  The patient's raw score on the AM-PAC Daily Activity inpatient short form is 20, standardized score is 42 03, greater than 39 4  Patients at this level are likely to benefit from discharge to home  Please refer to the recommendation of the Occupational Therapist for safe discharge planning  Recommendation continues for home health rehab services upon d/c  Presently, recommendation is for pt to continue w/rehab 3-5x a week for 10-14 days to meet goals  Plan   Treatment Interventions ADL retraining;Functional transfer training; Endurance training;Patient/family training;Equipment evaluation/education;Continued evaluation; Energy conservation; Activityengagement   Goal Expiration Date 11/15/22   OT Treatment Day 1   OT Frequency 3-5x/wk   Recommendation   OT Discharge Recommendation Home with home health rehabilitation   [facility]   Lower Body Dressing 2   Bathing 3   Toileting 3   Upper Body Dressing 4   Grooming 4   Eating 4   Daily Activity Raw Score 20   Daily Activity Standardized Score (Calc for Raw Score >=11) 42 03   AM-PAC Applied Cognition Inpatient   Following a Speech/Presentation 4   Understanding Ordinary Conversation 4   Taking Medications 4   Remembering Where Things Are Placed or Put Away 4   Remembering List of 4-5 Errands 4   Taking Care of Complicated Tasks 4   Applied Cognition Raw Score 24   Applied Cognition Standardized Score 62 21   Modified Sharp Scale   Modified Sharp Scale 4   End of Consult   Education Provided Yes   Patient Position at End of Consult Bedside chair; All needs within reach   Nurse Communication Nurse aware of consult     Lavon Vinson, OTS

## 2022-11-03 NOTE — PROGRESS NOTES
Progress Note - Acute Pain Service    Josue Conrad 57 y o  female MRN: [id number]  Unit/Bed#: [facility] 672-75 Encounter: [id number]      Assessment:   Principal Problem:    Adenocarcinoma of head of pancreas ([facility] [address] )    Josue Conrad is a 57 y o  female with PMhx of pancreatic adenocarcinoma, POD 3 s/p Whipple procedure, with thoracic epidural for postop pain control    Pt seen at bedside  NGT has been removed, pt on sips of clears  She is brushing her teeth and conversant  She has minimal pain at rest and has mild-mod pain with movement  She was able to walk down the hallway earlier today without issue  She denies pruritis, paresthesias of lower extremities, N/V  Upon examination of epidural site: epidural site is clean, dry, intact  Well secured  Alligator clip is on right shoulder area       Plan:   - Continue Thoracic epidural infusion of Ropivacaine 0 1% with fentanyl 2 mcg/mL at 6/4/10/4  - Add IV dilaudid 0 5mg q2hr PRN if breakthrough pain is present  - Anticipate epidural removal on POD 5     Bowel Regimen:  - Bowel regimen when appropriate from surgical perspective    APS will continue to follow  Please contact Acute Pain Service - [facility] via [employer] from [phone] with additional questions or concerns  See Paula or Nba for additional contacts and after hours information      Pain History  Current pain location(s): surgical incision site  Pain Scale:   0-3/10  24 hour history: epidural use only     Opioid requirement previous 24 hours: fentanyl from epidural bag    Meds/Allergies   all current active meds have been reviewed, current meds:   Current Facility-Administered Medications   Medication Dose Route Frequency   • fentaNYL (SUBLIMAZE) injection 25 mcg  25 mcg Intravenous Q5 Min PRN   • heparin (porcine) subcutaneous injection 5,000 Units  5,000 Units Subcutaneous Q8H [facility]   • HYDROmorphone (DILAUDID) injection 0 5 mg  0 5 mg Intravenous Q10 Min PRN   • insulin lispro (HumaLOG) 100 units/mL subcutaneous injection 2-12 Units  2-12 Units Subcutaneous Q6H [address]   • multi-electrolyte (PLASMALYTE-A/ISOLYTE-S PH 7 4) IV solution  100 mL/hr Intravenous Continuous   • ondansetron (ZOFRAN) injection 4 mg  4 mg Intravenous Once PRN   • [START ON 11/3/2022] pantoprazole (PROTONIX) EC tablet 40 mg  40 mg Oral Early Morning   • PARoxetine (PAXIL-CR) 24 hr tablet 37 5 mg  37 5 mg Oral Daily   • ropivacaine 0 1% and fentaNYL 2 mcg/mL PCEA   Epidural Continuous    and PTA meds:   Prior to Admission Medications   Prescriptions Last Dose Informant Patient Reported? Taking? Insulin Pen Needle (Pen Needles) 32G X 4 MM MISC  Self No No   Sig: Use once a week   Magnesium Oxide -Mg Supplement 400 MG CAPS 10/24/2022 Self Yes Yes   Sig: Take 1 capsule by mouth daily   Omega-3 Fatty Acids (FISH OIL) 1,000 mg 10/24/2022 Self Yes Yes   Sig: Take 1,000 mg by mouth 2 (two) times a day     Ozempic, 1 MG/DOSE, 4 MG/3ML SOPN injection pen 10/31/2022 at 0615  No Yes   Sig: INJECT 1MG SUBCUTANEOUSLY  WEEKLY   Patient taking differently: Inject under the skin once a week mondays   PARoxetine (PAXIL-CR) 37 5 mg 24 hr tablet 10/31/2022 at 0615 Self No Yes   Sig: TAKE 1 TABLET BY MOUTH IN  THE MORNING   Potassium Citrate ER 15 MEQ (1620 MG) TBCR 10/24/2022 Self No Yes   Sig: TAKE 1 TABLET BY MOUTH  TWICE DAILY   VITAMIN D PO 10/24/2022 Self Yes Yes   Sig: Take 2,000 Units by mouth 2 (two) times a day   aspirin 81 MG tablet  Self Yes Yes   Sig: Take 81 mg by mouth daily     Patient not taking: Reported on 10/27/2022   atorvastatin (LIPITOR) 40 mg tablet 10/30/2022 at Unknown time Self No Yes   Sig: Take 1 tablet (40 mg total) by mouth daily at bedtime   glucose blood (Contour Next Test) test strip  Self No No   Sig: Use 1 each daily Use as instructed   ibuprofen (ADVIL,MOTRIN) 100 MG tablet Past Week at Unknown time Self Yes Yes   Sig: Take 200 mg by mouth as needed for mild pain   metoprolol succinate (TOPROL-XL) 25 mg 24 hr tablet 10/31/2022 at [facility] U  96  No Yes   Sig: TAKE 1 TABLET BY MOUTH  TWICE DAILY   multivitamin (THERAGRAN) TABS 10/24/2022 Self Yes Yes   Sig: Take 1 tablet by mouth daily  olmesartan (BENICAR) 20 mg tablet 10/29/2022 Self No Yes   Sig: TAKE 1 TABLET BY MOUTH  DAILY   ondansetron (ZOFRAN) 4 mg tablet Unknown at Unknown time Self No No   Sig: Take 2 tablets (8 mg total) by mouth every 8 (eight) hours as needed for nausea or vomiting   pantoprazole (PROTONIX) 40 mg tablet 10/31/2022 at 0615 Self No Yes   Sig: TAKE 1 TABLET BY MOUTH  DAILY BEFORE BREAKFAST   senna (SENOKOT) 8 6 mg  Self No Yes   Sig: Take 1 tablet (8 6 mg total) by mouth daily at bedtime for 5 days   Patient taking differently: Take 8 6 mg by mouth daily at bedtime as needed   sotalol (BETAPACE) 120 mg tablet 10/31/2022 at 0615 Self No Yes   Sig: Take 1 tablet (120 mg total) by mouth every 12 (twelve) hours      Facility-Administered Medications: None       Allergies   Allergen Reactions   • Other Rash     Adhesive tape        Objective     Temp:  [98 2 °F (36 8 °C)-98 9 °F (37 2 °C)] 98 2 °F (36 8 °C)  HR:  [] 95  Resp:  [17-37] 18  BP: (111-155)/(49-78) 112/62    Physical Exam  Vitals reviewed  Constitutional:       Appearance: Normal appearance  HENT:      Head: Normocephalic and atraumatic  Nose: Nose normal       Mouth/Throat:      Mouth: Mucous membranes are moist    Eyes:      Extraocular Movements: Extraocular movements intact  Pupils: Pupils are equal, round, and reactive to light  Cardiovascular:      Rate and Rhythm: Normal rate  Pulses: Normal pulses  Musculoskeletal:      Cervical back: Normal range of motion  Comments: Moves around well   Skin:     General: Skin is warm  Neurological:      General: No focal deficit present  Mental Status: She is alert and oriented to person, place, and time  Mental status is at baseline     Psychiatric:         Mood and Affect: Mood normal          Behavior: Behavior normal          Thought Content: Thought content normal          Judgment: Judgment normal          Lab Results:   Results from last 7 days   Lab Units 11/02/22  0441   WBC Thousand/uL 8 36   HEMOGLOBIN g/dL 8 9*   HEMATOCRIT % 28 7*   PLATELETS Thousands/uL 67*      Results from last 7 days   Lab Units 11/02/22  0441 10/31/22  1855 10/31/22  1659   POTASSIUM mmol/L 3 7   < >  --    CHLORIDE mmol/L 110*   < >  --    CO2 mmol/L 27   < >  --    CO2, I-STAT mmol/L  --   --  20*   BUN mg/dL 9   < >  --    CREATININE mg/dL 0 53*   < >  --    CALCIUM mg/dL 8 1*   < >  --    ALK PHOS U/L 91   < >  --    ALT U/L 516*   < >  --    AST U/L 423*   < >  --    GLUCOSE, ISTAT mg/dl  --   --  151*    < > = values in this interval not displayed  Imaging Studies: I have personally reviewed pertinent reports  EKG, Pathology, and Other Studies: I have personally reviewed pertinent reports  Counseling / Coordination of Care  Total floor / unit time spent today 15 minutes  Greater than 50% of total time was spent with the patient and / or family counseling and / or coordination of care  A description of the counseling / coordination of care: discussed current pain regimen  Please note that the APS provides consultative services regarding pain management only  With the exception of ketamine and epidural infusions and except when indicated, final decisions regarding starting or changing doses of analgesic medications are at the discretion of the consulting service  Off hours consultation and/or medication management is generally not available      [facility]  Acute Pain Service

## 2022-11-03 NOTE — PLAN OF CARE
Problem: OCCUPATIONAL THERAPY ADULT  Goal: Performs self-care activities at highest level of function for planned discharge setting  See evaluation for individualized goals  Description: Treatment Interventions: ADL retraining, Functional transfer training, Endurance training, Patient/family training, Equipment evaluation/education, Compensatory technique education, Energy conservation, Activityengagement     See flowsheet documentation for full assessment, interventions and recommendations  Note: Limitation: Decreased ADL status, Decreased Safe judgement during ADL, Decreased endurance, Decreased self-care trans, Decreased high-level ADLs  Prognosis: Good  Assessment: OT trx session focused on ADL retraining, functional transfer training, endurance training, patient/family training, equipment evaluation/education, continued evaluation, energy conservation, and activity engagement  Pt was found supine in bed and left in bedside chair w/ call bell within reach  Presently, pt is MAX A for LB bathing/dressing (w/increased time + vc), MIN A for UB dressing (w/increased time, vc), MOD A x1 for bed mobility, MIN A for sit>stand + stand>sit  (w/increased time, vc + sergei RW), and S (CGA) w/sergei RW for fnxl mobility  ADL task of UB dressing were completed seated in bedside chair  ADL task of LB bathing/dressing were completed w/pt maintaining STS w/sergei RW in front of bedside chair  Moderate improvements are noted with functional transfer training and energy conservation  Remaining limitations observed w/ADL retraining, functional transfer training, endurance training, activity engagement, and energy conservation  Pt has made improvements in overall occupational functioning in comparison to initial OT eval session  The patient's raw score on the AM-PAC Daily Activity inpatient short form is 20, standardized score is 42 03, greater than 39 4  Patients at this level are likely to benefit from discharge to home   Please refer to the recommendation of the Occupational Therapist for safe discharge planning  Recommendation continues for home health rehab services upon d/c  Presently, recommendation is for pt to continue w/rehab 3-5x a week for 10-14 days to meet goals       OT Discharge Recommendation: Home with home health rehabilitation

## 2022-11-03 NOTE — PHYSICAL THERAPY NOTE
PHYSICAL THERAPY NOTE          Patient Name: Elvis Choudhary  OYMVN'C Date: 11/3/2022       11/03/22 1035   PT Last Visit   PT Visit Date 11/03/22   Note Type   Note Type Treatment   Pain Assessment   Pain Assessment Tool 0-10   Pain Score 2   Pain Location/Orientation Location: Abdomen   Restrictions/Precautions   Weight Bearing Precautions Per Order No   Other Precautions Multiple lines;Telemetry; Fall Risk;Pain  (PCA pump, NIGEL drain)   General   Chart Reviewed Yes   Response to Previous Treatment Patient with no complaints from previous session  Family/Caregiver Present Yes   Cognition   Overall Cognitive Status WFL   Arousal/Participation Alert   Attention Within functional limits   Memory Within functional limits   Following Commands Follows all commands and directions without difficulty   Subjective   Subjective Pleasant and agreeable to participate in therapy session  Bed Mobility   Supine to Sit 3  Moderate assistance   Additional items Assist x 1;HOB elevated; Increased time required;Verbal cues;LE management   Additional Comments Left OOB in chair with OT present and all needs in reach  Transfers   Sit to Stand 4  Minimal assistance   Additional items Assist x 1; Increased time required;Verbal cues   Stand to Sit 4  Minimal assistance   Additional items Assist x 1; Increased time required;Verbal cues   Additional Comments with sergei RW   Ambulation/Elevation   Gait pattern Excessively slow; Short stride;Decreased foot clearance; Improper Weight shift  (increased lateral sway)   Gait Assistance   (CGA progressing to S)   Additional items Assist x 1  (assist of second and third for lines and chair)   Assistive Device Bariatric Rolling walker   Distance 40 ft x 1, 110 ft x 1   Balance   Static Sitting Fair +   Dynamic Sitting Fair   Static Standing Fair -   Dynamic Standing Poor +   Ambulatory Poor +   Activity Tolerance   Activity Tolerance Patient limited by fatigue;Patient limited by pain   Medical Staff Made Aware OT, OTS   Nurse Made Aware RN cleared pt to be seen by PT   Assessment   Prognosis Good   Problem List Decreased strength;Decreased range of motion;Decreased endurance; Impaired balance;Decreased mobility;Pain   Assessment Pt seen for PT treatment session with focus on bed mobility, functional transfers, functional mobility  Pt making steady progress toward goals this session  Pt able to increase ambulatory distance significantly as she is not as limited by pain and lines this session  Pt continues to present with above gait deficits resulting in increased risk for falls  Pt continues to present with LE strength deficits evident by need for continued assist for STS transfers  Pt left upright in bedside chair with OT present and with all needs in reach  Pt will benefit from skilled therapy in order to address current impairments and functional limitations  PT to follow pt and recommending HHPT pending progress  The patient's AM-PAC Basic Mobility Inpatient Short Form Raw Score is 15  A Raw score of less than or equal to 16 suggests the patient may benefit from discharge to post-acute rehabilitation services  Please also refer to the recommendation of the Physical Therapist for safe discharge planning  Anticipate pt will progress to DC home  Barriers to Discharge Inaccessible home environment;Decreased caregiver support   Goals   Patient Goals to get better   STG Expiration Date 11/15/22   Plan   Treatment/Interventions Functional transfer training;LE strengthening/ROM; Therapeutic exercise; Endurance training;Patient/family training;Gait training;Equipment eval/education; Bed mobility;Spoke to nursing   Progress Progressing toward goals   PT Frequency 3-5x/wk   Recommendation   PT Discharge Recommendation Home with home health rehabilitation  (pending progress)   Equipment Recommended [address] Recommended HD Bariatric wheeled walker   [address]   Turning in Bed Without Bedrails 2   Lying on Back to Sitting on Edge of Flat Bed 2   Moving Bed to Chair 3   Standing Up From Chair 3   Walk in Room 3   Climb 3-5 Stairs 2   Basic Mobility Inpatient Raw Score 15   Basic Mobility Standardized Score 36 97   Highest Level Of Mobility   -HLM Goal 4: Move to chair/commode   -HLM Achieved 7: Walk 25 feet or more     Breanne Sam, PT, DPT

## 2022-11-03 NOTE — PLAN OF CARE
Problem: Prexisting or High Potential for Compromised Skin Integrity  Goal: Skin integrity is maintained or improved  Description: INTERVENTIONS:  - Identify patients at risk for skin breakdown  - Assess and monitor skin integrity  - Assess and monitor nutrition and hydration status  - Monitor labs   - Assess for incontinence   - Turn and reposition patient  - Assist with mobility/ambulation  - Relieve pressure over bony prominences  - Avoid friction and shearing  - Provide appropriate hygiene as needed including keeping skin clean and dry  - Evaluate need for skin moisturizer/barrier cream  - Collaborate with interdisciplinary team   - Patient/family teaching  - Consider wound care consult   Outcome: Progressing     Problem: Potential for Falls  Goal: Patient will remain free of falls  Description: INTERVENTIONS:  - Educate patient/family on patient safety including physical limitations  - Instruct patient to call for assistance with activity   - Consult OT/PT to assist with strengthening/mobility   - Keep Call bell within reach  - Keep bed low and locked with side rails adjusted as appropriate  - Keep care items and personal belongings within reach  - Initiate and maintain comfort rounds  - Make Fall Risk Sign visible to staff  Problem: Nutrition/Hydration-ADULT  Goal: Nutrient/Hydration intake appropriate for improving, restoring or maintaining nutritional needs  Description: Monitor and assess patient's nutrition/hydration status for malnutrition  Collaborate with interdisciplinary team and initiate plan and interventions as ordered  Monitor patient's weight and dietary intake as ordered or per policy  Utilize nutrition screening tool and intervene as necessary  Determine patient's food preferences and provide high-protein, high-caloric foods as appropriate       INTERVENTIONS:  - Monitor oral intake, urinary output, labs, and treatment plans  - Assess nutrition and hydration status and recommend course of action  - Evaluate amount of meals eaten  - Assist patient with eating if necessary   - Allow adequate time for meals  - Recommend/ encourage appropriate diets, oral nutritional supplements, and vitamin/mineral supplements  - Order, calculate, and assess calorie counts as needed  - Recommend, monitor, and adjust tube feedings and TPN/PPN based on assessed needs  - Assess need for intravenous fluids  - Provide specific nutrition/hydration education as appropriate  - Include patient/family/caregiver in decisions related to nutrition  Outcome: Progressing     Problem: MOBILITY - ADULT  Goal: Maintain or return to baseline ADL function  Description: INTERVENTIONS:  -  Assess patient's ability to carry out ADLs; assess patient's baseline for ADL function and identify physical deficits which impact ability to perform ADLs (bathing, care of mouth/teeth, toileting, grooming, dressing, etc )  - Assess/evaluate cause of self-care deficits   - Assess range of motion  - Assess patient's mobility; develop plan if impaired  - Assess patient's need for assistive devices and provide as appropriate  - Encourage maximum independence but intervene and supervise when necessary  - Involve family in performance of ADLs  - Assess for home care needs following discharge   - Consider OT consult to assist with ADL evaluation and planning for discharge  - Provide patient education as appropriate  Outcome: Progressing  Goal: Maintains/Returns to pre admission functional level  Description: INTERVENTIONS:  - Perform BMAT or MOVE assessment daily    - Set and communicate daily mobility goal to care team and patient/family/caregiver     - Collaborate with rehabilitation services on mobility goals if consulted  - Out of bed for toileting  - Record patient progress and toleration of activity level   Outcome: Progressing     - Apply yellow socks and bracelet for high fall risk patients  - Consider moving patient to room near nurses station  Outcome: Progressing

## 2022-11-03 NOTE — PROGRESS NOTES
Progress Note - Oncology Surgical   Hood Deras 57 y o  female MRN: [id number]  Unit/Bed#: [facility] 511-01 Encounter: [id number]    Assessment:  Patient is a 57 y o  female who presented with  post pancreatic head mass, now status on bubble, portal vein, SMV repair on 10/31       Afebrile,VSS   NIGEL:  785 cc, serosang  UOP:  1 7 L      Plan:  Advance diet as tolerated  Continue IV fluids  DC White  Continue epidural per APS for pain control  Restarted metoprolol and sotalol  PT/OT  Encourage out of bed and ambulation  Plan for NIGEL amylase once patient taking in p o  Subjective/Objective     Subjective: Tachy into the 160s; in and out of afib  Restarted home beta blockers  NGT removed yesterday and patient transferred to step-down 1  Denies n/v  Pain controlled  Up to chair and ambulated yesterday  No bowel function yet    Objective:     Blood pressure 112/62, pulse 95, temperature 98 2 °F (36 8 °C), temperature source Oral, resp  rate 18, height 5' 4" (1 626 m), weight (!) 142 kg (312 lb 2 7 oz), SpO2 96 %  ,Body mass index is 53 58 kg/m²  Intake/Output Summary (Last 24 hours) at 11/2/2022 2338  Last data filed at 11/2/2022 2221  Gross per 24 hour   Intake 2838 92 ml   Output 2630 ml   Net 208 92 ml       Invasive Devices  Report    Central Venous Catheter Line  Duration           Port A Cath 05/31/22 Left Chest 155 days          Peripheral Intravenous Line  Duration           Peripheral IV 10/31/22 Dorsal (posterior); Right Forearm 2 days    Peripheral IV 11/02/22 Right Antecubital <1 day          Epidural Line  Duration           Epidural Catheter 10/31/22 2 days          Drain  Duration           Ureteral Internal Stent Left ureter 6 Fr  107 days    Closed/Suction Drain RUQ Bulb 19 Fr  2 days    Urethral Catheter Latex 16 Fr  2 days                Physical Exam  Constitutional:       General: She is not in acute distress  Appearance: She is well-developed   She is not ill-appearing, toxic-appearing or diaphoretic  HENT:      Head: Normocephalic and atraumatic  Eyes:      Extraocular Movements: Extraocular movements intact  Cardiovascular:      Rate and Rhythm: Normal rate  Pulmonary:      Effort: Pulmonary effort is normal  No respiratory distress  Abdominal:      General: There is no distension  Palpations: Abdomen is soft  There is no mass  Tenderness: There is abdominal tenderness  There is no guarding or rebound  Comments: Incisions clean, dry and intact  NIGEL drain in place   Skin:     General: Skin is warm and dry  Neurological:      Mental Status: She is alert and oriented to person, place, and time     Psychiatric:         Mood and Affect: Mood normal          Behavior: Behavior normal

## 2022-11-03 NOTE — PROGRESS NOTES
Patient's family brought in her cpap from home  Per surgical resident, hold off on using tonight d/t to possible risk to surgical anastomosis with the added pressure of the cpap  Will discuss with day team and reevaluate use for tomorrow night  Patient is agreeable with this plan

## 2022-11-03 NOTE — PLAN OF CARE
Problem: PHYSICAL THERAPY ADULT  Goal: Performs mobility at highest level of function for planned discharge setting  See evaluation for individualized goals  Description: Treatment/Interventions: Functional transfer training, LE strengthening/ROM, Therapeutic exercise, Endurance training, Patient/family training, Elevations, Equipment eval/education, Bed mobility, Gait training, Spoke to nursing  Equipment Recommended:  (TBD)       See flowsheet documentation for full assessment, interventions and recommendations  Outcome: Progressing  Note: Prognosis: Good  Problem List: Decreased strength, Decreased range of motion, Decreased endurance, Impaired balance, Decreased mobility, Pain  Assessment: Pt seen for PT treatment session with focus on bed mobility, functional transfers, functional mobility  Pt making steady progress toward goals this session  Pt able to increase ambulatory distance significantly as she is not as limited by pain and lines this session  Pt continues to present with above gait deficits resulting in increased risk for falls  Pt continues to present with LE strength deficits evident by need for continued assist for STS transfers  Pt left upright in bedside chair with OT present and with all needs in reach  Pt will benefit from skilled therapy in order to address current impairments and functional limitations  PT to follow pt and recommending HHPT pending progress  The patient's AM-PAC Basic Mobility Inpatient Short Form Raw Score is 15  A Raw score of less than or equal to 16 suggests the patient may benefit from discharge to post-acute rehabilitation services  Please also refer to the recommendation of the Physical Therapist for safe discharge planning  Anticipate pt will progress to DC home    Barriers to Discharge: Inaccessible home environment, Decreased caregiver support     PT Discharge Recommendation: Home with home health rehabilitation (pending progress)    See flowsheet documentation for full assessment

## 2022-11-04 ENCOUNTER — APPOINTMENT (INPATIENT)
Dept: RADIOLOGY | Facility: HOSPITAL | Age: 62
End: 2022-11-04

## 2022-11-04 ENCOUNTER — APPOINTMENT (INPATIENT)
Dept: NON INVASIVE DIAGNOSTICS | Facility: HOSPITAL | Age: 62
End: 2022-11-04

## 2022-11-04 LAB
ANION GAP SERPL CALCULATED.3IONS-SCNC: 8 MMOL/L (ref 4–13)
AORTIC ROOT: 3 CM
APICAL FOUR CHAMBER EJECTION FRACTION: 55 %
APTT PPP: 30 SECONDS (ref 23–37)
ATRIAL RATE: 102 BPM
BUN SERPL-MCNC: 6 MG/DL (ref 5–25)
CALCIUM SERPL-MCNC: 8.3 MG/DL (ref 8.3–10.1)
CARDIAC TROPONIN I PNL SERPL HS: 7 NG/L (ref 8–18)
CARDIAC TROPONIN I PNL SERPL HS: 9 NG/L (ref 8–18)
CHLORIDE SERPL-SCNC: 106 MMOL/L (ref 96–108)
CO2 SERPL-SCNC: 26 MMOL/L (ref 21–32)
CREAT SERPL-MCNC: 0.61 MG/DL (ref 0.6–1.3)
E WAVE DECELERATION TIME: 100 MS
ERYTHROCYTE [DISTWIDTH] IN BLOOD BY AUTOMATED COUNT: 15.4 % (ref 11.6–15.1)
FRACTIONAL SHORTENING: 31 (ref 28–44)
GFR SERPL CREATININE-BSD FRML MDRD: 97 ML/MIN/1.73SQ M
GLUCOSE SERPL-MCNC: 140 MG/DL (ref 65–140)
GLUCOSE SERPL-MCNC: 143 MG/DL (ref 65–140)
GLUCOSE SERPL-MCNC: 143 MG/DL (ref 65–140)
GLUCOSE SERPL-MCNC: 158 MG/DL (ref 65–140)
HCT VFR BLD AUTO: 27.3 % (ref 34.8–46.1)
HGB BLD-MCNC: 8.4 G/DL (ref 11.5–15.4)
INTERVENTRICULAR SEPTUM IN DIASTOLE (PARASTERNAL SHORT AXIS VIEW): 1.1 CM
INTERVENTRICULAR SEPTUM: 1.1 CM (ref 0.6–1.1)
LAAS-AP2: 20.4 CM2
LAAS-AP4: 23.3 CM2
LEFT ATRIUM SIZE: 3.6 CM
LEFT INTERNAL DIMENSION IN SYSTOLE: 3.5 CM (ref 2.1–4)
LEFT VENTRICULAR INTERNAL DIMENSION IN DIASTOLE: 5.1 CM (ref 3.5–6)
LEFT VENTRICULAR POSTERIOR WALL IN END DIASTOLE: 1.1 CM
LEFT VENTRICULAR STROKE VOLUME: 71 ML
LVSV (TEICH): 71 ML
MCH RBC QN AUTO: 28.5 PG (ref 26.8–34.3)
MCHC RBC AUTO-ENTMCNC: 30.8 G/DL (ref 31.4–37.4)
MCV RBC AUTO: 93 FL (ref 82–98)
MV E'TISSUE VEL-SEP: 10 CM/S
MV PEAK A VEL: 0.92 M/S
MV PEAK E VEL: 66 CM/S
MV STENOSIS PRESSURE HALF TIME: 29 MS
MV VALVE AREA P 1/2 METHOD: 7.59
P AXIS: 51 DEGREES
PLATELET # BLD AUTO: 82 THOUSANDS/UL (ref 149–390)
PMV BLD AUTO: 12.2 FL (ref 8.9–12.7)
POTASSIUM SERPL-SCNC: 3.4 MMOL/L (ref 3.5–5.3)
PR INTERVAL: 128 MS
QRS AXIS: 26 DEGREES
QRSD INTERVAL: 92 MS
QT INTERVAL: 360 MS
QTC INTERVAL: 469 MS
RBC # BLD AUTO: 2.95 MILLION/UL (ref 3.81–5.12)
RIGHT ATRIUM AREA SYSTOLE A4C: 15.8 CM2
RIGHT VENTRICLE ID DIMENSION: 4.1 CM
SL CV LEFT ATRIUM LENGTH A2C: 7 CM
SL CV LV EF: 50
SL CV PED ECHO LEFT VENTRICLE DIASTOLIC VOLUME (MOD BIPLANE) 2D: 122 ML
SL CV PED ECHO LEFT VENTRICLE SYSTOLIC VOLUME (MOD BIPLANE) 2D: 51 ML
SODIUM SERPL-SCNC: 140 MMOL/L (ref 135–147)
T WAVE AXIS: 46 DEGREES
VENTRICULAR RATE: 102 BPM
WBC # BLD AUTO: 8.66 THOUSAND/UL (ref 4.31–10.16)

## 2022-11-04 RX ORDER — POTASSIUM CHLORIDE 20 MEQ/1
20 TABLET, EXTENDED RELEASE ORAL
Status: COMPLETED | OUTPATIENT
Start: 2022-11-04 | End: 2022-11-04

## 2022-11-04 RX ORDER — HEPARIN SODIUM 5000 [USP'U]/ML
7500 INJECTION, SOLUTION INTRAVENOUS; SUBCUTANEOUS EVERY 8 HOURS SCHEDULED
Status: DISCONTINUED | OUTPATIENT
Start: 2022-11-04 | End: 2022-11-04

## 2022-11-04 RX ORDER — OXYCODONE HYDROCHLORIDE 5 MG/1
5 TABLET ORAL EVERY 4 HOURS PRN
Status: DISCONTINUED | OUTPATIENT
Start: 2022-11-04 | End: 2022-11-11 | Stop reason: HOSPADM

## 2022-11-04 RX ORDER — HYDROMORPHONE HCL/PF 1 MG/ML
0.5 SYRINGE (ML) INJECTION
Status: DISCONTINUED | OUTPATIENT
Start: 2022-11-04 | End: 2022-11-07

## 2022-11-04 RX ORDER — HYDROMORPHONE HCL 110MG/55ML
PATIENT CONTROLLED ANALGESIA SYRINGE INTRAVENOUS
Status: CANCELLED | OUTPATIENT
Start: 2022-11-04

## 2022-11-04 RX ORDER — HEPARIN SODIUM 10000 [USP'U]/100ML
3-30 INJECTION, SOLUTION INTRAVENOUS
Status: DISCONTINUED | OUTPATIENT
Start: 2022-11-04 | End: 2022-11-06

## 2022-11-04 RX ORDER — GABAPENTIN 100 MG/1
100 CAPSULE ORAL 3 TIMES DAILY
Status: DISCONTINUED | OUTPATIENT
Start: 2022-11-04 | End: 2022-11-11 | Stop reason: HOSPADM

## 2022-11-04 RX ORDER — OXYCODONE HYDROCHLORIDE 10 MG/1
10 TABLET ORAL EVERY 4 HOURS PRN
Status: DISCONTINUED | OUTPATIENT
Start: 2022-11-04 | End: 2022-11-11 | Stop reason: HOSPADM

## 2022-11-04 RX ORDER — ACETAMINOPHEN 325 MG/1
975 TABLET ORAL EVERY 8 HOURS SCHEDULED
Status: DISCONTINUED | OUTPATIENT
Start: 2022-11-04 | End: 2022-11-11 | Stop reason: HOSPADM

## 2022-11-04 RX ADMIN — ACETAMINOPHEN 975 MG: 325 TABLET ORAL at 12:40

## 2022-11-04 RX ADMIN — METOPROLOL SUCCINATE 25 MG: 25 TABLET, EXTENDED RELEASE ORAL at 17:02

## 2022-11-04 RX ADMIN — IOHEXOL 100 ML: 350 INJECTION, SOLUTION INTRAVENOUS at 14:04

## 2022-11-04 RX ADMIN — HEPARIN SODIUM 5000 UNITS: 5000 INJECTION INTRAVENOUS; SUBCUTANEOUS at 05:27

## 2022-11-04 RX ADMIN — HEPARIN SODIUM 18 UNITS/KG/HR: 10000 INJECTION, SOLUTION INTRAVENOUS at 21:28

## 2022-11-04 RX ADMIN — HYDROMORPHONE HYDROCHLORIDE 0.5 MG: 1 INJECTION, SOLUTION INTRAMUSCULAR; INTRAVENOUS; SUBCUTANEOUS at 21:57

## 2022-11-04 RX ADMIN — ONDANSETRON 4 MG: 2 INJECTION INTRAMUSCULAR; INTRAVENOUS at 05:23

## 2022-11-04 RX ADMIN — POTASSIUM CHLORIDE 20 MEQ: 1500 TABLET, EXTENDED RELEASE ORAL at 17:02

## 2022-11-04 RX ADMIN — SOTALOL HYDROCHLORIDE 120 MG: 120 TABLET ORAL at 21:32

## 2022-11-04 RX ADMIN — SOTALOL HYDROCHLORIDE 120 MG: 120 TABLET ORAL at 08:31

## 2022-11-04 RX ADMIN — ACETAMINOPHEN 975 MG: 325 TABLET ORAL at 21:31

## 2022-11-04 RX ADMIN — GABAPENTIN 100 MG: 100 CAPSULE ORAL at 17:05

## 2022-11-04 RX ADMIN — PAROXETINE 37.5 MG: 37.5 TABLET, FILM COATED, EXTENDED RELEASE ORAL at 08:31

## 2022-11-04 RX ADMIN — PANTOPRAZOLE SODIUM 40 MG: 40 TABLET, DELAYED RELEASE ORAL at 05:25

## 2022-11-04 RX ADMIN — GABAPENTIN 100 MG: 100 CAPSULE ORAL at 21:32

## 2022-11-04 RX ADMIN — METOPROLOL SUCCINATE 25 MG: 25 TABLET, EXTENDED RELEASE ORAL at 08:31

## 2022-11-04 RX ADMIN — HEPARIN SODIUM 7500 UNITS: 5000 INJECTION INTRAVENOUS; SUBCUTANEOUS at 13:16

## 2022-11-04 RX ADMIN — OXYCODONE HYDROCHLORIDE 5 MG: 5 TABLET ORAL at 18:25

## 2022-11-04 RX ADMIN — DEXTROSE, SODIUM CHLORIDE, AND POTASSIUM CHLORIDE 50 ML/HR: 5; .45; .15 INJECTION INTRAVENOUS at 08:38

## 2022-11-04 RX ADMIN — OXYCODONE HYDROCHLORIDE 10 MG: 10 TABLET ORAL at 23:10

## 2022-11-04 RX ADMIN — POTASSIUM CHLORIDE 20 MEQ: 1500 TABLET, EXTENDED RELEASE ORAL at 13:16

## 2022-11-04 RX ADMIN — POTASSIUM CHLORIDE 20 MEQ: 1500 TABLET, EXTENDED RELEASE ORAL at 08:31

## 2022-11-04 NOTE — CASE MANAGEMENT
Case Management Discharge Planning Note    Patient name Jerardo Pacheco  Location [address] [place] [address] 511/PPHP 125-82 MRN [id number]  : 1960 Date 2022       Current Admission Date: 10/31/2022  Current Admission Diagnosis:Adenocarcinoma of head of pancreas [facility])   Patient Active Problem List    Diagnosis Date Noted   • Paroxysmal A-fib ([facility] ) 2022   • Left flank pain 2022   • CPAP (continuous positive airway pressure) dependence    • Chemotherapy induced neutropenia ([facility] ) 2022   • Adenocarcinoma of head of pancreas ([facility] ) 2022   • Uric acid kidney stone 2022   • Nephrolithiasis 10/25/2021   • Class 3 severe obesity due to excess calories with body mass index (BMI) of 50 0 to 59 9 in adult ([facility] ) 2020   • Type 2 diabetes mellitus without complication, without long-term current use of insulin ([facility] ) 2017   • Severe obstructive sleep apnea 2016   • Dyspnea on exertion 2016   • Supraventricular tachycardia ([facility] ) 2016   • Hypertension 2016   • Controlled depression 2016   • Adenomatous colon polyp 2015   • 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[facility] ) 2013   • Morbid obesity ([facility] ) 2013   • Hyperlipidemia 2013   • Benign essential hypertension 10/02/2012   • Esophageal reflux 2012      LOS (days): 4  Geometric Mean LOS (GMLOS) (days):   Days to GMLOS:     OBJECTIVE:  Risk of Unplanned Readmission Score: 15 25         Current admission status: Inpatient   Preferred Pharmacy:   [address] [place] [address], [phone] [address]  Phone: [phone] Fax: [phone]    Primary Care Provider: Joshua Espinoza MD    Primary Insurance: BLUE CROSS  Secondary Insurance:     DISCHARGE DETAILS:    Other Referral/Resources/Interventions Provided:  Interventions: [facility]  Referral Comments: referral made to [facility]    Additional Comments: Discussed patient with white surgery  Patient is not cleared for dc home at this time

## 2022-11-04 NOTE — CONSULTS
[address] [facility] 57 y o  female MRN: [id number]  Unit/Bed#: [facility] 511-01 Encounter: [id number]      -------------------------------------------------------------------------------------------------------------  Chief Complaint: SOB     History of Present Illness     Lilly Vazquez is a 57 y o  female with PMH of DM-2, obesity, A-fib on Sotalol, and recent whipple on 10/31 for adenocarinoma of the pancreatic head  She had a large intra-op EBL of about 2L, and her post-op course was complicated by a bile leak, for which she is now s/p pancreaticoduodenectomy with portal vein repair  She was transferred to the floor for further recovery when today she developed acute SOB and hypotension  Work-up revealed R upper and mille lobe segmental and subsegmental pulmonary emboli as well as R lower lobe subsegmental pulmonary emboli  ICU now consulted to help manage PE treatment       History obtained from chart review and the patient   -------------------------------------------------------------------------------------------------------------  Assessment and Plan:    Neuro:   • Diagnosis: Acute post-op pain  o Plan: Currently with epidural  o           Acute pain following  o           Plan is to DC Epidural @ 7pm  o           Will start heparin gtt after  o           Appreciate acute pain recs  o           Multi-modal treatment with Tylenol/oxy/gabapentin  • Diagnosis: Depression  o Plan: Continue Paxil      CV:     • Diagnosis: Chronic A-fib  o Plan: Continue home Betapace  o Continue Metoprolol  o           HR goal   • Diagnosis: Acute PE  o Plan: Currently hemodynamically stable  o          CT scan with no evidence RV strain  o          Stat echo now  o          Heparin gtt when epidural out      Pulm:  • Diagnosis: No acute issues  o Plan: Pulmonary toilet  o           IS q1 our while awake  o          Oxygen via NC, titrate as need to sats >92%      GI:   • Diagnosis: S/P whipple and portal vein repair  •                   Midline incision with staples open to air  Clean dry, and intact  •                   NIGEL drain RUQ- serosanguinous output  o Plan: Continue soft diet  o           Monitor NIGEL output Q shift  o           Continue Protonix      :   • Diagnosis: No acute issues  o Plan: Voids independently      F/E/N:   • Plan: Fluids-none  •          Electrolytes- replete as need to K+>4, Mag>2, and Phos>2  •          Nutrition- Soft diet      Heme/Onc:   • Diagnosis: Hypercoagulable state  o Plan: Stop SQ heparin  o DC Epidural at 7pm  o Start IV heparin- no bolus after epidural removal  o Continue SCDs      Endo:   • Diagnosis: DM-2  o Plan: Continue SSI      ID:   • Diagnosis: No acute issues  o Plan: No indications for antibiotics       MSK/Skin:   • Diagnosis: ambulatory dysfunction  o Plan: OOB as tolerated  o PT/OT consult    Disposition: Admit to Critical Care stepdown level1  Code Status: Level 1 - Full Code  --------------------------------------------------------------------------------------------------------------  Review of Systems   Constitutional: Negative  HENT: Negative  Eyes: Negative  Respiratory: Positive for chest tightness and shortness of breath  Cardiovascular: Negative  Gastrointestinal: Positive for abdominal pain  Negative for blood in stool  Endocrine: Negative  Genitourinary: Negative  Musculoskeletal: Negative  Skin: Negative  Allergic/Immunologic: Negative  Neurological: Negative  Hematological: Negative  Psychiatric/Behavioral: Negative  Physical Exam  Vitals and nursing note reviewed  Constitutional:       Appearance: Normal appearance  She is obese  HENT:      Head: Normocephalic and atraumatic  Nose: Nose normal       Mouth/Throat:      Mouth: Mucous membranes are dry  Pharynx: Oropharynx is clear  Eyes:      Extraocular Movements: Extraocular movements intact        Conjunctiva/sclera: Conjunctivae normal  Pupils: Pupils are equal, round, and reactive to light  Cardiovascular:      Rate and Rhythm: Normal rate and regular rhythm  Pulses: Normal pulses  Heart sounds: Normal heart sounds  Pulmonary:      Effort: Pulmonary effort is normal       Breath sounds: Normal breath sounds  Abdominal:      General: Bowel sounds are normal       Palpations: Abdomen is soft  Comments: obese   Genitourinary:     General: Normal vulva  Musculoskeletal:         General: Normal range of motion  Cervical back: Normal range of motion  Skin:     General: Skin is warm and dry  Capillary Refill: Capillary refill takes less than 2 seconds  Coloration: Skin is pale  Neurological:      General: No focal deficit present  Mental Status: She is oriented to person, place, and time  Psychiatric:         Behavior: Behavior normal        --------------------------------------------------------------------------------------------------------------  Vitals:   Vitals:    11/04/22 1203 11/04/22 1300 11/04/22 1321 11/04/22 1418   BP: 122/73      BP Location:       Pulse:       Resp:       Temp: (!) 102 7 °F (39 3 °C)  (!) 101 5 °F (38 6 °C) 100 °F (37 8 °C)   TempSrc:       SpO2: (!) 89% 93%     Weight:       Height:         Temp  Min: 96 8 °F (36 °C)  Max: 102 8 °F (39 3 °C)     Height: 5' 4" (162 6 cm)  Body mass index is 52 45 kg/m²         Laboratory and Diagnostics:  Results from last 7 days   Lab Units 11/04/22  0626 11/03/22  0000 11/02/22  0441 11/01/22  0507 10/31/22  1855 10/31/22  1659 10/31/22  1619   WBC Thousand/uL 8 66 7 22 8 36 9 43 11 86*  --  11 63*   HEMOGLOBIN g/dL 8 4* 8 4* 8 9* 10 4* 11 0*  --  8 8*   I STAT HEMOGLOBIN g/dl  --   --   --   --   --  10 2*  --    HEMATOCRIT % 27 3* 26 5* 28 7* 32 2* 34 7*  --  27 5*   HEMATOCRIT, ISTAT %  --   --   --   --   --  30*  --    PLATELETS Thousands/uL 82* 64* 67* 90* 103*  --  105*   NEUTROS PCT %  --  75  --  78*  --   --   --    MONOS PCT %  --  7  --  10  --   --   --      Results from last 7 days   Lab Units 11/04/22  0535 11/03/22  0459 11/02/22 0441 11/01/22  0507 10/31/22  1855 10/31/22  1659 10/31/22  1546   SODIUM mmol/L 140 143 143 141 140  --   --    POTASSIUM mmol/L 3 4* 3 5 3 7 3 9 4 5  --   --    CHLORIDE mmol/L 106 111* 110* 110* 112*  --   --    CO2 mmol/L 26 28 27 23 19*  --   --    CO2, I-STAT mmol/L  --   --   --   --   --  20* 21   ANION GAP mmol/L 8 4 6 8 9  --   --    BUN mg/dL 6 7 9 9 10  --   --    CREATININE mg/dL 0 61 0 45* 0 53* 0 66 0 86  --   --    CALCIUM mg/dL 8 3 8 3 8 1* 8 2* 8 5  --   --    GLUCOSE RANDOM mg/dL 158* 139 165* 163* 165*  --   --    ALT U/L  --  335* 516* 525* 365*  --   --    AST U/L  --  165* 423* 685* 493*  --   --    ALK PHOS U/L  --  87 91 96 94  --   --    ALBUMIN g/dL  --  2 4* 2 4* 2 6* 2 8*  --   --    TOTAL BILIRUBIN mg/dL  --  0 77 0 84 0 84 1 02*  --   --      Results from last 7 days   Lab Units 11/03/22  0459 11/02/22 0441 11/01/22  0507 10/31/22  1855   MAGNESIUM mg/dL 2 1 2 3 2 3 1 5*   PHOSPHORUS mg/dL 1 8* 1 7* 2 9 4 2*      Results from last 7 days   Lab Units 10/31/22  1540   INR  1 25*          Results from last 7 days   Lab Units 11/01/22  0443 11/01/22  0153 10/31/22  2257 10/31/22  1855   LACTIC ACID mmol/L 3 5* 3 9* 4 3* 6 0*     ABG:  Results from last 7 days   Lab Units 11/01/22  0625   PH ART  7 449   PCO2 ART mm Hg 32 6*   PO2 ART mm Hg 90 0   HCO3 ART mmol/L 22 1   BASE EXC ART mmol/L -1 3   ABG SOURCE  Line, Arterial     VBG:  Results from last 7 days   Lab Units 11/01/22  0625   ABG SOURCE  Line, Arterial           Micro:        EKG: Tele reviewed  Imaging: I have personally reviewed pertinent reports     and I have personally reviewed pertinent films in PACS    Historical Information   Past Medical History:   Diagnosis Date   • Abnormal liver function test     last assessed 1/16/17    • Adenomatosis    • 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    last assessed 4/4/13   • Arthritis    • Atrial flutter (HCC)    • Benign essential hypertension     last assessed 12/21/17    • Body mass index (BMI) of 50-59 9 in adult [facility])    • Cancer (HCC)     pancreas   • Colon polyp    • Congenital pes planus     last assessed 7/15/14    • COVID     4/30/21   • CPAP (continuous positive airway pressure) dependence    • Dental crowns present    • Depression    • [facility] mellitus [facility]    • Dizziness     occas--pt reports did see family doctor   • GERD (gastroesophageal reflux disease)    • Hemangioma of skin 08/26/2003    last assessed 8/26/03   • History of cancer chemotherapy     [facility] Oncologist Dr Samina Norris   • History of gastroesophageal reflux (GERD)    • Hypercholesterolemia    • Hyperlipidemia    • Hypertension    • Irregular heart beat    • Kidney stone    • 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[facility] ) 04/19/2006    last assessed 12/31/14    • Osteoarthritis of both knees     last assessed 12/31/14    • Paroxysmal atrial fibrillation ([facility] )    • Port-A-Cath in place    • S/P ablation of atrial flutter    • Shortness of breath     per pt "from chemo-not drastic--still working and goes up steps"   • Sleep apnea    • Wears glasses      Past Surgical History:   Procedure Laterality Date   • ABDOMINAL ADHESION SURGERY N/A 10/31/2022    Procedure: LYSIS ADHESIONS, colectomy, vascular pedicle flap;  Surgeon: Wendi Burk MD;  [facility];  Service: Surgical Oncology   • ABDOMINAL SURGERY  06/2006    20cm GIST removed    • ABLATION SOFT TISSUE N/A 10/31/2022    Procedure: SOFT TISSUE ABLATION;  Surgeon: Wendi Burk MD;  [facility];  Service: Surgical Oncology   • APPENDECTOMY     • ATRIAL ABLATION SURGERY     • CARPAL TUNNEL RELEASE Bilateral    • COLONOSCOPY     • FL GUIDED CENTRAL VENOUS ACCESS DEVICE INSERTION  05/31/2022   • FL RETROGRADE PYELOGRAM  07/18/2022   • FL RETROGRADE PYELOGRAM  8/22/2022   • HYSTERECTOMY      fibroids   • IR PORT CHECK  06/23/2022   • IR PORT REMOVAL AND PLACEMENT NEW SITE  06/28/2022   • JOINT REPLACEMENT Bilateral     knees   • KIDNEY STONE SURGERY Right 09/17/2021    placed stent in  for kidney stone   • KNEE SURGERY     • KNEE SURGERY Left 03/2019   • LAPAROTOMY N/A 10/31/2022    Procedure: EXPLORATORY LAPAROTOMY;  Surgeon: Cassi Miramontes MD;  [facility];  Service: Surgical Oncology   • OOPHORECTOMY      cyst   • UT CYSTO/URETERO W/LITHOTRIPSY &INDWELL STENT INSRT Left 8/22/2022    Procedure: CYSTOSCOPY URETEROSCOPY WITH LITHOTRIPSY HOLMIUM LASER, RETROGRADE PYELOGRAM AND INSERTION STENT URETERAL;  Surgeon: Joan Bruce MD;  [facility];  Service: Urology   • UT CYSTOSCOPY,INSERT URETERAL STENT Left 8/22/2022    Procedure: EXCHANGE STENT URETERAL;  Surgeon: Joan Bruce MD;  [facility];  Service: Urology   • UT CYSTOURETHROSCOPY,URETER CATHETER Left 07/18/2022    Procedure: CYSTOSCOPY RETROGRADE PYELOGRAM WITH INSERTION STENT URETERAL;  Surgeon: Joan Bruce MD;  [facility];  Service: Urology   • TONSILLECTOMY     • TUBAL LIGATION     • TUMOR EXCISION  2006    gastrointestinal stromal tumor   • TUNNELED VENOUS PORT PLACEMENT N/A 05/31/2022    Procedure: INSERTION VENOUS PORT (PORT-A-CATH); Surgeon: Cassi Miramontes MD;  [facility];  Service: Surgical Oncology   • UPPER GASTROINTESTINAL ENDOSCOPY     • WHIPPLE PROCEDURE/PANCREATICO-DUODENECTOMY N/A 10/31/2022    Procedure:  WHIPPLE PROCEDURE/PANCREATICO-DUODENECTOMY, IOUS;  Surgeon: Cassi Miramontes MD;  [facility];  Service: Surgical Oncology     Social History   Social History     Substance and Sexual Activity   Alcohol Use Not Currently    Comment: social drinker per allscript      Social History     Substance and Sexual Activity   Drug Use Never     Social History     Tobacco Use   Smoking Status Former Smoker   • Years: 9 00   • Types: Cigarettes   Smokeless Tobacco Never Used     Exercise History: None  Family History:   Family History   Problem Relation Age of Onset   • Emphysema Mother    • Arthritis Mother    • Diabetes Mother    • Hypertension Mother    • COPD Mother    • Arthritis Father    • Prostate cancer Father    • Cerebral aneurysm Son    • No Known Problems Maternal Grandmother    • No Known Problems Maternal Grandfather    • No Known Problems Paternal Grandmother    • No Known Problems Paternal Grandfather    • No Known Problems Son    • No Known Problems Maternal Aunt    • No Known Problems Maternal Aunt    • No Known Problems Paternal Aunt    • No Known Problems Paternal Aunt      I have reviewed this patient's family history and commented on sigificant items within the HPI      Medications:  Current Facility-Administered Medications   Medication Dose Route Frequency   • acetaminophen (TYLENOL) tablet 975 mg  975 mg Oral Q8H [address]   • fentaNYL (SUBLIMAZE) injection 25 mcg  25 mcg Intravenous Q5 Min PRN   • HYDROmorphone (DILAUDID) injection 0 5 mg  0 5 mg Intravenous Q10 Min PRN   • insulin lispro (HumaLOG) 100 units/mL subcutaneous injection 2-12 Units  2-12 Units Subcutaneous TID With Meals   • iohexol (OMNIPAQUE) 240 MG/ML solution 50 mL  50 mL Oral 90 min pre-procedure   • metoprolol (LOPRESSOR) injection 2 5 mg  2 5 mg Intravenous Q6H PRN   • metoprolol succinate (TOPROL-XL) 24 hr tablet 25 mg  25 mg Oral BID   • pantoprazole (PROTONIX) EC tablet 40 mg  40 mg Oral Early Morning   • PARoxetine (PAXIL-CR) 24 hr tablet 37 5 mg  37 5 mg Oral Daily   • potassium chloride (K-DUR,KLOR-CON) CR tablet 20 mEq  20 mEq Oral TID With Meals   • ropivacaine 0 1% and fentaNYL 2 mcg/mL PCEA   Epidural Continuous   • sotalol (BETAPACE) tablet 120 mg  120 mg Oral Q12H [address]     Home medications:  Prior to Admission Medications   Prescriptions Last Dose Informant Patient Reported? Taking?    Insulin Pen Needle (Pen Needles) 32G X 4 MM MISC  Self No No   Sig: Use once a week   Magnesium Oxide -Mg Supplement 400 MG CAPS 10/24/2022 Self Yes Yes   Sig: Take 1 capsule by mouth daily   Omega-3 Fatty Acids (FISH OIL) 1,000 mg 10/24/2022 Self Yes Yes   Sig: Take 1,000 mg by mouth 2 (two) times a day     Ozempic, 1 MG/DOSE, 4 MG/3ML SOPN injection pen 10/31/2022 at 0615  No Yes   Sig: INJECT 1MG SUBCUTANEOUSLY  WEEKLY   Patient taking differently: Inject under the skin once a week mondays   PARoxetine (PAXIL-CR) 37 5 mg 24 hr tablet 10/31/2022 at 0615 Self No Yes   Sig: TAKE 1 TABLET BY MOUTH IN  THE MORNING   Potassium Citrate ER 15 MEQ (1620 MG) TBCR 10/24/2022 Self No Yes   Sig: TAKE 1 TABLET BY MOUTH  TWICE DAILY   VITAMIN D PO 10/24/2022 Self Yes Yes   Sig: Take 2,000 Units by mouth 2 (two) times a day   aspirin 81 MG tablet  Self Yes Yes   Sig: Take 81 mg by mouth daily  Patient not taking: Reported on 10/27/2022   atorvastatin (LIPITOR) 40 mg tablet 10/30/2022 at Unknown time Self No Yes   Sig: Take 1 tablet (40 mg total) by mouth daily at bedtime   glucose blood (Contour Next Test) test strip  Self No No   Sig: Use 1 each daily Use as instructed   ibuprofen (ADVIL,MOTRIN) 100 MG tablet Past Week at Unknown time Self Yes Yes   Sig: Take 200 mg by mouth as needed for mild pain   metoprolol succinate (TOPROL-XL) 25 mg 24 hr tablet 10/31/2022 at 0615 Self No Yes   Sig: TAKE 1 TABLET BY MOUTH  TWICE DAILY   multivitamin (THERAGRAN) TABS 10/24/2022 Self Yes Yes   Sig: Take 1 tablet by mouth daily     olmesartan (BENICAR) 20 mg tablet 10/29/2022 Self No Yes   Sig: TAKE 1 TABLET BY MOUTH  DAILY   ondansetron (ZOFRAN) 4 mg tablet Unknown at Unknown time Self No No   Sig: Take 2 tablets (8 mg total) by mouth every 8 (eight) hours as needed for nausea or vomiting   pantoprazole (PROTONIX) 40 mg tablet 10/31/2022 at 0615 Self No Yes   Sig: TAKE 1 TABLET BY MOUTH  DAILY BEFORE BREAKFAST   senna (SENOKOT) 8 6 mg  Self No Yes   Sig: Take 1 tablet (8 6 mg total) by mouth daily at bedtime for 5 days   Patient taking differently: Take 8 6 mg by mouth daily at bedtime as needed   sotalol (BETAPACE) 120 mg tablet 10/31/2022 at 0615 Self No Yes   Sig: Take 1 tablet (120 mg total) by mouth every 12 (twelve) hours      Facility-Administered Medications: None     Allergies: Allergies   Allergen Reactions   • Other Rash     Adhesive tape      ------------------------------------------------------------------------------------------------------------  Advance Directive and Living Will:      Power of :    POLST:    ------------------------------------------------------------------------------------------------------------  Anticipated Length of Stay is > 2 midnights    Care Time Delivered:   No Critical Care time spent       CHINO [place]        Portions of the record may have been created with voice recognition software  Occasional wrong word or "sound a like" substitutions may have occurred due to the inherent limitations of voice recognition software    Read the chart carefully and recognize, using context, where substitutions have occurred

## 2022-11-04 NOTE — QUICK NOTE
Called to see patient for temperatures orally 102 5, tachycardia to 105  Respirations 18/min, O2 sats 87% on 2 LNC, 92% on 3 LNC  No chest pains, no SOB  Is on SQH 5000 units SQ Q8, changed today to 7500 units SQ q8  Patient has less right lower abdominal pain as compared to this morning  Not complaining of any pain  Some nasuea this am, none now, feels that she is thirsty  Passing flatus, no bowel movement as of yet  T: 102 5 at present  P 103/minute - regular   EKG sinus tachycardia  R: 20/min  O2 sats 92 % 3L NC  BP: 120/70    Lungs: clear bilaterally, diminished breath sounds LLL  Abdomen: obese, soft, less Rt mid abdominal pain, no guarding, no rigidity,midline incision clean and dry    Plan:   STAT: EKG/troponins  STAT: CTChest PE protocol  Adding abd/pelvis  Blood cxs x 2    Dr Candace [place] notified and aware  Patient stays level SD2  Patient has epidural inplace

## 2022-11-04 NOTE — PROGRESS NOTES
Contacted by white surgery team and surgical critical are team  Patient has diagnosis of multiple PEs as CT chest scan resulted  Plan to start heparin gtt later today  Given Hep SQ 7500u was provided at 1316 today, as per ALHAJI guidelines, recommendations to draw PTT or to wait 6 hrs after last dose of hep sq to pull epidural  Discussed with both teams plan to remove epidural at 1916 so that heparin infusion may start  Pt's diet has been advanced to soft diet, and pt may transition to PO/IV pain regimen after epidural removed: oxycodone 5-10 mg PO Q 4 hrs PRN mod-severe pain; dilaudid 0 5 mg IV Q 2 hrs PRN breakthrough pain  plt count also 80s today, from 62s yesterday  Will hold pressure at epidural site once epidural has been removed by anesthesia staff today

## 2022-11-04 NOTE — PROGRESS NOTES
Patient states she has been having more pain the previous in her abdomen  Upon checking PCA epidural, patient was using her PCA dose only about once every hour  She spent a good deal of time out of bed today, ambulated three times to the bathroom and also restarted her clear liquid diet today  Explained to her that all this increased activity may explain why she is having more discomfort than previous  Encouraged use of the PCA pump  She states she doesn't want to "get addicted and keep pushing it"  Reeducated patient on the use of the pca epidural, how there are limits set to avoid overmedicating and that this medicine is temporary  She is agreeable to using her PCA more often, when she feels she needs it  After about an hour, patient reports her pain has improved and that she is using the PCA more often  Will continue to monitor patient

## 2022-11-04 NOTE — PROGRESS NOTES
Epidural Follow-up Note - Acute Pain Service    Danyel Gerard 57 y o  female MRN: [id number]  Unit/Bed#: [facility] 476-17 Encounter: [id number]      Assessment:   Principal Problem:    Adenocarcinoma of head of pancreas ([address] )      Danyel Gerard is a 57y o  year old female PMhx of pancreatic adenocarcinoma, POD 3 s/p Whipple procedure, with thoracic epidural for postop pain control    Pt seen at bedside, she is reclined up in bed with her  and family in the room  She did complain of more pain at the lower abdominal surgical incision site today  She was ambulating and moving around more yesterday and today  She did press the PCEA button more frequently than before  Upon inspection of Epidural site: epidural continues to be well secured and intact  Alligator clip still secured well on right shoulder area  She denies N/V, pruritis, paresthesias of lower extremities  She is agreeable to having the epidural rate increased to 8 cc/hr to provide broader coverage of the lower abdomen  Plan:  - modify Thoracic epidural infusion to run at a rate of 8 cc/hr  Keep other settings the same  - continue IV dilaudid 0 5mg q2hr PRN if breakthrough pain is present  - Anticipate epidural removal on POD 5  plt trend has been low past two days (60s yesterday, and 80s today)  Will have CBC/INR rechecked tomorrow AM to determine if epidural may be removed  Will continue to discuss with surgical team       Bowel Regimen:  - Bowel regimen when appropriate from surgical perspective    APS will continue to follow  Please contact Acute Pain Service - [facility] via Telepo from [phone] with additional questions or concerns  See [email or web address] or Nba for additional contacts and after hours information      Pain History  Current pain location(s): abdomen, surgical incision  Pain Scale:   2-4/10  24 hour history: epidural use only     Meds/Allergies   all current active meds have been reviewed, current meds:   Current Facility-Administered Medications   Medication Dose Route Frequency   • dextrose 5 % and sodium chloride 0 45 % with KCl 20 mEq/L infusion  50 mL/hr Intravenous Continuous   • fentaNYL (SUBLIMAZE) injection 25 mcg  25 mcg Intravenous Q5 Min PRN   • heparin (porcine) subcutaneous injection 5,000 Units  5,000 Units Subcutaneous Q8H [address]   • HYDROmorphone (DILAUDID) injection 0 5 mg  0 5 mg Intravenous Q10 Min PRN   • insulin lispro (HumaLOG) 100 units/mL subcutaneous injection 2-12 Units  2-12 Units Subcutaneous TID With Meals   • metoprolol (LOPRESSOR) injection 2 5 mg  2 5 mg Intravenous Q6H PRN   • metoprolol succinate (TOPROL-XL) 24 hr tablet 25 mg  25 mg Oral BID   • ondansetron (ZOFRAN) injection 4 mg  4 mg Intravenous Once PRN   • pantoprazole (PROTONIX) EC tablet 40 mg  40 mg Oral Early Morning   • PARoxetine (PAXIL-CR) 24 hr tablet 37 5 mg  37 5 mg Oral Daily   • ropivacaine 0 1% and fentaNYL 2 mcg/mL PCEA   Epidural Continuous   • sotalol (BETAPACE) tablet 120 mg  120 mg Oral Q12H [address]    and PTA meds:   Prior to Admission Medications   Prescriptions Last Dose Informant Patient Reported? Taking?    Insulin Pen Needle (Pen Needles) 32G X 4 MM MISC  Self No No   Sig: Use once a week   Magnesium Oxide -Mg Supplement 400 MG CAPS 10/24/2022 Self Yes Yes   Sig: Take 1 capsule by mouth daily   Omega-3 Fatty Acids (FISH OIL) 1,000 mg 10/24/2022 Self Yes Yes   Sig: Take 1,000 mg by mouth 2 (two) times a day     Ozempic, 1 MG/DOSE, 4 MG/3ML SOPN injection pen 10/31/2022 at 0615  No Yes   Sig: INJECT 1MG SUBCUTANEOUSLY  WEEKLY   Patient taking differently: Inject under the skin once a week mondays   PARoxetine (PAXIL-CR) 37 5 mg 24 hr tablet 10/31/2022 at 0615 Self No Yes   Sig: TAKE 1 TABLET BY MOUTH IN  THE MORNING   Potassium Citrate ER 15 MEQ (1620 MG) TBCR 10/24/2022 Self No Yes   Sig: TAKE 1 TABLET BY MOUTH  TWICE DAILY   VITAMIN D PO 10/24/2022 Self Yes Yes   Sig: Take 2,000 Units by mouth 2 (two) times a day   aspirin 81 MG tablet Self Yes Yes   Sig: Take 81 mg by mouth daily  Patient not taking: Reported on 10/27/2022   atorvastatin (LIPITOR) 40 mg tablet 10/30/2022 at Unknown time Self No Yes   Sig: Take 1 tablet (40 mg total) by mouth daily at bedtime   glucose blood (Contour Next Test) test strip  Self No No   Sig: Use 1 each daily Use as instructed   ibuprofen (ADVIL,MOTRIN) 100 MG tablet Past Week at Unknown time Self Yes Yes   Sig: Take 200 mg by mouth as needed for mild pain   metoprolol succinate (TOPROL-XL) 25 mg 24 hr tablet 10/31/2022 at 0615 Self No Yes   Sig: TAKE 1 TABLET BY MOUTH  TWICE DAILY   multivitamin (THERAGRAN) TABS 10/24/2022 Self Yes Yes   Sig: Take 1 tablet by mouth daily  olmesartan (BENICAR) 20 mg tablet 10/29/2022 Self No Yes   Sig: TAKE 1 TABLET BY MOUTH  DAILY   ondansetron (ZOFRAN) 4 mg tablet Unknown at Unknown time Self No No   Sig: Take 2 tablets (8 mg total) by mouth every 8 (eight) hours as needed for nausea or vomiting   pantoprazole (PROTONIX) 40 mg tablet 10/31/2022 at 0615 Self No Yes   Sig: TAKE 1 TABLET BY MOUTH  DAILY BEFORE BREAKFAST   senna (SENOKOT) 8 6 mg  Self No Yes   Sig: Take 1 tablet (8 6 mg total) by mouth daily at bedtime for 5 days   Patient taking differently: Take 8 6 mg by mouth daily at bedtime as needed   sotalol (BETAPACE) 120 mg tablet 10/31/2022 at 0615 Self No Yes   Sig: Take 1 tablet (120 mg total) by mouth every 12 (twelve) hours      Facility-Administered Medications: None       Allergies   Allergen Reactions   • Other Rash     Adhesive tape        Objective     Temp:  [97 9 °F (36 6 °C)-98 7 °F (37 1 °C)] 98 7 °F (37 1 °C)  HR:  [] 89  Resp:  [19-20] 20  BP: (113-162)/(63-98) 134/73    Physical Exam  Constitutional:       Appearance: Normal appearance  HENT:      Head: Normocephalic and atraumatic  Mouth/Throat:      Mouth: Mucous membranes are moist    Eyes:      Extraocular Movements: Extraocular movements intact        Pupils: Pupils are equal, round, and reactive to light  Pulmonary:      Effort: Pulmonary effort is normal    Abdominal:      Tenderness: There is abdominal tenderness  Musculoskeletal:      Cervical back: Normal range of motion  Comments: Pain at the lower abdomen surgery incision site   Skin:     General: Skin is warm  Neurological:      General: No focal deficit present  Mental Status: She is alert and oriented to person, place, and time  Mental status is at baseline  Psychiatric:         Mood and Affect: Mood normal          Behavior: Behavior normal          Thought Content: Thought content normal          Judgment: Judgment normal        Epidural: Site clean/dry/intact, no surrounding erythema/edema/induration, infusion functioning appropriately    Lab Results:   Results from last 7 days   Lab Units 11/03/22  0000   WBC Thousand/uL 7 22   HEMOGLOBIN g/dL 8 4*   HEMATOCRIT % 26 5*   PLATELETS Thousands/uL 64*      Results from last 7 days   Lab Units 11/03/22  0459 10/31/22  1855 10/31/22  1659   POTASSIUM mmol/L 3 5   < >  --    CHLORIDE mmol/L 111*   < >  --    CO2 mmol/L 28   < >  --    CO2, I-STAT mmol/L  --   --  20*   BUN mg/dL 7   < >  --    CREATININE mg/dL 0 45*   < >  --    CALCIUM mg/dL 8 3   < >  --    ALK PHOS U/L 87   < >  --    ALT U/L 335*   < >  --    AST U/L 165*   < >  --    GLUCOSE, ISTAT mg/dl  --   --  151*    < > = values in this interval not displayed  Results from last 7 days   Lab Units 10/31/22  1540   INR  1 25*       Imaging Studies: I have personally reviewed pertinent reports  EKG, Pathology, and Other Studies: I have personally reviewed pertinent reports  Counseling / Coordination of Care  Total floor / unit time spent today 15 minutes  Greater than 50% of total time was spent with the patient and / or family counseling and / or coordination of care  A description of the counseling / coordination of care: discussed current pain regimen with patient and family       Please note that the APS provides consultative services regarding pain management only  With the exception of ketamine, peripheral nerve catheters, and epidural infusions (and except when indicated), final decisions regarding starting or changing doses of analgesic medications are at the discretion of the consulting service  Off hours consultation and/or medication management is generally not available      [address] [facility]  Acute Pain Service

## 2022-11-04 NOTE — PROGRESS NOTES
Progress Note - Surgical Oncology  Jeanette Remy 57 y o  female MRN: [id number]  Unit/Bed#: [facility] 511-01 Encounter: [id number]    Assessment:  Patient is a 58 y  o  female who presented with  post pancreatic head mass, now status on bubble, portal vein, SMV repair on 10/31    11/3 amylase blood 62, amylase bloody fluid 17     Afebrile,VSS   NIGEL:  715 dark serous/sang from 785  Urine 2 L      Plan:  Liquid diet, consider advanced  Continue IV fluids  Continue epidural per APS for pain control, consider DC  Restarted metoprolol and sotalol  PT/OT  Encourage out of bed and ambulation      Subjective/Objective     Subjective:   No acute events overnight  Passing gas, no bowel movements  No nausea no vomiting  Objective:     Blood pressure 134/73, pulse 89, temperature 98 7 °F (37 1 °C), resp  rate 20, height 5' 4" (1 626 m), weight (!) 140 kg (308 lb 13 8 oz), SpO2 95 %  ,Body mass index is 53 02 kg/m²  Intake/Output Summary (Last 24 hours) at 11/3/2022 2132  Last data filed at 11/3/2022 1905  Gross per 24 hour   Intake 2947 99 ml   Output 1750 ml   Net 1197 99 ml       Invasive Devices  Report    Central Venous Catheter Line  Duration           Port A Cath 05/31/22 Left Chest 156 days          Peripheral Intravenous Line  Duration           Peripheral IV 10/31/22 Dorsal (posterior); Right Forearm 3 days    Peripheral IV 11/02/22 Right Antecubital 1 day          Epidural Line  Duration           Epidural Catheter 10/31/22 3 days          Drain  Duration           Ureteral Internal Stent Left ureter 6 Fr  108 days    Closed/Suction Drain RUQ Bulb 19 Fr  3 days                Physical Exam:   Gen: NAD, Comfortable  Neuro: A&O, No focal deficits  Head: Normal Cephalic, Atraumatic  Eye: EOMI, PERRLA, No scleral icterus  Neck: Supple, No JVD, Midline trachea  CV: RRR, Cap refill <2 sec  Pulm: Normal work of breathing, no respiratory distress  Abd: Soft, Non-Distended, mild distension  Ext: No edema, Non-tender  Skin: warm, dry, intact

## 2022-11-05 PROBLEM — I26.99 PULMONARY EMBOLISM (HCC): Status: ACTIVE | Noted: 2022-01-01

## 2022-11-05 PROBLEM — A41.9 SEPSIS (HCC): Status: ACTIVE | Noted: 2022-11-05

## 2022-11-05 LAB
ANION GAP SERPL CALCULATED.3IONS-SCNC: 5 MMOL/L (ref 4–13)
APTT PPP: 124 SECONDS (ref 23–37)
APTT PPP: 48 SECONDS (ref 23–37)
ATRIAL RATE: 86 BPM
BUN SERPL-MCNC: 8 MG/DL (ref 5–25)
CALCIUM SERPL-MCNC: 8.6 MG/DL (ref 8.3–10.1)
CHLORIDE SERPL-SCNC: 105 MMOL/L (ref 96–108)
CO2 SERPL-SCNC: 26 MMOL/L (ref 21–32)
CREAT SERPL-MCNC: 0.51 MG/DL (ref 0.6–1.3)
ERYTHROCYTE [DISTWIDTH] IN BLOOD BY AUTOMATED COUNT: 15.4 % (ref 11.6–15.1)
GFR SERPL CREATININE-BSD FRML MDRD: 103 ML/MIN/1.73SQ M
GLUCOSE SERPL-MCNC: 147 MG/DL (ref 65–140)
GLUCOSE SERPL-MCNC: 157 MG/DL (ref 65–140)
GLUCOSE SERPL-MCNC: 165 MG/DL (ref 65–140)
GLUCOSE SERPL-MCNC: 178 MG/DL (ref 65–140)
GLUCOSE SERPL-MCNC: 186 MG/DL (ref 65–140)
HCT VFR BLD AUTO: 26.2 % (ref 34.8–46.1)
HGB BLD-MCNC: 8.3 G/DL (ref 11.5–15.4)
INR PPP: 1.48 (ref 0.84–1.19)
LACTATE SERPL-SCNC: 1.7 MMOL/L (ref 0.5–2)
MCH RBC QN AUTO: 29 PG (ref 26.8–34.3)
MCHC RBC AUTO-ENTMCNC: 31.7 G/DL (ref 31.4–37.4)
MCV RBC AUTO: 92 FL (ref 82–98)
P AXIS: 58 DEGREES
PLATELET # BLD AUTO: 93 THOUSANDS/UL (ref 149–390)
PMV BLD AUTO: 11.2 FL (ref 8.9–12.7)
POTASSIUM SERPL-SCNC: 3.6 MMOL/L (ref 3.5–5.3)
PR INTERVAL: 128 MS
PROCALCITONIN SERPL-MCNC: 0.63 NG/ML
PROTHROMBIN TIME: 18.1 SECONDS (ref 11.6–14.5)
QRS AXIS: 38 DEGREES
QRSD INTERVAL: 98 MS
QT INTERVAL: 394 MS
QTC INTERVAL: 471 MS
RBC # BLD AUTO: 2.86 MILLION/UL (ref 3.81–5.12)
SODIUM SERPL-SCNC: 136 MMOL/L (ref 135–147)
STREPTOCOCCUS DNA BLD POS NAA+NON-PROBE: DETECTED
T WAVE AXIS: 54 DEGREES
VENTRICULAR RATE: 86 BPM
WBC # BLD AUTO: 17.05 THOUSAND/UL (ref 4.31–10.16)

## 2022-11-05 RX ORDER — POTASSIUM CHLORIDE 20 MEQ/1
40 TABLET, EXTENDED RELEASE ORAL ONCE
Status: COMPLETED | OUTPATIENT
Start: 2022-11-05 | End: 2022-11-05

## 2022-11-05 RX ADMIN — METOPROLOL SUCCINATE 25 MG: 25 TABLET, EXTENDED RELEASE ORAL at 08:37

## 2022-11-05 RX ADMIN — OXYCODONE HYDROCHLORIDE 5 MG: 5 TABLET ORAL at 05:40

## 2022-11-05 RX ADMIN — OXYCODONE HYDROCHLORIDE 5 MG: 5 TABLET ORAL at 17:47

## 2022-11-05 RX ADMIN — GABAPENTIN 100 MG: 100 CAPSULE ORAL at 17:46

## 2022-11-05 RX ADMIN — GABAPENTIN 100 MG: 100 CAPSULE ORAL at 08:37

## 2022-11-05 RX ADMIN — HYDROMORPHONE HYDROCHLORIDE 0.5 MG: 1 INJECTION, SOLUTION INTRAMUSCULAR; INTRAVENOUS; SUBCUTANEOUS at 19:59

## 2022-11-05 RX ADMIN — PANTOPRAZOLE SODIUM 40 MG: 40 TABLET, DELAYED RELEASE ORAL at 05:40

## 2022-11-05 RX ADMIN — CEFEPIME 2000 MG: 2 INJECTION, POWDER, FOR SOLUTION INTRAVENOUS at 21:38

## 2022-11-05 RX ADMIN — INSULIN LISPRO 2 UNITS: 100 INJECTION, SOLUTION INTRAVENOUS; SUBCUTANEOUS at 08:36

## 2022-11-05 RX ADMIN — SOTALOL HYDROCHLORIDE 120 MG: 120 TABLET ORAL at 21:14

## 2022-11-05 RX ADMIN — ACETAMINOPHEN 975 MG: 325 TABLET ORAL at 05:39

## 2022-11-05 RX ADMIN — POTASSIUM CHLORIDE 40 MEQ: 1500 TABLET, EXTENDED RELEASE ORAL at 08:39

## 2022-11-05 RX ADMIN — OXYCODONE HYDROCHLORIDE 5 MG: 5 TABLET ORAL at 09:32

## 2022-11-05 RX ADMIN — GABAPENTIN 100 MG: 100 CAPSULE ORAL at 21:14

## 2022-11-05 RX ADMIN — ACETAMINOPHEN 975 MG: 325 TABLET ORAL at 13:07

## 2022-11-05 RX ADMIN — HEPARIN SODIUM 15 UNITS/KG/HR: 10000 INJECTION, SOLUTION INTRAVENOUS at 11:26

## 2022-11-05 RX ADMIN — CEFEPIME 2000 MG: 2 INJECTION, POWDER, FOR SOLUTION INTRAVENOUS at 07:53

## 2022-11-05 RX ADMIN — ACETAMINOPHEN 975 MG: 325 TABLET ORAL at 21:14

## 2022-11-05 RX ADMIN — PAROXETINE 37.5 MG: 37.5 TABLET, FILM COATED, EXTENDED RELEASE ORAL at 08:39

## 2022-11-05 RX ADMIN — VANCOMYCIN HYDROCHLORIDE 1750 MG: 10 INJECTION, POWDER, LYOPHILIZED, FOR SOLUTION INTRAVENOUS at 09:32

## 2022-11-05 RX ADMIN — INSULIN LISPRO 2 UNITS: 100 INJECTION, SOLUTION INTRAVENOUS; SUBCUTANEOUS at 18:01

## 2022-11-05 RX ADMIN — SOTALOL HYDROCHLORIDE 120 MG: 120 TABLET ORAL at 08:37

## 2022-11-05 RX ADMIN — METOPROLOL SUCCINATE 25 MG: 25 TABLET, EXTENDED RELEASE ORAL at 17:47

## 2022-11-05 RX ADMIN — VANCOMYCIN HYDROCHLORIDE 1750 MG: 10 INJECTION, POWDER, LYOPHILIZED, FOR SOLUTION INTRAVENOUS at 21:38

## 2022-11-05 NOTE — PROGRESS NOTES
Progress Note - Oncology Surgical   Linda Coleman 57 y o  female MRN: [id number]  Unit/Bed#: [facility] 511-01 Encounter: [id number]    Assessment:  Patient is a 57 y o  female who presented with  post pancreatic head mass, now status on bubble, portal vein, SMV repair on 57/47 complicated by PE now on anticoagulation       TMax: 102 8, currently afebrile on 3L NC   NIGEL:  610 cc, serosang/tinge of bile  UOP:  1 2 L      Plan:  Diet as tolerated  Continue epidural per APS for pain control  Continue metoprolol and sotalol  Continue heparin drip  Consider repeat amylase, fever workup given elevated WBC: 17  Continue to platelets  PT/OT  Encourage out of bed and ambulation            Subjective/Objective     SubjectiveEvents:  Patient became hypoxic, hypotensive and tachycardic yesterday, EKG and troponins showed sinus tachycardia tropes were negative  CTA PE chest abdomen pelvis showed right-sided pulmonary embolus and 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  Epidural was removed and patient was started on heparin drip    Objective:     Blood pressure 118/60, pulse 90, temperature 100 1 °F (37 8 °C), resp  rate 18, height 5' 4" (1 626 m), weight (!) 138 kg (305 lb), SpO2 96 %  ,Body mass index is 52 35 kg/m²  Intake/Output Summary (Last 24 hours) at 11/4/2022 2000  Last data filed at 11/4/2022 1730  Gross per 24 hour   Intake 1111 13 ml   Output 2975 ml   Net -1863 87 ml       Invasive Devices  Report    Central Venous Catheter Line  Duration           Port A Cath 05/31/22 Left Chest 157 days          Peripheral Intravenous Line  Duration           Peripheral IV 10/31/22 Dorsal (posterior); Right Forearm 4 days    Peripheral IV 11/02/22 Right Antecubital 2 days    Peripheral IV 11/04/22 Left Antecubital <1 day          Epidural Line  Duration           Epidural Catheter 10/31/22 4 days          Drain  Duration           Ureteral Internal Stent Left ureter 6 Fr  109 days    Closed/Suction Drain RUQ Bulb 19 Fr  4 days                Physical Exam  Constitutional:       General: She is not in acute distress  Appearance: She is well-developed  She is not ill-appearing, toxic-appearing or diaphoretic  HENT:      Head: Normocephalic and atraumatic  Eyes:      Extraocular Movements: Extraocular movements intact  Cardiovascular:      Rate and Rhythm: Normal rate  Pulmonary:      Effort: Pulmonary effort is normal  No respiratory distress  Abdominal:      General: There is no distension  Palpations: Abdomen is soft  Tenderness: There is no abdominal tenderness  There is no guarding or rebound  Comments: Incisions clean, dry and intact  NIGEL drain in place   Skin:     General: Skin is warm and dry  Neurological:      Mental Status: She is alert and oriented to person, place, and time     Psychiatric:         Mood and Affect: Mood normal          Behavior: Behavior normal

## 2022-11-05 NOTE — PROGRESS NOTES
Progress Note - Surgical Oncology   [facility] Resident on [facility]   Emmett Hunter 57 y o  female MRN: [id number]  Unit/Bed#: [facility] 511-01 Encounter: [id number]    Assessment:  Patient is a 58 y  o  female who presented with  post pancreatic head mass, now status on bubble, portal vein, SMV repair on 65/60 complicated by PE now on anticoagulation  Afebrile  VSS  On 2L NC  UOP 1200  NIGEL 570 ss  IS 1250  PTT 74, rest of AM labs pending    Plan:  -Diet as tolerated  -Continue epidural per APS for pain control  -Continue metoprolol and sotalol  -Continue heparin drip  -Check NIGEL amylase once on diet consistently  -Continue to monitor platelets  -PT/OT  -Encourage out of bed and ambulation    Subjective/Objective     Subjective: NAEO  Passing flatus  No nausea  No vomiting  Not eating a full 3 meals, more or less picking at her food  No fevers  No chills  No chest pain  No SOB  Objective:     Blood pressure 127/76, pulse 83, temperature 97 7 °F (36 5 °C), temperature source Oral, resp  rate 17, height 5' 4" (1 626 m), weight (!) 140 kg (307 lb 11 2 oz), SpO2 92 %  ,Body mass index is 52 82 kg/m²  Intake/Output Summary (Last 24 hours) at 11/6/2022 0630  Last data filed at 11/6/2022 0501  Gross per 24 hour   Intake 1550 47 ml   Output 1770 ml   Net -219 53 ml       Invasive Devices  Report    Central Venous Catheter Line  Duration           Port A Cath 05/31/22 Left Chest 158 days          Peripheral Intravenous Line  Duration           Peripheral IV 11/02/22 Right Antecubital 3 days    Peripheral IV 11/04/22 Left Antecubital 1 day          Drain  Duration           Ureteral Internal Stent Left ureter 6 Fr  110 days    Closed/Suction Drain RUQ Bulb 19 Fr  5 days                General: NAD  HENT: NCAT MMM  Neck: supple, no JVD  CV: nl rate  Lungs: nl wob  No resp distress  ABD: Soft, tender RLQ  Incisions c/d/i    Extrem: No CCE  Neuro: AAOx3       Scheduled Meds:  Current Facility-Administered Medications   Medication Dose Route Frequency Provider Last Rate   • acetaminophen  975 mg Oral Q8H [address] Nancy Jama PA-C     • cefepime  2,000 mg Intravenous Q12H Patricia Gaitan PA-C Stopped (11/06/22 0301)   • gabapentin  100 mg Oral TID Celoracio Aba, CRNP     • heparin (porcine)  3-30 Units/kg/hr (Order-Specific) Intravenous Titrated Olivia Porras MD 17 Units/kg/hr (11/06/22 0103)   • HYDROmorphone  0 5 mg Intravenous Q3H PRN Olivia Porras MD     • insulin lispro  2-12 Units Subcutaneous TID With Meals Nancy Jama PA-C     • iohexol  50 mL Oral 90 min pre-procedure Jordon Nettles PA-C     • metoprolol  2 5 mg Intravenous Q6H PRN Olivia Porras MD     • metoprolol succinate  25 mg Oral BID Olivia Porras MD     • oxyCODONE  10 mg Oral Q4H PRN Celester Aba, CRNP     • oxyCODONE  5 mg Oral Q4H PRN Celester Kimball, CRNP     • pantoprazole  40 mg Oral Early Morning Olivia Porras MD     • PARoxetine  37 5 mg Oral Daily Olivia Porras MD     • sotalol  120 mg Oral Q12H [address] Olivia Porras MD     • vancomycin  20 mg/kg (Adjusted) Intravenous Q12H Patricia Gaitan PA-C Stopped (11/06/22 0301)     Continuous Infusions:heparin (porcine), 3-30 Units/kg/hr (Order-Specific), Last Rate: 17 Units/kg/hr (11/06/22 0103)      PRN Meds: •  HYDROmorphone  •  metoprolol  •  oxyCODONE  •  oxyCODONE      Lab, Imaging and other studies:  I have personally reviewed pertinent lab results    , CBC:   No results found for: WBC, HGB, HCT, MCV, PLT, ADJUSTEDWBC, MCH, MCHC, RDW, MPV, NRBC, CMP:   No results found for: SODIUM, K, CL, CO2, ANIONGAP, BUN, CREATININE, GLUCOSE, CALCIUM, AST, ALT, ALKPHOS, PROT, BILITOT, EGFR, Coagulation:   No results found for: PT, INR, APTT, Urinalysis: No results found for: Naaman Gowers, SPECGRAV, PHUR, LEUKOCYTESUR, NITRITE, PROTEINUA, GLUCOSEU, KETONESU, BILIRUBINUR, BLOODU, Amylase: No results found for: AMYLASE, Lipase: No results found for: LIPASE  VTE Pharmacologic Prophylaxis: Heparin  VTE Mechanical Prophylaxis: sequential compression device      [facility]  11/6/2022 6:30 AM

## 2022-11-05 NOTE — PROGRESS NOTES
Daily Progress Note - Critical Care   Kari Hamilton 57 y o  female MRN: [id number]  Unit/Bed#: [facility] 511-01 Encounter: [id number]        ----------------------------------------------------------------------------------------  HPI/24hr events:  76-year-old female with past medical history of diabetes, obesity, Afib, and pancreatic adenocarcinoma who now presents with bilateral PEs following Whipple procedure  10/31 ex lap, lysis of adhesions, colectomy, vascular pedicle flap, Whipple, repair of SMV  Hypotension shortness of breath on the floor yesterday found to have bilateral pulmonary embolisms, transferred back to critical care  Epidural was discontinued started on IV heparin  Overnight with fever, this morning blood cultures with 1 of 2 GPC      ---------------------------------------------------------------------------------------  SUBJECTIVE    Review of Systems   Constitutional: Positive for fatigue  Respiratory: Positive for shortness of breath  Gastrointestinal: Positive for abdominal pain  Review of systems was reviewed and negative unless stated above in HPI/24-hour events   ---------------------------------------------------------------------------------------  Assessment and Plan:    Neuro:   • Analgesia:  Acute pain due to recent procedure  o Epidural has been discontinued  o Continue around the clock Tylenol, gabapentin  o P r n  Oxycodone  • Sedation:  NA  • Delirium PPX: CAM-ICU, Sleep Hygiene   • Depression:  Continue prior to admission Paxil      CV:   • Atrial fibrillation:  With adequate rate control  o Cautious beta-blockade in setting large pulmonary embolism and hypotension however no RV dysfunction on echo  o Continue Toprol 25 b i d  And sotalol 1 20q12      Pulm:  • Acute pulmonary embolism:  Continue heparin infusion  • Acute hypoxemic respiratory failure secondary to above:   Wean nasal cannula as tolerated      GI:   • Pancreatic adenocarcinoma status post Whipple: Complicated by postop bile leak  o Monitor drain  o Does have some pneumoperitoneum adjacent to the gastrojejunal anastomosis with ascites in setting of bacteremia and fever will start broad-spectrum antibiotics  o No role for repeat scan at this time consider culture of drain  - Drain with to 60 overnight and 610/24 hours of dark bilious output  o Will discuss with white surgery  o Carb controlled surgical soft diet      :   • Ureteral stenosis status post stent function properly no acute issues      F/E/N:   • Electrolytes - Monitor/trend - replete based on deficiencies       Heme/Onc:   • Thrombocytopenia:  Likely secondary to surgical consumption and possibly sepsis  o Improved, 93 from 82 yesterday  o Monitor daily  • DVT PPX:  Heparin infusion, SCDs      Endo:   • Diabetes:  Sliding scale insulin goal blood glucose 140-180      ID:   • Sepsis:  As exhibited by fever, leukocytosis source concerning for bacteremia versus intra-abdominal infection  o Fever and leukocytosis could be related to thrombus however with 1 of 2 positive blood cultures will check lactate, procalcitonin start broad-spectrum antibiotics with cefepime and vancomycin check MRSA nasal swab will discuss drain study with weight surgery  o Tailor antibiotics to culture data  o Early BIC pending      MSK/Skin:   • Out of bed to chair as tolerated  • PT/OT      Disposition: Continue Critical Care   Code Status: Level 1 - Full Code  ---------------------------------------------------------------------------------------  ICU CORE MEASURES    Prophylaxis   VTE Pharmacologic Prophylaxis: Heparin Drip  VTE Mechanical Prophylaxis: sequential compression device  Stress Ulcer Prophylaxis: Pantoprazole PO      Invasive Devices Review  Invasive Devices  Report    Central Venous Catheter Line  Duration           Port A Cath 05/31/22 Left Chest 157 days          Peripheral Intravenous Line  Duration           Peripheral IV 11/02/22 Right Antecubital 2 days Peripheral IV 22 Left Antecubital <1 day          Drain  Duration           Ureteral Internal Stent Left ureter 6 Fr  109 days    Closed/Suction Drain RUQ Bulb 19 Fr  4 days              Can any invasive devices be discontinued today? Not applicable  ---------------------------------------------------------------------------------------  OBJECTIVE    Physical Exam  Constitutional:       Appearance: She is obese  HENT:      Head: Normocephalic  Mouth/Throat:      Mouth: Mucous membranes are moist    Eyes:      Pupils: Pupils are equal, round, and reactive to light  Cardiovascular:      Rate and Rhythm: Normal rate and regular rhythm  Pulmonary:      Effort: Pulmonary effort is normal       Comments: Decreased B/L breath sounds  Abdominal:      General: There is distension  Tenderness: There is abdominal tenderness  Comments: NIGEL with dark bilious output   Musculoskeletal:      Cervical back: Normal range of motion  Skin:     General: Skin is warm  Capillary Refill: Capillary refill takes less than 2 seconds  Neurological:      General: No focal deficit present  Mental Status: She is alert  Vitals   Vitals:    22 2144 22 2300 22 0230 22 0548   BP: 132/74 141/75 129/77    BP Location:       Pulse:  100     Resp:  18     Temp:  99 8 °F (37 7 °C) 98 4 °F (36 9 °C)    TempSrc:  Oral     SpO2:  92%     Weight:    (!) 140 kg (308 lb 10 3 oz)   Height:         Temp (24hrs), Av 4 °F (38 °C), Min:98 4 °F (36 9 °C), Max:102 8 °F (39 3 °C)  Current: Temperature: 98 4 °F (36 9 °C)      Invasive/non-invasive ventilation settings   Respiratory  Report   Lab Data (Last 4 hours)    None         O2/Vent Data (Last 4 hours)    None                Height and Weights   Height: 5' 4" (162 6 cm)     Body mass index is 52 98 kg/m²    Weight (last 2 days)     Date/Time Weight    22 0548 140 (308 64)    22 1646 138 (305)    22 0517 076 (253 64) 11/03/22 0600 140 (308 86)            Intake and Output  I/O       11/03 0701  11/04 0700 11/04 0701 11/05 0700    P  O  660 100    I V  (mL/kg) 1006 7 (7 2) 907 5 (6 5)    IV Piggyback 286 5     Total Intake(mL/kg) 1953 2 (14 1) 1007 5 (7 2)    Urine (mL/kg/hr) 2000 (0 6) 1175 (0 3)    Drains 715 610    Total Output 2715 1785    Net -761 8 -777 5                  Nutrition       Diet Orders   (From admission, onward)             Start     Ordered    11/04/22 0910  Diet Surgical; Surgical Soft/Lite Meal; No Carbonation, Consistent Carbohydrate Diet Level 2 (5 carb servings/75 grams CHO/meal)  Diet effective now        References:    Nutrtion Support Algorithm Enteral vs  Parenteral   Question Answer Comment   Diet Type Surgical    Surgical Surgical Soft/Lite Meal    Other Restriction(s): No Carbonation    Other Restriction(s): Consistent Carbohydrate Diet Level 2 (5 carb servings/75 grams CHO/meal)    RD to adjust diet per protocol?  Yes        11/04/22 0910    11/04/22 0910  Dietary nutrition supplements  Once        Question Answer Comment   Select Supplement: Glucerna-Strawberry    Frequency Lunch, Dinner        11/04/22 0910                  Laboratory and Diagnostics:  Results from last 7 days   Lab Units 11/05/22  0407 11/04/22  0626 11/03/22  0000 11/02/22  0441 11/01/22  0507 10/31/22  1855 10/31/22  1659 10/31/22  1619   WBC Thousand/uL 17 05* 8 66 7 22 8 36 9 43 11 86*  --  11 63*   HEMOGLOBIN g/dL 8 3* 8 4* 8 4* 8 9* 10 4* 11 0*  --  8 8*   I STAT HEMOGLOBIN g/dl  --   --   --   --   --   --  10 2*  --    HEMATOCRIT % 26 2* 27 3* 26 5* 28 7* 32 2* 34 7*  --  27 5*   HEMATOCRIT, ISTAT %  --   --   --   --   --   --  30*  --    PLATELETS Thousands/uL 93* 82* 64* 67* 90* 103*  --  105*   NEUTROS PCT %  --   --  75  --  78*  --   --   --    MONOS PCT %  --   --  7  --  10  --   --   --      Results from last 7 days   Lab Units 11/05/22  0407 11/04/22  0535 11/03/22  0459 11/02/22  0441 11/01/22  0507 10/31/22  1855 10/31/22  1659   SODIUM mmol/L 136 140 143 143 141 140  --    POTASSIUM mmol/L 3 6 3 4* 3 5 3 7 3 9 4 5  --    CHLORIDE mmol/L 105 106 111* 110* 110* 112*  --    CO2 mmol/L 26 26 28 27 23 19*  --    CO2, I-STAT mmol/L  --   --   --   --   --   --  20*   ANION GAP mmol/L 5 8 4 6 8 9  --    BUN mg/dL 8 6 7 9 9 10  --    CREATININE mg/dL 0 51* 0 61 0 45* 0 53* 0 66 0 86  --    CALCIUM mg/dL 8 6 8 3 8 3 8 1* 8 2* 8 5  --    GLUCOSE RANDOM mg/dL 186* 158* 139 165* 163* 165*  --    ALT U/L  --   --  335* 516* 525* 365*  --    AST U/L  --   --  165* 423* 685* 493*  --    ALK PHOS U/L  --   --  87 91 96 94  --    ALBUMIN g/dL  --   --  2 4* 2 4* 2 6* 2 8*  --    TOTAL BILIRUBIN mg/dL  --   --  0 77 0 84 0 84 1 02*  --      Results from last 7 days   Lab Units 11/03/22  0459 11/02/22  0441 11/01/22  0507 10/31/22  1855   MAGNESIUM mg/dL 2 1 2 3 2 3 1 5*   PHOSPHORUS mg/dL 1 8* 1 7* 2 9 4 2*      Results from last 7 days   Lab Units 11/05/22  0357 11/04/22  1701 10/31/22  1540   INR  1 48*  --  1 25*   PTT seconds 124* 30  --           Results from last 7 days   Lab Units 11/01/22  0443 11/01/22  0153 10/31/22  2257 10/31/22  1855   LACTIC ACID mmol/L 3 5* 3 9* 4 3* 6 0*     ABG:  Results from last 7 days   Lab Units 11/01/22  0625   PH ART  7 449   PCO2 ART mm Hg 32 6*   PO2 ART mm Hg 90 0   HCO3 ART mmol/L 22 1   BASE EXC ART mmol/L -1 3   ABG SOURCE  Line, Arterial     VBG:  Results from last 7 days   Lab Units 11/01/22  0625   ABG SOURCE  Line, Arterial           Micro  Results from last 7 days   Lab Units 11/04/22  1218   BLOOD CULTURE  Received in Microbiology Lab  Culture in Progress     GRAM STAIN RESULT  Gram positive cocci in chains*         Active Medications  Scheduled Meds:  Current Facility-Administered Medications   Medication Dose Route Frequency Provider Last Rate   • acetaminophen  975 mg Oral Q8H [address] Nancy Jama PA-C     • cefepime  2,000 mg Intravenous Q12H Jaxon Ma PA-C • gabapentin  100 mg Oral TID CHINO Dee     • heparin (porcine)  3-30 Units/kg/hr (Order-Specific) Intravenous Titrated Koki Greer MD 15 Units/kg/hr (11/05/22 0622)   • HYDROmorphone  0 5 mg Intravenous Q3H PRN Koki Greer MD     • insulin lispro  2-12 Units Subcutaneous TID With Meals Nancy Jama PA-C     • iohexol  50 mL Oral 90 min pre-procedure Mayra Mock PA-C     • metoprolol  2 5 mg Intravenous Q6H PRN Koki Greer MD     • metoprolol succinate  25 mg Oral BID Koki Greer MD     • oxyCODONE  10 mg Oral Q4H PRN CHINO Dee     • oxyCODONE  5 mg Oral Q4H PRN CHINO Dee     • pantoprazole  40 mg Oral Early Morning Koki Greer MD     • PARoxetine  37 5 mg Oral Daily Koki Greer MD     • potassium chloride  40 mEq Oral Once Fabiola Gresham PA-C     • sotalol  120 mg Oral Q12H [facility] Koki Greer MD     • vancomycin  15 mg/kg Intravenous Q12H Alyssa Uribe PA-C       Continuous Infusions:  heparin (porcine), 3-30 Units/kg/hr (Order-Specific), Last Rate: 15 Units/kg/hr (11/05/22 0622)      PRN Meds:   HYDROmorphone, 0 5 mg, Q3H PRN  metoprolol, 2 5 mg, Q6H PRN  oxyCODONE, 10 mg, Q4H PRN  oxyCODONE, 5 mg, Q4H PRN        Allergies   Allergies   Allergen Reactions   • Other Rash     Adhesive tape        Advance Directive and Living Will:      Power of :    POLST:        Counseling / Coordination of Care  Total Critical Care time spent 30 minutes excluding procedures, teaching and family updates  Fabiola Gresham PA-C        Portions of the record may have been created with voice recognition software  Occasional wrong word or "sound a like" substitutions may have occurred due to the inherent limitations of voice recognition software    Read the chart carefully and recognize, using context, where substitutions have occurred

## 2022-11-06 LAB
AMYLASE FLD QL: 40 U/L
AMYLASE SERPL-CCNC: 16 IU/L (ref 25–115)
ANION GAP SERPL CALCULATED.3IONS-SCNC: 6 MMOL/L (ref 4–13)
APTT PPP: 62 SECONDS (ref 23–37)
APTT PPP: 74 SECONDS (ref 23–37)
BUN SERPL-MCNC: 9 MG/DL (ref 5–25)
CALCIUM SERPL-MCNC: 8.6 MG/DL (ref 8.3–10.1)
CHLORIDE SERPL-SCNC: 105 MMOL/L (ref 96–108)
CO2 SERPL-SCNC: 23 MMOL/L (ref 21–32)
CREAT SERPL-MCNC: 0.42 MG/DL (ref 0.6–1.3)
ERYTHROCYTE [DISTWIDTH] IN BLOOD BY AUTOMATED COUNT: 15.6 % (ref 11.6–15.1)
GFR SERPL CREATININE-BSD FRML MDRD: 110 ML/MIN/1.73SQ M
GLUCOSE SERPL-MCNC: 144 MG/DL (ref 65–140)
GLUCOSE SERPL-MCNC: 144 MG/DL (ref 65–140)
GLUCOSE SERPL-MCNC: 146 MG/DL (ref 65–140)
GLUCOSE SERPL-MCNC: 155 MG/DL (ref 65–140)
GLUCOSE SERPL-MCNC: 157 MG/DL (ref 65–140)
HCT VFR BLD AUTO: 27.1 % (ref 34.8–46.1)
HGB BLD-MCNC: 8.4 G/DL (ref 11.5–15.4)
MCH RBC QN AUTO: 28.7 PG (ref 26.8–34.3)
MCHC RBC AUTO-ENTMCNC: 31 G/DL (ref 31.4–37.4)
MCV RBC AUTO: 93 FL (ref 82–98)
PLATELET # BLD AUTO: 99 THOUSANDS/UL (ref 149–390)
PMV BLD AUTO: 12 FL (ref 8.9–12.7)
POTASSIUM SERPL-SCNC: 3.5 MMOL/L (ref 3.5–5.3)
PROCALCITONIN SERPL-MCNC: 0.61 NG/ML
RBC # BLD AUTO: 2.93 MILLION/UL (ref 3.81–5.12)
SODIUM SERPL-SCNC: 134 MMOL/L (ref 135–147)
WBC # BLD AUTO: 14.12 THOUSAND/UL (ref 4.31–10.16)

## 2022-11-06 RX ORDER — HEPARIN SODIUM 10000 [USP'U]/100ML
3-30 INJECTION, SOLUTION INTRAVENOUS
Status: DISCONTINUED | OUTPATIENT
Start: 2022-11-06 | End: 2022-11-07

## 2022-11-06 RX ORDER — POTASSIUM CHLORIDE 20 MEQ/1
40 TABLET, EXTENDED RELEASE ORAL ONCE
Status: COMPLETED | OUTPATIENT
Start: 2022-11-06 | End: 2022-11-06

## 2022-11-06 RX ADMIN — OXYCODONE HYDROCHLORIDE 5 MG: 5 TABLET ORAL at 05:27

## 2022-11-06 RX ADMIN — GABAPENTIN 100 MG: 100 CAPSULE ORAL at 21:42

## 2022-11-06 RX ADMIN — POTASSIUM CHLORIDE 40 MEQ: 1500 TABLET, EXTENDED RELEASE ORAL at 08:05

## 2022-11-06 RX ADMIN — HEPARIN SODIUM 17 UNITS/KG/HR: 10000 INJECTION, SOLUTION INTRAVENOUS at 01:03

## 2022-11-06 RX ADMIN — ACETAMINOPHEN 975 MG: 325 TABLET ORAL at 14:16

## 2022-11-06 RX ADMIN — PANTOPRAZOLE SODIUM 40 MG: 40 TABLET, DELAYED RELEASE ORAL at 05:12

## 2022-11-06 RX ADMIN — GABAPENTIN 100 MG: 100 CAPSULE ORAL at 08:05

## 2022-11-06 RX ADMIN — CEFEPIME 2000 MG: 2 INJECTION, POWDER, FOR SOLUTION INTRAVENOUS at 08:05

## 2022-11-06 RX ADMIN — INSULIN LISPRO 2 UNITS: 100 INJECTION, SOLUTION INTRAVENOUS; SUBCUTANEOUS at 11:38

## 2022-11-06 RX ADMIN — SOTALOL HYDROCHLORIDE 120 MG: 120 TABLET ORAL at 21:42

## 2022-11-06 RX ADMIN — METOPROLOL SUCCINATE 25 MG: 25 TABLET, EXTENDED RELEASE ORAL at 17:01

## 2022-11-06 RX ADMIN — METOPROLOL SUCCINATE 25 MG: 25 TABLET, EXTENDED RELEASE ORAL at 08:05

## 2022-11-06 RX ADMIN — CEFTRIAXONE 2000 MG: 2 INJECTION, POWDER, FOR SOLUTION INTRAMUSCULAR; INTRAVENOUS at 20:39

## 2022-11-06 RX ADMIN — GABAPENTIN 100 MG: 100 CAPSULE ORAL at 16:59

## 2022-11-06 RX ADMIN — PAROXETINE 37.5 MG: 37.5 TABLET, FILM COATED, EXTENDED RELEASE ORAL at 08:06

## 2022-11-06 RX ADMIN — OXYCODONE HYDROCHLORIDE 5 MG: 5 TABLET ORAL at 11:06

## 2022-11-06 RX ADMIN — SOTALOL HYDROCHLORIDE 120 MG: 120 TABLET ORAL at 08:06

## 2022-11-06 RX ADMIN — ACETAMINOPHEN 975 MG: 325 TABLET ORAL at 21:42

## 2022-11-06 RX ADMIN — ACETAMINOPHEN 975 MG: 325 TABLET ORAL at 05:12

## 2022-11-06 RX ADMIN — HEPARIN SODIUM 17 UNITS/KG/HR: 10000 INJECTION, SOLUTION INTRAVENOUS at 14:41

## 2022-11-06 NOTE — PLAN OF CARE
Problem: Prexisting or High Potential for Compromised Skin Integrity  Goal: Skin integrity is maintained or improved  Description: INTERVENTIONS:  - Identify patients at risk for skin breakdown  - Assess and monitor skin integrity  - Assess and monitor nutrition and hydration status  - Monitor labs   - Assess for incontinence   - Turn and reposition patient  - Assist with mobility/ambulation  - Relieve pressure over bony prominences  - Avoid friction and shearing  - Provide appropriate hygiene as needed including keeping skin clean and dry  - Evaluate need for skin moisturizer/barrier cream  - Collaborate with interdisciplinary team   - Patient/family teaching  - Consider wound care consult   11/5/2022 2159 by Joycelyn Gil RN  Outcome: Progressing  11/5/2022 2159 by Joycelyn Gil RN  Outcome: Progressing     Problem: Potential for Falls  Goal: Patient will remain free of falls  Description: INTERVENTIONS:  - Educate patient/family on patient safety including physical limitations  - Instruct patient to call for assistance with activity   - Consult OT/PT to assist with strengthening/mobility   - Keep Call bell within reach  - Keep bed low and locked with side rails adjusted as appropriate  - Keep care items and personal belongings within reach  - Initiate and maintain comfort rounds  - Make Fall Risk Sign visible to staff  - Offer Toileting every  Hours, in advance of need  - Initiate/Maintain alarm  - Obtain necessary fall risk management equipment:   - Apply yellow socks and bracelet for high fall risk patients  - Consider moving patient to room near nurses station  11/5/2022 2159 by Joycelyn Gil RN  Outcome: Progressing  11/5/2022 2159 by Joycelyn Gil RN  Outcome: Progressing     Problem: Nutrition/Hydration-ADULT  Goal: Nutrient/Hydration intake appropriate for improving, restoring or maintaining nutritional needs  Description: Monitor and assess patient's nutrition/hydration status for malnutrition  Collaborate with interdisciplinary team and initiate plan and interventions as ordered  Monitor patient's weight and dietary intake as ordered or per policy  Utilize nutrition screening tool and intervene as necessary  Determine patient's food preferences and provide high-protein, high-caloric foods as appropriate       INTERVENTIONS:  - Monitor oral intake, urinary output, labs, and treatment plans  - Assess nutrition and hydration status and recommend course of action  - Evaluate amount of meals eaten  - Assist patient with eating if necessary   - Allow adequate time for meals  - Recommend/ encourage appropriate diets, oral nutritional supplements, and vitamin/mineral supplements  - Order, calculate, and assess calorie counts as needed  - Recommend, monitor, and adjust tube feedings and TPN/PPN based on assessed needs  - Assess need for intravenous fluids  - Provide specific nutrition/hydration education as appropriate  - Include patient/family/caregiver in decisions related to nutrition  11/5/2022 2159 by Porsche Lemon RN  Outcome: Progressing  11/5/2022 2159 by Porsche Lemon RN  Outcome: Progressing     Problem: MOBILITY - ADULT  Goal: Maintain or return to baseline ADL function  Description: INTERVENTIONS:  -  Assess patient's ability to carry out ADLs; assess patient's baseline for ADL function and identify physical deficits which impact ability to perform ADLs (bathing, care of mouth/teeth, toileting, grooming, dressing, etc )  - Assess/evaluate cause of self-care deficits   - Assess range of motion  - Assess patient's mobility; develop plan if impaired  - Assess patient's need for assistive devices and provide as appropriate  - Encourage maximum independence but intervene and supervise when necessary  - Involve family in performance of ADLs  - Assess for home care needs following discharge   - Consider OT consult to assist with ADL evaluation and planning for discharge  - Provide patient education as appropriate  11/5/2022 2159 by Annette Frederick RN  Outcome: Progressing  11/5/2022 2159 by Annette Frederick RN  Outcome: Progressing  Goal: Maintains/Returns to pre admission functional level  Description: INTERVENTIONS:  - Perform BMAT or MOVE assessment daily    - Set and communicate daily mobility goal to care team and patient/family/caregiver  - Collaborate with rehabilitation services on mobility goals if consulted  - Perform Range of Motion times a day  - Reposition patient every  hours    - Dangle patient  times a day  - Stand patient  times a day  - Ambulate patient  times a day  - Out of bed to chair  times a day   - Out of bed for meal times a day  - Out of bed for toileting  - Record patient progress and toleration of activity level   11/5/2022 2159 by Annette Frederick RN  Outcome: Progressing  11/5/2022 2159 by Annette Frederick RN  Outcome: Progressing

## 2022-11-06 NOTE — CASE MANAGEMENT
Case Management Discharge Planning Note    Patient name Braulio Mcdowell  Location [address] 511/PPHP 621-90 MRN [id number]  : 1960 Date 2022       Current Admission Date: 10/31/2022  Current Admission Diagnosis:Adenocarcinoma of head of pancreas [facility])   Patient Active Problem List    Diagnosis Date Noted   • Pulmonary embolism ([facility] ) 2022   • Sepsis ([facility] ) 2022   • Paroxysmal A-fib ([place] [address] ) 2022   • Left flank pain 2022   • CPAP (continuous positive airway pressure) dependence    • Chemotherapy induced neutropenia ([place] [address] ) 2022   • Adenocarcinoma of head of pancreas ([place] [address] ) 2022   • Uric acid kidney stone 2022   • Nephrolithiasis 10/25/2021   • Class 3 severe obesity due to excess calories with body mass index (BMI) of 50 0 to 59 9 in adult ([place] [address] ) 2020   • Type 2 diabetes mellitus without complication, without long-term current use of insulin ([place] [address] ) 2017   • Severe obstructive sleep apnea 2016   • Dyspnea on exertion 2016   • Supraventricular tachycardia ([place] [address] ) 2016   • Hypertension 2016   • Controlled depression 2016   • Adenomatous colon polyp 2015   • 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[place] [address] ) 2013   • Morbid obesity ([place] [address] ) 2013   • Hyperlipidemia 2013   • Benign essential hypertension 10/02/2012   • Esophageal reflux 2012      LOS (days): 6  Geometric Mean LOS (GMLOS) (days):   Days to GMLOS:     OBJECTIVE:  Risk of Unplanned Readmission Score: 16 84         Current admission status: Inpatient   Preferred Pharmacy:   [address] [place] [address]   Phone: [phone] Fax: [phone]    Primary Care Provider: Evelia Patel MD    Primary Insurance: BLUE CROSS  Secondary Insurance:     DISCHARGE DETAILS:    [address] for [facility], no charge    MD notified

## 2022-11-06 NOTE — CONSULTS
Vancomycin therapy has been discontinued  Pharmacy will sign off  Thank you for this consult  Please do not hesitate to call us with questions or re-consult us if the need arises       Neno Fuentes, PharmD, [address] [facility] Clinical Pharmacist  [phone] or via [place]

## 2022-11-06 NOTE — PHYSICAL THERAPY NOTE
Physical Therapy Treatment    Patient Name: Linda Coleman    GSAMJ'P Date: 11/6/2022     Problem List  Principal Problem:    Adenocarcinoma of head of pancreas [facility])  Active Problems:    Hypertension    Controlled depression    Type 2 diabetes mellitus without complication, without long-term current use of insulin (HCC)    Hyperlipidemia    Esophageal reflux    Benign essential hypertension    CPAP (continuous positive airway pressure) dependence    Paroxysmal A-fib ([facility] )    Pulmonary embolism (HCC)    Sepsis ([facility] )       Past Medical History  Past Medical History:   Diagnosis Date   • Abnormal liver function test     last assessed 1/16/17    • Adenomatosis    • 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    last assessed 4/4/13   • Arthritis    • Atrial flutter ([facility] )    • Benign essential hypertension     last assessed 12/21/17    • Body mass index (BMI) of 50-59 9 in adult [facility])    • Cancer (HCC)     pancreas   • Colon polyp    • Congenital pes planus     last assessed 7/15/14    • COVID     4/30/21   • CPAP (continuous positive airway pressure) dependence    • Dental crowns present    • Depression    • Diabetes mellitus ([facility] )    • Dizziness     occas--pt reports did see family doctor   • GERD (gastroesophageal reflux disease)    • Hemangioma of skin 08/26/2003    last assessed 8/26/03   • History of cancer chemotherapy     [facility] Oncologist Dr Dexter Bamberger   • History of gastroesophageal reflux (GERD)    • Hypercholesterolemia    • Hyperlipidemia    • Hypertension    • Irregular heart beat    • Kidney stone    • 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[facility] ) 04/19/2006    last assessed 12/31/14    • Osteoarthritis of both knees     last assessed 12/31/14    • Paroxysmal atrial fibrillation ([facility] )    • Port-A-Cath in place    • S/P ablation of atrial flutter    • Shortness of breath     per pt "from chemo-not drastic--still working and goes up steps" • Sleep apnea    • Wears glasses         Past Surgical History  Past Surgical History:   Procedure Laterality Date   • ABDOMINAL ADHESION SURGERY N/A 10/31/2022    Procedure: LYSIS ADHESIONS, colectomy, vascular pedicle flap;  Surgeon: Benedicto Marroquin MD;  [facility];  Service: Surgical Oncology   • ABDOMINAL SURGERY  06/2006    20cm GIST removed    • ABLATION SOFT TISSUE N/A 10/31/2022    Procedure: SOFT TISSUE ABLATION;  Surgeon: Benedicto Marroquin MD;  [facility];  Service: Surgical Oncology   • APPENDECTOMY     • ATRIAL ABLATION SURGERY     • CARPAL TUNNEL RELEASE Bilateral    • COLONOSCOPY     • FL GUIDED CENTRAL VENOUS ACCESS DEVICE INSERTION  05/31/2022   • FL RETROGRADE PYELOGRAM  07/18/2022   • FL RETROGRADE PYELOGRAM  8/22/2022   • HYSTERECTOMY      fibroids   • IR PORT CHECK  06/23/2022   • IR PORT REMOVAL AND PLACEMENT NEW SITE  06/28/2022   • JOINT REPLACEMENT Bilateral     knees   • KIDNEY STONE SURGERY Right 09/17/2021    placed stent in  for kidney stone   • KNEE SURGERY     • KNEE SURGERY Left 03/2019   • LAPAROTOMY N/A 10/31/2022    Procedure: EXPLORATORY LAPAROTOMY;  Surgeon: Benedicto Marroquin MD;  [facility];  Service: Surgical Oncology   • OOPHORECTOMY      cyst   • TX CYSTO/URETERO W/LITHOTRIPSY &INDWELL STENT INSRT Left 8/22/2022    Procedure: CYSTOSCOPY URETEROSCOPY WITH LITHOTRIPSY HOLMIUM LASER, RETROGRADE PYELOGRAM AND INSERTION STENT URETERAL;  Surgeon: Brittani Parra MD;  [facility];  Service: Urology   • TX CYSTOSCOPY,INSERT URETERAL STENT Left 8/22/2022    Procedure: EXCHANGE STENT URETERAL;  Surgeon: Brittani Parra MD;  [facility];  Service: Urology   • TX CYSTOURETHROSCOPY,URETER CATHETER Left 07/18/2022    Procedure: CYSTOSCOPY RETROGRADE PYELOGRAM WITH INSERTION STENT URETERAL;  Surgeon: Brittani Parra MD;  [facility];  Service: Urology   • TONSILLECTOMY     • TUBAL LIGATION     • TUMOR EXCISION  2006    gastrointestinal stromal tumor   • TUNNELED VENOUS PORT PLACEMENT N/A 05/31/2022    Procedure: INSERTION VENOUS PORT (PORT-A-CATH); Surgeon: Antonino Burk MD;  [facility];  Service: Surgical Oncology   • UPPER GASTROINTESTINAL ENDOSCOPY     • WHIPPLE PROCEDURE/PANCREATICO-DUODENECTOMY N/A 10/31/2022    Procedure: WHIPPLE PROCEDURE/PANCREATICO-DUODENECTOMY, IOUS;  Surgeon: Antonino Burk MD;  [facility];  Service: Surgical Oncology           11/06/22 0907   PT Last Visit   PT Visit Date 11/06/22   Note Type   Note Type Treatment   Pain Assessment   Pain Assessment Tool 0-10   Pain Score 2   Pain Location/Orientation Location: Abdomen   Restrictions/Precautions   Weight Bearing Precautions Per Order No   Other Precautions O2;Telemetry;Multiple lines  (2 5 NC)   General   Chart Reviewed Yes   Cognition   Overall Cognitive Status WFL   Arousal/Participation Alert; Responsive; Cooperative   Attention Within functional limits   Orientation Level Oriented X4   Memory Within functional limits   Following Commands Follows one step commands without difficulty   Bed Mobility   Additional Comments Not tested  Pt seated OOB upon arrival   Transfers   Sit to Stand 5  Supervision   Additional items Assist x 1; Increased time required   Stand to Sit 5  Supervision   Additional items Assist x 1; Increased time required   Toilet transfer 5  Supervision   Additional items Assist x 1; Increased time required;Raised toilet seat   Additional Comments Performed RW  Increased time for line management   Ambulation/Elevation   Gait pattern Shuffling; Wide PORTIA   Gait Assistance 5  Supervision   Additional items Assist x 1;Verbal cues  (2nd person for line management)   Assistive Device Bariatric Rolling walker   Distance 95 feet + 5 feet   Balance   Static Sitting Fair   Dynamic Sitting Fair   Static Standing Fair   Dynamic Standing [address],Flo[address] 3,4, & 5 -   Activity Tolerance   Activity Tolerance Patient tolerated treatment well   Nurse Made Aware Yes RN Susan present   Exercises   Balance training  Static standing without UE support x5 minutes total - Close supervision   Assessment   Prognosis Good   Problem List Decreased strength;Decreased range of motion;Decreased endurance; Impaired balance;Decreased mobility;Pain   Assessment Patient seen for physical therapy session with a focus on gait training, transfers, standing tolerance, and overall endurance  Luiza Brewer continues to make progress in functional mobility  She progressed to close supervision level for ambulation using her bariatric RW  Pt did not experience any KERR or SOB  Vitals remained stable throughout  Continue with PT to improve overall mobility independence  Recommend HHPT   Barriers to Discharge Inaccessible home environment;Decreased caregiver support   Goals   Patient Goals To go to the bathroom   Plan   Treatment/Interventions LE strengthening/ROM; Functional transfer training; Endurance training;Patient/family training;Gait training;Bed mobility   Progress Progressing toward goals   PT Frequency 3-5x/wk   Recommendation   PT Discharge Recommendation Home with home health rehabilitation   [address] [place] [address] Mobility Inpatient   Turning in Bed Without Bedrails 2   Lying on Back to Sitting on Edge of Flat Bed 2   Moving Bed to Chair 4   Standing Up From Chair 4   Walk in Room 4   Climb 3-5 Stairs 2   Basic Mobility Inpatient Raw Score 18   Basic Mobility Standardized Score 41 05   Highest Level Of Mobility   JH-HLM Goal 6: Walk 10 steps or more   JH-HLM Achieved 7: Walk 25 feet or more   Education   Education Provided Mobility training   Patient Demonstrates acceptance/verbal understanding;Demonstrates verbal understanding   End of Consult   Patient Position at End of Consult Seated edge of bed;Bed/Chair alarm activated; All needs within reach       Maximino Nixon PT, DPT

## 2022-11-06 NOTE — PROGRESS NOTES
Daily Progress Note - Critical Care   Jaimie Pabon 57 y o  female MRN: [id number]  Unit/Bed#: [facility] 511-01 Encounter: [id number]        ----------------------------------------------------------------------------------------  HPI/24hr events: 26-year-old female with past medical history of diabetes, obesity, Afib, and pancreatic adenocarcinoma who now presents with bilateral PEs following Whipple procedure  10/31 ex lap, lysis of adhesions, colectomy, vascular pedicle flap, Whipple, repair of SMV  No acute events overnight     ---------------------------------------------------------------------------------------  SUBJECTIVE    Review of Systems   Constitutional: Positive for fatigue  Respiratory: Positive for shortness of breath  Gastrointestinal: Positive for abdominal distention and abdominal pain       Review of systems was reviewed and negative unless stated above in HPI/24-hour events   ---------------------------------------------------------------------------------------  Assessment and Plan:    Neuro:   • Analgesia:Acute pain dur to recent surgery  o Epidural d/raj 11/4  o ATC tylenol, gabapentin  o PRN oxycodone  • Sedation: NA  • Delirium PPX: CAM-ICU, Sleep Hygiene   • Depression: paxil       CV:   • PAF: with adequate rate control   o Continue toprol 25 BID and sotalol 120 Q12  o Heparin drip for AC      Pulm:  • Pulmonary embolism: ECHO with no RV dysfunction   o Continue heparin infusion, when cleared from a surgical standpoint would reccomend AC with lovenox in light of provoked PE in malignancy   • Acute hypoxemic respiratory failure: requiring 2L NC  o 2/2 PE and atelectasis   o Wean as tolerated  o IS  o OOB to chair as tolerated  • MARTIN: QHS CPAP       GI:   • Pancreatic Adenocarcinoma s/p Whipple: complicated by SMV bleeding requiring stent and likely post-op bile leak   o Monitor drain   o Consider amylase and bili  o Plan per surgical oncology       :   • Ureteral stensois s/p stenting: functioning, no issues       F/E/N:   • Electrolytes - Monitor/trend - replete based on deficiencies   • Consider d/c IVF      Heme/Onc:   • Thrombocytopenia: improved, trend CBC daily  • DVT PPX: heparin drip, SCDs      Endo:   • DM: SSI goal blood glucose 140-180      ID:   • Sepsis, possible GPC bacteremia   o In 1/2 cultures, likely contaminant   o De-escalate abx to ceftriaxone and then potentially d/c abx tomorrow and remaining culture is negative       MSK/Skin:   • PT/OT  • OOB to chair       Disposition: Transfer to Med Surg with Telemetry   Code Status: Level 1 - Full Code  ---------------------------------------------------------------------------------------  ICU CORE MEASURES    Prophylaxis   VTE Pharmacologic Prophylaxis: Heparin Drip  VTE Mechanical Prophylaxis: sequential compression device  Stress Ulcer Prophylaxis: Prophylaxis Not Indicated       Invasive Devices Review  Invasive Devices  Report    Central Venous Catheter Line  Duration           Port A Cath 05/31/22 Left Chest 158 days          Peripheral Intravenous Line  Duration           Peripheral IV 11/02/22 Right Antecubital 3 days    Peripheral IV 11/04/22 Left Antecubital 1 day          Drain  Duration           Ureteral Internal Stent Left ureter 6 Fr  110 days    Closed/Suction Drain RUQ Bulb 19 Fr  5 days              Can any invasive devices be discontinued today? Not applicable  ---------------------------------------------------------------------------------------  OBJECTIVE    Physical Exam  Constitutional:       Appearance: She is obese  HENT:      Head: Normocephalic  Mouth/Throat:      Mouth: Mucous membranes are moist    Eyes:      Pupils: Pupils are equal, round, and reactive to light  Cardiovascular:      Rate and Rhythm: Normal rate and regular rhythm  Pulmonary:      Effort: Pulmonary effort is normal       Comments: Decreased B/L breath sounds  Abdominal:      General: There is distension  Tenderness: There is abdominal tenderness  Comments: Cloudy serosang NIGEL output   Skin:     Capillary Refill: Capillary refill takes less than 2 seconds  Neurological:      General: No focal deficit present  Mental Status: She is alert  Vitals   Vitals:    22 1901 22 2309 22 0301 22 0512   BP: 134/82 132/84 127/76    BP Location: Left arm Left arm     Pulse: 85  83    Resp: 16 18 17    Temp: 98 5 °F (36 9 °C) 99 6 °F (37 6 °C) 97 7 °F (36 5 °C)    TempSrc: Oral Oral Oral    SpO2: 96%  92%    Weight:    (!) 140 kg (307 lb 11 2 oz)   Height:         Temp (24hrs), Av 4 °F (36 9 °C), Min:97 6 °F (36 4 °C), Max:99 6 °F (37 6 °C)  Current: Temperature: 97 7 °F (36 5 °C)      Invasive/non-invasive ventilation settings   Respiratory  Report   Lab Data (Last 4 hours)    None         O2/Vent Data (Last 4 hours)    None                Height and Weights   Height: 5' 4" (162 6 cm)     Body mass index is 52 82 kg/m²  Weight (last 2 days)     Date/Time Weight    22 0512 140 (307 7)    22 0548 140 (308 64)    22 1646 138 (305)    22 0544 139 (305 56)            Intake and Output  I/O        0701   0700  0701   0700    P  O  100 438    I V  (mL/kg) 907 5 (6 5) 562 5 (4)    IV Piggyback  550    Total Intake(mL/kg) 1007 5 (7 2) 1550 5 (11 1)    Urine (mL/kg/hr) 1175 (0 3) 1200 (0 4)    Drains 610 570    Total Output 1785 1770    Net -777 5 -219 5                  Nutrition       Diet Orders   (From admission, onward)             Start     Ordered    22 0910  Diet Surgical; Surgical Soft/Lite Meal; No Carbonation, Consistent Carbohydrate Diet Level 2 (5 carb servings/75 grams CHO/meal)  Diet effective now        References:    Nutrtion Support Algorithm Enteral vs  Parenteral   Question Answer Comment   Diet Type Surgical    Surgical Surgical Soft/Lite Meal    Other Restriction(s): No Carbonation    Other Restriction(s): Consistent Carbohydrate Diet Level 2 (5 carb servings/75 grams CHO/meal)    RD to adjust diet per protocol?  Yes        11/04/22 0910    11/04/22 0910  Dietary nutrition supplements  Once        Question Answer Comment   Select Supplement: Glucerna-Strawberry    Frequency Lunch, Dinner        11/04/22 0910                  Laboratory and Diagnostics:  Results from last 7 days   Lab Units 11/05/22  0407 11/04/22  0626 11/03/22  0000 11/02/22  0441 11/01/22  0507 10/31/22  1855 10/31/22  1659 10/31/22  1619   WBC Thousand/uL 17 05* 8 66 7 22 8 36 9 43 11 86*  --  11 63*   HEMOGLOBIN g/dL 8 3* 8 4* 8 4* 8 9* 10 4* 11 0*  --  8 8*   I STAT HEMOGLOBIN g/dl  --   --   --   --   --   --  10 2*  --    HEMATOCRIT % 26 2* 27 3* 26 5* 28 7* 32 2* 34 7*  --  27 5*   HEMATOCRIT, ISTAT %  --   --   --   --   --   --  30*  --    PLATELETS Thousands/uL 93* 82* 64* 67* 90* 103*  --  105*   NEUTROS PCT %  --   --  75  --  78*  --   --   --    MONOS PCT %  --   --  7  --  10  --   --   --      Results from last 7 days   Lab Units 11/05/22  0407 11/04/22  0535 11/03/22  0459 11/02/22  0441 11/01/22  0507 10/31/22  1855 10/31/22  1659   SODIUM mmol/L 136 140 143 143 141 140  --    POTASSIUM mmol/L 3 6 3 4* 3 5 3 7 3 9 4 5  --    CHLORIDE mmol/L 105 106 111* 110* 110* 112*  --    CO2 mmol/L 26 26 28 27 23 19*  --    CO2, I-STAT mmol/L  --   --   --   --   --   --  20*   ANION GAP mmol/L 5 8 4 6 8 9  --    BUN mg/dL 8 6 7 9 9 10  --    CREATININE mg/dL 0 51* 0 61 0 45* 0 53* 0 66 0 86  --    CALCIUM mg/dL 8 6 8 3 8 3 8 1* 8 2* 8 5  --    GLUCOSE RANDOM mg/dL 186* 158* 139 165* 163* 165*  --    ALT U/L  --   --  335* 516* 525* 365*  --    AST U/L  --   --  165* 423* 685* 493*  --    ALK PHOS U/L  --   --  87 91 96 94  --    ALBUMIN g/dL  --   --  2 4* 2 4* 2 6* 2 8*  --    TOTAL BILIRUBIN mg/dL  --   --  0 77 0 84 0 84 1 02*  --      Results from last 7 days   Lab Units 11/03/22  0459 11/02/22  0441 11/01/22  0507 10/31/22  1855   MAGNESIUM mg/dL 2 1 2 3 2 3 1 5*   PHOSPHORUS mg/dL 1 8* 1 7* 2 9 4 2*      Results from last 7 days   Lab Units 11/06/22  0106 11/05/22  1433 11/05/22  0357 11/04/22  1701 10/31/22  1540   INR   --   --  1 48*  --  1 25*   PTT seconds 74* 48* 124* 30  --           Results from last 7 days   Lab Units 11/05/22  0723 11/01/22  0443 11/01/22  0153 10/31/22  2257 10/31/22  1855   LACTIC ACID mmol/L 1 7 3 5* 3 9* 4 3* 6 0*     ABG:  Results from last 7 days   Lab Units 11/01/22  0625   PH ART  7 449   PCO2 ART mm Hg 32 6*   PO2 ART mm Hg 90 0   HCO3 ART mmol/L 22 1   BASE EXC ART mmol/L -1 3   ABG SOURCE  Line, Arterial     VBG:  Results from last 7 days   Lab Units 11/01/22  0625   ABG SOURCE  Line, Arterial     Results from last 7 days   Lab Units 11/05/22  0723   PROCALCITONIN ng/ml 0 63*       Micro  Results from last 7 days   Lab Units 11/04/22  1218   BLOOD CULTURE  No Growth at 24 hrs     GRAM STAIN RESULT  Gram positive cocci in chains*           Active Medications  Scheduled Meds:  Current Facility-Administered Medications   Medication Dose Route Frequency Provider Last Rate   • acetaminophen  975 mg Oral Q8H [facility] Nancy Jama PA-C     • cefepime  2,000 mg Intravenous Q12H Marium Hooks PA-C Stopped (11/06/22 0301)   • gabapentin  100 mg Oral TID CHINO Truj[place]     • heparin (porcine)  3-30 Units/kg/hr (Order-Specific) Intravenous Titrated Tiarra Ayala MD 17 Units/kg/hr (11/06/22 0103)   • HYDROmorphone  0 5 mg Intravenous Q3H PRN Tiarra Ayala MD     • insulin lispro  2-12 Units Subcutaneous TID With Meals Nancy Jama PA-C     • iohexol  50 mL Oral 90 min pre-procedure Vicenta Kaur PA-C     • metoprolol  2 5 mg Intravenous Q6H PRN Tiarra Ayala MD     • metoprolol succinate  25 mg Oral BID Tiarra Ayala MD     • oxyCODONE  10 mg Oral Q4H PRN CHINO Trujillo     • oxyCODONE  5 mg Oral Q4H PRN CHINO Trujillo     • pantoprazole  40 mg Oral Early Morning Tiarra Ayala MD     • PARoxetine  37 5 mg Oral Daily Ann Marie Wilcox MD     • sotalol  120 mg Oral Q12H [facility] Ann Marie Wilcox MD     • vancomycin  20 mg/kg (Adjusted) Intravenous Q12H Alex Ortega PA-C Stopped (11/06/22 0301)     Continuous Infusions:  heparin (porcine), 3-30 Units/kg/hr (Order-Specific), Last Rate: 17 Units/kg/hr (11/06/22 0103)      PRN Meds:   HYDROmorphone, 0 5 mg, Q3H PRN  metoprolol, 2 5 mg, Q6H PRN  oxyCODONE, 10 mg, Q4H PRN  oxyCODONE, 5 mg, Q4H PRN        Allergies   Allergies   Allergen Reactions   • Other Rash     Adhesive tape        Advance Directive and Living Will:      Power of :    POLST:        Counseling / Coordination of Care  Total Critical Care time spent 0 minutes excluding procedures, teaching and family updates  Alex Ortega PA-C        Portions of the record may have been created with voice recognition software  Occasional wrong word or "sound a like" substitutions may have occurred due to the inherent limitations of voice recognition software    Read the chart carefully and recognize, using context, where substitutions have occurred

## 2022-11-07 ENCOUNTER — RA CDI HCC (OUTPATIENT)
Dept: OTHER | Facility: HOSPITAL | Age: 62
End: 2022-11-07

## 2022-11-07 LAB
ANION GAP SERPL CALCULATED.3IONS-SCNC: 5 MMOL/L (ref 4–13)
APTT PPP: 60 SECONDS (ref 23–37)
BACTERIA BLD CULT: ABNORMAL
BUN SERPL-MCNC: 9 MG/DL (ref 5–25)
CALCIUM SERPL-MCNC: 8.6 MG/DL (ref 8.3–10.1)
CHLORIDE SERPL-SCNC: 107 MMOL/L (ref 96–108)
CO2 SERPL-SCNC: 26 MMOL/L (ref 21–32)
CREAT SERPL-MCNC: 0.4 MG/DL (ref 0.6–1.3)
ERYTHROCYTE [DISTWIDTH] IN BLOOD BY AUTOMATED COUNT: 15.6 % (ref 11.6–15.1)
GFR SERPL CREATININE-BSD FRML MDRD: 111 ML/MIN/1.73SQ M
GLUCOSE SERPL-MCNC: 133 MG/DL (ref 65–140)
GLUCOSE SERPL-MCNC: 135 MG/DL (ref 65–140)
GLUCOSE SERPL-MCNC: 151 MG/DL (ref 65–140)
GLUCOSE SERPL-MCNC: 165 MG/DL (ref 65–140)
GLUCOSE SERPL-MCNC: 172 MG/DL (ref 65–140)
GRAM STN SPEC: ABNORMAL
HCT VFR BLD AUTO: 25.6 % (ref 34.8–46.1)
HGB BLD-MCNC: 7.9 G/DL (ref 11.5–15.4)
MCH RBC QN AUTO: 28.5 PG (ref 26.8–34.3)
MCHC RBC AUTO-ENTMCNC: 30.9 G/DL (ref 31.4–37.4)
MCV RBC AUTO: 92 FL (ref 82–98)
MRSA NOSE QL CULT: NORMAL
PLATELET # BLD AUTO: 125 THOUSANDS/UL (ref 149–390)
PMV BLD AUTO: 12 FL (ref 8.9–12.7)
POTASSIUM SERPL-SCNC: 3 MMOL/L (ref 3.5–5.3)
RBC # BLD AUTO: 2.77 MILLION/UL (ref 3.81–5.12)
SODIUM SERPL-SCNC: 138 MMOL/L (ref 135–147)
STREPTOCOCCUS DNA BLD POS NAA+NON-PROBE: DETECTED
WBC # BLD AUTO: 10.2 THOUSAND/UL (ref 4.31–10.16)

## 2022-11-07 RX ORDER — AMOXICILLIN 250 MG
2 CAPSULE ORAL
Status: DISCONTINUED | OUTPATIENT
Start: 2022-11-07 | End: 2022-11-11 | Stop reason: HOSPADM

## 2022-11-07 RX ORDER — POLYETHYLENE GLYCOL 3350 17 G/17G
17 POWDER, FOR SOLUTION ORAL DAILY PRN
Status: DISCONTINUED | OUTPATIENT
Start: 2022-11-07 | End: 2022-11-11 | Stop reason: HOSPADM

## 2022-11-07 RX ORDER — POTASSIUM CHLORIDE 29.8 MG/ML
40 INJECTION INTRAVENOUS EVERY 4 HOURS
Status: COMPLETED | OUTPATIENT
Start: 2022-11-07 | End: 2022-11-07

## 2022-11-07 RX ADMIN — PANTOPRAZOLE SODIUM 40 MG: 40 TABLET, DELAYED RELEASE ORAL at 05:32

## 2022-11-07 RX ADMIN — INSULIN LISPRO 2 UNITS: 100 INJECTION, SOLUTION INTRAVENOUS; SUBCUTANEOUS at 16:38

## 2022-11-07 RX ADMIN — POLYETHYLENE GLYCOL 3350 17 G: 17 POWDER, FOR SOLUTION ORAL at 16:45

## 2022-11-07 RX ADMIN — APIXABAN 10 MG: 5 TABLET, FILM COATED ORAL at 17:15

## 2022-11-07 RX ADMIN — PAROXETINE 37.5 MG: 37.5 TABLET, FILM COATED, EXTENDED RELEASE ORAL at 08:59

## 2022-11-07 RX ADMIN — METOPROLOL SUCCINATE 25 MG: 25 TABLET, EXTENDED RELEASE ORAL at 17:16

## 2022-11-07 RX ADMIN — GABAPENTIN 100 MG: 100 CAPSULE ORAL at 08:58

## 2022-11-07 RX ADMIN — ACETAMINOPHEN 975 MG: 325 TABLET ORAL at 15:04

## 2022-11-07 RX ADMIN — CEFTRIAXONE 2000 MG: 2 INJECTION, POWDER, FOR SOLUTION INTRAMUSCULAR; INTRAVENOUS at 19:31

## 2022-11-07 RX ADMIN — INSULIN LISPRO 2 UNITS: 100 INJECTION, SOLUTION INTRAVENOUS; SUBCUTANEOUS at 11:18

## 2022-11-07 RX ADMIN — GABAPENTIN 100 MG: 100 CAPSULE ORAL at 21:02

## 2022-11-07 RX ADMIN — APIXABAN 10 MG: 5 TABLET, FILM COATED ORAL at 08:58

## 2022-11-07 RX ADMIN — SOTALOL HYDROCHLORIDE 120 MG: 120 TABLET ORAL at 21:02

## 2022-11-07 RX ADMIN — SENNOSIDES AND DOCUSATE SODIUM 2 TABLET: 8.6; 5 TABLET ORAL at 21:02

## 2022-11-07 RX ADMIN — GABAPENTIN 100 MG: 100 CAPSULE ORAL at 15:04

## 2022-11-07 RX ADMIN — OXYCODONE HYDROCHLORIDE 5 MG: 5 TABLET ORAL at 21:06

## 2022-11-07 RX ADMIN — POTASSIUM CHLORIDE 40 MEQ: 29.8 INJECTION, SOLUTION INTRAVENOUS at 12:23

## 2022-11-07 RX ADMIN — METOPROLOL SUCCINATE 25 MG: 25 TABLET, EXTENDED RELEASE ORAL at 08:58

## 2022-11-07 RX ADMIN — OXYCODONE HYDROCHLORIDE 5 MG: 5 TABLET ORAL at 10:08

## 2022-11-07 RX ADMIN — ACETAMINOPHEN 975 MG: 325 TABLET ORAL at 05:32

## 2022-11-07 RX ADMIN — POTASSIUM CHLORIDE 40 MEQ: 29.8 INJECTION, SOLUTION INTRAVENOUS at 07:17

## 2022-11-07 RX ADMIN — SOTALOL HYDROCHLORIDE 120 MG: 120 TABLET ORAL at 08:59

## 2022-11-07 RX ADMIN — HEPARIN SODIUM 17 UNITS/KG/HR: 10000 INJECTION, SOLUTION INTRAVENOUS at 01:21

## 2022-11-07 RX ADMIN — ACETAMINOPHEN 975 MG: 325 TABLET ORAL at 21:02

## 2022-11-07 NOTE — CONSULTS
Consultation - Infectious Disease   Josue Conrad 57 y o  female MRN: [id number]  Unit/Bed#: [facility] 511-01  Encounter: [id number]        IMPRESSION & RECOMMENDATIONS:   · Strep Mitis Oralis positive blood culture  -Met sepsis criteria with hypotension, fever, leukocytosis, hypoxia although with concurrent PE discovery on 11/4  Also elevated procal on 11/5 with Abx started  1/2 blood cx from 11/4 growing strep mitis oralis  Started on cef/vanc, since de-escalated to Rocephin  CT chest/abd/pelvis on 11/4 without findings to suggest acute infectious etiology   -cannot rule-out true bacteremia, especially given intubation and upper GI surgery  Will therefore treat as a true bacteremia    -continue Rocephin 2q q24h for 2 week course of IV abx   Abx #3/14  -Echo from 11/4 without valvular vegetations  Would not repeat echo unless repeat blood cultures are positive  -MRSA nares pending, already off of vanco  -f/u repeat blood cx  -trend CBC and fever curve    · Pancreatic adenocarcinoma s/p Whipple with vascular reconstruction  POD7  Management per primary  NIGEL draining serosanguinous fluid, unchanged    · Pulmonary embolism  11/4, with hypotension, hypoxia, fever  No dysfunction on echo  Cont eliquis     · Morbid obesity with MARTIN  · afib- eliquis, sotalol, metoprolol    Antibiotics:  Total days of post-op abx: #3  Rocephin #2  S/p vanco #1  S/p cefepime #2    Pre-op: Ancef and flagyl    Cultures:   Blood cx 11/4: 1/2 strep mitis oralis  Repeat cx drawn 11/7     HISTORY OF PRESENT ILLNESS:  Reason for Consult: strep mitis oralis positive blood culture    HPI: 56 yo F with T2 N0 M0 adenocarcinoma of head of pancreas s/p neoadjuvant chemo with FOLFIRINOX #7, 2 mm lung nodule, morbid obesity, T2DM on insulin, MARTIN, afib, presented 10/31 for Whipple with vascular reconstruction of SMV and portal vein, ex lap, adhesion lysis, and colectomy  Intra-op frozen section pancreatic and bile duct margins were negative for malignancy  Intra-operatively there were issues with bleeding given friable tissues and proximity of the tumor to portal vein and SMV with EBL 2L  She received intraoperative Ancef and Flagyl  After the case she remained intubated in the ICU until 11/2  She was off of pressors and antibiotics the entire post-op duration  Post-op course was complicated by bile leak, but otherwise mostly unremarkable and she was transferred to the medical floors  On 11/4 she developed acute SOB and hypotension, found to have multi-lob segmental and subsegmental PE and was started on a heparin gtt  Echo without RV strain  Patient was afebrile with WBC 7 22, plt 64, temp 99 2  Blood cultures drawn 11/4  Now growing 1/2 strep mitis oralis  Per notes, on 11/4 she was also complaining of right sided abdominal pain with more tenderness as well as nausea  Incision was C/D/I, NIGEL drainage unchanged serosanguinous, and no rebound or guarding on exam     On 11/5, Tmax 102 8F, WBC 17k, procal 0 63  Patient started on cefepime and vanc  On 11/6 Vanc was d/c, and patient de-escalated to Rocephin after receiving dose of cefepime for the day  11/4 11/5 11/6 11/7  WBC:  8 66 -->    17  -->   14    -->  10  Temp:  99 2--> 102 7 -->  99 6  --> 98  Procal:               0 63  -->  0 61      ID is being consulted for antibiotic guidance in setting of 1/2 gram positive blood cx           REVIEW OF SYSTEMS   Review of Systems   Constitutional: Positive for fatigue  Negative for chills and fever  Appetite improving     HENT: Negative for congestion, dental problem and mouth sores  Respiratory: Positive for cough (sputum occasionally blood tinged, otherwise unchanged)  Negative for shortness of breath and wheezing  Cardiovascular: Negative for chest pain, palpitations and leg swelling  Gastrointestinal: Positive for abdominal pain (post-op, improving) and constipation  Negative for diarrhea, nausea and vomiting  Passing gas     Genitourinary: Negative for difficulty urinating, dysuria and hematuria  OBJECTIVE     Vitals:    22 2143 22 0310 22 0600 22 0857   BP: 136/77 104/64  140/78   BP Location:       Pulse: 86      Resp: 17 17  16   Temp: 98 °F (36 7 °C)   98 2 °F (36 8 °C)   TempSrc: Oral      SpO2: 99%      Weight:   136 kg (299 lb 14 4 oz)    Height:          Temperature:   Temp (24hrs), Av 3 °F (36 8 °C), Min:98 °F (36 7 °C), Max:98 9 °F (37 2 °C)    Temperature: 98 2 °F (36 8 °C)  Intake & Output:  I/O           P  O  438 300 240    I V  (mL/kg) 562 5 (4) 247 8 (1 8)     IV Piggyback 550      Total Intake(mL/kg) 1550 5 (11 1) 547 8 (4) 240 (1 8)    Urine (mL/kg/hr) 1200 (0 4) 1750 (0 5) 300 (1)    Drains 570 570 70    Total Output 1770 2320 370    Net -219 5 -1772 2 -130               Weights:        Body mass index is 51 48 kg/m²  Weight (last 2 days)     Date/Time Weight    22 0600 136 (299 9)    22 0512 140 (307 7)    22 0548 140 (308 64)        Physical Exam  Constitutional:       General: She is not in acute distress  Appearance: She is obese  She is not toxic-appearing or diaphoretic  HENT:      Mouth/Throat:      Mouth: Mucous membranes are moist       Comments: No obvious dental carries  Gums light pink without gingivitis  No oral lesions  Eyes:      Pupils: Pupils are equal, round, and reactive to light  Cardiovascular:      Rate and Rhythm: Normal rate and regular rhythm  Heart sounds: No murmur heard  Pulmonary:      Effort: Pulmonary effort is normal  No respiratory distress  Breath sounds: No wheezing or rales  Abdominal:      General: There is no distension  Palpations: Abdomen is soft  Tenderness: There is no abdominal tenderness  There is no guarding  Musculoskeletal:      Right lower leg: No edema  Left lower leg: No edema     Skin: General: Skin is warm  Neurological:      Mental Status: She is alert and oriented to person, place, and time  Psychiatric:         Mood and Affect: Mood normal          Behavior: Behavior normal          Thought Content:  Thought content normal          Judgment: Judgment normal        PAST MEDICAL HISTORY     Past Medical History:   Diagnosis Date   • Abnormal liver function test     last assessed 1/16/17    • Adenomatosis    • 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    last assessed 4/4/13   • Arthritis    • Atrial flutter [facility] )    • Benign essential hypertension     last assessed 12/21/17    • Body mass index (BMI) of 50-59 9 in [facility])    • Cancer (HCC)     pancreas   • Colon polyp    • Congenital pes planus     last assessed 7/15/14    • COVID     4/30/21   • CPAP (continuous positive airway pressure) dependence    • Dental crowns present    • Depression    • Diabetes mellitus ([facility] )    • Dizziness     occas--pt reports did see family doctor   • GERD (gastroesophageal reflux disease)    • Hemangioma of skin 08/26/2003    last assessed 8/26/03   • History of cancer chemotherapy     [facility] Oncologist Dr Kiara Valera   • History of gastroesophageal reflux (GERD)    • Hypercholesterolemia    • Hyperlipidemia    • Hypertension    • Irregular heart beat    • Kidney stone    • 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    last assessed 12/31/14    • Osteoarthritis of both knees     last assessed 12/31/14    • Paroxysmal atrial fibrillation ([facility] )    • Port-A-Cath in place    • S/P ablation of atrial flutter    • Shortness of breath     per pt "from chemo-not drastic--still working and goes up steps"   • Sleep apnea    • Wears glasses      PAST SURGICAL HISTORY     Past Surgical History:   Procedure Laterality Date   • ABDOMINAL ADHESION SURGERY N/A 10/31/2022    Procedure: LYSIS ADHESIONS, colectomy, vascular pedicle flap;  Surgeon: Roger Mckeon MD;  [facility];  Service: Surgical Oncology   • ABDOMINAL SURGERY  06/2006    20cm GIST removed    • ABLATION SOFT TISSUE N/A 10/31/2022    Procedure: SOFT TISSUE ABLATION;  Surgeon: Pablo Banks MD;  [facility];  Service: Surgical Oncology   • APPENDECTOMY     • ATRIAL ABLATION SURGERY     • CARPAL TUNNEL RELEASE Bilateral    • COLONOSCOPY     • FL GUIDED CENTRAL VENOUS ACCESS DEVICE INSERTION  05/31/2022   • FL RETROGRADE PYELOGRAM  07/18/2022   • FL RETROGRADE PYELOGRAM  8/22/2022   • HYSTERECTOMY      fibroids   • IR PORT CHECK  06/23/2022   • IR PORT REMOVAL AND PLACEMENT NEW SITE  06/28/2022   • JOINT REPLACEMENT Bilateral     knees   • KIDNEY STONE SURGERY Right 09/17/2021    placed stent in  for kidney stone   • KNEE SURGERY     • KNEE SURGERY Left 03/2019   • LAPAROTOMY N/A 10/31/2022    Procedure: EXPLORATORY LAPAROTOMY;  Surgeon: Pablo Banks MD;  [facility];  Service: Surgical Oncology   • OOPHORECTOMY      cyst   • NE CYSTO/URETERO W/LITHOTRIPSY &INDWELL STENT INSRT Left 8/22/2022    Procedure: CYSTOSCOPY URETEROSCOPY WITH LITHOTRIPSY HOLMIUM LASER, RETROGRADE PYELOGRAM AND INSERTION STENT URETERAL;  Surgeon: Juanita Pagan MD;  [facility];  Service: Urology   • NE CYSTOSCOPY,INSERT URETERAL STENT Left 8/22/2022    Procedure: EXCHANGE STENT URETERAL;  Surgeon: Juanita Pagan MD;  [facility];  Service: Urology   • NE CYSTOURETHROSCOPY,URETER CATHETER Left 07/18/2022    Procedure: CYSTOSCOPY RETROGRADE PYELOGRAM WITH INSERTION STENT URETERAL;  Surgeon: Juanita Pagan MD;  [facility];  Service: Urology   • TONSILLECTOMY     • TUBAL LIGATION     • TUMOR EXCISION  2006    gastrointestinal stromal tumor   • TUNNELED VENOUS PORT PLACEMENT N/A 05/31/2022    Procedure: INSERTION VENOUS PORT (PORT-A-CATH);   Surgeon: Pablo Banks MD;  [facility];  Service: Surgical Oncology   • UPPER GASTROINTESTINAL ENDOSCOPY     • WHIPPLE PROCEDURE/PANCREATICO-DUODENECTOMY N/A 10/31/2022    Procedure: WHIPPLE PROCEDURE/PANCREATICO-DUODENECTOMY, IOUS;  Surgeon: Noé Reeves MD;  [facility];  Service: Surgical Oncology     SOCIAL & FAMILY HISTORY     Social History     Substance and Sexual Activity   Alcohol Use Not Currently    Comment: social drinker per allscript      Social History     Substance and Sexual Activity   Drug Use Never     Social History     Tobacco Use   Smoking Status Former Smoker   • Years: 9 00   • Types: Cigarettes   Smokeless Tobacco Never Used     Family History   Problem Relation Age of Onset   • Emphysema Mother    • Arthritis Mother    • Diabetes Mother    • Hypertension Mother    • COPD Mother    • Arthritis Father    • Prostate cancer Father    • Cerebral aneurysm Son    • No Known Problems Maternal Grandmother    • No Known Problems Maternal Grandfather    • No Known Problems Paternal Grandmother    • No Known Problems Paternal Grandfather    • No Known Problems Son    • No Known Problems Maternal Aunt    • No Known Problems Maternal Aunt    • No Known Problems Paternal Aunt    • No Known Problems Paternal Aunt      LABORATORY DATA     Labs: I have personally reviewed pertinent reports  Results from last 7 days   Lab Units 11/07/22 0443 11/06/22  0512 11/05/22  0407 11/04/22  0626 11/03/22  0000 11/02/22 0441 11/01/22  0507   WBC Thousand/uL 10 20* 14 12* 17 05*   < > 7 22   < > 9 43   HEMOGLOBIN g/dL 7 9* 8 4* 8 3*   < > 8 4*   < > 10 4*   HEMATOCRIT % 25 6* 27 1* 26 2*   < > 26 5*   < > 32 2*   PLATELETS Thousands/uL 125* 99* 93*   < > 64*   < > 90*   NEUTROS PCT %  --   --   --   --  75  --  78*   MONOS PCT %  --   --   --   --  7  --  10    < > = values in this interval not displayed        Results from last 7 days   Lab Units 11/07/22  0443 11/06/22  0512 11/05/22  0407 11/04/22  0535 11/03/22  0459 11/02/22  0441 11/01/22  0507 10/31/22  1855 10/31/22  1659 10/31/22  1546 10/31/22  1458   POTASSIUM mmol/L 3 0* 3 5 3 6   < > 3 5 3 7 3 9   < >  --   --   --    CHLORIDE mmol/L 107 105 105   < > 111* 110* 110*   < >  --   --   --    CO2 mmol/L 26 23 26   < > 28 27 23   < >  --   --   --    CO2, I-STAT mmol/L  --   --   --   --   --   --   --   --  20* 21 20*   BUN mg/dL 9 9 8   < > 7 9 9   < >  --   --   --    CREATININE mg/dL 0 40* 0 42* 0 51*   < > 0 45* 0 53* 0 66   < >  --   --   --    CALCIUM mg/dL 8 6 8 6 8 6   < > 8 3 8 1* 8 2*   < >  --   --   --    ALK PHOS U/L  --   --   --   --  87 91 96   < >  --   --   --    ALT U/L  --   --   --   --  335* 516* 525*   < >  --   --   --    AST U/L  --   --   --   --  165* 423* 685*   < >  --   --   --    GLUCOSE, ISTAT mg/dl  --   --   --   --   --   --   --   --  151* 156* 163*    < > = values in this interval not displayed  Results from last 7 days   Lab Units 11/03/22  0459 11/02/22 0441 11/01/22  0507   MAGNESIUM mg/dL 2 1 2 3 2 3     Results from last 7 days   Lab Units 11/03/22  0459 11/02/22  0441 11/01/22  0507   PHOSPHORUS mg/dL 1 8* 1 7* 2 9      Results from last 7 days   Lab Units 11/07/22  0443 11/06/22  0800 11/06/22  0106 11/05/22  1433 11/05/22  0357 11/04/22  1701 10/31/22  1540   INR   --   --   --   --  1 48*  --  1 25*   PTT seconds 60* 62* 74*   < > 124*   < >  --     < > = values in this interval not displayed  Results from last 7 days   Lab Units 11/05/22  0723   LACTIC ACID mmol/L 1 7         Micro:  Lab Results   Component Value Date    BLOODCX Streptococcus mitis oralis group (A) 11/04/2022    BLOODCX No Growth at 48 hrs  11/04/2022    BLOODCX No Growth After 5 Days  08/02/2022    BLOODCX No Growth After 5 Days  08/02/2022    URINECX No Growth <1000 cfu/mL 08/12/2022    URINECX 10,000-19,000 cfu/ml  08/01/2022    URINECX 20,000-29,000 cfu/ml  06/26/2022     IMAGING & DIAGNOSTIC TESTS     Imaging: I have personally reviewed pertinent reports  US kidney and bladder with pvr    Result Date: 10/27/2022  Impression: Bilateral nonobstructing renal calculi  Splenomegaly  Workstation performed: [id number]     EKG, Pathology, and Other Studies: I have personally reviewed pertinent reports  ALLERGIES     Allergies   Allergen Reactions   • Other Rash     Adhesive tape      MEDICATIONS PRIOR TO ARRIVAL     Prior to Admission medications    Medication Sig Start Date End Date Taking? Authorizing Provider   apixaban (Eliquis) 5 mg Take 2 tablets (10 mg total) by mouth 2 (two) times a day for 7 days, THEN 1 tablet (5 mg total) 2 (two) times a day for 23 days  11/6/22 12/6/22 Yes Francisco Corado MD   aspirin 81 MG tablet Take 81 mg by mouth daily  Patient not taking: Reported on 10/27/2022   Yes Historical Provider, MD   atorvastatin (LIPITOR) 40 mg tablet Take 1 tablet (40 mg total) by mouth daily at bedtime 9/19/22  Yes Alli Guerin DO   ibuprofen (ADVIL,MOTRIN) 100 MG tablet Take 200 mg by mouth as needed for mild pain   Yes Historical Provider, MD   Magnesium Oxide -Mg Supplement 400 MG CAPS Take 1 capsule by mouth daily 5/25/16  Yes Historical Provider, MD   metoprolol succinate (TOPROL-XL) 25 mg 24 hr tablet TAKE 1 TABLET BY MOUTH  TWICE DAILY 10/20/22  Yes Alli Guerin DO   multivitamin (THERAGRAN) TABS Take 1 tablet by mouth daily     Yes Historical Provider, MD   olmesartan (BENICAR) 20 mg tablet TAKE 1 TABLET BY MOUTH  DAILY 5/5/22  Yes Alli Guerin DO   Omega-3 Fatty Acids (FISH OIL) 1,000 mg Take 1,000 mg by mouth 2 (two) times a day     Yes Historical Provider, MD   Ozempic, 1 MG/DOSE, 4 MG/3ML SOPN injection pen INJECT 1MG SUBCUTANEOUSLY  WEEKLY  Patient taking differently: Inject under the skin once a week mondays 10/3/22  Yes Suly Cope MD   pantoprazole (PROTONIX) 40 mg tablet TAKE 1 TABLET BY MOUTH  DAILY BEFORE BREAKFAST 10/21/22  Yes Suly Cope MD   PARoxetine (PAXIL-CR) 37 5 mg 24 hr tablet TAKE 1 TABLET BY MOUTH IN  THE MORNING 5/5/22  Yes Suly Cope MD   Potassium Citrate ER 15 MEQ (1620 MG) TBCR TAKE 1 TABLET BY MOUTH  TWICE DAILY 9/19/22 Yes Troy Burnham PA-C   senna (SENOKOT) 8 6 mg Take 1 tablet (8 6 mg total) by mouth daily at bedtime for 5 days  Patient taking differently: Take 8 6 mg by mouth daily at bedtime as needed 8/22/22 10/27/22 Yes Blessing Heller MD   sotalol (BETAPACE) 120 mg tablet Take 1 tablet (120 mg total) by mouth every 12 (twelve) hours 12/15/21  Yes Alli Guerin DO   VITAMIN D PO Take 2,000 Units by mouth 2 (two) times a day   Yes Historical Provider, MD   glucose blood (Contour Next Test) test strip Use 1 each daily Use as instructed 3/17/22   Evelia Patel MD   Insulin Pen Needle (Pen Needles) 32G X 4 MM MISC Use once a week 9/3/21   Evelia Patel MD   ondansetron (ZOFRAN) 4 mg tablet Take 2 tablets (8 mg total) by mouth every 8 (eight) hours as needed for nausea or vomiting 6/1/22   Michael Willis MD     MEDICATIONS ADMINISTERED IN LAST 24 HOURS     Medication Administration - last 24 hours from 11/06/2022 0909 to 11/07/2022 5815       Date/Time Order Dose Route Action Action by     11/07/2022 0532 pantoprazole (PROTONIX) EC tablet 40 mg 40 mg Oral Given Jp Maldonado     11/07/2022 0859 PARoxetine (PAXIL-CR) 24 hr tablet 37 5 mg 37 5 mg Oral Given Jp Maldonado     11/07/2022 0858 metoprolol succinate (TOPROL-XL) 24 hr tablet 25 mg 25 mg Oral Given Jp Maldonado     11/06/2022 1701 metoprolol succinate (TOPROL-XL) 24 hr tablet 25 mg 25 mg Oral Given Tre Newberry RN     11/07/2022 0859 sotalol (BETAPACE) tablet 120 mg 120 mg Oral Given Jp Maldonado     11/06/2022 2142 sotalol (BETAPACE) tablet 120 mg 120 mg Oral Given Tre Newberry RN     11/07/2022 0642 insulin lispro (HumaLOG) 100 units/mL subcutaneous injection 2-12 Units 0 Units Subcutaneous Hold Jp Maldonado     11/06/2022 1608 insulin lispro (HumaLOG) 100 units/mL subcutaneous injection 2-12 Units 2 Units Subcutaneous Not Given Byron Us RN     11/06/2022 1138 insulin lispro (HumaLOG) 100 units/mL subcutaneous injection 2-12 Units 2 Units Subcutaneous Given Lianet Hoover RN     11/07/2022 0532 acetaminophen (TYLENOL) tablet 975 mg 975 mg Oral Given Shilo Anderson     11/06/2022 2142 acetaminophen (TYLENOL) tablet 975 mg 975 mg Oral Given Tre Newberry RN     11/06/2022 1416 acetaminophen (TYLENOL) tablet 975 mg 975 mg Oral Given Lianet Hoover RN     11/06/2022 1711 oxyCODONE (ROXICODONE) IR tablet 5 mg 5 mg Oral Not Given MONIKA Bustamante     11/06/2022 1106 oxyCODONE (ROXICODONE) IR tablet 5 mg 5 mg Oral Given Lianet Hoover RN     11/07/2022 0858 gabapentin (NEURONTIN) capsule 100 mg 100 mg Oral Given Shilo Anderson     11/06/2022 2142 gabapentin (NEURONTIN) capsule 100 mg 100 mg Oral Given Tre Newbrery RN     11/06/2022 1659 gabapentin (NEURONTIN) capsule 100 mg 100 mg Oral Given Tre Newberry RN     11/06/2022 2017 heparin (porcine) 25,000 units in 0 45% NaCl 250 mL infusion (premix) 0 Units/kg/hr Intravenous Stopped Tre Newberry RN     11/06/2022 1441 heparin (porcine) 25,000 units in 0 45% NaCl 250 mL infusion (premix) 17 Units/kg/hr Intravenous [address] Lianet Hoover [facility]     11/06/2022 1439 heparin (porcine) 25,000 units in 0 45% NaCl 250 mL infusion (premix) 0 Units/kg/hr Intravenous Stopped Lianet Hoover RN     11/06/2022 1508 cefepime (MAXIPIME) 2,000 mg in dextrose 5 % 50 mL IVPB 0 mg Intravenous Stopped Tre Newberry RN     11/06/2022 0935 vancomycin (VANCOCIN) 1,750 mg in sodium chloride 0 9 % 500 mL IVPB 1,750 mg Intravenous Not Given Lianet Hoover RN     11/06/2022 2039 cefTRIAXone (ROCEPHIN) 2,000 mg in dextrose 5 % 50 mL IVPB 2,000 mg Intravenous New Bag MONIKA Bustamante     11/07/2022 0858 apixaban (ELIQUIS) tablet 10 mg 10 mg Oral Given Shilo Anderson     11/07/2022 0121 heparin (porcine) 25,000 units in 0 45% NaCl 250 mL infusion (premix) 17 Units/kg/hr Intravenous New Bag Tre Newberry RN     11/06/2022 2017 heparin (porcine) 25,000 units in 0 45% NaCl 250 mL infusion (premix) 17 Units/kg/hr Intravenous Restarted MONIKA Bustamante     11/07/2022 0717 potassium chloride 40 mEq IVPB (premix) 40 mEq Intravenous New Bag Sylvie Route        CURRENT MEDICATIONS     Current Facility-Administered Medications   Medication Dose Route Frequency Provider Last Rate   • acetaminophen  975 mg Oral Q8H [address] Nancy Jama PA-C     • apixaban  10 mg Oral BID Olivia Porras MD     • cefTRIAXone  2,000 mg Intravenous Q24H Riana Hardy DO 2,000 mg (11/06/22 2039)   • gabapentin  100 mg Oral TID CHINO Terry     • HYDROmorphone  0 5 mg Intravenous Q3H PRN Olivia Porras MD     • insulin lispro  2-12 Units Subcutaneous TID With Meals Nancy Jama PA-C     • iohexol  50 mL Oral 90 min pre-procedure Jordon Nettles PA-C     • metoprolol  2 5 mg Intravenous Q6H PRN Olivia Porras MD     • metoprolol succinate  25 mg Oral BID Olivia Porras MD     • oxyCODONE  10 mg Oral Q4H PRN CHINO Terry     • oxyCODONE  5 mg Oral Q4H PRN CHINO Terry     • pantoprazole  40 mg Oral Early Morning Olivia Porras MD     • PARoxetine  37 5 mg Oral Daily Olivia Porras MD     • potassium chloride  40 mEq Intravenous Q4H David Gilmore MD 40 mEq (11/07/22 0717)   • sotalol  120 mg Oral Q12H [address] Olivia Porras MD          HYDROmorphone, 0 5 mg, Q3H PRN  metoprolol, 2 5 mg, Q6H PRN  oxyCODONE, 10 mg, Q4H PRN  oxyCODONE, 5 mg, Q4H PRN        Portions of the record may have been created with voice recognition software  Occasional wrong word or "sound a like" substitutions may have occurred due to the inherent limitations of voice recognition software    Read the chart carefully and recognize, using context, where substitutions have occurred     ==  Brooklyn Pena, [address] [place] [address],  [address]  Internal Medicine Residency

## 2022-11-07 NOTE — NUTRITION
11/07/22 1434   Recommendations/Interventions   Interventions/Recommendations Supplement adjust   Intervention Comments Decrease Glucerna to once daily and updated flavor preference  Trial orange Gelatein once daily  Recommendations to Provider Pt reporting no BM during admission so far  States that senokot was effective for her at home for constipation  Consider bowel regimen

## 2022-11-07 NOTE — RESTORATIVE TECHNICIAN NOTE
Restorative Technician Note      Patient Name: rBaulio Mcdowell     Note Type: Mobility  Patient Position Upon Consult: Bedside chair  Activity Performed: Ambulated  Assistive Device: Roller walker  Patient Position at End of Consult: Bedside chair;  All needs within reach

## 2022-11-07 NOTE — PROGRESS NOTES
[address]  coding opportunities          Chart Reviewed number of suggestions sent to Provider: 1  [id number]     Patients Insurance        Commercial Insurance: Santos Daley

## 2022-11-07 NOTE — CASE MANAGEMENT
Case Management Discharge Planning Note    Patient name Rene Vasquez  [facility]N [id number]  : 1960 Date 2022       Current Admission Date: 10/31/2022  Current Admission Diagnosis:Adenocarcinoma of head of pancreas [facility])   Patient Active Problem List    Diagnosis Date Noted   • Pulmonary embolism ([facility] ) 2022   • Sepsis ([facility] ) 2022   • Paroxysmal A-fib ([facility] ) 2022   • Left flank pain 2022   • CPAP (continuous positive airway pressure) dependence    • Chemotherapy induced neutropenia ([place] [address] ) 2022   • Adenocarcinoma of head of pancreas ([place] [address] ) 2022   • Uric acid kidney stone 2022   • Nephrolithiasis 10/25/2021   • Class 3 severe obesity due to excess calories with body mass index (BMI) of 50 0 to 59 9 in adult ([facility] ) 2020   • Type 2 diabetes mellitus without complication, without long-term current use of insulin ([place] [address] ) 2017   • Severe obstructive sleep apnea 2016   • Dyspnea on exertion 2016   • Supraventricular tachycardia ([facility] ) 2016   • Hypertension 2016   • Controlled depression 2016   • Adenomatous colon polyp 2015   • 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[facility] ) 2013   • Morbid obesity ([place] [address] ) 2013   • Hyperlipidemia 2013   • Benign essential hypertension 10/02/2012   • Esophageal reflux 2012      LOS (days): 7  Geometric Mean LOS (GMLOS) (days):   Days to GMLOS:     OBJECTIVE:  Risk of Unplanned Readmission Score: 16 82         Current admission status: Inpatient   Preferred Pharmacy:   [address] [place] [address] #[id number] Zulma Parnell, [phone]  Phone: [phone] Fax: [phone]    Primary Care Provider: Tiffanie Claudio MD    Primary Insurance: BLUE CROSS  Secondary Insurance:     DISCHARGE DETAILS:      Other Referral/Resources/Interventions Provided:  Interventions: Short Term Rehab  Referral Comments: Made [place] snf referrals    Treatment Team Recommendation: Short Term Rehab  Discharge Destination Plan[de-identified] Short Term Rehab     Additional Comments: Discussed patient with Farheen Vargas  Patient will need two weeks IV Abx and spouse cannot manage this at home  Suergery questions if patient will need PICC for snf admission  She has port for past and futre chemo but needs no cancer treatment until after rehab  Met  with patient and her spouse to discussdischarge plans  Patient's preference is [facility]  Explained blanket referrals which were then made in [address] [place] [address]

## 2022-11-07 NOTE — PHYSICAL THERAPY NOTE
Physical Therapy Treatment Note    Patient's Name: Chelsea Lester  : 22 0927   PT Last Visit   PT Visit Date 22   Note Type   Note Type Treatment   Pain Assessment   Pain Assessment Tool 0-10   Pain Score 5   Pain Location/Orientation Location: Abdomen   Hospital Pain Intervention(s) Repositioned; Ambulation/increased activity; Emotional support   Restrictions/Precautions   Weight Bearing Precautions Per Order No   Other Precautions Multiple lines;Telemetry;O2;Fall Risk;Pain  (O2 2L/min)   General   Chart Reviewed Yes   Response to Previous Treatment Patient with no complaints from previous session  Family/Caregiver Present Yes   Subjective   Subjective Pt agreeable to mobilize  Bed Mobility   Supine to Sit Unable to assess   Sit to Supine Unable to assess   Additional Comments Pt greeted in chair  Transfers   Sit to Stand 5  Supervision   Additional items Increased time required;Armrests   Stand to Sit 5  Supervision   Additional items Increased time required;Armrests   Additional Comments RW   Ambulation/Elevation   Gait pattern Decreased R stance;Decreased foot clearance; Improper Weight shift; Excessively slow; Short stride   Gait Assistance 5  Supervision   Additional items Verbal cues   Assistive Device Bariatric Rolling walker   Distance 80'x2   Balance   Static Sitting Fair +   Dynamic Sitting Fair   Static Standing Fair   Dynamic Standing Fair -   Ambulatory Fair -  (RW)   Endurance Deficit   Endurance Deficit Yes   Endurance Deficit Description weakness, fatigue   Activity Tolerance   Activity Tolerance Patient limited by fatigue   Medical Staff Made Aware nsg   Nurse Made Aware yes - present at end of session   Assessment   Prognosis Good   Problem List Decreased strength;Decreased range of motion;Decreased endurance; Impaired balance;Decreased mobility;Pain;Obesity   Assessment Pt seen for PT treatment session w/ focus on t/f training + gait training   Pt reports feeling fatigued, states she was up q 2 hours to go to the bathroom last night, however agreeable to mobilize  Pt S for mobility, min verbal cues for obstacle negotiation and pacing  Pt required ~1 min standing rest in between bouts of ambulation due to fatigue  From a PT standpoint continue to recommend HHPT upon d/c    Barriers to Discharge Inaccessible home environment   Goals   Patient Goals to walk   PT Treatment Day 2   Plan   Treatment/Interventions Functional transfer training;LE strengthening/ROM; Elevations; Therapeutic exercise; Endurance training;Patient/family training;Equipment eval/education; Bed mobility;Gait training; Compensatory technique education;Spoke to nursing;Family  (balance training)   Progress Progressing toward goals   PT Frequency 3-5x/wk   Recommendation   PT Discharge Recommendation Home with home health rehabilitation   [address] Recommended HD Bariatric wheeled walker   Madeline 8 in Bed Without Bedrails 3   Lying on Back to Sitting on Edge of Flat Bed 3   Moving Bed to Chair 4   Standing Up From Chair 4   Walk in Room 4   Climb 3-5 Stairs 3   Basic Mobility Inpatient Raw Score 21   Basic Mobility Standardized Score 45 55   Highest Level Of Mobility   JH-HLM Goal 6: Walk 10 steps or more   JH-HLM Achieved 7: Walk 25 feet or more   Education   Education Provided Mobility training;Assistive device   Patient Demonstrates acceptance/verbal understanding;Reinforcement needed   End of Consult   Patient Position at End of Consult Bedside chair; All needs within reach  (all lines in tact, spouse + RN at bedside)     Melissa Corea, PT, DPT

## 2022-11-07 NOTE — PROGRESS NOTES
Epidural Follow-up Note - Acute Pain Service    Linda Coleman 57 y o  female MRN: [id number]  Unit/Bed#: [facility] 511-01 Encounter: [id number]      Assessment:   Principal Problem:    Adenocarcinoma of head of pancreas ([facility] )  Active Problems:    Hypertension    Controlled depression    Type 2 diabetes mellitus without complication, without long-term current use of insulin (HCC)    Hyperlipidemia    Esophageal reflux    Benign essential hypertension    CPAP (continuous positive airway pressure) dependence    Paroxysmal A[facility]fib ([facility] )    Pulmonary embolism ([facility] )    Sepsis ([facility] )      Linda Coleman is a 57y o  year old female status post Whipple procedure  Plan:  Analgesia:  - Discontinued Thoracic epidural infusion on 11/4/22     - Last platelet count   Lab Results   Component Value Date     (L) 11/07/2022     · Continue Tylenol 975 mg p o  q 8 hours scheduled  · Continue oxycodone 5 mg p o  q 4 hours PRN moderate pain  · Continue oxycodone 10 mg p o  q 4 hours PRN severe pain  · Suggest discontinue IV Dilaudid  · Continue gabapentin 100 mg p o  t i d  scheduled  Multimodal analgesia:      Bowel Regimen:  - Bowel regimen when appropriate from surgical perspective    APS will sign off at this time  Thank you for the consult  All opioids and other analgesics to be written at discretion of primary team  Please contact Acute Pain Service - [facility] via [employer] from [phone] with additional questions or concerns  See Paula or Nba for additional contacts and after hours information  Plan discussed with primary team    Pain History  Current pain location(s):  Abdomen  Pain Scale:   2 to 4/10  Quality:  Sore  24 hour history:  Patient states that she is very comfortable at rest but has some increase in pain with movement  States that pain is very well controlled on current pain regimen  Has not required any IV opioid      Epidural catheter removed on 11/4/22 prior to initiation of heparin infusion for newly discovered pulmonary emboli  At the time, patient's platelet count less than 100,000  Patient denies any numbness, tingling, weakness, bowel or bladder dysfunction, saddle anesthesia or any other complaints  Neurologic exam intact in the lower extremities  PCEA use:  Opioid requirement previous 24 hours:  Oxycodone 10 mg p o  Meds/Allergies   all current active meds have been reviewed, current meds:   Current Facility-Administered Medications   Medication Dose Route Frequency   • acetaminophen (TYLENOL) tablet 975 mg  975 mg Oral Q8H [facility]   • apixaban (ELIQUIS) tablet 10 mg  10 mg Oral BID   • cefTRIAXone (ROCEPHIN) 2,000 mg in dextrose 5 % 50 mL IVPB  2,000 mg Intravenous Q24H   • gabapentin (NEURONTIN) capsule 100 mg  100 mg Oral TID   • HYDROmorphone (DILAUDID) injection 0 5 mg  0 5 mg Intravenous Q3H PRN   • insulin lispro (HumaLOG) 100 units/mL subcutaneous injection 2-12 Units  2-12 Units Subcutaneous TID With Meals   • iohexol (OMNIPAQUE) 240 MG/ML solution 50 mL  50 mL Oral 90 min pre-procedure   • metoprolol (LOPRESSOR) injection 2 5 mg  2 5 mg Intravenous Q6H PRN   • metoprolol succinate (TOPROL-XL) 24 hr tablet 25 mg  25 mg Oral BID   • oxyCODONE (ROXICODONE) immediate release tablet 10 mg  10 mg Oral Q4H PRN   • oxyCODONE (ROXICODONE) IR tablet 5 mg  5 mg Oral Q4H PRN   • pantoprazole (PROTONIX) EC tablet 40 mg  40 mg Oral Early Morning   • PARoxetine (PAXIL-CR) 24 hr tablet 37 5 mg  37 5 mg Oral Daily   • potassium chloride 40 mEq IVPB (premix)  40 mEq Intravenous Q4H   • sotalol (BETAPACE) tablet 120 mg  120 mg Oral Q12H [facility]    and PTA meds:   Prior to Admission Medications   Prescriptions Last Dose Informant Patient Reported? Taking?    Insulin Pen Needle (Pen Needles) 32G X 4 MM MISC  Self No No   Sig: Use once a week   Magnesium Oxide -Mg Supplement 400 MG CAPS 10/24/2022 Self Yes Yes   Sig: Take 1 capsule by mouth daily   Omega-3 Fatty Acids (FISH OIL) 1,000 mg 10/24/2022 Self Yes Yes Sig: Take 1,000 mg by mouth 2 (two) times a day     Ozempic, 1 MG/DOSE, 4 MG/3ML SOPN injection pen 10/31/2022 at 0615  No Yes   Sig: INJECT 1MG SUBCUTANEOUSLY  WEEKLY   Patient taking differently: Inject under the skin once a week mondays   PARoxetine (PAXIL-CR) 37 5 mg 24 hr tablet 10/31/2022 at 0615 Self No Yes   Sig: TAKE 1 TABLET BY MOUTH IN  THE MORNING   Potassium Citrate ER 15 MEQ (1620 MG) TBCR 10/24/2022 Self No Yes   Sig: TAKE 1 TABLET BY MOUTH  TWICE DAILY   VITAMIN D PO 10/24/2022 Self Yes Yes   Sig: Take 2,000 Units by mouth 2 (two) times a day   aspirin 81 MG tablet  Self Yes Yes   Sig: Take 81 mg by mouth daily  Patient not taking: Reported on 10/27/2022   atorvastatin (LIPITOR) 40 mg tablet 10/30/2022 at Unknown time Self No Yes   Sig: Take 1 tablet (40 mg total) by mouth daily at bedtime   glucose blood (Contour Next Test) test strip  Self No No   Sig: Use 1 each daily Use as instructed   ibuprofen (ADVIL,MOTRIN) 100 MG tablet Past Week at Unknown time Self Yes Yes   Sig: Take 200 mg by mouth as needed for mild pain   metoprolol succinate (TOPROL-XL) 25 mg 24 hr tablet 10/31/2022 at 0615 Self No Yes   Sig: TAKE 1 TABLET BY MOUTH  TWICE DAILY   multivitamin (THERAGRAN) TABS 10/24/2022 Self Yes Yes   Sig: Take 1 tablet by mouth daily     olmesartan (BENICAR) 20 mg tablet 10/29/2022 Self No Yes   Sig: TAKE 1 TABLET BY MOUTH  DAILY   ondansetron (ZOFRAN) 4 mg tablet Unknown at Unknown time Self No No   Sig: Take 2 tablets (8 mg total) by mouth every 8 (eight) hours as needed for nausea or vomiting   pantoprazole (PROTONIX) 40 mg tablet 10/31/2022 at 0615 Self No Yes   Sig: TAKE 1 TABLET BY MOUTH  DAILY BEFORE BREAKFAST   senna (SENOKOT) 8 6 mg  Self No Yes   Sig: Take 1 tablet (8 6 mg total) by mouth daily at bedtime for 5 days   Patient taking differently: Take 8 6 mg by mouth daily at bedtime as needed   sotalol (BETAPACE) 120 mg tablet 10/31/2022 at 0615 Self No Yes   Sig: Take 1 tablet (120 mg total) by mouth every 12 (twelve) hours      Facility-Administered Medications: None       Allergies   Allergen Reactions   • Other Rash     Adhesive tape        Objective     Temp:  [98 °F (36 7 °C)-98 9 °F (37 2 °C)] 98 6 °F (37 °C)  HR:  [80-89] 86  Resp:  [14-18] 18  BP: (104-140)/(64-81) 112/73    Physical Exam  Vitals and nursing note reviewed  Constitutional:       General: She is awake  She is not in acute distress  Appearance: She is morbidly obese  She is not ill-appearing, toxic-appearing or diaphoretic  Eyes:      Conjunctiva/sclera: Conjunctivae normal       Pupils: Pupils are equal, round, and reactive to light  Neck:      Vascular: No JVD  Trachea: No tracheal deviation  Cardiovascular:      Rate and Rhythm: Normal rate and regular rhythm  Pulmonary:      Effort: Pulmonary effort is normal  No respiratory distress  Abdominal:      Tenderness: There is generalized abdominal tenderness  There is no guarding or rebound  Skin:     General: Skin is warm and dry  Capillary Refill: Capillary refill takes less than 2 seconds  Neurological:      General: No focal deficit present  Mental Status: She is alert and oriented to person, place, and time  GCS: GCS eye subscore is 4  GCS verbal subscore is 5  GCS motor subscore is 6  Comments: Strength 5/5 in bilateral lower extremities  No sensory deficits in lower extremities  Psychiatric:         Attention and Perception: Attention normal          Speech: Speech normal          Behavior: Behavior normal  Behavior is cooperative         Epidural: Site clean/dry/intact, no surrounding erythema/edema/induration, infusion functioning appropriately    Lab Results:   Results from last 7 days   Lab Units 11/07/22  0443   WBC Thousand/uL 10 20*   HEMOGLOBIN g/dL 7 9*   HEMATOCRIT % 25 6*   PLATELETS Thousands/uL 125*      Results from last 7 days   Lab Units 11/07/22  0443 11/04/22  0535 11/03/22  0459 10/31/22  1855 10/31/22  1659   POTASSIUM mmol/L 3 0*   < > 3 5   < >  --    CHLORIDE mmol/L 107   < > 111*   < >  --    CO2 mmol/L 26   < > 28   < >  --    CO2, I-STAT mmol/L  --   --   --   --  20*   BUN mg/dL 9   < > 7   < >  --    CREATININE mg/dL 0 40*   < > 0 45*   < >  --    CALCIUM mg/dL 8 6   < > 8 3   < >  --    ALK PHOS U/L  --   --  87   < >  --    ALT U/L  --   --  335*   < >  --    AST U/L  --   --  165*   < >  --    GLUCOSE, ISTAT mg/dl  --   --   --   --  151*    < > = values in this interval not displayed  Results from last 7 days   Lab Units 11/07/22  0443 11/05/22  1433 11/05/22  0357   PTT seconds 60*   < > 124*   INR   --   --  1 48*    < > = values in this interval not displayed  Imaging Studies: I have personally reviewed pertinent reports  EKG, Pathology, and Other Studies: I have personally reviewed pertinent reports  Counseling / Coordination of Care  Total floor / unit time spent today 30 minutes  Greater than 50% of total time was spent with the patient and / or family counseling and / or coordination of care  A description of the counseling / coordination of care:  Patient interview, physical examination, review of medical record, review of imaging and laboratory data, development of pain management plan, discussion of pain management plan with patient and primary service  Please note that the APS provides consultative services regarding pain management only  With the exception of ketamine, peripheral nerve catheters, and epidural infusions (and except when indicated), final decisions regarding starting or changing doses of analgesic medications are at the discretion of the consulting service  Off hours consultation and/or medication management is generally not available      Radha Hall  Acute Pain Service

## 2022-11-07 NOTE — PROGRESS NOTES
Progress Note - Surgical Oncology  Jeanette Remy 57 y o  female MRN: [id number]  Unit/Bed#: [facility] 511-01 Encounter: [id number]      Assessment:  62yo F POD7 s/p pylorus-preserving Whipple procedure, cholecystectomy, vascularized pedicle flap, and repair of an incisional hernia w/ a post-operative course complicated by bile leak and pulmonary embolism  Afebrile, vitals normal on 2L via NC  U O: 1,750cc/24hrs  NIGEL: 480cc/24hrs (serosanguineous)  Passing flatus w/ no bowel movements    Plan:  - Surgical soft diet  - Continue heparin gtt for PE, plan for first dose of Eliquis this morning  - Blood cultures from 10/4 positive in one of two specimens for streptococcus mitis oralis, will discuss repeating cultures today and continue Rocephin  - Continue NIGEL drain: 480cc/24hrs (drain amylase yesterday 40, serum 16)  - PT evaluation yesterday recommended home w/ HHPT  - Multimodal analgesia  - OOB/ambulation as tolerated  - Continue IS use and pulmonary toilet, attempt to wean oxygen (currently on 2L)    Subjective/Objective     Subjective:   No acute events overnight  This morning the patient reported pain adequately controlled on her current analgesic regimen w/ no fevers/chills, nausea/emesis, or dyspnea/chest pain  She has been passing flatus w/ no bowel movements but tolerating her soft diet without issue (ate turkey for dinner), using her incentive spirometer, and ambulating in the hallway w/ a rolling walker  Objective:     Blood pressure 104/64, pulse 86, temperature 98 °F (36 7 °C), temperature source Oral, resp  rate 17, height 5' 4" (1 626 m), weight (!) 140 kg (307 lb 11 2 oz), SpO2 99 %  ,Body mass index is 52 82 kg/m²  Gen: Awake, alert, and in no acute distress  Head: Normocephalic, atraumatic  Neck: No obvious JVD, trachea midline  Cardiovascular: Regular rate  Respiratory:  In no acute respiratory distress w/ no use of accessory muscles of respiration  Abd: Soft, minimally distended, and appropriately TTP surrounding midline laparotomy incision closed w/ staples w/ no visible signs of infection; no guarding/rigidity or signs of peritonitis  Extremities: No visible wounds/ulcerations to the B/L upper/lower extremities  Skin: Warm/dry  Psychiatric: Appropriate mood/affect    Intake/Output Summary (Last 24 hours) at 11/7/2022 0548  Last data filed at 11/7/2022 0310  Gross per 24 hour   Intake 547 79 ml   Output 2230 ml   Net -1682 21 ml       Invasive Devices  Report    Central Venous Catheter Line  Duration           Port A Cath 05/31/22 Left Chest 159 days          Peripheral Intravenous Line  Duration           Peripheral IV 11/04/22 Left Antecubital 2 days    Peripheral IV 11/06/22 Proximal;Right;Ventral (anterior) Forearm <1 day          Drain  Duration           Ureteral Internal Stent Left ureter 6 Fr  111 days    Closed/Suction Drain RUQ Bulb 19 Fr  6 days                I have personally reviewed pertinent lab results    , CBC:   Lab Results   Component Value Date    WBC 10 20 (H) 11/07/2022    HGB 7 9 (L) 11/07/2022    HCT 25 6 (L) 11/07/2022    MCV 92 11/07/2022     (L) 11/07/2022    MCH 28 5 11/07/2022    MCHC 30 9 (L) 11/07/2022    RDW 15 6 (H) 11/07/2022    MPV 12 0 11/07/2022   , CMP: No results found for: SODIUM, K, CL, CO2, ANIONGAP, BUN, CREATININE, GLUCOSE, CALCIUM, AST, ALT, ALKPHOS, PROT, BILITOT, EGFR, Coagulation: No results found for: PT, INR, APTT, Urinalysis: No results found for: COLORU, CLARITYU, SPECGRAV, PHUR, LEUKOCYTESUR, NITRITE, PROTEINUA, GLUCOSEU, KETONESU, BILIRUBINUR, BLOODU, Amylase: No results found for: AMYLASE, Lipase: No results found for: LIPASE

## 2022-11-07 NOTE — PLAN OF CARE
Problem: PHYSICAL THERAPY ADULT  Goal: Performs mobility at highest level of function for planned discharge setting  See evaluation for individualized goals  Description: Treatment/Interventions: Functional transfer training, LE strengthening/ROM, Therapeutic exercise, Endurance training, Patient/family training, Elevations, Equipment eval/education, Bed mobility, Gait training, Spoke to nursing  Equipment Recommended:  (TBD)       See flowsheet documentation for full assessment, interventions and recommendations  Outcome: Progressing  Note: Prognosis: Good  Problem List: Decreased strength, Decreased range of motion, Decreased endurance, Impaired balance, Decreased mobility, Pain, Obesity  Assessment: Pt seen for PT treatment session w/ focus on t/f training + gait training  Pt reports feeling fatigued, states she was up q 2 hours to go to the bathroom last night, however agreeable to mobilize  Pt S for mobility, min verbal cues for obstacle negotiation and pacing  Pt required ~1 min standing rest in between bouts of ambulation due to fatigue  From a PT standpoint continue to recommend HHPT upon d/c   Barriers to Discharge: [address] home environment     PT Discharge Recommendation: Home with home health rehabilitation    See flowsheet documentation for full assessment

## 2022-11-07 NOTE — UTILIZATION REVIEW
Continued Stay Review    Date: 11/7/2022                        Current Patient Class:  Inpatient   Current Level of Care:  Level 2 step down     HPI:61 y o  female initially admitted on 10/31 -   For elective surgery  - WHIPPLE PROCEDURE/PANCREATICO-DUODENECTOMY, IOUS (N/A)  EXPLORATORY LAPAROTOMY (N/A)  SOFT TISSUE ABLATION (N/A)  LYSIS ADHESIONS, colectomy, vascular pedicle flap (N/A)   Primary repair of SMV/Portal vein injury    Assessment/Plan: 11/7/2022 - POD #7, s/p Whipple post op complicated by bile leak and Pulmonary embolism  Diet as tolerated - Transition form heparin to Eliquis  ID consult du to positive blood culture- may need longer - term abx's  Continue NIGEL drain, Multimodal oral anaesthesia - OOB ambulate Aggressive pulmonary toilet       Infectious Disease consult - 11/7 -  Strep Mitis Oralis positive blood culture  11/5 abx started Cef/Vanc plan continue Rocephin for 2 week course MRSA from Nares f/v repeat blood cultures - trend CBC and fever curve    Vital Signs:  Date/Time Temp Pulse Resp BP MAP (mmHg) SpO2 Calculated FIO2 (%) - Nasal Cannula Nasal Cannula O2 Flow Rate (L/min) O2 Device Patient Position - Orthostatic VS   11/07/22 14:21:06 98 2 °F (36 8 °C) -- 18 146/81 103 -- -- -- -- --   11/07/22 11:00:49 98 6 °F (37 °C) -- 18 112/73 86 -- -- -- -- --   11/07/22 08:57:35 98 2 °F (36 8 °C) -- 16 140/78 99 -- -- -- -- --   11/07/22 03:10:30 -- -- 17 104/64 77 -- -- -- -- --   11/06/22 21:43:23 98 °F (36 7 °C) 86 17 136/77 97 99 % -- -- -- --   11/06/22 2000 -- -- -- -- -- -- 28 2 L/min Nasal cannula --   11/06/22 18:29:28 98 1 °F (36 7 °C) -- 17 129/71 90 97 % -- -- -- Lying   11/06/22 17:01:21 -- -- -- 131/67 88 -- -- -- -- --   11/06/22 1600 -- -- -- -- -- -- 28 2 L/min Nasal cannula --   11/06/22 15:52:01 98 3 °F (36 8 °C) 89 17 136/81 99 96 % -- -- -- Lying   11/06/22 14:18:59 98 9 °F (37 2 °C) 80 14 -- -- -- -- -- -- --   11/06/22 0900 -- -- -- -- -- 96 % 28 2 L/min Nasal cannula -- 11/06/22 0717 98 9 °F (37 2 °C) 82 16 127/80 96 -- -- -- -- Sitting   11/06/22 07:14:34 98 9 °F (37 2 °C) -- -- -- -- -- -- -- -- --   11/06/22 03:01:21 97 7 °F (36 5 °C) 83 17 127/76 93 92 % 28 2 L/min Nasal cannula --   11/05/22 23:09:40 99 6 °F (37 6 °C) -- 18 132/84 100 -- -- -- -- Lying   11/05/22 19:01:47 98 5 °F (36 9 °C) 85 16 134/82 99 96 % -- -- -- Lying   11/05/22 1900 -- -- -- 134/82 99 96 % -- -- -- --   11/05/22 1747 -- 90 -- 131/75 -- -- -- -- -- --   11/05/22 1552 98 7 °F (37 1 °C) 80 15 129/78 95 95 % -- -- -- Lying   11/05/22 12:23:31 98 °F (36 7 °C) 82 14 142/80 101 -- -- -- -- --   11/05/22 0932 97 6 °F (36 4 °C) 86 16 136/96 -- 96 % -- -- High flow nasal cannula Sitting         Pertinent Labs/Diagnostic Results:       Results from last 7 days   Lab Units 11/07/22  0443 11/06/22  0512 11/05/22  0407 11/04/22  0626 11/03/22  0000 11/02/22  0441 11/01/22  0507   WBC Thousand/uL 10 20* 14 12* 17 05* 8 66 7 22   < > 9 43   HEMOGLOBIN g/dL 7 9* 8 4* 8 3* 8 4* 8 4*   < > 10 4*   HEMATOCRIT % 25 6* 27 1* 26 2* 27 3* 26 5*   < > 32 2*   PLATELETS Thousands/uL 125* 99* 93* 82* 64*   < > 90*   NEUTROS ABS Thousands/µL  --   --   --   --  5 50  --  7 40    < > = values in this interval not displayed           Results from last 7 days   Lab Units 11/07/22  0443 11/06/22  0512 11/05/22  0407 11/04/22  0535 11/03/22  0459 11/02/22  0441 11/01/22  0507 10/31/22  1855 10/31/22  1855 10/31/22  1659 10/31/22  1546   SODIUM mmol/L 138 134* 136 140 143 143 141   < > 140  --   --    POTASSIUM mmol/L 3 0* 3 5 3 6 3 4* 3 5 3 7 3 9   < > 4 5  --   --    CHLORIDE mmol/L 107 105 105 106 111* 110* 110*   < > 112*  --   --    CO2 mmol/L 26 23 26 26 28 27 23   < > 19*  --   --    CO2, I-STAT mmol/L  --   --   --   --   --   --   --   --   --  20* 21   ANION GAP mmol/L 5 6 5 8 4 6 8   < > 9  --   --    BUN mg/dL 9 9 8 6 7 9 9   < > 10  --   --    CREATININE mg/dL 0 40* 0 42* 0 51* 0 61 0 45* 0 53* 0 66   < > 0 86  --   -- EGFR ml/min/1 73sq m 111 110 103 97 107 102 94   < > 72  --   --    CALCIUM mg/dL 8 6 8 6 8 6 8 3 8 3 8 1* 8 2*   < > 8 5  --   --    CALCIUM, IONIZED mmol/L  --   --   --   --   --   --   --   --  1 13  --   --    CALCIUM, IONIZED, ISTAT mmol/L  --   --   --   --   --   --   --   --   --  1 19 1 26   MAGNESIUM mg/dL  --   --   --   --  2 1 2 3 2 3  --  1 5*  --   --    PHOSPHORUS mg/dL  --   --   --   --  1 8* 1 7* 2 9  --  4 2*  --   --     < > = values in this interval not displayed       Results from last 7 days   Lab Units 11/03/22  0459 11/02/22 0441 11/01/22  0507 10/31/22  1855   AST U/L 165* 423* 685* 493*   ALT U/L 335* 516* 525* 365*   ALK PHOS U/L 87 91 96 94   TOTAL PROTEIN g/dL 5 2* 5 2* 5 3* 5 6*   ALBUMIN g/dL 2 4* 2 4* 2 6* 2 8*   TOTAL BILIRUBIN mg/dL 0 77 0 84 0 84 1 02*   BILIRUBIN DIRECT mg/dL 0 20  --   --   --      Results from last 7 days   Lab Units 11/07/22  1059 11/07/22  0613 11/06/22  2057 11/06/22  1603 11/06/22  1122 11/06/22  0610 11/05/22  2110 11/05/22  1648 11/05/22  1109 11/05/22  0603 11/04/22  1609 11/04/22  1058   POC GLUCOSE mg/dl 151* 133 157* 146* 155* 144* 165* 157* 147* 178* 140 143*     Results from last 7 days   Lab Units 11/07/22  0443 11/06/22  0512 11/05/22  0407 11/04/22  0535 11/03/22  0459 11/02/22  0441 11/01/22  0507 10/31/22  1855   GLUCOSE RANDOM mg/dL 135 144* 186* 158* 139 165* 163* 165*          Results from last 7 days   Lab Units 11/01/22  0625 10/31/22  1855   PH ART  7 449 7 315*   PCO2 ART mm Hg 32 6* 31 6*   PO2 ART mm Hg 90 0 108 3   HCO3 ART mmol/L 22 1 15 7*   BASE EXC ART mmol/L -1 3 -9 3   O2 CONTENT ART mL/dL 14 6* 15 6*   O2 HGB, ARTERIAL % 95 7 96 2   ABG SOURCE  Line, Arterial Line, Arterial         Results from last 7 days   Lab Units 10/31/22  1659 10/31/22  1546   I STAT BASE EXC mmol/L -7* -6*   I STAT O2 SAT % 99* 99*   ISTAT PH ART  7 269* 7 282*   I STAT ART PCO2 mm HG 41 7 41 9   I STAT ART PO2 mm  0* 166 0*   I STAT ART HCO3 mmol/L 19 1* 19 8*       Results from last 7 days   Lab Units 11/07/22  0443 11/06/22  0800 11/06/22  0106 11/05/22  1433 11/05/22  0357 11/04/22  1701 10/31/22  1540   PROTIME seconds  --   --   --   --  18 1*  --  15 9*   INR   --   --   --   --  1 48*  --  1 25*   PTT seconds 60* 62* 74*   < > 124*   < >  --     < > = values in this interval not displayed  Results from last 7 days   Lab Units 11/06/22  0512 11/05/22  0723   PROCALCITONIN ng/ml 0 61* 0 63*     Results from last 7 days   Lab Units 11/05/22  0723 11/01/22  0443 11/01/22  0153 10/31/22  2257 10/31/22  1855   LACTIC ACID mmol/L 1 7 3 5* 3 9* 4 3* 6 0*       Results from last 7 days   Lab Units 11/06/22  0512 11/03/22  1205   AMYLASE IU/L 16* 62       Results from last 7 days   Lab Units 11/07/22  0852 11/04/22  1218   BLOOD CULTURE  Received in Microbiology Lab  Culture in Progress  Received in Microbiology Lab  Culture in Progress  Streptococcus mitis oralis group*  No Growth at 48 hrs     GRAM STAIN RESULT   --  Gram positive cocci in chains*         Medications:   Scheduled Medications:  acetaminophen, 975 mg, Oral, Q8H KIM  apixaban, 10 mg, Oral, BID  cefTRIAXone, 2,000 mg, Intravenous, Q24H  gabapentin, 100 mg, Oral, TID  insulin lispro, 2-12 Units, Subcutaneous, TID With Meals  iohexol, 50 mL, Oral, 90 min pre-procedure  metoprolol succinate, 25 mg, Oral, BID  pantoprazole, 40 mg, Oral, Early Morning  PARoxetine, 37 5 mg, Oral, Daily  potassium chloride, 40 mEq, Intravenous, Q4H-11/7 x 2   sotalol, 120 mg, Oral, Q12H [address]      Continuous IV Infusions:    heparin (porcine) 25,000 units in 0 45% NaCl 250 mL infusion (premix)  Rate: 3 8-37 5 mL/hr Dose: 3-30 Units/kg/hr  Weight Dosing Info: 125 kg (Order-Specific)  Freq: Titrated Route: IV  Last Dose: Stopped (11/07/22 1009)  Start: 11/04/22 2128 End: 11/07/22 0817      PRN Meds:  metoprolol, 2 5 mg, Intravenous, Q6H PRN  oxyCODONE, 10 mg, Oral, Q4H PRN  oxyCODONE, 5 mg, Oral, Q4H PRN-11/6 x 2 - 11/7 x 1         Discharge Plan:  TBD     Network Utilization Review Department  ATTENTION: Please call with any questions or concerns to [phone] and carefully listen to the prompts so that you are directed to the right person  All voicemails are confidential   [facility] all requests for admission clinical reviews, approved or denied determinations and any other requests to dedicated fax number below belonging to the campus where the patient is receiving treatment   List of dedicated fax numbers for the Facilities:  [address] DENIALS (Administrative/Medical Necessity) [phone]   [address] (Maternity/NICU/Pediatrics) [phone]   [address] [phone]   [place] [address] [phone]   [address] [place] [address] Myke Marcano 28 [phone]   [address] [facility] 134 815 [facility] [phone]

## 2022-11-08 ENCOUNTER — TELEPHONE (OUTPATIENT)
Dept: OTHER | Facility: OTHER | Age: 62
End: 2022-11-08

## 2022-11-08 LAB
AMYLASE FLD QL: 12 U/L
AMYLASE SERPL-CCNC: 15 IU/L (ref 25–115)
ANION GAP SERPL CALCULATED.3IONS-SCNC: 2 MMOL/L (ref 4–13)
BASOPHILS # BLD AUTO: 0.06 THOUSANDS/ÂΜL (ref 0–0.1)
BASOPHILS NFR BLD AUTO: 1 % (ref 0–1)
BUN SERPL-MCNC: 8 MG/DL (ref 5–25)
CALCIUM SERPL-MCNC: 8.3 MG/DL (ref 8.3–10.1)
CHLORIDE SERPL-SCNC: 105 MMOL/L (ref 96–108)
CO2 SERPL-SCNC: 29 MMOL/L (ref 21–32)
CREAT SERPL-MCNC: 0.44 MG/DL (ref 0.6–1.3)
EOSINOPHIL # BLD AUTO: 0.16 THOUSAND/ÂΜL (ref 0–0.61)
EOSINOPHIL NFR BLD AUTO: 1 % (ref 0–6)
ERYTHROCYTE [DISTWIDTH] IN BLOOD BY AUTOMATED COUNT: 16.1 % (ref 11.6–15.1)
ERYTHROCYTE [DISTWIDTH] IN BLOOD BY AUTOMATED COUNT: 16.1 % (ref 11.6–15.1)
GFR SERPL CREATININE-BSD FRML MDRD: 108 ML/MIN/1.73SQ M
GLUCOSE SERPL-MCNC: 128 MG/DL (ref 65–140)
GLUCOSE SERPL-MCNC: 146 MG/DL (ref 65–140)
GLUCOSE SERPL-MCNC: 160 MG/DL (ref 65–140)
GLUCOSE SERPL-MCNC: 168 MG/DL (ref 65–140)
GLUCOSE SERPL-MCNC: 176 MG/DL (ref 65–140)
HCT VFR BLD AUTO: 25.1 % (ref 34.8–46.1)
HCT VFR BLD AUTO: 25.1 % (ref 34.8–46.1)
HGB BLD-MCNC: 7.9 G/DL (ref 11.5–15.4)
HGB BLD-MCNC: 7.9 G/DL (ref 11.5–15.4)
IMM GRANULOCYTES # BLD AUTO: 0.16 THOUSAND/UL (ref 0–0.2)
IMM GRANULOCYTES NFR BLD AUTO: 1 % (ref 0–2)
LYMPHOCYTES # BLD AUTO: 0.97 THOUSANDS/ÂΜL (ref 0.6–4.47)
LYMPHOCYTES NFR BLD AUTO: 8 % (ref 14–44)
MCH RBC QN AUTO: 28.6 PG (ref 26.8–34.3)
MCH RBC QN AUTO: 28.6 PG (ref 26.8–34.3)
MCHC RBC AUTO-ENTMCNC: 31.5 G/DL (ref 31.4–37.4)
MCHC RBC AUTO-ENTMCNC: 31.5 G/DL (ref 31.4–37.4)
MCV RBC AUTO: 91 FL (ref 82–98)
MCV RBC AUTO: 91 FL (ref 82–98)
MONOCYTES # BLD AUTO: 1.13 THOUSAND/ÂΜL (ref 0.17–1.22)
MONOCYTES NFR BLD AUTO: 10 % (ref 4–12)
NEUTROPHILS # BLD AUTO: 9.3 THOUSANDS/ÂΜL (ref 1.85–7.62)
NEUTS SEG NFR BLD AUTO: 79 % (ref 43–75)
NRBC BLD AUTO-RTO: 0 /100 WBCS
PLATELET # BLD AUTO: 142 THOUSANDS/UL (ref 149–390)
PLATELET # BLD AUTO: 142 THOUSANDS/UL (ref 149–390)
PMV BLD AUTO: 11.2 FL (ref 8.9–12.7)
PMV BLD AUTO: 11.2 FL (ref 8.9–12.7)
POTASSIUM SERPL-SCNC: 3.3 MMOL/L (ref 3.5–5.3)
RBC # BLD AUTO: 2.76 MILLION/UL (ref 3.81–5.12)
RBC # BLD AUTO: 2.76 MILLION/UL (ref 3.81–5.12)
SODIUM SERPL-SCNC: 136 MMOL/L (ref 135–147)
WBC # BLD AUTO: 11.66 THOUSAND/UL (ref 4.31–10.16)
WBC # BLD AUTO: 11.66 THOUSAND/UL (ref 4.31–10.16)

## 2022-11-08 RX ORDER — POTASSIUM CHLORIDE 20 MEQ/1
40 TABLET, EXTENDED RELEASE ORAL 2 TIMES DAILY
Status: COMPLETED | OUTPATIENT
Start: 2022-11-08 | End: 2022-11-08

## 2022-11-08 RX ADMIN — ACETAMINOPHEN 975 MG: 325 TABLET ORAL at 05:30

## 2022-11-08 RX ADMIN — GABAPENTIN 100 MG: 100 CAPSULE ORAL at 20:56

## 2022-11-08 RX ADMIN — METOPROLOL SUCCINATE 25 MG: 25 TABLET, EXTENDED RELEASE ORAL at 08:37

## 2022-11-08 RX ADMIN — METOPROLOL SUCCINATE 25 MG: 25 TABLET, EXTENDED RELEASE ORAL at 17:22

## 2022-11-08 RX ADMIN — ACETAMINOPHEN 975 MG: 325 TABLET ORAL at 13:32

## 2022-11-08 RX ADMIN — SENNOSIDES AND DOCUSATE SODIUM 2 TABLET: 8.6; 5 TABLET ORAL at 21:01

## 2022-11-08 RX ADMIN — GABAPENTIN 100 MG: 100 CAPSULE ORAL at 08:37

## 2022-11-08 RX ADMIN — POTASSIUM CHLORIDE 40 MEQ: 1500 TABLET, EXTENDED RELEASE ORAL at 17:22

## 2022-11-08 RX ADMIN — SOTALOL HYDROCHLORIDE 120 MG: 120 TABLET ORAL at 20:55

## 2022-11-08 RX ADMIN — POTASSIUM CHLORIDE 40 MEQ: 1500 TABLET, EXTENDED RELEASE ORAL at 09:38

## 2022-11-08 RX ADMIN — INSULIN LISPRO 2 UNITS: 100 INJECTION, SOLUTION INTRAVENOUS; SUBCUTANEOUS at 11:46

## 2022-11-08 RX ADMIN — PAROXETINE 37.5 MG: 37.5 TABLET, FILM COATED, EXTENDED RELEASE ORAL at 08:37

## 2022-11-08 RX ADMIN — GABAPENTIN 100 MG: 100 CAPSULE ORAL at 17:27

## 2022-11-08 RX ADMIN — SOTALOL HYDROCHLORIDE 120 MG: 120 TABLET ORAL at 08:37

## 2022-11-08 RX ADMIN — APIXABAN 10 MG: 5 TABLET, FILM COATED ORAL at 08:37

## 2022-11-08 RX ADMIN — PANTOPRAZOLE SODIUM 40 MG: 40 TABLET, DELAYED RELEASE ORAL at 05:30

## 2022-11-08 RX ADMIN — ACETAMINOPHEN 975 MG: 325 TABLET ORAL at 21:01

## 2022-11-08 RX ADMIN — CEFTRIAXONE 2000 MG: 2 INJECTION, POWDER, FOR SOLUTION INTRAMUSCULAR; INTRAVENOUS at 19:42

## 2022-11-08 RX ADMIN — OXYCODONE HYDROCHLORIDE 5 MG: 5 TABLET ORAL at 10:40

## 2022-11-08 RX ADMIN — APIXABAN 10 MG: 5 TABLET, FILM COATED ORAL at 17:22

## 2022-11-08 NOTE — QUICK NOTE
NIGEL drain removed at bedside, patient tolerated procedure well  Bulb was filled with 75mls of serous sanguinous fluid  Replaced with sterile 4x4 gauze and ABD pad over top   All questions answered

## 2022-11-08 NOTE — TELEPHONE ENCOUNTER
Patient is calling regarding cancelling an appointment      Date/Time:  11/14/22  /  4:00     Patient was rescheduled: YES [] NO [x]    Patient requesting call back to reschedule: YES [] NO [x]      [address] [facility]

## 2022-11-08 NOTE — RESTORATIVE TECHNICIAN NOTE
Restorative Technician Note      Patient Name: Ivan Askew     Note Type: Mobility  Patient Position Upon Consult: Supine  Activity Performed: Ambulated  Assistive Device: Roller walker  Patient Position at End of Consult: Bedside chair;  All needs within reach

## 2022-11-08 NOTE — PLAN OF CARE
Problem: OCCUPATIONAL THERAPY ADULT  Goal: Performs self-care activities at highest level of function for planned discharge setting  See evaluation for individualized goals  Description: Treatment Interventions: ADL retraining, Functional transfer training, Endurance training, Patient/family training, Equipment evaluation/education, Compensatory technique education, Energy conservation, Activityengagement          See flowsheet documentation for full assessment, interventions and recommendations  Outcome: Progressing  Note: Limitation: Decreased ADL status, Decreased Safe judgement during ADL, Decreased endurance, Decreased self-care trans, Decreased high-level ADLs  Prognosis: Good  Assessment: OT trx session focused on UE strengthening/ROM, endurance training, patient/family training, continued evaluation, energy conservation, and activity engagement  Pt was found in bedside chair and left in bedside chair w/call bell within reach  Pt is able to tolerate 8x3 seated supported lateral rows + bicep curls w/yellow theraband  Pt able to follow proper exercise execution  No limitations w/strength in B UE  Improvements noted w/UE strengthening/ROM, endurance training, energy conservation, and activity engagement  Remaining limitations include functional transfer training and ADL retraining  The patient's raw score on the AM-PAC Daily Activity inpatient short form is 21, standardized score is 44 27, greater than 39 4  Patients at this level are likely to benefit from discharge to home  Please refer to the recommendation of the Occupational Therapist for safe discharge planning  Recommendation is for pt to receive home health rehabilitation upon d/c  Presently, recommendation  is for pt to continue w/rehab 3-5x a week for 10-14 days to meet goals       OT Discharge Recommendation: Home with home health rehabilitation

## 2022-11-08 NOTE — PLAN OF CARE
Problem: Prexisting or High Potential for Compromised Skin Integrity  Goal: Skin integrity is maintained or improved  Description: INTERVENTIONS:  - Identify patients at risk for skin breakdown  - Assess and monitor skin integrity  - Assess and monitor nutrition and hydration status  - Monitor labs   - Assess for incontinence   - Turn and reposition patient  - Assist with mobility/ambulation  - Relieve pressure over bony prominences  - Avoid friction and shearing  - Provide appropriate hygiene as needed including keeping skin clean and dry  - Evaluate need for skin moisturizer/barrier cream  - Collaborate with interdisciplinary team   - Patient/family teaching  - Consider wound care consult   Outcome: Progressing     Problem: Potential for Falls  Goal: Patient will remain free of falls  Description: INTERVENTIONS:  - Educate patient/family on patient safety including physical limitations  - Instruct patient to call for assistance with activity   - Consult OT/PT to assist with strengthening/mobility   - Keep Call bell within reach  - Keep bed low and locked with side rails adjusted as appropriate  - Keep care items and personal belongings within reach  - Initiate and maintain comfort rounds  - Make Fall Risk Sign visible to staff  - Offer Toileting every  Hours, in advance of need  - Initiate/Maintain alarm  - Obtain necessary fall risk management equipment:   - Apply yellow socks and bracelet for high fall risk patients  - Consider moving patient to room near nurses station  Outcome: Progressing     Problem: Nutrition/Hydration-ADULT  Goal: Nutrient/Hydration intake appropriate for improving, restoring or maintaining nutritional needs  Description: Monitor and assess patient's nutrition/hydration status for malnutrition  Collaborate with interdisciplinary team and initiate plan and interventions as ordered  Monitor patient's weight and dietary intake as ordered or per policy   Utilize nutrition screening tool and intervene as necessary  Determine patient's food preferences and provide high-protein, high-caloric foods as appropriate  INTERVENTIONS:  - Monitor oral intake, urinary output, labs, and treatment plans  - Assess nutrition and hydration status and recommend course of action  - Evaluate amount of meals eaten  - Assist patient with eating if necessary   - Allow adequate time for meals  - Recommend/ encourage appropriate diets, oral nutritional supplements, and vitamin/mineral supplements  - Order, calculate, and assess calorie counts as needed  - Recommend, monitor, and adjust tube feedings and TPN/PPN based on assessed needs  - Assess need for intravenous fluids  - Provide specific nutrition/hydration education as appropriate  - Include patient/family/caregiver in decisions related to nutrition  Outcome: Progressing     Problem: MOBILITY - ADULT  Goal: Maintain or return to baseline ADL function  Description: INTERVENTIONS:  -  Assess patient's ability to carry out ADLs; assess patient's baseline for ADL function and identify physical deficits which impact ability to perform ADLs (bathing, care of mouth/teeth, toileting, grooming, dressing, etc )  - Assess/evaluate cause of self-care deficits   - Assess range of motion  - Assess patient's mobility; develop plan if impaired  - Assess patient's need for assistive devices and provide as appropriate  - Encourage maximum independence but intervene and supervise when necessary  - Involve family in performance of ADLs  - Assess for home care needs following discharge   - Consider OT consult to assist with ADL evaluation and planning for discharge  - Provide patient education as appropriate  Outcome: Progressing  Goal: Maintains/Returns to pre admission functional level  Description: INTERVENTIONS:  - Perform BMAT or MOVE assessment daily    - Set and communicate daily mobility goal to care team and patient/family/caregiver     - Collaborate with rehabilitation services on mobility goals if consulted  - Perform Range of Motion  times a day  - Reposition patient every  hours    - Dangle patient  times a day  - Stand patient  times a day  - Ambulate patient  times a day  - Out of bed to chair  times a day   - Out of bed for meal times a day  - Out of bed for toileting  - Record patient progress and toleration of activity level   Outcome: Progressing

## 2022-11-08 NOTE — PROGRESS NOTES
Progress Note - Infectious Disease   Danyel Gerard 57 y o  female MRN: [id number]  Unit/Bed#: [facility] 511-01 Encounter: [id number]      Impression/Plan:  1  Systemic inflammatory response syndrome-  -concern for true bacteremia given acute change in vitals Whipple POD3  1/2 blood cx with strep mitis oralis  Concurrent b/l pulmonary emboli confounding SIRS vs sepsis differentiation  Patient clinically improved on systemic anticoagulation and IV antibiotics  -antibiotics as below  -anticoagulation now with DOAC per primary     2  Streptococcus oralis bacteremia-  Given clinical course, it is concerning that this is a true bacteremia rather than a contaminant  Source of bacteremia likely from upper GI intervention  Original cultures 1/2 positive   -will treat with 14 day course of antibiotics, from timing of repeat blood cx being negative x48 hours  At that point patient will be stable from ID standpoint for d/c to rehab center  -rocephin 2g q24h   -total antibiotic day #4  -follow up repeat 11/7 blood cultures to confirm clearance for 48 hours prior to d/c  Currently (-) x24 hr  -TTE did not show valvular vegetations or significant dysfunction     3  Pulmonary embolism-  -2/2 malignancy and post-op immobilization  Patient was POD3 at timing of onset  -anticoagulation per the primary     4  Pancreatic adenocarcinoma-  -status post Whipple procedure with vascular reconstruction    -can utilize chemo port for IV abx course at rehab center     Antibiotics:  Total days of post-op abx: #4  Rocephin #3  S/p vanco #1  S/p cefepime #2     Pre-op: Ancef and flagyl     Cultures:   Blood cx 11/4: 1/2 strep mitis oralis  Repeat cx drawn 11/8    Subjective:  Patient has had no fever/ chills/ sweats overnight, although remains on scheduled tylenol as per pain regimen  no nausea, vomiting, diarrhea; no cough, shortness of breath; no pain  No new symptoms  Patient is feeling better day by day   She is using her incentive spirometer and pulling volumes of 1500 cc  She is ambulating more frequently with assistance  She is still constipated but feels she may have a BM soon  No abdominal distention or pain  Objective:  Vitals:  Temp:  [97 9 °F (36 6 °C)-98 6 °F (37 °C)] 98 °F (36 7 °C)  HR:  [89-93] 91  Resp:  [17-18] 17  BP: (112-146)/(71-81) 128/71  SpO2:  [96 %-100 %] 96 %  Temp (24hrs), Av 2 °F (36 8 °C), Min:97 9 °F (36 6 °C), Max:98 6 °F (37 °C)  Current: Temperature: 98 °F (36 7 °C)    Physical Exam:   General Appearance:  Alert, interactive, nontoxic, no acute distress  Obese   Throat: Oropharynx moist without lesions  Lungs:   Clear to auscultation bilaterally; no wheezes, rhonchi or rales; respirations unlabored   Heart:  RRR; no murmur, rub or gallop   Abdomen:   Soft, non-tender, non-distended, positive bowel sounds  NIGEL drain with mostly serous drainage  No TTP around drain site  Extremities: No clubbing, cyanosis or edema   Skin: No new rashes or lesions  No draining wounds noted  Labs, Imaging, & Other studies:   All pertinent labs and imaging studies were personally reviewed  Results from last 7 days   Lab Units 22  0512   WBC Thousand/uL 11 66* 10 20* 14 12*   HEMOGLOBIN g/dL 7 9* 7 9* 8 4*   PLATELETS Thousands/uL 142* 125* 99*     Results from last 7 days   Lab Units 22  02122  0443 22  0512 22  0535 22  0459 22  0441   SODIUM mmol/L 136 138 134*   < > 143 143   POTASSIUM mmol/L 3 3* 3 0* 3 5   < > 3 5 3 7   CHLORIDE mmol/L 105 107 105   < > 111* 110*   CO2 mmol/L 29 26 23   < > 28 27   BUN mg/dL 8 9 9   < > 7 9   CREATININE mg/dL 0 44* 0 40* 0 42*   < > 0 45* 0 53*   EGFR ml/min/1 73sq m 108 111 110   < > 107 102   CALCIUM mg/dL 8 3 8 6 8 6   < > 8 3 8 1*   AST U/L  --   --   --   --  165* 423*   ALT U/L  --   --   --   --  335* 516*   ALK PHOS U/L  --   --   --   --  87 91    < > = values in this interval not displayed  Results from last 7 days   Lab Units 11/07/22  0852 11/05/22  0933 11/04/22  1218   BLOOD CULTURE  Received in Microbiology Lab  Culture in Progress  Received in Microbiology Lab  Culture in Progress  --  No Growth at 72 hrs    Streptococcus mitis oralis group*   GRAM STAIN RESULT   --   --  Gram positive cocci in chains*   MRSA CULTURE ONLY   --  No Methicillin Resistant Staphlyococcus aureus (MRSA) isolated  --      Results from last 7 days   Lab Units 11/06/22  0512 11/05/22  0723   PROCALCITONIN ng/ml 0 61* 0 63*                 Danuta Schrader, DO  Internal Medicine PGY3

## 2022-11-08 NOTE — PHYSICAL THERAPY NOTE
Physical Therapy Treatment Note    Patient's Name: William Man  : 22 1318   PT Last Visit   PT Visit Date 22   Note Type   Note Type Treatment   Pain Assessment   Pain Assessment Tool 0-10   Pain Score No Pain   Restrictions/Precautions   Weight Bearing Precautions Per Order No   Other Precautions Telemetry; Fall Risk   General   Chart Reviewed Yes   Response to Previous Treatment Patient with no complaints from previous session  Family/Caregiver Present Yes   Subjective   Subjective Pt agreeable to mobilize  Bed Mobility   Supine to Sit Unable to assess   Sit to Supine Unable to assess   Additional Comments Pt greeted in chair  Transfers   Sit to Stand 5  Supervision   Stand to Sit 5  Supervision   Toilet transfer 5  Supervision   Additional Comments RW   Ambulation/Elevation   Gait pattern Excessively slow; Short stride;Decreased R stance; Improper Weight shift;Decreased foot clearance   Gait Assistance 5  Supervision   Additional items Verbal cues   Assistive Device Bariatric Rolling walker   Distance 100'x2   Stair Management Assistance 4  Minimal assist  (cga)   Additional items Assist x 1;Verbal cues   Stair Management Technique Nonreciprocal;Two rails   Number of Stairs 5   Balance   Static Sitting Fair +   Dynamic Sitting Fair   Static Standing Fair   Dynamic Standing Fair -   Ambulatory Fair -  (RW)   Endurance Deficit   Endurance Deficit Yes   Endurance Deficit Description weakness, fatigue   Activity Tolerance   Activity Tolerance Patient limited by fatigue   Nurse Made Aware nsg made aware pt is requesting to get washed up   Assessment   Prognosis Good   Problem List Decreased strength;Decreased range of motion;Decreased endurance; Impaired balance;Decreased mobility;Pain;Obesity   Assessment Pt seen for PT treatment session w/ focus on t/f training, gait training, + stair training   Pt demonstrated progress today as she was able to participate in stair training for the first time + complete 5 stairs w/ CGA  Pt also able to progress gait distance  Continue to recommend HHPT upon d/c    Barriers to Discharge Inaccessible home environment   Goals   Patient Goals get washed up   PT Treatment Day 3   Plan   Treatment/Interventions Functional transfer training;LE strengthening/ROM; Elevations; Therapeutic exercise; Endurance training;Patient/family training;Equipment eval/education; Bed mobility;Gait training; Compensatory technique education;Spoke to nursing;Family  (balance training)   Progress Progressing toward goals   PT Frequency 3-5x/wk   Recommendation   PT Discharge Recommendation Home with home health rehabilitation   [address] Recommended HD Bariatric wheeled walker   Madeline 8 in Bed Without Bedrails 3   Lying on Back to Sitting on Edge of Flat Bed 3   Moving Bed to Chair 4   Standing Up From Chair 4   Walk in Room 4   Climb 3-5 Stairs 3   Basic Mobility Inpatient Raw Score 21   Basic Mobility Standardized Score 45 55   Highest Level Of Mobility   JH-HLM Goal 6: Walk 10 steps or more   JH-HLM Achieved 7: Walk 25 feet or more   Education   Education Provided Mobility training;Assistive device   Patient Demonstrates acceptance/verbal understanding;Reinforcement needed   End of Consult   Patient Position at End of Consult Bedside chair; All needs within reach  (on waffle cushion, spouse at bedside)     Terrance Michelle, PT, DPT

## 2022-11-08 NOTE — OCCUPATIONAL THERAPY NOTE
Occupational Therapy Progress Note     Patient Name: Alan Keita  Today's Date: 11/8/2022  Problem List  Principal Problem:    Adenocarcinoma of head of pancreas [facility])  Active Problems:    Hypertension    Controlled depression    Type 2 diabetes mellitus without complication, without long-term current use of insulin (HCC)    Hyperlipidemia    Esophageal reflux    Benign essential hypertension    CPAP (continuous positive airway pressure) dependence    Paroxysmal A-fib (HCC)    Pulmonary embolism (HCC)    Sepsis ([facility] [address] )        11/08/22 0230   OT Last Visit   OT Visit Date 11/08/22   Note Type   Note Type Treatment   Pain Assessment   Pain Assessment Tool 0-10   Pain Score No Pain   Restrictions/Precautions   Weight Bearing Precautions Per Order No   Other Precautions Multiple lines;Telemetry; Fall Risk   Bed Mobility   Additional Comments Pt was found in bedside chair and left in bedside chair w/call bell in reach  Therapeutic Excerise-Strength   UE Strength Yes   Right Upper Extremity- Strength   R Shoulder Flexion   R Elbow Elbow flexion   Equipment Theraband  (yellow)   R Weight/Reps/Sets 8x3   RUE Strength Comment Pt is able to tolerate 8x3 seated supported lateral rows + bicep curls w/yellow theraband  Pt able to follow proper exercise execution  No limitations w/strength in R UE  Left Upper Extremity-Strength   L Shoulder Flexion   L Elbow Elbow flexion   Equipment Theraband  (yellow)   L Weights/Reps/Sets 8x3   LUE Strength Comment Pt is able to tolerate 8x3 seated supported lateral rows + bicep curls w/yellow theraband  Pt able to follow proper exercise execution  No limitations w/strength in L UE  Subjective   Subjective "I feel good doing these"   Cognition   Overall Cognitive Status [facility]   Arousal/Participation Alert; Responsive; Cooperative   Attention Within functional limits   Orientation Level Oriented X4   Memory Within functional limits   Following Commands Follows one step commands without difficulty   Comments Pt is able to follow conversation and attend to min vc for safety and hand placement w/RW  Pt demonstrates G safety awareness and insight into condition  Activity Tolerance   Activity Tolerance Patient tolerated treatment well   Medical Staff Made Aware OT Feliciano Dubois RN aware   Assessment   Assessment OT trx session focused on UE strengthening/ROM, endurance training, patient/family training, continued evaluation, energy conservation, and activity engagement  Pt was found in bedside chair and left in bedside chair w/call bell within reach  Pt is able to tolerate 8x3 seated supported lateral rows + bicep curls w/yellow theraband  Pt able to follow proper exercise execution  No limitations w/strength in B UE  Improvements noted w/UE strengthening/ROM, endurance training, energy conservation, and activity engagement  Remaining limitations include functional transfer training and ADL retraining  The patient's raw score on the AM-PAC Daily Activity inpatient short form is 21, standardized score is 44 27, greater than 39 4  Patients at this level are likely to benefit from discharge to home  Please refer to the recommendation of the Occupational Therapist for safe discharge planning  Recommendation is for pt to receive home health rehabilitation upon d/c  Presently, recommendation  is for pt to continue w/rehab 3-5x a week for 10-14 days to meet goals  Plan   Treatment Interventions UE strengthening/ROM; Endurance training;Patient/family training;Continued evaluation; Energy conservation; Activityengagement   Goal Expiration Date 11/15/22   OT Treatment Day 2   OT Frequency 3-5x/wk   Recommendation   OT Discharge Recommendation Home with home health rehabilitation   [facility] Daily Activity Inpatient   Lower Body Dressing 3   Bathing 3   Toileting 4   Upper Body Dressing 3   Grooming 4   Eating 4   Daily Activity Raw Score 21   Daily Activity Standardized Score (Calc for Raw Score >=11) 44 27   AM-PAC Applied Cognition Inpatient   Following a Speech/Presentation 4   Understanding Ordinary Conversation 4   Taking Medications 4   Remembering Where Things Are Placed or Put Away 4   Remembering List of 4-5 Errands 4   Taking Care of Complicated Tasks 4   Applied Cognition Raw Score 24   Applied Cognition Standardized Score 62 21   Modified Pardeep Scale   Modified Pardeep Scale 3   End of Consult   Education Provided Yes   Patient Position at End of Consult Seated edge of bed; All needs within reach   Nurse Communication Nurse aware of consult     BILL Estrella

## 2022-11-08 NOTE — PLAN OF CARE
Problem: PHYSICAL THERAPY ADULT  Goal: Performs mobility at highest level of function for planned discharge setting  See evaluation for individualized goals  Description: Treatment/Interventions: Functional transfer training, LE strengthening/ROM, Therapeutic exercise, Endurance training, Patient/family training, Elevations, Equipment eval/education, Bed mobility, Gait training, Spoke to nursing  Equipment Recommended:  (TBD)       See flowsheet documentation for full assessment, interventions and recommendations  Outcome: Progressing  Note: Prognosis: Good  Problem List: Decreased strength, Decreased range of motion, Decreased endurance, Impaired balance, Decreased mobility, Pain, Obesity  Assessment: Pt seen for PT treatment session w/ focus on t/f training, gait training, + stair training  Pt demonstrated progress today as she was able to participate in stair training for the first time + complete 5 stairs w/ CGA  Pt also able to progress gait distance  Continue to recommend HHPT upon d/c   Barriers to Discharge: Inaccessible home environment     PT Discharge Recommendation: Home with home health rehabilitation    See flowsheet documentation for full assessment

## 2022-11-08 NOTE — PROGRESS NOTES
Progress Note - Oncology Surgical   Yoon Singleton 57 y o  female MRN: [id number]  Unit/Bed#: [facility] 511-01 Encounter: [id number]    Assessment:  Patient is a 57 y o  female who presented with  post pancreatic head mass, now status on bubble, portal vein, SMV repair on 02/87 complicated by PE now on anticoagulation       Afebrile, VSS on 2L NC   NIGEL:  575 cc from 570, light serosang  UOP:  1 3 L      Plan:  Diet as tolerated  Prn pain control per APS recs  Continue metoprolol and sotalol  Eliquis, off hep gtt  F/u repeat NIGEL and serum amylase  PT/OT: [address]  Encourage out of bed and ambulation            Subjective/Objective     SubjectiveEvents:  Doing well  Tolerating diet, denies nausea or vomiting  No bowel movements yet, but passing gas and urinating    Objective:     Blood pressure 139/77, pulse 91, temperature 98 3 °F (36 8 °C), temperature source Oral, resp  rate 18, height 5' 4" (1 626 m), weight (!) 138 kg (303 lb 5 7 oz), SpO2 96 %  ,Body mass index is 52 07 kg/m²  Intake/Output Summary (Last 24 hours) at 11/8/2022 0544  Last data filed at 11/8/2022 0501  Gross per 24 hour   Intake 1504 64 ml   Output 1925 ml   Net -420 36 ml       Invasive Devices  Report    Central Venous Catheter Line  Duration           Port A Cath 05/31/22 Left Chest 160 days          Peripheral Intravenous Line  Duration           Peripheral IV 11/06/22 Proximal;Right;Ventral (anterior) Forearm 1 day          Drain  Duration           Ureteral Internal Stent Left ureter 6 Fr  112 days    Closed/Suction Drain RUQ Bulb 19 Fr  7 days                Physical Exam  Constitutional:       General: She is not in acute distress  Appearance: She is well-developed  She is not ill-appearing, toxic-appearing or diaphoretic  HENT:      Head: Normocephalic and atraumatic  Eyes:      Extraocular Movements: Extraocular movements intact  Cardiovascular:      Rate and Rhythm: Normal rate     Pulmonary:      Effort: Pulmonary effort is normal  No respiratory distress  Abdominal:      General: There is no distension  Palpations: Abdomen is soft  There is no mass  Tenderness: There is no abdominal tenderness  There is no guarding or rebound  Comments: Incisions clean, dry and intact  NIGEL in place   Skin:     General: Skin is warm and dry  Neurological:      Mental Status: She is alert and oriented to person, place, and time     Psychiatric:         Mood and Affect: Mood normal          Behavior: Behavior normal

## 2022-11-09 ENCOUNTER — HOSPITAL ENCOUNTER (OUTPATIENT)
Dept: INFUSION CENTER | Facility: HOSPITAL | Age: 62
Discharge: HOME | End: 2022-11-09
Attending: INTERNAL MEDICINE

## 2022-11-09 LAB
ANION GAP SERPL CALCULATED.3IONS-SCNC: 4 MMOL/L (ref 4–13)
BACTERIA BLD CULT: NORMAL
BASOPHILS # BLD AUTO: 0.06 THOUSANDS/ÂΜL (ref 0–0.1)
BASOPHILS NFR BLD AUTO: 1 % (ref 0–1)
BUN SERPL-MCNC: 9 MG/DL (ref 5–25)
CALCIUM SERPL-MCNC: 8.6 MG/DL (ref 8.3–10.1)
CHLORIDE SERPL-SCNC: 104 MMOL/L (ref 96–108)
CO2 SERPL-SCNC: 27 MMOL/L (ref 21–32)
CREAT SERPL-MCNC: 0.41 MG/DL (ref 0.6–1.3)
EOSINOPHIL # BLD AUTO: 0.24 THOUSAND/ÂΜL (ref 0–0.61)
EOSINOPHIL NFR BLD AUTO: 2 % (ref 0–6)
ERYTHROCYTE [DISTWIDTH] IN BLOOD BY AUTOMATED COUNT: 16.3 % (ref 11.6–15.1)
GFR SERPL CREATININE-BSD FRML MDRD: 111 ML/MIN/1.73SQ M
GLUCOSE SERPL-MCNC: 138 MG/DL (ref 65–140)
GLUCOSE SERPL-MCNC: 148 MG/DL (ref 65–140)
GLUCOSE SERPL-MCNC: 162 MG/DL (ref 65–140)
GLUCOSE SERPL-MCNC: 174 MG/DL (ref 65–140)
GLUCOSE SERPL-MCNC: 194 MG/DL (ref 65–140)
HCT VFR BLD AUTO: 26.9 % (ref 34.8–46.1)
HGB BLD-MCNC: 8.2 G/DL (ref 11.5–15.4)
IMM GRANULOCYTES # BLD AUTO: 0.29 THOUSAND/UL (ref 0–0.2)
IMM GRANULOCYTES NFR BLD AUTO: 2 % (ref 0–2)
LYMPHOCYTES # BLD AUTO: 1.18 THOUSANDS/ÂΜL (ref 0.6–4.47)
LYMPHOCYTES NFR BLD AUTO: 9 % (ref 14–44)
MCH RBC QN AUTO: 28.4 PG (ref 26.8–34.3)
MCHC RBC AUTO-ENTMCNC: 30.5 G/DL (ref 31.4–37.4)
MCV RBC AUTO: 93 FL (ref 82–98)
MONOCYTES # BLD AUTO: 0.94 THOUSAND/ÂΜL (ref 0.17–1.22)
MONOCYTES NFR BLD AUTO: 7 % (ref 4–12)
NEUTROPHILS # BLD AUTO: 10.56 THOUSANDS/ÂΜL (ref 1.85–7.62)
NEUTS SEG NFR BLD AUTO: 79 % (ref 43–75)
NRBC BLD AUTO-RTO: 0 /100 WBCS
PLATELET # BLD AUTO: 156 THOUSANDS/UL (ref 149–390)
PMV BLD AUTO: 11.5 FL (ref 8.9–12.7)
POTASSIUM SERPL-SCNC: 3.6 MMOL/L (ref 3.5–5.3)
RBC # BLD AUTO: 2.89 MILLION/UL (ref 3.81–5.12)
SODIUM SERPL-SCNC: 135 MMOL/L (ref 135–147)
WBC # BLD AUTO: 13.27 THOUSAND/UL (ref 4.31–10.16)

## 2022-11-09 RX ORDER — MINERAL OIL 100 G/100G
1 OIL RECTAL ONCE
Status: COMPLETED | OUTPATIENT
Start: 2022-11-09 | End: 2022-11-09

## 2022-11-09 RX ORDER — POTASSIUM CHLORIDE 20 MEQ/1
40 TABLET, EXTENDED RELEASE ORAL ONCE
Status: COMPLETED | OUTPATIENT
Start: 2022-11-09 | End: 2022-11-09

## 2022-11-09 RX ORDER — BISACODYL 10 MG
10 SUPPOSITORY, RECTAL RECTAL DAILY
Status: DISCONTINUED | OUTPATIENT
Start: 2022-11-09 | End: 2022-11-11

## 2022-11-09 RX ADMIN — APIXABAN 10 MG: 5 TABLET, FILM COATED ORAL at 17:26

## 2022-11-09 RX ADMIN — GABAPENTIN 100 MG: 100 CAPSULE ORAL at 21:06

## 2022-11-09 RX ADMIN — INSULIN LISPRO 2 UNITS: 100 INJECTION, SOLUTION INTRAVENOUS; SUBCUTANEOUS at 08:52

## 2022-11-09 RX ADMIN — POTASSIUM CHLORIDE 40 MEQ: 1500 TABLET, EXTENDED RELEASE ORAL at 12:03

## 2022-11-09 RX ADMIN — METOPROLOL SUCCINATE 25 MG: 25 TABLET, EXTENDED RELEASE ORAL at 09:00

## 2022-11-09 RX ADMIN — PANTOPRAZOLE SODIUM 40 MG: 40 TABLET, DELAYED RELEASE ORAL at 04:29

## 2022-11-09 RX ADMIN — POLYETHYLENE GLYCOL 3350 17 G: 17 POWDER, FOR SOLUTION ORAL at 04:27

## 2022-11-09 RX ADMIN — ACETAMINOPHEN 975 MG: 325 TABLET ORAL at 04:29

## 2022-11-09 RX ADMIN — ACETAMINOPHEN 975 MG: 325 TABLET ORAL at 14:47

## 2022-11-09 RX ADMIN — SOTALOL HYDROCHLORIDE 120 MG: 120 TABLET ORAL at 21:06

## 2022-11-09 RX ADMIN — SENNOSIDES AND DOCUSATE SODIUM 2 TABLET: 8.6; 5 TABLET ORAL at 21:06

## 2022-11-09 RX ADMIN — ACETAMINOPHEN 975 MG: 325 TABLET ORAL at 21:05

## 2022-11-09 RX ADMIN — METOPROLOL SUCCINATE 25 MG: 25 TABLET, EXTENDED RELEASE ORAL at 17:26

## 2022-11-09 RX ADMIN — PAROXETINE 37.5 MG: 37.5 TABLET, FILM COATED, EXTENDED RELEASE ORAL at 09:01

## 2022-11-09 RX ADMIN — CEFTRIAXONE 2000 MG: 2 INJECTION, POWDER, FOR SOLUTION INTRAMUSCULAR; INTRAVENOUS at 20:49

## 2022-11-09 RX ADMIN — APIXABAN 10 MG: 5 TABLET, FILM COATED ORAL at 09:00

## 2022-11-09 RX ADMIN — SOTALOL HYDROCHLORIDE 120 MG: 120 TABLET ORAL at 08:59

## 2022-11-09 RX ADMIN — GABAPENTIN 100 MG: 100 CAPSULE ORAL at 17:26

## 2022-11-09 RX ADMIN — MINERAL OIL 1 ENEMA: 100 ENEMA RECTAL at 08:30

## 2022-11-09 RX ADMIN — INSULIN LISPRO 2 UNITS: 100 INJECTION, SOLUTION INTRAVENOUS; SUBCUTANEOUS at 12:04

## 2022-11-09 RX ADMIN — BISACODYL 10 MG: 10 SUPPOSITORY RECTAL at 08:00

## 2022-11-09 RX ADMIN — GABAPENTIN 100 MG: 100 CAPSULE ORAL at 09:00

## 2022-11-09 NOTE — PROGRESS NOTES
Progress Note - Infectious Disease   Louis Randhawa 57 y o  female MRN: [id number]  Unit/Bed#: [facility] 511-01 Encounter: [id number]      Impression/Plan:  1  Systemic inflammatory response syndrome-  -concern for true bacteremia given acute change in vitals Whipple POD3  1/2 blood cx with strep mitis oralis  Concurrent b/l pulmonary emboli confounding SIRS vs sepsis differentiation  Patient clinically improved on systemic anticoagulation and IV antibiotics  -antibiotics as below  -anticoagulation now with DOAC per primary     2  Streptococcus oralis bacteremia-  Given clinical course, it is concerning that this is a true bacteremia rather than a contaminant  Source of bacteremia likely from upper GI intervention  Original cultures 1/2 positive   -will need 14 day course of antibiotics (end date 11/18)  -rocephin 2g q24h #5/14  -will need CMP to monitor LFTs and CBC with diff in 1 week   -repeat 11/7 blood cultures(-) 48 hours, therefore patient stable from ID standpoint for d/c to rehab center  However given slight increase in leukocytosis since removal of NIGEL drain yesterday, would recommend at least observing another day until WBC count decreasing  -TTE did not show valvular vegetations or significant dysfunction     3  Pulmonary embolism-  -2/2 malignancy and post-op immobilization  Patient was POD3 at timing of onset  -anticoagulation per the primary     4  Pancreatic adenocarcinoma-  -status post Whipple procedure with vascular reconstruction               -can utilize chemo port for IV abx course at rehab center        Discussed plan with primary team    Antibiotics:  Total days of post-op abx: #5  Rocephin #4  S/p vanco #1  S/p cefepime #2     Pre-op: Ancef and flagyl     Cultures:   Blood cx 11/4: 1/2 strep mitis oralis  Repeat cx drawn 11/8    Subjective:  Patient has no fever, chills, sweats; no nausea, vomiting, diarrhea; no cough, shortness of breath; no pain  No new symptoms      NIGEL drain was removed yesterday with slight increase in WBC this am  Patient had a few hard stool pellets pass after suppositories and enemas, but otherwise no significant bowel movement yet  Abdomen remains soft, but is uncomfortable per patient    Objective:  Vitals:  Temp:  [97 4 °F (36 3 °C)-99 2 °F (37 3 °C)] 97 4 °F (36 3 °C)  HR:  [85-92] 85  Resp:  [17-20] 20  BP: (124-141)/(70-79) 124/70  SpO2:  [95 %-100 %] 100 %  Temp (24hrs), Av 3 °F (36 8 °C), Min:97 4 °F (36 3 °C), Max:99 2 °F (37 3 °C)  Current: Temperature: (!) 97 4 °F (36 3 °C)    Physical Exam:   General Appearance:  Alert, interactive, nontoxic, no acute distress  Obese   Throat: Oropharynx moist without lesions  Lungs:   Clear to auscultation bilaterally; no wheezes, rhonchi or rales; respirations unlabored   Heart:  RRR; no murmur, rub or gallop   Abdomen:   Soft, non-tender, non-distended, positive bowel sounds  Extremities: No clubbing, cyanosis or edema   Skin: No new rashes or lesions  No draining wounds noted  Labs, Imaging, & Other studies:   All pertinent labs and imaging studies were personally reviewed    CT C/A/P reviewed with b/l PE visualized   No obvious collections or signs of infection otherwise appreciated    Results from last 7 days   Lab Units 22  0443   WBC Thousand/uL 13 27* 11 66*  11 66* 10 20*   HEMOGLOBIN g/dL 8 2* 7 9*  7 9* 7 9*   PLATELETS Thousands/uL 156 142*  142* 125*     Results from last 7 days   Lab Units 22  04222  02122  0443 22  0535 22  0459   SODIUM mmol/L 135 136 138   < > 143   POTASSIUM mmol/L 3 6 3 3* 3 0*   < > 3 5   CHLORIDE mmol/L 104 105 107   < > 111*   CO2 mmol/L 27 29 26   < > 28   BUN mg/dL 9 8 9   < > 7   CREATININE mg/dL 0 41* 0 44* 0 40*   < > 0 45*   EGFR ml/min/1 73sq m 111 108 111   < > 107   CALCIUM mg/dL 8 6 8 3 8 6   < > 8 3   AST U/L  --   --   --   --  165*   ALT U/L  --   --   --   --  335*   ALK PHOS U/L  --   -- --   --  87    < > = values in this interval not displayed  Results from last 7 days   Lab Units 11/07/22  0852 11/05/22  0933 11/04/22  1218   BLOOD CULTURE  No Growth at 24 hrs  No Growth at 24 hrs   --  No Growth After 4 Days    Streptococcus mitis oralis group*   GRAM STAIN RESULT   --   --  Gram positive cocci in chains*   MRSA CULTURE ONLY   --  No Methicillin Resistant Staphlyococcus aureus (MRSA) isolated  --      Results from last 7 days   Lab Units 11/06/22  0512 11/05/22  0723   PROCALCITONIN ng/ml 0 61* 0 63*                   Danuta Fernando, DO  Internal Medicine PGY3

## 2022-11-09 NOTE — PROGRESS NOTES
Progress Note - Oncology Surgical   Georgie Mccord 57 y o  female MRN: [id number]  Unit/Bed#: [facility] 511-01 Encounter: [id number]    Assessment:  Patient is a 57 y o  female who presented with  post pancreatic head mass, now status on bubble, portal vein, SMV repair on 29/58 complicated by PE now on anticoagulation       Afebrile, VSS on 2L NC     UOP:  1 3 L  WBC    Plan:  Diet as tolerated  Prn pain control per APS recs  Continue metoprolol and sotalol  Eliquis,   NIGEL drain discontinued yesterday  PT/OT: [facility]  Encourage out of bed and ambulation            Subjective/Objective     SubjectiveEvents:  Doing well  Tolerating diet, denies nausea or vomiting  No bowel movements yet, but passing gas and urinating    Objective:     Blood pressure 124/70, pulse 85, temperature (!) 97 4 °F (36 3 °C), temperature source Oral, resp  rate 20, height 5' 4" (1 626 m), weight (!) 137 kg (302 lb 7 5 oz), SpO2 100 %  ,Body mass index is 51 92 kg/m²  Intake/Output Summary (Last 24 hours) at 11/9/2022 0558  Last data filed at 11/9/2022 0401  Gross per 24 hour   Intake 530 ml   Output 1415 ml   Net -885 ml       Invasive Devices  Report    Central Venous Catheter Line  Duration           Port A Cath 05/31/22 Left Chest 161 days          Peripheral Intravenous Line  Duration           Peripheral IV 11/06/22 Proximal;Right;Ventral (anterior) Forearm 2 days          Drain  Duration           Ureteral Internal Stent Left ureter 6 Fr  113 days                Physical Exam  Constitutional:       General: She is not in acute distress  Appearance: She is well-developed  She is not ill-appearing, toxic-appearing or diaphoretic  HENT:      Head: Normocephalic and atraumatic  Eyes:      Extraocular Movements: Extraocular movements intact  Cardiovascular:      Rate and Rhythm: Normal rate  Pulmonary:      Effort: Pulmonary effort is normal  No respiratory distress  Abdominal:      General: There is no distension        Palpations: Abdomen is soft  There is no mass  Tenderness: There is no abdominal tenderness  There is no guarding or rebound  Comments: Incisions clean, dry and intact   Skin:     General: Skin is warm and dry  Neurological:      Mental Status: She is alert and oriented to person, place, and time     Psychiatric:         Mood and Affect: Mood normal          Behavior: Behavior normal

## 2022-11-09 NOTE — RESTORATIVE TECHNICIAN NOTE
Restorative Technician Note      Patient Name: Feliciano Grullon     Note Type: Mobility  Patient Position Upon Consult: Bedside chair  Activity Performed: Ambulated  Assistive Device: Roller walker  Patient Position at End of Consult: Bedside chair;  All needs within reach [employer]

## 2022-11-09 NOTE — PLAN OF CARE
Problem: Prexisting or High Potential for Compromised Skin Integrity  Goal: Skin integrity is maintained or improved  Description: INTERVENTIONS:  - Identify patients at risk for skin breakdown  - Assess and monitor skin integrity  - Assess and monitor nutrition and hydration status  - Monitor labs   - Assess for incontinence   - Turn and reposition patient  - Assist with mobility/ambulation  - Relieve pressure over bony prominences  - Avoid friction and shearing  - Provide appropriate hygiene as needed including keeping skin clean and dry  - Evaluate need for skin moisturizer/barrier cream  - Collaborate with interdisciplinary team   - Patient/family teaching  - Consider wound care consult   Outcome: Progressing     Problem: Potential for Falls  Goal: Patient will remain free of falls  Description: INTERVENTIONS:  - Educate patient/family on patient safety including physical limitations  - Instruct patient to call for assistance with activity   - Consult OT/PT to assist with strengthening/mobility   - Keep Call bell within reach  - Keep bed low and locked with side rails adjusted as appropriate  - Keep care items and personal belongings within reach  - Initiate and maintain comfort rounds  - Make Fall Risk Sign visible to staff  - Offer Toileting every 2 Hours, in advance of need  - Initiate/Maintain alarm  - Obtain necessary fall risk management equipment  - Apply yellow socks and bracelet for high fall risk patients  - Consider moving patient to room near nurses station  Outcome: Progressing     Problem: Nutrition/Hydration-ADULT  Goal: Nutrient/Hydration intake appropriate for improving, restoring or maintaining nutritional needs  Description: Monitor and assess patient's nutrition/hydration status for malnutrition  Collaborate with interdisciplinary team and initiate plan and interventions as ordered  Monitor patient's weight and dietary intake as ordered or per policy   Utilize nutrition screening tool and intervene as necessary  Determine patient's food preferences and provide high-protein, high-caloric foods as appropriate  INTERVENTIONS:  - Monitor oral intake, urinary output, labs, and treatment plans  - Assess nutrition and hydration status and recommend course of action  - Evaluate amount of meals eaten  - Assist patient with eating if necessary   - Allow adequate time for meals  - Recommend/ encourage appropriate diets, oral nutritional supplements, and vitamin/mineral supplements  - Order, calculate, and assess calorie counts as needed  - Recommend, monitor, and adjust tube feedings and TPN/PPN based on assessed needs  - Assess need for intravenous fluids  - Provide specific nutrition/hydration education as appropriate  - Include patient/family/caregiver in decisions related to nutrition  Outcome: Progressing     Problem: MOBILITY - ADULT  Goal: Maintain or return to baseline ADL function  Description: INTERVENTIONS:  -  Assess patient's ability to carry out ADLs; assess patient's baseline for ADL function and identify physical deficits which impact ability to perform ADLs (bathing, care of mouth/teeth, toileting, grooming, dressing, etc )  - Assess/evaluate cause of self-care deficits   - Assess range of motion  - Assess patient's mobility; develop plan if impaired  - Assess patient's need for assistive devices and provide as appropriate  - Encourage maximum independence but intervene and supervise when necessary  - Involve family in performance of ADLs  - Assess for home care needs following discharge   - Consider OT consult to assist with ADL evaluation and planning for discharge  - Provide patient education as appropriate  Outcome: Progressing  Goal: Maintains/Returns to pre admission functional level  Description: INTERVENTIONS:  - Perform BMAT or MOVE assessment daily    - Set and communicate daily mobility goal to care team and patient/family/caregiver     - Collaborate with rehabilitation services on mobility goals if consulted  - Perform Range of Motion   - Reposition patient every 2 hours    - Ambulate patient   - Out of bed for toileting  - Record patient progress and toleration of activity level   Outcome: Progressing

## 2022-11-10 ENCOUNTER — APPOINTMENT (OUTPATIENT)
Dept: RADIOLOGY | Facility: HOSPITAL | Age: 62
End: 2022-11-10

## 2022-11-10 ENCOUNTER — APPOINTMENT (INPATIENT)
Dept: NON INVASIVE DIAGNOSTICS | Facility: HOSPITAL | Age: 62
End: 2022-11-10

## 2022-11-10 PROBLEM — R78.81 STREPTOCOCCAL BACTEREMIA: Status: ACTIVE | Noted: 2022-11-10

## 2022-11-10 PROBLEM — B95.5 STREPTOCOCCAL BACTEREMIA: Status: ACTIVE | Noted: 2022-11-10

## 2022-11-10 LAB
ANION GAP SERPL CALCULATED.3IONS-SCNC: 6 MMOL/L (ref 4–13)
ATRIAL RATE: 158 BPM
ATRIAL RATE: 95 BPM
BASOPHILS # BLD MANUAL: 0 THOUSAND/UL (ref 0–0.1)
BASOPHILS NFR MAR MANUAL: 0 % (ref 0–1)
BUN SERPL-MCNC: 9 MG/DL (ref 5–25)
CALCIUM SERPL-MCNC: 8.6 MG/DL (ref 8.3–10.1)
CARDIAC TROPONIN I PNL SERPL HS: 7 NG/L
CHLORIDE SERPL-SCNC: 105 MMOL/L (ref 96–108)
CO2 SERPL-SCNC: 28 MMOL/L (ref 21–32)
CREAT SERPL-MCNC: 0.45 MG/DL (ref 0.6–1.3)
EOSINOPHIL # BLD MANUAL: 0.38 THOUSAND/UL (ref 0–0.4)
EOSINOPHIL NFR BLD MANUAL: 3 % (ref 0–6)
ERYTHROCYTE [DISTWIDTH] IN BLOOD BY AUTOMATED COUNT: 16.8 % (ref 11.6–15.1)
GFR SERPL CREATININE-BSD FRML MDRD: 107 ML/MIN/1.73SQ M
GLUCOSE SERPL-MCNC: 139 MG/DL (ref 65–140)
GLUCOSE SERPL-MCNC: 149 MG/DL (ref 65–140)
GLUCOSE SERPL-MCNC: 155 MG/DL (ref 65–140)
GLUCOSE SERPL-MCNC: 190 MG/DL (ref 65–140)
GLUCOSE SERPL-MCNC: 194 MG/DL (ref 65–140)
HCT VFR BLD AUTO: 26.2 % (ref 34.8–46.1)
HGB BLD-MCNC: 8 G/DL (ref 11.5–15.4)
LYMPHOCYTES # BLD AUTO: 1.14 THOUSAND/UL (ref 0.6–4.47)
LYMPHOCYTES # BLD AUTO: 9 % (ref 14–44)
MAGNESIUM SERPL-MCNC: 1.9 MG/DL (ref 1.6–2.6)
MCH RBC QN AUTO: 28.7 PG (ref 26.8–34.3)
MCHC RBC AUTO-ENTMCNC: 30.5 G/DL (ref 31.4–37.4)
MCV RBC AUTO: 94 FL (ref 82–98)
MONOCYTES # BLD AUTO: 0.25 THOUSAND/UL (ref 0–1.22)
MONOCYTES NFR BLD: 2 % (ref 4–12)
NEUTROPHILS # BLD MANUAL: 10.9 THOUSAND/UL (ref 1.85–7.62)
NEUTS BAND NFR BLD MANUAL: 4 % (ref 0–8)
NEUTS SEG NFR BLD AUTO: 82 % (ref 43–75)
NRBC BLD AUTO-RTO: 1 /100 WBC (ref 0–2)
P AXIS: 266 DEGREES
P AXIS: 58 DEGREES
PLATELET # BLD AUTO: 155 THOUSANDS/UL (ref 149–390)
PLATELET BLD QL SMEAR: ADEQUATE
PMV BLD AUTO: 11.1 FL (ref 8.9–12.7)
POLYCHROMASIA BLD QL SMEAR: PRESENT
POTASSIUM SERPL-SCNC: 3.4 MMOL/L (ref 3.5–5.3)
PR INTERVAL: 112 MS
PR INTERVAL: 122 MS
QRS AXIS: 24 DEGREES
QRS AXIS: 26 DEGREES
QRSD INTERVAL: 82 MS
QRSD INTERVAL: 94 MS
QT INTERVAL: 314 MS
QT INTERVAL: 380 MS
QTC INTERVAL: 477 MS
QTC INTERVAL: 509 MS
RBC # BLD AUTO: 2.79 MILLION/UL (ref 3.81–5.12)
RBC MORPH BLD: PRESENT
SODIUM SERPL-SCNC: 139 MMOL/L (ref 135–147)
T WAVE AXIS: -31 DEGREES
T WAVE AXIS: 47 DEGREES
TSH SERPL DL<=0.05 MIU/L-ACNC: 2.18 UIU/ML (ref 0.45–4.5)
VENTRICULAR RATE: 158 BPM
VENTRICULAR RATE: 95 BPM
WBC # BLD AUTO: 12.68 THOUSAND/UL (ref 4.31–10.16)

## 2022-11-10 RX ORDER — POTASSIUM CHLORIDE 29.8 MG/ML
40 INJECTION INTRAVENOUS ONCE
Status: COMPLETED | OUTPATIENT
Start: 2022-11-10 | End: 2022-11-10

## 2022-11-10 RX ORDER — POLYETHYLENE GLYCOL 3350 17 G/17G
17 POWDER, FOR SOLUTION ORAL ONCE
Status: COMPLETED | OUTPATIENT
Start: 2022-11-10 | End: 2022-11-10

## 2022-11-10 RX ORDER — CEFTRIAXONE 2 G/50ML
2000 INJECTION, SOLUTION INTRAVENOUS ONCE
OUTPATIENT
Start: 2022-11-12

## 2022-11-10 RX ORDER — SODIUM CHLORIDE 9 MG/ML
20 INJECTION, SOLUTION INTRAVENOUS ONCE
OUTPATIENT
Start: 2022-11-12

## 2022-11-10 RX ORDER — CALCIUM CARBONATE 200(500)MG
500 TABLET,CHEWABLE ORAL ONCE
Status: COMPLETED | OUTPATIENT
Start: 2022-11-10 | End: 2022-11-10

## 2022-11-10 RX ORDER — BISACODYL 10 MG
10 SUPPOSITORY, RECTAL RECTAL ONCE
Status: DISCONTINUED | OUTPATIENT
Start: 2022-11-10 | End: 2022-11-11

## 2022-11-10 RX ORDER — METOPROLOL SUCCINATE 50 MG/1
50 TABLET, EXTENDED RELEASE ORAL 2 TIMES DAILY
Status: DISCONTINUED | OUTPATIENT
Start: 2022-11-10 | End: 2022-11-11 | Stop reason: HOSPADM

## 2022-11-10 RX ORDER — METOPROLOL SUCCINATE 25 MG/1
25 TABLET, EXTENDED RELEASE ORAL ONCE
Status: COMPLETED | OUTPATIENT
Start: 2022-11-10 | End: 2022-11-10

## 2022-11-10 RX ADMIN — APIXABAN 10 MG: 5 TABLET, FILM COATED ORAL at 09:00

## 2022-11-10 RX ADMIN — GABAPENTIN 100 MG: 100 CAPSULE ORAL at 21:52

## 2022-11-10 RX ADMIN — INSULIN LISPRO 2 UNITS: 100 INJECTION, SOLUTION INTRAVENOUS; SUBCUTANEOUS at 09:04

## 2022-11-10 RX ADMIN — OXYCODONE HYDROCHLORIDE 5 MG: 5 TABLET ORAL at 06:11

## 2022-11-10 RX ADMIN — METOPROLOL SUCCINATE 25 MG: 25 TABLET, EXTENDED RELEASE ORAL at 10:58

## 2022-11-10 RX ADMIN — CEFTRIAXONE 2000 MG: 2 INJECTION, POWDER, FOR SOLUTION INTRAMUSCULAR; INTRAVENOUS at 21:52

## 2022-11-10 RX ADMIN — METOPROLOL SUCCINATE 50 MG: 50 TABLET, EXTENDED RELEASE ORAL at 17:26

## 2022-11-10 RX ADMIN — SOTALOL HYDROCHLORIDE 120 MG: 120 TABLET ORAL at 09:01

## 2022-11-10 RX ADMIN — METOROPROLOL TARTRATE 2.5 MG: 5 INJECTION, SOLUTION INTRAVENOUS at 09:29

## 2022-11-10 RX ADMIN — GABAPENTIN 100 MG: 100 CAPSULE ORAL at 17:26

## 2022-11-10 RX ADMIN — PAROXETINE 37.5 MG: 37.5 TABLET, FILM COATED, EXTENDED RELEASE ORAL at 09:03

## 2022-11-10 RX ADMIN — APIXABAN 10 MG: 5 TABLET, FILM COATED ORAL at 17:26

## 2022-11-10 RX ADMIN — POLYETHYLENE GLYCOL 3350 17 G: 17 POWDER, FOR SOLUTION ORAL at 06:50

## 2022-11-10 RX ADMIN — POTASSIUM CHLORIDE 40 MEQ: 29.8 INJECTION, SOLUTION INTRAVENOUS at 06:54

## 2022-11-10 RX ADMIN — BISACODYL 10 MG: 10 SUPPOSITORY RECTAL at 11:01

## 2022-11-10 RX ADMIN — METOPROLOL SUCCINATE 25 MG: 25 TABLET, EXTENDED RELEASE ORAL at 09:01

## 2022-11-10 RX ADMIN — GABAPENTIN 100 MG: 100 CAPSULE ORAL at 09:01

## 2022-11-10 RX ADMIN — INSULIN LISPRO 2 UNITS: 100 INJECTION, SOLUTION INTRAVENOUS; SUBCUTANEOUS at 11:57

## 2022-11-10 RX ADMIN — ACETAMINOPHEN 975 MG: 325 TABLET ORAL at 14:27

## 2022-11-10 RX ADMIN — POLYETHYLENE GLYCOL 3350 17 G: 17 POWDER, FOR SOLUTION ORAL at 14:27

## 2022-11-10 RX ADMIN — ACETAMINOPHEN 975 MG: 325 TABLET ORAL at 21:55

## 2022-11-10 RX ADMIN — CALCIUM CARBONATE (ANTACID) CHEW TAB 500 MG 500 MG: 500 CHEW TAB at 06:08

## 2022-11-10 RX ADMIN — INSULIN LISPRO 2 UNITS: 100 INJECTION, SOLUTION INTRAVENOUS; SUBCUTANEOUS at 17:27

## 2022-11-10 RX ADMIN — SOTALOL HYDROCHLORIDE 120 MG: 120 TABLET ORAL at 21:52

## 2022-11-10 RX ADMIN — SENNOSIDES AND DOCUSATE SODIUM 2 TABLET: 8.6; 5 TABLET ORAL at 21:54

## 2022-11-10 RX ADMIN — PANTOPRAZOLE SODIUM 40 MG: 40 TABLET, DELAYED RELEASE ORAL at 06:08

## 2022-11-10 NOTE — CASE MANAGEMENT
Case Management Discharge Planning Note    Patient name Elvis Choudhary  Location [address] [place] [address] [address] 142-16 MRN [id number]  : 1960 Date 11/10/2022       Current Admission Date: 10/31/2022  Current Admission Diagnosis:Adenocarcinoma of head of pancreas [facility])   Patient Active Problem List    Diagnosis Date Noted   • Streptococcal bacteremia 11/10/2022   • Pulmonary embolism ([facility] ) 2022   • Sepsis ([place] [address] ) 2022   • Paroxysmal A-fib ([place] [address] ) 2022   • Left flank pain 2022   • CPAP (continuous positive airway pressure) dependence    • Chemotherapy induced neutropenia ([place] [address] ) 2022   • Adenocarcinoma of head of pancreas ([place] [address] ) 2022   • Uric acid kidney stone 2022   • Nephrolithiasis 10/25/2021   • Class 3 severe obesity due to excess calories with body mass index (BMI) of 50 0 to 59 9 in adult ([place] [address] ) 2020   • Type 2 diabetes mellitus without complication, without long-term current use of insulin ([place] [address] ) 2017   • Severe obstructive sleep apnea 2016   • Dyspnea on exertion 2016   • Supraventricular tachycardia ([place] [address] ) 2016   • Hypertension 2016   • Controlled depression 2016   • Adenomatous colon polyp 2015   • 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[place] [address] ) 2013   • Morbid obesity ([place] [address] ) 2013   • Hyperlipidemia 2013   • Benign essential hypertension 10/02/2012   • Esophageal reflux 2012      LOS (days): 10  Geometric Mean LOS (GMLOS) (days):   Days to GMLOS:     OBJECTIVE:  Risk of Unplanned Readmission Score: 16 41         Current admission status: Inpatient   Preferred Pharmacy:   [address] [place] [address] #[id number]  Phone: [phone] Fax: [phone]    Primary Care Provider: Jeremy Armenta MD    Primary Insurance: BLUE CROSS  Secondary Insurance:     DISCHARGE DETAILS:    Other Referral/Resources/Interventions Provided:  Referral Comments: Per Anastacio  Patient and spouse agreeable to go to the [facility] to receive IV ABX Infusion once a day until 11/18  [address] reserved for post op care/PT/OT -SOC within 24-48 hours of discharge

## 2022-11-10 NOTE — PLAN OF CARE
Problem: OCCUPATIONAL THERAPY ADULT  Goal: Performs self-care activities at highest level of function for planned discharge setting  See evaluation for individualized goals  Description: Treatment Interventions: ADL retraining, Functional transfer training, Endurance training, Patient/family training, Equipment evaluation/education, Compensatory technique education, Energy conservation, Activityengagement     See flowsheet documentation for full assessment, interventions and recommendations  Outcome: Progressing  Note: Limitation: Decreased ADL status, Decreased Safe judgement during ADL, Decreased endurance, Decreased self-care trans, Decreased high-level ADLs  Prognosis: Good  Assessment: OT trx session focused on ADL retraining, functional transfer training, endurance training, patient/family training, equipment evaluation/education, continued evaluation, energy conservation, and activity engagement  Pt was found supine in bed and left in bedside chair w/call bell within reach  Presently, pt is S for grooming (w/increased time, vc), MIN A for UB bathing/dressing (w/increased time, vc), MOD A for LB bathing (w/increased time + vc), Total A for LB dressing (w/increased time, vc), MIN A for bed mobility, S for sit>stand + stand>sit (w/increased time, vc + RW), and CGA for fnxl mobility (w/increased time, min vc + RW)  Pt was able to ambulate from EOB>sinkside but required chair to sit as she began to feel too fatigued to remain standing - pt able to continue w/ADL retraining intervention seated sinkside  Pt able to maintain STS w/RW during perineal hygiene + wiping buttocks  O2 remained above 95 t/o  ADL tasks of grooming + UB bathing/dressing + LB bathing were completed seated sinkside  ADL task of LB dressing completed in bedside chair  Minimal improvements noted w/ADL retraining and functional transfer training   Remaining limitations observed w/energy conservation, endurance training, activity engagement, ADL retraining, and fnxl transfer training  The patient's raw score on the AM-PAC Daily Activity inpatient short form is 19, standardized score is 40 22, greater than 39 4  Patients at this level are likely to benefit from discharge to home  Please refer to the recommendation of the Occupational Therapist for safe discharge planning  Rec is for home health rehab upon d/c  Presently, it is recommended pt continue w/rehab 3-5x a week for 10-14 days to meet goals       OT Discharge Recommendation: Home with home health rehabilitation

## 2022-11-10 NOTE — RESTORATIVE TECHNICIAN NOTE
Restorative Technician Note      Patient Name: Jarrett Plater     Note Type: Mobility  Patient Position Upon Consult: Supine  Activity Performed: Ambulated  Assistive Device: Roller walker  Patient Position at End of Consult: Bedside chair;  All needs within reach

## 2022-11-10 NOTE — PLAN OF CARE
Problem: Prexisting or High Potential for Compromised Skin Integrity  Goal: Skin integrity is maintained or improved  Description: INTERVENTIONS:  - Identify patients at risk for skin breakdown  - Assess and monitor skin integrity  - Assess and monitor nutrition and hydration status  - Monitor labs   - Assess for incontinence   - Turn and reposition patient  - Assist with mobility/ambulation  - Relieve pressure over bony prominences  - Avoid friction and shearing  - Provide appropriate hygiene as needed including keeping skin clean and dry  - Evaluate need for skin moisturizer/barrier cream  - Collaborate with interdisciplinary team   - Patient/family teaching  - Consider wound care consult   Outcome: Progressing     Problem: Potential for Falls  Goal: Patient will remain free of falls  Description: INTERVENTIONS:  - Educate patient/family on patient safety including physical limitations  - Instruct patient to call for assistance with activity   - Consult OT/PT to assist with strengthening/mobility   - Keep Call bell within reach  - Keep bed low and locked with side rails adjusted as appropriate  - Keep care items and personal belongings within reach  - Initiate and maintain comfort rounds  - Make Fall Risk Sign visible to staff  - Offer Toileting every  Hours, in advance of need  - Initiate/Maintain alarm  - Obtain necessary fall risk management equipment:   - Apply yellow socks and bracelet for high fall risk patients  - Consider moving patient to room near nurses station  Outcome: Progressing     Problem: Nutrition/Hydration-ADULT  Goal: Nutrient/Hydration intake appropriate for improving, restoring or maintaining nutritional needs  Description: Monitor and assess patient's nutrition/hydration status for malnutrition  Collaborate with interdisciplinary team and initiate plan and interventions as ordered  Monitor patient's weight and dietary intake as ordered or per policy   Utilize nutrition screening tool and intervene as necessary  Determine patient's food preferences and provide high-protein, high-caloric foods as appropriate  INTERVENTIONS:  - Monitor oral intake, urinary output, labs, and treatment plans  - Assess nutrition and hydration status and recommend course of action  - Evaluate amount of meals eaten  - Assist patient with eating if necessary   - Allow adequate time for meals  - Recommend/ encourage appropriate diets, oral nutritional supplements, and vitamin/mineral supplements  - Order, calculate, and assess calorie counts as needed  - Recommend, monitor, and adjust tube feedings and TPN/PPN based on assessed needs  - Assess need for intravenous fluids  - Provide specific nutrition/hydration education as appropriate  - Include patient/family/caregiver in decisions related to nutrition  Outcome: Progressing     Problem: MOBILITY - ADULT  Goal: Maintain or return to baseline ADL function  Description: INTERVENTIONS:  -  Assess patient's ability to carry out ADLs; assess patient's baseline for ADL function and identify physical deficits which impact ability to perform ADLs (bathing, care of mouth/teeth, toileting, grooming, dressing, etc )  - Assess/evaluate cause of self-care deficits   - Assess range of motion  - Assess patient's mobility; develop plan if impaired  - Assess patient's need for assistive devices and provide as appropriate  - Encourage maximum independence but intervene and supervise when necessary  - Involve family in performance of ADLs  - Assess for home care needs following discharge   - Consider OT consult to assist with ADL evaluation and planning for discharge  - Provide patient education as appropriate  Outcome: Progressing  Goal: Maintains/Returns to pre admission functional level  Description: INTERVENTIONS:  - Perform BMAT or MOVE assessment daily    - Set and communicate daily mobility goal to care team and patient/family/caregiver     - Collaborate with rehabilitation services on mobility goals if consulted  - Perform Range of Motion  times a day  - Reposition patient every  hours    - Dangle patient  times a day  - Stand patient  times a day  - Ambulate patient  times a day  - Out of bed to chair  times a day   - Out of bed for meals times a day  - Out of bed for toileting  - Record patient progress and toleration of activity level   Outcome: Progressing

## 2022-11-10 NOTE — QUICK NOTE
Called by RN for new onset of palpitations, new onset CP, and SVT to 150s-160s on telemetry  Evaluated at bedside  Patient describes CP as located mid-sternal, a few seconds, did not radiate, and preceded by palpitations  Denies any fevers/chills, N/V, SOB, change in abdominal pain, urinary patterns, and still denies any BM, however passing flatus  Vitals on evaluation 96%on 1L, afebrile, -160 range, /85  General: Pt is AAOx3, sitting up in bedside chair in NAD  HEENT: normocephalic, dry mucous membranes   Neck: Supple, non-tender, no JVD, ROM intact, no tracheal deviation   CV: CP not reproducible with palpation  Sustained tachycardia, regular rhythm  no murmurs, gallops, rubs  S1 and S2  Resp: Lung sounds clear to auscultation B/L, normal respiratory effort, no  wheezes, rhonchi, rhales   Abd: Soft, with no tenderness, non-distended, non-tympanitic  Normoactive bowelsounds all 4 quadrants  No rebound or guarding  No CVA tenderness  Abdominal incision clean, dry, intact  Staples in place  No erythema  Ext: Warm with no cyanosis, no edema, no deformities  ROM intact   Skin: No rashes, bruises, ulcers  Neuro: Sensation intact all 4 extremities  5+ strength all 4 extremities       Plan:  -STAT EKG, TROP, CXR  -Give PRN Metoprolol  -Consult cardiology  -Discussed with Dr Adele Olson and Cardiology at [facility] cardiology recommendations    Mulugeta Potter

## 2022-11-10 NOTE — PROGRESS NOTES
Progress Note - Infectious Disease   Rene Vasquez 57 y o  female MRN: [id number]  Unit/Bed#: [facility] 511-01 Encounter: [id number]      Impression/Plan:  1  Systemic inflammatory response syndrome-  -concern for true bacteremia given acute change in vitals Whipple POD3  1/2 blood cx with strep mitis oralis  Concurrent b/l pulmonary emboli confounding SIRS vs sepsis differentiation  Patient clinically improved on systemic anticoagulation and IV antibiotics  -antibiotics as below  -anticoagulation now with DOAC per primary     2  Streptococcus oralis bacteremia-  Given clinical course, it is concerning that this is a true bacteremia rather than a contaminant  Source of bacteremia likely from upper GI intervention  Original cultures 1/2 positive   -will need 14 day course of antibiotics (end date 11/18)  -rocephin 2g q24h #6/14  -will need CMP to monitor LFTs and CBC with diff in 1 week   -repeat 11/7 blood cultures(-) 72 hours  patient stable from ID standpoint    -scripts for rocephin and labs left with case management  -TTE did not show valvular vegetations or significant dysfunction     3  Pulmonary embolism-  -2/2 malignancy and post-op immobilization  Patient was POD3 at timing of onset  -anticoagulation per the primary     4  Pancreatic adenocarcinoma-  -status post Whipple procedure with vascular reconstruction               -XCS utilize chemo port for IV abx course at rehab center     5  SVT  -s/p ablation many years ago, maintained on sotalol and metoprolol   -cardiology consulted today for run of SVT which resolved with bearing down    6   Constipation  -post-op constipation   -dispo pending normal BM        Discussed plan with primary team     Antibiotics:  Total days of post-op abx: #6  Rocephin #5  S/p vanco #1  S/p cefepime #2     Pre-op: Ancef and flagyl     Cultures:   Blood cx 11/4: 1/2 strep mitis oralis  Repeat cx drawn 11/8: (-)    Subjective:  Patient has no fever, chills, sweats; no nausea, vomiting, diarrhea; no cough, shortness of breath; no pain  No new symptoms  Had a run of SVT this morning which resolved with valsalva maneuver  Cardiology consulted by primary team  Has a history of SVT  Patient otherwise asymptomatic  Small hard BM yesterday- dispo pending regular BM  Suppositories ordered by primary  Objective:  Vitals:  Temp:  [97 5 °F (36 4 °C)-98 4 °F (36 9 °C)] 97 6 °F (36 4 °C)  HR:  [] 159  Resp:  [14-18] 16  BP: (112-142)/(66-85) 142/85  SpO2:  [94 %-98 %] 98 %  Temp (24hrs), Av °F (36 7 °C), Min:97 5 °F (36 4 °C), Max:98 4 °F (36 9 °C)  Current: Temperature: 97 6 °F (36 4 °C)    Physical Exam:   General Appearance:  Alert, interactive, nontoxic, no acute distress  Obese   Throat: Oropharynx moist without lesions  Lungs:   Clear to auscultation bilaterally; no wheezes, rhonchi or rales; respirations unlabored   Heart:  Tachycardiac, regular; no murmur, rub or gallop   Abdomen:   Soft, non-tender, non-distended, positive bowel sounds  Extremities: No clubbing, cyanosis or edema   Skin: No new rashes or lesions  No draining wounds noted  Labs, Imaging, & Other studies:   All pertinent labs and imaging studies were personally reviewed    EKG from this am reviewed showing SVT    Results from last 7 days   Lab Units 11/10/22  0456 22  0219   WBC Thousand/uL 12 68* 13 27* 11 66*  11 66*   HEMOGLOBIN g/dL 8 0* 8 2* 7 9*  7 9*   PLATELETS Thousands/uL 155 156 142*  142*     Results from last 7 days   Lab Units 11/10/22  0456 22  0422 22  0219   SODIUM mmol/L 139 135 136   POTASSIUM mmol/L 3 4* 3 6 3 3*   CHLORIDE mmol/L 105 104 105   CO2 mmol/L 28 27 29   BUN mg/dL 9 9 8   CREATININE mg/dL 0 45* 0 41* 0 44*   EGFR ml/min/1 73sq m 107 111 108   CALCIUM mg/dL 8 6 8 6 8 3     Results from last 7 days   Lab Units 22  0852 22  0933 22  1218   BLOOD CULTURE  No Growth at 72 hrs    No Growth at 72 hrs   --  No Growth After 5 Days    Streptococcus mitis oralis group*   GRAM STAIN RESULT   --   --  Gram positive cocci in chains*   MRSA CULTURE ONLY   --  No Methicillin Resistant Staphlyococcus aureus (MRSA) isolated  --      Results from last 7 days   Lab Units 11/06/22  0512 11/05/22  0723   PROCALCITONIN ng/ml 0 61* 0 63*                     Danuta Eugene, DO  Internal Medicine PGY3

## 2022-11-10 NOTE — PHYSICAL THERAPY NOTE
Physical Therapy Cancellation Note         11/10/22 0942   PT Last Visit   PT Visit Date 11/10/22   Note Type   Note Type Cancelled Session   Cancel Reasons   (Spoke to RN who reports pt having episode of tachycardia  RN requests hold on therapy at this time    PT to continue to follow and see pt as appropriate and able )     Ki Cavazos, PT, DPT

## 2022-11-10 NOTE — CONSULTS
Consultation - Cardiology Team One  Cece Oviedo 57 y o  female MRN: [id number]  Unit/Bed#: [facility] 511-01 Encounter: [id number]    Inpatient consult to Cardiology  Consult performed by: CHINO Tinajero  Consult ordered by: Rossana Osuna PA-C          Physician Requesting Consult: Mima Wolf MD  Reason for Consult / Principal Problem: tachycardia      Assessment/ Plan:    1  Paroxysmal SVT: known hx of; s/p prior ablation 2016; had recurrence of SVT this AM with HRs in 160s; she broke with vagal maneuver into SR with HRs 90s  This was in the setting of recent abdominal surgery and hypokalemia; K 3 4 today  Will increase metoprolol succinate to 50mg BID  Check Mag level and TSH  Recent echo noted; no need to repeat; no need to check troponins  2  Pancreatic CA: s/p adjuvent chemotherapy; now s/p whipple POD #11  Management per surgical team  3  HTN: well controlled; increasing metoprolol dose due to SVT  4  HLD: resume home lipitor when ok from surgical standpoint  5  Hx of paroxysmal atrial fibrillation: 2016 after her SVT ablation; initiated on sotalol/metoprolol and underwent cardioversion; does not appear to have had any docuemented recurrence; she is going to be on anticoagulation now given her PE  History of Present Illness      HPI: Cece Oviedo is a 57y o  year old female who has a history of SVT with prior ablation 2016, prior atrial fibrillation episode 2016 with initiation of sotalol and CV not on AC historically, HTN, pancreatic cancer s/p chemotherapy and admission now for Whipple procedure, MARTIN, HLD, obesity  She  follows with cardiologist Dr Haim Irvin  Pt with above hx who presented on 10/31 and underwent whipple procedure for pancreatic CA  She is POD #11 today  Recovery complicated by PE diagnosed on CTA 11/4 for which she is now on eliquis  Today she developed sudden onset narrow complex regular tachycardia with HRs in 160s   She reported chest pressure and palpitations  Was given 2 5mg IV metoprolol without any improvement  She did however break/convert to SR with HRs in  with vagal maneuver  EKG post showed sinus rhythm  She does carry past hx of SVT; having undergone SVT ablation 2016  She then developed atrial fibrillation and was placed on sotalol and underwent cardioversion; she has not been on [facility] historically due to low CHADs-Vasc score and does appears to have had any documented recurrence of afib; she has been taking ASA  Had a recent echo 9/7/22 showing LVEF 55% and trace MR  She takes sotalol 120mg BID and metoprolol succinate 25mg BID as OP which she has been getting consistently here  Denies any nausea or vomiting  EKG reviewed personally:  11/10/2022  Narrow complex regular tachycardia with HRs 160    Repeat ekg post conversion showing SR     Telemetry reviewed personally:   Narrow complex tachycardia; conversion to SR rates in 90s    Review of Systems   Constitutional: Negative for decreased appetite and fever  Cardiovascular: Positive for leg swelling and palpitations  Negative for chest pain, dyspnea on exertion and orthopnea  Respiratory: Negative for cough and shortness of breath  Gastrointestinal: Negative for nausea and vomiting  Genitourinary: Negative for dysuria  Neurological: Negative for dizziness and light-headedness  Psychiatric/Behavioral: Negative for altered mental status  All other systems reviewed and are negative       Historical Information   Past Medical History:   Diagnosis Date   • Abnormal liver function test     last assessed 1/16/17    • Adenomatosis    • 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    last assessed 4/4/13   • Arthritis    • Atrial flutter ([facility] [address] )    • Benign essential hypertension     last assessed 12/21/17    • Body mass index (BMI) of 50-59 9 in adult [facility])    • Cancer (HCC)     pancreas   • Colon polyp    • Congenital pes planus     last assessed 7/15/14    • COVID 4/30/21   • CPAP (continuous positive airway pressure) dependence    • Dental crowns present    • Depression    • Diabetes mellitus ([facility] )    • Dizziness     occas--pt reports did see family doctor   • GERD (gastroesophageal reflux disease)    • Hemangioma of skin 08/26/2003    last assessed 8/26/03   • History of cancer chemotherapy     [facility] Oncologist Dr Anastasia Severe   • History of gastroesophageal reflux (GERD)    • Hypercholesterolemia    • Hyperlipidemia    • Hypertension    • Irregular heart beat    • Kidney stone    • 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    last assessed 12/31/14    • Osteoarthritis of both knees     last assessed 12/31/14    • Paroxysmal atrial fibrillation ([facility] )    • Port-A-Cath in place    • S/P ablation of atrial flutter    • Shortness of breath     per pt "from chemo-not drastic--still working and goes up steps"   • Sleep apnea    • Wears glasses      Past Surgical History:   Procedure Laterality Date   • ABDOMINAL ADHESION SURGERY N/A 10/31/2022    Procedure: LYSIS ADHESIONS, colectomy, vascular pedicle flap;  Surgeon: Low Lopez MD;  [facility];  Service: Surgical Oncology   • ABDOMINAL SURGERY  06/2006    20cm GIST removed    • ABLATION SOFT TISSUE N/A 10/31/2022    Procedure: SOFT TISSUE ABLATION;  Surgeon: Low Lopez MD;  [facility];  Service: Surgical Oncology   • APPENDECTOMY     • ATRIAL ABLATION SURGERY     • CARPAL TUNNEL RELEASE Bilateral    • COLONOSCOPY     • FL GUIDED CENTRAL VENOUS ACCESS DEVICE INSERTION  05/31/2022   • FL RETROGRADE PYELOGRAM  07/18/2022   • FL RETROGRADE PYELOGRAM  8/22/2022   • HYSTERECTOMY      fibroids   • IR PORT CHECK  06/23/2022   • IR PORT REMOVAL AND PLACEMENT NEW SITE  06/28/2022   • JOINT REPLACEMENT Bilateral     knees   • KIDNEY STONE SURGERY Right 09/17/2021    placed stent in  for kidney stone   • KNEE SURGERY     • KNEE SURGERY Left 03/2019   • LAPAROTOMY N/A 10/31/2022    Procedure: EXPLORATORY LAPAROTOMY;  Surgeon: Darrian Jerez MD;  [facility];  Service: Surgical Oncology   • OOPHORECTOMY      cyst   • CA CYSTO/URETERO W/LITHOTRIPSY &INDWELL STENT INSRT Left 8/22/2022    Procedure: CYSTOSCOPY URETEROSCOPY WITH LITHOTRIPSY HOLMIUM LASER, RETROGRADE PYELOGRAM AND INSERTION STENT URETERAL;  Surgeon: Christopher Dunne MD;  [facility];  Service: Urology   • CA CYSTOSCOPY,INSERT URETERAL STENT Left 8/22/2022    Procedure: EXCHANGE STENT URETERAL;  Surgeon: Christopher Dunne MD;  [facility];  Service: Urology   • CA CYSTOURETHROSCOPY,URETER CATHETER Left 07/18/2022    Procedure: CYSTOSCOPY RETROGRADE PYELOGRAM WITH INSERTION STENT URETERAL;  Surgeon: Christopher Dunne MD;  Location[facility];  Service: Urology   • TONSILLECTOMY     • TUBAL LIGATION     • TUMOR EXCISION  2006    gastrointestinal stromal tumor   • TUNNELED VENOUS PORT PLACEMENT N/A 05/31/2022    Procedure: INSERTION VENOUS PORT (PORT-A-CATH); Surgeon: Darrian Jerez MD;  [facility];  Service: Surgical Oncology   • UPPER GASTROINTESTINAL ENDOSCOPY     • WHIPPLE PROCEDURE/PANCREATICO-DUODENECTOMY N/A 10/31/2022    Procedure:  WHIPPLE PROCEDURE/PANCREATICO-DUODENECTOMY, IOUS;  Surgeon: Darrian Jerez MD;  [facility];  Service: Surgical Oncology     Social History     Substance and Sexual Activity   Alcohol Use Not Currently    Comment: social drinker per allscript      Social History     Substance and Sexual Activity   Drug Use Never     Social History     Tobacco Use   Smoking Status Former Smoker   • Years: 9 00   • Types: Cigarettes   Smokeless Tobacco Never Used     Family History:   Family History   Problem Relation Age of Onset   • Emphysema Mother    • Arthritis Mother    • Diabetes Mother    • Hypertension Mother    • COPD Mother    • Arthritis Father    • Prostate cancer Father    • Cerebral aneurysm Son    • No Known Problems Maternal Grandmother    • No Known Problems Maternal Grandfather    • No Known Problems Paternal Grandmother    • No Known Problems Paternal Grandfather    • No Known Problems Son    • No Known Problems Maternal Aunt    • No Known Problems Maternal Aunt    • No Known Problems Paternal Aunt    • No Known Problems Paternal Aunt        Meds/Allergies   current meds:   Current Facility-Administered Medications   Medication Dose Route Frequency   • acetaminophen (TYLENOL) tablet 975 mg  975 mg Oral Q8H [address]   • apixaban (ELIQUIS) tablet 10 mg  10 mg Oral BID   • bisacodyl (DULCOLAX) rectal suppository 10 mg  10 mg Rectal Daily   • bisacodyl (DULCOLAX) rectal suppository 10 mg  10 mg Rectal Once   • cefTRIAXone (ROCEPHIN) 2,000 mg in dextrose 5 % 50 mL IVPB  2,000 mg Intravenous Q24H   • gabapentin (NEURONTIN) capsule 100 mg  100 mg Oral TID   • insulin lispro (HumaLOG) 100 units/mL subcutaneous injection 2-12 Units  2-12 Units Subcutaneous TID With Meals   • iohexol (OMNIPAQUE) 240 MG/ML solution 50 mL  50 mL Oral 90 min pre-procedure   • metoprolol (LOPRESSOR) injection 2 5 mg  2 5 mg Intravenous Q6H PRN   • metoprolol succinate (TOPROL-XL) 24 hr tablet 50 mg  50 mg Oral BID   • oxyCODONE (ROXICODONE) immediate release tablet 10 mg  10 mg Oral Q4H PRN   • oxyCODONE (ROXICODONE) IR tablet 5 mg  5 mg Oral Q4H PRN   • pantoprazole (PROTONIX) EC tablet 40 mg  40 mg Oral Early Morning   • PARoxetine (PAXIL-CR) 24 hr tablet 37 5 mg  37 5 mg Oral Daily   • polyethylene glycol (MIRALAX) packet 17 g  17 g Oral Daily PRN   • senna-docusate sodium (SENOKOT S) 8 6-50 mg per tablet 2 tablet  2 tablet Oral HS   • sotalol (BETAPACE) tablet 120 mg  120 mg Oral Q12H [address]     Allergies   Allergen Reactions   • Other Rash     Adhesive tape      Objective   Vitals: Blood pressure 142/85, pulse (!) 159, temperature 97 6 °F (36 4 °C), resp  rate 16, height 5' 4" (1 626 m), weight (!) 137 kg (301 lb 5 9 oz), SpO2 98 %  ,     Body mass index is 51 73 kg/m²  ,     Systolic (95SRU), PBC:096 , Min:112 , ZCQ:220     Diastolic (47KIS), MZW:28, Min:66, Max:85      Intake/Output Summary (Last 24 hours) at 11/10/2022 1000  Last data filed at 11/10/2022 3813  Gross per 24 hour   Intake 960 ml   Output 950 ml   Net 10 ml     Weight (last 2 days)     Date/Time Weight    11/10/22 0600 137 (301 37)    22 0405 137 (302 47)    22 0530 138 (303 35)    22 0007 138 (305 34)        Invasive Devices  Report    Central Venous Catheter Line  Duration           Port A Cath 22 Left Chest 162 days          Peripheral Intravenous Line  Duration           Peripheral IV 22 Proximal;Right;Ventral (anterior) Forearm 3 days          Drain  Duration           Ureteral Internal Stent Left ureter 6 Fr  114 days              Physical Exam  Vitals reviewed  Constitutional:       General: She is not in acute distress  Appearance: Normal appearance  She is obese  HENT:      Head: Normocephalic  Mouth/Throat:      Mouth: Mucous membranes are dry  Cardiovascular:      Rate and Rhythm: Normal rate and regular rhythm  Heart sounds: S1 normal and S2 normal  No murmur heard  Pulmonary:      Effort: Pulmonary effort is normal       Breath sounds: Normal breath sounds  No wheezing or rales  Abdominal:      Comments: Midline incision noted; bradly; staples intact   Musculoskeletal:      Right lower le+ Pitting Edema present  Left lower le+ Pitting Edema present  Skin:     Coloration: Skin is pale  Neurological:      Mental Status: She is alert and oriented to person, place, and time     Psychiatric:         Mood and Affect: Mood normal        LABORATORY RESULTS:      CBC with diff:   Results from last 7 days   Lab Units 11/10/22  0456 22  0422 22  0219 22  0443 22  0512 22  0407 22  0626   WBC Thousand/uL 12 68* 13 27* 11 66*  11 66* 10 20* 14 12* 17 05* 8 66   HEMOGLOBIN g/dL 8 0* 8 2* 7 9*  7 9* 7 9* 8 4* 8 3* 8 4*   HEMATOCRIT % 26 2* 26 9* 25 1* 25 1* 25 6* 27 1* 26 2* 27 3*   MCV fL 94 93 91  91 92 93 92 93   PLATELETS Thousands/uL 155 156 142*  142* 125* 99* 93* 82*   MCH pg 28 7 28 4 28 6  28 6 28 5 28 7 29 0 28 5   MCHC g/dL 30 5* 30 5* 31 5  31 5 30 9* 31 0* 31 7 30 8*   RDW % 16 8* 16 3* 16 1*  16 1* 15 6* 15 6* 15 4* 15 4*   MPV fL 11 1 11 5 11 2  11 2 12 0 12 0 11 2 12 2   NRBC AUTO /100 WBCs  --  0 0  --   --   --   --    NRBC /100 WBC 1  --   --   --   --   --   --        CMP:  Results from last 7 days   Lab Units 11/10/22  0456 11/09/22  0422 11/08/22  0219 11/07/22  0443 11/06/22  0512 11/05/22  0407 11/04/22  0535   POTASSIUM mmol/L 3 4* 3 6 3 3* 3 0* 3 5 3 6 3 4*   CHLORIDE mmol/L 105 104 105 107 105 105 106   CO2 mmol/L 28 27 29 26 23 26 26   BUN mg/dL 9 9 8 9 9 8 6   CREATININE mg/dL 0 45* 0 41* 0 44* 0 40* 0 42* 0 51* 0 61   CALCIUM mg/dL 8 6 8 6 8 3 8 6 8 6 8 6 8 3   EGFR ml/min/1 73sq m 107 111 108 111 110 103 97       BMP:  Results from last 7 days   Lab Units 11/10/22  0456 11/09/22  0422 11/08/22  0219 11/07/22  0443 11/06/22  0512 11/05/22  0407 11/04/22  0535   POTASSIUM mmol/L 3 4* 3 6 3 3* 3 0* 3 5 3 6 3 4*   CHLORIDE mmol/L 105 104 105 107 105 105 106   CO2 mmol/L 28 27 29 26 23 26 26   BUN mg/dL 9 9 8 9 9 8 6   CREATININE mg/dL 0 45* 0 41* 0 44* 0 40* 0 42* 0 51* 0 61   CALCIUM mg/dL 8 6 8 6 8 3 8 6 8 6 8 6 8 3          Lab Results   Component Value Date    NTBNP 534 (H) 03/21/2018                              Results from last 7 days   Lab Units 11/05/22  0357   INR  1 48*     Lipid Profile:   Lab Results   Component Value Date    CHOL 153 04/12/2017    CHOL 229 (H) 01/03/2017     Lab Results   Component Value Date    HDL 38 (L) 05/26/2022    HDL 43 02/21/2022    HDL 38 (L) 06/18/2021     Lab Results   Component Value Date    LDLCALC 83 05/26/2022    LDLCALC 97 02/21/2022    LDLCALC 90 06/18/2021     Lab Results   Component Value Date    TRIG 162 (H) 05/26/2022    TRIG 153 (H) 02/21/2022    TRIG 145 06/18/2021 Cardiac testing:   Results for orders placed during the hospital encounter of 20    Echo complete with contrast if indicated    Narrative  [address]  [address] [facility]  [address]  [phone]    Transthoracic Echocardiogram  2D, M-mode, Doppler, and Color Doppler    Study date:  13-Mar-2020    Patient: Kan Bull  MR number: [id number]  Account number: [de-identified]  : 1960  Age: 60 years  Gender: Female  Status: Outpatient  Location: Echo lab  Height: 64 in  Weight: 326 3 lb  BP: 149/ 94 mmHg    Indications: Dyspnea    Diagnoses: R06 00 - Dyspnea, unspecified    Sonographer:  MARLYS Cary  Primary Physician:  Tiffani Awad MD  Referring Physician:  Jerry Mart DO  Group:  [facility]  Interpreting Physician:  Ignacia Bowers MD    SUMMARY    LEFT VENTRICLE:  Systolic function was at the lower limits of normal  Ejection fraction was estimated in the range of 50 % to 55 % to be 50 %  There were no regional wall motion abnormalities  Wall thickness was mildly increased  There was mild concentric hypertrophy  Doppler parameters were consistent with abnormal left ventricular relaxation (grade 1 diastolic dysfunction)  LEFT ATRIUM:  The atrium was mildly dilated  RIGHT ATRIUM:  The atrium was mildly dilated  MITRAL VALVE:  There was mild annular calcification  There was trace regurgitation  TRICUSPID VALVE:  There was trace regurgitation  The tricuspid jet envelope definition was inadequate for estimation of RV systolic pressure  HISTORY: PRIOR HISTORY: A Fib ,A Flutter,HTN,Gastroesophageal Reflux Disease,Hypercholesterolemia,Atrial ablation,Hyperlipidemia,obesity,severe obstructive sleep apnea  PROCEDURE: The procedure was performed in the echo lab  This was a routine study  The transthoracic approach was used   The study included complete 2D imaging, M-mode, complete spectral Doppler, and color Doppler  The heart rate was 68 bpm,  at the start of the study  Images were obtained from the parasternal, apical, subcostal, and suprasternal notch acoustic windows  Intravenous contrast ( 0 6 ml Definity in NSS) was administered  Intravenous contrast was administered to  opacify the left ventricle  Echocardiographic views were limited due to poor acoustic window availability, decreased penetration, and lung interference  This was a technically difficult study  LEFT VENTRICLE: Size was normal  Systolic function was at the lower limits of normal  Ejection fraction was estimated in the range of 50 % to 55 % to be 50 %  There were no regional wall motion abnormalities  Wall thickness was mildly  increased  There was mild concentric hypertrophy  DOPPLER: Doppler parameters were consistent with abnormal left ventricular relaxation (grade 1 diastolic dysfunction)  RIGHT VENTRICLE: The size was normal  Systolic function was normal     LEFT ATRIUM: The atrium was mildly dilated  RIGHT ATRIUM: The atrium was mildly dilated  MITRAL VALVE: There was mild annular calcification  There was normal leaflet separation  DOPPLER: The transmitral velocity was within the normal range  There was no evidence for stenosis  There was trace regurgitation  AORTIC VALVE: The valve was trileaflet  Leaflets exhibited mildly increased thickness and normal cuspal separation  DOPPLER: Transaortic velocity was within the normal range  There was no evidence for stenosis  There was no regurgitation  TRICUSPID VALVE: DOPPLER: There was trace regurgitation  The tricuspid jet envelope definition was inadequate for estimation of RV systolic pressure  PULMONIC VALVE: DOPPLER: There was no significant regurgitation  PERICARDIUM: There was no thickening or calcification  There was no pericardial effusion  AORTA: The root exhibited normal size  SYSTEMIC VEINS: IVC: The inferior vena cava was normal in size   Respirophasic changes were normal     SYSTEM MEASUREMENT TABLES    2D mode  AoR Diam 2D: 3 4 cm  LA Diam (2D): 3 6 cm  LA/Ao (2D): 1 06  FS (2D Teich): 30 7 %  IVSd (2D): 1 15 cm  LVDEV: 146 cmï¾³  LVESV: 61 6 cmï¾³  LVIDd(2D): 5 47 cm  LVISd (2D): 3 79 cm  LVOT Area 2D: 3 14 cmï¾²  LVPWd (2D): 1 19 cm  SV (Teich): 84 4 cmï¾³    Apical four chamber  LVEF A4C: 51 %    Unspecified Scan Mode  FERNANDO Cont Eq (Peak Christopher): 2 23 cmï¾²  LVOT Diam : 2 cm  LVOT Vmax: 908 mm/s  LVOT Vmax; Mean: 908 mm/s  Peak Grad ; Mean: 3 mm[Hg]  MV Peak A Christopher: 888 mm/s  MV Peak E Christopher  Mean: 548 mm/s  MVA (PHT): 2 72 cmï¾²  PHT: 82 ms  RA Area: 20 8 cmï¾²  RA Volume: 67 9 cmï¾³  TAPSE: 2 1 cm    [employer] Accredited Echocardiography Laboratory    Prepared and electronically signed by    Khadijah Sapp MD  Signed 13-Mar-2020 10:54:33    Imaging: I have personally reviewed pertinent reports  CTA chest ct abdomen pelvis w contrast    Result Date: 11/4/2022  Narrative: CT PULMONARY ANGIOGRAM OF THE CHEST AND CT ABDOMEN AND PELVIS WITH INTRAVENOUS CONTRAST INDICATION: 69-year-old female with history of pancreatic adenocarcinoma status post Whipple procedure and primary repair of SMV/portal vein injury on 10/31/2022 with abdominal pain postoperatively  Rule out pulmonary embolism and leak  COMPARISON:  CT chest, abdomen, pelvis 9/26/2022  TECHNIQUE:  CT examination of the chest, abdomen and pelvis was performed  Thin section CT angiographic technique was used in the chest in order to evaluate for pulmonary embolus and coronal 3D MIP postprocessing was performed on the acquisition scanner  Axial, sagittal, and coronal 2D reformatted images were created from the source data and submitted for interpretation  Radiation dose length product (DLP) for this visit:  1516 16 mGy-cm     This examination, like all CT scans performed in the [facility], was performed utilizing techniques to minimize radiation dose exposure, including the use of iterative reconstruction and automated exposure control  IV Contrast:  100 mL of iohexol (OMNIPAQUE)   Enteric Contrast:  Enteric contrast was not administered  FINDINGS: CHEST PULMONARY ARTERIAL TREE: Filling defects in the right pulmonary artery, segmental and subsegmental right upper lobe pulmonary arteries, segmental and subsegmental right middle lobe pulmonary arteries, and subsegmental right lower lobe pulmonary artery, consistent with pulmonary emboli  Dilated main pulmonary artery measuring 3 1 cm  RV/LV ratio of 0 89  No CT evidence of right heart strain  LUNGS: Nonenhancing right lower lobe airspace consolidation, new since prior study  Subsegmental atelectasis in the left lower lobe, lingula, and right lower lobe  Stable 2 mm right upper lobe solid pulmonary nodule (3/25)  PLEURA:  No pleural effusion or pneumothorax  HEART/AORTA:  The heart is mildly enlarged  No pericardial effusion  No thoracic aortic aneurysm  Common origin of the brachiocephalic artery and the left common carotid artery off of the aortic arch, which is a normal anatomic variant  MEDIASTINUM AND SHANNAN:  Unremarkable  CHEST WALL AND LOWER NECK: Left chest wall vascular access device with distal tip terminating in the right atrium  Stable 5 mm right thyroid nodule  ABDOMEN LIVER/BILIARY TREE: Hepatomegaly measuring 19 4 cm in length  Mild hepatic steatosis  Ill-defined wedgelike hypoenhancement in the left hepatic lobe  No suspicious hepatic lesion  GALLBLADDER:  Gallbladder is surgically absent  SPLEEN:  Splenomegaly measuring 15 0 cm in craniocaudal dimension  PANCREAS:  Postsurgical changes of Whipple procedure, with ill-defined haziness in the surgical bed, likely related to postoperative edema  The remaining pancreatic parenchyma in the body and tail are unremarkable  No dilatation of the residual main pancreatic duct  ADRENAL GLANDS: Stable 1 9 cm right adrenal adenoma  The left adrenal gland is unremarkable  KIDNEYS/URETERS:  Mild bilateral perinephric stranding, greater on the right compared to left, likely postoperative in etiology  Stable subcentimeter hypodense lesion in the interpolar right kidney which is too small to characterize  No hydronephrosis  STOMACH AND BOWEL:  Small volume linear pneumoperitoneum immediately adjacent to the gastrojejunal anastomosis (2/242)  No bowel obstruction identified  Mild bowel wall thickening of the duodenum  APPENDIX:  No findings to suggest appendicitis  ABDOMINOPELVIC CAVITY: Right upper quadrant drainage catheter terminating posterolateral to the right hepatic lobe  Small volume abdominopelvic ascites  No well-defined organized rim-enhancing fluid collection to suggest an intra-abdominal abscess  Small volume pneumoperitoneum adjacent to the gastrojejunal anastomosis and in the right upper quadrant  No lymphadenopathy  VESSELS:  Atherosclerotic changes are present  No evidence of aneurysm  The anterior right and left portal veins are patent  The posterior right portal vein is diminutive but patent  The hepatic veins are patent  Nonocclusive thrombus in the main portal vein and superior mesenteric vein  Surgical clips adjacent to the superior mesenteric vein and the portal vein, consistent with prior repair  The splenic vein is patent  PELVIS REPRODUCTIVE ORGANS:  Surgical changes of prior hysterectomy  URINARY BLADDER:  Nondependent pocket of gas, likely related to recent instrumentation  Otherwise, unremarkable  ABDOMINAL WALL/INGUINAL REGIONS:  Postsurgical changes of the anterior abdominal wall with superficial skin staples  Trace subcutaneous emphysema in the right upper quadrant anterior abdominal wall surrounding the right upper quadrant drainage catheter  OSSEOUS STRUCTURES:  No acute fracture or destructive osseous lesion  Spinal degenerative changes are noted  Impression: 1    Pulmonary emboli in the right pulmonary artery, segmental and subsegmental right upper and middle lobe pulmonary arteries, and the subsegmental right lower lobe pulmonary arteries  RV/LV ratio of 0 89  No CT evidence of right heart strain  2   Small volume linear pneumoperitoneum immediately adjacent to the gastrojejunal anastomosis and in the right upper quadrant with a small volume of abdominopelvic ascites  These findings are nonspecific given recent major abdominal surgery and may be postoperative in etiology, however, a leak of the gastrojejunal anastomosis cannot be excluded  Recommend further evaluation with an upper GI study  3   Nonocclusive thrombus in the main portal vein and superior mesenteric vein  4   Nonenhancing right lower lobe airspace consolidation, likely representing pulmonary infarct  5   Ill-defined wedgelike hypoenhancement in the left hepatic lobe  Differential diagnosis includes hepatic infarct, altered perfusion, versus artifact  Recommend attention on follow-up  6   Postsurgical changes of Whipple procedure, with ill-defined haziness in the surgical bed, likely related to postoperative edema  7   Mild bowel wall thickening of the duodenum, which may represent postoperative edema versus duodenitis  8   Dilated main pulmonary artery measuring 3 1 cm, which can be seen with pulmonary hypertension  9   Hepatosplenomegaly and mild hepatic steatosis  I personally discussed this study with Oral Cord on 11/4/2022 at 3:07 PM  Workstation performed: [id number]     XR chest portable ICU    Result Date: 10/31/2022  Narrative: CHEST INDICATION:   s/p intubation  COMPARISON:  5/31/2022  EXAM PERFORMED/VIEWS:  XR CHEST PORTABLE ICU Images:  1 FINDINGS: Cardiac and mediastinal contours are stable  Probable mild cardiomegaly  Mild central pulmonary vascular congestion  No focal airspace consolidation or effusions  No pneumothorax  There is an endotracheal tube with its distal tip in the proximal right atrium    Retraction of this to several centimeters would be helpful  Left chest wall tariq catheter in place  Distal tip of the gastric tube not visualized but is at least in the stomach  Impression: Endotracheal tube with its distal tip in the proximal right atrium  This should be retracted several centimeters  Gastric tube as above  The study was marked in [facility] for immediate notification  Workstation performed: [id number]     US kidney and bladder with pvr    Result Date: 10/27/2022  Narrative: RENAL ULTRASOUND WITH PVR INDICATION:   N20 0: Calculus of kidney  COMPARISON: None TECHNIQUE:   Ultrasound of the retroperitoneum was performed with a curvilinear transducer utilizing volumetric sweeps and still imaging techniques  FINDINGS: KIDNEYS: Symmetric and normal size  Right kidney:  12 8 x 5 1 x 7 0 cm  Volume 240 9 mL Left kidney:  10 5 x 5 2 x 5 5 cm  Volume 158 1 mL Right kidney Normal echogenicity and contour  No mass is identified  No hydronephrosis  3 mm right lower pole calculus with clinical artifact  No perinephric fluid collections  Left kidney Normal echogenicity and contour  No mass is identified  No hydronephrosis  Multiple left renal calculi with truncal artifact measuring up to 5 mm  No perinephric fluid collections  URETERS: Nonvisualized  BLADDER: Normally distended  No focal thickening or mass lesions  Bilateral ureteral jets detected  Prevoid: 394 6 No significant post void volume  Measured post void volume in mL: 22 1 Incidental note is made of splenomegaly  Impression: Bilateral nonobstructing renal calculi  Splenomegaly  Workstation performed: [id number]     Echo complete w/ contrast if indicated    Result Date: 11/4/2022  Narrative: •  Left Ventricle: Left ventricular cavity size is normal  Wall thickness is normal  The left ventricular ejection fraction is 50%  Systolic function is low normal  Wall motion is normal  Diastolic function is mildly abnormal, consistent with grade I (abnormal) relaxation   • Tricuspid Valve: There is mild regurgitation  Assessment  Principal Problem:    Adenocarcinoma of head of pancreas ([facility] )  Active Problems:    Hypertension    Controlled depression    Type 2 diabetes mellitus without complication, without long-term current use of insulin (HCC)    Hyperlipidemia    Esophageal reflux    Benign essential hypertension    CPAP (continuous positive airway pressure) dependence    Paroxysmal A-fib ([place] [address] )    Pulmonary embolism ([place] [address] )    Sepsis ([place] [address] )    Thank you for allowing us to participate in this patient's care  This pt will follow up with          once discharged  Counseling / Coordination of Care  Total floor / unit time spent today 45 minutes  Greater than 50% of total time was spent with the patient and / or family counseling and / or coordination of care  A description of the counseling / coordination of care: Review of history, current assessment, development of a plan  Code Status: Level 1 - Full Code    ** Please Note: Dragon 360 Dictation voice to text software may have been used in the creation of this document   **

## 2022-11-10 NOTE — PROGRESS NOTES
Progress Note - Surgical Oncology  Lilly Vazquez 57 y o  female MRN: [id number]  Unit/Bed#: [facility] 511-01 Encounter: [id number]      Assessment:  62yo F POD10 s/p exploratory laparotomy, eLOA, pylorus-preserving pancreatico-duodenectomy, cholecystectomy, repair of incisional hernia, and vascularized pedicle flap w/ a post-operative course complicated by a pulmonary embolism and (now resolved) bile leak  Afebrile, VSS on room air  U O: 950cc (w/ one occurrence) over 24hrs  Flatus but still no bowel movement      Plan:  - Continue soft diet as tolerated  - Tums ordered per patient request  - Miralax/Ducolax  - Continue Eliquis for PE  - Blood cultures from 10/4 positive in one of two specimens for streptococcus mitis/oralis, infectious disease recommending 14d total course of antibiotics w/ repeat blood cultures (pending, no growth to date)  - Multimodal analgesia  - Toprol-Xl and sotalol per cardiology consult  - Protonix/Paxil  - OOB/ambulation/PT/OT    Subjective/Objective     Subjective:   No acute events overnight  This morning the patient reported pain adequately controlled on her current analgesic regimen with no fevers/chills, nausea/emesis, or dyspnea/chest pain  She has been passing flatus but with no bowel movements  She has been tolerating her diet without issue and ambulating in the hallways  Objective:     Blood pressure 112/66, pulse 84, temperature 98 4 °F (36 9 °C), resp  rate 17, height 5' 4" (1 626 m), weight (!) 137 kg (302 lb 7 5 oz), SpO2 98 %  ,Body mass index is 51 92 kg/m²  Gen: Awake, alert, and in no acute distress  Head: Normocephalic, atraumatic  Neck: No obvious JVD, trachea midline  Cardiovascular: Regular rate  Respiratory:  In no acute respiratory distress w/ no use of accessory muscles of respiration  Abd:  Soft, minimally distended, appropriately tender to palpation surrounding midline laparotomy incision closed with staples with no visible signs of infection; no guarding/rigidity or signs of peritonitis  Extremities: No visible wounds/ulcerations to the B/L upper/lower extremities  Skin: Warm/dry  Psychiatric: Appropriate mood/affect    Intake/Output Summary (Last 24 hours) at 11/10/2022 0615  Last data filed at 11/10/2022 8170  Gross per 24 hour   Intake 716 ml   Output 950 ml   Net -234 ml       Invasive Devices  Report    Central Venous Catheter Line  Duration           Port A Cath 05/31/22 Left Chest 162 days          Peripheral Intravenous Line  Duration           Peripheral IV 11/06/22 Proximal;Right;Ventral (anterior) Forearm 3 days          Drain  Duration           Ureteral Internal Stent Left ureter 6 Fr  114 days                I have personally reviewed pertinent lab results    , CBC:   Lab Results   Component Value Date    WBC 12 68 (H) 11/10/2022    HGB 8 0 (L) 11/10/2022    HCT 26 2 (L) 11/10/2022    MCV 94 11/10/2022     11/10/2022    MCH 28 7 11/10/2022    MCHC 30 5 (L) 11/10/2022    RDW 16 8 (H) 11/10/2022    MPV 11 1 11/10/2022   , CMP:   Lab Results   Component Value Date    SODIUM 139 11/10/2022    K 3 4 (L) 11/10/2022     11/10/2022    CO2 28 11/10/2022    BUN 9 11/10/2022    CREATININE 0 45 (L) 11/10/2022    CALCIUM 8 6 11/10/2022    EGFR 107 11/10/2022   , Coagulation: No results found for: PT, INR, APTT, Urinalysis: No results found for: Emmett Steward, [address], [address] [facility], [address], NITRITE, PROTEINUA, GLUCOSEU, Supriya Sharif

## 2022-11-10 NOTE — OCCUPATIONAL THERAPY NOTE
Occupational Therapy Progress Note     Patient Name: William Man  Today's Date: 11/10/2022  Problem List  Principal Problem:    Adenocarcinoma of head of pancreas [facility])  Active Problems:    Hypertension    Controlled depression    Type 2 diabetes mellitus without complication, without long-term current use of insulin (HCC)    Hyperlipidemia    Esophageal reflux    Benign essential hypertension    CPAP (continuous positive airway pressure) dependence    Paroxysmal A-fib (HCC)    Pulmonary embolism (HCC)    Sepsis ([facility] )    Streptococcal bacteremia          11/10/22 0856   OT Last Visit   OT Visit Date 11/10/22   Note Type   Note Type Treatment   Pain Assessment   Pain Assessment Tool 0-10   Pain Score No Pain   Restrictions/Precautions   Weight Bearing Precautions Per Order No   Other Precautions Multiple lines;Telemetry;O2;Fall Risk  (SCD)   ADL   Where Assessed Sitting at sink   Grooming Assistance 5  Supervision/Setup   Grooming Deficit Wash/dry hands; Wash/dry face;Denture care;Brushing hair;Supervision/safety; Increased time to complete;Setup   Grooming Comments Pt required set-up of grooming items for completion of task  Pt required no assistance for lateral support as she was seated in chair sinkside  UB Bathing Assistance 4  Minimal Assistance   UB Bathing Deficit Verbal cueing;Supervision/safety;Right arm; Increased time to complete; Chest;Left arm; Abdomen   UB Bathing Comments Pt required min a for wiping down back - pt required no assistance for lateral support as she was seated in chair  LB Bathing Assistance 3  Moderate Assistance   LB Bathing Deficit Steadying;Verbal cueing;Supervision/safety; Increased time to complete;Perineal area; Buttocks;Right upper leg;Left upper leg;Right lower leg including foot; Left lower leg including foot   LB Bathing Comments Pt required mod a to wipe down B feet + buttocks area  Pt able to maintain STS w/RW standing sinkside during perineal hygiene + wiping buttocks  UB Dressing Assistance 4  Minimal Assistance   UB Dressing Deficit Verbal cueing;Supervision/safety; Increased time to complete; Thread RUE; Thread LUE;Pull around back   UB Dressing Comments Pt required min a to thread B UE into gown + tie gown in back  LB Dressing Assistance 1  Total Assistance   LB Dressing Deficit Don/doff R sock; Don/doff L sock   LB Dressing Comments Pt required total A for donning B socks on, pt attempted to do while seated in bedside chair following ADL routine but reported feeling too fatigued  Functional Standing Tolerance   Time 5 minutes   Activity Pt was able to tolerate fnxl standing activity for fnxl mobility from EOB>standing sinkside  Due to fatigue, once at sinkside, pt required to sit  Pt able to then maintain STS during LB bathing and ambulate back to bedside chair from standing sinkside  Pt required use of the RW t/o  Comments Pt is able to follow min vc for safety and hand placement w/RW  Bed Mobility   Rolling R 4  Minimal assistance   Additional items Increased time required;Verbal cues   Supine to Sit 4  Minimal assistance   Additional items Increased time required;Verbal cues   Additional Comments Pt was found supine in bed and left in bedside chair w/call bell in reach  Transfers   Sit to Stand 5  Supervision   Additional items Increased time required;Verbal cues   Stand to Sit 5  Supervision   Additional items Increased time required;Verbal cues   Additional Comments Pt required use of the RW for support during transitions  Pt demonstrates F safety awareness and insight into condition  Functional Mobility   Functional Mobility 5  Supervision   Additional Comments CGA w/RW for fnxl mobility from EOB>standing sinkside  Pt is able to follow min vc for safety and hand placement w/RW  Pt was able to continue ambulation back to the bedside chair following ADL routine  Pt reported feeling fatigued - O2 remained above 95 t/o     Additional items Rolling walker Subjective   Subjective "I think I need to sit down now"   Cognition   Overall Cognitive Status [facility]   Arousal/Participation Responsive; Alert; Cooperative   Attention Within functional limits   Orientation Level Oriented X4   Memory Within functional limits   Following Commands Follows all commands and directions without difficulty   Comments Pt is able to follow conversation and attend to min vc for safety and hand placement w/RW during transitions + fnxl mobility  Pt is aware of when she feels too fatigued and requests to sit down  Pt demonstrates F safety awareness and insight into condition  Activity Tolerance   Activity Tolerance Patient tolerated treatment well;Patient limited by fatigue  (Although pt reported fatigue in the initial transition from EOB>standing sinkside, pt was able to continue with ADL retraining intervention seated sinkside )   Medical Staff Made Aware OT Lexy Pedro RN aware   Assessment   Assessment OT trx session focused on ADL retraining, functional transfer training, endurance training, patient/family training, equipment evaluation/education, continued evaluation, energy conservation, and activity engagement  Pt was found supine in bed and left in bedside chair w/call bell within reach  Presently, pt is S for grooming (w/increased time, vc), MIN A for UB bathing/dressing (w/increased time, vc), MOD A for LB bathing (w/increased time + vc), Total A for LB dressing (w/increased time, vc), MIN A for bed mobility, S for sit>stand + stand>sit (w/increased time, vc + RW), and CGA for fnxl mobility (w/increased time, min vc + RW)  Pt was able to ambulate from EOB>sinkside but required chair to sit as she began to feel too fatigued to remain standing - pt able to continue w/ADL retraining intervention seated sinkside  Pt able to maintain STS w/RW during perineal hygiene + wiping buttocks  O2 remained above 95 t/o   ADL tasks of grooming + UB bathing/dressing + LB bathing were completed seated sinkside  ADL task of LB dressing completed in bedside chair  Minimal improvements noted w/ADL retraining and functional transfer training  Remaining limitations observed w/energy conservation, endurance training, activity engagement, ADL retraining, and fnxl transfer training  The patient's raw score on the AM-PAC Daily Activity inpatient short form is 19, standardized score is 40 22, greater than 39 4  Patients at this level are likely to benefit from discharge to home  Please refer to the recommendation of the Occupational Therapist for safe discharge planning  Rec is for home health rehab upon d/c  Presently, it is recommended pt continue w/rehab 3-5x a week for 10-14 days to meet goals  Plan   Treatment Interventions ADL retraining;Functional transfer training; Endurance training;Patient/family training;Equipment evaluation/education;Continued evaluation; Energy conservation; Activityengagement   Goal Expiration Date 11/15/22   OT Treatment Day 3   OT Frequency 3-5x/wk   Recommendation   OT Discharge Recommendation Home with home health rehabilitation   [facility] Daily Activity Inpatient   Lower Body Dressing 2   Bathing 2   Toileting 4   Upper Body Dressing 3   Grooming 4   Eating 4   Daily Activity Raw Score 19   Daily Activity Standardized Score (Calc for Raw Score >=11) 40 22   AM-[facility] Applied Cognition Inpatient   Following a Speech/Presentation 4   Understanding Ordinary Conversation 4   Taking Medications 4   Remembering Where Things Are Placed or Put Away 4   Remembering List of 4-5 Errands 4   Taking Care of Complicated Tasks 4   Applied Cognition Raw Score 24   Applied Cognition Standardized Score 62 21   Modified Smyth Scale   Modified Smyth Scale 4   End of Consult   Education Provided Yes  (pursed lip breathing - pt able to demonstrate learning)   Patient Position at End of Consult Bedside chair; All needs within reach   Nurse Communication Nurse aware of consult     Dennie Ade, BILL

## 2022-11-11 ENCOUNTER — TELEPHONE (OUTPATIENT)
Dept: SURGICAL ONCOLOGY | Facility: CLINIC | Age: 62
End: 2022-11-11

## 2022-11-11 ENCOUNTER — TELEPHONE (OUTPATIENT)
Dept: OTHER | Facility: OTHER | Age: 62
End: 2022-11-11

## 2022-11-11 VITALS
DIASTOLIC BLOOD PRESSURE: 70 MMHG | BODY MASS INDEX: 50.02 KG/M2 | TEMPERATURE: 97.6 F | WEIGHT: 293 LBS | HEIGHT: 64 IN | RESPIRATION RATE: 17 BRPM | OXYGEN SATURATION: 91 % | HEART RATE: 86 BPM | SYSTOLIC BLOOD PRESSURE: 116 MMHG

## 2022-11-11 LAB
ANION GAP SERPL CALCULATED.3IONS-SCNC: 8 MMOL/L (ref 4–13)
BASOPHILS # BLD AUTO: 0.04 THOUSANDS/ÂΜL (ref 0–0.1)
BASOPHILS NFR BLD AUTO: 0 % (ref 0–1)
BUN SERPL-MCNC: 7 MG/DL (ref 5–25)
CALCIUM SERPL-MCNC: 8.5 MG/DL (ref 8.3–10.1)
CHLORIDE SERPL-SCNC: 105 MMOL/L (ref 96–108)
CO2 SERPL-SCNC: 26 MMOL/L (ref 21–32)
CREAT SERPL-MCNC: 0.48 MG/DL (ref 0.6–1.3)
EOSINOPHIL # BLD AUTO: 0.21 THOUSAND/ÂΜL (ref 0–0.61)
EOSINOPHIL NFR BLD AUTO: 2 % (ref 0–6)
ERYTHROCYTE [DISTWIDTH] IN BLOOD BY AUTOMATED COUNT: 16.8 % (ref 11.6–15.1)
GFR SERPL CREATININE-BSD FRML MDRD: 105 ML/MIN/1.73SQ M
GLUCOSE SERPL-MCNC: 156 MG/DL (ref 65–140)
GLUCOSE SERPL-MCNC: 159 MG/DL (ref 65–140)
GLUCOSE SERPL-MCNC: 201 MG/DL (ref 65–140)
GLUCOSE SERPL-MCNC: 209 MG/DL (ref 65–140)
HCT VFR BLD AUTO: 24.7 % (ref 34.8–46.1)
HGB BLD-MCNC: 7.4 G/DL (ref 11.5–15.4)
IMM GRANULOCYTES # BLD AUTO: 0.22 THOUSAND/UL (ref 0–0.2)
IMM GRANULOCYTES NFR BLD AUTO: 2 % (ref 0–2)
LYMPHOCYTES # BLD AUTO: 1.05 THOUSANDS/ÂΜL (ref 0.6–4.47)
LYMPHOCYTES NFR BLD AUTO: 11 % (ref 14–44)
MCH RBC QN AUTO: 27.5 PG (ref 26.8–34.3)
MCHC RBC AUTO-ENTMCNC: 30 G/DL (ref 31.4–37.4)
MCV RBC AUTO: 92 FL (ref 82–98)
MONOCYTES # BLD AUTO: 0.64 THOUSAND/ÂΜL (ref 0.17–1.22)
MONOCYTES NFR BLD AUTO: 7 % (ref 4–12)
NEUTROPHILS # BLD AUTO: 7.26 THOUSANDS/ÂΜL (ref 1.85–7.62)
NEUTS SEG NFR BLD AUTO: 78 % (ref 43–75)
NRBC BLD AUTO-RTO: 1 /100 WBCS
PLATELET # BLD AUTO: 183 THOUSANDS/UL (ref 149–390)
PMV BLD AUTO: 10.9 FL (ref 8.9–12.7)
POTASSIUM SERPL-SCNC: 3.4 MMOL/L (ref 3.5–5.3)
RBC # BLD AUTO: 2.69 MILLION/UL (ref 3.81–5.12)
SODIUM SERPL-SCNC: 139 MMOL/L (ref 135–147)
WBC # BLD AUTO: 9.42 THOUSAND/UL (ref 4.31–10.16)

## 2022-11-11 RX ORDER — POTASSIUM CHLORIDE 20 MEQ/1
40 TABLET, EXTENDED RELEASE ORAL ONCE
Status: COMPLETED | OUTPATIENT
Start: 2022-11-11 | End: 2022-11-11

## 2022-11-11 RX ORDER — OXYCODONE HYDROCHLORIDE 5 MG/1
5 TABLET ORAL EVERY 4 HOURS PRN
Qty: 16 TABLET | Refills: 0 | Status: SHIPPED | OUTPATIENT
Start: 2022-11-11 | End: 2022-11-18

## 2022-11-11 RX ORDER — ACETAMINOPHEN 325 MG/1
975 TABLET ORAL EVERY 8 HOURS SCHEDULED
Qty: 63 TABLET | Refills: 0 | Status: SHIPPED | OUTPATIENT
Start: 2022-11-11 | End: 2022-11-18

## 2022-11-11 RX ORDER — GABAPENTIN 100 MG/1
100 CAPSULE ORAL 3 TIMES DAILY
Qty: 21 CAPSULE | Refills: 0 | Status: SHIPPED | OUTPATIENT
Start: 2022-11-11 | End: 2022-11-18

## 2022-11-11 RX ORDER — METOPROLOL SUCCINATE 50 MG/1
50 TABLET, EXTENDED RELEASE ORAL 2 TIMES DAILY
Qty: 120 TABLET | Refills: 0 | Status: ON HOLD | OUTPATIENT
Start: 2022-11-11 | End: 2022-11-18 | Stop reason: SDUPTHER

## 2022-11-11 RX ADMIN — GABAPENTIN 100 MG: 100 CAPSULE ORAL at 09:36

## 2022-11-11 RX ADMIN — APIXABAN 10 MG: 5 TABLET, FILM COATED ORAL at 09:36

## 2022-11-11 RX ADMIN — POTASSIUM CHLORIDE 40 MEQ: 1500 TABLET, EXTENDED RELEASE ORAL at 15:14

## 2022-11-11 RX ADMIN — INSULIN LISPRO 2 UNITS: 100 INJECTION, SOLUTION INTRAVENOUS; SUBCUTANEOUS at 09:47

## 2022-11-11 RX ADMIN — GABAPENTIN 100 MG: 100 CAPSULE ORAL at 15:14

## 2022-11-11 RX ADMIN — PANTOPRAZOLE SODIUM 40 MG: 40 TABLET, DELAYED RELEASE ORAL at 06:35

## 2022-11-11 RX ADMIN — CEFTRIAXONE SODIUM 2000 MG: 10 INJECTION, POWDER, FOR SOLUTION INTRAVENOUS at 15:12

## 2022-11-11 RX ADMIN — PAROXETINE 37.5 MG: 37.5 TABLET, FILM COATED, EXTENDED RELEASE ORAL at 09:41

## 2022-11-11 RX ADMIN — SOTALOL HYDROCHLORIDE 120 MG: 120 TABLET ORAL at 09:36

## 2022-11-11 RX ADMIN — ACETAMINOPHEN 975 MG: 325 TABLET ORAL at 06:35

## 2022-11-11 RX ADMIN — ACETAMINOPHEN 975 MG: 325 TABLET ORAL at 13:05

## 2022-11-11 RX ADMIN — INSULIN LISPRO 4 UNITS: 100 INJECTION, SOLUTION INTRAVENOUS; SUBCUTANEOUS at 12:47

## 2022-11-11 RX ADMIN — METOPROLOL SUCCINATE 50 MG: 50 TABLET, EXTENDED RELEASE ORAL at 09:36

## 2022-11-11 NOTE — DISCHARGE SUMMARY
Discharge Summary - surgical oncology  Minoo Guy 57 y o  female MRN: [id number]  [facility] Encounter: [id number]    Admission Date: 10/31/2022     Discharge Date: 11/11/2022    Admitting Diagnosis: Adenocarcinoma of head of pancreas [facility]) [C25 0]     Discharge Diagnosis:  Adenocarcinoma at the head of the pancreas status post Whipple procedure, portal vein/SMV repair by Dr Sean Everett    Pulmonary embolism, started on heparin drip, transitioned to Eliquis on discharge  Attending and Service: Dr Sean Everett, surgical oncology    Consulting Physician(s):  Surgical Critical Care, Cardiology, Nephrology, APS, PTOT, ID    Imaging and Procedures Performed:   10/31/2022 Whipple procedure, portal vein/SMV repair -Sean Everett    US kidney and bladder with pvr    Result Date: 10/27/2022  Impression: Bilateral nonobstructing renal calculi  Splenomegaly  Workstation performed: [id number]     Pathology:   Final Diagnosis   A   Gallbladder, cholecystectomy:       - Mild chronic cholecystitis  - No malignancy identified      B  Pancreas, Whipple:     - Ductal adenocarcinoma of the pancreas, 3 0 cm      - Tumor invades peripancreatic soft tissues and duodenal wall to involve lamina propria  - Perineural invasion by tumor present  - Metastatic carcinoma present in five of nine lymph nodes (5/9)  - Tumor involves the proximal duodenal margin      - Remainder of margins are negative for tumor      C  Portal lymph node:     - Four lymph nodes identified; all negative for malignancy (0/4)        Hospital Course: Minoo Guy is a 50-year-old female with a past medical history of SVT, DM, GERD, HTN, HLD, pancreatic head mass presented for elective surgical intervention as mentioned above  Patient underwent Whipple procedure by Dr Sean Everett, without complications  She was transferred to the critical care unit postoperatively for acute postoperative management and monitoring overnight    She was extubated on postoperative day 1, remained with an NPO, NG tube in place, epidural, White in place, IV fluids, NIGEL drain in the right lower quadrant, and was encouraged to have aggressive pulmonary toileting  Her NG tube was removed on postoperative day 2, she was advanced to sips of clear liquids, and was cleared to be transferred out to step-down level 1  Her White was removed on postoperative day 3, and her diet continued to be advanced  Unfortunately on postoperative day 4 patient had increasing oxygen requirements, increased tachycardia, and was febrile  She underwent a full fever workup as well as CT chest abdomen pelvis with PE protocol  She was found to have multiple PEs for which she was started on a heparin drip 6 hours after her epidural was removed  1/2 blood cultures were positive on draw, with repeat cultures 2/2 negative, ID was consulted and they recommended a 2 week course of IV Rocephin  Her fever curve, white count, vital signs, oxygen requirements were closely monitored throughout the rest of her hospital stay  Her NIGEL drain was removed on postoperative day 8 after a normal serum amylase and NIGEL amylase result  Aggressive pulmonary toileting was initiated  Discharge planning was initiated, on initial clearance day of discharge unfortunately patient had an episode of SVT and chest pain for which she underwent a full cardiology workup and a consult to cardiology was placed  A STEMI was ruled out and her  SVT was treated with Vagal Maneuvers without any issues  Cardiology made recommendations to increase metoprolol to 50 mg b i d  into continue the Eliquis for her Pes  A baseline lower extremity duplex was ordered prior to discharge secondary to patient's history of PEs, which was negative  She was cleared for discharge from a surgical standpoint, infectious disease standpoint, and cardiology standpoint on postoperative day 11    Her IV antibiotic infusions were set up with warranted infusion [place] daily until 11/18/2022  All discharge instructions as well as postoperative follow-up appointments were discussed with the patient, patient and  were in agreement with plan  New medication regimens discussed with the patient and her  at bedside both are in agreement with plan and understanding  On discharge, the patient is instructed to follow-up with the patient's primary care provider within the next 2 weeks to review the events of the recent hospitalization  The patient is instructed to follow-up with Dr Sean Everett on 11/15/2022  The patient is instructed to follow the provided discharge instructions  Condition at Discharge: good     Discharge instructions/Information to patient and family:   See after visit summary for information provided to patient and family  Provisions for Follow-Up Care:  See after visit summary for information related to follow-up care and any pertinent home health orders  Disposition: Home w/ [facility]    Planned Readmission: No    Discharge Statement   I spent 30 minutes discharging the patient  This time was spent on the day of discharge  I had direct contact with the patient on the day of discharge  Additional documentation is required if more than 30 minutes were spent on discharge  Discharge Medications:  See after visit summary for reconciled discharge medications provided to patient and family      Vibha Moyer PA-C  11/11/2022  3:59 PM

## 2022-11-11 NOTE — DISCHARGE INSTR - AVS FIRST PAGE
DISCHARGE INSTRUCTIONS    Follow Up: Follow up with Dr Tru Abrams on  11/15 at 3:45PM  -IV anti-biotic Rocephin infusion daily at [address] [place] [address] until 11/18  -Take Eliquis 10mg twice a day until 11/13, then take Eliquis 5mg twice a day until 12/5  -Metoprolol 50 mg twice a day  -Follow-up with cardiologist in 1-2 weeks, call to make an appointment  Diet: You may resume a regular diet    Pain:  Oxycodone 5 mg as needed every 6 hours for moderate/severe pain  Tylenol 975 mg every 8 hours scheduled for 7 days  Gabapentin 100 mg 3 times a day for 7 days  Shower: You may shower over the wound  Do not bathe or use a pool or hot tub until cleared by the physician  Activity: As tolerated  You may go up and down stairs, walk as much as you are comfortable, but walk at least 3 times each day  Do not lift anything heavier than 15 pounds for at least 2-4 weeks, unless cleared by the doctor  Driving: Do not drive or make any important decisions while on narcotic pain medication  Generally, you may drive when your off all narcotic pain medications  Medications: Resume all of your previous medications, unless told otherwise by the doctor  You do not need to take the narcotic pain medications unless you are having significant pain and discomfort  Call the office: If you are experiencing any of the following, fevers above 101 5° or chills, significant nausea or vomiting, increase in abdominal pain, if the wound develops drainage and/or is excessive redness around the wound, or if you have significant diarrhea or other worsening symptoms

## 2022-11-11 NOTE — PHYSICAL THERAPY NOTE
PHYSICAL THERAPY TREATMENT  NAME:  Ivan Askew  DATE: 11/11/22    AGE:   57 y o    Mrn:   [id number]  ADMIT DX:  Adenocarcinoma of head of pancreas ([facility] ) [C25 0]    Past Medical History:   Diagnosis Date    Abnormal liver function test     last assessed 1/16/17     Adenomatosis     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    last assessed 4/4/13    Arthritis     Atrial flutter ([place] [address] )     Benign essential hypertension     last assessed 12/21/17     Body mass index (BMI) of 50-59 9 in adult [facility])     Cancer ([place] [address] )     pancreas    Colon polyp     Congenital pes planus     last assessed 7/15/14     COVID     4/30/21    CPAP (continuous positive airway pressure) dependence     Dental crowns present     Depression     Diabetes mellitus ([place] [address] )     Dizziness     occas--pt reports did see family doctor    GERD (gastroesophageal reflux disease)     Hemangioma of skin 08/26/2003    last assessed 8/26/03    History of cancer chemotherapy      Oncologist Dr Cheri Del Rosario    History of gastroesophageal reflux (GERD)     Hypercholesterolemia     Hyperlipidemia     Hypertension     Irregular heart beat     Kidney stone     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[place] [address] ) 04/19/2006    last assessed 12/31/14     Osteoarthritis of both knees     last assessed 12/31/14     Paroxysmal atrial fibrillation ([place] [address] )     Port-A-Cath in place     S/P ablation of atrial flutter     Shortness of breath     per pt "from chemo-not drastic--still working and goes up steps"    Sleep apnea     Wears glasses      Past Surgical History:   Procedure Laterality Date    ABDOMINAL ADHESION SURGERY N/A 10/31/2022    Procedure: LYSIS ADHESIONS, colectomy, vascular pedicle flap;  Surgeon: Aniyah Gaxiola MD;  [facility];  Service: Surgical Oncology    ABDOMINAL SURGERY  06/2006    20cm GIST removed     ABLATION SOFT TISSUE N/A 10/31/2022    Procedure: SOFT TISSUE ABLATION;  Surgeon: Aniyah Gaxiola MD;  [facility]; Service: Surgical Oncology    APPENDECTOMY      ATRIAL ABLATION SURGERY      CARPAL TUNNEL RELEASE Bilateral     COLONOSCOPY      FL GUIDED CENTRAL VENOUS ACCESS DEVICE INSERTION  05/31/2022    FL RETROGRADE PYELOGRAM  07/18/2022    FL RETROGRADE PYELOGRAM  8/22/2022    HYSTERECTOMY      fibroids    IR PORT CHECK  06/23/2022    IR PORT REMOVAL AND PLACEMENT NEW SITE  06/28/2022    JOINT REPLACEMENT Bilateral     knees    KIDNEY STONE SURGERY Right 09/17/2021    placed stent in  for kidney stone    KNEE SURGERY      KNEE SURGERY Left 03/2019    LAPAROTOMY N/A 10/31/2022    Procedure: EXPLORATORY LAPAROTOMY;  Surgeon: Adis Grubbs MD;  [facility];  Service: Surgical Oncology    OOPHORECTOMY      cyst    CO CYSTO/URETERO W/LITHOTRIPSY &INDWELL STENT INSRT Left 8/22/2022    Procedure: CYSTOSCOPY URETEROSCOPY WITH LITHOTRIPSY HOLMIUM LASER, RETROGRADE PYELOGRAM AND INSERTION STENT URETERAL;  Surgeon: Deepthi Alexander MD;  [facility];  Service: Urology    CO CYSTOSCOPY,INSERT URETERAL STENT Left 8/22/2022    Procedure: EXCHANGE STENT URETERAL;  Surgeon: Deepthi Alexander MD;  [facility];  Service: Urology    CO CYSTOURETHROSCOPY,URETER CATHETER Left 07/18/2022    Procedure: CYSTOSCOPY RETROGRADE PYELOGRAM WITH INSERTION STENT URETERAL;  Surgeon: Deepthi Alexander MD;  [facility];  Service: Urology    TONSILLECTOMY      TUBAL LIGATION      TUMOR EXCISION  2006    gastrointestinal stromal tumor    TUNNELED VENOUS PORT PLACEMENT N/A 05/31/2022    Procedure: INSERTION VENOUS PORT (PORT-A-CATH); Surgeon: Adis Grubbs MD;  [facility];  Service: Surgical Oncology    UPPER GASTROINTESTINAL ENDOSCOPY      WHIPPLE PROCEDURE/PANCREATICO-DUODENECTOMY N/A 10/31/2022    Procedure:  WHIPPLE PROCEDURE/PANCREATICO-DUODENECTOMY, IOUS;  Surgeon: Adis Grubbs MD;  [facility];  Service: Surgical Oncology       Length Of Stay: 11 11/11/22 1133   PT Last Visit   PT Visit Date 11/11/22   Note Type   Note Type Treatment   Bed Mobility   Additional Comments OOB in recliner- spouse present on presentation   Transfers   Sit to Stand 5  Supervision   Stand to Sit 5  Supervision   Stand pivot 5  Supervision   Ambulation/Elevation   Gait pattern Excessively slow   Gait Assistance 5  Supervision   Assistive Device Bariatric Rolling walker   Stair Management Assistance   (pt declining further practice- stating being d/c later today and has completed w/ therapy prior- no concerns)   Balance   Static Sitting Good   Dynamic Sitting Fair +   Static Standing Fair +   Dynamic Standing [facility]  (rw)   Endurance Deficit   Endurance Deficit No   Activity Tolerance   Activity Tolerance Patient tolerated treatment well  (pt and spouse both report no concerns for d/c home)   Exercises   Knee AROM Long Arc Quad 10 reps   Ankle Pumps Sitting;20 reps   Assessment   Prognosis Good   Problem List Decreased strength;Decreased endurance; Impaired balance;Decreased mobility;Obesity   Assessment pt seen for limited tx session as she just finished ambulating in halls  spouse present and pt / spouse both reporting no concerns for d/c home (pt reports planned for later today) all DME including RW in place for d/c ; HEP reviewed w/o questions  verbal review of car xfers + education on compliance w/ RW use and HEP emphasized for d/c  pt denies further PT needs or concerns prior to d /c and declines offer to practive stairs one more time prior to d/c  Pt is cleared for d/c home once medicallty stable w/ support of spouse and HHC as planned  PT to sign off at this time     Goals   Patient Goals go home today   STG Expiration Date 11/15/22   Plan   Progress Discontinue PT   Recommendation   PT Discharge Recommendation Home with home health rehabilitation   [address] Recommended HD Bariatric wheeled walker   Madeline 8 in Bed Without Bedrails 4 Lying on Back to Sitting on Edge of Flat Bed 4   Moving Bed to Chair 4   Standing Up From Chair 4   Walk in Room 4   Climb 3-5 Stairs 3   Basic Mobility Inpatient Raw Score 23   Basic Mobility Standardized Score 50 88   Turning Head Towards Sound 4   Follow Simple Instructions 4   Low Function Basic Mobility Raw Score 31   Low Function Basic Mobility Standardized Score 51 1   Highest Level Of Mobility   [facility] Goal 7: Walk 25 feet or more   End of Consult   Patient Position at End of Consult Bedside chair; All needs within reach     The patient's AM-PAC Basic Mobility Inpatient Short Form Raw Score is 21  A Raw score of greater than 16 suggests the patient may benefit from discharge to home  Please also refer to the recommendation of the Physical Therapist for safe discharge planning              Will Dick, PT

## 2022-11-11 NOTE — RESTORATIVE TECHNICIAN NOTE
Restorative Technician Note      Patient Name: Linda Coleman     Note Type: Mobility  Patient Position Upon Consult: Bedside chair  Activity Performed: Ambulated  Assistive Device: Roller walker  Patient Position at End of Consult: Bedside chair;  All needs within reach

## 2022-11-11 NOTE — CASE MANAGEMENT
Case Management Discharge Planning Note    Patient name Minoo Guy  Location [address] [place] [address] 511/PPHP [id number] MRN [id number]  : 1960 Date 2022       Current Admission Date: 10/31/2022  Current Admission Diagnosis:Adenocarcinoma of head of pancreas [facility])   Patient Active Problem List    Diagnosis Date Noted   • Streptococcal bacteremia 11/10/2022   • Pulmonary embolism ([email or web address] ) 2022   • Sepsis ([email or web address] ) 2022   • Paroxysmal A-fib ([email or web address] ) 2022   • Left flank pain 2022   • CPAP (continuous positive airway pressure) dependence    • Chemotherapy induced neutropenia ([facility] 75 ) 2022   • Adenocarcinoma of head of pancreas ([facility] ) 2022   • Uric acid kidney stone 2022   • Nephrolithiasis 10/25/2021   • Class 3 severe obesity due to excess calories with body mass index (BMI) of 50 0 to 59 9 in adult ([facility] ) 2020   • Type 2 diabetes mellitus without complication, without long-term current use of insulin ([place] [id number] ) 2017   • Severe obstructive sleep apnea 2016   • Dyspnea on exertion 2016   • Supraventricular tachycardia ([email or web address] ) 2016   • Hypertension 2016   • Controlled depression 2016   • Adenomatous colon polyp 2015   • 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[facility] ) 2013   • Morbid obesity ([facility] ) 2013   • Hyperlipidemia 2013   • Benign essential hypertension 10/02/2012   • Esophageal reflux 2012      LOS (days): 11  Geometric Mean LOS (GMLOS) (days):   Days to GMLOS:     OBJECTIVE:  Risk of Unplanned Readmission Score: 17 01         Current admission status: Inpatient   Preferred Pharmacy:   [address] [place] [address] 17, [phone] [address]  Phone: [phone] Fax: [phone]    Primary Care Provider: Mayra Wright MD    Primary Insurance: BLUE CROSS  Secondary Insurance:     DISCHARGE DETAILS:    Discharge planning discussed with[de-identified] patient  Freedom of Choice: Yes  Comments - [place] of Choice: [address]  CM contacted family/caregiver?: Yes  Were Treatment Team discharge recommendations reviewed with patient/caregiver?: Yes  Did patient/caregiver verbalize understanding of patient care needs?: Yes  Were patient/caregiver advised of the risks associated with not following Treatment Team discharge recommendations?: Yes         Tigre Mahoney requested[de-identified] Occupational Therapy, Physical Therapy, Nursing, [address] Name[de-identified] Other ([address])  [address] Provider[de-identified] PCP  Supporting Clincal Findings[de-identified] Limited Endurance         Other Referral/Resources/Interventions Provided:  Referral Comments: CM spoke with patient about discharge today  She is aware that [address] will be contacting her within 24-48 after discharge to set up a schedule to visit for aftercare  Patient is also aware that she is going to outpatient infursions for her additional IV ABX until 11/18 at [address], spouse will be transporting his wife to the center    Discharge Orders faxed to [address]

## 2022-11-11 NOTE — PROGRESS NOTES
Progress Note - General Surgery   Josue Conrad 57 y o  female MRN: [id number]  Unit/Bed#: [facility] 511-01 Encounter: [id number]    Assessment:  59yoF s/p whipple procedure, portal vein/SMV repairs on 10/31    Vitals stable, on 2 5L NC, afebrile  Labs pending      Stool x3    Plan:  - surg soft  - on eliquis  - on rocephin  - SSI  - incentive spirometry  - encourage ambulation  - pain control  - dispo planning    Subjective/Objective   Subjective:   Patient doing well this morning, reports some abdominal pain, tolerating diet without nausea or emesis  Having BM, ambulating, using IS  Objective:     Blood pressure 124/75, pulse 76, temperature 97 8 °F (36 6 °C), temperature source Oral, resp  rate 17, height 5' 4" (1 626 m), weight (!) 137 kg (302 lb 7 5 oz), SpO2 97 %  ,Body mass index is 51 92 kg/m²  Intake/Output Summary (Last 24 hours) at 11/11/2022 1344  Last data filed at 11/10/2022 2353  Gross per 24 hour   Intake 1062 5 ml   Output 550 ml   Net 512 5 ml       Invasive Devices  Report    Central Venous Catheter Line  Duration           Port A Cath 05/31/22 Left Chest 163 days          Peripheral Intravenous Line  Duration           Peripheral IV 11/06/22 Proximal;Right;Ventral (anterior) Forearm 4 days          Drain  Duration           Ureteral Internal Stent Left ureter 6 Fr  115 days                Physical Exam:  General: NAD  Skin: Warm, dry, anicteric  HEENT: Normocephalic, atraumatic  CV: RRR, no m/r/g  Pulm: CTA b/l, no inc WOB, 2 L NC  Abd: Soft, ND, minimally tender diffusely, midline incision c/d/i  MSK: Symmetric, no edema, no tenderness, no deformity  Neuro: AOx3, GCS 15    Lab, Imaging and other studies:I have personally reviewed pertinent lab results    , CBC: No results found for: WBC, HGB, HCT, MCV, PLT, ADJUSTEDWBC, MCH, MCHC, RDW, MPV, NRBC, CMP: No results found for: SODIUM, K, CL, CO2, ANIONGAP, BUN, CREATININE, GLUCOSE, CALCIUM, AST, ALT, ALKPHOS, PROT, BILITOT, EGFR  VTE Pharmacologic Prophylaxis: Sequential compression device (Venodyne)   VTE Mechanical Prophylaxis: sequential compression device

## 2022-11-11 NOTE — PLAN OF CARE
Problem: OCCUPATIONAL THERAPY ADULT  Goal: Performs self-care activities at highest level of function for planned discharge setting  See evaluation for individualized goals  Description: Treatment Interventions: ADL retraining, Functional transfer training, Endurance training, Patient/family training, Equipment evaluation/education, Compensatory technique education, Energy conservation, Activityengagement          See flowsheet documentation for full assessment, interventions and recommendations     Outcome: Adequate for Discharge  Note: Limitation: Decreased ADL status, Decreased Safe judgement during ADL, Decreased endurance, Decreased self-care trans, Decreased high-level ADLs  Prognosis: Good  Assessment: Pt seen for brief OT session to determine d/c needs - pt observed ambulating in hallway with restorative specialist - pt denies any concerns re: going home - reports she has supportive family who are able to provide support with LB adls,and assist with iadls - has all necessary DME at home - is not concerned about her ability to return home and manage care - d/c from caseload     OT Discharge Recommendation: Home with home health rehabilitation

## 2022-11-11 NOTE — PLAN OF CARE
Problem: OCCUPATIONAL THERAPY ADULT  Goal: Performs self-care activities at highest level of function for planned discharge setting  See evaluation for individualized goals  Description: Treatment Interventions: ADL retraining, Functional transfer training, Endurance training, Patient/family training, Equipment evaluation/education, Compensatory technique education, Energy conservation, Activityengagement          See flowsheet documentation for full assessment, interventions and recommendations     11/11/2022 1626 by [facility], OT  Outcome: Adequate for Discharge  11/11/2022 1625 by [facility], OT  Outcome: Adequate for Discharge  Note: Limitation: Decreased ADL status, Decreased Safe judgement during ADL, Decreased endurance, Decreased self-care trans, Decreased high-level ADLs  Prognosis: Good  Assessment: Pt seen for brief OT session to determine d/c needs - pt observed ambulating in hallway with restorative specialist - pt denies any concerns re: going home - reports she has supportive family who are able to provide support with LB adls,and assist with iadls - has all necessary DME at home - is not concerned about her ability to return home and manage care - d/c from caseload     OT Discharge Recommendation: Home with home health rehabilitation

## 2022-11-11 NOTE — PROGRESS NOTES
Progress Note - Infectious Disease   Jarrett Anderson 57 y o  female MRN: [id number]  Unit/Bed#: [facility] 511-01 Encounter: [id number]      Impression/Plan:  1  Systemic inflammatory response syndrome-  -concern for true bacteremia given acute change in vitals Whipple POD3  1/2 blood cx with strep mitis oralis  Concurrent b/l pulmonary emboli confounding SIRS vs sepsis differentiation  Patient clinically improved on systemic anticoagulation and IV antibiotics  -antibiotics as below  -anticoagulation now with DOAC per primary  -WBC count persisting at 12k  Patient clinically without signs of infection  Possibly elevated in setting of severe constipation which is slowly improving now  Also with bilateral PE, underlying malignancy, and post-op      2  Streptococcus oralis bacteremia-  Given clinical course, it is concerning that this is a true bacteremia rather than a contaminant  Source of bacteremia likely from upper GI intervention  Original cultures 1/2 positive   -will need 14 day course of antibiotics (end date 11/18)  -rocephin 2g q24h   -will need CMP to monitor LFTs and CBC with diff in 1 week   -repeat 11/7 blood cultures have remained negative  patient stable from ID standpoint               -script for rocephin sent   -TTE did not show valvular vegetations or significant dysfunction     3  Pulmonary embolism-  -2/2 malignancy and post-op immobilization  Patient was POD3 at timing of onset  -anticoagulation per the primary     4  Pancreatic adenocarcinoma-  -status post Whipple procedure with vascular reconstruction               -PYA utilize chemo port for IV abx course at rehab center      5  SVT  -s/p ablation many years ago, maintained on sotalol and metoprolol              -11/10: run of SVT that resolved with vagal maneuvers   -cardiology following     6   Constipation  -post-op constipation              -dispo pending normal BM  -leukocytosis possibly persistent in setting of severe constipation, now slowly improving      Discussed plan with primary team     Antibiotics:  Total days of post-op abx: #7  Rocephin #6/14  S/p vanco #1  S/p cefepime #2     Pre-op: Ancef and flagyl     Cultures:   Blood cx : 1/2 strep mitis oralis  Repeat cx drawn : (-)    Subjective:  Patient has no fever, chills, sweats; no nausea, vomiting, diarrhea; no cough, shortness of breath; no pain  No new symptoms  Had multiple hard BMs in last 24 hours  Objective:  Vitals:  Temp:  [97 5 °F (36 4 °C)-98 5 °F (36 9 °C)] 97 8 °F (36 6 °C)  HR:  [] 79  Resp:  [16-20] 17  BP: (124-159)/(73-85) 125/73  SpO2:  [92 %-98 %] 92 %  Temp (24hrs), Av 9 °F (36 6 °C), Min:97 5 °F (36 4 °C), Max:98 5 °F (36 9 °C)  Current: Temperature: 97 8 °F (36 6 °C)    Physical Exam:   General Appearance:  Alert, interactive, nontoxic, no acute distress  Obses   Throat: Oropharynx moist without lesions  Lungs:   Clear to auscultation bilaterally; no wheezes, rhonchi or rales; respirations unlabored   Heart:  RRR; no murmur, rub or gallop   Abdomen:   Soft, non-tender, non-distended, positive bowel sounds  Extremities: No clubbing, cyanosis or edema   Skin: No new rashes or lesions  No draining wounds noted         Labs, Imaging, & Other studies:   All pertinent labs and imaging studies were personally reviewed  Results from last 7 days   Lab Units 11/10/22  0456 11/09/22  0422 11/08/22  0219   WBC Thousand/uL 12 68* 13 27* 11 66*  11 66*   HEMOGLOBIN g/dL 8 0* 8 2* 7 9*  7 9*   PLATELETS Thousands/uL 155 156 142*  142*     Results from last 7 days   Lab Units 11/10/22  04522  0219   SODIUM mmol/L 139 135 136   POTASSIUM mmol/L 3 4* 3 6 3 3*   CHLORIDE mmol/L 105 104 105   CO2 mmol/L 28 27 29   BUN mg/dL 9 9 8   CREATININE mg/dL 0 45* 0 41* 0 44*   EGFR ml/min/1 73sq m 107 111 108   CALCIUM mg/dL 8 6 8 6 8 3     Results from last 7 days   Lab Units 22  0852 22  0933 22  1218   BLOOD CULTURE  No Growth at 72 hrs  No Growth at 72 hrs   --  No Growth After 5 Days    Streptococcus mitis oralis group*   GRAM STAIN RESULT   --   --  Gram positive cocci in chains*   MRSA CULTURE ONLY   --  No Methicillin Resistant Staphlyococcus aureus (MRSA) isolated  --      Results from last 7 days   Lab Units 11/06/22  0512 11/05/22  0723   PROCALCITONIN ng/ml 0 61* 0 63*                   Danuta Gar, DO  Internal Medicine PGY3

## 2022-11-11 NOTE — PLAN OF CARE
Problem: PHYSICAL THERAPY ADULT  Goal: Performs mobility at highest level of function for planned discharge setting  See evaluation for individualized goals  Description: Treatment/Interventions: Functional transfer training, LE strengthening/ROM, Therapeutic exercise, Endurance training, Patient/family training, Elevations, Equipment eval/education, Bed mobility, Gait training, Spoke to nursing  Equipment Recommended:  (TBD)       See flowsheet documentation for full assessment, interventions and recommendations  Outcome: Adequate for Discharge  Note: Prognosis: Good  Problem List: Decreased strength, Decreased endurance, Impaired balance, Decreased mobility, Obesity  Assessment: pt seen for limited tx session as she just finished ambulating in halls  spouse present and pt / spouse both reporting no concerns for d/c home (pt reports planned for later today) all DME including RW in place for d/c ; HEP reviewed w/o questions  verbal review of car xfers + education on compliance w/ RW use and HEP emphasized for d/c  pt denies further PT needs or concerns prior to d /c and declines offer to practive stairs one more time prior to d/c  Pt is cleared for d/c home once medicallty stable w/ support of spouse and HHC as planned  PT to sign off at this time  Barriers to Discharge: Inaccessible home environment     PT Discharge Recommendation: Home with home health rehabilitation    See flowsheet documentation for full assessment

## 2022-11-11 NOTE — OCCUPATIONAL THERAPY NOTE
Occupational Therapy Progress Note     Patient Name: Emmett Hunter  Today's Date: 11/11/2022  Problem List  Principal Problem:    Adenocarcinoma of head of pancreas [facility])  Active Problems:    Hypertension    Controlled depression    Type 2 diabetes mellitus without complication, without long-term current use of insulin (HCC)    Hyperlipidemia    Esophageal reflux    Benign essential hypertension    CPAP (continuous positive airway pressure) dependence    Paroxysmal A-fib (HCC)    Pulmonary embolism (HCC)    Sepsis ([facility] [address] )    Streptococcal bacteremia        11/11/22 1130   OT Last Visit   OT Visit Date 11/11/22   Note Type   Note Type Treatment   Pain Assessment   Pain Assessment Tool 0-10   Pain Score No Pain   Restrictions/Precautions   Weight Bearing Precautions Per Order No   Lifestyle   Autonomy I adls and mobiltiy - i iadls   Reciprocal Relationships supportive family   Service to Others retired   Intrinsic Gratification sedentary   Transfers   Sit to Clean Engines 5  Supervision   Stand to Archetypes 5  Supervision   Functional Mobility   Functional Mobility 5  Supervision   Additional Comments observed pt ambulating in hallways with restorative specialist   Additional items Rolling walker   Subjective   Subjective "I think I'm going home today"   Cognition   Overall Cognitive Status WFL   Assessment   Assessment Pt seen for brief OT session to determine d/c needs - pt observed ambulating in hallway with restorative specialist - pt denies any concerns re: going home - reports she has supportive family who are able to provide support with LB adls,and assist with iadls - has all necessary DME at home - is not concerned about her ability to return home and manage care - d/c from caseload   Plan   OT Frequency   (d/c OT)   Recommendation   OT Discharge Recommendation Home with home health rehabilitation   AM-PAC Daily Activity Inpatient   Lower Body Dressing 3   Bathing 3   Toileting 3   Upper Body Dressing 4   Grooming 4 Eating 4   Daily Activity Raw Score 21   Daily Activity Standardized Score (Calc for Raw Score >=11) 44 27   AM-PAC Applied Cognition Inpatient   Following a Speech/Presentation 4   Understanding Ordinary Conversation 4   Taking Medications 4   Remembering Where Things Are Placed or Put Away 4   Remembering List of 4-5 Errands 4   Taking Care of Complicated Tasks 4   Applied Cognition Raw Score 24   Applied Cognition Standardized Score 62 21   End of Consult   Education Provided Yes;Family or social support of family present for education by provider   Patient Position at End of Consult Bedside chair; All needs within reach     The patient's raw score on the AM-PAC Daily Activity inpatient short form is 21, standardized score is 44 27, greater than 39 4  Patients at this level are likely to benefit from discharge to home  Please refer to the recommendation of the Occupational Therapist for safe discharge planning      [facility]

## 2022-11-11 NOTE — DISCHARGE INSTRUCTIONS
DISCHARGE INSTRUCTIONS    Follow Up: Follow up with Dr Sari Correa on  11/15 at 3:45PM  -IV anti-biotic Rocephin infusion daily at [address] [place] [address] until 11/18  -Take Eliquis 10mg twice a day until 11/13, then take Eliquis 5mg twice a day until 12/5  -Metoprolol 50 mg twice a day  -Follow-up with cardiologist in 1-2 weeks, call to make an appointment  Diet: You may resume a regular diet    Pain:  Oxycodone 5 mg as needed every 6 hours for moderate/severe pain  Tylenol 975 mg every 8 hours scheduled for 7 days  Gabapentin 100 mg 3 times a day for 7 days  Shower: You may shower over the wound  Do not bathe or use a pool or hot tub until cleared by the physician  Activity: As tolerated  You may go up and down stairs, walk as much as you are comfortable, but walk at least 3 times each day  Do not lift anything heavier than 15 pounds for at least 2-4 weeks, unless cleared by the doctor  Driving: Do not drive or make any important decisions while on narcotic pain medication  Generally, you may drive when your off all narcotic pain medications  Medications: Resume all of your previous medications, unless told otherwise by the doctor  You do not need to take the narcotic pain medications unless you are having significant pain and discomfort  Call the office: If you are experiencing any of the following, fevers above 101 5° or chills, significant nausea or vomiting, increase in abdominal pain, if the wound develops drainage and/or is excessive redness around the wound, or if you have significant diarrhea or other worsening symptoms

## 2022-11-11 NOTE — RESTORATIVE TECHNICIAN NOTE
Restorative Technician Note      Patient Name: Jeanette Remy     Note Type: Mobility  Patient Position Upon Consult: Bedside chair  Activity Performed: Ambulated  Assistive Device: Roller walker  Patient Position at End of Consult: Bedside chair;  All needs within reach

## 2022-11-11 NOTE — TELEPHONE ENCOUNTER
Called patient about 11/15/22 at 3:45pm Post op   appointment available with Dr Senait Rey, pt to call back to confirm gave [place] [phone] to call and confirm that time for an Post op  appointment

## 2022-11-12 ENCOUNTER — HOSPITAL ENCOUNTER (INPATIENT)
Facility: HOSPITAL | Age: 62
LOS: 6 days | Discharge: HOME HEALTH | End: 2022-11-18
Attending: EMERGENCY MEDICINE | Admitting: SURGERY

## 2022-11-12 ENCOUNTER — TELEPHONE (OUTPATIENT)
Dept: OTHER | Facility: HOSPITAL | Age: 62
End: 2022-11-12

## 2022-11-12 ENCOUNTER — ANESTHESIA EVENT (INPATIENT)
Dept: RADIOLOGY | Facility: HOSPITAL | Age: 62
End: 2022-11-12

## 2022-11-12 ENCOUNTER — APPOINTMENT (EMERGENCY)
Dept: RADIOLOGY | Facility: HOSPITAL | Age: 62
End: 2022-11-12

## 2022-11-12 ENCOUNTER — APPOINTMENT (OUTPATIENT)
Dept: PERIOP | Facility: HOSPITAL | Age: 62
End: 2022-11-12

## 2022-11-12 ENCOUNTER — ANESTHESIA EVENT (INPATIENT)
Dept: PERIOP | Facility: HOSPITAL | Age: 62
End: 2022-11-12

## 2022-11-12 ENCOUNTER — ANESTHESIA (INPATIENT)
Dept: RADIOLOGY | Facility: HOSPITAL | Age: 62
End: 2022-11-12

## 2022-11-12 ENCOUNTER — ANESTHESIA (INPATIENT)
Dept: PERIOP | Facility: HOSPITAL | Age: 62
End: 2022-11-12

## 2022-11-12 ENCOUNTER — HOSPITAL ENCOUNTER (OUTPATIENT)
Dept: INFUSION CENTER | Facility: HOSPITAL | Age: 62
Discharge: HOME | End: 2022-11-12
Attending: INTERNAL MEDICINE

## 2022-11-12 DIAGNOSIS — K92.0 HEMATEMESIS, UNSPECIFIED WHETHER NAUSEA PRESENT: Primary | ICD-10-CM

## 2022-11-12 DIAGNOSIS — I26.99 PULMONARY EMBOLISM (HCC): ICD-10-CM

## 2022-11-12 DIAGNOSIS — K92.0 HEMATEMESIS WITHOUT NAUSEA: ICD-10-CM

## 2022-11-12 DIAGNOSIS — K92.2 ACUTE UPPER GI BLEED: ICD-10-CM

## 2022-11-12 DIAGNOSIS — I26.99 PULMONARY EMBOLISM WITHOUT ACUTE COR PULMONALE, UNSPECIFIED CHRONICITY, UNSPECIFIED PULMONARY EMBOLISM TYPE (HCC): ICD-10-CM

## 2022-11-12 DIAGNOSIS — B95.5 STREPTOCOCCAL BACTEREMIA: ICD-10-CM

## 2022-11-12 DIAGNOSIS — K21.9 GASTROESOPHAGEAL REFLUX DISEASE WITHOUT ESOPHAGITIS: ICD-10-CM

## 2022-11-12 DIAGNOSIS — T14.8XXA HEMATOMA: ICD-10-CM

## 2022-11-12 DIAGNOSIS — D73.5 SPLENIC INFARCT: ICD-10-CM

## 2022-11-12 DIAGNOSIS — R78.81 STREPTOCOCCAL BACTEREMIA: ICD-10-CM

## 2022-11-12 DIAGNOSIS — C25.0 ADENOCARCINOMA OF HEAD OF PANCREAS (HCC): ICD-10-CM

## 2022-11-12 DIAGNOSIS — I47.1 SUSTAINED SVT (HCC): ICD-10-CM

## 2022-11-12 DIAGNOSIS — I47.20 VENTRICULAR TACHYCARDIA, SUSTAINED: ICD-10-CM

## 2022-11-12 DIAGNOSIS — I48.91 ATRIAL FIBRILLATION WITH RAPID VENTRICULAR RESPONSE (HCC): ICD-10-CM

## 2022-11-12 PROBLEM — Z90.49 H/O WHIPPLE PROCEDURE: Status: ACTIVE | Noted: 2022-01-01

## 2022-11-12 PROBLEM — C25.9 PANCREATIC CANCER (HCC): Status: ACTIVE | Noted: 2022-01-01

## 2022-11-12 PROBLEM — Z90.410 H/O WHIPPLE PROCEDURE: Status: ACTIVE | Noted: 2022-01-01

## 2022-11-12 LAB
ABO GROUP BLD BPU: NORMAL
ABO GROUP BLD: NORMAL
ALBUMIN SERPL BCP-MCNC: 1.7 G/DL (ref 3.5–5)
ALBUMIN SERPL BCP-MCNC: 2 G/DL (ref 3.5–5)
ALP SERPL-CCNC: 875 U/L (ref 46–116)
ALP SERPL-CCNC: 943 U/L (ref 46–116)
ALT SERPL W P-5'-P-CCNC: 78 U/L (ref 12–78)
ALT SERPL W P-5'-P-CCNC: 87 U/L (ref 12–78)
ANION GAP SERPL CALCULATED.3IONS-SCNC: 10 MMOL/L (ref 4–13)
ANION GAP SERPL CALCULATED.3IONS-SCNC: 9 MMOL/L (ref 4–13)
APTT PPP: 29 SECONDS (ref 23–37)
APTT PPP: 29 SECONDS (ref 23–37)
AST SERPL W P-5'-P-CCNC: 212 U/L (ref 5–45)
AST SERPL W P-5'-P-CCNC: 245 U/L (ref 5–45)
ATRIAL RATE: 124 BPM
ATRIAL RATE: 144 BPM
BACTERIA BLD CULT: NORMAL
BACTERIA BLD CULT: NORMAL
BACTERIA UR QL AUTO: ABNORMAL /HPF
BASOPHILS # BLD AUTO: 0.07 THOUSANDS/ÂΜL (ref 0–0.1)
BASOPHILS # BLD MANUAL: 0 THOUSAND/UL (ref 0–0.1)
BASOPHILS NFR MAR MANUAL: 0 % (ref 0–1)
BILIRUB SERPL-MCNC: 1.86 MG/DL (ref 0.2–1)
BILIRUB SERPL-MCNC: 3.12 MG/DL (ref 0.2–1)
BILIRUB UR QL STRIP: ABNORMAL
BLD GP AB SCN SERPL QL: NEGATIVE
BPU ID: NORMAL
BUN SERPL-MCNC: 11 MG/DL (ref 5–25)
BUN SERPL-MCNC: 12 MG/DL (ref 5–25)
CA-I BLD-SCNC: 1.02 MMOL/L (ref 1.12–1.32)
CALCIUM ALBUM COR SERPL-MCNC: 9.5 MG/DL (ref 8.3–10.1)
CALCIUM ALBUM COR SERPL-MCNC: 9.9 MG/DL (ref 8.3–10.1)
CALCIUM SERPL-MCNC: 7.9 MG/DL (ref 8.3–10.1)
CALCIUM SERPL-MCNC: 8.1 MG/DL (ref 8.3–10.1)
CFFMA (FUNCTIONAL FIBRINOGEN MAX AMPLITUDE): 37.3 MM (ref 15–32)
CFFMA (FUNCTIONAL FIBRINOGEN MAX AMPLITUDE): 41.8 MM (ref 15–32)
CHLORIDE SERPL-SCNC: 105 MMOL/L (ref 96–108)
CHLORIDE SERPL-SCNC: 107 MMOL/L (ref 96–108)
CKLY30: 0 % (ref 0–2.6)
CKR(REACTION TIME): 6.3 MIN (ref 4.6–9.1)
CLARITY UR: CLEAR
CO2 SERPL-SCNC: 21 MMOL/L (ref 21–32)
CO2 SERPL-SCNC: 22 MMOL/L (ref 21–32)
COLOR UR: YELLOW
CREAT SERPL-MCNC: 0.62 MG/DL (ref 0.6–1.3)
CREAT SERPL-MCNC: 0.66 MG/DL (ref 0.6–1.3)
CROSSMATCH: NORMAL
CRTMA(RAPIDTEG MAX AMPLITUDE): 69.3 MM (ref 52–70)
CRTMA(RAPIDTEG MAX AMPLITUDE): 71.9 MM (ref 52–70)
EOSINOPHIL # BLD MANUAL: 0 THOUSAND/UL (ref 0–0.4)
EOSINOPHIL NFR BLD MANUAL: 0 % (ref 0–6)
ERYTHROCYTE [DISTWIDTH] IN BLOOD BY AUTOMATED COUNT: 15.6 % (ref 11.6–15.1)
ERYTHROCYTE [DISTWIDTH] IN BLOOD BY AUTOMATED COUNT: 17.1 % (ref 11.6–15.1)
GFR SERPL CREATININE-BSD FRML MDRD: 94 ML/MIN/1.73SQ M
GFR SERPL CREATININE-BSD FRML MDRD: 96 ML/MIN/1.73SQ M
GLUCOSE SERPL-MCNC: 106 MG/DL (ref 65–140)
GLUCOSE SERPL-MCNC: 118 MG/DL (ref 65–140)
GLUCOSE SERPL-MCNC: 119 MG/DL (ref 65–140)
GLUCOSE SERPL-MCNC: 129 MG/DL (ref 65–140)
GLUCOSE SERPL-MCNC: 179 MG/DL (ref 65–140)
GLUCOSE SERPL-MCNC: 198 MG/DL (ref 65–140)
GLUCOSE SERPL-MCNC: 204 MG/DL (ref 65–140)
GLUCOSE SERPL-MCNC: 211 MG/DL (ref 65–140)
GLUCOSE SERPL-MCNC: 212 MG/DL (ref 65–140)
GLUCOSE SERPL-MCNC: 224 MG/DL (ref 65–140)
GLUCOSE SERPL-MCNC: 226 MG/DL (ref 65–140)
GLUCOSE SERPL-MCNC: 237 MG/DL (ref 65–140)
GLUCOSE UR STRIP-MCNC: NEGATIVE MG/DL
HCT VFR BLD AUTO: 22.4 % (ref 34.8–46.1)
HCT VFR BLD AUTO: 22.7 % (ref 34.8–46.1)
HCT VFR BLD AUTO: 24.1 % (ref 34.8–46.1)
HCT VFR BLD AUTO: 24.7 % (ref 34.8–46.1)
HGB BLD-MCNC: 7 G/DL (ref 11.5–15.4)
HGB BLD-MCNC: 7.3 G/DL (ref 11.5–15.4)
HGB BLD-MCNC: 7.6 G/DL (ref 11.5–15.4)
HGB BLD-MCNC: 7.7 G/DL (ref 11.5–15.4)
HGB UR QL STRIP.AUTO: ABNORMAL
IMM GRANULOCYTES # BLD AUTO: >0.5 THOUSAND/UL (ref 0–0.2)
INR PPP: 1.48 (ref 0.84–1.19)
INR PPP: 1.62 (ref 0.84–1.19)
KETONES UR STRIP-MCNC: NEGATIVE MG/DL
LACTATE SERPL-SCNC: 2.1 MMOL/L (ref 0.5–2)
LACTATE SERPL-SCNC: 3.7 MMOL/L (ref 0.5–2)
LACTATE SERPL-SCNC: 4.1 MMOL/L (ref 0.5–2)
LACTATE SERPL-SCNC: 4.8 MMOL/L (ref 0.5–2)
LEUKOCYTE ESTERASE UR QL STRIP: NEGATIVE
LYMPHOCYTES # BLD AUTO: 0.94 THOUSAND/UL (ref 0.6–4.47)
LYMPHOCYTES # BLD AUTO: 3 % (ref 14–44)
MAGNESIUM SERPL-MCNC: 1.6 MG/DL (ref 1.6–2.6)
MCH RBC QN AUTO: 28.8 PG (ref 26.8–34.3)
MCH RBC QN AUTO: 29.3 PG (ref 26.8–34.3)
MCHC RBC AUTO-ENTMCNC: 31.2 G/DL (ref 31.4–37.4)
MCHC RBC AUTO-ENTMCNC: 31.5 G/DL (ref 31.4–37.4)
MCV RBC AUTO: 93 FL (ref 82–98)
MCV RBC AUTO: 93 FL (ref 82–98)
MONOCYTES # BLD AUTO: 0.31 THOUSAND/UL (ref 0–1.22)
MONOCYTES NFR BLD: 1 % (ref 4–12)
MYELOCYTES NFR BLD MANUAL: 1 % (ref 0–1)
NEUTROPHILS # BLD MANUAL: 29.37 THOUSAND/UL (ref 1.85–7.62)
NEUTS BAND NFR BLD MANUAL: 7 % (ref 0–8)
NEUTS SEG NFR BLD AUTO: 87 % (ref 43–75)
NITRITE UR QL STRIP: NEGATIVE
NON-SQ EPI CELLS URNS QL MICRO: ABNORMAL /HPF
NRBC BLD AUTO-RTO: 1 /100 WBC (ref 0–2)
NRBC BLD AUTO-RTO: 1 /100 WBCS
P AXIS: 265 DEGREES
P AXIS: 67 DEGREES
PH UR STRIP.AUTO: 6.5 [PH]
PHOSPHATE SERPL-MCNC: 3.9 MG/DL (ref 2.3–4.1)
PLATELET # BLD AUTO: 176 THOUSANDS/UL (ref 149–390)
PLATELET # BLD AUTO: 366 THOUSANDS/UL (ref 149–390)
PLATELET BLD QL SMEAR: ADEQUATE
PMV BLD AUTO: 11.1 FL (ref 8.9–12.7)
PMV BLD AUTO: 11.3 FL (ref 8.9–12.7)
POIKILOCYTOSIS BLD QL SMEAR: PRESENT
POLYCHROMASIA BLD QL SMEAR: PRESENT
POTASSIUM SERPL-SCNC: 3.5 MMOL/L (ref 3.5–5.3)
POTASSIUM SERPL-SCNC: 4 MMOL/L (ref 3.5–5.3)
PR INTERVAL: 114 MS
PR INTERVAL: 125 MS
PROCALCITONIN SERPL-MCNC: 5.17 NG/ML
PROT SERPL-MCNC: 5.1 G/DL (ref 6.4–8.4)
PROT SERPL-MCNC: 5.3 G/DL (ref 6.4–8.4)
PROT UR STRIP-MCNC: ABNORMAL MG/DL
PROTHROMBIN TIME: 18.2 SECONDS (ref 11.6–14.5)
PROTHROMBIN TIME: 19.5 SECONDS (ref 11.6–14.5)
QRS AXIS: 29 DEGREES
QRS AXIS: 40 DEGREES
QRSD INTERVAL: 88 MS
QRSD INTERVAL: 90 MS
QT INTERVAL: 267 MS
QT INTERVAL: 334 MS
QTC INTERVAL: 414 MS
QTC INTERVAL: 479 MS
RBC # BLD AUTO: 2.59 MILLION/UL (ref 3.81–5.12)
RBC # BLD AUTO: 2.67 MILLION/UL (ref 3.81–5.12)
RBC #/AREA URNS AUTO: ABNORMAL /HPF
RBC MORPH BLD: PRESENT
RH BLD: POSITIVE
SODIUM SERPL-SCNC: 136 MMOL/L (ref 135–147)
SODIUM SERPL-SCNC: 138 MMOL/L (ref 135–147)
SP GR UR STRIP.AUTO: >=1.05 (ref 1–1.03)
SPECIMEN EXPIRATION DATE: NORMAL
T WAVE AXIS: -41 DEGREES
T WAVE AXIS: 33 DEGREES
UNIT DISPENSE STATUS: NORMAL
UNIT PRODUCT CODE: NORMAL
UNIT PRODUCT VOLUME: 250 ML
UNIT PRODUCT VOLUME: 250 ML
UNIT PRODUCT VOLUME: 280 ML
UNIT PRODUCT VOLUME: 280 ML
UNIT PRODUCT VOLUME: 350 ML
UNIT RH: NORMAL
UROBILINOGEN UR STRIP-ACNC: 2 MG/DL
VARIANT LYMPHS # BLD AUTO: 1 %
VENTRICULAR RATE: 124 BPM
VENTRICULAR RATE: 144 BPM
WBC # BLD AUTO: 27.46 THOUSAND/UL (ref 4.31–10.16)
WBC # BLD AUTO: 31.24 THOUSAND/UL (ref 4.31–10.16)
WBC #/AREA URNS AUTO: ABNORMAL /HPF

## 2022-11-12 PROCEDURE — 30233K1 TRANSFUSION OF NONAUTOLOGOUS FROZEN PLASMA INTO PERIPHERAL VEIN, PERCUTANEOUS APPROACH: ICD-10-PCS

## 2022-11-12 PROCEDURE — 30233N1 TRANSFUSION OF NONAUTOLOGOUS RED BLOOD CELLS INTO PERIPHERAL VEIN, PERCUTANEOUS APPROACH: ICD-10-PCS

## 2022-11-12 PROCEDURE — 04H33DZ INSERTION OF INTRALUMINAL DEVICE INTO HEPATIC ARTERY, PERCUTANEOUS APPROACH: ICD-10-PCS | Performed by: RADIOLOGY

## 2022-11-12 PROCEDURE — 0DJ08ZZ INSPECTION OF UPPER INTESTINAL TRACT, VIA NATURAL OR ARTIFICIAL OPENING ENDOSCOPIC: ICD-10-PCS | Performed by: INTERNAL MEDICINE

## 2022-11-12 RX ORDER — LIDOCAINE HYDROCHLORIDE 10 MG/ML
INJECTION, SOLUTION EPIDURAL; INFILTRATION; INTRACAUDAL; PERINEURAL AS NEEDED
Status: DISCONTINUED | OUTPATIENT
Start: 2022-11-12 | End: 2022-11-12

## 2022-11-12 RX ORDER — INSULIN LISPRO 100 [IU]/ML
2-12 INJECTION, SOLUTION INTRAVENOUS; SUBCUTANEOUS EVERY 6 HOURS SCHEDULED
Status: DISCONTINUED | OUTPATIENT
Start: 2022-11-12 | End: 2022-11-12

## 2022-11-12 RX ORDER — LIDOCAINE HYDROCHLORIDE 20 MG/ML
INJECTION, SOLUTION EPIDURAL; INFILTRATION; INTRACAUDAL; PERINEURAL
Status: DISCONTINUED | OUTPATIENT
Start: 2022-11-12 | End: 2022-11-12

## 2022-11-12 RX ORDER — PANTOPRAZOLE SODIUM 40 MG/10ML
40 INJECTION, POWDER, LYOPHILIZED, FOR SOLUTION INTRAVENOUS EVERY 12 HOURS SCHEDULED
Status: DISCONTINUED | OUTPATIENT
Start: 2022-11-12 | End: 2022-11-15

## 2022-11-12 RX ORDER — FENTANYL CITRATE 50 UG/ML
50 INJECTION, SOLUTION INTRAMUSCULAR; INTRAVENOUS ONCE
Status: DISCONTINUED | OUTPATIENT
Start: 2022-11-12 | End: 2022-11-12

## 2022-11-12 RX ORDER — IODIXANOL 320 MG/ML
300 INJECTION, SOLUTION INTRAVASCULAR
Status: DISCONTINUED | OUTPATIENT
Start: 2022-11-12 | End: 2022-11-18 | Stop reason: HOSPADM

## 2022-11-12 RX ORDER — PROPOFOL 10 MG/ML
INJECTION, EMULSION INTRAVENOUS AS NEEDED
Status: DISCONTINUED | OUTPATIENT
Start: 2022-11-12 | End: 2022-11-12

## 2022-11-12 RX ORDER — SUCCINYLCHOLINE/SOD CL,ISO/PF 100 MG/5ML
SYRINGE (ML) INTRAVENOUS AS NEEDED
Status: DISCONTINUED | OUTPATIENT
Start: 2022-11-12 | End: 2022-11-12

## 2022-11-12 RX ORDER — SODIUM CHLORIDE, SODIUM GLUCONATE, SODIUM ACETATE, POTASSIUM CHLORIDE, MAGNESIUM CHLORIDE, SODIUM PHOSPHATE, DIBASIC, AND POTASSIUM PHOSPHATE .53; .5; .37; .037; .03; .012; .00082 G/100ML; G/100ML; G/100ML; G/100ML; G/100ML; G/100ML; G/100ML
1000 INJECTION, SOLUTION INTRAVENOUS ONCE
Status: COMPLETED | OUTPATIENT
Start: 2022-11-12 | End: 2022-11-12

## 2022-11-12 RX ORDER — ONDANSETRON 2 MG/ML
4 INJECTION INTRAMUSCULAR; INTRAVENOUS EVERY 6 HOURS PRN
Status: DISCONTINUED | OUTPATIENT
Start: 2022-11-12 | End: 2022-11-18 | Stop reason: HOSPADM

## 2022-11-12 RX ORDER — PANTOPRAZOLE SODIUM 40 MG/10ML
40 INJECTION, POWDER, LYOPHILIZED, FOR SOLUTION INTRAVENOUS ONCE
Status: COMPLETED | OUTPATIENT
Start: 2022-11-12 | End: 2022-11-12

## 2022-11-12 RX ORDER — SODIUM CHLORIDE 9 MG/ML
INJECTION, SOLUTION INTRAVENOUS CONTINUOUS PRN
Status: DISCONTINUED | OUTPATIENT
Start: 2022-11-12 | End: 2022-11-12

## 2022-11-12 RX ORDER — ACETAMINOPHEN 325 MG/1
650 TABLET ORAL EVERY 6 HOURS PRN
Status: DISCONTINUED | OUTPATIENT
Start: 2022-11-12 | End: 2022-11-13

## 2022-11-12 RX ORDER — ALBUMIN, HUMAN INJ 5% 5 %
SOLUTION INTRAVENOUS CONTINUOUS PRN
Status: DISCONTINUED | OUTPATIENT
Start: 2022-11-12 | End: 2022-11-12

## 2022-11-12 RX ORDER — MAGNESIUM SULFATE 1 G/100ML
1 INJECTION INTRAVENOUS ONCE
Status: COMPLETED | OUTPATIENT
Start: 2022-11-12 | End: 2022-11-12

## 2022-11-12 RX ORDER — HEPARIN SODIUM 5000 [USP'U]/ML
5000 INJECTION, SOLUTION INTRAVENOUS; SUBCUTANEOUS EVERY 8 HOURS SCHEDULED
Status: DISCONTINUED | OUTPATIENT
Start: 2022-11-12 | End: 2022-11-12

## 2022-11-12 RX ORDER — ROCURONIUM BROMIDE 10 MG/ML
INJECTION, SOLUTION INTRAVENOUS AS NEEDED
Status: DISCONTINUED | OUTPATIENT
Start: 2022-11-12 | End: 2022-11-12

## 2022-11-12 RX ORDER — METOPROLOL TARTRATE 5 MG/5ML
5 INJECTION INTRAVENOUS EVERY 6 HOURS PRN
Status: DISCONTINUED | OUTPATIENT
Start: 2022-11-12 | End: 2022-11-18 | Stop reason: HOSPADM

## 2022-11-12 RX ORDER — SODIUM CHLORIDE, SODIUM LACTATE, POTASSIUM CHLORIDE, CALCIUM CHLORIDE 600; 310; 30; 20 MG/100ML; MG/100ML; MG/100ML; MG/100ML
75 INJECTION, SOLUTION INTRAVENOUS CONTINUOUS
Status: DISCONTINUED | OUTPATIENT
Start: 2022-11-12 | End: 2022-11-15

## 2022-11-12 RX ORDER — ONDANSETRON 2 MG/ML
INJECTION INTRAMUSCULAR; INTRAVENOUS AS NEEDED
Status: DISCONTINUED | OUTPATIENT
Start: 2022-11-12 | End: 2022-11-12

## 2022-11-12 RX ORDER — HYDROMORPHONE HCL IN WATER/PF 6 MG/30 ML
0.2 PATIENT CONTROLLED ANALGESIA SYRINGE INTRAVENOUS
Status: DISCONTINUED | OUTPATIENT
Start: 2022-11-12 | End: 2022-11-14

## 2022-11-12 RX ORDER — HYDROMORPHONE HCL/PF 1 MG/ML
0.5 SYRINGE (ML) INJECTION
Status: DISCONTINUED | OUTPATIENT
Start: 2022-11-12 | End: 2022-11-14

## 2022-11-12 RX ADMIN — SODIUM CHLORIDE, SODIUM GLUCONATE, SODIUM ACETATE, POTASSIUM CHLORIDE AND MAGNESIUM CHLORIDE 1000 ML: 526; 502; 368; 37; 30 INJECTION, SOLUTION INTRAVENOUS at 12:01

## 2022-11-12 RX ADMIN — ALBUMIN HUMAN: 0.05 INJECTION, SOLUTION INTRAVENOUS at 03:30

## 2022-11-12 RX ADMIN — METOROPROLOL TARTRATE 5 MG: 5 INJECTION, SOLUTION INTRAVENOUS at 13:11

## 2022-11-12 RX ADMIN — PANTOPRAZOLE SODIUM 40 MG: 40 INJECTION, POWDER, FOR SOLUTION INTRAVENOUS at 10:57

## 2022-11-12 RX ADMIN — ONDANSETRON 4 MG: 2 INJECTION INTRAMUSCULAR; INTRAVENOUS at 04:02

## 2022-11-12 RX ADMIN — MAGNESIUM SULFATE HEPTAHYDRATE 1 G: 1 INJECTION, SOLUTION INTRAVENOUS at 08:27

## 2022-11-12 RX ADMIN — Medication 200 MG: at 03:44

## 2022-11-12 RX ADMIN — PIPERACILLIN AND TAZOBACTAM 3.38 G: 36; 4.5 INJECTION, POWDER, FOR SOLUTION INTRAVENOUS at 21:31

## 2022-11-12 RX ADMIN — SODIUM CHLORIDE, SODIUM LACTATE, POTASSIUM CHLORIDE, AND CALCIUM CHLORIDE 100 ML/HR: .6; .31; .03; .02 INJECTION, SOLUTION INTRAVENOUS at 21:10

## 2022-11-12 RX ADMIN — SODIUM CHLORIDE 250 ML: 0.9 INJECTION, SOLUTION INTRAVENOUS at 03:08

## 2022-11-12 RX ADMIN — PANTOPRAZOLE SODIUM 40 MG: 40 INJECTION, POWDER, FOR SOLUTION INTRAVENOUS at 21:31

## 2022-11-12 RX ADMIN — LIDOCAINE HYDROCHLORIDE 50 MG: 10 INJECTION, SOLUTION EPIDURAL; INFILTRATION; INTRACAUDAL; PERINEURAL at 14:35

## 2022-11-12 RX ADMIN — SODIUM CHLORIDE 3 UNITS/HR: 900 INJECTION, SOLUTION INTRAVENOUS at 09:14

## 2022-11-12 RX ADMIN — PIPERACILLIN AND TAZOBACTAM 3.38 G: 36; 4.5 INJECTION, POWDER, FOR SOLUTION INTRAVENOUS at 15:26

## 2022-11-12 RX ADMIN — SODIUM CHLORIDE: 0.9 INJECTION, SOLUTION INTRAVENOUS at 03:28

## 2022-11-12 RX ADMIN — Medication 2000 UNITS: at 04:24

## 2022-11-12 RX ADMIN — SUGAMMADEX 200 MG: 100 INJECTION, SOLUTION INTRAVENOUS at 05:20

## 2022-11-12 RX ADMIN — CEFTRIAXONE SODIUM 1000 MG: 10 INJECTION, POWDER, FOR SOLUTION INTRAVENOUS at 08:46

## 2022-11-12 RX ADMIN — PHENYLEPHRINE HYDROCHLORIDE 20 MCG/MIN: 10 INJECTION INTRAVENOUS at 03:46

## 2022-11-12 RX ADMIN — SODIUM CHLORIDE 1000 ML: 0.9 INJECTION, SOLUTION INTRAVENOUS at 02:20

## 2022-11-12 RX ADMIN — PROPOFOL 150 MG: 10 INJECTION, EMULSION INTRAVENOUS at 14:35

## 2022-11-12 RX ADMIN — ROCURONIUM BROMIDE 30 MG: 50 INJECTION INTRAVENOUS at 04:00

## 2022-11-12 RX ADMIN — PIPERACILLIN AND TAZOBACTAM 3.38 G: 36; 4.5 INJECTION, POWDER, FOR SOLUTION INTRAVENOUS at 03:11

## 2022-11-12 RX ADMIN — METOROPROLOL TARTRATE 5 MG: 5 INJECTION, SOLUTION INTRAVENOUS at 21:31

## 2022-11-12 RX ADMIN — Medication 100 MG: at 14:35

## 2022-11-12 RX ADMIN — LIDOCAINE HYDROCHLORIDE 0.5 ML: 20 INJECTION, SOLUTION EPIDURAL; INFILTRATION; INTRACAUDAL; PERINEURAL at 03:40

## 2022-11-12 RX ADMIN — SODIUM CHLORIDE, SODIUM LACTATE, POTASSIUM CHLORIDE, AND CALCIUM CHLORIDE 150 ML/HR: .6; .31; .03; .02 INJECTION, SOLUTION INTRAVENOUS at 06:35

## 2022-11-12 RX ADMIN — SODIUM CHLORIDE 500 ML: 0.9 INJECTION, SOLUTION INTRAVENOUS at 01:05

## 2022-11-12 RX ADMIN — PANTOPRAZOLE SODIUM 40 MG: 40 INJECTION, POWDER, FOR SOLUTION INTRAVENOUS at 01:06

## 2022-11-12 RX ADMIN — SODIUM CHLORIDE: 9 INJECTION, SOLUTION INTRAVENOUS at 03:40

## 2022-11-12 RX ADMIN — IOHEXOL 100 ML: 350 INJECTION, SOLUTION INTRAVENOUS at 01:59

## 2022-11-12 RX ADMIN — HEPARIN SODIUM 5000 UNITS: 5000 INJECTION INTRAVENOUS; SUBCUTANEOUS at 07:05

## 2022-11-12 RX ADMIN — INSULIN LISPRO 4 UNITS: 100 INJECTION, SOLUTION INTRAVENOUS; SUBCUTANEOUS at 07:25

## 2022-11-12 RX ADMIN — SODIUM CHLORIDE, SODIUM LACTATE, POTASSIUM CHLORIDE, AND CALCIUM CHLORIDE 150 ML/HR: .6; .31; .03; .02 INJECTION, SOLUTION INTRAVENOUS at 13:18

## 2022-11-12 RX ADMIN — PROPOFOL 200 MG: 10 INJECTION, EMULSION INTRAVENOUS at 03:44

## 2022-11-12 RX ADMIN — LIDOCAINE HYDROCHLORIDE 50 MG: 10 INJECTION, SOLUTION EPIDURAL; INFILTRATION; INTRACAUDAL; PERINEURAL at 03:44

## 2022-11-12 NOTE — CONSULTS
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

## 2022-11-12 NOTE — DISCHARGE INSTRUCTIONS
DISCHARGE INSTRUCTIONS    Follow Up: Follow up with Dr Angela Levi in 2 weeks 11/29 at 11:30AM  Follow up with Dr Anand Yan as scheduled on 11/30 at 8:45AM  Follow up with PCP in 1-2 weeks  Follow up with Cardiologist in 1-2 weeks  Medication Changes:  Protonix 40mg twice a day  Cardizem 240mg once daily  Metoprolol 25mg twice a day  Eliquis 5mg twice a day    Diet: You may resume a regular diet    Pain: Oxycodone 5mg as needed for moderate/severe pain every 6 hours  Gabapentin 100mg three times day  Tylenol 975mg as needed for mild pain control  Shower: You may shower over the wound  Do not bathe or use a pool or hot tub until cleared by the physician  Activity: As tolerated  You may go up and down stairs, walk as much as you are comfortable, but walk at least 3 times each day  Do not lift anything heavier than 15 pounds for at least 2-4 weeks, unless cleared by the doctor  Driving: Do not drive or make any important decisions while on narcotic pain medication  Generally, you may drive when your off all narcotic pain medications  Medications: Resume all of your previous medications, unless told otherwise by the doctor  You do not need to take the narcotic pain medications unless you are having significant pain and discomfort  Call the office: If you are experiencing any of the following, fevers above 101 5° or chills, significant nausea or vomiting, increase in abdominal pain, if the wound develops drainage and/or is excessive redness around the wound, or if you have significant diarrhea or other worsening symptoms  Embolization   AMBULATORY CARE:   What you need to know about embolization: Embolization is a procedure to create a clot, or block, in a blood vessel  This stops blood from flowing to the area  The procedure may be used to treat many conditions  It can help stop heavy bleeding (hemorrhage), or prevent an aneurysm from rupturing  An abnormal connection between arteries can be removed  Embolization can stop blood flow to a tumor, such as a uterine fibroid or a cancer tumor  Chemotherapy medicine may be given during an embolization to treat a cancer tumor  This is called chemoembolization  How to prepare for the procedure: Embolization is sometimes done as an emergency procedure  This means you will not have time to prepare  For an embolization that is not an emergency, the following are general guidelines for how to prepare:  Tell your provider about all your allergies  This includes if you have ever had an allergic reaction to contrast liquid, anesthesia, or antibiotics  You may be told not to eat or drink anything after midnight the night before your procedure  Arrange to have someone drive you home  The person should stay with you to help you and watch for problems that may develop  Give your provider a list of your medicines  Include all medicines and supplements you take  You may need to stop taking blood thinners or aspirin several days before your procedure  This will help decrease your risk for bleeding  Do not stop taking medicines unless your healthcare provider tells you to stop  Your provider will tell you which medicines to take or not take on the day of your procedure  You may need blood tests to check how well your blood clots and to check your kidney function  Depending on the reason for this procedure, you may an MRI, ultrasound, x-ray, or CT scan  These pictures will help your healthcare provider examine the area to be worked on  If you are a woman, tell your provider if you know or think you might be pregnant  You may not be able to have certain tests because they may harm an unborn baby  Your provider may need to take extra precautions for other tests  What will happen during the procedure: You may be given general anesthesia to keep you asleep and pain-free  You may instead be given moderate sedation   This means you will be awake during the procedure, but you should not feel any pain  Your provider will put numbing medicine on your skin where the procedure will be done  A small incision will be made over an artery  A catheter (thin tube) will be guided into the artery  Contrast liquid will be used to help your healthcare provider see your arteries more easily  Your provider will use a type of x-ray that gives a moving picture of the arteries  This will help him or her move the catheter into the right place  The catheter is moved up until it reaches the correct artery  Your provider will put medicine or a material into the artery to slow or stop blood from flowing  This may be a coil, foam, beads, a plug, or liquid  The liquid may also contain material that is larger than blood cells  Your provider will remove the catheter  Pressure will be used to stop any bleeding that happens  The incision area does not need to be closed with stitches  It will be small and close on its own  It will be covered with a bandage to keep it from becoming infected  What to expect after the procedure: You may have pain for a few days  Depending on the reason you had this procedure, you may also have a headache or cramps  You may have pain, bleeding, or bruising where the catheter went into your leg  All of these symptoms are normal and should get better soon  You may be given pain medicine through your IV or a pump  A pump allows you to control when the pain medicine is given  You should expect to stay in the hospital at least overnight  If you had this procedure to treat heavy bleeding, it may take 24 hours to know if the bleeding stopped  Healthcare providers will help you walk around after your procedure  This will help prevent blood clots  Do not get up until healthcare providers say it is okay  They may want you to lie in one position for a certain amount of time  When they say it is okay to walk, they will help you stand and walk safely       Risks of embolization: You may bleed more than expected or develop an infection  The area being treated may be damaged during the procedure  Your artery may be damaged from the catheter, or you may develop a blood clot  Your kidneys may be damaged from the contrast liquid  The material being put into the artery may go to the wrong place  This can stop blood flow to healthy tissue  The procedure may not work, or it may not relieve your symptoms  Call your doctor or specialist if:   You have a fever higher than 100 4°F (38°C)  You have a fever, pain, and nausea that last longer than 3 days  You suddenly have severe abdominal pain  You cannot urinate, or you urinate very little  You have signs of an infection at the catheter site, such as red streaks, pain, or swelling  You have new or worsening pain  You have questions or concerns about your condition or care  Medicines: You may need any of the following:  NSAIDs help decrease swelling and pain or fever  This medicine is available with or without a doctor's order  NSAIDs can cause stomach bleeding or kidney problems in certain people  If you take blood thinner medicine, always ask your healthcare provider if NSAIDs are safe for you  Always read the medicine label and follow directions  Prescription pain medicine may be given  Ask your healthcare provider how to take this medicine safely  Some prescription pain medicines contain acetaminophen  Do not take other medicines that contain acetaminophen without talking to your healthcare provider  Too much acetaminophen may cause liver damage  Prescription pain medicine may cause constipation  Ask your healthcare provider how to prevent or treat constipation  Take your medicine as directed  Contact your healthcare provider if you think your medicine is not helping or if you have side effects  Tell him or her if you are allergic to any medicine   Keep a list of the medicines, vitamins, and herbs you take  Include the amounts, and when and why you take them  Bring the list or the pill bottles to follow-up visits  Carry your medicine list with you in case of an emergency  Self-care:   Rest as needed  Rest and sleep will help your body heal      Follow your healthcare provider's instructions for activity  He or she will tell you when it is okay to return to your normal activities and to start driving  He or she may want you to wait 1 to 2 weeks to return to work  Care for the catheter site as directed  It is okay to shower after the procedure  You will only have a small cut in your skin from where the catheter went into your leg  Check the catheter site for signs of infection, including red streaks, pain, and swelling  Treat symptoms of postembolization syndrome  This syndrome is common after an embolization procedure  It usually starts within 72 hours of the procedure and may last a few days  The main symptoms are fever, pain, and nausea  You will probably be able to manage your symptoms at home  Acetaminophen or an NSAID, such as ibuprofen, can reduce a fever and pain  You may need to eat lightly to manage nausea  Drink more liquids for the first week after the procedure to prevent dehydration  Follow up with your doctor or specialist as directed: You may need to have more tests to check if the procedure worked  Write down your questions so you remember to ask them during your visits  © Copyright [address] Information is for End User's use only and may not be sold, redistributed or otherwise used for commercial purposes  All illustrations and images included in CareNotes® are the copyrighted property of A D [employer] M [employer] Inc  or [facility]   The above information is an  only  It is not intended as medical advice for individual conditions or treatments   Talk to your doctor, nurse or pharmacist before following any medical regimen to see if it is safe and effective for you

## 2022-11-12 NOTE — ANESTHESIA POSTPROCEDURE EVALUATION
Post-Op Assessment Note    CV Status:  Stable  Pain Score: 0    Pain management: adequate  Multimodal analgesia used between 6 hours prior to anesthesia start to PACU discharge    Mental Status:  Alert and awake   Hydration Status:  Euvolemic   PONV Controlled:  Controlled   Airway Patency:  Patent  Airway: intubated   Two or more mitigation strategies used for obstructive sleep apnea   Post Op Vitals Reviewed: Yes      Staff: CRNA         No complications documented      BP   104/54   Temp      Pulse 99   Resp  14   SpO2   96%

## 2022-11-12 NOTE — CONSULTS
ICU Acceptance Note - Critical Care   Josue Conrad 57 y o  female MRN: [id number]  Unit/Bed#: MICU 10 Encounter: [id number]      -------------------------------------------------------------------------------------------------------------  Chief Complaint:  Hematemesis    History of Present Illness   HX and PE limited by:  Nothing  Josue Conrad is a 57 y o  female with past medical history of pancreatic adenocarcinoma status post Whipple procedure with portal vein/SMV repair on 10/31, postoperative pulmonary embolism on Eliquis, hypertension, hyperlipidemia, diabetes, obstructive sleep apnea on CPAP who now presents with hematemesis secondary to GA/, hepatic artery blowout  Patient had postoperative course complicated by bilateral pulmonary embolism and strep bacteremia for which she is currently on Rocephin  She is discharged home yesterday afternoon  Patient returned home but then reported to the ER at 10:00 p m  Last night secondary to of severe abdominal pain nausea, and bloody vomiting  CTA abdomen and pelvis was performed which was concerning for gastrointestinal bleeding  Given [facility] to reverse Eliquis use Patient was taken to Interventional Radiology no visualized extravasation of contrast however empirically had stent placed across the GDA stump and common hepatic artery    Patient now presents to the ICU in stable condition    History obtained from chart review and the patient   -------------------------------------------------------------------------------------------------------------  Assessment and Plan:    Neuro:   •  Analgesia: PRN dilaudid   • Sedation: NA  • Delirium PPX: CAM-ICU, sleep hygiene           CV:   • AF: hold sotalol and BB  o Hold AC 2/2 below   •  History of hypertension: hold PTA meds      Pulm:  • Acute hypoxemic respiratory failure: 2/2 mild volume overload  o Wean midflow NC as tolerated   • History of pulmonary embolism: hold eliquis 2/2 GIB  • History of obstructive sleep apnea: QHS CPAP and with naps         GI:   • Gastrointestinal bleeding: likely 2/2 GDA stump blowout  o Q6 HH  o Trend lactic  o Type and screen  •  Pancreatic adenocarcinoma status post Whipple procedure  o Appreciate surg onc  • Bowel Reg: PRN  • GI PPX: protonix           :   •  No active issues, maintain White        F/E/N:   • Electrolytes - Monitor/trend - replete based on deficiencies   • Metabolic acidosis, lactic acidosis: 2/2 bleed as above  o Trend HH  o Resuscitate as needed      Heme/Onc:   •  Acute blood loss anemia: 2/2 hematemesis as above  • DVT PPX: hold 2/2 above        Endo:   • Type 2 diabetes with hyperglycemia: start insulin infusion           ID:   • Strep oralis bacteremia: continue ceftriaxone    o 2 week course per ID through 11/18        MSK/Skin:   •  PT/OT when appropriate        Disposition: Admit to Critical Care   Code Status: Level 1 - Full Code  --------------------------------------------------------------------------------------------------------------  Review of Systems   Constitutional: Positive for fatigue  Gastrointestinal: Positive for abdominal distention and abdominal pain  A 12-point, complete review of systems was reviewed and negative except as stated above     Physical Exam  Constitutional:       Appearance: She is obese  HENT:      Head: Normocephalic  Mouth/Throat:      Mouth: Mucous membranes are moist    Eyes:      Pupils: Pupils are equal, round, and reactive to light  Cardiovascular:      Rate and Rhythm: Normal rate and regular rhythm  Pulmonary:      Effort: Pulmonary effort is normal       Breath sounds: Normal breath sounds  Abdominal:      General: There is distension  Palpations: Abdomen is soft  Tenderness: There is abdominal tenderness  Musculoskeletal:      Cervical back: Normal range of motion  Right lower leg: Edema present  Left lower leg: Edema present  Skin:     General: Skin is warm        Capillary Refill: Capillary refill takes less than 2 seconds  Neurological:      General: No focal deficit present        Mental Status: She is alert        --------------------------------------------------------------------------------------------------------------  Historical Information   Past Medical History:   Diagnosis Date   • Abnormal liver function test     last assessed 1/16/17    • Adenomatosis    • 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/2006    last assessed 12/31/14    • Osteoarthritis of both knees     last assessed 12/31/14    • Paroxysmal atrial fibrillation ([facility] )    • Port-A-Cath in place    • S/P ablation of atrial flutter    • Shortness of breath     per pt "from chemo-not drastic--still working and goes up steps"   • Sleep apnea    • Wears glasses      Past Surgical History:   Procedure Laterality Date   • ABDOMINAL ADHESION SURGERY N/A 10/31/2022    Procedure: LYSIS ADHESIONS, colectomy, vascular pedicle flap;  Surgeon: Lindsay Staton MD;  [facility];  Service: Surgical Oncology   • ABDOMINAL SURGERY  06/2006    20cm GIST removed    • ABLATION SOFT TISSUE N/A 10/31/2022    Procedure: SOFT TISSUE ABLATION;  Surgeon: Lambert Rivera MD;  [facility];  Service: Surgical Oncology   • APPENDECTOMY     • ATRIAL ABLATION SURGERY     • CARPAL TUNNEL RELEASE Bilateral    • COLONOSCOPY     • FL GUIDED CENTRAL VENOUS ACCESS DEVICE INSERTION  05/31/2022   • FL RETROGRADE PYELOGRAM  07/18/2022   • FL RETROGRADE PYELOGRAM  8/22/2022   • HYSTERECTOMY      fibroids   • IR MESENTERIC/VISCERAL ANGIOGRAM  11/12/2022   • IR PORT CHECK  06/23/2022   • IR PORT REMOVAL AND PLACEMENT NEW SITE  06/28/2022   • JOINT REPLACEMENT Bilateral     knees   • KIDNEY STONE SURGERY Right 09/17/2021    placed stent in  for kidney stone   • KNEE SURGERY     • KNEE SURGERY Left 03/2019   • LAPAROTOMY N/A 10/31/2022    Procedure: EXPLORATORY LAPAROTOMY;  Surgeon: Lambert Rivera MD;  [facility];  Service: Surgical Oncology   • OOPHORECTOMY      cyst   • SD CYSTO/URETERO W/LITHOTRIPSY &INDWELL STENT INSRT Left 8/22/2022    Procedure: CYSTOSCOPY URETEROSCOPY WITH LITHOTRIPSY HOLMIUM LASER, RETROGRADE PYELOGRAM AND INSERTION STENT URETERAL;  Surgeon: Poppy Liang MD;  [facility];  Service: Urology   • SD CYSTOSCOPY,INSERT URETERAL STENT Left 8/22/2022    Procedure: EXCHANGE STENT URETERAL;  Surgeon: Poppy Liang MD;  [facility];  Service: Urology   • SD CYSTOURETHROSCOPY,URETER CATHETER Left 07/18/2022    Procedure: CYSTOSCOPY RETROGRADE PYELOGRAM WITH INSERTION STENT URETERAL;  Surgeon: Poppy Liang MD;  [facility];  Service: Urology   • TONSILLECTOMY     • TUBAL LIGATION     • TUMOR EXCISION  2006    gastrointestinal stromal tumor   • TUNNELED VENOUS PORT PLACEMENT N/A 05/31/2022    Procedure: INSERTION VENOUS PORT (PORT-A-CATH);   Surgeon: Lambert Rivera MD;  [facility];  Service: Surgical Oncology   • UPPER GASTROINTESTINAL ENDOSCOPY     • WHIPPLE PROCEDURE/PANCREATICO-DUODENECTOMY N/A 10/31/2022 Procedure: WHIPPLE PROCEDURE/PANCREATICO-DUODENECTOMY, IOUS;  Surgeon: Ele Rizvi MD;  [facility];  Service: Surgical Oncology     Social History   Social History     Substance and Sexual Activity   Alcohol Use Not Currently    Comment: social drinker per allscript      Social History     Substance and Sexual Activity   Drug Use Never     Social History     Tobacco Use   Smoking Status Former Smoker   • Years: 9 00   • Types: Cigarettes   Smokeless Tobacco Never Used     Exercise History: uknwon   Family History:   Family History   Problem Relation Age of Onset   • Emphysema Mother    • Arthritis Mother    • Diabetes Mother    • Hypertension Mother    • COPD Mother    • Arthritis Father    • Prostate cancer Father    • Cerebral aneurysm Son    • No Known Problems Maternal Grandmother    • No Known Problems Maternal Grandfather    • No Known Problems Paternal Grandmother    • No Known Problems Paternal Grandfather    • No Known Problems Son    • No Known Problems Maternal Aunt    • No Known Problems Maternal Aunt    • No Known Problems Paternal Aunt    • No Known Problems Paternal Aunt      I have reviewed this patient's family history and commented on sigificant items within the HPI    Vitals:   Vitals:    11/12/22 0230 11/12/22 0245 11/12/22 0556 11/12/22 0600   BP: 111/55 98/55     BP Location: Right arm Right arm     Pulse: (!) 116 (!) 112  (!) 106   Resp: 22 (!) 23  15   Temp:    98 4 °F (36 9 °C)   TempSrc:    Bladder   SpO2: 93% 94% 93% 93%   Weight:    (!) 141 kg (311 lb 1 1 oz)   Height:    5' 4" (1 626 m)     Temp  Min: 98 4 °F (36 9 °C)  Max: 98 5 °F (36 9 °C)     Height: 5' 4" (162 6 cm)  Body mass index is 53 39 kg/m²    N/A    Medications:  Current Facility-Administered Medications   Medication Dose Route Frequency   • acetaminophen (TYLENOL) tablet 650 mg  650 mg Oral Q6H PRN   • fentanyl citrate (PF) 100 MCG/2ML 50 mcg  50 mcg Intravenous Once   • heparin (porcine) subcutaneous injection 5,000 Units  5,000 Units Subcutaneous Q8H [facility]   • HYDROmorphone (DILAUDID) injection 0 5 mg  0 5 mg Intravenous Q3H PRN   • HYDROmorphone HCl (DILAUDID) injection 0 2 mg  0 2 mg Intravenous Q3H PRN   • insulin lispro (HumaLOG) 100 units/mL subcutaneous injection 2-12 Units  2-12 Units Subcutaneous Q6H [facility]   • iodixanol (VISIPAQUE) 320 MG/ML injection 300 mL  300 mL Intra-arterial Once in imaging   • lactated ringers infusion  150 mL/hr Intravenous Continuous   • ondansetron (ZOFRAN) injection 4 mg  4 mg Intravenous Q6H PRN     Home medications:  Prior to Admission Medications   Prescriptions Last Dose Informant Patient Reported? Taking? Insulin Pen Needle (Pen Needles) 32G X 4 MM MISC  Self No No   Sig: Use once a week   Magnesium Oxide -Mg Supplement 400 MG CAPS  Self Yes No   Sig: Take 1 capsule by mouth daily   Omega-3 Fatty Acids (FISH OIL) 1,000 mg  Self Yes No   Sig: Take 1,000 mg by mouth 2 (two) times a day     Ozempic, 1 MG/DOSE, 4 MG/3ML SOPN injection pen   No No   Sig: INJECT 1MG SUBCUTANEOUSLY  WEEKLY   Patient taking differently: Inject under the skin once a week mondays   PARoxetine (PAXIL-CR) 37 5 mg 24 hr tablet  Self No No   Sig: TAKE 1 TABLET BY MOUTH IN  THE MORNING   Potassium Citrate ER 15 MEQ (1620 MG) TBCR  Self No No   Sig: TAKE 1 TABLET BY MOUTH  TWICE DAILY   VITAMIN D PO  Self Yes No   Sig: Take 2,000 Units by mouth 2 (two) times a day   acetaminophen (TYLENOL) 325 mg tablet   No No   Sig: Take 3 tablets (975 mg total) by mouth every 8 (eight) hours for 7 days   apixaban (Eliquis) 5 mg   No No   Sig: Take 2 tablets (10 mg total) by mouth 2 (two) times a day for 7 days, THEN 1 tablet (5 mg total) 2 (two) times a day for 23 days     atorvastatin (LIPITOR) 40 mg tablet  Self No No   Sig: Take 1 tablet (40 mg total) by mouth daily at bedtime   gabapentin (NEURONTIN) 100 mg capsule   No No   Sig: Take 1 capsule (100 mg total) by mouth 3 (three) times a day for 7 days   glucose blood (Contour Next Test) test strip  Self No No   Sig: Use 1 each daily Use as instructed   ibuprofen (ADVIL,MOTRIN) 100 MG tablet  Self Yes No   Sig: Take 200 mg by mouth as needed for mild pain   metoprolol succinate (TOPROL-XL) 50 mg 24 hr tablet   No No   Sig: Take 1 tablet (50 mg total) by mouth 2 (two) times a day   multivitamin (THERAGRAN) TABS  Self Yes No   Sig: Take 1 tablet by mouth daily  olmesartan (BENICAR) 20 mg tablet  Self No No   Sig: TAKE 1 TABLET BY MOUTH  DAILY   ondansetron (ZOFRAN) 4 mg tablet  Self No No   Sig: Take 2 tablets (8 mg total) by mouth every 8 (eight) hours as needed for nausea or vomiting   oxyCODONE (ROXICODONE) 5 immediate release tablet   No No   Sig: Take 1 tablet (5 mg total) by mouth every 4 (four) hours as needed for moderate pain (To begin after epidural out) for up to 4 days Max Daily Amount: 30 mg   pantoprazole (PROTONIX) 40 mg tablet  Self No No   Sig: TAKE 1 TABLET BY MOUTH  DAILY BEFORE BREAKFAST   senna (SENOKOT) 8 6 mg  Self No No   Sig: Take 1 tablet (8 6 mg total) by mouth daily at bedtime for 5 days   Patient taking differently: Take 8 6 mg by mouth daily at bedtime as needed   sotalol (BETAPACE) 120 mg tablet  Self No No   Sig: Take 1 tablet (120 mg total) by mouth every 12 (twelve) hours      Facility-Administered Medications: None     Allergies:   Allergies   Allergen Reactions   • Other Rash     Adhesive tape          Laboratory and Diagnostics:  Results from last 7 days   Lab Units 11/12/22  0608 11/12/22  0102 11/11/22  1120 11/10/22  0456 11/09/22  0422 11/08/22  0219 11/07/22  0443   WBC Thousand/uL 27 46* 31 24* 9 42 12 68* 13 27* 11 66*  11 66* 10 20*   HEMOGLOBIN g/dL 7 6* 7 7* 7 4* 8 0* 8 2* 7 9*  7 9* 7 9*   HEMATOCRIT % 24 1* 24 7* 24 7* 26 2* 26 9* 25 1*  25 1* 25 6*   PLATELETS Thousands/uL 176 366 183 155 156 142*  142* 125*   NEUTROS PCT %  --   --  78*  --  79* 79*  --    BANDS PCT %  --  7  --  4  --   --   --    MONOS PCT %  --   --  7  --  7 10  -- MONO PCT %  --  1*  --  2*  --   --   --      Results from last 7 days   Lab Units 11/12/22  0608 11/12/22  0102 11/11/22  1120 11/10/22  0456 11/09/22  0422 11/08/22  0219 11/07/22  0443   SODIUM mmol/L 138 136 139 139 135 136 138   POTASSIUM mmol/L 4 0 3 5 3 4* 3 4* 3 6 3 3* 3 0*   CHLORIDE mmol/L 107 105 105 105 104 105 107   CO2 mmol/L 22 21 26 28 27 29 26   ANION GAP mmol/L 9 10 8 6 4 2* 5   BUN mg/dL 12 11 7 9 9 8 9   CREATININE mg/dL 0 66 0 62 0 48* 0 45* 0 41* 0 44* 0 40*   CALCIUM mg/dL 7 9* 8 1* 8 5 8 6 8 6 8 3 8 6   GLUCOSE RANDOM mg/dL 237* 204* 201* 155* 148* 160* 135   ALT U/L 87* 78  --   --   --   --   --    AST U/L 212* 245*  --   --   --   --   --    ALK PHOS U/L 875* 943*  --   --   --   --   --    ALBUMIN g/dL 2 0* 1 7*  --   --   --   --   --    TOTAL BILIRUBIN mg/dL 3 12* 1 86*  --   --   --   --   --      Results from last 7 days   Lab Units 11/12/22  0608 11/10/22  0456   MAGNESIUM mg/dL 1 6 1 9   PHOSPHORUS mg/dL 3 9  --       Results from last 7 days   Lab Units 11/12/22  0608 11/12/22  0102 11/07/22  0443 11/06/22  0800 11/06/22  0106 11/05/22  1433   INR  1 48* 1 62*  --   --   --   --    PTT seconds 29 29 60* 62* 74* 48*          Results from last 7 days   Lab Units 11/12/22  0313 11/12/22  0110   LACTIC ACID mmol/L 4 1* 4 8*     ABG:    VBG:    Results from last 7 days   Lab Units 11/06/22  0512   PROCALCITONIN ng/ml 0 61*       Micro:  Results from last 7 days   Lab Units 11/07/22  0852 11/05/22  0933   BLOOD CULTURE  No Growth After 4 Days  No Growth After 4 Days    --    MRSA CULTURE ONLY   --  No Methicillin Resistant Staphlyococcus aureus (MRSA) isolated           ------------------------------------------------------------------------------------------------------------  Advance Directive and Living Will:      Power of :    POLST:    ------------------------------------------------------------------------------------------------------------  Anticipated Length of Stay is > 2 midnights    Counseling / Coordination of Care  Total Critical Care time spent 30 minutes excluding procedures, teaching and family updates  Amy Larson PA-C        Portions of the record may have been created with voice recognition software  Occasional wrong word or "sound a like" substitutions may have occurred due to the inherent limitations of voice recognition software    Read the chart carefully and recognize, using context, where substitutions have occurred

## 2022-11-12 NOTE — ED ATTENDING ATTESTATION
11/12/2022  Stephanie Tabares DO, saw and evaluated the patient  I have discussed the patient with the resident/non-physician practitioner and agree with the resident's/non-physician practitioner's findings, Plan of Care, and MDM as documented in the resident's/non-physician practitioner's note, except where noted  All available labs and Radiology studies were reviewed  I was present for key portions of any procedure(s) performed by the resident/non-physician practitioner and I was immediately available to provide assistance  At this point I agree with the current assessment done in the Emergency Department  I have conducted an independent evaluation of this patient a history and physical is as follows:    ED Course  ED Course as of 11/12/22 0840   Sat Nov 12, 2022   0128 Surgical oncology resident at bedside  Patient consented for PRBC transfusion PRN  0235 LACTIC ACID(!!): 4 8  Will give 30 cc/kg bolus based on IBW  HPI: 56 yo F w/ Hx pancreatic CA s/p recent whipple procedure, PE on eliquis presenting for abdominal pain  Abrupt onset two hour prior to arrival  Reported to have bloody vomitus and bright red blood in stool  Reporting intense, constant, nonradiating abdominal pain  Patient referred to ED by surgical oncology  Patient reportedly compliant w/ eliquis  Physical Exam:   GENERAL APPEARANCE: Alert, resting comfortably in bed  NEURO: GCS 15  Good sensation throughout  HEENT: NC/AT, No congestion or rhinorrhea  CV: tachycardia, RR  LUNGS: CTAB, Chest rise equal B/L  ABD: RLQ/epigastric pain  No r/g  + BS   MSK: No signs of edema/ erythema noted  : No flank pain  SKIN: Pale  Abdominal wall incision c/d/i  Psych: Calm, cooperative     A/P:  65-year-old female status post Whipple procedure presenting for acute onset abdominal pain and GI bleeding  Differential diagnosis includes upper GI hemorrhage or intra-abdominal infection  Patient also recently diagnosed with PE    Patient evaluated immediately on arrival, type and screen for 2 PRBC, will check labs and CT to evaluate for infection, bleeding or complication of recent surgery  Patient evaluated by Surgical Oncology at bedside and CT results reviewed with IR  Patient taken for urgent angio embolization with plan to admit to SICU postprocedure      Critical Care Time  CriticalCare Time  Performed by: Remo Barthel, DO  Authorized by: Remo Barthel, DO     Critical care provider statement:     Critical care time (minutes):  36    Critical care start time:  11/12/2022 1:55 AM    Critical care end time:  11/12/2022 2:31 AM    Critical care time was exclusive of:  Separately billable procedures and treating other patients and teaching time    Critical care was necessary to treat or prevent imminent or life-threatening deterioration of the following conditions:  Circulatory failure    Critical care was time spent personally by me on the following activities:  Blood draw for specimens, obtaining history from patient or surrogate, development of treatment plan with patient or surrogate, discussions with consultants, evaluation of patient's response to treatment, examination of patient, review of old charts, re-evaluation of patient's condition, ordering and review of radiographic studies, ordering and review of laboratory studies and ordering and performing treatments and interventions    I assumed direction of critical care for this patient from another provider in my specialty: no

## 2022-11-12 NOTE — ANESTHESIA PREPROCEDURE EVALUATION
Procedure:  IR MESENTERIC/VISCERAL ANGIOGRAM    Relevant Problems   CARDIO   (+) Benign essential hypertension   (+) Dyspnea on exertion   (+) Hyperlipidemia   (+) Hypertension   (+) Paroxysmal A-fib (HCC)   (+) Supraventricular tachycardia (HCC)      ENDO   (+) Type 2 diabetes mellitus without complication, without long-term current use of insulin (HCC)      GI/HEPATIC   (+) Adenocarcinoma of head of pancreas (HCC)   (+) Esophageal reflux      /RENAL   (+) Nephrolithiasis   (+) Uric acid kidney stone      NEURO/PSYCH   (+) Controlled depression      PULMONARY   (+) Dyspnea on exertion   (+) Severe obstructive sleep apnea      Recent PE  TTE: LV EF 50, DD1, normal RV systolic function, mild TR    Prior grade 1 views with MAC3, McGrath3    Cr 0 62, hgb 7 7       Anesthesia Plan  ASA Score- 3 Emergent    Anesthesia Type- general with ASA Monitors  Additional Monitors: arterial line  Airway Plan: ETT  Comment: General anesthesia, endotracheal tube; standard ASA monitors  Risks and benefits discussed with patient; patient consented and agrees to proceed  I saw and evaluated the patient  If seen with CRNA, we have discussed the anesthetic plan and I am in agreement that the plan is appropriate for the patient          Plan Factors-    Chart reviewed  Existing labs reviewed  Induction- intravenous and rapid sequence induction  Postoperative Plan- Plan for postoperative opioid use  Planned trial extubation    Informed Consent- Anesthetic plan and risks discussed with patient  I personally reviewed this patient with the CRNA  Discussed and agreed on the Anesthesia Plan with the CRNA  Jamie Caldwell

## 2022-11-12 NOTE — ED PROVIDER NOTES
History  Chief Complaint   Patient presents with   • Abdominal Pain     Pt arrives via EMS from home c/o abdominal pain +N, +V  Pt was d/c around 1700 on 11/11/2022 from [facility] s/p whipple procedure on 10/31/2022      25-year-old female patient with history of HTN, HLD, a flutter, recent Whipple procedure for pancreatic cancer presenting with abdominal pain, nausea, vomiting onset yesterday  Patient was recently discharged yesterday for status post Whipple procedure which was complicated by bilateral PEs, sepsis  Patient states that she started having episodes of bloody emesis x6 and has been having stool with blood  Patient also complaining of generalized abdominal pain  Denies any fever, chest pain, shortness of breath, lightheadedness  Prior to Admission Medications   Prescriptions Last Dose Informant Patient Reported? Taking? Insulin Pen Needle (Pen Needles) 32G X 4 MM MISC  Self No No   Sig: Use once a week   Magnesium Oxide -Mg Supplement 400 MG CAPS  Self Yes No   Sig: Take 1 capsule by mouth daily   Omega-3 Fatty Acids (FISH OIL) 1,000 mg  Self Yes No   Sig: Take 1,000 mg by mouth 2 (two) times a day     Ozempic, 1 MG/DOSE, 4 MG/3ML SOPN injection pen   No No   Sig: INJECT 1MG SUBCUTANEOUSLY  WEEKLY   Patient taking differently: Inject under the skin once a week mondays   PARoxetine (PAXIL-CR) 37 5 mg 24 hr tablet  Self No No   Sig: TAKE 1 TABLET BY MOUTH IN  THE MORNING   Potassium Citrate ER 15 MEQ (1620 MG) TBCR  Self No No   Sig: TAKE 1 TABLET BY MOUTH  TWICE DAILY   VITAMIN D PO  Self Yes No   Sig: Take 2,000 Units by mouth 2 (two) times a day   acetaminophen (TYLENOL) 325 mg tablet   No No   Sig: Take 3 tablets (975 mg total) by mouth every 8 (eight) hours for 7 days   apixaban (Eliquis) 5 mg   No No   Sig: Take 2 tablets (10 mg total) by mouth 2 (two) times a day for 7 days, THEN 1 tablet (5 mg total) 2 (two) times a day for 23 days     atorvastatin (LIPITOR) 40 mg tablet  Self No No   Sig: Take 1 tablet (40 mg total) by mouth daily at bedtime   gabapentin (NEURONTIN) 100 mg capsule   No No   Sig: Take 1 capsule (100 mg total) by mouth 3 (three) times a day for 7 days   glucose blood (Contour Next Test) test strip  Self No No   Sig: Use 1 each daily Use as instructed   ibuprofen (ADVIL,MOTRIN) 100 MG tablet  Self Yes No   Sig: Take 200 mg by mouth as needed for mild pain   metoprolol succinate (TOPROL-XL) 50 mg 24 hr tablet   No No   Sig: Take 1 tablet (50 mg total) by mouth 2 (two) times a day   multivitamin (THERAGRAN) TABS  Self Yes No   Sig: Take 1 tablet by mouth daily     olmesartan (BENICAR) 20 mg tablet  Self No No   Sig: TAKE 1 TABLET BY MOUTH  DAILY   ondansetron (ZOFRAN) 4 mg tablet  Self No No   Sig: Take 2 tablets (8 mg total) by mouth every 8 (eight) hours as needed for nausea or vomiting   oxyCODONE (ROXICODONE) 5 immediate release tablet   No No   Sig: Take 1 tablet (5 mg total) by mouth every 4 (four) hours as needed for moderate pain (To begin after epidural out) for up to 4 days Max Daily Amount: 30 mg   pantoprazole (PROTONIX) 40 mg tablet  Self No No   Sig: TAKE 1 TABLET BY MOUTH  DAILY BEFORE BREAKFAST   senna (SENOKOT) 8 6 mg  Self No No   Sig: Take 1 tablet (8 6 mg total) by mouth daily at bedtime for 5 days   Patient taking differently: Take 8 6 mg by mouth daily at bedtime as needed   sotalol (BETAPACE) 120 mg tablet  Self No No   Sig: Take 1 tablet (120 mg total) by mouth every 12 (twelve) hours      Facility-Administered Medications: None       Past Medical History:   Diagnosis Date   • Abnormal liver function test     last assessed 1/16/17    • Adenomatosis    • 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    last assessed 4/4/13   • Arthritis    • Atrial flutter ([facility] [address] )    • Benign essential hypertension     last assessed 12/21/17    • Body mass index (BMI) of 50-59 9 in adult [facility])    • Cancer (HCC)     pancreas   • Colon polyp    • Congenital pes planus     last assessed 7/15/14    • COVID     4/30/21   • CPAP (continuous positive airway pressure) dependence    • Dental crowns present    • Depression    • Diabetes mellitus ([facility] )    • Dizziness     occas--pt reports did see family doctor   • GERD (gastroesophageal reflux disease)    • Hemangioma of skin 08/26/2003    last assessed 8/26/03   • History of cancer chemotherapy     [facility] Oncologist Dr Jaguar Brothers   • History of gastroesophageal reflux (GERD)    • Hypercholesterolemia    • Hyperlipidemia    • Hypertension    • Irregular heart beat    • Kidney stone    • 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[facility] [address] ) 04/19/2006    last assessed 12/31/14    • Osteoarthritis of both knees     last assessed 12/31/14    • Paroxysmal atrial fibrillation ([facility] )    • Port-A-Cath in place    • S/P ablation of atrial flutter    • Shortness of breath     per pt "from chemo-not drastic--still working and goes up steps"   • Sleep apnea    • Wears glasses        Past Surgical History:   Procedure Laterality Date   • ABDOMINAL ADHESION SURGERY N/A 10/31/2022    Procedure: LYSIS ADHESIONS, colectomy, vascular pedicle flap;  Surgeon: Liliana Zhu MD;  [facility]: [facility];  Service: Surgical Oncology   • ABDOMINAL SURGERY  06/2006    20cm GIST removed    • ABLATION SOFT TISSUE N/A 10/31/2022    Procedure: SOFT TISSUE ABLATION;  Surgeon: Liliana Zhu MD;  [facility]: [facility];  Service: Surgical Oncology   • APPENDECTOMY     • ATRIAL ABLATION SURGERY     • CARPAL TUNNEL RELEASE Bilateral    • COLONOSCOPY     • FL GUIDED CENTRAL VENOUS ACCESS DEVICE INSERTION  05/31/2022   • FL RETROGRADE PYELOGRAM  07/18/2022   • FL RETROGRADE PYELOGRAM  8/22/2022   • HYSTERECTOMY      fibroids   • IR MESENTERIC/VISCERAL ANGIOGRAM  11/12/2022   • IR PORT CHECK  06/23/2022   • IR PORT REMOVAL AND PLACEMENT NEW SITE  06/28/2022   • JOINT REPLACEMENT Bilateral     knees   • KIDNEY STONE SURGERY Right 09/17/2021    placed stent in  for kidney stone   • KNEE SURGERY     • KNEE SURGERY Left 03/2019   • LAPAROTOMY N/A 10/31/2022    Procedure: EXPLORATORY LAPAROTOMY;  Surgeon: Liliana Zhu MD;  [facility]: [facility];  Service: Surgical Oncology   • OOPHORECTOMY      cyst   • SC CYSTO/URETERO W/LITHOTRIPSY &INDWELL STENT INSRT Left 8/22/2022    Procedure: CYSTOSCOPY URETEROSCOPY WITH LITHOTRIPSY HOLMIUM LASER, RETROGRADE PYELOGRAM AND INSERTION STENT URETERAL;  Surgeon: Jyoti Barbosa MD;  [facility]: [facility];  Service: Urology   • SC CYSTOSCOPY,INSERT URETERAL STENT Left 8/22/2022    Procedure: EXCHANGE STENT URETERAL;  Surgeon: Jyoti Barbosa MD;  [facility];  Service: Urology   • SC CYSTOURETHROSCOPY,URETER CATHETER Left 07/18/2022    Procedure: CYSTOSCOPY RETROGRADE PYELOGRAM WITH INSERTION STENT URETERAL;  Surgeon: Jyoti Barbosa MD;  Location: [facility];  Service: Urology   • TONSILLECTOMY     • TUBAL LIGATION     • TUMOR EXCISION  2006    gastrointestinal stromal tumor   • TUNNELED VENOUS PORT PLACEMENT N/A 05/31/2022    Procedure: INSERTION VENOUS PORT (PORT-A-CATH); Surgeon: Liliana Zhu MD;  [facility];  Service: Surgical Oncology   • UPPER GASTROINTESTINAL ENDOSCOPY     • WHIPPLE PROCEDURE/PANCREATICO-DUODENECTOMY N/A 10/31/2022    Procedure:  WHIPPLE PROCEDURE/PANCREATICO-DUODENECTOMY, IOUS;  Surgeon: Liliana Zhu MD;  [facility];  Service: Surgical Oncology       Family History   Problem Relation Age of Onset   • Emphysema Mother    • Arthritis Mother    • Diabetes Mother    • Hypertension Mother    • COPD Mother    • Arthritis Father    • Prostate cancer Father    • Cerebral aneurysm Son    • No Known Problems Maternal Grandmother    • No Known Problems Maternal Grandfather    • No Known Problems Paternal Grandmother    • No Known Problems Paternal Grandfather    • No Known Problems Son    • No Known Problems Maternal Aunt    • No Known Problems Maternal Aunt    • No Known Problems Paternal Aunt    • No Known Problems Paternal Aunt      I have reviewed and agree with the history as documented  E-Cigarette/Vaping   • E-Cigarette Use Never User      E-Cigarette/Vaping Substances   • Nicotine No    • THC No    • CBD No    • Flavoring No    • Other No    • Unknown No      Social History     Tobacco Use   • Smoking status: Former Smoker     Years: 9 00     Types: Cigarettes   • Smokeless tobacco: Never Used   Vaping Use   • Vaping Use: Never used   Substance Use Topics   • Alcohol use: Not Currently     Comment: social drinker per allscript    • Drug use: Never        Review of Systems   Constitutional: Negative for chills, fatigue and fever  HENT: Negative for congestion, rhinorrhea and sore throat  Respiratory: Negative for cough, chest tightness and shortness of breath  Cardiovascular: Negative for chest pain and leg swelling  Gastrointestinal: Positive for blood in stool, nausea and vomiting  Negative for abdominal distention, abdominal pain and diarrhea  Genitourinary: Negative for difficulty urinating and dysuria  Musculoskeletal: Negative for arthralgias, back pain and myalgias  Skin: Negative for rash and wound  Neurological: Negative for dizziness, weakness and headaches  All other systems reviewed and are negative  Physical Exam  ED Triage Vitals [11/12/22 0039]   Temperature Pulse Respirations Blood Pressure SpO2   98 5 °F (36 9 °C) (!) 133 22 100/68 95 %      Temp Source Heart Rate Source Patient Position - Orthostatic VS BP Location FiO2 (%)   Oral Monitor Lying Right arm --      Pain Score       5             Orthostatic Vital Signs  Vitals:    11/13/22 0358 11/13/22 0400 11/13/22 0426 11/13/22 0500   BP:       Pulse: (!) 154 (!) 156 (!) 150 (!) 148   Patient Position - Orthostatic VS:           Physical Exam  Vitals reviewed  Constitutional:       Appearance: Normal appearance  HENT:      Head: Normocephalic and atraumatic        Nose: Nose normal       Mouth/Throat:      Mouth: Mucous membranes are moist       Pharynx: Oropharynx is clear  Eyes:      Extraocular Movements: Extraocular movements intact  Conjunctiva/sclera: Conjunctivae normal    Cardiovascular:      Rate and Rhythm: Regular rhythm  Tachycardia present  Pulses: Normal pulses  Heart sounds: Normal heart sounds  Pulmonary:      Effort: Pulmonary effort is normal       Breath sounds: Normal breath sounds  Abdominal:      General: A surgical scar is present  Bowel sounds are normal       Palpations: Abdomen is soft  Tenderness: There is abdominal tenderness in the right upper quadrant, right lower quadrant, epigastric area and left upper quadrant  Comments: Dried bloody emesis on patient's chart    Staples intact from previous Whipple procedure  Musculoskeletal:         General: Normal range of motion  Cervical back: Normal range of motion  Skin:     General: Skin is warm and dry  Coloration: Skin is pale  Neurological:      General: No focal deficit present  Mental Status: She is alert and oriented to person, place, and time  Mental status is at baseline           ED Medications  Medications   lactated ringers infusion (100 mL/hr Intravenous New Bag 11/12/22 2110)   ondansetron (ZOFRAN) injection 4 mg (has no administration in time range)   acetaminophen (TYLENOL) tablet 650 mg (has no administration in time range)   HYDROmorphone HCl (DILAUDID) injection 0 2 mg (has no administration in time range)   HYDROmorphone (DILAUDID) injection 0 5 mg (has no administration in time range)   iodixanol (VISIPAQUE) 320 MG/ML injection 300 mL (has no administration in time range)   insulin regular (HumuLIN R,NovoLIN R) 1 Units/mL in sodium chloride 0 9 % 100 mL infusion (1 Units/hr Intravenous Rate/Dose Change 11/13/22 0207)   pantoprazole (PROTONIX) injection 40 mg (40 mg Intravenous Given 11/12/22 2131)   metoprolol (LOPRESSOR) injection 5 mg ( Intravenous Canceled Entry 11/13/22 0508) piperacillin-tazobactam (ZOSYN) 3 375 g in sodium chloride 0 9 % 100 mL IVPB (3 375 g Intravenous New Bag 11/13/22 0359)   amiodarone 150 mg in dextrose 5 % 100 mL IV bolus (has no administration in time range)   amiodarone (CORDARONE) 900 mg in dextrose 5 % 500 mL infusion (has no administration in time range)     Followed by   amiodarone (CORDARONE) 900 mg in dextrose 5 % 500 mL infusion (has no administration in time range)   pantoprazole (PROTONIX) injection 40 mg (40 mg Intravenous Given 11/12/22 0106)   sodium chloride 0 9 % bolus 500 mL (0 mL Intravenous Stopped 11/12/22 0308)   iohexol (OMNIPAQUE) 350 MG/ML injection (SINGLE-DOSE) 100 mL (100 mL Intravenous Given 11/12/22 0159)   sodium chloride 0 9 % bolus 1,000 mL (1,000 mL Intravenous New Bag 11/12/22 0220)   piperacillin-tazobactam (ZOSYN) 3 375 g in sodium chloride 0 9 % 100 mL IVPB (3 375 g Intravenous New Bag 11/12/22 0311)   sodium chloride 0 9 % bolus 250 mL (250 mL Intravenous New Bag 11/12/22 0308)   prothrombin complex concentrate (human) (Kcentra) 2,000 Units (2,000 Units Intravenous New Bag 11/12/22 0424)   magnesium sulfate IVPB (premix) SOLN 1 g (0 g Intravenous Stopped 11/12/22 0945)   multi-electrolyte (ISOLYTE-S PH 7 4) bolus 1,000 mL (0 mL Intravenous Stopped 11/12/22 1325)   metoprolol (LOPRESSOR) injection 5 mg (5 mg Intravenous Given 11/13/22 0425)   metoprolol (LOPRESSOR) injection 5 mg (5 mg Intravenous Given 11/13/22 0508)       Diagnostic Studies  Results Reviewed     Procedure Component Value Units Date/Time    Procalcitonin [[id number]]  (Abnormal) Collected: 11/12/22 0608    Lab Status: Final result Specimen: Blood from Line, Arterial Updated: 11/12/22 0901     Procalcitonin 5 17 ng/ml     Blood culture #1 [[id number]] Collected: 11/12/22 0224    Lab Status: Preliminary result Specimen: Blood from Arm, Right Updated: 11/12/22 0803     Blood Culture Received in Microbiology Lab  Culture in Progress      Blood culture #2 [[id number]] Collected: 11/12/22 0218    Lab Status: Preliminary result Specimen: Blood from Arm, Right Updated: 11/12/22 0803     Blood Culture Received in Microbiology Lab  Culture in Progress  Lactic acid, plasma [[id number]]  (Abnormal) Collected: 11/12/22 0608    Lab Status: Final result Specimen: Blood from Line, Arterial Updated: 11/12/22 0745     LACTIC ACID 3 7 mmol/L     Narrative:      Result may be elevated if tourniquet was used during collection      TEG 6 Global Hemostasis with Lysis [[id number]]  (Abnormal) Collected: 11/12/22 0608    Lab Status: Final result Specimen: Blood from Line, Arterial Updated: 11/12/22 0720     CKR(REACTION TIME) 6 3 Min      CKLY30 0 0 %      CRTMA(RAPIDTEG MAX AMPLITUDE) 69 3 mm      CFFMA (FUNCTIONAL FIBRINOGEN MAX AMPLITUDE) 37 3 mm     CBC and differential [[id number]]  (Abnormal) Collected: 11/12/22 0608    Lab Status: Final result Specimen: Blood from Line, Arterial Updated: 11/12/22 0714     WBC 27 46 Thousand/uL      RBC 2 59 Million/uL      Hemoglobin 7 6 g/dL      Hematocrit 24 1 %      MCV 93 fL      MCH 29 3 pg      MCHC 31 5 g/dL      RDW 15 6 %      MPV 11 1 fL      Platelets 951 Thousands/uL      nRBC 1 /100 WBCs      Immature Grans Absolute >0 50 Thousand/uL      Basophils Absolute 0 07 Thousands/µL     Narrative:      See Manual Diff Done Within 3 Days    APTT [[id number]]  (Normal) Collected: 11/12/22 0608    Lab Status: Final result Specimen: Blood from Line, Arterial Updated: 11/12/22 0637     PTT 29 seconds     Protime-INR [[id number]]  (Abnormal) Collected: 11/12/22 0608    Lab Status: Final result Specimen: Blood from Line, Arterial Updated: 11/12/22 0637     Protime 18 2 seconds      INR 1 48    TEG 6 Global Hemostasis with Lysis [[id number]]  (Abnormal) Collected: 11/12/22 0313    Lab Status: Final result Specimen: Blood from Arm, Left Updated: 11/12/22 0350     CRTMA(RAPIDTEG MAX AMPLITUDE) 71 9 mm      CFFMA (FUNCTIONAL FIBRINOGEN MAX AMPLITUDE) 41 8 mm     Lactic acid 2 Hours [[id number]]  (Abnormal) Collected: 11/12/22 0313    Lab Status: Final result Specimen: Blood from Arm, Left Updated: 11/12/22 0350     LACTIC ACID 4 1 mmol/L     Narrative:      Result may be elevated if tourniquet was used during collection  CBC and differential [[id number]]  (Abnormal) Collected: 11/12/22 0102    Lab Status: Final result Specimen: Blood from Arm, Left Updated: 11/12/22 0249     WBC 31 24 Thousand/uL      RBC 2 67 Million/uL      Hemoglobin 7 7 g/dL      Hematocrit 24 7 %      MCV 93 fL      MCH 28 8 pg      MCHC 31 2 g/dL      RDW 17 1 %      MPV 11 3 fL      Platelets 264 Thousands/uL     Narrative: This is an appended report  These results have been appended to a previously verified report  Manual Differential(PHLEBS Do Not Order) [[id number]]  (Abnormal) Collected: 11/12/22 0102    Lab Status: Final result Specimen: Blood from Arm, Left Updated: 11/12/22 0249     Segmented % 87 %      Bands % 7 %      Lymphocytes % 3 %      Monocytes % 1 %      Eosinophils, % 0 %      Basophils % 0 %      Myelocytes % 1 %      Atypical Lymphocytes % 1 %      Absolute Neutrophils 29 37 Thousand/uL      Lymphocytes Absolute 0 94 Thousand/uL      Monocytes Absolute 0 31 Thousand/uL      Eosinophils Absolute 0 00 Thousand/uL      Basophils Absolute 0 00 Thousand/uL      Total Counted --     nRBC 1 /100 WBC      RBC Morphology Present     Poikilocytes Present     Polychromasia Present     Platelet Estimate Adequate    Lactic acid, plasma [[id number]]  (Abnormal) Collected: 11/12/22 0110    Lab Status: Final result Specimen: Blood from Arm, Left Updated: 11/12/22 0228     LACTIC ACID 4 8 mmol/L     Narrative:      Result may be elevated if tourniquet was used during collection      Comprehensive metabolic panel [[id number]]  (Abnormal) Collected: 11/12/22 0102    Lab Status: Final result Specimen: Blood from Arm, Left Updated: 11/12/22 0226     Sodium 136 mmol/L      Potassium 3 5 mmol/L      Chloride 105 mmol/L      CO2 21 mmol/L      ANION GAP 10 mmol/L      BUN 11 mg/dL      Creatinine 0 62 mg/dL      Glucose 204 mg/dL      Calcium 8 1 mg/dL      Corrected Calcium 9 9 mg/dL       U/L      ALT 78 U/L      Alkaline Phosphatase 943 U/L      Total Protein 5 3 g/dL      Albumin 1 7 g/dL      Total Bilirubin 1 86 mg/dL      eGFR 96 ml/min/1 73sq m     Narrative:      [place] guidelines for Chronic Kidney Disease (CKD):   •  Stage 1 with normal or high GFR (GFR > 90 mL/min/1 73 square meters)  •  Stage 2 Mild CKD (GFR = 60-89 mL/min/1 73 square meters)  •  Stage 3A Moderate CKD (GFR = 45-59 mL/min/1 73 square meters)  •  Stage 3B Moderate CKD (GFR = 30-44 mL/min/1 73 square meters)  •  Stage 4 Severe CKD (GFR = 15-29 mL/min/1 73 square meters)  •  Stage 5 End Stage CKD (GFR <15 mL/min/1 73 square meters)  Note: GFR calculation is accurate only with a steady state creatinine    Protime-INR [[id number]]  (Abnormal) Collected: 11/12/22 0102    Lab Status: Final result Specimen: Blood from Arm, Left Updated: 11/12/22 0208     Protime 19 5 seconds      INR 1 62    APTT [[id number]]  (Normal) Collected: 11/12/22 0102    Lab Status: Final result Specimen: Blood from Arm, Left Updated: 11/12/22 0208     PTT 29 seconds     Fingerstick Glucose (POCT) [[id number]]  (Abnormal) Collected: 11/12/22 0128    Lab Status: Final result Updated: 11/12/22 0129     POC Glucose 198 mg/dl                  IR mesenteric/visceral angiogram   Final Result by Donna Casillas MD (11/12 8137)   Impression: No active bleeding seen off of the celiac axis or superior mesenteric artery  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 and a covered stent was successfully placed across it  Plan: Follow-up with  surgical oncology               Workstation performed: [id number]         PE Study with CT abdomen &pelvis with contrast   Final Result by Ivan Minaya MD (11/12 1146)      Interval improvement of pulmonary emboli  New loculated fluid collection between the caudate and left lateral segment  Stable ill-defined linear hypodensity in the left lateral segment as described above  Mixed attenuation curvilinear structure in the retroperitoneum at the site of the surgery  Unclear if this represents evolving hematoma or potentially wall thickening and edema within a bypass loop of bowel  There are numerous foci of extraluminal air,    newly demonstrated just cephalad to this adjacent to a bypass loop and stomach in the midline as well as extending into the enlarging right anterior paranephric collection  Findings suspicious for anastomotic leak  I personally discussed this study with Dr Heather Phelan on 11/12/2022 at 11:30 AM       There is a significant additional finding or different interpretation from the [facility] preliminary report which was provided shortly after completion of the exam  New free air within the retroperitoneum and operative site  Workstation performed: [id number]               Procedures  Procedures      ED Course  ED Course as of 11/13/22 0533   Sat Nov 12, 2022   0141 EKG: sinus tachycardia  Q waves in anterior leads  Initial Sepsis Screening     Row Name 11/12/22 4342 11/12/22 0231             Is the patient's history suggestive of a new or worsening infection? -- Yes (Proceed)  -MC       Suspected source of infection -- acute abdominal infection  -MC       Are two or more of the following signs & symptoms of infection both present and new to the patient? -- Yes (Proceed)  -MC       Indicate SIRS criteria -- Leukocytosis (WBC > 86683 IJL); Tachycardia > 90 bpm  -MC       If the answer is yes to both questions, suspicion of sepsis is present -- --       If severe sepsis is present AND tissue hypoperfusion perists in the hour after fluid resuscitation or lactate > 4, the patient meets criteria for SEPTIC SHOCK -- --       Are any of the following organ dysfunction criteria present within 6 hours of suspected infection and SIRS criteria that are NOT considered to be chronic conditions? -- Yes  -       Organ dysfunction -- Lactate >/equal 4 0 mmol/L (MEETS CRITERIA FOR SEPTIC SHOCK)  -       Date of presentation of severe sepsis -- 11/12/22  -       Time of presentation of severe sepsis -- 0232  -       Tissue hypoperfusion persists in the hour after crystalloid fluid administration, evidenced, by either: * OR * Lactate level is greater to or equal 4 mmol/dL ( ___ mmol/dL in comment field)  - --       Was hypotension present within one hour of the conclusion of crystalloid fluid administration? No  - --       Date of presentation of septic shock 11/12/22  - --       Time of presentation of septic shock 0350  - --             User Key  (r) = Recorded By, (t) = Taken By, (c) = Cosigned By    [address] Name Provider Peyman Merino MD Resident                          MDM  Number of Diagnoses or Management Options  Acute upper GI bleed  Hematoma  Pulmonary embolism ([facility] [address] )  Splenic infarct  Diagnosis management comments: 60-year-old female patient with history of recent Whipple procedure, complicated with pulmonary embolism, sepsis recently discharged today presenting with 6 episodes of bloody emesis and stool with bleeding  Patient also has abdominal pain  Patient on Eliquis  Exam shows patient tachycardic, pale, abdominal tenderness  Concern for GI bleed  Spoke with [facility] surgery  Patient was given Protonix  Lactic acid was elevated  Hemoglobin unchanged from previous  WBC elevated  CT showed evidence of hematoma  Blood was ordered for crossmatch  Patient was taken up for IR for possible GDA blowout  Admitted to [facility] surgery and to make you status post IR         Amount and/or Complexity of Data Reviewed  Clinical lab tests: ordered and reviewed  Tests in the radiology section of CPT®: ordered and reviewed        Disposition  Final diagnoses:   Acute upper GI bleed   Hematoma   Splenic infarct   Pulmonary embolism ([facility] [address] )     Time reflects when diagnosis was documented in both MDM as applicable and the Disposition within this note     Time User Action Codes Description Comment    11/12/2022  2:14 AM Diaz [place] Add [K92 0] Hematemesis, unspecified whether nausea present     11/12/2022  2:27 AM Ana Battiest Add [K92 2] Acute upper GI bleed     11/12/2022  5:59 AM Wash Bur, [address]  8XXA] Hematoma     11/12/2022  5:59 AM Polo Abts [facility] Add [D73 5] Splenic infarct     11/12/2022  5:59 AM Polo Abts [facility] Add [I26 99] Pulmonary embolism ([facility] [address] )     11/12/2022 11:51 AM Bowman Dancong Add [K92 0] Hematemesis without nausea       ED Disposition     ED Disposition   Admit    Condition   Stable    Date/Time   Sat Nov 12, 2022  2:27 AM    Comment   Case was discussed with surgical oncology and the patient's admission status was agreed to be Admission Status: inpatient status to the service of Dr Sandra Oviedo  Follow-up Information    None         Current Discharge Medication List      CONTINUE these medications which have NOT CHANGED    Details   acetaminophen (TYLENOL) 325 mg tablet Take 3 tablets (975 mg total) by mouth every 8 (eight) hours for 7 days  Qty: 63 tablet, Refills: 0    Associated Diagnoses: Adenocarcinoma of head of pancreas (HCC)      apixaban (Eliquis) 5 mg Take 2 tablets (10 mg total) by mouth 2 (two) times a day for 7 days, THEN 1 tablet (5 mg total) 2 (two) times a day for 23 days    Qty: 74 tablet, Refills: 0    Comments: Eliquis Starter Pack  Associated Diagnoses: Pulmonary embolism without acute cor pulmonale, unspecified chronicity, unspecified pulmonary embolism type (HCC)      atorvastatin (LIPITOR) 40 mg tablet Take 1 tablet (40 mg total) by mouth daily at bedtime  Qty: 90 tablet, Refills: 3    Comments: Requesting 1 year supply  Associated Diagnoses: Hyperlipidemia, unspecified hyperlipidemia type      gabapentin (NEURONTIN) 100 mg capsule Take 1 capsule (100 mg total) by mouth 3 (three) times a day for 7 days  Qty: 21 capsule, Refills: 0    Associated Diagnoses: Adenocarcinoma of head of pancreas ([facility])      glucose blood (Contour Next Test) test strip Use 1 each daily Use as instructed  Qty: 100 each, Refills: 3    Associated Diagnoses: Type 2 diabetes mellitus without complication, without long-term current use of insulin ([facility])      ibuprofen (ADVIL,MOTRIN) 100 MG tablet Take 200 mg by mouth as needed for mild pain      Insulin Pen Needle (Pen Needles) 32G X 4 MM MISC Use once a week  Qty: 12 each, Refills: 3    Associated Diagnoses: Type 2 diabetes mellitus without complication, without long-term current use of insulin ([facility])      Magnesium Oxide -Mg Supplement 400 MG CAPS Take 1 capsule by mouth daily      metoprolol succinate (TOPROL-XL) 50 mg 24 hr tablet Take 1 tablet (50 mg total) by mouth 2 (two) times a day  Qty: 120 tablet, Refills: 0    Associated Diagnoses: Adenocarcinoma of head of pancreas ([address] ); Ventricular tachycardia, sustained      multivitamin (THERAGRAN) TABS Take 1 tablet by mouth daily  olmesartan (BENICAR) 20 mg tablet TAKE 1 TABLET BY MOUTH  DAILY  Qty: 90 tablet, Refills: 3    Comments: Requesting 1 year supply  Associated Diagnoses: Essential hypertension      Omega-3 Fatty Acids (FISH OIL) 1,000 mg Take 1,000 mg by mouth 2 (two) times a day        ondansetron (ZOFRAN) 4 mg tablet Take 2 tablets (8 mg total) by mouth every 8 (eight) hours as needed for nausea or vomiting  Qty: 20 tablet, Refills: 3    Associated Diagnoses: Nausea;  Chemotherapy induced nausea and vomiting      oxyCODONE (ROXICODONE) 5 immediate release tablet Take 1 tablet (5 mg total) by mouth every 4 (four) hours as needed for moderate pain (To begin after epidural out) for up to 4 days Max Daily Amount: 30 mg  Qty: 16 tablet, Refills: 0    Associated Diagnoses: Adenocarcinoma of head of pancreas (HCC)      Ozempic, 1 MG/DOSE, 4 MG/3ML SOPN injection pen INJECT 1MG SUBCUTANEOUSLY  WEEKLY  Qty: 9 mL, Refills: 3    Comments: Requesting 1 year supply  Associated Diagnoses: Type 2 diabetes mellitus without complication, without long-term current use of insulin (HCC)      pantoprazole (PROTONIX) 40 mg tablet TAKE 1 TABLET BY MOUTH  DAILY BEFORE BREAKFAST  Qty: 90 tablet, Refills: 3    Comments: Requesting 1 year supply  Associated Diagnoses: Gastroesophageal reflux disease without esophagitis      PARoxetine (PAXIL-CR) 37 5 mg 24 hr tablet TAKE 1 TABLET BY MOUTH IN  THE MORNING  Qty: 90 tablet, Refills: 3    Comments: Requesting 1 year supply  Associated Diagnoses: Depression, unspecified depression type      Potassium Citrate ER 15 MEQ (1620 MG) TBCR TAKE 1 TABLET BY MOUTH  TWICE DAILY  Qty: 200 tablet, Refills: 3    Comments: Requesting 1 year supply  Associated Diagnoses: Kidney stones      senna (SENOKOT) 8 6 mg Take 1 tablet (8 6 mg total) by mouth daily at bedtime for 5 days  Qty: 5 tablet, Refills: 0    Associated Diagnoses: Left flank pain      sotalol (BETAPACE) 120 mg tablet Take 1 tablet (120 mg total) by mouth every 12 (twelve) hours  Qty: 180 tablet, Refills: 3    Associated Diagnoses: Paroxysmal atrial fibrillation (HCC)      VITAMIN D PO Take 2,000 Units by mouth 2 (two) times a day           No discharge procedures on file  PDMP Review       Value Time User    PDMP Reviewed  Yes 11/11/2022  3:57 PM Benjamin Zepeda PA-C           ED Provider  Attending physically available and evaluated Lay Malcolm  MATT managed the patient along with the ED Attending      Electronically Signed by         Dorothea Enriquez MD  11/13/22 6058

## 2022-11-12 NOTE — SEPSIS NOTE
Sepsis Note   Feliciano Grullon 57 y o  female MRN: [id number]  Unit/Bed#: MICU 10 Encounter: [id number]       qSOFA     [address] Name 11/12/22 0245 11/12/22 0230 11/12/22 0215 11/12/22 0130 11/12/22 0039    Altered mental status GCS < 15 -- -- -- -- --    Respiratory Rate > / =22 1 1 1 1 1    Systolic BP < / =776 1 0 0 0 1    Q Sofa Score 2 1 1 1 2               Initial Sepsis Screening     Row Name 11/12/22 9123 11/12/22 0231             Is the patient's history suggestive of a new or worsening infection? -- Yes (Proceed)  -       Suspected source of infection -- acute abdominal infection  -MC       Are two or more of the following signs & symptoms of infection both present and new to the patient? -- Yes (Proceed)  -       Indicate SIRS criteria -- Leukocytosis (WBC > 36165 IJL); Tachycardia > 90 bpm  -       If the answer is yes to both questions, suspicion of sepsis is present -- --       If severe sepsis is present AND tissue hypoperfusion perists in the hour after fluid resuscitation or lactate > 4, the patient meets criteria for SEPTIC SHOCK -- --       Are any of the following organ dysfunction criteria present within 6 hours of suspected infection and SIRS criteria that are NOT considered to be chronic conditions? -- Yes  -       Organ dysfunction -- Lactate >/equal 4 0 mmol/L (MEETS CRITERIA FOR SEPTIC SHOCK)  -       Date of presentation of severe sepsis -- 11/12/22  -       Time of presentation of severe sepsis -- 0232  -       Tissue hypoperfusion persists in the hour after crystalloid fluid administration, evidenced, by either: * OR * Lactate level is greater to or equal 4 mmol/dL ( ___ mmol/dL in comment field)  - --       Was hypotension present within one hour of the conclusion of crystalloid fluid administration?  No  -MC --       Date of presentation of septic shock 11/12/22  - --       Time of presentation of septic shock 0350  - --             User Key  (r) = Recorded By, (t) = Taken By, (c) = Cosigned By    [address] Name Provider Jay Jackman MD Resident

## 2022-11-12 NOTE — ANESTHESIA PROCEDURE NOTES
Arterial Line Insertion  Performed by: Horton Cushing, MD  Authorized by: Horton Cushing, MD   Consent: Verbal consent obtained  Written consent obtained  Risks and benefits: risks, benefits and alternatives were discussed  Consent given by: patient  Patient understanding: patient states understanding of the procedure being performed  Patient consent: the patient's understanding of the procedure matches consent given  Procedure consent: procedure consent matches procedure scheduled  Relevant documents: relevant documents present and verified  Required items: required blood products, implants, devices, and special equipment available  Patient identity confirmed: arm band  Preparation: Patient was prepped and draped in the usual sterile fashion    Indications: multiple ABGs and hemodynamic monitoring  Orientation:  Right  Location: radial arterylidocaine (XYLOCAINE) 2% - Infiltration   0 5 mL - 11/12/2022 3:40:00 AM  Sedation:  Patient sedated: no    Procedure Details:  Needle gauge: 20  Seldinger technique: Seldinger technique used  Number of attempts: 1    Post-procedure:  Post-procedure: dressing applied  Waveform: good waveform and waveform confirmed  Post-procedure CNS: normal and unchanged  Patient tolerance: patient tolerated the procedure well with no immediate complications  Comments: Pre-induction

## 2022-11-12 NOTE — CONSULTS
Consult Service: Gastroenterology      PATIENT INFORMATION      Renee Collier 57 y o  female MRN: [id number]  [id number]  PCP: Hector Washington MD  Date of Admission:  11/12/2022  Date of Consultation: 11/12/22  Requesting Physician: Connor Ramon MD       [facility]   Renee Collier is a 57 y o  old female with PMH of pancreatic adenocarcinoma status post Whipple's on 10/31, history of pulmonary embolism and atrial fibrillation on Eliquis, obstructive sleep apnea , type 2 diabetes , Streptococcus oralis bacteremia who presents with hematemesis    Gastroenterology has been consulted for assistance with management of hematemesis    Hematemesis  · With recent whipple's can be from anastomotic ulcer  · Hemoglobin at baseline is around 10-11, dropped to 7 6   · Unclear if patient received any transfusions, but NG tube with bloody output noted  · Given this, will plan for EGD for further evaluation  · Keep NPO   · PPI IV BID  · Further recommendations after EGD    Pancreatic cancer  · Newly diagnosed  · S/p whipple's pancreaticoduodenectomy on 10/31  · Management per surgical oncology    Pulmonary embolism/Atrial fibrillation  · Anticoagulation on hold due to GI bleed      HISTORY OF PRESENT ILLNESS      Renee Collier is a 57 y o  female who is originally admitted for hematemesis and is being consulted for the same  Patient is lethargic and unable to provide history  REVIEW OF SYSTEMS     A thorough 12-point review of systems has been conducted  Pertinent positives and negatives are mentioned in the history of present illness         PAST MEDICAL & SURGICAL HISTORY      Past Medical History:   Diagnosis Date   • Abnormal liver function test     last assessed 1/16/17    • Adenomatosis    • 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    last assessed 4/4/13   • Arthritis    • Atrial flutter ([facility] [address] )    • Benign essential hypertension     last assessed 12/21/17    • Body mass index (BMI) of 50-59 9 in adult [facility])    • Cancer ([facility] )     pancreas   • Colon polyp    • Congenital pes planus     last assessed 7/15/14    • COVID     4/30/21   • CPAP (continuous positive airway pressure) dependence    • Dental crowns present    • Depression    • Diabetes mellitus ([place] [address] )    • Dizziness     occas--pt reports did see family doctor   • GERD (gastroesophageal reflux disease)    • Hemangioma of skin 08/26/2003    last assessed 8/26/03   • History of cancer chemotherapy     [facility] Oncologist Dr Anton Howell   • History of gastroesophageal reflux (GERD)    • Hypercholesterolemia    • Hyperlipidemia    • Hypertension    • Irregular heart beat    • Kidney stone    • 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[place] [address] ) 04/19/2006    last assessed 12/31/14    • Osteoarthritis of both knees     last assessed 12/31/14    • Paroxysmal atrial fibrillation ([place] [address] )    • Port-A-Cath in place    • S/P ablation of atrial flutter    • Shortness of breath     per pt "from chemo-not drastic--still working and goes up steps"   • Sleep apnea    • Wears glasses        Past Surgical History:   Procedure Laterality Date   • ABDOMINAL ADHESION SURGERY N/A 10/31/2022    Procedure: LYSIS ADHESIONS, colectomy, vascular pedicle flap;  Surgeon: Dempsey Fabry, MD;  [facility]: [facility];  Service: Surgical Oncology   • ABDOMINAL SURGERY  06/2006    20cm GIST removed    • ABLATION SOFT TISSUE N/A 10/31/2022    Procedure: SOFT TISSUE ABLATION;  Surgeon: Dempsey Fabry, MD;  [facility];  Service: Surgical Oncology   • APPENDECTOMY     • ATRIAL ABLATION SURGERY     • CARPAL TUNNEL RELEASE Bilateral    • COLONOSCOPY     • FL GUIDED CENTRAL VENOUS ACCESS DEVICE INSERTION  05/31/2022   • FL RETROGRADE PYELOGRAM  07/18/2022   • FL RETROGRADE PYELOGRAM  8/22/2022   • HYSTERECTOMY      fibroids   • IR MESENTERIC/VISCERAL ANGIOGRAM  11/12/2022   • IR PORT CHECK  06/23/2022   • IR PORT REMOVAL AND PLACEMENT NEW SITE  06/28/2022 • JOINT REPLACEMENT Bilateral     knees   • KIDNEY STONE SURGERY Right 09/17/2021    placed stent in  for kidney stone   • KNEE SURGERY     • KNEE SURGERY Left 03/2019   • LAPAROTOMY N/A 10/31/2022    Procedure: EXPLORATORY LAPAROTOMY;  Surgeon: Adrián Roberts MD;  [facility];  Service: Surgical Oncology   • OOPHORECTOMY      cyst   • NH CYSTO/URETERO W/LITHOTRIPSY &INDWELL STENT INSRT Left 8/22/2022    Procedure: CYSTOSCOPY URETEROSCOPY WITH LITHOTRIPSY HOLMIUM LASER, RETROGRADE PYELOGRAM AND INSERTION STENT URETERAL;  Surgeon: Weston Haywood MD;  [facility];  Service: Urology   • NH CYSTOSCOPY,INSERT URETERAL STENT Left 8/22/2022    Procedure: EXCHANGE STENT URETERAL;  Surgeon: Weston Haywood MD;  [facility];  Service: Urology   • NH CYSTOURETHROSCOPY,URETER CATHETER Left 07/18/2022    Procedure: CYSTOSCOPY RETROGRADE PYELOGRAM WITH INSERTION STENT URETERAL;  Surgeon: Weston Haywood MD;  [facility];  Service: Urology   • TONSILLECTOMY     • TUBAL LIGATION     • TUMOR EXCISION  2006    gastrointestinal stromal tumor   • TUNNELED VENOUS PORT PLACEMENT N/A 05/31/2022    Procedure: INSERTION VENOUS PORT (PORT-A-CATH); Surgeon: Adrián Roberts MD;  [facility];  Service: Surgical Oncology   • UPPER GASTROINTESTINAL ENDOSCOPY     • WHIPPLE PROCEDURE/PANCREATICO-DUODENECTOMY N/A 10/31/2022    Procedure: WHIPPLE PROCEDURE/PANCREATICO-DUODENECTOMY, IOUS;  Surgeon: Adrián Roberts MD;  [facility];  Service: Surgical Oncology         MEDICATIONS & ALLERGIES       Medications:   Prior to Admission medications    Medication Sig Start Date End Date Taking?  Authorizing Provider   acetaminophen (TYLENOL) 325 mg tablet Take 3 tablets (975 mg total) by mouth every 8 (eight) hours for 7 days 11/11/22 11/18/22  Guillermo Schrader PA-C   apixaban (Eliquis) 5 mg Take 2 tablets (10 mg total) by mouth 2 (two) times a day for 7 days, THEN 1 tablet (5 mg total) 2 (two) times a day for 23 days  11/6/22 12/6/22  Abel Barnes MD   atorvastatin (LIPITOR) 40 mg tablet Take 1 tablet (40 mg total) by mouth daily at bedtime 9/19/22   Alli Guerin DO   gabapentin (NEURONTIN) 100 mg capsule Take 1 capsule (100 mg total) by mouth 3 (three) times a day for 7 days 11/11/22 11/18/22  Rosie Marquez PA-C   glucose blood (Contour Next Test) test strip Use 1 each daily Use as instructed 3/17/22   Christopher Montilla MD   ibuprofen (ADVIL,MOTRIN) 100 MG tablet Take 200 mg by mouth as needed for mild pain    Historical Provider, MD   Insulin Pen Needle (Pen Needles) 32G X 4 MM MISC Use once a week 9/3/21   Christopher Montilla MD   Magnesium Oxide -Mg Supplement 400 MG CAPS Take 1 capsule by mouth daily 5/25/16   Historical Provider, MD   metoprolol succinate (TOPROL-XL) 50 mg 24 hr tablet Take 1 tablet (50 mg total) by mouth 2 (two) times a day 11/11/22 1/10/23  Rosie Marquez PA-C   multivitamin [facility]) TABS Take 1 tablet by mouth daily      Historical Provider, MD   olmesartan (BENICAR) 20 mg tablet TAKE 1 TABLET BY MOUTH  DAILY 5/5/22   Alli Guerin DO   Omega-3 Fatty Acids (FISH OIL) 1,000 mg Take 1,000 mg by mouth 2 (two) times a day      Historical Provider, MD   ondansetron (ZOFRAN) 4 mg tablet Take 2 tablets (8 mg total) by mouth every 8 (eight) hours as needed for nausea or vomiting 6/1/22   Shavon Buitrago MD   oxyCODONE (ROXICODONE) 5 immediate release tablet Take 1 tablet (5 mg total) by mouth every 4 (four) hours as needed for moderate pain (To begin after epidural out) for up to 4 days Max Daily Amount: 30 mg 11/11/22 11/15/22  Rosie Marquez PA-C   Ozempic, 1 MG/DOSE, 4 MG/3ML SOPN injection pen INJECT 1MG SUBCUTANEOUSLY  WEEKLY  Patient taking differently: Inject under the skin once a week mondays 10/3/22   Christopher Montilla MD   pantoprazole (PROTONIX) 40 mg tablet TAKE 1 TABLET BY MOUTH  DAILY BEFORE BREAKFAST 10/21/22   Christopher Montilla MD PARoxetine (PAXIL-CR) 37 5 mg 24 hr tablet TAKE 1 TABLET BY MOUTH IN  THE MORNING 5/5/22   Jeremy Armenta MD   Potassium Citrate ER 15 MEQ (1620 MG) TBCR TAKE 1 TABLET BY MOUTH  TWICE DAILY 9/19/22   Angie Yanez PA-C   senna (SENOKOT) 8 6 mg Take 1 tablet (8 6 mg total) by mouth daily at bedtime for 5 days  Patient taking differently: Take 8 6 mg by mouth daily at bedtime as needed 8/22/22 10/27/22  Vernon Goltz, MD   sotalol (BETAPACE) 120 mg tablet Take 1 tablet (120 mg total) by mouth every 12 (twelve) hours 12/15/21   Alli Guerin DO   VITAMIN D PO Take 2,000 Units by mouth 2 (two) times a day    Historical Provider, MD       Allergies: Allergies   Allergen Reactions   • Other Rash     Adhesive tape          SOCIAL HISTORY      Marital Status: /Civil Union    Substance Use History:   Social History     Substance and Sexual Activity   Alcohol Use Not Currently    Comment: social drinker per allscript      Social History     Tobacco Use   Smoking Status Former Smoker   • Years: 9 00   • Types: Cigarettes   Smokeless Tobacco Never Used     Social History     Substance and Sexual Activity   Drug Use Never         FAMILY HISTORY      Non-Contributory      PHYSICAL EXAM     Vitals:   Blood Pressure: 98/55 (11/12/22 0245)  Pulse: (!) 142 (11/12/22 1200)  Temperature: 98 24 °F (36 8 °C) (11/12/22 1200)  Temp Source: Bladder (11/12/22 1200)  Respirations: 15 (11/12/22 1200)  Height: 5' 4" (162 6 cm) (11/12/22 0600)  Weight - Scale: (!) 141 kg (311 lb 1 1 oz) (11/12/22 0600)  SpO2: 95 % (11/12/22 1200)    Physical Exam:   GENERAL: NAD  HEENT:  NC/AT, MMM  CARDIAC:  RRR, +S1/S2, no S3/S4 heard  PULMONARY:  Nasal canula  ABDOMEN:  Soft, NT/ND, +BS, no rebound/guarding/rigidity  DIGITAL RECTAL EXAM:not done as bloody NG output noted  NEUROLOGIC:  Alert/oriented x3     SKIN:  No rashes or erythema       ADDITIONAL DATA     Lab Results:     Results from last 7 days   Lab Units 11/12/22  5481 11/12/22  0608 11/12/22  0102 11/11/22  1120   WBC Thousand/uL  --  27 46* 31 24* 9 42   HEMOGLOBIN g/dL 7 3* 7 6* 7 7* 7 4*   HEMATOCRIT % 22 7* 24 1* 24 7* 24 7*   PLATELETS Thousands/uL  --  176 366 183   NEUTROS PCT %  --   --   --  78*   LYMPHS PCT %  --   --   --  11*   LYMPHO PCT %  --   --  3*  --    MONOS PCT %  --   --   --  7   MONO PCT %  --   --  1*  --    EOS PCT %  --   --  0 2     Results from last 7 days   Lab Units 11/12/22  0608   POTASSIUM mmol/L 4 0   CHLORIDE mmol/L 107   CO2 mmol/L 22   BUN mg/dL 12   CREATININE mg/dL 0 66   CALCIUM mg/dL 7 9*   ALK PHOS U/L 875*   ALT U/L 87*   AST U/L 212*     Results from last 7 days   Lab Units 11/12/22  0608   INR  1 48*       Imaging:    XR chest portable    Result Date: 11/10/2022  Narrative: CHEST INDICATION:   New onset CP and palpitations with sustained tachycardia  COMPARISON:  October 31, 2022  EXAM PERFORMED/VIEWS:  XR CHEST PORTABLE FINDINGS:  Port-A-Cath enters superior vena cava with tip in or near right atrium  Cardiomediastinal silhouette appears unremarkable  The lungs are clear  No pneumothorax or pleural effusion  Osseous structures appear within normal limits for patient age  Impression: No acute disease in the chest  Workstation performed: [id number]     IR mesenteric/visceral angiogram    Result Date: 11/12/2022  Narrative: Superior mesenteric angiogram Celiac angiogram Common hepatic angiogram Covered Stenting of the common hepatic artery History: Whipple procedure with abdominal pain and bleeding seen at the gastroduodenal artery stump status post Whipple procedure 12 days ago with presumed GDA stump blowout Contrast: 1 50 cc of Visipaque 320 Fluoro time: 17 minutes Number of Images: 288 Radiation Dose: 1379 mGy Conscious sedation time:  with anesth Technique: The patient was brought to the interventional radiology suite and identified verbally and by wrist band   The patient was placed supine on the table and the right groin was prepped and draped in the usual sterile fashion  Lidocaine was administered to the skin and a small skin incision was made  The right common femoral artery was then punctured percutaneously utilizing Seldinger technique  A milli[employer] Systemsson wire was advanced into the abdominal aorta over which a 5 [employer]ie vascular sheath was inserted and connected to a flush bag  A 5 Syriac [employer] 1 glide catheter was advanced into the superior mesenteric artery for an angiogram and then into the celiac axis for an angiogram  A 6 Syriac sheath was then advanced into the common hepatic artery for an angiogram followed by covered stenting of the distal common hepatic artery across the gastroduodenal artery stump using a 5 mm x 26 mm covered stent  A postprocedure common hepatic angiogram was performed  A right common femoral antrum was performed followed by right common femoral arteriotomy closure using the Perclose pro glide device  The patient tolerated the procedure well, including general anesthesia with no immediate complications seen  Impression: Impression: No active bleeding seen off of the celiac axis or superior mesenteric artery  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 and a covered stent was successfully placed across it  Plan: Follow-up with  surgical oncology  Workstation performed: [id number]     PE Study with CT abdomen &pelvis with contrast    Result Date: 11/12/2022  Narrative: CT PULMONARY ANGIOGRAM OF THE CHEST AND CT ABDOMEN AND PELVIS WITH INTRAVENOUS CONTRAST INDICATION:   PE hx, GI bleed    COMPARISON:  September 26, 2022, November 4, 2022 TECHNIQUE:  CT examination of the chest, abdomen and pelvis was performed  Thin section CT angiographic technique was used in the chest in order to evaluate for pulmonary embolus and coronal 3D MIP postprocessing was performed on the acquisition scanner  Axial, sagittal, and coronal 2D reformatted images were created from the source data and submitted for interpretation  Radiation dose length product (DLP) for this visit:  4390 68 mGy-cm   This examination, like all CT scans performed in the [facility], was performed utilizing techniques to minimize radiation dose exposure, including the use of iterative reconstruction and automated exposure control  IV Contrast:  100 mL of iohexol (OMNIPAQUE) Enteric Contrast:  Enteric contrast was not administered  FINDINGS: CHEST PULMONARY ARTERIAL TREE:  Multiple bilateral small peripheral embolic defects are again noted, some of which are more eccentric than on the prior and smaller in size, suggesting evolving chronic emboli  No definite new emboli detected  LUNGS:  Mild subsegmental atelectasis persisting in the right lung base, improved  There is no tracheal or endobronchial lesion  PLEURA:  Unremarkable  HEART/AORTA:  Heart is unremarkable for patient's age  No thoracic aortic aneurysm  MEDIASTINUM AND SHANNAN:  Unremarkable  CHEST WALL AND LOWER NECK:  Unremarkable  ABDOMEN LIVER/BILIARY TREE:  Ill-defined wedge-shaped hypodensity again noted in the left lateral segment posteriorly, without significant change  This was not present in September  Possible related to recent surgical procedure, possible retractor injury  No evidence for active contrast extravasation or enhancement  No intrahepatic biliary dilatation  There is a new loculated fluid collection interposed between the left lateral segment and caudate, mass effect on the caudate  This thin-walled well-defined  collection is 6 x 4 cm in size (image 2/236)  Given the recent history of Whipple procedure, postop seroma or biloma should be considered  In regard to the other mixed attenuation curvilinear structure described on the right report as retroperitoneal hematoma, I feel this more likely represents mural edema, thickening and probable mild component of intramural hematoma within loop of small bowel    Unclear if this is portion of jejunostomy loop, difficult to confidently differentiate from retroperitoneal hemorrhage  There is no evidence for active contrast extravasation demonstrated  There are numerous foci of free air seen cephalad to this adjacent to the stomach and jejunostomy loop (image 2/244  )  These foci of free air are new compared to the prior  The more anterior peritoneal free air has mostly resolved postop  Findings raising suspicion for anastomotic breakdown  GALLBLADDER:  No calcified gallstones  No pericholecystic inflammatory change  SPLEEN:  Unremarkable  PANCREAS:  Status post Whipple procedure  Postop changes as noted above  See above for mixed attenuation soft tissue seen at the surgical site  Partial thrombosis again noted within the main portal vein and SMV  ADRENAL GLANDS:  Stable nodule right adrenal gland  Left adrenal within normal limits  KIDNEYS/URETERS:  No hydronephrosis  No nephrolithiasis  Increasing pararenal fluid collection on the right with multiple foci of air noted at the superior extent  Unclear if this is evolving fat necrosis or potentially extraluminal air related to bowel  These foci of retroperitoneal air were not present on a prior postoperative study from November 4  STOMACH AND BOWEL:  Unremarkable  APPENDIX:  No findings to suggest appendicitis  ABDOMINOPELVIC CAVITY:  No ascites  No pneumoperitoneum  No lymphadenopathy  VESSELS:  Unremarkable for patient's age  PELVIS REPRODUCTIVE ORGANS:  Unremarkable for patient's age  URINARY BLADDER:  Unremarkable  ABDOMINAL WALL/INGUINAL REGIONS:  Unremarkable  OSSEOUS STRUCTURES:  No acute fracture or destructive osseous lesion  Impression: Interval improvement of pulmonary emboli  New loculated fluid collection between the caudate and left lateral segment  Stable ill-defined linear hypodensity in the left lateral segment as described above  Mixed attenuation curvilinear structure in the retroperitoneum at the site of the surgery    Unclear if this represents evolving hematoma or potentially wall thickening and edema within a bypass loop of bowel  There are numerous foci of extraluminal air,  newly demonstrated just cephalad to this adjacent to a bypass loop and stomach in the midline as well as extending into the enlarging right anterior paranephric collection  Findings suspicious for anastomotic leak  I personally discussed this study with Dr Rafael Gunter on 11/12/2022 at 11:30 AM  There is a significant additional finding or different interpretation from the [facility] preliminary report which was provided shortly after completion of the exam  New free air within the retroperitoneum and operative site  Workstation performed: [id number]     CTA chest ct abdomen pelvis w contrast    Result Date: 11/4/2022  Narrative: CT PULMONARY ANGIOGRAM OF THE CHEST AND CT ABDOMEN AND PELVIS WITH INTRAVENOUS CONTRAST INDICATION: 56-year-old female with history of pancreatic adenocarcinoma status post Whipple procedure and primary repair of SMV/portal vein injury on 10/31/2022 with abdominal pain postoperatively  Rule out pulmonary embolism and leak  COMPARISON:  CT chest, abdomen, pelvis 9/26/2022  TECHNIQUE:  CT examination of the chest, abdomen and pelvis was performed  Thin section CT angiographic technique was used in the chest in order to evaluate for pulmonary embolus and coronal 3D MIP postprocessing was performed on the acquisition scanner  Axial, sagittal, and coronal 2D reformatted images were created from the source data and submitted for interpretation  Radiation dose length product (DLP) for this visit:  1516 16 mGy-cm   This examination, like all CT scans performed in the [facility], was performed utilizing techniques to minimize radiation dose exposure, including the use of iterative reconstruction and automated exposure control   IV Contrast:  100 mL of iohexol (OMNIPAQUE)   Enteric Contrast:  Enteric contrast was not administered  FINDINGS: CHEST PULMONARY ARTERIAL TREE: Filling defects in the right pulmonary artery, segmental and subsegmental right upper lobe pulmonary arteries, segmental and subsegmental right middle lobe pulmonary arteries, and subsegmental right lower lobe pulmonary artery, consistent with pulmonary emboli  Dilated main pulmonary artery measuring 3 1 cm  RV/LV ratio of 0 89  No CT evidence of right heart strain  LUNGS: Nonenhancing right lower lobe airspace consolidation, new since prior study  Subsegmental atelectasis in the left lower lobe, lingula, and right lower lobe  Stable 2 mm right upper lobe solid pulmonary nodule (3/25)  PLEURA:  No pleural effusion or pneumothorax  HEART/AORTA:  The heart is mildly enlarged  No pericardial effusion  No thoracic aortic aneurysm  Common origin of the brachiocephalic artery and the left common carotid artery off of the aortic arch, which is a normal anatomic variant  MEDIASTINUM AND SHANNAN:  Unremarkable  CHEST WALL AND LOWER NECK: Left chest wall vascular access device with distal tip terminating in the right atrium  Stable 5 mm right thyroid nodule  ABDOMEN LIVER/BILIARY TREE: Hepatomegaly measuring 19 4 cm in length  Mild hepatic steatosis  Ill-defined wedgelike hypoenhancement in the left hepatic lobe  No suspicious hepatic lesion  GALLBLADDER:  Gallbladder is surgically absent  SPLEEN:  Splenomegaly measuring 15 0 cm in craniocaudal dimension  PANCREAS:  Postsurgical changes of Whipple procedure, with ill-defined haziness in the surgical bed, likely related to postoperative edema  The remaining pancreatic parenchyma in the body and tail are unremarkable  No dilatation of the residual main pancreatic duct  ADRENAL GLANDS: Stable 1 9 cm right adrenal adenoma  The left adrenal gland is unremarkable  KIDNEYS/URETERS:  Mild bilateral perinephric stranding, greater on the right compared to left, likely postoperative in etiology    Stable subcentimeter hypodense lesion in the interpolar right kidney which is too small to characterize  No hydronephrosis  STOMACH AND BOWEL:  Small volume linear pneumoperitoneum immediately adjacent to the gastrojejunal anastomosis (2/242)  No bowel obstruction identified  Mild bowel wall thickening of the duodenum  APPENDIX:  No findings to suggest appendicitis  ABDOMINOPELVIC CAVITY: Right upper quadrant drainage catheter terminating posterolateral to the right hepatic lobe  Small volume abdominopelvic ascites  No well-defined organized rim-enhancing fluid collection to suggest an intra-abdominal abscess  Small volume pneumoperitoneum adjacent to the gastrojejunal anastomosis and in the right upper quadrant  No lymphadenopathy  VESSELS:  Atherosclerotic changes are present  No evidence of aneurysm  The anterior right and left portal veins are patent  The posterior right portal vein is diminutive but patent  The hepatic veins are patent  Nonocclusive thrombus in the main portal vein and superior mesenteric vein  Surgical clips adjacent to the superior mesenteric vein and the portal vein, consistent with prior repair  The splenic vein is patent  PELVIS REPRODUCTIVE ORGANS:  Surgical changes of prior hysterectomy  URINARY BLADDER:  Nondependent pocket of gas, likely related to recent instrumentation  Otherwise, unremarkable  ABDOMINAL WALL/INGUINAL REGIONS:  Postsurgical changes of the anterior abdominal wall with superficial skin staples  Trace subcutaneous emphysema in the right upper quadrant anterior abdominal wall surrounding the right upper quadrant drainage catheter  OSSEOUS STRUCTURES:  No acute fracture or destructive osseous lesion  Spinal degenerative changes are noted  Impression: 1  Pulmonary emboli in the right pulmonary artery, segmental and subsegmental right upper and middle lobe pulmonary arteries, and the subsegmental right lower lobe pulmonary arteries  RV/LV ratio of 0 89    No CT evidence of right heart strain  2   Small volume linear pneumoperitoneum immediately adjacent to the gastrojejunal anastomosis and in the right upper quadrant with a small volume of abdominopelvic ascites  These findings are nonspecific given recent major abdominal surgery and may be postoperative in etiology, however, a leak of the gastrojejunal anastomosis cannot be excluded  Recommend further evaluation with an upper GI study  3   Nonocclusive thrombus in the main portal vein and superior mesenteric vein  4   Nonenhancing right lower lobe airspace consolidation, likely representing pulmonary infarct  5   Ill-defined wedgelike hypoenhancement in the left hepatic lobe  Differential diagnosis includes hepatic infarct, altered perfusion, versus artifact  Recommend attention on follow-up  6   Postsurgical changes of Whipple procedure, with ill-defined haziness in the surgical bed, likely related to postoperative edema  7   Mild bowel wall thickening of the duodenum, which may represent postoperative edema versus duodenitis  8   Dilated main pulmonary artery measuring 3 1 cm, which can be seen with pulmonary hypertension  9   Hepatosplenomegaly and mild hepatic steatosis  I personally discussed this study with Vineet Cowart on 11/4/2022 at 3:07 PM  Workstation performed: [id number]     XR chest portable ICU    Result Date: 10/31/2022  Narrative: CHEST INDICATION:   s/p intubation  COMPARISON:  5/31/2022  EXAM PERFORMED/VIEWS:  XR CHEST PORTABLE ICU Images:  1 FINDINGS: Cardiac and mediastinal contours are stable  Probable mild cardiomegaly  Mild central pulmonary vascular congestion  No focal airspace consolidation or effusions  No pneumothorax  There is an endotracheal tube with its distal tip in the proximal right atrium  Retraction of this to several centimeters would be helpful  Left chest wall tariq catheter in place  Distal tip of the gastric tube not visualized but is at least in the stomach  Impression: Endotracheal tube with its distal tip in the proximal right atrium  This should be retracted several centimeters  Gastric tube as above  The study was marked in [facility] for immediate notification  Workstation performed: [id number]     VAS lower limb venous duplex study, complete bilateral    Result Date: 11/11/2022  Narrative:  [facility] REPORT CLINICAL: Indications: Patient presents with recent discovery of pulmonary embolism and physician wants to determine potential source  Operative History: Atrial ablation surgery Risk Factors The patient has history of Obesity, HTN, Diabetes and Hyperlipidemia  CONCLUSION: Impression: RIGHT LOWER LIMB: No evidence of acute or chronic deep vein thrombosis  No evidence of superficial thrombophlebitis noted  Doppler evaluation shows a normal response to augmentation maneuvers  Popliteal, posterior tibial and anterior tibial arterial Doppler waveforms are triphasic  LEFT LOWER LIMB: No evidence of acute or chronic deep vein thrombosis  No evidence of superficial thrombophlebitis noted  Doppler evaluation shows a normal response to augmentation maneuvers  Popliteal, posterior tibial and anterior tibial arterial Doppler waveforms are triphasic  Technical findings were given to Deven Grissom 11/10/2022  SIGNATURE: Electronically Signed by: Lindajo Alpers, MD, [address] on 2022-11-11 11:02:11 AM    US kidney and bladder with pvr    Result Date: 10/27/2022  Narrative: RENAL ULTRASOUND WITH PVR INDICATION:   N20 0: Calculus of kidney  COMPARISON: None TECHNIQUE:   Ultrasound of the retroperitoneum was performed with a curvilinear transducer utilizing volumetric sweeps and still imaging techniques  FINDINGS: KIDNEYS: Symmetric and normal size  Right kidney:  12 8 x 5 1 x 7 0 cm  Volume 240 9 mL Left kidney:  10 5 x 5 2 x 5 5 cm  Volume 158 1 mL Right kidney Normal echogenicity and contour  No mass is identified  No hydronephrosis   3 mm right lower pole calculus with clinical artifact  No perinephric fluid collections  Left kidney Normal echogenicity and contour  No mass is identified  No hydronephrosis  Multiple left renal calculi with truncal artifact measuring up to 5 mm  No perinephric fluid collections  URETERS: Nonvisualized  BLADDER: Normally distended  No focal thickening or mass lesions  Bilateral ureteral jets detected  Prevoid: 394 6 No significant post void volume  Measured post void volume in mL: 22 1 Incidental note is made of splenomegaly  Impression: Bilateral nonobstructing renal calculi  Splenomegaly  Workstation performed: [id number]     Echo complete w/ contrast if indicated    Result Date: 11/4/2022  Narrative: •  Left Ventricle: Left ventricular cavity size is normal  Wall thickness is normal  The left ventricular ejection fraction is 50%  Systolic function is low normal  Wall motion is normal  Diastolic function is mildly abnormal, consistent with grade I (abnormal) relaxation  •  Tricuspid Valve: There is mild regurgitation  EKG, Pathology, and Other Studies Reviewed on Admission:   · EKG: Reviewed      Counseling / Coordination of Care Time: 30 total mins spent n consult  Greater than 50% of total time spent on patient counseling and coordination of care  Velvet Ibarra MD   PGY-5,   Gastroenterology         ** Please Note: This note is constructed using a voice recognition dictation system   **

## 2022-11-12 NOTE — H&P
H&P - Surgical Oncology  Ewelina Wolfe 57 y o  female MRN: [id number]  Unit/Bed#: ED 20 Encounter: [id number]        Assessment/Plan     Assessment:  Ms Ewelina Wolfe is a 58 yo female who presents with bloody emesis and worsening abdominal pain with a high level of concern for GDA blowout s/p a whipple procedure and repair of the portal vein and SMV  Plan:  -NPO  -IV fluids  -IV antibiotics  -PRN pain meds  -PRN anti-nausea medication  -Trend lactic  -Plan is to proceed with angiogram with possible angioembolization with IR  IR has been made aware  Plan to place NG tube under general anesthesia  -Patient will be admitted to the SICU  History of Present Illness     HPI:  Ewelina Wolfe is a 57 y o  female who presents with bloody vomitus and increased abdominal pain after being discharge today  She had recently undergone a whipple procedure with portal vein/SMV repair on 10/31  The patient states her pain began at approximately 10:00pm  She describes her stomach pain as severe 10/10  She states she is thirsty, has no appetite, denies fevers, and denies chills  She complains she is diaphoretic  She states she hasn't taken her insulin today however most recent blood glucose was under 200  She had a 15-20 minute bout of dark bloody emesis  PMHX is notable for A flutter, DM, GERD, HTN, HLD, and Afib  PSHX is notable for multiple past abdominal surgeries  CTA of the chest extended to the abdomen and pelvis was obtained and official read has not been finalized however it appears no active extravasation at this point in time  Concern for GDA blowout and plan to proceed with angiogram through interventional radiology  Review of Systems   Constitutional: Negative for chills and fever  HENT: Negative for ear pain and sore throat  Eyes: Negative for pain and visual disturbance  Respiratory: Negative for cough and shortness of breath  Cardiovascular: Negative for chest pain and palpitations  Gastrointestinal: Positive for abdominal distention, abdominal pain, nausea and vomiting  Genitourinary: Negative for dysuria and hematuria  Musculoskeletal: Negative for arthralgias and back pain  Skin: Negative for color change and rash  Neurological: Negative for seizures and syncope  All other systems reviewed and are negative        Historical Information   Past Medical History:   Diagnosis Date   • Abnormal liver function test     last assessed 1/16/17    • Adenomatosis    • 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    last assessed 4/4/13   • Arthritis    • Atrial flutter ([place] [address] )    • Benign essential hypertension     last assessed 12/21/17    • Body mass index (BMI) of 50-59 9 in adult [facility])    • Cancer (HCC)     pancreas   • Colon polyp    • Congenital pes planus     last assessed 7/15/14    • COVID     4/30/21   • CPAP (continuous positive airway pressure) dependence    • Dental crowns present    • Depression    • Diabetes mellitus ([place] [address] )    • Dizziness     occas--pt reports did see family doctor   • GERD (gastroesophageal reflux disease)    • Hemangioma of skin 08/26/2003    last assessed 8/26/03   • History of cancer chemotherapy     [facility] Oncologist Dr Silas Quiñonez   • History of gastroesophageal reflux (GERD)    • Hypercholesterolemia    • Hyperlipidemia    • Hypertension    • Irregular heart beat    • Kidney stone    • 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[place] [address] ) 04/19/2006    last assessed 12/31/14    • Osteoarthritis of both knees     last assessed 12/31/14    • Paroxysmal atrial fibrillation ([place] [address] )    • Port-A-Cath in place    • S/P ablation of atrial flutter    • Shortness of breath     per pt "from chemo-not drastic--still working and goes up steps"   • Sleep apnea    • Wears glasses      Past Surgical History:   Procedure Laterality Date   • ABDOMINAL ADHESION SURGERY N/A 10/31/2022    Procedure: LYSIS ADHESIONS, colectomy, vascular pedicle flap;  Surgeon: Diana De Guzman MD;  [facility];  Service: Surgical Oncology   • ABDOMINAL SURGERY  06/2006    20cm GIST removed    • ABLATION SOFT TISSUE N/A 10/31/2022    Procedure: SOFT TISSUE ABLATION;  Surgeon: Vero Enriquez MD;  [facility];  Service: Surgical Oncology   • APPENDECTOMY     • ATRIAL ABLATION SURGERY     • CARPAL TUNNEL RELEASE Bilateral    • COLONOSCOPY     • FL GUIDED CENTRAL VENOUS ACCESS DEVICE INSERTION  05/31/2022   • FL RETROGRADE PYELOGRAM  07/18/2022   • FL RETROGRADE PYELOGRAM  8/22/2022   • HYSTERECTOMY      fibroids   • IR PORT CHECK  06/23/2022   • IR PORT REMOVAL AND PLACEMENT NEW SITE  06/28/2022   • JOINT REPLACEMENT Bilateral     knees   • KIDNEY STONE SURGERY Right 09/17/2021    placed stent in  for kidney stone   • KNEE SURGERY     • KNEE SURGERY Left 03/2019   • LAPAROTOMY N/A 10/31/2022    Procedure: EXPLORATORY LAPAROTOMY;  Surgeon: Vero Enriquez MD;  [facility];  Service: Surgical Oncology   • OOPHORECTOMY      cyst   • WI CYSTO/URETERO W/LITHOTRIPSY &INDWELL STENT INSRT Left 8/22/2022    Procedure: CYSTOSCOPY URETEROSCOPY WITH LITHOTRIPSY HOLMIUM LASER, RETROGRADE PYELOGRAM AND INSERTION STENT URETERAL;  Surgeon: Vanessa Larsen MD;  [facility];  Service: Urology   • WI CYSTOSCOPY,INSERT URETERAL STENT Left 8/22/2022    Procedure: EXCHANGE STENT URETERAL;  Surgeon: Vanessa Larsen MD;  [facility];  Service: Urology   • WI CYSTOURETHROSCOPY,URETER CATHETER Left 07/18/2022    Procedure: CYSTOSCOPY RETROGRADE PYELOGRAM WITH INSERTION STENT URETERAL;  Surgeon: Vanessa Larsen MD;  [facility];  Service: Urology   • TONSILLECTOMY     • TUBAL LIGATION     • TUMOR EXCISION  2006    gastrointestinal stromal tumor   • TUNNELED VENOUS PORT PLACEMENT N/A 05/31/2022    Procedure: INSERTION VENOUS PORT (PORT-A-CATH);   Surgeon: Vero Enriquez MD;  [facility];  Service: Surgical Oncology   • UPPER GASTROINTESTINAL ENDOSCOPY     • WHIPPLE PROCEDURE/PANCREATICO-DUODENECTOMY N/A 10/31/2022    Procedure:  WHIPPLE PROCEDURE/PANCREATICO-DUODENECTOMY, IOUS;  Surgeon: Liliana Zhu MD;  [facility];  Service: Surgical Oncology     Social History   Social History     Substance and Sexual Activity   Alcohol Use Not Currently    Comment: social drinker per allscript      Social History     Substance and Sexual Activity   Drug Use Never     Social History     Tobacco Use   Smoking Status Former Smoker   • Years: 9 00   • Types: Cigarettes   Smokeless Tobacco Never Used     Family History: non-contributory    Meds/Allergies   all medications and allergies reviewed  Allergies   Allergen Reactions   • Other Rash     Adhesive tape        Objective   First Vitals:   Blood Pressure: 100/68 (11/12/22 0039)  Pulse: (!) 133 (11/12/22 0039)  Temperature: 98 5 °F (36 9 °C) (11/12/22 0039)  Temp Source: Oral (11/12/22 0039)  Respirations: 22 (11/12/22 0039)  SpO2: 95 % (11/12/22 0039)    Current Vitals:   Blood Pressure: 111/55 (11/12/22 0230)  Pulse: (!) 116 (11/12/22 0230)  Temperature: 98 5 °F (36 9 °C) (11/12/22 0039)  Temp Source: Oral (11/12/22 0039)  Respirations: 22 (11/12/22 0230)  SpO2: 93 % (11/12/22 0230)    No intake or output data in the 24 hours ending 11/12/22 0247    Invasive Devices  Report    Central Venous Catheter Line  Duration           Port A Cath 05/31/22 Left Chest 164 days          Peripheral Intravenous Line  Duration           Peripheral IV 11/12/22 Left Antecubital <1 day    Peripheral IV 11/12/22 Proximal;Right;Ventral (anterior) Antecubital <1 day          Drain  Duration           Ureteral Internal Stent Left ureter 6 Fr  116 days                Physical Exam:  General: No acute distress  Neuro: Alert, oriented to person and place  Eyes: Extraocular movements intact  CV: Well perfused, regular rate and rhythm  Lungs: Normal work of breathing, no increased respiratory effort  Abdomen: Soft, mildly distended, tender in the RUQ and RLQ  Incision(s) clean, dry and intact  Extremities: No edema, clubbing or cyanosis  Skin: Warm, dry  Lymph: No palpable lymph nodes  Psych: Normal mentation      Lab Results:   I have personally reviewed pertinent lab results  , CBC:   Lab Results   Component Value Date    WBC 31 24 (HH) 11/12/2022    HGB 7 7 (L) 11/12/2022    HCT 24 7 (L) 11/12/2022    MCV 93 11/12/2022     11/12/2022    MCH 28 8 11/12/2022    MCHC 31 2 (L) 11/12/2022    RDW 17 1 (H) 11/12/2022    MPV 11 3 11/12/2022    NRBC 1 11/12/2022   , CMP:   Lab Results   Component Value Date    SODIUM 136 11/12/2022    K 3 5 11/12/2022     11/12/2022    CO2 21 11/12/2022    BUN 11 11/12/2022    CREATININE 0 62 11/12/2022    CALCIUM 8 1 (L) 11/12/2022     (H) 11/12/2022    ALT 78 11/12/2022    ALKPHOS 943 (H) 11/12/2022    EGFR 96 11/12/2022   , Coagulation:   Lab Results   Component Value Date    INR 1 62 (H) 11/12/2022   , Urinalysis: No results found for: Nils Darius, SPECGRAV, PHUR, LEUKOCYTESUR, NITRITE, PROTEINUA, GLUCOSEU, KETONESU, BILIRUBINUR, BLOODU, Amylase: No results found for: AMYLASE, Lipase: No results found for: LIPASE  Imaging: I have personally reviewed pertinent reports  No results found  EKG, Pathology, and Other Studies: I have personally reviewed pertinent reports        Code Status: Prior  Advance Directive and Living Will:      Power of :    POLST:      Caleb Heart MD  General Surgery Resident

## 2022-11-12 NOTE — ANESTHESIA POSTPROCEDURE EVALUATION
Post-Op Assessment Note    CV Status:  Stable  Pain Score: 0    Pain management: adequate     Mental Status:  Alert   Hydration Status:  Stable   PONV Controlled:  Controlled   Airway Patency:  Patent      Post Op Vitals Reviewed: Yes      Staff: Anesthesiologist, CRNA         No complications documented      BP      Temp      Pulse    Resp      SpO2

## 2022-11-12 NOTE — TELEPHONE ENCOUNTER
Received a message via [employer] from answering service that pt's  Micaela Jacobson) called regarding advise for patient throwing up blood  I called Mr Augustina Rabago right away  He explained that Ms Augustina Rabago was discharged from the hospital today after Whipple's procedure was performed on 10/31  Patient was starting to c/o severe abdominal discomfort around 10pm  Dr Hardy Pierce from surg onc team was called and patient was reportedly advised to take 2 of the oxycodone 5mg and 2 of her gabapentin tablets  About 15-20 mins of taking those medications, patient started vomiting dark blood multiple times  Patient has remained conscious and alert  Mr Augustina Rabago has already called 911 and patient is on her way to the hospital ([facility]) via ambulance for full evaluation  Patient will mostly likely need evaluation by both surgical oncology (given c/o significant abdominal pain in setting of recent Whipple surgery) and GI team (given report of hematemesis and need for endoscopic evaluation); CTA would also be beneficial to evaluate for any actively bleeding vessels amenable to embolizations  Will route this message to pt's primary oncologist, Dr iRcky Edmonds, so that Dr Ricky Edmonds is also informed of the situation

## 2022-11-12 NOTE — ANESTHESIA PREPROCEDURE EVALUATION
Procedure:  EGD    Relevant Problems   CARDIO   (+) Benign essential hypertension   (+) Dyspnea on exertion   (+) Hyperlipidemia   (+) Hypertension   (+) Paroxysmal A-fib (HCC)   (+) Supraventricular tachycardia (HCC)      ENDO   (+) Type 2 diabetes mellitus without complication, without long-term current use of insulin (HCC)      GI/HEPATIC   (+) Esophageal reflux   (+) Hematemesis   (+) Pancreatic cancer (HCC)      /RENAL   (+) Nephrolithiasis   (+) Uric acid kidney stone      NEURO/PSYCH   (+) Controlled depression      PULMONARY   (+) Dyspnea on exertion   (+) Severe obstructive sleep apnea        Physical Exam    Airway    Mallampati score: II  TM Distance: >3 FB  Neck ROM: full     Dental       Cardiovascular      Pulmonary      Other Findings        Anesthesia Plan  ASA Score- 3 Emergent    Anesthesia Type- general with ASA Monitors  Additional Monitors:   Airway Plan: ETT  Plan Factors-    Chart reviewed  Induction- intravenous  Postoperative Plan-     Informed Consent- Anesthetic plan and risks discussed with patient  I personally reviewed this patient with the CRNA  Discussed and agreed on the Anesthesia Plan with the CRNA  Su Brock

## 2022-11-13 ENCOUNTER — HOSPITAL ENCOUNTER (OUTPATIENT)
Dept: INFUSION CENTER | Facility: HOSPITAL | Age: 62
End: 2022-11-13
Attending: INTERNAL MEDICINE

## 2022-11-13 LAB
ALBUMIN SERPL BCP-MCNC: 1.7 G/DL (ref 3.5–5)
ALP SERPL-CCNC: 594 U/L (ref 46–116)
ALT SERPL W P-5'-P-CCNC: 58 U/L (ref 12–78)
ANION GAP SERPL CALCULATED.3IONS-SCNC: 6 MMOL/L (ref 4–13)
AST SERPL W P-5'-P-CCNC: 79 U/L (ref 5–45)
ATRIAL RATE: 151 BPM
BILIRUB DIRECT SERPL-MCNC: 0.49 MG/DL (ref 0–0.2)
BILIRUB SERPL-MCNC: 1.05 MG/DL (ref 0.2–1)
BUN SERPL-MCNC: 13 MG/DL (ref 5–25)
CALCIUM SERPL-MCNC: 7.7 MG/DL (ref 8.3–10.1)
CHLORIDE SERPL-SCNC: 109 MMOL/L (ref 96–108)
CO2 SERPL-SCNC: 27 MMOL/L (ref 21–32)
CREAT SERPL-MCNC: 0.43 MG/DL (ref 0.6–1.3)
ERYTHROCYTE [DISTWIDTH] IN BLOOD BY AUTOMATED COUNT: 16.2 % (ref 11.6–15.1)
GFR SERPL CREATININE-BSD FRML MDRD: 109 ML/MIN/1.73SQ M
GLUCOSE SERPL-MCNC: 131 MG/DL (ref 65–140)
GLUCOSE SERPL-MCNC: 138 MG/DL (ref 65–140)
GLUCOSE SERPL-MCNC: 141 MG/DL (ref 65–140)
GLUCOSE SERPL-MCNC: 141 MG/DL (ref 65–140)
GLUCOSE SERPL-MCNC: 142 MG/DL (ref 65–140)
GLUCOSE SERPL-MCNC: 145 MG/DL (ref 65–140)
GLUCOSE SERPL-MCNC: 150 MG/DL (ref 65–140)
GLUCOSE SERPL-MCNC: 151 MG/DL (ref 65–140)
GLUCOSE SERPL-MCNC: 151 MG/DL (ref 65–140)
HCT VFR BLD AUTO: 21.5 % (ref 34.8–46.1)
HCT VFR BLD AUTO: 21.7 % (ref 34.8–46.1)
HCT VFR BLD AUTO: 23.9 % (ref 34.8–46.1)
HGB BLD-MCNC: 6.9 G/DL (ref 11.5–15.4)
HGB BLD-MCNC: 6.9 G/DL (ref 11.5–15.4)
HGB BLD-MCNC: 7.6 G/DL (ref 11.5–15.4)
MAGNESIUM SERPL-MCNC: 1.9 MG/DL (ref 1.6–2.6)
MCH RBC QN AUTO: 29.2 PG (ref 26.8–34.3)
MCHC RBC AUTO-ENTMCNC: 32.1 G/DL (ref 31.4–37.4)
MCV RBC AUTO: 91 FL (ref 82–98)
P AXIS: 254 DEGREES
PHOSPHATE SERPL-MCNC: 2.9 MG/DL (ref 2.3–4.1)
PLATELET # BLD AUTO: 147 THOUSANDS/UL (ref 149–390)
PMV BLD AUTO: 11 FL (ref 8.9–12.7)
POTASSIUM SERPL-SCNC: 3.3 MMOL/L (ref 3.5–5.3)
PR INTERVAL: 133 MS
PROT SERPL-MCNC: 4.9 G/DL (ref 6.4–8.4)
QRS AXIS: 38 DEGREES
QRSD INTERVAL: 88 MS
QT INTERVAL: 263 MS
QTC INTERVAL: 417 MS
RBC # BLD AUTO: 2.36 MILLION/UL (ref 3.81–5.12)
SODIUM SERPL-SCNC: 142 MMOL/L (ref 135–147)
T WAVE AXIS: -6 DEGREES
VENTRICULAR RATE: 151 BPM
WBC # BLD AUTO: 15.39 THOUSAND/UL (ref 4.31–10.16)

## 2022-11-13 PROCEDURE — 30233N1 TRANSFUSION OF NONAUTOLOGOUS RED BLOOD CELLS INTO PERIPHERAL VEIN, PERCUTANEOUS APPROACH: ICD-10-PCS

## 2022-11-13 RX ORDER — POTASSIUM CHLORIDE 29.8 MG/ML
40 INJECTION INTRAVENOUS EVERY 4 HOURS
Status: DISCONTINUED | OUTPATIENT
Start: 2022-11-13 | End: 2022-11-13

## 2022-11-13 RX ORDER — INSULIN LISPRO 100 [IU]/ML
1-5 INJECTION, SOLUTION INTRAVENOUS; SUBCUTANEOUS EVERY 6 HOURS SCHEDULED
Status: DISCONTINUED | OUTPATIENT
Start: 2022-11-13 | End: 2022-11-16

## 2022-11-13 RX ORDER — METOPROLOL TARTRATE 5 MG/5ML
10 INJECTION INTRAVENOUS EVERY 6 HOURS
Status: DISCONTINUED | OUTPATIENT
Start: 2022-11-13 | End: 2022-11-14

## 2022-11-13 RX ORDER — HEPARIN SODIUM 5000 [USP'U]/ML
7500 INJECTION, SOLUTION INTRAVENOUS; SUBCUTANEOUS EVERY 8 HOURS SCHEDULED
Status: DISCONTINUED | OUTPATIENT
Start: 2022-11-13 | End: 2022-11-14

## 2022-11-13 RX ORDER — METOPROLOL TARTRATE 5 MG/5ML
5 INJECTION INTRAVENOUS ONCE
Status: COMPLETED | OUTPATIENT
Start: 2022-11-13 | End: 2022-11-13

## 2022-11-13 RX ORDER — POTASSIUM CHLORIDE 29.8 MG/ML
40 INJECTION INTRAVENOUS EVERY 4 HOURS
Status: COMPLETED | OUTPATIENT
Start: 2022-11-13 | End: 2022-11-13

## 2022-11-13 RX ORDER — POTASSIUM CHLORIDE 14.9 MG/ML
20 INJECTION INTRAVENOUS EVERY 4 HOURS
Status: DISCONTINUED | OUTPATIENT
Start: 2022-11-13 | End: 2022-11-13

## 2022-11-13 RX ORDER — ACETAMINOPHEN 650 MG/1
650 SUPPOSITORY RECTAL EVERY 6 HOURS PRN
Status: DISCONTINUED | OUTPATIENT
Start: 2022-11-13 | End: 2022-11-17

## 2022-11-13 RX ORDER — MAGNESIUM SULFATE HEPTAHYDRATE 40 MG/ML
2 INJECTION, SOLUTION INTRAVENOUS ONCE
Status: COMPLETED | OUTPATIENT
Start: 2022-11-13 | End: 2022-11-13

## 2022-11-13 RX ADMIN — POTASSIUM CHLORIDE 40 MEQ: 29.8 INJECTION, SOLUTION INTRAVENOUS at 17:03

## 2022-11-13 RX ADMIN — DEXTROSE 150 MG: 50 INJECTION, SOLUTION INTRAVENOUS at 06:02

## 2022-11-13 RX ADMIN — METOROPROLOL TARTRATE 10 MG: 5 INJECTION, SOLUTION INTRAVENOUS at 23:52

## 2022-11-13 RX ADMIN — AMIODARONE HYDROCHLORIDE 1 MG/MIN: 50 INJECTION, SOLUTION INTRAVENOUS at 09:04

## 2022-11-13 RX ADMIN — SODIUM CHLORIDE, SODIUM LACTATE, POTASSIUM CHLORIDE, AND CALCIUM CHLORIDE 100 ML/HR: .6; .31; .03; .02 INJECTION, SOLUTION INTRAVENOUS at 07:33

## 2022-11-13 RX ADMIN — PIPERACILLIN AND TAZOBACTAM 3.38 G: 36; 4.5 INJECTION, POWDER, FOR SOLUTION INTRAVENOUS at 03:59

## 2022-11-13 RX ADMIN — SODIUM CHLORIDE, SODIUM LACTATE, POTASSIUM CHLORIDE, AND CALCIUM CHLORIDE 75 ML/HR: .6; .31; .03; .02 INJECTION, SOLUTION INTRAVENOUS at 19:45

## 2022-11-13 RX ADMIN — DEXTROSE 150 MG: 50 INJECTION, SOLUTION INTRAVENOUS at 08:46

## 2022-11-13 RX ADMIN — PANTOPRAZOLE SODIUM 40 MG: 40 INJECTION, POWDER, FOR SOLUTION INTRAVENOUS at 21:30

## 2022-11-13 RX ADMIN — METOROPROLOL TARTRATE 5 MG: 5 INJECTION, SOLUTION INTRAVENOUS at 05:08

## 2022-11-13 RX ADMIN — MAGNESIUM SULFATE HEPTAHYDRATE 2 G: 40 INJECTION, SOLUTION INTRAVENOUS at 07:26

## 2022-11-13 RX ADMIN — METOROPROLOL TARTRATE 5 MG: 5 INJECTION, SOLUTION INTRAVENOUS at 03:59

## 2022-11-13 RX ADMIN — METOROPROLOL TARTRATE 5 MG: 5 INJECTION, SOLUTION INTRAVENOUS at 04:25

## 2022-11-13 RX ADMIN — PANTOPRAZOLE SODIUM 40 MG: 40 INJECTION, POWDER, FOR SOLUTION INTRAVENOUS at 09:18

## 2022-11-13 RX ADMIN — PIPERACILLIN AND TAZOBACTAM 3.38 G: 36; 4.5 INJECTION, POWDER, FOR SOLUTION INTRAVENOUS at 09:57

## 2022-11-13 RX ADMIN — POTASSIUM CHLORIDE 20 MEQ: 14.9 INJECTION, SOLUTION INTRAVENOUS at 09:18

## 2022-11-13 RX ADMIN — HEPARIN SODIUM 7500 UNITS: 5000 INJECTION INTRAVENOUS; SUBCUTANEOUS at 14:29

## 2022-11-13 RX ADMIN — METOROPROLOL TARTRATE 10 MG: 5 INJECTION, SOLUTION INTRAVENOUS at 12:31

## 2022-11-13 RX ADMIN — HEPARIN SODIUM 7500 UNITS: 5000 INJECTION INTRAVENOUS; SUBCUTANEOUS at 21:30

## 2022-11-13 RX ADMIN — METOROPROLOL TARTRATE 10 MG: 5 INJECTION, SOLUTION INTRAVENOUS at 18:36

## 2022-11-13 RX ADMIN — CEFTRIAXONE 2000 MG: 2 INJECTION, POWDER, FOR SOLUTION INTRAMUSCULAR; INTRAVENOUS at 11:09

## 2022-11-13 RX ADMIN — POTASSIUM CHLORIDE 40 MEQ: 29.8 INJECTION, SOLUTION INTRAVENOUS at 12:31

## 2022-11-13 RX ADMIN — METOROPROLOL TARTRATE 10 MG: 5 INJECTION, SOLUTION INTRAVENOUS at 07:26

## 2022-11-13 NOTE — MALNUTRITION/BMI
This medical record reflects one or more clinical indicators suggestive of morbid obesity  360 Statement: BMI 52 83    BMI Findings:  Adult BMI Classifications: Morbid Obesity 50-59 9        Body mass index is 52 83 kg/m²  See Nutrition note dated 11/13 for additional details  Completed nutrition assessment is viewable in the nutrition documentation

## 2022-11-13 NOTE — UTILIZATION REVIEW
Initial Clinical Review    Admission: Date/Time/Statement:   Admission Orders (From admission, onward)     Ordered        11/12/22 0313  Inpatient Admission  Once                      Orders Placed This Encounter   Procedures   • Inpatient Admission     Standing Status:   Standing     Number of Occurrences:   1     Order Specific Question:   Level of Care     Answer:   Critical Care [15]     Order Specific Question:   Estimated length of stay     Answer:   More than 2 Midnights     Order Specific Question:   Certification     Answer:   I certify that inpatient services are medically necessary for this patient for a duration of greater than two midnights  See H&P and MD Progress Notes for additional information about the patient's course of treatment  ED Arrival Information     Expected   -    Arrival   11/12/2022 00:34    Acuity   Urgent            Means of arrival   Ambulance    Escorted by   [facility]    Service   Oncology-Surgical    Admission type   Emergency            Arrival complaint   Pain           Chief Complaint   Patient presents with   • Abdominal Pain     Pt arrives via EMS from home c/o abdominal pain +N, +V  Pt was d/c around 1700 on 11/11/2022 from [facility] s/p whipple procedure on 10/31/2022  Initial Presentation: 57 y o  female with PMH of flutter, DM, GERD, HTN, HLD, and Afib  presents with c/o with bloody vomitus and increased abdominal pain after being discharge today following a whipple procedure with portal vein/SMV repair on 10/31  Pt has no appetite and is diaphoretic  She states she hasn't taken her insulin today however most recent blood glucose was under 200  She had a 15-20 minute bout of dark bloody emesis  CTA of the chest extended to the abdomen and pelvis was obtained it appears no active extravasation at this point in time  Concern for GDA blowout and plan to proceed with angiogram through interventional radiology    Admit Inpatient med surg, IR and GI consults, keep NPO, give IVF and IV ABX, pain and nausea control prn, trend lactic acid, NG tube will be placed under anesthesia, pt to IR for angiogram with possible angioembolization  11/12 IR procedure:  Preoperative diagnosis:   1  Hematemesis, unspecified whether nausea present    2  Acute upper GI bleed    Findings: Successful covered stenting across the GDA stump of the common hepatic artery  Complications: None immediate  Anesthesia: general    11/12 GI Consult:  57-year-old female patient status post Whipple procedure for pancreatic adenocarcinoma, the patient developed hematemesis, initial concern was for GDA blowout and the patient underwent IR procedure, no active bleeding was seen, patient persists with GI bleeding, persistent drop in hemoglobin despite transfusion, will perform endoscopy  Date: 11/13   Day 2:   Hemoglobin 6 9 this morning receiving 1 unit of packed red blood cells  Entered AFib with rapid ventricular response overnight refractory to IV beta-blocker was given amiodarone bolus with transition to sinus rhythm  Pt noted to have fatigue, abdominal distention and pain  Continue pain control, lopressor IV q6h, supplemental O2 prn, hold bowel regimen, dc White today, continue IVF, monitor electrolytes and replete prn, PT/OT eval, transfer to Stepdown Level 1       ED Triage Vitals [11/12/22 0039]   Temperature Pulse Respirations Blood Pressure SpO2   98 5 °F (36 9 °C) (!) 133 22 100/68 95 %      Temp Source Heart Rate Source Patient Position - Orthostatic VS BP Location FiO2 (%)   Oral Monitor Lying Right arm --      Pain Score       5          Wt Readings from Last 1 Encounters:   11/13/22 (!) 140 kg (307 lb 12 2 oz)     Additional Vital Signs:   Date/Time Temp Pulse Resp BP MAP (mmHg) Arterial Line BP MAP SpO2 Calculated FIO2 (%) - Nasal Cannula O2 Flow Rate (L/min) Nasal Cannula O2 Flow Rate (L/min) O2 Device   11/13/22 1000 99 32 °F (37 4 °C) 138 Abnormal  21 -- -- 162/70 100 mmHg 95 % -- -- -- --   11/13/22 0900 99 68 °F (37 6 °C) 138 Abnormal  22 -- -- 164/70 100 mmHg 96 % -- -- -- --   11/13/22 0846 99 68 °F (37 6 °C) 144 Abnormal  19 160/68 -- 164/70 100 mmHg 96 % -- -- -- --   11/13/22 0830 99 68 °F (37 6 °C) 142 Abnormal  19 -- -- 164/68 100 mmHg 97 % -- -- -- --   11/13/22 0730 99 32 °F (37 4 °C) 148 Abnormal  20 -- -- 156/62 96 mmHg 96 % -- -- -- --   11/13/22 0726 99 32 °F (37 4 °C) 148 Abnormal  20 -- -- 156/64 96 mmHg 96 % -- -- -- --   11/13/22 0700 99 32 °F (37 4 °C) 150 Abnormal  20 -- -- 132/66 92 mmHg 96 % -- -- -- --   11/13/22 0649 -- 148 Abnormal  -- -- -- -- -- -- -- -- -- --   11/13/22 0645 99 32 °F (37 4 °C) 88 19 -- -- 154/58 84 mmHg 97 % -- -- -- --   11/13/22 0624 99 5 °F (37 5 °C) 89 18 152/57 -- -- -- -- -- -- -- --   11/13/22 0606 -- 94 -- -- -- -- -- -- -- -- -- --   11/13/22 0600 99 5 °F (37 5 °C) 148 Abnormal  19 -- -- 146/58 88 mmHg 96 % -- -- -- --   11/13/22 0500 98 96 °F (37 2 °C) 148 Abnormal  20 -- -- 146/58 88 mmHg 96 % -- -- -- --   11/13/22 0426 98 96 °F (37 2 °C) 150 Abnormal  17 -- -- 146/56 86 mmHg 96 % -- -- -- --   11/13/22 0400 99 32 °F (37 4 °C) 156 Abnormal  22 -- -- 148/56 88 mmHg 98 % 28 -- 2 L/min Nasal cannula   11/13/22 0358 99 32 °F (37 4 °C) 154 Abnormal  22 -- -- 136/56 84 mmHg 99 % -- -- -- --   11/13/22 0000 99 68 °F (37 6 °C) 94 22 -- -- 160/50 78 mmHg 98 % 36 -- 4 L/min Nasal cannula   11/12/22 2200 99 32 °F (37 4 °C) 100 23 Abnormal  -- -- 140/50 76 mmHg 98 % -- -- -- --   11/12/22 2130 99 32 °F (37 4 °C) 153 Abnormal  20 -- -- 148/44 78 mmHg 98 % -- -- -- --   11/12/22 2100 -- -- 20 -- -- -- -- -- -- -- -- --   11/12/22 2000 98 6 °F (37 °C) 92 18 -- -- 156/50 78 mmHg 94 % 36 -- 4 L/min Nasal cannula   11/12/22 1900 98 96 °F (37 2 °C) 90 17 -- -- 150/50 76 mmHg 99 % -- -- -- --   11/12/22 1800 98 96 °F (37 2 °C) 88 19 -- -- 158/60 86 mmHg 99 % -- -- -- --   11/12/22 1730 98 96 °F (37 2 °C) 86 18 -- -- 160/62 88 mmHg 98 % -- -- -- --   11/12/22 1700 98 96 °F (37 2 °C) 86 19 -- -- 148/60 84 mmHg 99 % -- -- -- --   11/12/22 1630 98 96 °F (37 2 °C) 86 17 -- -- 152/60 84 mmHg 99 % -- -- -- --   11/12/22 1615 98 6 °F (37 °C) 86 18 -- -- 152/60 84 mmHg 99 % -- -- -- --   11/12/22 1600 98 6 °F (37 °C) 90 21 -- -- 150/58 84 mmHg 97 % 36 -- 4 L/min Nasal cannula   11/12/22 1530 98 6 °F (37 °C) 94 24 Abnormal  113/65 77 146/60 84 mmHg 95 % 36 -- 4 L/min Nasal cannula   11/12/22 1320 -- 136 Abnormal  16 102/68 82 136/58 84 mmHg 96 % 28 -- 2 L/min Nasal cannula   11/12/22 1300 98 24 °F (36 8 °C) 140 Abnormal  17 -- -- 136/54 82 mmHg 95 % -- -- -- --   11/12/22 1230 98 24 °F (36 8 °C) 140 Abnormal  21 -- -- 132/52 82 mmHg 95 % -- -- -- --   11/12/22 1200 98 24 °F (36 8 °C) 142 Abnormal  15 -- -- 128/52 78 mmHg 95 % 28 -- 2 L/min Nasal cannula   11/12/22 1130 98 24 °F (36 8 °C) 90 15 -- -- 130/48 72 mmHg 94 % -- -- -- --   11/12/22 1124 -- -- -- -- -- -- -- 91 % 28 -- 2 L/min Nasal cannula   11/12/22 1123 98 24 °F (36 8 °C) 94 16 -- -- 124/46 68 mmHg 85 % Abnormal  -- -- -- None (Room air)   11/12/22 1110 -- -- -- -- -- -- -- 93 % -- -- -- None (Room air)   11/12/22 1100 98 24 °F (36 8 °C) 90 15 -- -- 126/48 74 mmHg 97 % -- -- -- --   11/12/22 1030 98 24 °F (36 8 °C) 90 16 -- -- 130/50 76 mmHg 97 % -- -- -- --   11/12/22 1000 98 24 °F (36 8 °C) 92 17 -- -- 132/50 76 mmHg 97 % -- -- -- --   11/12/22 0900 98 24 °F (36 8 °C) 92 32 Abnormal  -- -- 134/52 78 mmHg 97 % 44 -- 6 L/min Mid flow nasal cannula   11/12/22 0810 98 24 °F (36 8 °C) -- -- -- -- -- -- -- -- -- -- --   11/12/22 0800 98 24 °F (36 8 °C) 96 15 -- -- 136/58 84 mmHg 96 % -- 6 L/min -- Mid flow nasal cannula   11/12/22 0700 98 24 °F (36 8 °C) 98 14 -- -- 140/60 86 mmHg 97 % 68 12 L/min 12 L/min --   11/12/22 0600 98 4 °F (36 9 °C) 106 Abnormal  15 -- -- 124/56 76 mmHg 93 % -- -- -- --   11/12/22 0556 -- -- -- -- -- -- -- 93 % 68 -- 12 L/min Mid flow nasal cannula   11/12/22 0245 -- 112 Abnormal  23 Abnormal  98/55 72 -- -- 94 % 28 -- 2 L/min Nasal cannula   11/12/22 0230 -- 116 Abnormal  22 111/55 79 -- -- 93 % -- -- -- --   11/12/22 0215 -- 120 Abnormal  22 129/58 -- -- -- 91 % -- -- -- None (Room air)   11/12/22 0130 -- 124 Abnormal  22 103/55 71 -- -- 93 % -- -- -- None (Room air)   11/12/22 0039 98 5 °F (36 9 °C) 133 Abnormal  22 100/68 -- -- -- 95 % -- -- -- None (Room air)       Pertinent Labs/Diagnostic Test Results:   11/12 and repeat on 11/13 EKG: Supraventricular tachycardia  Nonspecific T wave abnormality  Abnormal ECG    IR mesenteric/visceral angiogram   Final Result by Atlas Sandhoff, MD (11/12 1515)   Impression: No active bleeding seen off of the celiac axis or superior mesenteric artery  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 and a covered stent was successfully placed across it  Plan: Follow-up with  surgical oncology  Workstation performed: [id number]         PE Study with CT abdomen &pelvis with contrast   Final Result by Tania Ward MD (11/12 6688)      Interval improvement of pulmonary emboli  New loculated fluid collection between the caudate and left lateral segment  Stable ill-defined linear hypodensity in the left lateral segment as described above  Mixed attenuation curvilinear structure in the retroperitoneum at the site of the surgery  Unclear if this represents evolving hematoma or potentially wall thickening and edema within a bypass loop of bowel  There are numerous foci of extraluminal air,    newly demonstrated just cephalad to this adjacent to a bypass loop and stomach in the midline as well as extending into the enlarging right anterior paranephric collection  Findings suspicious for anastomotic leak               I personally discussed this study with Dr Ashley Johnston on 11/12/2022 at 11:30 AM       There is a significant additional finding or different interpretation from the [facility] preliminary report which was provided shortly after completion of the exam  New free air within the retroperitoneum and operative site                             Workstation performed: [id number]               Results from last 7 days   Lab Units 11/13/22  0430 11/13/22  0012 11/12/22  1828 11/12/22  1159 11/12/22  0608 11/12/22  0102 11/11/22  1120 11/10/22  0456 11/09/22  0422 11/08/22  0219   WBC Thousand/uL 15 39*  --   --   --  27 46* 31 24* 9 42 12 68* 13 27* 11 66*  11 66*   HEMOGLOBIN g/dL 6 9* 6 9* 7 0* 7 3* 7 6* 7 7* 7 4* 8 0* 8 2* 7 9*  7 9*   HEMATOCRIT % 21 5* 21 7* 22 4* 22 7* 24 1* 24 7* 24 7* 26 2* 26 9* 25 1*  25 1*   PLATELETS Thousands/uL 147*  --   --   --  176 366 183 155 156 142*  142*   NEUTROS ABS Thousands/µL  --   --   --   --   --   --  7 26  --  10 56* 9 30*   BANDS PCT %  --   --   --   --   --  7  --  4  --   --          Results from last 7 days   Lab Units 11/13/22  0430 11/12/22  0849 11/12/22  0608 11/12/22  0102 11/11/22  1120 11/10/22  0456   SODIUM mmol/L 142  --  138 136 139 139   POTASSIUM mmol/L 3 3*  --  4 0 3 5 3 4* 3 4*   CHLORIDE mmol/L 109*  --  107 105 105 105   CO2 mmol/L 27 -- 22 21 26 28   ANION GAP mmol/L 6  --  9 10 8 6   BUN mg/dL 13  --  12 11 7 9   CREATININE mg/dL 0 43*  --  0 66 0 62 0 48* 0 45*   EGFR ml/min/1 73sq m 109  --  94 96 105 107   CALCIUM mg/dL 7 7*  --  7 9* 8 1* 8 5 8 6   CALCIUM, IONIZED mmol/L  --  1 02*  --   --   --   --    MAGNESIUM mg/dL 1 9  --  1 6  --   --  1 9   PHOSPHORUS mg/dL 2 9  --  3 9  --   --   --      Results from last 7 days   Lab Units 11/13/22  0430 11/12/22  0608 11/12/22  0102   AST U/L 79* 212* 245*   ALT U/L 58 87* 78   ALK PHOS U/L 594* 875* 943*   TOTAL PROTEIN g/dL 4 9* 5 1* 5 3*   ALBUMIN g/dL 1 7* 2 0* 1 7*   TOTAL BILIRUBIN mg/dL 1 05* 3 12* 1 86*   BILIRUBIN DIRECT mg/dL 0 49*  --   --      Results from last 7 days   Lab Units 11/13/22  1021 11/13/22  0758 11/13/22  0610 11/13/22  0406 11/13/22  0205 11/13/22  0008 11/12/22  2201 11/12/22  1956 11/12/22  1756 11/12/22  1559 11/12/22  1208 11/12/22  0954   POC GLUCOSE mg/dl 142* 150* 151* 141* 151* 138 129 118 106 119 179* 226*     Results from last 7 days   Lab Units 11/13/22  0430 11/12/22  0608 11/12/22  0102 11/11/22  1120 11/10/22  0456 11/09/22  0422 11/08/22  0219 11/07/22  0443   GLUCOSE RANDOM mg/dL 141* 237* 204* 201* 155* 148* 160* 135     Results from last 7 days   Lab Units 11/10/22  0941   HS TNI 0HR ng/L 7         Results from last 7 days   Lab Units 11/12/22  0608 11/12/22  0102 11/07/22  0443   PROTIME seconds 18 2* 19 5*  --    INR  1 48* 1 62*  --    PTT seconds 29 29 60*     Results from last 7 days   Lab Units 11/10/22  0456   TSH 3RD GENERATON uIU/mL 2 180     Results from last 7 days   Lab Units 11/12/22  0608   PROCALCITONIN ng/ml 5 17*     Results from last 7 days   Lab Units 11/12/22  0931 11/12/22  0608 11/12/22  0313 11/12/22  0110   LACTIC ACID mmol/L 2 1* 3 7* 4 1* 4 8*     Results from last 7 days   Lab Units 11/08/22  0219   AMYLASE IU/L 15*     Results from last 7 days   Lab Units 11/12/22  1100   CLARITY UA  Clear   COLOR UA  Yellow   SPEC GRAV UA  >=1 050*   PH UA  6 5   GLUCOSE UA mg/dl Negative   KETONES UA mg/dl Negative   BLOOD UA  Trace*   PROTEIN UA mg/dl 30 (1+)*   NITRITE UA  Negative   BILIRUBIN UA  Small*   UROBILINOGEN UA (BE) mg/dl 2 0*   LEUKOCYTES UA  Negative   WBC UA /hpf 2-4*   RBC UA /hpf 10-20*   BACTERIA UA /hpf None Seen   EPITHELIAL CELLS WET PREP /hpf None Seen     Results from last 7 days   Lab Units 11/12/22  0224 11/12/22  0218 11/07/22  0852   BLOOD CULTURE  No Growth at 24 hrs  No Growth at 24 hrs  No Growth After 5 Days  No Growth After 5 Days       ED Treatment:   Medication Administration from 11/12/2022 0034 to 11/12/2022 0549       Date/Time Order Dose Route Action     11/12/2022 0106 pantoprazole (PROTONIX) injection 40 mg 40 mg Intravenous Given     11/12/2022 0105 sodium chloride 0 9 % bolus 500 mL 500 mL Intravenous New Bag     11/12/2022 0159 iohexol (OMNIPAQUE) 350 MG/ML injection (SINGLE-DOSE) 100 mL 100 mL Intravenous Given     11/12/2022 0220 sodium chloride 0 9 % bolus 1,000 mL 1,000 mL Intravenous New Bag     11/12/2022 0311 piperacillin-tazobactam (ZOSYN) 3 375 g in sodium chloride 0 9 % 100 mL IVPB 3 375 g Intravenous New Bag     11/12/2022 0308 sodium chloride 0 9 % bolus 250 mL 250 mL Intravenous New Bag        Past Medical History:   Diagnosis Date   • Abnormal liver function test     last assessed 1/16/17    • Adenomatosis    • 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    last assessed 4/4/13   • Arthritis    • Atrial flutter ([place] [address] )    • Benign essential hypertension     last assessed 12/21/17    • Body mass index (BMI) of 50-59 9 in adult [facility])    • Cancer ([place] [address] )     pancreas   • Colon polyp    • Congenital pes planus     last assessed 7/15/14    • COVID     4/30/21   • CPAP (continuous positive airway pressure) dependence    • Dental crowns present    • Depression    • Diabetes mellitus ([place] [address] )    • Dizziness     occas--pt reports did see family doctor   • GERD (gastroesophageal reflux disease)    • Hemangioma of skin 08/26/2003    last assessed 8/26/03   • History of cancer chemotherapy     [facility] Oncologist Dr Venkata Claros   • History of gastroesophageal reflux (GERD)    • Hypercholesterolemia    • Hyperlipidemia    • Hypertension    • Irregular heart beat    • Kidney stone    • 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 ) 04/19/2006    last assessed 12/31/14    • Osteoarthritis of both knees     last assessed 12/31/14    • Paroxysmal atrial fibrillation ([facility] )    • Port-A-Cath in place    • S/P ablation of atrial flutter    • Shortness of breath     per pt "from chemo-not drastic--still working and goes up steps"   • Sleep apnea    • Wears glasses      Present on Admission:  • Hypertension  • Supraventricular tachycardia ([facility] )  • Severe obstructive sleep apnea  • Type 2 diabetes mellitus without complication, without long-term current use of insulin (HCC)  • Esophageal reflux  • Paroxysmal A-fib (HCC)  • Pulmonary embolism (HCC)  • Streptococcal bacteremia      Admitting Diagnosis: Abdominal pain [R10 9]  Acute upper GI bleed [K92 2]  Hematemesis, unspecified whether nausea present [K92 0]  Age/Sex: 57 y o  female  Admission Orders:  Scheduled Medications:  cefTRIAXone, 2,000 mg, Intravenous, Q24H  heparin (porcine), 7,500 Units, Subcutaneous, Q8H [address]  insulin lispro, 1-5 Units, Subcutaneous, Q6H KIM  metoprolol, 10 mg, Intravenous, Q6H  pantoprazole, 40 mg, Intravenous, Q12H [address]  potassium chloride, 40 mEq, Intravenous, Q4H      Continuous IV Infusions:  amiodarone, 1 mg/min, Intravenous, Continuous   Followed by  amiodarone, 0 5 mg/min, Intravenous, Continuous  lactated ringers, 75 mL/hr, Intravenous, Continuous      PRN Meds:  acetaminophen, 650 mg, Rectal, Q6H PRN  HYDROmorphone, 0 5 mg, Intravenous, Q3H PRN  HYDROmorphone, 0 2 mg, Intravenous, Q3H PRN  iodixanol, 300 mL, Intra-arterial, Once in imaging  metoprolol, 5 mg, Intravenous, Q6H PRN x3 thus far  ondansetron, 4 mg, Intravenous, Q6H PRN    scd  IO      INPATIENT CONSULT TO IR  IP CONSULT TO SURGICAL ONCOLOGY  IP CONSULT TO GASTROENTEROLOGY  IP CONSULT TO SURGICAL CRITICAL CARE    Network Utilization Review Department  ATTENTION: Please call with any questions or concerns to [phone] and carefully listen to the prompts so that you are directed to the right person  All voicemails are confidential   Francia Sanchez all requests for admission clinical reviews, approved or denied determinations and any other requests to dedicated fax number below belonging to the campus where the patient is receiving treatment   List of dedicated fax numbers for the Facilities:  [address] DENIALS (Administrative/Medical Necessity) [phone]   [address] [place] [address] (Maternity/NICU/Pediatrics) Michelel Murillo [address] Delores Rossi 77 [phone]   [address] Mario Alberto [address] [facility] Myke Marcano 28 [phone]   [address] [place] Lukas Parnell [place] [address] 815 [facility] [phone]

## 2022-11-13 NOTE — PROGRESS NOTES
Gastroenterology Progress Note      PATIENT INFORMATION      Patient: Cinthia Phoenix 57 y o  female   MRN: [id number]  PCP: Beauford Felty, MD  [facility]: [id number]  Date Of Visit: 22  Current Length of Stay: 1 day(s)     ASSESSMENTS & PLAN    Cinthia Phoenix is a 57 y o  old female with PMH of pancreatic adenocarcinoma status post Whipple's on 10/31, history of pulmonary embolism and atrial fibrillation on Eliquis, obstructive sleep apnea , type 2 diabetes , Streptococcus oralis bacteremia who presents with hematemesis     Gastroenterology has been consulted for assistance with management of hematemesis     Hematemesis  · Now resolved  · Secondary to large gastrojejunal anastomotic ulcer in the setting of recent Whipple  · Hemoglobin at baseline is around 10-11, dropped to 7 6 on presentation, currently 6 9 and got 1U, recheck pending    · She also underwent stenting of GDA stump with IR  · PPI IV BID, po on discharge  · Repeat EGD in 4-6 weeks to document ulcer healing  · Advance diet as tolerated     Pancreatic cancer  · Newly diagnosed  · S/p whipple's pancreaticoduodenectomy on 10/31  · Management per surgical oncology     Pulmonary embolism/Atrial fibrillation  · Anticoagulation on hold due to GI bleed  · Ok to re-start if repeat hemoglobin is stable       Disposition: Gi will sign off, please call with questions or concerns       SUBJECTIVE     NG tube- bilious  Patient denies abdominal pain, nausea, vomiting, heartburn, dysphagia, constipation, diarrhea, blood in stools  OBJECTIVE     Vitals:   Temp (24hrs), Av 1 °F (37 3 °C), Min:98 24 °F (36 8 °C), Max:99 68 °F (37 6 °C)    Temp:  [98 24 °F (36 8 °C)-99 68 °F (37 6 °C)] 99 32 °F (37 4 °C)  HR:  [] 78  Resp:  [16-24] 18  BP: (102-160)/(57-68) 160/68  SpO2:  [94 %-99 %] 96 %  Body mass index is 52 83 kg/m²  Input and Output Summary (last 24 hours):        Intake/Output Summary (Last 24 hours) at 11/13/2022 1210  Last data filed at 11/13/2022 1121  Gross per 24 hour   Intake 4803 47 ml   Output 2670 ml   Net 2133 47 ml       Physical Exam:   GENERAL: Non toxic appearing  HEENT:  Normocephalic, atraumatic, pupils bilaterally reactive to light  CARDIAC:  Regular rate and rhythm, S1, S2 heard, no murmurs  PULMONARY:  CTA B/L, no wheezing/rales/rhonci, non-labored breathing  ABDOMEN:  Soft, NT/ND, +BS, no rebound/guarding/rigidity  Extremities:  2+ Pulses in DP/PT  No edema, cyanosis, or clubbing  NEUROLOGIC:  Alert/oriented x3  SKIN:  No rashes or erythema          ADDITIONAL DATA     Labs & Recent Cultures:     Results from last 7 days   Lab Units 11/13/22  0430 11/12/22  0608 11/12/22  0102 11/11/22  1120   WBC Thousand/uL 15 39*   < > 31 24* 9 42   HEMOGLOBIN g/dL 6 9*   < > 7 7* 7 4*   HEMATOCRIT % 21 5*   < > 24 7* 24 7*   PLATELETS Thousands/uL 147*   < > 366 183   NEUTROS PCT %  --   --   --  78*   LYMPHS PCT %  --   --   --  11*   LYMPHO PCT %  --   --  3*  --    MONOS PCT %  --   --   --  7   MONO PCT %  --   --  1*  --    EOS PCT %  --   --  0 2    < > = values in this interval not displayed  Results from last 7 days   Lab Units 11/13/22  0430   POTASSIUM mmol/L 3 3*   CHLORIDE mmol/L 109*   CO2 mmol/L 27   BUN mg/dL 13   CREATININE mg/dL 0 43*   CALCIUM mg/dL 7 7*   ALK PHOS U/L 594*   ALT U/L 58   AST U/L 79*     Results from last 7 days   Lab Units 11/12/22  0608   INR  1 48*         Results from last 7 days   Lab Units 11/12/22  0224 11/12/22  0218 11/07/22  0852   BLOOD CULTURE  No Growth at 24 hrs  No Growth at 24 hrs  No Growth After 5 Days  No Growth After 5 Days  Nutrition:  Diet NPO; Sips of clear liquids  Radiology Results:   IR mesenteric/visceral angiogram   Final Result by Larisa dEwards MD (11/12 0819)   Impression: No active bleeding seen off of the celiac axis or superior mesenteric artery   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 and a covered stent was successfully placed across it  Plan: Follow-up with  surgical oncology  Workstation performed: [id number]         PE Study with CT abdomen &pelvis with contrast   Final Result by Meg Tenorio MD (11/12 1146)      Interval improvement of pulmonary emboli  New loculated fluid collection between the caudate and left lateral segment  Stable ill-defined linear hypodensity in the left lateral segment as described above  Mixed attenuation curvilinear structure in the retroperitoneum at the site of the surgery  Unclear if this represents evolving hematoma or potentially wall thickening and edema within a bypass loop of bowel  There are numerous foci of extraluminal air,    newly demonstrated just cephalad to this adjacent to a bypass loop and stomach in the midline as well as extending into the enlarging right anterior paranephric collection  Findings suspicious for anastomotic leak  I personally discussed this study with Dr Liang Fraser on 11/12/2022 at 11:30 AM       There is a significant additional finding or different interpretation from the [facility] preliminary report which was provided shortly after completion of the exam  New free air within the retroperitoneum and operative site                             Workstation performed: [id number]           Scheduled Medications:  cefTRIAXone, 2,000 mg, Q24H  heparin (porcine), 7,500 Units, Q8H [facility]  insulin lispro, 1-5 Units, Q6H KIM  metoprolol, 10 mg, Q6H  pantoprazole, 40 mg, Q12H KIM  potassium chloride, 40 mEq, Q4H        PRN MEDS:  acetaminophen, 650 mg, Q6H PRN  HYDROmorphone, 0 5 mg, Q3H PRN  HYDROmorphone, 0 2 mg, Q3H PRN  iodixanol, 300 mL, Once in imaging  metoprolol, 5 mg, Q6H PRN  ondansetron, 4 mg, Q6H PRN        Last 24 Hours Medication List:   Current Facility-Administered Medications   Medication Dose Route Frequency Provider Last Rate   • acetaminophen  650 mg Rectal Q6H PRN Isamar Bhakta CHRIST Uribe PA-C     • amiodarone  1 mg/min Intravenous Continuous Aishwarya Flakes, DO 1 mg/min (11/13/22 3205)    Followed by   • amiodarone  0 5 mg/min Intravenous Continuous Marshall Gloria Michelleside, DO     • cefTRIAXone  2,000 mg Intravenous Q24H Dana Soriano PA-C 2,000 mg (11/13/22 1109)   • heparin (porcine)  7,500 Units Subcutaneous [facility] Dana Soriano PA-C     • HYDROmorphone  0 5 mg Intravenous Q3H PRN Shanita Cruz MD     • HYDROmorphone  0 2 mg Intravenous Q3H PRN Shanita Cruz MD     • insulin lispro  1-5 Units Subcutaneous Q6H [address] Dana Peña PA-C     • iodixanol  300 mL Intra-arterial Once in imaging Foreign Duffy MD     • lactated ringers  75 mL/hr Intravenous Continuous Dana Peña PA-C 75 mL/hr (11/13/22 1100)   • metoprolol  10 mg Intravenous Q6H Alyssa CHRIST Uribe PA-C     • metoprolol  5 mg Intravenous Q6H PRN Lokesh Castro PA-C     • ondansetron  4 mg Intravenous Q6H PRN Shanita Cruz MD     • pantoprazole  40 mg Intravenous Q12H [address] Izabela Rae MD     • potassium chloride  40 mEq Intravenous Q4H Mark Wade PA-C            Time Spent for Care: 30 mins spent in total   More than 50% of total time spent on counseling and coordination of care as described above  Current Length of Stay: 1 day(s)      Code Status: Level 1 - Full Code          ** Please Note: This note is constructed using a voice recognition dictation system   **

## 2022-11-13 NOTE — PROGRESS NOTES
Progress Note - Surgical Oncology  Chelsea Lester 57 y o  female MRN: [id number]  [facility]: [id number]    Assessment:  Patient is a 57 y o  female recently discharged after whipple on 10/31 c/b PE on eliquis who presented with GDA bleed, now status post IR IR Angiogram with GDA stent and EGD with finding of large clean base ulcer at [facility] anastamosis w/o active bleeding on 11/12  Afebrile, tachycardic into the 140s and 150s required multiple doses of Lopressor earlier in the day broke her AFib, but overnight patient had persistent rates in the 140s  Virtually asymptomatic  Patient given amiodarone bolus and drip    Also receiving 1 unit PRBCs for hemoglobin of 6 9 from 6 9 from 7  NGT:500 cc    UOP:  2 1 L  H/h:  6 9 from 6 9 from 7 from 7 3    Plan:  Continue to trend H/H and monitor for signs of bleeding  Follow-up post transfusion H&H  NPO/NGT for now; monitor NG output  [email or web address]  Prn pain control  Continue zosyn  Continue insulin gtt per ICU  Appreciate ICU level of care  Encourage out of bed and ambulation  PT/OT    Subjective/Objective     Subjective:   Events as noted above  Objective:    Blood pressure 113/65, pulse (!) 153, temperature 99 32 °F (37 4 °C), resp  rate 20, height 5' 4" (1 626 m), weight (!) 141 kg (311 lb 1 1 oz), SpO2 98 %  ,Body mass index is 53 39 kg/m²        Intake/Output Summary (Last 24 hours) at 11/12/2022 2322  Last data filed at 11/12/2022 1952  Gross per 24 hour   Intake 7957 64 ml   Output 2270 ml   Net 5687 64 ml       Invasive Devices  Report    Central Venous Catheter Line  Duration           Port A Cath 05/31/22 Left Chest 165 days          Peripheral Intravenous Line  Duration           Peripheral IV 11/12/22 Left;Upper;Ventral (anterior) Arm <1 day    Peripheral IV 11/12/22 Proximal;Right;Ventral (anterior) Antecubital <1 day          Arterial Line  Duration           Arterial Line 11/12/22 Right Radial <1 day          Drain  Duration           Ureteral Internal Stent Left ureter 6 Fr  117 days    NG/OG/Enteral Tube Nasogastric Right nare <1 day    Urethral Catheter Latex 16 Fr  <1 day                Physical Exam  Vitals reviewed  Constitutional:       General: She is not in acute distress  Appearance: She is not ill-appearing, toxic-appearing or diaphoretic  HENT:      Head: Normocephalic and atraumatic  Nose:      Comments: NGT in place  Eyes:      Extraocular Movements: Extraocular movements intact  Cardiovascular:      Rate and Rhythm: Normal rate  Pulmonary:      Effort: Pulmonary effort is normal  No respiratory distress  Abdominal:      General: There is no distension  Palpations: Abdomen is soft  Tenderness: There is no abdominal tenderness  There is no guarding or rebound  Comments: Incisions clean, dry and intact   Skin:     General: Skin is warm and dry  Neurological:      Mental Status: She is alert and oriented to person, place, and time     Psychiatric:         Mood and Affect: Mood normal          Behavior: Behavior normal              Results from last 7 days   Lab Units 11/12/22  1828 11/12/22  1159 11/12/22  0608 11/12/22  0102 11/11/22  1120   WBC Thousand/uL  --   --  27 46* 31 24* 9 42   HEMOGLOBIN g/dL 7 0* 7 3* 7 6* 7 7* 7 4*   HEMATOCRIT % 22 4* 22 7* 24 1* 24 7* 24 7*   PLATELETS Thousands/uL  --   --  176 366 183     Results from last 7 days   Lab Units 11/12/22  0608 11/12/22  0102 11/11/22  1120   POTASSIUM mmol/L 4 0 3 5 3 4*   CHLORIDE mmol/L 107 105 105   CO2 mmol/L 22 21 26   BUN mg/dL 12 11 7   CREATININE mg/dL 0 66 0 62 0 48*   CALCIUM mg/dL 7 9* 8 1* 8 5     Results from last 7 days   Lab Units 11/12/22  0608 11/12/22  0102 11/07/22  0443   INR  1 48* 1 62*  --    PTT seconds 29 29 60*

## 2022-11-13 NOTE — UTILIZATION REVIEW
NOTIFICATION OF INPATIENT ADMISSION   AUTHORIZATION REQUEST   SERVICING FACILITY:   [facility]  Address: [address] [place] [address], [address] [place] [address]  Tax ID: [id number]  NPI: [id number] ATTENDING PROVIDER:  Attending Name and NPI#: Sueellen Habermann, Md [[id number]]  Address: [address] [place] [address]  Phone: [phone]   ADMISSION INFORMATION:  Place of Service: [place] [address]  Place of Service Code: 21  Inpatient Admission Date/Time: 11/12/22  3:13 AM  Discharge Date/Time: No discharge date for patient encounter  Admitting Diagnosis Code/Description:  Abdominal pain [R10 9]  Acute upper GI bleed [K92 2]  Hematemesis, unspecified whether nausea present [K92 0]     UTILIZATION REVIEW CONTACT:  Polina Welch Utilization   Network Utilization Review Department  Phone: [phone]  Fax: [phone]  Email: [email or web address]  Contact for approvals/pending authorizations, clinical reviews, and discharge  PHYSICIAN ADVISORY SERVICES:  Medical Necessity Denial & [id number] Review  Phone: [phone]  Fax: [phone]  Email: [email or web address]

## 2022-11-13 NOTE — PROGRESS NOTES
Daily Progress Note - Critical Care   Jerardo Pacheco 57 y o  female MRN: [id number]  Unit/Bed#: [facility] 10 Encounter: [id number]        ----------------------------------------------------------------------------------------  HPI/24hr events: 57 y o  female with past medical history of pancreatic adenocarcinoma status post Whipple procedure with portal vein/SMV repair on 10/31, postoperative pulmonary embolism on Eliquis, hypertension, hyperlipidemia, diabetes, obstructive sleep apnea on CPAP who now presents with hematemesis secondary to anastomotic ulcer  11/12 IR angiogram:  Stent across tPA stump and common hepatic artery  11/12 EGD:  Created ulcer at the anastomosis    Hemoglobin 6 9 this morning receiving 1 unit of packed red blood cells  Entered AFib with rapid ventricular response overnight refractory to IV beta-blocker was given amiodarone bolus with transition to sinus rhythm     ---------------------------------------------------------------------------------------  SUBJECTIVE    Review of Systems   Constitutional: Positive for fatigue  Cardiovascular: Negative  Gastrointestinal: Positive for abdominal distention and abdominal pain  Review of systems was reviewed and negative unless stated above in HPI/24-hour events   ---------------------------------------------------------------------------------------  Assessment and Plan:    Neuro:   • Analgesia:  P r n   Dilaudid  • Sedation:  Na  • Delirium PPX: CAM-ICU, Sleep Hygiene       CV:   • Atrial fibrillation RVR:  Requiring amiodarone  o Start standing Lopressor 10 IV q 6  o With able to start enteral access will start home beta-blocker and sotalol today  o Hold systemic anticoagulation secondary to below  • History of hypertension:  Hold PTA meds      Pulm:  • Acute hypoxemic respiratory failure:  Secondary to flat positioning and mild volume overload  o Continues with spirometry  o Wean nasal cannula as tolerated  • History of PE:  Hold systemic anticoagulation for now secondary to below  o Start DVT prophylaxis  • History of MARTIN:  Hold home CPAP      GI:   • Upper GI bleeding, hematemesis  o Secondary to gastrojejunal anastomosis ulcer now status post IR embolization  o B i d  Protonix  o Consider starting diet  • Pancreatic adenocarcinoma status post Whipple procedure, with positive margins  o Ongoing management per white surgery  • Bowel regimen:  Hold  • GI prophylaxis:  B i d   Protonix  • Last BM:  Prior to admission      :   • No active issues  • Can discontinue White catheter today      F/E/N:   • Electrolytes - Monitor/trend - replete based on deficiencies   • Maintenance IV fluids at 75      Heme/Onc:   • Acute blood loss anemia secondary to above  o Transfuse 1 unit packed red blood cells this morning trend Q 12  • DVT PPX:  Consider starting DVT prophylaxis with subcutaneous heparin today, SCDs  o Will discuss with white surgery      Endo:   • Diabetes:  Insulin infusion only receive 30 units in past 24 hours can transition to sliding scale this morning  o Goal blood glucose 41591      ID:   • Strep oralis bacteremia:  Deescalate back to ceftriaxone  o Complete 2 week course per ID through 11/18      MSK/Skin:   • PT/OT out of bed to chair today      Disposition: Transfer to Stepdown Level 1   Code Status: Level 1 - Full Code  ---------------------------------------------------------------------------------------  ICU CORE MEASURES    Prophylaxis   VTE Pharmacologic Prophylaxis: Pharmacologic VTE Prophylaxis contraindicated due to Upper GI bleeding  VTE Mechanical Prophylaxis: sequential compression device  Stress Ulcer Prophylaxis: Pantoprazole IV     Invasive Devices Review  Invasive Devices  Report    Central Venous Catheter Line  Duration           Port A Cath 05/31/22 Left Chest 165 days          Peripheral Intravenous Line  Duration           Peripheral IV 11/12/22 Left;Upper;Ventral (anterior) Arm 1 day    Peripheral IV 11/12/22 Proximal;Right;Ventral (anterior) Antecubital 1 day          Arterial Line  Duration           Arterial Line 22 Right Radial 1 day          Drain  Duration           Ureteral Internal Stent Left ureter 6 Fr  117 days    NG/OG/Enteral Tube Nasogastric Right nare 1 day    Urethral Catheter Latex 16 Fr  1 day              Can any invasive devices be discontinued today? Yes  ---------------------------------------------------------------------------------------  OBJECTIVE    Physical Exam  Constitutional:       Appearance: She is obese  HENT:      Head: Normocephalic  Nose:      Comments: NGT bilious output       Mouth/Throat:      Mouth: Mucous membranes are moist    Eyes:      Pupils: Pupils are equal, round, and reactive to light  Cardiovascular:      Rate and Rhythm: Tachycardia present  Rhythm irregular  Pulmonary:      Effort: Pulmonary effort is normal       Breath sounds: Normal breath sounds  Abdominal:      General: There is distension  Palpations: Abdomen is soft  Tenderness: There is abdominal tenderness  Comments: Midline incision c/d/i   Musculoskeletal:      Right lower leg: Edema present  Left lower leg: Edema present  Skin:     General: Skin is warm  Capillary Refill: Capillary refill takes less than 2 seconds  Neurological:      General: No focal deficit present  Mental Status: She is alert           Vitals   Vitals:    22 0500 22 0600 22 0606 22 0624   BP:    152/57   BP Location:       Pulse: (!) 148 (!) 148 94 89   Resp: 20 19  18   Temp: 98 96 °F (37 2 °C) 99 5 °F (37 5 °C)  99 5 °F (37 5 °C)   TempSrc:  Bladder     SpO2: 96% 96%     Weight:    (!) 140 kg (307 lb 12 2 oz)   Height:         Temp (24hrs), Av 7 °F (37 1 °C), Min:98 24 °F (36 8 °C), Max:99 68 °F (37 6 °C)  Current: Temperature: 99 5 °F (37 5 °C)      Invasive/non-invasive ventilation settings   Respiratory  Report   Lab Data (Last 4 hours)    None O2/Vent Data (Last 4 hours)    None                Height and Weights   Height: 5' 4" (162 6 cm)     Body mass index is 52 83 kg/m²  Weight (last 2 days)     Date/Time Weight    11/13/22 0624 140 (307 76)    11/12/22 0600 141 (311 07)            Intake and Output  I/O       11/11 0701 11/12 0700 11/12 0701 11/13 0700    I V  (mL/kg) 1500 (10 6) 3944 8 (28 2)    Blood 1200     IV Piggyback 2100 615    Total Intake(mL/kg) 4800 (34) 4559 8 (32 6)    Urine (mL/kg/hr) 325 2105 (0 6)    Emesis/NG output 550 500    Total Output 875 2605    Net +3925 +1954 8                  Nutrition       Diet Orders   (From admission, onward)             Start     Ordered    11/12/22 0314  Diet NPO  Diet effective now        References:    Nutrtion Support Algorithm Enteral vs  Parenteral   Question Answer Comment   Diet Type NPO    RD to adjust diet per protocol?  No        11/12/22 0313                  Laboratory and Diagnostics:  Results from last 7 days   Lab Units 11/13/22  0430 11/13/22  0012 11/12/22  1828 11/12/22  1159 11/12/22  0608 11/12/22  0102 11/11/22  1120 11/10/22  0456 11/09/22  0422 11/08/22  0219   WBC Thousand/uL 15 39*  --   --   --  27 46* 31 24* 9 42 12 68* 13 27* 11 66*  11 66*   HEMOGLOBIN g/dL 6 9* 6 9* 7 0* 7 3* 7 6* 7 7* 7 4* 8 0* 8 2* 7 9*  7 9*   HEMATOCRIT % 21 5* 21 7* 22 4* 22 7* 24 1* 24 7* 24 7* 26 2* 26 9* 25 1*  25 1*   PLATELETS Thousands/uL 147*  --   --   --  176 366 183 155 156 142*  142*   NEUTROS PCT %  --   --   --   --   --   --  78*  --  79* 79*   BANDS PCT %  --   --   --   --   --  7  --  4  --   --    MONOS PCT %  --   --   --   --   --   --  7  --  7 10   MONO PCT %  --   --   --   --   --  1*  --  2*  --   --      Results from last 7 days   Lab Units 11/13/22  0430 11/12/22  0608 11/12/22  0102 11/11/22  1120 11/10/22  0456 11/09/22  0422 11/08/22  0219   SODIUM mmol/L 142 138 136 139 139 135 136   POTASSIUM mmol/L 3 3* 4 0 3 5 3 4* 3 4* 3 6 3 3*   CHLORIDE mmol/L 109* 107 105 105 105 104 105   CO2 mmol/L 27 22 21 26 28 27 29   ANION GAP mmol/L 6 9 10 8 6 4 2*   BUN mg/dL 13 12 11 7 9 9 8   CREATININE mg/dL 0 43* 0 66 0 62 0 48* 0 45* 0 41* 0 44*   CALCIUM mg/dL 7 7* 7 9* 8 1* 8 5 8 6 8 6 8 3   GLUCOSE RANDOM mg/dL 141* 237* 204* 201* 155* 148* 160*   ALT U/L 58 87* 78  --   --   --   --    AST U/L 79* 212* 245*  --   --   --   --    ALK PHOS U/L 594* 875* 943*  --   --   --   --    ALBUMIN g/dL 1 7* 2 0* 1 7*  --   --   --   --    TOTAL BILIRUBIN mg/dL 1 05* 3 12* 1 86*  --   --   --   --      Results from last 7 days   Lab Units 11/13/22  0430 11/12/22  0608 11/10/22  0456   MAGNESIUM mg/dL 1 9 1 6 1 9   PHOSPHORUS mg/dL 2 9 3 9  --       Results from last 7 days   Lab Units 11/12/22  0608 11/12/22  0102 11/07/22  0443 11/06/22  0800   INR  1 48* 1 62*  --   --    PTT seconds 29 29 60* 62*          Results from last 7 days   Lab Units 11/12/22  0931 11/12/22  0608 11/12/22  0313 11/12/22  0110   LACTIC ACID mmol/L 2 1* 3 7* 4 1* 4 8*     ABG:    VBG:    Results from last 7 days   Lab Units 11/12/22  0608   PROCALCITONIN ng/ml 5 17*       Micro  Results from last 7 days   Lab Units 11/12/22  0224 11/12/22  0218 11/07/22  0852   BLOOD CULTURE  Received in Microbiology Lab  Culture in Progress  Received in Microbiology Lab  Culture in Progress  No Growth After 5 Days  No Growth After 5 Days             Active Medications  Scheduled Meds:  Current Facility-Administered Medications   Medication Dose Route Frequency Provider Last Rate   • acetaminophen  650 mg Rectal Q6H PRN Jad Rodriguez PA-C     • HYDROmorphone  0 5 mg Intravenous Q3H PRN Rayo Mclaughlin MD     • HYDROmorphone  0 2 mg Intravenous Q3H PRN Rayo Mclaughlin MD     • insulin regular (HumuLIN R,NovoLIN R) infusion  0 3-21 Units/hr Intravenous Titrated Jad Rodriguez PA-C 1 Units/hr (11/13/22 0207)   • iodixanol  300 mL Intra-arterial Once in imaging Clover England MD     • lactated ringers  100 mL/hr Intravenous Continuous Shelby Francisco PA-C 100 mL/hr (11/12/22 2110)   • magnesium sulfate  2 g Intravenous Once Shelby Francisco PA-C     • metoprolol  10 mg Intravenous Q6H Pam Uribe PA-C     • metoprolol  5 mg Intravenous Q6H PRN Shelby Francisco PA-C     • ondansetron  4 mg Intravenous Q6H PRN Will Centeno MD     • pantoprazole  40 mg Intravenous Q12H [facility] Andre Brennan MD     • piperacillin-tazobactam  3 375 g Intravenous Q6H Shelby Francisco PA-C Stopped (11/13/22 0549)   • potassium chloride  40 mEq Intravenous Q4H Alyssa R FOREST Uribe       Continuous Infusions:  insulin regular (HumuLIN R,NovoLIN R) infusion, 0 3-21 Units/hr, Last Rate: 1 Units/hr (11/13/22 0207)  lactated ringers, 100 mL/hr, Last Rate: 100 mL/hr (11/12/22 2110)      PRN Meds:   acetaminophen, 650 mg, Q6H PRN  HYDROmorphone, 0 5 mg, Q3H PRN  HYDROmorphone, 0 2 mg, Q3H PRN  iodixanol, 300 mL, Once in imaging  metoprolol, 5 mg, Q6H PRN  ondansetron, 4 mg, Q6H PRN        Allergies   Allergies   Allergen Reactions   • Other Rash     Adhesive tape        Advance Directive and Living Will:      Power of :    POLST:        Counseling / Coordination of Care  Total Critical Care time spent 0 minutes excluding procedures, teaching and family updates  Shelby Francisco PA-C        Portions of the record may have been created with voice recognition software  Occasional wrong word or "sound a like" substitutions may have occurred due to the inherent limitations of voice recognition software    Read the chart carefully and recognize, using context, where substitutions have occurred

## 2022-11-14 ENCOUNTER — HOSPITAL ENCOUNTER (OUTPATIENT)
Dept: INFUSION CENTER | Facility: HOSPITAL | Age: 62
Discharge: HOME | End: 2022-11-14
Attending: INTERNAL MEDICINE

## 2022-11-14 ENCOUNTER — TRANSITIONAL CARE MANAGEMENT (OUTPATIENT)
Dept: FAMILY MEDICINE CLINIC | Facility: CLINIC | Age: 62
End: 2022-11-14

## 2022-11-14 LAB
ABO GROUP BLD BPU: NORMAL
ANION GAP SERPL CALCULATED.3IONS-SCNC: 8 MMOL/L (ref 4–13)
BASE EXCESS BLDA CALC-SCNC: -4 MMOL/L (ref -2–3)
BPU ID: NORMAL
BUN SERPL-MCNC: 9 MG/DL (ref 5–25)
CA-I BLD-SCNC: 1.03 MMOL/L (ref 1.12–1.32)
CA-I BLD-SCNC: 1.11 MMOL/L (ref 1.12–1.32)
CALCIUM SERPL-MCNC: 8.4 MG/DL (ref 8.3–10.1)
CHLORIDE SERPL-SCNC: 107 MMOL/L (ref 96–108)
CO2 SERPL-SCNC: 24 MMOL/L (ref 21–32)
CREAT SERPL-MCNC: 0.41 MG/DL (ref 0.6–1.3)
CROSSMATCH: NORMAL
ERYTHROCYTE [DISTWIDTH] IN BLOOD BY AUTOMATED COUNT: 16.3 % (ref 11.6–15.1)
GFR SERPL CREATININE-BSD FRML MDRD: 111 ML/MIN/1.73SQ M
GLUCOSE SERPL-MCNC: 123 MG/DL (ref 65–140)
GLUCOSE SERPL-MCNC: 132 MG/DL (ref 65–140)
GLUCOSE SERPL-MCNC: 134 MG/DL (ref 65–140)
GLUCOSE SERPL-MCNC: 142 MG/DL (ref 65–140)
GLUCOSE SERPL-MCNC: 150 MG/DL (ref 65–140)
GLUCOSE SERPL-MCNC: 175 MG/DL (ref 65–140)
GLUCOSE SERPL-MCNC: 203 MG/DL (ref 65–140)
HCO3 BLDA-SCNC: 22.3 MMOL/L (ref 22–28)
HCT VFR BLD AUTO: 25.3 % (ref 34.8–46.1)
HCT VFR BLD AUTO: 25.9 % (ref 34.8–46.1)
HCT VFR BLD CALC: 20 % (ref 34.8–46.1)
HGB BLD-MCNC: 7.7 G/DL (ref 11.5–15.4)
HGB BLD-MCNC: 7.9 G/DL (ref 11.5–15.4)
HGB BLDA-MCNC: 6.8 G/DL (ref 11.5–15.4)
MAGNESIUM SERPL-MCNC: 2.3 MG/DL (ref 1.6–2.6)
MCH RBC QN AUTO: 28.7 PG (ref 26.8–34.3)
MCHC RBC AUTO-ENTMCNC: 30.4 G/DL (ref 31.4–37.4)
MCV RBC AUTO: 94 FL (ref 82–98)
PCO2 BLD: 24 MMOL/L (ref 21–32)
PCO2 BLD: 47.1 MM HG (ref 36–44)
PH BLD: 7.28 [PH] (ref 7.35–7.45)
PHOSPHATE SERPL-MCNC: 2.9 MG/DL (ref 2.3–4.1)
PLATELET # BLD AUTO: 141 THOUSANDS/UL (ref 149–390)
PMV BLD AUTO: 11.2 FL (ref 8.9–12.7)
PO2 BLD: 107 MM HG (ref 75–129)
POTASSIUM BLD-SCNC: 4 MMOL/L (ref 3.5–5.3)
POTASSIUM SERPL-SCNC: 3.9 MMOL/L (ref 3.5–5.3)
RBC # BLD AUTO: 2.68 MILLION/UL (ref 3.81–5.12)
SAO2 % BLD FROM PO2: 97 % (ref 60–85)
SODIUM BLD-SCNC: 138 MMOL/L (ref 136–145)
SODIUM SERPL-SCNC: 139 MMOL/L (ref 135–147)
SPECIMEN SOURCE: ABNORMAL
UNIT DISPENSE STATUS: NORMAL
UNIT PRODUCT CODE: NORMAL
UNIT PRODUCT VOLUME: 250 ML
UNIT PRODUCT VOLUME: 250 ML
UNIT PRODUCT VOLUME: 280 ML
UNIT PRODUCT VOLUME: 280 ML
UNIT PRODUCT VOLUME: 350 ML
UNIT RH: NORMAL
WBC # BLD AUTO: 12.87 THOUSAND/UL (ref 4.31–10.16)

## 2022-11-14 RX ORDER — METOCLOPRAMIDE HYDROCHLORIDE 5 MG/ML
10 INJECTION INTRAMUSCULAR; INTRAVENOUS ONCE AS NEEDED
Status: DISCONTINUED | OUTPATIENT
Start: 2022-11-14 | End: 2022-11-15 | Stop reason: HOSPADM

## 2022-11-14 RX ORDER — ENOXAPARIN SODIUM 100 MG/ML
60 INJECTION SUBCUTANEOUS EVERY 12 HOURS SCHEDULED
Status: DISCONTINUED | OUTPATIENT
Start: 2022-11-14 | End: 2022-11-15

## 2022-11-14 RX ORDER — ENOXAPARIN SODIUM 100 MG/ML
60 INJECTION SUBCUTANEOUS
Status: DISCONTINUED | OUTPATIENT
Start: 2022-11-14 | End: 2022-11-14

## 2022-11-14 RX ORDER — GABAPENTIN 100 MG/1
100 CAPSULE ORAL 3 TIMES DAILY
Status: DISCONTINUED | OUTPATIENT
Start: 2022-11-14 | End: 2022-11-18 | Stop reason: HOSPADM

## 2022-11-14 RX ORDER — SOTALOL HYDROCHLORIDE 120 MG/1
120 TABLET ORAL EVERY 12 HOURS
Status: DISCONTINUED | OUTPATIENT
Start: 2022-11-14 | End: 2022-11-18 | Stop reason: HOSPADM

## 2022-11-14 RX ORDER — ACETAMINOPHEN 325 MG/1
975 TABLET ORAL EVERY 8 HOURS SCHEDULED
Status: COMPLETED | OUTPATIENT
Start: 2022-11-14 | End: 2022-11-18

## 2022-11-14 RX ORDER — ONDANSETRON 2 MG/ML
4 INJECTION INTRAMUSCULAR; INTRAVENOUS ONCE AS NEEDED
Status: DISCONTINUED | OUTPATIENT
Start: 2022-11-14 | End: 2022-11-15 | Stop reason: HOSPADM

## 2022-11-14 RX ORDER — FENTANYL CITRATE/PF 50 MCG/ML
25 SYRINGE (ML) INJECTION
Status: DISCONTINUED | OUTPATIENT
Start: 2022-11-14 | End: 2022-11-17

## 2022-11-14 RX ORDER — LOSARTAN POTASSIUM 50 MG/1
50 TABLET ORAL DAILY
Refills: 3 | Status: DISCONTINUED | OUTPATIENT
Start: 2022-11-14 | End: 2022-11-18 | Stop reason: HOSPADM

## 2022-11-14 RX ORDER — ATORVASTATIN CALCIUM 40 MG/1
40 TABLET, FILM COATED ORAL
Status: DISCONTINUED | OUTPATIENT
Start: 2022-11-14 | End: 2022-11-18 | Stop reason: HOSPADM

## 2022-11-14 RX ORDER — METOPROLOL SUCCINATE 25 MG/1
25 TABLET, EXTENDED RELEASE ORAL 2 TIMES DAILY
Status: DISCONTINUED | OUTPATIENT
Start: 2022-11-14 | End: 2022-11-18 | Stop reason: HOSPADM

## 2022-11-14 RX ORDER — PAROXETINE HYDROCHLORIDE HEMIHYDRATE 37.5 MG/1
37.5 TABLET, FILM COATED, EXTENDED RELEASE ORAL EVERY MORNING
Status: DISCONTINUED | OUTPATIENT
Start: 2022-11-14 | End: 2022-11-18 | Stop reason: HOSPADM

## 2022-11-14 RX ORDER — DILTIAZEM HYDROCHLORIDE 60 MG/1
60 TABLET, FILM COATED ORAL EVERY 6 HOURS SCHEDULED
Status: DISCONTINUED | OUTPATIENT
Start: 2022-11-14 | End: 2022-11-15

## 2022-11-14 RX ORDER — METOPROLOL SUCCINATE 50 MG/1
50 TABLET, EXTENDED RELEASE ORAL 2 TIMES DAILY
Status: DISCONTINUED | OUTPATIENT
Start: 2022-11-14 | End: 2022-11-14

## 2022-11-14 RX ADMIN — METOROPROLOL TARTRATE 10 MG: 5 INJECTION, SOLUTION INTRAVENOUS at 06:13

## 2022-11-14 RX ADMIN — PANTOPRAZOLE SODIUM 40 MG: 40 INJECTION, POWDER, FOR SOLUTION INTRAVENOUS at 08:11

## 2022-11-14 RX ADMIN — SOTALOL HYDROCHLORIDE 120 MG: 120 TABLET ORAL at 10:35

## 2022-11-14 RX ADMIN — SODIUM CHLORIDE, SODIUM LACTATE, POTASSIUM CHLORIDE, AND CALCIUM CHLORIDE 75 ML/HR: .6; .31; .03; .02 INJECTION, SOLUTION INTRAVENOUS at 23:44

## 2022-11-14 RX ADMIN — ACETAMINOPHEN 975 MG: 325 TABLET ORAL at 09:25

## 2022-11-14 RX ADMIN — DILTIAZEM HYDROCHLORIDE 60 MG: 60 TABLET, FILM COATED ORAL at 13:28

## 2022-11-14 RX ADMIN — PAROXETINE 37.5 MG: 37.5 TABLET, FILM COATED, EXTENDED RELEASE ORAL at 10:34

## 2022-11-14 RX ADMIN — ATORVASTATIN CALCIUM 40 MG: 40 TABLET, FILM COATED ORAL at 21:20

## 2022-11-14 RX ADMIN — ENOXAPARIN SODIUM 60 MG: 60 INJECTION SUBCUTANEOUS at 21:20

## 2022-11-14 RX ADMIN — HEPARIN SODIUM 7500 UNITS: 5000 INJECTION INTRAVENOUS; SUBCUTANEOUS at 05:17

## 2022-11-14 RX ADMIN — INSULIN LISPRO 1 UNITS: 100 INJECTION, SOLUTION INTRAVENOUS; SUBCUTANEOUS at 12:49

## 2022-11-14 RX ADMIN — SOTALOL HYDROCHLORIDE 120 MG: 120 TABLET ORAL at 21:20

## 2022-11-14 RX ADMIN — ENOXAPARIN SODIUM 60 MG: 60 INJECTION SUBCUTANEOUS at 09:25

## 2022-11-14 RX ADMIN — GABAPENTIN 100 MG: 100 CAPSULE ORAL at 17:13

## 2022-11-14 RX ADMIN — ACETAMINOPHEN 975 MG: 325 TABLET ORAL at 17:14

## 2022-11-14 RX ADMIN — METOPROLOL SUCCINATE 50 MG: 50 TABLET, EXTENDED RELEASE ORAL at 10:35

## 2022-11-14 RX ADMIN — GABAPENTIN 100 MG: 100 CAPSULE ORAL at 21:20

## 2022-11-14 RX ADMIN — SODIUM CHLORIDE, SODIUM LACTATE, POTASSIUM CHLORIDE, AND CALCIUM CHLORIDE 75 ML/HR: .6; .31; .03; .02 INJECTION, SOLUTION INTRAVENOUS at 09:24

## 2022-11-14 RX ADMIN — PANTOPRAZOLE SODIUM 40 MG: 40 INJECTION, POWDER, FOR SOLUTION INTRAVENOUS at 21:20

## 2022-11-14 RX ADMIN — CEFTRIAXONE 2000 MG: 2 INJECTION, POWDER, FOR SOLUTION INTRAMUSCULAR; INTRAVENOUS at 10:34

## 2022-11-14 RX ADMIN — METOPROLOL SUCCINATE 25 MG: 25 TABLET, EXTENDED RELEASE ORAL at 17:14

## 2022-11-14 RX ADMIN — LOSARTAN POTASSIUM 50 MG: 50 TABLET, FILM COATED ORAL at 09:25

## 2022-11-14 RX ADMIN — DILTIAZEM HYDROCHLORIDE 60 MG: 60 TABLET, FILM COATED ORAL at 19:24

## 2022-11-14 RX ADMIN — GABAPENTIN 100 MG: 100 CAPSULE ORAL at 09:24

## 2022-11-14 NOTE — PROGRESS NOTES
Spiritual Care Progress Note    2022  Patient: Rene Vasquez : 1960  Admission Date & Time: 2022 0034  MRN: [id number] [facility]: [id number]      John Bianchi briefly visited with pt (care team preparing to move pt) to introduce self and provide information on Chaplains   did not have a long conversation with pt but provided information that chaplains are available if needed  Chaplains remain available                 Chaplaincy Interventions Utilized:   Empowerment: Provided chaplaincy education    Relationship Building: Cultivated a relationship of care and support    Ritual: Provided prayer       22 1300   Clinical Encounter Type   Visited With Patient and family together   Routine Visit Introduction

## 2022-11-14 NOTE — QUICK NOTE
Patient's hemoglobin has remained stable  Gi will sign off and arrange repeat EGD as an outpatient to document ulcer healing   Please call with questions or concerns     Urbano Rubio MD  Gastroenterology Fellow

## 2022-11-14 NOTE — PROGRESS NOTES
Progress Note -Interventional Radiology NP  Juventino Lesch 57 y o  female MRN: [id number]  Unit/Bed#: MICU 10 Encounter: [id number]    Assessment/Plan:    50-year-old female with recent Whipple on 26/36 complicated by PE (on Eliquis) and sepsis  Was home approximately 6 hours before she returned to the ER with 6 episodes of hematemesis  CTA concerning for GDA blowout, went to IR on 11/12 for mesenteric angiogram and possible hepatic artery embolization  In IR underwent successful covered stenting across the GDA stump of the common hepatic artery  No active bleeding was seen during angiogram     Patient continued to still bleed status post stenting, and was taken to GI lab for EGD  A large crater irregular ulcer was found in the [address] anastomosis with the clean base, no active bleeding seen  Hemoglobin   Date Value Ref Range Status   11/14/2022 7 7 (L) 11 5 - 15 4 g/dL Final   11/14/2022 7 9 (L) 11 5 - 15 4 g/dL Final   11/13/2022 7 6 (L) 11 5 - 15 4 g/dL Final   11/13/2022 6 9 (LL) 11 5 - 15 4 g/dL Final     Comment:     NO CLOTS   01/13/2017 13 6 11 1 - 15 9 g/dL Final   03/16/2016 13 8 11 1 - 15 9 g/dL Final   01/22/2015 9 9 (L) 11 5 - 15 4 g/dL Final   01/19/2015 9 4 (L) 11 5 - 15 4 g/dL Final        - continue to monitor hemoglobin  - rest care per primary        Subjective: Juventino Lesch is a 57 y o  female who presented with hematemesis         Patient Active Problem List   Diagnosis   • Dyspnea on exertion   • Supraventricular tachycardia (HCC)   • Hypertension   • Controlled depression   • Severe obstructive sleep apnea   • Morbid obesity (HCC)   • Type 2 diabetes mellitus without complication, without long-term current use of insulin (HCC)   • Hyperlipidemia   • 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)   • Esophageal reflux   • Benign essential hypertension   • Adenomatous colon polyp   • Class 3 severe obesity due to excess calories with body mass index (BMI) of 50 0 to 59 9 in adult [facility])   • Nephrolithiasis   • Uric acid kidney stone   • Pancreatic cancer ([address] )   • Chemotherapy induced neutropenia (HCC)   • CPAP (continuous positive airway pressure) dependence   • Left flank pain   • Paroxysmal A-fib (HCC)   • Pulmonary embolism (HCC)   • Sepsis (HCC)   • Streptococcal bacteremia   • H/O Whipple procedure   • Hematemesis          Objective:    Vitals:  /67   Pulse 60   Temp 97 6 °F (36 4 °C) (Oral)   Resp 16   Ht 5' 4" (1 626 m)   Wt (!) 140 kg (307 lb 12 2 oz)   SpO2 94%   BMI 52 83 kg/m²   Body mass index is 52 83 kg/m²  Weight (last 2 days)     Date/Time Weight    11/13/22 0624 140 (307 76)    11/12/22 0600 141 (311 07)          I/Os:    Intake/Output Summary (Last 24 hours) at 11/14/2022 1602  Last data filed at 11/14/2022 1223  Gross per 24 hour   Intake 2775 12 ml   Output 3100 ml   Net -324 88 ml       Invasive Devices  Report    Central Venous Catheter Line  Duration           Port A Cath 05/31/22 Left Chest 167 days          Peripheral Intravenous Line  Duration           Peripheral IV 11/12/22 Left;Upper;Ventral (anterior) Arm 2 days    Peripheral IV 11/12/22 Proximal;Right;Ventral (anterior) Antecubital 2 days          Drain  Duration           Ureteral Internal Stent Left ureter 6 Fr  119 days                Physical Exam:  Physical Exam  Vitals and nursing note reviewed  Constitutional:       General: She is not in acute distress  Appearance: She is well-developed  She is obese  HENT:      Head: Normocephalic and atraumatic  Eyes:      Conjunctiva/sclera: Conjunctivae normal    Cardiovascular:      Rate and Rhythm: Normal rate and regular rhythm  Heart sounds: No murmur heard  Pulmonary:      Effort: Pulmonary effort is normal  No respiratory distress  Breath sounds: Normal breath sounds  Abdominal:      Palpations: Abdomen is soft  Tenderness: There is no abdominal tenderness  Musculoskeletal:      Cervical back: Neck supple     Skin: General: Skin is warm and dry  Comments: Right groin puncture site soft, CDI   Neurological:      Mental Status: She is alert                        Lab Results and Cultures:   CBC with diff:   Lab Results   Component Value Date    WBC 12 87 (H) 11/14/2022    HGB 7 7 (L) 11/14/2022    HCT 25 3 (L) 11/14/2022    MCV 94 11/14/2022     (L) 11/14/2022    ADJUSTEDWBC 8 80 01/27/2016    MCH 28 7 11/14/2022    MCHC 30 4 (L) 11/14/2022    RDW 16 3 (H) 11/14/2022    MPV 11 2 11/14/2022    NRBC 1 11/12/2022      BMP/CMP:  Lab Results   Component Value Date     04/12/2017    K 3 9 11/14/2022    K 4 0 04/19/2022     11/14/2022     04/19/2022    CO2 24 11/14/2022    CO2 24 11/12/2022    CO2 22 04/19/2022    ANIONGAP 7 01/22/2015    BUN 9 11/14/2022    BUN 13 04/19/2022    CREATININE 0 41 (L) 11/14/2022    CREATININE 0 78 04/12/2017    GLUCOSE 175 (H) 11/12/2022    GLUCOSE 92 04/12/2017    CALCIUM 8 4 11/14/2022    CALCIUM 9 5 04/12/2017    AST 79 (H) 11/13/2022    AST 36 04/19/2022    ALT 58 11/13/2022    ALT 52 (H) 04/19/2022    ALKPHOS 594 (H) 11/13/2022    ALKPHOS 96 04/12/2017    PROT 6 3 04/12/2017    BILITOT 0 3 04/12/2017    EGFR 111 11/14/2022   ,     Coags:   Lab Results   Component Value Date    PTT 29 11/12/2022    INR 1 48 (H) 11/12/2022   ,   Results from last 7 days   Lab Units 11/12/22  0608 11/12/22  0102   PTT seconds 29 29   INR  1 48* 1 62*        Lipid Panel:   Lab Results   Component Value Date    CHOL 153 04/12/2017     Lab Results   Component Value Date    HDL 38 (L) 05/26/2022     Lab Results   Component Value Date    HDL 38 (L) 05/26/2022     Lab Results   Component Value Date    LDLCALC 83 05/26/2022     Lab Results   Component Value Date    TRIG 162 (H) 05/26/2022       HgbA1c:   Lab Results   Component Value Date    HGBA1C 5 6 10/17/2022    HGBA1C 6 5 (H) 02/21/2022    HGBA1C 6 3 10/25/2021       Blood Culture:   Lab Results   Component Value Date    BLOODCX No Growth at 48 hrs  11/12/2022   ,   Urinalysis:   Lab Results   Component Value Date    COLORU Yellow 11/12/2022    CLARITYU Clear 11/12/2022    SPECGRAV >=1 050 (H) 11/12/2022    PHUR 6 5 11/12/2022    PHUR 7 5 07/18/2022    LEUKOCYTESUR Negative 11/12/2022    NITRITE Negative 11/12/2022    GLUCOSEU Negative 11/12/2022    KETONESU Negative 11/12/2022    BILIRUBINUR Small (A) 11/12/2022    BLOODU Trace (A) 11/12/2022   ,   Urine Culture:   Lab Results   Component Value Date    URINECX No Growth <1000 cfu/mL 08/12/2022   ,   Wound Culure:  No results found for: WOUNDCULT    VTE Pharmacologic Prophylaxis: Sequential compression device (Venodyne)       Thank you for allowing me to participate in the care of Hood Deras  Please don't hesitate to call, text, email, or TigerText with any questions  This text is generated with voice recognition software  There may be translation, syntax,  or grammatical errors  If you have any questions, please contact the dictating provider

## 2022-11-14 NOTE — PHYSICAL THERAPY NOTE
Physical Therapy evaluation note     Patient Name: Braulio Mcdowell    LVWWT'G Date: 11/14/2022     Problem List  Principal Problem:    Hematemesis  Active Problems:    Supraventricular tachycardia ([facility] )    Hypertension    Severe obstructive sleep apnea    Type 2 diabetes mellitus without complication, without long-term current use of insulin (HCC)    Esophageal reflux    Pancreatic cancer (HCC)    Paroxysmal A-fib ([facility] )    Pulmonary embolism ([facility] )    Streptococcal bacteremia    H/O Whipple procedure       Past Medical History  Past Medical History:   Diagnosis Date    Abnormal liver function test     last assessed 1/16/17     Adenomatosis     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    last assessed 4/4/13    Arthritis     Atrial flutter ([facility] )     Benign essential hypertension     last assessed 12/21/17     Body mass index (BMI) of 50-59 9 in adult ([facility] )     Cancer ([facility] )     pancreas    Colon polyp     Congenital pes planus     last assessed 7/15/14     COVID     4/30/21    CPAP (continuous positive airway pressure) dependence     Dental crowns present     Depression     Diabetes mellitus ([facility] )     Dizziness     occas--pt reports did see family doctor    GERD (gastroesophageal reflux disease)     Hemangioma of skin 08/26/2003    last assessed 8/26/03    History of cancer chemotherapy      Oncologist Dr James Null    History of gastroesophageal reflux (GERD)     Hypercholesterolemia     Hyperlipidemia     Hypertension     Irregular heart beat     Kidney stone     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[facility] ) 04/19/2006    last assessed 12/31/14     Osteoarthritis of both knees     last assessed 12/31/14     Paroxysmal atrial fibrillation (HCC)     Port-A-Cath in place     S/P ablation of atrial flutter     Shortness of breath     per pt "from chemo-not drastic--still working and goes up steps"    Sleep apnea     Wears glasses         Past Surgical History  Past Surgical History:   Procedure Laterality Date    ABDOMINAL ADHESION SURGERY N/A 10/31/2022    Procedure: LYSIS ADHESIONS, colectomy, vascular pedicle flap;  Surgeon: Jasmeet Calloway MD;  [facility];  Service: Surgical Oncology    ABDOMINAL SURGERY  06/2006    20cm GIST removed     ABLATION SOFT TISSUE N/A 10/31/2022    Procedure: SOFT TISSUE ABLATION;  Surgeon: Jasmeet Calloway MD;  [facility];  Service: Surgical Oncology    APPENDECTOMY      ATRIAL ABLATION SURGERY      CARPAL TUNNEL RELEASE Bilateral     COLONOSCOPY      FL GUIDED CENTRAL VENOUS ACCESS DEVICE INSERTION  05/31/2022    FL RETROGRADE PYELOGRAM  07/18/2022    FL RETROGRADE PYELOGRAM  8/22/2022    HYSTERECTOMY      fibroids    IR MESENTERIC/VISCERAL ANGIOGRAM  11/12/2022    IR PORT CHECK  06/23/2022    IR PORT REMOVAL AND PLACEMENT NEW SITE  06/28/2022    JOINT REPLACEMENT Bilateral     knees    KIDNEY STONE SURGERY Right 09/17/2021    placed stent in  for kidney stone    KNEE SURGERY      KNEE SURGERY Left 03/2019    LAPAROTOMY N/A 10/31/2022    Procedure: EXPLORATORY LAPAROTOMY;  Surgeon: Jasmeet Calloway MD;  [facility];  Service: Surgical Oncology    OOPHORECTOMY      cyst    MA CYSTO/URETERO W/LITHOTRIPSY &INDWELL STENT INSRT Left 8/22/2022    Procedure: CYSTOSCOPY URETEROSCOPY WITH LITHOTRIPSY HOLMIUM LASER, RETROGRADE PYELOGRAM AND INSERTION STENT URETERAL;  Surgeon: Pao Rainey MD;  [facility];  Service: Urology    MA CYSTOSCOPY,INSERT URETERAL STENT Left 8/22/2022    Procedure: EXCHANGE STENT URETERAL;  Surgeon: Pao Rainey MD;  [facility];  Service: Urology    MA CYSTOURETHROSCOPY,URETER CATHETER Left 07/18/2022    Procedure: CYSTOSCOPY RETROGRADE PYELOGRAM WITH INSERTION STENT URETERAL;  Surgeon: Pao Rainey MD;  [facility];  Service: Urology    TONSILLECTOMY      TUBAL LIGATION      TUMOR EXCISION  2006    gastrointestinal stromal tumor    TUNNELED VENOUS PORT PLACEMENT N/A 05/31/2022    Procedure: INSERTION VENOUS PORT (PORT-A-CATH); Surgeon: Dempsey Fabry, MD;  [facility];  Service: Surgical Oncology    UPPER GASTROINTESTINAL ENDOSCOPY      WHIPPLE PROCEDURE/PANCREATICO-DUODENECTOMY N/A 10/31/2022    Procedure: WHIPPLE PROCEDURE/PANCREATICO-DUODENECTOMY, IOUS;  Surgeon: Dempsey Fabry, MD;  [facility];  Service: Surgical Oncology         11/14/22 1157   PT Last Visit   PT Visit Date 11/14/22   Note Type   Note type Evaluation   Pain Assessment   Pain Assessment Tool FLACC   Pain Rating: FLACC (Rest) - Face 0   Pain Rating: FLACC (Rest) - Legs 0   Pain Rating: FLACC (Rest) - Activity 0   Pain Rating: FLACC (Rest) - Cry 0   Pain Rating: FLACC (Rest) - Consolability 0   Score: FLACC (Rest) 0   Pain Rating: FLACC (Activity) - Face 0   Pain Rating: FLACC (Activity) - Legs 0   Pain Rating: FLACC (Activity) - Activity 1   Pain Rating: FLACC (Activity) - Cry 0   Pain Rating: FLACC (Activity) - Consolability 0   Score: FLACC (Activity) 1   Restrictions/Precautions   Weight Bearing Precautions Per Order No   Other Precautions Fall Risk;O2;Multiple lines;Telemetry;Pain   Home Living   Type of Home House   Home Layout Two level   Additional Comments Pt lives in a [facility] with 2 ABIOLA  Pt lives with her SO  Pt works full time and her  is retired  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 SPC  Pt was I or ADL's and IADL's prior  Prior Function   Level of [place] Independent with ADLs; Independent with functional mobility; Independent with IADLS   Lives With Spouse   Receives Help From Family   IADLs Independent with meal prep; Independent with medication management   Vocational Full time employment   General   Family/Caregiver Present Yes   Cognition   Overall Cognitive Status WFL   Arousal/Participation Alert   RLE Assessment   RLE Assessment X   Strength RLE   R Hip Flexion 2/5   R Knee Extension 3-/5   R Ankle Dorsiflexion 3/5   LLE Assessment   LLE Assessment X   Strength LLE   L Hip Flexion 3-/5   L Knee Extension 3-/5   L Ankle Dorsiflexion 3/5   Coordination   Sensation WFL   Light Touch   RLE Light Touch Grossly intact   LLE Light Touch Grossly intact   Bed Mobility   Supine to Sit 3  Moderate assistance   Additional items Assist x 2   Additional Comments sitting EOB min A level   Transfers   Sit to Stand 3  Moderate assistance   Additional items Assist x 1   Stand to Sit 3  Moderate assistance   Additional items Assist x 1   Ambulation/Elevation   Gait pattern Excessively slow; Step to; Foward flexed; Shuffling; Antalgic   Gait Assistance 3  Moderate assist   Additional items Assist x 1   Assistive Device   (HHA)   Distance 3ft to chair   Balance   Static Sitting Fair -   Dynamic Sitting Poor +   Static Standing Poor   Dynamic Standing Poor   Ambulatory Poor -   Endurance Deficit   Endurance Deficit Yes   Activity Tolerance   Activity Tolerance Patient limited by fatigue;Patient limited by pain   Medical Staff Made Aware OT   Nurse Made Aware nurse approved therapy session   Assessment   Prognosis Good   Problem List Decreased strength;Decreased endurance; Impaired balance;Decreased mobility;Pain   Assessment Pt is a 56 yo female [facility] on [facility]22 s/p abdominal pain  Pt had IR on 11/12 for mesenteric/visceral angiogram  DX: A fib, hx of PE, upper GI bleed/hematemesis, DM  Pt recently admitted and had whipple procedure on 10/31  Two patient identifiers were used to confirm  Pt lives in a [facility] with  2 ABIOLA  Pt lives with her  who is home  Pt works full time  Pt was I for ADL's and IADL's prior  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 SPC  Pt's impairments include reduced mobility, pain, gait abnormalities, reduced BLE Stregnth, high risk of falling, poor sitting balance and tolerance  These impairments limit the ability of the patient to perform mobility without increased assistance, return to PLOF and participate in everyday life activities   Pt would benefit from continued skilled therapy while in the hospital to improve overall mobility and work towards a safe d/c  Recommend discharge to rehab  At the end of the session the patient was left in seated position with call bell and phone within reach  The patient's AM-PAC Basic Mobility Inpatient Short Form Raw Score is 10  A Raw score of less than or equal to 16 suggests the patient may benefit from discharge to post-acute rehabilitation services  Please also refer to the recommendation of the Physical Therapist for safe discharge planning  Barriers to Discharge Inaccessible home environment;Decreased caregiver support   Goals   STG Expiration Date 11/28/22   Short Term Goal #1 STG 1: Pt will perform transfers at a MI level to return to baseline of function  STG 2: Pt will ambulate 300ft with RW at a MI level to reduce the level of assistance needed upon d/c home  STG 3: Pt will perform bed mobility at a I to safety return to PLOF  PT Treatment Day 0   Plan   Treatment/Interventions Functional transfer training;LE strengthening/ROM; Therapeutic exercise; Endurance training;Gait training;Equipment eval/education; Bed mobility;OT   PT Frequency 2-3x/wk   Recommendation   PT Discharge Recommendation Post acute rehabilitation services   AM-PAC Basic Mobility Inpatient   Turning in Bed Without Bedrails 2   Lying on Back to Sitting on Edge of Flat Bed 1   Moving Bed to Chair 2   Standing Up From Chair 2   Walk in Room 2   Climb 3-5 Stairs 1   Basic Mobility Inpatient Raw Score 10   Turning Head Towards Sound 4   Follow Simple Instructions 4   Low Function Basic Mobility Raw Score 18   Low Function Basic Mobility Standardized Score 29 25   Highest Level Of Mobility   JH-HLM Goal 4: Move to chair/commode   JH-HLM Achieved 4: Move to chair/commode   Marlon Hutton, PT, DPT

## 2022-11-14 NOTE — CASE MANAGEMENT
Case Management Assessment & Discharge Planning Note    Patient name Zeny Henry MICU 10/MICU 10 MRN [id number]  : 1960 Date 2022       Current Admission Date: 2022  Current Admission Diagnosis:Hematemesis   Patient Active Problem List    Diagnosis Date Noted   • H/O Whipple procedure 2022   • Hematemesis 2022   • Streptococcal bacteremia 11/10/2022   • Pulmonary embolism ([facility] ) 2022   • Sepsis ([place] [address] ) 2022   • Paroxysmal A-fib ([place] [address] ) 2022   • Left flank pain 2022   • CPAP (continuous positive airway pressure) dependence    • Chemotherapy induced neutropenia ([place] [address] ) 2022   • Pancreatic cancer ([place] [address] ) 2022   • Uric acid kidney stone 2022   • Nephrolithiasis 10/25/2021   • Class 3 severe obesity due to excess calories with body mass index (BMI) of 50 0 to 59 9 in adult ([place] [address] ) 2020   • Type 2 diabetes mellitus without complication, without long-term current use of insulin ([place] [address] ) 2017   • Severe obstructive sleep apnea 2016   • Dyspnea on exertion 2016   • Supraventricular tachycardia ([place] [address] ) 2016   • Hypertension 2016   • Controlled depression 2016   • Adenomatous colon polyp 2015   • 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[place] [address] ) 2013   • Morbid obesity ([place] [address] ) 2013   • Hyperlipidemia 2013   • Benign essential hypertension 10/02/2012   • Esophageal reflux 2012      LOS (days): 2  Geometric Mean LOS (GMLOS) (days):   Days to GMLOS:     OBJECTIVE:  PATIENT READMITTED TO HOSPITAL  Risk of Unplanned Readmission Score: 23 52         Current admission status: Inpatient       Preferred Pharmacy:   [address] [place] [address] 95, [phone]  Phone: [phone] Fax: [phone]    Primary Care Provider: Isatu Narayanan MD    Primary Insurance: BLUE CROSS  Secondary Insurance: ASSESSMENT:  Active Health Care Proxies    There are no active Health Care Proxies on file  Readmission Root Cause  30 Day Readmission: Yes  Who directed you to return to the hospital?: Family  Did you understand whom to contact if you had questions or problems?: Yes  Did you get your prescriptions before you left the hospital?: Yes  Were you able to get your prescriptions filled when you left the hospital?: Yes  Did you take your medications as prescribed?: Yes  During previous admission, was a post-acute recommendation made?: Yes  What post-acute resources were offered?: [facility] ([facility])  Patient was readmitted due to: GDA bleed, now status post IR IR Angiogram with GDA stent and EGD with finding of large clean base ulcer at [address] anastamosis w/o active bleeding on 11/12  Action Plan: admit to MICU    Patient Information  Admitted from[de-identified] Home  Mental Status: Alert  During Assessment patient was accompanied by: Spouse  Assessment information provided by[de-identified] Patient, Spouse  Primary Caregiver: Self  Support Systems: Spouse/significant other, Children  What city do you live in[place] entry access options   Select all that apply : Stairs  Number of steps to enter home : 2  Type of Current Residence: 2 [place] home  Upon entering residence, is there a bedroom on the main floor (no further steps)?: No  A bedroom is located on the following floor levels of residence (select all that apply):: 2nd Floor  Upon entering residence, is there a bathroom on the main floor (no further steps)?: Yes  Number of steps to 2nd floor from main floor: One Flight  In the last 12 months, was there a time when you were not able to pay the mortgage or rent on time?: No  In the last 12 months, was there a time when you did not have a steady place to sleep or slept in a shelter (including now)?: No  Homeless/housing insecurity resource given?: N/A  Living Arrangements: Lives w/ Spouse/significant other  Is patient a ?: No    Activities of Daily Living Prior to Admission  Functional Status: Independent  Completes ADLs independently?: Yes  Ambulates independently?: Yes  Does patient use assisted devices?: No  Does patient currently own DME?: Yes  What DME does the patient currently own?: [employer] [place]  Does patient have a history of Outpatient Therapy (PT/OT)?: No  Does the patient have a history of Short-Term Rehab?: No  Does patient have a history of HHC?: Yes (Referral to [facility] at last admission)  Does patient currently have [address]?: Yes [facility]    [facility]  Type of Current Home Care Services: Nurse visit  [address][de-identified] [address] Provider[de-identified] PCP    Patient Information Continued  Income Source: Employed  Does patient have prescription coverage?: Yes  Within the past 12 months, you worried that your food would run out before you got the money to buy more : Never true  Within the past 12 months, the food you bought just didn't last and you didn't have money to get more : Never true  Food insecurity resource given?: N/A  Does patient receive dialysis treatments?: No  Does patient have a history of substance abuse?: No  Does patient have a history of Mental Health Diagnosis?: No         Means of Transportation  In the past 12 months, has lack of transportation kept you from medical appointments or from getting medications?: No  In the past 12 months, has lack of transportation kept you from meetings, work, or from getting things needed for daily living?: No        DISCHARGE DETAILS:    Discharge planning discussed with[de-identified] patient,  at bedside  Freedom of Choice: Yes  Comments - Freedom of Choice: return referral to [facility] -- would consider STR if necessary  CM contacted family/caregiver?: Yes  Were Treatment Team discharge recommendations reviewed with patient/caregiver?: Yes  Did patient/caregiver verbalize understanding of patient care needs?: Yes  Were patient/caregiver advised of the risks associated with not following Treatment Team discharge recommendations?: Yes    Contacts  Patient Contacts: Shade Chanel,   Relationship to Patient[de-identified] Family  Contact Method: Phone  Phone Number: [phone]  Reason/Outcome: Continuity of Care, Emergency Contact, Discharge [address]         Is the patient interested in Consuelo Goldberg at discharge?: Yes  [address] requested[de-identified] [address] Name[de-identified] [facility] Provider[de-identified] PCP  [address] Needed[de-identified] Post-Op Care and Assessment  Homebound Criteria Met[de-identified] Requires the Assistance of Another Person for Safe Ambulation or to Leave the Home  Supporting Clincal Findings[de-identified] Limited Endurance                   Treatment Team Recommendation: Other  Discharge Destination Plan[de-identified] Other (likely [place] [id number])  Transport at Discharge : Family                   Patient/caregiver received discharge checklist   Content reviewed  Patient/caregiver encouraged to participate in discharge plan of care prior to discharge home  CM reviewed d/c planning process including the following: identifying help at home, patient preference for d/c planning needs, Discharge Lounge, [facility] Meds to Bed program, availability of treatment team to discuss questions or concerns patient and/or family may have regarding understanding medications and recognizing signs and symptoms once discharged  CM also encouraged patient to follow up with all recommended appointments after discharge  Patient advised of importance for patient and family to participate in managing patient’s medical well being  Additional Comments: Patient independent at baseline  Re-admitted within hours after last discharge, return referral to [facility]  CM will continue to follow for d/c needs

## 2022-11-14 NOTE — PLAN OF CARE
Problem: OCCUPATIONAL THERAPY ADULT  Goal: Performs self-care activities at highest level of function for planned discharge setting  See evaluation for individualized goals  Description: Treatment Interventions: ADL retraining, Functional transfer training, Endurance training, Patient/family training, Equipment evaluation/education, Compensatory technique education, Continued evaluation, Energy conservation, Activityengagement          See flowsheet documentation for full assessment, interventions and recommendations  Note: Limitation: Decreased ADL status, Decreased endurance, Decreased self-care trans, Decreased high-level ADLs  Prognosis: Fair  Assessment: Pt is a 56 y/o female seen for OT eval s/p adm to [facility] w/ bloody vomitus and increased abdominal pain after being discharged 11/12  Pt had recently undergone whipple procedure w/ portal vein/SMV repair on 10/31  Pt is now s/p hepatic artery stent on 11/12  Pt  has a past medical history of Abnormal liver function test, Adenomatosis, 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), Arthritis, Atrial flutter ([facility] ), Benign essential hypertension, Body mass index (BMI) of 50-59 9 in adult [facility]), Cancer ([facility] ), Colon polyp, Congenital pes planus, COVID, CPAP (continuous positive airway pressure) dependence, Dental crowns present, Depression, Diabetes mellitus ([facility] ), Dizziness, GERD (gastroesophageal reflux disease), Hemangioma of skin (08/26/2003), History of cancer chemotherapy, History of gastroesophageal reflux (GERD), Hypercholesterolemia, Hyperlipidemia, Hypertension, Irregular heart beat, Kidney stone, 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[facility] ) (04/19/2006), Osteoarthritis of both knees, Paroxysmal atrial fibrillation ([facility] ), Port-A-Cath in place, S/P ablation of atrial flutter, Shortness of breath, Sleep apnea, and Wears glasses  Pt with active OT orders and up with assistance  orders   Pt lives with his spouse in 2 , 2 [facility] front, 9 [place] side  Pt was I w/  ADLS and IADLS, drove, & required no use of DME PTA  Pt is currently demonstrating the following occupational deficits: Min A UB ADLS, Mod A LB ADLS, Mod A x2 bed mobility, Mod A transfers and functional mobility w/ HHA  These deficits that are impacting pt's baseline areas of occupation are a result of the following impairments: endurance, activity tolerance, functional mobility, forward functional reach, balance, trunk control, functional standing tolerance and decreased I w/ ADLS/IADLS  The following Occupational Performance Areas to address include: bathing/shower, toilet hygiene, dressing, functional mobility, community mobility, clothing management, household maintenance and job performance/volunteering  Recommend inpatient rehab  upon D/C   Pt to continue to benefit from acute immediate OT services to address the following goals 2-3x/wk to  within the next 10-14 days:     OT Discharge Recommendation: Post acute rehabilitation services        Peng Gee MS, OTR/L

## 2022-11-14 NOTE — PROGRESS NOTES
Progress Note - Surgical Oncology  Alan Keita 57 y o  female MRN: [id number]  Unit/Bed#: MICU 10 Encounter: [id number]    Assessment:  Patient is a 57 y o  female recently discharged after whipple on 10/31 c/b PE on eliquis who presented with GDA bleed, now status post IR IR Angiogram with GDA stent and EGD with finding of large clean base ulcer at [address] [facility] anastamosis w/o active bleeding on 11/12  Afebrile, tachy to the 140s on 2 L nasal cannula    Received 1 unit PRBCs for hemoglobin of 6 9 from 6 9 from 7 yesterday  NGT:  290 from 500 cc    UOP:  1 7 L  H/h:  7 7 from 7 9 from 7 6    Plan:  Continue to trend H/H and monitor for signs of bleeding  NPO/NGT for now; monitor NG output-consider discontinuing today  Continue IV fluids  Prn pain control  Continue zosyn  Consider transitioning to DVT prophylaxis  Appreciate ICU level of care  Encourage out of bed and ambulation  PT/OT    Subjective/Objective     Subjective:   Remains tachy this morning into 130s after lopressor x1 on amio  Asymptomatic  Doing okay otherwise  Somewhat SOB  No flatus or BM    Objective:    Blood pressure 160/68, pulse (!) 142, temperature 98 6 °F (37 °C), temperature source Oral, resp  rate 18, height 5' 4" (1 626 m), weight (!) 140 kg (307 lb 12 2 oz), SpO2 94 %  ,Body mass index is 52 83 kg/m²        Intake/Output Summary (Last 24 hours) at 11/14/2022 0193  Last data filed at 11/14/2022 0401  Gross per 24 hour   Intake 3464 52 ml   Output 1990 ml   Net 1474 52 ml       Invasive Devices  Report    Central Venous Catheter Line  Duration           Port A Cath 05/31/22 Left Chest 166 days          Peripheral Intravenous Line  Duration           Peripheral IV 11/12/22 Left;Upper;Ventral (anterior) Arm 2 days    Peripheral IV 11/12/22 Proximal;Right;Ventral (anterior) Antecubital 2 days          Arterial Line  Duration           Arterial Line 11/12/22 Right Radial 2 days          Drain  Duration           Ureteral Internal Stent Left ureter 6 Fr  118 days    NG/OG/Enteral Tube Nasogastric Right nare 2 days    External Urinary Catheter <1 day                Physical Exam  Vitals reviewed  Constitutional:       General: She is not in acute distress  Appearance: She is not ill-appearing, toxic-appearing or diaphoretic  HENT:      Head: Normocephalic and atraumatic  Nose:      Comments: NGT in place  Eyes:      Extraocular Movements: Extraocular movements intact  Cardiovascular:      Rate and Rhythm: Normal rate  Pulmonary:      Effort: Pulmonary effort is normal  No respiratory distress  Abdominal:      General: There is no distension  Palpations: Abdomen is soft  Tenderness: There is no abdominal tenderness  There is no guarding or rebound  Comments: Incisions clean, dry and intact   Skin:     General: Skin is warm and dry  Neurological:      Mental Status: She is alert and oriented to person, place, and time  Psychiatric:         Mood and Affect: Mood normal          Behavior: Behavior normal              Results from last 7 days   Lab Units 11/14/22  0518 11/14/22  0035 11/13/22  1752 11/13/22  0430 11/12/22  1159 11/12/22  0608   WBC Thousand/uL 12 87*  --   --  15 39*  --  27 46*   HEMOGLOBIN g/dL 7 7* 7 9* 7 6* 6 9*   < > 7 6*   HEMATOCRIT % 25 3* 25 9* 23 9* 21 5*   < > 24 1*   PLATELETS Thousands/uL 141*  --   --  147*  --  176    < > = values in this interval not displayed       Results from last 7 days   Lab Units 11/13/22  0430 11/12/22  0608 11/12/22  0102   POTASSIUM mmol/L 3 3* 4 0 3 5   CHLORIDE mmol/L 109* 107 105   CO2 mmol/L 27 22 21   BUN mg/dL 13 12 11   CREATININE mg/dL 0 43* 0 66 0 62   CALCIUM mg/dL 7 7* 7 9* 8 1*     Results from last 7 days   Lab Units 11/12/22  0608 11/12/22  0102   INR  1 48* 1 62*   PTT seconds 29 29

## 2022-11-14 NOTE — CONSULTS
Consultation - General Cardiology Team 2  Rose Hoffman 57 y o  female MRN: [id number]  Unit/Bed#: MICU 10 Encounter: [id number]      Inpatient consult to Cardiology  Consult performed by: Aj Bernal MD  Consult ordered by: Ct Yoo PA-C        PCP: Sonali Chilel MD     History of Present Illness   Physician Requesting Consult: Diana De Guzman MD  Reason for Consult / Principal Problem: SVT    HPI: Rose Hoffman is a 57y o  year old female who presented with history of HTN, HLD, MARTIN on CPAP, DM2, Pancreatic Adenocarcinoma s/p Adjuvant Chemotherapy s/p Whipple (10/31/22), Paroxsymal Atrial Fibrillation/Flutter (2016), Paroxsymal SVT with prior Ablation for A  Tach (2016) who presented on 11/12/22 with complaints of bloody emesis and increased abdominal pain after being discharged that same day  Patient had undergone a Whipple Procedure with Portal Vein/SMV repair on 10/31/22  Patient had developed post-operative Pulmonary Embolus and was discharged on Eliquis as well as post-op Strep Bacteremia  On admission this time, underwent CTA A/P was performed which was concerning for gastrointestinal bleeding  Was given [facility] to reverse Eliquis and was taken to Interventional Radiology where there was no visualized extravasation of contrast, however empirically had a stent placed across the GDA stump and common hepatic artery  Cardiology called because of SVT  11/10/22 @ 8:24 AM: Long RP SVT  ? Atypical AVNRT vs  Atrial Tachycardia      11/10/22 @ 8:52 AM: NSR s/p Vagal Maneuver      11/12/22 @ 1:12 AM: Sinus Tachycardia      11/12/22 @ 11:49 AM: Long RP SVT, ? Atypical AVNRT vs  Atrial Tachycardia      11/13/22 @ 4:19 AM: Long RP SVT, ? Atypical AVNRT vs  Atrial Tachycardia      Upon further history taking patient states that she is active, able to perform ADLs such as cooking, cleaning, laundry, shops for groceries without limitations   States that she is able to also walk 2 flights of stairs in her home without difficulty  Note: She states that she has feel palpitations and flutters sensation periodically since having her ablation procedure  States she cannot recall when this exactly began but endorses that she experiences symptoms 2-3x a month and as per instruction by her Cardiology (Dr Maximus Melchor) she uses Vagal Maneuvers (bearing down) and this generally resolves her symptoms  Review of Systems  Review of system was conducted and was negative except for as stated in the HPI        Historical Information   Past Medical History:   Diagnosis Date   • Abnormal liver function test     last assessed 1/16/17    • Adenomatosis    • 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    last assessed 4/4/13   • Arthritis    • Atrial flutter ([facility] )    • Benign essential hypertension     last assessed 12/21/17    • Body mass index (BMI) of 50-59 9 in [facility])    • Cancer (HCC)     pancreas   • Colon polyp    • Congenital pes planus     last assessed 7/15/14    • COVID     4/30/21   • CPAP (continuous positive airway pressure) dependence    • Dental crowns present    • Depression    • Diabetes mellitus ([facility] )    • Dizziness     occas--pt reports did see family doctor   • GERD (gastroesophageal reflux disease)    • Hemangioma of skin 08/26/2003    last assessed 8/26/03   • History of cancer chemotherapy     [facility] Oncologist Dr María Jimenez   • History of gastroesophageal reflux (GERD)    • Hypercholesterolemia    • Hyperlipidemia    • Hypertension    • Irregular heart beat    • Kidney stone    • 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[facility] ) 04/19/2006    last assessed 12/31/14    • Osteoarthritis of both knees     last assessed 12/31/14    • Paroxysmal atrial fibrillation ([facility] )    • Port-A-Cath in place    • S/P ablation of atrial flutter    • Shortness of breath     per pt "from chemo-not drastic--still working and goes up steps"   • Sleep apnea    • Wears glasses      Past Surgical History: Procedure Laterality Date   • ABDOMINAL ADHESION SURGERY N/A 10/31/2022    Procedure: LYSIS ADHESIONS, colectomy, vascular pedicle flap;  Surgeon: Lindsay Staton MD;  [facility];  Service: Surgical Oncology   • ABDOMINAL SURGERY  06/2006    20cm GIST removed    • ABLATION SOFT TISSUE N/A 10/31/2022    Procedure: SOFT TISSUE ABLATION;  Surgeon: Lindsay Staton MD;  [facility];  Service: Surgical Oncology   • APPENDECTOMY     • ATRIAL ABLATION SURGERY     • CARPAL TUNNEL RELEASE Bilateral    • COLONOSCOPY     • FL GUIDED CENTRAL VENOUS ACCESS DEVICE INSERTION  05/31/2022   • FL RETROGRADE PYELOGRAM  07/18/2022   • FL RETROGRADE PYELOGRAM  8/22/2022   • HYSTERECTOMY      fibroids   • IR MESENTERIC/VISCERAL ANGIOGRAM  11/12/2022   • IR PORT CHECK  06/23/2022   • IR PORT REMOVAL AND PLACEMENT NEW SITE  06/28/2022   • JOINT REPLACEMENT Bilateral     knees   • KIDNEY STONE SURGERY Right 09/17/2021    placed stent in  for kidney stone   • KNEE SURGERY     • KNEE SURGERY Left 03/2019   • LAPAROTOMY N/A 10/31/2022    Procedure: EXPLORATORY LAPAROTOMY;  Surgeon: Lindsay Staton MD;  [facility];  Service: Surgical Oncology   • OOPHORECTOMY      cyst   • WI CYSTO/URETERO W/LITHOTRIPSY &INDWELL STENT INSRT Left 8/22/2022    Procedure: CYSTOSCOPY URETEROSCOPY WITH LITHOTRIPSY HOLMIUM LASER, RETROGRADE PYELOGRAM AND INSERTION STENT URETERAL;  Surgeon: Shilo Lopez MD;  [facility];  Service: Urology   • WI CYSTOSCOPY,INSERT URETERAL STENT Left 8/22/2022    Procedure: EXCHANGE STENT URETERAL;  Surgeon: Shilo Lopez MD;  [facility];  Service: Urology   • WI CYSTOURETHROSCOPY,URETER CATHETER Left 07/18/2022    Procedure: CYSTOSCOPY RETROGRADE PYELOGRAM WITH INSERTION STENT URETERAL;  Surgeon: Shilo Lopez MD;  [facility];  Service: Urology   • TONSILLECTOMY     • TUBAL LIGATION     • TUMOR EXCISION  2006    gastrointestinal stromal tumor   • TUNNELED VENOUS PORT PLACEMENT N/A 05/31/2022    Procedure: INSERTION VENOUS PORT (PORT-A-CATH); Surgeon: Carly Ross MD;  [facility];  Service: Surgical Oncology   • UPPER GASTROINTESTINAL ENDOSCOPY     • WHIPPLE PROCEDURE/PANCREATICO-DUODENECTOMY N/A 10/31/2022    Procedure:  WHIPPLE PROCEDURE/PANCREATICO-DUODENECTOMY, IOUS;  Surgeon: Carly Ross MD;  [facility];  Service: Surgical Oncology     Social History     Substance and Sexual Activity   Alcohol Use Not Currently    Comment: social drinker per allscript      Social History     Substance and Sexual Activity   Drug Use Never     Social History     Tobacco Use   Smoking Status Former Smoker   • Years: 9 00   • Types: Cigarettes   Smokeless Tobacco Never Used     Family History:   Family History   Problem Relation Age of Onset   • Emphysema Mother    • Arthritis Mother    • Diabetes Mother    • Hypertension Mother    • COPD Mother    • Arthritis Father    • Prostate cancer Father    • Cerebral aneurysm Son    • No Known Problems Maternal Grandmother    • No Known Problems Maternal Grandfather    • No Known Problems Paternal Grandmother    • No Known Problems Paternal Grandfather    • No Known Problems Son    • No Known Problems Maternal Aunt    • No Known Problems Maternal Aunt    • No Known Problems Paternal Aunt    • No Known Problems Paternal Aunt        Meds/Allergies   Hospital Medications:   Current Facility-Administered Medications   Medication Dose Route Frequency   • acetaminophen (TYLENOL) rectal suppository 650 mg  650 mg Rectal Q6H PRN   • acetaminophen (TYLENOL) tablet 975 mg  975 mg Oral Q8H [address]   • atorvastatin (LIPITOR) tablet 40 mg  40 mg Oral HS   • cefTRIAXone (ROCEPHIN) 2,000 mg in dextrose 5 % 50 mL IVPB  2,000 mg Intravenous Q24H   • enoxaparin (LOVENOX) subcutaneous injection 60 mg  60 mg Subcutaneous Q12H [address]   • gabapentin (NEURONTIN) capsule 100 mg  100 mg Oral TID   • insulin lispro (HumaLOG) 100 units/mL subcutaneous injection 1-5 Units  1-5 Units Subcutaneous Q6H [address]   • iodixanol (VISIPAQUE) 320 MG/ML injection 300 mL  300 mL Intra-arterial Once in imaging   • lactated ringers infusion  75 mL/hr Intravenous Continuous   • losartan (COZAAR) tablet 50 mg  50 mg Oral Daily   • metoprolol (LOPRESSOR) injection 5 mg  5 mg Intravenous Q6H PRN   • metoprolol succinate (TOPROL-XL) 24 hr tablet 50 mg  50 mg Oral BID   • ondansetron (ZOFRAN) injection 4 mg  4 mg Intravenous Q6H PRN   • pantoprazole (PROTONIX) injection 40 mg  40 mg Intravenous Q12H [address]   • PARoxetine (PAXIL-CR) 24 hr tablet 37 5 mg  37 5 mg Oral QAM   • sotalol (BETAPACE) tablet 120 mg  120 mg Oral Q12H     Home Medications:   Medications Prior to Admission   Medication   • acetaminophen (TYLENOL) 325 mg tablet   • apixaban (Eliquis) 5 mg   • atorvastatin (LIPITOR) 40 mg tablet   • gabapentin (NEURONTIN) 100 mg capsule   • glucose blood (Contour Next Test) test strip   • ibuprofen (ADVIL,MOTRIN) 100 MG tablet   • Insulin Pen Needle (Pen Needles) 32G X 4 MM MISC   • Magnesium Oxide -Mg Supplement 400 MG CAPS   • metoprolol succinate (TOPROL-XL) 50 mg 24 hr tablet   • multivitamin (THERAGRAN) TABS   • olmesartan (BENICAR) 20 mg tablet   • Omega-3 Fatty Acids (FISH OIL) 1,000 mg   • ondansetron (ZOFRAN) 4 mg tablet   • oxyCODONE (ROXICODONE) 5 immediate release tablet   • Ozempic, 1 MG/DOSE, 4 MG/3ML SOPN injection pen   • pantoprazole (PROTONIX) 40 mg tablet   • PARoxetine (PAXIL-CR) 37 5 mg 24 hr tablet   • Potassium Citrate ER 15 MEQ (1620 MG) TBCR   • senna (SENOKOT) 8 6 mg   • sotalol (BETAPACE) 120 mg tablet   • VITAMIN D PO       Allergies   Allergen Reactions   • Other Rash     Adhesive tape        Objective   Vitals: Blood pressure 138/79, pulse 78, temperature 98 9 °F (37 2 °C), temperature source Oral, resp  rate 18, height 5' 4" (1 626 m), weight (!) 140 kg (307 lb 12 2 oz), SpO2 97 %    Orthostatic Blood Pressures      Flowsheet Row Most Recent Value   Blood Pressure 138/79 filed at 11/14/2022 0760   Patient Position - Orthostatic VS Lying filed at 11/14/2022 0942              Invasive Devices  Report      Central Venous Catheter Line  Duration             Port A Cath 05/31/22 Left Chest 166 days              Peripheral Intravenous Line  Duration             Peripheral IV 11/12/22 Left;Upper;Ventral (anterior) Arm 2 days    Peripheral IV 11/12/22 Proximal;Right;Ventral (anterior) Antecubital 2 days              Drain  Duration             Ureteral Internal Stent Left ureter 6 Fr  118 days    External Urinary Catheter <1 day                    Physical Exam  GEN: Katy Childersr appears well, alert and oriented x 3, pleasant and cooperative  Obese  HEENT:  Normocephalic, atraumatic, anicteric, moist mucous membranes  NECK: No JVD or carotid bruits   HEART: reg rhythm, tachy rate, normal S1 and S2, no murmurs, clicks, gallops or rubs   LUNGS: Clear to auscultation bilaterally; no wheezes, rales, or rhonchi; respiration nonlabored   ABDOMEN:  Normoactive bowel sounds, soft, no tenderness, no distention  EXTREMITIES: peripheral pulses palpable; no edema  NEURO: no gross focal findings; cranial nerves grossly intact   SKIN:  Dry, intact, warm to touch    Lab Results: I have personally reviewed pertinent lab results  Results from last 7 days   Lab Units 11/10/22  0941   HS TNI 0HR ng/L 7         Results from last 7 days   Lab Units 11/14/22  0518 11/13/22  0430 11/12/22  0608   POTASSIUM mmol/L 3 9 3 3* 4 0   CO2 mmol/L 24 27 22   CHLORIDE mmol/L 107 109* 107   BUN mg/dL 9 13 12   CREATININE mg/dL 0 41* 0 43* 0 66     Results from last 7 days   Lab Units 11/14/22  0518 11/14/22  0035 11/13/22  1752 11/13/22  0430 11/12/22  1159 11/12/22  0608   HEMOGLOBIN g/dL 7 7* 7 9* 7 6* 6 9*   < > 7 6*   HEMATOCRIT % 25 3* 25 9* 23 9* 21 5*   < > 24 1*   PLATELETS Thousands/uL 141*  --   --  147*  --  176    < > = values in this interval not displayed               Imaging: I have personally reviewed pertinent reports  Telemetry:   11/14/22 @ 11:03AM: SVT  Long RP  Previous STRESS TEST:  SUMMARY:  -  Stress results: Duration of exercise was 3 min and 31 sec  Target heart rate was achieved  There was resting hypertension with an appropriate blood pressure response to stress  There was no chest pain during stress  -  ECG conclusions: The stress ECG was negative for ischemia  IMPRESSIONS: Normal study after maximal exercise without reproduction of symptoms  Prepared and signed by    Raghu Beverly MD  Signed 03/13/2020 17:08:12    ECHO:    SUMMARY    LEFT VENTRICLE:  Systolic function was at the lower limits of normal  Ejection fraction was estimated in the range of 50 % to 55 % to be 50 %  There were no regional wall motion abnormalities  Wall thickness was mildly increased  There was mild concentric hypertrophy  Doppler parameters were consistent with abnormal left ventricular relaxation (grade 1 diastolic dysfunction)  LEFT ATRIUM:  The atrium was mildly dilated  RIGHT ATRIUM:  The atrium was mildly dilated  MITRAL VALVE:  There was mild annular calcification  There was trace regurgitation  TRICUSPID VALVE:  There was trace regurgitation  The tricuspid jet envelope definition was inadequate for estimation of RV systolic pressure  Raghu Beverly MD  Signed 13-Mar-2020 10:54:33    Results for orders placed during the hospital encounter of 10/31/22    Echo complete w/ contrast if indicated    Interpretation Summary  •  Left Ventricle: Left ventricular cavity size is normal  Wall thickness is normal  The left ventricular ejection fraction is 50%  Systolic function is low normal  Wall motion is normal  Diastolic function is mildly abnormal, consistent with grade I (abnormal) relaxation  •  Tricuspid Valve: There is mild regurgitation  VTE Prophylaxis: Heparin     Assessment/Plan     Assessment:    1   Supraventricular Tachycardia, occasionally resolves with Vagal Maneuvers  --> Possibly Atypical AVNRT given Long RP vs  Atrial Tachycardia originating close to septum/AV node  Presumably being driven by anemia given presenting Hgb in the 7 0 range  --> TTE (9/7/22): EF 50% with GLS of -15 6%  G1DD  Minimal valve disease given Mild TR   2  HTN // HLD   3  MARTIN on CPAP   4  DM2 (A1c 5 6 but was 6 5% in 02/2022)  5  Pancreatic Adenocarcinoma s/p Adjuvant Chemotherapy s/p Whipple (10/31/22)   6  Paroxsymal SVT with prior Ablation for A  Tach (2016)  --> Underwent EP study and noted to have right-sided Atrial Tachycardia  Although patient had successful ablation, she went into Atrial Fibrillation requiring intra-op cardioversion as it did not spontaneously convert  7  Paroxsymal Atrial Fibrillation/Flutter (2016)   --> [id number]: Atleast 3 (Sex, DM2, HTN)  --> Previously only on Aspirin but as of 10/2022 she has been on Eliquis for PE  8  Provoked Pulmonary Embolus 2/2 Post-Operative Whipple Procedure (10/31/22)    Plan:  - c/w Lipitor 40mg PO QHS  - c/w Losartan 50mg PO Daily  - c/w Sotalol 120mg PO Q12H  - Start Cardizem PO 60mg PO Q6H and titrate up as tolerated (Patient initially in SVT during my initial evaluation at 11:00 AM however had converted back to NSR on her own by 12:45 PM)  --> If patient goes back into SVT, can do Adenosine IV 6mg, 12mg, 12mg trial  If this fails to convert patient back to NSR then can start a Cardizem ggt  If this fails to work then can add Amiodarone ggt to regimen   --> As we initiate Cardizem PO, would reduce Metoprolol dose from 50mg to 25mg PO BID with eventual goal of taking patient off as we up titrate Cardizem  - Resume AC once able  --> Although patient was only on ASA previously given low risk score, she is with a score of 3 now (see above)   May benefit from longer [facility] even after completing course of Elqiuis for provoked PE   - Will ask EP to evaluate patient again as she endorses having palpitations symptoms at home that frequently resolve with Vagal Maneuvers  Can be done in outpatient setting as this is unlikely to change in patient management   - Replete electrolytes PRN with Mg/Phos/K goals of 2/3/4 respectively  - Outpatient Zio Patch    Case discussed and reviewed with Dr Concepcion Tatum who agrees with my assessment and plan  Thank you for involving us in the care of your patient  Aj Bernal MD  Cardiology Fellow PGY-6    ==========================================================================================    Counseling / Coordination of Care  Total floor / unit time spent today 1 hour minutes  Greater than 50% of total time was spent with the patient and / or family counseling and / or coordination of care  A description of the counseling / coordination of care:         Epic/ Allscripts/Care Everywhere records reviewed:     ** Please Note: Fluency DirectDictation voice to text software may have been used in the creation of this document   **

## 2022-11-14 NOTE — PROGRESS NOTES
Daily Progress Note - Critical Care   Elvis Choudhary 57 y o  female MRN: [id number]  Unit/Bed#: MICU 10 Encounter: [id number]        ----------------------------------------------------------------------------------------  HPI/24hr events: Per Jovan Alan PA-C: 57 y  o  female with past medical history of pancreatic adenocarcinoma status post Whipple procedure with portal vein/SMV repair on 10/31, postoperative pulmonary embolism on Eliquis, hypertension, hyperlipidemia, diabetes, obstructive sleep apnea on CPAP who now presents with hematemesis secondary to anastomotic ulcer  11/12 IR angiogram:  Stent across tPA stump and common hepatic artery  11/12 EGD:  Created ulcer at the anastomosis    No acute overnight events, hgb stable after transfusion     ---------------------------------------------------------------------------------------  SUBJECTIVE    Review of Systems   Constitutional: Positive for fatigue  Gastrointestinal: Positive for abdominal pain  All other systems reviewed and are negative      Review of systems was reviewed and negative unless stated above in HPI/24-hour events   ---------------------------------------------------------------------------------------  Assessment and Plan:    Neuro:   • Diagnosis: Pain Control  o Plan: Tylenol rectal prn, Dilaudid 0 2/0 5 IV prn  • Diagnosis: Delirium ppx  o Plan: CAM-ICU  o Maintain sleep wake cycle    CV:   • Diagnosis: Afib with RVR  o Plan: Continue amiodarone  o Lopressor 10mg IV Q6H  o Consider starting home meds: beta-blocker/sotalol  • Diagnosis: HTN  o Plan: Hold home medications    Pulm:  • Diagnosis: Acute hypoxemic respiratory failure  o Likely 2/2 flat positioning and mild volume overload  o Plan: Wean nasal canula as able  o Encourage deep inspiration, coughing, IS  • Diagnosis: Hx of PE  o Plan: Start DVT ppx  o Continue SQH  • Diagnosis: Hx of MARTIN  o Plan: Hold home CPAP    GI:   • Diagnosis: Upper GI Bleed/Hematemesis  o S/p IR embolization of gastrojejunal anastomosis ulcer  o Plan: Protonix BID  o Consider advancing diet, currently NPO with sips of clears  · Diagnosis: Pancreatic adenocarcinoma status post Whipple procedure, with positive margins  o Plan: follow with white surgery  • Diagnosis: GI ppx  o Plan: protonix BID  o Hold bowel regimen    :   • Diagnosis: No active issues  o Plan: monitor I&Os    F/E/N:   • F: LR @ 75cc/hr  • E: replace to goal  • N: NPO, sips of clear liquids    Heme/Onc:   • Diagnosis: Acute Blood Loss Anemia  o Plan: trend Hgb  o Transfuse for hypotension, signs of bleeding, or hgb <7  • Diagnosis: DVT ppx  o Plan: SQH and SCDs    Endo:   • Diagnosis: Type 2 DM on Insulin  o Plan: Continue ISS  o Goal blood glucose 140-180  o Q6H glucose checks    ID:   • Diagnosis: Strep oralis bacteremia  o Plan: Continue ceftriaxone until 11/18 per ID    MSK/Skin:   • Diagnosis: No acute issues  o Plan: PT/OT as able  o OOB/ambulate as tolerated  o Frequent turning/repositioning    Disposition: Continue Stepdown Level 1 level of care   Code Status: Level 1 - Full Code  ---------------------------------------------------------------------------------------  ICU CORE MEASURES    Prophylaxis   VTE Pharmacologic Prophylaxis: Heparin  VTE Mechanical Prophylaxis: sequential compression device  Stress Ulcer Prophylaxis: Pantoprazole PO and Pantoprazole IV     Invasive Devices Review  Invasive Devices  Report    Central Venous Catheter Line  Duration           Port A Cath 05/31/22 Left Chest 166 days          Peripheral Intravenous Line  Duration           Peripheral IV 11/12/22 Proximal;Right;Ventral (anterior) Antecubital 2 days    Peripheral IV 11/12/22 Left;Upper;Ventral (anterior) Arm 1 day          Arterial Line  Duration           Arterial Line 11/12/22 Right Radial 2 days          Drain  Duration           Ureteral Internal Stent Left ureter 6 Fr  118 days    NG/OG/Enteral Tube Nasogastric Right nare 2 days    External Urinary Catheter <1 day              Can any invasive devices be discontinued today? Yes  ---------------------------------------------------------------------------------------  OBJECTIVE    Vitals   Vitals:    22 0000 22 0200   BP:       BP Location:       Pulse: 80 84 80 76   Resp: 18 (!) 24 20 20   Temp: 98 4 °F (36 9 °C)  98 6 °F (37 °C)    TempSrc: Oral  Oral    SpO2: 96% 96% 97% 96%   Weight:       Height:         Temp (24hrs), Av 2 °F (37 3 °C), Min:98 2 °F (36 8 °C), Max:99 68 °F (37 6 °C)  Current: Temperature: 98 6 °F (37 °C)    Respiratory:  SpO2: SpO2: 96 %  Nasal Cannula O2 Flow Rate (L/min): 2 L/min    Invasive/non-invasive ventilation settings   Respiratory  Report   Lab Data (Last 4 hours)    None         O2/Vent Data (Last 4 hours)    None                Physical Exam  Vitals and nursing note reviewed  Constitutional:       General: She is not in acute distress  Appearance: She is well-developed  HENT:      Head: Normocephalic and atraumatic  Eyes:      Extraocular Movements: Extraocular movements intact  Conjunctiva/sclera: Conjunctivae normal    Cardiovascular:      Rate and Rhythm: Normal rate and regular rhythm  Pulses: Normal pulses  Heart sounds: Normal heart sounds  No murmur heard  Pulmonary:      Effort: Pulmonary effort is normal  No respiratory distress  Breath sounds: Normal breath sounds  Abdominal:      Palpations: Abdomen is soft  Tenderness: There is no abdominal tenderness  Musculoskeletal:         General: Normal range of motion  Cervical back: Normal range of motion and neck supple  No rigidity  Comments: SCDs in place   Skin:     General: Skin is warm and dry  Capillary Refill: Capillary refill takes 2 to 3 seconds  Neurological:      General: No focal deficit present  Mental Status: She is alert and oriented to person, place, and time               Laboratory and Diagnostics:  Results from last 7 days   Lab Units 11/14/22  0035 11/13/22  1752 11/13/22  0430 11/13/22  0012 11/12/22  1828 11/12/22  1159 11/12/22  0608 11/12/22  0102 11/11/22  1120 11/10/22  0456 11/09/22  0422 11/08/22  0219   WBC Thousand/uL  --   --  15 39*  --   --   --  27 46* 31 24* 9 42 12 68* 13 27* 11 66*  11 66*   HEMOGLOBIN g/dL 7 9* 7 6* 6 9* 6 9* 7 0* 7 3* 7 6* 7 7* 7 4* 8 0* 8 2* 7 9*  7 9*   HEMATOCRIT % 25 9* 23 9* 21 5* 21 7* 22 4* 22 7* 24 1* 24 7* 24 7* 26 2* 26 9* 25 1*  25 1*   PLATELETS Thousands/uL  --   --  147*  --   --   --  176 366 183 155 156 142*  142*   NEUTROS PCT %  --   --   --   --   --   --   --   --  78*  --  79* 79*   BANDS PCT %  --   --   --   --   --   --   --  7  --  4  --   --    MONOS PCT %  --   --   --   --   --   --   --   --  7  --  7 10   MONO PCT %  --   --   --   --   --   --   --  1*  --  2*  --   --      Results from last 7 days   Lab Units 11/13/22  0430 11/12/22  0608 11/12/22  0102 11/11/22  1120 11/10/22  0456 11/09/22  0422 11/08/22  0219   SODIUM mmol/L 142 138 136 139 139 135 136   POTASSIUM mmol/L 3 3* 4 0 3 5 3 4* 3 4* 3 6 3 3*   CHLORIDE mmol/L 109* 107 105 105 105 104 105   CO2 mmol/L 27 22 21 26 28 27 29   ANION GAP mmol/L 6 9 10 8 6 4 2*   BUN mg/dL 13 12 11 7 9 9 8   CREATININE mg/dL 0 43* 0 66 0 62 0 48* 0 45* 0 41* 0 44*   CALCIUM mg/dL 7 7* 7 9* 8 1* 8 5 8 6 8 6 8 3   GLUCOSE RANDOM mg/dL 141* 237* 204* 201* 155* 148* 160*   ALT U/L 58 87* 78  --   --   --   --    AST U/L 79* 212* 245*  --   --   --   --    ALK PHOS U/L 594* 875* 943*  --   --   --   --    ALBUMIN g/dL 1 7* 2 0* 1 7*  --   --   --   --    TOTAL BILIRUBIN mg/dL 1 05* 3 12* 1 86*  --   --   --   --      Results from last 7 days   Lab Units 11/13/22  0430 11/12/22  0608 11/10/22  0456   MAGNESIUM mg/dL 1 9 1 6 1 9   PHOSPHORUS mg/dL 2 9 3 9  --       Results from last 7 days   Lab Units 11/12/22  0608 11/12/22  0102 11/07/22  0443   INR  1 48* 1 62*  --    PTT seconds 29 29 60*          Results from last 7 days   Lab Units 11/12/22  0931 11/12/22  0608 11/12/22  0313 11/12/22  0110   LACTIC ACID mmol/L 2 1* 3 7* 4 1* 4 8*     ABG:    VBG:    Results from last 7 days   Lab Units 11/12/22  0608   PROCALCITONIN ng/ml 5 17*       Micro  Results from last 7 days   Lab Units 11/12/22  0224 11/12/22  0218 11/07/22  0852   BLOOD CULTURE  No Growth at 24 hrs  No Growth at 24 hrs  No Growth After 5 Days  No Growth After 5 Days  Imaging: I have personally reviewed pertinent reports  Intake and Output  I/O       11/12 0701 11/13 0700 11/13 0701 11/14 0700    P  O   120    I V  (mL/kg) 3944 8 (28 2) 1969 8 (14 1)    Blood  355    IV Piggyback 615 630    Total Intake(mL/kg) 4559 8 (32 6) 3074 8 (22)    Urine (mL/kg/hr) 2105 (0 6) 1150 (0 3)    Emesis/NG output 500 290    Total Output 2605 1440    Net +1954 8 +1634 8                Height and Weights   Height: 5' 4" (162 6 cm)     Body mass index is 52 83 kg/m²  Weight (last 2 days)     Date/Time Weight    11/13/22 0624 140 (307 76)    11/12/22 0600 141 (311 07)            Nutrition       Diet Orders   (From admission, onward)             Start     Ordered    11/13/22 1038  Diet NPO; Sips of clear liquids  Diet effective now        References:    Nutrtion Support Algorithm Enteral vs  Parenteral   Question Answer Comment   Diet Type NPO    NPO Except: Sips of clear liquids    RD to adjust diet per protocol?  No        11/13/22 1042                Active Medications  Scheduled Meds:  Current Facility-Administered Medications   Medication Dose Route Frequency Provider Last Rate   • acetaminophen  650 mg Rectal Q6H PRN Ciara Garrett PA-C     • amiodarone  0 5 mg/min Intravenous Continuous Sarah Gone, DO 0 5 mg/min (11/13/22 1501)   • cefTRIAXone  2,000 mg Intravenous Q24H Zuleima Soriano PA-C Stopped (11/13/22 1139)   • heparin (porcine)  7,500 Units Subcutaneous [facility] Zuleima Soriano PA-C     • HYDROmorphone  0 5 mg Intravenous Q3H PRN Emmit Frankel, MD     • HYDROmorphone  0 2 mg Intravenous Q3H PRN Emmit Frankel, MD     • insulin lispro  1-5 Units Subcutaneous Q6H [address]     • iodixanol  300 mL Intra-arterial Once in imaging Andreina Castaneda MD     • lactated ringers  75 mL/hr Intravenous Continuous Narcisa Peña PA-C 75 mL/hr (11/13/22 1945)   • metoprolol  10 mg Intravenous Q6H Alyssa Uribe PA-C     • metoprolol  5 mg Intravenous Q6H PRN Alphonzo Bumpers, PA-C     • ondansetron  4 mg Intravenous Q6H PRN Emmit Frankel, MD     • pantoprazole  40 mg Intravenous Q12H [address] Debbi Husain MD       Continuous Infusions:  amiodarone, 0 5 mg/min, Last Rate: 0 5 mg/min (11/13/22 1501)  lactated ringers, 75 mL/hr, Last Rate: 75 mL/hr (11/13/22 1945)      PRN Meds:   acetaminophen, 650 mg, Q6H PRN  HYDROmorphone, 0 5 mg, Q3H PRN  HYDROmorphone, 0 2 mg, Q3H PRN  iodixanol, 300 mL, Once in imaging  metoprolol, 5 mg, Q6H PRN  ondansetron, 4 mg, Q6H PRN        Allergies   Allergies   Allergen Reactions   • Other Rash     Adhesive tape      ---------------------------------------------------------------------------------------  Advance Directive and Living Will:      Power of :    POLST:    ---------------------------------------------------------------------------------------  Care Time Delivered:   No Critical Care time spent     [facility], DO      Portions of the record may have been created with voice recognition software  Occasional wrong word or "sound a like" substitutions may have occurred due to the inherent limitations of voice recognition software    Read the chart carefully and recognize, using context, where substitutions have occurred

## 2022-11-14 NOTE — OCCUPATIONAL THERAPY NOTE
Occupational Therapy Evaluation     Patient Name: Lanie Scott  [id number] Date: 11/14/2022  Problem List  Principal Problem:    Hematemesis  Active Problems:    Supraventricular tachycardia ([facility] )    Hypertension    Severe obstructive sleep apnea    Type 2 diabetes mellitus without complication, without long-term current use of insulin (HCC)    Esophageal reflux    Pancreatic cancer (HCC)    Paroxysmal A-fib ([facility] )    Pulmonary embolism ([facility] )    Streptococcal bacteremia    H/O Whipple procedure    Past Medical History  Past Medical History:   Diagnosis Date    Abnormal liver function test     last assessed 1/16/17     Adenomatosis     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    last assessed 4/4/13    Arthritis     Atrial flutter ([facility] )     Benign essential hypertension     last assessed 12/21/17     Body mass index (BMI) of 50-59 9 in adult ([facility] )     Cancer ([facility] )     pancreas    Colon polyp     Congenital pes planus     last assessed 7/15/14     COVID     4/30/21    CPAP (continuous positive airway pressure) dependence     Dental crowns present     Depression     Diabetes mellitus ([facility] )     Dizziness     occas--pt reports did see family doctor    GERD (gastroesophageal reflux disease)     Hemangioma of skin 08/26/2003    last assessed 8/26/03    History of cancer chemotherapy      Oncologist Dr Thomas Camacho    History of gastroesophageal reflux (GERD)     Hypercholesterolemia     Hyperlipidemia     Hypertension     Irregular heart beat     Kidney stone     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[facility] ) 04/19/2006    last assessed 12/31/14     Osteoarthritis of both knees     last assessed 12/31/14     Paroxysmal atrial fibrillation (HCC)     Port-A-Cath in place     S/P ablation of atrial flutter     Shortness of breath     per pt "from chemo-not drastic--still working and goes up steps"    Sleep apnea     Wears glasses      Past Surgical History  Past Surgical History:   Procedure Laterality Date    ABDOMINAL ADHESION SURGERY N/A 10/31/2022    Procedure: LYSIS ADHESIONS, colectomy, vascular pedicle flap;  Surgeon: Antonino Burk MD;  [facility];  Service: Surgical Oncology    ABDOMINAL SURGERY  06/2006    20cm GIST removed     ABLATION SOFT TISSUE N/A 10/31/2022    Procedure: SOFT TISSUE ABLATION;  Surgeon: Antonino Burk MD;  [facility];  Service: Surgical Oncology    APPENDECTOMY      ATRIAL ABLATION SURGERY      CARPAL TUNNEL RELEASE Bilateral     COLONOSCOPY      FL GUIDED CENTRAL VENOUS ACCESS DEVICE INSERTION  05/31/2022    FL RETROGRADE PYELOGRAM  07/18/2022    FL RETROGRADE PYELOGRAM  8/22/2022    HYSTERECTOMY      fibroids    IR MESENTERIC/VISCERAL ANGIOGRAM  11/12/2022    IR PORT CHECK  06/23/2022    IR PORT REMOVAL AND PLACEMENT NEW SITE  06/28/2022    JOINT REPLACEMENT Bilateral     knees    KIDNEY STONE SURGERY Right 09/17/2021    placed stent in  for kidney stone    KNEE SURGERY      KNEE SURGERY Left 03/2019    LAPAROTOMY N/A 10/31/2022    Procedure: EXPLORATORY LAPAROTOMY;  Surgeon: Antonino Burk MD;  [facility];  Service: Surgical Oncology    OOPHORECTOMY      cyst    ME CYSTO/URETERO W/LITHOTRIPSY &INDWELL STENT INSRT Left 8/22/2022    Procedure: CYSTOSCOPY URETEROSCOPY WITH LITHOTRIPSY HOLMIUM LASER, RETROGRADE PYELOGRAM AND INSERTION STENT URETERAL;  Surgeon: Rubens Wright MD;  [facility];  Service: Urology    ME CYSTOSCOPY,INSERT URETERAL STENT Left 8/22/2022    Procedure: EXCHANGE STENT URETERAL;  Surgeon: Rubens Wright MD;  [facility];  Service: Urology    ME CYSTOURETHROSCOPY,URETER CATHETER Left 07/18/2022    Procedure: CYSTOSCOPY RETROGRADE PYELOGRAM WITH INSERTION STENT URETERAL;  Surgeon: Rubens Wright MD;  [facility];  Service: Urology    TONSILLECTOMY      TUBAL LIGATION      TUMOR EXCISION  2006    gastrointestinal stromal tumor    TUNNELED VENOUS PORT PLACEMENT N/A 05/31/2022    Procedure: INSERTION VENOUS PORT (PORT-A-CATH); Surgeon: Cassi Miramontes MD;  [facility];  Service: Surgical Oncology    UPPER GASTROINTESTINAL ENDOSCOPY      WHIPPLE PROCEDURE/PANCREATICO-DUODENECTOMY N/A 10/31/2022    Procedure: WHIPPLE PROCEDURE/PANCREATICO-DUODENECTOMY, IOUS;  Surgeon: Cassi Miramontes MD;  [facility];  Service: Surgical Oncology             11/14/22 1156   OT Last Visit   OT Visit Date 11/14/22   Note Type   Note type Evaluation   Pain Assessment   Pain Assessment Tool 0-10   Pain Score No Pain   Restrictions/Precautions   Weight Bearing Precautions Per Order No   Other Precautions Multiple lines;Telemetry; Fall Risk;Pain   Home Living   Type of [address] Two level  (2 ABIOLA front, 9 ABIOLA side)   Bathroom Shower/Tub Tub/shower unit   [employer] Raised   Home Equipment Walker;Cane  (unused PTA)   Prior Function   Level of [place] Independent with ADLs; Independent with functional mobility; Independent with IADLS   Lives With Spouse   Receives Help From Family   IADLs Independent with driving; Independent with meal prep; Independent with medication management   Falls in the last 6 months 0   Vocational Full time employment   Lifestyle   Autonomy I w/ ADLS/IADLS, transfers and functional mobility PTA   Reciprocal Relationships Pt lives w/ her spouse who is retired   Service to [employer] Works full time   [address] to relax   General   Additional Pertinent History recently discharge on 11/12; was home approx 6 hr before readmission   Family/Caregiver Present Yes   ADL   Eating Assistance 5  Supervision/Setup   Grooming Assistance 5  Supervision/Setup   [address] 4  Minimal Assistance   LB Bathing Assistance 3  Moderate Assistance   [address] 4  [address] 2  Maximal [address]  3  Moderate Assistance   Functional Assistance 3  Moderate Assistance   Functional Deficit Steadying;Verbal cueing;Supervision/safety; Increased time to complete   Bed Mobility   Supine to Sit 3  Moderate assistance   Additional items Assist x 2;HOB elevated; Increased time required;Verbal cues;LE management   Sit to Supine Unable to assess   Additional Comments Pt sat EOB w/ Fair sitting balance/trunk control   Transfers   Sit to Stand 3  Moderate assistance   Additional items Assist x 1; Increased time required;Verbal cues   Stand to Sit 3  Moderate assistance   Additional items Assist x 1; Increased time required;Verbal cues   Additional Comments HHA used   Functional Mobility   Functional Mobility 3  Moderate assistance   Additional Comments Pt took few small steps from EOB to chair w/ Mod A x1; HHA used   Additional items Hand hold assistance   Balance   Static Sitting Fair   Dynamic Sitting Fair -   Static Standing Poor +   Dynamic Standing Poor   Ambulatory Poor   Activity Tolerance   Activity Tolerance Patient limited by fatigue;Patient limited by pain   Medical Staff Made Aware PT, [address]   Nurse Made Aware yes, Malini   RUE Assessment   RUE Assessment WFL   LUE Assessment   LUE Assessment WFL   Hand Function   Gross Motor Coordination Functional   Fine Motor Coordination Functional   Cognition   Overall Cognitive Status WFL   Arousal/Participation Responsive; Cooperative   Attention Within functional limits   Orientation Level Oriented X4   Memory Within functional limits   Following Commands Follows all commands and directions without difficulty   Comments Pt is pleasant and cooperative   Assessment   Limitation Decreased ADL status; Decreased endurance;Decreased self-care trans;Decreased high-level ADLs   Prognosis Fair   Assessment Pt is a 56 y/o female seen for OT eval s/p adm to [facility] w/ bloody vomitus and increased abdominal pain after being discharged 11/12  Pt had recently undergone whipple procedure w/ portal vein/SMV repair on 10/31  Pt is now s/p hepatic artery stent on 11/12   Pt  has a past medical history of Abnormal liver function test, Adenomatosis, Anomalous atrioventricular excitation (2010), Arthritis, Atrial flutter ([facility] ), Benign essential hypertension, Body mass index (BMI) of 50-59 9 in adult [facility]), Cancer ([place] [address] ), Colon polyp, Congenital pes planus, COVID, CPAP (continuous positive airway pressure) dependence, Dental crowns present, Depression, Diabetes mellitus ([place] [address] ), Dizziness, GERD (gastroesophageal reflux disease), Hemangioma of skin (2003), History of cancer chemotherapy, History of gastroesophageal reflux (GERD), Hypercholesterolemia, Hyperlipidemia, Hypertension, Irregular heart beat, Kidney stone, 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[place] [address] ) (2006), Osteoarthritis of both knees, Paroxysmal atrial fibrillation ([place] [address] ), Port-A-Cath in place, S/P ablation of atrial flutter, Shortness of breath, Sleep apnea, and Wears glasses  Pt with active OT orders and up with assistance  orders  Pt lives with his spouse in 2 , 2 ABIOLA front, 9 ABIOLA side  Pt was I w/  ADLS and IADLS, drove, & required no use of DME PTA  Pt is currently demonstrating the following occupational deficits: Min A UB ADLS, Mod A LB ADLS, Mod A x2 bed mobility, Mod A transfers and functional mobility w/ HHA  These deficits that are impacting pt's baseline areas of occupation are a result of the following impairments: endurance, activity tolerance, functional mobility, forward functional reach, balance, trunk control, functional standing tolerance and decreased I w/ ADLS/IADLS  The following Occupational Performance Areas to address include: bathing/shower, toilet hygiene, dressing, functional mobility, community mobility, clothing management, household maintenance and job performance/volunteering  Recommend inpatient rehab  upon D/C   Pt to continue to benefit from acute immediate OT services to address the following goals 2-3x/wk to  within the next 10-14 days:   Goals   Patient Goals to be independent again   LTG Time Frame 10-14   Long Term Goal #1 see below listed goals   Plan   Treatment Interventions ADL retraining;Functional transfer training; Endurance training;Patient/family training;Equipment evaluation/education; Compensatory technique education;Continued evaluation; Energy conservation; Activityengagement   Goal Expiration Date 11/28/22   OT Frequency 2-3x/wk   Recommendation   OT Discharge Recommendation Post acute rehabilitation services   Additional Comments  The patient's raw score on the AM-PAC Daily Activity inpatient short form is 17, standardized score is 37 26, less than 39 4  Patients at this level are likely to benefit from discharge to post-acute rehabilitation services  Please refer to the recommendation of the Occupational Therapist for safe discharge planning   Additional Comments 2 Pt seen as a co-evaluation due to the patient's co-morbidities, clinically unstable presentation, and present impairments which are a regression from the patient's baseline   AM-[facility] Daily Activity Inpatient   Lower Body Dressing 2   Bathing 2   Toileting 2   Upper Body Dressing 3   Grooming 4   Eating 4   Daily Activity Raw Score 17   Daily Activity Standardized Score (Calc for Raw Score >=11) 37 26   AM-[facility] Applied Cognition Inpatient   Following a Speech/Presentation 3   Understanding Ordinary Conversation 4   Taking Medications 4   Remembering Where Things Are Placed or Put Away 4   Remembering List of 4-5 Errands 4   Taking Care of Complicated Tasks 3   Applied Cognition Raw Score 22   Applied Cognition Standardized Score 47 83   End of Consult   Education Provided Yes   Patient Position at End of Consult Bedside chair;Bed/Chair alarm activated; All needs within reach   Nurse Communication Nurse aware of consult        GOALS    1) Pt will improve activity tolerance to G for 30 min txment sessions for increase engagement in functional tasks  2) Pt will complete UB/LB dressing/self care w/ mod I using adaptive device and DME as needed  3) Pt will complete bathing w/ Mod I w/ use of AE and DME as needed  4) Pt will complete toileting w/ mod I w/ G hygiene/thoroughness using DME as needed  5) Pt will improve functional transfers to Mod I on/off all surfaces using DME as needed w/ G balance/safety   6) Pt will improve functional mobility during ADL/IADL/leisure tasks to Mod I using DME as needed w/ G balance/safety   7) Pt will be attentive 100% of the time during ongoing cognitive assessment w/ G participation to assist w/ safe d/c planning/recommendations  8) Pt will demonstrate G carryover of pt/caregiver education and training as appropriate w/o cues w/ good tolerance to increase safety during functional tasks  9) Pt will demonstrate 100% carryover of energy conservation techniques t/o functional I/ADL/leisure tasks w/o cues s/p skilled education to increase endurance during functional tasks     Apurva Morris MS, OTR/L

## 2022-11-14 NOTE — PLAN OF CARE
Problem: PHYSICAL THERAPY ADULT  Goal: Performs mobility at highest level of function for planned discharge setting  See evaluation for individualized goals  Description: Treatment/Interventions: Functional transfer training, LE strengthening/ROM, Therapeutic exercise, Endurance training, Gait training, Equipment eval/education, Bed mobility, OT          See flowsheet documentation for full assessment, interventions and recommendations  Note: Prognosis: Good  Problem List: Decreased strength, Decreased endurance, Impaired balance, Decreased mobility, Pain  Assessment: Pt is a 56 yo female admitted to [place] [address] on 11/12/2022 s/p abdominal pain  Pt had IR on 11/12 for mesenteric/visceral angiogram  DX: A fib, hx of PE, upper GI bleed/hematemesis, DM  Pt recently admitted and had whipple procedure on 10/31  Two patient identifiers were used to confirm  Pt lives in a [facility] with  2 ABIOLA  Pt lives with her  who is home  Pt works full time  Pt was I for ADL's and IADL's prior  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 [employer]  Pt's impairments include reduced mobility, pain, gait abnormalities, reduced BLE Stregnth, high risk of falling, poor sitting balance and tolerance  These impairments limit the ability of the patient to perform mobility without increased assistance, return to PLOF and participate in everyday life activities  Pt would benefit from continued skilled therapy while in the hospital to improve overall mobility and work towards a safe d/c  Recommend discharge to rehab  At the end of the session the patient was left in seated position with call bell and phone within reach  The patient's AM-PAC Basic Mobility Inpatient Short Form Raw Score is 10  A Raw score of less than or equal to 16 suggests the patient may benefit from discharge to post-acute rehabilitation services  Please also refer to the recommendation of the Physical Therapist for safe discharge planning    Barriers to Discharge: Inaccessible home environment, Decreased caregiver support     PT Discharge Recommendation: Post acute rehabilitation services    See flowsheet documentation for full assessment

## 2022-11-15 LAB
ANION GAP SERPL CALCULATED.3IONS-SCNC: 7 MMOL/L (ref 4–13)
BUN SERPL-MCNC: 8 MG/DL (ref 5–25)
CALCIUM SERPL-MCNC: 8.4 MG/DL (ref 8.3–10.1)
CHLORIDE SERPL-SCNC: 108 MMOL/L (ref 96–108)
CO2 SERPL-SCNC: 27 MMOL/L (ref 21–32)
CREAT SERPL-MCNC: 0.41 MG/DL (ref 0.6–1.3)
ERYTHROCYTE [DISTWIDTH] IN BLOOD BY AUTOMATED COUNT: 16.4 % (ref 11.6–15.1)
GFR SERPL CREATININE-BSD FRML MDRD: 111 ML/MIN/1.73SQ M
GLUCOSE SERPL-MCNC: 113 MG/DL (ref 65–140)
GLUCOSE SERPL-MCNC: 117 MG/DL (ref 65–140)
GLUCOSE SERPL-MCNC: 117 MG/DL (ref 65–140)
GLUCOSE SERPL-MCNC: 123 MG/DL (ref 65–140)
GLUCOSE SERPL-MCNC: 128 MG/DL (ref 65–140)
GLUCOSE SERPL-MCNC: 130 MG/DL (ref 65–140)
GLUCOSE SERPL-MCNC: 188 MG/DL (ref 65–140)
HCT VFR BLD AUTO: 26.5 % (ref 34.8–46.1)
HEPARIN CF II AG PPP IA-MCNC: 1.37 IU/ML
HGB BLD-MCNC: 8 G/DL (ref 11.5–15.4)
MAGNESIUM SERPL-MCNC: 2.2 MG/DL (ref 1.6–2.6)
MCH RBC QN AUTO: 28.8 PG (ref 26.8–34.3)
MCHC RBC AUTO-ENTMCNC: 30.2 G/DL (ref 31.4–37.4)
MCV RBC AUTO: 95 FL (ref 82–98)
PHOSPHATE SERPL-MCNC: 3.3 MG/DL (ref 2.3–4.1)
PLATELET # BLD AUTO: 169 THOUSANDS/UL (ref 149–390)
PMV BLD AUTO: 11.1 FL (ref 8.9–12.7)
POTASSIUM SERPL-SCNC: 3.3 MMOL/L (ref 3.5–5.3)
RBC # BLD AUTO: 2.78 MILLION/UL (ref 3.81–5.12)
SODIUM SERPL-SCNC: 142 MMOL/L (ref 135–147)
WBC # BLD AUTO: 11.75 THOUSAND/UL (ref 4.31–10.16)

## 2022-11-15 RX ORDER — DILTIAZEM HYDROCHLORIDE 240 MG/1
240 CAPSULE, COATED, EXTENDED RELEASE ORAL DAILY
Status: DISCONTINUED | OUTPATIENT
Start: 2022-11-15 | End: 2022-11-18 | Stop reason: HOSPADM

## 2022-11-15 RX ORDER — PANTOPRAZOLE SODIUM 40 MG/1
40 TABLET, DELAYED RELEASE ORAL
Status: DISCONTINUED | OUTPATIENT
Start: 2022-11-15 | End: 2022-11-18 | Stop reason: HOSPADM

## 2022-11-15 RX ORDER — POTASSIUM CHLORIDE 29.8 MG/ML
40 INJECTION INTRAVENOUS ONCE
Status: COMPLETED | OUTPATIENT
Start: 2022-11-15 | End: 2022-11-15

## 2022-11-15 RX ADMIN — GABAPENTIN 100 MG: 100 CAPSULE ORAL at 21:22

## 2022-11-15 RX ADMIN — POTASSIUM CHLORIDE 40 MEQ: 29.8 INJECTION, SOLUTION INTRAVENOUS at 09:05

## 2022-11-15 RX ADMIN — SOTALOL HYDROCHLORIDE 120 MG: 120 TABLET ORAL at 10:59

## 2022-11-15 RX ADMIN — APIXABAN 5 MG: 5 TABLET, FILM COATED ORAL at 16:07

## 2022-11-15 RX ADMIN — ACETAMINOPHEN 975 MG: 325 TABLET ORAL at 16:05

## 2022-11-15 RX ADMIN — GABAPENTIN 100 MG: 100 CAPSULE ORAL at 16:06

## 2022-11-15 RX ADMIN — SOTALOL HYDROCHLORIDE 120 MG: 120 TABLET ORAL at 21:22

## 2022-11-15 RX ADMIN — LOSARTAN POTASSIUM 50 MG: 50 TABLET, FILM COATED ORAL at 08:44

## 2022-11-15 RX ADMIN — DILTIAZEM HYDROCHLORIDE 60 MG: 60 TABLET, FILM COATED ORAL at 11:00

## 2022-11-15 RX ADMIN — INSULIN LISPRO 1 UNITS: 100 INJECTION, SOLUTION INTRAVENOUS; SUBCUTANEOUS at 11:29

## 2022-11-15 RX ADMIN — APIXABAN 5 MG: 5 TABLET, FILM COATED ORAL at 08:59

## 2022-11-15 RX ADMIN — METOPROLOL SUCCINATE 25 MG: 25 TABLET, EXTENDED RELEASE ORAL at 16:08

## 2022-11-15 RX ADMIN — PANTOPRAZOLE SODIUM 40 MG: 40 TABLET, DELAYED RELEASE ORAL at 16:08

## 2022-11-15 RX ADMIN — PAROXETINE 37.5 MG: 37.5 TABLET, FILM COATED, EXTENDED RELEASE ORAL at 10:59

## 2022-11-15 RX ADMIN — ACETAMINOPHEN 975 MG: 325 TABLET ORAL at 00:09

## 2022-11-15 RX ADMIN — DILTIAZEM HYDROCHLORIDE 60 MG: 60 TABLET, FILM COATED ORAL at 00:09

## 2022-11-15 RX ADMIN — CEFTRIAXONE 2000 MG: 2 INJECTION, POWDER, FOR SOLUTION INTRAMUSCULAR; INTRAVENOUS at 11:00

## 2022-11-15 RX ADMIN — PANTOPRAZOLE SODIUM 40 MG: 40 TABLET, DELAYED RELEASE ORAL at 08:44

## 2022-11-15 RX ADMIN — DILTIAZEM HYDROCHLORIDE 240 MG: 240 CAPSULE, COATED, EXTENDED RELEASE ORAL at 14:29

## 2022-11-15 RX ADMIN — ATORVASTATIN CALCIUM 40 MG: 40 TABLET, FILM COATED ORAL at 21:22

## 2022-11-15 RX ADMIN — DILTIAZEM HYDROCHLORIDE 60 MG: 60 TABLET, FILM COATED ORAL at 05:49

## 2022-11-15 RX ADMIN — ACETAMINOPHEN 975 MG: 325 TABLET ORAL at 08:44

## 2022-11-15 RX ADMIN — GABAPENTIN 100 MG: 100 CAPSULE ORAL at 08:44

## 2022-11-15 RX ADMIN — METOPROLOL SUCCINATE 25 MG: 25 TABLET, EXTENDED RELEASE ORAL at 08:45

## 2022-11-15 NOTE — PLAN OF CARE
Problem: MOBILITY - ADULT  Goal: Maintain or return to baseline ADL function  Description: INTERVENTIONS:  -  Assess patient's ability to carry out ADLs; assess patient's baseline for ADL function and identify physical deficits which impact ability to perform ADLs (bathing, care of mouth/teeth, toileting, grooming, dressing, etc )  - Assess/evaluate cause of self-care deficits   - Assess range of motion  - Assess patient's mobility; develop plan if impaired  - Assess patient's need for assistive devices and provide as appropriate  - Encourage maximum independence but intervene and supervise when necessary  - Involve family in performance of ADLs  - Assess for home care needs following discharge   - Consider OT consult to assist with ADL evaluation and planning for discharge  - Provide patient education as appropriate  Outcome: Progressing  Goal: Maintains/Returns to pre admission functional level  Description: INTERVENTIONS:  - Perform BMAT or MOVE assessment daily    - Set and communicate daily mobility goal to care team and patient/family/caregiver  - Collaborate with rehabilitation services on mobility goals if consulted  - Perform Range of Motion 3 times a day  - Reposition patient every 3 hours    - Dangle patient 3 times a day  - Stand patient 3 times a day  - Ambulate patient 3 times a day  - Out of bed to chair 3 times a day   - Out of bed for meals 3 times a day  - Out of bed for toileting  - Record patient progress and toleration of activity level   Outcome: Progressing     Problem: Prexisting or High Potential for Compromised Skin Integrity  Goal: Skin integrity is maintained or improved  Description: INTERVENTIONS:  - Identify patients at risk for skin breakdown  - Assess and monitor skin integrity  - Assess and monitor nutrition and hydration status  - Monitor labs   - Assess for incontinence   - Turn and reposition patient  - Assist with mobility/ambulation  - Relieve pressure over bony prominences  - Avoid friction and shearing  - Provide appropriate hygiene as needed including keeping skin clean and dry  - Evaluate need for skin moisturizer/barrier cream  - Collaborate with interdisciplinary team   - Patient/family teaching  - Consider wound care consult   Outcome: Progressing     Problem: Potential for Falls  Goal: Patient will remain free of falls  Description: INTERVENTIONS:  - Educate patient/family on patient safety including physical limitations  - Instruct patient to call for assistance with activity   - Consult OT/PT to assist with strengthening/mobility   - Keep Call bell within reach  - Keep bed low and locked with side rails adjusted as appropriate  - Keep care items and personal belongings within reach  - Initiate and maintain comfort rounds  - Make Fall Risk Sign visible to staff  - Offer Toileting every 3 Hours, in advance of need  - Initiate/Maintain 3alarm  - Obtain necessary fall risk management equipment: 3  - Apply yellow socks and bracelet for high fall risk patients  - Consider moving patient to room near nurses station  Outcome: Progressing     Problem: Nutrition/Hydration-ADULT  Goal: Nutrient/Hydration intake appropriate for improving, restoring or maintaining nutritional needs  Description: Monitor and assess patient's nutrition/hydration status for malnutrition  Collaborate with interdisciplinary team and initiate plan and interventions as ordered  Monitor patient's weight and dietary intake as ordered or per policy  Utilize nutrition screening tool and intervene as necessary  Determine patient's food preferences and provide high-protein, high-caloric foods as appropriate       INTERVENTIONS:  - Monitor oral intake, urinary output, labs, and treatment plans  - Assess nutrition and hydration status and recommend course of action  - Evaluate amount of meals eaten  - Assist patient with eating if necessary   - Allow adequate time for meals  - Recommend/ encourage appropriate diets, oral nutritional supplements, and vitamin/mineral supplements  - Order, calculate, and assess calorie counts as needed  - Recommend, monitor, and adjust tube feedings and TPN/PPN based on assessed needs  - Assess need for intravenous fluids  - Provide specific nutrition/hydration education as appropriate  - Include patient/family/caregiver in decisions related to nutrition  Outcome: Progressing

## 2022-11-15 NOTE — PROGRESS NOTES
Spiritual Care Progress Note    11/15/2022  Patient: Yoon Singleton : 1960  Admission Date & Time: 2022 0034  MRN: [id number] CSN: [id number]      [facility] briefly visited with pt while doing rounds on the unit  Pt and  were in room,  chatting on the phone, while  and pt chatted about diagnosis, prognosis, and what comes next for pt  Pt is in good spirits and appears to be comfortable with treatment she is undergoing and the plan for further treatment   provided education on the Chaplains for the hospital and a listening ear for the pt   also prayed for comfort and continued healing for the pt while in the hospital     Chaplains remain available                 Chaplaincy Interventions Utilized:   Empowerment: Clarified, confirmed, or reviewed information from treatment team  and Provided chaplaincy education    Exploration: Explored hope, Explored emotional needs & resources, Explored relational needs & resources, and Explored spiritual needs & resources    Collaboration: Encouraged adherence to treatment plan    Relationship Building: Cultivated a relationship of care and support and Listened empathically    Ritual: Provided prayer    Chaplaincy Outcomes Achieved:  Relational resources utilized and Spiritual resources utilized    Spiritual Coping Strategies Utilized:   Spiritual comfort       11/15/22 1300   Clinical Encounter Type   Visited With Patient   Routine Visit Follow-up   Shinto Encounters   Shinto Needs Prayer   Patient Spiritual Encounters   Spiritual Assessment 4

## 2022-11-15 NOTE — OCCUPATIONAL THERAPY NOTE
Occupational Therapy Treatment Note      Kassidy Perla    11/15/2022    Principal Problem:    Hematemesis  Active Problems:    Supraventricular tachycardia ([facility] )    Hypertension    Severe obstructive sleep apnea    Type 2 diabetes mellitus without complication, without long-term current use of insulin (HCC)    Esophageal reflux    Pancreatic cancer (HCC)    Paroxysmal A-fib ([facility] )    Pulmonary embolism ([facility] )    Streptococcal bacteremia    H/O Whipple procedure      Past Medical History:   Diagnosis Date    Abnormal liver function test     last assessed 1/16/17     Adenomatosis     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 )     Cancer ([facility] )     pancreas    Colon polyp     Congenital pes planus     last assessed 7/15/14     COVID     4/30/21    CPAP (continuous positive airway pressure) dependence     Dental crowns present     Depression     Diabetes mellitus ([facility] )     Dizziness     occas--pt reports did see family doctor    GERD (gastroesophageal reflux disease)     Hemangioma of skin 08/26/2003    last assessed 8/26/03    History of cancer chemotherapy      Oncologist Dr Tiny Elizabeth    History of gastroesophageal reflux (GERD)     Hypercholesterolemia     Hyperlipidemia     Hypertension     Irregular heart beat     Kidney stone     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    last assessed 12/31/14     Osteoarthritis of both knees     last assessed 12/31/14     Paroxysmal atrial fibrillation ([facility] )     Port-A-Cath in place     S/P ablation of atrial flutter     Shortness of breath     per pt "from chemo-not drastic--still working and goes up steps"    Sleep apnea     Wears glasses        Past Surgical History:   Procedure Laterality Date    ABDOMINAL ADHESION SURGERY N/A 10/31/2022    Procedure: LYSIS ADHESIONS, colectomy, vascular pedicle flap;  Surgeon: Pablo Banks MD;  Location: [facility];  Service: Surgical Oncology    ABDOMINAL SURGERY  06/2006    20cm GIST removed     ABLATION SOFT TISSUE N/A 10/31/2022    Procedure: SOFT TISSUE ABLATION;  Surgeon: Pablo Banks MD;  [facility];  Service: Surgical Oncology    APPENDECTOMY      ATRIAL ABLATION SURGERY      CARPAL TUNNEL RELEASE Bilateral     COLONOSCOPY      FL GUIDED CENTRAL VENOUS ACCESS DEVICE INSERTION  05/31/2022    FL RETROGRADE PYELOGRAM  07/18/2022    FL RETROGRADE PYELOGRAM  8/22/2022    HYSTERECTOMY      fibroids    IR MESENTERIC/VISCERAL ANGIOGRAM  11/12/2022    IR PORT CHECK  06/23/2022    IR PORT REMOVAL AND PLACEMENT NEW SITE  06/28/2022    JOINT REPLACEMENT Bilateral     knees    KIDNEY STONE SURGERY Right 09/17/2021    placed stent in  for kidney stone    KNEE SURGERY      KNEE SURGERY Left 03/2019    LAPAROTOMY N/A 10/31/2022    Procedure: EXPLORATORY LAPAROTOMY;  Surgeon: Pablo Banks MD;  [facility]: [facility];  Service: Surgical Oncology    OOPHORECTOMY      cyst    IL CYSTO/URETERO W/LITHOTRIPSY &INDWELL STENT INSRT Left 8/22/2022    Procedure: CYSTOSCOPY URETEROSCOPY WITH LITHOTRIPSY HOLMIUM LASER, RETROGRADE PYELOGRAM AND INSERTION STENT URETERAL;  Surgeon: Juanita Pagan MD;  [facility];  Service: Urology    IL CYSTOSCOPY,INSERT URETERAL STENT Left 8/22/2022    Procedure: EXCHANGE STENT URETERAL;  Surgeon: Juanita Pagan MD;  [facility];  Service: Urology    IL CYSTOURETHROSCOPY,URETER CATHETER Left 07/18/2022    Procedure: CYSTOSCOPY RETROGRADE PYELOGRAM WITH INSERTION STENT URETERAL;  Surgeon: Juanita Pagan MD;  Location: [facility];  Service: Urology    TONSILLECTOMY      TUBAL LIGATION      TUMOR EXCISION  2006    gastrointestinal stromal tumor    TUNNELED VENOUS PORT PLACEMENT N/A 05/31/2022    Procedure: INSERTION VENOUS PORT (PORT-A-CATH);   Surgeon: Pablo Banks MD;  [facility];  Service: Surgical Oncology    UPPER GASTROINTESTINAL ENDOSCOPY      WHIPPLE PROCEDURE/PANCREATICO-DUODENECTOMY N/A 10/31/2022    Procedure: WHIPPLE PROCEDURE/PANCREATICO-DUODENECTOMY, IOUS;  Surgeon: Adrián Roberts MD;  [facility];  Service: Surgical Oncology        11/15/22 1054   OT Last Visit   OT Visit Date 11/15/22   Note Type   Note Type Treatment   Pain Assessment   Pain Assessment Tool 0-10   Pain Score No Pain   Restrictions/Precautions   Weight Bearing Precautions Per Order No   Lifestyle   Autonomy I w/ ADLS/IADLS, transfers and functional mobility PTA   Reciprocal Relationships Pt lives w/ her spouse who is retired   Service to [employer] full time   [employer] to relax   ADL   Eating Assistance 7  Independent   Grooming Assistance 7  Independent   Grooming Deficit Setup; Increased time to complete   UB Bathing Assistance 5  Supervision/Setup   UB Bathing Deficit Setup; Increased time to complete; Chest;Right arm;Left arm;Perineal area; Abdomen   LB Bathing Assistance 4  Minimal Assistance   LB Bathing Deficit Setup; Increased time to complete   UB Dressing Assistance 5  Supervision/Setup   UB Dressing Deficit Increased time to complete   LB Dressing Assistance 3  Moderate Assistance   LB Dressing Deficit Don/doff R sock; Don/doff L sock   Toileting Assistance  4  Minimal Assistance   Toileting Deficit Clothing management up;Clothing management down   Functional Standing Tolerance   Time 5 MIN   Comments standing for functional activites  some fatigue noted at the end   Transfers   Sit to Stand 5  Supervision   Additional items Assist x 1   Stand to Sit 5  Supervision   Stand pivot 5  Supervision   Additional items Increased time required   Additional Comments use of rw   Functional Mobility   Functional Mobility 4  Minimal assistance   Additional items Rolling walker   Cognition   Overall Cognitive Status [facility]   Arousal/Participation Alert; Cooperative   Attention Within functional limits   Orientation Level Oriented X4   Memory Within functional limits   Following Commands Follows all commands and directions without difficulty   Activity Tolerance   Activity Tolerance Patient limited by fatigue   Assessment   Assessment Pt seen for OT treatment session w focus on self care, functional mobiltiy, ECT/self pacing skills  Pt is showing nice improvements  She now only needs SBA for sit to stand and for SPT  She tolerated standing x 5 min w noticeable fatigue towards the end  She is able to complete grooming and UB self care w set up and S, increased time due to fatigue  Min assist LB bathing and mod assist for socks  Pt reports she does not wear socks at home, uses crocks  Pt educated on ECT/self pacing skills w good understanding  From OT standpoint, she can benefit from STR when medically cleared for DC  Per CM, pt will remain here until Friday to to IV  At that time, pt might be able to dc home w family support  OT will continue to follow while in acute care  Plan   Treatment Interventions ADL retraining;Functional transfer training;UE strengthening/ROM; Endurance training;Cognitive reorientation;Patient/family training;Equipment evaluation/education; Compensatory technique education; Activityengagement; Energy conservation   Goal Expiration Date 11/28/22   OT Treatment Day 1   OT Frequency 2-3x/wk   Recommendation   OT Discharge Recommendation   (STR vs home w HHservices)   AM-PAC Daily Activity Inpatient   Lower Body Dressing 2   Bathing 2   Toileting 3   Upper Body Dressing 4   Grooming 4   Eating 4   Daily Activity Raw Score 19   Daily Activity Standardized Score (Calc for Raw Score >=11) 40 22

## 2022-11-15 NOTE — CASE MANAGEMENT
Case Management Discharge Planning Note    Patient name Yoon Singleton  Location [facility]14/[facility]44-81 MRN [id number]  : 1960 Date 11/15/2022       Current Admission Date: 2022  Current Admission Diagnosis:Hematemesis   Patient Active Problem List    Diagnosis Date Noted   • H/O Whipple procedure 2022   • Hematemesis 2022   • Streptococcal bacteremia 11/10/2022   • Pulmonary embolism ([facility] ) 2022   • Sepsis ([facility] ) 2022   • Paroxysmal [facility]fib [facility] ) 2022   • Left flank pain 2022   • CPAP (continuous positive airway pressure) dependence    • Chemotherapy induced neutropenia ([facility] ) 2022   • Pancreatic cancer ([facility] ) 2022   • Uric acid kidney stone 2022   • Nephrolithiasis 10/25/2021   • Class 3 severe obesity due to excess calories with body mass index (BMI) of 50 0 to 59 9 in adult ([facility] ) 2020   • Type 2 diabetes mellitus without complication, without long-term current use of insulin ([facility] ) 2017   • Severe obstructive sleep apnea 2016   • Dyspnea on exertion 2016   • Supraventricular tachycardia ([facility] ) 2016   • Hypertension 2016   • Controlled depression 2016   • Adenomatous colon polyp 2015   • 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   • Morbid obesity ([facility] ) 2013   • Hyperlipidemia 2013   • Benign essential hypertension 10/02/2012   • Esophageal reflux 2012      LOS (days): 3  Geometric Mean LOS (GMLOS) (days):   Days to GMLOS:     OBJECTIVE:  Risk of Unplanned Readmission Score: 21 73         Current admission status: Inpatient   Preferred Pharmacy:   [address] [place] [address] 81, [phone]   Phone: [phone] Fax: [phone]    Primary Care Provider: Christopher Montilla MD    Primary Insurance: BLUE CROSS  Secondary Insurance:     DISCHARGE DETAILS:            Other Referral/Resources/Interventions Provided:  Referral Comments: CM spoke with therapy for re-evaluations  At this time STR is being recommended, however feel as though she may be able to progress to home with home therapy  Therapy will continue to see the patient  [facility] is following the patient  Additional Comments: Patient is not medically cleared for discharge at this time  She continues on IV abt until Friday at this time

## 2022-11-15 NOTE — RESTORATIVE TECHNICIAN NOTE
Restorative Technician Note      Patient Name: Lay Malcolm     Restorative Tech Visit Date: 11/15/2022  Note Type: Mobility  Patient Position Upon Consult: Bedside chair  Activity Performed: Ambulated  Assistive Device: Roller walker  Patient Position at End of Consult: Bedside chair;  All needs within [facility]

## 2022-11-15 NOTE — QUICK NOTE
30 staples removed from patient's midline incision  Incision was cleaned with an alcohol swab prior to and after removal   Claudia came out easily and patient tolerated procedure well  No complications  Benzoin was applied to the skin surrounding the incision and steri-strips were placed overlapping one another  Incision is clean, dry, and intact  No drainage, erythema, or signs of infection  All questions were answered

## 2022-11-15 NOTE — PROGRESS NOTES
Progress Note - Surgical Oncology   Zeny Conner 57 y o  female MRN: [id number]  Unit/Bed#: [facility] 914-01 Encounter: [id number]    Assessment:  Patient is a 58 y  o  female recently discharged after whipple on 10/31 c/b PE on eliquis who presented with GDA bleed, now status post IR IR Angiogram with GDA stent and EGD with finding of large clean base ulcer at [address] anastamosis w/o active bleeding on 11/12      Afebrile,  Tachy improved to 60's     Urine 1 5 L  Stool x 2      Plan:  Continue to trend H/H and monitor for signs of bleeding  Continue eliquis  CCD2  Prn pain control  Continue ceftriaxone   Appreciate Infectious Disease recs  Appreciate cardiology recs  Encourage out of bed and ambulation  PT/OT      Subjective/Objective     Subjective:   No acute events overnight  -N  -V  +BM (small)  Patient thinks bloody but nurses neck nonbloody  Objective:     Blood pressure 139/80, pulse 88, temperature 97 5 °F (36 4 °C), resp  rate 18, height 5' 4" (1 626 m), weight (!) 140 kg (307 lb 12 2 oz), SpO2 91 %  ,Body mass index is 52 83 kg/m²        Intake/Output Summary (Last 24 hours) at 11/15/2022 1844  Last data filed at 11/15/2022 1601  Gross per 24 hour   Intake 1172 5 ml   Output 1475 ml   Net -302 5 ml       Invasive Devices  Report    Central Venous Catheter Line  Duration           Port A Cath 05/31/22 Left Chest 168 days          Peripheral Intravenous Line  Duration           Peripheral IV 11/12/22 Left;Upper;Ventral (anterior) Arm 3 days    Peripheral IV 11/12/22 Proximal;Right;Ventral (anterior) Antecubital 3 days          Drain  Duration           Ureteral Internal Stent Left ureter 6 Fr  120 days                Physical Exam:   Gen: NAD, Comfortable  Neuro: A&O, No focal deficits  Head: Normal Cephalic, Atraumatic  Eye: EOMI, PERRLA, No scleral icterus  Neck: Supple, No JVD, Midline trachea  CV: RRR, Cap refill <2 sec  Pulm: Normal work of breathing, no respiratory distress  Abd: Soft, Non-Distended, Non-Tender  Ext: No edema, Non-tender  Skin: warm, dry, intact

## 2022-11-15 NOTE — PLAN OF CARE
Problem: OCCUPATIONAL THERAPY ADULT  Goal: Performs self-care activities at highest level of function for planned discharge setting  See evaluation for individualized goals  Description: Treatment Interventions: ADL retraining, Functional transfer training, Endurance training, Patient/family training, Equipment evaluation/education, Compensatory technique education, Continued evaluation, Energy conservation, Activityengagement          See flowsheet documentation for full assessment, interventions and recommendations  Note: Limitation: Decreased ADL status, Decreased endurance, Decreased self-care trans, Decreased high-level ADLs  Prognosis: Fair  Assessment: Pt seen for OT treatment session w focus on self care, functional mobiltiy, ECT/self pacing skills  Pt is showing nice improvements  She now only needs SBA for sit to stand and for SPT  She tolerated standing x 5 min w noticeable fatigue towards the end  She is able to complete grooming and UB self care w set up and S, increased time due to fatigue  Min assist LB bathing and mod assist for socks  Pt reports she does not wear socks at home, uses crocks  Pt educated on ECT/self pacing skills w good understanding  From OT standpoint, she can benefit from STR when medically cleared for DC  Per CM, pt will remain here until Friday to to IV  At that time, pt might be able to dc home w family support  OT will continue to follow while in acute care       OT Discharge Recommendation:  (STR vs home w [facility])

## 2022-11-15 NOTE — PROGRESS NOTES
Cardiology Progress Note - Lanie Scott 57 y o  female MRN: [id number]    Unit/Bed#: [facility] 914-01 Encounter: [id number]      Assessment:  Principal Problem:    Hematemesis  Active Problems:    Supraventricular tachycardia ([address] )    Hypertension    Severe obstructive sleep apnea    Type 2 diabetes mellitus without complication, without long-term current use of insulin (HCC)    Esophageal reflux    Pancreatic cancer (HCC)    Paroxysmal A-fib (HCC)    Pulmonary embolism (HCC)    Streptococcal bacteremia    H/O Whipple procedure      Plan:  Paroxysmal SVT  -Patient recently discharged s/p whipple procedure for pancreatic adenocarcinoma  Readmitted for bloody emesis s/p EGD, common hepatic stenting    -Patient has had several episodes of SVT this admission that broke spontaneously, appears as AVNRT  -Patient has a history of PAF/flutter and paroxysmal SVT, patient notes history of palpitations that sometimes improve with vagal maneuvers  History of prior ablation for right-sided atrial tachycardia    -On Eliquis 5 mg BID due to recent PE after surgery   -On Lipitor 40 mg daily, Losartan 50 mg daily, Sotalol 120 mg Q12, Toprol-XL 50 -> 25 mg BID  -Cardizem PO 60 mg Q6H   -11/13 ECG: SVT   -11/4 Echo: 50% EF   -Na 142, K 3 3   -Mg 2 2, Phos 3 3   -11/15 fluids In 2 2L, Out 1 8L  -1 episode of SVT noted yesterday night on telemetry, none so far today    -Patient did not notice any palpitations overnight or today  Plan  -Will change Cardizem to Cardizem CD 24 hr capsule 240 mg daily   -Continue Eliquis, Lipitor, Losartan, Sotalol, Toprol XL  -Will monitor today to see if SVT reoccurs  -If patient is in persistent SVT, recommend Adenosine or Cardizem drip   -If patient continues to go into SVT we will consider switching sotalol to amiodarone    -Follow telemetry closely  -Replete K as needed  -F/U labs  -Consider outpatient Zio patch       Paroxysmal Atrial Fibrillation  -Patient maintains sinus rhythm with sotalol, continued inpatient   -See A&P for paroxysmal SVT  Subjective:   Patient seen and examined  Patient had 1 episode of SVT overnight on telemetry  Patient did not notice any feelings of palpitations overnight and feels fairly well today  She notes some fatigue  Her mouth feels dry and she feels "a bit gassy"  She is eating and drinking well  Review of Systems   Constitutional: Positive for fatigue  Negative for chills and fever  HENT: Positive for sore throat  Negative for hearing loss  Dry throat   Eyes: Negative for visual disturbance  Respiratory: Negative for cough and shortness of breath  Cardiovascular: Negative for chest pain and palpitations  Gastrointestinal: Negative for abdominal distention, abdominal pain, blood in stool, constipation, diarrhea, nausea and vomiting  Feels "gassy"   Endocrine: Negative for polyuria  Genitourinary: Negative for dysuria and hematuria  Musculoskeletal: Negative for arthralgias and back pain  Skin: Negative for rash and wound  Neurological: Negative for dizziness, tremors, seizures, weakness, light-headedness and headaches  Psychiatric/Behavioral: The patient is not nervous/anxious  Objective:     Vitals: Blood pressure 118/70, pulse 66, temperature (!) 97 3 °F (36 3 °C), resp  rate 18, height 5' 4" (1 626 m), weight (!) 140 kg (307 lb 12 2 oz), SpO2 (!) 89 %  , Body mass index is 52 83 kg/m² ,   Orthostatic Blood Pressures    Flowsheet Row Most Recent Value   Blood Pressure 118/70 filed at 11/15/2022 0707   Patient Position - Orthostatic VS Lying filed at 11/14/2022 0942            Intake/Output Summary (Last 24 hours) at 11/15/2022 1014  Last data filed at 11/15/2022 0903  Gross per 24 hour   Intake 2333 75 ml   Output 1575 ml   Net 758 75 ml       Patient had one episode of SVT last night, none today  Physical Exam:  Physical Exam  Vitals reviewed  Constitutional:       Appearance: Normal appearance  She is obese  HENT:      Head: Normocephalic  Right Ear: External ear normal       Left Ear: External ear normal       Nose: Nose normal       Mouth/Throat:      Mouth: Mucous membranes are moist    Eyes:      Extraocular Movements: Extraocular movements intact  Pupils: Pupils are equal, round, and reactive to light  Cardiovascular:      Rate and Rhythm: Normal rate and regular rhythm  Pulses: Normal pulses  Heart sounds: Normal heart sounds  Pulmonary:      Effort: Pulmonary effort is normal       Breath sounds: Normal breath sounds  Abdominal:      General: Abdomen is flat  Bowel sounds are normal       Palpations: Abdomen is soft  Tenderness: There is no abdominal tenderness  Musculoskeletal:         General: Normal range of motion  Cervical back: Normal range of motion  Right lower leg: Edema present  Left lower leg: Edema present  Comments: Pitting edema of B/L lower extremities   Skin:     General: Skin is warm  Capillary Refill: Capillary refill takes less than 2 seconds  Neurological:      General: No focal deficit present  Mental Status: She is alert and oriented to person, place, and time         Medications:      Current Facility-Administered Medications:   •  acetaminophen (TYLENOL) rectal suppository 650 mg, 650 mg, Rectal, Q6H PRN, Faiza Uribe PA-C  •  acetaminophen (TYLENOL) tablet 975 mg, 975 mg, Oral, Q8H KIM, Alyssa Uribe PA-C, 975 mg at 11/15/22 5855  •  apixaban (ELIQUIS) tablet 5 mg, 5 mg, Oral, BID, Yara Ahmadi PA-C, 5 mg at 11/15/22 2559  •  atorvastatin (LIPITOR) tablet 40 mg, 40 mg, Oral, HS, Faiza Uribe PA-C, 40 mg at 11/14/22 2120  •  cefTRIAXone (ROCEPHIN) 2,000 mg in dextrose 5 % 50 mL IVPB, 2,000 mg, Intravenous, Q24H, Faiza Uribe PA-C, Stopped at 11/14/22 1104  •  diltiazem (CARDIZEM) tablet 60 mg, 60 mg, Oral, Q6H [address], Alyssa Uribe PA-C, 60 mg at 11/15/22 0549  •  fentaNYL (SUBLIMAZE) injection 25 mcg, 25 mcg, Intravenous, Q3 min PRN, Ollie Barber MD  •  gabapentin (NEURONTIN) capsule 100 mg, 100 mg, Oral, TID, John Uribe PA-C, 100 mg at 11/15/22 0844  •  insulin lispro (HumaLOG) 100 units/mL subcutaneous injection 1-5 Units, 1-5 Units, Subcutaneous, Q6H [facility], 1 Units at 11/14/22 1249 **AND** Fingerstick Glucose (POCT), , , Q6H, DAMIAN SandersC  •  iodixanol (VISIPAQUE) 320 MG/ML injection 300 mL, 300 mL, Intra-arterial, Once in imaging, John Uribe PA-C  •  losartan (COZAAR) tablet 50 mg, 50 mg, Oral, Daily, John Uribe PA-C, 50 mg at 11/15/22 0844  •  metoprolol (LOPRESSOR) injection 5 mg, 5 mg, Intravenous, Q6H PRN, DAMIAN PonceC, 5 mg at 11/13/22 0359  •  metoprolol succinate (TOPROL-XL) 24 hr tablet 25 mg, 25 mg, Oral, BID, DAMIAN SandersC, 25 mg at 11/15/22 0845  •  ondansetron (ZOFRAN) injection 4 mg, 4 mg, Intravenous, Q6H PRN, DAMIAN PonceC  •  pantoprazole (PROTONIX) EC tablet 40 mg, 40 mg, Oral, BID AC, Wendi Burk MD, 40 mg at 11/15/22 0844  •  PARoxetine (PAXIL-CR) 24 hr tablet 37 5 mg, 37 5 mg, Oral, QAM, ASAEL Sanders-C, 37 5 mg at 11/14/22 1034  •  potassium chloride 40 mEq IVPB (premix), 40 mEq, Intravenous, Once, Kaur Lynch MD, Last Rate: 25 mL/hr at 11/15/22 0905, 40 mEq at 11/15/22 0905  •  sotalol (BETAPACE) tablet 120 mg, 120 mg, Oral, Q12H, John Barros FOREST Uribe, 120 mg at 11/14/22 2120     Labs & Results:        Results from last 7 days   Lab Units 11/15/22  0543 11/14/22  0518 11/14/22  0035 11/13/22  1752 11/13/22  0430   WBC Thousand/uL 11 75* 12 87*  --   --  15 39*   HEMOGLOBIN g/dL 8 0* 7 7* 7 9*   < > 6 9*   HEMATOCRIT % 26 5* 25 3* 25 9*   < > 21 5*   PLATELETS Thousands/uL 169 141*  --   --  147*    < > = values in this interval not displayed           Results from last 7 days   Lab Units 11/15/22  0543 11/14/22  0518 11/13/22  0430 11/12/22  7023 11/12/22  0435 11/12/22  0102 POTASSIUM mmol/L 3 3* 3 9 3 3* 4 0  --  3 5   CHLORIDE mmol/L 108 107 109* 107  --  105   CO2 mmol/L 27 24 27 22  --  21   CO2, I-STAT mmol/L  --   --   --   --  24  --    BUN mg/dL 8 9 13 12  --  11   CREATININE mg/dL 0 41* 0 41* 0 43* 0 66  --  0 62   CALCIUM mg/dL 8 4 8 4 7 7* 7 9*  --  8 1*   ALK PHOS U/L  --   --  594* 875*  --  943*   ALT U/L  --   --  58 87*  --  78   AST U/L  --   --  79* 212*  --  245*   GLUCOSE, ISTAT mg/dl  --   --   --   --  175*  --      Results from last 7 days   Lab Units 11/12/22  0608 11/12/22  0102   INR  1 48* 1 62*   PTT seconds 29 29     Results from last 7 days   Lab Units 11/15/22  0543 11/14/22  0518 11/13/22  0430   MAGNESIUM mg/dL 2 2 2 3 1 9       EKG personally reviewed by Lino Maynard MD      Counseling / Coordination of Care  Total floor / unit time spent today 30 minutes  Greater than 50% of total time was spent with the patient and / or family counseling and / or coordination of care  A description of the counseling / coordination of care: history/plan of care

## 2022-11-15 NOTE — UTILIZATION REVIEW
NOTIFICATION OF ADMISSION DISCHARGE   This is a Notification of Discharge from [address]  Please be advised that this patient has been discharge from our facility  Below you will find the admission and discharge date and time including the patient’s disposition  UTILIZATION REVIEW CONTACT:  Karo Laureano  Utilization   Network Utilization Review Department  Phone: [phone] x carefully listen to the prompts  All voicemails are confidential   Email: [email or web address]  [email or web address]     ADMISSION INFORMATION  PRESENTATION DATE: 10/31/2022  7:19 AM  OBERVATION ADMISSION DATE:   INPATIENT ADMISSION DATE: 10/31/22  5:58 PM   DISCHARGE DATE: 11/11/2022  5:14 PM  DISPOSITION: Home with [facility] with [address] INFORMATION:  Send all requests for admission clinical reviews, approved or denied determinations and any other requests to dedicated fax number below belonging to the campus where the patient is receiving treatment   List of dedicated fax numbers:  [address] DENIALS (Administrative/Medical Necessity) [phone]   [address] (Maternity/NICU/Pediatrics) [phone]   [facility] [phone]   [place] [address] [phone]   [address] [phone]   220 [facility] [phone]   90 [facility] [phone]   [address] [place] [address] 119 [phone]   [facility]  [phone]   4056 [facility] [phone]   412 [facility] 850 [address] [facility] [phone]

## 2022-11-15 NOTE — RESTORATIVE TECHNICIAN NOTE
Restorative Technician Note      Patient Name: Ivan Askew     Restorative Tech Visit Date: 11/15/2022  Note Type: Mobility  Patient Position Upon Consult: Bedside chair  Activity Performed: Ambulated  Assistive Device: Roller walker  Patient Position at End of Consult: Bedside chair;  All needs within [facility]

## 2022-11-15 NOTE — PROGRESS NOTES
Progress Note - Surgical Oncology  Hernandez Abrams 57 y o  female MRN: [id number]  Unit/Bed#: [facility] 914-01 Encounter: [id number]    Assessment:  Patient is a 57 y o  female recently discharged after whipple on 10/31 c/b PE on eliquis who presented with GDA bleed, now status post IR IR Angiogram with GDA stent and EGD with finding of large clean base ulcer at [address] anastamosis w/o active bleeding on 11/12      Afebrile,  Tachy improved to 60's      Urine 1 4 L  Stool x 2      Plan:  Continue to trend H/H and monitor for signs of bleeding  Follow morning hgb, possible restart eliquis if stable  CCD2  Prn pain control  Continue ceftriaxone   Appreciate cardiology recs  Encourage out of bed and ambulation  PT/OT      Subjective/Objective     Subjective:   No acute events overnight  Tachy improved  -N  -V      Objective:     Blood pressure 119/71, pulse 67, temperature (!) 97 °F (36 1 °C), resp  rate 18, height 5' 4" (1 626 m), weight (!) 140 kg (307 lb 12 2 oz), SpO2 93 %  ,Body mass index is 52 83 kg/m²        Intake/Output Summary (Last 24 hours) at 11/15/2022 0557  Last data filed at 11/14/2022 2344  Gross per 24 hour   Intake 2253 66 ml   Output 2225 ml   Net 28 66 ml       Invasive Devices  Report    Central Venous Catheter Line  Duration           Port A Cath 05/31/22 Left Chest 167 days          Peripheral Intravenous Line  Duration           Peripheral IV 11/12/22 Proximal;Right;Ventral (anterior) Antecubital 3 days    Peripheral IV 11/12/22 Left;Upper;Ventral (anterior) Arm 2 days          Drain  Duration           Ureteral Internal Stent Left ureter 6 Fr  119 days                Physical Exam:   Gen: NAD, Comfortable  Neuro: A&O, No focal deficits  Head: Normal Cephalic, Atraumatic  Eye: EOMI, PERRLA, No scleral icterus  Neck: Supple, No JVD, Midline trachea  CV: RRR, Cap refill <2 sec  Pulm: Normal work of breathing, no respiratory distress  Abd: Soft, Non-Distended, Non-Tender  Ext: No edema, Non-tender  Skin: warm, dry, intact

## 2022-11-15 NOTE — CONSULTS
Consultation - Infectious Disease   Emmett Hunter 57 y o  female MRN: [id number]  Unit/Bed#: [facility] 914-01 Encounter: [id number]      IMPRESSION & RECOMMENDATIONS:   Impression/Recommendations: This is a 57 y o  female, with recently diagnosed adenocarcinoma of head of pancreas, status post neoadjuvant chemotherapy, admitted on 10/31 to undergo Whipple resection  Postop course were complicated by sepsis and Streptococcus oralis bacteremia  Patient is doing well on IV antibiotic  She was readmitted on 11/12 upper GI bleed  1  Streptococcus oralis bacteremia in the postop period from Whipple resection  Source is most likely translocation from gut  Patient improved quickly on IV antibiotic  Bacteremia cleared quickly  2D echo without obvious vegetation  Repeat blood cultures during this admission have no growth thus far  Given source of bacteremia and rapid clearance of bacteremia, will continue with 14 day treatment course  Continue high-dose IV ceftriaxone  Treat times 14 days total, through 11/18  Follow-up on final blood cultures from this admission    2  Upper GI bleed, secondary to gastrojejunal anastomotic ulcer, resolved  Monitor  3  Pancreatic cancer, status post neoadjuvant chemotherapy and now status post Whipple resection  4  PE, improved on CT imaging this admission  Anticoagulation per primary service  Recent hospitalization records reviewed in detail  Discussed with patient and family in detail regarding the above plan  Discussed with surgery service  Thank you for this consultation  We will follow along with you  HISTORY OF PRESENT ILLNESS:  Reason for Consult:  Streptococcus oralis bacteremia  HPI: Emmett Hunter is a 57 y o  female, with recently diagnosed adenocarcinoma of head of pancreas, status post neoadjuvant chemotherapy  Patient was admitted on 10/31 to undergo Whipple resection  This was done with reconstruction of SMV and portal vein    Postop course was complicated by PE  Patient also developed fever and sepsis postop and was found to have Streptococcus oralis bacteremia  This was felt to be translocation from gut  Patient was started on IV ceftriaxone with plan for 14 day treatment course  She was discharged home on 11/11  Patient came back to ER later this evening with abdominal pain and bloody emesis  Mesenteric arteriogram did not reviewed EGD a blowout  Stent was placed  Patient underwent EGD with finding of large gastrojejunal anastomotic ulcer with bleeding  Patient did well, with no further evidence of upper GI bleed  She has remained on IV ceftriaxone throughout this hospitalization  We are asked to evaluate the patient regarding duration of treatment  At present, patient feels quite well  She has no further abdominal pain  No further vomiting or hematemesis  No fever or chills  She is tolerating antibiotic well  No diarrhea  REVIEW OF SYSTEMS:  A complete system-based review was done  Except for what is noted in HPI above, ROS of systems is otherwise negative      PAST MEDICAL HISTORY:  Past Medical History:   Diagnosis Date   • Abnormal liver function test     last assessed 1/16/17    • Adenomatosis    • 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)    • Cancer (HCC)     pancreas   • Colon polyp    • Congenital pes planus     last assessed 7/15/14    • COVID     4/30/21   • CPAP (continuous positive airway pressure) dependence    • Dental crowns present    • Depression    • Diabetes mellitus ([address] )    • Dizziness     occas--pt reports did see family doctor   • GERD (gastroesophageal reflux disease)    • Hemangioma of skin 08/26/2003    last assessed 8/26/03   • History of cancer chemotherapy     [facility] Oncologist Dr Pascual Banks   • History of gastroesophageal reflux (GERD)    • Hypercholesterolemia    • Hyperlipidemia    • Hypertension    • Irregular heart beat    • Kidney stone    • 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[facility] [address] ) 04/19/2006    last assessed 12/31/14    • Osteoarthritis of both knees     last assessed 12/31/14    • Paroxysmal atrial fibrillation ([facility] [address] )    • Port-A-Cath in place    • S/P ablation of atrial flutter    • Shortness of breath     per pt "from chemo-not drastic--still working and goes up steps"   • Sleep apnea    • Wears glasses      Past Surgical History:   Procedure Laterality Date   • ABDOMINAL ADHESION SURGERY N/A 10/31/2022    Procedure: LYSIS ADHESIONS, colectomy, vascular pedicle flap;  Surgeon: Low Lopez MD;  [facility];  Service: Surgical Oncology   • ABDOMINAL SURGERY  06/2006    20cm GIST removed    • ABLATION SOFT TISSUE N/A 10/31/2022    Procedure: SOFT TISSUE ABLATION;  Surgeon: Low Lopez MD;  [facility];  Service: Surgical Oncology   • APPENDECTOMY     • ATRIAL ABLATION SURGERY     • CARPAL TUNNEL RELEASE Bilateral    • COLONOSCOPY     • FL GUIDED CENTRAL VENOUS ACCESS DEVICE INSERTION  05/31/2022   • FL RETROGRADE PYELOGRAM  07/18/2022   • FL RETROGRADE PYELOGRAM  8/22/2022   • HYSTERECTOMY      fibroids   • IR MESENTERIC/VISCERAL ANGIOGRAM  11/12/2022   • IR PORT CHECK  06/23/2022   • IR PORT REMOVAL AND PLACEMENT NEW SITE  06/28/2022   • JOINT REPLACEMENT Bilateral     knees   • KIDNEY STONE SURGERY Right 09/17/2021    placed stent in  for kidney stone   • KNEE SURGERY     • KNEE SURGERY Left 03/2019   • LAPAROTOMY N/A 10/31/2022    Procedure: EXPLORATORY LAPAROTOMY;  Surgeon: Low Lopez MD;  [facility];  Service: Surgical Oncology   • OOPHORECTOMY      cyst   • KS CYSTO/URETERO W/LITHOTRIPSY &INDWELL STENT INSRT Left 8/22/2022    Procedure: CYSTOSCOPY URETEROSCOPY WITH LITHOTRIPSY HOLMIUM LASER, RETROGRADE PYELOGRAM AND INSERTION STENT URETERAL;  Surgeon: Rodo Acuña MD Ishmael;  Location: [facility];  Service: Urology   • MI CYSTOSCOPY,INSERT URETERAL STENT Left 2022    Procedure: EXCHANGE STENT URETERAL;  Surgeon: Jyoti Barbosa MD;  [facility];  Service: Urology   • MI CYSTOURETHROSCOPY,URETER CATHETER Left 2022    Procedure: CYSTOSCOPY RETROGRADE PYELOGRAM WITH INSERTION STENT URETERAL;  Surgeon: Jyoti Barbosa MD;  Location[facility];  Service: Urology   • TONSILLECTOMY     • TUBAL LIGATION     • TUMOR EXCISION  2006    gastrointestinal stromal tumor   • TUNNELED VENOUS PORT PLACEMENT N/A 2022    Procedure: INSERTION VENOUS PORT (PORT-A-CATH); Surgeon: Liliana Zhu MD;  [facility];  Service: Surgical Oncology   • UPPER GASTROINTESTINAL ENDOSCOPY     • WHIPPLE PROCEDURE/PANCREATICO-DUODENECTOMY N/A 10/31/2022    Procedure: WHIPPLE PROCEDURE/PANCREATICO-DUODENECTOMY, IOUS;  Surgeon: Liliana Zhu MD;  [facility];  Service: Surgical Oncology     Problem list reviewed  FAMILY HISTORY:  Non-contributory    SOCIAL HISTORY:  Social History     Substance and Sexual Activity   Alcohol Use Not Currently    Comment: social drinker per allscript      Social History     Substance and Sexual Activity   Drug Use Never     Social History     Tobacco Use   Smoking Status Former Smoker   • Years: 9 00   • Types: Cigarettes   Smokeless Tobacco Never Used       ALLERGIES:  Allergies   Allergen Reactions   • Other Rash     Adhesive tape        MEDICATIONS:  All current active medications have been reviewed  Patient is currently on IV ceftriaxone  PHYSICAL EXAM:  Vitals:  Temp:  [97 °F (36 1 °C)-97 7 °F (36 5 °C)] 97 7 °F (36 5 °C)  HR:  [60-77] 77  Resp:  [16-22] 17  BP: (113-130)/(67-75) 130/75  SpO2:  [89 %-97 %] 90 %  Temp (24hrs), Av 4 °F (36 3 °C), Min:97 °F (36 1 °C), Max:97 7 °F (36 5 °C)  Current: Temperature: 97 7 °F (36 5 °C)     Physical Exam:  General:  Well-nourished, well-developed, in no acute distress   Awake, alert and oriented x 3  Eyes:  Conjunctive clear with no hemorrhages or effusions  Oropharynx:  No ulcers, no lesions, pharynx benign, no tonsillitis  Neck:  Supple, no lymphadenopathy, no mass, nontender  Lungs:  Expansion symmetric, no rales, no wheezing, no accessory muscle use  Cardiac:  Regular rate and rhythm, normal S1, normal S2, no murmurs  Abdomen:  Soft, nondistended, non-tender, no HSM  Extremities:  Mild leg edema, no erythema, nontender  No ulcers  Skin:  No rashes, no ulcers  Neurological:  Moves all four extremities spontaneously, sensation grossly intact    LABS, IMAGING, & OTHER STUDIES:  Lab Results:  I have personally reviewed pertinent labs  Results from last 7 days   Lab Units 11/15/22  0543 11/14/22  0518 11/13/22 0430 11/12/22  8842 11/12/22  0435 11/12/22  0102   POTASSIUM mmol/L 3 3* 3 9 3 3* 4 0  --  3 5   CHLORIDE mmol/L 108 107 109* 107  --  105   CO2 mmol/L 27 24 27 22  --  21   CO2, I-STAT mmol/L  --   --   --   --  24  --    BUN mg/dL 8 9 13 12  --  11   CREATININE mg/dL 0 41* 0 41* 0 43* 0 66  --  0 62   EGFR ml/min/1 73sq m 111 111 109 94  --  96   GLUCOSE, ISTAT mg/dl  --   --   --   --  175*  --    CALCIUM mg/dL 8 4 8 4 7 7* 7 9*  --  8 1*   AST U/L  --   --  79* 212*  --  245*   ALT U/L  --   --  58 87*  --  78   ALK PHOS U/L  --   --  594* 875*  --  943*     Results from last 7 days   Lab Units 11/15/22  0543 11/14/22  0518 11/14/22  0035 11/13/22  1752 11/13/22  0430   WBC Thousand/uL 11 75* 12 87*  --   --  15 39*   HEMOGLOBIN g/dL 8 0* 7 7* 7 9*   < > 6 9*   PLATELETS Thousands/uL 169 141*  --   --  147*    < > = values in this interval not displayed  Results from last 7 days   Lab Units 11/12/22 0224 11/12/22  0218   BLOOD CULTURE  No Growth at 72 hrs  No Growth at 72 hrs  Imaging Studies:   I have personally reviewed pertinent imaging study reports and images in PACS  CXR reviewed personally  No infiltrates  Chest/abdomen/pelvis CT reviewed personally  Improved PE  No consolidation  Fluid collection between caudate and left lateral segment  Postoperative changes  EKG, Pathology, and Other Studies:   I have personally reviewed pertinent reports

## 2022-11-16 ENCOUNTER — HOSPITAL ENCOUNTER (OUTPATIENT)
Dept: INFUSION CENTER | Facility: HOSPITAL | Age: 62
Discharge: HOME | End: 2022-11-16
Attending: INTERNAL MEDICINE

## 2022-11-16 ENCOUNTER — PATIENT OUTREACH (OUTPATIENT)
Dept: CASE MANAGEMENT | Facility: HOSPITAL | Age: 62
End: 2022-11-16

## 2022-11-16 LAB
ANION GAP SERPL CALCULATED.3IONS-SCNC: 7 MMOL/L (ref 4–13)
BASOPHILS # BLD AUTO: 0.04 THOUSANDS/ÂΜL (ref 0–0.1)
BASOPHILS NFR BLD AUTO: 0 % (ref 0–1)
BUN SERPL-MCNC: 8 MG/DL (ref 5–25)
CALCIUM SERPL-MCNC: 8.4 MG/DL (ref 8.3–10.1)
CHLORIDE SERPL-SCNC: 108 MMOL/L (ref 96–108)
CO2 SERPL-SCNC: 26 MMOL/L (ref 21–32)
CREAT SERPL-MCNC: 0.43 MG/DL (ref 0.6–1.3)
EOSINOPHIL # BLD AUTO: 0.08 THOUSAND/ÂΜL (ref 0–0.61)
EOSINOPHIL NFR BLD AUTO: 1 % (ref 0–6)
ERYTHROCYTE [DISTWIDTH] IN BLOOD BY AUTOMATED COUNT: 16.2 % (ref 11.6–15.1)
GFR SERPL CREATININE-BSD FRML MDRD: 109 ML/MIN/1.73SQ M
GLUCOSE SERPL-MCNC: 115 MG/DL (ref 65–140)
GLUCOSE SERPL-MCNC: 132 MG/DL (ref 65–140)
GLUCOSE SERPL-MCNC: 137 MG/DL (ref 65–140)
GLUCOSE SERPL-MCNC: 139 MG/DL (ref 65–140)
GLUCOSE SERPL-MCNC: 159 MG/DL (ref 65–140)
GLUCOSE SERPL-MCNC: 168 MG/DL (ref 65–140)
HCT VFR BLD AUTO: 26.8 % (ref 34.8–46.1)
HGB BLD-MCNC: 8.3 G/DL (ref 11.5–15.4)
IMM GRANULOCYTES # BLD AUTO: 0.12 THOUSAND/UL (ref 0–0.2)
IMM GRANULOCYTES NFR BLD AUTO: 1 % (ref 0–2)
LYMPHOCYTES # BLD AUTO: 1.16 THOUSANDS/ÂΜL (ref 0.6–4.47)
LYMPHOCYTES NFR BLD AUTO: 11 % (ref 14–44)
MCH RBC QN AUTO: 29.1 PG (ref 26.8–34.3)
MCHC RBC AUTO-ENTMCNC: 31 G/DL (ref 31.4–37.4)
MCV RBC AUTO: 94 FL (ref 82–98)
MONOCYTES # BLD AUTO: 0.6 THOUSAND/ÂΜL (ref 0.17–1.22)
MONOCYTES NFR BLD AUTO: 6 % (ref 4–12)
NEUTROPHILS # BLD AUTO: 8.38 THOUSANDS/ÂΜL (ref 1.85–7.62)
NEUTS SEG NFR BLD AUTO: 81 % (ref 43–75)
NRBC BLD AUTO-RTO: 0 /100 WBCS
PLATELET # BLD AUTO: 160 THOUSANDS/UL (ref 149–390)
PMV BLD AUTO: 10.9 FL (ref 8.9–12.7)
POTASSIUM SERPL-SCNC: 3.3 MMOL/L (ref 3.5–5.3)
RBC # BLD AUTO: 2.85 MILLION/UL (ref 3.81–5.12)
SODIUM SERPL-SCNC: 141 MMOL/L (ref 135–147)
WBC # BLD AUTO: 10.38 THOUSAND/UL (ref 4.31–10.16)

## 2022-11-16 RX ORDER — INSULIN LISPRO 100 [IU]/ML
1-5 INJECTION, SOLUTION INTRAVENOUS; SUBCUTANEOUS EVERY 6 HOURS SCHEDULED
Status: DISCONTINUED | OUTPATIENT
Start: 2022-11-16 | End: 2022-11-18

## 2022-11-16 RX ORDER — POTASSIUM CHLORIDE 20 MEQ/1
40 TABLET, EXTENDED RELEASE ORAL 2 TIMES DAILY
Status: COMPLETED | OUTPATIENT
Start: 2022-11-16 | End: 2022-11-16

## 2022-11-16 RX ADMIN — ACETAMINOPHEN 975 MG: 325 TABLET ORAL at 16:14

## 2022-11-16 RX ADMIN — ATORVASTATIN CALCIUM 40 MG: 40 TABLET, FILM COATED ORAL at 21:31

## 2022-11-16 RX ADMIN — APIXABAN 5 MG: 5 TABLET, FILM COATED ORAL at 17:55

## 2022-11-16 RX ADMIN — LOSARTAN POTASSIUM 50 MG: 50 TABLET, FILM COATED ORAL at 08:13

## 2022-11-16 RX ADMIN — PAROXETINE 37.5 MG: 37.5 TABLET, FILM COATED, EXTENDED RELEASE ORAL at 08:16

## 2022-11-16 RX ADMIN — POTASSIUM CHLORIDE 40 MEQ: 1500 TABLET, EXTENDED RELEASE ORAL at 12:05

## 2022-11-16 RX ADMIN — DILTIAZEM HYDROCHLORIDE 240 MG: 240 CAPSULE, COATED, EXTENDED RELEASE ORAL at 08:13

## 2022-11-16 RX ADMIN — CEFTRIAXONE 2000 MG: 2 INJECTION, POWDER, FOR SOLUTION INTRAMUSCULAR; INTRAVENOUS at 10:06

## 2022-11-16 RX ADMIN — METOPROLOL SUCCINATE 25 MG: 25 TABLET, EXTENDED RELEASE ORAL at 08:13

## 2022-11-16 RX ADMIN — SOTALOL HYDROCHLORIDE 120 MG: 120 TABLET ORAL at 08:16

## 2022-11-16 RX ADMIN — POTASSIUM CHLORIDE 40 MEQ: 1500 TABLET, EXTENDED RELEASE ORAL at 17:55

## 2022-11-16 RX ADMIN — METOPROLOL SUCCINATE 25 MG: 25 TABLET, EXTENDED RELEASE ORAL at 17:55

## 2022-11-16 RX ADMIN — APIXABAN 5 MG: 5 TABLET, FILM COATED ORAL at 08:13

## 2022-11-16 RX ADMIN — GABAPENTIN 100 MG: 100 CAPSULE ORAL at 21:31

## 2022-11-16 RX ADMIN — GABAPENTIN 100 MG: 100 CAPSULE ORAL at 16:14

## 2022-11-16 RX ADMIN — ACETAMINOPHEN 975 MG: 325 TABLET ORAL at 00:00

## 2022-11-16 RX ADMIN — PANTOPRAZOLE SODIUM 40 MG: 40 TABLET, DELAYED RELEASE ORAL at 06:18

## 2022-11-16 RX ADMIN — PANTOPRAZOLE SODIUM 40 MG: 40 TABLET, DELAYED RELEASE ORAL at 16:14

## 2022-11-16 RX ADMIN — INSULIN LISPRO 1 UNITS: 100 INJECTION, SOLUTION INTRAVENOUS; SUBCUTANEOUS at 12:03

## 2022-11-16 RX ADMIN — SOTALOL HYDROCHLORIDE 120 MG: 120 TABLET ORAL at 21:31

## 2022-11-16 RX ADMIN — ACETAMINOPHEN 975 MG: 325 TABLET ORAL at 08:13

## 2022-11-16 RX ADMIN — GABAPENTIN 100 MG: 100 CAPSULE ORAL at 08:13

## 2022-11-16 NOTE — PROGRESS NOTES
Cardiology Progress Note - Chelsea Lester 57 y o  female MRN: [id number]    Unit/Bed#: [facility] 914-01 Encounter: [id number]      Assessment:  Principal Problem:    Hematemesis  Active Problems:    Supraventricular tachycardia ([address] )    Hypertension    Severe obstructive sleep apnea    Type 2 diabetes mellitus without complication, without long-term current use of insulin (HCC)    Esophageal reflux    Pancreatic cancer (HCC)    Paroxysmal A-fib (HCC)    Pulmonary embolism (HCC)    Streptococcal bacteremia    H/O Whipple procedure      Plan:  Paroxysmal SVT  -Patient recently discharged s/p whipple procedure for pancreatic adenocarcinoma  Readmitted for bloody emesis s/p EGD, common hepatic stenting    -Patient has had several episodes of SVT this admission that broke spontaneously, appears as AVNRT  -Patient has a history of PAF/flutter and paroxysmal SVT, patient notes history of palpitations that sometimes improve with vagal maneuvers  History of prior ablation for right-sided atrial tachycardia    -On Eliquis 5 mg BID due to recent PE after surgery   -On Lipitor 40 mg daily, Losartan 50 mg daily, Sotalol 120 mg Q12, Toprol-XL 50 -> 25 mg BID  -Cardizem CD 24 hr capsule 240 mg daily  -11/4 Echo: 50% EF   -Na 142 -> 141, K 3 3 -> 3 3   -11/15 fluids In 2 2L, Out 1 8L, 11/16 in 456 ml, out 1 55L  -No additional episodes of SVT noted overnight on telemetry   -Patient did not notice any palpitations overnight or today       Plan  -No SVT occurences overnight on telemetry, will continue medical management at this time   -Continue Cardizem CD 24 hr capsule 240 mg daily   -Continue Eliquis, Lipitor, Losartan, Sotalol, Toprol XL  -If patient is in persistent SVT, recommend Adenosine or Cardizem drip   -If patient continues to go into SVT we will consider switching sotalol to amiodarone    -Follow telemetry closely  -Replete K as needed  -F/U labs    -Consider outpatient Zio patch       Paroxysmal Atrial Fibrillation  -Patient maintains sinus rhythm with sotalol, continued inpatient   -See A&P for paroxysmal SVT  Subjective:   Patient seen and examined  She notes overnight that she felt nervous and awoke from sleep last night, unsure if her heart was beating fast  No runs of SVT noted on telemetry overnight  She feels well this AM  Notes her legs are still "a bit" swollen  She is eating and drinking well  Review of Systems   Constitutional: Positive for fatigue  Negative for chills, fever and unexpected weight change  HENT: Negative for hearing loss and sore throat  Eyes: Negative for visual disturbance  Respiratory: Negative for cough and shortness of breath  Cardiovascular: Negative for chest pain and palpitations  Gastrointestinal: Negative for abdominal distention, abdominal pain, blood in stool, constipation, diarrhea, nausea and vomiting  Endocrine: Negative for polyuria  Genitourinary: Negative for dysuria  Musculoskeletal: Negative for arthralgias and back pain  Skin: Negative for rash and wound  Neurological: Negative for dizziness, tremors, seizures, weakness, light-headedness and headaches  Psychiatric/Behavioral: The patient is not nervous/anxious  Objective:     Vitals: Blood pressure 140/81, pulse 82, temperature 97 7 °F (36 5 °C), temperature source Oral, resp  rate 22, height 5' 4" (1 626 m), weight (!) 140 kg (307 lb 12 2 oz), SpO2 94 %  , Body mass index is 52 83 kg/m² ,   Orthostatic Blood Pressures    Flowsheet Row Most Recent Value   Blood Pressure 140/81 filed at 11/16/2022 0813   Patient Position - Orthostatic VS Sitting filed at 11/16/2022 0813            Intake/Output Summary (Last 24 hours) at 11/16/2022 0954  Last data filed at 11/16/2022 0801  Gross per 24 hour   Intake 290 ml   Output 2350 ml   Net -2060 ml       No significant arrhythmias seen on telemetry review  Physical Exam:  Physical Exam  Vitals reviewed     Constitutional:       Appearance: Normal appearance  She is obese  HENT:      Head: Normocephalic  Right Ear: External ear normal       Left Ear: External ear normal       Nose: Nose normal       Mouth/Throat:      Mouth: Mucous membranes are moist    Eyes:      Extraocular Movements: Extraocular movements intact  Pupils: Pupils are equal, round, and reactive to light  Cardiovascular:      Rate and Rhythm: Normal rate and regular rhythm  Pulses: Normal pulses  Heart sounds: Normal heart sounds  Pulmonary:      Effort: Pulmonary effort is normal       Breath sounds: Normal breath sounds  Abdominal:      General: Abdomen is flat  Bowel sounds are normal       Palpations: Abdomen is soft  Tenderness: There is no abdominal tenderness  Musculoskeletal:         General: Normal range of motion  Right lower leg: Edema present  Left lower leg: Edema present  Skin:     General: Skin is warm  Capillary Refill: Capillary refill takes less than 2 seconds  Neurological:      General: No focal deficit present  Mental Status: She is alert         Medications:      Current Facility-Administered Medications:   •  acetaminophen (TYLENOL) rectal suppository 650 mg, 650 mg, Rectal, Q6H PRN, Catie Uribe PA-C  •  acetaminophen (TYLENOL) tablet 975 mg, 975 mg, Oral, Q8H KIM, DAMIAN SandersC, 975 mg at 11/16/22 3892  •  apixaban (ELIQUIS) tablet 5 mg, 5 mg, Oral, BID, Yara Ahmadi PA-C, 5 mg at 11/16/22 1948  •  atorvastatin (LIPITOR) tablet 40 mg, 40 mg, Oral, HS, DAMIAN AgudeloC, 40 mg at 11/15/22 2122  •  cefTRIAXone (ROCEPHIN) 2,000 mg in dextrose 5 % 50 mL IVPB, 2,000 mg, Intravenous, Q24H, Alyssa Uribe PA-C, Last Rate: 100 mL/hr at 11/15/22 1100, 2,000 mg at 11/15/22 1100  •  diltiazem (CARDIZEM CD) 24 hr capsule 240 mg, 240 mg, Oral, Daily, Janeen Martin, DO, 240 mg at 11/16/22 0813  •  fentaNYL (SUBLIMAZE) injection 25 mcg, 25 mcg, Intravenous, Q3 min PRN, Areli Cruz MD Angel  •  gabapentin (NEURONTIN) capsule 100 mg, 100 mg, Oral, TID, [place] Denia Uribe, PA-C, 100 mg at 11/16/22 0813  •  insulin lispro (HumaLOG) 100 units/mL subcutaneous injection 1-5 Units, 1-5 Units, Subcutaneous, Q6H [facility], 1 Units at 11/15/22 1129 **AND** Fingerstick Glucose (POCT), , , Q6H, Alyssa R Rony PA-C  •  iodixanol (VISIPAQUE) 320 MG/ML injection 300 mL, 300 mL, Intra-arterial, Once in imaging, [place] Denia Uribe PA-C  •  losartan (COZAAR) tablet 50 mg, 50 mg, Oral, Daily, Alyssa R Rasmuson, PA-C, 50 mg at 11/16/22 0813  •  metoprolol (LOPRESSOR) injection 5 mg, 5 mg, Intravenous, Q6H PRN, [place] Denia Uribe, PA-C, 5 mg at 11/13/22 0359  •  metoprolol succinate (TOPROL-XL) 24 hr tablet 25 mg, 25 mg, Oral, BID, Alyssa R Rasmuson, PA-C, 25 mg at 11/16/22 0813  •  ondansetron (ZOFRAN) injection 4 mg, 4 mg, Intravenous, Q6H PRN, [place] Denia Uribe PA-C  •  pantoprazole (PROTONIX) EC tablet 40 mg, 40 mg, Oral, BID AC, Aniyah Gaxiola MD, 40 mg at 11/16/22 9391  •  PARoxetine (PAXIL-CR) 24 hr tablet 37 5 mg, 37 5 mg, Oral, QAM, Alyssa R Rasmuson, PA-C, 37 5 mg at 11/16/22 0816  •  sotalol (BETAPACE) tablet 120 mg, 120 mg, Oral, Q12H, Alyssa R Rasmuson, PA-C, 120 mg at 11/16/22 0816     Labs & Results:        Results from last 7 days   Lab Units 11/16/22  0542 11/15/22  0543 11/14/22  0518   WBC Thousand/uL 10 38* 11 75* 12 87*   HEMOGLOBIN g/dL 8 3* 8 0* 7 7*   HEMATOCRIT % 26 8* 26 5* 25 3*   PLATELETS Thousands/uL 160 169 141*         Results from last 7 days   Lab Units 11/16/22  0542 11/15/22  0543 11/14/22  0518 11/13/22  0430 11/12/22  0608 11/12/22  0435 11/12/22  0102   POTASSIUM mmol/L 3 3* 3 3* 3 9 3 3* 4 0  --  3 5   CHLORIDE mmol/L 108 108 107 109* 107  --  105   CO2 mmol/L 26 27 24 27 22  --  21   CO2, I-STAT mmol/L  --   --   --   --   --  24  --    BUN mg/dL 8 8 9 13 12  --  11   CREATININE mg/dL 0 43* 0 41* 0 41* 0 43* 0 66  --  0 62   CALCIUM mg/dL 8 4 8 4 8 4 7 7* 7 9* --  8 1*   ALK PHOS U/L  --   --   --  594* 875*  --  943*   ALT U/L  --   --   --  58 87*  --  78   AST U/L  --   --   --  79* 212*  --  245*   GLUCOSE, ISTAT mg/dl  --   --   --   --   --  175*  --      Results from last 7 days   Lab Units 11/12/22  0608 11/12/22  0102   INR  1 48* 1 62*   PTT seconds 29 29     Results from last 7 days   Lab Units 11/15/22  0543 11/14/22  0518 11/13/22  0430   MAGNESIUM mg/dL 2 2 2 3 1 9       EKG personally reviewed by Star Parrish MD      Counseling / Coordination of Care  Total floor / unit time spent today 15 minutes  Greater than 50% of total time was spent with the patient and / or family counseling and / or coordination of care  A description of the counseling / coordination of care: history/plan of care

## 2022-11-16 NOTE — PROGRESS NOTES
Per chart review patient is currently admitted at [facility]    Once patient is discharged home MSW will make follow up call

## 2022-11-16 NOTE — RESTORATIVE TECHNICIAN NOTE
Restorative Technician Note      Patient Name: Hernandez Abrams     Restorative Tech Visit Date: 11/16/22  Note Type: Mobility  Patient Position Upon Consult: Bedside chair  Activity Performed: Ambulated  Assistive Device: Roller walker  Patient Position at End of Consult: Bedside chair;  All needs within reach      Elier Gomez

## 2022-11-16 NOTE — PROGRESS NOTES
Progress Note - Infectious Disease   Ewelina Wolfe 57 y o  female MRN: [id number]  Unit/Bed#: [facility] 914-01 Encounter: [id number]      Impression/Recommendations:  1  Streptococcus oralis bacteremia in the postop period from Whipple resection  Source is most likely translocation from gut  Patient improved quickly on IV antibiotic  Bacteremia cleared quickly  2D echo without obvious vegetation  Repeat blood cultures during this admission have no growth thus far  Given source of bacteremia and rapid clearance of bacteremia, will continue with 14 day treatment course  Continue high-dose IV ceftriaxone  Treat x 14 days total, through   Follow-up on final blood cultures from this admission     2  Upper GI bleed, secondary to gastrojejunal anastomotic ulcer, resolved  Monitor      3  Pancreatic cancer, status post neoadjuvant chemotherapy and now status post Whipple resection      4  PE, improved on CT imaging this admission  Anticoagulation per primary service      Discussed with patient in detail regarding the above plan  Discussed with surgery service earlier  Antibiotics:  Ceftriaxone   Antibiotic # 12    Subjective:  Patient states that her stool is still black  Otherwise, she feels well  No nausea/vomiting  No abdominal pain  Temperature stays down  No chills  Objective:  Vitals:  Temp:  [97 5 °F (36 4 °C)-97 7 °F (36 5 °C)] 97 7 °F (36 5 °C)  HR:  [75-88] 82  Resp:  [17-22] 22  BP: (125-140)/(69-81) 140/81  SpO2:  [87 %-94 %] 94 %  Temp (24hrs), Av 7 °F (36 5 °C), Min:97 5 °F (36 4 °C), Max:97 7 °F (36 5 °C)  Current: Temperature: 97 7 °F (36 5 °C)    Physical Exam:     General: Awake, alert, cooperative, no distress  Neck:  Supple  No mass  No lymphadenopathy  Lungs: Expansion symmetric, no rales, no wheezing, respirations unlabored  Heart:  Regular rate and rhythm, S1 and S2 normal, no murmur     Abdomen: Soft, nondistended, non-tender, bowel sounds active all four quadrants, no masses, no organomegaly  Extremities: Stable leg edema  No erythema/warmth  No ulcer  Nontender to palpation  Skin:  No rash  Neuro: Moves all extremities  Invasive Devices     Central Venous Catheter Line  Duration           Port A Cath 05/31/22 Left Chest 168 days          Drain  Duration           Ureteral Internal Stent Left ureter 6 Fr  120 days                Labs studies:   I have personally reviewed pertinent labs  Results from last 7 days   Lab Units 11/16/22  0542 11/15/22  0543 11/14/22  0518 11/13/22  0430 11/12/22  6686 11/12/22  0435 11/12/22  0102   POTASSIUM mmol/L 3 3* 3 3* 3 9 3 3* 4 0  --  3 5   CHLORIDE mmol/L 108 108 107 109* 107  --  105   CO2 mmol/L 26 27 24 27 22  --  21   CO2, I-STAT mmol/L  --   --   --   --   --  24  --    BUN mg/dL 8 8 9 13 12  --  11   CREATININE mg/dL 0 43* 0 41* 0 41* 0 43* 0 66  --  0 62   EGFR ml/min/1 73sq m 109 111 111 109 94  --  96   GLUCOSE, ISTAT mg/dl  --   --   --   --   --  175*  --    CALCIUM mg/dL 8 4 8 4 8 4 7 7* 7 9*  --  8 1*   AST U/L  --   --   --  79* 212*  --  245*   ALT U/L  --   --   --  58 87*  --  78   ALK PHOS U/L  --   --   --  594* 875*  --  943*     Results from last 7 days   Lab Units 11/16/22  0542 11/15/22  0543 11/14/22  0518   WBC Thousand/uL 10 38* 11 75* 12 87*   HEMOGLOBIN g/dL 8 3* 8 0* 7 7*   PLATELETS Thousands/uL 160 169 141*     Results from last 7 days   Lab Units 11/12/22  0224 11/12/22  0218   BLOOD CULTURE  No Growth After 4 Days  No Growth After 4 Days  Imaging Studies:   I have personally reviewed pertinent imaging study reports and images in PACS  EKG, Pathology, and Other Studies:   I have personally reviewed pertinent reports

## 2022-11-16 NOTE — PHYSICAL THERAPY NOTE
PHYSICAL THERAPY NOTE          Patient Name: Yoon Singleton  CEZBX'U Date: 11/16/2022 11/16/22 1048   PT Last Visit   PT Visit Date 11/16/22   Note Type   Note Type Treatment   Pain Assessment   Pain Assessment Tool 0-10   Pain Score No Pain   Restrictions/Precautions   Weight Bearing Precautions Per Order No   Other Precautions Multiple lines; Fall Risk;Telemetry   General   Chart Reviewed Yes   Response to Previous Treatment Patient with no complaints from previous session  Family/Caregiver Present Yes   Cognition   Overall Cognitive Status WFL   Arousal/Participation Alert; Cooperative   Attention Within functional limits   Orientation Level Oriented X4   Memory Within functional limits   Following Commands Follows all commands and directions without difficulty   Comments pt pleasant and cooperative to participate in therapy session   Bed Mobility   Supine to Sit Unable to assess   Sit to Supine Unable to assess   Additional Comments pt sitting OOB in recliner upon arrival  Pt returned to sitting OOB in recliner with all needs wihtin reach at the end of therapy session   Transfers   Sit to Stand 5  Supervision   Additional items Armrests   Stand to Sit 5  Supervision   Additional items Armrests   Additional Comments transfers with RW   Ambulation/Elevation   Gait pattern Excessively slow; Short stride   Gait Assistance 5  Supervision   Assistive Device Rolling walker   Distance 2 x 80ft   Stair Management Assistance 5  Supervision   Stair Management Technique Two rails; Step to pattern; Foreward   Number of Stairs 2  (limited by lines, reports no questions or concerns)   Balance   Static Sitting Fair +   Dynamic Sitting Fair +   Static Standing Fair   Dynamic Standing Fair   Ambulatory Fair   Activity Tolerance   Activity Tolerance Patient tolerated treatment well   Medical Staff Made Aware spoke with CM, OT   Nurse Made Aware RN cleared pt to participate in therapy session   Assessment   Prognosis Good   Problem List Decreased strength;Decreased endurance; Impaired balance;Decreased mobility;Pain   Assessment Pt seen for PT treatment session this date  Therapy session focused on functional transfers, gait training, stair training and endurance training in order to improve overall mobility and independence  Pt requires assist of 1 for all mobility performed this date  Pt with improvements in all functional mobility performed, requiring close S level- no physical assistance  Stair training initiated this date, pt completes at close S/ CGA level using b/l Hrs and step to pattern  Pt does require 1x seated rest break between gait trials 2* fatigue  Pt making steady progress toward goals  Pt was left sitting OOB in recliner at the end of PT session with all needs in reach  Pt would benefit from continued PT services while in hospital to address remaining limitations  PT to continue to follow pt and recommends home with HHPT + family support  The patient's AM-PAC Basic Mobility Inpatient Short Form Raw Score is 18  A Raw score of greater than 16 suggests the patient may benefit from discharge to home  Please also refer to the recommendation of the Physical Therapist for safe discharge planning  Goals   Patient Goals to go home   STG Expiration Date 11/28/22   PT Treatment Day 1   Plan   Treatment/Interventions Functional transfer training;LE strengthening/ROM; Elevations; Patient/family training;Equipment eval/education;Gait training;Spoke to nursing;Spoke to case management;OT   Recommendation   PT Discharge Recommendation (S)  Home with home health rehabilitation  (+ family support as needed)   AM-PAC Basic Mobility Inpatient   Turning in Bed Without Bedrails 3   Lying on Back to Sitting on Edge of Flat Bed 3   Moving Bed to Chair 3   Standing Up From Chair 3   Walk in Room 3   Climb 3-5 Stairs 3   Basic Mobility Inpatient Raw Score 18   Basic Mobility Standardized Score 41 05   Highest Level Of Mobility   JH-HLM Goal 6: Walk 10 steps or more   JH-HLM Achieved 7: Walk 25 feet or more   Education   Education Provided Mobility training;Assistive device   Patient Demonstrates acceptance/verbal understanding   End of Consult   Patient Position at End of Consult Bedside chair; All needs within reach     Yenny Frederick, PT, DPT

## 2022-11-16 NOTE — PLAN OF CARE
Problem: PHYSICAL THERAPY ADULT  Goal: Performs mobility at highest level of function for planned discharge setting  See evaluation for individualized goals  Description: Treatment/Interventions: Functional transfer training, LE strengthening/ROM, Therapeutic exercise, Endurance training, Gait training, Equipment eval/education, Bed mobility, OT          See flowsheet documentation for full assessment, interventions and recommendations  Outcome: Progressing  Note: Prognosis: Good  Problem List: Decreased strength, Decreased endurance, Impaired balance, Decreased mobility, Pain  Assessment: Pt seen for PT treatment session this date  Therapy session focused on functional transfers, gait training, stair training and endurance training in order to improve overall mobility and independence  Pt requires assist of 1 for all mobility performed this date  Pt with improvements in all functional mobility performed, requiring close S level- no physical assistance  Stair training initiated this date, pt completes at close S/ CGA level using b/l Hrs and step to pattern  Pt does require 1x seated rest break between gait trials 2* fatigue  Pt making steady progress toward goals  Pt was left sitting OOB in recliner at the end of PT session with all needs in reach  Pt would benefit from continued PT services while in hospital to address remaining limitations  PT to continue to follow pt and recommends home with HHPT + family support  The patient's AM-PAC Basic Mobility Inpatient Short Form Raw Score is 18  A Raw score of greater than 16 suggests the patient may benefit from discharge to home  Please also refer to the recommendation of the Physical Therapist for safe discharge planning  PT Discharge Recommendation: (S) Home with home health rehabilitation (+ family support as needed)    See flowsheet documentation for full assessment

## 2022-11-16 NOTE — UTILIZATION REVIEW
Continued Stay Review    Date: 11/16                         Current Patient Class: Inpatient  Current Level of Care: Med surg    HPI:61 y o  female initially admitted on 11/12    Assessment/Plan:   Continue high-dose Iv antibiotics  Treat x14 days total through 11/18  Per Cardiology; PSVT - Having this in the setting of her recent illness, postoperative from a Whipple and having postoperative bleeding  It appears as AVNRT and she gives a history of breaking her SVT with vagal maneuvers  Continue with metoprolol and Cardizem  Paroxysmal atrial fibrillation - Maintains sinus rhythm with sotalol  Continue the same dosing regimen   Will decreasing metoprolol dose as we had Cardizem   If SVT continues to be a problem we could change out sotalol for amiodarone   On Eliquis for stroke prevention given her recent PE  Vital Signs:   11/16/22 11:17:36 97 7 °F (36 5 °C) 72 16 108/60 76 -- -- 93 % -- -- -- --   11/16/22 0813 97 7 °F (36 5 °C) 82 22 140/81 -- -- -- 94 % -- -- None (Room air) Sitting   11/16/22 03:02:16 97 7 °F (36 5 °C) 77 18 125/71 89 -- -- 87 %   Abnormal  -- --       Pertinent Labs/Diagnostic Results:       Results from last 7 days   Lab Units 11/16/22  0542 11/15/22  0543 11/14/22  0518 11/14/22  0035 11/13/22  1752 11/12/22  0435 11/12/22  0102 11/11/22  1120 11/10/22  0456   WBC Thousand/uL 10 38* 11 75* 12 87*  --   --    < > 31 24* 9 42 12 68*   HEMOGLOBIN g/dL 8 3* 8 0* 7 7* 7 9* 7 6*   < > 7 7* 7 4* 8 0*   I STAT HEMOGLOBIN   --   --   --   --   --    < >  --   --   --    HEMATOCRIT % 26 8* 26 5* 25 3* 25 9* 23 9*   < > 24 7* 24 7* 26 2*   HEMATOCRIT, ISTAT   --   --   --   --   --    < >  --   --   --    PLATELETS Thousands/uL 160 169 141*  --   --    < > 366 183 155   NEUTROS ABS Thousands/µL 8 38*  --   --   --   --   --   --  7 26  --    BANDS PCT %  --   --   --   --   --   --  7  --  4    < > = values in this interval not displayed           Results from last 7 days   Lab Units 11/16/22  0542 11/15/22  0543 11/14/22  0518 11/13/22  0430 11/12/22  0849 11/12/22  0608 11/12/22  0435 11/11/22  1120 11/10/22  0456   SODIUM mmol/L 141 142 139 142  --  138  --    < > 139   POTASSIUM mmol/L 3 3* 3 3* 3 9 3 3*  --  4 0  --    < > 3 4*   CHLORIDE mmol/L 108 108 107 109*  --  107  --    < > 105   CO2 mmol/L 26 27 24 27  --  22  --    < > 28   CO2, I-STAT mmol/L  --   --   --   --   --   --  24  --   --    ANION GAP mmol/L 7 7 8 6  --  9  --    < > 6   BUN mg/dL 8 8 9 13  --  12  --    < > 9   CREATININE mg/dL 0 43* 0 41* 0 41* 0 43*  --  0 66  --    < > 0 45*   EGFR ml/min/1 73sq m 109 111 111 109  --  94  --    < > 107   CALCIUM mg/dL 8 4 8 4 8 4 7 7*  --  7 9*  --    < > 8 6   CALCIUM, IONIZED mmol/L  --   --  1 11*  --  1 02*  --   --   --   --    CALCIUM, IONIZED, ISTAT mmol/L  --   --   --   --   --   --  1 03*  --   --    MAGNESIUM mg/dL  --  2 2 2 3 1 9  --  1 6  --   --  1 9   PHOSPHORUS mg/dL  --  3 3 2 9 2 9  --  3 9  --   --   --     < > = values in this interval not displayed       Results from last 7 days   Lab Units 11/13/22  0430 11/12/22  0608 11/12/22  0102   AST U/L 79* 212* 245*   ALT U/L 58 87* 78   ALK PHOS U/L 594* 875* 943*   TOTAL PROTEIN g/dL 4 9* 5 1* 5 3*   ALBUMIN g/dL 1 7* 2 0* 1 7*   TOTAL BILIRUBIN mg/dL 1 05* 3 12* 1 86*   BILIRUBIN DIRECT mg/dL 0 49*  --   --      Results from last 7 days   Lab Units 11/16/22  1115 11/16/22  0621 11/16/22  0025 11/15/22  1652 11/15/22  1609 11/15/22  1120 11/15/22  0812 11/15/22  0547 11/14/22  2358 11/14/22  1717 11/14/22  1241 11/14/22  0517   POC GLUCOSE mg/dl 159* 137 115 130 128 188* 123 113 117 123 203* 134     Results from last 7 days   Lab Units 11/16/22  0542 11/15/22  0543 11/14/22  0518 11/13/22  0430 11/12/22  0608 11/12/22  0102 11/11/22  1120 11/10/22  0456   GLUCOSE RANDOM mg/dL 132 117 150* 141* 237* 204* 201* 155*         Results from last 7 days   Lab Units 11/12/22  0435   I STAT BASE EXC mmol/L -4*   I STAT O2 SAT % 97*   ISTAT PH ART  7 282*   I STAT ART PCO2 mm HG 47 1*   I STAT ART PO2 mm  0   I STAT ART HCO3 mmol/L 22 3         Results from last 7 days   Lab Units 11/10/22  0941   HS TNI 0HR ng/L 7         Results from last 7 days   Lab Units 11/12/22  0608 11/12/22  0102   PROTIME seconds 18 2* 19 5*   INR  1 48* 1 62*   PTT seconds 29 29     Results from last 7 days   Lab Units 11/10/22  0456   TSH 3RD GENERATON uIU/mL 2 180     Results from last 7 days   Lab Units 11/12/22  0608   PROCALCITONIN ng/ml 5 17*     Results from last 7 days   Lab Units 11/12/22  0931 11/12/22  0608 11/12/22  0313 11/12/22  0110   LACTIC ACID mmol/L 2 1* 3 7* 4 1* 4 8*       Results from last 7 days   Lab Units 11/12/22  1100   CLARITY UA  Clear   COLOR UA  Yellow   SPEC GRAV UA  >=1 050*   PH UA  6 5   GLUCOSE UA mg/dl Negative   KETONES UA mg/dl Negative   BLOOD UA  Trace*   PROTEIN UA mg/dl 30 (1+)*   NITRITE UA  Negative   BILIRUBIN UA  Small*   UROBILINOGEN UA (BE) mg/dl 2 0*   LEUKOCYTES UA  Negative   WBC UA /hpf 2-4*   RBC UA /hpf 10-20*   BACTERIA UA /hpf None Seen   EPITHELIAL CELLS WET PREP /hpf None Seen       Results from last 7 days   Lab Units 11/12/22  0224 11/12/22  0218   BLOOD CULTURE  No Growth After 4 Days  No Growth After 4 Days         Medications:   Scheduled Medications:  acetaminophen, 975 mg, Oral, Q8H [facility]  apixaban, 5 mg, Oral, BID  atorvastatin, 40 mg, Oral, HS  cefTRIAXone, 2,000 mg, Intravenous, Q24H  diltiazem, 240 mg, Oral, Daily  gabapentin, 100 mg, Oral, TID  insulin lispro, 1-5 Units, Subcutaneous, Q6H KIM  losartan, 50 mg, Oral, Daily  metoprolol succinate, 25 mg, Oral, BID  pantoprazole, 40 mg, Oral, BID AC  PARoxetine, 37 5 mg, Oral, QAM  potassium chloride, 40 mEq, Oral, BID  sotalol, 120 mg, Oral, Q12H      Continuous IV Infusions: None     PRN Meds:  acetaminophen, 650 mg, Rectal, Q6H PRN  fentaNYL, 25 mcg, Intravenous, Q3 min PRN  iodixanol, 300 mL, Intra-arterial, Once in imaging  metoprolol, 5 mg, Intravenous, Q6H PRN  ondansetron, 4 mg, Intravenous, Q6H PRN      Discharge Plan: See above    Network Utilization Review Department  ATTENTION: Please call with any questions or concerns to [phone] and carefully listen to the prompts so that you are directed to the right person  All voicemails are confidential   Jan Ruvalcaba all requests for admission clinical reviews, approved or denied determinations and any other requests to dedicated fax number below belonging to the campus where the patient is receiving treatment   List of dedicated fax numbers for the Facilities:  [address] DENIALS (Administrative/Medical Necessity) [phone]   [address] (Maternity/NICU/Pediatrics) [phone]   [address] [phone]   Maxim Rossi  [phone]   [address] [address] [address] [phone]   1556 [facility] 815 [facility] [phone]

## 2022-11-17 ENCOUNTER — HOSPITAL ENCOUNTER (OUTPATIENT)
Dept: INFUSION CENTER | Facility: HOSPITAL | Age: 62
Discharge: HOME | End: 2022-11-17
Attending: INTERNAL MEDICINE

## 2022-11-17 LAB
ANION GAP SERPL CALCULATED.3IONS-SCNC: 4 MMOL/L (ref 4–13)
BACTERIA BLD CULT: NORMAL
BACTERIA BLD CULT: NORMAL
BASOPHILS # BLD AUTO: 0.05 THOUSANDS/ÂΜL (ref 0–0.1)
BASOPHILS NFR BLD AUTO: 1 % (ref 0–1)
BUN SERPL-MCNC: 7 MG/DL (ref 5–25)
CALCIUM SERPL-MCNC: 8.1 MG/DL (ref 8.3–10.1)
CHLORIDE SERPL-SCNC: 111 MMOL/L (ref 96–108)
CO2 SERPL-SCNC: 26 MMOL/L (ref 21–32)
CREAT SERPL-MCNC: 0.41 MG/DL (ref 0.6–1.3)
EOSINOPHIL # BLD AUTO: 0.08 THOUSAND/ÂΜL (ref 0–0.61)
EOSINOPHIL NFR BLD AUTO: 1 % (ref 0–6)
ERYTHROCYTE [DISTWIDTH] IN BLOOD BY AUTOMATED COUNT: 16.3 % (ref 11.6–15.1)
GFR SERPL CREATININE-BSD FRML MDRD: 111 ML/MIN/1.73SQ M
GLUCOSE SERPL-MCNC: 115 MG/DL (ref 65–140)
GLUCOSE SERPL-MCNC: 125 MG/DL (ref 65–140)
GLUCOSE SERPL-MCNC: 126 MG/DL (ref 65–140)
GLUCOSE SERPL-MCNC: 130 MG/DL (ref 65–140)
GLUCOSE SERPL-MCNC: 147 MG/DL (ref 65–140)
GLUCOSE SERPL-MCNC: 158 MG/DL (ref 65–140)
GLUCOSE SERPL-MCNC: 166 MG/DL (ref 65–140)
HCT VFR BLD AUTO: 26.3 % (ref 34.8–46.1)
HGB BLD-MCNC: 8 G/DL (ref 11.5–15.4)
IMM GRANULOCYTES # BLD AUTO: 0.07 THOUSAND/UL (ref 0–0.2)
IMM GRANULOCYTES NFR BLD AUTO: 1 % (ref 0–2)
LYMPHOCYTES # BLD AUTO: 1.14 THOUSANDS/ÂΜL (ref 0.6–4.47)
LYMPHOCYTES NFR BLD AUTO: 15 % (ref 14–44)
MCH RBC QN AUTO: 29.2 PG (ref 26.8–34.3)
MCHC RBC AUTO-ENTMCNC: 30.4 G/DL (ref 31.4–37.4)
MCV RBC AUTO: 96 FL (ref 82–98)
MONOCYTES # BLD AUTO: 0.52 THOUSAND/ÂΜL (ref 0.17–1.22)
MONOCYTES NFR BLD AUTO: 7 % (ref 4–12)
NEUTROPHILS # BLD AUTO: 5.93 THOUSANDS/ÂΜL (ref 1.85–7.62)
NEUTS SEG NFR BLD AUTO: 75 % (ref 43–75)
NRBC BLD AUTO-RTO: 0 /100 WBCS
PLATELET # BLD AUTO: 133 THOUSANDS/UL (ref 149–390)
PMV BLD AUTO: 10.6 FL (ref 8.9–12.7)
POTASSIUM SERPL-SCNC: 3.5 MMOL/L (ref 3.5–5.3)
RBC # BLD AUTO: 2.74 MILLION/UL (ref 3.81–5.12)
SODIUM SERPL-SCNC: 141 MMOL/L (ref 135–147)
WBC # BLD AUTO: 7.79 THOUSAND/UL (ref 4.31–10.16)

## 2022-11-17 RX ORDER — OXYCODONE HYDROCHLORIDE 10 MG/1
10 TABLET ORAL EVERY 4 HOURS PRN
Status: DISCONTINUED | OUTPATIENT
Start: 2022-11-17 | End: 2022-11-18 | Stop reason: HOSPADM

## 2022-11-17 RX ORDER — OXYCODONE HYDROCHLORIDE 5 MG/1
5 TABLET ORAL EVERY 4 HOURS PRN
Status: DISCONTINUED | OUTPATIENT
Start: 2022-11-17 | End: 2022-11-18 | Stop reason: HOSPADM

## 2022-11-17 RX ORDER — POTASSIUM CHLORIDE 20 MEQ/1
40 TABLET, EXTENDED RELEASE ORAL ONCE
Status: COMPLETED | OUTPATIENT
Start: 2022-11-17 | End: 2022-11-17

## 2022-11-17 RX ADMIN — PANTOPRAZOLE SODIUM 40 MG: 40 TABLET, DELAYED RELEASE ORAL at 18:21

## 2022-11-17 RX ADMIN — GABAPENTIN 100 MG: 100 CAPSULE ORAL at 22:03

## 2022-11-17 RX ADMIN — ACETAMINOPHEN 975 MG: 325 TABLET ORAL at 18:20

## 2022-11-17 RX ADMIN — METOPROLOL SUCCINATE 25 MG: 25 TABLET, EXTENDED RELEASE ORAL at 08:45

## 2022-11-17 RX ADMIN — ATORVASTATIN CALCIUM 40 MG: 40 TABLET, FILM COATED ORAL at 22:03

## 2022-11-17 RX ADMIN — INSULIN LISPRO 1 UNITS: 100 INJECTION, SOLUTION INTRAVENOUS; SUBCUTANEOUS at 12:20

## 2022-11-17 RX ADMIN — APIXABAN 5 MG: 5 TABLET, FILM COATED ORAL at 08:45

## 2022-11-17 RX ADMIN — PANTOPRAZOLE SODIUM 40 MG: 40 TABLET, DELAYED RELEASE ORAL at 06:06

## 2022-11-17 RX ADMIN — APIXABAN 5 MG: 5 TABLET, FILM COATED ORAL at 18:20

## 2022-11-17 RX ADMIN — POTASSIUM CHLORIDE 40 MEQ: 1500 TABLET, EXTENDED RELEASE ORAL at 08:45

## 2022-11-17 RX ADMIN — PAROXETINE 37.5 MG: 37.5 TABLET, FILM COATED, EXTENDED RELEASE ORAL at 08:46

## 2022-11-17 RX ADMIN — METOPROLOL SUCCINATE 25 MG: 25 TABLET, EXTENDED RELEASE ORAL at 18:20

## 2022-11-17 RX ADMIN — SOTALOL HYDROCHLORIDE 120 MG: 120 TABLET ORAL at 08:46

## 2022-11-17 RX ADMIN — ACETAMINOPHEN 975 MG: 325 TABLET ORAL at 00:57

## 2022-11-17 RX ADMIN — ACETAMINOPHEN 975 MG: 325 TABLET ORAL at 08:45

## 2022-11-17 RX ADMIN — SOTALOL HYDROCHLORIDE 120 MG: 120 TABLET ORAL at 22:03

## 2022-11-17 RX ADMIN — GABAPENTIN 100 MG: 100 CAPSULE ORAL at 08:45

## 2022-11-17 RX ADMIN — GABAPENTIN 100 MG: 100 CAPSULE ORAL at 18:20

## 2022-11-17 RX ADMIN — LOSARTAN POTASSIUM 50 MG: 50 TABLET, FILM COATED ORAL at 08:45

## 2022-11-17 RX ADMIN — DILTIAZEM HYDROCHLORIDE 240 MG: 240 CAPSULE, COATED, EXTENDED RELEASE ORAL at 08:45

## 2022-11-17 RX ADMIN — CEFTRIAXONE 2000 MG: 2 INJECTION, POWDER, FOR SOLUTION INTRAMUSCULAR; INTRAVENOUS at 10:16

## 2022-11-17 NOTE — OCCUPATIONAL THERAPY NOTE
Occupational Therapy Progress Note     Patient Name: Cece Oviedo  JZRXB'B Date: 11/17/2022  Problem List  Principal Problem:    Hematemesis  Active Problems:    Supraventricular tachycardia ([address] )    Hypertension    Severe obstructive sleep apnea    Type 2 diabetes mellitus without complication, without long-term current use of insulin (HCC)    Esophageal reflux    Pancreatic cancer (HCC)    Paroxysmal A-fib (HCC)    Pulmonary embolism (HCC)    Streptococcal bacteremia    H/O Whipple procedure          11/17/22 1311   OT Last Visit   OT Visit Date 11/17/22   Note Type   Note Type Treatment   Pain Assessment   Pain Assessment Tool 0-10   Pain Score No Pain   Hospital Pain Intervention(s) Repositioned; Ambulation/increased activity; Emotional support   Restrictions/Precautions   Weight Bearing Precautions Per Order No   Other Precautions Fall Risk   Lifestyle   Autonomy I w/ ADLS/IADLS, transfers and functional mobility PTA   Reciprocal Relationships Pt lives w/ her spouse who is retired   Service to [employer] full time   [address] to relax   Bed Mobility   Supine to Sit Unable to assess   Sit to Supine Unable to assess   Additional Comments Upon arrival, pt found sitting upright in recliner; @ end of session, pt left sitting upright in recliner with all functional needs in reach   Transfers   Sit to Stand 6  Modified independent   Additional items Armrests   Stand to Sit 6  Modified independent   Additional items Armrests   Additional Comments w/ RW   Functional Mobility   Functional Mobility 6  Modified independent   Additional Comments Pt completed household functional mobility w/ RW @ Mod I level   Additional items Rolling walker   Subjective   Subjective "I will be okay at home, I have everything on one floor once I get in "   Cognition   Overall Cognitive Status [facility]   Arousal/Participation Alert; Responsive;Arousable; Cooperative   Attention Within functional limits   Orientation Level Oriented X4   Memory Within functional limits   Following Commands Follows all commands and directions without difficulty   Comments Pt pleasant and cooperative, expresses no concerns regarding ADL/IADL performance @ this time   Activity Tolerance   Activity Tolerance Patient tolerated treatment well   Assessment   Assessment Pt is a 56 yo female who actively participated in skilled OT session on 11/17/2022  Upon arrival, pt found sitting upright in recliner and was agreeable to OT session  Pt performed STS and completed household functional mobility @ Mod I level w/ RW w/ no LOB/SOB t/o  Discussed DME recommendations w/ pt and pt's spouse to enhance occupational performance, decrease fall risk, and increased safety in which both were receptive of education provided  At the end of the session, pt was left sitting upright in recliner with all functional needs in reach  From an OT standpoint, recommend discharge to home w/ home health and increased social support once medically stable  Pt was receptive regarding education on returning home safely with energy conservation techniques and demonstrated good carryover during occupational/functional performance  At this time, pt demonstrates good insight/safety awareness and does not express any concerns regarding performing ADL/IADL/functional mobility tasks  No further skilled acute care OT services are needed at this time  The patient's raw score on the AM-PAC Daily Activity inpatient short form is 22, standardized score is 47 1, greater than 39 4  Patients at this level are likely to benefit from discharge to home  Please refer to the recommendation of the Occupational Therapist for safe discharge planning  Recommend continued engagement in ADL/IADL/functional mobility tasks with nursing and restorative therapy staff as appropriate to promote the highest level of independence prior to discharge   OT is discharging pt from caseload at this time, please reconsult if needed  Plan   OT Treatment Day 2   Recommendation   OT Discharge Recommendation Home with home health rehabilitation  (w/ increased social support/assistance upon d/c; d/c OT @ this time, no further acute care skills needed)   Equipment Recommended Raised toilet seat ($);Shower transfer bench ($$)   AM-PAC Daily Activity Inpatient   Lower Body Dressing 3   Bathing 3   Toileting 4   Upper Body Dressing 4   Grooming 4   Eating 4   Daily Activity Raw Score 22   Daily Activity Standardized Score (Calc for Raw Score >=11) 47  [address] [facility]   Following a Speech/Presentation 4   Understanding Ordinary Conversation 4   Taking Medications 4   Remembering Where Things Are Placed or Put Away 4   Remembering List of 4-5 Errands 4   Taking Care of Complicated Tasks 4   Applied Cognition Raw Score 24   Applied Cognition Standardized Score 62 21         Ailyn Max MS, OTR/L

## 2022-11-17 NOTE — PLAN OF CARE
Problem: MOBILITY - ADULT  Goal: Maintain or return to baseline ADL function  Description: INTERVENTIONS:  -  Assess patient's ability to carry out ADLs; assess patient's baseline for ADL function and identify physical deficits which impact ability to perform ADLs (bathing, care of mouth/teeth, toileting, grooming, dressing, etc )  - Assess/evaluate cause of self-care deficits   - Assess range of motion  - Assess patient's mobility; develop plan if impaired  - Assess patient's need for assistive devices and provide as appropriate  - Encourage maximum independence but intervene and supervise when necessary  - Involve family in performance of ADLs  - Assess for home care needs following discharge   - Consider OT consult to assist with ADL evaluation and planning for discharge  - Provide patient education as appropriate  Outcome: Progressing     Problem: Prexisting or High Potential for Compromised Skin Integrity  Goal: Skin integrity is maintained or improved  Description: INTERVENTIONS:  - Identify patients at risk for skin breakdown  - Assess and monitor skin integrity  - Assess and monitor nutrition and hydration status  - Monitor labs   - Assess for incontinence   - Turn and reposition patient  - Assist with mobility/ambulation  - Relieve pressure over bony prominences  - Avoid friction and shearing  - Provide appropriate hygiene as needed including keeping skin clean and dry  - Evaluate need for skin moisturizer/barrier cream  - Collaborate with interdisciplinary team   - Patient/family teaching  - Consider wound care consult   Outcome: Progressing     Problem: Potential for Falls  Goal: Patient will remain free of falls  Description: INTERVENTIONS:  - Educate patient/family on patient safety including physical limitations  - Instruct patient to call for assistance with activity   - Consult OT/PT to assist with strengthening/mobility   - Keep Call bell within reach  - Keep bed low and locked with side rails adjusted as appropriate  - Keep care items and personal belongings within reach  - Initiate and maintain comfort rounds  - Make Fall Risk Sign visible to staff  - Offer Toileting every 2 Hours, in advance of need  - Obtain necessary fall risk management equipment:   - Apply yellow socks and bracelet for high fall risk patients  - Consider moving patient to room near nurses station  Outcome: Progressing     Problem: Nutrition/Hydration-ADULT  Goal: Nutrient/Hydration intake appropriate for improving, restoring or maintaining nutritional needs  Description: Monitor and assess patient's nutrition/hydration status for malnutrition  Collaborate with interdisciplinary team and initiate plan and interventions as ordered  Monitor patient's weight and dietary intake as ordered or per policy  Utilize nutrition screening tool and intervene as necessary  Determine patient's food preferences and provide high-protein, high-caloric foods as appropriate       INTERVENTIONS:  - Monitor oral intake, urinary output, labs, and treatment plans  - Assess nutrition and hydration status and recommend course of action  - Evaluate amount of meals eaten  - Assist patient with eating if necessary   - Allow adequate time for meals  - Recommend/ encourage appropriate diets, oral nutritional supplements, and vitamin/mineral supplements  - Order, calculate, and assess calorie counts as needed  - Recommend, monitor, and adjust tube feedings and TPN/PPN based on assessed needs  - Assess need for intravenous fluids  - Provide specific nutrition/hydration education as appropriate  - Include patient/family/caregiver in decisions related to nutrition  Outcome: Progressing

## 2022-11-17 NOTE — CASE MANAGEMENT
Case Management Discharge Planning Note    Patient name Alan Keita  Location [facility] 914/[facility] 801-44 MRN [id number]  : 1960 Date 2022       Current Admission Date: 2022  Current Admission Diagnosis:Hematemesis   Patient Active Problem List    Diagnosis Date Noted   • H/O Whipple procedure 2022   • Hematemesis 2022   • Streptococcal bacteremia 11/10/2022   • Pulmonary embolism ([facility] ) 2022   • Sepsis ([facility] ) 2022   • Paroxysmal A-fib ([facility] ) 2022   • Left flank pain 2022   • CPAP (continuous positive airway pressure) dependence    • Chemotherapy induced neutropenia ([facility] ) 2022   • Pancreatic cancer ([facility] ) 2022   • Uric acid kidney stone 2022   • Nephrolithiasis 10/25/2021   • Class 3 severe obesity due to excess calories with body mass index (BMI) of 50 0 to 59 9 in adult ([facility] ) 2020   • Type 2 diabetes mellitus without complication, without long-term current use of insulin ([place] [address] ) 2017   • Severe obstructive sleep apnea 2016   • Dyspnea on exertion 2016   • Supraventricular tachycardia ([facility] ) 2016   • Hypertension 2016   • Controlled depression 2016   • Adenomatous colon polyp 2015   • 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   • Morbid obesity ([facility] ) 2013   • Hyperlipidemia 2013   • Benign essential hypertension 10/02/2012   • Esophageal reflux 2012      LOS (days): 5  Geometric Mean LOS (GMLOS) (days):   Days to GMLOS:     OBJECTIVE:  Risk of Unplanned Readmission Score: 25 06         Current admission status: Inpatient   Preferred Pharmacy:   [address] [place] [address] 95, [phone] [place] [address]  Phone: [phone] Fax: [phone]    Primary Care Provider: Cydney Machuca MD    Primary Insurance: BLUE CROSS  Secondary Insurance:     DISCHARGE DETAILS:    Discharge planning discussed with[de-identified] Patient           Were Treatment Team discharge recommendations reviewed with patient/caregiver?: Yes  Did patient/caregiver verbalize understanding of patient care needs?: Yes  Were patient/caregiver advised of the risks associated with not following Treatment Team discharge recommendations?: Yes         [address]         Is the patient interested in [facility] at discharge?: Yes  [address] requested[de-identified] Occupational Therapy, Physical [address] Name[de-identified] [facility] Provider[de-identified] PCP  [address] Needed[de-identified] Evaluate Functional Status and Safety, Gait/ADL Training, Strengthening/Theraputic Exercises to Improve Function  Homebound Criteria Met[de-identified] Uses an Assist Device (i e  cane, walker, etc)  Supporting Clincal Findings[de-identified] Limited Endurance, Fatigues Easliy in [employer]         Other Referral/Resources/Interventions Provided:  Referral Comments: CM called and spoke with [employer] from [facility]  They are aware that the patient will be discharging tomorrow  They have all the information that they need at this time until the discharge instructions are ready  They are requesting d/c instructions be faxed over to  [phone] when they are completed  Treatment Team Recommendation: Home with [address]  Discharge Destination Plan[de-identified] Home with [address]     Additional Comments: Patient confirms that her  will be able to bring her home tomorrow when her IV abt are completed

## 2022-11-17 NOTE — PROGRESS NOTES
General Surgery  Progress Note   Elvis Choudhary 57 y o  female MRN: [id number]  Unit/Bed#: [facility] 914-01 Encounter: [id number]    Assessment:  Patient is a 58 y  o  female recently discharged after whipple on 10/31 c/b PE on eliquis who presented with GDA bleed, now status post IR IR Angiogram with GDA stent and EGD with finding of large clean base ulcer at [address] anastamosis w/o active bleeding on   Afebrile, VSS, no episodes of SVT  WBC:  7 9; Hb from 8 3    UOP:  2 8 L    Plan:  -continue to trend H&H and monitor for signs of bleeding   -continue Eliquis   -CCD2  -continue ceftriaxone   -appreciate ID recommendations   -appreciate Cardiology recommendations   -encourage ambulation   -PT/OT   -DVT prophylaxis   -incentive spirometry     Subjective/Objective     Subjective: Patient seen and examined at bedside, in no acute distress  No acute events overnight  Patient's pain is well controlled  Pt denies nausea or vomiting  Passing gas and stool  Tolerating diet without issue  Objective:     Vitals:Blood pressure 129/72, pulse 78, temperature 97 7 °F (36 5 °C), resp  rate 16, height 5' 4" (1 626 m), weight (!) 140 kg (307 lb 12 2 oz), SpO2 92 %  ,Body mass index is 52 83 kg/m²  Temp (24hrs), Av 7 °F (36 5 °C), Min:97 7 °F (36 5 °C), Max:97 7 °F (36 5 °C)  Current: Temperature: 97 7 °F (36 5 °C)      Intake/Output Summary (Last 24 hours) at 2022 0445  Last data filed at 2022 0401  Gross per 24 hour   Intake 350 ml   Output 2775 ml   Net -2425 ml       Invasive Devices     Central Venous Catheter Line  Duration           Port A Cath 22 Left Chest 169 days          Drain  Duration           Ureteral Internal Stent Left ureter 6 Fr  121 days                Physical Exam:  General: No acute distress, alert and oriented  CV: Well perfused, regular rate and rhythm  Lungs: Normal work of breathing, no increased respiratory effort  Abdomen: Soft, appropriately-tender, non-distended  Incisions well healing    Extremities: No edema, clubbing or cyanosis  Skin: Warm, dry    Lab Results: Results: I have personally reviewed all pertinent laboratory/tests results  VTE Prophylaxis: Sequential compression device Marielle Márquez)  and Reason for no pharmacologic prophylaxis Therapeutic anticoagulation    Kenan Jose MD  11/17/2022

## 2022-11-17 NOTE — PLAN OF CARE
Problem: PHYSICAL THERAPY ADULT  Goal: Performs mobility at highest level of function for planned discharge setting  See evaluation for individualized goals  Description: Treatment/Interventions: Functional transfer training, LE strengthening/ROM, Therapeutic exercise, Endurance training, Gait training, Equipment eval/education, Bed mobility, OT          See flowsheet documentation for full assessment, interventions and recommendations  Outcome: Adequate for Discharge  Note: Prognosis: Good  Problem List: Decreased strength, Decreased endurance, Impaired balance, Decreased mobility, Pain  Assessment: Pt seen for PT treatment session this date  Therapy session focused on functional transfers, gait training and stair training in order to improve overall mobility and independence  Pt performing functional transfers at mod I level, ambulation at S level using RW and stair negotiation at S level using b/l handrails to mimic home setup  Pt steady on feet throughout trial- no LOB noted, demonstrates good safety awareness throughout session  Pt was left sitting OOB in recliner at the end of PT session with all needs in reach  Pt with no questions or concerns regarding d/c home reports  and son can provide assistance as needed  Pt appears to be functioning at/ near baseline mobility levels  Pt with no further acute inpatient PT needs at this time- PT to sign off- please re-consult if needed  PT to d/c pt and recommends home with HHPT + family support  The patient's AM-PAC Basic Mobility Inpatient Short Form Raw Score is 19  A Raw score of greater than 16 suggests the patient may benefit from discharge to home  Please also refer to the recommendation of the Physical Therapist for safe discharge planning  PT Discharge Recommendation: Home with home health rehabilitation (+ family support)    See flowsheet documentation for full assessment

## 2022-11-17 NOTE — PROGRESS NOTES
Pastoral Care Progress Note    2022  Patient: Kassidy Perla : 1960  Admission Date & Time: 2022 0034  MRN: [id number] CSN: [id number]      On call  Ulisses Pedro responded at 1945 to MA  Pt's spouse fell and is on blood thinners  Pt's spouse was taken to ED   spoke with pt, she shared about her concerns for pt's well-being and that he has supported her through this current admission                 Chaplaincy Interventions Utilized:     Relationship Building: Cultivated a relationship of care and support      Chaplaincy Outcomes Achieved:  Distress reduced       22   Clinical Encounter Type   Visited With Patient   Crisis Visit Code  (MA)

## 2022-11-17 NOTE — PROGRESS NOTES
Progress Note - Infectious Disease   Alan Keita 57 y o  female MRN: [id number]  Unit/Bed#: [facility] 914-01 Encounter: [id number]      Impression/Recommendations:  1  Streptococcus oralis bacteremia in the postop period from Whipple resection   Source is most likely translocation from gut   Patient improved quickly on IV antibiotic   Bacteremia cleared quickly   2D echo without obvious vegetation   Repeat blood cultures during this admission have no growth   Given source of bacteremia and rapid clearance of bacteremia, will continue with 14 day treatment course  Continue high-dose IV ceftriaxone  Treat x 14 days total, through       2  Upper GI bleed, secondary to gastrojejunal anastomotic ulcer, resolved  Monitor      3  Pancreatic cancer, status post neoadjuvant chemotherapy and now status post Whipple resection      4  PE, improved on CT imaging this admission  Anticoagulation per primary service      Discussed with patient in detail regarding the above plan  Discussed with surgery service       Antibiotics:  Ceftriaxone   Antibiotic # 13     Subjective:  Patient feels well  No nausea/vomiting  No abdominal pain  Temperature stays down  No chills      Objective:  Vitals:  Temp:  [97 5 °F (36 4 °C)-97 7 °F (36 5 °C)] 97 5 °F (36 4 °C)  HR:  [72-78] 77  Resp:  [16-18] 18  BP: (108-132)/(60-72) 132/72  SpO2:  [91 %-93 %] 93 %  Temp (24hrs), Av 6 °F (36 4 °C), Min:97 5 °F (36 4 °C), Max:97 7 °F (36 5 °C)  Current: Temperature: 97 5 °F (36 4 °C)    Physical Exam:     General: Awake, alert, cooperative, no distress  Neck:  Supple  No mass  No lymphadenopathy  Lungs: Expansion symmetric, no rales, no wheezing, respirations unlabored  Heart:  Regular rate and rhythm, S1 and S2 normal, no murmur  Abdomen: Soft, nondistended, non-tender, bowel sounds active all four quadrants, no masses, no organomegaly  Extremities: Stable leg edema  No erythema/warmth  No ulcer   Nontender to palpation  Skin:  No rash  Neuro: Moves all extremities  Invasive Devices     Central Venous Catheter Line  Duration           Port A Cath 05/31/22 Left Chest 169 days          Drain  Duration           Ureteral Internal Stent Left ureter 6 Fr  121 days                Labs studies:   I have personally reviewed pertinent labs  Results from last 7 days   Lab Units 11/17/22 0614 11/16/22 0542 11/15/22  0543 11/14/22  0518 11/13/22  0430 11/12/22 0212 11/12/22 0435 11/12/22  0102   POTASSIUM mmol/L 3 5 3 3* 3 3*   < > 3 3* 4 0  --  3 5   CHLORIDE mmol/L 111* 108 108   < > 109* 107  --  105   CO2 mmol/L 26 26 27   < > 27 22  --  21   CO2, I-STAT mmol/L  --   --   --   --   --   --  24  --    BUN mg/dL 7 8 8   < > 13 12  --  11   CREATININE mg/dL 0 41* 0 43* 0 41*   < > 0 43* 0 66  --  0 62   EGFR ml/min/1 73sq m 111 109 111   < > 109 94  --  96   GLUCOSE, ISTAT mg/dl  --   --   --   --   --   --  175*  --    CALCIUM mg/dL 8 1* 8 4 8 4   < > 7 7* 7 9*  --  8 1*   AST U/L  --   --   --   --  79* 212*  --  245*   ALT U/L  --   --   --   --  58 87*  --  78   ALK PHOS U/L  --   --   --   --  594* 875*  --  943*    < > = values in this interval not displayed  Results from last 7 days   Lab Units 11/17/22  0614 11/16/22  0542 11/15/22  0543   WBC Thousand/uL 7 79 10 38* 11 75*   HEMOGLOBIN g/dL 8 0* 8 3* 8 0*   PLATELETS Thousands/uL 133* 160 169     Results from last 7 days   Lab Units 11/12/22 0224 11/12/22 0218   BLOOD CULTURE  No Growth After 5 Days  No Growth After 5 Days  Imaging Studies:   I have personally reviewed pertinent imaging study reports and images in PACS  EKG, Pathology, and Other Studies:   I have personally reviewed pertinent reports

## 2022-11-17 NOTE — PROGRESS NOTES
Cardiology Progress Note - Braulio Mcdowell 57 y o  female MRN: [id number]    Unit/Bed#: [facility] 914-01 Encounter: [id number]      Assessment:  Principal Problem:    Hematemesis  Active Problems:    Supraventricular tachycardia ([address] )    Hypertension    Severe obstructive sleep apnea    Type 2 diabetes mellitus without complication, without long-term current use of insulin (HCC)    Esophageal reflux    Pancreatic cancer (HCC)    Paroxysmal A-fib (HCC)    Pulmonary embolism (HCC)    Streptococcal bacteremia    H/O Whipple procedure      Plan:  Paroxysmal SVT  -Patient recently discharged s/p whipple procedure for pancreatic adenocarcinoma  Readmitted for bloody emesis s/p EGD, common hepatic stenting    -Patient has had several episodes of SVT this admission that broke spontaneously, appears as AVNRT  -Patient has a history of PAF/flutter and paroxysmal SVT, patient notes history of palpitations that sometimes improve with vagal maneuvers  History of prior ablation for right-sided atrial tachycardia    -On Eliquis 5 mg BID due to recent PE after surgery   -On Lipitor 40 mg daily, Losartan 50 mg daily, Sotalol 120 mg Q12, Toprol-XL 25 mg BID  -Cardizem CD 24 hr capsule 240 mg daily  -11/4 Echo: 50% EF   -Na 141, K 3 5   -11/15 fluids In 2 2L, Out 1 8L, 11/16 in 456 ml, out 1 55L, 11/17 In 350 ml out 2 77L  -No SVT noted overnight on telemetry   -Patient did not notice any palpitations overnight or today       Plan  -Will continue medical management at this time, telemetry not showing any recurrence of SVT  -Continue Cardizem CD 24 hr capsule 240 mg daily   -Continue Eliquis, Lipitor, Losartan, Sotalol, Toprol XL  -If patient is in persistent SVT, recommend Adenosine or Cardizem drip   -If patient continues to go into SVT we will consider switching sotalol to amiodarone    -Follow telemetry closely  -Replete K as needed    -F/U labs      Paroxysmal Atrial Fibrillation  -Patient maintains sinus rhythm with sotalol, continued inpatient   -See A&P for paroxysmal SVT  Subjective:   Patient seen and examined  No significant events overnight  Her palpitations have not recurred  Endorsing no new complaints at this time  Eating and drinking well  Review of Systems   Constitutional: Negative for chills, fatigue and fever  HENT: Negative for hearing loss and sore throat  Eyes: Negative for visual disturbance  Respiratory: Negative for cough and shortness of breath  Cardiovascular: Negative for chest pain and palpitations  Gastrointestinal: Negative for abdominal distention, abdominal pain, blood in stool, constipation, diarrhea, nausea and vomiting  Endocrine: Negative for polyuria  Genitourinary: Negative for dysuria  Musculoskeletal: Negative for arthralgias and back pain  Skin: Negative for rash and wound  Neurological: Negative for dizziness, tremors, seizures, weakness, light-headedness and headaches  Psychiatric/Behavioral: The patient is not nervous/anxious  Objective:     Vitals: Blood pressure 132/72, pulse 77, temperature 97 5 °F (36 4 °C), resp  rate 18, height 5' 4" (1 626 m), weight (!) 140 kg (307 lb 12 2 oz), SpO2 93 %  , Body mass index is 52 83 kg/m² ,   Orthostatic Blood Pressures    Flowsheet Row Most Recent Value   Blood Pressure 132/72 filed at 11/17/2022 0719   Patient Position - Orthostatic VS Sitting filed at 11/16/2022 0813            Intake/Output Summary (Last 24 hours) at 11/17/2022 0945  Last data filed at 11/17/2022 0845  Gross per 24 hour   Intake 418 ml   Output 1975 ml   Net -1557 ml       No significant arrhythmias seen on telemetry review  Physical Exam:  Physical Exam  Vitals reviewed  Constitutional:       Appearance: Normal appearance  She is obese  HENT:      Head: Normocephalic        Right Ear: External ear normal       Left Ear: External ear normal       Nose: Nose normal       Mouth/Throat:      Mouth: Mucous membranes are moist    Eyes: Extraocular Movements: Extraocular movements intact  Pupils: Pupils are equal, round, and reactive to light  Cardiovascular:      Rate and Rhythm: Normal rate and regular rhythm  Pulses: Normal pulses  Heart sounds: Normal heart sounds  Pulmonary:      Effort: Pulmonary effort is normal       Breath sounds: Normal breath sounds  Abdominal:      General: Abdomen is flat  Palpations: Abdomen is soft  Tenderness: There is no abdominal tenderness  Musculoskeletal:         General: Normal range of motion  Cervical back: Normal range of motion  Right lower leg: Edema present  Left lower leg: Edema present  Skin:     General: Skin is warm  Capillary Refill: Capillary refill takes less than 2 seconds  Neurological:      General: No focal deficit present  Mental Status: She is alert and oriented to person, place, and time         Medications:      Current Facility-Administered Medications:   •  acetaminophen (TYLENOL) tablet 975 mg, 975 mg, Oral, Q8H [address], Alyssa Uribe PA-C, 975 mg at 11/17/22 0845  •  apixaban (ELIQUIS) tablet 5 mg, 5 mg, Oral, BID, Yara Ahmadi PA-C, 5 mg at 11/17/22 0845  •  atorvastatin (LIPITOR) tablet 40 mg, 40 mg, Oral, HS, Sloane Uribe PA-C, 40 mg at 11/16/22 2131  •  cefTRIAXone (ROCEPHIN) 2,000 mg in dextrose 5 % 50 mL IVPB, 2,000 mg, Intravenous, Q24H, Alyssa Uribe PA-C, Last Rate: 100 mL/hr at 11/16/22 1006, 2,000 mg at 11/16/22 1006  •  diltiazem (CARDIZEM CD) 24 hr capsule 240 mg, 240 mg, Oral, Daily, Lena Vega, DO, 240 mg at 11/17/22 0845  •  gabapentin (NEURONTIN) capsule 100 mg, 100 mg, Oral, TID, Sloane Uribe PA-C, 100 mg at 11/17/22 0845  •  insulin lispro (HumaLOG) 100 units/mL subcutaneous injection 1-5 Units, 1-5 Units, Subcutaneous, Q6H [address] **AND** Fingerstick Glucose (POCT), , , 4x Daily AC and at bedtime, Jose Rafael Lopez PA-C  •  iodixanol (VISIPAQUE) 320 MG/ML injection 300 mL, 300 mL, Intra-arterial, Once in imaging, Taqueria Vann, PA-C  •  losartan (COZAAR) tablet 50 mg, 50 mg, Oral, Daily, Kelsey Uribe, PA-C, 50 mg at 11/17/22 0845  •  metoprolol (LOPRESSOR) injection 5 mg, 5 mg, Intravenous, Q6H PRN, Kelsey Feliz Rashelenaon, PA-C, 5 mg at 11/13/22 7992  •  metoprolol succinate (TOPROL-XL) 24 hr tablet 25 mg, 25 mg, Oral, BID, Alyssa R Rasmuson, PA-C, 25 mg at 11/17/22 0845  •  ondansetron (ZOFRAN) injection 4 mg, 4 mg, Intravenous, Q6H PRN, Kelsey Uribe PA-C  •  oxyCODONE (ROXICODONE) immediate release tablet 10 mg, 10 mg, Oral, Q4H PRN, Abel Barnes MD  •  oxyCODONE (ROXICODONE) IR tablet 5 mg, 5 mg, Oral, Q4H PRN, Abel Barnes MD  •  pantoprazole (PROTONIX) EC tablet 40 mg, 40 mg, Oral, BID AC, Adis Grubbs MD, 40 mg at 11/17/22 0606  •  PARoxetine (PAXIL-CR) 24 hr tablet 37 5 mg, 37 5 mg, Oral, QAM, Alyssa R Arielaon, PA-C, 37 5 mg at 11/17/22 0846  •  sotalol (BETAPACE) tablet 120 mg, 120 mg, Oral, Q12H, Kelsey Uribe, PA-C, 120 mg at 11/17/22 0846     Labs & Results:        Results from last 7 days   Lab Units 11/17/22  0614 11/16/22  0542 11/15/22  0543   WBC Thousand/uL 7 79 10 38* 11 75*   HEMOGLOBIN g/dL 8 0* 8 3* 8 0*   HEMATOCRIT % 26 3* 26 8* 26 5*   PLATELETS Thousands/uL 133* 160 169         Results from last 7 days   Lab Units 11/17/22  0614 11/16/22  0542 11/15/22  0543 11/14/22  0518 11/13/22  0430 11/12/22  0608 11/12/22  0435 11/12/22  0102   POTASSIUM mmol/L 3 5 3 3* 3 3*   < > 3 3* 4 0  --  3 5   CHLORIDE mmol/L 111* 108 108   < > 109* 107  --  105   CO2 mmol/L 26 26 27   < > 27 22  --  21   CO2, I-STAT mmol/L  --   --   --   --   --   --  24  --    BUN mg/dL 7 8 8   < > 13 12  --  11   CREATININE mg/dL 0 41* 0 43* 0 41*   < > 0 43* 0 66  --  0 62   CALCIUM mg/dL 8 1* 8 4 8 4   < > 7 7* 7 9*  --  8 1*   ALK PHOS U/L  --   --   --   --  594* 875*  --  943*   ALT U/L  --   --   --   --  58 87*  --  78   AST U/L  --   --   --   --  79* 212*  -- 245*   GLUCOSE, ISTAT mg/dl  --   --   --   --   --   --  175*  --     < > = values in this interval not displayed  Results from last 7 days   Lab Units 11/12/22  0608 11/12/22  0102   INR  1 48* 1 62*   PTT seconds 29 29     Results from last 7 days   Lab Units 11/15/22  0543 11/14/22  0518 11/13/22  0430   MAGNESIUM mg/dL 2 2 2 3 1 9       EKG personally reviewed by Santana Drummond MD     Counseling / Coordination of Care  Total floor / unit time spent today 15 minutes  Greater than 50% of total time was spent with the patient and / or family counseling and / or coordination of care  A description of the counseling / coordination of care: history/plan of care

## 2022-11-17 NOTE — PLAN OF CARE
Problem: OCCUPATIONAL THERAPY ADULT  Goal: Performs self-care activities at highest level of function for planned discharge setting  See evaluation for individualized goals  Description: Treatment Interventions: ADL retraining, Functional transfer training, Endurance training, Patient/family training, Equipment evaluation/education, Compensatory technique education, Continued evaluation, Energy conservation, Activityengagement          See flowsheet documentation for full assessment, interventions and recommendations  Outcome: Completed  Note: Limitation: Decreased ADL status, Decreased endurance, Decreased self-care trans, Decreased high-level ADLs  Prognosis: Fair  Assessment: Pt is a 58 yo female who actively participated in skilled OT session on 11/17/2022  Upon arrival, pt found sitting upright in recliner and was agreeable to OT session  Pt performed STS and completed household functional mobility @ Mod I level w/ RW w/ no LOB/SOB t/o  Discussed DME recommendations w/ pt and pt's spouse to enhance occupational performance, decrease fall risk, and increased safety in which both were receptive of education provided  At the end of the session, pt was left sitting upright in recliner with all functional needs in reach  From an OT standpoint, recommend discharge to home w/ home health and increased social support once medically stable  Pt was receptive regarding education on returning home safely with energy conservation techniques and demonstrated good carryover during occupational/functional performance  At this time, pt demonstrates good insight/safety awareness and does not express any concerns regarding performing ADL/IADL/functional mobility tasks  No further skilled acute care OT services are needed at this time  The patient's raw score on the AM-PAC Daily Activity inpatient short form is 22, standardized score is 47 1, greater than 39 4   Patients at this level are likely to benefit from discharge to home  Please refer to the recommendation of the Occupational Therapist for safe discharge planning  Recommend continued engagement in ADL/IADL/functional mobility tasks with nursing and restorative therapy staff as appropriate to promote the highest level of independence prior to discharge  OT is discharging pt from caseload at this time, please reconsult if needed       OT Discharge Recommendation: Home with home health rehabilitation (w/ increased social support/assistance upon d/c; d/c OT @ this time, no further acute care skills needed)

## 2022-11-18 ENCOUNTER — TRANSITIONAL CARE MANAGEMENT (OUTPATIENT)
Dept: FAMILY MEDICINE CLINIC | Facility: CLINIC | Age: 62
End: 2022-11-18

## 2022-11-18 ENCOUNTER — HOSPITAL ENCOUNTER (OUTPATIENT)
Dept: INFUSION CENTER | Facility: HOSPITAL | Age: 62
Discharge: HOME | End: 2022-11-18
Attending: INTERNAL MEDICINE

## 2022-11-18 ENCOUNTER — TELEPHONE (OUTPATIENT)
Dept: CARDIOLOGY CLINIC | Facility: CLINIC | Age: 62
End: 2022-11-18

## 2022-11-18 VITALS
SYSTOLIC BLOOD PRESSURE: 141 MMHG | HEART RATE: 71 BPM | RESPIRATION RATE: 18 BRPM | OXYGEN SATURATION: 93 % | HEIGHT: 64 IN | DIASTOLIC BLOOD PRESSURE: 78 MMHG | TEMPERATURE: 97.5 F | WEIGHT: 293 LBS | BODY MASS INDEX: 50.02 KG/M2

## 2022-11-18 LAB
ANION GAP SERPL CALCULATED.3IONS-SCNC: 6 MMOL/L (ref 4–13)
BASOPHILS # BLD AUTO: 0.05 THOUSANDS/ÂΜL (ref 0–0.1)
BASOPHILS NFR BLD AUTO: 1 % (ref 0–1)
BUN SERPL-MCNC: 7 MG/DL (ref 5–25)
CALCIUM SERPL-MCNC: 8.4 MG/DL (ref 8.3–10.1)
CHLORIDE SERPL-SCNC: 110 MMOL/L (ref 96–108)
CO2 SERPL-SCNC: 26 MMOL/L (ref 21–32)
CREAT SERPL-MCNC: 0.44 MG/DL (ref 0.6–1.3)
EOSINOPHIL # BLD AUTO: 0.1 THOUSAND/ÂΜL (ref 0–0.61)
EOSINOPHIL NFR BLD AUTO: 2 % (ref 0–6)
ERYTHROCYTE [DISTWIDTH] IN BLOOD BY AUTOMATED COUNT: 16.5 % (ref 11.6–15.1)
GFR SERPL CREATININE-BSD FRML MDRD: 108 ML/MIN/1.73SQ M
GLUCOSE SERPL-MCNC: 110 MG/DL (ref 65–140)
GLUCOSE SERPL-MCNC: 127 MG/DL (ref 65–140)
GLUCOSE SERPL-MCNC: 160 MG/DL (ref 65–140)
HCT VFR BLD AUTO: 26.3 % (ref 34.8–46.1)
HGB BLD-MCNC: 7.9 G/DL (ref 11.5–15.4)
IMM GRANULOCYTES # BLD AUTO: 0.03 THOUSAND/UL (ref 0–0.2)
IMM GRANULOCYTES NFR BLD AUTO: 1 % (ref 0–2)
LYMPHOCYTES # BLD AUTO: 1.13 THOUSANDS/ÂΜL (ref 0.6–4.47)
LYMPHOCYTES NFR BLD AUTO: 20 % (ref 14–44)
MCH RBC QN AUTO: 28.8 PG (ref 26.8–34.3)
MCHC RBC AUTO-ENTMCNC: 30 G/DL (ref 31.4–37.4)
MCV RBC AUTO: 96 FL (ref 82–98)
MONOCYTES # BLD AUTO: 0.42 THOUSAND/ÂΜL (ref 0.17–1.22)
MONOCYTES NFR BLD AUTO: 7 % (ref 4–12)
NEUTROPHILS # BLD AUTO: 3.95 THOUSANDS/ÂΜL (ref 1.85–7.62)
NEUTS SEG NFR BLD AUTO: 69 % (ref 43–75)
NRBC BLD AUTO-RTO: 0 /100 WBCS
PLATELET # BLD AUTO: 122 THOUSANDS/UL (ref 149–390)
PMV BLD AUTO: 11.4 FL (ref 8.9–12.7)
POTASSIUM SERPL-SCNC: 3.6 MMOL/L (ref 3.5–5.3)
RBC # BLD AUTO: 2.74 MILLION/UL (ref 3.81–5.12)
SODIUM SERPL-SCNC: 142 MMOL/L (ref 135–147)
WBC # BLD AUTO: 5.68 THOUSAND/UL (ref 4.31–10.16)

## 2022-11-18 RX ORDER — INSULIN LISPRO 100 [IU]/ML
1-5 INJECTION, SOLUTION INTRAVENOUS; SUBCUTANEOUS
Status: DISCONTINUED | OUTPATIENT
Start: 2022-11-18 | End: 2022-11-18 | Stop reason: HOSPADM

## 2022-11-18 RX ORDER — APIXABAN 5 MG (74)
KIT ORAL
COMMUNITY
Start: 2022-11-06

## 2022-11-18 RX ORDER — METOPROLOL SUCCINATE 50 MG/1
25 TABLET, EXTENDED RELEASE ORAL 2 TIMES DAILY
Qty: 120 TABLET | Refills: 0
Start: 2022-11-18 | End: 2023-01-17

## 2022-11-18 RX ORDER — PANTOPRAZOLE SODIUM 40 MG/1
40 TABLET, DELAYED RELEASE ORAL 2 TIMES DAILY
Qty: 90 TABLET | Refills: 0 | Status: SHIPPED | OUTPATIENT
Start: 2022-11-18

## 2022-11-18 RX ORDER — DILTIAZEM HYDROCHLORIDE 240 MG/1
240 CAPSULE, COATED, EXTENDED RELEASE ORAL DAILY
Qty: 60 CAPSULE | Refills: 0 | Status: SHIPPED | OUTPATIENT
Start: 2022-11-19 | End: 2023-01-18

## 2022-11-18 RX ADMIN — SOTALOL HYDROCHLORIDE 120 MG: 120 TABLET ORAL at 09:56

## 2022-11-18 RX ADMIN — DILTIAZEM HYDROCHLORIDE 240 MG: 240 CAPSULE, COATED, EXTENDED RELEASE ORAL at 09:55

## 2022-11-18 RX ADMIN — LOSARTAN POTASSIUM 50 MG: 50 TABLET, FILM COATED ORAL at 09:55

## 2022-11-18 RX ADMIN — METOPROLOL SUCCINATE 25 MG: 25 TABLET, EXTENDED RELEASE ORAL at 09:55

## 2022-11-18 RX ADMIN — PANTOPRAZOLE SODIUM 40 MG: 40 TABLET, DELAYED RELEASE ORAL at 05:18

## 2022-11-18 RX ADMIN — GABAPENTIN 100 MG: 100 CAPSULE ORAL at 09:55

## 2022-11-18 RX ADMIN — APIXABAN 5 MG: 5 TABLET, FILM COATED ORAL at 09:55

## 2022-11-18 RX ADMIN — PAROXETINE 37.5 MG: 37.5 TABLET, FILM COATED, EXTENDED RELEASE ORAL at 09:56

## 2022-11-18 RX ADMIN — CEFTRIAXONE 2000 MG: 2 INJECTION, POWDER, FOR SOLUTION INTRAMUSCULAR; INTRAVENOUS at 10:02

## 2022-11-18 RX ADMIN — ACETAMINOPHEN 975 MG: 325 TABLET ORAL at 00:17

## 2022-11-18 NOTE — DISCHARGE SUMMARY
Discharge Summary -surgical oncology  Cinthia Phoenix 57 y o  female MRN: [id number]  [facility] Encounter: [id number]    Admission Date: 11/12/2022     Discharge Date:  11/18/2022    Admitting Diagnosis: Abdominal pain [R10 9]  Acute upper GI bleed [K92 2]  Hematemesis, unspecified whether nausea present [K92 0]    Discharge Diagnosis:  Acute upper GI bleed- resolved with IR angiogram and GI GDA stent placement  Attending and Service: Dr Sebastien Walters, surgical oncology    Consulting Physician(s):  Surgical critical Care, Cardiology, Infectious Disease, IR, GI    Imaging and Procedures Performed:   Orders Placed This Encounter   Procedures   • Critical Care     11/12/2022 IR angiogram with GDA stent  11/12/2022 EGD    Hospital Course: Cinthia Phoenix is a 40-year-old female with a past medical history of SVT, DM, GERD, HTN, HLD, pancreatic head mass status post Whipple procedure with portal vein/SMV repair presented to the emergency department after being discharged with kerry hematemesis and upper GI bleed  Found to have a GDA bleed for which IR angiogram was performed as well as GDA stent placement and EGD  Patient was admitted to the critical care service for acute monitoring of hemodynamic stability  She was transfused 2 units of blood on admission  Hospital day 1 she continued with persistent tachycardia for which Cardiology was consulted  She was found to be in SVT again and her medication regimen was altered by Cardiology  She was monitored throughout her hospital stay on telemetry to assist with medication regimen changes per Cardiology  She had an NG tube in place to monitor upper GI output, of which appeared mildly bloody for which she was started on Protonix b i d  her NG tube after decreased output was removed on hospital day 1 and she was started on sips of clear liquids   Her anticoagulation was resumed on hospital day 3, her diet was advanced as tolerated, and she was transitioned out of the critical care unit into the step-down level 2 unit  On hospital day 3 her staples when her midline incision were also removed and replaced with Steri-Strips  PT/OT were consulted to assist with ambulation  Id was also consulted to assist with antibiotic course regimen  Patient was cleared for discharge on hospital day 4, however she was kept for duration of IV antibiotics until hospital day 6  Once course of IV antibiotics was finished, patient was cleared for discharge after she was tolerating diet without nausea or vomiting, out of bed and ambulating without any assistance, utilizing her incentive spirometer, peeing without any complications, and her pain remained controlled  Cardiology also cleared patient for discharge on hospital day 6  All changes in medication regimens were discussed with the patient and her  at bedside, both are in agreement with plan  All discharge instructions as well as postoperative follow-up appointments were discussed with the patient and her  at bedside as well and they are both in agreement with plans  On discharge, the patient is instructed to follow-up with the patient's primary care provider within the next 2 weeks to review the events of the recent hospitalization  Patient has a follow-up appoint with Dr Everardo Garcia on 11/29/2022 at 11:30 a m  and an appointment with Dr Ambrose Stone on 11/30/2022 at 9:00 a m  The patient is instructed to follow the provided discharge instructions  Condition at Discharge: good     Discharge instructions/Information to patient and family:   See after visit summary for information provided to patient and family  Provisions for Follow-Up Care:  See after visit summary for information related to follow-up care and any pertinent home health orders  Disposition: Home with home health  Planned Readmission: No    Discharge Statement   I spent 30 minutes discharging the patient   This time was spent on the day of discharge  I had direct contact with the patient on the day of discharge  Additional documentation is required if more than 30 minutes were spent on discharge  Discharge Medications:  See after visit summary for reconciled discharge medications provided to patient and family      Malathi Moore PA-C  11/18/2022  11:44 AM

## 2022-11-18 NOTE — INCIDENTAL FINDINGS
The following findings require follow up:  Radiographic finding   Findin/12 CTA CAP  IMPRESSION:     Interval improvement of pulmonary emboli      New loculated fluid collection between the caudate and left lateral segment  Stable ill-defined linear hypodensity in the left lateral segment as described above      Mixed attenuation curvilinear structure in the retroperitoneum at the site of the surgery  Unclear if this represents evolving hematoma or potentially wall thickening and edema within a bypass loop of bowel  There are numerous foci of extraluminal air, newly demonstrated just cephalad to this adjacent to a bypass loop and stomach in the midline as well as extending into the enlarging right anterior paranephric collection  Findings suspicious for anastomotic leak    Treated with IR angio and GI GDA stent  Follow up required:   F/U with Dr Lillian Patrick in 2 weeks  at 11:30AM  F/U with Dr Kelli Bland as scheduled on  at 8:45AM  F/U with PCP in 1-2 weeks  F/U with Cardiologist in 1-2 weeks  Follow up should be done within 2 week(s)    Please notify the following clinician to assist with the follow up:  F/U with Dr Lillian Patrick in 2 weeks  at 11:30AM  F/U with Dr Kelli Bland as scheduled on  at 8:45AM  F/U with PCP in 1-2 weeks  F/U with Cardiologist in 1-2 weeks      Underwood Sours \" I have a bad toothache on the right upper part of my mouth \"

## 2022-11-18 NOTE — DISCHARGE INSTR - AVS FIRST PAGE
DISCHARGE INSTRUCTIONS    Follow Up: Follow up with Dr Nelson Gómez in 2 weeks 11/29 at 11:30AM  Follow up with Dr Kiara Valera as scheduled on 11/30 at 8:45AM  Follow up with PCP in 1-2 weeks  Follow up with Cardiologist in 1-2 weeks  Medication Changes:  Protonix 40mg twice a day  Cardizem 240mg once daily  Metoprolol 25mg twice a day  Eliquis 5mg twice a day    Diet: You may resume a regular diet    Pain: Oxycodone 5mg as needed for moderate/severe pain every 6 hours  Gabapentin 100mg three times day  Tylenol 975mg as needed for mild pain control  Shower: You may shower over the wound  Do not bathe or use a pool or hot tub until cleared by the physician  Activity: As tolerated  You may go up and down stairs, walk as much as you are comfortable, but walk at least 3 times each day  Do not lift anything heavier than 15 pounds for at least 2-4 weeks, unless cleared by the doctor  Driving: Do not drive or make any important decisions while on narcotic pain medication  Generally, you may drive when your off all narcotic pain medications  Medications: Resume all of your previous medications, unless told otherwise by the doctor  You do not need to take the narcotic pain medications unless you are having significant pain and discomfort  Call the office: If you are experiencing any of the following, fevers above 101 5° or chills, significant nausea or vomiting, increase in abdominal pain, if the wound develops drainage and/or is excessive redness around the wound, or if you have significant diarrhea or other worsening symptoms

## 2022-11-18 NOTE — PROGRESS NOTES
Progress Note - Surgical Oncology  Renee Collier 57 y o  female MRN: [id number]  [facility]: [id number]    Assessment:  57 y  o  female recently discharged after whipple on 75/99 complicated by PE on eliquis who presented with GDA bleed, now s/p IR Angiogram with GDA stent and EGD with finding of large clean base ulcer at [address] anastamosis w/o active bleeding on 11/12  Afebrile  VSS  No episodes of SVT  UOP:1 4L     Plan:  -D/C today after last dose of IV abx if stable- home w/ home rehab  -AM labs pending, trend when available  -Continue Eliquis  -Continue cardiology meds  -PRN pain control  -Encourage out of bed and ambulation  -Continue incentive spirometer use    Subjective/Objective     Subjective: No acute events overnight, slept well  Denies pain  BM yesterday described as dark and firm  +flatus  Tolerating diet without N/V  Drinking and urinating without concern  Able to get out of bed and showered yesterday  Denies fevers, chills, sweats, CP, SOB, abdominal pain  Objective:     Blood pressure 137/75, pulse 68, temperature 97 5 °F (36 4 °C), resp  rate 20, height 5' 4" (1 626 m), weight (!) 140 kg (307 lb 12 2 oz), SpO2 92 %  ,Body mass index is 52 83 kg/m²  Intake/Output Summary (Last 24 hours) at 11/18/2022 0506  Last data filed at 11/18/2022 0407  Gross per 24 hour   Intake 118 ml   Output 1400 ml   Net -1282 ml       Invasive Devices     Central Venous Catheter Line  Duration           Port A Cath 05/31/22 Left Chest 170 days          Drain  Duration           Ureteral Internal Stent Left ureter 6 Fr  122 days                Physical Exam:  Gen: Awake, alert, in NAD   Head: Normocephalic, atraumatic  Neck: No obvious JVD, trachea midline  Cardiovascular: Regular rate, S1/S2  Respiratory: In no acute respiratory distress, breathing appropriate  Abd: Soft, non-distended  Appropriately tender with palpation  Midline incision covered with steri-strips   No guarding/rigidity or signs of peritonitis  Extremities: No visible wounds/ulcerations to the B/L upper/lower extremities  Skin: Warm/dry  Psychiatric: Appropriate mood/affect      Lab, Imaging and other studies:  I have personally reviewed pertinent lab results    , CBC:   Lab Results   Component Value Date    WBC 7 79 11/17/2022    HGB 8 0 (L) 11/17/2022    HCT 26 3 (L) 11/17/2022    MCV 96 11/17/2022     (L) 11/17/2022    MCH 29 2 11/17/2022    MCHC 30 4 (L) 11/17/2022    RDW 16 3 (H) 11/17/2022    MPV 10 6 11/17/2022    NRBC 0 11/17/2022   , CMP:   Lab Results   Component Value Date    SODIUM 141 11/17/2022    K 3 5 11/17/2022     (H) 11/17/2022    CO2 26 11/17/2022    BUN 7 11/17/2022    CREATININE 0 41 (L) 11/17/2022    CALCIUM 8 1 (L) 11/17/2022    EGFR 111 11/17/2022   , Coagulation: No results found for: PT, INR, APTT, Urinalysis: No results found for: COLORU, CLARITYU, SPECGRAV, PHUR, LEUKOCYTESUR, NITRITE, PROTEINUA, GLUCOSEU, KETONESU, BILIRUBINUR, BLOODU, Amylase: No results found for: AMYLASE, Lipase: No results found for: LIPASE  VTE Pharmacologic Prophylaxis: Eliquis  VTE Mechanical Prophylaxis: sequential compression device

## 2022-11-18 NOTE — PROGRESS NOTES
Progress Note - Infectious Disease   Cinthia Phoenix 57 y o  female MRN: [id number]  Unit/Bed#: [facility] 914-01 Encounter: [id number]      Impression/Recommendations:  1  Streptococcus oralis bacteremia in the postop period from Whipple resection   Source is most likely translocation from gut   Patient improved quickly on IV antibiotic   Bacteremia cleared quickly   2D echo without obvious vegetation   Repeat blood cultures during this admission have no growth   Given source of bacteremia and rapid clearance of bacteremia, will continue with 14 day treatment course  Continue high-dose IV ceftriaxone  Treat x 14 days total, complete today      2  Upper GI bleed, secondary to gastrojejunal anastomotic ulcer, resolved  Monitor      3  Pancreatic cancer, status post neoadjuvant chemotherapy and now status post Whipple resection  Plan for adjuvant chemotherapy in a few weeks noted      4  PE, improved on CT imaging this admission  Anticoagulation per primary service      Discussed with patient in detail regarding the above plan  Discussed with surgery service earlier  Okay for discharge from ID viewpoint       Antibiotics:  Ceftriaxone   Antibiotic # 14     Subjective:  Patient feels well  No nausea/vomiting  No abdominal pain  Temperature stays down   No chills      Objective:  Vitals:  Temp:  [97 5 °F (36 4 °C)-97 7 °F (36 5 °C)] 97 5 °F (36 4 °C)  HR:  [68-77] 71  Resp:  [16-20] 18  BP: (133-145)/(72-80) 141/78  SpO2:  [92 %-93 %] 93 %  Temp (24hrs), Av 6 °F (36 4 °C), Min:97 5 °F (36 4 °C), Max:97 7 °F (36 5 °C)  Current: Temperature: 97 5 °F (36 4 °C)    Physical Exam:     General: Awake, alert, cooperative, no distress  Neck:  Supple  No mass  No lymphadenopathy  Lungs: Expansion symmetric, no rales, no wheezing, respirations unlabored  Heart:  Regular rate and rhythm, S1 and S2 normal, no murmur     Abdomen: Soft, nondistended, non-tender, bowel sounds active all four quadrants, no masses, no organomegaly  Extremities: Stable leg edema  No erythema/warmth  No draining ulcer  Nontender to palpation  Skin:  No rash  Neuro: Moves all extremities  Invasive Devices     Central Venous Catheter Line  Duration           Port A Cath 05/31/22 Left Chest 170 days          Drain  Duration           Ureteral Internal Stent Left ureter 6 Fr  122 days                Labs studies:   I have personally reviewed pertinent labs  Results from last 7 days   Lab Units 11/18/22  0519 11/17/22  0614 11/16/22  0542 11/14/22  0518 11/13/22  0430 11/12/22  0608 11/12/22  0435 11/12/22  0102   POTASSIUM mmol/L 3 6 3 5 3 3*   < > 3 3* 4 0  --  3 5   CHLORIDE mmol/L 110* 111* 108   < > 109* 107  --  105   CO2 mmol/L 26 26 26   < > 27 22  --  21   CO2, I-STAT mmol/L  --   --   --   --   --   --  24  --    BUN mg/dL 7 7 8   < > 13 12  --  11   CREATININE mg/dL 0 44* 0 41* 0 43*   < > 0 43* 0 66  --  0 62   EGFR ml/min/1 73sq m 108 111 109   < > 109 94  --  96   GLUCOSE, ISTAT mg/dl  --   --   --   --   --   --  175*  --    CALCIUM mg/dL 8 4 8 1* 8 4   < > 7 7* 7 9*  --  8 1*   AST U/L  --   --   --   --  79* 212*  --  245*   ALT U/L  --   --   --   --  58 87*  --  78   ALK PHOS U/L  --   --   --   --  594* 875*  --  943*    < > = values in this interval not displayed  Results from last 7 days   Lab Units 11/18/22  0519 11/17/22  0614 11/16/22  0542   WBC Thousand/uL 5 68 7 79 10 38*   HEMOGLOBIN g/dL 7 9* 8 0* 8 3*   PLATELETS Thousands/uL 122* 133* 160     Results from last 7 days   Lab Units 11/12/22  0224 11/12/22  0218   BLOOD CULTURE  No Growth After 5 Days  No Growth After 5 Days  Imaging Studies:   I have personally reviewed pertinent imaging study reports and images in PACS  EKG, Pathology, and Other Studies:   I have personally reviewed pertinent reports

## 2022-11-18 NOTE — CASE MANAGEMENT
Case Management Discharge Planning Note    Patient name Lilly Vazquez  [facility] MRN [id number]  : 1960 Date 2022       Current Admission Date: 2022  Current Admission Diagnosis:Hematemesis   Patient Active Problem List    Diagnosis Date Noted   • H/O Whipple procedure 2022   • Hematemesis 2022   • Streptococcal bacteremia 11/10/2022   • Pulmonary embolism ([facility] ) 2022   • Sepsis ([facility] ) 2022   • Paroxysmal A-fib ([facility] ) 2022   • Left flank pain 2022   • CPAP (continuous positive airway pressure) dependence    • Chemotherapy induced neutropenia ([facility] ) 2022   • Pancreatic cancer ([facility] ) 2022   • Uric acid kidney stone 2022   • Nephrolithiasis 10/25/2021   • Class 3 severe obesity due to excess calories with body mass index (BMI) of 50 0 to 59 9 in adult ([facility] ) 2020   • Type 2 diabetes mellitus without complication, without long-term current use of insulin ([place] [address] ) 2017   • Severe obstructive sleep apnea 2016   • Dyspnea on exertion 2016   • Supraventricular tachycardia ([facility] [place] ) 2016   • Hypertension 2016   • Controlled depression 2016   • Adenomatous colon polyp 2015   • 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   • Morbid obesity ([facility] ) 2013   • Hyperlipidemia 2013   • Benign essential hypertension 10/02/2012   • Esophageal reflux 2012      LOS (days): 6  Geometric Mean LOS (GMLOS) (days):   Days to GMLOS:     OBJECTIVE:  Risk of Unplanned Readmission Score: 20 34         Current admission status: Inpatient   Preferred Pharmacy:   [address] [place] [address] 74, [phone]   Phone: [phone] Fax: [phone]    Primary Care Provider: Tom Royal MD    Primary Insurance: BLUE CROSS  Secondary Insurance:     DISCHARGE DETAILS:      Other Referral/Resources/Interventions Provided:  Referral Comments: Patient is medically cleared for discharge today   D/C instructions have been sent to [facility] at [phone]         Treatment Team Recommendation: Home with [address] [place] [address]  Discharge Destination Plan[de-identified] Home with [place] at Discharge : Family

## 2022-11-18 NOTE — PROGRESS NOTES
Cardiology Progress Note - Albert Swenson 57 y o  female MRN: [id number]    Unit/Bed#: [facility] 914-01 Encounter: [id number]      Assessment:  Principal Problem:    Hematemesis  Active Problems:    Supraventricular tachycardia ([address] )    Hypertension    Severe obstructive sleep apnea    Type 2 diabetes mellitus without complication, without long-term current use of insulin (HCC)    Esophageal reflux    Pancreatic cancer (HCC)    Paroxysmal A-fib (HCC)    Pulmonary embolism (HCC)    Streptococcal bacteremia    H/O Whipple procedure      Plan:  Paroxysmal SVT  -Patient recently discharged s/p whipple procedure for pancreatic adenocarcinoma  Readmitted for bloody emesis s/p EGD, common hepatic stenting    -Patient has had several episodes of SVT this admission that broke spontaneously, appears as AVNRT  -Patient has a history of PAF/flutter and paroxysmal SVT, patient notes history of palpitations that sometimes improve with vagal maneuvers  History of prior ablation for right-sided atrial tachycardia    -On Eliquis 5 mg BID   -On Lipitor 40 mg daily, Losartan 50 mg daily, Sotalol 120 mg Q12, Toprol-XL 25 mg BID  -Cardizem CD 24 hr capsule 240 mg daily  -11/4 Echo: 50% EF   -Na 142, K 3 6   -11/15 fluids In 2 2L, Out 1 8L, 11/16 in 456 ml, out 1 55L, 11/17 In 350 ml out 2 77L, 11/18 in 118 mL, out 1 4L  -No SVT noted for the last two days on telemetry   -Patient did not notice any palpitations overnight or today       Plan  -Will continue medical management at this time, telemetry continues to show sinus rhythm   -Continue Cardizem CD 24 hr capsule 240 mg daily   -Continue Eliquis, Lipitor, Losartan, Sotalol, Toprol XL  -If patient is in persistent SVT, recommend Adenosine or Cardizem drip   -If patient continues to go into SVT we will consider switching sotalol to amiodarone    -Follow telemetry closely    -F/U labs      Paroxysmal Atrial Fibrillation  -Patient maintains sinus rhythm with sotalol, continued inpatient   -See A&P for paroxysmal SVT  Subjective:   Patient seen and examined  No significant events overnight  She feels well this AM, denies palpitations or heart racing  She is eating and drinking well  No other concerns at this time  Review of Systems   Constitutional: Positive for fatigue  Negative for chills and fever  HENT: Negative for hearing loss and sore throat  Eyes: Negative for visual disturbance  Respiratory: Negative for cough and shortness of breath  Cardiovascular: Negative for chest pain and palpitations  Gastrointestinal: Negative for abdominal distention, abdominal pain, blood in stool, constipation, diarrhea, nausea and vomiting  Endocrine: Negative for polyuria  Genitourinary: Negative for dysuria and hematuria  Musculoskeletal: Negative for arthralgias and back pain  Skin: Negative for rash and wound  Neurological: Negative for dizziness, tremors, seizures, weakness, light-headedness and headaches  Psychiatric/Behavioral: The patient is not nervous/anxious  Objective:     Vitals: Blood pressure 141/78, pulse 71, temperature 97 5 °F (36 4 °C), resp  rate 18, height 5' 4" (1 626 m), weight (!) 140 kg (307 lb 12 2 oz), SpO2 93 %  , Body mass index is 52 83 kg/m² ,   Orthostatic Blood Pressures    Flowsheet Row Most Recent Value   Blood Pressure 141/78 filed at 11/18/2022 8623   Patient Position - Orthostatic VS Sitting filed at 11/16/2022 0813            Intake/Output Summary (Last 24 hours) at 11/18/2022 0934  Last data filed at 11/18/2022 8445  Gross per 24 hour   Intake --   Output 2100 ml   Net -2100 ml       No significant arrhythmias seen on telemetry review  Physical Exam:  Physical Exam  Vitals reviewed  Constitutional:       Appearance: Normal appearance  She is obese  HENT:      Head: Normocephalic        Right Ear: External ear normal       Left Ear: External ear normal       Nose: Nose normal       Mouth/Throat:      Mouth: Mucous membranes are moist    Eyes:      Extraocular Movements: Extraocular movements intact  Pupils: Pupils are equal, round, and reactive to light  Cardiovascular:      Rate and Rhythm: Normal rate and regular rhythm  Pulses: Normal pulses  Heart sounds: Normal heart sounds  Pulmonary:      Effort: Pulmonary effort is normal       Breath sounds: Normal breath sounds  Abdominal:      General: Abdomen is flat  Bowel sounds are normal       Palpations: Abdomen is soft  Tenderness: There is no abdominal tenderness  Musculoskeletal:         General: Normal range of motion  Cervical back: Normal range of motion  Right lower leg: Edema present  Left lower leg: Edema present  Skin:     General: Skin is warm  Capillary Refill: Capillary refill takes less than 2 seconds  Neurological:      General: No focal deficit present  Mental Status: She is alert and oriented to person, place, and time         Medications:      Current Facility-Administered Medications:   •  apixaban (ELIQUIS) tablet 5 mg, 5 mg, Oral, BID, Yara Ahmadi PA-C, 5 mg at 11/17/22 1820  •  atorvastatin (LIPITOR) tablet 40 mg, 40 mg, Oral, HS, Michelle Uribe PA-C, 40 mg at 11/17/22 2203  •  cefTRIAXone (ROCEPHIN) 2,000 mg in dextrose 5 % 50 mL IVPB, 2,000 mg, Intravenous, Q24H, Minh Hubbard PA-C, Stopped at 11/17/22 1216  •  diltiazem (CARDIZEM CD) 24 hr capsule 240 mg, 240 mg, Oral, Daily, Delonte Arizmendi DO, 240 mg at 11/17/22 0845  •  gabapentin (NEURONTIN) capsule 100 mg, 100 mg, Oral, TID, Michelle Uribe PA-C, 100 mg at 11/17/22 2203  •  insulin lispro (HumaLOG) 100 units/mL subcutaneous injection 1-5 Units, 1-5 Units, Subcutaneous, 4x Daily (AC & HS) **AND** Fingerstick Glucose (POCT), , , 4x Daily AC and at bedtime, Krystle Schilling MD  •  iodixanol (VISIPAQUE) 320 MG/ML injection 300 mL, 300 mL, Intra-arterial, Once in imaging, Michelle Uribe PA-C  •  losartan (COZAAR) tablet 50 mg, 50 mg, Oral, Daily, Cuca Carly Uribe, PA-C, 50 mg at 11/17/22 0845  •  metoprolol (LOPRESSOR) injection 5 mg, 5 mg, Intravenous, Q6H PRN, Cuca Carly Tituson, PA-C, 5 mg at 11/13/22 1747  •  metoprolol succinate (TOPROL-XL) 24 hr tablet 25 mg, 25 mg, Oral, BID, Cucawhitney Tituson, PA-C, 25 mg at 11/17/22 1820  •  ondansetron (ZOFRAN) injection 4 mg, 4 mg, Intravenous, Q6H PRN, Cuca Uribe, PA-C  •  oxyCODONE (ROXICODONE) immediate release tablet 10 mg, 10 mg, Oral, Q4H PRN, Phil Kumar MD  •  oxyCODONE (ROXICODONE) IR tablet 5 mg, 5 mg, Oral, Q4H PRN, Phil Kumar MD  •  pantoprazole (PROTONIX) EC tablet 40 mg, 40 mg, Oral, BID AC, Dempsey Fabry, MD, 40 mg at 11/18/22 0518  •  PARoxetine (PAXIL-CR) 24 hr tablet 37 5 mg, 37 5 mg, Oral, QAM, Alyssaradha Uribe PA-C, 37 5 mg at 11/17/22 0846  •  sotalol (BETAPACE) tablet 120 mg, 120 mg, Oral, Q12H, Cuc[place]hitney Uribe, PA-C, 120 mg at 11/17/22 2203     Labs & Results:        Results from last 7 days   Lab Units 11/18/22  0519 11/17/22  0614 11/16/22  0542   WBC Thousand/uL 5 68 7 79 10 38*   HEMOGLOBIN g/dL 7 9* 8 0* 8 3*   HEMATOCRIT % 26 3* 26 3* 26 8*   PLATELETS Thousands/uL 122* 133* 160         Results from last 7 days   Lab Units 11/18/22  0519 11/17/22  0614 11/16/22  0542 11/14/22  0518 11/13/22  0430 11/12/22  0608 11/12/22  0435 11/12/22  0102   POTASSIUM mmol/L 3 6 3 5 3 3*   < > 3 3* 4 0  --  3 5   CHLORIDE mmol/L 110* 111* 108   < > 109* 107  --  105   CO2 mmol/L 26 26 26   < > 27 22  --  21   CO2, I-STAT mmol/L  --   --   --   --   --   --  24  --    BUN mg/dL 7 7 8   < > 13 12  --  11   CREATININE mg/dL 0 44* 0 41* 0 43*   < > 0 43* 0 66  --  0 62   CALCIUM mg/dL 8 4 8 1* 8 4   < > 7 7* 7 9*  --  8 1*   ALK PHOS U/L  --   --   --   --  594* 875*  --  943*   ALT U/L  --   --   --   --  58 87*  --  78   AST U/L  --   --   --   --  79* 212*  --  245*   GLUCOSE, ISTAT mg/dl  --   --   --   --   --   --  175*  --     < > = values in this interval not displayed  Results from last 7 days   Lab Units 11/12/22  0608 11/12/22  0102   INR  1 48* 1 62*   PTT seconds 29 29     Results from last 7 days   Lab Units 11/15/22  0543 11/14/22  0518 11/13/22  0430   MAGNESIUM mg/dL 2 2 2 3 1 9       EKG personally reviewed by Celi Gaxiola MD     Counseling / Coordination of Care  Total floor / unit time spent today 15 minutes  Greater than 50% of total time was spent with the patient and / or family counseling and / or coordination of care  A description of the counseling / coordination of care: history/plan of care  Rosa Arredondo

## 2022-11-18 NOTE — TELEPHONE ENCOUNTER
----- Message from Jesus Denton MD sent at 11/18/2022  1:21 PM EST -----  Regarding: Hospital Follow-Up  Please call patient to schedule a follow-up with Dr Yeni Singh within 1 month  Discharged from [place] for SVT

## 2022-11-21 NOTE — UTILIZATION REVIEW
NOTIFICATION OF ADMISSION DISCHARGE   This is a Notification of Discharge from [address]  Please be advised that this patient has been discharge from our facility  Below you will find the admission and discharge date and time including the patient’s disposition  UTILIZATION REVIEW CONTACT:  Jordana Schaefer  Utilization   Network Utilization Review Department  Phone: [phone] x carefully listen to the prompts  All voicemails are confidential   Email: [email or web address]     ADMISSION INFORMATION  PRESENTATION DATE: 11/12/2022 12:34 AM  OBERVATION ADMISSION DATE:  INPATIENT ADMISSION DATE: 11/12/22  3:13 AM   DISCHARGE DATE: 11/18/2022  1:48 PM  DISPOSITION: Home with Home Health Care    IMPORTANT INFORMATION:  Send all requests for admission clinical reviews, approved or denied determinations and any other requests to dedicated fax number below belonging to the campus where the patient is receiving treatment   List of dedicated fax numbers:  [address] DENIALS (Administrative/Medical Necessity) [phone]   [address] (Maternity/NICU/Pediatrics) [phone]   [facility] [phone]   [facility] 87 [phone]   [address] [phone]   220 [facility] [phone] 90 [facility] [phone]   [address] [place], [place] [address] [phone]   [facility]  [phone]   405 [facility] [phone]   412 [facility] 850 [address] [facility] [phone]

## 2022-11-22 DIAGNOSIS — I48.0 PAROXYSMAL ATRIAL FIBRILLATION (HCC): ICD-10-CM

## 2022-11-23 ENCOUNTER — RA CDI HCC (OUTPATIENT)
Dept: OTHER | Facility: HOSPITAL | Age: 62
End: 2022-11-23

## 2022-11-23 RX ORDER — SOTALOL HYDROCHLORIDE 120 MG/1
TABLET ORAL
Qty: 180 TABLET | Refills: 3 | Status: SHIPPED | OUTPATIENT
Start: 2022-11-23

## 2022-11-26 ENCOUNTER — TELEPHONE (OUTPATIENT)
Dept: OTHER | Facility: OTHER | Age: 62
End: 2022-11-26

## 2022-11-26 NOTE — TELEPHONE ENCOUNTER
Patient's , Azucena Mccord, called to inform that patient has been feeling tired and sleeping all day  States that patient hasn't eaten anything and threw up what it looks like bile  Her BP is 122/82, pulse 98, room air 95%  Also stating that patient had 3 tylenol at 3:30 pm and they are concerned about the bile and dehydration  States that patient started to show symptoms after taking diltiazem  TC message sent out

## 2022-11-28 NOTE — RESPIRATORY THERAPY NOTE
RT Ventilator Management Note  Georgie Mccord 57 y o  female MRN: [id number]  Unit/Bed#: [facility] 326-16 Encounter: [id number]      Daily Screen         11/1/2022  0752 11/1/2022  1036          Patient safety screen outcome[de-identified] Failed Passed      Not Ready for Weaning due to[de-identified] Underline problem not resolved --                Physical Exam:   Assessment Type: Assess only  General Appearance: Sleeping, Sedated  Respiratory Pattern: Assisted  Chest Assessment: Chest expansion symmetrical  Bilateral Breath Sounds: Diminished      Resp Comments: pt placed on spont vent mode at this time  Composite Graft Text: The defect edges were debeveled with a #15 scalpel blade.  Given the location of the defect, shape of the defect, the proximity to free margins and the fact the defect was full thickness a composite graft was deemed most appropriate.  The defect was outline and then transferred to the donor site.  A full thickness graft was then excised from the donor site. The graft was then placed in the primary defect, oriented appropriately and then sutured into place.  The secondary defect was then repaired using a primary closure.

## 2022-11-28 NOTE — TELEPHONE ENCOUNTER
Reached out to patient regarding call made this weekend  Patient stated she vomited on Saturday and again early this morning  Patient was able to keep down some cereal this morning along with Zofran  Informed patient to eat bland food and stay hydrated with water and Gatorade  Will follow up for post op visit tomorrow

## 2022-11-29 NOTE — PROGRESS NOTES
Surgical Oncology Follow Up       [address] [facility] [place]  [address]  [facility] [address] [phone]    Chiquita Ventura  1960  [id number]  [address] [facility] [address]  [facility] [id number] [phone]    Diagnoses and all orders for this visit:    Malignant neoplasm of head of pancreas [facility])  -     Ambulatory referral to Radiation Oncology; Future  -     BUN; Future  -     Cancer antigen 19-9; Future  -     Creatinine, serum; Future  -     CT chest abdomen pelvis w contrast; Future        Chief Complaint   Patient presents with   • Post-Op Infection   • Post-op       Return in about 3 months (around 2/28/2023) for Office Visit, Imaging - See orders, Labs - See Treatment Plan  Oncology History   Pancreatic cancer ([facility] [address] )   5/11/2022 Initial Diagnosis    Adenocarcinoma of head of pancreas ([facility] [address] )     5/11/2022 Biopsy    Endoscopic Ultrasound:  A , B , & C   Pancreas, Pancreatic head:   Malignant (PSC Category VI)  Positive for adenocarcinoma     6/8/2022 -  Chemotherapy    pegfilgrastim (Danielle Labrum), 6 mg, Subcutaneous, Once, 7 of 14 cycles  Administration: 6 mg (6/10/2022), 6 mg (9/2/2022), 6 mg (9/16/2022), 6 mg (9/30/2022), 6 mg (8/19/2022), 6 mg (7/22/2022), 6 mg (7/7/2022)  fosaprepitant (EMEND) IVPB, 150 mg, Intravenous, Once, 5 of 12 cycles  Administration: 150 mg (7/20/2022), 150 mg (8/17/2022), 150 mg (8/31/2022), 150 mg (9/14/2022), 150 mg (9/28/2022)  fluorouracil (ADRUCIL), 400 mg/m2 = 955 mg, Intravenous, Once, 7 of 14 cycles  Administration: 955 mg (6/8/2022), 880 mg (8/31/2022), 880 mg (9/14/2022), 935 mg (9/28/2022), 880 mg (8/17/2022), 955 mg (7/5/2022)  irinotecan (CAMPTOSAR) chemo infusion, 430 mg, Intravenous, Once, 8 of 15 cycles  Dose modification: 150 mg/m2 (original dose 180 mg/m2, Cycle 6, Reason: Max Dose Reached, Comment: per protocol), 180 mg/m2 (original dose 180 mg/m2, Cycle 5, Reason: Other (Must fill in a comment), Comment: Per protocol)  Administration: 440 mg (6/8/2022), 330 mg (8/31/2022), 330 mg (9/14/2022), 351 mg (9/28/2022), 330 mg (8/17/2022), 330 mg (7/20/2022), 440 mg (7/5/2022)  oxaliplatin (ELOXATIN) chemo infusion, 203 15 mg, Intravenous, Once, 8 of 15 cycles  Administration: 200 mg (6/8/2022), 187 mg (8/31/2022), 187 mg (9/14/2022), 200 mg (9/28/2022), 200 mg (8/17/2022), 200 mg (7/20/2022), 200 mg (7/5/2022)  fluorouracil (ADRUCIL) ambulatory infusion Soln, 1,200 mg/m2/day = 5,735 mg, Intravenous, Over 46 hours, 8 of 15 cycles     10/31/2022 Surgery    Whipple procedure:  A   Gallbladder, cholecystectomy:       - Mild chronic cholecystitis  - No malignancy identified  B   Pancreas, Whipple:     - Ductal adenocarcinoma of the pancreas, 3 0 cm      - Tumor invades peripancreatic soft tissues and duodenal wall to involve lamina propria  - Perineural invasion by tumor present  - Metastatic carcinoma present in five of nine lymph nodes (5/9)  - Tumor involves the proximal duodenal margin      - Remainder of margins are negative for tumor  C   Portal lymph node:     - Four lymph nodes identified; all negative for malignancy (0/4)  pT2N2         Staging: T2N2M0 pancreas adenocarcinoma, October 2022  Positive duodenal margin  Treatment history:  Neoadjuvant FOLFIRINOX  Pylorus preserving pancreaticoduodenectomy, October 2022  Hepatic artery stent placement  Current treatment:  Adjuvant chemotherapy and chemoradiation pending  Disease status: TAVIA    History of Present Illness:  Patient returns in follow-up of her pancreatic or duodenectomy  She did require anticoagulation for a pulmonary embolism  She then had a GI bleed as well as a stent of her common hepatic artery, even though no definitive GDA stump bleed was seen  She has since been discharged and is still somewhat fatigued    Her appetite is fair, at best   She does drink a lot of water  Her urine is not concentrated  She does have some consistent nausea  Review of Systems  Complete ROS Surg Onc:   Complete ROS Surg Onc:   Constitutional: The patient denies new or recent history of general fatigue, no recent weight loss, no change in appetite  Eyes: No complaints of visual problems, no scleral icterus  ENT: no complaints of ear pain, no hoarseness, no difficulty swallowing,  no tinnitus and no new masses in head, oral cavity, or neck  Cardiovascular: No complaints of chest pain, no palpitations, no ankle edema  Respiratory: No complaints of shortness of breath, no cough  Gastrointestinal: No complaints of jaundice, no bloody stools, no pale stools  Genitourinary: No complaints of dysuria, no hematuria, no nocturia, no frequent urination, no urethral discharge  Musculoskeletal: No complaints of weakness, paralysis, joint stiffness or arthralgias  Integumentary: No complaints of rash, no new lesions  Neurological: No complaints of convulsions, no seizures, no dizziness  Hematologic/Lymphatic: No complaints of easy bruising  Endocrine:  No hot or cold intolerance  No polydipsia, polyphagia, or polyuria  Allergy/immunology:  No environmental allergies  No food allergies  Not immunocompromised  Skin:  No pallor or rash  No wound          Patient Active Problem List   Diagnosis   • Dyspnea on exertion   • Supraventricular tachycardia (HCC)   • Hypertension   • Controlled depression   • Severe obstructive sleep apnea   • Morbid obesity (HCC)   • Type 2 diabetes mellitus without complication, without long-term current use of insulin (HCC)   • Hyperlipidemia   • 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)   • Esophageal reflux   • Benign essential hypertension   • Adenomatous colon polyp   • Class 3 severe obesity due to excess calories with body mass index (BMI) of 50 0 to 59 9 in [facility])   • Nephrolithiasis   • Uric acid kidney stone   • Pancreatic cancer ([place] [address] )   • Chemotherapy induced neutropenia (HCC)   • CPAP (continuous positive airway pressure) dependence   • Left flank pain   • Paroxysmal A-fib (HCC)   • Pulmonary embolism (HCC)   • Sepsis ([place] [address] )   • Streptococcal bacteremia   • H/O Whipple procedure   • Hematemesis     Past Medical History:   Diagnosis Date   • Abnormal liver function test     last assessed 1/16/17    • Adenomatosis    • 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    last assessed 4/4/13   • Arthritis    • Atrial flutter ([place] [address] )    • Benign essential hypertension     last assessed 12/21/17    • Body mass index (BMI) of 50-59 9 in adult [facility])    • Cancer (HCC)     pancreas   • Colon polyp    • Congenital pes planus     last assessed 7/15/14    • COVID     4/30/21   • CPAP (continuous positive airway pressure) dependence    • Dental crowns present    • Depression    • Diabetes mellitus ([place] [address] )    • Dizziness     occas--pt reports did see family doctor   • GERD (gastroesophageal reflux disease)    • Hemangioma of skin 08/26/2003    last assessed 8/26/03   • History of cancer chemotherapy     [facility] Oncologist Dr Melva Sever   • History of gastroesophageal reflux (GERD)    • Hypercholesterolemia    • Hyperlipidemia    • Hypertension    • Irregular heart beat    • Kidney stone    • 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[place] [address] ) 04/19/2006    last assessed 12/31/14    • Osteoarthritis of both knees     last assessed 12/31/14    • Paroxysmal atrial fibrillation ([place] [address] )    • Port-A-Cath in place    • S/P ablation of atrial flutter    • Shortness of breath     per pt "from chemo-not drastic--still working and goes up steps"   • Sleep apnea    • Wears glasses      Past Surgical History:   Procedure Laterality Date   • ABDOMINAL ADHESION SURGERY N/A 10/31/2022    Procedure: LYSIS ADHESIONS, colectomy, vascular pedicle flap;  Surgeon: Sonia Junior MD;  [facility];  Service: Surgical Oncology   • ABDOMINAL SURGERY  06/2006    20cm GIST removed    • ABLATION SOFT TISSUE N/A 10/31/2022    Procedure: SOFT TISSUE ABLATION;  Surgeon: Hanna Stevens MD;  [facility];  Service: Surgical Oncology   • APPENDECTOMY     • ATRIAL ABLATION SURGERY     • CARPAL TUNNEL RELEASE Bilateral    • COLONOSCOPY     • FL GUIDED CENTRAL VENOUS ACCESS DEVICE INSERTION  05/31/2022   • FL RETROGRADE PYELOGRAM  07/18/2022   • FL RETROGRADE PYELOGRAM  8/22/2022   • HYSTERECTOMY      fibroids   • IR MESENTERIC/VISCERAL ANGIOGRAM  11/12/2022   • IR PORT CHECK  06/23/2022   • IR PORT REMOVAL AND PLACEMENT NEW SITE  06/28/2022   • JOINT REPLACEMENT Bilateral     knees   • KIDNEY STONE SURGERY Right 09/17/2021    placed stent in  for kidney stone   • KNEE SURGERY     • KNEE SURGERY Left 03/2019   • LAPAROTOMY N/A 10/31/2022    Procedure: EXPLORATORY LAPAROTOMY;  Surgeon: Hanna Stevens MD;  [facility];  Service: Surgical Oncology   • OOPHORECTOMY      cyst   • WV CYSTO/URETERO W/LITHOTRIPSY &INDWELL STENT INSRT Left 8/22/2022    Procedure: CYSTOSCOPY URETEROSCOPY WITH LITHOTRIPSY HOLMIUM LASER, RETROGRADE PYELOGRAM AND INSERTION STENT URETERAL;  Surgeon: Brenda Freeman MD;  [facility];  Service: Urology   • WV CYSTOSCOPY,INSERT URETERAL STENT Left 8/22/2022    Procedure: EXCHANGE STENT URETERAL;  Surgeon: Brenda Freeman MD;  [facility];  Service: Urology   • WV CYSTOURETHROSCOPY,URETER CATHETER Left 07/18/2022    Procedure: CYSTOSCOPY RETROGRADE PYELOGRAM WITH INSERTION STENT URETERAL;  Surgeon: Brenda Freeman MD;  [facility];  Service: Urology   • TONSILLECTOMY     • TUBAL LIGATION     • TUMOR EXCISION  2006    gastrointestinal stromal tumor   • TUNNELED VENOUS PORT PLACEMENT N/A 05/31/2022    Procedure: INSERTION VENOUS PORT (PORT-A-CATH);   Surgeon: Hanna Stevens MD;  [facility];  Service: Surgical Oncology   • UPPER GASTROINTESTINAL ENDOSCOPY     • WHIPPLE PROCEDURE/PANCREATICO-DUODENECTOMY N/A 10/31/2022    Procedure: WHIPPLE PROCEDURE/PANCREATICO-DUODENECTOMY, IOUS;  Surgeon: Yaniv Gonsales MD;  [facility];  Service: Surgical Oncology     Family History   Problem Relation Age of Onset   • Emphysema Mother    • Arthritis Mother    • Diabetes Mother    • Hypertension Mother    • COPD Mother    • Arthritis Father    • Prostate cancer Father    • Cerebral aneurysm Son    • No Known Problems Maternal Grandmother    • No Known Problems Maternal Grandfather    • No Known Problems Paternal Grandmother    • No Known Problems Paternal Grandfather    • No Known Problems Son    • No Known Problems Maternal Aunt    • No Known Problems Maternal Aunt    • No Known Problems Paternal Aunt    • No Known Problems Paternal Aunt      Social History     Socioeconomic History   • Marital status: /Civil Union     Spouse name: Not on file   • Number of children: Not on file   • Years of education: Not on file   • Highest education level: Not on file   Occupational History   • Not on file   Tobacco Use   • Smoking status: Former     Years: 9 00     Types: Cigarettes   • Smokeless tobacco: Never   Vaping Use   • Vaping Use: Never used   Substance and Sexual Activity   • Alcohol use: Not Currently     Comment: social drinker per allscript    • Drug use: Never   • Sexual activity: Not on file     Comment: defer   Other Topics Concern   • Not on file   Social History Narrative    Lack of exercise    Good sleep hygiene    No caffeine use    Dog    Good sleep hygiene       Social Determinants of Health     Financial Resource Strain: Not on file   Food Insecurity: No Food Insecurity   • Worried About Running Out of Food in the Last Year: Never true   • Ran Out of Food in the Last Year: Never true   Transportation Needs: No Transportation Needs   • Lack of Transportation (Medical): No   • Lack of Transportation (Non-Medical):  No   Physical Activity: Not on file   Stress: Not on file Social Connections: Not on file   Intimate Partner Violence: Not on file   Housing Stability: Low Risk    • Unable to Pay for Housing in the Last Year: No   • Number of Places Lived in the Last Year: 1   • Unstable Housing in the Last Year: No       Current Outpatient Medications:   •  apixaban (Eliquis) 5 mg, Take 1 tablet (5 mg total) by mouth 2 (two) times a day for 23 days, THEN 1 tablet (5 mg total) 2 (two) times a day for 23 days  , Disp: 74 tablet, Rfl: 0  •  atorvastatin (LIPITOR) 40 mg tablet, Take 1 tablet (40 mg total) by mouth daily at bedtime, Disp: 90 tablet, Rfl: 3  •  diltiazem (CARDIZEM CD) 240 mg 24 hr capsule, Take 1 capsule (240 mg total) by mouth daily Do not start before November 19, 2022 , Disp: 60 capsule, Rfl: 0  •  Eliquis DVT/PE Starter Pack 5 MG TBPK, , Disp: , Rfl:   •  glucose blood (Contour Next Test) test strip, Use 1 each daily Use as instructed, Disp: 100 each, Rfl: 3  •  Insulin Pen Needle (Pen Needles) 32G X 4 MM MISC, Use once a week, Disp: 12 each, Rfl: 3  •  metoprolol succinate (TOPROL-XL) 50 mg 24 hr tablet, Take 0 5 tablets (25 mg total) by mouth 2 (two) times a day (Patient taking differently: Take 50 mg by mouth 2 (two) times a day), Disp: 120 tablet, Rfl: 0  •  multivitamin (THERAGRAN) TABS, Take 1 tablet by mouth daily, Disp: , Rfl:   •  ondansetron (ZOFRAN) 4 mg tablet, Take 2 tablets (8 mg total) by mouth every 8 (eight) hours as needed for nausea or vomiting, Disp: 20 tablet, Rfl: 3  •  Ozempic, 1 MG/DOSE, 4 MG/3ML SOPN injection pen, INJECT 1MG SUBCUTANEOUSLY  WEEKLY (Patient taking differently: Inject under the skin once a week mondays), Disp: 9 mL, Rfl: 3  •  pantoprazole (PROTONIX) 40 mg tablet, Take 1 tablet (40 mg total) by mouth 2 (two) times a day, Disp: 90 tablet, Rfl: 0  •  PARoxetine (PAXIL-CR) 37 5 mg 24 hr tablet, TAKE 1 TABLET BY MOUTH IN  THE MORNING, Disp: 90 tablet, Rfl: 3  •  sotalol (BETAPACE) 120 mg tablet, TAKE 1 TABLET BY MOUTH  EVERY 12 HOURS, Disp: 180 tablet, Rfl: 3  •  gabapentin (NEURONTIN) 100 mg capsule, Take 1 capsule (100 mg total) by mouth 3 (three) times a day for 7 days, Disp: 21 capsule, Rfl: 0  •  Magnesium Oxide -Mg Supplement 400 MG CAPS, Take 1 capsule by mouth daily (Patient not taking: Reported on 11/18/2022), Disp: , Rfl:   •  olmesartan (BENICAR) 20 mg tablet, TAKE 1 TABLET BY MOUTH  DAILY (Patient not taking: Reported on 11/18/2022), Disp: 90 tablet, Rfl: 3  •  Omega-3 Fatty Acids (FISH OIL) 1,000 mg, Take 1,000 mg by mouth 2 (two) times a day   (Patient not taking: Reported on 11/18/2022), Disp: , Rfl:   •  Potassium Citrate ER 15 MEQ (1620 MG) TBCR, TAKE 1 TABLET BY MOUTH  TWICE DAILY (Patient not taking: Reported on 11/18/2022), Disp: 200 tablet, Rfl: 3  •  senna (SENOKOT) 8 6 mg, Take 1 tablet (8 6 mg total) by mouth daily at bedtime for 5 days (Patient taking differently: Take 8 6 mg by mouth daily at bedtime as needed), Disp: 5 tablet, Rfl: 0  •  VITAMIN D PO, Take 2,000 Units by mouth 2 (two) times a day (Patient not taking: Reported on 11/18/2022), Disp: , Rfl:   Allergies   Allergen Reactions   • Other Rash     Adhesive tape      Vitals:    11/29/22 1053   BP: 140/62   Pulse: 90   Resp: 17   Temp: 98 °F (36 7 °C)   SpO2: 93%       Physical Exam  Constitutional: General appearance: The Patient is well-developed and well-nourished who appears the stated age in no acute distress  Patient is pleasant and talkative  HEENT:  Normocephalic  Sclerae are anicteric  Mucous membranes are moist  Neck is supple without adenopathy  Abdomen: Abdomen is soft, non-tender, non-distended and without masses  Extremities: There is no clubbing or cyanosis  There is no edema  Symmetric  Neuro: Grossly nonfocal  Gait is normal      Skin: Warm, anicteric  Psych:  Patient is pleasant and talkative  Breasts:        Pathology:  Final Diagnosis   A   Gallbladder, cholecystectomy:       - Mild chronic cholecystitis       - No malignancy identified      B  Pancreas, Whipple:     - Ductal adenocarcinoma of the pancreas, 3 0 cm      - Tumor invades peripancreatic soft tissues and duodenal wall to involve lamina propria  - Perineural invasion by tumor present  - Metastatic carcinoma present in five of nine lymph nodes (5/9)  - Tumor involves the proximal duodenal margin      - Remainder of margins are negative for tumor      C  Portal lymph node:     - Four lymph nodes identified; all negative for malignancy (0/4)      Electronically signed by Joya Murphy MD on 11/4/2022 at  2:00 PM   Comments: This is an appended report  These results have been appended to a previously preliminary verified report  Additional Information    All reported additional testing was performed with appropriately reactive controls   These tests were developed and their performance characteristics determined by [facility] or appropriate performing facility, though some tests may be performed on tissues which have not been validated for performance characteristics (such as staining performed on alcohol exposed cell blocks and decalcified tissues)   Results should be interpreted with caution and in the context of the patients’ clinical condition  These tests may not be cleared or approved by the [place]  Food and Drug Administration, though the FDA has determined that such clearance or approval is not necessary  These tests are used for clinical purposes and they should not be regarded as investigational or for research  This laboratory has been approved by CLIA 88, designated as a high-complexity laboratory and is qualified to perform these tests     - Interpretation performed at [facility], [address]    Comment: This is an appended report  These results have been appended to a previously preliminary verified report     Synoptic Checklist   PANCREAS (EXOCRINE)  8th Edition - Protocol posted: 6/22/2022  PANCREAS (EXOCRINE): RESECTION - All Specimens  SPECIMEN   Procedure  Pancreaticoduodenectomy (Whipple resection), partial pancreatectomy    TUMOR   Tumor Site  Pancreatic head    Histologic Type  Ductal adenocarcinoma (NOS)    Histologic Grade  G2, moderately differentiated    Tumor Size  Greatest Dimension (Centimeters): 3 cm   Additional Dimension (Centimeters)  1 5 cm     1 5 cm   Site(s) Involved by Direct Tumor Extension  Duodenal wall    Treatment Effect  Present, with residual cancer showing evident tumor regression, but more than single cells or rare small groups of cancer cells (partial response, score 2)    Lymphovascular Invasion  Not identified    Perineural Invasion  Present    MARGINS   Margin Status for Invasive Carcinoma  Invasive carcinoma present at margin    Margin(s) Involved by Invasive Carcinoma  Proximal (gastric or duodenal)    Margin Status for Dysplasia and Intraepithelial Neoplasia  Not applicable    REGIONAL LYMPH NODES   Regional Lymph Node Status  Tumor present in regional lymph node(s)    Number of Lymph Nodes with Tumor  5    Number of Lymph Nodes Examined  13    PATHOLOGIC STAGE CLASSIFICATION  (pTNM, AJCC 8th Edition)   Reporting of pT, pN, and (when applicable) pM categories is based on information available to the pathologist at the time the report is issued  As per the AJCC (Chapter 1, 8th Ed ) it is the managing physician's responsibility to establish the final pathologic stage based upon all pertinent information, including but potentially not limited to this pathology report     pT Category  pT2    pN Category  pN2    ADDITIONAL FINDINGS   Additional Findings  None identified    Comment(s)  AJCC Prognostic Stage Group (8th Ed ):  Stage III, pT2, pN2, MX, G2             Labs:      Imaging  EGD    Result Date: 11/12/2022  Narrative: [address] [place] [address] Operating Room 600 [address] [address] [phone] DATE OF SERVICE: 11/12/22 PHYSICIAN(S): Attending: Urvashi Hartmann MD Fellow: Lon Prieto MD INDICATION: Hematemesis without nausea POST-OP DIAGNOSIS: See the impression below  PREPROCEDURE: Informed consent was obtained for the procedure, including sedation  Risks of perforation, hemorrhage, adverse drug reaction and aspiration were discussed  The patient was placed in the left lateral decubitus position  Patient was explained about the risks and benefits of the procedure  Risks including but not limited to bleeding, infection, and perforation were explained in detail  Also explained about less than 100% sensitivity with the exam and other alternatives  DETAILS OF PROCEDURE: Patient was taken to the procedure room where a time out was performed to confirm correct patient and correct procedure  The patient underwent monitored anesthesia care, which was administered by an anesthesia professional  The patient's blood pressure, heart rate, level of consciousness, respirations, oxygen and ETCO2 were monitored throughout the procedure  The scope was introduced through the mouth and advanced to the jejunum  Retroflexion was performed in the fundus  The patient experienced no blood loss  The procedure was not difficult  The patient tolerated the procedure well  There were no apparent complications   ANESTHESIA INFORMATION: ASA: III Anesthesia Type: General MEDICATIONS: No administrations occurring from 1356 to 1505 on 11/12/22 FINDINGS: The esophagus, stomach, afferent jejunum and efferent jejunum appeared normal  Large, cratered, irregular ulcer in the gastrojejunal anastomosis with clean base (Je III) The jejunum appeared normal  Both limbs were intubated and no active bleeding or old blood were seen Nasogastric tube successfully placed in the stomach; scope reinserted to confirm placement SPECIMENS: * No specimens in log *     Impression: Large ulcer at the gastrojejunal anastomosis without any active bleeding currently Otherwise normal endoscopy NG tube replaced at the end of the procedure under endoscopic visualization RECOMMENDATION: PPI IV BID Return to micu Monitor H&H  ATTENDING ATTESTATION:  I was present throughout the entire procedure from insertion to complete withdrawal of the scope  I performed all interventions myself or oversaw the fellow  Pia Gonzalez MD     XR chest portable    Result Date: 11/10/2022  Narrative: CHEST INDICATION:   New onset CP and palpitations with sustained tachycardia  COMPARISON:  October 31, 2022  EXAM PERFORMED/VIEWS:  XR CHEST PORTABLE FINDINGS:  Port-A-Cath enters superior vena cava with tip in or near right atrium  Cardiomediastinal silhouette appears unremarkable  The lungs are clear  No pneumothorax or pleural effusion  Osseous structures appear within normal limits for patient age  Impression: No acute disease in the chest  Workstation performed: [id number]     IR mesenteric/visceral angiogram    Result Date: 11/12/2022  Narrative: Superior mesenteric angiogram Celiac angiogram Common hepatic angiogram Covered Stenting of the common hepatic artery History: Whipple procedure with abdominal pain and bleeding seen at the gastroduodenal artery stump status post Whipple procedure 12 days ago with presumed GDA stump blowout Contrast: 1 50 cc of Visipaque 320 Fluoro time: 17 minutes Number of Images: 288 Radiation Dose: 1379 mGy Conscious sedation time:  with anesth Technique: The patient was brought to the interventional radiology suite and identified verbally and by wrist band  The patient was placed supine on the table and the right groin was prepped and draped in the usual sterile fashion  Lidocaine was administered to the skin and a small skin incision was made  The right common femoral artery was then punctured percutaneously utilizing Seldinger technique  A ProtectWiseson wire was advanced into the abdominal aorta over which a 5 Western Allie vascular sheath was inserted and connected to a flush bag  A 5 Bahraini Stoddard 1 glide catheter was advanced into the superior mesenteric artery for an angiogram and then into the celiac axis for an angiogram  A 6 [place] sheath was then advanced into the common hepatic artery for an angiogram followed by covered stenting of the distal common hepatic artery across the gastroduodenal artery stump using a 5 mm x 26 mm covered stent  A postprocedure common hepatic angiogram was performed  A right common femoral antrum was performed followed by right common femoral arteriotomy closure using the Perclose pro glide device  The patient tolerated the procedure well, including general anesthesia with no immediate complications seen  Impression: Impression: No active bleeding seen off of the celiac axis or superior mesenteric artery  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 and a covered stent was successfully placed across it  Plan: Follow-up with  surgical oncology  Workstation performed: [id number]     PE Study with CT abdomen &pelvis with contrast    Result Date: 11/12/2022  Narrative: CT PULMONARY ANGIOGRAM OF THE CHEST AND CT ABDOMEN AND PELVIS WITH INTRAVENOUS CONTRAST INDICATION:   PE hx, GI bleed    COMPARISON:  September 26, 2022, November 4, 2022 TECHNIQUE:  CT examination of the chest, abdomen and pelvis was performed  Thin section CT angiographic technique was used in the chest in order to evaluate for pulmonary embolus and coronal 3D MIP postprocessing was performed on the acquisition scanner  Axial, sagittal, and coronal 2D reformatted images were created from the source data and submitted for interpretation  Radiation dose length product (DLP) for this visit:  4390 68 mGy-cm   This examination, like all CT scans performed in the [facility], was performed utilizing techniques to minimize radiation dose exposure, including the use of iterative reconstruction and automated exposure control   IV Contrast:  100 mL of iohexol (OMNIPAQUE) Enteric Contrast:  Enteric contrast was not administered  FINDINGS: CHEST PULMONARY ARTERIAL TREE:  Multiple bilateral small peripheral embolic defects are again noted, some of which are more eccentric than on the prior and smaller in size, suggesting evolving chronic emboli  No definite new emboli detected  LUNGS:  Mild subsegmental atelectasis persisting in the right lung base, improved  There is no tracheal or endobronchial lesion  PLEURA:  Unremarkable  HEART/AORTA:  Heart is unremarkable for patient's age  No thoracic aortic aneurysm  MEDIASTINUM AND SHANNAN:  Unremarkable  CHEST WALL AND LOWER NECK:  Unremarkable  ABDOMEN LIVER/BILIARY TREE:  Ill-defined wedge-shaped hypodensity again noted in the left lateral segment posteriorly, without significant change  This was not present in September  Possible related to recent surgical procedure, possible retractor injury  No evidence for active contrast extravasation or enhancement  No intrahepatic biliary dilatation  There is a new loculated fluid collection interposed between the left lateral segment and caudate, mass effect on the caudate  This thin-walled well-defined  collection is 6 x 4 cm in size (image 2/236)  Given the recent history of Whipple procedure, postop seroma or biloma should be considered  In regard to the other mixed attenuation curvilinear structure described on the right report as retroperitoneal hematoma, I feel this more likely represents mural edema, thickening and probable mild component of intramural hematoma within loop of small bowel  Unclear if this is portion of jejunostomy loop, difficult to confidently differentiate from retroperitoneal hemorrhage  There is no evidence for active contrast extravasation demonstrated  There are numerous foci of free air seen cephalad to this adjacent to the stomach and jejunostomy loop (image 2/244  )  These foci of free air are new compared to the prior    The more anterior peritoneal free air has mostly resolved postop  Findings raising suspicion for anastomotic breakdown  GALLBLADDER:  No calcified gallstones  No pericholecystic inflammatory change  SPLEEN:  Unremarkable  PANCREAS:  Status post Whipple procedure  Postop changes as noted above  See above for mixed attenuation soft tissue seen at the surgical site  Partial thrombosis again noted within the main portal vein and SMV  ADRENAL GLANDS:  Stable nodule right adrenal gland  Left adrenal within normal limits  KIDNEYS/URETERS:  No hydronephrosis  No nephrolithiasis  Increasing pararenal fluid collection on the right with multiple foci of air noted at the superior extent  Unclear if this is evolving fat necrosis or potentially extraluminal air related to bowel  These foci of retroperitoneal air were not present on a prior postoperative study from November 4  STOMACH AND BOWEL:  Unremarkable  APPENDIX:  No findings to suggest appendicitis  ABDOMINOPELVIC CAVITY:  No ascites  No pneumoperitoneum  No lymphadenopathy  VESSELS:  Unremarkable for patient's age  PELVIS REPRODUCTIVE ORGANS:  Unremarkable for patient's age  URINARY BLADDER:  Unremarkable  ABDOMINAL WALL/INGUINAL REGIONS:  Unremarkable  OSSEOUS STRUCTURES:  No acute fracture or destructive osseous lesion  Impression: Interval improvement of pulmonary emboli  New loculated fluid collection between the caudate and left lateral segment  Stable ill-defined linear hypodensity in the left lateral segment as described above  Mixed attenuation curvilinear structure in the retroperitoneum at the site of the surgery  Unclear if this represents evolving hematoma or potentially wall thickening and edema within a bypass loop of bowel  There are numerous foci of extraluminal air,  newly demonstrated just cephalad to this adjacent to a bypass loop and stomach in the midline as well as extending into the enlarging right anterior paranephric collection  Findings suspicious for anastomotic leak  I personally discussed this study with Dr Caleb Lew on 11/12/2022 at 11:30 AM  There is a significant additional finding or different interpretation from the [facility] preliminary report which was provided shortly after completion of the exam  New free air within the retroperitoneum and operative site  Workstation performed: [id number]     CTA chest ct abdomen pelvis w contrast    Result Date: 11/4/2022  Narrative: CT PULMONARY ANGIOGRAM OF THE CHEST AND CT ABDOMEN AND PELVIS WITH INTRAVENOUS CONTRAST INDICATION: 19-year-old female with history of pancreatic adenocarcinoma status post Whipple procedure and primary repair of SMV/portal vein injury on 10/31/2022 with abdominal pain postoperatively  Rule out pulmonary embolism and leak  COMPARISON:  CT chest, abdomen, pelvis 9/26/2022  TECHNIQUE:  CT examination of the chest, abdomen and pelvis was performed  Thin section CT angiographic technique was used in the chest in order to evaluate for pulmonary embolus and coronal 3D MIP postprocessing was performed on the acquisition scanner  Axial, sagittal, and coronal 2D reformatted images were created from the source data and submitted for interpretation  Radiation dose length product (DLP) for this visit:  1516 16 mGy-cm   This examination, like all CT scans performed in the [facility], was performed utilizing techniques to minimize radiation dose exposure, including the use of iterative reconstruction and automated exposure control  IV Contrast:  100 mL of iohexol (OMNIPAQUE)   Enteric Contrast:  Enteric contrast was not administered  FINDINGS: CHEST PULMONARY ARTERIAL TREE: Filling defects in the right pulmonary artery, segmental and subsegmental right upper lobe pulmonary arteries, segmental and subsegmental right middle lobe pulmonary arteries, and subsegmental right lower lobe pulmonary artery, consistent with pulmonary emboli  Dilated main pulmonary artery measuring 3 1 cm   RV/LV ratio of 0 89  No CT evidence of right heart strain  LUNGS: Nonenhancing right lower lobe airspace consolidation, new since prior study  Subsegmental atelectasis in the left lower lobe, lingula, and right lower lobe  Stable 2 mm right upper lobe solid pulmonary nodule (3/25)  PLEURA:  No pleural effusion or pneumothorax  HEART/AORTA:  The heart is mildly enlarged  No pericardial effusion  No thoracic aortic aneurysm  Common origin of the brachiocephalic artery and the left common carotid artery off of the aortic arch, which is a normal anatomic variant  MEDIASTINUM AND SHANNAN:  Unremarkable  CHEST WALL AND LOWER NECK: Left chest wall vascular access device with distal tip terminating in the right atrium  Stable 5 mm right thyroid nodule  ABDOMEN LIVER/BILIARY TREE: Hepatomegaly measuring 19 4 cm in length  Mild hepatic steatosis  Ill-defined wedgelike hypoenhancement in the left hepatic lobe  No suspicious hepatic lesion  GALLBLADDER:  Gallbladder is surgically absent  SPLEEN:  Splenomegaly measuring 15 0 cm in craniocaudal dimension  PANCREAS:  Postsurgical changes of Whipple procedure, with ill-defined haziness in the surgical bed, likely related to postoperative edema  The remaining pancreatic parenchyma in the body and tail are unremarkable  No dilatation of the residual main pancreatic duct  ADRENAL GLANDS: Stable 1 9 cm right adrenal adenoma  The left adrenal gland is unremarkable  KIDNEYS/URETERS:  Mild bilateral perinephric stranding, greater on the right compared to left, likely postoperative in etiology  Stable subcentimeter hypodense lesion in the interpolar right kidney which is too small to characterize  No hydronephrosis  STOMACH AND BOWEL:  Small volume linear pneumoperitoneum immediately adjacent to the gastrojejunal anastomosis (2/242)  No bowel obstruction identified  Mild bowel wall thickening of the duodenum  APPENDIX:  No findings to suggest appendicitis   ABDOMINOPELVIC CAVITY: Right upper quadrant drainage catheter terminating posterolateral to the right hepatic lobe  Small volume abdominopelvic ascites  No well-defined organized rim-enhancing fluid collection to suggest an intra-abdominal abscess  Small volume pneumoperitoneum adjacent to the gastrojejunal anastomosis and in the right upper quadrant  No lymphadenopathy  VESSELS:  Atherosclerotic changes are present  No evidence of aneurysm  The anterior right and left portal veins are patent  The posterior right portal vein is diminutive but patent  The hepatic veins are patent  Nonocclusive thrombus in the main portal vein and superior mesenteric vein  Surgical clips adjacent to the superior mesenteric vein and the portal vein, consistent with prior repair  The splenic vein is patent  PELVIS REPRODUCTIVE ORGANS:  Surgical changes of prior hysterectomy  URINARY BLADDER:  Nondependent pocket of gas, likely related to recent instrumentation  Otherwise, unremarkable  ABDOMINAL WALL/INGUINAL REGIONS:  Postsurgical changes of the anterior abdominal wall with superficial skin staples  Trace subcutaneous emphysema in the right upper quadrant anterior abdominal wall surrounding the right upper quadrant drainage catheter  OSSEOUS STRUCTURES:  No acute fracture or destructive osseous lesion  Spinal degenerative changes are noted  Impression: 1  Pulmonary emboli in the right pulmonary artery, segmental and subsegmental right upper and middle lobe pulmonary arteries, and the subsegmental right lower lobe pulmonary arteries  RV/LV ratio of 0 89  No CT evidence of right heart strain  2   Small volume linear pneumoperitoneum immediately adjacent to the gastrojejunal anastomosis and in the right upper quadrant with a small volume of abdominopelvic ascites  These findings are nonspecific given recent major abdominal surgery and may be postoperative in etiology, however, a leak of the gastrojejunal anastomosis cannot be excluded  Recommend further evaluation with an upper GI study  3   Nonocclusive thrombus in the main portal vein and superior mesenteric vein  4   Nonenhancing right lower lobe airspace consolidation, likely representing pulmonary infarct  5   Ill-defined wedgelike hypoenhancement in the left hepatic lobe  Differential diagnosis includes hepatic infarct, altered perfusion, versus artifact  Recommend attention on follow-up  6   Postsurgical changes of Whipple procedure, with ill-defined haziness in the surgical bed, likely related to postoperative edema  7   Mild bowel wall thickening of the duodenum, which may represent postoperative edema versus duodenitis  8   Dilated main pulmonary artery measuring 3 1 cm, which can be seen with pulmonary hypertension  9   Hepatosplenomegaly and mild hepatic steatosis  I personally discussed this study with Dima Willis on 11/4/2022 at 3:07 PM  Workstation performed: [id number]     XR chest portable ICU    Result Date: 10/31/2022  Narrative: CHEST INDICATION:   s/p intubation  COMPARISON:  5/31/2022  EXAM PERFORMED/VIEWS:  XR CHEST PORTABLE ICU Images:  1 FINDINGS: Cardiac and mediastinal contours are stable  Probable mild cardiomegaly  Mild central pulmonary vascular congestion  No focal airspace consolidation or effusions  No pneumothorax  There is an endotracheal tube with its distal tip in the proximal right atrium  Retraction of this to several centimeters would be helpful  Left chest wall tariq catheter in place  Distal tip of the gastric tube not visualized but is at least in the stomach  Impression: Endotracheal tube with its distal tip in the proximal right atrium  This should be retracted several centimeters  Gastric tube as above  The study was marked in [facility] for immediate notification   Workstation performed: [id number]     VAS lower limb venous duplex study, complete bilateral    Result Date: 11/11/2022  Narrative:  [address] REPORT CLINICAL: Indications: Patient presents with recent discovery of pulmonary embolism and physician wants to determine potential source  Operative History: Atrial ablation surgery Risk Factors The patient has history of Obesity, HTN, Diabetes and Hyperlipidemia  CONCLUSION: Impression: RIGHT LOWER LIMB: No evidence of acute or chronic deep vein thrombosis  No evidence of superficial thrombophlebitis noted  Doppler evaluation shows a normal response to augmentation maneuvers  Popliteal, posterior tibial and anterior tibial arterial Doppler waveforms are triphasic  LEFT LOWER LIMB: No evidence of acute or chronic deep vein thrombosis  No evidence of superficial thrombophlebitis noted  Doppler evaluation shows a normal response to augmentation maneuvers  Popliteal, posterior tibial and anterior tibial arterial Doppler waveforms are triphasic  Technical findings were given to Jelani Villagomez 11/10/2022  SIGNATURE: Electronically Signed by: Magdalena Simpson MD, [address] on 2022-11-11 11:02:11 AM    Echo complete w/ contrast if indicated    Result Date: 11/4/2022  Narrative: •  Left Ventricle: Left ventricular cavity size is normal  Wall thickness is normal  The left ventricular ejection fraction is 50%  Systolic function is low normal  Wall motion is normal  Diastolic function is mildly abnormal, consistent with grade I (abnormal) relaxation  •  Tricuspid Valve: There is mild regurgitation  I reviewed the above laboratory and imaging data  Discussion/Summary:  69-year-old female status post neoadjuvant chemotherapy followed by pylorus preserving pancreaticoduodenectomy for a T2N2M0 pancreas adenocarcinoma  Her duodenal margin was not followed  I have recommended that she consider adjuvant chemotherapy/chemoradiation  She is seeing Dr Margaret Patel tomorrow  I will place a referral for radiation  Given her current performance status, I still think she is probably at least another month away from adjuvant therapy    From my standpoint, I have told her to continue nutritional supplements and to make sure she stays hydrated and eat small frequent meals  I suspect this will improve over the next 2-4 weeks  I will refill her Zofran for her  Her  also thinks she is slightly depressed  She is on Paxil, she is going to discuss this further with her family physician  Having an antidepressant prescribed, is not unreasonable at this time  Given the clot seen in her portal vein at the time of follow-up imaging, I would recommend that she have long-term anticoagulation  I will see her again in 3 months with a repeat CT CA 19-9  She is agreeable to this plan  All her questions were answered

## 2022-11-29 NOTE — LETTER
November 29, 2022     [place], [address] [facility]    Patient: Deena Braragan   YOB: 1960   Date of Visit: 11/29/2022       Dear Dr Kerr Done: Thank you for referring Deena Barragan to me for evaluation  Below are my notes for this consultation  If you have questions, please do not hesitate to call me  I look forward to following your patient along with you  Sincerely,        Fiorella Royal MD        CC: MD Wilfred Chavarria DO Laurian Barbara, MD  11/29/2022 11:31 AM  Incomplete               Surgical Oncology Follow Up       [address] [facility] [address]  [phone]    Deena Barragan  1960  [id number]  [address] [facility] [id number] [phone]    Diagnoses and all orders for this visit:    Malignant neoplasm of head of pancreas [facility])  -     Ambulatory referral to Radiation Oncology; Future  -     BUN; Future  -     Cancer antigen 19-9; Future  -     Creatinine, serum; Future  -     CT chest abdomen pelvis w contrast; Future        Chief Complaint   Patient presents with   • Post-Op Infection   • Post-op       Return in about 3 months (around 2/28/2023) for Office Visit, Imaging - See orders, Labs - See Treatment Plan  Oncology History   Pancreatic cancer ([facility] [address] )   5/11/2022 Initial Diagnosis    Adenocarcinoma of head of pancreas ([facility] [address] )     5/11/2022 Biopsy    Endoscopic Ultrasound:  A , B , & C   Pancreas, Pancreatic head:   Malignant (PSC Category VI)  Positive for adenocarcinoma     6/8/2022 -  Chemotherapy    pegfilgrastim ([employer]), 6 mg, Subcutaneous, Once, 7 of 14 cycles  Administration: 6 mg (6/10/2022), 6 mg (9/2/2022), 6 mg (9/16/2022), 6 mg (9/30/2022), 6 mg (8/19/2022), 6 mg (7/22/2022), 6 mg (7/7/2022)  fosaprepitant (EMEND) IVPB, 150 mg, Intravenous, Once, 5 of 12 cycles  Administration: 150 mg (7/20/2022), 150 mg (8/17/2022), 150 mg (8/31/2022), 150 mg (9/14/2022), 150 mg (9/28/2022)  fluorouracil (ADRUCIL), 400 mg/m2 = 955 mg, Intravenous, Once, 7 of 14 cycles  Administration: 955 mg (6/8/2022), 880 mg (8/31/2022), 880 mg (9/14/2022), 935 mg (9/28/2022), 880 mg (8/17/2022), 955 mg (7/5/2022)  irinotecan (CAMPTOSAR) chemo infusion, 430 mg, Intravenous, Once, 8 of 15 cycles  Dose modification: 150 mg/m2 (original dose 180 mg/m2, Cycle 6, Reason: Max Dose Reached, Comment: per protocol), 180 mg/m2 (original dose 180 mg/m2, Cycle 5, Reason: Other (Must fill in a comment), Comment: Per protocol)  Administration: 440 mg (6/8/2022), 330 mg (8/31/2022), 330 mg (9/14/2022), 351 mg (9/28/2022), 330 mg (8/17/2022), 330 mg (7/20/2022), 440 mg (7/5/2022)  oxaliplatin (ELOXATIN) chemo infusion, 203 15 mg, Intravenous, Once, 8 of 15 cycles  Administration: 200 mg (6/8/2022), 187 mg (8/31/2022), 187 mg (9/14/2022), 200 mg (9/28/2022), 200 mg (8/17/2022), 200 mg (7/20/2022), 200 mg (7/5/2022)  fluorouracil (ADRUCIL) ambulatory infusion Soln, 1,200 mg/m2/day = 5,735 mg, Intravenous, Over 46 hours, 8 of 15 cycles     10/31/2022 Surgery    Whipple procedure:  A   Gallbladder, cholecystectomy:       - Mild chronic cholecystitis  - No malignancy identified  B   Pancreas, Whipple:     - Ductal adenocarcinoma of the pancreas, 3 0 cm      - Tumor invades peripancreatic soft tissues and duodenal wall to involve lamina propria  - Perineural invasion by tumor present  - Metastatic carcinoma present in five of nine lymph nodes (5/9)  - Tumor involves the proximal duodenal margin      - Remainder of margins are negative for tumor  C   Portal lymph node:     - Four lymph nodes identified; all negative for malignancy (0/4)      pT2N2         Staging: T2N2M0 pancreas adenocarcinoma, October 2022  Positive duodenal margin  Treatment history: Neoadjuvant FOLFIRINOX  Pylorus preserving pancreaticoduodenectomy, October 2022  Hepatic artery stent placement  Current treatment:  Adjuvant chemotherapy and chemoradiation pending  Disease status: TAVIA    History of Present Illness:  Patient returns in follow-up of her pancreatic or duodenectomy  She did require anticoagulation for a pulmonary embolism  She then had a GI bleed as well as a stent of her common hepatic artery, even though no definitive GDA stump bleed was seen  She has since been discharged and is still somewhat fatigued  Her appetite is fair, at best   She does drink a lot of water  Her urine is not concentrated  She does have some consistent nausea  Review of Systems  Complete ROS Surg Onc:   Complete ROS Surg Onc:   Constitutional: The patient denies new or recent history of general fatigue, no recent weight loss, no change in appetite  Eyes: No complaints of visual problems, no scleral icterus  ENT: no complaints of ear pain, no hoarseness, no difficulty swallowing,  no tinnitus and no new masses in head, oral cavity, or neck  Cardiovascular: No complaints of chest pain, no palpitations, no ankle edema  Respiratory: No complaints of shortness of breath, no cough  Gastrointestinal: No complaints of jaundice, no bloody stools, no pale stools  Genitourinary: No complaints of dysuria, no hematuria, no nocturia, no frequent urination, no urethral discharge  Musculoskeletal: No complaints of weakness, paralysis, joint stiffness or arthralgias  Integumentary: No complaints of rash, no new lesions  Neurological: No complaints of convulsions, no seizures, no dizziness  Hematologic/Lymphatic: No complaints of easy bruising  Endocrine:  No hot or cold intolerance  No polydipsia, polyphagia, or polyuria  Allergy/immunology:  No environmental allergies  No food allergies  Not immunocompromised  Skin:  No pallor or rash  No wound          Patient Active Problem List Diagnosis   • Dyspnea on exertion   • Supraventricular tachycardia (HCC)   • Hypertension   • Controlled depression   • Severe obstructive sleep apnea   • Morbid obesity (HCC)   • Type 2 diabetes mellitus without complication, without long-term current use of insulin (HCC)   • Hyperlipidemia   • 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)   • Esophageal reflux   • Benign essential hypertension   • Adenomatous colon polyp   • Class 3 severe obesity due to excess calories with body mass index (BMI) of 50 0 to 59 9 in adult [facility])   • Nephrolithiasis   • Uric acid kidney stone   • Pancreatic cancer ([place] [address] )   • Chemotherapy induced neutropenia (HCC)   • CPAP (continuous positive airway pressure) dependence   • Left flank pain   • Paroxysmal A-fib (HCC)   • Pulmonary embolism (HCC)   • Sepsis ([place] [address] )   • Streptococcal bacteremia   • H/O Whipple procedure   • Hematemesis     Past Medical History:   Diagnosis Date   • Abnormal liver function test     last assessed 1/16/17    • Adenomatosis    • 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    last assessed 4/4/13   • Arthritis    • Atrial flutter ([place] [address] )    • Benign essential hypertension     last assessed 12/21/17    • Body mass index (BMI) of 50-59 9 in adult [facility])    • Cancer (HCC)     pancreas   • Colon polyp    • Congenital pes planus     last assessed 7/15/14    • COVID     4/30/21   • CPAP (continuous positive airway pressure) dependence    • Dental crowns present    • Depression    • Diabetes mellitus ([place] [address] )    • Dizziness     occas--pt reports did see family doctor   • GERD (gastroesophageal reflux disease)    • Hemangioma of skin 08/26/2003    last assessed 8/26/03   • History of cancer chemotherapy     [facility] Oncologist Dr Crista Wisdom   • History of gastroesophageal reflux (GERD)    • Hypercholesterolemia    • Hyperlipidemia    • Hypertension    • Irregular heart beat    • Kidney stone    • 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[place] [address] ) 04/19/2006    last assessed 12/31/14    • Osteoarthritis of both knees     last assessed 12/31/14    • Paroxysmal atrial fibrillation ([address] )    • Port-A-Cath in place    • S/P ablation of atrial flutter    • Shortness of breath     per pt "from chemo-not drastic--still working and goes up steps"   • Sleep apnea    • Wears glasses      Past Surgical History:   Procedure Laterality Date   • ABDOMINAL ADHESION SURGERY N/A 10/31/2022    Procedure: LYSIS ADHESIONS, colectomy, vascular pedicle flap;  Surgeon: Jez Washington MD;  [facility];  Service: Surgical Oncology   • ABDOMINAL SURGERY  06/2006    20cm GIST removed    • ABLATION SOFT TISSUE N/A 10/31/2022    Procedure: SOFT TISSUE ABLATION;  Surgeon: Jez Washington MD;  [facility];  Service: Surgical Oncology   • APPENDECTOMY     • ATRIAL ABLATION SURGERY     • CARPAL TUNNEL RELEASE Bilateral    • COLONOSCOPY     • FL GUIDED CENTRAL VENOUS ACCESS DEVICE INSERTION  05/31/2022   • FL RETROGRADE PYELOGRAM  07/18/2022   • FL RETROGRADE PYELOGRAM  8/22/2022   • HYSTERECTOMY      fibroids   • IR MESENTERIC/VISCERAL ANGIOGRAM  11/12/2022   • IR PORT CHECK  06/23/2022   • IR PORT REMOVAL AND PLACEMENT NEW SITE  06/28/2022   • JOINT REPLACEMENT Bilateral     knees   • KIDNEY STONE SURGERY Right 09/17/2021    placed stent in  for kidney stone   • KNEE SURGERY     • KNEE SURGERY Left 03/2019   • LAPAROTOMY N/A 10/31/2022    Procedure: EXPLORATORY LAPAROTOMY;  Surgeon: Jez Washington MD;  [facility];  Service: Surgical Oncology   • OOPHORECTOMY      cyst   • ME CYSTO/URETERO W/LITHOTRIPSY &INDWELL STENT INSRT Left 8/22/2022    Procedure: CYSTOSCOPY URETEROSCOPY WITH LITHOTRIPSY HOLMIUM LASER, RETROGRADE PYELOGRAM AND INSERTION STENT URETERAL;  Surgeon: Lupillo Fung MD;  [facility];  Service: Urology   • ME CYSTOSCOPY,INSERT URETERAL STENT Left 8/22/2022    Procedure: EXCHANGE STENT URETERAL;  Surgeon: Lupillo Fung MD;  [facility];  Service: Urology   • OH CYSTOURETHROSCOPY,URETER CATHETER Left 07/18/2022    Procedure: CYSTOSCOPY RETROGRADE PYELOGRAM WITH INSERTION STENT URETERAL;  Surgeon: Raza Reeves MD;  [facility];  Service: Urology   • TONSILLECTOMY     • TUBAL LIGATION     • TUMOR EXCISION  2006    gastrointestinal stromal tumor   • TUNNELED VENOUS PORT PLACEMENT N/A 05/31/2022    Procedure: INSERTION VENOUS PORT (PORT-A-CATH); Surgeon: Parvin Lima MD;  [facility];  Service: Surgical Oncology   • UPPER GASTROINTESTINAL ENDOSCOPY     • WHIPPLE PROCEDURE/PANCREATICO-DUODENECTOMY N/A 10/31/2022    Procedure:  WHIPPLE PROCEDURE/PANCREATICO-DUODENECTOMY, IOUS;  Surgeon: Parvin Lima MD;  [facility];  Service: Surgical Oncology     Family History   Problem Relation Age of Onset   • Emphysema Mother    • Arthritis Mother    • Diabetes Mother    • Hypertension Mother    • COPD Mother    • Arthritis Father    • Prostate cancer Father    • Cerebral aneurysm Son    • No Known Problems Maternal Grandmother    • No Known Problems Maternal Grandfather    • No Known Problems Paternal Grandmother    • No Known Problems Paternal Grandfather    • No Known Problems Son    • No Known Problems Maternal Aunt    • No Known Problems Maternal Aunt    • No Known Problems Paternal Aunt    • No Known Problems Paternal Aunt      Social History     Socioeconomic History   • Marital status: /Civil Union     Spouse name: Not on file   • Number of children: Not on file   • Years of education: Not on file   • Highest education level: Not on file   Occupational History   • Not on file   Tobacco Use   • Smoking status: Former     Years: 9 00     Types: Cigarettes   • Smokeless tobacco: Never   Vaping Use   • Vaping Use: Never used   Substance and Sexual Activity   • Alcohol use: Not Currently     Comment: social drinker per allscript    • Drug use: Never   • Sexual activity: Not on file     Comment: defer   Other Topics Concern   • Not on file   Social History Narrative    Lack of exercise    Good sleep hygiene    No caffeine use    Dog    Good sleep hygiene       Social Determinants of Health     Financial Resource Strain: Not on file   Food Insecurity: No Food Insecurity   • Worried About Running Out of Food in the Last Year: Never true   • Ran Out of Food in the Last Year: Never true   Transportation Needs: No Transportation Needs   • Lack of Transportation (Medical): No   • Lack of Transportation (Non-Medical): No   Physical Activity: Not on file   Stress: Not on file   Social Connections: Not on file   Intimate Partner Violence: Not on file   Housing Stability: Low Risk    • Unable to Pay for Housing in the Last Year: No   • Number of Places Lived in the Last Year: 1   • Unstable Housing in the Last Year: No       Current Outpatient Medications:   •  apixaban (Eliquis) 5 mg, Take 1 tablet (5 mg total) by mouth 2 (two) times a day for 23 days, THEN 1 tablet (5 mg total) 2 (two) times a day for 23 days  , Disp: 74 tablet, Rfl: 0  •  atorvastatin (LIPITOR) 40 mg tablet, Take 1 tablet (40 mg total) by mouth daily at bedtime, Disp: 90 tablet, Rfl: 3  •  diltiazem (CARDIZEM CD) 240 mg 24 hr capsule, Take 1 capsule (240 mg total) by mouth daily Do not start before November 19, 2022 , Disp: 60 capsule, Rfl: 0  •  Eliquis DVT/PE Starter Pack 5 MG TBPK, , Disp: , Rfl:   •  glucose blood (Contour Next Test) test strip, Use 1 each daily Use as instructed, Disp: 100 each, Rfl: 3  •  Insulin Pen Needle (Pen Needles) 32G X 4 MM MISC, Use once a week, Disp: 12 each, Rfl: 3  •  metoprolol succinate (TOPROL-XL) 50 mg 24 hr tablet, Take 0 5 tablets (25 mg total) by mouth 2 (two) times a day (Patient taking differently: Take 50 mg by mouth 2 (two) times a day), Disp: 120 tablet, Rfl: 0  •  multivitamin (THERAGRAN) TABS, Take 1 tablet by mouth daily, Disp: , Rfl:   •  ondansetron (ZOFRAN) 4 mg tablet, Take 2 tablets (8 mg total) by mouth every 8 (eight) hours as needed for nausea or vomiting, Disp: 20 tablet, Rfl: 3  •  Ozempic, 1 MG/DOSE, 4 MG/3ML SOPN injection pen, INJECT 1MG SUBCUTANEOUSLY  WEEKLY (Patient taking differently: Inject under the skin once a week mondays), Disp: 9 mL, Rfl: 3  •  pantoprazole (PROTONIX) 40 mg tablet, Take 1 tablet (40 mg total) by mouth 2 (two) times a day, Disp: 90 tablet, Rfl: 0  •  PARoxetine (PAXIL-CR) 37 5 mg 24 hr tablet, TAKE 1 TABLET BY MOUTH IN  THE MORNING, Disp: 90 tablet, Rfl: 3  •  sotalol (BETAPACE) 120 mg tablet, TAKE 1 TABLET BY MOUTH  EVERY 12 HOURS, Disp: 180 tablet, Rfl: 3  •  gabapentin (NEURONTIN) 100 mg capsule, Take 1 capsule (100 mg total) by mouth 3 (three) times a day for 7 days, Disp: 21 capsule, Rfl: 0  •  Magnesium Oxide -Mg Supplement 400 MG CAPS, Take 1 capsule by mouth daily (Patient not taking: Reported on 11/18/2022), Disp: , Rfl:   •  olmesartan (BENICAR) 20 mg tablet, TAKE 1 TABLET BY MOUTH  DAILY (Patient not taking: Reported on 11/18/2022), Disp: 90 tablet, Rfl: 3  •  Omega-3 Fatty Acids (FISH OIL) 1,000 mg, Take 1,000 mg by mouth 2 (two) times a day   (Patient not taking: Reported on 11/18/2022), Disp: , Rfl:   •  Potassium Citrate ER 15 MEQ (1620 MG) TBCR, TAKE 1 TABLET BY MOUTH  TWICE DAILY (Patient not taking: Reported on 11/18/2022), Disp: 200 tablet, Rfl: 3  •  senna (SENOKOT) 8 6 mg, Take 1 tablet (8 6 mg total) by mouth daily at bedtime for 5 days (Patient taking differently: Take 8 6 mg by mouth daily at bedtime as needed), Disp: 5 tablet, Rfl: 0  •  VITAMIN D PO, Take 2,000 Units by mouth 2 (two) times a day (Patient not taking: Reported on 11/18/2022), Disp: , Rfl:   Allergies   Allergen Reactions   • Other Rash     Adhesive tape      Vitals:    11/29/22 1053   BP: 140/62   Pulse: 90   Resp: 17   Temp: 98 °F (36 7 °C)   SpO2: 93%       Physical Exam  Constitutional: General appearance:  The Patient is well-developed and well-nourished who appears the stated age in no acute distress  Patient is pleasant and talkative  HEENT:  Normocephalic  Sclerae are anicteric  Mucous membranes are moist  Neck is supple without adenopathy  Abdomen: Abdomen is soft, non-tender, non-distended and without masses  Extremities: There is no clubbing or cyanosis  There is no edema  Symmetric  Neuro: Grossly nonfocal  Gait is normal      Skin: Warm, anicteric  Psych:  Patient is pleasant and talkative  Breasts:        Pathology:  Final Diagnosis   A   Gallbladder, cholecystectomy:       - Mild chronic cholecystitis  - No malignancy identified      B  Pancreas, Whipple:     - Ductal adenocarcinoma of the pancreas, 3 0 cm      - Tumor invades peripancreatic soft tissues and duodenal wall to involve lamina propria  - Perineural invasion by tumor present  - Metastatic carcinoma present in five of nine lymph nodes (5/9)  - Tumor involves the proximal duodenal margin      - Remainder of margins are negative for tumor      C  Portal lymph node:     - Four lymph nodes identified; all negative for malignancy (0/4)      Electronically signed by Jenise York MD on 11/4/2022 at  2:00 PM   Comments: This is an appended report  These results have been appended to a previously preliminary verified report  Additional Information    All reported additional testing was performed with appropriately reactive controls   These tests were developed and their performance characteristics determined by [facility] or appropriate performing facility, though some tests may be performed on tissues which have not been validated for performance characteristics (such as staining performed on alcohol exposed cell blocks and decalcified tissues)   Results should be interpreted with caution and in the context of the patients’ clinical condition  These tests may not be cleared or approved by the [place]   Food and Drug Administration, though the FDA has determined that such clearance or approval is not necessary  These tests are used for clinical purposes and they should not be regarded as investigational or for research  This laboratory has been approved by [facility], designated as a high-complexity laboratory and is qualified to perform these tests     - Interpretation performed at [facility], [address]    Comment: This is an appended report  These results have been appended to a previously preliminary verified report  Synoptic Checklist   PANCREAS (EXOCRINE)  8th Edition - Protocol posted: 6/22/2022  PANCREAS (EXOCRINE): RESECTION - All Specimens  SPECIMEN   Procedure  Pancreaticoduodenectomy (Whipple resection), partial pancreatectomy    TUMOR   Tumor Site  Pancreatic head    Histologic Type  Ductal adenocarcinoma (NOS)    Histologic Grade  G2, moderately differentiated    Tumor Size  Greatest Dimension (Centimeters): 3 cm   Additional Dimension (Centimeters)  1 5 cm     1 5 cm   Site(s) Involved by Direct Tumor Extension  Duodenal wall    Treatment Effect  Present, with residual cancer showing evident tumor regression, but more than single cells or rare small groups of cancer cells (partial response, score 2)    Lymphovascular Invasion  Not identified    Perineural Invasion  Present    MARGINS   Margin Status for Invasive Carcinoma  Invasive carcinoma present at margin    Margin(s) Involved by Invasive Carcinoma  Proximal (gastric or duodenal)    Margin Status for Dysplasia and Intraepithelial Neoplasia  Not applicable    REGIONAL LYMPH NODES   Regional Lymph Node Status  Tumor present in regional lymph node(s)    Number of Lymph Nodes with Tumor  5    Number of Lymph Nodes Examined  13    PATHOLOGIC STAGE CLASSIFICATION  (pTNM, AJCC 8th Edition)   Reporting of pT, pN, and (when applicable) pM categories is based on information available to the pathologist at the time the report is issued   As per the AJCC (Chapter 1, 8th Ed ) it is the managing [de-identified] responsibility to establish the final pathologic stage based upon all pertinent information, including but potentially not limited to this pathology report  pT Category  pT2    pN Category  pN2    ADDITIONAL FINDINGS   Additional Findings  None identified    Comment(s)  AJCC Prognostic Stage Group (8th Ed ):  Stage III, pT2, pN2, MX, G2             Labs:      Imaging  EGD    Result Date: 11/12/2022  Narrative: [facility] Operating Room [address] [address] [phone] DATE OF SERVICE: 11/12/22 PHYSICIAN(S): Attending: Ronn Johnson MD Fellow: Laurent Urias MD INDICATION: Hematemesis without nausea POST-OP DIAGNOSIS: See the impression below  PREPROCEDURE: Informed consent was obtained for the procedure, including sedation  Risks of perforation, hemorrhage, adverse drug reaction and aspiration were discussed  The patient was placed in the left lateral decubitus position  Patient was explained about the risks and benefits of the procedure  Risks including but not limited to bleeding, infection, and perforation were explained in detail  Also explained about less than 100% sensitivity with the exam and other alternatives  DETAILS OF PROCEDURE: Patient was taken to the procedure room where a time out was performed to confirm correct patient and correct procedure  The patient underwent monitored anesthesia care, which was administered by an anesthesia professional  The patient's blood pressure, heart rate, level of consciousness, respirations, oxygen and ETCO2 were monitored throughout the procedure  The scope was introduced through the mouth and advanced to the jejunum  Retroflexion was performed in the fundus  The patient experienced no blood loss  The procedure was not difficult  The patient tolerated the procedure well  There were no apparent complications   ANESTHESIA INFORMATION: ASA: III Anesthesia Type: General MEDICATIONS: No administrations occurring from 1356 to 1505 on 11/12/22 FINDINGS: The esophagus, stomach, afferent jejunum and efferent jejunum appeared normal  Large, cratered, irregular ulcer in the gastrojejunal anastomosis with clean base (Je III) The jejunum appeared normal  Both limbs were intubated and no active bleeding or old blood were seen Nasogastric tube successfully placed in the stomach; scope reinserted to confirm placement SPECIMENS: * No specimens in log *     Impression: Large ulcer at the gastrojejunal anastomosis without any active bleeding currently Otherwise normal endoscopy NG tube replaced at the end of the procedure under endoscopic visualization RECOMMENDATION: PPI IV BID Return to micu Monitor H&H  ATTENDING ATTESTATION:  I was present throughout the entire procedure from insertion to complete withdrawal of the scope  I performed all interventions myself or oversaw the fellow  Faisal Lucas MD     XR chest portable    Result Date: 11/10/2022  Narrative: CHEST INDICATION:   New onset CP and palpitations with sustained tachycardia  COMPARISON:  October 31, 2022  EXAM PERFORMED/VIEWS:  XR CHEST PORTABLE FINDINGS:  Port-A-Cath enters superior vena cava with tip in or near right atrium  Cardiomediastinal silhouette appears unremarkable  The lungs are clear  No pneumothorax or pleural effusion  Osseous structures appear within normal limits for patient age       Impression: No acute disease in the chest  Workstation performed: [id number]     IR mesenteric/visceral angiogram    Result Date: 11/12/2022  Narrative: Superior mesenteric angiogram Celiac angiogram Common hepatic angiogram Covered Stenting of the common hepatic artery History: Whipple procedure with abdominal pain and bleeding seen at the gastroduodenal artery stump status post Whipple procedure 12 days ago with presumed GDA stump blowout Contrast: 1 50 cc of Visipaque 320 Fluoro time: 17 minutes Number of Images: 288 Radiation Dose: 1379 mGy Conscious sedation time:  with anesth Technique: The patient was brought to the interventional radiology suite and identified verbally and by wrist band  The patient was placed supine on the table and the right groin was prepped and draped in the usual sterile fashion  Lidocaine was administered to the skin and a small skin incision was made  The right common femoral artery was then punctured percutaneously utilizing Seldinger technique  A [employer] wire was advanced into the abdominal aorta over which a 5 [employer]ie vascular sheath was inserted and connected to a flush bag  A 5 [employer] Stoddard 1 glide catheter was advanced into the superior mesenteric artery for an angiogram and then into the celiac axis for an angiogram  A 6 Cuban sheath was then advanced into the common hepatic artery for an angiogram followed by covered stenting of the distal common hepatic artery across the gastroduodenal artery stump using a 5 mm x 26 mm covered stent  A postprocedure common hepatic angiogram was performed  A right common femoral antrum was performed followed by right common femoral arteriotomy closure using the Perclose pro glide device  The patient tolerated the procedure well, including general anesthesia with no immediate complications seen  Impression: Impression: No active bleeding seen off of the celiac axis or superior mesenteric artery  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 and a covered stent was successfully placed across it  Plan: Follow-up with  surgical oncology  Workstation performed: [id number]     PE Study with CT abdomen &pelvis with contrast    Result Date: 11/12/2022  Narrative: CT PULMONARY ANGIOGRAM OF THE CHEST AND CT ABDOMEN AND PELVIS WITH INTRAVENOUS CONTRAST INDICATION:   PE hx, GI bleed    COMPARISON:  September 26, 2022, November 4, 2022 TECHNIQUE:  CT examination of the chest, abdomen and pelvis was performed    Thin section CT angiographic technique was used in the chest in order to evaluate for pulmonary embolus and coronal 3D MIP postprocessing was performed on the acquisition scanner  Axial, sagittal, and coronal 2D reformatted images were created from the source data and submitted for interpretation  Radiation dose length product (DLP) for this visit:  4390 68 mGy-cm   This examination, like all CT scans performed in the [facility], was performed utilizing techniques to minimize radiation dose exposure, including the use of iterative reconstruction and automated exposure control  IV Contrast:  100 mL of iohexol (OMNIPAQUE) Enteric Contrast:  Enteric contrast was not administered  FINDINGS: CHEST PULMONARY ARTERIAL TREE:  Multiple bilateral small peripheral embolic defects are again noted, some of which are more eccentric than on the prior and smaller in size, suggesting evolving chronic emboli  No definite new emboli detected  LUNGS:  Mild subsegmental atelectasis persisting in the right lung base, improved  There is no tracheal or endobronchial lesion  PLEURA:  Unremarkable  HEART/AORTA:  Heart is unremarkable for patient's age  No thoracic aortic aneurysm  MEDIASTINUM AND SHANNAN:  Unremarkable  CHEST WALL AND LOWER NECK:  Unremarkable  ABDOMEN LIVER/BILIARY TREE:  Ill-defined wedge-shaped hypodensity again noted in the left lateral segment posteriorly, without significant change  This was not present in September  Possible related to recent surgical procedure, possible retractor injury  No evidence for active contrast extravasation or enhancement  No intrahepatic biliary dilatation  There is a new loculated fluid collection interposed between the left lateral segment and caudate, mass effect on the caudate  This thin-walled well-defined  collection is 6 x 4 cm in size (image 2/236)  Given the recent history of Whipple procedure, postop seroma or biloma should be considered    In regard to the other mixed attenuation curvilinear structure described on the right report as retroperitoneal hematoma, I feel this more likely represents mural edema, thickening and probable mild component of intramural hematoma within loop of small bowel  Unclear if this is portion of jejunostomy loop, difficult to confidently differentiate from retroperitoneal hemorrhage  There is no evidence for active contrast extravasation demonstrated  There are numerous foci of free air seen cephalad to this adjacent to the stomach and jejunostomy loop (image 2/244  )  These foci of free air are new compared to the prior  The more anterior peritoneal free air has mostly resolved postop  Findings raising suspicion for anastomotic breakdown  GALLBLADDER:  No calcified gallstones  No pericholecystic inflammatory change  SPLEEN:  Unremarkable  PANCREAS:  Status post Whipple procedure  Postop changes as noted above  See above for mixed attenuation soft tissue seen at the surgical site  Partial thrombosis again noted within the main portal vein and SMV  ADRENAL GLANDS:  Stable nodule right adrenal gland  Left adrenal within normal limits  KIDNEYS/URETERS:  No hydronephrosis  No nephrolithiasis  Increasing pararenal fluid collection on the right with multiple foci of air noted at the superior extent  Unclear if this is evolving fat necrosis or potentially extraluminal air related to bowel  These foci of retroperitoneal air were not present on a prior postoperative study from November 4  STOMACH AND BOWEL:  Unremarkable  APPENDIX:  No findings to suggest appendicitis  ABDOMINOPELVIC CAVITY:  No ascites  No pneumoperitoneum  No lymphadenopathy  VESSELS:  Unremarkable for patient's age  PELVIS REPRODUCTIVE ORGANS:  Unremarkable for patient's age  URINARY BLADDER:  Unremarkable  ABDOMINAL WALL/INGUINAL REGIONS:  Unremarkable  OSSEOUS STRUCTURES:  No acute fracture or destructive osseous lesion  Impression: Interval improvement of pulmonary emboli   New loculated fluid collection between the caudate and left lateral segment  Stable ill-defined linear hypodensity in the left lateral segment as described above  Mixed attenuation curvilinear structure in the retroperitoneum at the site of the surgery  Unclear if this represents evolving hematoma or potentially wall thickening and edema within a bypass loop of bowel  There are numerous foci of extraluminal air,  newly demonstrated just cephalad to this adjacent to a bypass loop and stomach in the midline as well as extending into the enlarging right anterior paranephric collection  Findings suspicious for anastomotic leak  I personally discussed this study with Dr Dina Johnson on 11/12/2022 at 11:30 AM  There is a significant additional finding or different interpretation from the [facility] preliminary report which was provided shortly after completion of the exam  New free air within the retroperitoneum and operative site  Workstation performed: [id number]     CTA chest ct abdomen pelvis w contrast    Result Date: 11/4/2022  Narrative: CT PULMONARY ANGIOGRAM OF THE CHEST AND CT ABDOMEN AND PELVIS WITH INTRAVENOUS CONTRAST INDICATION: 69-year-old female with history of pancreatic adenocarcinoma status post Whipple procedure and primary repair of SMV/portal vein injury on 10/31/2022 with abdominal pain postoperatively  Rule out pulmonary embolism and leak  COMPARISON:  CT chest, abdomen, pelvis 9/26/2022  TECHNIQUE:  CT examination of the chest, abdomen and pelvis was performed  Thin section CT angiographic technique was used in the chest in order to evaluate for pulmonary embolus and coronal 3D MIP postprocessing was performed on the acquisition scanner  Axial, sagittal, and coronal 2D reformatted images were created from the source data and submitted for interpretation  Radiation dose length product (DLP) for this visit:  1516 16 mGy-cm     This examination, like all CT scans performed in the [facility], was performed utilizing techniques to minimize radiation dose exposure, including the use of iterative reconstruction and automated exposure control  IV Contrast:  100 mL of iohexol (OMNIPAQUE)   Enteric Contrast:  Enteric contrast was not administered  FINDINGS: CHEST PULMONARY ARTERIAL TREE: Filling defects in the right pulmonary artery, segmental and subsegmental right upper lobe pulmonary arteries, segmental and subsegmental right middle lobe pulmonary arteries, and subsegmental right lower lobe pulmonary artery, consistent with pulmonary emboli  Dilated main pulmonary artery measuring 3 1 cm  RV/LV ratio of 0 89  No CT evidence of right heart strain  LUNGS: Nonenhancing right lower lobe airspace consolidation, new since prior study  Subsegmental atelectasis in the left lower lobe, lingula, and right lower lobe  Stable 2 mm right upper lobe solid pulmonary nodule (3/25)  PLEURA:  No pleural effusion or pneumothorax  HEART/AORTA:  The heart is mildly enlarged  No pericardial effusion  No thoracic aortic aneurysm  Common origin of the brachiocephalic artery and the left common carotid artery off of the aortic arch, which is a normal anatomic variant  MEDIASTINUM AND SHANNAN:  Unremarkable  CHEST WALL AND LOWER NECK: Left chest wall vascular access device with distal tip terminating in the right atrium  Stable 5 mm right thyroid nodule  ABDOMEN LIVER/BILIARY TREE: Hepatomegaly measuring 19 4 cm in length  Mild hepatic steatosis  Ill-defined wedgelike hypoenhancement in the left hepatic lobe  No suspicious hepatic lesion  GALLBLADDER:  Gallbladder is surgically absent  SPLEEN:  Splenomegaly measuring 15 0 cm in craniocaudal dimension  PANCREAS:  Postsurgical changes of Whipple procedure, with ill-defined haziness in the surgical bed, likely related to postoperative edema  The remaining pancreatic parenchyma in the body and tail are unremarkable  No dilatation of the residual main pancreatic duct   ADRENAL GLANDS: Stable 1 9 cm right adrenal adenoma  The left adrenal gland is unremarkable  KIDNEYS/URETERS:  Mild bilateral perinephric stranding, greater on the right compared to left, likely postoperative in etiology  Stable subcentimeter hypodense lesion in the interpolar right kidney which is too small to characterize  No hydronephrosis  STOMACH AND BOWEL:  Small volume linear pneumoperitoneum immediately adjacent to the gastrojejunal anastomosis (2/242)  No bowel obstruction identified  Mild bowel wall thickening of the duodenum  APPENDIX:  No findings to suggest appendicitis  ABDOMINOPELVIC CAVITY: Right upper quadrant drainage catheter terminating posterolateral to the right hepatic lobe  Small volume abdominopelvic ascites  No well-defined organized rim-enhancing fluid collection to suggest an intra-abdominal abscess  Small volume pneumoperitoneum adjacent to the gastrojejunal anastomosis and in the right upper quadrant  No lymphadenopathy  VESSELS:  Atherosclerotic changes are present  No evidence of aneurysm  The anterior right and left portal veins are patent  The posterior right portal vein is diminutive but patent  The hepatic veins are patent  Nonocclusive thrombus in the main portal vein and superior mesenteric vein  Surgical clips adjacent to the superior mesenteric vein and the portal vein, consistent with prior repair  The splenic vein is patent  PELVIS REPRODUCTIVE ORGANS:  Surgical changes of prior hysterectomy  URINARY BLADDER:  Nondependent pocket of gas, likely related to recent instrumentation  Otherwise, unremarkable  ABDOMINAL WALL/INGUINAL REGIONS:  Postsurgical changes of the anterior abdominal wall with superficial skin staples  Trace subcutaneous emphysema in the right upper quadrant anterior abdominal wall surrounding the right upper quadrant drainage catheter  OSSEOUS STRUCTURES:  No acute fracture or destructive osseous lesion  Spinal degenerative changes are noted  Impression: 1    Pulmonary emboli in the right pulmonary artery, segmental and subsegmental right upper and middle lobe pulmonary arteries, and the subsegmental right lower lobe pulmonary arteries  RV/LV ratio of 0 89  No CT evidence of right heart strain  2   Small volume linear pneumoperitoneum immediately adjacent to the gastrojejunal anastomosis and in the right upper quadrant with a small volume of abdominopelvic ascites  These findings are nonspecific given recent major abdominal surgery and may be postoperative in etiology, however, a leak of the gastrojejunal anastomosis cannot be excluded  Recommend further evaluation with an upper GI study  3   Nonocclusive thrombus in the main portal vein and superior mesenteric vein  4   Nonenhancing right lower lobe airspace consolidation, likely representing pulmonary infarct  5   Ill-defined wedgelike hypoenhancement in the left hepatic lobe  Differential diagnosis includes hepatic infarct, altered perfusion, versus artifact  Recommend attention on follow-up  6   Postsurgical changes of Whipple procedure, with ill-defined haziness in the surgical bed, likely related to postoperative edema  7   Mild bowel wall thickening of the duodenum, which may represent postoperative edema versus duodenitis  8   Dilated main pulmonary artery measuring 3 1 cm, which can be seen with pulmonary hypertension  9   Hepatosplenomegaly and mild hepatic steatosis  I personally discussed this study with Heath Hardwick on 11/4/2022 at 3:07 PM  Workstation performed: [id number]     XR chest portable ICU    Result Date: 10/31/2022  Narrative: CHEST INDICATION:   s/p intubation  COMPARISON:  5/31/2022  EXAM PERFORMED/VIEWS:  XR CHEST PORTABLE ICU Images:  1 FINDINGS: Cardiac and mediastinal contours are stable  Probable mild cardiomegaly  Mild central pulmonary vascular congestion  No focal airspace consolidation or effusions  No pneumothorax   There is an endotracheal tube with its distal tip in the proximal right atrium  Retraction of this to several centimeters would be helpful  Left chest wall tariq catheter in place  Distal tip of the gastric tube not visualized but is at least in the stomach  Impression: Endotracheal tube with its distal tip in the proximal right atrium  This should be retracted several centimeters  Gastric tube as above  The study was marked in [facility] for immediate notification  Workstation performed: [id number]     VAS lower limb venous duplex study, complete bilateral    Result Date: 11/11/2022  Narrative:  [facility] REPORT CLINICAL: Indications: Patient presents with recent discovery of pulmonary embolism and physician wants to determine potential source  Operative History: Atrial ablation surgery Risk Factors The patient has history of Obesity, HTN, Diabetes and Hyperlipidemia  CONCLUSION: Impression: RIGHT LOWER LIMB: No evidence of acute or chronic deep vein thrombosis  No evidence of superficial thrombophlebitis noted  Doppler evaluation shows a normal response to augmentation maneuvers  Popliteal, posterior tibial and anterior tibial arterial Doppler waveforms are triphasic  LEFT LOWER LIMB: No evidence of acute or chronic deep vein thrombosis  No evidence of superficial thrombophlebitis noted  Doppler evaluation shows a normal response to augmentation maneuvers  Popliteal, posterior tibial and anterior tibial arterial Doppler waveforms are triphasic  Technical findings were given to Haylie Jesus 11/10/2022  SIGNATURE: Electronically Signed by: Berenice Marcum MD, [address] on 2022-11-11 11:02:11 AM    Echo complete w/ contrast if indicated    Result Date: 11/4/2022  Narrative: •  Left Ventricle: Left ventricular cavity size is normal  Wall thickness is normal  The left ventricular ejection fraction is 50%  Systolic function is low normal  Wall motion is normal  Diastolic function is mildly abnormal, consistent with grade I (abnormal) relaxation   •  Tricuspid Valve: There is mild regurgitation  I reviewed the above laboratory and imaging data  Discussion/Summary:  70-year-old female status post neoadjuvant chemotherapy followed by pylorus preserving pancreaticoduodenectomy for a T2N2M0 pancreas adenocarcinoma  Her duodenal margin was not followed  I have recommended that she consider adjuvant chemotherapy/chemoradiation  She is seeing Dr Margaret Patel tomorrow  I will place a referral for radiation  Given her current performance status, I still think she is probably at least another month away from adjuvant therapy  From my standpoint, I have told her to continue nutritional supplements and to make sure she stays hydrated and eat small frequent meals  I suspect this will improve over the next 2-4 weeks  I will refill her Zofran for her  Her  also thinks she is slightly depressed  She is on Paxil, she is going to discuss this further with her family physician  Having an antidepressant prescribed, is not unreasonable at this time  Given the clot seen in her portal vein at the time of follow-up imaging, I would recommend that she have long-term anticoagulation  I will see her again in 3 months with a repeat CT CA 19-9  She is agreeable to this plan  All her questions were answered

## 2022-11-29 NOTE — TELEPHONE ENCOUNTER
Scheduled date of EGD(as of today):1/18/23  Physician performing EGD:Dr Tabitha Mahan  Location of EGD: [facility]  Clearances: NA

## 2022-11-29 NOTE — TELEPHONE ENCOUNTER
----- Message from Tate Camacho sent at 11/29/2022 10:19 AM EST -----    ----- Message -----  From: Prem Vences MD  Sent: 11/14/2022   1:29 PM EST  To: , #    Please arrange outpatient EGD for ulcer healing in 6-8 weeks    Prem Vences MD  Gastroenterology Fellow

## 2022-11-30 NOTE — PROGRESS NOTES
Hematology/Oncology Outpatient Follow- up Note  Yola Jackson 57 y o  female MRN: @ Encounter: [id number]        Date:  11/30/2022    Presenting Complaint/Diagnosis : Locally advanced pancreatic cancer for neoadjuvant chemotherapy    HPI:    Ahmet Sky seen for initial consultation 6/1/2022 regarding newly diagnosed pancreatic adenocarcinoma   The patient had some abdominal discomfort after an EGD and up getting a CT scan which revealed pancreatic duct dilatation in the body with an ill-defined density in the pancreatic head   There was also 1 2 x 1 9 cm right adrenal nodule which was previously characterized as an adenoma   The patient had an MRI repeated in April which revealed a 4 2 x 3 6 x 3 cm mass in the pancreatic head with abnormal enlargement of the pancreatic duct and the pancreatic body and tail  The patient had an EUS with biopsy which revealed adenocarcinoma   CA 19 9 was normal   The patient was seen by our colleagues in Surgical Oncology then referred to see us for consideration of neoadjuvant treatment prior to resection      Previous Hematologic/ Oncologic History:    Oncology History   Pancreatic cancer ([facility] [address] )   5/11/2022 Initial Diagnosis    Adenocarcinoma of head of pancreas ([facility] [address] )     5/11/2022 Biopsy    Endoscopic Ultrasound:  A , B , & C   Pancreas, Pancreatic head:   Malignant (PSC Category VI)  Positive for adenocarcinoma     6/8/2022 -  Chemotherapy    pegfilgrastim (Sherlon Median), 6 mg, Subcutaneous, Once, 7 of 14 cycles  Administration: 6 mg (6/10/2022), 6 mg (9/2/2022), 6 mg (9/16/2022), 6 mg (9/30/2022), 6 mg (8/19/2022), 6 mg (7/22/2022), 6 mg (7/7/2022)  fosaprepitant (EMEND) IVPB, 150 mg, Intravenous, Once, 5 of 12 cycles  Administration: 150 mg (7/20/2022), 150 mg (8/17/2022), 150 mg (8/31/2022), 150 mg (9/14/2022), 150 mg (9/28/2022)  fluorouracil (ADRUCIL), 400 mg/m2 = 955 mg, Intravenous, Once, 7 of 14 cycles  Administration: 955 mg (6/8/2022), 880 mg (8/31/2022), 880 mg (9/14/2022), 935 mg (9/28/2022), 880 mg (8/17/2022), 955 mg (7/5/2022)  irinotecan (CAMPTOSAR) chemo infusion, 430 mg, Intravenous, Once, 8 of 15 cycles  Dose modification: 150 mg/m2 (original dose 180 mg/m2, Cycle 6, Reason: Max Dose Reached, Comment: per protocol), 180 mg/m2 (original dose 180 mg/m2, Cycle 5, Reason: Other (Must fill in a comment), Comment: Per protocol)  Administration: 440 mg (6/8/2022), 330 mg (8/31/2022), 330 mg (9/14/2022), 351 mg (9/28/2022), 330 mg (8/17/2022), 330 mg (7/20/2022), 440 mg (7/5/2022)  oxaliplatin (ELOXATIN) chemo infusion, 203 15 mg, Intravenous, Once, 8 of 15 cycles  Administration: 200 mg (6/8/2022), 187 mg (8/31/2022), 187 mg (9/14/2022), 200 mg (9/28/2022), 200 mg (8/17/2022), 200 mg (7/20/2022), 200 mg (7/5/2022)  fluorouracil (ADRUCIL) ambulatory infusion Soln, 1,200 mg/m2/day = 5,735 mg, Intravenous, Over 46 hours, 8 of 15 cycles     10/31/2022 Surgery    Whipple procedure:  A   Gallbladder, cholecystectomy:       - Mild chronic cholecystitis  - No malignancy identified  B   Pancreas, Whipple:     - Ductal adenocarcinoma of the pancreas, 3 0 cm      - Tumor invades peripancreatic soft tissues and duodenal wall to involve lamina propria  - Perineural invasion by tumor present  - Metastatic carcinoma present in five of nine lymph nodes (5/9)  - Tumor involves the proximal duodenal margin      - Remainder of margins are negative for tumor  C   Portal lymph node:     - Four lymph nodes identified; all negative for malignancy (0/4)  pT2N2         Current Hematologic/ Oncologic Treatment:     FOLFIRINOX status post 7 cycles  Cycle 8  Will start on the 28th of December now that the patient has had surgery    Interval History:    Patient returns for follow-up visit  She states she is recovering from surgery  Has good and bad days  She states she is fatigued  Today she is more fatigued than yesterday    Overall denies any nausea denies any vomiting denies any diarrhea  Does have some abdominal discomfort  ECOG performance status is 1  Symptoms are more consistent with her recovery from her surgery  The rest of her 14 point review of systems today was negative  Test Results:    Imaging: EGD    Result Date: 11/12/2022  Narrative: [facility] [address] Operating Room [address] [place] [address] [phone] DATE OF SERVICE: 11/12/22 PHYSICIAN(S): Attending: Shirlene Black MD Fellow: Corky Pitts MD INDICATION: Hematemesis without nausea POST-OP DIAGNOSIS: See the impression below  PREPROCEDURE: Informed consent was obtained for the procedure, including sedation  Risks of perforation, hemorrhage, adverse drug reaction and aspiration were discussed  The patient was placed in the left lateral decubitus position  Patient was explained about the risks and benefits of the procedure  Risks including but not limited to bleeding, infection, and perforation were explained in detail  Also explained about less than 100% sensitivity with the exam and other alternatives  DETAILS OF PROCEDURE: Patient was taken to the procedure room where a time out was performed to confirm correct patient and correct procedure  The patient underwent monitored anesthesia care, which was administered by an anesthesia professional  The patient's blood pressure, heart rate, level of consciousness, respirations, oxygen and ETCO2 were monitored throughout the procedure  The scope was introduced through the mouth and advanced to the jejunum  Retroflexion was performed in the fundus  The patient experienced no blood loss  The procedure was not difficult  The patient tolerated the procedure well  There were no apparent complications   ANESTHESIA INFORMATION: ASA: III Anesthesia Type: General MEDICATIONS: No administrations occurring from 1356 to 1505 on 11/12/22 FINDINGS: The esophagus, stomach, afferent jejunum and efferent jejunum appeared normal  Large, cratered, irregular ulcer in the gastrojejunal anastomosis with clean base (Je III) The jejunum appeared normal  Both limbs were intubated and no active bleeding or old blood were seen Nasogastric tube successfully placed in the stomach; scope reinserted to confirm placement SPECIMENS: * No specimens in log *     Impression: Large ulcer at the gastrojejunal anastomosis without any active bleeding currently Otherwise normal endoscopy NG tube replaced at the end of the procedure under endoscopic visualization RECOMMENDATION: PPI IV BID Return to micu Monitor H&H  ATTENDING ATTESTATION:  I was present throughout the entire procedure from insertion to complete withdrawal of the scope  I performed all interventions myself or oversaw the fellow  Gerson Santana MD     XR chest portable    Result Date: 11/10/2022  Narrative: CHEST INDICATION:   New onset CP and palpitations with sustained tachycardia  COMPARISON:  October 31, 2022  EXAM PERFORMED/VIEWS:  XR CHEST PORTABLE FINDINGS:  Port-A-Cath enters superior vena cava with tip in or near right atrium  Cardiomediastinal silhouette appears unremarkable  The lungs are clear  No pneumothorax or pleural effusion  Osseous structures appear within normal limits for patient age       Impression: No acute disease in the chest  Workstation performed: [id number]     IR mesenteric/visceral angiogram    Result Date: 11/12/2022  Narrative: Superior mesenteric angiogram Celiac angiogram Common hepatic angiogram Covered Stenting of the common hepatic artery History: Whipple procedure with abdominal pain and bleeding seen at the gastroduodenal artery stump status post Whipple procedure 12 days ago with presumed GDA stump blowout Contrast: 1 50 cc of Visipaque 320 Fluoro time: 17 minutes Number of Images: 288 Radiation Dose: 1379 mGy Conscious sedation time:  with anesth Technique: The patient was brought to the interventional radiology suite and identified verbally and by wrist band  The patient was placed supine on the table and the right groin was prepped and draped in the usual sterile fashion  Lidocaine was administered to the skin and a small skin incision was made  The right common femoral artery was then punctured percutaneously utilizing Seldinger technique  A Azunason wire was advanced into the abdominal aorta over which a 5 Western Allie vascular sheath was inserted and connected to a flush bag  A 5 Czech MaxTraffic 1 glide catheter was advanced into the superior mesenteric artery for an angiogram and then into the celiac axis for an angiogram  A 6 Czech sheath was then advanced into the common hepatic artery for an angiogram followed by covered stenting of the distal common hepatic artery across the gastroduodenal artery stump using a 5 mm x 26 mm covered stent  A postprocedure common hepatic angiogram was performed  A right common femoral antrum was performed followed by right common femoral arteriotomy closure using the Perclose pro glide device  The patient tolerated the procedure well, including general anesthesia with no immediate complications seen  Impression: Impression: No active bleeding seen off of the celiac axis or superior mesenteric artery  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 and a covered stent was successfully placed across it  Plan: Follow-up with  surgical oncology  Workstation performed: [id number]     PE Study with CT abdomen &pelvis with contrast    Result Date: 11/12/2022  Narrative: CT PULMONARY ANGIOGRAM OF THE CHEST AND CT ABDOMEN AND PELVIS WITH INTRAVENOUS CONTRAST INDICATION:   PE hx, GI bleed    COMPARISON:  September 26, 2022, November 4, 2022 TECHNIQUE:  CT examination of the chest, abdomen and pelvis was performed  Thin section CT angiographic technique was used in the chest in order to evaluate for pulmonary embolus and coronal 3D MIP postprocessing was performed on the acquisition scanner   Axial, sagittal, and coronal 2D reformatted images were created from the source data and submitted for interpretation  Radiation dose length product (DLP) for this visit:  4390 68 mGy-cm   This examination, like all CT scans performed in the [facility], was performed utilizing techniques to minimize radiation dose exposure, including the use of iterative reconstruction and automated exposure control  IV Contrast:  100 mL of iohexol (OMNIPAQUE) Enteric Contrast:  Enteric contrast was not administered  FINDINGS: CHEST PULMONARY ARTERIAL TREE:  Multiple bilateral small peripheral embolic defects are again noted, some of which are more eccentric than on the prior and smaller in size, suggesting evolving chronic emboli  No definite new emboli detected  LUNGS:  Mild subsegmental atelectasis persisting in the right lung base, improved  There is no tracheal or endobronchial lesion  PLEURA:  Unremarkable  HEART/AORTA:  Heart is unremarkable for patient's age  No thoracic aortic aneurysm  MEDIASTINUM AND SHANNAN:  Unremarkable  CHEST WALL AND LOWER NECK:  Unremarkable  ABDOMEN LIVER/BILIARY TREE:  Ill-defined wedge-shaped hypodensity again noted in the left lateral segment posteriorly, without significant change  This was not present in September  Possible related to recent surgical procedure, possible retractor injury  No evidence for active contrast extravasation or enhancement  No intrahepatic biliary dilatation  There is a new loculated fluid collection interposed between the left lateral segment and caudate, mass effect on the caudate  This thin-walled well-defined  collection is 6 x 4 cm in size (image 2/236)  Given the recent history of Whipple procedure, postop seroma or biloma should be considered    In regard to the other mixed attenuation curvilinear structure described on the right report as retroperitoneal hematoma, I feel this more likely represents mural edema, thickening and probable mild component of intramural hematoma within loop of small bowel  Unclear if this is portion of jejunostomy loop, difficult to confidently differentiate from retroperitoneal hemorrhage  There is no evidence for active contrast extravasation demonstrated  There are numerous foci of free air seen cephalad to this adjacent to the stomach and jejunostomy loop (image 2/244  )  These foci of free air are new compared to the prior  The more anterior peritoneal free air has mostly resolved postop  Findings raising suspicion for anastomotic breakdown  GALLBLADDER:  No calcified gallstones  No pericholecystic inflammatory change  SPLEEN:  Unremarkable  PANCREAS:  Status post Whipple procedure  Postop changes as noted above  See above for mixed attenuation soft tissue seen at the surgical site  Partial thrombosis again noted within the main portal vein and SMV  ADRENAL GLANDS:  Stable nodule right adrenal gland  Left adrenal within normal limits  KIDNEYS/URETERS:  No hydronephrosis  No nephrolithiasis  Increasing pararenal fluid collection on the right with multiple foci of air noted at the superior extent  Unclear if this is evolving fat necrosis or potentially extraluminal air related to bowel  These foci of retroperitoneal air were not present on a prior postoperative study from November 4  STOMACH AND BOWEL:  Unremarkable  APPENDIX:  No findings to suggest appendicitis  ABDOMINOPELVIC CAVITY:  No ascites  No pneumoperitoneum  No lymphadenopathy  VESSELS:  Unremarkable for patient's age  PELVIS REPRODUCTIVE ORGANS:  Unremarkable for patient's age  URINARY BLADDER:  Unremarkable  ABDOMINAL WALL/INGUINAL REGIONS:  Unremarkable  OSSEOUS STRUCTURES:  No acute fracture or destructive osseous lesion  Impression: Interval improvement of pulmonary emboli  New loculated fluid collection between the caudate and left lateral segment  Stable ill-defined linear hypodensity in the left lateral segment as described above   Mixed attenuation curvilinear structure in the retroperitoneum at the site of the surgery  Unclear if this represents evolving hematoma or potentially wall thickening and edema within a bypass loop of bowel  There are numerous foci of extraluminal air,  newly demonstrated just cephalad to this adjacent to a bypass loop and stomach in the midline as well as extending into the enlarging right anterior paranephric collection  Findings suspicious for anastomotic leak  I personally discussed this study with Dr Judi Miller on 11/12/2022 at 11:30 AM  There is a significant additional finding or different interpretation from the [facility] preliminary report which was provided shortly after completion of the exam  New free air within the retroperitoneum and operative site  Workstation performed: [id number]     CTA chest ct abdomen pelvis w contrast    Result Date: 11/4/2022  Narrative: CT PULMONARY ANGIOGRAM OF THE CHEST AND CT ABDOMEN AND PELVIS WITH INTRAVENOUS CONTRAST INDICATION: 76-year-old female with history of pancreatic adenocarcinoma status post Whipple procedure and primary repair of SMV/portal vein injury on 10/31/2022 with abdominal pain postoperatively  Rule out pulmonary embolism and leak  COMPARISON:  CT chest, abdomen, pelvis 9/26/2022  TECHNIQUE:  CT examination of the chest, abdomen and pelvis was performed  Thin section CT angiographic technique was used in the chest in order to evaluate for pulmonary embolus and coronal 3D MIP postprocessing was performed on the acquisition scanner  Axial, sagittal, and coronal 2D reformatted images were created from the source data and submitted for interpretation  Radiation dose length product (DLP) for this visit:  1516 16 mGy-cm   This examination, like all CT scans performed in the [facility], was performed utilizing techniques to minimize radiation dose exposure, including the use of iterative reconstruction and automated exposure control   IV Contrast:  100 mL of iohexol (OMNIPAQUE)   Enteric Contrast:  Enteric contrast was not administered  FINDINGS: CHEST PULMONARY ARTERIAL TREE: Filling defects in the right pulmonary artery, segmental and subsegmental right upper lobe pulmonary arteries, segmental and subsegmental right middle lobe pulmonary arteries, and subsegmental right lower lobe pulmonary artery, consistent with pulmonary emboli  Dilated main pulmonary artery measuring 3 1 cm  RV/LV ratio of 0 89  No CT evidence of right heart strain  LUNGS: Nonenhancing right lower lobe airspace consolidation, new since prior study  Subsegmental atelectasis in the left lower lobe, lingula, and right lower lobe  Stable 2 mm right upper lobe solid pulmonary nodule (3/25)  PLEURA:  No pleural effusion or pneumothorax  HEART/AORTA:  The heart is mildly enlarged  No pericardial effusion  No thoracic aortic aneurysm  Common origin of the brachiocephalic artery and the left common carotid artery off of the aortic arch, which is a normal anatomic variant  MEDIASTINUM AND SHANNAN:  Unremarkable  CHEST WALL AND LOWER NECK: Left chest wall vascular access device with distal tip terminating in the right atrium  Stable 5 mm right thyroid nodule  ABDOMEN LIVER/BILIARY TREE: Hepatomegaly measuring 19 4 cm in length  Mild hepatic steatosis  Ill-defined wedgelike hypoenhancement in the left hepatic lobe  No suspicious hepatic lesion  GALLBLADDER:  Gallbladder is surgically absent  SPLEEN:  Splenomegaly measuring 15 0 cm in craniocaudal dimension  PANCREAS:  Postsurgical changes of Whipple procedure, with ill-defined haziness in the surgical bed, likely related to postoperative edema  The remaining pancreatic parenchyma in the body and tail are unremarkable  No dilatation of the residual main pancreatic duct  ADRENAL GLANDS: Stable 1 9 cm right adrenal adenoma  The left adrenal gland is unremarkable   KIDNEYS/URETERS:  Mild bilateral perinephric stranding, greater on the right compared to left, likely postoperative in etiology  Stable subcentimeter hypodense lesion in the interpolar right kidney which is too small to characterize  No hydronephrosis  STOMACH AND BOWEL:  Small volume linear pneumoperitoneum immediately adjacent to the gastrojejunal anastomosis (2/242)  No bowel obstruction identified  Mild bowel wall thickening of the duodenum  APPENDIX:  No findings to suggest appendicitis  ABDOMINOPELVIC CAVITY: Right upper quadrant drainage catheter terminating posterolateral to the right hepatic lobe  Small volume abdominopelvic ascites  No well-defined organized rim-enhancing fluid collection to suggest an intra-abdominal abscess  Small volume pneumoperitoneum adjacent to the gastrojejunal anastomosis and in the right upper quadrant  No lymphadenopathy  VESSELS:  Atherosclerotic changes are present  No evidence of aneurysm  The anterior right and left portal veins are patent  The posterior right portal vein is diminutive but patent  The hepatic veins are patent  Nonocclusive thrombus in the main portal vein and superior mesenteric vein  Surgical clips adjacent to the superior mesenteric vein and the portal vein, consistent with prior repair  The splenic vein is patent  PELVIS REPRODUCTIVE ORGANS:  Surgical changes of prior hysterectomy  URINARY BLADDER:  Nondependent pocket of gas, likely related to recent instrumentation  Otherwise, unremarkable  ABDOMINAL WALL/INGUINAL REGIONS:  Postsurgical changes of the anterior abdominal wall with superficial skin staples  Trace subcutaneous emphysema in the right upper quadrant anterior abdominal wall surrounding the right upper quadrant drainage catheter  OSSEOUS STRUCTURES:  No acute fracture or destructive osseous lesion  Spinal degenerative changes are noted  Impression: 1    Pulmonary emboli in the right pulmonary artery, segmental and subsegmental right upper and middle lobe pulmonary arteries, and the subsegmental right lower lobe pulmonary arteries  RV/LV ratio of 0 89  No CT evidence of right heart strain  2   Small volume linear pneumoperitoneum immediately adjacent to the gastrojejunal anastomosis and in the right upper quadrant with a small volume of abdominopelvic ascites  These findings are nonspecific given recent major abdominal surgery and may be postoperative in etiology, however, a leak of the gastrojejunal anastomosis cannot be excluded  Recommend further evaluation with an upper GI study  3   Nonocclusive thrombus in the main portal vein and superior mesenteric vein  4   Nonenhancing right lower lobe airspace consolidation, likely representing pulmonary infarct  5   Ill-defined wedgelike hypoenhancement in the left hepatic lobe  Differential diagnosis includes hepatic infarct, altered perfusion, versus artifact  Recommend attention on follow-up  6   Postsurgical changes of Whipple procedure, with ill-defined haziness in the surgical bed, likely related to postoperative edema  7   Mild bowel wall thickening of the duodenum, which may represent postoperative edema versus duodenitis  8   Dilated main pulmonary artery measuring 3 1 cm, which can be seen with pulmonary hypertension  9   Hepatosplenomegaly and mild hepatic steatosis  I personally discussed this study with Dima Willis on 11/4/2022 at 3:07 PM  Workstation performed: [id number]     XR chest portable ICU    Result Date: 10/31/2022  Narrative: CHEST INDICATION:   s/p intubation  COMPARISON:  5/31/2022  EXAM PERFORMED/VIEWS:  XR CHEST PORTABLE ICU Images:  1 FINDINGS: Cardiac and mediastinal contours are stable  Probable mild cardiomegaly  Mild central pulmonary vascular congestion  No focal airspace consolidation or effusions  No pneumothorax  There is an endotracheal tube with its distal tip in the proximal right atrium  Retraction of this to several centimeters would be helpful    Left chest wall tariq catheter in place   Distal tip of the gastric tube not visualized but is at least in the stomach  Impression: Endotracheal tube with its distal tip in the proximal right atrium  This should be retracted several centimeters  Gastric tube as above  The study was marked in [facility] for immediate notification  Workstation performed: [id number]     VAS lower limb venous duplex study, complete bilateral    Result Date: 11/11/2022  Narrative:  [facility] REPORT CLINICAL: Indications: Patient presents with recent discovery of pulmonary embolism and physician wants to determine potential source  Operative History: Atrial ablation surgery Risk Factors The patient has history of Obesity, HTN, Diabetes and Hyperlipidemia  CONCLUSION: Impression: RIGHT LOWER LIMB: No evidence of acute or chronic deep vein thrombosis  No evidence of superficial thrombophlebitis noted  Doppler evaluation shows a normal response to augmentation maneuvers  Popliteal, posterior tibial and anterior tibial arterial Doppler waveforms are triphasic  LEFT LOWER LIMB: No evidence of acute or chronic deep vein thrombosis  No evidence of superficial thrombophlebitis noted  Doppler evaluation shows a normal response to augmentation maneuvers  Popliteal, posterior tibial and anterior tibial arterial Doppler waveforms are triphasic  Technical findings were given to Adia Mcqueen 11/10/2022  SIGNATURE: Electronically Signed by: Supriya Vickers MD, [address] on 2022-11-11 11:02:11 AM    Echo complete w/ contrast if indicated    Result Date: 11/4/2022  Narrative: •  Left Ventricle: Left ventricular cavity size is normal  Wall thickness is normal  The left ventricular ejection fraction is 50%  Systolic function is low normal  Wall motion is normal  Diastolic function is mildly abnormal, consistent with grade I (abnormal) relaxation  •  Tricuspid Valve: There is mild regurgitation         Labs:   Lab Results   Component Value Date    WBC 5 68 11/18/2022    HGB 7 9 (L) 11/18/2022    HCT 26 3 (L) 11/18/2022    MCV 96 11/18/2022     (L) 11/18/2022     Lab Results   Component Value Date     04/12/2017    K 3 6 11/18/2022     (H) 11/18/2022    CO2 26 11/18/2022    ANIONGAP 7 01/22/2015    BUN 7 11/18/2022    CREATININE 0 44 (L) 11/18/2022    GLUCOSE 175 (H) 11/12/2022    GLUF 117 (H) 10/17/2022    CALCIUM 8 4 11/18/2022    CORRECTEDCA 9 5 11/12/2022    AST 79 (H) 11/13/2022    ALT 58 11/13/2022    ALKPHOS 594 (H) 11/13/2022    PROT 6 3 04/12/2017    BILITOT 0 3 04/12/2017    EGFR 108 11/18/2022       Lab Results   Component Value Date    IRON 59 10/14/2019    TIBC 430 10/14/2019    FERRITIN 45 10/14/2019       ROS: As stated in the history of present illness otherwise his 14 point review of systems today was negative        Active Problems:   Patient Active Problem List   Diagnosis   • Dyspnea on exertion   • Supraventricular tachycardia (HCC)   • Hypertension   • Controlled depression   • Severe obstructive sleep apnea   • Morbid obesity (HCC)   • Type 2 diabetes mellitus without complication, without long-term current use of insulin (HCC)   • Hyperlipidemia   • 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)   • Esophageal reflux   • Benign essential hypertension   • Adenomatous colon polyp   • Class 3 severe obesity due to excess calories with body mass index (BMI) of 50 0 to 59 9 in adult [facility])   • Nephrolithiasis   • Uric acid kidney stone   • Pancreatic cancer ([facility] [address] )   • Chemotherapy induced neutropenia (HCC)   • CPAP (continuous positive airway pressure) dependence   • Left flank pain   • Paroxysmal A-fib (HCC)   • Pulmonary embolism (HCC)   • Sepsis ([facility] [address] )   • Streptococcal bacteremia   • H/O Whipple procedure   • Hematemesis       Past Medical History:   Past Medical History:   Diagnosis Date   • Abnormal liver function test     last assessed 1/16/17    • Adenomatosis    • 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    last assessed 4/4/13 • Arthritis    • Atrial flutter (HCC)    • Benign essential hypertension     last assessed 12/21/17    • Body mass index (BMI) of 50-59 9 in adult [facility])    • Cancer (HCC)     pancreas   • Colon polyp    • Congenital pes planus     last assessed 7/15/14    • COVID     4/30/21   • CPAP (continuous positive airway pressure) dependence    • Dental crowns present    • Depression    • Diabetes mellitus ([facility] [address] )    • Dizziness     occas--pt reports did see family doctor   • GERD (gastroesophageal reflux disease)    • Hemangioma of skin 08/26/2003    last assessed 8/26/03   • History of cancer chemotherapy     [facility] Oncologist Dr Wandy Smith   • History of gastroesophageal reflux (GERD)    • Hypercholesterolemia    • Hyperlipidemia    • Hypertension    • Irregular heart beat    • Kidney stone    • 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[facility] [address] ) 04/19/2006    last assessed 12/31/14    • Osteoarthritis of both knees     last assessed 12/31/14    • Paroxysmal [facility] fibrillation [facility] )    • Port-A-Cath in place    • S/P ablation of atrial flutter    • Shortness of breath     per pt "from chemo-not drastic--still working and goes up steps"   • Sleep apnea    • Wears glasses        Surgical History:   Past Surgical History:   Procedure Laterality Date   • ABDOMINAL ADHESION SURGERY N/A 10/31/2022    Procedure: LYSIS ADHESIONS, colectomy, vascular pedicle flap;  Surgeon: Ata Martini MD;  [facility];  Service: Surgical Oncology   • ABDOMINAL SURGERY  06/2006    20cm GIST removed    • ABLATION SOFT TISSUE N/A 10/31/2022    Procedure: SOFT TISSUE ABLATION;  Surgeon: Ata Martini MD;  [facility];  Service: Surgical Oncology   • APPENDECTOMY     • ATRIAL ABLATION SURGERY     • CARPAL TUNNEL RELEASE Bilateral    • COLONOSCOPY     • FL GUIDED CENTRAL VENOUS ACCESS DEVICE INSERTION  05/31/2022   • FL RETROGRADE PYELOGRAM  07/18/2022   • FL RETROGRADE PYELOGRAM  8/22/2022   • HYSTERECTOMY      fibroids   • IR MESENTERIC/VISCERAL ANGIOGRAM  11/12/2022   • IR PORT CHECK  06/23/2022   • IR PORT REMOVAL AND PLACEMENT NEW SITE  06/28/2022   • JOINT REPLACEMENT Bilateral     knees   • KIDNEY STONE SURGERY Right 09/17/2021    placed stent in  for kidney stone   • KNEE SURGERY     • KNEE SURGERY Left 03/2019   • LAPAROTOMY N/A 10/31/2022    Procedure: EXPLORATORY LAPAROTOMY;  Surgeon: Homer Persaud MD;  [facility];  Service: Surgical Oncology   • OOPHORECTOMY      cyst   • RI CYSTO/URETERO W/LITHOTRIPSY &INDWELL STENT INSRT Left 8/22/2022    Procedure: CYSTOSCOPY URETEROSCOPY WITH LITHOTRIPSY HOLMIUM LASER, RETROGRADE PYELOGRAM AND INSERTION STENT URETERAL;  Surgeon: Doe Sarkar MD;  [facility];  Service: Urology   • RI CYSTOSCOPY,INSERT URETERAL STENT Left 8/22/2022    Procedure: EXCHANGE STENT URETERAL;  Surgeon: Doe Sarkar MD;  [facility];  Service: Urology   • RI CYSTOURETHROSCOPY,URETER CATHETER Left 07/18/2022    Procedure: CYSTOSCOPY RETROGRADE PYELOGRAM WITH INSERTION STENT URETERAL;  Surgeon: Doe Sarkar MD;  [facility];  Service: Urology   • TONSILLECTOMY     • TUBAL LIGATION     • TUMOR EXCISION  2006    gastrointestinal stromal tumor   • TUNNELED VENOUS PORT PLACEMENT N/A 05/31/2022    Procedure: INSERTION VENOUS PORT (PORT-A-CATH); Surgeon: Homer Persaud MD;  [facility];  Service: Surgical Oncology   • UPPER GASTROINTESTINAL ENDOSCOPY     • WHIPPLE PROCEDURE/PANCREATICO-DUODENECTOMY N/A 10/31/2022    Procedure:  WHIPPLE PROCEDURE/PANCREATICO-DUODENECTOMY, IOUS;  Surgeon: Homer Persaud MD;  [facility];  Service: Surgical Oncology       Family History:    Family History   Problem Relation Age of Onset   • Emphysema Mother    • Arthritis Mother    • Diabetes Mother    • Hypertension Mother    • COPD Mother    • Arthritis Father    • Prostate cancer Father    • Cerebral aneurysm Son    • No Known Problems Maternal Grandmother    • No Known Problems Maternal Grandfather    • No Known Problems Paternal Grandmother    • No Known Problems Paternal Grandfather    • No Known Problems Son    • No Known Problems Maternal Aunt    • No Known Problems Maternal Aunt    • No Known Problems Paternal Aunt    • No Known Problems Paternal Aunt        Cancer-related family history includes Prostate cancer in her father  Social History:   Social History     Socioeconomic History   • Marital status: /Civil Union     Spouse name: Not on file   • Number of children: Not on file   • Years of education: Not on file   • Highest education level: Not on file   Occupational History   • Not on file   Tobacco Use   • Smoking status: Former     Years: 9 00     Types: Cigarettes   • Smokeless tobacco: Never   Vaping Use   • Vaping Use: Never used   Substance and Sexual Activity   • Alcohol use: Not Currently     Comment: social drinker per allscript    • Drug use: Never   • Sexual activity: Not on file     Comment: defer   Other Topics Concern   • Not on file   Social History Narrative    Lack of exercise    Good sleep hygiene    No caffeine use    Dog    Good sleep hygiene       Social Determinants of Health     Financial Resource Strain: Not on file   Food Insecurity: No Food Insecurity   • Worried About Running Out of Food in the Last Year: Never true   • Ran Out of Food in the Last Year: Never true   Transportation Needs: No Transportation Needs   • Lack of Transportation (Medical): No   • Lack of Transportation (Non-Medical):  No   Physical Activity: Not on file   Stress: Not on file   Social Connections: Not on file   Intimate Partner Violence: Not on file   Housing Stability: Low Risk    • Unable to Pay for Housing in the Last Year: No   • Number of Places Lived in the Last Year: 1   • Unstable Housing in the Last Year: No       Current Medications:   Current Outpatient Medications   Medication Sig Dispense Refill   • apixaban (Eliquis) 5 mg Take 1 tablet (5 mg total) by mouth 2 (two) times a day for 23 days, THEN 1 tablet (5 mg total) 2 (two) times a day for 23 days   74 tablet 0   • apixaban (Eliquis) 5 mg Take 1 tablet (5 mg total) by mouth 2 (two) times a day 180 tablet 3   • atorvastatin (LIPITOR) 40 mg tablet Take 1 tablet (40 mg total) by mouth daily at bedtime 90 tablet 3   • diltiazem (CARDIZEM CD) 240 mg 24 hr capsule Take 1 capsule (240 mg total) by mouth daily Do not start before November 19, 2022  60 capsule 0   • Eliquis DVT/PE Starter Pack 5 MG TBPK      • glucose blood (Contour Next Test) test strip Use 1 each daily Use as instructed 100 each 3   • Insulin Pen Needle (Pen Needles) 32G X 4 MM MISC Use once a week 12 each 3   • Magnesium Oxide -Mg Supplement 400 MG CAPS Take 1 capsule by mouth daily     • metoprolol succinate (TOPROL-XL) 50 mg 24 hr tablet Take 0 5 tablets (25 mg total) by mouth 2 (two) times a day (Patient taking differently: Take 50 mg by mouth 2 (two) times a day) 120 tablet 0   • multivitamin (THERAGRAN) TABS Take 1 tablet by mouth daily     • olmesartan (BENICAR) 20 mg tablet TAKE 1 TABLET BY MOUTH  DAILY 90 tablet 3   • Omega-3 Fatty Acids (FISH OIL) 1,000 mg Take 1,000 mg by mouth 2 (two) times a day     • ondansetron (ZOFRAN) 4 mg tablet Take 2 tablets (8 mg total) by mouth every 8 (eight) hours as needed for nausea or vomiting 20 tablet 3   • Ozempic, 1 MG/DOSE, 4 MG/3ML SOPN injection pen INJECT 1MG SUBCUTANEOUSLY  WEEKLY (Patient taking differently: Inject under the skin once a week mondays) 9 mL 3   • pantoprazole (PROTONIX) 40 mg tablet Take 1 tablet (40 mg total) by mouth 2 (two) times a day 90 tablet 0   • PARoxetine (PAXIL-CR) 37 5 mg 24 hr tablet TAKE 1 TABLET BY MOUTH IN  THE MORNING 90 tablet 3   • Potassium Citrate ER 15 MEQ (1620 MG) TBCR TAKE 1 TABLET BY MOUTH  TWICE DAILY 200 tablet 3   • sotalol (BETAPACE) 120 mg tablet TAKE 1 TABLET BY MOUTH  EVERY 12 HOURS 180 tablet 3   • VITAMIN D PO Take 2,000 Units by mouth 2 (two) times a day     • gabapentin (NEURONTIN) 100 mg capsule Take 1 capsule (100 mg total) by mouth 3 (three) times a day for 7 days 21 capsule 0   • senna (SENOKOT) 8 6 mg Take 1 tablet (8 6 mg total) by mouth daily at bedtime for 5 days (Patient taking differently: Take 8 6 mg by mouth daily at bedtime as needed) 5 tablet 0     No current facility-administered medications for this visit  Allergies: Allergies   Allergen Reactions   • Other Rash     Adhesive tape        Physical Exam:    Body surface area is 2 28 meters squared  Wt Readings from Last 3 Encounters:   11/30/22 130 kg (286 lb)   11/29/22 131 kg (288 lb)   11/13/22 (!) 140 kg (307 lb 12 2 oz)        Temp Readings from Last 3 Encounters:   11/30/22 98 °F (36 7 °C)   11/29/22 98 °F (36 7 °C)   11/18/22 97 5 °F (36 4 °C)        BP Readings from Last 3 Encounters:   11/30/22 122/70   11/29/22 140/62   11/18/22 141/78         Pulse Readings from Last 3 Encounters:   11/30/22 87   11/29/22 90   11/18/22 71       Physical Exam     Constitutional   General appearance: No acute distress, well appearing and well nourished  Eyes   Conjunctiva and lids: No swelling, erythema or discharge  Pupils and irises: Equal, round and reactive to light  Ears, Nose, Mouth, and Throat   External inspection of ears and nose: Normal     Nasal mucosa, septum, and turbinates: Normal without edema or erythema  Oropharynx: Normal with no erythema, edema, exudate or lesions  Pulmonary   Respiratory effort: No increased work of breathing or signs of respiratory distress  Auscultation of lungs: Clear to auscultation  Cardiovascular   Palpation of heart: Normal PMI, no thrills  Auscultation of heart: Normal rate and rhythm, normal S1 and S2, without murmurs  Examination of extremities for edema and/or varicosities: Normal     Carotid pulses: Normal     Abdomen   Abdomen: Non-tender, no masses  Liver and spleen: No hepatomegaly or splenomegaly  Lymphatic   Palpation of lymph nodes in neck: No lymphadenopathy  Musculoskeletal   Gait and station: Normal     Digits and nails: Normal without clubbing or cyanosis  Inspection/palpation of joints, bones, and muscles: Normal     Skin   Skin and subcutaneous tissue: Normal without rashes or lesions  Neurologic   Cranial nerves: Cranial nerves 2-12 intact  Sensation: No sensory loss  Psychiatric   Orientation to person, place, and time: Normal     Mood and affect: Normal         Assessment / Plan:      The patient is a pleasant 76-year-old female who was started on neoadjuvant chemotherapy with FOLFIRINOX which was then dose adjusted based on toxicity  She completed 7 cycles  Final pathology revealed a 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 with invasion of peripancreatic soft tissues and duodenal wall to involve lamina propria with perineural invasion present  Metastatic carcinoma was present in 5 of 9 lymph nodes  The tumor did involve the proximal duodenal margin  At this point the patient will proceed to finish up 6 full months of chemotherapy to be followed by 6 weeks of concurrent chemoradiation  I explained this to the patient  She is agreeable to proceeding  Doses will stay the same  She will start after Dulce per her preference and considering the fact she is still healing and had a rough postop course  I think if we start to soon she may have complications and may need to be held  The patient and her  agree  I will see her back in 6-7 weeks  She will start chemotherapy in approximately 3-4 weeks  Goals and Barriers:  Current Goal:  Prolong Survival from pancreatic cancer  Barriers: None  Patient's Capacity to Self Care:  Patient  able to self care  Portions of the record may have been created with voice recognition software    Occasional wrong word or "sound a like" substitutions may have occurred due to the inherent limitations of voice recognition software  Read the chart carefully and recognize, using context, where substitutions have occurred

## 2022-11-30 NOTE — RESULT NOTES
Verified Results  (1) COMPREHENSIVE METABOLIC PANEL [id number] 22:13XA Curly Gut     Test Name Result Flag Reference   Glucose, Serum 123 mg/dL H 65-99   BUN 14 mg/dL  6-24   Creatinine, Serum 0 70 mg/dL  0 57-1 00   eGFR If NonAfricn Am 96 mL/min/1 73  >59   eGFR If Africn Am 111 mL/min/1 73  >59   BUN/Creatinine Ratio 20  9-23   Sodium, Serum 143 mmol/L  134-144   Potassium, Serum 4 3 mmol/L  3 5-5 2   Chloride, Serum 104 mmol/L     Carbon Dioxide, Total 21 mmol/L  18-29   Calcium, Serum 9 7 mg/dL  8 7-10 2   Protein, Total, Serum 6 5 g/dL  6 0-8 5   Albumin, Serum 4 1 g/dL  3 5-5 5   Globulin, Total 2 4 g/dL  1 5-4 5   A/G Ratio 1 7  1 1-2 5   Bilirubin, Total 0 5 mg/dL  0 0-1 2   Alkaline Phosphatase, S 108 IU/L     AST (SGOT) 76 IU/L H 0-40   ALT (SGPT) 106 IU/L H 0-32     (1) LIPID PANEL, FASTING [id number] 08:48AM Curly Gut     Test Name Result Flag Reference   Cholesterol, Total 229 mg/dL H 100-199   Triglycerides 213 mg/dL H 0-149   HDL Cholesterol 43 mg/dL  >39   VLDL Cholesterol David 43 mg/dL H 5-40   LDL Cholesterol Calc 143 mg/dL H 0-99   T  Chol/HDL Ratio 5 3 ratio units H 0 0-4 4   T  Chol/HDL Ratio                                                             Men  Women                                               1/2 Avg  Risk  3 4    3 3                                                   Avg Risk  5 0    4 4                                                2X Avg  Risk  9 6    7 1                                                3X Avg  Risk 23 4   11 0     [facility]) Cardiovascular Risk Assessment [id number] 08:48AM Curly Gut     Test Name Result Flag Reference   Interpretation Note     -------------------------------  CARDIOVASCULAR REPORT:  -------------------------------  Current available clinical information suggests the  patient's risk is at least LOW  One major CHD risk factor is  present (age over 54)   If the patient has CHD or a CHD risk  equivalent, the risk category is high  If patient does not  have CHD or a CHD risk equivalent, consider use of the  Pooled Cohort Equations to estimate 10-year CVD risk, as  individuals with greater than 7 5% risk may warrant more  intensive therapy  The calculator can be found at:  http://tools  cardiosource org/QNKVY-Ydft-Rsymlhqun/  -  Insulin resistance, obesity, excessive alcohol use, smoking,  thyroid disease, nephrotic syndrome, liver disease, and  certain medications are all causes of secondary  dyslipidemia  Consider evaluation if clinically indicated  -  Therapeutic lifestyle changes are always valuable to achieve  optimal blood lipid status (diet, exercise, weight  management)  -------------------------------  LIPID MANAGEMENT  Select one patient risk category based upon medical history  and clinical judgment  Additional risk factors such as  personal or family history of premature CHD, smoking, and  hypertension modify a patient's goals of therapy  In CVD  prevention, the intensity of therapy should be adjusted to  the level of patient risk  MODERATE intensity statin therapy  generally results in an average LDL-C reduction of 30% to  less than 50% from the untreated baseline  Examples include  (daily doses): atorvastatin 10-20 mg, rosuvastatin 5-10 mg,  simvastatin 20-40 mg, pravastatin 40-80 mg, lovastatin 40  mg  HIGH intensity statin therapy generally results in an  average LDL-C reduction of 50% or more from the untreated  baseline  Examples include (daily doses): atorvastatin 40-80  mg and rosuvastatin 20 mg   -------------------------------  LOW RISK ASSESSMENT AND TREATMENT SUGGESTIONS  -------------------------------  LDL-C is acceptable, was 201 and now is 143 mg/dL  Non-HDL  Cholesterol is acceptable, was 255 and now is 186 mg/dL    -  Considerations for use of statin therapy include family  history of premature atherosclerotic disease, elevated  coronary artery calcium score, ankle-brachial index < 0 9,  elevated CRP, or elevated 10-year or lifetime CVD risk  Elevated triglycerides may be associated with increased  cardiovascular risk due to increased numbers of atherogenic  lipoprotein particles  Co-morbid conditions should be  evaluated and treated  -------------------------------  INTERMEDIATE RISK ASSESSMENT AND TREATMENT SUGGESTIONS  -------------------------------  LDL-C is borderline high, was 201 and now is 143 mg/dL  Non-HDL Cholesterol is borderline high, was 255 and now is  186 mg/dL  -  Consider beginning or increasing statin  Factors that may  influence statin use include family history of premature  atherosclerotic disease, elevated coronary artery calcium  score, ankle-brachial index < 0 9, elevated CRP, or elevated  10-year or lifetime CVD risk  If statin cannot be tolerated  or increased, alternatives include use of an intestinal  agent (ezetimibe or bile acid sequestrant), niacin, and/or  fish oil   -------------------------------  HIGH RISK ASSESSMENT AND TREATMENT SUGGESTIONS  -------------------------------  LDL-C is high, was 201 and now is 143 mg/dL  Non-HDL  Cholesterol is high, was 255 and now is 186 mg/dL  -  Begin statin  If statin already in use, consider increasing  dose to achieve at least a 50% LDL reduction from baseline  Moderate or high intensity statin is preferred  If statin  cannot be tolerated or increased, alternatives include use  of an intestinal agent (ezetimibe or bile acid sequestrant),  niacin, and/or fish oil   -------------------------------  DISCLAIMER  These assessments and treatment suggestions are provided as  a convenience in support of the physician-patient  relationship and are not intended to replace the physician's  clinical judgment  They are derived from the national  guidelines in addition to other evidence and expert opinion  The clinician should consider this information within the  context of clinical opinion and the individual patient    SEE GUIDANCE FOR CARDIOVASCULAR REPORT: National Heart,  Lung, and Blood [place]'s Third Report of the NCEP Expert  Panel on Detection, Evaluation and Treatment of High Blood  Cholesterol in Adults (ATP III) (2002  NIH publication  ); Melanie et al  Diabetes Care 2008; 31(4):811-82;  Parker et al  Clin Chem 2009; 55(3):407-419; Sanket Garcia et  al  2013 ACC/AHA guideline on the treatment of blood  cholesterol to reduce atherosclerotic cardiovascular risk in  adults: a report of the [employer] of  Cardiology/American Heart Association Task Force on Practice  Guidelines  Circulation 8661;643(WJJYS 2):S1? S45  PDF Image   aerobic capacity/endurance/gait, locomotion, and balance

## 2022-11-30 NOTE — TELEPHONE ENCOUNTER
While we try to accommodate patient requests, our priority is to schedule treatment according to Doctor's orders and site availability  Does the Provider use the intake sheet or checkout note? What would be a preferred day of the week that would work best for your infusion appointment? WED / [place] November  Do you prefer mornings or afternoons for your appointments? EARLY MORNING  Are there any days or dates that do not work for your schedule, including any upcoming vacations? NO  We are going to try our best to schedule you at the infusion center closest to your home  In the event that we are unable to what would be your next preferred infusion site or sites? 1  [facility]  2  [facility]  3  Do you have transportation to take you to all of your appointments?  YES  Would you like the infusion center to draw labs from your port? (disregard if patient doesn't have a port or need labs for infusion appointment) NO

## 2022-11-30 NOTE — PROGRESS NOTES
Chief Complaint   Patient presents with   • Transition of Care Management     TCM Call     Date and time call was made  11/18/2022  3:41 PM    Comment  [address] [phone]    Date of Admission  11/11/22    Date of discharge  11/18/22    Diagnosis  throwing up blood    Disposition  Home    Were the patients medications reviewed and updated  No    Current Symptoms  None    Incisional pain severity  Mild      TCM Call     Post hospital issues  None    Should patient be enrolled in anticoag monitoring? No    Scheduled for follow up? Yes    Patients specialists  Urologist    Urologist name  f/u w/ Dr Farhana Isaacs / [facility] urology    Did you obtain your prescribed medications  Yes    Do you need help managing your prescriptions or medications  Yes    Is transportation to your appointment needed  Yes    I have advised the patient to call PCP with any new or worsening symptoms  [id number]    Living Arrangements  Spouse or Significiant other    Are you recieving any outpatient services  No    Are you recieving home care services  Yes    Types of home care services  Nurse visit    Are you using any community resources  No    Current waiver services  No    Have you fallen in the last 12 months  No    Interperter language line needed  No           Patient ID: Zeny Conner is a 60 y o  female  HPI  Pt is seeing for f/iu recent hospital stay - recent surgery for pancreatic cancer -  Had upper GI bleeding in early postop period, home now -  Feeling tired  -  No pain  -  Less appetite     The following portions of the patient's history were reviewed and updated as appropriate: allergies, current medications, past family history, past medical history, past social history, past surgical history and problem list     Review of Systems   Constitutional: Positive for activity change, appetite change and fatigue  HENT: Negative  Respiratory: Negative  Cardiovascular: Negative  Gastrointestinal: Negative for abdominal distention, abdominal pain, anal bleeding, blood in stool, constipation, diarrhea, nausea, rectal pain and vomiting  Genitourinary: Negative  Neurological: Negative  Psychiatric/Behavioral: Positive for dysphoric mood  Current Outpatient Medications   Medication Sig Dispense Refill   • apixaban (Eliquis) 5 mg Take 1 tablet (5 mg total) by mouth 2 (two) times a day 180 tablet 3   • atorvastatin (LIPITOR) 40 mg tablet Take 1 tablet (40 mg total) by mouth daily at bedtime 90 tablet 3   • diltiazem (CARDIZEM CD) 240 mg 24 hr capsule Take 1 capsule (240 mg total) by mouth daily Do not start before November 19, 2022  60 capsule 0   • glucose blood (Contour Next Test) test strip Use 1 each daily Use as instructed 100 each 3   • Insulin Pen Needle (Pen Needles) 32G X 4 MM MISC Use once a week 12 each 3   • metoprolol succinate (TOPROL-XL) 50 mg 24 hr tablet Take 0 5 tablets (25 mg total) by mouth 2 (two) times a day (Patient taking differently: Take 50 mg by mouth 2 (two) times a day) 120 tablet 0   • ondansetron (ZOFRAN) 4 mg tablet Take 2 tablets (8 mg total) by mouth every 8 (eight) hours as needed for nausea or vomiting 20 tablet 3   • Ozempic, 1 MG/DOSE, 4 MG/3ML SOPN injection pen INJECT 1MG SUBCUTANEOUSLY  WEEKLY (Patient taking differently: Inject under the skin once a week mondays) 9 mL 3   • pantoprazole (PROTONIX) 40 mg tablet Take 1 tablet (40 mg total) by mouth 2 (two) times a day 90 tablet 0   • PARoxetine (PAXIL-CR) 37 5 mg 24 hr tablet TAKE 1 TABLET BY MOUTH IN  THE MORNING 90 tablet 3   • Potassium Citrate ER 15 MEQ (1620 MG) TBCR TAKE 1 TABLET BY MOUTH  TWICE DAILY 200 tablet 3   • sotalol (BETAPACE) 120 mg tablet TAKE 1 TABLET BY MOUTH  EVERY 12 HOURS 180 tablet 3   • apixaban (Eliquis) 5 mg Take 1 tablet (5 mg total) by mouth 2 (two) times a day for 23 days, THEN 1 tablet (5 mg total) 2 (two) times a day for 23 days   74 tablet 0   • Eliquis DVT/PE Starter Pack 5 MG TBPK  (Patient not taking: Reported on 11/30/2022)     • gabapentin (NEURONTIN) 100 mg capsule Take 1 capsule (100 mg total) by mouth 3 (three) times a day for 7 days 21 capsule 0   • Magnesium Oxide -Mg Supplement 400 MG CAPS Take 1 capsule by mouth daily (Patient not taking: Reported on 11/30/2022)     • multivitamin (THERAGRAN) TABS Take 1 tablet by mouth daily (Patient not taking: Reported on 11/30/2022)     • olmesartan (BENICAR) 20 mg tablet TAKE 1 TABLET BY MOUTH  DAILY (Patient not taking: Reported on 11/30/2022) 90 tablet 3   • Omega-3 Fatty Acids (FISH OIL) 1,000 mg Take 1,000 mg by mouth 2 (two) times a day (Patient not taking: Reported on 11/30/2022)     • VITAMIN D PO Take 2,000 Units by mouth 2 (two) times a day (Patient not taking: Reported on 11/30/2022)       No current facility-administered medications for this visit  Objective:    /70 (BP Location: Left arm, Patient Position: Sitting, Cuff Size: Adult)   Pulse 78   Temp (!) 97 4 °F (36 3 °C) (Temporal)   Resp 14   Ht 5' 4" (1 626 m)   Wt 130 kg (286 lb)   SpO2 97%   BMI 49 09 kg/m²        Physical Exam  Constitutional:       General: She is not in acute distress  Appearance: She is obese  Cardiovascular:      Rate and Rhythm: Normal rate and regular rhythm  Heart sounds: No murmur heard  Pulmonary:      Effort: Pulmonary effort is normal  No respiratory distress  Breath sounds: No wheezing or rales  Abdominal:      Palpations: Abdomen is soft  Tenderness: There is no abdominal tenderness  There is no guarding  Musculoskeletal:      Right lower leg: No edema  Left lower leg: No edema  Skin:     Coloration: Skin is pale  Neurological:      Mental Status: She is alert  Labs in chart were reviewed        Assessment/Plan:         Diagnoses and all orders for this visit:    Malignant neoplasm of head of pancreas (HCC)    Iron deficiency anemia due to chronic blood loss    Paroxysmal A-fib (HCC)    Hemorrhoids, unspecified hemorrhoid type  -     hydrocortisone (ANUSOL-HC) 2 5 % rectal cream; Apply topically 2 (two) times a day  -     hydrocortisone (ANUSOL-HC) 25 mg suppository;  Insert 1 suppository (25 mg total) into the rectum 2 (two) times a day    f/u with surgical team, GI   rto in 1 m                  Yan Dwyer MD

## 2022-11-30 NOTE — TELEPHONE ENCOUNTER
Our mutual patient is scheduled for procedure:  EGD    On: _1/18/23  _      With:   _Ish_______    He/She is taking the following blood thinner:   Eliquis       Can this be stopped ___2___ days prior to the procedure?       Physician Approving clearance: ________________________

## 2022-12-01 PROBLEM — K92.2 GI BLEED: Status: ACTIVE | Noted: 2022-01-01

## 2022-12-01 NOTE — ED ATTENDING ATTESTATION
12/1/2022  I, Katie Corley MD, saw and evaluated the patient  I have discussed the patient with the resident/non-physician practitioner and agree with the resident's/non-physician practitioner's findings, Plan of Care, and MDM as documented in the resident's/non-physician practitioner's note, except where noted  All available labs and Radiology studies were reviewed  I was present for key portions of any procedure(s) performed by the resident/non-physician practitioner and I was immediately available to provide assistance  At this point I agree with the current assessment done in the Emergency Department  I have conducted an independent evaluation of this patient a history and physical is as follows:  Pt has history of GI bleed for PUD and arterial bleed from anastomosis  Pt had sudden onset of UGI bleed Pt is on thinners for PE in past PE: alert heart reg lugsn clear abd soft tender rlq Pt actively vomiting blood in ED  MDM: GI and IR and surgery contacted and surgery at bedside  T and c for blood [facility] given    ED Course         Critical Care Time  CriticalCare Time  Performed by: Katie Corley MD  Authorized by:  Katie Corley MD     Critical care provider statement:     Critical care time (minutes):  35    Critical care time was exclusive of:  Separately billable procedures and treating other patients and teaching time    Critical care was necessary to treat or prevent imminent or life-threatening deterioration of the following conditions:  Circulatory failure (GI blood requiring transfusion [facility])    Critical care was time spent personally by me on the following activities:  Obtaining history from patient or surrogate, development of treatment plan with patient or surrogate, evaluation of patient's response to treatment, examination of patient, ordering and performing treatments and interventions and re-evaluation of patient's condition    I assumed direction of critical care for this patient from another provider in my specialty: no

## 2022-12-01 NOTE — CONSULTS
[id number] [facility] 57 y o  female MRN: [id number]  Unit/Bed#: ED 29 Encounter: [id number]      -------------------------------------------------------------------------------------------------------------  Chief Complaint: Nausea, abdominal pain    History of Present Illness   HX and PE limited by: n/a  Yoon Singleton is a 57 y o  female with PMHx of pancreatic adenocarcinoma s/p Whipple on 10/31, DM2, AFib, HLD, and GERD who presented to the ER today with hematemesis  Patient had a complicated post-operative Whipple course with PE (now on Eliquis) and bacteremia  She also re-presented to the hospital in mid-November with episodes of hematemesis and was found to have GDA blowout and gastrojejunal anastamosis site ulcer  She underwent mesenteric angiogram and stent placement across the GDA stump  Today, she began experiencing hematemesis and was brought to the emergency department  Hemoglobin upon arrival was 7 8, receiving 2u prbcs and [address] given Eliquis use  Initial lactic acid 11 3  Evaluated by surgical oncology and GI for potential EGD  CTA A/P revealed high density collection lateral to residual pancreas and right retroperitoneal mid abdominal collection with internal foci of gas  History obtained from chart review and the patient   -------------------------------------------------------------------------------------------------------------  Assessment and Plan:    Neuro:   • Acute pain  o Dilaudid prn  o Hold home gabapentin while NPO  • Depression  o Hold home Paxil while NPO  • Daily CAM-ICU, delirium precautions   Regulate sleep/wake cycle      CV:   • AFib  o Hold home cardizem, sotalol, metoprolol, and Eliquis      Pulm:  • Post-operative PE on Eliquis  o Holding Eliquis      GI:   • Suspected upper GI bleed w/ hx of marginal ulcer, hematemesis  o GI consulted, plan for STAT EGD  o Protonix IV BID  o Zofran prn for nausea/vomiting  • Pancreatic adenocarcinoma s/p Whipple 10/31  o Surgical oncology primary, appreciate recommendations  o Serial abdominal exams      :   • Trend strict I/Os      F/E/N:   • LR at 125cc/hr      Heme/Onc:   • ABLA  o Concern for bleeding marginal ulcer  Hold all AC/AP  GI consulted as above    o Transfuse 2u prbcs now as well as [address]  o TEG pending  o Q6 hgb      Endo:   • DM2  o SSI      ID:   • Bandemia, concern for intra-abdominal infection/abscess  o Start Zosyn  Blood cultures pending  Tailor abx as able  Trend fever curve/white count  MSK/Skin:   • Frequent turns/repositioning, offloading  • PT/OT when appropriate      Disposition: Admit to Critical Care   Code Status: Level 1 - Full Code  --------------------------------------------------------------------------------------------------------------  Review of Systems   Constitutional: Positive for fatigue  Negative for fever  Gastrointestinal: Positive for abdominal distention, abdominal pain, nausea and vomiting  A 12-point, complete review of systems was reviewed and negative except as stated above     Physical Exam  Vitals and nursing note reviewed  Constitutional:       General: She is not in acute distress  Appearance: She is morbidly obese  She is ill-appearing  HENT:      Head: Normocephalic and atraumatic  Eyes:      Pupils: Pupils are equal, round, and reactive to light  Cardiovascular:      Rate and Rhythm: Normal rate and regular rhythm  Comments: Trace edema b/l  Pulmonary:      Effort: Pulmonary effort is normal       Breath sounds: Normal breath sounds  No decreased breath sounds, wheezing, rhonchi or rales  Abdominal:      General: There is distension  Palpations: Abdomen is soft  Tenderness: There is abdominal tenderness  Comments: Well-healed ex-lap incision   Skin:     General: Skin is warm and dry  Coloration: Skin is pale  Neurological:      General: No focal deficit present        Mental Status: She is alert and oriented to person, place, and time  GCS: GCS eye subscore is 4  GCS verbal subscore is 5  GCS motor subscore is 6  Psychiatric:         Behavior: Behavior is cooperative        --------------------------------------------------------------------------------------------------------------  Vitals:   Vitals:    12/01/22 1345 12/01/22 1532 12/01/22 1545   BP: 130/76  142/80   Pulse: (!) 106  (!) 108   Resp: 20  18   Temp:  (!) 97 4 °F (36 3 °C)    TempSrc:  Tympanic    SpO2:   97%     Temp  Min: 97 4 °F (36 3 °C)  Max: 97 4 °F (36 3 °C)        There is no height or weight on file to calculate BMI    N/A    Laboratory and Diagnostics:  Results from last 7 days   Lab Units 12/01/22  1359 11/30/22  1059   WBC Thousand/uL 13 32*  --    WHITE BLOOD CELL COUNT  x10E3/uL  --  8 0   HEMOGLOBIN g/dL 7 8*  --    HEMOGLOBIN  g/dL  --  8 4*   HEMATOCRIT % 27 6*  --    HEMATOCRIT  %  --  27 1*   PLATELETS Thousands/uL 198  --    PLATELETS  V00K7/LB  --  114*   NEUTROS PCT  %  --  73   BANDS PCT % 14*  --    MONO PCT % 0*  --    MONOS PCT  %  --  8     Results from last 7 days   Lab Units 12/01/22  1359 11/30/22  1059   SODIUM mmol/L 138 140   POTASSIUM mmol/L 3 6 3 5   CHLORIDE mmol/L 105 101   CO2 mmol/L 18* 24   ANION GAP mmol/L 15*  --    BUN mg/dL 10 13   CREATININE mg/dL 0 92 0 63   CALCIUM mg/dL 8 8  --    GLUCOSE RANDOM mg/dL 240* 117*   ALT U/L 29 13   AST U/L 88* 20   ALK PHOS U/L 649*  --    ALBUMIN g/dL 1 7* 2 6*   TOTAL BILIRUBIN mg/dL 1 37* 0 7          Results from last 7 days   Lab Units 12/01/22  1432   INR  1 78*   PTT seconds 28          Results from last 7 days   Lab Units 12/01/22  1541 12/01/22  1423   LACTIC ACID mmol/L 12 4* 11 3*       Imaging:  CTA abdomen/pelvis: Residual high density collection located lateral to the residual pancreas and inferomedial to the tariq hepatis in keeping with residual hematoma      Retroperitoneal and right mid abdominal collection as measured above with internal foci of gas in keeping with abscesses  These likely communicate on #6/78      No active extravasation into the bowel lumen to indicate an active bleed       I have personally reviewed pertinent reports        Historical Information   Past Medical History:   Diagnosis Date   • Abnormal liver function test     last assessed 1/16/17    • Adenomatosis    • 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)    • Cancer (HCC)     pancreas   • Colon polyp    • Congenital pes planus     last assessed 7/15/14    • COVID     4/30/21   • CPAP (continuous positive airway pressure) dependence    • Dental crowns present    • Depression    • Diabetes mellitus ([facility] [address] )    • Dizziness     occas--pt reports did see family doctor   • GERD (gastroesophageal reflux disease)    • Hemangioma of skin 08/26/2003    last assessed 8/26/03   • History of cancer chemotherapy     [facility] Oncologist Dr Amanda Robison   • History of gastroesophageal reflux (GERD)    • Hypercholesterolemia    • Hyperlipidemia    • Hypertension    • Irregular heart beat    • Kidney stone    • 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[facility] [address] ) 04/19/2006    last assessed 12/31/14    • Osteoarthritis of both knees     last assessed 12/31/14    • Paroxysmal atrial fibrillation ([facility] [address] )    • Port-A-Cath in place    • S/P ablation of atrial flutter    • Shortness of breath     per pt "from chemo-not drastic--still working and goes up steps"   • Sleep apnea    • Wears glasses      Past Surgical History:   Procedure Laterality Date   • ABDOMINAL ADHESION SURGERY N/A 10/31/2022    Procedure: LYSIS ADHESIONS, colectomy, vascular pedicle flap;  Surgeon: Jasmeet Calloway MD;  [facility];  Service: Surgical Oncology   • ABDOMINAL SURGERY  06/2006    20cm GIST removed    • ABLATION SOFT TISSUE N/A 10/31/2022 Procedure: SOFT TISSUE ABLATION;  Surgeon: Elin Powell MD;  [facility];  Service: Surgical Oncology   • APPENDECTOMY     • ATRIAL ABLATION SURGERY     • CARPAL TUNNEL RELEASE Bilateral    • COLONOSCOPY     • FL GUIDED CENTRAL VENOUS ACCESS DEVICE INSERTION  05/31/2022   • FL RETROGRADE PYELOGRAM  07/18/2022   • FL RETROGRADE PYELOGRAM  8/22/2022   • HYSTERECTOMY      fibroids   • IR MESENTERIC/VISCERAL ANGIOGRAM  11/12/2022   • IR PORT CHECK  06/23/2022   • IR PORT REMOVAL AND PLACEMENT NEW SITE  06/28/2022   • JOINT REPLACEMENT Bilateral     knees   • KIDNEY STONE SURGERY Right 09/17/2021    placed stent in  for kidney stone   • KNEE SURGERY     • KNEE SURGERY Left 03/2019   • LAPAROTOMY N/A 10/31/2022    Procedure: EXPLORATORY LAPAROTOMY;  Surgeon: Elin Powell MD;  [facility];  Service: Surgical Oncology   • OOPHORECTOMY      cyst   • IA CYSTO/URETERO W/LITHOTRIPSY &INDWELL STENT INSRT Left 8/22/2022    Procedure: CYSTOSCOPY URETEROSCOPY WITH LITHOTRIPSY HOLMIUM LASER, RETROGRADE PYELOGRAM AND INSERTION STENT URETERAL;  Surgeon: Anderson Long MD;  [facility];  Service: Urology   • IA CYSTOSCOPY,INSERT URETERAL STENT Left 8/22/2022    Procedure: EXCHANGE STENT URETERAL;  Surgeon: Anderson Long MD;  [facility];  Service: Urology   • IA CYSTOURETHROSCOPY,URETER CATHETER Left 07/18/2022    Procedure: CYSTOSCOPY RETROGRADE PYELOGRAM WITH INSERTION STENT URETERAL;  Surgeon: Anderson Long MD;  [facility];  Service: Urology   • TONSILLECTOMY     • TUBAL LIGATION     • TUMOR EXCISION  2006    gastrointestinal stromal tumor   • TUNNELED VENOUS PORT PLACEMENT N/A 05/31/2022    Procedure: INSERTION VENOUS PORT (PORT-A-CATH); Surgeon: Elin Powell MD;  [facility];  Service: Surgical Oncology   • UPPER GASTROINTESTINAL ENDOSCOPY     • WHIPPLE PROCEDURE/PANCREATICO-DUODENECTOMY N/A 10/31/2022    Procedure:  WHIPPLE PROCEDURE/PANCREATICO-DUODENECTOMY, IOUS; Surgeon: Dempsey Fabry, MD;  [facility];  Service: Surgical Oncology     Social History   Social History     Substance and Sexual Activity   Alcohol Use Not Currently    Comment: social drinker per allscript      Social History     Substance and Sexual Activity   Drug Use Never     Social History     Tobacco Use   Smoking Status Former   • Years: 9 00   • Types: Cigarettes   Smokeless Tobacco Never     Family History:   Family History   Problem Relation Age of Onset   • Emphysema Mother    • Arthritis Mother    • Diabetes Mother    • Hypertension Mother    • COPD Mother    • Arthritis Father    • Prostate cancer Father    • Cerebral aneurysm Son    • No Known Problems Maternal Grandmother    • No Known Problems Maternal Grandfather    • No Known Problems Paternal Grandmother    • No Known Problems Paternal Grandfather    • No Known Problems Son    • No Known Problems Maternal Aunt    • No Known Problems Maternal Aunt    • No Known Problems Paternal Aunt    • No Known Problems Paternal Aunt      I have reviewed this patient's family history and commented on sigificant items within the HPI      Medications:  Current Facility-Administered Medications   Medication Dose Route Frequency   • cefepime (MAXIPIME) 2 g/50 mL dextrose IVPB  2,000 mg Intravenous Once   • HYDROmorphone (DILAUDID) injection 0 5 mg  0 5 mg Intravenous Q4H PRN   • insulin lispro (HumaLOG) 100 units/mL subcutaneous injection 2-12 Units  2-12 Units Subcutaneous Q6H [facility]   • lactated ringers infusion  125 mL/hr Intravenous Continuous   • metroNIDAZOLE (FLAGYL) tablet 500 mg  500 mg Oral Once   • multi-electrolyte (ISOLYTE-S PH 7 4) bolus 2,000 mL  2,000 mL Intravenous Once   • pantoprazole (PROTONIX) injection 40 mg  40 mg Intravenous Q12H [facility]   • piperacillin-tazobactam (ZOSYN) 3 375 g in sodium chloride 0 9 % 100 mL IVPB  3 375 g Intravenous Q6H   • vancomycin (VANCOCIN) 1,750 mg in sodium chloride 0 9 % 500 mL IVPB  20 mg/kg (Adjusted) Intravenous Once     Home medications:  Prior to Admission Medications   Prescriptions Last Dose Informant Patient Reported? Taking? Eliquis DVT/PE Starter Pack 5 MG TBPK   Yes No   Patient not taking: Reported on 11/30/2022   Insulin Pen Needle (Pen Needles) 32G X 4 MM MISC   No No   Sig: Use once a week   Magnesium Oxide -Mg Supplement 400 MG CAPS   Yes No   Sig: Take 1 capsule by mouth daily   Patient not taking: Reported on 11/30/2022   Omega-3 Fatty Acids (FISH OIL) 1,000 mg   Yes No   Sig: Take 1,000 mg by mouth 2 (two) times a day   Patient not taking: Reported on 11/30/2022   Ozempic, 1 MG/DOSE, 4 MG/3ML SOPN injection pen   No No   Sig: INJECT 1MG SUBCUTANEOUSLY  WEEKLY   Patient taking differently: Inject under the skin once a week mondays   PARoxetine (PAXIL-CR) 37 5 mg 24 hr tablet   No No   Sig: TAKE 1 TABLET BY MOUTH IN  THE MORNING   Potassium Citrate ER 15 MEQ (1620 MG) TBCR   No No   Sig: TAKE 1 TABLET BY MOUTH  TWICE DAILY   VITAMIN D PO   Yes No   Sig: Take 2,000 Units by mouth 2 (two) times a day   Patient not taking: Reported on 11/30/2022   apixaban (Eliquis) 5 mg   No No   Sig: Take 1 tablet (5 mg total) by mouth 2 (two) times a day for 23 days, THEN 1 tablet (5 mg total) 2 (two) times a day for 23 days  apixaban (Eliquis) 5 mg   No No   Sig: Take 1 tablet (5 mg total) by mouth 2 (two) times a day   atorvastatin (LIPITOR) 40 mg tablet   No No   Sig: Take 1 tablet (40 mg total) by mouth daily at bedtime   diltiazem (CARDIZEM CD) 240 mg 24 hr capsule   No No   Sig: Take 1 capsule (240 mg total) by mouth daily Do not start before November 19, 2022     gabapentin (NEURONTIN) 100 mg capsule   No No   Sig: Take 1 capsule (100 mg total) by mouth 3 (three) times a day for 7 days   glucose blood (Contour Next Test) test strip   No No   Sig: Use 1 each daily Use as instructed   hydrocortisone (ANUSOL-HC) 2 5 % rectal cream   No No   Sig: Apply topically 2 (two) times a day   hydrocortisone (ANUSOL-HC) 25 mg suppository   No No   Sig: Insert 1 suppository (25 mg total) into the rectum 2 (two) times a day   metoprolol succinate (TOPROL-XL) 50 mg 24 hr tablet   No No   Sig: Take 0 5 tablets (25 mg total) by mouth 2 (two) times a day   Patient taking differently: Take 50 mg by mouth 2 (two) times a day   multivitamin (THERAGRAN) TABS   Yes No   Sig: Take 1 tablet by mouth daily   Patient not taking: Reported on 11/30/2022   olmesartan (BENICAR) 20 mg tablet   No No   Sig: TAKE 1 TABLET BY MOUTH  DAILY   Patient not taking: Reported on 11/30/2022   ondansetron (ZOFRAN) 4 mg tablet   No No   Sig: Take 2 tablets (8 mg total) by mouth every 8 (eight) hours as needed for nausea or vomiting   pantoprazole (PROTONIX) 40 mg tablet   No No   Sig: Take 1 tablet (40 mg total) by mouth 2 (two) times a day   sotalol (BETAPACE) 120 mg tablet   No No   Sig: TAKE 1 TABLET BY MOUTH  EVERY 12 HOURS      Facility-Administered Medications: None     Allergies: Allergies   Allergen Reactions   • Other Rash     Adhesive tape      ------------------------------------------------------------------------------------------------------------  Advance Directive and Living Will:      Power of :    POLST:    ------------------------------------------------------------------------------------------------------------  Anticipated Length of Stay is > 2 midnights    Care Time Delivered:   Upon my evaluation, this patient had a high probability of imminent or life-threatening deterioration due to acute GI bleed, lactic acidosis, which required my direct attention, intervention, and personal management  I have personally provided 42 minutes (1501 to [phone]) of critical care time, exclusive of procedures, teaching, family meetings, and any prior time recorded by providers other than myself         Dimitrios Shannon PA-C

## 2022-12-01 NOTE — ANESTHESIA PREPROCEDURE EVALUATION
Procedure:  EGD    Relevant Problems   CARDIO   (+) Benign essential hypertension   (+) Dyspnea on exertion   (+) Hyperlipidemia   (+) Hypertension   (+) Paroxysmal A-fib (HCC)   (+) Pulmonary embolism (HCC)   (+) Supraventricular tachycardia (HCC)      ENDO   (+) Type 2 diabetes mellitus without complication, without long-term current use of insulin (HCC)      GI/HEPATIC   (+) Esophageal reflux   (+) Hematemesis   (+) Pancreatic cancer (HCC)      /RENAL   (+) Nephrolithiasis   (+) Uric acid kidney stone      NEURO/PSYCH   (+) Controlled depression      PULMONARY   (+) Dyspnea on exertion   (+) Severe obstructive sleep apnea      Other   (+) CPAP (continuous positive airway pressure) dependence   (+) Chemotherapy induced neutropenia (HCC)   (+) H/O Whipple procedure   (+) Morbid obesity (HCC)   (+) Sepsis (HCC)      Postoperative whipple course c/b PE now on Eliquis  Also re-presented mid November with GDA blowout and GJ anastomosis ulcer  HgB 7 8 on arrival s/p 2U pRBCs and [address]  Lactate 11 3  CTAP with peripancreatic collection and RP mid abdominal collection with gas  Physical Exam    Airway    Mallampati score: II  TM Distance: >3 FB  Neck ROM: full     Dental       Cardiovascular      Pulmonary      Other Findings        Anesthesia Plan  ASA Score- 4     Anesthesia Type- general with ASA Monitors  Additional Monitors:   Airway Plan: ETT  Plan Factors-Exercise tolerance (METS): >4 METS  Chart reviewed  Existing labs reviewed  Patient summary reviewed  Induction- intravenous  Postoperative Plan-     Informed Consent- Anesthetic plan and risks discussed with patient  I personally reviewed this patient with the CRNA  Discussed and agreed on the Anesthesia Plan with the CRNA  Andrey Jackson

## 2022-12-01 NOTE — PROGRESS NOTES
The patient’s standard-infusion Piperacillin-Tazobactam / Zosyn (infused over 30-60 minutes) has been converted to extended-infusion (infused over 4 hours) per [facility] Extended-Infusion Piperacillin-Tazobactam Protocol for Adults as approved by the Pharmacy and Therapeutics Committee (accessible here on MyNET)       The patient met ALL eligible criteria:    Age >= 25years old   Critical Care patient    And did NOT have ANY exclusions:     Emergency Department or Operating Room patient  Drug incompatibilities that could NOT be avoided with timing or separate line administration    The following are reminders for Nursing regarding administration:  Infuse the first dose of Zosyn over 30min as a load (if new start), and then all subsequent doses will be given as an extended-infusion over 4 hours (see dosing below)  Use primary tubing as an intermittent infusion; change out primary tubing every 24 hours   Ensure full dose of the medication is given at the appropriate rate  Most incompatible drugs can be scheduled during times when the Zosyn is not being infused; however, if one requires administration during the same time, a separate site or lumen MUST be used  If access is limited and an incompatible medication urgently needs to be given, the Zosyn extended-infusion can be held for up to 30min (remember to flush line before/after)  Extended-infusion Zosyn does NOT require special timing around hemodialysis (it can even be given simultaneously)  If a patient needs an urgent MRI while Zosyn is infusing and there is not a MRI-compatible pump available for use, finish the infusion over the traditional length (30min) and ask Pharmacy to reschedule the next doses so that they start a few hours earlier  Pharmacy will assist nursing in troubleshooting other administration issues as they arise  Dosing for Piperacillin-Tazobactam  CrCl (mL/min) Traditional Dosing Extended-Infusion Dosing #   CrCl > 40 High-Dose  CrCl > 40 Low-Dose 4 5g Q6H (over 30min)  3 375g IV Q6H (over 30min) 3 375g IV Q8H (over 4hr)*    *1st dose loaded over 30min, then start extended-infusion dosing 4hr later   CrCl 20-40 High-Dose  CrCl 20-40 Low-Dose 3 375g IV Q6H (over 30min)  2 25g IV Q6H (over 30min)    CrCl < 20 High-Dose  CrCl < 20 Low-Dose 2 25g IV Q6H (over 30min)  2 25g IV Q8H (over 30min) 3 375g IV Q12H (over 4hr)*    *1st dose loaded over 30min, then start extended-infusion dosing 6hr later   Hemo/Peritoneal Dialysis High-Dose  Hemo/Peritoneal Dialysis Low-Dose 2 25g IV Q6H (over 30min)  2 25g IV Q8H (over 30min)    CVVH/D High-Dose  CVVH/D Low-Dose 3 375g IV Q6H (over 30min)  2 25 IV Q6H (over 30min) 3 375g IV Q8H (over 4hr)*    *1st dose loaded over 30min, then start extended-infusion dosing 4hr later   # = Use 4 5g dosing (same interval) if morbidly obese (BMI ?40)    Please call the Pharmacy with any questions or concerns

## 2022-12-01 NOTE — CONSULTS
Consultation Note - Surgical Oncology  : [facility] Resident role on [employer]  Georgie Mccord 57 y o  female MRN: [id number]  Unit/Bed#: ED 29 Encounter: [id number]        Assessment:  57y o  year old female with pancreatic cancer s/p whipple with portal vein/SMV repair which was complicated by GI bleed with IR covered stent placement and GI scope  Presents again with hematemesis which started at noon  Afebrile, HDS    Plan:  - Surgical critical care consult, recommend admission to ICU  - Type and screen for 4 units of blood   - Trend Hgb, Lactate  - Broad spectrum abx  - GI consult  - IR consult  - TEG  - CTA without evidence of active extravasation  - PRN analgesia    HPI:  Georgie Mccord is a 57 y o  female with a history of pancreatic cancer s/p whipple with portal vein/smv repair 10/31  Complicated by pulmonary embolism on Eliquis  She subsequently had GI bleed with hematemesis requiring IR angio and covered stent placement over GDA, GI scope at that time with large ulcer  Resolved and resumed Eliquis on discharge  Presents today with hematemesis since noon and epigastric pain  Continued hematemesis on presentation  Hgb 7 8, lactate 12  Abdomen soft, tender, mildly distended  Physical Exam:  General: No acute distress, alert and oriented  CV: Well perfused, tachycardic  Lungs: Normal work of breathing, no increased respiratory effort  Abdomen: Soft, tender in epigastrium, mildly distended  Extremities: No edema, clubbing or cyanosis  Skin: Warm, dry      Review of Systems   Constitutional: Positive for fatigue  HENT: Negative  Eyes: Negative  Respiratory: Negative  Cardiovascular: Negative  Gastrointestinal: Positive for abdominal pain and vomiting  Hematemesis   Endocrine: Negative  Genitourinary: Negative  Musculoskeletal: Negative  Skin: Negative  Allergic/Immunologic: Negative  Neurological: Negative  Hematological: Negative  Psychiatric/Behavioral: Negative  Objective       No intake or output data in the 24 hours ending 12/01/22 1501    First Vitals:   Blood Pressure: 130/76 (12/01/22 1345)  Pulse: (!) 106 (12/01/22 1345)  Respirations: 20 (12/01/22 1345)    Current Vitals:   Blood Pressure: 130/76 (12/01/22 1345)  Pulse: (!) 106 (12/01/22 1345)  Respirations: 20 (12/01/22 1345)    Invasive Devices     Central Venous Catheter Line  Duration           Port A Cath 05/31/22 Left Chest 184 days          Peripheral Intravenous Line  Duration           Peripheral IV 12/01/22 Left Antecubital <1 day    Peripheral IV 12/01/22 Right;Left Antecubital <1 day          Drain  Duration           Ureteral Internal Stent Left ureter 6 Fr  136 days                Imaging: I have personally reviewed pertinent reports  No results found  EKG, Pathology, and Other Studies: I have personally reviewed pertinent reports          Historical Information   Past Medical History:   Diagnosis Date   • Abnormal liver function test     last assessed 1/16/17    • Adenomatosis    • 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    last assessed 4/4/13   • Arthritis    • Atrial flutter ([address] )    • Benign essential hypertension     last assessed 12/21/17    • Body mass index (BMI) of 50-59 9 in [facility])    • Cancer (HCC)     pancreas   • Colon polyp    • Congenital pes planus     last assessed 7/15/14    • COVID     4/30/21   • CPAP (continuous positive airway pressure) dependence    • Dental crowns present    • Depression    • Diabetes mellitus ([address] )    • Dizziness     occas--pt reports did see family doctor   • GERD (gastroesophageal reflux disease)    • Hemangioma of skin 08/26/2003    last assessed 8/26/03   • History of cancer chemotherapy     [facility] Oncologist Dr Lion Narvaez   • History of gastroesophageal reflux (GERD)    • Hypercholesterolemia    • Hyperlipidemia    • Hypertension    • Irregular heart beat    • Kidney stone    • 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[facility] ) 04/19/2006    last assessed 12/31/14    • Osteoarthritis of both knees     last assessed 12/31/14    • Paroxysmal atrial fibrillation ([facility] )    • Port-A-Cath in place    • S/P ablation of atrial flutter    • Shortness of breath     per pt "from chemo-not drastic--still working and goes up steps"   • Sleep apnea    • Wears glasses      Past Surgical History:   Procedure Laterality Date   • ABDOMINAL ADHESION SURGERY N/A 10/31/2022    Procedure: LYSIS ADHESIONS, colectomy, vascular pedicle flap;  Surgeon: Benedicto Marroquin MD;  [facility];  Service: Surgical Oncology   • ABDOMINAL SURGERY  06/2006    20cm GIST removed    • ABLATION SOFT TISSUE N/A 10/31/2022    Procedure: SOFT TISSUE ABLATION;  Surgeon: Benedicto Marroquin MD;  [facility];  Service: Surgical Oncology   • APPENDECTOMY     • ATRIAL ABLATION SURGERY     • CARPAL TUNNEL RELEASE Bilateral    • COLONOSCOPY     • FL GUIDED CENTRAL VENOUS ACCESS DEVICE INSERTION  05/31/2022   • FL RETROGRADE PYELOGRAM  07/18/2022   • FL RETROGRADE PYELOGRAM  8/22/2022   • HYSTERECTOMY      fibroids   • IR MESENTERIC/VISCERAL ANGIOGRAM  11/12/2022   • IR PORT CHECK  06/23/2022   • IR PORT REMOVAL AND PLACEMENT NEW SITE  06/28/2022   • JOINT REPLACEMENT Bilateral     knees   • KIDNEY STONE SURGERY Right 09/17/2021    placed stent in  for kidney stone   • KNEE SURGERY     • KNEE SURGERY Left 03/2019   • LAPAROTOMY N/A 10/31/2022    Procedure: EXPLORATORY LAPAROTOMY;  Surgeon: Benedicto Marroquin MD;  [facility];  Service: Surgical Oncology   • OOPHORECTOMY      cyst   • WA CYSTO/URETERO W/LITHOTRIPSY &INDWELL STENT INSRT Left 8/22/2022    Procedure: CYSTOSCOPY URETEROSCOPY WITH LITHOTRIPSY HOLMIUM LASER, RETROGRADE PYELOGRAM AND INSERTION STENT URETERAL;  Surgeon: Brittani Parra MD;  [facility];  Service: Urology   • WA CYSTOSCOPY,INSERT URETERAL STENT Left 8/22/2022    Procedure: EXCHANGE STENT URETERAL; Surgeon: Fernanda Avila MD;  [facility];  Service: Urology   • WA CYSTOURETHROSCOPY,URETER CATHETER Left 07/18/2022    Procedure: CYSTOSCOPY RETROGRADE PYELOGRAM WITH INSERTION STENT URETERAL;  Surgeon: Fernanda Avila MD;  [facility];  Service: Urology   • TONSILLECTOMY     • TUBAL LIGATION     • TUMOR EXCISION  2006    gastrointestinal stromal tumor   • TUNNELED VENOUS PORT PLACEMENT N/A 05/31/2022    Procedure: INSERTION VENOUS PORT (PORT-A-CATH); Surgeon: Mallorie Ashley MD;  [facility];  Service: Surgical Oncology   • UPPER GASTROINTESTINAL ENDOSCOPY     • WHIPPLE PROCEDURE/PANCREATICO-DUODENECTOMY N/A 10/31/2022    Procedure:  WHIPPLE PROCEDURE/PANCREATICO-DUODENECTOMY, IOUS;  Surgeon: Mallorie Ashley MD;  [facility];  Service: Surgical Oncology     Social History   Social History     Substance and Sexual Activity   Alcohol Use Not Currently    Comment: social drinker per [employer]      Social History     Substance and Sexual Activity   Drug Use Never     Social History     Tobacco Use   Smoking Status Former   • Years: 9 00   • Types: Cigarettes   Smokeless Tobacco Never     Family History   Problem Relation Age of Onset   • Emphysema Mother    • Arthritis Mother    • Diabetes Mother    • Hypertension Mother    • COPD Mother    • Arthritis Father    • Prostate cancer Father    • Cerebral aneurysm Son    • No Known Problems Maternal Grandmother    • No Known Problems Maternal Grandfather    • No Known Problems Paternal Grandmother    • No Known Problems Paternal Grandfather    • No Known Problems Son    • No Known Problems Maternal Aunt    • No Known Problems Maternal Aunt    • No Known Problems Paternal Aunt    • No Known Problems Paternal Aunt        Meds/Allergies   all current active meds have been reviewed, current meds:   Current Facility-Administered Medications   Medication Dose Route Frequency   • lactated ringers infusion  125 mL/hr Intravenous Continuous   • ondansetron (FOR EMS ONLY) (ZOFRAN) 4 mg/2 mL injection 4 mg  1 each Does not apply Once   • pantoprazole (PROTONIX) injection 40 mg  40 mg Intravenous Q12H [facility]   • prothrombin complex concentrate (human) (Kcentra) 2,000 Units  2,000 Units Intravenous Once    and PTA meds:   Prior to Admission Medications   Prescriptions Last Dose Informant Patient Reported? Taking? Eliquis DVT/PE Starter Pack 5 MG TBPK   Yes No   Patient not taking: Reported on 11/30/2022   Insulin Pen Needle (Pen Needles) 32G X 4 MM MISC   No No   Sig: Use once a week   Magnesium Oxide -Mg Supplement 400 MG CAPS   Yes No   Sig: Take 1 capsule by mouth daily   Patient not taking: Reported on 11/30/2022   Omega-3 Fatty Acids (FISH OIL) 1,000 mg   Yes No   Sig: Take 1,000 mg by mouth 2 (two) times a day   Patient not taking: Reported on 11/30/2022   Ozempic, 1 MG/DOSE, 4 MG/3ML SOPN injection pen   No No   Sig: INJECT 1MG SUBCUTANEOUSLY  WEEKLY   Patient taking differently: Inject under the skin once a week mondays   PARoxetine (PAXIL-CR) 37 5 mg 24 hr tablet   No No   Sig: TAKE 1 TABLET BY MOUTH IN  THE MORNING   Potassium Citrate ER 15 MEQ (1620 MG) TBCR   No No   Sig: TAKE 1 TABLET BY MOUTH  TWICE DAILY   VITAMIN D PO   Yes No   Sig: Take 2,000 Units by mouth 2 (two) times a day   Patient not taking: Reported on 11/30/2022   apixaban (Eliquis) 5 mg   No No   Sig: Take 1 tablet (5 mg total) by mouth 2 (two) times a day for 23 days, THEN 1 tablet (5 mg total) 2 (two) times a day for 23 days  apixaban (Eliquis) 5 mg   No No   Sig: Take 1 tablet (5 mg total) by mouth 2 (two) times a day   atorvastatin (LIPITOR) 40 mg tablet   No No   Sig: Take 1 tablet (40 mg total) by mouth daily at bedtime   diltiazem (CARDIZEM CD) 240 mg 24 hr capsule   No No   Sig: Take 1 capsule (240 mg total) by mouth daily Do not start before November 19, 2022     gabapentin (NEURONTIN) 100 mg capsule   No No   Sig: Take 1 capsule (100 mg total) by mouth 3 (three) times a day for 7 days   glucose blood (Contour Next Test) test strip   No No   Sig: Use 1 each daily Use as instructed   hydrocortisone (ANUSOL-HC) 2 5 % rectal cream   No No   Sig: Apply topically 2 (two) times a day   hydrocortisone (ANUSOL-HC) 25 mg suppository   No No   Sig: Insert 1 suppository (25 mg total) into the rectum 2 (two) times a day   metoprolol succinate (TOPROL-XL) 50 mg 24 hr tablet   No No   Sig: Take 0 5 tablets (25 mg total) by mouth 2 (two) times a day   Patient taking differently: Take 50 mg by mouth 2 (two) times a day   multivitamin (THERAGRAN) TABS   Yes No   Sig: Take 1 tablet by mouth daily   Patient not taking: Reported on 11/30/2022   olmesartan (BENICAR) 20 mg tablet   No No   Sig: TAKE 1 TABLET BY MOUTH  DAILY   Patient not taking: Reported on 11/30/2022   ondansetron (ZOFRAN) 4 mg tablet   No No   Sig: Take 2 tablets (8 mg total) by mouth every 8 (eight) hours as needed for nausea or vomiting   pantoprazole (PROTONIX) 40 mg tablet   No No   Sig: Take 1 tablet (40 mg total) by mouth 2 (two) times a day   sotalol (BETAPACE) 120 mg tablet   No No   Sig: TAKE 1 TABLET BY MOUTH  EVERY 12 HOURS      Facility-Administered Medications: None     Allergies   Allergen Reactions   • Other Rash     Adhesive tape        Lab Results: I have personally reviewed pertinent lab results    , CBC:   Lab Results   Component Value Date    WBC 13 32 (H) 12/01/2022    HGB 7 8 (L) 12/01/2022    HCT 27 6 (L) 12/01/2022    MCV 97 12/01/2022     12/01/2022    MCH 27 3 12/01/2022    MCHC 28 3 (L) 12/01/2022    RDW 16 5 (H) 12/01/2022    MPV 11 8 12/01/2022    NRBC 9 (H) 12/01/2022   , CMP:   Lab Results   Component Value Date    SODIUM 138 12/01/2022    K 3 6 12/01/2022     12/01/2022    CO2 18 (L) 12/01/2022    BUN 10 12/01/2022    CREATININE 0 92 12/01/2022    CALCIUM 8 8 12/01/2022    AST 88 (H) 12/01/2022    ALT 29 12/01/2022    ALKPHOS 649 (H) 12/01/2022    EGFR 66 12/01/2022       Counseling / Coordination of Care  Total floor / unit time spent today 25 minutes  Greater than 50% of total time was spent with the patient and / or family counseling and / or coordination of care  Inpatient Consult to Surgical Oncology  Consult performed by: Alvaro Osei MD  Consult ordered by:  MD Alvaro Fernandez MD  12/1/2022 3:01 PM

## 2022-12-01 NOTE — CONSULTS
Consultation - [facility]  Kari Hamilton 57 y o  female MRN: [id number]  Unit/Bed#: ED 29 Encounter: [id number]        Inpatient consult to gastroenterology  Consult performed by: Perla Red MD  Consult ordered by: Rosy Walters MD          Reason for Consult / Principal Problem:     Hematemesis      ASSESSMENT AND PLAN:     57-year-old female with past medical history of pancreatic adenocarcinoma status post Whipple on 10/31 postop course complicated by PE on Eliquis and sepsis presented to the hospital in early November with episodes of hematemesis and found to have possible GDA blowout s/p stent placement across GDA stump, gastrojejunal anastomosis site ulcer who presents to the hospital with hematemesis  On arrival patient mildly tachycardic with heart rate 100-110 otherwise normal blood pressure  Labs were significant for creatinine 0 9 (baseline 0 4 -0 5), AST 88, ALT 29, alkaline phosphatase 649, albumin 1  Total bilirubin 1 37, lipase 2500  White blood cell count 13, hemoglobin 7 8, platelet 672  Hematemesis  PE on Eliquis    · Hgb 7 8 (7 9 on 11/18)  · Patient was recently admitted with hematemesis and on CTA she was found to have loculated fluid collection in the liver which was thought to be from GDA blowout  She underwent mesenteric angiogram but did not show any active bleeding however stent placed across GDA stump on 11/12  · She continued to bleed for which she underwent EGD on 11 12 and found to have clean based large ulcer at the gastrojejunal anastomosis without any active bleeding  · Patient currently going for CTA depending on the results we can plan on endoscopy  · Continue IV PPI  · Last dose of Eliquis was this morning    Elevated liver enzymes  Elevated lipase    · Patient has had elevated alkaline phosphatase since 11/12 with elevated bilirubin    She has mild improvement in her liver numbers  · He is she does have mild elevation in AST and ALT, will continue to monitor  · Will await imaging results      Pancreatic adenocarcinoma status post Whipple    · Patient underwent Whipple on 10/31    · Hospital course complicated by PE and sepsis  · Later developed bleeding and thought to have possible GDA blowout status post stent placement across GDA      Disposition:  GI will continue to follow    Manjula Mendes MD   Gastroenterology Fellow     ______________________________________________________________________    HPI:  27-year-old female with past medical history of pancreatic adenocarcinoma status post Whipple on 10/31 postop course complicated by PE on Eliquis and sepsis presented to the hospital in early November with episodes of hematemesis and found to have GDA blowout underwent mesenteric angiogram and stent placement across GDA stump, gastrojejunal anastomosis site ulcer who presents to the hospital with hematemesis  Patient states that she started having hematemesis at noon today  She has had multiple episodes at home and since arrival to the ER  Endorses generalized abdominal discomfort worse in the epigastric region  States that she was also seen occasional blood-streaked stool at home but no episodes of melena  REVIEW OF SYSTEMS:    CONSTITUTIONAL: Denies any fever, chills, rigors, and weight loss  HEENT: No earache or tinnitus  Denies hearing loss or visual disturbances  CARDIOVASCULAR: No chest pain or palpitations  RESPIRATORY: Denies any cough, hemoptysis, shortness of breath or dyspnea on exertion  GASTROINTESTINAL: As noted in the History of Present Illness  GENITOURINARY: No problems with urination  Denies any hematuria or dysuria  NEUROLOGIC: No dizziness or vertigo, denies headaches  MUSCULOSKELETAL: Denies any muscle or joint pain  SKIN: Denies skin rashes or itching  ENDOCRINE: Denies excessive thirst  Denies intolerance to heat or cold  PSYCHOSOCIAL: Denies depression or anxiety  Denies any recent memory loss  Historical Information   Past Medical History:   Diagnosis Date   • Abnormal liver function test     last assessed 1/16/17    • Adenomatosis    • 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    last assessed 4/4/13   • Arthritis    • Atrial flutter ([facility] )    • Benign essential hypertension     last assessed 12/21/17    • Body mass index (BMI) of 50-59 9 in adult [facility])    • Cancer (HCC)     pancreas   • Colon polyp    • Congenital pes planus     last assessed 7/15/14    • COVID     4/30/21   • CPAP (continuous positive airway pressure) dependence    • Dental crowns present    • Depression    • Diabetes mellitus ([facility] )    • Dizziness     occas--pt reports did see family doctor   • GERD (gastroesophageal reflux disease)    • Hemangioma of skin 08/26/2003    last assessed 8/26/03   • History of cancer chemotherapy     [facility] Oncologist Dr Valverde Room   • History of gastroesophageal reflux (GERD)    • Hypercholesterolemia    • Hyperlipidemia    • Hypertension    • Irregular heart beat    • Kidney stone    • 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    last assessed 12/31/14    • Osteoarthritis of both knees     last assessed 12/31/14    • Paroxysmal atrial fibrillation ([facility] )    • Port-A-Cath in place    • S/P ablation of atrial flutter    • Shortness of breath     per pt "from chemo-not drastic--still working and goes up steps"   • Sleep apnea    • Wears glasses      Past Surgical History:   Procedure Laterality Date   • ABDOMINAL ADHESION SURGERY N/A 10/31/2022    Procedure: LYSIS ADHESIONS, colectomy, vascular pedicle flap;  Surgeon: Mima Wolf MD;  [facility];  Service: Surgical Oncology   • ABDOMINAL SURGERY  06/2006    20cm GIST removed    • ABLATION SOFT TISSUE N/A 10/31/2022    Procedure: SOFT TISSUE ABLATION;  Surgeon: Mima Wolf MD;  [facility];  Service: Surgical Oncology   • APPENDECTOMY     • ATRIAL ABLATION SURGERY     • CARPAL TUNNEL RELEASE Bilateral • COLONOSCOPY     • FL GUIDED CENTRAL VENOUS ACCESS DEVICE INSERTION  05/31/2022   • FL RETROGRADE PYELOGRAM  07/18/2022   • FL RETROGRADE PYELOGRAM  8/22/2022   • HYSTERECTOMY      fibroids   • IR MESENTERIC/VISCERAL ANGIOGRAM  11/12/2022   • IR PORT CHECK  06/23/2022   • IR PORT REMOVAL AND PLACEMENT NEW SITE  06/28/2022   • JOINT REPLACEMENT Bilateral     knees   • KIDNEY STONE SURGERY Right 09/17/2021    placed stent in  for kidney stone   • KNEE SURGERY     • KNEE SURGERY Left 03/2019   • LAPAROTOMY N/A 10/31/2022    Procedure: EXPLORATORY LAPAROTOMY;  Surgeon: Uli Barrett MD;  [facility];  Service: Surgical Oncology   • OOPHORECTOMY      cyst   • PA CYSTO/URETERO W/LITHOTRIPSY &INDWELL STENT INSRT Left 8/22/2022    Procedure: CYSTOSCOPY URETEROSCOPY WITH LITHOTRIPSY HOLMIUM LASER, RETROGRADE PYELOGRAM AND INSERTION STENT URETERAL;  Surgeon: Nell Wan MD;  [facility];  Service: Urology   • PA CYSTOSCOPY,INSERT URETERAL STENT Left 8/22/2022    Procedure: EXCHANGE STENT URETERAL;  Surgeon: Nell Wan MD;  [facility];  Service: Urology   • PA CYSTOURETHROSCOPY,URETER CATHETER Left 07/18/2022    Procedure: CYSTOSCOPY RETROGRADE PYELOGRAM WITH INSERTION STENT URETERAL;  Surgeon: Nell Wan MD;  [facility];  Service: Urology   • TONSILLECTOMY     • TUBAL LIGATION     • TUMOR EXCISION  2006    gastrointestinal stromal tumor   • TUNNELED VENOUS PORT PLACEMENT N/A 05/31/2022    Procedure: INSERTION VENOUS PORT (PORT-A-CATH); Surgeon: Uli Barrett MD;  [facility];  Service: Surgical Oncology   • UPPER GASTROINTESTINAL ENDOSCOPY     • WHIPPLE PROCEDURE/PANCREATICO-DUODENECTOMY N/A 10/31/2022    Procedure:  WHIPPLE PROCEDURE/PANCREATICO-DUODENECTOMY, IOUS;  Surgeon: Uli Barrett MD;  [facility];  Service: Surgical Oncology     Social History   Social History     Substance and Sexual Activity   Alcohol Use Not Currently    Comment: social drinker per allscript      Social History     Substance and Sexual Activity   Drug Use Never     Social History     Tobacco Use   Smoking Status Former   • Years: 9 00   • Types: Cigarettes   Smokeless Tobacco Never     Family History   Problem Relation Age of Onset   • Emphysema Mother    • Arthritis Mother    • Diabetes Mother    • Hypertension Mother    • COPD Mother    • Arthritis Father    • Prostate cancer Father    • Cerebral aneurysm Son    • No Known Problems Maternal Grandmother    • No Known Problems Maternal Grandfather    • No Known Problems Paternal Grandmother    • No Known Problems Paternal Grandfather    • No Known Problems Son    • No Known Problems Maternal Aunt    • No Known Problems Maternal Aunt    • No Known Problems Paternal Aunt    • No Known Problems Paternal Aunt        Meds/Allergies     (Not in a hospital admission)    Current Facility-Administered Medications   Medication Dose Route Frequency   • lactated ringers infusion  125 mL/hr Intravenous Continuous   • ondansetron (FOR EMS ONLY) (ZOFRAN) 4 mg/2 mL injection 4 mg  1 each Does not apply Once   • pantoprazole (PROTONIX) injection 40 mg  40 mg Intravenous Q12H [address]   • prothrombin complex concentrate (human) (Kcentra) 2,000 Units  2,000 Units Intravenous Once       Allergies   Allergen Reactions   • Other Rash     Adhesive tape            Objective     Blood pressure 130/76, pulse (!) 106, resp  rate 20  There is no height or weight on file to calculate BMI  No intake or output data in the 24 hours ending 12/01/22 0363      PHYSICAL EXAM:      General Appearance:   Alert, cooperative, no distress   HEENT:   Normocephalic, atraumatic, anicteric  Neck:  Supple, symmetrical, trachea midline   Lungs:   Clear to auscultation bilaterally; no rales, rhonchi or wheezing; respirations unlabored    Heart[de-identified]   Regular rate and rhythm; no murmur, rub, or gallop     Abdomen:   Soft, non-tender, non-distended; normal bowel sounds; no masses, no organomegaly    Genitalia:   Deferred    Rectal:   Deferred    Extremities:  No cyanosis, clubbing or edema    Pulses:  2+ and symmetric all extremities    Skin:  No jaundice, rashes, or lesions    Lymph nodes:  No palpable cervical lymphadenopathy        Lab Results:   Admission on 12/01/2022   Component Date Value   • WBC 12/01/2022 13 32 (H)    • RBC 12/01/2022 2 86 (L)    • Hemoglobin 12/01/2022 7 8 (L)    • Hematocrit 12/01/2022 27 6 (L)    • MCV 12/01/2022 97    • MCH 12/01/2022 27 3    • MCHC 12/01/2022 28 3 (L)    • RDW 12/01/2022 16 5 (H)    • MPV 12/01/2022 11 8    • Platelets 24/09/9791 198    • Sodium 12/01/2022 138    • Potassium 12/01/2022 3 6    • Chloride 12/01/2022 105    • CO2 12/01/2022 18 (L)    • ANION GAP 12/01/2022 15 (H)    • BUN 12/01/2022 10    • Creatinine 12/01/2022 0 92    • Glucose 12/01/2022 240 (H)    • Calcium 12/01/2022 8 8    • Corrected Calcium 12/01/2022 10 6 (H)    • AST 12/01/2022 88 (H)    • ALT 12/01/2022 29    • Alkaline Phosphatase 12/01/2022 649 (H)    • Total Protein 12/01/2022 6 4    • Albumin 12/01/2022 1 7 (L)    • Total Bilirubin 12/01/2022 1 37 (H)    • eGFR 12/01/2022 66    • Lipase 12/01/2022 2,494 (H)    • Segmented % 12/01/2022 69    • Bands % 12/01/2022 14 (H)    • Lymphocytes % 12/01/2022 16    • Monocytes % 12/01/2022 0 (L)    • Eosinophils, % 12/01/2022 0    • Basophils % 12/01/2022 0    • Metamyelocytes% 12/01/2022 1    • Absolute Neutrophils 12/01/2022 11 06 (H)    • Lymphocytes Absolute 12/01/2022 2 13    • Monocytes Absolute 12/01/2022 0 00    • Eosinophils Absolute 12/01/2022 0 00    • Basophils Absolute 12/01/2022 0 00    • nRBC 12/01/2022 9 (H)    • RBC Morphology 12/01/2022 Present    • Anisocytosis 12/01/2022 Present    • Polychromasia 12/01/2022 Present    • Schistocytes 12/01/2022 Present    • Platelet Estimate 41/21/0092 Adequate        Imaging Studies: I have personally reviewed pertinent imaging studies

## 2022-12-01 NOTE — ED PROVIDER NOTES
History  Chief Complaint   Patient presents with   • Vomiting     Pt began vomiting blood today, s/p whipple with hx complications; 4mg zofran given by EMS     HPI     71-year-old female with past medical history of pancreatic cancer status post Whipple procedure 1 month ago, PE on Eliquis, and anastomotic ulcer who presents for evaluation of hematemesis  Patient states hematemesis started at around noon today  She also has pain in the epigastric area  Patient states she had similar symptoms a few weeks ago  At that time, she was found to have bleeding anastomotic ulcer  She also had GDA coiled by IR  Patient states she did have blood in her bowel movements a few days ago but this has resolved since then  Denies fevers or chills  Patient states she does feel lightheaded  Denies chest pain or shortness of breath  Patient is on eliquis for PE diagnosed last month after having Whipple procedure  She last took Eliquis this morning  Prior to Admission Medications   Prescriptions Last Dose Informant Patient Reported? Taking?    Eliquis DVT/PE Starter Pack 5 MG TBPK   Yes No   Patient not taking: Reported on 11/30/2022   Insulin Pen Needle (Pen Needles) 32G X 4 MM MISC   No No   Sig: Use once a week   Magnesium Oxide -Mg Supplement 400 MG CAPS   Yes No   Sig: Take 1 capsule by mouth daily   Patient not taking: Reported on 11/30/2022   Omega-3 Fatty Acids (FISH OIL) 1,000 mg   Yes No   Sig: Take 1,000 mg by mouth 2 (two) times a day   Patient not taking: Reported on 11/30/2022   Ozempic, 1 MG/DOSE, 4 MG/3ML SOPN injection pen   No No   Sig: INJECT 1MG SUBCUTANEOUSLY  WEEKLY   Patient taking differently: Inject under the skin once a week mondays   PARoxetine (PAXIL-CR) 37 5 mg 24 hr tablet   No No   Sig: TAKE 1 TABLET BY MOUTH IN  THE MORNING   Potassium Citrate ER 15 MEQ (1620 MG) TBCR   No No   Sig: TAKE 1 TABLET BY MOUTH  TWICE DAILY   VITAMIN D PO   Yes No   Sig: Take 2,000 Units by mouth 2 (two) times a day Patient not taking: Reported on 11/30/2022   apixaban (Eliquis) 5 mg   No No   Sig: Take 1 tablet (5 mg total) by mouth 2 (two) times a day for 23 days, THEN 1 tablet (5 mg total) 2 (two) times a day for 23 days  apixaban (Eliquis) 5 mg   No No   Sig: Take 1 tablet (5 mg total) by mouth 2 (two) times a day   atorvastatin (LIPITOR) 40 mg tablet   No No   Sig: Take 1 tablet (40 mg total) by mouth daily at bedtime   diltiazem (CARDIZEM CD) 240 mg 24 hr capsule   No No   Sig: Take 1 capsule (240 mg total) by mouth daily Do not start before November 19, 2022     gabapentin (NEURONTIN) 100 mg capsule   No No   Sig: Take 1 capsule (100 mg total) by mouth 3 (three) times a day for 7 days   glucose blood (Contour Next Test) test strip   No No   Sig: Use 1 each daily Use as instructed   hydrocortisone (ANUSOL-HC) 2 5 % rectal cream   No No   Sig: Apply topically 2 (two) times a day   hydrocortisone (ANUSOL-HC) 25 mg suppository   No No   Sig: Insert 1 suppository (25 mg total) into the rectum 2 (two) times a day   metoprolol succinate (TOPROL-XL) 50 mg 24 hr tablet   No No   Sig: Take 0 5 tablets (25 mg total) by mouth 2 (two) times a day   Patient taking differently: Take 50 mg by mouth 2 (two) times a day   multivitamin (THERAGRAN) TABS   Yes No   Sig: Take 1 tablet by mouth daily   Patient not taking: Reported on 11/30/2022   olmesartan (BENICAR) 20 mg tablet   No No   Sig: TAKE 1 TABLET BY MOUTH  DAILY   Patient not taking: Reported on 11/30/2022   ondansetron (ZOFRAN) 4 mg tablet   No No   Sig: Take 2 tablets (8 mg total) by mouth every 8 (eight) hours as needed for nausea or vomiting   pantoprazole (PROTONIX) 40 mg tablet   No No   Sig: Take 1 tablet (40 mg total) by mouth 2 (two) times a day   sotalol (BETAPACE) 120 mg tablet   No No   Sig: TAKE 1 TABLET BY MOUTH  EVERY 12 HOURS      Facility-Administered Medications: None       Past Medical History:   Diagnosis Date   • Abnormal liver function test     last assessed 1/16/17    • Adenomatosis    • 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    last assessed 4/4/13   • Arthritis    • Atrial flutter ([facility] )    • Benign essential hypertension     last assessed 12/21/17    • Body mass index (BMI) of 50-59 9 in adult [facility])    • Cancer (HCC)     pancreas   • Colon polyp    • Congenital pes planus     last assessed 7/15/14    • COVID     4/30/21   • CPAP (continuous positive airway pressure) dependence    • Dental crowns present    • Depression    • Diabetes mellitus ([facility] )    • Dizziness     occas--pt reports did see family doctor   • GERD (gastroesophageal reflux disease)    • Hemangioma of skin 08/26/2003    last assessed 8/26/03   • History of cancer chemotherapy     [facility] Oncologist Dr Anton Howell   • History of gastroesophageal reflux (GERD)    • Hypercholesterolemia    • Hyperlipidemia    • Hypertension    • Irregular heart beat    • Kidney stone    • 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    last assessed 12/31/14    • Osteoarthritis of both knees     last assessed 12/31/14    • Paroxysmal atrial fibrillation ([facility] )    • Port-A-Cath in place    • S/P ablation of atrial flutter    • Shortness of breath     per pt "from chemo-not drastic--still working and goes up steps"   • Sleep apnea    • Wears glasses        Past Surgical History:   Procedure Laterality Date   • ABDOMINAL ADHESION SURGERY N/A 10/31/2022    Procedure: LYSIS ADHESIONS, colectomy, vascular pedicle flap;  Surgeon: Dempsey Fabry, MD;  [facility];  Service: Surgical Oncology   • ABDOMINAL SURGERY  06/2006    20cm GIST removed    • ABLATION SOFT TISSUE N/A 10/31/2022    Procedure: SOFT TISSUE ABLATION;  Surgeon: Dempsey Fabry, MD;  [facility];  Service: Surgical Oncology   • APPENDECTOMY     • ATRIAL ABLATION SURGERY     • CARPAL TUNNEL RELEASE Bilateral    • COLONOSCOPY     • FL GUIDED CENTRAL VENOUS ACCESS DEVICE INSERTION  05/31/2022   • FL RETROGRADE PYELOGRAM  07/18/2022   • FL RETROGRADE PYELOGRAM  8/22/2022   • HYSTERECTOMY      fibroids   • IR MESENTERIC/VISCERAL ANGIOGRAM  11/12/2022   • IR PORT CHECK  06/23/2022   • IR PORT REMOVAL AND PLACEMENT NEW SITE  06/28/2022   • JOINT REPLACEMENT Bilateral     knees   • KIDNEY STONE SURGERY Right 09/17/2021    placed stent in  for kidney stone   • KNEE SURGERY     • KNEE SURGERY Left 03/2019   • LAPAROTOMY N/A 10/31/2022    Procedure: EXPLORATORY LAPAROTOMY;  Surgeon: Vero Enriquez MD;  [facility];  Service: Surgical Oncology   • OOPHORECTOMY      cyst   • NV CYSTO/URETERO W/LITHOTRIPSY &INDWELL STENT INSRT Left 8/22/2022    Procedure: CYSTOSCOPY URETEROSCOPY WITH LITHOTRIPSY HOLMIUM LASER, RETROGRADE PYELOGRAM AND INSERTION STENT URETERAL;  Surgeon: Vanessa Larsen MD;  [facility];  Service: Urology   • NV CYSTOSCOPY,INSERT URETERAL STENT Left 8/22/2022    Procedure: EXCHANGE STENT URETERAL;  Surgeon: Vanessa Larsen MD;  [facility];  Service: Urology   • NV CYSTOURETHROSCOPY,URETER CATHETER Left 07/18/2022    Procedure: CYSTOSCOPY RETROGRADE PYELOGRAM WITH INSERTION STENT URETERAL;  Surgeon: Vanessa Larsen MD;  [facility];  Service: Urology   • TONSILLECTOMY     • TUBAL LIGATION     • TUMOR EXCISION  2006    gastrointestinal stromal tumor   • TUNNELED VENOUS PORT PLACEMENT N/A 05/31/2022    Procedure: INSERTION VENOUS PORT (PORT-A-CATH); Surgeon: Vero Enriquez MD;  [facility];  Service: Surgical Oncology   • UPPER GASTROINTESTINAL ENDOSCOPY     • WHIPPLE PROCEDURE/PANCREATICO-DUODENECTOMY N/A 10/31/2022    Procedure:  WHIPPLE PROCEDURE/PANCREATICO-DUODENECTOMY, IOUS;  Surgeon: Vero Enriquez MD;  [facility];  Service: Surgical Oncology       Family History   Problem Relation Age of Onset   • Emphysema Mother    • Arthritis Mother    • Diabetes Mother    • Hypertension Mother    • COPD Mother    • Arthritis Father    • Prostate cancer Father    • Cerebral aneurysm Son    • No Known Problems Maternal Grandmother    • No Known Problems Maternal Grandfather    • No Known Problems Paternal Grandmother    • No Known Problems Paternal Grandfather    • No Known Problems Son    • No Known Problems Maternal Aunt    • No Known Problems Maternal Aunt    • No Known Problems Paternal Aunt    • No Known Problems Paternal Aunt      I have reviewed and agree with the history as documented  E-Cigarette/Vaping   • E-Cigarette Use Never User      E-Cigarette/Vaping Substances   • Nicotine No    • THC No    • CBD No    • Flavoring No    • Other No    • Unknown No      Social History     Tobacco Use   • Smoking status: Former     Years: 9 00     Types: Cigarettes   • Smokeless tobacco: Never   Vaping Use   • Vaping Use: Never used   Substance Use Topics   • Alcohol use: Not Currently     Comment: social drinker per allscript    • Drug use: Never        Review of Systems   Constitutional: Positive for fatigue  Negative for appetite change, chills and fever  HENT: Negative for congestion, rhinorrhea and sore throat  Respiratory: Negative for cough and shortness of breath  Cardiovascular: Negative for chest pain  Gastrointestinal: Positive for abdominal pain, blood in stool, nausea and vomiting  Negative for diarrhea  Genitourinary: Negative for dysuria, frequency, hematuria and urgency  Musculoskeletal: Negative for arthralgias and myalgias  Skin: Positive for pallor  Negative for color change and rash  Neurological: Positive for weakness and light-headedness  Negative for dizziness, numbness and headaches  All other systems reviewed and are negative        Physical Exam  ED Triage Vitals   Temperature Pulse Respirations Blood Pressure SpO2   12/01/22 1532 12/01/22 1345 12/01/22 1345 12/01/22 1345 12/01/22 1545   (!) 97 4 °F (36 3 °C) (!) 106 20 130/76 97 %      Temp Source Heart Rate Source Patient Position - Orthostatic VS BP Location FiO2 (%)   12/01/22 1532 12/01/22 1823 -- 12/01/22 2100 --   Tympanic Monitor  Left arm       Pain Score       12/01/22 1416       8             Orthostatic Vital Signs  Vitals:    12/02/22 1310 12/02/22 1430 12/02/22 1445 12/02/22 1505   BP: 140/54 153/60  (!) 105/46   Pulse: (!) 128 (!) 124 (!) 122 (!) 122       Physical Exam  Vitals and nursing note reviewed  Constitutional:       General: She is in acute distress  Appearance: She is well-developed  She is obese  She is ill-appearing  She is not toxic-appearing or diaphoretic  Comments: Actively vomiting bright red blood  HENT:      Head: Normocephalic and atraumatic  Right Ear: External ear normal       Left Ear: External ear normal       Nose: Nose normal       Mouth/Throat:      Mouth: Mucous membranes are moist       Pharynx: Oropharynx is clear  Eyes:      Extraocular Movements: Extraocular movements intact  Conjunctiva/sclera: Conjunctivae normal    Cardiovascular:      Rate and Rhythm: Regular rhythm  Tachycardia present  Pulses: Normal pulses  Heart sounds: Normal heart sounds  No murmur heard  No friction rub  No gallop  Pulmonary:      Effort: Pulmonary effort is normal  No respiratory distress  Breath sounds: Normal breath sounds  No wheezing or rales  Abdominal:      General: There is no distension  Palpations: Abdomen is soft  Tenderness: There is abdominal tenderness  There is no guarding or rebound  Comments: Mild epigastric tenderness  Recent midline surgical incision, appears clean, dry and intact  Musculoskeletal:         General: No tenderness  Cervical back: Neck supple  Right lower leg: No edema  Left lower leg: No edema  Skin:     General: Skin is warm and dry  Coloration: Skin is not pale  Findings: No erythema or rash  Neurological:      General: No focal deficit present  Mental Status: She is alert and oriented to person, place, and time        Cranial Nerves: No cranial nerve deficit  Sensory: No sensory deficit  Motor: No weakness     Psychiatric:         Mood and Affect: Mood normal          Behavior: Behavior normal          ED Medications  Medications   HYDROmorphone (DILAUDID) injection 0 5 mg ( Intravenous MAR Hold 12/2/22 1609)   insulin lispro (HumaLOG) 100 units/mL subcutaneous injection 2-12 Units ( Subcutaneous Dose Auto Held 12/7/22 1800)   ondansetron (ZOFRAN) injection 4 mg ( Intravenous MAR Hold 12/2/22 1609)   pantoprazole (PROTONIX) 80 mg in sodium chloride 0 9 % 100 mL infusion (8 mg/hr Intravenous New Bag 12/2/22 1538)   metoclopramide (REGLAN) injection 10 mg ( Intravenous MAR Hold 12/2/22 1609)   acetaminophen (TYLENOL) rectal suppository 650 mg ( Rectal MAR Hold 12/2/22 1609)   dextrose 5 % and sodium chloride 0 9 % infusion (125 mL/hr Intravenous New Bag 12/2/22 0917)   norepinephrine (LEVOPHED) 4 mg (STANDARD CONCENTRATION) IV in sodium chloride 0 9% 250 mL (30 mcg/min Intravenous New Bag 12/2/22 1614)   ertapenem (INVanz) 1,000 mg in sodium chloride 0 9 % 50 mL IVPB ( Intravenous Dose Auto Held 12/5/22 1100)   vasopressin (PITRESSIN) 20 Units in sodium chloride 0 9 % 100 mL infusion (0 04 Units/min Intravenous New Bag 12/2/22 1146)   chlorhexidine (PERIDEX) 0 12 % oral rinse 15 mL ( Mouth/Throat Dose Auto Held 12/5/22 2100)   fentanyl citrate (PF) 100 MCG/2ML 50 mcg ( Intravenous MAR Hold 12/2/22 1609)   propofol (DIPRIVAN) 1000 mg in 100 mL infusion (premix) (10 mcg/kg/min × 128 kg Intravenous New Bag 12/2/22 1151)   fentaNYL 1000 mcg in sodium chloride 0 9% 100mL infusion (100 mcg/hr Intravenous Rate/Dose Change 12/2/22 1244)   calcium gluconate 2 g in sodium chloride 0 9% 100 mL (premix) ( Intravenous MAR Hold 12/2/22 1609)   multi-electrolyte (ISOLYTE-S PH 7 4) bolus 1,000 mL ( Intravenous MAR Unhold 12/2/22 1613)   ondansetron (FOR EMS ONLY) (ZOFRAN) 4 mg/2 mL injection 4 mg (0 mg Does not apply Given to EMS 12/1/22 3399) pantoprazole (PROTONIX) 80 mg in sodium chloride 0 9 % 100 mL IVPB (0 mg Intravenous Stopped 12/1/22 1439)   fentanyl citrate (PF) 100 MCG/2ML 25 mcg (25 mcg Intravenous Given 12/1/22 1416)   prothrombin complex concentrate (human) (Kcentra) 2,000 Units (2,000 Units Intravenous Given 12/1/22 1553)   fentanyl citrate (PF) 100 MCG/2ML 25 mcg (25 mcg Intravenous Given 12/1/22 1432)   iohexol (OMNIPAQUE) 350 MG/ML injection (SINGLE-DOSE) 100 mL (100 mL Intravenous Given 12/1/22 1447)   dextrose 50 % IV solution 50 mL (50 mL Intravenous Given 12/1/22 2026)   piperacillin-tazobactam (ZOSYN) 4 5 g in sodium chloride 0 9 % 100 mL IVPB (0 g Intravenous Stopped 12/1/22 2245)   calcium gluconate 2 g in sodium chloride 0 9% 100 mL (premix) (0 g Intravenous Stopped 12/2/22 0020)   dextrose 50 % IV solution **ADS Override Pull** (50 mL  Given 12/2/22 0005)   multi-electrolyte (ISOLYTE-S PH 7 4) bolus 1,000 mL (0 mL Intravenous Stopped 12/2/22 0500)   magnesium sulfate 2 g/50 mL IVPB (premix) 2 g (0 g Intravenous Stopped 12/2/22 1029)   metoclopramide (REGLAN) injection 10 mg (10 mg Intravenous Given 12/2/22 0835)   dextrose 50 % IV solution 50 mL (50 mL Intravenous Given 12/2/22 0859)   multi-electrolyte (ISOLYTE-S PH 7 4) bolus 1,000 mL (0 mL Intravenous Stopped 12/2/22 1029)   norepinephrine (LEVOPHED) 1 mg/mL injection **ADS Override Pull** (  Given 12/2/22 1001)   metoclopramide (REGLAN) injection 10 mg (10 mg Intravenous Given 12/2/22 1253)   etomidate (AMIDATE) 2 mg/mL injection 30 mg (30 mg Intravenous Given 12/2/22 1112)   Succinylcholine Chloride 100 mg/5 mL syringe 100 mg (100 mg Intravenous Given 12/2/22 1113)   dextrose 50 % IV solution 50 mL (50 mL Intravenous Given 12/2/22 1205)   midazolam (VERSED) injection 4 mg (4 mg Intravenous Given 12/2/22 1313)   HYDROmorphone (DILAUDID) injection 1 mg (1 mg Intravenous Given 12/2/22 1245)   sodium bicarbonate 8 4 % injection 50 mEq (50 mEq Intravenous Given 12/2/22 1258) sodium bicarbonate 8 4 % injection 50 mEq (50 mEq Intravenous Given 12/2/22 1258)   EPINEPHrine (ADRENALIN) injection (0 2 mg Intravenous Given 12/2/22 1310)   sodium bicarbonate 8 4 % injection 50 mEq (50 mEq Intravenous Given 12/2/22 1557)   sodium bicarbonate 8 4 % injection 50 mEq (50 mEq Intravenous Given 12/2/22 1557)       Diagnostic Studies  Results Reviewed     Procedure Component Value Units Date/Time    Blood Culture Identification Panel [[id number]]  (Abnormal) Collected: 12/01/22 1756    Lab Status: Preliminary result Specimen: Blood from Hand, Left Updated: 12/02/22 0656     Escherichia coli Detected     CTX-M (ESBL RESISTANCE GENE) Detected    Narrative:      Routine culture and susceptiblity to follow for confirmation  Film Array panel tests for 11 gram positive organisms, 15 gram negative organisms, 7 yeast species and 10 resistance genes  Blood culture [[id number]]  (Abnormal) Collected: 12/01/22 1750    Lab Status: Preliminary result Specimen: Blood from Arm, Left Updated: 12/02/22 0516     Gram Stain Result Gram negative rods    Blood culture [[id number]]  (Abnormal) Collected: 12/01/22 1756    Lab Status: Preliminary result Specimen: Blood from Hand, Left Updated: 12/02/22 0516     Gram Stain Result Gram negative rods    Lactic acid, plasma [[id number]]  (Abnormal) Collected: 12/01/22 2140    Lab Status: Final result Specimen: Blood from Arm, Left Updated: 12/01/22 2304     LACTIC ACID 15 8 mmol/L     Narrative:      Result may be elevated if tourniquet was used during collection      Comprehensive metabolic panel [[id number]]  (Abnormal) Collected: 12/01/22 2140    Lab Status: Final result Specimen: Blood from Arm, Left Updated: 12/01/22 2240     Sodium 140 mmol/L      Potassium 4 1 mmol/L      Chloride 107 mmol/L      CO2 14 mmol/L      ANION GAP 19 mmol/L      BUN 12 mg/dL      Creatinine 1 21 mg/dL      Glucose 111 mg/dL      Calcium 8 8 mg/dL      Corrected Calcium 10 6 mg/dL      AST 2,739 U/L       U/L      Alkaline Phosphatase 799 U/L      Total Protein 6 0 g/dL      Albumin 1 8 g/dL      Total Bilirubin 4 46 mg/dL      eGFR 48 ml/min/1 73sq m     Narrative:      [place] guidelines for Chronic Kidney Disease (CKD):   •  Stage 1 with normal or high GFR (GFR > 90 mL/min/1 73 square meters)  •  Stage 2 Mild CKD (GFR = 60-89 mL/min/1 73 square meters)  •  Stage 3A Moderate CKD (GFR = 45-59 mL/min/1 73 square meters)  •  Stage 3B Moderate CKD (GFR = 30-44 mL/min/1 73 square meters)  •  Stage 4 Severe CKD (GFR = 15-29 mL/min/1 73 square meters)  •  Stage 5 End Stage CKD (GFR <15 mL/min/1 73 square meters)  Note: GFR calculation is accurate only with a steady state creatinine    CBC and differential [[id number]]  (Abnormal) Collected: 12/01/22 2140    Lab Status: Final result Specimen: Blood from Arm, Left Updated: 12/01/22 2239     WBC 24 31 Thousand/uL      RBC 3 79 Million/uL      Hemoglobin 10 7 g/dL      Hematocrit 34 2 %      MCV 91 fL      MCH 28 2 pg      MCHC 31 3 g/dL      RDW 16 3 %      MPV 11 6 fL      Platelets 80 Thousands/uL     Phosphorus [[id number]]  (Abnormal) Collected: 12/01/22 2140    Lab Status: Final result Specimen: Blood from Arm, Left Updated: 12/01/22 2236     Phosphorus 4 3 mg/dL     Magnesium [[id number]]  (Normal) Collected: 12/01/22 2140    Lab Status: Final result Specimen: Blood from Arm, Left Updated: 12/01/22 2236     Magnesium 1 9 mg/dL     Basic metabolic panel [[id number]]  (Abnormal) Collected: 12/01/22 2140    Lab Status: Final result Specimen: Blood from Arm, Left Updated: 12/01/22 2231     Sodium 141 mmol/L      Potassium 4 1 mmol/L      Chloride 108 mmol/L      CO2 15 mmol/L      ANION GAP 18 mmol/L      BUN 12 mg/dL      Creatinine 1 16 mg/dL      Glucose 115 mg/dL      Calcium 9 1 mg/dL      eGFR 50 ml/min/1 73sq m     Narrative:      [place] guidelines for Chronic Kidney Disease (CKD):   •  Stage 1 with normal or high GFR (GFR > 90 mL/min/1 73 square meters)  •  Stage 2 Mild CKD (GFR = 60-89 mL/min/1 73 square meters)  •  Stage 3A Moderate CKD (GFR = 45-59 mL/min/1 73 square meters)  •  Stage 3B Moderate CKD (GFR = 30-44 mL/min/1 73 square meters)  •  Stage 4 Severe CKD (GFR = 15-29 mL/min/1 73 square meters)  •  Stage 5 End Stage CKD (GFR <15 mL/min/1 73 square meters)  Note: GFR calculation is accurate only with a steady state creatinine    Protime-INR [[id number]]  (Abnormal) Collected: 12/01/22 2140    Lab Status: Final result Specimen: Blood from Arm, Left Updated: 12/01/22 2219     Protime 27 6 seconds      INR 2 54    Hemoglobin [[id number]]  (Abnormal) Collected: 12/01/22 2140    Lab Status: Final result Specimen: Blood from Arm, Left Updated: 12/01/22 2216     Hemoglobin 10 7 g/dL     Lactic acid 2 Hours [[id number]] Collected: 12/01/22 2140    Lab Status: No result Specimen: Blood from Arm, Left     Procalcitonin [[id number]]  (Abnormal) Collected: 12/01/22 1359    Lab Status: Final result Specimen: Blood from Arm, Right Updated: 12/01/22 2117     Procalcitonin 1 68 ng/ml     Fingerstick Glucose (POCT) [[id number]]  (Abnormal) Collected: 12/01/22 2020    Lab Status: Final result Updated: 12/01/22 2021     POC Glucose 60 mg/dl     Lactic acid, plasma [[id number]]  (Abnormal) Collected: 12/01/22 1750    Lab Status: Final result Specimen: Blood from Arm, Left Updated: 12/01/22 1958     LACTIC ACID 16 2 mmol/L     Narrative:      Result may be elevated if tourniquet was used during collection      Hemoglobin [[id number]]  (Abnormal) Collected: 12/01/22 1758    Lab Status: Final result Specimen: Blood from Arm, Left Updated: 12/01/22 1806     Hemoglobin 7 9 g/dL     Blood gas, venous [[id number]]  (Abnormal) Collected: 12/01/22 1758    Lab Status: Final result Specimen: Blood from Arm, Left Updated: 12/01/22 1806     pH, Walter 7 243     pCO2, Walter 32 6 mm Hg      pO2, Walter 35 9 mm Hg      HCO3, Walter 13 8 mmol/L Base Excess, Walter -12 5 mmol/L      O2 Content, Walter 7 7 ml/dL      O2 HGB, VENOUS 58 5 %     Lactic acid 2 Hours [[id number]]  (Abnormal) Collected: 12/01/22 1541    Lab Status: Final result Specimen: Blood from Arm, Left Updated: 12/01/22 1702     LACTIC ACID 12 4 mmol/L     Narrative:      Result may be elevated if tourniquet was used during collection  TEG 6 Global Hemostasis with Lysis [[id number]]  (Normal) Collected: 12/01/22 1537    Lab Status: Final result Specimen: Blood from Arm, Right Updated: 12/01/22 1652     CKR(REACTION TIME) 5 5 Min      CKLY30 0 0 %      CRTMA(RAPIDTEG MAX AMPLITUDE) 65 7 mm      CFFMA (FUNCTIONAL FIBRINOGEN MAX AMPLITUDE) 26 3 mm     TEG 6s Platelet Mapping [[id number]]  (Abnormal) Collected: 12/01/22 1450    Lab Status: Final result Specimen: Blood from Arm, Right Updated: 12/01/22 1616     HKHMA(MAX AMPLITUDE KAOLIN) 69 5 mm      ACTFMA(MAX AMPLITUDE ACTF) 22 8 mm      ADPMA(MAX AMPLITUDE ADP) 60 6 mm      AAMA (MAX AMPLITUDE AA) 66 7 mm      ADPADPINHIBITION 19 1 %      AAASAINHIBITION 6 %      ADPADPAGGREGATION 80 9 %      AAASAAGGREGATION 94 %     Lactic acid, plasma [[id number]]  (Abnormal) Collected: 12/01/22 1423    Lab Status: Final result Specimen: Blood from Arm, Right Updated: 12/01/22 1539     LACTIC ACID 11 3 mmol/L     Narrative:      Result may be elevated if tourniquet was used during collection      Protime-INR [[id number]]  (Abnormal) Collected: 12/01/22 1432    Lab Status: Final result Specimen: Blood from Arm, Right Updated: 12/01/22 1507     Protime 21 0 seconds      INR 1 78    APTT [[id number]]  (Normal) Collected: 12/01/22 1432    Lab Status: Final result Specimen: Blood from Arm, Right Updated: 12/01/22 1507     PTT 28 seconds     Lipase [[id number]]  (Abnormal) Collected: 12/01/22 1359    Lab Status: Final result Specimen: Blood from Arm, Right Updated: 12/01/22 1442     Lipase 2,494 u/L     Comprehensive metabolic panel [[id number]]  (Abnormal) Collected: 12/01/22 1359    Lab Status: Final result Specimen: Blood from Arm, Right Updated: 12/01/22 1442     Sodium 138 mmol/L      Potassium 3 6 mmol/L      Chloride 105 mmol/L      CO2 18 mmol/L      ANION GAP 15 mmol/L      BUN 10 mg/dL      Creatinine 0 92 mg/dL      Glucose 240 mg/dL      Calcium 8 8 mg/dL      Corrected Calcium 10 6 mg/dL      AST 88 U/L      ALT 29 U/L      Alkaline Phosphatase 649 U/L      Total Protein 6 4 g/dL      Albumin 1 7 g/dL      Total Bilirubin 1 37 mg/dL      eGFR 66 ml/min/1 73sq m     Narrative:      [place] guidelines for Chronic Kidney Disease (CKD):   •  Stage 1 with normal or high GFR (GFR > 90 mL/min/1 73 square meters)  •  Stage 2 Mild CKD (GFR = 60-89 mL/min/1 73 square meters)  •  Stage 3A Moderate CKD (GFR = 45-59 mL/min/1 73 square meters)  •  Stage 3B Moderate CKD (GFR = 30-44 mL/min/1 73 square meters)  •  Stage 4 Severe CKD (GFR = 15-29 mL/min/1 73 square meters)  •  Stage 5 End Stage CKD (GFR <15 mL/min/1 73 square meters)  Note: GFR calculation is accurate only with a steady state creatinine    Manual Differential(PHLEBS Do Not Order) [[id number]]  (Abnormal) Collected: 12/01/22 1359    Lab Status: Final result Specimen: Blood from Arm, Right Updated: 12/01/22 1438     Segmented % 69 %      Bands % 14 %      Lymphocytes % 16 %      Monocytes % 0 %      Eosinophils, % 0 %      Basophils % 0 %      Metamyelocytes% 1 %      Absolute Neutrophils 11 06 Thousand/uL      Lymphocytes Absolute 2 13 Thousand/uL      Monocytes Absolute 0 00 Thousand/uL      Eosinophils Absolute 0 00 Thousand/uL      Basophils Absolute 0 00 Thousand/uL      Total Counted --     nRBC 9 /100 WBC      RBC Morphology Present     Anisocytosis Present     Polychromasia Present     Schistocytes Present     Platelet Estimate Adequate    CBC and differential [[id number]]  (Abnormal) Collected: 12/01/22 1359    Lab Status: Final result Specimen: Blood from Arm, Right Updated: 12/01/22 1438     WBC 13 32 Thousand/uL      RBC 2 86 Million/uL      Hemoglobin 7 8 g/dL      Hematocrit 27 6 %      MCV 97 fL      MCH 27 3 pg      MCHC 28 3 g/dL      RDW 16 5 %      MPV 11 8 fL      Platelets 860 Thousands/uL     Narrative: This is an appended report  These results have been appended to a previously verified report  XR chest portable   Final Result by Ann De Luna MD (12/02 1201)      1  Endotracheal tube 1 cm above the veto  Retraction is recommended  2   Right internal jugular central venous catheter projects over the SVC  No pneumothorax  3   Cardiomegaly with probable vascular congestion versus vascular crowding from low lung volumes  The study was marked in [facility] for immediate notification  Workstation performed: [facility] right upper quadrant with liver dopplers   Final Result by Annika Royal MD (12/02 1031)      Postoperative appearance of the right upper quadrant  Hematoma and fluid collection is better seen on prior CT  Workstation performed: [id number]         CTA abdomen pelvis w wo contrast   Final Result by Emani Adam MD (12/01 3029)      Residual high density collection located lateral to the residual pancreas and inferomedial to the tariq hepatis in keeping with residual hematoma  Retroperitoneal and right mid abdominal collection as measured above with internal foci of gas in keeping with abscesses  These likely communicate on #6/78  No active extravasation into the bowel lumen to indicate an active bleed  The study was marked in [facility] for immediate notification           Workstation performed: [id number]         IR drainage tube placement    (Results Pending)         Procedures  US Guided Peripheral IV    Date/Time: 12/1/2022 6:47 PM  Performed by: Andriy Biswas MD  Authorized by: Andriy Biswas MD     Patient location:  ED  Performed by:  Resident  Other Assisting Provider: No    Indications:     Indications: difficulty obtaining IV access      Image availability:  Not saved  Procedure details:     Patient evaluated for contraindications to access (i e  fistula, thrombosis, etc): Yes      Standard clean technique used for ultrasound access: Yes      Location:  Right arm    Catheter size:  18 gauge    Number of attempts:  1    Successful placement: yes    Post-procedure details:     Post-procedure:  Dressing applied    Assessment: free fluid flow and no signs of infiltration      Post-procedure complications: none      Patient tolerance of procedure: Tolerated well, no immediate complications  US Guided Peripheral IV    Date/Time: 12/1/2022 5:47 PM  Performed by: Geraldo Mccord MD  Authorized by: Geraldo Mccord MD     Patient location:  ED  Performed by:  Resident  Other Assisting Provider: No    Indications:     Indications: difficulty obtaining IV access      Image availability:  Not saved  Procedure details:     Patient evaluated for contraindications to access (i e  fistula, thrombosis, etc): Yes      Standard clean technique used for ultrasound access: Yes      Location:  Left antecubital    Catheter size:  20 gauge    Number of attempts:  1    Successful placement: yes    Post-procedure details:     Post-procedure:  Dressing applied    Assessment: free fluid flow and no signs of infiltration      Post-procedure complications: none      Patient tolerance of procedure: Tolerated well, no immediate complications          ED Course                                       MDM     50-year-old female with past medical history of pancreatic cancer status post Whipple procedure 1 month ago, PE on Eliquis, and anastomotic ulcer who presents for evaluation of hematemesis and abdominal pain starting two hours prior to arrival    Patient is ill appearing, actively vomiting bright red blood, she is alert and protecting her airway at this time   Patent arrives with one 18 gauge IV in place, placed second IV with ultrasound guidance  Differential diagnosis includes: anastomotic leak, upper gi bleed, intra abdominal infection  Will type and cross for 6 units, will transfuse two units now given active vomiting and tachycardia  Blood transfusion consent obtained and signed by patient  Will check CBC, CMP, coags, lactate  Will obtain CTA abd/pelvis  Will discuss with surg onc, IR and GI  Will also give protonix IV and pain meds  Will give [facility] to reverse eliquis, at this time, benefits of pcc given active vomiting likely outweigh risks of reversing eliquis even with recent diagnosis of PE    CTA shows possible intra-abdominal infection  Will start patient on broad spectrum antibiotics  Initial lactate 11  Discussed with surg onc, GI and IR, will plan for admission to SICU, patient will have GI perform EGD once in ICU  Discussed plan with patient and her family       Disposition  Final diagnoses:   Hematemesis, unspecified whether nausea present   Whipple disease   H/O Whipple procedure   Intra-abdominal infection   Lactic acidosis     Time reflects when diagnosis was documented in both MDM as applicable and the Disposition within this note     Time User Action Codes Description Comment    12/1/2022  2:43 PM Jada Beau Add [K92 0] Hematemesis, unspecified whether nausea present     12/1/2022  3:55 PM Jada Beau Add [K90 81] Whipple disease     12/2/2022 10:59 AM Romina Cassette Add [K92 0] Gastrointestinal hemorrhage with hematemesis     12/2/2022  4:26 PM Jada Beau Add [Z90 410,  Z90 49] H/O Whipple procedure     12/2/2022  4:27 PM Jada Beau Add [B99 9] Intra-abdominal infection     12/2/2022  4:27 PM Jada Beau Add [E87 20] Lactic acidosis       ED Disposition     ED Disposition   Admit    Condition   Stable    Date/Time   Thu Dec 1, 2022  3:55 PM    Comment   Case was discussed with surgical oncology and the patient's admission status was agreed to be Admission Status: inpatient status to the service of Dr Osbaldo Phillips  Follow-up Information    None         Current Discharge Medication List      CONTINUE these medications which have NOT CHANGED    Details   !! apixaban (Eliquis) 5 mg Take 1 tablet (5 mg total) by mouth 2 (two) times a day for 23 days, THEN 1 tablet (5 mg total) 2 (two) times a day for 23 days  Qty: 74 tablet, Refills: 0    Comments: Eliquis Starter Pack  Associated Diagnoses: Pulmonary embolism without acute cor pulmonale, unspecified chronicity, unspecified pulmonary embolism type ([facility] [address] )      ! ! apixaban (Eliquis) 5 mg Take 1 tablet (5 mg total) by mouth 2 (two) times a day  Qty: 180 tablet, Refills: 3    Associated Diagnoses: Pulmonary embolism without acute cor pulmonale, unspecified chronicity, unspecified pulmonary embolism type (HCC)      atorvastatin (LIPITOR) 40 mg tablet Take 1 tablet (40 mg total) by mouth daily at bedtime  Qty: 90 tablet, Refills: 3    Comments: Requesting 1 year supply  Associated Diagnoses: Hyperlipidemia, unspecified hyperlipidemia type      diltiazem (CARDIZEM CD) 240 mg 24 hr capsule Take 1 capsule (240 mg total) by mouth daily Do not start before November 19, 2022    Qty: 60 capsule, Refills: 0    Associated Diagnoses: Ventricular tachycardia, sustained      Eliquis DVT/PE Starter Pack 5 MG TBPK       gabapentin (NEURONTIN) 100 mg capsule Take 1 capsule (100 mg total) by mouth 3 (three) times a day for 7 days  Qty: 21 capsule, Refills: 0    Associated Diagnoses: Adenocarcinoma of head of pancreas (HCC)      glucose blood (Contour Next Test) test strip Use 1 each daily Use as instructed  Qty: 100 each, Refills: 3    Associated Diagnoses: Type 2 diabetes mellitus without complication, without long-term current use of insulin (HCC)      hydrocortisone (ANUSOL-HC) 2 5 % rectal cream Apply topically 2 (two) times a day  Qty: 28 g, Refills: 1    Associated Diagnoses: Hemorrhoids, unspecified hemorrhoid type      hydrocortisone (ANUSOL-HC) 25 mg suppository Insert 1 suppository (25 mg total) into the rectum 2 (two) times a day  Qty: 12 suppository, Refills: 0    Associated Diagnoses: Hemorrhoids, unspecified hemorrhoid type      Insulin Pen Needle (Pen Needles) 32G X 4 MM MISC Use once a week  Qty: 12 each, Refills: 3    Associated Diagnoses: Type 2 diabetes mellitus without complication, without long-term current use of insulin ([facility])      Magnesium Oxide -Mg Supplement 400 MG CAPS Take 1 capsule by mouth daily      metoprolol succinate (TOPROL-XL) 50 mg 24 hr tablet Take 0 5 tablets (25 mg total) by mouth 2 (two) times a day  Qty: 120 tablet, Refills: 0    Associated Diagnoses: Ventricular tachycardia, sustained; Adenocarcinoma of head of pancreas ([address] )      multivitamin (THERAGRAN) TABS Take 1 tablet by mouth daily      olmesartan (BENICAR) 20 mg tablet TAKE 1 TABLET BY MOUTH  DAILY  Qty: 90 tablet, Refills: 3    Comments: Requesting 1 year supply  Associated Diagnoses: Essential hypertension      Omega-3 Fatty Acids (FISH OIL) 1,000 mg Take 1,000 mg by mouth 2 (two) times a day      ondansetron (ZOFRAN) 4 mg tablet Take 2 tablets (8 mg total) by mouth every 8 (eight) hours as needed for nausea or vomiting  Qty: 20 tablet, Refills: 3    Associated Diagnoses: Nausea;  Chemotherapy induced nausea and vomiting      Ozempic, 1 MG/DOSE, 4 MG/3ML SOPN injection pen INJECT 1MG SUBCUTANEOUSLY  WEEKLY  Qty: 9 mL, Refills: 3    Comments: Requesting 1 year supply  Associated Diagnoses: Type 2 diabetes mellitus without complication, without long-term current use of insulin ([facility])      pantoprazole (PROTONIX) 40 mg tablet Take 1 tablet (40 mg total) by mouth 2 (two) times a day  Qty: 90 tablet, Refills: 0    Comments: Requesting 1 year supply  Associated Diagnoses: Gastroesophageal reflux disease without esophagitis      PARoxetine (PAXIL-CR) 37 5 mg 24 hr tablet TAKE 1 TABLET BY MOUTH IN  THE MORNING  Qty: 90 tablet, Refills: 3    Comments: Requesting 1 year supply  Associated Diagnoses: Depression, unspecified depression type      Potassium Citrate ER 15 MEQ (1620 MG) TBCR TAKE 1 TABLET BY MOUTH  TWICE DAILY  Qty: 200 tablet, Refills: 3    Comments: Requesting 1 year supply  Associated Diagnoses: Kidney stones      sotalol (BETAPACE) 120 mg tablet TAKE 1 TABLET BY MOUTH  EVERY 12 HOURS  Qty: 180 tablet, Refills: 3    Comments: Requesting 1 year supply  Associated Diagnoses: Paroxysmal atrial fibrillation (HCC)      VITAMIN D PO Take 2,000 Units by mouth 2 (two) times a day       !! - Potential duplicate medications found  Please discuss with provider  No discharge procedures on file  PDMP Review       Value Time User    PDMP Reviewed  Yes 11/11/2022  3:57 PM Marcela Villarreal PA-C           ED Provider  Attending physically available and evaluated Lilly Vazquez  I managed the patient along with the ED Attending      Electronically Signed by         Cheli Becerra MD  12/02/22 9135

## 2022-12-02 PROBLEM — B96.20 E COLI BACTEREMIA: Status: ACTIVE | Noted: 2022-01-01

## 2022-12-02 PROBLEM — R65.21 SEPTIC SHOCK (HCC): Status: ACTIVE | Noted: 2022-01-01

## 2022-12-02 PROBLEM — R74.01 TRANSAMINITIS: Status: ACTIVE | Noted: 2022-01-01

## 2022-12-02 NOTE — PROGRESS NOTES
Post-op Check  Patient returns to the MICU in stable condition following EGD  No active bleeding noted, suctioned out large amount of blood, known ulcer at [address] [facility] anastamosis w/o stigmata of bleeding  Exam  G: obese, pale  HEENT: PERRLA, NGT in place with sanguinous output  C: RRR no m/r/g  P: decreased B/L breath sounds   GI: abd distended, mild epigastric tenderness, healing midline incision   Vasc: 2+ peripheral pulses    Plan  -Post-op labs revealing for metabolic acidosis, ALI, PATI  Also has elevated INR 2 5, hgb stable   Will administer 2u FFP for correction of INR and intravascular volume expansion   -Access port for serial labs  -close monitoring of BP/HR  -NPO, NGT to suction, monitor output  -Protonix BID, plan for repeat EGD in AM

## 2022-12-02 NOTE — PROGRESS NOTES
Patient manually bag at this time due to drop in oxygen saturation and intubation not achieved at this time; manual bag with ambu until 1118 and re attempted intubation

## 2022-12-02 NOTE — CASE MANAGEMENT
Case Management Assessment & Discharge Planning Note    Patient name Cinthia Phoenix  [facility] [id number]  : 1960 Date 2022       Current Admission Date: 2022  Current Admission Diagnosis:GI bleed   Patient Active Problem List    Diagnosis Date Noted   • GI bleed 2022   • H/O Whipple procedure 2022   • Hematemesis 2022   • Streptococcal bacteremia 11/10/2022   • Pulmonary embolism ([facility] [address] ) 2022   • Sepsis ([facility] [address] ) 2022   • Paroxysmal A-fib ([facility] ) 2022   • Left flank pain 2022   • CPAP (continuous positive airway pressure) dependence    • Chemotherapy induced neutropenia ([facility] [address] ) 2022   • Pancreatic cancer ([facility] [address] ) 2022   • Uric acid kidney stone 2022   • Nephrolithiasis 10/25/2021   • Class 3 severe obesity due to excess calories with body mass index (BMI) of 50 0 to 59 9 in adult ([facility] [address] ) 2020   • Type 2 diabetes mellitus without complication, without long-term current use of insulin ([facility] ) 2017   • Severe obstructive sleep apnea 2016   • Dyspnea on exertion 2016   • Supraventricular tachycardia ([facility] [address] ) 2016   • Hypertension 2016   • Controlled depression 2016   • Adenomatous colon polyp 2015   • 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   • Morbid obesity ([facility] ) 2013   • Hyperlipidemia 2013   • Benign essential hypertension 10/02/2012   • Esophageal reflux 2012      LOS (days): 1  Geometric Mean LOS (GMLOS) (days): 3 50  Days to GMLOS:2 5     OBJECTIVE:  PATIENT READMITTED TO HOSPITAL  Risk of Unplanned Readmission Score: 34 57         Current admission status: Inpatient       Preferred Pharmacy:   [address] [place] [address]  Phone: [phone] Fax: [phone]    [facility] ([facility]) - Jenni Mancini  [address] [address]  Phone: [phone] Fax: [phone]    Primary Care Provider: Jeremy Armenta MD    Primary Insurance: BLUE CROSS  Secondary Insurance:     ASSESSMENT:  Rob 26 Proxies    There are no active Health Care Proxies on file  Readmission Root Cause  30 Day Readmission: No    Patient Information  Admitted from[de-identified] Home  Mental Status: Sedated, Intubated  During Assessment patient was accompanied by: Spouse  Assessment information provided by[de-identified] Spouse, Other - please comment (chart review)  Primary Caregiver: Self  Support Systems: Spouse/significant other, Children  What city do you live in[place] entry access options   Select all that apply : Stairs  Number of steps to enter home : 2  Type of Current Residence: 2 story home  Upon entering residence, is there a bedroom on the main floor (no further steps)?: No  A bedroom is located on the following floor levels of residence (select all that apply):: 2nd Floor  Upon entering residence, is there a bathroom on the main floor (no further steps)?: Yes  Number of steps to 2nd floor from main floor: One Flight  In the last 12 months, was there a time when you were not able to pay the mortgage or rent on time?: No  In the last 12 months, was there a time when you did not have a steady place to sleep or slept in a shelter (including now)?: No  Homeless/housing insecurity resource given?: N/A  Living Arrangements: Lives w/ Spouse/significant other  Is patient a ?: No    Activities of Daily Living Prior to Admission  Functional Status: Independent  Completes ADLs independently?: Yes  Ambulates independently?: Yes  Does patient use assisted devices?: Yes  Assisted Devices (DME) used: Zonia Doe  Does patient currently own DME?: Yes  What DME does the patient currently own?: Zonia Doe  Does patient have a history of Kaannabella Goldberg?: Yes [facility])         Patient Information Continued  Income Source: Employed  Does patient have prescription coverage?: Yes  Within the past 12 months, you worried that your food would run out before you got the money to buy more : Never true  Within the past 12 months, the food you bought just didn't last and you didn't have money to get more : Never true  Food insecurity resource given?: N/A  Does patient have a history of substance abuse?: No  Does patient have a history of Mental Health Diagnosis?: No         Means of Transportation  In the past 12 months, has lack of transportation kept you from medical appointments or from getting medications?: No  In the past 12 months, has lack of transportation kept you from meetings, work, or from getting things needed for daily living?: No  Was application for public transport provided?: N/A        DISCHARGE DETAILS:    Discharge planning discussed with[de-identified] patient's  Merry Diaz at bedside  [place] of Choice: Yes     CM contacted family/caregiver?: Yes  Were Treatment Team discharge recommendations reviewed with patient/caregiver?: No (awaiting recommendations)          Contacts  Patient Contacts: Shani Coles,   Relationship to Patient[de-identified] Family  Contact Method: Phone  Phone Number: [phone]  Reason/Outcome: Continuity of Care, Emergency Contact, Discharge Planning                        Treatment Team Recommendation: Other (TBD)  Discharge Destination Plan[de-identified] Other (TBD -- awaiting recommendations)  Transport at Discharge : Other (Comment) (TBD)                Patient/caregiver received discharge checklist   Content reviewed  Patient/caregiver encouraged to participate in discharge plan of care prior to discharge home    CM reviewed d/c planning process including the following: identifying help at home, patient preference for d/c planning needs, Discharge un[facility] Meds to Bed program, availability of treatment team to discuss questions or concerns patient and/or family may have regarding understanding medications and recognizing signs and symptoms once discharged  CM also encouraged patient to follow up with all recommended appointments after discharge  Patient advised of importance for patient and family to participate in managing patient’s medical well being  Additional Comments: CM introduced self and role to family at bedside, provided contact information  Patient is independent at baseline, uses walker/cane  Was d/c on 11/8/22 with [address] [place] [address]  Patient currently intubated/sedated, CM will continue to follow for d/c needs

## 2022-12-02 NOTE — TELEMEDICINE
e-Consult (IPC)  - Interventional Radiology  Zeny Conner 57 y o  female MRN: [id number]  Unit/Bed#: [facility] 05 Encounter: [id number]          Interventional Radiology has been consulted to evaluate Zeny Conner    We were consulted by general surgery concerning this patient with abdominal fluid collections  IP Consult to IR  Consult performed by: CHION Witt  Consult ordered by: Balwinder Wright PA-C        12/02/22    Assessment/Recommendation:     57year old female known to IR s/p GDA stent placement on 11/12/22, now with recurrent hematemesis and sepsis, EGD with old blood and clots and no active bleeding 12/1  Patient with lactic acidosis, leukocytosis, febrile, tachycardic  now intubated and central line placement  CTA A/P demonstrates mid abdomen and RP fluid and gas collections that may be amenable to drainage  Patient remains critically ill in the MICU, was recently intubated, on 2 pressors, and sedation  Patient will likely require 2 drains for source control, an anterior and posterior drain  May be successful with decubitus position, but given patients body habitus this may or may not be possible  IR will attempt drain placement, but given critical illness, may not be possible to change positions, or possible with body habitus and needle placement and CT scanner movements  - keep NPO  - IR to attempt in decubitus position anterior and posterior drains under CT guidance  - will send fluid for cultures if possible     11-20 minutes, >50% of the total time devoted to medical consultative verbal/EMR discussion between providers  Written report will be generated in the EMR  Thank you for allowing Interventional Radiology to participate in the care of Zeny Conner  Please don't hesitate to call or TigerText us with any questions       [address] [place] [address] Jose Alfredo Click

## 2022-12-02 NOTE — UTILIZATION REVIEW
Initial Clinical Review    Admission: Date/Time/Statement:   Admission Orders (From admission, onward)     Ordered        12/01/22 1447  Inpatient Admission  Once                      Orders Placed This Encounter   Procedures   • Inpatient Admission     Standing Status:   Standing     Number of Occurrences:   1     Order Specific Question:   Level of Care     Answer:   Critical Care [15]     Order Specific Question:   Estimated length of stay     Answer:   More than 2 Midnights     Order Specific Question:   Certification     Answer:   I certify that inpatient services are medically necessary for this patient for a duration of greater than two midnights  See H&P and MD Progress Notes for additional information about the patient's course of treatment  ED Arrival Information     Expected   12/1/2022     Arrival   12/1/2022 13:35    Acuity   Emergent            Means of arrival   Ambulance    Escorted by   [facility]   Oncology-Surgical    Admission type   Emergency            Arrival complaint   Abdominal Pain           Chief Complaint   Patient presents with   • Vomiting     Pt began vomiting blood today, s/p whipple with hx complications; 4mg zofran given by EMS       Initial Presentation: 57 y o  female with PMH of ancreatic adenocarcinoma s/p Whipple on 10/31, DM2, AFib, HLD, and GERD presented to the ED from home via EMS with hematemesis that started at noon today  Pt has complicated post op Whipple course with PE (now on Eliquis) and bacteremia  She was recently admitted mid Nov w/ episodes of hematemesis and was found to have GDA blowout and gastrojejunal anastamosis site ulcer, underwent mesenteric angiogram and stent placement across the GDA stump  In the ED, hgb 7 8, received 2 u prbc and KCENTRA  Lactic acid 11  3  CTA A/P revealed high density collection lateral to residual pancreas and right retroperitoneal mid abdominal collection with internal foci of gas   Also given IV protonix, IV Fentanyl, D50  Plan: Inpatient admission for evaluation and treatment of  Hematemesis, suspected UGI bleed, ABLA, acute pain, bandemia: NPO  GI consulted- plan for stat EGD  IV protonix bid, Zofran prn for n/v  Serial abd exams  IVFTEG pending  Hgb q6h  Start Zosyn  Bld cxs pending  Trend fever curve/WBC  12/01  GI Consult:  Pt w/ hematemesis, elevated liver enzymes, elevated lipase, on Eliquis for PE   EGD on 11/12 showed clean based large ulcer at the gastrojejunal anastomosis without any active bleeding  She had 3 episodes of hematemesis in the ED  Last dose of Eliquis was this am   Plan for emergent endoscopy in the OR  Cont PPI  Cont to mon liver enzymes and lipase  Gen Surg Consult: Hematemesis: CTA without evidence of active extravasation Plan: ICU admission, Type and screen for 4 units of blood, Trend Hgb, Lactate, Broad spectrum abx, GI consult, IR consult, TEG, PRN analgesia  Critical Care Notes: Pt returned in MICU in stable condition s/p EGD  No active bleeding noted, suctioned out large amount of blood, known ulcer at [address] [facility] anastamosis w/o stigmata of bleeding  On exam, abdomen distended, mild epigastric tenderness, healing mid line incision, decreased b/l breath sounds  Post op lab showed metabolic acidosis, ALI, PATI  Also has elevated INR 2 5, hgb stable  Plan: Will administer 2u FFP for correction of INR and intravascular volume expansion  Access port for serial labs  close monitoring of BP/HR  NPO, NGT to suction, monitor output  Protonix BID, plan for repeat EGD in AM    Date: 12/02  Day 2:   Critical Care Notes: Pt received 2u FFP post procedure for INR of 2 5  Blood cultures preliminarily growing gram negative rods  Lactic acidosis of 15 8 overnight now down to 14 1  On 2L NC  Plan: Cont NGT/NPO  Cont to hold Eliquis  Protonix gtt  Zofran/reglan prn for nausea/vomiting  Trend liver enzymes daily  Serial abdominal exams  U/S w/ liver dopplers pending  IVF  Transfuse for hgb <7 0  Continue Zosyn day 2, f/u cultures  Trend fever curve/white count  Trend lactic acid  PE: aaox 3, pale, ill appearing, obese, b/l LE edema, abdominal distension, mid line exlap incision well healed, tachycardia, NGT in place, feet cool to touch  IR Consult: CTA A/P demonstrates mid abdomen and RP fluid and gas collections that may be amenable to drainage  Pt critically ill in the MICU, she was recently  intubated, on 2 pressors, and sedation  Plan: Keep NPO  IR to attempt in decubitus position anterior and posterior drains under CT guidance    will send fluid for cultures if possible     ED Triage Vitals   Temperature Pulse Respirations Blood Pressure SpO2   12/01/22 1532 12/01/22 1345 12/01/22 1345 12/01/22 1345 12/01/22 1545   (!) 97 4 °F (36 3 °C) (!) 106 20 130/76 97 %      Temp Source Heart Rate Source Patient Position - Orthostatic VS BP Location FiO2 (%)   12/01/22 1532 12/01/22 1823 -- 12/01/22 2100 --   Tympanic Monitor  Left arm       Pain Score       12/01/22 1416       8          Wt Readings from Last 1 Encounters:   12/02/22 127 kg (281 lb)     Additional Vital Signs:   Date/Time Temp Pulse Resp BP MAP (mmHg) Arterial Line BP MAP SpO2 Calculated FIO2 (%) - Nasal Cannula O2 Flow Rate (L/min) Nasal Cannula O2 Flow Rate (L/min) O2 Device Cardiac (WDL)   12/02/22 1514 -- -- -- -- -- -- -- -- -- -- -- Ventilator --   12/02/22 1505 101 5 °F (38 6 °C) Abnormal  122 Abnormal  17 105/46 Abnormal  -- 112/48 68 mmHg 90 % -- -- -- -- --   12/02/22 1445 101 5 °F (38 6 °C) Abnormal  122 Abnormal  18 -- -- 110/44 62 mmHg Abnormal  86 % Abnormal  -- -- -- -- --   12/02/22 1430 101 5 °F (38 6 °C) Abnormal  124 Abnormal  26 Abnormal  153/60 -- 116/44 64 mmHg Abnormal  92 % -- -- -- -- --   12/02/22 1310 100 8 °F (38 2 °C) Abnormal  128 Abnormal  20 140/54 -- 140/54 78 mmHg 94 % -- -- -- -- --   12/02/22 1300 100 9 °F (38 3 °C) Abnormal  127 Abnormal  19 153/60 -- -- -- -- -- -- -- -- --   12/02/22 1247 100 9 °F (38 3 °C) Abnormal  121 Abnormal  22 121/50 -- -- -- -- -- -- -- -- --   12/02/22 1245 100 8 °F (38 2 °C) Abnormal  120 Abnormal  20 -- -- 104/44 62 mmHg Abnormal  94 % -- -- -- -- --   12/02/22 1215 100 4 °F (38 °C) 110 Abnormal  25 Abnormal  -- -- 132/42 68 mmHg 96 % -- -- -- -- --   12/02/22 1125 99 7 °F (37 6 °C) 92 28 Abnormal  -- -- 106/40 60 mmHg Abnormal  93 % -- -- -- Ventilator --   12/02/22 1124 99 7 °F (37 6 °C) 104 32 Abnormal  -- -- 118/44 66 mmHg 84 % Abnormal  -- -- -- -- --   12/02/22 1122 99 7 °F (37 6 °C) 102 31 Abnormal  -- -- 118/44 66 mmHg 61 % Abnormal  -- -- -- -- --   12/02/22 1121 99 7 °F (37 6 °C) 94 35 Abnormal  -- -- 98/38 56 mmHg Abnormal  76 % Abnormal  -- -- -- -- --   12/02/22 1120 99 7 °F (37 6 °C) 78 22 -- -- 90/34 50 mmHg Abnormal  92 % -- -- -- -- --   12/02/22 1119 99 7 °F (37 6 °C) 78 33 Abnormal  87/47 Abnormal  52 Abnormal  92/34 52 mmHg Abnormal  100 % -- -- -- -- --   12/02/22 1118 99 7 °F (37 6 °C) 98 27 Abnormal  -- -- 118/42 64 mmHg Abnormal  100 % -- -- -- -- --   12/02/22 1116 99 7 °F (37 6 °C) 106 Abnormal  15 -- -- 106/40 58 mmHg Abnormal  34 % Abnormal  -- -- -- -- --   12/02/22 1115 99 7 °F (37 6 °C) 114 Abnormal  17 -- -- 118/44 66 mmHg 85 % Abnormal  -- -- -- -- --   12/02/22 1114 99 7 °F (37 6 °C) 122 Abnormal  29 Abnormal  -- -- 112/42 64 mmHg Abnormal  96 % -- -- -- -- --   12/02/22 1113 99 7 °F (37 6 °C) 120 Abnormal  35 Abnormal  -- -- 136/46 74 mmHg 96 % -- -- -- -- --   12/02/22 1112 99 7 °F (37 6 °C) 120 Abnormal  35 Abnormal  -- -- 130/46 72 mmHg 96 % -- -- -- -- --   12/02/22 1111 99 7 °F (37 6 °C) 120 Abnormal  36 Abnormal  -- -- 134/46 74 mmHg 95 % -- -- -- -- --   12/02/22 1110 99 7 °F (37 6 °C) 120 Abnormal  37 Abnormal  -- -- 138/46 74 mmHg 96 % -- -- -- -- --   12/02/22 1109 99 7 °F (37 6 °C) 118 Abnormal  33 Abnormal  117/69 80 128/50 72 mmHg 96 % -- -- -- -- --   12/02/22 1108 99 7 °F (37 6 °C) 116 Abnormal  33 Abnormal  -- -- 126/46 70 mmHg 96 % -- -- -- -- --   12/02/22 1107 99 7 °F (37 6 °C) 118 Abnormal  34 Abnormal  -- -- 124/44 68 mmHg 97 % -- -- -- -- --   12/02/22 1106 99 7 °F (37 6 °C) 116 Abnormal  35 Abnormal  -- -- 118/42 64 mmHg Abnormal  96 % -- -- -- -- --   12/02/22 1105 99 7 °F (37 6 °C) 116 Abnormal  34 Abnormal  -- -- 90/42 60 mmHg Abnormal  97 % -- -- -- -- --   12/02/22 1100 99 7 °F (37 6 °C) 114 Abnormal  30 Abnormal  95/58 78 106/34 58 mmHg Abnormal  94 % -- -- -- -- --   12/02/22 1058 99 9 °F (37 7 °C) 114 Abnormal  34 Abnormal  103/30 Abnormal  -- 98/32 54 mmHg Abnormal  95 % -- -- -- -- --   12/02/22 1056 99 7 °F (37 6 °C) 112 Abnormal  32 Abnormal  87/54 Abnormal  64 Abnormal  96/32 50 mmHg Abnormal  95 % -- -- -- -- --   12/02/22 1049 99 7 °F (37 6 °C) 114 Abnormal  35 Abnormal  85/56 Abnormal  61 Abnormal  -- -- 97 % -- -- -- -- --   12/02/22 1045 100 °F (37 8 °C) 110 Abnormal  30 Abnormal  85/56 Abnormal  61 Abnormal  -- -- 97 % -- -- -- -- --   12/02/22 1015 100 °F (37 8 °C) 108 Abnormal  37 Abnormal  81/48 Abnormal  55 Abnormal   -- -- 96 % -- -- -- -- --   MAP (mmHg): Levo inc from 2 to 5 at 12/02/22 1015   12/02/22 1008 100 2 °F (37 9 °C) 108 Abnormal  37 Abnormal  86/59 Abnormal  74 -- -- 97 % -- -- -- -- --   12/02/22 1002 100 °F (37 8 °C) 106 Abnormal  38 Abnormal  86/59 Abnormal   -- -- -- 96 % -- -- -- -- --   BP: Levo started at 12/02/22 1002   12/02/22 1000 100 4 °F (38 °C) 106 Abnormal  39 Abnormal  86/59 Abnormal  -- -- -- 96 % -- -- -- -- --   12/02/22 0945 100 °F (37 8 °C) 108 Abnormal  37 Abnormal  78/48 Abnormal  57 Abnormal   -- -- 94 % -- -- -- -- --   MAP (mmHg): Levo ordered at 12/02/22 0945   12/02/22 0930 100 4 °F (38 °C) 106 Abnormal  37 Abnormal  78/47 Abnormal  61 Abnormal  -- -- 98 % -- -- -- -- --   12/02/22 0928 100 4 °F (38 °C) 108 Abnormal  39 Abnormal  89/52 Abnormal  63 Abnormal  -- -- 98 % -- -- -- -- --   12/02/22 0900 100 4 °F (38 °C) 108 Abnormal  35 Abnormal  90/46 Abnormal  57 Abnormal  -- -- 98 % -- -- -- -- --   12/02/22 0846 100 4 °F (38 °C) 108 Abnormal  36 Abnormal  -- -- -- -- 94 % 28 -- 2 L/min Nasal cannula --   12/02/22 0845 100 4 °F (38 °C) 106 Abnormal  29 Abnormal  93/43 Abnormal  51 Abnormal  -- -- 94 % -- -- -- -- --   12/02/22 0830 100 4 °F (38 °C) 108 Abnormal  36 Abnormal  89/48 Abnormal  62 Abnormal  -- -- 94 % -- -- -- -- --   12/02/22 0815 100 °F (37 8 °C) 110 Abnormal  36 Abnormal  99/51 61 Abnormal  -- -- 94 % -- -- -- -- --   12/02/22 0800 100 °F (37 8 °C) 110 Abnormal  33 Abnormal  101/52 58 Abnormal  -- -- 95 % -- -- -- -- --   12/02/22 0745 100 °F (37 8 °C) 108 Abnormal  34 Abnormal  95/56 75 -- -- 97 % -- -- -- -- --   12/02/22 0730 100 °F (37 8 °C) 108 Abnormal  33 Abnormal  105/57 70 -- -- 95 % -- -- -- -- --   12/02/22 0715 99 7 °F (37 6 °C) 106 Abnormal  33 Abnormal  106/60 78 -- -- 96 % -- -- -- -- --   12/02/22 0700 99 7 °F (37 6 °C) 106 Abnormal  25 Abnormal  118/63 81 -- -- 95 % -- -- -- -- --   12/02/22 0645 99 3 °F (37 4 °C) 106 Abnormal  31 Abnormal  124/65 84 -- -- 96 % -- -- -- -- --   12/02/22 0600 98 6 °F (37 °C) 100 26 Abnormal  120/72 88 -- -- 95 % -- -- -- -- --   12/02/22 0500 97 9 °F (36 6 °C) 94 25 Abnormal  110/58 81 -- -- 97 % -- -- -- -- --   12/02/22 0400 97 9 °F (36 6 °C) 90 28 Abnormal  104/56 74 -- -- 95 % 28 -- 2 L/min Nasal cannula --   12/02/22 0330 97 5 °F (36 4 °C) 88 27 Abnormal  102/57 78 -- -- 95 % -- -- -- -- --   12/02/22 0300 97 5 °F (36 4 °C) 90 26 Abnormal  101/68 83 -- -- 96 % -- -- -- -- --   12/02/22 0200 97 5 °F (36 4 °C) 90 26 Abnormal  104/58 69 -- -- 95 % -- -- -- -- --   12/02/22 0130 -- 92 29 Abnormal  105/60 70 -- -- 95 % -- -- -- -- --   12/02/22 0100 -- 92 26 Abnormal  98/62 71 -- -- 94 % 28 -- 2 L/min Nasal cannula --   12/02/22 0015 -- 96 27 Abnormal  97/53 64 Abnormal  -- -- 95 % -- -- -- -- --   12/02/22 0000 97 6 °F (36 4 °C) 90 26 Abnormal  95/59 70 -- -- 93 % -- -- -- None (Room air) --   12/01/22 2345 97 7 °F (36 5 °C) 90 30 Abnormal  92/54 65 -- -- 93 % -- -- -- -- --   12/01/22 2330 97 8 °F (36 6 °C) 94 26 Abnormal  107/69 80 -- -- 92 % -- -- -- -- --   12/01/22 2315 97 5 °F (36 4 °C) 94 27 Abnormal  100/61 74 -- -- 92 % -- -- -- -- --   12/01/22 2305 97 4 °F (36 3 °C) Abnormal  94 28 Abnormal  100/61 72 -- -- 91 % -- -- -- -- --   12/01/22 2300 -- 92 33 Abnormal  84/58 Abnormal  65 -- -- 92 % -- -- -- -- --   12/01/22 2245 -- 92 27 Abnormal  86/57 Abnormal  67 -- -- 92 % -- -- -- -- --   12/01/22 2230 -- 94 20 97/57 67 -- -- 92 % -- -- -- -- --   12/01/22 2200 -- 92 21 110/67 80 -- -- 96 % -- -- -- None (Room air) --   12/01/22 2130 -- 94 22 113/68 85 -- -- 96 % -- -- -- -- --   12/01/22 2100 97 5 °F (36 4 °C) 92 19 109/68 80 -- -- 95 % 32 -- 3 L/min Nasal cannula --   12/01/22 2015 -- 94 25 Abnormal  127/77 92 -- -- 95 % 44 -- 6 L/min Nasal cannula X   12/01/22 2000 -- 96 26 Abnormal  126/79 96 -- -- 95 % 44 -- 6 L/min Nasal cannula X   12/01/22 1952 -- 96 -- -- -- -- -- 96 % -- -- -- -- --   12/01/22 1951 98 8 °F (37 1 °C) 97 -- 134/78 93 -- -- -- -- 6 L/min -- Simple mask X   12/01/22 1823 -- 98 -- 118/74  -- -- -- 97 % -- -- -- None (Room air) --   BP: left forearm at 12/01/22 1823   12/01/22 1800 -- 94 25 Abnormal  125/78 -- -- -- 98 % -- -- -- -- --   12/01/22 1739 97 5 °F (36 4 °C) 96 27 Abnormal  105/63 -- -- -- -- -- -- -- -- --   12/01/22 1545 -- 108 Abnormal  18 142/80 104 -- -- 97 % -- -- -- None (Room air) --   12/01/22 1532 97 4 °F (36 3 °C) Abnormal  -- -- -- -- -- -- -- -- -- -- -- --       Pertinent Labs/Diagnostic Test Results:   XR chest portable   Final Result by Sara Kowalski MD (12/02 1201)      1  Endotracheal tube 1 cm above the veto  Retraction is recommended  2   Right internal jugular central venous catheter projects over the SVC  No pneumothorax  3   Cardiomegaly with probable vascular congestion versus vascular crowding from low lung volumes        The study was marked in [facility] for immediate notification  Workstation performed: Albin Campuzano right upper quadrant with liver dopplers   Final Result by Rock Ellis MD (12/02 1031)      Postoperative appearance of the right upper quadrant  Hematoma and fluid collection is better seen on prior CT  Workstation performed: [id number]         CTA abdomen pelvis w wo contrast   Final Result by Sami Lizarraga MD (12/01 1549)      Residual high density collection located lateral to the residual pancreas and inferomedial to the tariq hepatis in keeping with residual hematoma  Retroperitoneal and right mid abdominal collection as measured above with internal foci of gas in keeping with abscesses  These likely communicate on #6/78  No active extravasation into the bowel lumen to indicate an active bleed  The study was marked in [facility] for immediate notification  Workstation performed: [id number]         IR drainage tube placement    (Results Pending)     12/02 ECHO:    Left Ventricle: Left ventricular cavity size is normal  The left ventricular ejection fraction is 60%  Systolic function is normal  Although no diagnostic regional wall motion abnormality was identified, this possibility cannot be completely excluded on the basis of this study  •  Mitral Valve: There is mild regurgitation          Results from last 7 days   Lab Units 12/02/22  1529 12/02/22  1158 12/02/22  1003 12/02/22  0430 12/01/22  2140 12/01/22  1758 12/01/22  1359 11/30/22  1059   0000   WBC Thousand/uL  --  45 77*  --  22 68* 24 31*  --  13 32*  --    < >   WHITE BLOOD CELL COUNT  x10E3/uL  --   --   --   --   --   --   --  8 0  --    HEMOGLOBIN g/dL 10 5* 8 5* 7 3* 8 0* 10 7*  10 7*   < > 7 8*  --   --    HEMOGLOBIN  g/dL  --   --   --   --   --   --   --  8 4*  --    HEMATOCRIT %  --  28 6*  --  25 4* 34 2*  --  27 6*  --   --    HEMATOCRIT  %  --   --   --   --   --   --   --  27 1*  --    PLATELETS Thousands/uL  --  103*  --  57* 80*  --  198  --    < >   PLATELETS  C24K2/RN  --   --   --   --   --   --   --  114*  --    NEUTROS ABS  x10E3/uL  --   --   --   --   --   --   --  5 9  --    BANDS PCT %  --   --   --   --  34*  --  14*  --   --     < > = values in this interval not displayed           Results from last 7 days   Lab Units 12/02/22  1204 12/02/22  0430 12/01/22 2150 12/01/22 2140 12/01/22  1359 11/30/22  1059   SODIUM mmol/L 144 144  --  140  141 138 140   POTASSIUM mmol/L 3 9 4 5  --  4 1  4 1 3 6 3 5   CHLORIDE mmol/L 111* 111*  --  107  108 105 101   CO2 mmol/L 7* 15*  --  14*  15* 18* 24   ANION GAP mmol/L 26* 18*  --  19*  18* 15*  --    BUN mg/dL 15 13  --  12  12 10 13   CREATININE mg/dL 1 21 0 91  --  1 21  1 16 0 92 0 63   EGFR ml/min/1 73sq m 48 67  --  48  50 66 100   CALCIUM mg/dL 9 0 9 1  --  8 8  9 1 8 8  --    CALCIUM, IONIZED mmol/L  --  1 15 1 09*  --   --   --    MAGNESIUM mg/dL  --  1 8  --  1 9  --   --    PHOSPHORUS mg/dL  --  5 3*  --  4 3*  --   --      Results from last 7 days   Lab Units 12/02/22  1204 12/02/22 0430 12/01/22 2140 12/01/22  1359 11/30/22  1059   AST U/L 10,369* 6,505* 2,739* 88* 20   ALT U/L 2,136* 1,494* 723* 29 13   ALK PHOS U/L 991* 604* 799* 649*  --    TOTAL PROTEIN g/dL 5 5* 5 2* 6 0* 6 4 6 1   ALBUMIN g/dL 1 6* 1 7* 1 8* 1 7* 2 6*   TOTAL BILIRUBIN mg/dL 3 94* 4 06* 4 46* 1 37* 0 7   BILIRUBIN DIRECT mg/dL  --  2 15*  --   --   --      Results from last 7 days   Lab Units 12/02/22  1533 12/02/22  1344 12/02/22  1159 12/02/22  0922 12/02/22  0857 12/02/22  0854 12/02/22  0533 12/02/22  0155 12/02/22  0040 12/01/22  2356 12/01/22  2020   POC GLUCOSE mg/dl 95 101 66 118 30* 25* 91 125 139 68 60*     Results from last 7 days   Lab Units 12/02/22  1204 12/02/22  0915 12/02/22  0430 12/01/22  2140 12/01/22  1359 11/30/22  1059   GLUCOSE RANDOM mg/dL 71 35* 87 111  115 240* 117*             No results found for: BETA-HYDROXYBUTYRATE   Results from last 7 days   Lab Units 12/02/22  1529 12/02/22  1205   PH ART  7 027* 7 021*   PCO2 ART mm Hg 47 6* 34 3*   PO2 ART mm Hg 76 8 90 9   HCO3 ART mmol/L 12 2* 8 7*   BASE EXC ART mmol/L -18 1 -21 0   O2 CONTENT ART mL/dL 14 6* 12 4*   O2 HGB, ARTERIAL % 90 0* 91 8*   ABG SOURCE  Line, Arterial Line, Arterial     Results from last 7 days   Lab Units 12/02/22  0430 12/01/22  1758   PH ARMOND  7 343 7 243*   PCO2 ARMOND mm Hg 24 9* 32 6*   PO2 ARMOND mm Hg 40 7 35 9   HCO3 ARMOND mmol/L 13 2* 13 8*   BASE EXC ARMOND mmol/L -11 2 -12 5   O2 CONTENT ARMOND ml/dL 9 1 7 7   O2 HGB, VENOUS % 72 7 58 5*                     Results from last 7 days   Lab Units 12/02/22  0430 12/01/22  2140 12/01/22  1432   PROTIME seconds 31 0* 27 6* 21 0*   INR  2 95* 2 54* 1 78*   PTT seconds  --   --  28         Results from last 7 days   Lab Units 12/01/22  1359   PROCALCITONIN ng/ml 1 68*     Results from last 7 days   Lab Units 12/02/22  1529 12/02/22  1158 12/02/22  0838 12/02/22  0525 12/02/22  0150 12/01/22  2140 12/01/22  1750   LACTIC ACID mmol/L 15 7* 20 6* 18 0* 14 1* 15 8* 15 8* 16 2*     Results from last 7 days   Lab Units 12/01/22  1359   LIPASE u/L 2,494*           Results from last 7 days   Lab Units 12/01/22  1756 12/01/22  1750   GRAM STAIN RESULT  Gram negative rods* Gram negative rods*               ED Treatment:   Medication Administration from 12/01/2022 1335 to 12/01/2022 2043       Date/Time Order Dose Route Action     12/01/2022 1502 EST ondansetron (FOR EMS ONLY) (ZOFRAN) 4 mg/2 mL injection 4 mg 0 mg Does not apply Given to EMS     12/01/2022 1439 EST pantoprazole (PROTONIX) 80 mg in sodium chloride 0 9 % 100 mL IVPB 0 mg Intravenous Stopped     12/01/2022 1424 EST pantoprazole (PROTONIX) 80 mg in sodium chloride 0 9 % 100 mL IVPB 80 mg Intravenous New Bag     12/01/2022 1416 EST fentanyl citrate (PF) 100 MCG/2ML 25 mcg 25 mcg Intravenous Given     12/01/2022 1553 EST prothrombin complex concentrate (human) (Kcentra) 2,000 Units 2,000 Units Intravenous Given     12/01/2022 1432 EST fentanyl citrate (PF) 100 MCG/2ML 25 mcg 25 mcg Intravenous Given     12/01/2022 1447 EST iohexol (OMNIPAQUE) 350 MG/ML injection (SINGLE-DOSE) 100 mL 100 mL Intravenous Given     12/01/2022 2026 EST dextrose 50 % IV solution 50 mL 50 mL Intravenous Given        Past Medical History:   Diagnosis Date   • Abnormal liver function test     last assessed 1/16/17    • Adenomatosis    • 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    last assessed 4/4/13   • Arthritis    • [facility]    • Benign essential hypertension     last assessed 12/21/17    • Body mass index (BMI) of 50-59 9 in [facility])    • Cancer (HCC)     pancreas   • Colon polyp    • Congenital pes planus     last assessed 7/15/14    • COVID     4/30/21   • CPAP (continuous positive airway pressure) dependence    • Dental crowns present    • Depression    • Diabetes mellitus ([facility] )    • Dizziness     occas--pt reports did see family doctor   • GERD (gastroesophageal reflux disease)    • Hemangioma of skin 08/26/2003    last assessed 8/26/03   • History of cancer chemotherapy     [facility] Oncologist Dr Carmen Marrero   • History of gastroesophageal reflux (GERD)    • Hypercholesterolemia    • Hyperlipidemia    • Hypertension    • Irregular heart beat    • Kidney stone    • 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    last assessed 12/31/14    • Osteoarthritis of both knees     last assessed 12/31/14    • Paroxysmal atrial fibrillation ([facility] )    • Port-A-Cath in place    • S/P ablation of atrial flutter    • Shortness of breath     per pt "from chemo-not drastic--still working and goes up steps"   • Sleep apnea    • Wears glasses      Present on Admission:  • GI bleed  • Pancreatic cancer ([facility] )  • Paroxysmal A-fib ([facility] )  • Pulmonary embolism ([facility] )  • Hematemesis      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]  Vomiting [R11 10]  Hematemesis, unspecified whether nausea present [K92 0]  Age/Sex: 57 y o  female  Admission Orders:  SCD  NGT maintenance  Cardio-Pulmonary Monitoring, Neuro Checks, Nursing dysphagia assesment, I/O, Daily weights, Vital signs  Continuous pulse ox  NPO      Scheduled Medications:  [MAR Hold] calcium gluconate, 2 g, Intravenous, Once  [MAR Hold] chlorhexidine, 15 mL, Mouth/Throat, Q12H [address]  [MAR Hold] ertapenem, 1,000 mg, Intravenous, Q24H  [MAR Hold] insulin lispro, 2-12 Units, Subcutaneous, Q6H [address]  multi-electrolyte, 1,000 mL, Intravenous, Once      Continuous IV Infusions:  dextrose 5 % and sodium chloride 0 9 %, 125 mL/hr, Intravenous, Continuous  fentaNYL, 100 mcg/hr, Intravenous, Continuous  norepinephrine, 1-30 mcg/min, Intravenous, Titrated  pantoprozole (PROTONIX) infusion (Continuous), 8 mg/hr, Intravenous, Continuous  propofol, 5-50 mcg/kg/min, Intravenous, Titrated  vasopressin (PITRESSIN) in 0 9 % sodium chloride 100 mL, 0 04 Units/min, Intravenous, Continuous  lactated ringers infusion  Rate: 125 mL/hr Dose: 125 mL/hr  Freq: Continuous Route: IV  Indications of Use: IV Hydration  Last Dose: Stopped (12/02/22 0917)  Start: 12/01/22 1445 End: 12/02/22 0858    PRN Meds:  [SEZ Hold] acetaminophen, 650 mg, Rectal, Q6H PRN 12/02 x 1  [MAR Hold] fentanyl citrate (PF), 50 mcg, Intravenous, Q1H PRN 12/02 x 1  [MAR Hold] HYDROmorphone, 0 5 mg, Intravenous, Q4H PRN 12/01 x 1, 12/02 x 1  [MAR Hold] metoclopramide, 10 mg, Intravenous, Q6H PRN 12/02 x 1  [MAR Hold] ondansetron, 4 mg, Intravenous, Q6H PRN 12/01 x 1, 12/02 x 1          IP CONSULT TO GASTROENTEROLOGY  IP CONSULT TO SURGICAL ONCOLOGY  IP CONSULT TO SURGICAL CRITICAL CARE  IP CONSULT TO CASE MANAGEMENT  INPATIENT CONSULT TO IR    Network Utilization Review Department  ATTENTION: Please call with any questions or concerns to [phone] and carefully listen to the prompts so that you are directed to the right person   All voicemails are confidential   Sudie Crigler all requests for admission clinical reviews, approved or denied determinations and any other requests to dedicated fax number below belonging to the campus where the patient is receiving treatment   List of dedicated fax numbers for the Facilities:  [address] DENIALS (Administrative/Medical Necessity) [phone]   [address] (Maternity/NICU/Pediatrics) [phone]   [address]rella [phone]   [facility] 77 [phone]   [address] [address] [place] [facility] 28 [phone]   [facility] Angelique Branch [facility] 134 815 [facility] [phone]

## 2022-12-02 NOTE — ANESTHESIA POSTPROCEDURE EVALUATION
Post-Op Assessment Note    CV Status:  Stable  Pain Score: 0    Pain management: adequate     Mental Status:  Awake and sleepy   Hydration Status:  Euvolemic   PONV Controlled:  Controlled   Airway Patency:  Patent  Airway: intubated   Two or more mitigation strategies used for obstructive sleep apnea   Post Op Vitals Reviewed: Yes      Staff: CRNA         No notable events documented      /78 (12/01/22 1951)    Temp 98 8 °F (37 1 °C) (12/01/22 1951)    Pulse 96 (12/01/22 1952)   Resp  18   SpO2 96 % (12/01/22 1952)

## 2022-12-02 NOTE — PROCEDURES
Intubation    Date/Time: 12/2/2022 11:18 AM  Performed by: Scott Krause MD  Authorized by: Scott Krause MD     Patient location:  Bedside  Consent:     Consent obtained:  Verbal    Consent given by:  Healthcare agent    Risks discussed:  Aspiration, bleeding, death, hypoxia, pneumothorax and laryngeal injury    Alternatives discussed:  No treatment and delayed treatment  Universal protocol:     Procedure explained and questions answered to patient or proxy's satisfaction: yes      Relevant documents present and verified: yes      Test results available and properly labeled: yes      Radiology Images displayed and confirmed  If images not available, report reviewed: yes      Required blood products, implants, devices, and special equipment available: yes      Immediately prior to procedure, a time out was called: yes      Patient identity confirmed:  Arm band  Pre-procedure details:     Patient status:  Altered mental status    Mallampati score:  2    Pretreatment medications:  Etomidate    Paralytics:  Succinylcholine  Indications:     Indications for intubation: respiratory failure and airway protection    Procedure details:     Preoxygenation:  Bag valve mask    CPR in progress: no      Intubation method:  Oral    Oral intubation technique:  Glidescope and direct    Laryngoscope blade: Mac 3    Tube size (mm):  7 5    Tube type:  Cuffed and hi-lo    Number of attempts:  2    Ventilation between attempts: yes      Cricoid pressure: yes      Tube visualized through cords: yes    Placement assessment:     ETT to teeth:  24 cm    Tube secured with:  ETT rico    Breath sounds:  Equal and absent over the epigastrium    Placement verification: chest rise, condensation, colorimetric ETCO2 device, CXR verification, direct visualization, equal breath sounds and tube exhalation      Chest x-ray findings: at veto  Post-procedure details:     Patient tolerance of procedure:   Tolerated well, no immediate complications  Comments:      1 additional attempt was made prior by [place], [place] and was unsuccessful due to very anterior airway that could not be reached with the tube despite grade 1 view with the glidescope  2nd attempt was made with DL and bougie which was successful  Patient was bagged between attempts and SpO2 was increased over 90% prior to second attempt

## 2022-12-02 NOTE — PROCEDURES
Arterial Line Insertion    Date/Time: 12/2/2022 10:45 AM  Performed by: Luis Howe MD  Authorized by: Luis Howe MD     Patient location:  ICU  Consent:     Consent obtained:  Verbal    Consent given by:  Healthcare agent    Risks discussed:  Bleeding, infection and ischemia  Universal protocol:     Procedure explained and questions answered to patient or proxy's satisfaction: yes      Relevant documents present and verified: yes      Test results available and properly labeled: yes      Radiology Images displayed and confirmed  If images not available, report reviewed: yes      Required blood products, implants, devices, and special equipment available: yes      Immediately prior to procedure a time out was called: yes      Patient identity confirmed:  Arm band  Indications:     Indications: hemodynamic monitoring, multiple ABGs and continuous blood pressure monitoring    Pre-procedure details:     Skin preparation:  Chlorhexidine  Anesthesia (see MAR for exact dosages): Anesthesia method:  None  Procedure details:     Location / Tip of Catheter:  Radial    Laterality:  Right    Paul's test performed: yes      Paul's test abnormal: no      Needle gauge:  20 G    Placement technique:  Percutaneous, Seldinger and ultrasound guided    Ultrasound image availability:  Not saved    Sterile ultrasound techniques: Sterile gel and sterile probe covers were used      Number of attempts:  2    Successful placement: yes      Transducer: waveform confirmed    Post-procedure details:     Post-procedure:  Sterile dressing applied and sutured    CMS:  Normal and unchanged    Patient tolerance of procedure:   Tolerated well, no immediate complications

## 2022-12-02 NOTE — PROGRESS NOTES
Daily Progress Note - Critical Care   Georgie Mccord 57 y o  female MRN: [id number]  Unit/Bed#: MICU 05 Encounter: [id number]        ----------------------------------------------------------------------------------------  HPI/24hr events: 58 y/o F w/ recent Whipple with portal vein/SMV repair (96/67) complicated by PE on Eliquis, bacteremia, GDA blowout s/p stent placement, marginal ulcer  Presented 12/1 w/ hematemesis  Received KCentra and 2u prbcs  EGD w/ GI revealed no active source of bleeding  Significant amounts of clots found in the stomach  Visualized clean-based GJ anastamosis ulcer  Received 2u FFP post procedure for INR of 2 5  Blood cultures preliminarily growing gram negative rods  Lactic acidosis of 15 8 overnight now down to 14 1    ---------------------------------------------------------------------------------------  SUBJECTIVE  "My mouth is dry"    Review of Systems   Constitutional: Positive for fatigue  HENT:        Dry mouth   Gastrointestinal: Positive for abdominal distention and abdominal pain  Review of systems was reviewed and negative unless stated above in HPI/24-hour events   ---------------------------------------------------------------------------------------  Assessment and Plan:    Neuro:   • Pain control  o Dilaudid 0 5 Q4 prn  o Hold home gabapentin while NPO  • Depression   o Hold home paxil while NPO  • Daily CAM-ICU, delirium precautions  Regulate sleep/wake cycle      CV:   • AFib  o Hold home cardizem, sotalol, metoprolol, Eliquis  • HLD  o Atorvastatin on hold while NPO  • Goal MAP > 65      Pulm:  • Post-operative PE on Eliquis  o Holding home Eliquis in setting of UGI bleed  o S/p [facility] for reversal      GI:   • UGI bleed w/ hx of marginal ulcer and GDA blowout  o GI following, appreciate recommendations  o Continue NGT/NPO  o Protonix gtt  o Zofran prn for nausea/vomiting  • Elevated liver enzymes  o Trend daily   Serial abdominal exams  o U/S w/ liver dopplers pending  • Pancreatic adenocarcinoma s/p Whipple 10/31  o Surgical oncology primary, appreciate recommendations  o Serial abdominal exams  • Nausea  o Zofran/reglan prn      :   • Trend strict I/Os  • Maintain benavides      F/E/N:   • LR at 125cc/hr  • Replete electrolytes as needed for goal Mag > 2 0, Phos >3 0, K >4 0  • NPO/NGT      Heme/Onc:   • Acute blood loss anemia  o S/p 2u prbcs, 2u FFP  o Transfuse for hgb <7 0  • Coagulopathy  o Suspect 2/2 acute liver injury      Endo:   • DM2  o SSI      ID:   • Gram negative bacteremia - BCID w/ ESBL E Coli  o Continue Zosyn day 2, f/u culture data  Trend fever curve/white count  Trend lactic acid, continue fluid resuscitation as needed  MSK/Skin:   • Frequent turns/repositioning, offloading  • PT/OT when appropriate    Patient appropriate for transfer out of the ICU today?: No  Disposition: Continue Critical Care   Code Status: Level 1 - Full Code  ---------------------------------------------------------------------------------------  ICU CORE MEASURES    Prophylaxis   VTE Pharmacologic Prophylaxis: Pharmacologic VTE Prophylaxis contraindicated due to ABLA  VTE Mechanical Prophylaxis: sequential compression device  Stress Ulcer Prophylaxis: Pantoprazole Infusion    Invasive Devices Review  Invasive Devices     Central Venous Catheter Line  Duration           Port A Cath 05/31/22 Left Chest 184 days          Peripheral Intravenous Line  Duration           Peripheral IV 12/01/22 Left Antecubital <1 day    Peripheral IV 12/01/22 Right Antecubital <1 day          Drain  Duration           Ureteral Internal Stent Left ureter 6 Fr  136 days    NG/OG/Enteral Tube Nasogastric Left nare <1 day    Urethral Catheter Temperature probe 16 Fr  <1 day              Can any invasive devices be discontinued today?  No  ---------------------------------------------------------------------------------------  OBJECTIVE    Vitals   Vitals:    12/02/22 0400 12/02/22 0500 12/02/22 0600 22 0700   BP: 104/56 110/58 120/72 118/63   BP Location: Left arm      Pulse: 90 94 100 (!) 106   Resp: (!) 28 (!) 25 (!) 26 (!) 25   Temp: 97 9 °F (36 6 °C) 97 9 °F (36 6 °C) 98 6 °F (37 °C) 99 7 °F (37 6 °C)   TempSrc: Axillary      SpO2: 95% 97% 95% 95%   Weight: 128 kg (281 lb 8 4 oz)        Temp (24hrs), Av 9 °F (36 6 °C), Min:97 4 °F (36 3 °C), Max:99 7 °F (37 6 °C)  Current: Temperature: 99 7 °F (37 6 °C)    Respiratory:  SpO2: SpO2: 95 %  Nasal Cannula O2 Flow Rate (L/min): 2 L/min    Invasive/non-invasive ventilation settings   Respiratory    Lab Data (Last 4 hours)    None         O2/Vent Data (Last 4 hours)    None                Physical Exam  Vitals and nursing note reviewed  Constitutional:       General: She is not in acute distress  Appearance: She is morbidly obese  She is ill-appearing  Interventions: Nasal cannula in place  HENT:      Head: Normocephalic and atraumatic  Comments: NGT in place  Eyes:      Pupils: Pupils are equal, round, and reactive to light  Cardiovascular:      Rate and Rhythm: Regular rhythm  Tachycardia present  Heart sounds: Normal heart sounds  Heart sounds not distant  No murmur heard  No friction rub  No gallop  Comments: Feet cool to touch  Pulmonary:      Effort: Pulmonary effort is normal       Breath sounds: Normal breath sounds  No decreased breath sounds, wheezing, rhonchi or rales  Abdominal:      General: There is distension  Palpations: Abdomen is soft  Tenderness: There is no abdominal tenderness  Comments: Midline ex lap incision well-healed  C/D/I   Genitourinary:     Comments: White present  Musculoskeletal:      Cervical back: Neck supple  Right lower leg: Edema present  Left lower leg: Edema present  Skin:     General: Skin is warm and dry  Coloration: Skin is pale  Neurological:      General: No focal deficit present        Mental Status: She is alert and oriented to person, place, and time  GCS: GCS eye subscore is 4  GCS verbal subscore is 5  GCS motor subscore is 6  Psychiatric:         Behavior: Behavior is cooperative             Laboratory and Diagnostics:  Results from last 7 days   Lab Units 12/02/22  0430 12/01/22 2140 12/01/22  1758 12/01/22  1359 11/30/22  1059   WBC Thousand/uL 22 68* 24 31*  --  13 32*  --    WHITE BLOOD CELL COUNT  x10E3/uL  --   --   --   --  8 0   HEMOGLOBIN g/dL 8 0* 10 7*  10 7* 7 9* 7 8*  --    HEMOGLOBIN  g/dL  --   --   --   --  8 4*   HEMATOCRIT % 25 4* 34 2*  --  27 6*  --    HEMATOCRIT  %  --   --   --   --  27 1*   PLATELETS Thousands/uL 57* 80*  --  198  --    PLATELETS  Y28R0/FF  --   --   --   --  114*   NEUTROS PCT  %  --   --   --   --  73   BANDS PCT %  --  34*  --  14*  --    MONO PCT %  --  7  --  0*  --    MONOS PCT  %  --   --   --   --  8     Results from last 7 days   Lab Units 12/02/22 0430 12/01/22  2140 12/01/22  1359 11/30/22  1059   SODIUM mmol/L 144 140  141 138 140   POTASSIUM mmol/L 4 5 4 1  4 1 3 6 3 5   CHLORIDE mmol/L 111* 107  108 105 101   CO2 mmol/L 15* 14*  15* 18* 24   ANION GAP mmol/L 18* 19*  18* 15*  --    BUN mg/dL 13 12  12 10 13   CREATININE mg/dL 0 91 1 21  1 16 0 92 0 63   CALCIUM mg/dL 9 1 8 8  9 1 8 8  --    GLUCOSE RANDOM mg/dL 87 111  115 240* 117*   ALT U/L 1,494* 723* 29 13   AST U/L 6,505* 2,739* 88* 20   ALK PHOS U/L 604* 799* 649*  --    ALBUMIN g/dL 1 7* 1 8* 1 7* 2 6*   TOTAL BILIRUBIN mg/dL 4 06* 4 46* 1 37* 0 7     Results from last 7 days   Lab Units 12/02/22 0430 12/01/22  2140   MAGNESIUM mg/dL 1 8 1 9   PHOSPHORUS mg/dL 5 3* 4 3*      Results from last 7 days   Lab Units 12/02/22  0430 12/01/22  2140 12/01/22  1432   INR  2 95* 2 54* 1 78*   PTT seconds  --   --  28          Results from last 7 days   Lab Units 12/02/22  0525 12/02/22  0150 12/01/22  2140 12/01/22  1750 12/01/22  1541 12/01/22  1423   LACTIC ACID mmol/L 14 1* 15 8* 15 8* 16 2* 12 4* 11 3*     ABG: VBG:  Results from last 7 days   Lab Units 22  0430   PH ARMOND  7 343   PCO2 ARMOND mm Hg 24 9*   PO2 ARMOND mm Hg 40 7   HCO3 ARMOND mmol/L 13 2*   BASE EXC ARMOND mmol/L -11 2     Results from last 7 days   Lab Units 22  1359   PROCALCITONIN ng/ml 1 68*       Micro  Results from last 7 days   Lab Units 22  1756 22  1750   GRAM STAIN RESULT  Gram negative rods* Gram negative rods*     Imagin/1 EGD: The esophagus appeared normal  With old blood and large clots removed via suctioning  No source of active bleeding seen  • The stomach appeared normal  With significant amounts of hematin and clots removed with suction  No source of active bleeding identified  Visualization obscured by hematin and clots in cardia  • Edematous mucosa in the gastrojejunal anastomosis; no bleeding was identified  • Large, cratered ulcer in the gastrojejunal anastomosis with clean base (Je III)  The jejunum appeared normal  With large amounts of old blood  No active bleeding seen  I have personally reviewed pertinent reports  Intake and Output  I/O        07 0700  0701   07    I V  (mL/kg)  2921 9 (22 8)    Blood  1550    NG/GT  40    IV Piggyback  400    Total Intake(mL/kg)  4911 9 (38 4)    Urine (mL/kg/hr)  975    Emesis/NG output  200    Total Output  1175    Net  +3736 9              Height and Weights         Body mass index is 48 32 kg/m²  Weight (last 2 days)     Date/Time Weight    22 0400 128 (281 53)            Nutrition       Diet Orders   (From admission, onward)             Start     Ordered    22 1649  Diet NPO  Diet effective now        References:    Nutrtion Support Algorithm Enteral vs  Parenteral   Question Answer Comment   Diet Type NPO    RD to adjust diet per protocol?  No        22 1648                Active Medications  Scheduled Meds:  Current Facility-Administered Medications   Medication Dose Route Frequency Provider Last Rate   • acetaminophen  650 mg Rectal Q6H PRN Alec Mera PA-C     • HYDROmorphone  0 5 mg Intravenous Q4H PRN Deena Scott MD     • insulin lispro  2-12 Units Subcutaneous Q6H [address] Alec Mera PA-C     • lactated ringers  125 mL/hr Intravenous Continuous Deena Scott  mL/hr (12/02/22 0541)   • magnesium sulfate  2 g Intravenous Once Alec Mera PA-C     • metoclopramide  10 mg Intravenous Q6H PRN Peyman Fry MD     • ondansetron  4 mg Intravenous Q6H PRN Peyman Fry MD     • pantoprozole (PROTONIX) infusion (Continuous)  8 mg/hr Intravenous Continuous Priyanka Hayes MD 8 mg/hr (12/02/22 0441)   • piperacillin-tazobactam  3 375 g Intravenous Q8H Deena Scott MD Stopped (12/02/22 0702)     Continuous Infusions:  lactated ringers, 125 mL/hr, Last Rate: 125 mL/hr (12/02/22 0541)  pantoprozole (PROTONIX) infusion (Continuous), 8 mg/hr, Last Rate: 8 mg/hr (12/02/22 0441)      PRN Meds:   acetaminophen, 650 mg, Q6H PRN  HYDROmorphone, 0 5 mg, Q4H PRN  metoclopramide, 10 mg, Q6H PRN  ondansetron, 4 mg, Q6H PRN        Allergies   Allergies   Allergen Reactions   • Other Rash     Adhesive tape      ---------------------------------------------------------------------------------------  Advance Directive and Living Will:      Power of :    POLST:    ---------------------------------------------------------------------------------------  Care Time Delivered:   Upon my evaluation, this patient had a high probability of imminent or life-threatening deterioration due to UGI bleed, lactic acidosis, which required my direct attention, intervention, and personal management  I have personally provided 46 minutes (7015 to 1284) of critical care time, exclusive of procedures, teaching, family meetings, and any prior time recorded by providers other than myself       Alec Mera PA-C

## 2022-12-02 NOTE — ANESTHESIA PREPROCEDURE EVALUATION
Procedure:  LAPAROTOMY EXPLORATORY (Abdomen)    Relevant Problems   CARDIO   (+) Benign essential hypertension   (+) Dyspnea on exertion   (+) Hyperlipidemia   (+) Hypertension   (+) Paroxysmal A-fib (HCC)   (+) Supraventricular tachycardia (HCC)      ENDO   (+) Type 2 diabetes mellitus without complication, without long-term current use of insulin (HCC)      GI/HEPATIC   (+) Esophageal reflux   (+) GI bleed   (+) Hematemesis   (+) Pancreatic cancer (HCC)      /RENAL   (+) Nephrolithiasis   (+) Uric acid kidney stone      NEURO/PSYCH   (+) Controlled depression      PULMONARY   (+) Dyspnea on exertion   (+) Severe obstructive sleep apnea      HPI/24hr events: 56 y/o F w/ recent Whipple with portal vein/SMV repair (19/69) complicated by PE on Eliquis, bacteremia, GDA blowout s/p stent placement, marginal ulcer  Presented 12/1 w/ hematemesis  Received KCentra and 2u prbcs       EGD w/ GI revealed no active source of bleeding  Significant amounts of clots found in the stomach  Visualized clean-based GJ anastamosis ulcer  Received 2u FFP post procedure for INR of 2 5  Blood cultures preliminarily growing gram negative rods  Lactic acidosis of 15 8 overnight now down to 14 1  Physical Exam    Airway  Comment: Intubated  Mallampati score: II  TM Distance: >3 FB  Neck ROM: full     Dental   No notable dental hx     Cardiovascular  Cardiovascular exam normal    Pulmonary  Pulmonary exam normal     Other Findings        Anesthesia Plan  ASA Score- 4 Emergent    Anesthesia Type- general with ASA Monitors  Additional Monitors: arterial line  Airway Plan: ETT  Plan Factors-Exercise tolerance (METS): <4 METS  Chart reviewed  EKG reviewed  Imaging results reviewed  Existing labs reviewed  Patient summary reviewed  Patient is not a current smoker  Patient not instructed to abstain from smoking on day of procedure  Patient did not smoke on day of surgery      There is medical exclusion for perioperative obstructive sleep apnea risk education  Induction-     Postoperative Plan-     Informed Consent- Anesthetic plan and risks discussed with spouse  I personally reviewed this patient with the CRNA  Discussed and agreed on the Anesthesia Plan with the CRNA  Hany Corona Findings    Left Ventricle Left ventricular cavity size is normal  Wall thickness is normal  The left ventricular ejection fraction is 50%  Systolic function is low normal   Wall motion is normal  Diastolic function is mildly abnormal, consistent with grade I (abnormal) relaxation  Right Ventricle Right ventricular cavity size is normal  Systolic function is normal  Wall thickness is normal    Left Atrium The atrium is normal in size  Right Atrium The atrium is normal in size  Aortic Valve The aortic valve is trileaflet  The leaflets are not thickened  The leaflets are not calcified  The leaflets exhibit normal mobility  There is no evidence of regurgitation  The aortic valve has no significant stenosis  Mitral Valve There is no evidence of regurgitation  There is no evidence of stenosis  The mitral valve has normal structure and normal function  Tricuspid Valve Tricuspid valve structure is normal  There is mild regurgitation  There is no evidence of stenosis  Pulmonic Valve Pulmonic valve structure is normal  There is trace regurgitation  There is no evidence of stenosis  Ascending Aorta The aortic root is normal in size  IVC/SVC The inferior vena cava is normal in size  Pericardium There is no pericardial effusion  The pericardium is normal in appearance       11/13/22 Supraventricular tachycardia  Nonspecific ST and T wave abnormality  Abnormal ECG  When compared with ECG of 12-NOV-2022 11:49,  No significant change was found

## 2022-12-02 NOTE — PROGRESS NOTES
Progress Note    Albert Swenson 57 y o  female MRN: [id number]  Unit/Bed#: MICU 05 Encounter: [id number]    Assessment:  56 y/o F w/ recent Whipple with portal vein/SMV repair (67/43) complicated by PE on Eliquis, bacteremia, s/p GDA stent placement, marginal ulcer  Presented 12/1 w/ hematemesis  EGD 12/1: large clot burden in cardia; no active GJ ulcer bleed  Deranged LFTs suggestive of shock liver  INR -3  Blood culture: GNRs (on zosyn)  Wbc-22  Hb-8 (s/p 3 U PRBCs)  Lactate: 15 8  UO: 1 L   NGT: 200 cc; old blood  PLAN;  - lactic acidosis very likely 2/2 to hepatic dysfunction/shock liver  - repeat EGD per GI   - continue PPI   - f/u liver doppler   - wean O2   - continue zosyn; follow definitive blood cultures  - will consider IR consult for drainage of abdominal/RP collection  Subjective:   - c/o: dull, intermittent abdominal pain; otherwise no new complaints this morning   - no new episodes of hematemesis  Objective:     Vitals: Temp:  [97 4 °F (36 3 °C)-99 7 °F (37 6 °C)] 99 7 °F (37 6 °C)  HR:  [] 106  Resp:  [18-33] 25  BP: ()/(53-80) 118/63  Body mass index is 48 32 kg/m²  I/O       11/30 0701  12/01 0700 12/01 0701  12/02 0700 12/02 0701  12/03 0700    I V  (mL/kg)  2921 9 (22 8)     Blood  1550     NG/GT  60     IV Piggyback  400     Total Intake(mL/kg)  4931 9 (38 5)     Urine (mL/kg/hr)  1150     Emesis/NG output  250     Total Output  1400     Net  +3531 9                  Physical Exam:  GEN: supine in bed;not in any acute distress  HEENT: atraumatic  CV: RRR  Lung: Normal effort  Ab: Soft, NT/ND  Extrem: No CCE  Neuro: A+Ox3    Lab, Imaging and other studies:   I have personally reviewed pertinent reports    , CBC with diff:   Lab Results   Component Value Date    WBC 22 68 (H) 12/02/2022    HGB 8 0 (L) 12/02/2022    HCT 25 4 (L) 12/02/2022    MCV 89 12/02/2022    PLT 57 (L) 12/02/2022    MCH 28 0 12/02/2022    MCHC 31 5 12/02/2022    RDW 16 9 (H) 12/02/2022 MPV 11 9 12/02/2022    NRBC 0 12/02/2022   , BMP/CMP:   Lab Results   Component Value Date    SODIUM 144 12/02/2022    K 4 5 12/02/2022     (H) 12/02/2022    CO2 15 (L) 12/02/2022    BUN 13 12/02/2022    CREATININE 0 91 12/02/2022    CALCIUM 9 1 12/02/2022    AST 6,505 (H) 12/02/2022    ALT 1,494 (H) 12/02/2022    ALKPHOS 604 (H) 12/02/2022    EGFR 67 12/02/2022   , Magnesium: No components found for: MAG  VTE Pharmacologic Prophylaxis: Sequential compression device (Venodyne)   VTE Mechanical Prophylaxis: sequential compression device

## 2022-12-02 NOTE — PROCEDURES
Central Line Insertion    Date/Time: 12/2/2022 11:07 AM  Performed by: Radhames Carrasco MD  Authorized by: Radhames Carrasco MD     Patient location:  ICU  Consent:     Consent obtained:  Emergent situation    Consent given by:  Patient    Risks discussed:  Arterial puncture, incorrect placement and infection    Alternatives discussed:  No treatment  Universal protocol:     Procedure explained and questions answered to patient or proxy's satisfaction: yes      Relevant documents present and verified: yes      Test results available and properly labeled: yes      Patient identity confirmed:  Verbally with patient  Pre-procedure details:     Hand hygiene: Hand hygiene performed prior to insertion      Skin preparation:  ChloraPrep  Indications:     Central line indications: medications requiring central line and hemodynamic monitoring    Anesthesia (see MAR for exact dosages): Anesthesia method:  Local infiltration    Local anesthetic:  Lidocaine 1% w/o epi  Procedure details:     Location:  Right internal jugular    Vessel type: vein      Laterality:  Right    Approach: percutaneous technique used      Patient position:  Trendelenburg    Catheter type:  Triple lumen    Catheter size:  7 Fr    Landmarks identified: yes      Ultrasound guidance: yes      Ultrasound image availability:  Not obtained due to urgency    Number of attempts:  1    Successful placement: yes    Post-procedure details:     Post-procedure:  Dressing applied and line sutured    Assessment:  Blood return through all ports    Patient tolerance of procedure:   Tolerated well, no immediate complications

## 2022-12-03 NOTE — DISCHARGE SUMMARY
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 ED on 22 w/ chief complaint hematemesis and epigastric pain  Pt on eliquis for hx of PE diagnosed post op  Procedures Performed:   Orders Placed This Encounter   Procedures   • Central Line   • Critical Care   • Intubation   • POC Cardiac US   • POC Cardiac US   • Temporary HD Catheter       Summary of Hospital Course: 64yo F w/ hx of pancreatic malignancy s/p Whipple procedure w/ portal vein/SMV repair on 10/31 w/ a post-operative course complicated by a PE on Eliquis, bacteremia, GDA blowout requiring a GDA stent, and a marginal ulcer who presented on  w/ hematemesis and acute decompensation on  prompting an exploratory laparotomy and abthera placement  Patient also treated for ESBL bacteremia  Patient received multiple blood products intraoperatively and developed worsening acidosis  Admitted to ICU post op  Patient arrived intubated, sedated, and on multiple high dose vasopressors for hemodynamic support  Post op labs showed worsening acidosis and renal function  Sodium bicarbonate drip started  Additional blood products given  Dialysis catheter placed and patient started on HD  Despite these interventions patient's clinical status continued to deteriorate w/ worsening acidosis and increasing vasopressor requirements  Pt was made comfort care after discussion with family  Patient  at [facility] on 12/3/22      Significant Findings, Care, Treatment and Services Provided: UGIB  Acute liver failure  Acute renal failure  Hemorrhagic shock  Septic shock  ESBL bacteremia  Exploratory laparotomy    Complications: acute liver failure, hemorrhagic shock, septic shock    Disposition:      Final Diagnosis: acute liver failure    Medical Problems     Resolved Problems  Date Reviewed: 2022   None         Condition at Time of Death: critical    Date, Time and Cause of Death    Date of Death: 12/3/22  Time of Death:  1:22 AM  Preliminary Cause of Death: [facility]  Entered by: Theodor Angelucci PG1 1 Narcisa Clemens MD 22 02:39          Death Note:    INPATIENT DEATH NOTE  Chelsea Lester 57 y o  female MRN: [id number]  Unit/Bed#: MICU 05 Encounter: [id number]    Date, Time and Cause of Death    Date of Death: 12/3/22  Time of Death:  1:22 AM  Preliminary Cause of Death: [facility]  Entered by: Theodor Angelucci PG1 1 Narcisa Clemens MD 22 02:39           Patient's Information  Pronounced by: Murtaza Johansen  Did the patient's death occur in the ED?: No  Did the patient's death occur in the OR?: No  Did the patient's death occur less than 10 days post-op?: Yes  Did the patient's death occur within 24 hours of admission?: No  Was code status DNR at the time of death?: Yes    PHYSICAL EXAM:  Unresponsive to noxious stimuli, Breath sounds absent, Carotid pulse absent and Heart sounds absent    Medical Examiner notification criteria:   within 24 hours of surgery / procedure / anesthesia   Medical Examiner's office notified?:  Yes       Family Notification  Was the family notified?: Yes  Date Notified: 22  Time Notified: 22  Notified by: Murtaza Johansen  Name of Family Notified of Death: Kwabena Mccoy   Relationship to Patient: Spouse  Family Notification Route:  At bedside  Was the family told to contact a  home?: Yes  Name of [place] [id number][de-identified] [facility]        Primary Service Attending Physician notified?:  yes - Attending:  Elin Powell MD    Physician/Resident responsible for completing Discharge Summary:  Adam Funez MD

## 2022-12-03 NOTE — ANESTHESIA POSTPROCEDURE EVALUATION
Post-Op Assessment Note    CV Status:  Stable    Pain management: adequate     Mental Status:  Sleepy (pt sedated)   Hydration Status:  Stable   PONV Controlled:  Controlled   Airway Patency:  Patent  Airway: intubated      Post Op Vitals Reviewed: Yes      Staff: Anesthesiologist, CRNA         No notable events documented      BP  102/69   Temp     Pulse 114   Resp   14   SpO2   96

## 2022-12-03 NOTE — PROCEDURES
Temporary HD Catheter    Date/Time: 12/2/2022 10:31 PM  Performed by: Nelli Randall PA-C  Authorized by: Nelli Randall PA-C     Patient location:  Bedside  Other Assisting Provider: Yes (comment) (Dr Emilee Perez, Dr Sarah Douglas)    Consent:     Consent obtained:  Emergent situation and written    Consent given by:  Spouse    Risks discussed:  Arterial puncture, bleeding, incorrect placement and nerve damage    Alternatives discussed:  No treatment and delayed treatment  Universal protocol:     Procedure explained and questions answered to patient or proxy's satisfaction: yes      Relevant documents present and verified: yes      Test results available and properly labeled: yes      Radiology Images displayed and confirmed  If images not available, report reviewed: yes      Required blood products, implants, devices, and special equipment available: yes      Site/side marked: yes      Immediately prior to procedure, a time out was called: yes      Patient identity confirmed:  Arm band and hospital-assigned identification number  Pre-procedure details:     Hand hygiene: Hand hygiene performed prior to insertion      Sterile barrier technique: All elements of maximal sterile technique followed      Skin preparation:  2% chlorhexidine    Skin preparation agent: Skin preparation agent completely dried prior to procedure    Indications:     Central line indications: dialysis      Site selection rationale:  Existing R IJ CVC; L port; multiple pressors with minimal access  Anesthesia (see MAR for exact dosages):      Anesthesia method:  None  Procedure details:     Location:  Right femoral    Vessel type: vein      Laterality:  Right    Approach: percutaneous endoscopic technique used      Patient position:  Trendelenburg    Catheter type:  Triple lumen    Catheter size:  12 5 Fr    Catheter length:  16 cm    Landmarks identified: yes      Ultrasound guidance: yes      Ultrasound image availability:  Not obtained due to urgency    Sterile ultrasound techniques: Sterile gel and sterile probe covers were used      Number of attempts:  1    Successful placement: yes    Post-procedure details:     Post-procedure:  Dressing applied and line sutured    Assessment:  Blood return through all ports    Patient tolerance of procedure:   Tolerated well, no immediate complications

## 2022-12-03 NOTE — QUICK NOTE
10:40pm - Updated pt's family at bedside with surg onc (Deangelo Urban and Shira Wright) regarding pt's poor prognosis  Explained that given pt's persistent hypotension on multiple pressors and worsening acidosis refractory to resuscitative measures, it is unlikely that she would survive the night  At this point, multiple organ systems have begun to fail, including her kidneys  We will attempt to initiate CVVH to help correct her acidosis, however, there is the possibility she would not be able to tolerate it given her low blood pressure  The pt's family state their understanding and would like to trial CVVH at this time  All questions and concerns were addressed at this time  11:50pm - pt persistently hypotensive with MAPs now in the low 50s despite initiation of CVVH  After discussion with pt's family at bedside, they would like to transition to comfort care at this time  This was also discussed with Dr Shira Wright, who was in agreement with plan

## 2022-12-03 NOTE — OP NOTE
OPERATIVE REPORT  PATIENT NAME: Louis Randhawa    :  1960  MRN: [id number]  Pt Location: BE OR ROOM 07    SURGERY DATE: 2022    Surgeon(s) and Role:     * Ya Horton MD - Primary     * Prudence Mirza MD - Assisting     * Pedro Luis Laboy MD - Farzana Peters MD - Brandie Key MD - Observing    Preop Diagnosis:  Hematemesis, unspecified whether nausea present [K92 0]    Post-Op Diagnosis Codes:     * Hematemesis, unspecified whether nausea present [K92 0]    Procedure(s) (LRB):  LAPAROTOMY EXPLORATORY, Abthera placement (N/A)    Specimen(s):  * No specimens in log *    Estimated Blood Loss:   2000 mL    Drains:  Urethral Catheter Temperature probe 16 Fr  (Active)   Amt returned on insertion(mL) 300 mL 22 0000   Reasons to continue Urinary Catheter  Accurate I&O assessment in critically ill patients (48 hr  max) 22 1523   Goal for Removal Remove after 48 hrs of I/O monitoring 22 0400   Site Assessment Clean;Skin intact 22 0400   White Care Done 22 0900   Collection Container Standard drainage bag 22 0400   Securement Method Securing device (Describe) 22 0400   Output (mL) 25 mL 22 1901   Number of days: 0       Ureteral Internal Stent Left ureter 6 Fr  (Active)   Number of days: 137       NG/OG Tube Nasogastric Right nare (Active)   Site Assessment Clean;Dry; Intact 22 1530   Output (mL) 200 mL 22 1600   Number of days: 0       NG/OG Tube Nasogastric Right nare (Active)   Number of days: 0       Anesthesia Type:   Choice    Operative Indications:  Persistent lactic acidosis in setting of GI bleed    Operative Findings:  Bowel, starting from stomach, through small intestine and down to colon appeared viable  No evidence of bowel ischemia or compromise  Stomach with blood within woman  Small intestine also with blood within lumen extending into colon    Liver would decreased turgor, somewhat mottled appearance, pale tan/yellow  Complications:   None    Procedure and Technique:  The patient is a 76-year-old woman with a history of pancreas cancer status post Whipple procedure  Her course was complicated by postop bleed which necessitated hepatic artery stent placement to bridge what appeared to be a blown GDA stump  She presented yesterday with recurrent signs of GI bleed and underwent endoscopy confirming clot in the stomach  She underwent endoscopy again today for continued workup the over the course of the day started to decompensate and have worsening lactic acidosis  She was therefore taken the operating room for exploration  Preoperatively, she was also noted to have Gram-negative korin bacteremia, as well as findings suggestive of retroperitoneal abscess on CT scan  Once the OR she was identified by proper time-out  She had already been intubated as she was in critically ill condition on pressors  After proper time-out, the patient was prepped and draped in usual fashion, and the midline we incised sharply  Cautery was used to dissect through the dermis down to the midline  We then entered the abdomen with some difficulty given a fair number of adhesions between the omentum, the viscera, and the anterior abdominal wall  We took our time taking these down  Surfaces were quite oozy throughout this process  We are ultimately able to dissect the omentum, colon, small bowel, and stomach away from the anterior abdominal wall  Bookwalter retractor was then placed and the abdomen inspected  The stomach was inspected, then the small bowel run all the way from the afferent and efferent jejunal limbs, down through the jejunum, towards the terminal ileum, and after this, the colon itself was run to the sigmoid  There was no evidence of bowel ischemia  There was old blood noted throughout the lumen of the stomach and bowels      Inspection of the liver revealed what appeared to be mottled surface with decreased tone  Additional inspection in the retroperitoneum behind the right colon, just anterior to the right kidney, there was a small pocket which we are able to break in 2 with finger fracture which released some turbid fluid  We then copiously irrigated the abdomen with 7 L of normal saline  In anticipation of second-look operation, we then placed ABThera VAC in place  The patient was then transferred back to the ICU in critical condition on pressors       I was present for the entire procedure    Patient Disposition:  Critical Care Unit    This procedure was not performed to treat colon cancer through resection      SIGNATURE: Jarett Lee MD  DATE: December 2, 2022  TIME: 9:52 PM

## 2022-12-03 NOTE — QUICK NOTE
Late entry, patient has required consistent care from all teams since arrival to the ICU post-operatively  The patient arrrived to MICU 5 on 30mcg of Levophed and 0 04 of Vasopressin with a MAP of 50-55 and -130bpm  She is s/p 4u pRBC, 4 FFP, 1 PLT intra-operatively w/ additional products administered post-operatively  She has exhibited worsening metabolic acidosis (w/ lactate of 19 4), coagulopathy (POC INR 3 30), leukocytosis (35 9), multiple electrolyte derangements, and was immediately started on a bicarb gtt, a fourth vasopressor, stress dose steroids, transitioned from propofol to precedex, has additional blood products ordered/being administered, and is on broad spectrum antibiotics  Her Abthera has produced 500cc of sanguineous output post-operatively  Given the persistent acidosis, metabolic derangements, and anuria, a R femoral HD catheter was placed, nephrology was consulted, and CVVH w/ a net positive will be initiated  The patient's family was at bedside and was updated by the critical care team  Dr Tyson Lemon and To at bedside

## 2022-12-03 NOTE — CONSULTS
Consultation - Nephrology   Albert Swenson 57 y o  female MRN: [id number]  Unit/Bed#: MICU 05 Encounter: [id number]    ASSESSMENT and PLAN:  Acute kidney injury, POA  -Baseline creatinine:  0 4-0 6 mg/dl  -Admission creatinine:  0 6 mg/dl  - Work up:   · UA with microscopy:  UA showed trace leukocyte, 2+ protein, 2020 RBC per high-power field and WBCs  · Imaging:  No  Hydronephrosis  -Etiology:  Due to hypotension and use of IV contrast resulting in ATN  Patient is on vasopressors   -Plan:   · Urine output around 410 mL since a m but has decrease in last couple hours  In the setting of worsening and persistent lactic acidosis with lactic acid level 04 4, metabolic acidosis with bicarb of 15, pH 7 0, starting on CRRT  Using higher dialysis flow rate to provide more clearance  · Dialysis consent was obtained from patient's , signed and placed in the chart after discussion about the risk and benefit, will be running 50 mL/hour net positive  · Continue vasopressors per ICU team  · Avoid nephrotoxins and dose all medications per EGFR  · Avoid hypotension  Hypernatremia  -sodium trended up to 150 mEq/liter likely with use of sodium bicarbonate drip  -recommend changing to hypotonic bicarbonate drip with 75 mEq of sodium bicarbonate  Continue to monitor sodium level  Once acidosis better with CRRT can stop bicarbonate drip    Metabolic acidosis/lactic acidosis  -bicarb level 15 with anion gap of 20  -lactic acidosis level 19 4  Peak lactic acid level 20 6 at 11:00 a m   -pH 7 09 on ABG at 8:00 p m   -persistent lactic acidosis in the setting of PATI, elevated LFTs and persistent hypotension  Status post multiple IV fluid boluses  -currently on bicarb drip  Recommend changing to hypotonic bicarb with sodium level elevated to 150    Hyperphosphatemia with phosphorus level trending up to 7 1 in the setting of acute kidney injury    Continue to monitor with initiation of CRRT    Hypercalcemia:  Corrected calcium 10 8  Ionized calcium actually on lower side at 1 00, continue to monitor    Anemia/acute blood loss:  Last hemoglobin 8 3 g per dL  -status post multiple PRBCs  Continue to monitor hemoglobin per ICU  -history of previous EGD with marginal ulcer with GDA blowout, status post stenting  -EGD on 12/01 showed large clot burden in the cardia  -status post exploratory laparotomy on 12/02 withAbthera placement   No evidence of bowel ischemia    Hypotension/sepsis/E coli Gram-negative bacteremia  -continue antibiotics per primary team   Also on IV hydrocortisone    Acute respiratory failure intubated on vent at 80% FiO2  Management per ICU team    Others:  History of postop PE  /history of AFib/pancreatic adenocarcinoma status post Whipple procedure on 10/31    Discussed with ICU advanced practitioner      HISTORY OF PRESENT ILLNESS:  Requesting Physician: Low Lopez MD  Reason for Consult:  Acute kidney injury, acidosis, consulted for CRRT    Jeanette Remy is a 57 y o  female with medical history of pancreatic adeno carcinoma, status post Whipple's procedure on 10/31/2022 postop course complicated by PE and sepsis presented to the hospital this admission with episode of hematemesis, found to have GDA blowout and underwent stent placement on 11/12 after angiogram    -see underwent EGD on 11/12 found to have large clean based ulcer at the gastrojejunal anastomosi  She was discharged on 11/18/2022    -she continued to have hematemesis on 12/01  Found to have a lactic acidosis  Again had CT abdomen pelvis with contrast on 12/01, no hydronephrosis but finding of residual high density collection lateral to residual pancreas and inferomedial to tariq hepatitis  Retroperitoneal and mid abdominal collection possibly some abscess  -EGD on 12/01 showed large clot burden in the cardia as per surgical note    Patient baseline creatinine is around 0 4-0 6  [facility]   Admission creatinine was 0 6, renal function was worsening to 1 2 on 12/01 and further worsening to 1 6 on 12/02  Lactic acidosis worsening and so nephrology consulted for CRRT  Last lactic acid level was 19 4  -underwent exploratory laparotomy on 12/02 Abthera placement  No evidence of bowel ischemia or compromise    Small intestine with blood within lumen extending into colon     PAST MEDICAL HISTORY:  Past Medical History:   Diagnosis Date   • Abnormal liver function test     last assessed 1/16/17    • Adenomatosis    • 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    last assessed 4/4/13   • Arthritis    • Atrial flutter ([facility] [address] )    • Benign essential hypertension     last assessed 12/21/17    • Body mass index (BMI) of 50-59 9 in adult [facility])    • Cancer (HCC)     pancreas   • Colon polyp    • Congenital pes planus     last assessed 7/15/14    • COVID     4/30/21   • CPAP (continuous positive airway pressure) dependence    • Dental crowns present    • Depression    • Diabetes mellitus ([facility] [address] )    • Dizziness     occas--pt reports did see family doctor   • GERD (gastroesophageal reflux disease)    • Hemangioma of skin 08/26/2003    last assessed 8/26/03   • History of cancer chemotherapy     [facility] Oncologist Dr Carmen Marrero   • History of gastroesophageal reflux (GERD)    • Hypercholesterolemia    • Hyperlipidemia    • Hypertension    • Irregular heart beat    • Kidney stone    • 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[facility] [address] ) 04/19/2006    last assessed 12/31/14    • Osteoarthritis of both knees     last assessed 12/31/14    • Paroxysmal atrial fibrillation ([facility] [address] )    • Port-A-Cath in place    • S/P ablation of atrial flutter    • Shortness of breath     per pt "from chemo-not drastic--still working and goes up steps"   • Sleep apnea    • Wears glasses        PAST SURGICAL HISTORY:  Past Surgical History:   Procedure Laterality Date   • ABDOMINAL ADHESION SURGERY N/A 10/31/2022    Procedure: LYSIS ADHESIONS, colectomy, vascular pedicle flap;  Surgeon: Mima Wolf MD;  [facility];  Service: Surgical Oncology   • ABDOMINAL SURGERY  06/2006    20cm GIST removed    • ABLATION SOFT TISSUE N/A 10/31/2022    Procedure: SOFT TISSUE ABLATION;  Surgeon: Mima Wolf MD;  [facility];  Service: Surgical Oncology   • APPENDECTOMY     • ATRIAL ABLATION SURGERY     • CARPAL TUNNEL RELEASE Bilateral    • COLONOSCOPY     • FL GUIDED CENTRAL VENOUS ACCESS DEVICE INSERTION  05/31/2022   • FL RETROGRADE PYELOGRAM  07/18/2022   • FL RETROGRADE PYELOGRAM  8/22/2022   • HYSTERECTOMY      fibroids   • IR MESENTERIC/VISCERAL ANGIOGRAM  11/12/2022   • IR PORT CHECK  06/23/2022   • IR PORT REMOVAL AND PLACEMENT NEW SITE  06/28/2022   • JOINT REPLACEMENT Bilateral     knees   • KIDNEY STONE SURGERY Right 09/17/2021    placed stent in  for kidney stone   • KNEE SURGERY     • KNEE SURGERY Left 03/2019   • LAPAROTOMY N/A 10/31/2022    Procedure: EXPLORATORY LAPAROTOMY;  Surgeon: Mima Wolf MD;  [facility];  Service: Surgical Oncology   • OOPHORECTOMY      cyst   • CT CYSTO/URETERO W/LITHOTRIPSY &INDWELL STENT INSRT Left 8/22/2022    Procedure: CYSTOSCOPY URETEROSCOPY WITH LITHOTRIPSY HOLMIUM LASER, RETROGRADE PYELOGRAM AND INSERTION STENT URETERAL;  Surgeon: Ashley Stephenson MD;  [facility];  Service: Urology   • CT CYSTOSCOPY,INSERT URETERAL STENT Left 8/22/2022    Procedure: EXCHANGE STENT URETERAL;  Surgeon: Ashley Stephenson MD;  [facility];  Service: Urology   • CT CYSTOURETHROSCOPY,URETER CATHETER Left 07/18/2022    Procedure: CYSTOSCOPY RETROGRADE PYELOGRAM WITH INSERTION STENT URETERAL;  Surgeon: Ashley Stephenson MD;  [facility];  Service: Urology   • TONSILLECTOMY     • TUBAL LIGATION     • TUMOR EXCISION  2006    gastrointestinal stromal tumor   • TUNNELED VENOUS PORT PLACEMENT N/A 05/31/2022    Procedure: INSERTION VENOUS PORT (PORT-A-CATH);   Surgeon: Mima Wlof MD;  [facility];  Service: Surgical Oncology   • UPPER GASTROINTESTINAL ENDOSCOPY     • WHIPPLE PROCEDURE/PANCREATICO-DUODENECTOMY N/A 10/31/2022    Procedure:  WHIPPLE PROCEDURE/PANCREATICO-DUODENECTOMY, IOUS;  Surgeon: Rahul Del Cid MD;  [facility];  Service: Surgical Oncology       SOCIAL HISTORY:  Social History     Substance and Sexual Activity   Alcohol Use Not Currently    Comment: social drinker per allscript      Social History     Substance and Sexual Activity   Drug Use Never     Social History     Tobacco Use   Smoking Status Former   • Years: 9 00   • Types: Cigarettes   Smokeless Tobacco Never       FAMILY HISTORY:  Family History   Problem Relation Age of Onset   • Emphysema Mother    • Arthritis Mother    • Diabetes Mother    • Hypertension Mother    • COPD Mother    • Arthritis Father    • Prostate cancer Father    • Cerebral aneurysm Son    • No Known Problems Maternal Grandmother    • No Known Problems Maternal Grandfather    • No Known Problems Paternal Grandmother    • No Known Problems Paternal Grandfather    • No Known Problems Son    • No Known Problems Maternal Aunt    • No Known Problems Maternal Aunt    • No Known Problems Paternal Aunt    • No Known Problems Paternal Aunt        ALLERGIES:  Allergies   Allergen Reactions   • Other Rash     Adhesive tape        MEDICATIONS:    Current Facility-Administered Medications:   •  acetaminophen (TYLENOL) rectal suppository 650 mg, 650 mg, Rectal, Q6H PRN, Desiree Estrada MD, 650 mg at 12/02/22 1342  •  calcium gluconate 2 g in sodium chloride 0 9% 100 mL (premix), 2 g, Intravenous, Once, Desiree Estrada MD  •  chlorhexidine (PERIDEX) 0 12 % oral rinse 15 mL, 15 mL, Mouth/Throat, Q12H [address], Desiree Estrada MD, 15 mL at 12/02/22 1210  •  EPINEPHrine 6000 mcg (DOUBLE CONCENTRATION) IV in sodium chloride 0 9% 250 mL, 1-10 mcg/min, Intravenous, Titrated, Fahad Dorantes PA-C, Last Rate: 25 mL/hr at 12/02/22 2125, 10 mcg/min at 12/02/22 2125  • ertapenem (INVanz) 1,000 mg in sodium chloride 0 9 % 50 mL IVPB, 1,000 mg, Intravenous, Q24H, Neeraj Robins MD, Stopped at 12/02/22 1300  •  fentaNYL 1000 mcg in sodium chloride 0 9% 100mL infusion, 100 mcg/hr, Intravenous, Continuous, Neeraj Robins MD, Last Rate: 10 mL/hr at 12/02/22 1919, 100 mcg/hr at 12/02/22 1919  •  fentanyl citrate (PF) 100 MCG/2ML 50 mcg, 50 mcg, Intravenous, Q1H PRN, Neeraj Robins MD, 50 mcg at 12/02/22 1140  •  hydrocortisone (Solu-CORTEF) injection 100 mg, 100 mg, Intravenous, Q6H [address], Neeraj Robins MD, 100 mg at 12/02/22 1951  •  HYDROmorphone (DILAUDID) injection 0 5 mg, 0 5 mg, Intravenous, Q4H PRN, Neeraj Robins MD, 0 5 mg at 12/02/22 0300  •  insulin lispro (HumaLOG) 100 units/mL subcutaneous injection 2-12 Units, 2-12 Units, Subcutaneous, Q6H [address] **AND** Fingerstick Glucose (POCT), , , Q6H, Neeraj Robins MD  •  metoclopramide (REGLAN) injection 10 mg, 10 mg, Intravenous, Q6H PRN, Neeraj Robins MD, 10 mg at 12/02/22 0305  •  norepinephrine (LEVOPHED) 8 mg (DOUBLE CONCENTRATION) IV in sodium chloride 0 9% 250 mL, 1-30 mcg/min, Intravenous, Titrated, Dru Dyer PA-C, Last Rate: 56 3 mL/hr at 12/02/22 2109, 30 mcg/min at 12/02/22 2109  •  ondansetron [facility]) injection 4 mg, 4 mg, Intravenous, Q6H PRN, Neeraj Robins MD, 4 mg at 12/02/22 0540  •  pantoprazole (PROTONIX) injection 40 mg, 40 mg, Intravenous, Q12H [address], Dru Dyer PA-C, 40 mg at 12/02/22 2056  •  phenylephrine (LOUISA-SYNEPHRINE) 100 mg (DOUBLE CONCENTRATION) IV in sodium chloride 0 9% 250 mL,  mcg/min, Intravenous, Titrated, Dru Dyer PA-C  •  propofol (DIPRIVAN) 1000 mg in 100 mL infusion (premix), 5-50 mcg/kg/min, Intravenous, Titrated, Neeraj Robins MD, Held at 12/02/22 1954  •  sodium bicarbonate 150 mEq in dextrose 5 % 1,000 mL infusion, 125 mL/hr, Intravenous, Continuous, Neeraj Robins MD, Last Rate: 125 mL/hr at 12/02/22 1933, 125 mL/hr at 12/02/22 1933  •  vasopressin (PITRESSIN) 20 Units in sodium chloride 0 9 % 100 mL infusion, 0 04 Units/min, Intravenous, Continuous, Benedicto Kumar MD, Last Rate: 12 mL/hr at 12/02/22 1919, 0 04 Units/min at 12/02/22 1919    REVIEW OF SYSTEMS:   Review of Systems   Unable to perform ROS: Intubated         PHYSICAL EXAM:  Current Weight: Weight - Scale: 127 kg (281 lb)  First Weight: Weight - Scale: 128 kg (281 lb 8 4 oz)  Vitals:    12/02/22 2000 12/02/22 2030 12/02/22 2052 12/02/22 2100   BP:   (!) 92/45    BP Location:       Pulse: (!) 116 (!) 116 (!) 119 (!) 118   Resp: (!) 24 (!) 24 (!) 26 (!) 27   Temp: 99 3 °F (37 4 °C) 99 3 °F (37 4 °C) 99 1 °F (37 3 °C) 99 °F (37 2 °C)   TempSrc:       SpO2: 98% 98%  99%   Weight:       Height:           Intake/Output Summary (Last 24 hours) at 12/2/2022 2132  Last data filed at 12/2/2022 2128  Gross per 24 hour   Intake 94904 27 ml   Output 4585 ml   Net 8710 27 ml     Physical Exam  General:  Ill looking, intubated   Head: normocephalic, atraumatic  Eyes: Conjunctivae pink,  Sclera anicteric  ENT: Intubated  Neck: supple   Chest: Clear to Auscultation both lungs,  no crackles or wheezing  CVS: S1 & S2 present, normal rate, regular rhythm, no murmur  Abdomen: soft, non-tender, non-distended, Bowel sounds normoactive  Extremities: no edema of  legs  Neuro: Sedated, intubated  Skin: no rash, warm and dry  Psych: sedated  Unable to assess  Patient is on multiple vasopressors    Invasive Devices:   Urethral Catheter Temperature probe 16 Fr   (Active)   Amt returned on insertion(mL) 300 mL 12/02/22 0000   Reasons to continue Urinary Catheter  Accurate I&O assessment in critically ill patients (48 hr  max) 12/02/22 1523   Goal for Removal Remove after 48 hrs of I/O monitoring 12/02/22 0400   Site Assessment Clean;Skin intact 12/02/22 0400   White Care Done 12/02/22 0900   Collection Container Standard drainage bag 12/02/22 0400   Securement Method Securing device (Describe) 12/02/22 0400   Output (mL) 25 mL 12/02/22 1901       Lab Results:   Results from last 7 days   Lab Units 12/02/22  1910 12/02/22  1831 12/02/22  1742 12/02/22  1529 12/02/22  1204 12/02/22  1158 12/02/22  1003 12/02/22  0430 12/01/22  2140   WBC Thousand/uL 35 88*  --   --   --   --  45 77*  --  22 68* 24 31*   HEMOGLOBIN g/dL 8 3*  --   --    < >  --  8 5*   < > 8 0* 10 7*  10 7*   I STAT HEMOGLOBIN g/dl  --  6 8* 8 2*  --   --   --   --   --   --    HEMATOCRIT % 27 7*  --   --   --   --  28 6*  --  25 4* 34 2*   HEMATOCRIT, ISTAT %  --  20* 24*  --   --   --   --   --   --    PLATELETS Thousands/uL 82*  --   --   --   --  103*  --  57* 80*   POTASSIUM mmol/L 3 7  --   --   --  3 9  --   --  4 5 4 1  4 1   CHLORIDE mmol/L 115*  --   --   --  111*  --   --  111* 107  108   CO2 mmol/L 15*  --   --   --  7*  --   --  15* 14*  15*   CO2, I-STAT mmol/L  --  17* 17*  --   --   --   --   --   --    BUN mg/dL 14  --   --   --  15  --   --  13 12  12   CREATININE mg/dL 1 66*  --   --   --  1 21  --   --  0 91 1 21  1 16   CALCIUM mg/dL 9 0  --   --   --  9 0  --   --  9 1 8 8  9 1   MAGNESIUM mg/dL 2 2  --   --   --   --   --   --  1 8 1 9   PHOSPHORUS mg/dL 7 1*  --   --   --   --   --   --  5 3* 4 3*   ALK PHOS U/L 572*  --   --   --  991*  --   --  604* 799*   ALT U/L 970*  --   --   --  2,136*  --   --  1,494* 723*   AST U/L 5,842*  --   --   --  10,369*  --   --  6,505* 2,739*   GLUCOSE, ISTAT mg/dl  --  53* 66  --   --   --   --   --   --     < > = values in this interval not displayed  Other Studies:    Reviewed results of CTA abdomen pelvis with and without contrast  Checks x-ray was personally reviewed by me, no evidence of pulmonary edema  Portions of the record may have been created with voice recognition software  Occasional wrong word or "sound a like" substitutions may have occurred due to the inherent limitations of voice recognition software   Read the chart carefully and recognize, using context, where substitutions have occurred  If you have any questions, please contact the dictating provider

## 2022-12-03 NOTE — DEATH NOTE
INPATIENT DEATH NOTE  Alan Keita 57 y o  female MRN: [id number]  Unit/Bed#: MICU 05 Encounter: [id number]    Date, Time and Cause of Death    Date of Death: 12/3/22  Time of Death:  1:22 AM  Preliminary Cause of Death: Pancreatic cancer ([facility] [address] )  Entered by: Mark Irizarry     PG1 1 Deretha Dubin, MD 22 02:39           Patient's Information  Pronounced by: Xenia Smith  Did the patient's death occur in the ED?: No  Did the patient's death occur in the OR?: No  Did the patient's death occur less than 10 days post-op?: Yes  Did the patient's death occur within 24 hours of admission?: No  Was code status DNR at the time of death?: Yes    PHYSICAL EXAM:  Unresponsive to noxious stimuli, Breath sounds absent, Carotid pulse absent and Heart sounds absent    Medical Examiner notification criteria:   within 24 hours of surgery / procedure / anesthesia   Medical Examiner's office notified?:  Yes       Family Notification  Was the family notified?: Yes  Date Notified: 22  Time Notified: 22  Notified by: Xenia Smith  Name of Family Notified of Death: Jin Caba   Relationship to Patient: Spouse  Family Notification Route:  At bedside  Was the family told to contact a  home?: Yes  Name [place] [id number][de-identified] [facility]        Primary Service Attending Physician notified?:  yes - Attending:  Prasanth Miller MD    Physician/Resident responsible for completing Discharge Summary:  Samantha Lopez MD

## 2022-12-03 NOTE — RESPIRATORY THERAPY NOTE
12/03/22 0058   Respiratory Assessment   Resp Comments Patient extubated to room air  No reintubation

## 2022-12-04 LAB
BACTERIA BLD CULT: ABNORMAL
BACTERIA BLD CULT: ABNORMAL
BACTERIA UR CULT: NORMAL
BLACTX-M ISLT/SPM QL: DETECTED
E COLI DNA BLD POS QL NAA+NON-PROBE: DETECTED
GRAM STN SPEC: ABNORMAL
GRAM STN SPEC: ABNORMAL

## 2022-12-05 ENCOUNTER — TELEPHONE (OUTPATIENT)
Dept: CARDIOLOGY CLINIC | Facility: CLINIC | Age: 62
End: 2022-12-05

## 2022-12-05 NOTE — UTILIZATION REVIEW
NOTIFICATION OF ADMISSION DISCHARGE   This is a Notification of Discharge from [facility]  Please be advised that this patient has been discharge from our facility  Below you will find the admission and discharge date and time including the patient’s disposition  UTILIZATION REVIEW CONTACT:  Hay To  Utilization   Network Utilization Review Department  Phone: [phone] x carefully listen to the prompts  All voicemails are confidential   Email: [email or web address]     ADMISSION INFORMATION  PRESENTATION DATE: 2022  1:35 PM  OBERVATION ADMISSION DATE:  INPATIENT ADMISSION DATE: 22  2:47 PM   DISCHARGE DATE: 12/3/2022  3:06 AM   DISPOSITION:    IMPORTANT INFORMATION:  Send all requests for admission clinical reviews, approved or denied determinations and any other requests to dedicated fax number below belonging to the campus where the patient is receiving treatment   List of dedicated fax numbers:  [address] DENIALS (Administrative/Medical Necessity) [phone]   [address] (Maternity/NICU/Pediatrics) [phone]   [facility] [phone]   [place] [address] [phone]   [address] [phone]   220 [facility] [phone]   90 [facility] [phone]   [facility] 119 [phone]   [facility]  [phone]   [facility] [phone]   [address] [facility] [address] [facility] [phone]

## 2022-12-05 NOTE — CLINICAL RISK MANAGEMENT
Progress Note - Death in Restraints   Albert Swenson 57 y o  female MRN: [id number]  Unit/Bed#: MICU 05 Encounter: [id number]      Patient  while restrained  with Soft restraint right wrist and Soft restraint left wrist  Death unrelated to use of restraints  This situation was tracked internally  CMS and PA-TOM  notification not required

## 2023-01-31 NOTE — ANESTHESIA POSTPROCEDURE EVALUATION
Post-Op Assessment Note             Comments: N/A, extubated in OR  Reason for prolonged intubation > 24 hours:  N/A, extubated in [facility] for prolonged intubation > 48 hours:  N/A, extubated in OR      No notable events documented      BP     Temp      Pulse    Resp     SpO2

## 2024-08-02 NOTE — BRIEF OP NOTE (RAD/CATH)
INTERVENTIONAL RADIOLOGY PROCEDURE NOTE    Date: 11/12/2022    Procedure: Stenting of the common hepatic artery across the GDA stump    Procedure Summary     Date: 11/12/22 Room / Location: [facility] Interventional Radiology    Anesthesia Start: 2606 Anesthesia Stop:     Procedure: IR MESENTERIC/VISCERAL ANGIOGRAM Diagnosis: (GI bleeding from presumed GDA blowout s/p Whipple Procedure 10/31/22)    Scheduled Providers:  Responsible Provider: Kev Morton MD    Anesthesia Type: general ASA Status: 3 - Emergent          Preoperative diagnosis:   1  Hematemesis, unspecified whether nausea present    2  Acute upper GI bleed         Postoperative diagnosis: Same  Surgeon: Vanessa Mccormick MD     Assistant: None  No qualified resident was available  Blood loss: minimal    Specimens: none     Findings: Successful covered stenting across the GDA stump of the common hepatic artery  Complications: None immediate      Anesthesia: general
Walk in

## (undated) DEVICE — TUBING SUCTION 5MM X 12 FT

## (undated) DEVICE — INTENDED FOR TISSUE SEPARATION, AND OTHER PROCEDURES THAT REQUIRE A SHARP SURGICAL BLADE TO PUNCTURE OR CUT.: Brand: BARD-PARKER SAFETY BLADES SIZE 15, STERILE

## (undated) DEVICE — GLOVE SRG BIOGEL ECLIPSE 8

## (undated) DEVICE — SUT SILK 3-0 SH CR/8 18 IN C013D

## (undated) DEVICE — 1840 FOAM BLOCK NEEDLE COUNTER: Brand: DEVON

## (undated) DEVICE — 3M™ STERI-STRIP™ REINFORCED ADHESIVE SKIN CLOSURES, R1547, 1/2 IN X 4 IN (12 MM X 100 MM), 6 STRIPS/ENVELOPE: Brand: 3M™ STERI-STRIP™

## (undated) DEVICE — MEDI-VAC YANK SUCT HNDL W/TPRD BULBOUS TIP: Brand: CARDINAL HEALTH

## (undated) DEVICE — 3M™ TEGADERM™ TRANSPARENT FILM DRESSING FRAME STYLE, 1624W, 2-3/8 IN X 2-3/4 IN (6 CM X 7 CM), 100/CT 4CT/CASE: Brand: 3M™ TEGADERM™

## (undated) DEVICE — ABTHERA OPEN ABDOMEN DRESSING WITH SENSATRAC PAD: Brand: ABTHERA™ SENSAT.R.A.C.™

## (undated) DEVICE — SUT VICRYL 3-0 SH 27 IN J416H

## (undated) DEVICE — SURGICEL 4 X 8

## (undated) DEVICE — VAC CANISTER 500ML

## (undated) DEVICE — 40601 PROLONGED POSITIONING SYSTEM: Brand: 40601 PROLONGED POSITIONING SYSTEM

## (undated) DEVICE — GLOVE INDICATOR PI UNDERGLOVE SZ 7 BLUE

## (undated) DEVICE — JACKSON-PRATT 100CC BULB RESERVOIR: Brand: CARDINAL HEALTH

## (undated) DEVICE — PREMIUM DRY TRAY LF: Brand: MEDLINE INDUSTRIES, INC.

## (undated) DEVICE — SPONGE LAP 18 X 18 IN STRL RFD

## (undated) DEVICE — PROXIMATE RELOADABLE LINEAR CUTTER WITH SAFETY LOCK-OUT, 75MM: Brand: PROXIMATE

## (undated) DEVICE — SUT SILK 2-0 SH CR/8 18 IN C012D

## (undated) DEVICE — BLANKET HYPOTHERMIA ADULT GAYMAR

## (undated) DEVICE — LIGACLIP MCA MULTIPLE CLIP APPLIERS, 20 MEDIUM CLIPS: Brand: LIGACLIP

## (undated) DEVICE — 3M™ IOBAN™ 2 ANTIMICROBIAL INCISE DRAPE 6650EZ: Brand: IOBAN™ 2

## (undated) DEVICE — GLOVE SRG BIOGEL ECLIPSE 7.5

## (undated) DEVICE — INTENDED FOR TISSUE SEPARATION, AND OTHER PROCEDURES THAT REQUIRE A SHARP SURGICAL BLADE TO PUNCTURE OR CUT.: Brand: BARD-PARKER SAFETY BLADES SIZE 11, STERILE

## (undated) DEVICE — BAG DECANTER

## (undated) DEVICE — PROXIMATE LINEAR CUTTER RELOAD, BLUE, 75MM: Brand: PROXIMATE

## (undated) DEVICE — TRAY FOLEY 16FR URIMETER SURESTEP

## (undated) DEVICE — GAUZE SPONGES,16 PLY: Brand: CURITY

## (undated) DEVICE — SYRINGE 50ML LL

## (undated) DEVICE — SUT VICRYL 2-0 D-SPECIAL 27 IN D8114

## (undated) DEVICE — CATH URETERAL 5FR X 70 CM FLEX TIP POLYUR BARD

## (undated) DEVICE — PACK TUR

## (undated) DEVICE — GLOVE INDICATOR PI UNDERGLOVE SZ 8 BLUE

## (undated) DEVICE — POOLE SUCTION HANDLE: Brand: CARDINAL HEALTH

## (undated) DEVICE — INTENDED FOR TISSUE SEPARATION, AND OTHER PROCEDURES THAT REQUIRE A SHARP SURGICAL BLADE TO PUNCTURE OR CUT.: Brand: BARD-PARKER SAFETY BLADES SIZE 10, STERILE

## (undated) DEVICE — ROSEBUD DISSECTORS: Brand: DEROYAL

## (undated) DEVICE — SUT SILK 3-0 18 IN A184H

## (undated) DEVICE — ADHESIVE SKIN HIGH VISCOSITY EXOFIN 1ML

## (undated) DEVICE — GLOVE SRG BIOGEL ECLIPSE 7

## (undated) DEVICE — HEMOSTATIC MATRIX SURGIFLO 8ML W/THROMBIN

## (undated) DEVICE — SUT MONOCRYL 4-0 PS-2 27 IN Y426H

## (undated) DEVICE — 3000CC GUARDIAN II: Brand: GUARDIAN

## (undated) DEVICE — SUT SILK 2-0 18 IN A185H

## (undated) DEVICE — CHLORHEXIDINE 4PCT 4 OZ

## (undated) DEVICE — BETHLEHEM MAJOR GENERAL PACK: Brand: CARDINAL HEALTH

## (undated) DEVICE — 3M™ TEGADERM™ TRANSPARENT FILM DRESSING FRAME STYLE, 1626W, 4 IN X 4-3/4 IN (10 CM X 12 CM), 50/CT 4CT/CASE: Brand: 3M™ TEGADERM™

## (undated) DEVICE — GAUZE SPONGES,USP TYPE VII GAUZE, 12 PLY: Brand: CURITY

## (undated) DEVICE — UROCATCH BAG

## (undated) DEVICE — PROXIMATE VASCULAR LINEAR STAPLER RELOADS: Brand: PROXIMATE

## (undated) DEVICE — SUT SILK 3-0 SH 30 IN K832H

## (undated) DEVICE — SUT SILK 2-0 SH 30 IN K833H

## (undated) DEVICE — PAD GROUNDING ADULT

## (undated) DEVICE — FIBER STD QUANTA 272 MICRON

## (undated) DEVICE — GUIDEWIRE STRGHT TIP 0.035 IN  SOLO PLUS

## (undated) DEVICE — BULB SYRINGE,IRRIGATION WITH PROTECTIVE CAP: Brand: DOVER

## (undated) DEVICE — PLUMEPEN PRO 10FT

## (undated) DEVICE — SYRINGE 10ML LL

## (undated) DEVICE — 3M™ TEGADERM™ TRANSPARENT FILM DRESSING FRAME STYLE, 1628, 6 IN X 8 IN (15 CM X 20 CM), 10/CT 8CT/CASE: Brand: 3M™ TEGADERM™

## (undated) DEVICE — SUT PROLENE 2-0 SH 36 IN 8523H

## (undated) DEVICE — STERILE SURGICAL LUBRICANT,  TUBE: Brand: SURGILUBE

## (undated) DEVICE — INVIEW CLEAR LEGGINGS: Brand: CONVERTORS

## (undated) DEVICE — VEIN PICK

## (undated) DEVICE — CHLORAPREP HI-LITE 26ML ORANGE

## (undated) DEVICE — SPECIMEN CONTAINER STERILE PEEL PACK

## (undated) DEVICE — STRL BETHLACCESS CATHETER PACK: Brand: CARDINAL HEALTH

## (undated) DEVICE — SUT PROLENE 3-0 SH 36 IN 8522H

## (undated) DEVICE — VESSEL LOOPS X-RAY DETECTABLE: Brand: DEROYAL

## (undated) DEVICE — LIGACLIP MCA MULTIPLE CLIP APPLIERS, 20 SMALL CLIPS: Brand: LIGACLIP

## (undated) DEVICE — PROXIMATE RELOADABLE VASCULAR LINEAR STAPLER, 30MM: Brand: PROXIMATE

## (undated) DEVICE — JP CHANNEL DRAIN 19FR, FULL FLUTES: Brand: JACKSON-PRATT

## (undated) DEVICE — HARMONIC 1100 SHEARS, 20CM SHAFT LENGTH: Brand: HARMONIC